# Patient Record
Sex: MALE | Race: WHITE | Employment: OTHER | URBAN - METROPOLITAN AREA
[De-identification: names, ages, dates, MRNs, and addresses within clinical notes are randomized per-mention and may not be internally consistent; named-entity substitution may affect disease eponyms.]

---

## 2017-01-03 ENCOUNTER — TELEPHONE (OUTPATIENT)
Dept: PULMONOLOGY | Age: 57
End: 2017-01-03

## 2017-01-03 ENCOUNTER — TELEPHONE (OUTPATIENT)
Dept: INTERNAL MEDICINE CLINIC | Age: 57
End: 2017-01-03

## 2017-01-04 ENCOUNTER — OFFICE VISIT (OUTPATIENT)
Dept: INTERNAL MEDICINE CLINIC | Age: 57
End: 2017-01-04

## 2017-01-04 ENCOUNTER — TELEPHONE (OUTPATIENT)
Dept: INTERNAL MEDICINE CLINIC | Age: 57
End: 2017-01-04

## 2017-01-04 VITALS
RESPIRATION RATE: 16 BRPM | SYSTOLIC BLOOD PRESSURE: 138 MMHG | DIASTOLIC BLOOD PRESSURE: 88 MMHG | HEART RATE: 88 BPM | TEMPERATURE: 97.9 F

## 2017-01-04 DIAGNOSIS — I10 ESSENTIAL HYPERTENSION, BENIGN: Primary | ICD-10-CM

## 2017-01-04 DIAGNOSIS — J01.00 ACUTE NON-RECURRENT MAXILLARY SINUSITIS: ICD-10-CM

## 2017-01-04 DIAGNOSIS — R73.02 GLUCOSE INTOLERANCE (IMPAIRED GLUCOSE TOLERANCE): ICD-10-CM

## 2017-01-04 PROCEDURE — 99213 OFFICE O/P EST LOW 20 MIN: CPT | Performed by: INTERNAL MEDICINE

## 2017-01-04 ASSESSMENT — ENCOUNTER SYMPTOMS
ABDOMINAL PAIN: 0
SHORTNESS OF BREATH: 0

## 2017-01-06 ENCOUNTER — ANTI-COAG VISIT (OUTPATIENT)
Dept: PHARMACY | Facility: CLINIC | Age: 57
End: 2017-01-06

## 2017-01-06 LAB — INR BLD: 2.4

## 2017-01-09 ENCOUNTER — TELEPHONE (OUTPATIENT)
Dept: PULMONOLOGY | Age: 57
End: 2017-01-09

## 2017-01-20 ENCOUNTER — HOSPITAL ENCOUNTER (OUTPATIENT)
Dept: OTHER | Age: 57
Discharge: HOME | End: 2017-01-20
Attending: INTERNAL MEDICINE | Admitting: INTERNAL MEDICINE

## 2017-01-20 ENCOUNTER — TELEPHONE (OUTPATIENT)
Dept: INTERNAL MEDICINE CLINIC | Age: 57
End: 2017-01-20

## 2017-01-20 DIAGNOSIS — J34.9 SINUS DISORDER: ICD-10-CM

## 2017-01-20 DIAGNOSIS — J34.9 SINUS DISORDER: Primary | ICD-10-CM

## 2017-01-20 RX ORDER — AZITHROMYCIN 250 MG/1
250 TABLET, FILM COATED ORAL DAILY
Qty: 15 TABLET | Refills: 0 | Status: SHIPPED | OUTPATIENT
Start: 2017-01-20 | End: 2017-03-27 | Stop reason: SDUPTHER

## 2017-01-20 RX ORDER — OXYCODONE HYDROCHLORIDE AND ACETAMINOPHEN 5; 325 MG/1; MG/1
1 TABLET ORAL EVERY 6 HOURS PRN
Qty: 60 TABLET | Refills: 0 | Status: SHIPPED | OUTPATIENT
Start: 2017-01-20 | End: 2017-03-08 | Stop reason: SDUPTHER

## 2017-01-25 RX ORDER — WARFARIN SODIUM 5 MG/1
TABLET ORAL
Qty: 105 TABLET | Refills: 3 | Status: SHIPPED | OUTPATIENT
Start: 2017-01-25 | End: 2017-02-02 | Stop reason: SDUPTHER

## 2017-01-26 ENCOUNTER — HOSPITAL ENCOUNTER (OUTPATIENT)
Dept: OTHER | Age: 57
Discharge: HOME | End: 2017-01-26
Attending: SURGERY | Admitting: SURGERY

## 2017-01-26 DIAGNOSIS — R05.9 COUGH: ICD-10-CM

## 2017-01-26 DIAGNOSIS — N20.0 URIC ACID NEPHROLITHIASIS: ICD-10-CM

## 2017-01-26 DIAGNOSIS — R06.02 SOB (SHORTNESS OF BREATH): ICD-10-CM

## 2017-01-30 ENCOUNTER — TELEPHONE (OUTPATIENT)
Dept: CARDIOLOGY CLINIC | Age: 57
End: 2017-01-30

## 2017-01-30 ENCOUNTER — TELEPHONE (OUTPATIENT)
Dept: INTERNAL MEDICINE CLINIC | Age: 57
End: 2017-01-30

## 2017-01-30 ENCOUNTER — OFFICE VISIT (OUTPATIENT)
Dept: INTERNAL MEDICINE CLINIC | Age: 57
End: 2017-01-30

## 2017-01-30 VITALS
SYSTOLIC BLOOD PRESSURE: 132 MMHG | BODY MASS INDEX: 32.47 KG/M2 | WEIGHT: 202 LBS | DIASTOLIC BLOOD PRESSURE: 76 MMHG | HEART RATE: 80 BPM | HEIGHT: 66 IN | RESPIRATION RATE: 16 BRPM

## 2017-01-30 DIAGNOSIS — J42 BRONCHIOLITIS OBLITERANS (HCC): Primary | ICD-10-CM

## 2017-01-30 DIAGNOSIS — R51.9 HEADACHE BEHIND THE EYES: ICD-10-CM

## 2017-01-30 DIAGNOSIS — M79.604 LEG PAIN, RIGHT: ICD-10-CM

## 2017-01-30 DIAGNOSIS — R06.02 SHORTNESS OF BREATH: ICD-10-CM

## 2017-01-30 PROCEDURE — 99214 OFFICE O/P EST MOD 30 MIN: CPT | Performed by: INTERNAL MEDICINE

## 2017-01-30 RX ORDER — AZELASTINE 1 MG/ML
1 SPRAY, METERED NASAL 2 TIMES DAILY
Qty: 1 BOTTLE | Refills: 3 | Status: SHIPPED | OUTPATIENT
Start: 2017-01-30 | End: 2017-11-14

## 2017-01-30 ASSESSMENT — ENCOUNTER SYMPTOMS
SHORTNESS OF BREATH: 1
ABDOMINAL PAIN: 0

## 2017-01-31 ENCOUNTER — TELEPHONE (OUTPATIENT)
Dept: INTERNAL MEDICINE CLINIC | Age: 57
End: 2017-01-31

## 2017-01-31 DIAGNOSIS — R51.9 HEADACHE BEHIND THE EYES: Primary | ICD-10-CM

## 2017-02-02 ENCOUNTER — ANTI-COAG VISIT (OUTPATIENT)
Dept: PHARMACY | Facility: CLINIC | Age: 57
End: 2017-02-02

## 2017-02-02 ENCOUNTER — HOSPITAL ENCOUNTER (OUTPATIENT)
Dept: MRI IMAGING | Age: 57
Discharge: HOME | End: 2017-02-02
Attending: INTERNAL MEDICINE | Admitting: INTERNAL MEDICINE

## 2017-02-02 ENCOUNTER — OFFICE VISIT (OUTPATIENT)
Age: 57
End: 2017-02-02

## 2017-02-02 VITALS
SYSTOLIC BLOOD PRESSURE: 118 MMHG | DIASTOLIC BLOOD PRESSURE: 74 MMHG | WEIGHT: 200.6 LBS | HEART RATE: 72 BPM | HEIGHT: 66 IN | OXYGEN SATURATION: 97 % | BODY MASS INDEX: 32.24 KG/M2

## 2017-02-02 DIAGNOSIS — I42.8 NONISCHEMIC CARDIOMYOPATHY (HCC): Primary | ICD-10-CM

## 2017-02-02 DIAGNOSIS — I44.7 LBBB (LEFT BUNDLE BRANCH BLOCK): ICD-10-CM

## 2017-02-02 DIAGNOSIS — R51.9 HEADACHE: ICD-10-CM

## 2017-02-02 DIAGNOSIS — E78.00 PURE HYPERCHOLESTEROLEMIA: ICD-10-CM

## 2017-02-02 DIAGNOSIS — R51.9 HEADACHE BEHIND THE EYES: ICD-10-CM

## 2017-02-02 DIAGNOSIS — R06.02 SHORTNESS OF BREATH: ICD-10-CM

## 2017-02-02 LAB — INR BLD: 1.8

## 2017-02-02 PROCEDURE — 99214 OFFICE O/P EST MOD 30 MIN: CPT | Performed by: INTERNAL MEDICINE

## 2017-02-02 RX ORDER — CARVEDILOL 6.25 MG/1
6.25 TABLET ORAL 2 TIMES DAILY WITH MEALS
Qty: 180 TABLET | Refills: 6 | Status: SHIPPED | OUTPATIENT
Start: 2017-02-02 | End: 2017-10-24 | Stop reason: SDUPTHER

## 2017-02-02 RX ORDER — PRAVASTATIN SODIUM 40 MG
40 TABLET ORAL NIGHTLY
Qty: 30 TABLET | Refills: 3 | Status: SHIPPED | OUTPATIENT
Start: 2017-02-02 | End: 2017-03-08

## 2017-02-10 ENCOUNTER — HOSPITAL ENCOUNTER (OUTPATIENT)
Dept: ULTRASOUND IMAGING | Age: 57
Discharge: HOME | End: 2017-02-10

## 2017-02-10 DIAGNOSIS — Z13.820 OSTEOPOROSIS SCREENING: ICD-10-CM

## 2017-02-10 DIAGNOSIS — E21.3 HYPERPARATHYROIDISM (HCC): ICD-10-CM

## 2017-02-13 ENCOUNTER — OFFICE VISIT (OUTPATIENT)
Dept: PULMONOLOGY | Age: 57
End: 2017-02-13

## 2017-02-13 ENCOUNTER — TELEPHONE (OUTPATIENT)
Dept: PHARMACY | Facility: CLINIC | Age: 57
End: 2017-02-13

## 2017-02-13 VITALS
HEIGHT: 66 IN | DIASTOLIC BLOOD PRESSURE: 78 MMHG | OXYGEN SATURATION: 94 % | WEIGHT: 198.6 LBS | BODY MASS INDEX: 31.92 KG/M2 | RESPIRATION RATE: 16 BRPM | TEMPERATURE: 98.5 F | SYSTOLIC BLOOD PRESSURE: 124 MMHG | HEART RATE: 87 BPM

## 2017-02-13 DIAGNOSIS — R91.8 LUNG NODULES: ICD-10-CM

## 2017-02-13 DIAGNOSIS — J42 BRONCHIOLITIS OBLITERANS (HCC): Primary | ICD-10-CM

## 2017-02-13 DIAGNOSIS — J20.9 ACUTE BRONCHITIS, UNSPECIFIED ORGANISM: ICD-10-CM

## 2017-02-13 PROCEDURE — 99205 OFFICE O/P NEW HI 60 MIN: CPT | Performed by: INTERNAL MEDICINE

## 2017-02-13 RX ORDER — ALBUTEROL SULFATE 2.5 MG/3ML
2.5 SOLUTION RESPIRATORY (INHALATION) 3 TIMES DAILY
Qty: 360 ML | Refills: 11 | Status: SHIPPED | OUTPATIENT
Start: 2017-02-13 | End: 2017-07-31 | Stop reason: ALTCHOICE

## 2017-02-13 RX ORDER — LEVOFLOXACIN 500 MG/1
500 TABLET, FILM COATED ORAL DAILY
Qty: 7 TABLET | Refills: 0 | Status: SHIPPED | OUTPATIENT
Start: 2017-02-13 | End: 2017-02-21 | Stop reason: SDUPTHER

## 2017-02-13 RX ORDER — ALBUTEROL SULFATE 90 UG/1
2 AEROSOL, METERED RESPIRATORY (INHALATION) EVERY 4 HOURS PRN
Qty: 1 INHALER | Refills: 11 | Status: SHIPPED | OUTPATIENT
Start: 2017-02-13 | End: 2017-05-14

## 2017-02-17 ENCOUNTER — ANTI-COAG VISIT (OUTPATIENT)
Dept: PHARMACY | Facility: CLINIC | Age: 57
End: 2017-02-17

## 2017-02-17 DIAGNOSIS — I26.01 CHRONIC SEPTIC PULMONARY EMBOLISM WITH ACUTE COR PULMONALE (HCC): ICD-10-CM

## 2017-02-17 DIAGNOSIS — I27.82 CHRONIC SEPTIC PULMONARY EMBOLISM WITH ACUTE COR PULMONALE (HCC): ICD-10-CM

## 2017-02-17 LAB — INR BLD: 1.7

## 2017-02-21 ENCOUNTER — OFFICE VISIT (OUTPATIENT)
Dept: INTERNAL MEDICINE CLINIC | Age: 57
End: 2017-02-21

## 2017-02-21 ENCOUNTER — TELEPHONE (OUTPATIENT)
Dept: INTERNAL MEDICINE CLINIC | Age: 57
End: 2017-02-21

## 2017-02-21 VITALS
WEIGHT: 200 LBS | BODY MASS INDEX: 32.14 KG/M2 | TEMPERATURE: 98.4 F | RESPIRATION RATE: 16 BRPM | DIASTOLIC BLOOD PRESSURE: 82 MMHG | SYSTOLIC BLOOD PRESSURE: 136 MMHG | HEIGHT: 66 IN | HEART RATE: 72 BPM

## 2017-02-21 DIAGNOSIS — J42 BRONCHIOLITIS OBLITERANS (HCC): Primary | ICD-10-CM

## 2017-02-21 DIAGNOSIS — I10 ESSENTIAL HYPERTENSION, BENIGN: ICD-10-CM

## 2017-02-21 DIAGNOSIS — R05.9 COUGH: ICD-10-CM

## 2017-02-21 PROCEDURE — 99213 OFFICE O/P EST LOW 20 MIN: CPT | Performed by: INTERNAL MEDICINE

## 2017-02-21 RX ORDER — LEVOFLOXACIN 500 MG/1
500 TABLET, FILM COATED ORAL DAILY
Qty: 7 TABLET | Refills: 0 | Status: SHIPPED | OUTPATIENT
Start: 2017-02-21 | End: 2017-02-28

## 2017-02-21 ASSESSMENT — ENCOUNTER SYMPTOMS
SHORTNESS OF BREATH: 0
ABDOMINAL PAIN: 0

## 2017-03-01 ENCOUNTER — ANTI-COAG VISIT (OUTPATIENT)
Dept: PHARMACY | Facility: CLINIC | Age: 57
End: 2017-03-01

## 2017-03-01 ENCOUNTER — TELEPHONE (OUTPATIENT)
Dept: INTERNAL MEDICINE CLINIC | Age: 57
End: 2017-03-01

## 2017-03-01 LAB — INR BLD: 1.7

## 2017-03-07 ENCOUNTER — TELEPHONE (OUTPATIENT)
Dept: INTERNAL MEDICINE CLINIC | Age: 57
End: 2017-03-07

## 2017-03-08 ENCOUNTER — OFFICE VISIT (OUTPATIENT)
Dept: INTERNAL MEDICINE CLINIC | Age: 57
End: 2017-03-08

## 2017-03-08 VITALS
SYSTOLIC BLOOD PRESSURE: 122 MMHG | DIASTOLIC BLOOD PRESSURE: 64 MMHG | HEART RATE: 72 BPM | RESPIRATION RATE: 16 BRPM | WEIGHT: 202 LBS | BODY MASS INDEX: 32.47 KG/M2 | HEIGHT: 66 IN

## 2017-03-08 DIAGNOSIS — E78.00 PURE HYPERCHOLESTEROLEMIA: ICD-10-CM

## 2017-03-08 DIAGNOSIS — I10 ESSENTIAL HYPERTENSION, BENIGN: ICD-10-CM

## 2017-03-08 DIAGNOSIS — G44.219 EPISODIC TENSION-TYPE HEADACHE, NOT INTRACTABLE: Primary | ICD-10-CM

## 2017-03-08 PROCEDURE — 99214 OFFICE O/P EST MOD 30 MIN: CPT | Performed by: INTERNAL MEDICINE

## 2017-03-08 RX ORDER — OXYCODONE HYDROCHLORIDE AND ACETAMINOPHEN 5; 325 MG/1; MG/1
1 TABLET ORAL EVERY 6 HOURS PRN
Qty: 60 TABLET | Refills: 0 | Status: SHIPPED | OUTPATIENT
Start: 2017-03-08 | End: 2017-05-14

## 2017-03-08 RX ORDER — RANITIDINE 150 MG/1
150 TABLET ORAL 2 TIMES DAILY
Qty: 60 TABLET | Refills: 3 | Status: SHIPPED | OUTPATIENT
Start: 2017-03-08 | End: 2017-04-19 | Stop reason: ALTCHOICE

## 2017-03-08 ASSESSMENT — ENCOUNTER SYMPTOMS
ABDOMINAL PAIN: 0
SHORTNESS OF BREATH: 0

## 2017-03-15 ENCOUNTER — ANTI-COAG VISIT (OUTPATIENT)
Dept: PHARMACY | Facility: CLINIC | Age: 57
End: 2017-03-15

## 2017-03-15 DIAGNOSIS — I27.82 CHRONIC SEPTIC PULMONARY EMBOLISM WITH ACUTE COR PULMONALE (HCC): ICD-10-CM

## 2017-03-15 DIAGNOSIS — I26.01 CHRONIC SEPTIC PULMONARY EMBOLISM WITH ACUTE COR PULMONALE (HCC): ICD-10-CM

## 2017-03-15 LAB — INR BLD: 1.7

## 2017-03-17 ENCOUNTER — TELEPHONE (OUTPATIENT)
Dept: INTERNAL MEDICINE CLINIC | Age: 57
End: 2017-03-17

## 2017-03-17 RX ORDER — CARISOPRODOL 350 MG/1
TABLET ORAL
Qty: 60 TABLET | Refills: 0 | Status: SHIPPED | OUTPATIENT
Start: 2017-03-17 | End: 2017-05-14

## 2017-03-27 RX ORDER — AZITHROMYCIN 250 MG/1
TABLET, FILM COATED ORAL
Qty: 15 TABLET | Refills: 0 | Status: SHIPPED | OUTPATIENT
Start: 2017-03-27 | End: 2017-05-14

## 2017-03-28 ENCOUNTER — ANTI-COAG VISIT (OUTPATIENT)
Dept: PHARMACY | Facility: CLINIC | Age: 57
End: 2017-03-28

## 2017-03-28 DIAGNOSIS — I27.82 CHRONIC SEPTIC PULMONARY EMBOLISM WITH ACUTE COR PULMONALE (HCC): ICD-10-CM

## 2017-03-28 DIAGNOSIS — I26.01 CHRONIC SEPTIC PULMONARY EMBOLISM WITH ACUTE COR PULMONALE (HCC): ICD-10-CM

## 2017-03-28 LAB — INR BLD: 3.8

## 2017-04-06 ENCOUNTER — ANTI-COAG VISIT (OUTPATIENT)
Dept: PHARMACY | Facility: CLINIC | Age: 57
End: 2017-04-06

## 2017-04-06 DIAGNOSIS — I26.01 CHRONIC SEPTIC PULMONARY EMBOLISM WITH ACUTE COR PULMONALE (HCC): ICD-10-CM

## 2017-04-06 DIAGNOSIS — I27.82 CHRONIC SEPTIC PULMONARY EMBOLISM WITH ACUTE COR PULMONALE (HCC): ICD-10-CM

## 2017-04-06 LAB — INR BLD: 2.1

## 2017-04-13 ENCOUNTER — HOSPITAL ENCOUNTER (OUTPATIENT)
Dept: OTHER | Age: 57
Discharge: HOME | End: 2017-04-13
Attending: SURGERY | Admitting: SURGERY

## 2017-04-13 DIAGNOSIS — N20.0 URIC ACID NEPHROLITHIASIS: ICD-10-CM

## 2017-04-14 ENCOUNTER — OFFICE VISIT (OUTPATIENT)
Dept: INTERNAL MEDICINE CLINIC | Age: 57
End: 2017-04-14

## 2017-04-14 ENCOUNTER — TELEPHONE (OUTPATIENT)
Dept: INTERNAL MEDICINE CLINIC | Age: 57
End: 2017-04-14

## 2017-04-14 VITALS
DIASTOLIC BLOOD PRESSURE: 82 MMHG | HEART RATE: 67 BPM | BODY MASS INDEX: 31.92 KG/M2 | OXYGEN SATURATION: 98 % | RESPIRATION RATE: 16 BRPM | WEIGHT: 198.6 LBS | TEMPERATURE: 97.7 F | HEIGHT: 66 IN | SYSTOLIC BLOOD PRESSURE: 120 MMHG

## 2017-04-14 DIAGNOSIS — J01.11 ACUTE RECURRENT FRONTAL SINUSITIS: Primary | ICD-10-CM

## 2017-04-14 PROCEDURE — 99214 OFFICE O/P EST MOD 30 MIN: CPT | Performed by: NURSE PRACTITIONER

## 2017-04-14 RX ORDER — CEFUROXIME AXETIL 250 MG/1
250 TABLET ORAL 2 TIMES DAILY
Qty: 20 TABLET | Refills: 0 | Status: SHIPPED | OUTPATIENT
Start: 2017-04-14 | End: 2017-04-19 | Stop reason: ALTCHOICE

## 2017-04-14 ASSESSMENT — ENCOUNTER SYMPTOMS
RHINORRHEA: 1
WHEEZING: 0
SORE THROAT: 1
SHORTNESS OF BREATH: 0
COUGH: 1
SINUS PRESSURE: 1

## 2017-04-17 RX ORDER — ZOLPIDEM TARTRATE 10 MG/1
TABLET ORAL
Qty: 90 TABLET | Refills: 0 | Status: SHIPPED | OUTPATIENT
Start: 2017-04-17 | End: 2017-07-18 | Stop reason: SDUPTHER

## 2017-04-18 ENCOUNTER — TELEPHONE (OUTPATIENT)
Dept: INTERNAL MEDICINE CLINIC | Age: 57
End: 2017-04-18

## 2017-04-18 DIAGNOSIS — R05.9 COUGH: Primary | ICD-10-CM

## 2017-04-18 RX ORDER — LEVOFLOXACIN 500 MG/1
500 TABLET, FILM COATED ORAL DAILY
Qty: 10 TABLET | Refills: 0 | Status: SHIPPED | OUTPATIENT
Start: 2017-04-18 | End: 2017-04-28

## 2017-04-19 ENCOUNTER — ANTI-COAG VISIT (OUTPATIENT)
Dept: PHARMACY | Facility: CLINIC | Age: 57
End: 2017-04-19

## 2017-04-19 LAB — INR BLD: 2.5

## 2017-04-20 ENCOUNTER — TELEPHONE (OUTPATIENT)
Dept: INTERNAL MEDICINE CLINIC | Age: 57
End: 2017-04-20

## 2017-04-20 ENCOUNTER — HOSPITAL ENCOUNTER (OUTPATIENT)
Dept: OTHER | Age: 57
Discharge: HOME | End: 2017-04-20
Attending: INTERNAL MEDICINE | Admitting: INTERNAL MEDICINE

## 2017-04-20 DIAGNOSIS — R05.9 COUGH: ICD-10-CM

## 2017-04-20 RX ORDER — ONDANSETRON 4 MG/1
4-8 TABLET, ORALLY DISINTEGRATING ORAL EVERY 8 HOURS PRN
Qty: 20 TABLET | Refills: 0 | Status: SHIPPED | OUTPATIENT
Start: 2017-04-20 | End: 2018-06-04 | Stop reason: SDUPTHER

## 2017-04-21 ENCOUNTER — OFFICE VISIT (OUTPATIENT)
Dept: INTERNAL MEDICINE CLINIC | Age: 57
End: 2017-04-21

## 2017-04-21 VITALS
WEIGHT: 197 LBS | RESPIRATION RATE: 16 BRPM | DIASTOLIC BLOOD PRESSURE: 80 MMHG | OXYGEN SATURATION: 97 % | HEIGHT: 66 IN | BODY MASS INDEX: 31.66 KG/M2 | SYSTOLIC BLOOD PRESSURE: 132 MMHG | HEART RATE: 80 BPM

## 2017-04-21 DIAGNOSIS — J32.9 SINUSITIS, UNSPECIFIED CHRONICITY, UNSPECIFIED LOCATION: Primary | ICD-10-CM

## 2017-04-21 DIAGNOSIS — I10 ESSENTIAL HYPERTENSION, BENIGN: ICD-10-CM

## 2017-04-21 DIAGNOSIS — R11.0 NAUSEA: ICD-10-CM

## 2017-04-21 PROCEDURE — 99214 OFFICE O/P EST MOD 30 MIN: CPT | Performed by: INTERNAL MEDICINE

## 2017-04-21 ASSESSMENT — ENCOUNTER SYMPTOMS
ABDOMINAL PAIN: 0
SHORTNESS OF BREATH: 0

## 2017-04-24 ENCOUNTER — TELEPHONE (OUTPATIENT)
Dept: INTERNAL MEDICINE CLINIC | Age: 57
End: 2017-04-24

## 2017-05-01 ENCOUNTER — TELEPHONE (OUTPATIENT)
Dept: INTERNAL MEDICINE CLINIC | Age: 57
End: 2017-05-01

## 2017-05-02 RX ORDER — PREDNISONE 10 MG/1
10 TABLET ORAL DAILY
Qty: 30 TABLET | Refills: 0 | Status: SHIPPED | OUTPATIENT
Start: 2017-05-02 | End: 2017-05-14

## 2017-05-03 ENCOUNTER — ANTI-COAG VISIT (OUTPATIENT)
Dept: PHARMACY | Facility: CLINIC | Age: 57
End: 2017-05-03

## 2017-05-03 LAB — INR BLD: 3

## 2017-05-08 ENCOUNTER — TELEPHONE (OUTPATIENT)
Dept: INTERNAL MEDICINE CLINIC | Age: 57
End: 2017-05-08

## 2017-05-16 ENCOUNTER — TELEPHONE (OUTPATIENT)
Dept: PHARMACY | Facility: CLINIC | Age: 57
End: 2017-05-16

## 2017-05-16 ENCOUNTER — OFFICE VISIT (OUTPATIENT)
Dept: PULMONOLOGY | Age: 57
End: 2017-05-16

## 2017-05-16 VITALS
SYSTOLIC BLOOD PRESSURE: 126 MMHG | TEMPERATURE: 98.2 F | WEIGHT: 195.6 LBS | HEIGHT: 66 IN | BODY MASS INDEX: 31.43 KG/M2 | HEART RATE: 88 BPM | RESPIRATION RATE: 16 BRPM | DIASTOLIC BLOOD PRESSURE: 86 MMHG | OXYGEN SATURATION: 96 %

## 2017-05-16 DIAGNOSIS — J42 BRONCHIOLITIS OBLITERANS (HCC): Primary | ICD-10-CM

## 2017-05-16 DIAGNOSIS — R91.8 LUNG NODULES: ICD-10-CM

## 2017-05-16 PROCEDURE — 99214 OFFICE O/P EST MOD 30 MIN: CPT | Performed by: INTERNAL MEDICINE

## 2017-05-17 ENCOUNTER — ANTI-COAG VISIT (OUTPATIENT)
Dept: PHARMACY | Facility: CLINIC | Age: 57
End: 2017-05-17

## 2017-05-17 DIAGNOSIS — I26.01 CHRONIC SEPTIC PULMONARY EMBOLISM WITH ACUTE COR PULMONALE (HCC): ICD-10-CM

## 2017-05-17 DIAGNOSIS — I27.82 CHRONIC SEPTIC PULMONARY EMBOLISM WITH ACUTE COR PULMONALE (HCC): ICD-10-CM

## 2017-05-17 LAB — INR BLD: 6.2

## 2017-05-19 ENCOUNTER — TELEPHONE (OUTPATIENT)
Dept: INTERNAL MEDICINE CLINIC | Age: 57
End: 2017-05-19

## 2017-05-19 ENCOUNTER — ANTI-COAG VISIT (OUTPATIENT)
Dept: PHARMACY | Facility: CLINIC | Age: 57
End: 2017-05-19

## 2017-05-19 DIAGNOSIS — I26.01 CHRONIC SEPTIC PULMONARY EMBOLISM WITH ACUTE COR PULMONALE (HCC): ICD-10-CM

## 2017-05-19 DIAGNOSIS — I27.82 CHRONIC SEPTIC PULMONARY EMBOLISM WITH ACUTE COR PULMONALE (HCC): ICD-10-CM

## 2017-05-19 LAB — INR BLD: 3

## 2017-05-22 ENCOUNTER — OFFICE VISIT (OUTPATIENT)
Dept: INTERNAL MEDICINE CLINIC | Age: 57
End: 2017-05-22

## 2017-05-22 VITALS
BODY MASS INDEX: 31.18 KG/M2 | SYSTOLIC BLOOD PRESSURE: 136 MMHG | DIASTOLIC BLOOD PRESSURE: 84 MMHG | HEART RATE: 80 BPM | RESPIRATION RATE: 16 BRPM | HEIGHT: 66 IN | WEIGHT: 194 LBS

## 2017-05-22 DIAGNOSIS — I10 ESSENTIAL HYPERTENSION, BENIGN: ICD-10-CM

## 2017-05-22 DIAGNOSIS — R10.30 LOWER ABDOMINAL PAIN: Primary | ICD-10-CM

## 2017-05-22 DIAGNOSIS — E78.00 PURE HYPERCHOLESTEROLEMIA: ICD-10-CM

## 2017-05-22 PROCEDURE — 99214 OFFICE O/P EST MOD 30 MIN: CPT | Performed by: INTERNAL MEDICINE

## 2017-05-22 ASSESSMENT — ENCOUNTER SYMPTOMS
ABDOMINAL PAIN: 0
SHORTNESS OF BREATH: 0

## 2017-06-01 ENCOUNTER — TELEPHONE (OUTPATIENT)
Dept: PHARMACY | Facility: CLINIC | Age: 57
End: 2017-06-01

## 2017-06-05 ENCOUNTER — ANTI-COAG VISIT (OUTPATIENT)
Dept: PHARMACY | Facility: CLINIC | Age: 57
End: 2017-06-05

## 2017-06-05 LAB — INR BLD: 4.5

## 2017-06-06 ENCOUNTER — TELEPHONE (OUTPATIENT)
Dept: INTERNAL MEDICINE CLINIC | Age: 57
End: 2017-06-06

## 2017-06-08 ENCOUNTER — TELEPHONE (OUTPATIENT)
Dept: INTERNAL MEDICINE CLINIC | Age: 57
End: 2017-06-08

## 2017-06-09 ENCOUNTER — NURSE ONLY (OUTPATIENT)
Dept: INTERNAL MEDICINE CLINIC | Age: 57
End: 2017-06-09

## 2017-06-09 DIAGNOSIS — M54.6 CHRONIC THORACIC BACK PAIN, UNSPECIFIED BACK PAIN LATERALITY: Primary | ICD-10-CM

## 2017-06-09 DIAGNOSIS — G89.29 CHRONIC THORACIC BACK PAIN, UNSPECIFIED BACK PAIN LATERALITY: Primary | ICD-10-CM

## 2017-06-09 PROCEDURE — 96372 THER/PROPH/DIAG INJ SC/IM: CPT | Performed by: INTERNAL MEDICINE

## 2017-06-09 RX ORDER — METHYLPREDNISOLONE ACETATE 80 MG/ML
80 INJECTION, SUSPENSION INTRA-ARTICULAR; INTRALESIONAL; INTRAMUSCULAR; SOFT TISSUE ONCE
Status: COMPLETED | OUTPATIENT
Start: 2017-06-09 | End: 2017-06-09

## 2017-06-09 RX ADMIN — METHYLPREDNISOLONE ACETATE 80 MG: 80 INJECTION, SUSPENSION INTRA-ARTICULAR; INTRALESIONAL; INTRAMUSCULAR; SOFT TISSUE at 13:44

## 2017-06-19 ENCOUNTER — TELEPHONE (OUTPATIENT)
Dept: INTERNAL MEDICINE CLINIC | Age: 57
End: 2017-06-19

## 2017-06-19 ENCOUNTER — ANTI-COAG VISIT (OUTPATIENT)
Dept: PHARMACY | Facility: CLINIC | Age: 57
End: 2017-06-19

## 2017-06-19 DIAGNOSIS — I27.82 CHRONIC SEPTIC PULMONARY EMBOLISM WITH ACUTE COR PULMONALE (HCC): ICD-10-CM

## 2017-06-19 DIAGNOSIS — I26.01 CHRONIC SEPTIC PULMONARY EMBOLISM WITH ACUTE COR PULMONALE (HCC): ICD-10-CM

## 2017-06-19 LAB — INR BLD: 2.3

## 2017-07-10 ENCOUNTER — TELEPHONE (OUTPATIENT)
Dept: CARDIOLOGY CLINIC | Age: 57
End: 2017-07-10

## 2017-07-10 ENCOUNTER — TELEPHONE (OUTPATIENT)
Dept: PULMONOLOGY | Age: 57
End: 2017-07-10

## 2017-07-11 RX ORDER — PRAVASTATIN SODIUM 40 MG
40 TABLET ORAL DAILY
Qty: 30 TABLET | Refills: 3 | Status: SHIPPED | OUTPATIENT
Start: 2017-07-11 | End: 2017-09-21 | Stop reason: SDUPTHER

## 2017-07-13 ENCOUNTER — ANTI-COAG VISIT (OUTPATIENT)
Dept: PHARMACY | Facility: CLINIC | Age: 57
End: 2017-07-13

## 2017-07-13 DIAGNOSIS — I26.01 CHRONIC SEPTIC PULMONARY EMBOLISM WITH ACUTE COR PULMONALE (HCC): ICD-10-CM

## 2017-07-13 DIAGNOSIS — I27.82 CHRONIC SEPTIC PULMONARY EMBOLISM WITH ACUTE COR PULMONALE (HCC): ICD-10-CM

## 2017-07-13 LAB — INR BLD: 3.8

## 2017-07-18 RX ORDER — ZOLPIDEM TARTRATE 10 MG/1
TABLET ORAL
Qty: 90 TABLET | Refills: 1 | Status: SHIPPED | OUTPATIENT
Start: 2017-07-18 | End: 2018-01-09 | Stop reason: SDUPTHER

## 2017-07-21 ENCOUNTER — TELEPHONE (OUTPATIENT)
Dept: INTERNAL MEDICINE CLINIC | Age: 57
End: 2017-07-21

## 2017-07-21 RX ORDER — OXYCODONE HYDROCHLORIDE AND ACETAMINOPHEN 5; 325 MG/1; MG/1
1 TABLET ORAL EVERY 6 HOURS PRN
Qty: 16 TABLET | Refills: 0 | Status: ON HOLD | OUTPATIENT
Start: 2017-07-21 | End: 2017-07-25 | Stop reason: HOSPADM

## 2017-07-24 PROBLEM — I42.9 CARDIOMYOPATHY (HCC): Status: ACTIVE | Noted: 2017-07-24

## 2017-07-24 PROBLEM — G45.9 TIA (TRANSIENT ISCHEMIC ATTACK): Status: ACTIVE | Noted: 2017-07-24

## 2017-07-24 PROBLEM — Z86.711 HISTORY OF PULMONARY EMBOLISM: Status: ACTIVE | Noted: 2017-07-24

## 2017-07-25 ENCOUNTER — TELEPHONE (OUTPATIENT)
Dept: PHARMACY | Facility: CLINIC | Age: 57
End: 2017-07-25

## 2017-07-26 ENCOUNTER — CARE COORDINATION (OUTPATIENT)
Dept: CARE COORDINATION | Age: 57
End: 2017-07-26

## 2017-07-31 ENCOUNTER — OFFICE VISIT (OUTPATIENT)
Dept: INTERNAL MEDICINE CLINIC | Age: 57
End: 2017-07-31

## 2017-07-31 ENCOUNTER — ANTI-COAG VISIT (OUTPATIENT)
Dept: PHARMACY | Facility: CLINIC | Age: 57
End: 2017-07-31

## 2017-07-31 VITALS
DIASTOLIC BLOOD PRESSURE: 80 MMHG | OXYGEN SATURATION: 98 % | HEART RATE: 70 BPM | BODY MASS INDEX: 32.28 KG/M2 | SYSTOLIC BLOOD PRESSURE: 128 MMHG | WEIGHT: 200 LBS | TEMPERATURE: 97 F

## 2017-07-31 DIAGNOSIS — I27.82 CHRONIC SEPTIC PULMONARY EMBOLISM WITH ACUTE COR PULMONALE (HCC): ICD-10-CM

## 2017-07-31 DIAGNOSIS — H61.23 BILATERAL IMPACTED CERUMEN: ICD-10-CM

## 2017-07-31 DIAGNOSIS — I26.01 CHRONIC SEPTIC PULMONARY EMBOLISM WITH ACUTE COR PULMONALE (HCC): ICD-10-CM

## 2017-07-31 DIAGNOSIS — J01.90 ACUTE SINUSITIS, RECURRENCE NOT SPECIFIED, UNSPECIFIED LOCATION: Primary | ICD-10-CM

## 2017-07-31 LAB — INR BLD: 2.8

## 2017-07-31 PROCEDURE — 99214 OFFICE O/P EST MOD 30 MIN: CPT | Performed by: INTERNAL MEDICINE

## 2017-07-31 RX ORDER — OXYCODONE HYDROCHLORIDE AND ACETAMINOPHEN 5; 325 MG/1; MG/1
1 TABLET ORAL EVERY 6 HOURS PRN
Qty: 60 TABLET | Refills: 0 | Status: SHIPPED | OUTPATIENT
Start: 2017-07-31 | End: 2017-08-03

## 2017-07-31 ASSESSMENT — ENCOUNTER SYMPTOMS
NAUSEA: 0
WHEEZING: 0
VOMITING: 0
SINUS PRESSURE: 1
DIARRHEA: 0
TROUBLE SWALLOWING: 0
ABDOMINAL PAIN: 0
COUGH: 1
RHINORRHEA: 1
SORE THROAT: 0
SHORTNESS OF BREATH: 0

## 2017-08-04 ENCOUNTER — TELEPHONE (OUTPATIENT)
Dept: INTERNAL MEDICINE CLINIC | Age: 57
End: 2017-08-04

## 2017-08-14 ENCOUNTER — HOSPITAL ENCOUNTER (OUTPATIENT)
Dept: OTHER | Age: 57
Discharge: HOME | End: 2017-08-14
Attending: SURGERY | Admitting: SURGERY

## 2017-08-14 DIAGNOSIS — N20.0 KIDNEY STONE: ICD-10-CM

## 2017-08-14 PROBLEM — R29.898 RIGHT ARM WEAKNESS: Status: ACTIVE | Noted: 2017-08-14

## 2017-08-14 ASSESSMENT — ENCOUNTER SYMPTOMS
SHORTNESS OF BREATH: 0
ABDOMINAL PAIN: 0

## 2017-08-18 ENCOUNTER — TELEPHONE (OUTPATIENT)
Dept: INTERNAL MEDICINE CLINIC | Age: 57
End: 2017-08-18

## 2017-08-18 ENCOUNTER — OFFICE VISIT (OUTPATIENT)
Dept: INTERNAL MEDICINE CLINIC | Age: 57
End: 2017-08-18

## 2017-08-18 DIAGNOSIS — R07.89 ATYPICAL CHEST PAIN: ICD-10-CM

## 2017-08-18 DIAGNOSIS — I42.9 CARDIOMYOPATHY, UNSPECIFIED TYPE (HCC): ICD-10-CM

## 2017-08-18 DIAGNOSIS — I10 ESSENTIAL HYPERTENSION, BENIGN: Primary | ICD-10-CM

## 2017-08-18 DIAGNOSIS — R29.898 RIGHT ARM WEAKNESS: ICD-10-CM

## 2017-08-18 DIAGNOSIS — R79.1 ELEVATED INR: ICD-10-CM

## 2017-08-18 DIAGNOSIS — E78.00 PURE HYPERCHOLESTEROLEMIA: ICD-10-CM

## 2017-08-21 ENCOUNTER — ANTI-COAG VISIT (OUTPATIENT)
Dept: PHARMACY | Facility: CLINIC | Age: 57
End: 2017-08-21

## 2017-08-21 DIAGNOSIS — I27.82 CHRONIC SEPTIC PULMONARY EMBOLISM WITH ACUTE COR PULMONALE (HCC): ICD-10-CM

## 2017-08-21 DIAGNOSIS — I26.01 CHRONIC SEPTIC PULMONARY EMBOLISM WITH ACUTE COR PULMONALE (HCC): ICD-10-CM

## 2017-08-21 LAB — INR BLD: 1.4

## 2017-08-22 ENCOUNTER — OFFICE VISIT (OUTPATIENT)
Dept: INTERNAL MEDICINE CLINIC | Age: 57
End: 2017-08-22

## 2017-08-22 VITALS
SYSTOLIC BLOOD PRESSURE: 120 MMHG | RESPIRATION RATE: 16 BRPM | HEIGHT: 66 IN | WEIGHT: 196 LBS | DIASTOLIC BLOOD PRESSURE: 82 MMHG | HEART RATE: 84 BPM | BODY MASS INDEX: 31.5 KG/M2

## 2017-08-22 DIAGNOSIS — I42.9 CARDIOMYOPATHY, UNSPECIFIED TYPE (HCC): ICD-10-CM

## 2017-08-22 DIAGNOSIS — R07.2 PRECORDIAL PAIN: Primary | ICD-10-CM

## 2017-08-22 DIAGNOSIS — F05 ACUTE CONFUSIONAL STATE: ICD-10-CM

## 2017-08-22 PROCEDURE — 99213 OFFICE O/P EST LOW 20 MIN: CPT | Performed by: INTERNAL MEDICINE

## 2017-08-22 ASSESSMENT — ENCOUNTER SYMPTOMS
ABDOMINAL PAIN: 0
SHORTNESS OF BREATH: 0

## 2017-08-25 ENCOUNTER — TELEPHONE (OUTPATIENT)
Dept: INTERNAL MEDICINE CLINIC | Age: 57
End: 2017-08-25

## 2017-08-28 ENCOUNTER — OFFICE VISIT (OUTPATIENT)
Dept: CARDIOLOGY CLINIC | Age: 57
End: 2017-08-28

## 2017-08-28 VITALS
SYSTOLIC BLOOD PRESSURE: 132 MMHG | HEIGHT: 66 IN | DIASTOLIC BLOOD PRESSURE: 86 MMHG | BODY MASS INDEX: 31.66 KG/M2 | HEART RATE: 66 BPM | WEIGHT: 197 LBS | OXYGEN SATURATION: 98 %

## 2017-08-28 DIAGNOSIS — I44.7 LBBB (LEFT BUNDLE BRANCH BLOCK): ICD-10-CM

## 2017-08-28 DIAGNOSIS — I10 ESSENTIAL HYPERTENSION: ICD-10-CM

## 2017-08-28 DIAGNOSIS — I42.8 NONISCHEMIC CARDIOMYOPATHY (HCC): Primary | ICD-10-CM

## 2017-08-28 DIAGNOSIS — E78.2 MIXED HYPERLIPIDEMIA: ICD-10-CM

## 2017-08-28 PROCEDURE — 93000 ELECTROCARDIOGRAM COMPLETE: CPT | Performed by: INTERNAL MEDICINE

## 2017-08-28 PROCEDURE — 99205 OFFICE O/P NEW HI 60 MIN: CPT | Performed by: INTERNAL MEDICINE

## 2017-08-29 ENCOUNTER — TELEPHONE (OUTPATIENT)
Dept: CARDIOLOGY CLINIC | Age: 57
End: 2017-08-29

## 2017-09-01 ENCOUNTER — ANTI-COAG VISIT (OUTPATIENT)
Dept: PHARMACY | Facility: CLINIC | Age: 57
End: 2017-09-01

## 2017-09-01 ENCOUNTER — TELEPHONE (OUTPATIENT)
Dept: INTERNAL MEDICINE CLINIC | Age: 57
End: 2017-09-01

## 2017-09-01 ENCOUNTER — OFFICE VISIT (OUTPATIENT)
Dept: INTERNAL MEDICINE CLINIC | Age: 57
End: 2017-09-01

## 2017-09-01 VITALS
SYSTOLIC BLOOD PRESSURE: 134 MMHG | BODY MASS INDEX: 31.66 KG/M2 | HEIGHT: 66 IN | RESPIRATION RATE: 16 BRPM | WEIGHT: 197 LBS | DIASTOLIC BLOOD PRESSURE: 86 MMHG | HEART RATE: 68 BPM

## 2017-09-01 DIAGNOSIS — I26.01 CHRONIC SEPTIC PULMONARY EMBOLISM WITH ACUTE COR PULMONALE (HCC): ICD-10-CM

## 2017-09-01 DIAGNOSIS — R10.30 LOWER ABDOMINAL PAIN: Primary | ICD-10-CM

## 2017-09-01 DIAGNOSIS — I27.82 CHRONIC SEPTIC PULMONARY EMBOLISM WITH ACUTE COR PULMONALE (HCC): ICD-10-CM

## 2017-09-01 DIAGNOSIS — I10 ESSENTIAL HYPERTENSION, BENIGN: ICD-10-CM

## 2017-09-01 DIAGNOSIS — I42.8 NONISCHEMIC CARDIOMYOPATHY (HCC): ICD-10-CM

## 2017-09-01 DIAGNOSIS — N20.1 URETERAL STONE: ICD-10-CM

## 2017-09-01 DIAGNOSIS — I44.7 LBBB (LEFT BUNDLE BRANCH BLOCK): ICD-10-CM

## 2017-09-01 LAB
BILIRUBIN, POC: NORMAL
BLOOD URINE, POC: NORMAL
CLARITY, POC: NORMAL
COLOR, POC: NORMAL
GLUCOSE URINE, POC: NORMAL
INR BLD: 3.2
KETONES, POC: NORMAL
LEUKOCYTE EST, POC: NORMAL
NITRITE, POC: NORMAL
PH, POC: 7.5
PROTEIN, POC: NORMAL
SPECIFIC GRAVITY, POC: 1.01
UROBILINOGEN, POC: 0.2

## 2017-09-01 PROCEDURE — 81002 URINALYSIS NONAUTO W/O SCOPE: CPT | Performed by: INTERNAL MEDICINE

## 2017-09-01 PROCEDURE — 99214 OFFICE O/P EST MOD 30 MIN: CPT | Performed by: INTERNAL MEDICINE

## 2017-09-01 RX ORDER — TAMSULOSIN HYDROCHLORIDE 0.4 MG/1
0.4 CAPSULE ORAL 2 TIMES DAILY
Qty: 60 CAPSULE | Refills: 1 | Status: SHIPPED | OUTPATIENT
Start: 2017-09-01 | End: 2017-12-29 | Stop reason: SDUPTHER

## 2017-09-01 ASSESSMENT — ENCOUNTER SYMPTOMS
ABDOMINAL PAIN: 0
SHORTNESS OF BREATH: 0

## 2017-09-11 ENCOUNTER — TELEPHONE (OUTPATIENT)
Dept: INTERNAL MEDICINE CLINIC | Age: 57
End: 2017-09-11

## 2017-09-13 ENCOUNTER — OFFICE VISIT (OUTPATIENT)
Dept: INTERNAL MEDICINE CLINIC | Age: 57
End: 2017-09-13

## 2017-09-13 VITALS
HEART RATE: 86 BPM | DIASTOLIC BLOOD PRESSURE: 84 MMHG | TEMPERATURE: 98.3 F | RESPIRATION RATE: 16 BRPM | OXYGEN SATURATION: 96 % | BODY MASS INDEX: 31.96 KG/M2 | SYSTOLIC BLOOD PRESSURE: 130 MMHG | WEIGHT: 198 LBS

## 2017-09-13 DIAGNOSIS — J01.21 ACUTE RECURRENT ETHMOIDAL SINUSITIS: Primary | ICD-10-CM

## 2017-09-13 PROCEDURE — 99213 OFFICE O/P EST LOW 20 MIN: CPT | Performed by: INTERNAL MEDICINE

## 2017-09-13 RX ORDER — AMOXICILLIN AND CLAVULANATE POTASSIUM 875; 125 MG/1; MG/1
1 TABLET, FILM COATED ORAL 2 TIMES DAILY
Qty: 20 TABLET | Refills: 0 | Status: SHIPPED | OUTPATIENT
Start: 2017-09-13 | End: 2017-09-23

## 2017-09-13 ASSESSMENT — ENCOUNTER SYMPTOMS: COUGH: 1

## 2017-09-14 ENCOUNTER — TELEPHONE (OUTPATIENT)
Dept: PHARMACY | Facility: CLINIC | Age: 57
End: 2017-09-14

## 2017-09-20 ENCOUNTER — TELEPHONE (OUTPATIENT)
Dept: INTERNAL MEDICINE CLINIC | Age: 57
End: 2017-09-20

## 2017-09-20 ENCOUNTER — ANTI-COAG VISIT (OUTPATIENT)
Dept: PHARMACY | Facility: CLINIC | Age: 57
End: 2017-09-20

## 2017-09-20 ENCOUNTER — TELEPHONE (OUTPATIENT)
Dept: CARDIOLOGY CLINIC | Age: 57
End: 2017-09-20

## 2017-09-20 DIAGNOSIS — I26.01 CHRONIC SEPTIC PULMONARY EMBOLISM WITH ACUTE COR PULMONALE (HCC): ICD-10-CM

## 2017-09-20 DIAGNOSIS — I27.82 CHRONIC SEPTIC PULMONARY EMBOLISM WITH ACUTE COR PULMONALE (HCC): ICD-10-CM

## 2017-09-20 LAB — INR BLD: 2.7

## 2017-09-20 RX ORDER — METHOCARBAMOL 500 MG/1
500 TABLET, FILM COATED ORAL 2 TIMES DAILY
COMMUNITY
End: 2017-10-03

## 2017-09-20 RX ORDER — CEFUROXIME AXETIL 250 MG/1
250 TABLET ORAL 2 TIMES DAILY
Qty: 20 TABLET | Refills: 0 | Status: SHIPPED | OUTPATIENT
Start: 2017-09-20 | End: 2017-09-30

## 2017-09-21 RX ORDER — PRAVASTATIN SODIUM 40 MG
40 TABLET ORAL DAILY
Qty: 90 TABLET | Refills: 3 | Status: SHIPPED | OUTPATIENT
Start: 2017-09-21 | End: 2018-08-27 | Stop reason: SDUPTHER

## 2017-09-28 RX ORDER — POTASSIUM CHLORIDE 750 MG/1
TABLET, EXTENDED RELEASE ORAL
Qty: 360 TABLET | Refills: 3 | Status: ON HOLD | OUTPATIENT
Start: 2017-09-28 | End: 2018-04-11 | Stop reason: HOSPADM

## 2017-09-29 ENCOUNTER — HOSPITAL ENCOUNTER (OUTPATIENT)
Dept: OTHER | Age: 57
Discharge: HOME | End: 2017-09-29
Attending: SURGERY | Admitting: SURGERY

## 2017-09-29 DIAGNOSIS — N20.0 CALCULUS OF KIDNEY: ICD-10-CM

## 2017-10-03 ENCOUNTER — OFFICE VISIT (OUTPATIENT)
Dept: INTERNAL MEDICINE CLINIC | Age: 57
End: 2017-10-03

## 2017-10-03 VITALS
WEIGHT: 197 LBS | DIASTOLIC BLOOD PRESSURE: 82 MMHG | SYSTOLIC BLOOD PRESSURE: 130 MMHG | TEMPERATURE: 97.8 F | HEART RATE: 76 BPM | RESPIRATION RATE: 16 BRPM | BODY MASS INDEX: 31.66 KG/M2 | HEIGHT: 66 IN

## 2017-10-03 DIAGNOSIS — Z86.711 HISTORY OF PULMONARY EMBOLISM: ICD-10-CM

## 2017-10-03 DIAGNOSIS — G89.29 CHRONIC THORACIC BACK PAIN, UNSPECIFIED BACK PAIN LATERALITY: Primary | ICD-10-CM

## 2017-10-03 DIAGNOSIS — I50.22 CHRONIC SYSTOLIC CHF (CONGESTIVE HEART FAILURE), NYHA CLASS 2 (HCC): ICD-10-CM

## 2017-10-03 DIAGNOSIS — M54.6 CHRONIC THORACIC BACK PAIN, UNSPECIFIED BACK PAIN LATERALITY: Primary | ICD-10-CM

## 2017-10-03 PROCEDURE — 99214 OFFICE O/P EST MOD 30 MIN: CPT | Performed by: INTERNAL MEDICINE

## 2017-10-03 RX ORDER — METHOCARBAMOL 750 MG/1
750 TABLET, FILM COATED ORAL 4 TIMES DAILY
Qty: 40 TABLET | Refills: 0 | Status: SHIPPED | OUTPATIENT
Start: 2017-10-03 | End: 2017-10-13

## 2017-10-03 RX ORDER — AZITHROMYCIN 250 MG/1
TABLET, FILM COATED ORAL
Qty: 45 TABLET | Refills: 1 | Status: SHIPPED | OUTPATIENT
Start: 2017-10-03 | End: 2017-10-13

## 2017-10-03 ASSESSMENT — ENCOUNTER SYMPTOMS
ABDOMINAL PAIN: 0
SHORTNESS OF BREATH: 0

## 2017-10-03 NOTE — PROGRESS NOTES
Subjective:      Patient ID: Nat Capps is a 61 y.o. male. HPI  1. Chronic thoracic back pain, unspecified back pain laterality --recc sx --now acute onset back pain on 17 th hole of golf-- rt T10 spasm and some rt buttock radiation-- used ice and biofreeze - and robaxin 500 bid --    2. History of pulmonary embolism --Stable--no new issues      3. Chronic systolic CHF (congestive heart failure), NYHA class 2 ([address] --Stable--no new issues          Review of Systems   Respiratory: Negative for shortness of breath. Cardiovascular: Negative for chest pain. Gastrointestinal: Negative for abdominal pain. Objective:   Physical Exam   Eyes: Pupils are equal, round, and reactive to light. Neck: Normal range of motion. Cardiovascular: Normal rate and normal heart sounds. Pulmonary/Chest: Effort normal.   Abdominal: Soft. Musculoskeletal:   Rt parathoracic tightness--m/s pain--incr with flexion and rotation--not radic in nature    Neurological: He is alert. Skin: Skin is warm. Assessment:      1. Chronic thoracic back pain, unspecified back pain laterality --add robaxin 500 qid prn x 10 days      2. History of pulmonary embolism --Continue current therapy      3.  Chronic systolic CHF (congestive heart failure), NYHA class 2 ([address] --Continue current therapy      Ok for zmax QOD as per dr Conchis Weiss for his chr lung issue         Plan:

## 2017-10-04 ENCOUNTER — TELEPHONE (OUTPATIENT)
Dept: CARDIOLOGY CLINIC | Age: 57
End: 2017-10-04

## 2017-10-04 NOTE — TELEPHONE ENCOUNTER
Stewart Garrido called in this morning wanting to talk to Dr. Salma Rowan or his RN. He stated that he has some questions regarding his procedure next Tuesday 10/10/17, and would like to come in tomorrow and discuss them.      You can reach Stewart Garrido at #[phone]

## 2017-10-05 ENCOUNTER — ANTI-COAG VISIT (OUTPATIENT)
Dept: PHARMACY | Facility: CLINIC | Age: 57
End: 2017-10-05

## 2017-10-05 DIAGNOSIS — I27.82 CHRONIC SEPTIC PULMONARY EMBOLISM WITH ACUTE COR PULMONALE (HCC): ICD-10-CM

## 2017-10-05 DIAGNOSIS — I42.8 NONISCHEMIC CARDIOMYOPATHY (HCC): ICD-10-CM

## 2017-10-05 DIAGNOSIS — I44.7 LBBB (LEFT BUNDLE BRANCH BLOCK): ICD-10-CM

## 2017-10-05 DIAGNOSIS — I26.01 CHRONIC SEPTIC PULMONARY EMBOLISM WITH ACUTE COR PULMONALE (HCC): ICD-10-CM

## 2017-10-05 LAB
HCT VFR BLD CALC: 48.3 % (ref 40.5–52.5)
HEMOGLOBIN: 16.4 G/DL (ref 13.5–17.5)
INR BLD: 2.19 (ref 0.85–1.15)
INR BLD: 2.2
MCH RBC QN AUTO: 31.6 PG (ref 26–34)
MCHC RBC AUTO-ENTMCNC: 33.9 G/DL (ref 31–36)
MCV RBC AUTO: 93.2 FL (ref 80–100)
PDW BLD-RTO: 12.7 % (ref 12.4–15.4)
PLATELET # BLD: 194 K/UL (ref 135–450)
PMV BLD AUTO: 7.7 FL (ref 5–10.5)
PROTHROMBIN TIME: 24.7 SEC (ref 9.6–13)
RBC # BLD: 5.18 M/UL (ref 4.2–5.9)
WBC # BLD: 5.3 K/UL (ref 4–11)

## 2017-10-05 NOTE — MR AVS SNAPSHOT
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 HOLD Warfarin 3 days starting 10/7/17 per your doctor's orders for procedure 10/10/17. After procedure, if ok with your doctor, resume your normal dose of Warfarin. Call with any medication changes, especially antibiotics or steroids. Including anything over the counter  Keep Vitamin K consistent. Eats a green vegetable with moderate Vitamin K 1-2 times per week      Vital Signs     Smoking Status                   Never Smoker           Additional Information about your Body Mass Index (BMI)           Your BMI as listed above is considered obese (30 or more). BMI is an estimate of body fat, calculated from your height and weight. The higher your BMI, the greater your risk of heart disease, high blood pressure, type 2 diabetes, stroke, gallstones, arthritis, sleep apnea, and certain cancers. BMI is not perfect. It may overestimate body fat in athletes and people who are more muscular. Even a small weight loss (between 5 and 10 percent of your current weight) by decreasing your calorie intake and becoming more physically active will help lower your risk of developing or worsening diseases associated with obesity.      Learn more at: [email or web address]             Medications and Orders      Your Current Medications Are              methocarbamol (ROBAXIN-750) 750 MG tablet Take 1 tablet by mouth 4 times daily for 10 days    azithromycin (ZITHROMAX) 250 MG tablet Take 1 tab po qod for 90 days    potassium chloride (KLOR-CON M) 10 MEQ extended release tablet TAKE 4 TABLETS BY MOUTH EVERY DAY    sacubitril-valsartan (ENTRESTO) 24-26 MG per tablet Take 1 tablet by mouth 2 times daily    pravastatin (PRAVACHOL) 40 MG tablet Take 1 tablet by mouth daily    tamsulosin (FLOMAX) 0.4 MG capsule Take 1 capsule by mouth 2 times daily    hydrocortisone (PROCTOZONE-HC) 2.5 % rectal cream PLACE RECTALLY TWICE DAILY    dexlansoprazole (DEXILANT) 30 MG CPDR delayed release capsule Take 1 capsule by mouth daily    oxyCODONE-acetaminophen (PERCOCET) 5-325 MG per tablet Take 1 tablet by mouth every 4 hours as needed for Pain  Takes 1-2 tabs q4-6hrs prn .    zolpidem (AMBIEN) 10 MG tablet TAKE 1 TABLET BY MOUTH EVERY NIGHT AT BEDTIME    LYRICA 75 MG capsule TAKE 1 CAPSULE BY MOUTH EVERY MORNING AND 2 CAPSULES BY MOUTH EVERY EVENING    carisoprodol (SOMA) 250 MG tablet Take 350 mg by mouth as needed    ondansetron (ZOFRAN ODT) 4 MG disintegrating tablet Take 1-2 tablets by mouth every 8 hours as needed for Nausea May Sub regular tablet (non-ODT) if insurance does not cover ODT.    carvedilol (COREG) 6.25 MG tablet Take 1 tablet by mouth 2 times daily (with meals)    azelastine (ASTELIN) 0.1 % nasal spray 1 spray by Nasal route 2 times daily Use in each nostril as directed    dicyclomine (BENTYL) 10 MG capsule TAKE ONE CAPSULE BY MOUTH FOUR TIMES DAILY AS NEEDED    warfarin (COUMADIN) 5 MG tablet Take 5 mg by mouth daily EXCEPT 7.5mg on Monday, and Friday or as directed by [facility] Coumadin Service [phone]    Respiratory Therapy Supplies (NEBULIZER/TUBING/MOUTHPIECE) KIT 1 kit by Does not apply route 3 times daily    flavoxate (URISPAS) 100 MG tablet TAKE 1 TABLET BY MOUTH THREE TIMES DAILY AS NEEDED FOR URINARY SPASM    Cholecalciferol (VITAMIN D3) 2000 UNITS CAPS Take 2,000 Units by mouth daily    atropine-PHENobarbital-scopolamine-hyoscyamine (DONNATAL) 16.2 MG per tablet TAKE 1 TABLET BY MOUTH EVERY 6 HOURS AS NEEDED    aspirin 81 MG tablet   Take 81 mg by mouth daily     misoprostol (CYTOTEC) 200 MCG tablet Take 200 mcg by mouth 2 times daily. albuterol (ACCUNEB) 1.25 MG/3ML nebulizer solution Inhale 1 ampule into the lungs every 8 hours. albuterol (PROAIR HFA) 108 (90 BASE) MCG/ACT inhaler Inhale 2 puffs into the lungs every 6 hours as needed. Allergies              Atrovent Nasal Spray [Ipratropium] Other (See Comments)    Chest tightness    Morphine Other (See Comments)    \"makes me feel funny\"    Nitroglycerin Other (See Comments)    Severe hypotension (went from 999 to 66 systolic)    Sulfa Antibiotics Rash    Vicodin [Hydrocodone-acetaminophen] Rash      We Ordered/Performed the following           Protime-INR     Comments: This external order was created through the results console.          Result Summary for Protime-INR      Result Information     Status          Final result (Resulted: 10/5/2017  8:50 AM)           10/5/2017  8:50 AM      Component Results     Component Value    INR 2.2               Additional Information        Basic Information     Date Of Birth Sex Race Ethnicity Preferred Language    1960 Male White Non-/Non  English      Problem List as of 10/5/2017  Date Reviewed: 8/28/2017                Pulmonary embolus (HCC)    CAD (coronary artery disease)    [address]chiolitis obliterans ([facility] [facility]    Essential hypertension, benign    Pure hypercholesterolemia    Glucose intolerance (impaired glucose tolerance)    Ureteral stone    Acute confusional state    Elevated INR    Chronic systolic CHF (congestive heart failure), NYHA class 2 (HCC)    LBBB (left bundle branch block)    Right arm weakness    Transient cerebral ischemia    History of pulmonary embolism    Cardiomyopathy (HCC)    Confusion    Nausea    Cough    Shortness of breath    Acute non-recurrent maxillary sinusitis    DDD (degenerative disc disease), lumbar    Leg pain, right    Lightheadedness    Headache    Bronchitis    Chest pain    Abdominal pain    Nephrolithiasis    Irritable bowel syndrome    Allergic rhinitis    Sinusitis    Back pain

## 2017-10-05 NOTE — PROGRESS NOTES
Mr. Kimberly Salinas is a 61 y.o.  male with history of PE who presents today for anticoagulation monitoring and adjustment. Patient verifies current dosing regimen. Lab Results   Component Value Date    INR 2.2 10/05/2017    INR 2.7 09/20/2017    INR 3.2 09/01/2017       Patient appears well. No changes, no problems. Mr. Sandra Mathis is scheduled for Pacemaker next Tuesday. He will be off of his warfarin 3 days prior to the procedure. He states he will be staying in the hospital one night. I instructed patient to resume his normal dose of Warfarin when he comes home, if ok with his physician. Will see patient one week following procedure. Coumadin dose: Continue Warfarin 5mg (1 tablet) daily except 7.5mg (1 & 1/2 tablet) on Monday and Friday. HOLD Warfarin 3 days starting 10/7/17 per your doctor's orders for procedure 10/10/17. After procedure, if ok with your doctor, resume your normal dose of Warfarin. Recheck INR in 2 weeks. Patient reminded to call the Anticoagulation Clinic with any medication changes. Patient given instructions utilizing the teach back method. After visit summary printed and reviewed with patient. Warfarin dose updated. Influenza vaccine:   [] given  [x] declined  [x] documented in EPIC. .....  Patient will be receiving from his doctor after procedure next week

## 2017-10-06 ENCOUNTER — TELEPHONE (OUTPATIENT)
Dept: INTERNAL MEDICINE CLINIC | Age: 57
End: 2017-10-06

## 2017-10-06 DIAGNOSIS — R19.7 DIARRHEA, UNSPECIFIED TYPE: ICD-10-CM

## 2017-10-06 DIAGNOSIS — R19.7 DIARRHEA, UNSPECIFIED TYPE: Primary | ICD-10-CM

## 2017-10-06 LAB — REJECTED TEST: NORMAL

## 2017-10-09 ENCOUNTER — TELEPHONE (OUTPATIENT)
Dept: CARDIOLOGY CLINIC | Age: 57
End: 2017-10-09

## 2017-10-09 ENCOUNTER — OFFICE VISIT (OUTPATIENT)
Dept: CARDIOLOGY CLINIC | Age: 57
End: 2017-10-09

## 2017-10-09 ENCOUNTER — TELEPHONE (OUTPATIENT)
Dept: INTERNAL MEDICINE CLINIC | Age: 57
End: 2017-10-09

## 2017-10-09 VITALS
HEIGHT: 66 IN | HEART RATE: 90 BPM | OXYGEN SATURATION: 97 % | WEIGHT: 193 LBS | BODY MASS INDEX: 31.02 KG/M2 | SYSTOLIC BLOOD PRESSURE: 132 MMHG | DIASTOLIC BLOOD PRESSURE: 82 MMHG

## 2017-10-09 DIAGNOSIS — I50.22 CHRONIC SYSTOLIC HF (HEART FAILURE) (HCC): ICD-10-CM

## 2017-10-09 DIAGNOSIS — E78.2 MIXED HYPERLIPIDEMIA: ICD-10-CM

## 2017-10-09 DIAGNOSIS — I10 ESSENTIAL HYPERTENSION: ICD-10-CM

## 2017-10-09 DIAGNOSIS — I42.8 NONISCHEMIC CARDIOMYOPATHY (HCC): Primary | ICD-10-CM

## 2017-10-09 PROCEDURE — 99214 OFFICE O/P EST MOD 30 MIN: CPT | Performed by: INTERNAL MEDICINE

## 2017-10-09 NOTE — TELEPHONE ENCOUNTER
[facility] inform patient he cannot have c-diff if stool is formed--no additl sample is needed at this time

## 2017-10-09 NOTE — TELEPHONE ENCOUNTER
PEER TO PEER Discussion for   Prior authorization for  BI-V ICD Implant   Scheduled for 10/10/17  8:30am  [facility]    CPT codes 02805  & 99917  ICD 10:  I44.7  & I42.8    Case # [id number]  Subscriber ID# [id number]  Roderfieldlourdes Huertas (1960)    The Ordering physician should call [phone] and select Option 3 to engage in a Physician to Physician discussion.

## 2017-10-09 NOTE — PROGRESS NOTES
[address]   Electrophysiology Consultation   Date: 10/9/2017     Reason for Consultation: Bi-V ICD implant  Consult Requesting Physician: Bear Burks MD    Chief Complaint: Questions on ICD implant     HPI: Juan Pedraza is a 61 y.o. with a PMH significant for a prior PE in 2011 and nonischemic cardiomyopathy. He is typically followed by Dr. Dmitriy Lemus for his cardiac needs. He presents for further evaluation of a Bi-V ICD implant for prevention of SCD. He was initially hospitalized at [facility] with chest pain. He had an abnormal stress that led to a left heart catheterization on 7/21/16. His coronary arteries were normal but he had LV dysfunction with an LVEF 40% at that time. A repeat echocardiogram demonstrated his LVEF to be 30% despite optimal medical therapy. He is now on Entresto therapy and interested in learning about CRT. He reports that overall he has been doing okay. He is trying to work on weight loss. He admits to some fatigue and shortness of breath with using the elliptical. He stays active with golf. He denies lightheadedness, dizziness, chest pain, palpitations, orthopnea, edema, presyncope or syncope.      Past Medical History:   Diagnosis Date    Bronchiolitis obliterans ([address]     Bundle branch block, right     Cardiomyopathy ([address]     Fibromyalgia     GERD (gastroesophageal reflux disease)     Hepatitis 1979    unsure of which type    Hx of blood clots     Hyperlipemia     Hypertension     IBS (irritable bowel syndrome)     Kidney stone     over thirty kidney stones    Neuropathy ([address]     right side-chest    Pneumothorax 2011    Right    Prostatitis     Pulmonary embolism on right [facility]) 2011    upper lobe-coumadin 2011    Sacroiliitis [facility])         Past Surgical History:   Procedure Laterality Date    CHOLECYSTECTOMY  2007    COLONOSCOPY  9/21/2012    dr Byron Hall  2015    LITHOTRIPSY     [place] opening larger in sinuses    UPPER GASTROINTESTINAL ENDOSCOPY  3/28/2014    dr Heidi mercedes       Allergies: Allergies   Allergen Reactions    Atrovent Nasal Spray [Ipratropium] Other (See Comments)     Chest tightness    Morphine Other (See Comments)     \"makes me feel funny\"      Nitroglycerin Other (See Comments)     Severe hypotension (went from 233 to 66 systolic)    Sulfa Antibiotics Rash    Vicodin [Hydrocodone-Acetaminophen] Rash       Medication:   Prior to Admission medications    Medication Sig Start Date End Date Taking? Authorizing Provider   phenazopyridine (PYRIDIUM) 200 MG tablet Take 1 tablet by mouth 3 times daily as needed for Pain 10/6/17 10/9/17 Yes Camila Stanford MD   ciprofloxacin (CIPRO) 250 MG tablet Take 1 tablet by mouth 2 times daily for 5 doses 10/6/17 10/9/17 Yes Gomez Joe MD   oxyCODONE-acetaminophen (PERCOCET)  MG per tablet Take 1 tablet by mouth every 6 hours as needed for Pain .  10/6/17  Yes Gomez Joe MD   methocarbamol (ROBAXIN-750) 750 MG tablet Take 1 tablet by mouth 4 times daily for 10 days 10/3/17 10/13/17 Yes Gomez Joe MD   azithromycin (ZITHROMAX) 250 MG tablet Take 1 tab po qod for 90 days 10/3/17 10/13/17 Yes Gomez Joe MD   potassium chloride (KLOR-CON M) 10 MEQ extended release tablet TAKE 4 TABLETS BY MOUTH EVERY DAY 9/28/17  Yes Gomez Joe MD   sacubitril-valsartan [facility]) 24-26 MG per tablet Take 1 tablet by mouth 2 times daily 9/21/17  Yes Parag Dawson MD   pravastatin (PRAVACHOL) 40 MG tablet Take 1 tablet by mouth daily 9/21/17  Yes Parag Dawson MD   tamsulosin [facility]) 0.4 MG capsule Take 1 capsule by mouth 2 times daily 9/1/17  Yes Gomez Joe MD   hydrocortisone (PROCTOZONE-HC) 2.5 % rectal cream PLACE RECTALLY TWICE DAILY 8/25/17  Yes Gomez Joe MD   dexlansoprazole (DEXILANT) 30 MG CPDR delayed release capsule Take 1 capsule by mouth daily 8/16/16  Yes Historical Provider, MD   zolpidem (AMBIEN) 10 MG tablet TAKE 1 TABLET BY MOUTH EVERY NIGHT AT BEDTIME 7/18/17  Yes Iker Leo MD   LYRICA 75 MG capsule TAKE 1 CAPSULE BY MOUTH EVERY MORNING AND 2 CAPSULES BY MOUTH EVERY EVENING 7/18/17  Yes Iker Leo MD   carisoprodol (SOMA) 250 MG tablet Take 350 mg by mouth as needed   Yes Historical Provider, MD   ondansetron (ZOFRAN ODT) 4 MG disintegrating tablet Take 1-2 tablets by mouth every 8 hours as needed for Nausea May Sub regular tablet (non-ODT) if insurance does not cover ODT. 4/20/17  Yes Papa Bruce CNP   carvedilol (COREG) 6.25 MG tablet Take 1 tablet by mouth 2 times daily (with meals) 2/2/17  Yes Michelle Mitchell MD   azelastine (ASTELIN) 0.1 % nasal spray 1 spray by Nasal route 2 times daily Use in each nostril as directed 1/30/17  Yes Iker Leo MD   dicyclomine (BENTYL) 10 MG capsule TAKE ONE CAPSULE BY MOUTH FOUR TIMES DAILY AS NEEDED 12/29/16  Yes Iker Leo MD   warfarin (COUMADIN) 5 MG tablet Take 5 mg by mouth daily EXCEPT 7.5mg on Monday, and Friday or as directed by [facility] Coumadin Service [phone]   Yes Historical Provider, MD   Respiratory Therapy Supplies (NEBULIZER/TUBING/MOUTHPIECE) KIT 1 kit by Does not apply route 3 times daily 10/19/16  Yes Iker Leo MD   flavoxate (URISPAS) 100 MG tablet TAKE 1 TABLET BY MOUTH THREE TIMES DAILY AS NEEDED FOR URINARY SPASM 9/12/16  Yes Pete Cisneros MD   Cholecalciferol (VITAMIN D3) 2000 UNITS CAPS Take 2,000 Units by mouth daily   Yes Historical Provider, MD   atropine-PHENobarbital-scopolamine-hyoscyamine (DONNATAL) 16.2 MG per tablet TAKE 1 TABLET BY MOUTH EVERY 6 HOURS AS NEEDED 5/5/14  Yes Iker Leo MD   aspirin 81 MG tablet   Take 81 mg by mouth daily    Yes Historical Provider, MD   misoprostol (CYTOTEC) 200 MCG tablet Take 200 mcg by mouth 2 times daily. Yes Historical Provider, MD   albuterol (ACCUNEB) 1.25 MG/3ML nebulizer solution Inhale 1 ampule into the lungs every 8 hours.    Yes · Eyes: Conjunctivae normal. EOM are normal.   · Neck: Normal range of motion. Neck supple. No rigidity. No JVD present. · Cardiovascular: Normal rate, regular rhythm, S1&S2 and intact distal pulses. · Pulmonary/Chest: Bilateral respiratory sounds. No wheezes. No rhonchi. · Abdominal: Soft. Bowel sounds present. No distension, No tenderness. · Musculoskeletal: No tenderness. No edema    · Lymphadenopathy: Has no cervical adenopathy. · Neurological: Alert and oriented. Cranial nerve appears intact, No Gross deficit   · Skin: Skin is warm and dry. No rash noted. · Psychiatric: Has a normal mood, affect and behavior     Labs:  Reviewed. Cr 0.8 (8/2017)  , LDL 75, TG 96 (7/2017)    ECG: reviewed, 8/28/17 SR    Echo: 7/24/17  Left ventricle size is normal. Mild concentric left ventricular hypertrophy  is present. Global ejection fraction is moderately decreased and estimated  from 30 % to 35%. Diastolic filling parameters suggests grade I diastolic  dysfunction . There is abnormal (paradoxical) septal motion consistent with left bundle  branch block. Mitral valve is structurally normal.  Trivial to mild mitral regurgitation is present. The left atrial size is normal.  A bubble study was performed and fails to show evidence of right to left  shunting. Echo: 11/9/16  Dilated LV with severely reduced systolic function. EF 30%. Anterior, septal  and apical akinesis. Mild mitral regurgitation is present. The left atrial size is normal.  Normal right ventricular size and function. Stress Test:  7/21/16  SPECT perfusion images: On the stress corrected images there is a moderate defect in the septum of the left ventricle. At rest, partially redistributes especially the posterior lateral component. Complete redistribution is not present.   QVBS:  61 %      (Normal is at least 62% for women and 53% for men)  LV wall motion:   There is significant hypokinesia of the ventricular septum  LVEDV: Leg pain, right 11/02/2015    Lightheadedness 09/11/2015    Headache 08/21/2015    Atypical chest pain     Bronchitis 04/07/2015    Chest pain 08/06/2012    Abdominal pain 05/30/2012    Nephrolithiasis     Irritable bowel syndrome     Allergic rhinitis     Sinusitis 03/29/2010    Back pain 01/25/2010        Plan:  1) Cardiomyopathy: Nonischemic. EF 30-35%. NYHA Class II-III on OMT for > 3 months. Rec Bi-V ICD (LBBB, ) for primary prevention. Risk, benefit, alternative, and rationale for the procedure were discussed with the patient which included but, were not limited to: allergic reaction to the medications, pain, bleeding, infection, nerve injury, injury to diaphragm(breathing muscle), pulmonary embolus(blood clot in lungs), deep vein blood clot, pneumothorax, hemothorax, acute renal failure, cardiac perforation, tamponade, need for emergent surgery (open heart), permanent pacemaker, stroke, myocardial infarction and death. Patient verbalized understanding and would like to proceed. Continue current medical therapy including entresto and BB. Greater than 1 year life expectancy expected. 2) HF: Chronic. Systolic. EF 13%. Appears euvolemic on exam today. Recommend low fluid and Na intake. Encouraged daily weights. 3) PE: Hx of- on chronic coumadin therapy. 4) HTN:  Controlled. Continue current medications. Monitor. 5) HLD:  Continue statin therapy. Follow up s/p procedure. He asked to come in today for another appointment prior to procedure to ask additional questions about the ICD implant. He is scheduled for his procedure tomorrow.     I have discussed the plan of care with the primary care team.    Sandoval Franks MD, PhD

## 2017-10-09 NOTE — PATIENT INSTRUCTIONS
Patient Education        Learning About Saving Energy When You Have a Chronic Condition  Introduction  Everyday tasks can be tiring when you have COPD, heart failure, or another long-term (chronic) condition. You may feel at times that you've lost your ability to live your life. But learning to conserve, or save, your energy can help you be less tired. Conserving your energy means finding ways of doing daily activities with as little effort as possible. With some small changes in the way you do things, you can get your tasks done more easily. Some treatments are available that might help. Pulmonary rehabilitation can teach you ways to breathe easier. Cardiac rehabilitation can help make your heart stronger. You also may want to see an occupational or physical therapist. The therapist can give you more tips on building strength and moving with less effort. What can you do to conserve your energy? Planning  · Make a list of what you have to do every day. Group the tasks by location. · Do all the chores in one part of your house around the same time. · Go out for errands or do chores at the time of day when you have the most energy. · Plan rest periods into your day. Getting things done  · Sit down as often as you can when you get dressed, do chores, or cook. · Use a cart with wheels to roll items, such as laundry, from one room to another. · Push or slide boxes or other large items instead of lifting them. Reaching and bending  · Put things you use the most on shelves that are at the level of your waist or shoulder. · Use long-handled grabbers or other tools to reach items on a high shelf or to  things off the floor. Use long-handled dusters when you clean the house. · Use a raised toilet seat to avoid bending too far to sit or stand up. Eating  · Eat several small meals instead of three larger meals. · If you get too tired to eat much, try to choose healthy foods that have more calories.  Have

## 2017-10-10 ENCOUNTER — TELEPHONE (OUTPATIENT)
Dept: INTERNAL MEDICINE CLINIC | Age: 57
End: 2017-10-10

## 2017-10-11 ENCOUNTER — TELEPHONE (OUTPATIENT)
Dept: CARDIOLOGY CLINIC | Age: 57
End: 2017-10-11

## 2017-10-11 DIAGNOSIS — Z45.02 ENCOUNTER FOR ADJUSTMENT OF CARDIAC RESYNCHRONIZATION THERAPY DEFIBRILLATOR (CRT-D): ICD-10-CM

## 2017-10-11 DIAGNOSIS — Z95.810 BIVENTRICULAR ICD (IMPLANTABLE CARDIOVERTER-DEFIBRILLATOR) IN PLACE: Primary | ICD-10-CM

## 2017-10-11 PROBLEM — Z45.018 ENCOUNTER FOR ADJUSTMENT OF CARDIAC RESYNCHRONIZATION THERAPY PACEMAKER (CRT-P): Status: ACTIVE | Noted: 2017-10-11

## 2017-10-11 RX ORDER — OXYCODONE AND ACETAMINOPHEN 10; 325 MG/1; MG/1
1 TABLET ORAL EVERY 6 HOURS PRN
Qty: 10 TABLET | Refills: 0 | Status: SHIPPED | OUTPATIENT
Start: 2017-10-11 | End: 2017-10-27 | Stop reason: SDUPTHER

## 2017-10-11 NOTE — TELEPHONE ENCOUNTER
Patient was d/c'd from hospital today s/p device implant. He called in requesting pain medication for the site pain. He was not given any upon d/c from hospital due to having a recent script for back pain from his PCP. He reports that he is out of that pain medication and is very uncomfortable. Script provided by Javier Hope NP with no refills. I called him to relay- stated that it will be at the  for pickup. He verbalized understanding.

## 2017-10-12 ENCOUNTER — TELEPHONE (OUTPATIENT)
Dept: CARDIOLOGY CLINIC | Age: 57
End: 2017-10-12

## 2017-10-12 NOTE — TELEPHONE ENCOUNTER
Natalia SALDANA,     Can you please call the patient? He is insistent on speaking with you. Thank you.

## 2017-10-12 NOTE — TELEPHONE ENCOUNTER
Jennifer Martinez called in again stating that he forgot to ask Franny Busby two questions. Will his heating blanket and bluetooth headset interrupt his pacemaker? He also wanted to know if someone could give him a courtesy call over the weekend to see how he is doing.     Jennifer Martinez can be reached at [phone]

## 2017-10-12 NOTE — TELEPHONE ENCOUNTER
Called and spoke to patient. He has up coming appointment 10/17/17 with FITO Bacon CNP. Tried to explain to patient that since he is feeling better he can wait and come to appointment Tuesday. He should call PCP for frequent urination (he is fully emptying bladder and urinating in large amounts, no burning or pain). Was given IV antibiotics yesterday post op. He was last seen 10/9/17. He wants to be checked out by Dr. Areli Celis (told patient Dr. Areli Celis or Wolfgang Sutton do not see patients on Friday). Please advise? Spoke to FITO Bacon CNP and instructed patient that urination last evening was normal - had BiV pacemaker implanted and it is diuresing as it is supposed to and to keep his appointment 10/17/17. Patient wants Rosetta Kessler CNP to call him.  He is concerned about how long this will last.

## 2017-10-12 NOTE — TELEPHONE ENCOUNTER
Myriam Gray called back asking to speak to Dr. Tabitha Coleman himself, he is not getting the answers that he wants and doesn't want to keep going through the middle man. \"Something isn't right, doesn't feel right and only Dr. Tabitha Coleman can answer that. \"    Myriam Gray can be reached at [phone]

## 2017-10-12 NOTE — TELEPHONE ENCOUNTER
Patient may use heating blanket and blue tooth per Kelli Lopez RN. Also call office over the weekend and he will be put through to doctor on call. Patient called with above info. Patient expresses understanding.

## 2017-10-12 NOTE — TELEPHONE ENCOUNTER
Olman Mujica called in this morning stating that over night he had to call the paramedics. He stated that all of sudden he started urinating a lot, and just felt weird all over. Paramedics said everything looked fine and just call the doctor in the morning. He is wanting to know if Dr. Mana Gustafson would want to see him? He stated he feels a little better this morning.      You can reach Olman Mujica at #[phone]

## 2017-10-12 NOTE — TELEPHONE ENCOUNTER
Dr. Fabian Mclean,  Please read the messages below. He keeps calling in- he is requesting a call from you directly. Please call the pt today after your procedures.

## 2017-10-12 NOTE — TELEPHONE ENCOUNTER
Please call the patient. Heating blankets and blue tooth are fine. Unfortunately, the office is closed so no one will be able to call him over the weekend. He can call our office if there is an issue and it will put him through to the on-call physician covering the weekend if he has concerns.

## 2017-10-17 ENCOUNTER — OFFICE VISIT (OUTPATIENT)
Dept: CARDIOLOGY CLINIC | Age: 57
End: 2017-10-17

## 2017-10-17 ENCOUNTER — TELEPHONE (OUTPATIENT)
Dept: INTERNAL MEDICINE CLINIC | Age: 57
End: 2017-10-17

## 2017-10-17 ENCOUNTER — ANTI-COAG VISIT (OUTPATIENT)
Dept: PHARMACY | Facility: CLINIC | Age: 57
End: 2017-10-17

## 2017-10-17 ENCOUNTER — PROCEDURE VISIT (OUTPATIENT)
Dept: CARDIOLOGY CLINIC | Age: 57
End: 2017-10-17

## 2017-10-17 ENCOUNTER — HOSPITAL ENCOUNTER (OUTPATIENT)
Dept: OTHER | Age: 57
Discharge: HOME | End: 2017-10-17
Attending: NURSE PRACTITIONER | Admitting: NURSE PRACTITIONER

## 2017-10-17 VITALS
OXYGEN SATURATION: 98 % | HEIGHT: 66 IN | DIASTOLIC BLOOD PRESSURE: 80 MMHG | WEIGHT: 194 LBS | BODY MASS INDEX: 31.18 KG/M2 | SYSTOLIC BLOOD PRESSURE: 118 MMHG | HEART RATE: 68 BPM

## 2017-10-17 DIAGNOSIS — I25.10 CORONARY ARTERY DISEASE INVOLVING NATIVE HEART, ANGINA PRESENCE UNSPECIFIED, UNSPECIFIED VESSEL OR LESION TYPE: ICD-10-CM

## 2017-10-17 DIAGNOSIS — I27.82 CHRONIC SEPTIC PULMONARY EMBOLISM WITHOUT ACUTE COR PULMONALE (HCC): ICD-10-CM

## 2017-10-17 DIAGNOSIS — Z95.810 BIVENTRICULAR ICD (IMPLANTABLE CARDIOVERTER-DEFIBRILLATOR) IN PLACE: Primary | ICD-10-CM

## 2017-10-17 DIAGNOSIS — I10 ESSENTIAL HYPERTENSION, BENIGN: ICD-10-CM

## 2017-10-17 DIAGNOSIS — I42.0 DILATED CARDIOMYOPATHY (HCC): ICD-10-CM

## 2017-10-17 DIAGNOSIS — R05.9 COUGH: ICD-10-CM

## 2017-10-17 DIAGNOSIS — Z95.810 BIVENTRICULAR ICD (IMPLANTABLE CARDIOVERTER-DEFIBRILLATOR) IN PLACE: ICD-10-CM

## 2017-10-17 DIAGNOSIS — I26.90 CHRONIC SEPTIC PULMONARY EMBOLISM WITHOUT ACUTE COR PULMONALE (HCC): ICD-10-CM

## 2017-10-17 LAB — INR BLD: 3.2

## 2017-10-17 PROCEDURE — 93284 PRGRMG EVAL IMPLANTABLE DFB: CPT | Performed by: INTERNAL MEDICINE

## 2017-10-17 PROCEDURE — 99024 POSTOP FOLLOW-UP VISIT: CPT | Performed by: NURSE PRACTITIONER

## 2017-10-17 RX ORDER — METHOCARBAMOL 750 MG/1
750 TABLET, FILM COATED ORAL 2 TIMES DAILY
Status: ON HOLD | COMMUNITY
End: 2017-11-07 | Stop reason: ALTCHOICE

## 2017-10-17 RX ORDER — DOXYCYCLINE HYCLATE 100 MG
100 TABLET ORAL 2 TIMES DAILY
Qty: 20 TABLET | Refills: 0 | Status: SHIPPED | OUTPATIENT
Start: 2017-10-17 | End: 2017-10-17 | Stop reason: ALTCHOICE

## 2017-10-17 RX ORDER — CLINDAMYCIN HYDROCHLORIDE 150 MG/1
150 CAPSULE ORAL 3 TIMES DAILY
Qty: 30 CAPSULE | Refills: 0 | Status: SHIPPED | OUTPATIENT
Start: 2017-10-17 | End: 2017-10-27

## 2017-10-17 NOTE — PROGRESS NOTES
Mr. Yesenia Fiore is a 61 y.o.  male with history of PE who presents today for anticoagulation monitoring and adjustment. Patient verifies current dosing regimen. Patient denies s/s bleeding/bruising/swelling/SOB. No blood in urine or stool. No changes in medication/OTC agents/Herbals. No change in alcohol use. No Procedures scheduled in the future at this time. Lab Results   Component Value Date    INR 3.2 10/17/2017    INR 1.68 (H) 10/11/2017    INR 1.45 (H) 10/10/2017       He had a pacemaker/defribrillator placed on 101-10-17. He was off of his warfarin for 3 days prior to this procedure. He was also on 5 days of Cipro befor the procedure as well. He resumed his warfarin on Wednesday 10-11-17 at his previous dose. His INR is a bit high today at 3.2, which was surprising. He states that he is now on robaxin for muscle spasms. This should not affect his INR. He also states that he has no appetite. This may be the reason his INR is elevated. He denies any bruising or bleeding at thsi time. He does feel like he has cold symptoms today. He will speak to his physician about this and call us if her starts any new antibiotics. For INR of 3.2, we will hold his warfarin toady and decrease his dose to Warfarin 5mg (1 tablet) daily except 7.5mg (1 & 1/2 tablet) on Monday. He will return in 2 weeks for his next INR. Coumadin dose: Warfarin 5mg (1 tablet) daily except 7.5mg (1 & 1/2 tablet) on Monday . Recheck INR in 2 weeks. Patient reminded to call the Anticoagulation Clinic with any signs or symptoms of bleeding or with any medication changes. Patient given instructions utilizing the teach back method. After visit summary printed and reviewed with patient. Warfarin dose updated on patient home medication list  Yes    Influenza vaccine: He declined a flu vaccine for now per his physician's directions.   [] given    [x] declined   [] received previously   [x] plans to receive at a later time   [] refused    [] documented in EPIC

## 2017-10-17 NOTE — PROGRESS NOTES
Postop check, steri strips removed. Incision clean, dry and intact. No redness, swelling or drainage noted. Postop care/restrictions and remote monitor reviewed. Patient comes in for programming evaluation for his [employer] BIV- defibrillator. All sensing and pacing parameters are within normal range. No new arrhythmias. Please see interrogation for more detail. Patient c/o of coughing and feeling the pacing since he had it placed. He states it occurs more at night when he is laying down. I thoroughly tested the RV and LV leads with increasing the pulse width and decreasing the output, changing LV configuration and he continues to be symptomatic. He denies hiccups. Patient will see Neftali today. A CXR was done to verify lead placement. He was given a RX today prophlactically for doxy bc of recent chest congestion, increased coughing and he states he has been spiking a fever. He was told to follow up with his PCP if those symptoms continue or worsen. [employer] rep was asked to come in to test device /leads. He c/o of above symptoms with pacing regardless if the leads were pacing or not but did state the feeling of having a tickle in his throat was intensified when the output was increased. CHANGES MADE-A PULSE AMP 3.5-3.0  RV PULSE AMP 3.5-2.0  LV 1ST PULSE AMP 2.0-1.5  RA CAP CONTROL THRESHOLD START VOLTAGE 3.5-3.0  RV CAP CONTROL SERGEI (AUTO)-OFF  LV CAP CONTROL SERGEI (AUTO)-OFF  LV PACING POLARITY:[id number]. Changes reviewed with Neftali and he is to follow up with Dr. Fannie Álvarez next week to discuss this.

## 2017-10-17 NOTE — PROGRESS NOTES
Mr Juan Carlos Levine caught me in the [facility] to let me know that he will start a 10 day course of doxycycline. I told his that he should continue his current dose of warfarin, as doxycycline does not usually affect the INR.

## 2017-10-17 NOTE — PROGRESS NOTES
[address]   Electrophysiology   Date: 10/17/2017  CC:    Chief Complaint   Patient presents with    Follow-Up from [address] [place] [address]     10/10/17 Bi-V ICD/having increased coughing/feeling like he has a cold    Chest Pain     random/worse in the evening when getting ready to lay down    Shortness of Breath     when laying down     I had the privilege of visiting Beatrice Montana in the office. He presents today for follow up for cardiomyopathy and Biv ICD implant for primary prevention and resynchronization therapy. Patient reports he has not been feeling well. He reports he has a cough an stimulation to his chest area, worse when laying down. He also reports \"cold like symptoms. \" He reports a cough-occasionally productive. Patient also report poor appetite. 61 y. o. with a PMH significant for a prior PE in 2011 and nonischemic cardiomyopathy    He was initially hospitalized at [facility] with chest pain. He had an abnormal stress that led to a left heart catheterization on 7/21/16. His coronary arteries were normal but he had LV dysfunction with an LVEF 40% at that time. A repeat echocardiogram demonstrated his LVEF to be 30% despite optimal medical therapy. HPI: Beatrice Montana is a 61 y.o.      Past Medical History:   Diagnosis Date    Bronchiolitis obliterans ([address]     Bundle branch block, right     Cardiomyopathy ([address]     Fibromyalgia     GERD (gastroesophageal reflux disease)     Hepatitis 1979    unsure of which type    Hx of blood clots     Hyperlipemia     Hypertension     IBS (irritable bowel syndrome)     Kidney stone     over thirty kidney stones    [facility]uropathy [facility] [address]     right side-chest    Pneumothorax 2011    Right    Prostatitis     Pulmonary embolism on right [facility]) 2011    upper lobe-coumadin 2011    Sacroiliitis [facility])         Past Surgical History:   Procedure Laterality Date    CHOLECYSTECTOMY  2007    COLONOSCOPY  9/21/2012    dr Tanvir Senior  2015    LITHOTRIPSY      SINUS SURGERY      Made opening larger in sinuses    UPPER GASTROINTESTINAL ENDOSCOPY  3/28/2014    dr G[facility]       Allergies: Allergies   Allergen Reactions    Atrovent Nasal Spray [Ipratropium] Other (See Comments)     Chest tightness    Morphine Other (See Comments)     \"makes me feel funny\"      Nitroglycerin Other (See Comments)     Severe hypotension (went from 730 to 66 systolic)    Sulfa Antibiotics Rash    Vicodin [Hydrocodone-Acetaminophen] Rash       Medication:  Current Outpatient Prescriptions   Medication Sig Dispense Refill    methocarbamol (ROBAXIN) 750 MG tablet Take 750 mg by mouth 2 times daily      oxyCODONE-acetaminophen (PERCOCET)  MG per tablet Take 1 tablet by mouth every 6 hours as needed for Pain . 10 tablet 0    potassium chloride (KLOR-CON M) 10 MEQ extended release tablet TAKE 4 TABLETS BY MOUTH EVERY  tablet 3    sacubitril-valsartan (ENTRESTO) 24-26 MG per tablet Take 1 tablet by mouth 2 times daily 180 tablet 3    pravastatin (PRAVACHOL) 40 MG tablet Take 1 tablet by mouth daily 90 tablet 3    tamsulosin (FLOMAX) 0.4 MG capsule Take 1 capsule by mouth 2 times daily 60 capsule 1    hydrocortisone (PROCTOZONE-HC) 2.5 % rectal cream PLACE RECTALLY TWICE DAILY 30 g 0    dexlansoprazole (DEXILANT) 30 MG CPDR delayed release capsule Take 1 capsule by mouth daily      zolpidem (AMBIEN) 10 MG tablet TAKE 1 TABLET BY MOUTH EVERY NIGHT AT BEDTIME 90 tablet 1    LYRICA 75 MG capsule TAKE 1 CAPSULE BY MOUTH EVERY MORNING AND 2 CAPSULES BY MOUTH EVERY EVENING 90 capsule 1    carisoprodol (SOMA) 250 MG tablet Take 350 mg by mouth as needed      ondansetron (ZOFRAN ODT) 4 MG disintegrating tablet Take 1-2 tablets by mouth every 8 hours as needed for Nausea May Sub regular tablet (non-ODT) if insurance does not cover ODT.  20 tablet 0    carvedilol (COREG) 6.25 MG tablet Take 1 tablet by mouth 2 times daily (with meals) 180 tablet 6    azelastine (ASTELIN) 0.1 % nasal spray 1 spray by Nasal route 2 times daily Use in each nostril as directed 1 Bottle 3    dicyclomine (BENTYL) 10 MG capsule TAKE ONE CAPSULE BY MOUTH FOUR TIMES DAILY AS NEEDED 360 capsule 3    warfarin (COUMADIN) 5 MG tablet Take 5 mg by mouth daily EXCEPT 7.5mg on Monday or as directed by [facility] Coumadin [facility] [phone]      Respiratory Therapy Supplies (NEBULIZER/TUBING/MOUTHPIECE) KIT 1 kit by Does not apply route 3 times daily 2 kit 5    flavoxate (URISPAS) 100 MG tablet TAKE 1 TABLET BY MOUTH THREE TIMES DAILY AS NEEDED FOR URINARY SPASM 90 tablet 0    Cholecalciferol (VITAMIN D3) 2000 UNITS CAPS Take 2,000 Units by mouth daily      atropine-PHENobarbital-scopolamine-hyoscyamine (DONNATAL) 16.2 MG per tablet TAKE 1 TABLET BY MOUTH EVERY 6 HOURS AS NEEDED 60 tablet 1    aspirin 81 MG tablet   Take 81 mg by mouth daily       misoprostol (CYTOTEC) 200 MCG tablet Take 200 mcg by mouth 2 times daily.  albuterol (ACCUNEB) 1.25 MG/3ML nebulizer solution Inhale 1 ampule into the lungs every 8 hours.  albuterol (PROAIR HFA) 108 (90 BASE) MCG/ACT inhaler Inhale 2 puffs into the lungs every 6 hours as needed. No current facility-administered medications for this visit. Social History:  Reviewed. reports that he has never smoked. He has never used smokeless tobacco. He reports that he drinks alcohol. He reports that he does not use drugs. Family History:  Reviewed. family history includes Cancer in his father; Dementia in his mother; High Blood Pressure in his mother.        Review of System:    · General ROS: negative for chills, positive forfever, no change in weight   · Psychological ROS: negative for  anxiety or depression  · Ophthalmic ROS: negative for  eye pain or loss of vision  · ENT ROS: negative for  headaches, sore throat   · Allergy and Immunology ROS: negative for  hives  · Hematological and Lymphatic ROS: negative for  bleeding problems, blood clots, bruising or jaundice  · Endocrine ROS: negative for  skin changes, temperature intolerance or unexpected weight changes  · Respiratory ROS: positive for  cough, sputum, negative wheezing, shortness of breath   · Cardiovascular ROS: Per HPI. · Gastrointestinal ROS: negative for abdominal pain, diarrhea, nausea/vomiting, bleeding, positive for excessive urination   · Musculoskeletal ROS: negative for  joint swelling, pain    · Neurological ROS: negative for  confusion, numbness/tingling, seizures, weakness  · Dermatological ROS: negative for rash, changes in skin color, lesions     Physical Examination:  /80 (Site: Right Arm, Position: Sitting)   Pulse 68   Ht 5' 6\" (1.676 m)   Wt 194 lb (88 kg)   SpO2 98%   BMI 31.31 kg/m²     · Constitutional: Oriented. No distress. · Head: Normocephalic and atraumatic. · Mouth/Throat: Oropharynx is clear and moist.   · Eyes: Conjunctivae normal. EOM are normal.   · Neck: Normal range of motion. Neck supple. No rigidity. No JVD present. · Cardiovascular: Normal rate, regular rhythm, S1&S2 and intact distal pulses. · Left chest wall stable, incision site stable-margins well approximated, no hematoma, no ecchymosis   · Pulmonary/Chest: Bilateral respiratory sounds. No wheezes. No rhonchi. · Abdominal: Soft. Bowel sounds present. No distension, No tenderness. · Musculoskeletal: No tenderness. No edema    · Neurological: Alert and oriented. Cranial nerve appears intact, No Gross deficit   · Skin: Skin is warm and dry. No rash noted. · Psychiatric: Has a normal mood, affect and behavior       Labs:  Lab Results   Component Value Date    CREATININE 0.9 10/11/2017    BUN 7 10/11/2017     (L) 10/11/2017    K 4.2 10/11/2017    CL 98 (L) 10/11/2017    CO2 27 10/11/2017       ECG: personally reviewed     Echo:  Summary   Left ventricle size is normal. Mild concentric left ventricular hypertrophy   is present.  Global ejection fraction is moderately decreased and estimated   from 30 % to 35%. Diastolic filling parameters suggests grade I diastolic   dysfunction .   There is abnormal (paradoxical) septal motion consistent with left bundle   branch block.   Mitral valve is structurally normal.   Trivial to mild mitral regurgitation is present.   The left atrial size is normal.   A bubble study was performed and fails to show evidence of right to left   shunting.     Device: Device interrogated and functioning appropriately      Assessment:   Patient Active Problem List    Diagnosis Date Noted    Biventricular ICD (implantable cardioverter-defibrillator) in place      Priority: High    Chronic anticoagulation      Priority: High    Pulmonary embolus ([address] 07/08/2015     Priority: High    CAD (coronary artery disease) 07/09/2015     Priority: Medium    Bronchiolitis obliterans (HCC)      Priority: Medium    Essential hypertension, benign      Priority: Medium    Urinary tract infection without hematuria      Priority: Low    Ureteral stone 09/01/2017     Priority: Low    Acute confusional state 08/22/2017     Priority: Low    Elevated INR      Priority: Low    Chronic systolic CHF (congestive heart failure), NYHA class 2 (HCC)      Priority: Low    LBBB (left bundle branch block)      Priority: Low    Right arm weakness 08/14/2017     Priority: Low    Transient cerebral ischemia 07/24/2017     Priority: Low    History of pulmonary embolism 07/24/2017     Priority: Low    Cardiomyopathy ([facility] [address] 07/24/2017     Priority: Low    Confusion      Priority: Low    Nausea 04/21/2017     Priority: Low    Cough 02/21/2017     Priority: Low    Shortness of breath 01/30/2017     Priority: Low    Acute non-recurrent maxillary sinusitis 01/04/2017     Priority: Low    DDD (degenerative disc disease), lumbar 06/20/2016     Priority: Low    Leg pain, right 11/02/2015     Priority: Low    Lightheadedness 09/11/2015     Priority: Low    Headache 08/21/2015     Priority: Low    Atypical chest pain      Priority: Low    Bronchitis 04/07/2015     Priority: Low    Chest pain 08/06/2012     Priority: Low    Abdominal pain 05/30/2012     Priority: Low    Nephrolithiasis      Priority: Low    Irritable bowel syndrome      Priority: Low    Pure hypercholesterolemia      Priority: Low    Allergic rhinitis      Priority: Low    Glucose intolerance (impaired glucose tolerance)      Priority: Low    Sinusitis 03/29/2010     Priority: Low    Back pain 01/25/2010     Priority: Low    Encounter for adjustment of cardiac resynchronization therapy defibrillator (CRT-D) 10/11/2017        Plan:  1. BivICD in situ   Device interrogated   Pacing thresholds stable   Patient c/o diaphragmatic stimulation and cough     Multiple programming configurations attempted     LV lead also turned off to assess     Patient c/o continuing to feel pacing and stimulation with LV lead off    BiV pacing turned back on    Patient also c/o subjective fever and cough for the past several days    10 day course of doxycycline for pocket site     CXR today     Reviewed results     CXR improved from post procedural     No acute abnormalities   Patient education on incision site care and mobility restrictions   2. Cardiomyopathy   Compensated on exam   On Entresto, Coreg, ASA   Continue medications    Reinforced dietary restrictions and fluid restrictions   3. HTN   Stable  4. Cough    If not improved instructed to f/u with PCP    Recent dx of URI   5. Chronic PE   On anticoagulation therapy   INR per coumadin clinic         Thank you for allowing me to participate in the care of Jenna Medeiros. Further evaluation will be based upon the patient's clinical course and testing results. All questions and concerns were addressed to the patient/family. Alternatives to my treatment were discussed.      Deepika Elias, [address]  Electrophysiology   10/17/2017  12:22 PM      Patient

## 2017-10-21 ENCOUNTER — TELEPHONE (OUTPATIENT)
Dept: PHARMACY | Facility: CLINIC | Age: 57
End: 2017-10-21

## 2017-10-21 NOTE — TELEPHONE ENCOUNTER
Mr. Batsheva Bee had called yesterday and left a message. I was unable to return his call yesterday. Called and spoke with him today. He reports he called the after hours number last evening and it was taken care of.     Vipul Lopez, PharmD, [facility]  Anticoagulation Service  [phone]

## 2017-10-24 ENCOUNTER — PROCEDURE VISIT (OUTPATIENT)
Dept: CARDIOLOGY CLINIC | Age: 57
End: 2017-10-24

## 2017-10-24 ENCOUNTER — TELEPHONE (OUTPATIENT)
Dept: INTERNAL MEDICINE CLINIC | Age: 57
End: 2017-10-24

## 2017-10-24 ENCOUNTER — OFFICE VISIT (OUTPATIENT)
Dept: CARDIOLOGY CLINIC | Age: 57
End: 2017-10-24

## 2017-10-24 VITALS
SYSTOLIC BLOOD PRESSURE: 124 MMHG | OXYGEN SATURATION: 97 % | WEIGHT: 190 LBS | DIASTOLIC BLOOD PRESSURE: 82 MMHG | HEIGHT: 66 IN | BODY MASS INDEX: 30.53 KG/M2 | HEART RATE: 80 BPM

## 2017-10-24 DIAGNOSIS — I42.8 NONISCHEMIC CARDIOMYOPATHY (HCC): Primary | ICD-10-CM

## 2017-10-24 DIAGNOSIS — I50.22 CHRONIC SYSTOLIC HF (HEART FAILURE) (HCC): ICD-10-CM

## 2017-10-24 DIAGNOSIS — E78.2 MIXED HYPERLIPIDEMIA: ICD-10-CM

## 2017-10-24 DIAGNOSIS — I42.0 DILATED CARDIOMYOPATHY (HCC): ICD-10-CM

## 2017-10-24 DIAGNOSIS — I10 ESSENTIAL HYPERTENSION: ICD-10-CM

## 2017-10-24 DIAGNOSIS — Z95.810 BIVENTRICULAR ICD (IMPLANTABLE CARDIOVERTER-DEFIBRILLATOR) IN PLACE: Primary | ICD-10-CM

## 2017-10-24 PROCEDURE — 93284 PRGRMG EVAL IMPLANTABLE DFB: CPT | Performed by: INTERNAL MEDICINE

## 2017-10-24 PROCEDURE — 99024 POSTOP FOLLOW-UP VISIT: CPT | Performed by: INTERNAL MEDICINE

## 2017-10-24 RX ORDER — CARVEDILOL 12.5 MG/1
12.5 TABLET ORAL 2 TIMES DAILY WITH MEALS
Qty: 180 TABLET | Refills: 3 | Status: SHIPPED | OUTPATIENT
Start: 2017-10-24 | End: 2017-11-14 | Stop reason: ALTCHOICE

## 2017-10-24 NOTE — PROGRESS NOTES
Device interrogation by company representative. See interrogation for further details. DO NOT TURN ON SERGEI-AUTO THRESHOLD TESTING BC PT COUGHS. He is symptomatic with threshold testing. No new arrhythmias or changes made. Pt to see Dr. Serna Crooked today.

## 2017-10-24 NOTE — PROGRESS NOTES
LITHOTRIPSY      SINUS SURGERY      Made opening larger in sinuses    UPPER GASTROINTESTINAL ENDOSCOPY  3/28/2014    dr Eric mercedes       Allergies: Allergies   Allergen Reactions    Atrovent Nasal Spray [Ipratropium] Other (See Comments)     Chest tightness    Morphine Other (See Comments)     \"makes me feel funny\"      Nitroglycerin Other (See Comments)     Severe hypotension (went from 719 to 66 systolic)    Sulfa Antibiotics Rash    Vicodin [Hydrocodone-Acetaminophen] Rash       Medication:   Prior to Admission medications    Medication Sig Start Date End Date Taking? Authorizing Provider   carvedilol (COREG) 12.5 MG tablet Take 1 tablet by mouth 2 times daily (with meals) 10/24/17  Yes Bonnie Pickett MD   LYRICA 75 MG capsule TAKE ONE CAPSULE BY MOUTH EVERY MORNING AND 2 CAPSULES EVERY EVENING 10/23/17  Yes Cl Newell MD   methocarbamol (ROBAXIN) 750 MG tablet Take 750 mg by mouth 2 times daily   Yes Historical Provider, MD   clindamycin (CLEOCIN) 150 MG capsule Take 1 capsule by mouth 3 times daily for 10 days 10/17/17 10/27/17 Yes Sharon Prabhakar NP   oxyCODONE-acetaminophen (PERCOCET)  MG per tablet Take 1 tablet by mouth every 6 hours as needed for Pain .  10/11/17  Yes Sharon Prabhakar NP   potassium chloride (KLOR-CON M) 10 MEQ extended release tablet TAKE 4 TABLETS BY MOUTH EVERY DAY 9/28/17  Yes Cl Newell MD   sacubitril-valsartan [facility]) 24-26 MG per tablet Take 1 tablet by mouth 2 times daily 9/21/17  Yes Laura Mondragon MD   pravastatin (PRAVACHOL) 40 MG tablet Take 1 tablet by mouth daily 9/21/17  Yes Laura Mondragon MD   tamsulosin [facility]) 0.4 MG capsule Take 1 capsule by mouth 2 times daily 9/1/17  Yes Cl Newell MD   hydrocortisone (PROCTOZONE-HC) 2.5 % rectal cream PLACE RECTALLY TWICE DAILY 8/25/17  Yes Cl Newell MD   dexlansoprazole (DEXILANT) 30 MG CPDR delayed release capsule Take 1 capsule by mouth daily 8/16/16  Yes Historical has never used smokeless tobacco. He reports that he drinks alcohol. He reports that he does not use drugs. Family History:  family history includes Cancer in his father; Dementia in his mother; High Blood Pressure in his mother. Reviewed. Denies family history of sudden cardiac death, arrhythmia, premature CAD    Review of System:    · General ROS: negative for - chills, fever   · Psychological ROS: negative for - anxiety or depression  · Ophthalmic ROS: negative for - eye pain or loss of vision  · ENT ROS: negative for - epistaxis, headaches, nasal discharge, sore throat   · Allergy and Immunology ROS: negative for - hives, nasal congestion   · Hematological and Lymphatic ROS: negative for - bleeding problems, blood clots, bruising or jaundice  · Endocrine ROS: negative for - skin changes, temperature intolerance or unexpected weight changes  · Respiratory ROS: negative for - cough, hemoptysis, pleuritic pain, SOB, sputum changes or wheezing  · Cardiovascular ROS: Per HPI. · Gastrointestinal ROS: negative for - abdominal pain, blood in stools, diarrhea, hematemesis, melena,  nausea/vomiting or swallowing difficulty/pain  · Genito-Urinary ROS: negative for - dysuria or incontinence  · Musculoskeletal ROS: negative for - joint swelling or muscle pain  · Neurological ROS: negative for - confusion, dizziness, gait disturbance, headaches, numbness/tingling,  seizures,  speech problems, tremors,  visual changes or weakness  · Dermatological ROS: negative for - rash    Physical Examination:  Vitals:    10/24/17 1602   BP: 124/82   Pulse: 80   SpO2: 97%       · Constitutional: Oriented. No distress. · Head: Normocephalic and atraumatic. · Mouth/Throat: Oropharynx is clear and moist.   · Eyes: Conjunctivae normal. EOM are normal.   · Neck: Normal range of motion. Neck supple. No rigidity. No JVD present. · Cardiovascular: Normal rate, regular rhythm, S1&S2 and intact distal pulses.    · Pulmonary/Chest: disease), lumbar 06/20/2016    Leg pain, right 11/02/2015    Lightheadedness 09/11/2015    Headache 08/21/2015    Atypical chest pain     Bronchitis 04/07/2015    Chest pain 08/06/2012    Abdominal pain 05/30/2012    Nephrolithiasis     Irritable bowel syndrome     Allergic rhinitis     Sinusitis 03/29/2010    Back pain 01/25/2010        Plan:  1) Cardiomyopathy: Nonischemic. EF 30-35%. NYHA Class II-III on OMT for > 3 months. S/p Bi-V ICD implant for primary prevention. Paced 100%. He reports improvement with CRT. Continue current medical therapy including entresto and BB. 2) HF: Chronic. Systolic. EF 92%. Appears euvolemic on exam today. Recommend low fluid and Na intake. Encouraged daily weights. 3) PE: Hx of- on chronic coumadin therapy. 4) HTN:  Has been trending high (systolic 142'N). Increase Coreg to 12.5 mg BID. Monitor. 5) HLD:  Continue statin therapy. Follow up in 1 month with [facility] NP. Plan is to increase Entresto dosage and re-check BP trend on increased BB therapy.      I have discussed the plan of care with the primary care team.    Hugo Cramer MD, PhD

## 2017-10-25 ENCOUNTER — PROCEDURE VISIT (OUTPATIENT)
Dept: CARDIOLOGY CLINIC | Age: 57
End: 2017-10-25

## 2017-10-25 ENCOUNTER — TELEPHONE (OUTPATIENT)
Dept: CARDIOLOGY CLINIC | Age: 57
End: 2017-10-25

## 2017-10-25 DIAGNOSIS — I42.0 DILATED CARDIOMYOPATHY (HCC): ICD-10-CM

## 2017-10-25 DIAGNOSIS — Z95.810 BIVENTRICULAR ICD (IMPLANTABLE CARDIOVERTER-DEFIBRILLATOR) IN PLACE: Primary | ICD-10-CM

## 2017-10-25 PROCEDURE — 93284 PRGRMG EVAL IMPLANTABLE DFB: CPT | Performed by: INTERNAL MEDICINE

## 2017-10-25 NOTE — TELEPHONE ENCOUNTER
Nothing was done. The pt knows this. I am contacting Donal Jiang in device clinic before I call him because I believe that Donal Jiang has already spoken to the pt this morning.

## 2017-10-25 NOTE — TELEPHONE ENCOUNTER
Patient called this morning stating that he has been up all night because of the adjustments that were made on his pacemaker that were done yesterday in our office. He states he wants all the settings put back to where they were. After speaking to Dr. Merle Harper and Arbovaxk nereida, no adjustments were even made. I told patient that no changes were made when he was in our office. He insisted that \"2 men\" made adjustments. I reassured him that none were made. He became angry and started to raise his voice not believing anything I had to say. I tried to explain that I cant change something back that was never changed. In the end he kept talking over me and I hung up the phone. Since then he has called 2x asking that his pacemaker be changed back.

## 2017-10-27 ENCOUNTER — NURSE ONLY (OUTPATIENT)
Dept: INTERNAL MEDICINE CLINIC | Age: 57
End: 2017-10-27

## 2017-10-27 DIAGNOSIS — Z23 NEED FOR INFLUENZA VACCINATION: Primary | ICD-10-CM

## 2017-10-27 PROCEDURE — 90471 IMMUNIZATION ADMIN: CPT | Performed by: INTERNAL MEDICINE

## 2017-10-27 PROCEDURE — 90686 IIV4 VACC NO PRSV 0.5 ML IM: CPT | Performed by: INTERNAL MEDICINE

## 2017-10-27 RX ORDER — OXYCODONE AND ACETAMINOPHEN 10; 325 MG/1; MG/1
1 TABLET ORAL EVERY 6 HOURS PRN
Qty: 10 TABLET | Refills: 0 | Status: SHIPPED | OUTPATIENT
Start: 2017-10-27 | End: 2017-11-10 | Stop reason: SDUPTHER

## 2017-10-27 NOTE — PROGRESS NOTES
Device interrogation by company representative. See interrogation for further details. ALL OUTPUTS LOWERED TO MINIMUM SETTING.

## 2017-10-27 NOTE — TELEPHONE ENCOUNTER
Pt is requesting a written script for pain medication, pt is coming in about an hour for a flu shot and would like to  the script if possible.

## 2017-10-30 ENCOUNTER — PROCEDURE VISIT (OUTPATIENT)
Dept: CARDIOLOGY CLINIC | Age: 57
End: 2017-10-30

## 2017-10-30 DIAGNOSIS — Z95.810 BIVENTRICULAR ICD (IMPLANTABLE CARDIOVERTER-DEFIBRILLATOR) IN PLACE: Primary | ICD-10-CM

## 2017-10-31 ENCOUNTER — ANTI-COAG VISIT (OUTPATIENT)
Dept: PHARMACY | Facility: CLINIC | Age: 57
End: 2017-10-31

## 2017-10-31 DIAGNOSIS — I27.82 CHRONIC SEPTIC PULMONARY EMBOLISM WITHOUT ACUTE COR PULMONALE (HCC): ICD-10-CM

## 2017-10-31 DIAGNOSIS — I26.90 CHRONIC SEPTIC PULMONARY EMBOLISM WITHOUT ACUTE COR PULMONALE (HCC): ICD-10-CM

## 2017-10-31 LAB — INR BLD: 5.2

## 2017-11-01 ENCOUNTER — HOSPITAL ENCOUNTER (OUTPATIENT)
Dept: OTHER | Age: 57
Discharge: HOME | End: 2017-11-30
Attending: INTERNAL MEDICINE | Admitting: INTERNAL MEDICINE

## 2017-11-01 ENCOUNTER — TELEPHONE (OUTPATIENT)
Dept: INTERNAL MEDICINE CLINIC | Age: 57
End: 2017-11-01

## 2017-11-01 DIAGNOSIS — R05.9 COUGH: ICD-10-CM

## 2017-11-01 RX ORDER — PROMETHAZINE HYDROCHLORIDE AND CODEINE PHOSPHATE 6.25; 1 MG/5ML; MG/5ML
5 SYRUP ORAL EVERY 4 HOURS PRN
Qty: 120 ML | Refills: 1 | Status: SHIPPED | OUTPATIENT
Start: 2017-11-01 | End: 2018-04-05 | Stop reason: ALTCHOICE

## 2017-11-01 NOTE — PROGRESS NOTES
[employer] alert for intermittent capture in the RV lead. His device has a new alert per [employer] that will send an alert when he has 30 or more noncapture beats in a 24 hour period. Dr Rafa Higgins is aware of this. His outputs are at minimum settings d/t his symptoms per Dr. Rafa Higgins.

## 2017-11-01 NOTE — TELEPHONE ENCOUNTER
Pt is asking if dr will call in cough med Promethazine w/Codeine cough syrup to his pharmacy, pt uses [facility] on [address] at [phone].

## 2017-11-02 ENCOUNTER — TELEPHONE (OUTPATIENT)
Dept: INTERNAL MEDICINE CLINIC | Age: 57
End: 2017-11-02

## 2017-11-02 DIAGNOSIS — R05.9 COUGH: ICD-10-CM

## 2017-11-02 RX ORDER — PROMETHAZINE HYDROCHLORIDE AND CODEINE PHOSPHATE 6.25; 1 MG/5ML; MG/5ML
5 SYRUP ORAL EVERY 4 HOURS PRN
Qty: 120 ML | Refills: 1 | Status: CANCELLED | OUTPATIENT
Start: 2017-11-02 | End: 2018-11-02

## 2017-11-02 NOTE — TELEPHONE ENCOUNTER
Pt states jonnie does not have his cough medicine that was faxed yesterday can this be re-sent?     Pharmacy:  [id number] - [id number], [address]

## 2017-11-06 ENCOUNTER — TELEPHONE (OUTPATIENT)
Dept: PHARMACY | Facility: CLINIC | Age: 57
End: 2017-11-06

## 2017-11-06 ENCOUNTER — OFFICE VISIT (OUTPATIENT)
Dept: INTERNAL MEDICINE CLINIC | Age: 57
End: 2017-11-06

## 2017-11-06 ENCOUNTER — TELEPHONE (OUTPATIENT)
Dept: INTERNAL MEDICINE CLINIC | Age: 57
End: 2017-11-06

## 2017-11-06 ENCOUNTER — HOSPITAL ENCOUNTER (OUTPATIENT)
Dept: CT IMAGING | Age: 57
Discharge: HOME | End: 2017-11-06
Attending: INTERNAL MEDICINE | Admitting: INTERNAL MEDICINE

## 2017-11-06 ENCOUNTER — TELEPHONE (OUTPATIENT)
Dept: CARDIOLOGY CLINIC | Age: 57
End: 2017-11-06

## 2017-11-06 VITALS
RESPIRATION RATE: 16 BRPM | WEIGHT: 192 LBS | DIASTOLIC BLOOD PRESSURE: 86 MMHG | HEART RATE: 68 BPM | BODY MASS INDEX: 30.86 KG/M2 | HEIGHT: 66 IN | SYSTOLIC BLOOD PRESSURE: 144 MMHG

## 2017-11-06 DIAGNOSIS — R10.30 LOWER ABDOMINAL PAIN: ICD-10-CM

## 2017-11-06 DIAGNOSIS — R07.2 PRECORDIAL PAIN: Primary | ICD-10-CM

## 2017-11-06 DIAGNOSIS — Z95.810 BIVENTRICULAR ICD (IMPLANTABLE CARDIOVERTER-DEFIBRILLATOR) IN PLACE: ICD-10-CM

## 2017-11-06 PROCEDURE — G8417 CALC BMI ABV UP PARAM F/U: HCPCS | Performed by: INTERNAL MEDICINE

## 2017-11-06 PROCEDURE — G8598 ASA/ANTIPLAT THER USED: HCPCS | Performed by: INTERNAL MEDICINE

## 2017-11-06 PROCEDURE — G8428 CUR MEDS NOT DOCUMENT: HCPCS | Performed by: INTERNAL MEDICINE

## 2017-11-06 PROCEDURE — G8484 FLU IMMUNIZE NO ADMIN: HCPCS | Performed by: INTERNAL MEDICINE

## 2017-11-06 PROCEDURE — 1036F TOBACCO NON-USER: CPT | Performed by: INTERNAL MEDICINE

## 2017-11-06 PROCEDURE — 3017F COLORECTAL CA SCREEN DOC REV: CPT | Performed by: INTERNAL MEDICINE

## 2017-11-06 PROCEDURE — 99214 OFFICE O/P EST MOD 30 MIN: CPT | Performed by: INTERNAL MEDICINE

## 2017-11-06 ASSESSMENT — ENCOUNTER SYMPTOMS
ABDOMINAL PAIN: 0
SHORTNESS OF BREATH: 0

## 2017-11-06 NOTE — TELEPHONE ENCOUNTER
I called him back twice- both times it rang and then picked up after several rings with a \"busy signal\"

## 2017-11-06 NOTE — TELEPHONE ENCOUNTER
I called him again. He is requesting to be seen again by Dr. Rafa Higgins. He states that he \"feels like crap. \"  Appt made for this Wed at 10 am (double book) with device check.

## 2017-11-06 NOTE — TELEPHONE ENCOUNTER
Patient asking if Dr Fracisco Galo will work him in today for an ER f/u for chest pain, he is still in pain and went to the ED yesterday    VQ#[phone]

## 2017-11-06 NOTE — TELEPHONE ENCOUNTER
Patient is requesting that Mellissa Torres RN please call him at [phone]. Patient states he is in the process of passing a kidney stone, he took a pain pill last night and slept on his arm funny. When he woke up he has arm pain and went to the ED. Per pt the ED told him his device was \"fine\". Patient was offered an apt today at 11:45am or Wednesday, pt unable to make both.

## 2017-11-06 NOTE — TELEPHONE ENCOUNTER
Patient asking what he can take regarding a laxative he usually uses colace, is there something dr Yordan vo?     XB#[phone]

## 2017-11-06 NOTE — TELEPHONE ENCOUNTER
Patient seen in ED yesterday. INR checked there and it was in goal rage at 2.59  Patient advised to continue his normal dose and to call to make an appointment depending on course of treatment for his abdominal pain. Patient expressed understanding.

## 2017-11-07 ENCOUNTER — TELEPHONE (OUTPATIENT)
Dept: CARDIOLOGY CLINIC | Age: 57
End: 2017-11-07

## 2017-11-07 NOTE — TELEPHONE ENCOUNTER
Received call from Mr Lori Banuelos. He is currently admitted at [facility] for constipation. He states he was fine for 2 weeks then he starting getting the sternal pain that he was previously getting from the pacing. He states he thinks the coreg is causing him problems along with the pacer and he would like Dr. Dylan Machado to come see him and turn the pacemaker off bc he cannot deal with pain any longer.

## 2017-11-10 ENCOUNTER — TELEPHONE (OUTPATIENT)
Dept: INTERNAL MEDICINE CLINIC | Age: 57
End: 2017-11-10

## 2017-11-10 ENCOUNTER — PROCEDURE VISIT (OUTPATIENT)
Dept: CARDIOLOGY CLINIC | Age: 57
End: 2017-11-10

## 2017-11-10 ENCOUNTER — TELEPHONE (OUTPATIENT)
Dept: CARDIOLOGY CLINIC | Age: 57
End: 2017-11-10

## 2017-11-10 ENCOUNTER — ANTI-COAG VISIT (OUTPATIENT)
Dept: PHARMACY | Facility: CLINIC | Age: 57
End: 2017-11-10

## 2017-11-10 DIAGNOSIS — I26.01 CHRONIC SEPTIC PULMONARY EMBOLISM WITH ACUTE COR PULMONALE (HCC): ICD-10-CM

## 2017-11-10 DIAGNOSIS — I27.82 CHRONIC SEPTIC PULMONARY EMBOLISM WITH ACUTE COR PULMONALE (HCC): ICD-10-CM

## 2017-11-10 DIAGNOSIS — Z95.810 BIVENTRICULAR ICD (IMPLANTABLE CARDIOVERTER-DEFIBRILLATOR) IN PLACE: Primary | ICD-10-CM

## 2017-11-10 LAB — INR BLD: 2

## 2017-11-10 RX ORDER — OXYCODONE AND ACETAMINOPHEN 10; 325 MG/1; MG/1
1 TABLET ORAL EVERY 6 HOURS PRN
Qty: 60 TABLET | Refills: 0 | Status: SHIPPED | OUTPATIENT
Start: 2017-11-10 | End: 2018-03-20

## 2017-11-10 NOTE — PROGRESS NOTES
Mr. Donalee Cooks is a 61 y.o.  male with history of PE who presents today for anticoagulation monitoring and adjustment. Lab Results   Component Value Date    INR 2 11/10/2017    INR 2.97 (H) 11/09/2017    INR 3.59 (H) 11/08/2017     Patient appears well today. He was in the hospital last week for a couple days. Prior to that patient had been seen in the Coumadin Clinic on 10-31-17, at which time patient's INR was elevated. He was instructed to hold his Warfarin for two days and then decrease his dose to Warfarin 5mg daily. It is difficult to tell if the 5mg dose will keep patient's INR therapeutic since he was admitted to hospital, so I will continue this dose and repeat INR in 7-10 days. Coumadin dose: Continue Warfarin 5mg (1 tablet) daily. Recheck INR on 11-20-17. Patient reminded to call the Anticoagulation Clinic with any signs or symptoms of bleeding or with any medication changes. Patient given instructions utilizing the teach back method. After visit summary printed and reviewed with patient. Warfarin dose updated.

## 2017-11-10 NOTE — TELEPHONE ENCOUNTER
I told him that Dr. Fracisco Galo d/c'd carvedilol on 11/8/17 d/t Cynthia Eldridge feeling nauseated while taking it. I advised that per Dr. Clifton Elaine instruction, he should remain off. He asked if Dr. Cornelio Vigil wants to replace it with something that is more easy on his stomach. He doesn't think Jessie Patricia is going to be enough to keep his bp down. I advised to continue Entresto as is, monitor his bp (max 2x daily - but preferably 1x daily), and keep appt with Monika Marshall. I told him that Monika Marshall can discuss any further htn treatments when he is seen in office. He said that he is going to call our office next week if his bp continue to be elevated. I advised that he really needs to try to calm down and not be so anxious about his bp. He v/u.

## 2017-11-10 NOTE — TELEPHONE ENCOUNTER
Patient is calling to ask if he can be can be worked in your schedule for a hospital follow up in 2 weeks and also asking why he only received 10 pain pills please call him at [phone].

## 2017-11-10 NOTE — TELEPHONE ENCOUNTER
Anna Holland stopped in wanting to know if he is supposed to continue taking his carvedilol or if he was supposed to D/C that.       You can reach Anna Holland at #[phone]

## 2017-11-10 NOTE — TELEPHONE ENCOUNTER
Ok to find him a spot in 2 weeks --give him a rd on percocet 10 at #60 also --route to me to sign please --tell him we are trying to work with new rules from feds

## 2017-11-14 ENCOUNTER — OFFICE VISIT (OUTPATIENT)
Dept: PULMONOLOGY | Age: 57
End: 2017-11-14

## 2017-11-14 VITALS
OXYGEN SATURATION: 97 % | RESPIRATION RATE: 16 BRPM | SYSTOLIC BLOOD PRESSURE: 116 MMHG | BODY MASS INDEX: 30.89 KG/M2 | HEIGHT: 66 IN | DIASTOLIC BLOOD PRESSURE: 80 MMHG | HEART RATE: 103 BPM | WEIGHT: 192.2 LBS | TEMPERATURE: 97.5 F

## 2017-11-14 DIAGNOSIS — R91.8 LUNG NODULES: ICD-10-CM

## 2017-11-14 DIAGNOSIS — J42 BRONCHIOLITIS OBLITERANS (HCC): Primary | ICD-10-CM

## 2017-11-14 PROCEDURE — 1036F TOBACCO NON-USER: CPT | Performed by: INTERNAL MEDICINE

## 2017-11-14 PROCEDURE — 3017F COLORECTAL CA SCREEN DOC REV: CPT | Performed by: INTERNAL MEDICINE

## 2017-11-14 PROCEDURE — G8417 CALC BMI ABV UP PARAM F/U: HCPCS | Performed by: INTERNAL MEDICINE

## 2017-11-14 PROCEDURE — 3023F SPIROM DOC REV: CPT | Performed by: INTERNAL MEDICINE

## 2017-11-14 PROCEDURE — G8427 DOCREV CUR MEDS BY ELIG CLIN: HCPCS | Performed by: INTERNAL MEDICINE

## 2017-11-14 PROCEDURE — 1111F DSCHRG MED/CURRENT MED MERGE: CPT | Performed by: INTERNAL MEDICINE

## 2017-11-14 PROCEDURE — G8484 FLU IMMUNIZE NO ADMIN: HCPCS | Performed by: INTERNAL MEDICINE

## 2017-11-14 PROCEDURE — G8598 ASA/ANTIPLAT THER USED: HCPCS | Performed by: INTERNAL MEDICINE

## 2017-11-14 PROCEDURE — 99214 OFFICE O/P EST MOD 30 MIN: CPT | Performed by: INTERNAL MEDICINE

## 2017-11-14 PROCEDURE — G8926 SPIRO NO PERF OR DOC: HCPCS | Performed by: INTERNAL MEDICINE

## 2017-11-14 RX ORDER — CHOLECALCIFEROL (VITAMIN D3) 1250 MCG
1 CAPSULE ORAL WEEKLY
COMMUNITY
End: 2020-09-15 | Stop reason: SDUPTHER

## 2017-11-15 ENCOUNTER — OFFICE VISIT (OUTPATIENT)
Dept: INTERNAL MEDICINE CLINIC | Age: 57
End: 2017-11-15

## 2017-11-15 ENCOUNTER — TELEPHONE (OUTPATIENT)
Dept: PULMONOLOGY | Age: 57
End: 2017-11-15

## 2017-11-15 ENCOUNTER — TELEPHONE (OUTPATIENT)
Dept: INTERNAL MEDICINE CLINIC | Age: 57
End: 2017-11-15

## 2017-11-15 VITALS
OXYGEN SATURATION: 97 % | HEART RATE: 69 BPM | DIASTOLIC BLOOD PRESSURE: 82 MMHG | SYSTOLIC BLOOD PRESSURE: 128 MMHG | RESPIRATION RATE: 16 BRPM

## 2017-11-15 DIAGNOSIS — I42.0 DILATED CARDIOMYOPATHY (HCC): ICD-10-CM

## 2017-11-15 DIAGNOSIS — R10.30 LOWER ABDOMINAL PAIN: Primary | ICD-10-CM

## 2017-11-15 DIAGNOSIS — R06.00 DYSPNEA, UNSPECIFIED TYPE: ICD-10-CM

## 2017-11-15 DIAGNOSIS — R91.8 LUNG NODULES: Primary | ICD-10-CM

## 2017-11-15 PROCEDURE — 99496 TRANSJ CARE MGMT HIGH F2F 7D: CPT | Performed by: INTERNAL MEDICINE

## 2017-11-15 RX ORDER — PREDNISONE 10 MG/1
10 TABLET ORAL DAILY
Qty: 5 TABLET | Refills: 0 | Status: SHIPPED | OUTPATIENT
Start: 2017-11-15 | End: 2017-11-20

## 2017-11-15 ASSESSMENT — ENCOUNTER SYMPTOMS
ABDOMINAL PAIN: 0
SHORTNESS OF BREATH: 1

## 2017-11-15 NOTE — PROGRESS NOTES
Subjective:      Patient ID: Sav Carrera is a 61 y.o. male. HPI  1. Lower abdominal pain --hsop [facility]--DC 11/09--here for f/u     2. Dilated cardiomyopathy [facility]) --sees cardiology     3. Dyspnea, unspecified type --persisting sx--is seeing dr Angela Salazar --chest congestion-- sharp pains in rt shoulder--better after BM--dr mercedes added miralax for fiberall-- persisting cough-- no fever-- some phlegm--scattered-- using mucinex liquid--         Review of Systems   Respiratory: Positive for shortness of breath. Cardiovascular: Negative for chest pain. Gastrointestinal: Negative for abdominal pain. Objective:   Physical Exam   HENT:   Head: Normocephalic. Eyes: Pupils are equal, round, and reactive to light. Neck: Normal range of motion. Cardiovascular: Normal rate and normal heart sounds. Pulmonary/Chest: Effort normal. He has wheezes. He has no rales. He exhibits no tenderness. Wheeze---forced expiration--    Abdominal: Soft. Musculoskeletal: He exhibits no edema. Neurological: He is alert. Assessment:      1. Lower abdominal pain --improved     2. Dilated cardiomyopathy ([address] --Continue current therapy      3.  Dyspnea, unspecified type ---mild wheeze--add pred 10 mg po dialy x 5 dasy     abd sx are gone --Continue current therapy  --        Plan:

## 2017-11-15 NOTE — TELEPHONE ENCOUNTER
Pt asking for Dr Adriana Chapman to see him today for SOB and pain in the upper right part of his chest.       [phone]

## 2017-11-16 ENCOUNTER — HOSPITAL ENCOUNTER (OUTPATIENT)
Dept: CT IMAGING | Age: 57
Discharge: HOME | End: 2017-11-16

## 2017-11-16 DIAGNOSIS — R91.8 LUNG NODULES: ICD-10-CM

## 2017-11-17 ENCOUNTER — TELEPHONE (OUTPATIENT)
Dept: PULMONOLOGY | Age: 57
End: 2017-11-17

## 2017-11-17 RX ORDER — DOXYCYCLINE HYCLATE 100 MG
100 TABLET ORAL 2 TIMES DAILY
Qty: 14 TABLET | Refills: 0 | Status: SHIPPED | OUTPATIENT
Start: 2017-11-17 | End: 2017-11-17

## 2017-11-17 RX ORDER — LEVOFLOXACIN 500 MG/1
500 TABLET, FILM COATED ORAL DAILY
Qty: 7 TABLET | Refills: 0 | Status: SHIPPED | OUTPATIENT
Start: 2017-11-17 | End: 2017-11-24

## 2017-11-17 NOTE — TELEPHONE ENCOUNTER
Patient calling stating since his last office visit on 11/14/17 he has developed a productive cough with green mucus .  Can something be called in ?

## 2017-11-20 ENCOUNTER — TELEPHONE (OUTPATIENT)
Dept: CARDIOLOGY CLINIC | Age: 57
End: 2017-11-20

## 2017-11-20 ENCOUNTER — ANTI-COAG VISIT (OUTPATIENT)
Dept: PHARMACY | Facility: CLINIC | Age: 57
End: 2017-11-20

## 2017-11-20 ENCOUNTER — TELEPHONE (OUTPATIENT)
Dept: PULMONOLOGY | Age: 57
End: 2017-11-20

## 2017-11-20 DIAGNOSIS — I26.01 CHRONIC SEPTIC PULMONARY EMBOLISM WITH ACUTE COR PULMONALE (HCC): ICD-10-CM

## 2017-11-20 DIAGNOSIS — I27.82 CHRONIC SEPTIC PULMONARY EMBOLISM WITH ACUTE COR PULMONALE (HCC): ICD-10-CM

## 2017-11-20 LAB — INR BLD: 2.7

## 2017-11-20 NOTE — TELEPHONE ENCOUNTER
His HR is likely secondary to his body fighting his upper respiratory infection. If he took breathing treatments that can contribute as well. Monitor at this point. No medication changes. At his OV on 11/14/17 at another office it was '104. He can unplug if he wants. At this point, he is going to do what he wants anyway.

## 2017-11-20 NOTE — TELEPHONE ENCOUNTER
Patient called to report that his heart rate is in the 100's and believes that is too high. Pt states his bp this morning was 138/88. Patient would also like to know since his pace maker is off if he can unplug his home monitor. You can reach the patient at [phone].

## 2017-11-21 RX ORDER — LORATADINE 10 MG/1
10 TABLET ORAL DAILY
Qty: 30 TABLET | Refills: 0 | Status: SHIPPED | OUTPATIENT
Start: 2017-11-21 | End: 2018-05-01 | Stop reason: ALTCHOICE

## 2017-11-21 RX ORDER — FLUTICASONE PROPIONATE 50 MCG
1 SPRAY, SUSPENSION (ML) NASAL DAILY
Qty: 1 BOTTLE | Refills: 0 | Status: SHIPPED | OUTPATIENT
Start: 2017-11-21 | End: 2019-08-13 | Stop reason: CLARIF

## 2017-11-28 ENCOUNTER — PROCEDURE VISIT (OUTPATIENT)
Dept: CARDIOLOGY CLINIC | Age: 57
End: 2017-11-28

## 2017-11-28 ENCOUNTER — OFFICE VISIT (OUTPATIENT)
Dept: CARDIOLOGY CLINIC | Age: 57
End: 2017-11-28

## 2017-11-28 VITALS
WEIGHT: 191 LBS | HEART RATE: 86 BPM | HEIGHT: 66 IN | DIASTOLIC BLOOD PRESSURE: 82 MMHG | OXYGEN SATURATION: 96 % | SYSTOLIC BLOOD PRESSURE: 128 MMHG | BODY MASS INDEX: 30.7 KG/M2

## 2017-11-28 DIAGNOSIS — Z79.01 CHRONIC ANTICOAGULATION: ICD-10-CM

## 2017-11-28 DIAGNOSIS — Z95.810 BIVENTRICULAR ICD (IMPLANTABLE CARDIOVERTER-DEFIBRILLATOR) IN PLACE: Primary | ICD-10-CM

## 2017-11-28 DIAGNOSIS — I48.0 PAROXYSMAL ATRIAL FIBRILLATION (HCC): ICD-10-CM

## 2017-11-28 DIAGNOSIS — I42.0 DILATED CARDIOMYOPATHY (HCC): ICD-10-CM

## 2017-11-28 DIAGNOSIS — I10 ESSENTIAL HYPERTENSION, BENIGN: ICD-10-CM

## 2017-11-28 DIAGNOSIS — I50.22 CHRONIC SYSTOLIC CHF (CONGESTIVE HEART FAILURE), NYHA CLASS 2 (HCC): ICD-10-CM

## 2017-11-28 PROCEDURE — 93296 REM INTERROG EVL PM/IDS: CPT | Performed by: INTERNAL MEDICINE

## 2017-11-28 PROCEDURE — 93295 DEV INTERROG REMOTE 1/2/MLT: CPT | Performed by: INTERNAL MEDICINE

## 2017-11-28 PROCEDURE — 3017F COLORECTAL CA SCREEN DOC REV: CPT | Performed by: NURSE PRACTITIONER

## 2017-11-28 PROCEDURE — G8417 CALC BMI ABV UP PARAM F/U: HCPCS | Performed by: NURSE PRACTITIONER

## 2017-11-28 PROCEDURE — 93297 REM INTERROG DEV EVAL ICPMS: CPT | Performed by: NURSE PRACTITIONER

## 2017-11-28 PROCEDURE — 1036F TOBACCO NON-USER: CPT | Performed by: NURSE PRACTITIONER

## 2017-11-28 PROCEDURE — G8427 DOCREV CUR MEDS BY ELIG CLIN: HCPCS | Performed by: NURSE PRACTITIONER

## 2017-11-28 PROCEDURE — G8598 ASA/ANTIPLAT THER USED: HCPCS | Performed by: NURSE PRACTITIONER

## 2017-11-28 PROCEDURE — 99214 OFFICE O/P EST MOD 30 MIN: CPT | Performed by: NURSE PRACTITIONER

## 2017-11-28 PROCEDURE — 1111F DSCHRG MED/CURRENT MED MERGE: CPT | Performed by: NURSE PRACTITIONER

## 2017-11-28 PROCEDURE — G8484 FLU IMMUNIZE NO ADMIN: HCPCS | Performed by: NURSE PRACTITIONER

## 2017-11-28 RX ORDER — METOPROLOL SUCCINATE 25 MG/1
25 TABLET, EXTENDED RELEASE ORAL NIGHTLY
Qty: 30 TABLET | Refills: 3 | Status: SHIPPED | OUTPATIENT
Start: 2017-11-28 | End: 2017-11-30

## 2017-11-28 NOTE — PATIENT INSTRUCTIONS
shot. If you have had one before, ask your doctor whether you need another dose. Get a flu shot every year. If you must be around people with colds or flu, wash your hands often. Activity  · If your doctor recommends it, get more exercise. Walking is a good choice. Bit by bit, increase the amount you walk every day. Try for at least 30 minutes on most days of the week. You also may want to swim, bike, or do other activities. Your doctor may suggest that you join a cardiac rehabilitation program so that you can have help increasing your physical activity safely. · Start light exercise if your doctor says it is okay. Even a small amount will help you get stronger, have more energy, and manage stress. Walking is an easy way to get exercise. Start out by walking a little more than you did in the hospital. Gradually increase the amount you walk. · When you exercise, watch for signs that your heart is working too hard. You are pushing too hard if you cannot talk while you are exercising. If you become short of breath or dizzy or have chest pain, sit down and rest immediately. · Check your pulse regularly. Place two fingers on the artery at the palm side of your wrist, in line with your thumb. If your heartbeat seems uneven or fast, talk to your doctor. When should you call for help? Call 911 anytime you think you may need emergency care. For example, call if:  · You have symptoms of a heart attack. These may include:  ¨ Chest pain or pressure, or a strange feeling in the chest.  ¨ Sweating. ¨ Shortness of breath. ¨ Nausea or vomiting. ¨ Pain, pressure, or a strange feeling in the back, neck, jaw, or upper belly or in one or both shoulders or arms. ¨ Lightheadedness or sudden weakness. ¨ A fast or irregular heartbeat. After you call 911, the  may tell you to chew 1 adult-strength or 2 to 4 low-dose aspirin. Wait for an ambulance. Do not try to drive yourself. · You have symptoms of a stroke.  These may include:  ¨ Sudden numbness, tingling, weakness, or loss of movement in your face, arm, or leg, especially on only one side of your body. ¨ Sudden vision changes. ¨ Sudden trouble speaking. ¨ Sudden confusion or trouble understanding simple statements. ¨ Sudden problems with walking or balance. ¨ A sudden, severe headache that is different from past headaches. · You passed out (lost consciousness). Call your doctor now or seek immediate medical care if:  · You have new or increased shortness of breath. · You feel dizzy or lightheaded, or you feel like you may faint. · Your heart rate becomes irregular. · You can feel your heart flutter in your chest or skip heartbeats. Tell your doctor if these symptoms are new or worse. Watch closely for changes in your health, and be sure to contact your doctor if you have any problems. Where can you learn more? Go to https://YodiopeTRAFI.Zapproved. org and sign in to your [employer] account. Enter U020 in the [employer] box to learn more about \"Atrial Fibrillation: Care Instructions. \"     If you do not have an account, please click on the \"Sign Up Now\" link. Current as of: September 21, 2016  Content Version: 11.3  © 2674-7192 [employer]. Care instructions adapted under license by [facility], [employer] [facility] ([facility]). If you have questions about a medical condition or this instruction, always ask your healthcare professional. [place] [address] any warranty or liability for your use of this information.

## 2017-11-29 ENCOUNTER — TELEPHONE (OUTPATIENT)
Dept: CARDIOLOGY CLINIC | Age: 57
End: 2017-11-29

## 2017-11-29 NOTE — TELEPHONE ENCOUNTER
Last OV 11/28/17, recent ICD check 11/25/17, INR 11/20/17, please review and advise, pt is concerned about the Metoprolol and warning label

## 2017-11-29 NOTE — TELEPHONE ENCOUNTER
Hiro Cabrera called in wanting to speak with [facility] regarding the metoprolol she put him on. He read the warning label and has some concerns he would like to discuss. He would not go into detail, but says after reading the warning label he wants to make sure this is what she wants him to take.       You can reach Hiro Cabrera at #[phone]

## 2017-11-30 RX ORDER — CARVEDILOL 6.25 MG/1
6.25 TABLET ORAL 2 TIMES DAILY WITH MEALS
Qty: 60 TABLET | Refills: 3
Start: 2017-11-30 | End: 2018-02-09 | Stop reason: ALTCHOICE

## 2017-12-01 ENCOUNTER — HOSPITAL ENCOUNTER (OUTPATIENT)
Dept: OTHER | Age: 57
Discharge: HOME | End: 2017-12-31
Attending: INTERNAL MEDICINE | Admitting: INTERNAL MEDICINE

## 2017-12-01 ENCOUNTER — TELEPHONE (OUTPATIENT)
Dept: CARDIOLOGY CLINIC | Age: 57
End: 2017-12-01

## 2017-12-04 ENCOUNTER — TELEPHONE (OUTPATIENT)
Dept: INTERNAL MEDICINE CLINIC | Age: 57
End: 2017-12-04

## 2017-12-04 ENCOUNTER — ANTI-COAG VISIT (OUTPATIENT)
Dept: PHARMACY | Facility: CLINIC | Age: 57
End: 2017-12-04

## 2017-12-04 DIAGNOSIS — I26.01 CHRONIC SEPTIC PULMONARY EMBOLISM WITH ACUTE COR PULMONALE (HCC): ICD-10-CM

## 2017-12-04 DIAGNOSIS — I27.82 CHRONIC SEPTIC PULMONARY EMBOLISM WITH ACUTE COR PULMONALE (HCC): ICD-10-CM

## 2017-12-04 LAB — INTERNATIONAL NORMALIZATION RATIO, POC: 6.1

## 2017-12-11 ENCOUNTER — TELEPHONE (OUTPATIENT)
Dept: CARDIOLOGY CLINIC | Age: 57
End: 2017-12-11

## 2017-12-11 RX ORDER — PREGABALIN 75 MG/1
CAPSULE ORAL
Qty: 270 CAPSULE | Refills: 1 | Status: CANCELLED | OUTPATIENT
Start: 2017-12-11

## 2017-12-14 ENCOUNTER — INITIAL CONSULT (OUTPATIENT)
Dept: SURGERY | Age: 57
End: 2017-12-14

## 2017-12-14 ENCOUNTER — ANTI-COAG VISIT (OUTPATIENT)
Dept: PHARMACY | Facility: CLINIC | Age: 57
End: 2017-12-14

## 2017-12-14 VITALS
WEIGHT: 200 LBS | DIASTOLIC BLOOD PRESSURE: 84 MMHG | HEIGHT: 66 IN | HEART RATE: 84 BPM | BODY MASS INDEX: 32.14 KG/M2 | SYSTOLIC BLOOD PRESSURE: 127 MMHG

## 2017-12-14 DIAGNOSIS — I50.22 CHRONIC SYSTOLIC CHF (CONGESTIVE HEART FAILURE), NYHA CLASS 2 (HCC): ICD-10-CM

## 2017-12-14 DIAGNOSIS — Z79.01 CHRONIC ANTICOAGULATION: ICD-10-CM

## 2017-12-14 DIAGNOSIS — I26.01 CHRONIC SEPTIC PULMONARY EMBOLISM WITH ACUTE COR PULMONALE (HCC): ICD-10-CM

## 2017-12-14 DIAGNOSIS — Z79.01 ANTICOAGULATED ON COUMADIN: ICD-10-CM

## 2017-12-14 DIAGNOSIS — E83.52 HYPERCALCEMIA: Primary | ICD-10-CM

## 2017-12-14 DIAGNOSIS — E83.52 HYPERCALCEMIA: ICD-10-CM

## 2017-12-14 DIAGNOSIS — I27.82 CHRONIC SEPTIC PULMONARY EMBOLISM WITH ACUTE COR PULMONALE (HCC): ICD-10-CM

## 2017-12-14 DIAGNOSIS — J01.90 ACUTE NON-RECURRENT SINUSITIS, UNSPECIFIED LOCATION: Primary | ICD-10-CM

## 2017-12-14 LAB
INTERNATIONAL NORMALIZATION RATIO, POC: 4
PARATHYROID HORMONE INTACT: 127.3 PG/ML (ref 14–72)

## 2017-12-14 PROCEDURE — G8427 DOCREV CUR MEDS BY ELIG CLIN: HCPCS | Performed by: SURGERY

## 2017-12-14 PROCEDURE — 1036F TOBACCO NON-USER: CPT | Performed by: SURGERY

## 2017-12-14 PROCEDURE — 99214 OFFICE O/P EST MOD 30 MIN: CPT | Performed by: SURGERY

## 2017-12-14 PROCEDURE — 3017F COLORECTAL CA SCREEN DOC REV: CPT | Performed by: SURGERY

## 2017-12-14 PROCEDURE — G8484 FLU IMMUNIZE NO ADMIN: HCPCS | Performed by: SURGERY

## 2017-12-14 PROCEDURE — G8598 ASA/ANTIPLAT THER USED: HCPCS | Performed by: SURGERY

## 2017-12-14 PROCEDURE — G8417 CALC BMI ABV UP PARAM F/U: HCPCS | Performed by: SURGERY

## 2017-12-14 RX ORDER — METOPROLOL SUCCINATE 25 MG/1
25 TABLET, EXTENDED RELEASE ORAL DAILY
COMMUNITY
End: 2018-01-11 | Stop reason: SDUPTHER

## 2017-12-14 NOTE — PROGRESS NOTES
Mr. Leland Alfaro is a 61 y.o.  male with history of PE who presents today for anticoagulation monitoring and adjustment. Patient verifies current dosing regimen. Lab Results   Component Value Date    INR 4.0 12/14/2017    INR 6.1 12/04/2017    INR 2.7 11/20/2017     Patient appears well today. He states no medication or diet changes. No alcohol. No cranberries/cranberry juice or grapefruit/grapefruit juice. Mr. Zach Chand INR is still elevated today, cannot pinpoint why, so I am going to decrease his Warfarin dose. Coumadin dose: Hold Warfarin today only. Then NEW DOSE : Warfarin 5mg (1 tablet) daily except 2.5mg (1/2 tablet) every Friday. Recheck INR in 2 weeks. Patient reminded to call the Anticoagulation Clinic with any medication changes. Patient given instructions utilizing the teach back method. After visit summary printed and reviewed with patient. Medications reviewed and Warfarin dose updated.

## 2017-12-14 NOTE — PROGRESS NOTES
 fluticasone (FLONASE) 50 MCG/ACT nasal spray 1 spray by Nasal route daily 1 Bottle 0    loratadine (CLARITIN) 10 MG tablet Take 1 tablet by mouth daily 30 tablet 0    esomeprazole (NEXIUM) 20 MG delayed release capsule Take 40 mg by mouth every morning (before breakfast)      Cholecalciferol (VITAMIN D3) 11527 units CAPS Take 1 capsule by mouth once a week      oxyCODONE-acetaminophen (PERCOCET)  MG per tablet Take 1 tablet by mouth every 6 hours as needed for Pain . Earliest Fill Date: 11/10/17 60 tablet 0    potassium chloride (KLOR-CON M) 10 MEQ extended release tablet TAKE 4 TABLETS BY MOUTH EVERY  tablet 3    sacubitril-valsartan (ENTRESTO) 24-26 MG per tablet Take 1 tablet by mouth 2 times daily 180 tablet 3    pravastatin (PRAVACHOL) 40 MG tablet Take 1 tablet by mouth daily 90 tablet 3    tamsulosin (FLOMAX) 0.4 MG capsule Take 1 capsule by mouth 2 times daily (Patient taking differently: Take 0.4 mg by mouth daily ) 60 capsule 1    hydrocortisone (PROCTOZONE-HC) 2.5 % rectal cream PLACE RECTALLY TWICE DAILY 30 g 0    zolpidem (AMBIEN) 10 MG tablet TAKE 1 TABLET BY MOUTH EVERY NIGHT AT BEDTIME 90 tablet 1    ondansetron (ZOFRAN ODT) 4 MG disintegrating tablet Take 1-2 tablets by mouth every 8 hours as needed for Nausea May Sub regular tablet (non-ODT) if insurance does not cover ODT.  20 tablet 0    dicyclomine (BENTYL) 10 MG capsule TAKE ONE CAPSULE BY MOUTH FOUR TIMES DAILY AS NEEDED 360 capsule 3    warfarin (COUMADIN) 5 MG tablet Take 5 mg by mouth daily Except 2.5mg every Friday or as directed by [facility] [phone]      Respiratory Therapy Supplies (NEBULIZER/TUBING/MOUTHPIECE) KIT 1 kit by Does not apply route 3 times daily 2 kit 5    flavoxate (URISPAS) 100 MG tablet TAKE 1 TABLET BY MOUTH THREE TIMES DAILY AS NEEDED FOR URINARY SPASM 90 tablet 0    atropine-PHENobarbital-scopolamine-hyoscyamine (DONNATAL) 16.2 MG per tablet TAKE 1 TABLET BY MOUTH Negative. Neurological: Negative. Hematological: Bruises/bleeds easily. Psychiatric/Behavioral: Negative. The patient is not nervous/anxious. All other systems reviewed and are negative. Objective:   Physical Exam   Constitutional: He is oriented to person, place, and time. He appears well-developed and well-nourished. No distress. HENT:   Head: Normocephalic and atraumatic. Right Ear: External ear normal.   Left Ear: External ear normal.   Nose: Nose normal.   Mouth/Throat: Oropharynx is clear and moist.   Eyes: Conjunctivae are normal. No scleral icterus. Neck: Normal range of motion. Neck supple. Cardiovascular: Normal rate, regular rhythm and normal heart sounds. Pulmonary/Chest: Effort normal and breath sounds normal. No respiratory distress. He has no wheezes. Abdominal: Soft. He exhibits no distension. Musculoskeletal: Normal range of motion. He exhibits no edema. Neurological: He is oriented to person, place, and time. Skin: Skin is warm and dry. He is not diaphoretic. No erythema. Psychiatric: He has a normal mood and affect. His behavior is normal. Judgment and thought content normal.   Vitals reviewed. Assessment:      1. Hypercalcemia  PTH, INTACT   2. Chronic systolic CHF (congestive heart failure), NYHA class 2 ([address]     3. Chronic anticoagulation             Plan:      Elevated calciums noted. Possible hyperparathyroidism  Chronic history of kidney stones dating back 30 years. Unlikely result of hyperPTH  Osteoporosis is certainly an indication for parathyroidectomy but patient high risk for surgical complications related to recent AICD and anticoagulation  Check PTH to start. May need sestamibi pending results.

## 2017-12-15 ENCOUNTER — OFFICE VISIT (OUTPATIENT)
Dept: INTERNAL MEDICINE CLINIC | Age: 57
End: 2017-12-15

## 2017-12-15 ENCOUNTER — TELEPHONE (OUTPATIENT)
Dept: INTERNAL MEDICINE CLINIC | Age: 57
End: 2017-12-15

## 2017-12-15 VITALS
HEIGHT: 66 IN | WEIGHT: 196 LBS | SYSTOLIC BLOOD PRESSURE: 130 MMHG | TEMPERATURE: 98.1 F | DIASTOLIC BLOOD PRESSURE: 80 MMHG | HEART RATE: 70 BPM | OXYGEN SATURATION: 97 % | BODY MASS INDEX: 31.5 KG/M2

## 2017-12-15 DIAGNOSIS — J30.9 CHRONIC ALLERGIC RHINITIS, UNSPECIFIED SEASONALITY, UNSPECIFIED TRIGGER: ICD-10-CM

## 2017-12-15 DIAGNOSIS — J06.9 VIRAL URI: Primary | ICD-10-CM

## 2017-12-15 DIAGNOSIS — M51.36 DDD (DEGENERATIVE DISC DISEASE), LUMBAR: ICD-10-CM

## 2017-12-15 DIAGNOSIS — N20.1 URETERAL STONE: ICD-10-CM

## 2017-12-15 DIAGNOSIS — Z79.01 ANTICOAGULATED ON COUMADIN: ICD-10-CM

## 2017-12-15 PROCEDURE — 99214 OFFICE O/P EST MOD 30 MIN: CPT | Performed by: NURSE PRACTITIONER

## 2017-12-15 RX ORDER — DEXTROMETHORPHAN HYDROBROMIDE AND PROMETHAZINE HYDROCHLORIDE 15; 6.25 MG/5ML; MG/5ML
5 SYRUP ORAL 4 TIMES DAILY PRN
Qty: 120 ML | Refills: 0 | Status: SHIPPED | OUTPATIENT
Start: 2017-12-15 | End: 2017-12-22

## 2017-12-15 RX ORDER — FLAVOXATE HYDROCHLORIDE 100 MG/1
TABLET ORAL
Qty: 90 TABLET | Refills: 0 | Status: SHIPPED | OUTPATIENT
Start: 2017-12-15 | End: 2019-01-07 | Stop reason: SDUPTHER

## 2017-12-15 RX ORDER — AZELASTINE 1 MG/ML
2 SPRAY, METERED NASAL 2 TIMES DAILY
Qty: 1 BOTTLE | Refills: 3 | Status: SHIPPED | OUTPATIENT
Start: 2017-12-15 | End: 2021-07-01

## 2017-12-15 RX ORDER — METHOCARBAMOL 750 MG/1
750 TABLET, FILM COATED ORAL 4 TIMES DAILY PRN
Qty: 90 TABLET | Refills: 1 | Status: SHIPPED | OUTPATIENT
Start: 2017-12-15 | End: 2018-04-05 | Stop reason: ALTCHOICE

## 2017-12-15 ASSESSMENT — ENCOUNTER SYMPTOMS
COUGH: 1
ABDOMINAL PAIN: 0
DIARRHEA: 0
BACK PAIN: 1
SHORTNESS OF BREATH: 0
NAUSEA: 0
VOMITING: 0
SORE THROAT: 1
WHEEZING: 1
SINUS PRESSURE: 1

## 2017-12-15 ASSESSMENT — PATIENT HEALTH QUESTIONNAIRE - PHQ9
SUM OF ALL RESPONSES TO PHQ9 QUESTIONS 1 & 2: 0
1. LITTLE INTEREST OR PLEASURE IN DOING THINGS: 0
2. FEELING DOWN, DEPRESSED OR HOPELESS: 0
SUM OF ALL RESPONSES TO PHQ QUESTIONS 1-9: 0

## 2017-12-15 NOTE — TELEPHONE ENCOUNTER
Patient has a URI and wants to be worked in Dr Raul Sanford schedule today please call him at [phone]

## 2017-12-15 NOTE — PROGRESS NOTES
Department of Internal Medicine  Clinic Note    Date: 12/15/2017                                               Subjective/Objective:     Chief Complaint   Patient presents with    URI       HPI     Here for eval of cough, right frontal facial pain, congestion, body aches x7 days. Cough-green/yellow. Feels like he is wheezing at night and he's staying up at night coughing. Never a smoker. Denies fever, chills, no cp or sob. Grandkids visiting him have had \"colds\". On chronic nasal sprays and claritin. Using humidifier at night. Not tried any otc meds. Pt on coumadin for h/o DVT and PEs. Doesn't do well with steroids, they irritate his esophagus.               Current Outpatient Prescriptions   Medication Sig Dispense Refill    promethazine-dextromethorphan (PROMETHAZINE-DM) 6.25-15 MG/5ML syrup Take 5 mLs by mouth 4 times daily as needed for Cough 120 mL 0    flavoxate (URISPAS) 100 MG tablet TAKE 1 TABLET BY MOUTH THREE TIMES DAILY AS NEEDED FOR URINARY SPASM 90 tablet 0    methocarbamol (ROBAXIN) 750 MG tablet Take 1 tablet by mouth 4 times daily as needed (back spasms/pain) 90 tablet 1    azelastine (ASTELIN) 0.1 % nasal spray 2 sprays by Nasal route 2 times daily Use in each nostril as directed 1 Bottle 3    metoprolol succinate (TOPROL XL) 25 MG extended release tablet Take 25 mg by mouth daily      carvedilol (COREG) 6.25 MG tablet Take 1 tablet by mouth 2 times daily (with meals) 60 tablet 3    LYRICA 75 MG capsule TAKE 1 CAPSULE BY MOUTH EVERY MORNING AND 2 CAPSULES BY MOUTH EVERY EVENING 90 capsule 0    DEXILANT 30 MG CPDR delayed release capsule TAKE 1 CAPSULE BY MOUTH  DAILY 90 capsule 0    fluticasone (FLONASE) 50 MCG/ACT nasal spray 1 spray by Nasal route daily 1 Bottle 0    loratadine (CLARITIN) 10 MG tablet Take 1 tablet by mouth daily 30 tablet 0    esomeprazole (NEXIUM) 20 MG delayed release capsule Take 40 mg by mouth every morning (before breakfast)      Cholecalciferol (VITAMIN D3) 35194 units CAPS Take 1 capsule by mouth once a week      oxyCODONE-acetaminophen (PERCOCET)  MG per tablet Take 1 tablet by mouth every 6 hours as needed for Pain . Earliest Fill Date: 11/10/17 60 tablet 0    promethazine-codeine (PHENERGAN WITH CODEINE) 6.25-10 MG/5ML syrup Take 5 mLs by mouth every 4 hours as needed for Cough 120 mL 1    potassium chloride (KLOR-CON M) 10 MEQ extended release tablet TAKE 4 TABLETS BY MOUTH EVERY  tablet 3    sacubitril-valsartan (ENTRESTO) 24-26 MG per tablet Take 1 tablet by mouth 2 times daily 180 tablet 3    pravastatin (PRAVACHOL) 40 MG tablet Take 1 tablet by mouth daily 90 tablet 3    tamsulosin (FLOMAX) 0.4 MG capsule Take 1 capsule by mouth 2 times daily (Patient taking differently: Take 0.4 mg by mouth daily ) 60 capsule 1    hydrocortisone (PROCTOZONE-HC) 2.5 % rectal cream PLACE RECTALLY TWICE DAILY 30 g 0    zolpidem (AMBIEN) 10 MG tablet TAKE 1 TABLET BY MOUTH EVERY NIGHT AT BEDTIME 90 tablet 1    ondansetron (ZOFRAN ODT) 4 MG disintegrating tablet Take 1-2 tablets by mouth every 8 hours as needed for Nausea May Sub regular tablet (non-ODT) if insurance does not cover ODT. 20 tablet 0    dicyclomine (BENTYL) 10 MG capsule TAKE ONE CAPSULE BY MOUTH FOUR TIMES DAILY AS NEEDED 360 capsule 3    warfarin (COUMADIN) 5 MG tablet Take 5 mg by mouth daily Except 2.5mg every Friday or as directed by [facility] [phone]      Respiratory Therapy Supplies (NEBULIZER/TUBING/MOUTHPIECE) KIT 1 kit by Does not apply route 3 times daily 2 kit 5    atropine-PHENobarbital-scopolamine-hyoscyamine (DONNATAL) 16.2 MG per tablet TAKE 1 TABLET BY MOUTH EVERY 6 HOURS AS NEEDED 60 tablet 1    aspirin 81 MG tablet   Take 81 mg by mouth daily       misoprostol (CYTOTEC) 200 MCG tablet Take 200 mcg by mouth 2 times daily.  albuterol (ACCUNEB) 1.25 MG/3ML nebulizer solution Inhale 1 ampule into the lungs every 8 hours.       albuterol (PROAIR HFA) 108 (90 BASE) MCG/ACT inhaler Inhale 2 puffs into the lungs every 6 hours as needed. No current facility-administered medications for this visit. Allergies   Allergen Reactions    Atrovent Nasal Spray [Ipratropium] Other (See Comments)     Chest tightness    Morphine Other (See Comments)     \"makes me feel funny\"      Nitroglycerin Other (See Comments)     Severe hypotension (went from 702 to 66 systolic)    Sulfa Antibiotics Rash    Vicodin [Hydrocodone-Acetaminophen] Rash       Review of Systems   Constitutional: Negative for chills, fatigue and fever. HENT: Positive for congestion, postnasal drip, sinus pressure and sore throat. Respiratory: Positive for cough and wheezing. Negative for shortness of breath. Cardiovascular: Negative for chest pain, palpitations and leg swelling. Gastrointestinal: Negative for abdominal pain, diarrhea, nausea and vomiting. Genitourinary: Negative for dysuria. Musculoskeletal: Positive for back pain (chronic- unchanged) and myalgias (mild intermittent). Skin: Negative for rash. Neurological: Negative for dizziness, weakness and headaches. All other systems reviewed and are negative. Vitals:  /80 (Site: Left Arm, Position: Sitting, Cuff Size: Small Adult)   Pulse 70   Temp 98.1 °F (36.7 °C) (Oral)   Ht 5' 6\" (1.676 m)   Wt 196 lb (88.9 kg)   SpO2 97%   BMI 31.64 kg/m²     Physical Exam   Constitutional: He is oriented to person, place, and time. He appears well-developed and well-nourished. No distress. HENT:   Head: Normocephalic and atraumatic. Right Ear: External ear normal.   Left Ear: External ear normal.   Nose: Nose normal.   Mouth/Throat: Oropharynx is clear and moist. No oropharyngeal exudate. Eyes: Conjunctivae are normal. Pupils are equal, round, and reactive to light. Neck: Normal range of motion. Neck supple. Cardiovascular: Normal rate, regular rhythm, normal heart sounds and intact distal pulses.   Exam reveals no gallop and no friction rub. No murmur heard. Pulmonary/Chest: Effort normal and breath sounds normal. No respiratory distress. He has no wheezes. He has no rales. Abdominal: Soft. Musculoskeletal: Normal range of motion. Lymphadenopathy:     He has cervical adenopathy. Neurological: He is alert and oriented to person, place, and time. Skin: Skin is warm and dry. No rash noted. He is not diaphoretic. Psychiatric: He has a normal mood and affect. His behavior is normal. Judgment and thought content normal.   Nursing note and vitals reviewed. Assessment/Plan     Susana Glover was seen today for uri. Diagnoses and all orders for this visit:    Viral URI  -     promethazine-dextromethorphan (PROMETHAZINE-DM) 6.25-15 MG/5ML syrup; Take 5 mLs by mouth 4 times daily as needed for Cough  -drink plenty of fluids.   -continue using humidifier  -continue nasal spray and claritin    DDD (degenerative disc disease), lumbar  -     methocarbamol (ROBAXIN) 750 MG tablet; Take 1 tablet by mouth 4 times daily as needed (back spasms/pain)    Chronic allergic rhinitis, unspecified seasonality, unspecified trigger  -     azelastine (ASTELIN) 0.1 % nasal spray; 2 sprays by Nasal route 2 times daily Use in each nostril as directed    Ureteral stone  -     flavoxate (URISPAS) 100 MG tablet; TAKE 1 TABLET BY MOUTH THREE TIMES DAILY AS NEEDED FOR URINARY SPASM    Anticoagulated on Coumadin  -     PROTIME-INR; Future   Have INR repeated on 12/19 since taking cough med that can interfere with coumadin absorption        Orders Placed This Encounter   Procedures    PROTIME-INR     On dextromethorphan for uri/cough     Standing Status:   Future     Standing Expiration Date:   12/15/2018     Order Specific Question:   Daily Coumadin Dose? Answer:   5mg       Return if symptoms worsen or fail to improve.       MARK Villa     12/15/2017  1:50 PM

## 2017-12-15 NOTE — PATIENT INSTRUCTIONS
Patient Education        Viral Infections: Care Instructions  Your Care Instructions    You don't feel well, but it's not clear what's causing it. You may have a viral infection. Viruses cause many illnesses, such as the common cold, influenza, fever, rashes, and the diarrhea, nausea, and vomiting that are often called \"stomach flu. \" You may wonder if antibiotic medicines could make you feel better. But antibiotics only treat infections caused by bacteria. They don't work on viruses. The good news is that viral infections usually aren't serious. Most will go away in a few days without medical treatment. In the meantime, there are a few things you can do to make yourself more comfortable. Follow-up care is a key part of your treatment and safety. Be sure to make and go to all appointments, and call your doctor if you are having problems. It's also a good idea to know your test results and keep a list of the medicines you take. How can you care for yourself at home? · Get plenty of rest if you feel tired. · Take an over-the-counter pain medicine if needed, such as acetaminophen (Tylenol), ibuprofen (Advil, Motrin), or naproxen (Aleve). Read and follow all instructions on the label. · Be careful when taking over-the-counter cold or flu medicines and Tylenol at the same time. Many of these medicines have acetaminophen, which is Tylenol. Read the labels to make sure that you are not taking more than the recommended dose. Too much acetaminophen (Tylenol) can be harmful. · Drink plenty of fluids, enough so that your urine is light yellow or clear like water. If you have kidney, heart, or liver disease and have to limit fluids, talk with your doctor before you increase the amount of fluids you drink. · Stay home from work, school, and other public places while you have a fever. When should you call for help? Call 911 anytime you think you may need emergency care.  For example, call if:  ? · You have severe

## 2017-12-18 ASSESSMENT — ENCOUNTER SYMPTOMS
SHORTNESS OF BREATH: 0
RESPIRATORY NEGATIVE: 1
ABDOMINAL PAIN: 0
NAUSEA: 0
GASTROINTESTINAL NEGATIVE: 1
ABDOMINAL PAIN: 0
VOMITING: 0
BACK PAIN: 1

## 2017-12-20 ENCOUNTER — TELEPHONE (OUTPATIENT)
Dept: SURGERY | Age: 57
End: 2017-12-20

## 2017-12-20 DIAGNOSIS — E83.52 HYPERCALCEMIA: Primary | ICD-10-CM

## 2017-12-21 ENCOUNTER — TELEPHONE (OUTPATIENT)
Dept: INTERNAL MEDICINE CLINIC | Age: 57
End: 2017-12-21

## 2017-12-21 DIAGNOSIS — J32.9 SINUSITIS, UNSPECIFIED CHRONICITY, UNSPECIFIED LOCATION: Primary | ICD-10-CM

## 2017-12-21 RX ORDER — AMOXICILLIN 500 MG/1
500 CAPSULE ORAL 3 TIMES DAILY
Qty: 30 CAPSULE | Refills: 0 | Status: SHIPPED | OUTPATIENT
Start: 2017-12-21 | End: 2017-12-31

## 2017-12-21 RX ORDER — DOXYCYCLINE HYCLATE 100 MG
100 TABLET ORAL 2 TIMES DAILY
Qty: 14 TABLET | Refills: 0 | Status: SHIPPED | OUTPATIENT
Start: 2017-12-21 | End: 2017-12-21

## 2017-12-21 NOTE — TELEPHONE ENCOUNTER
I called and talked to pt. He thinks doxy is a sulfa drug and then sts he thinks it's a drug he had an allergic reaction to. Discussed appropriate dose of amoxicillin to cover sinusitis and called this rx in for pt.

## 2017-12-21 NOTE — TELEPHONE ENCOUNTER
Pt states he saw bonny and she told him to call back this  Week if he was not feeling better, he still has congestion, sore throat and sinus pressure.  He said bonny told him he would order an antibiotic     [facility] [address], [address] [facility]#[phone]

## 2017-12-26 ENCOUNTER — OFFICE VISIT (OUTPATIENT)
Dept: INTERNAL MEDICINE CLINIC | Age: 57
End: 2017-12-26

## 2017-12-26 VITALS
SYSTOLIC BLOOD PRESSURE: 138 MMHG | HEIGHT: 66 IN | WEIGHT: 200 LBS | HEART RATE: 80 BPM | DIASTOLIC BLOOD PRESSURE: 80 MMHG | BODY MASS INDEX: 32.14 KG/M2 | RESPIRATION RATE: 16 BRPM

## 2017-12-26 DIAGNOSIS — Z95.810 BIVENTRICULAR ICD (IMPLANTABLE CARDIOVERTER-DEFIBRILLATOR) IN PLACE: ICD-10-CM

## 2017-12-26 DIAGNOSIS — R07.89 ATYPICAL CHEST PAIN: Primary | ICD-10-CM

## 2017-12-26 DIAGNOSIS — I10 ESSENTIAL HYPERTENSION, BENIGN: ICD-10-CM

## 2017-12-26 DIAGNOSIS — M25.551 PAIN OF RIGHT HIP JOINT: ICD-10-CM

## 2017-12-26 PROCEDURE — 99214 OFFICE O/P EST MOD 30 MIN: CPT | Performed by: INTERNAL MEDICINE

## 2017-12-26 PROCEDURE — G8484 FLU IMMUNIZE NO ADMIN: HCPCS | Performed by: INTERNAL MEDICINE

## 2017-12-26 PROCEDURE — 1036F TOBACCO NON-USER: CPT | Performed by: INTERNAL MEDICINE

## 2017-12-26 PROCEDURE — 3017F COLORECTAL CA SCREEN DOC REV: CPT | Performed by: INTERNAL MEDICINE

## 2017-12-26 PROCEDURE — G8428 CUR MEDS NOT DOCUMENT: HCPCS | Performed by: INTERNAL MEDICINE

## 2017-12-26 PROCEDURE — G8417 CALC BMI ABV UP PARAM F/U: HCPCS | Performed by: INTERNAL MEDICINE

## 2017-12-26 PROCEDURE — G8598 ASA/ANTIPLAT THER USED: HCPCS | Performed by: INTERNAL MEDICINE

## 2017-12-27 ENCOUNTER — HOSPITAL ENCOUNTER (OUTPATIENT)
Dept: OTHER | Age: 57
Discharge: HOME | End: 2017-12-27
Attending: INTERNAL MEDICINE | Admitting: INTERNAL MEDICINE

## 2017-12-27 ENCOUNTER — ANTI-COAG VISIT (OUTPATIENT)
Dept: PHARMACY | Facility: CLINIC | Age: 57
End: 2017-12-27

## 2017-12-27 DIAGNOSIS — M25.551 PAIN OF RIGHT HIP JOINT: ICD-10-CM

## 2017-12-27 DIAGNOSIS — I26.01 CHRONIC SEPTIC PULMONARY EMBOLISM WITH ACUTE COR PULMONALE (HCC): ICD-10-CM

## 2017-12-27 DIAGNOSIS — I27.82 CHRONIC SEPTIC PULMONARY EMBOLISM WITH ACUTE COR PULMONALE (HCC): ICD-10-CM

## 2017-12-27 LAB — INR BLD: 2.4

## 2017-12-27 NOTE — PROGRESS NOTES
Mr. Athena Bosworth is a 61 y.o.  male with history of PE who presents today for anticoagulation monitoring and adjustment. Patient verifies current dosing regimen  Patient denies s/s bleeding/bruising/swelling/SOB  No blood in urine or stool. No dietary changes. No changes in medication/OTC agents/Herbals. No change in alcohol use. No missed doses. No Procedures scheduled in the future at this time. Lab Results   Component Value Date    INR 2.4 12/27/2017    INR 4.0 12/14/2017    INR 6.1 12/04/2017         augmentin - low interaction  Recheck inr in 2 weeks    After visit summary printed and reviewed with patient.       Medications reviewed and updated on home medication list: Yes  Warfarin dose updated on patient home medication list: Yes     Influenza vaccine: recieved    [] given    [] declined   [x] received previously   [] plans to receive at a later time   [] refused    [x] documented in EPIC

## 2017-12-28 ENCOUNTER — HOSPITAL ENCOUNTER (OUTPATIENT)
Dept: NUCLEAR MEDICINE | Age: 57
Discharge: HOME | End: 2017-12-28

## 2017-12-28 DIAGNOSIS — E83.52 HYPERCALCEMIA: ICD-10-CM

## 2017-12-28 DIAGNOSIS — J42 CHRONIC BRONCHITIS (HCC): ICD-10-CM

## 2017-12-28 RX ADMIN — Medication 20 MILLICURIE: at 09:53

## 2017-12-29 RX ORDER — TAMSULOSIN HYDROCHLORIDE 0.4 MG/1
CAPSULE ORAL
Qty: 60 CAPSULE | Refills: 5 | Status: SHIPPED | OUTPATIENT
Start: 2017-12-29 | End: 2018-04-05 | Stop reason: ALTCHOICE

## 2018-01-01 ENCOUNTER — HOSPITAL ENCOUNTER (OUTPATIENT)
Dept: OTHER | Age: 58
Discharge: HOME | End: 2018-01-31
Attending: INTERNAL MEDICINE | Admitting: INTERNAL MEDICINE

## 2018-01-03 ENCOUNTER — OFFICE VISIT (OUTPATIENT)
Dept: SURGERY | Age: 58
End: 2018-01-03

## 2018-01-03 VITALS
BODY MASS INDEX: 32.47 KG/M2 | SYSTOLIC BLOOD PRESSURE: 147 MMHG | HEART RATE: 72 BPM | DIASTOLIC BLOOD PRESSURE: 89 MMHG | WEIGHT: 202 LBS | HEIGHT: 66 IN

## 2018-01-03 DIAGNOSIS — I50.22 CHRONIC SYSTOLIC CHF (CONGESTIVE HEART FAILURE), NYHA CLASS 2 (HCC): ICD-10-CM

## 2018-01-03 DIAGNOSIS — E83.52 HYPERCALCEMIA: Primary | ICD-10-CM

## 2018-01-03 DIAGNOSIS — Z79.01 ANTICOAGULATED ON COUMADIN: ICD-10-CM

## 2018-01-03 DIAGNOSIS — E21.0 PRIMARY HYPERPARATHYROIDISM (HCC): ICD-10-CM

## 2018-01-03 PROCEDURE — G8427 DOCREV CUR MEDS BY ELIG CLIN: HCPCS | Performed by: SURGERY

## 2018-01-03 PROCEDURE — G8598 ASA/ANTIPLAT THER USED: HCPCS | Performed by: SURGERY

## 2018-01-03 PROCEDURE — G8484 FLU IMMUNIZE NO ADMIN: HCPCS | Performed by: SURGERY

## 2018-01-03 PROCEDURE — 1036F TOBACCO NON-USER: CPT | Performed by: SURGERY

## 2018-01-03 PROCEDURE — 99213 OFFICE O/P EST LOW 20 MIN: CPT | Performed by: SURGERY

## 2018-01-03 PROCEDURE — G8417 CALC BMI ABV UP PARAM F/U: HCPCS | Performed by: SURGERY

## 2018-01-03 PROCEDURE — 3017F COLORECTAL CA SCREEN DOC REV: CPT | Performed by: SURGERY

## 2018-01-03 NOTE — PROGRESS NOTES
Subjective:      Patient ID: Ysabel Cruz is a 61 y.o. male. HPI  CC: go over scan  F/U PTH and sestamibi scan. PTH elevated 127.3. Sestamibi scan with possible adenoma left neck. Knows it is the cause of his kidney stones, although he has been having recurrent kidney stones for 30 years. Denies CP, dyspnea, leg swelling. On coumadin for PEs. Review of Systems    Objective:   Physical Exam   Constitutional: He is oriented to person, place, and time. He appears well-developed and well-nourished. No distress. HENT:   Head: Normocephalic and atraumatic. Right Ear: External ear normal.   Left Ear: External ear normal.   Nose: Nose normal.   Mouth/Throat: Oropharynx is clear and moist.   Eyes: Conjunctivae are normal. No scleral icterus. Neck: Normal range of motion. Neck supple. Pulmonary/Chest: Effort normal. No respiratory distress. Musculoskeletal: Normal range of motion. He exhibits no edema. Neurological: He is alert and oriented to person, place, and time. Skin: Skin is warm and dry. He is not diaphoretic. No erythema. Psychiatric: He has a normal mood and affect. His behavior is normal. Judgment and thought content normal.   Vitals reviewed. Assessment:      1. Hypercalcemia     2. Anticoagulated on Coumadin     3. Chronic systolic CHF (congestive heart failure), NYHA class 2 ([address]     4.  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 Jyoti Kitchen and Dr. Simba Higgins about his risk factors and if surgery is best option  Seeing cardiologist later this month  Continue to trend calcium levels for now

## 2018-01-08 ENCOUNTER — TELEPHONE (OUTPATIENT)
Dept: INTERNAL MEDICINE CLINIC | Age: 58
End: 2018-01-08

## 2018-01-09 ENCOUNTER — TELEPHONE (OUTPATIENT)
Dept: INTERNAL MEDICINE CLINIC | Age: 58
End: 2018-01-09

## 2018-01-09 DIAGNOSIS — G47.00 INSOMNIA, UNSPECIFIED TYPE: Primary | ICD-10-CM

## 2018-01-09 RX ORDER — ZOLPIDEM TARTRATE 10 MG/1
TABLET ORAL
Qty: 90 TABLET | Refills: 0 | Status: ON HOLD | OUTPATIENT
Start: 2018-01-09 | End: 2018-04-11 | Stop reason: HOSPADM

## 2018-01-10 ENCOUNTER — ANTI-COAG VISIT (OUTPATIENT)
Dept: PHARMACY | Facility: CLINIC | Age: 58
End: 2018-01-10

## 2018-01-10 LAB — INTERNATIONAL NORMALIZATION RATIO, POC: 4.6

## 2018-01-10 NOTE — PROGRESS NOTES
Mr. Андрей Costa is a 61 y.o.  male with history of PE who presents today for anticoagulation monitoring and adjustment. Patient verifies current dosing regimen  Patient denies s/s bleeding/bruising/swelling/SOB  No blood in urine or stool. No dietary changes. No changes in medication/OTC agents/Herbals. No change in alcohol use. No missed doses. No Procedures scheduled in the future at this time. Lab Results   Component Value Date    INR 4.6 01/10/2018    INR 2.4 12/27/2017    INR 4.0 12/14/2017       He states that he has been ill for the past two weeks. He had diarrhea, fever and loss of appetite. He also has a cough and is using Mucinex. Each winter his physician prescribes azithromycin 250mg every other day. He just stated this last Friday and will continue until April. He states that the Zithromax does not usually affect his INR. He denies any bruising or bleeding at this time but will watch for this. He will seek medical attention as necessary. For INR of 4.6 we will hold his warfarin for 2 days. He will then resume his previous dosing, as he is starting to feel a bit better and is eating now. He will return next week for his next INR. After visit summary printed and reviewed with patient.

## 2018-01-11 NOTE — TELEPHONE ENCOUNTER
Medication Refill  When was your last appointment with cardiology?  (if 1year or longer, please schedule an appointment)  2/14/18 2/14/2018    Medication needing refilled: metoprolol succinate (TOPROL XL) 25 MG extended release tablet    Doseage of the medication: 25 mg    How are you taking this medication (QD, BID, TID, QID,Take 25 mg by mouth daily PRN):     Patient want a 30 or 90 day supply called in: 90 days    Which Pharmacy are we sending the medication to:[facility] [address] [place], [address] - F [phone]          Medication Refill  Medication needing refilled: sacubitril-valsartan (ENTRESTO) 24-26 MG per tablet    Doseage of the medication: 1 tablet    How are you taking this medication (QD, BID, TID, QID, PRN)Take 1 tablet by mouth 2 times daily:    Patient want a 30 or 90 day supply called in: 90 days    Which Pharmacy are we sending the medication to: [address] [place] [address]

## 2018-01-12 RX ORDER — METOPROLOL SUCCINATE 25 MG/1
25 TABLET, EXTENDED RELEASE ORAL DAILY
Qty: 90 TABLET | Refills: 3 | Status: ON HOLD | OUTPATIENT
Start: 2018-01-12 | End: 2018-04-11

## 2018-01-13 ENCOUNTER — OFFICE VISIT (OUTPATIENT)
Dept: INTERNAL MEDICINE CLINIC | Age: 58
End: 2018-01-13

## 2018-01-13 VITALS
HEIGHT: 66 IN | BODY MASS INDEX: 31.82 KG/M2 | OXYGEN SATURATION: 98 % | HEART RATE: 90 BPM | SYSTOLIC BLOOD PRESSURE: 142 MMHG | WEIGHT: 198 LBS | DIASTOLIC BLOOD PRESSURE: 92 MMHG

## 2018-01-13 DIAGNOSIS — J40 BRONCHITIS: Primary | ICD-10-CM

## 2018-01-13 DIAGNOSIS — E78.00 PURE HYPERCHOLESTEROLEMIA: ICD-10-CM

## 2018-01-13 DIAGNOSIS — R30.0 DYSURIA: ICD-10-CM

## 2018-01-13 DIAGNOSIS — Z79.01 ANTICOAGULATED ON COUMADIN: ICD-10-CM

## 2018-01-13 LAB
BILIRUBIN, POC: NORMAL
BLOOD URINE, POC: NORMAL
CLARITY, POC: NORMAL
COLOR, POC: NORMAL
GLUCOSE URINE, POC: NORMAL
KETONES, POC: NORMAL
LEUKOCYTE EST, POC: NORMAL
NITRITE, POC: NORMAL
PH, POC: 6.5
PROTEIN, POC: NORMAL
SPECIFIC GRAVITY, POC: 1.02
UROBILINOGEN, POC: NORMAL

## 2018-01-13 PROCEDURE — G8484 FLU IMMUNIZE NO ADMIN: HCPCS | Performed by: INTERNAL MEDICINE

## 2018-01-13 PROCEDURE — 99214 OFFICE O/P EST MOD 30 MIN: CPT | Performed by: INTERNAL MEDICINE

## 2018-01-13 PROCEDURE — G8598 ASA/ANTIPLAT THER USED: HCPCS | Performed by: INTERNAL MEDICINE

## 2018-01-13 PROCEDURE — 81002 URINALYSIS NONAUTO W/O SCOPE: CPT | Performed by: INTERNAL MEDICINE

## 2018-01-13 PROCEDURE — G8427 DOCREV CUR MEDS BY ELIG CLIN: HCPCS | Performed by: INTERNAL MEDICINE

## 2018-01-13 PROCEDURE — 1036F TOBACCO NON-USER: CPT | Performed by: INTERNAL MEDICINE

## 2018-01-13 PROCEDURE — 3017F COLORECTAL CA SCREEN DOC REV: CPT | Performed by: INTERNAL MEDICINE

## 2018-01-13 PROCEDURE — G8417 CALC BMI ABV UP PARAM F/U: HCPCS | Performed by: INTERNAL MEDICINE

## 2018-01-13 ASSESSMENT — ENCOUNTER SYMPTOMS
ABDOMINAL PAIN: 0
SHORTNESS OF BREATH: 0

## 2018-01-14 LAB — URINE CULTURE, ROUTINE: NORMAL

## 2018-01-16 RX ORDER — WARFARIN SODIUM 5 MG/1
TABLET ORAL
Qty: 105 TABLET | Refills: 0 | Status: SHIPPED | OUTPATIENT
Start: 2018-01-16 | End: 2018-01-19 | Stop reason: DRUGHIGH

## 2018-01-18 ENCOUNTER — TELEPHONE (OUTPATIENT)
Dept: CARDIOLOGY CLINIC | Age: 58
End: 2018-01-18

## 2018-01-18 NOTE — TELEPHONE ENCOUNTER
We received a Medical clarification request via fax from [address] [place] [address] for Carvedilol and Metoprolol. Last office note, Dr. Francesco Gunderson stated patient wanted to discontinue Coreg but would try the Metoprolol. I called [facility] and advised them that the patient is no longer taking Coreg but is taking Metoprolol. They discontinued the order for the Coreg. The medical clarification request form has been scanned in.

## 2018-01-19 ENCOUNTER — ANTI-COAG VISIT (OUTPATIENT)
Dept: PHARMACY | Facility: CLINIC | Age: 58
End: 2018-01-19

## 2018-01-19 DIAGNOSIS — I27.82 CHRONIC SEPTIC PULMONARY EMBOLISM WITH ACUTE COR PULMONALE (HCC): ICD-10-CM

## 2018-01-19 DIAGNOSIS — I26.01 CHRONIC SEPTIC PULMONARY EMBOLISM WITH ACUTE COR PULMONALE (HCC): ICD-10-CM

## 2018-01-19 LAB — INR BLD: 3.1

## 2018-01-19 NOTE — PROGRESS NOTES
Mr. Kassy Ruelas is a 61 y.o.  male with history of PE who presents today for anticoagulation monitoring and adjustment. Patient verifies current dosing regimen  Patient denies s/s bleeding/bruising/swelling/SOB  No blood in urine or stool. No change in alcohol use. No missed doses. No Procedures scheduled in the future at this time. Lab Results   Component Value Date    INR 3.1 01/19/2018    INR 4.6 01/10/2018    INR 2.4 12/27/2017       Mr. Zuleyma Doan is doing very well today. States that the only medication change he has today is that he is taking Promethazine DM for his cough, which will not affect his warfarin. Last visit his INR was supertherapeutic, so he started adding more greens into his diet. He has been eating greens 3-4 times per week now instead of 1-2 times. Today's INR is right about at goal range; would rather him be a little high based on history of PE. Will have him continue his current warfarin regimen. Instructed him to continue eating the same amount of greens that he has been the past couple of weeks. He requested to return in 3 weeks to recheck INR since it has been erratic lately. After visit summary printed and reviewed with patient.       Medications reviewed and updated on home medication list: No: no changes to maintenance medications    Warfarin dose updated on patient home medication list: Yes: removed an old order that had different dose

## 2018-01-25 DIAGNOSIS — M51.36 DDD (DEGENERATIVE DISC DISEASE), LUMBAR: Primary | ICD-10-CM

## 2018-01-25 RX ORDER — PREGABALIN 75 MG/1
CAPSULE ORAL
Qty: 270 CAPSULE | Refills: 1 | Status: SHIPPED | OUTPATIENT
Start: 2018-01-25 | End: 2018-08-13 | Stop reason: SDUPTHER

## 2018-01-26 ENCOUNTER — TELEPHONE (OUTPATIENT)
Dept: INTERNAL MEDICINE CLINIC | Age: 58
End: 2018-01-26

## 2018-01-26 DIAGNOSIS — R05.9 COUGH: Primary | ICD-10-CM

## 2018-01-29 ENCOUNTER — HOSPITAL ENCOUNTER (OUTPATIENT)
Dept: OTHER | Age: 58
Discharge: HOME | End: 2018-01-29
Attending: INTERNAL MEDICINE | Admitting: INTERNAL MEDICINE

## 2018-01-29 DIAGNOSIS — R05.9 COUGH: ICD-10-CM

## 2018-02-01 ENCOUNTER — HOSPITAL ENCOUNTER (OUTPATIENT)
Dept: OTHER | Age: 58
Discharge: HOME | End: 2018-02-28
Attending: INTERNAL MEDICINE | Admitting: INTERNAL MEDICINE

## 2018-02-05 ENCOUNTER — TELEPHONE (OUTPATIENT)
Dept: INTERNAL MEDICINE CLINIC | Age: 58
End: 2018-02-05

## 2018-02-05 ENCOUNTER — NURSE ONLY (OUTPATIENT)
Dept: INTERNAL MEDICINE CLINIC | Age: 58
End: 2018-02-05

## 2018-02-05 DIAGNOSIS — R05.9 COUGH: Primary | ICD-10-CM

## 2018-02-05 DIAGNOSIS — N39.0 URINARY TRACT INFECTION WITHOUT HEMATURIA, SITE UNSPECIFIED: Primary | ICD-10-CM

## 2018-02-05 LAB
BILIRUBIN, POC: NORMAL
BLOOD URINE, POC: NORMAL
CLARITY, POC: NORMAL
COLOR, POC: NORMAL
GLUCOSE URINE, POC: NORMAL
KETONES, POC: NORMAL
LEUKOCYTE EST, POC: NORMAL
NITRITE, POC: NORMAL
PH, POC: 7
PROTEIN, POC: NORMAL
SPECIFIC GRAVITY, POC: 1.01
UROBILINOGEN, POC: 0.2

## 2018-02-05 PROCEDURE — 81002 URINALYSIS NONAUTO W/O SCOPE: CPT | Performed by: INTERNAL MEDICINE

## 2018-02-05 RX ORDER — CIPROFLOXACIN 250 MG/1
250 TABLET, FILM COATED ORAL 2 TIMES DAILY
Qty: 10 TABLET | Refills: 0 | OUTPATIENT
Start: 2018-02-05 | End: 2018-02-10

## 2018-02-05 RX ORDER — PROMETHAZINE HYDROCHLORIDE AND CODEINE PHOSPHATE 6.25; 1 MG/5ML; MG/5ML
5 SYRUP ORAL EVERY 4 HOURS PRN
Qty: 120 ML | Refills: 1 | OUTPATIENT
Start: 2018-02-05 | End: 2018-02-12

## 2018-02-07 ENCOUNTER — TELEPHONE (OUTPATIENT)
Dept: PHARMACY | Facility: CLINIC | Age: 58
End: 2018-02-07

## 2018-02-09 ENCOUNTER — ANTI-COAG VISIT (OUTPATIENT)
Dept: PHARMACY | Facility: CLINIC | Age: 58
End: 2018-02-09

## 2018-02-09 LAB — INTERNATIONAL NORMALIZATION RATIO, POC: 3

## 2018-02-14 ENCOUNTER — OFFICE VISIT (OUTPATIENT)
Dept: CARDIOLOGY CLINIC | Age: 58
End: 2018-02-14

## 2018-02-14 ENCOUNTER — TELEPHONE (OUTPATIENT)
Dept: SURGERY | Age: 58
End: 2018-02-14

## 2018-02-14 VITALS
WEIGHT: 198 LBS | SYSTOLIC BLOOD PRESSURE: 118 MMHG | HEIGHT: 66 IN | DIASTOLIC BLOOD PRESSURE: 72 MMHG | HEART RATE: 76 BPM | BODY MASS INDEX: 31.82 KG/M2

## 2018-02-14 DIAGNOSIS — E78.2 MIXED HYPERLIPIDEMIA: ICD-10-CM

## 2018-02-14 DIAGNOSIS — I42.8 NICM (NONISCHEMIC CARDIOMYOPATHY) (HCC): Primary | ICD-10-CM

## 2018-02-14 DIAGNOSIS — I10 ESSENTIAL HYPERTENSION, BENIGN: ICD-10-CM

## 2018-02-14 DIAGNOSIS — I26.01 CHRONIC SEPTIC PULMONARY EMBOLISM WITH ACUTE COR PULMONALE (HCC): ICD-10-CM

## 2018-02-14 DIAGNOSIS — I50.22 CHRONIC SYSTOLIC HEART FAILURE (HCC): ICD-10-CM

## 2018-02-14 DIAGNOSIS — I27.82 CHRONIC SEPTIC PULMONARY EMBOLISM WITH ACUTE COR PULMONALE (HCC): ICD-10-CM

## 2018-02-14 PROCEDURE — 99214 OFFICE O/P EST MOD 30 MIN: CPT | Performed by: INTERNAL MEDICINE

## 2018-02-14 PROCEDURE — G8484 FLU IMMUNIZE NO ADMIN: HCPCS | Performed by: INTERNAL MEDICINE

## 2018-02-14 PROCEDURE — G8598 ASA/ANTIPLAT THER USED: HCPCS | Performed by: INTERNAL MEDICINE

## 2018-02-14 PROCEDURE — 1036F TOBACCO NON-USER: CPT | Performed by: INTERNAL MEDICINE

## 2018-02-14 PROCEDURE — G8427 DOCREV CUR MEDS BY ELIG CLIN: HCPCS | Performed by: INTERNAL MEDICINE

## 2018-02-14 PROCEDURE — 93000 ELECTROCARDIOGRAM COMPLETE: CPT | Performed by: INTERNAL MEDICINE

## 2018-02-14 PROCEDURE — G8417 CALC BMI ABV UP PARAM F/U: HCPCS | Performed by: INTERNAL MEDICINE

## 2018-02-14 PROCEDURE — 3017F COLORECTAL CA SCREEN DOC REV: CPT | Performed by: INTERNAL MEDICINE

## 2018-02-14 NOTE — PROGRESS NOTES
negative. Physical Exam:   /72   Pulse 76   Ht 5' 6\" (1.676 m)   Wt 198 lb (89.8 kg) Comment: did not wish to remove shoes  BMI 31.96 kg/m²   Wt Readings from Last 3 Encounters:   02/14/18 198 lb (89.8 kg)   01/13/18 198 lb (89.8 kg)   01/03/18 202 lb (91.6 kg)     Constitutional: He is oriented to person, place, and time. He appears well-developed and well-nourished. In no acute distress. Head: Normocephalic and atraumatic. Pupils equal and round. Neck: Neck supple. No JVP or carotid bruit appreciated. No mass and no thyromegaly present. No lymphadenopathy present. Cardiovascular: Normal rate. Normal heart sounds. Exam reveals no gallop and no friction rub. No murmur heard. Pulmonary/Chest: Effort normal and breath sounds normal. No respiratory distress. He has no wheezes, rhonchi or rales. Abdominal: Soft, non-tender. Bowel sounds are normal. He exhibits no organomegaly, mass or bruit. Extremities: No edema, cyanosis, or clubbing. Pulses are 2+ radial/dorsalis pedis/posterior tibial/carotid bilaterally. Neurological: No gross cranial nerve deficit. Coordination normal.   Skin: Skin is warm and dry. There is no rash or diaphoresis. Psychiatric: He has a normal mood and affect. His speech is normal and behavior is normal.     Lab Review:   FLP:    Lab Results   Component Value Date    TRIG 96 07/24/2017    HDL 46 07/24/2017    HDL 65 11/10/2011    LDLCALC 75 07/24/2017    LABVLDL 19 07/24/2017     BUN/Creatinine:    Lab Results   Component Value Date    BUN 9 11/08/2017    CREATININE 0.8 11/08/2017     EKG Interpretation: 2/14/18, sinus rhythm with LBBB    Image Review:     ECHO 11/28/17  Summary   Normal left ventricular wall thickness.  Ejection fraction is visually   estimated to be 40-45%.  Christean Chroman is septal hypokinesis (secondary to Pacing)   Trivial mitral & tricuspid regurgitation is present.   The left atrial size is normal.   Pacer / ICD wire is visualized in the right pressure is approximately 150/80 left ventricular   pressures 150/14 there is no gradient on pullback  #7SOFPM are no complications  #0JJE patient will be treated medically for left ventricular   dysfunction in the setting of normal coronaries         Assessment/Plan:     Nonischemic Cardiomyopathy  Mr. Gabby Rand is here for a general cardiology follow-up. He had Biv -ICD placed by Dr. Pavan Whitley for underlying  cardiomyopathy with left bundle branch block. Patient apparently became very symptomatic after Biv- ICD insertion and it has now been turned off except for shocking capabilities. He is overall feeling well since he was seen last in our office. His last echocardiogram from November 2017 showed improvement in in his left ventricle ejection fraction at 40-45% when his Biv- pacing was on. He appears euvolemic on clinical examination today. He is currently on evidence-based beta blocker and entresto therapy. I have told him to discuss with Dr. Pavan Whitley during the next appointment about repeating an echocardiogram off resynchronization therapy.      Chronic Systolic Heart failure. EF 40-45%. Appears euvolemic on exam today. Recommend low fluid and Na intake. Encouraged daily weights. Currently on evidence-based beta blocker and entresto therapy.       Pulmonary Embolism   Patient is on Warfarin therapy.       Hypertension  BP is stable. Patient is now back on Toprol XL 25 mg daily. BMP from 11/8/17 stable.      Hyperlipidemia  Continue statin therapy. I have recommended him to have a lipid profile done during the next blood draw. .    Patient to follow up in 4-5 months      Thank you very much for allowing me to participate in the care of your patient. Please do not hesitate to contact me if you have any questions.     Sincerely,  MD L[address]IE [address], [address] [place] [address] [address] [place] [address]  Ph: [phone]  Fax: [phone]

## 2018-02-14 NOTE — PATIENT INSTRUCTIONS
trans fat  · Read food labels, and try to avoid saturated and trans fats. They increase your risk of heart disease. Trans fat is found in many processed foods such as cookies and crackers. · Use olive or canola oil when you cook. Try cholesterol-lowering spreads, such as Benecol or Take Control. · Bake, broil, grill, or steam foods instead of frying them. · Choose lean meats instead of high-fat meats such as hot dogs and sausages. Cut off all visible fat when you prepare meat. · Eat fish, skinless poultry, and meat alternatives such as soy products instead of high-fat meats. Soy products, such as tofu, may be especially good for your heart. · Choose low-fat or fat-free milk and dairy products. Eat fish  · Eat at least two servings of fish a week. Certain fish, such as salmon and tuna, contain omega-3 fatty acids, which may help reduce your risk of heart attack. Eat foods high in fiber  · Eat a variety of grain products every day. Include whole-grain foods that have lots of fiber and nutrients. Examples of whole-grain foods include oats, whole wheat bread, and brown rice. · Buy whole-grain breads and cereals, instead of white bread or pastries. Limit salt and sodium  · Limit how much salt and sodium you eat to help lower your blood pressure. · Taste food before you salt it. Add only a little salt when you think you need it. With time, your taste buds will adjust to less salt. · Eat fewer snack items, fast foods, and other high-salt, processed foods. Check food labels for the amount of sodium in packaged foods. · Choose low-sodium versions of canned goods (such as soups, vegetables, and beans). Limit sugar  · Limit drinks and foods with added sugar. These include candy, desserts, and soda pop. Limit alcohol  · Limit alcohol to no more than 2 drinks a day for men and 1 drink a day for women. Too much alcohol can cause health problems. When should you call for help?   Watch closely for changes in your physical activity). Eat more fruits and vegetables  · Eat a variety of fruit and vegetables every day. Dark green, deep orange, red, or yellow fruits and vegetables are especially good for you. Examples include spinach, carrots, peaches, and berries. · Keep carrots, celery, and other veggies handy for snacks. Buy fruit that is in season and store it where you can see it so that you will be tempted to eat it. · Cook dishes that have a lot of veggies in them, such as stir-fries and soups. Limit saturated and trans fat  · Read food labels, and try to avoid saturated and trans fats. They increase your risk of heart disease. Trans fat is found in many processed foods such as cookies and crackers. · Use olive or canola oil when you cook. Try cholesterol-lowering spreads, such as Benecol or Take Control. · Bake, broil, grill, or steam foods instead of frying them. · Choose lean meats instead of high-fat meats such as hot dogs and sausages. Cut off all visible fat when you prepare meat. · Eat fish, skinless poultry, and meat alternatives such as soy products instead of high-fat meats. Soy products, such as tofu, may be especially good for your heart. · Choose low-fat or fat-free milk and dairy products. Eat fish  · Eat at least two servings of fish a week. Certain fish, such as salmon and tuna, contain omega-3 fatty acids, which may help reduce your risk of heart attack. Eat foods high in fiber  · Eat a variety of grain products every day. Include whole-grain foods that have lots of fiber and nutrients. Examples of whole-grain foods include oats, whole wheat bread, and brown rice. · Buy whole-grain breads and cereals, instead of white bread or pastries. Limit salt and sodium  · Limit how much salt and sodium you eat to help lower your blood pressure. · Taste food before you salt it. Add only a little salt when you think you need it. With time, your taste buds will adjust to less salt.   · Eat fewer snack items, will help you get stronger, have more energy, and manage stress. Walking is an easy way to get exercise. Start out by walking a little more than you did in the hospital. Gradually increase the amount you walk. ? · When you exercise, watch for signs that your heart is working too hard. You are pushing too hard if you cannot talk while you are exercising. If you become short of breath or dizzy or have chest pain, sit down and rest immediately. ? · Check your pulse regularly. Place two fingers on the artery at the palm side of your wrist, in line with your thumb. If your heartbeat seems uneven or fast, talk to your doctor. When should you call for help? Call 911 anytime you think you may need emergency care. For example, call if:  ? · You have symptoms of a heart attack. These may include:  ¨ Chest pain or pressure, or a strange feeling in the chest.  ¨ Sweating. ¨ Shortness of breath. ¨ Nausea or vomiting. ¨ Pain, pressure, or a strange feeling in the back, neck, jaw, or upper belly or in one or both shoulders or arms. ¨ Lightheadedness or sudden weakness. ¨ A fast or irregular heartbeat. After you call 911, the  may tell you to chew 1 adult-strength or 2 to 4 low-dose aspirin. Wait for an ambulance. Do not try to drive yourself. ? · You have symptoms of a stroke. These may include:  ¨ Sudden numbness, tingling, weakness, or loss of movement in your face, arm, or leg, especially on only one side of your body. ¨ Sudden vision changes. ¨ Sudden trouble speaking. ¨ Sudden confusion or trouble understanding simple statements. ¨ Sudden problems with walking or balance. ¨ A sudden, severe headache that is different from past headaches. ? · You passed out (lost consciousness). ?Call your doctor now or seek immediate medical care if:  ? · You have new or increased shortness of breath. ? · You feel dizzy or lightheaded, or you feel like you may faint. ? · Your heart rate becomes irregular.

## 2018-02-14 NOTE — LETTER
[facility] [address]. [place] [address]  Phone: [phone]  Fax: [phone]    Rashad Wheeler MD        February 15, 2018     Trell Tavarez, [address] [id number]    Patient: Loi Browne  MR Number: [id number]  YOB: 1960  Date of Visit: 2/14/2018    Dear Dr. Trell Tavarez:            [address]      Cardiology Follow Up    Loi Browne  1960 February 14, 2018    CC:  \" My breathing is good\"    HPI:  The patient is 61 y.o. male with a past medical history significant for for a prior PE in 2011 and nonischemic cardiomyopathy. He was previously followed by Dr. Gini Buckner for his primary cardiac needs. He was hospitalized at [facility] with chest pain and had an abnormal stress that led to a left heart catheterization on 7/21/16. His coronary arteries were normal but he had LV dysfunction with a LVEF of 40% at that time. A repeat echocardiogram demonstrated his LVEF to be 30% despite optimal medical therapy. Entresto therapy was initiated and he was interested in pursuing CRT. He underwent Bi-V ICD implant on 10/10/17. Biv pacing turned off,  now set at VVI 40 with defibrillator programming ON. He presents today to establish care for his primary cardiology needs. He states he is doing well. He has been compliant with his medications and tolerating them well.          Past Medical History:   Diagnosis Date    [facility]ans [facility]     Bundle branch block, right     Cardiomyopathy ([facility]     Fibromyalgia     GERD (gastroesophageal reflux disease)     Hepatitis 1979    unsure of which type    Hx of blood clots     Hyperlipemia     Hypertension     IBS (irritable bowel syndrome)     Kidney stone     over thirty kidney stones    Neuropathy ([facility]     right side-chest    Pneumothorax 2011    Right    Prostatitis     Pulmonary embolism on right [facility]) 2011    upper lobe-coumadin 2011  misoprostol (CYTOTEC) 200 MCG tablet Take 200 mcg by mouth 2 times daily.  albuterol (ACCUNEB) 1.25 MG/3ML nebulizer solution Inhale 1 ampule into the lungs every 8 hours.  albuterol (PROAIR HFA) 108 (90 BASE) MCG/ACT inhaler Inhale 2 puffs into the lungs every 6 hours as needed. No current facility-administered medications for this visit. Review of Systems:  Review of systems is as detailed above and otherwise all other systems are negative. Physical Exam:   /72   Pulse 76   Ht 5' 6\" (1.676 m)   Wt 198 lb (89.8 kg) Comment: did not wish to remove shoes  BMI 31.96 kg/m²    Wt Readings from Last 3 Encounters:   02/14/18 198 lb (89.8 kg)   01/13/18 198 lb (89.8 kg)   01/03/18 202 lb (91.6 kg)     Constitutional: He is oriented to person, place, and time. He appears well-developed and well-nourished. In no acute distress. Head: Normocephalic and atraumatic. Pupils equal and round. Neck: Neck supple. No JVP or carotid bruit appreciated. No mass and no thyromegaly present. No lymphadenopathy present. Cardiovascular: Normal rate. Normal heart sounds. Exam reveals no gallop and no friction rub. No murmur heard. Pulmonary/Chest: Effort normal and breath sounds normal. No respiratory distress. He has no wheezes, rhonchi or rales. Abdominal: Soft, non-tender. Bowel sounds are normal. He exhibits no organomegaly, mass or bruit. Extremities: No edema, cyanosis, or clubbing. Pulses are 2+ radial/dorsalis pedis/posterior tibial/carotid bilaterally. Neurological: No gross cranial nerve deficit. Coordination normal.   Skin: Skin is warm and dry. There is no rash or diaphoresis. Psychiatric: He has a normal mood and affect.  His speech is normal and behavior is normal.     Lab Review:   FLP:    Lab Results   Component Value Date    TRIG 96 07/24/2017    HDL 46 07/24/2017    HDL 65 11/10/2011    LDLCALC 75 07/24/2017    LABVLDL 19 07/24/2017     BUN/Creatinine:    Lab Results #1coronary angiography summary: Normal coronaries in a left   dominant system  Aleft main coronary is normal  Bthe LAD has minor irregularities and no significant stenoses  Cthe circumflex is dominant and has minor irregularities and no   significant stenoses  Dthe right coronary is a small nondominant vessel and is   angiographically normal  #2ventriculography in the ADKINS projection demonstrates mild   global left ventricular dysfunction ejection fractions   approximately 40  #3aortic pressure is approximately 150/80 left ventricular   pressures 150/14 there is no gradient on pullback  #8EFLVS are no complications  #5TKS patient will be treated medically for left ventricular   dysfunction in the setting of normal coronaries         Assessment/Plan:     Nonischemic Cardiomyopathy  Mr. Vasile Melvin is here for a general cardiology follow-up. He had Biv -ICD placed by Dr. Barbie Barragan for underlying  cardiomyopathy with left bundle branch block. Patient apparently became very symptomatic after Biv- ICD insertion and it has now been turned off except for shocking capabilities. He is overall feeling well since he was seen last in our office. His last echocardiogram from November 2017 showed improvement in in his left ventricle ejection fraction at 40-45% when his Biv- pacing was on. He appears euvolemic on clinical examination today. He is currently on evidence-based beta blocker and entresto therapy. I have told him to discuss with Dr. Barbie Barragan during the next appointment about repeating an echocardiogram off resynchronization therapy.      Chronic Systolic Heart failure. EF 40-45%. Appears euvolemic on exam today. Recommend low fluid and Na intake. Encouraged daily weights. Currently on evidence-based beta blocker and entresto therapy.       Pulmonary Embolism   Patient is on Warfarin therapy.       Hypertension  BP is stable. Patient is now back on Toprol XL 25 mg daily.   BMP from 11/8/17 stable.      Hyperlipidemia Continue statin therapy. I have recommended him to have a lipid profile done during the next blood draw. .    Patient to follow up in 4-5 months      Thank you very much for allowing me to participate in the care of your patient. Please do not hesitate to contact me if you have any questions. Sincerely,  Claude Akbar MD      [address] [place] [address]  Ph: [phone]  Fax: [phone]          If you have questions, please do not hesitate to call me. I look forward to following Hunter Gregory along with you.     Sincerely,        Claude Akbar MD

## 2018-02-19 ENCOUNTER — OFFICE VISIT (OUTPATIENT)
Dept: INTERNAL MEDICINE CLINIC | Age: 58
End: 2018-02-19

## 2018-02-19 VITALS
HEIGHT: 66 IN | OXYGEN SATURATION: 98 % | BODY MASS INDEX: 31.95 KG/M2 | HEART RATE: 123 BPM | SYSTOLIC BLOOD PRESSURE: 110 MMHG | TEMPERATURE: 98.5 F | DIASTOLIC BLOOD PRESSURE: 80 MMHG | WEIGHT: 198.8 LBS

## 2018-02-19 DIAGNOSIS — I10 ESSENTIAL HYPERTENSION, BENIGN: ICD-10-CM

## 2018-02-19 DIAGNOSIS — R00.2 PALPITATIONS: Primary | ICD-10-CM

## 2018-02-19 DIAGNOSIS — R35.0 URINARY FREQUENCY: ICD-10-CM

## 2018-02-19 DIAGNOSIS — Z95.810 BIVENTRICULAR ICD (IMPLANTABLE CARDIOVERTER-DEFIBRILLATOR) IN PLACE: ICD-10-CM

## 2018-02-19 DIAGNOSIS — R30.0 DYSURIA: ICD-10-CM

## 2018-02-19 DIAGNOSIS — I25.10 CORONARY ARTERY DISEASE INVOLVING NATIVE HEART WITHOUT ANGINA PECTORIS, UNSPECIFIED VESSEL OR LESION TYPE: ICD-10-CM

## 2018-02-19 DIAGNOSIS — Z79.01 CHRONIC ANTICOAGULATION: ICD-10-CM

## 2018-02-19 LAB
BILIRUBIN URINE: NEGATIVE
BLOOD, URINE: ABNORMAL
CLARITY: CLEAR
COLOR: YELLOW
EPITHELIAL CELLS, UA: 0 /HPF (ref 0–5)
GLUCOSE URINE: NEGATIVE MG/DL
HYALINE CASTS: 0 /LPF (ref 0–8)
KETONES, URINE: NEGATIVE MG/DL
LEUKOCYTE ESTERASE, URINE: NEGATIVE
MICROSCOPIC EXAMINATION: YES
NITRITE, URINE: NEGATIVE
PH UA: 7.5
PROTEIN UA: NEGATIVE MG/DL
RBC UA: 1 /HPF (ref 0–4)
SPECIFIC GRAVITY UA: 1.01
URINE REFLEX TO CULTURE: ABNORMAL
URINE TYPE: ABNORMAL
UROBILINOGEN, URINE: 0.2 E.U./DL
WBC UA: 0 /HPF (ref 0–5)

## 2018-02-19 PROCEDURE — G8598 ASA/ANTIPLAT THER USED: HCPCS | Performed by: NURSE PRACTITIONER

## 2018-02-19 PROCEDURE — G8427 DOCREV CUR MEDS BY ELIG CLIN: HCPCS | Performed by: NURSE PRACTITIONER

## 2018-02-19 PROCEDURE — G8417 CALC BMI ABV UP PARAM F/U: HCPCS | Performed by: NURSE PRACTITIONER

## 2018-02-19 PROCEDURE — G8484 FLU IMMUNIZE NO ADMIN: HCPCS | Performed by: NURSE PRACTITIONER

## 2018-02-19 PROCEDURE — 93000 ELECTROCARDIOGRAM COMPLETE: CPT | Performed by: NURSE PRACTITIONER

## 2018-02-19 PROCEDURE — 3017F COLORECTAL CA SCREEN DOC REV: CPT | Performed by: NURSE PRACTITIONER

## 2018-02-19 PROCEDURE — 1036F TOBACCO NON-USER: CPT | Performed by: NURSE PRACTITIONER

## 2018-02-19 PROCEDURE — 99214 OFFICE O/P EST MOD 30 MIN: CPT | Performed by: NURSE PRACTITIONER

## 2018-02-19 NOTE — PROGRESS NOTES
Department of Internal Medicine  Clinic Note    Date: 2/19/2018                                               Subjective/Objective:     Chief Complaint   Patient presents with    URI     x21       HPI (location/radiation, quality, severity)  Pt has had a continued cough- clear mucus. Has been on antibiotics and had a negative cxr done 1/29. Pt sts cough is getting better but nagging. He's really here today for palpitations that started this am after moving furniture. No cp or sob. Taking coumadin for PE and DVT history. Last INR was therapeutic last week. Not missing any med doses. Also h/o HTN, CHF, pacemaker (not on currently). He feels like his heart is racing. Patient is otherwise asymptomatic. Patient was seen by the [place] [address] cardiology group 5 days ago. Patient has history of cardiomyopathy with a bundle branch block, he has a biventricular pacemaker that is turned off and now said only with defibrillator programming on. He is on warfarin therapy. Patient's last echo was November 2017 with an EF of 40-45%. His last stress testing Was July 2016. If \"the visit patient added on that he is having some dysuria and urinary frequency for the past several days. Denies abdominal pain, fever, chills, nausea or vomiting. Tobacco:  reports that he has never smoked. He has never used smokeless tobacco.  ETOH:  reports that he does not drink alcohol. Current Outpatient Prescriptions   Medication Sig Dispense Refill    DEXILANT 30 MG CPDR delayed release capsule TAKE 1 CAPSULE BY MOUTH  DAILY 90 capsule 3    pregabalin (LYRICA) 75 MG capsule TAKE ONE CAPSULE BY MOUTH EVERY MORNING AND TAKE 2 CAPSULES BY MOUTH EVERY EVENING.  (Patient taking differently: 75 mg TAKE ONE CAPSULE BY MOUTH EVERY MORNING AND TAKE 2 CAPSULES BY MOUTH EVERY EVENING.) 270 capsule 1    metoprolol succinate (TOPROL XL) 25 MG extended release tablet Take 1 tablet by mouth daily 90 tablet 3    sacubitril-valsartan directed by [facility] Coumadin [facility] [phone]      Respiratory Therapy Supplies (NEBULIZER/TUBING/MOUTHPIECE) KIT 1 kit by Does not apply route 3 times daily 2 kit 5    atropine-PHENobarbital-scopolamine-hyoscyamine (DONNATAL) 16.2 MG per tablet TAKE 1 TABLET BY MOUTH EVERY 6 HOURS AS NEEDED 60 tablet 1    aspirin 81 MG tablet   Take 81 mg by mouth daily       misoprostol (CYTOTEC) 200 MCG tablet Take 200 mcg by mouth 2 times daily.  albuterol (ACCUNEB) 1.25 MG/3ML nebulizer solution Inhale 1 ampule into the lungs every 8 hours.  albuterol (PROAIR HFA) 108 (90 BASE) MCG/ACT inhaler Inhale 2 puffs into the lungs every 6 hours as needed. No current facility-administered medications for this visit. Allergies   Allergen Reactions    Atrovent Nasal Spray [Ipratropium] Other (See Comments)     Chest tightness    Morphine Other (See Comments)     \"makes me feel funny\"      Nitroglycerin Other (See Comments)     Severe hypotension (went from 678 to 66 systolic)    Sulfa Antibiotics Rash    Vicodin [Hydrocodone-Acetaminophen] Rash       Review of Systems   Constitutional: Negative for chills and fever. HENT: Negative for congestion. Respiratory: Positive for cough. Negative for shortness of breath. Cardiovascular: Positive for palpitations. Negative for chest pain and leg swelling. Gastrointestinal: Negative for abdominal pain, diarrhea, nausea and vomiting. Genitourinary: Positive for dysuria and frequency. Negative for difficulty urinating, discharge, flank pain, hematuria, penile pain and urgency. Musculoskeletal: Negative for back pain. Neurological: Negative for dizziness, weakness, light-headedness, numbness and headaches. All other systems reviewed and are negative.       Vitals:  /80 (Site: Left Arm, Position: Sitting, Cuff Size: Small Adult)   Pulse 123   Temp 98.5 °F (36.9 °C) (Oral)   Ht 5' 6\" (1.676 m)   Wt 198 lb 12.8 oz (90.2 kg)   SpO2 98%   BMI

## 2018-02-19 NOTE — PATIENT INSTRUCTIONS
your doctor if you think you are having a problem with your medicine. When should you call for help? Call 911 anytime you think you may need emergency care. For example, call if:  ? · You passed out (lost consciousness). ? · You have symptoms of a heart attack. These may include:  ¨ Chest pain or pressure, or a strange feeling in the chest.  ¨ Sweating. ¨ Shortness of breath. ¨ Pain, pressure, or a strange feeling in the back, neck, jaw, or upper belly or in one or both shoulders or arms. ¨ Lightheadedness or sudden weakness. ¨ A fast or irregular heartbeat. After you call 911, the  may tell you to chew 1 adult-strength or 2 to 4 low-dose aspirin. Wait for an ambulance. Do not try to drive yourself. ? · You have symptoms of a stroke. These may include:  ¨ Sudden numbness, tingling, weakness, or loss of movement in your face, arm, or leg, especially on only one side of your body. ¨ Sudden vision changes. ¨ Sudden trouble speaking. ¨ Sudden confusion or trouble understanding simple statements. ¨ Sudden problems with walking or balance. ¨ A sudden, severe headache that is different from past headaches. ?Call your doctor now or seek immediate medical care if:  ? · You have heart palpitations and:  ¨ Are dizzy or lightheaded, or you feel like you may faint. ¨ Have new or increased shortness of breath. ? Watch closely for changes in your health, and be sure to contact your doctor if:  ? · You continue to have heart palpitations. Where can you learn more? Go to https://Enure Networksuli.Emida. org and sign in to your Wiki-PR account. Enter R508 in the [facility] box to learn more about \"Palpitations: Care Instructions. \"     If you do not have an account, please click on the \"Sign Up Now\" link. Current as of: September 21, 2016  Content Version: 11.5  © 4554-6167 Healthwise, Incorporated. Care instructions adapted under license by [facility] ([facility]).  If you have questions about a medical condition or this instruction, always ask your healthcare professional. [place] [address] any warranty or liability for your use of this information. Patient Education        Musculoskeletal Chest Pain: Care Instructions  Your Care Instructions  Chest pain is not always a sign that something is wrong with your heart or that you have another serious problem. The doctor thinks your chest pain is caused by strained muscles or ligaments, inflamed chest cartilage, or another problem in your chest, rather than by your heart. You may need more tests to find the cause of your chest pain. Follow-up care is a key part of your treatment and safety. Be sure to make and go to all appointments, and call your doctor if you are having problems. It's also a good idea to know your test results and keep a list of the medicines you take. How can you care for yourself at home? · Take pain medicines exactly as directed. ¨ If the doctor gave you a prescription medicine for pain, take it as prescribed. ¨ If you are not taking a prescription pain medicine, ask your doctor if you can take an over-the-counter medicine. · Rest and protect the sore area. · Stop, change, or take a break from any activity that may be causing your pain or soreness. · Put ice or a cold pack on the sore area for 10 to 20 minutes at a time. Try to do this every 1 to 2 hours for the next 3 days (when you are awake) or until the swelling goes down. Put a thin cloth between the ice and your skin. · After 2 or 3 days, apply a heating pad set on low or a warm cloth to the area that hurts. Some doctors suggest that you go back and forth between hot and cold. · Do not wrap or tape your ribs for support. This may cause you to take smaller breaths, which could increase your risk of lung problems. · Mentholated creams such as Bengay or Icy Hot may soothe sore muscles. Follow the instructions on the package.   · Follow your doctor's worse symptoms of a urinary problem. For example:  ¨ You have blood or pus in your urine. ¨ You have chills or body aches. ¨ It hurts worse to urinate. ¨ You have groin or belly pain. ¨ You have pain in your back just below your rib cage (the flank area). ? Watch closely for changes in your health, and be sure to contact your doctor if you have any problems. Where can you learn more? Go to https://Populrpe"[employer]".Powered by Peak. org and sign in to your [employer] account. Enter L120 in the [facility] box to learn more about \"Painful Urination (Dysuria): Care Instructions. \"     If you do not have an account, please click on the \"Sign Up Now\" link. Current as of: May 12, 2017  Content Version: 11.5  © 8222-1678 Healthwise, Incorporated. Care instructions adapted under license by [facility] ([facility]). If you have questions about a medical condition or this instruction, always ask your healthcare professional. [address] any warranty or liability for your use of this information.

## 2018-02-20 ASSESSMENT — ENCOUNTER SYMPTOMS
DIARRHEA: 0
COUGH: 1
ABDOMINAL PAIN: 0
BACK PAIN: 0
SHORTNESS OF BREATH: 0
NAUSEA: 0
VOMITING: 0

## 2018-02-21 ENCOUNTER — TELEPHONE (OUTPATIENT)
Dept: INTERNAL MEDICINE CLINIC | Age: 58
End: 2018-02-21

## 2018-02-21 RX ORDER — WARFARIN SODIUM 5 MG/1
TABLET ORAL
Qty: 105 TABLET | Refills: 0 | Status: SHIPPED | OUTPATIENT
Start: 2018-02-21 | End: 2018-02-27 | Stop reason: DRUGHIGH

## 2018-02-21 RX ORDER — LEVOFLOXACIN 500 MG/1
500 TABLET, FILM COATED ORAL DAILY
Qty: 10 TABLET | Refills: 0 | Status: SHIPPED | OUTPATIENT
Start: 2018-02-21 | End: 2018-11-21 | Stop reason: SDUPTHER

## 2018-02-22 ENCOUNTER — TELEPHONE (OUTPATIENT)
Dept: PHARMACY | Facility: CLINIC | Age: 58
End: 2018-02-22

## 2018-02-22 NOTE — TELEPHONE ENCOUNTER
----- Message from Cristiano Dominguez LPN sent at 8/87/4026 10:02 AM EST -----  Starting Levaquin today please call him [phone]

## 2018-02-22 NOTE — TELEPHONE ENCOUNTER
I called an spoke with patient. He has not picked up the prescription yet, but will be picking it up and starting the Levaquin today. I instructed patient to take Warfarin 1/2 tablet (2.5mg) every-other-day alternating with 1 tablet (5mg) every-other-day while on the Levaquin. Patient already has an appointment in the [facility] next Wednesday, so I instructed him to be sure to keep that appointment.

## 2018-02-27 ENCOUNTER — ANTI-COAG VISIT (OUTPATIENT)
Dept: PHARMACY | Facility: CLINIC | Age: 58
End: 2018-02-27

## 2018-02-27 ENCOUNTER — HOSPITAL ENCOUNTER (OUTPATIENT)
Dept: OTHER | Age: 58
Discharge: HOME | End: 2018-02-27
Attending: UROLOGY | Admitting: UROLOGY

## 2018-02-27 DIAGNOSIS — N20.0 URIC ACID NEPHROLITHIASIS: ICD-10-CM

## 2018-02-27 DIAGNOSIS — I27.82 CHRONIC SEPTIC PULMONARY EMBOLISM WITH ACUTE COR PULMONALE (HCC): ICD-10-CM

## 2018-02-27 DIAGNOSIS — E78.2 MIXED HYPERLIPIDEMIA: ICD-10-CM

## 2018-02-27 DIAGNOSIS — I10 ESSENTIAL HYPERTENSION, BENIGN: ICD-10-CM

## 2018-02-27 DIAGNOSIS — I26.01 CHRONIC SEPTIC PULMONARY EMBOLISM WITH ACUTE COR PULMONALE (HCC): ICD-10-CM

## 2018-02-27 LAB
A/G RATIO: 1.7 (ref 1.1–2.2)
ALBUMIN SERPL-MCNC: 4.5 G/DL (ref 3.4–5)
ALP BLD-CCNC: 137 U/L (ref 40–129)
ALT SERPL-CCNC: 21 U/L (ref 10–40)
ANION GAP SERPL CALCULATED.3IONS-SCNC: 14 MMOL/L (ref 3–16)
AST SERPL-CCNC: 23 U/L (ref 15–37)
BILIRUB SERPL-MCNC: 0.5 MG/DL (ref 0–1)
BUN BLDV-MCNC: 10 MG/DL (ref 7–20)
CALCIUM SERPL-MCNC: 10.6 MG/DL (ref 8.3–10.6)
CHLORIDE BLD-SCNC: 106 MMOL/L (ref 99–110)
CHOLESTEROL, TOTAL: 162 MG/DL (ref 0–199)
CO2: 26 MMOL/L (ref 21–32)
CREAT SERPL-MCNC: 0.9 MG/DL (ref 0.9–1.3)
GFR AFRICAN AMERICAN: >60
GFR NON-AFRICAN AMERICAN: >60
GLOBULIN: 2.7 G/DL
GLUCOSE BLD-MCNC: 96 MG/DL (ref 70–99)
HDLC SERPL-MCNC: 46 MG/DL (ref 40–60)
INR BLD: 2.9
LDL CHOLESTEROL CALCULATED: 86 MG/DL
POTASSIUM SERPL-SCNC: 4.4 MMOL/L (ref 3.5–5.1)
SODIUM BLD-SCNC: 146 MMOL/L (ref 136–145)
TOTAL PROTEIN: 7.2 G/DL (ref 6.4–8.2)
TRIGL SERPL-MCNC: 151 MG/DL (ref 0–150)
VLDLC SERPL CALC-MCNC: 30 MG/DL

## 2018-02-27 NOTE — PROGRESS NOTES
Mr. Isidra Garcia is a 61 y.o.  male with history of PE who presents today for anticoagulation monitoring and adjustment. Patient verifies current dosing regimen  Patient denies s/s bleeding/bruising/swelling/SOB  No blood in urine or stool. No dietary changes. No changes in medication/OTC agents/Herbals. No change in alcohol use. No missed doses. No Procedures scheduled in the future at this time. Lab Results   Component Value Date    INR 2.90 02/27/2018    INR 3 02/09/2018    INR 3.1 01/19/2018       Patient appears well today. Patient started Levaquin 2/22/18. He has been taking Warfarin 1/2 tablet (2.5mg) every-other-day alternating with 1 tablet (5mg) every-other-day while on the Levaquin. He reports he has 2 days left, however, per EPIC it was for 10 days. His last dose should be 3/3/18. Patient's INR was in range today. Patient will continue the dosing while on Levaquin and then will go back to his normal dosing. After visit summary printed and reviewed with patient.       Medications reviewed and updated on home medication list: Yes  Warfarin dose updated on patient home medication list: Yes

## 2018-03-01 ENCOUNTER — HOSPITAL ENCOUNTER (OUTPATIENT)
Dept: OTHER | Age: 58
Discharge: HOME | End: 2018-03-31
Attending: INTERNAL MEDICINE | Admitting: INTERNAL MEDICINE

## 2018-03-05 ENCOUNTER — TELEPHONE (OUTPATIENT)
Dept: CARDIOLOGY CLINIC | Age: 58
End: 2018-03-05

## 2018-03-13 ENCOUNTER — OFFICE VISIT (OUTPATIENT)
Dept: PULMONOLOGY | Age: 58
End: 2018-03-13

## 2018-03-13 VITALS
RESPIRATION RATE: 16 BRPM | SYSTOLIC BLOOD PRESSURE: 132 MMHG | BODY MASS INDEX: 29.89 KG/M2 | WEIGHT: 186 LBS | HEART RATE: 74 BPM | TEMPERATURE: 97.6 F | HEIGHT: 66 IN | OXYGEN SATURATION: 96 % | DIASTOLIC BLOOD PRESSURE: 82 MMHG

## 2018-03-13 DIAGNOSIS — J42 BRONCHIOLITIS OBLITERANS (HCC): Primary | ICD-10-CM

## 2018-03-13 DIAGNOSIS — R91.8 LUNG NODULES: ICD-10-CM

## 2018-03-13 PROCEDURE — G8926 SPIRO NO PERF OR DOC: HCPCS | Performed by: INTERNAL MEDICINE

## 2018-03-13 PROCEDURE — G8482 FLU IMMUNIZE ORDER/ADMIN: HCPCS | Performed by: INTERNAL MEDICINE

## 2018-03-13 PROCEDURE — 1036F TOBACCO NON-USER: CPT | Performed by: INTERNAL MEDICINE

## 2018-03-13 PROCEDURE — 3017F COLORECTAL CA SCREEN DOC REV: CPT | Performed by: INTERNAL MEDICINE

## 2018-03-13 PROCEDURE — 99213 OFFICE O/P EST LOW 20 MIN: CPT | Performed by: INTERNAL MEDICINE

## 2018-03-13 PROCEDURE — G8417 CALC BMI ABV UP PARAM F/U: HCPCS | Performed by: INTERNAL MEDICINE

## 2018-03-13 PROCEDURE — 3023F SPIROM DOC REV: CPT | Performed by: INTERNAL MEDICINE

## 2018-03-13 PROCEDURE — G8427 DOCREV CUR MEDS BY ELIG CLIN: HCPCS | Performed by: INTERNAL MEDICINE

## 2018-03-13 PROCEDURE — G8598 ASA/ANTIPLAT THER USED: HCPCS | Performed by: INTERNAL MEDICINE

## 2018-03-13 NOTE — PATIENT INSTRUCTIONS
Please obtain 6 minute walk test and lung function test    Please come for a follow-up visit in 6 months

## 2018-03-13 NOTE — PROGRESS NOTES
tablet Take 1 tablet by mouth every 6 hours as needed for Pain . Earliest Fill Date: 11/10/17 60 tablet 0    promethazine-codeine (PHENERGAN WITH CODEINE) 6.25-10 MG/5ML syrup Take 5 mLs by mouth every 4 hours as needed for Cough 120 mL 1    potassium chloride (KLOR-CON M) 10 MEQ extended release tablet TAKE 4 TABLETS BY MOUTH EVERY  tablet 3    pravastatin (PRAVACHOL) 40 MG tablet Take 1 tablet by mouth daily 90 tablet 3    hydrocortisone (PROCTOZONE-HC) 2.5 % rectal cream PLACE RECTALLY TWICE DAILY 30 g 0    ondansetron (ZOFRAN ODT) 4 MG disintegrating tablet Take 1-2 tablets by mouth every 8 hours as needed for Nausea May Sub regular tablet (non-ODT) if insurance does not cover ODT. 20 tablet 0    dicyclomine (BENTYL) 10 MG capsule TAKE ONE CAPSULE BY MOUTH FOUR TIMES DAILY AS NEEDED 360 capsule 3    warfarin (COUMADIN) 5 MG tablet Take 5 mg by mouth daily Except 2.5mg every Friday or as directed by [facility] Coumadin Service [phone]      Respiratory Therapy Supplies (NEBULIZER/TUBING/MOUTHPIECE) KIT 1 kit by Does not apply route 3 times daily 2 kit 5    atropine-PHENobarbital-scopolamine-hyoscyamine (DONNATAL) 16.2 MG per tablet TAKE 1 TABLET BY MOUTH EVERY 6 HOURS AS NEEDED 60 tablet 1    aspirin 81 MG tablet   Take 81 mg by mouth daily       misoprostol (CYTOTEC) 200 MCG tablet Take 200 mcg by mouth 2 times daily.  albuterol (ACCUNEB) 1.25 MG/3ML nebulizer solution Inhale 1 ampule into the lungs every 8 hours.  albuterol (PROAIR HFA) 108 (90 BASE) MCG/ACT inhaler Inhale 2 puffs into the lungs every 6 hours as needed.  albuterol (PROVENTIL) (2.5 MG/3ML) 0.083% nebulizer solution USE 3 ML VIA NEBULIZER THREE TIMES DAILY 360 mL 5     No current facility-administered medications for this visit.         Allergies   Allergen Reactions    Atrovent Nasal Spray [Ipratropium] Other (See Comments)     Chest tightness    Morphine Other (See Comments)     \"makes me feel funny\"      turgor  Lymphatic: No cervical LAD. No supraclavicular LAD. Musculoskeletal: No cyanosis, clubbing or joint deformity. Psychiatric: Normal mood and affect; exhibits normal insight and judgement     Pulmonary Function Testing (4/16/14)  FVC 3.69 L at 86% predicted ---> 3.68L at 86% predicted  FEV1 1.89 L at 57% predicted ----> 1.9L at 57% predicted  FEV1/FVC ratio at 51% ---->52% predicted  TLC 4.64 L at 80% predicted  VC 3.73L at 86% predicted  ERV 0.8L at 57% predicted  RV/TLC at 20% predicted  DLCO 24.9 at 97% predicted  DLCO/VA 5.18L at 131 % predicted     Overall: Mild restriction; obstruction is at least moderate without significant reversal; normal diffusing capacity     Images and reports of chest imaging were reviewed by me. My interpretation is:  CXR (1/29/18): Clear lung fields; ICD in place   Chest CT (1/29/17): No LAD; no discernible PE; granuloma in the right upper lobe and right base; right upper lobe nodule; left lower lobe nodule; tree-in-bud opacities in the left upper lobe (lung nodules are stable compared to September 2014)  Chest CT (11/16/17): No LAD; granulomas in the right upper lobe, left upper lobe, lingula and left lower lobe; tree-in-bud opacities in the right upper lobe, right lower lobe, left upper lobe and left lower lobe (stable compared to 9/2014)        ECHO (11/8/17)   Summary   Normal left ventricular wall thickness. Ejection fraction is visually   estimated to be 40-45%.  Radhika Hazy is septal hypokinesis (secondary to Pacing)   Trivial mitral & tricuspid regurgitation is present.   The left atrial size is normal.   Pacer / ICD wire is visualized in the right ventricle.   There appears to be normal right ventricular size and function.     Lab Results   Component Value Date    WBC 6.5 11/08/2017    HGB 15.2 11/08/2017    HCT 44.9 11/08/2017    MCV 91.6 11/08/2017     11/08/2017       Lab Results   Component Value Date    BNP 22.8 08/16/2012       Lab Results   Component Value

## 2018-03-14 ENCOUNTER — TELEPHONE (OUTPATIENT)
Dept: PULMONOLOGY | Age: 58
End: 2018-03-14

## 2018-03-14 NOTE — TELEPHONE ENCOUNTER
I found it in [place] (the 5th one down) It was done at [facility] in 2014. He did not desaturate.

## 2018-03-14 NOTE — TELEPHONE ENCOUNTER
Call from Martha Abraham at [facility] stating they do not take Jovon's insurance  Need to fax order for overnight oximetry to another DME  Cristiane Nguyen and let him know it was being faxed to [facility], Order faxed

## 2018-03-20 ENCOUNTER — HOSPITAL ENCOUNTER (OUTPATIENT)
Dept: CARDIAC REHAB | Age: 58
Discharge: HOME | End: 2018-04-03
Attending: INTERNAL MEDICINE | Admitting: INTERNAL MEDICINE

## 2018-03-20 ENCOUNTER — ANTI-COAG VISIT (OUTPATIENT)
Dept: PHARMACY | Facility: CLINIC | Age: 58
End: 2018-03-20

## 2018-03-20 ENCOUNTER — TELEPHONE (OUTPATIENT)
Dept: CARDIOLOGY CLINIC | Age: 58
End: 2018-03-20

## 2018-03-20 DIAGNOSIS — I27.82 CHRONIC SEPTIC PULMONARY EMBOLISM WITH ACUTE COR PULMONALE (HCC): ICD-10-CM

## 2018-03-20 DIAGNOSIS — M47.816 LUMBAR SPONDYLOSIS: Primary | ICD-10-CM

## 2018-03-20 DIAGNOSIS — I26.01 CHRONIC SEPTIC PULMONARY EMBOLISM WITH ACUTE COR PULMONALE (HCC): ICD-10-CM

## 2018-03-20 LAB — INTERNATIONAL NORMALIZATION RATIO, POC: 5.6

## 2018-03-20 RX ORDER — OXYCODONE HYDROCHLORIDE AND ACETAMINOPHEN 5; 325 MG/1; MG/1
1 TABLET ORAL EVERY 4 HOURS PRN
Qty: 60 TABLET | Refills: 0 | Status: SHIPPED | OUTPATIENT
Start: 2018-03-20 | End: 2018-06-27 | Stop reason: SDUPTHER

## 2018-03-20 NOTE — PROGRESS NOTES
Mr. Sophie Ledezma is a 61 y.o.  male with history of PE who presents today for anticoagulation monitoring and adjustment. Patient verifies current dosing regimen  Patient denies s/s bleeding/bruising/swelling/SOB  No blood in urine or stool. No dietary changes. No change in alcohol use. No missed doses. No Procedures scheduled in the future at this time. Lab Results   Component Value Date    INR 5.6 03/20/2018    INR 2.90 02/27/2018    INR 3 02/09/2018     Fluconazole for 3 days started 3/16/18. Called our Pharmacy over the weekend and was instructed to take a lower warfarin dose of 2.5mg for 3 days. He took 2.5mg 3/16, 3/17 and 3/18. Significant interaction with warfarin. Likely reason for elevated INR today. Hold Warfarin today ONLY. Take Warfarin 2.5mg (half tablet) tomorrow ONLY  THEN Warfarin 5mg (whole tablet) daily except 2.5mg (half tablet) every Friday. After visit summary printed and reviewed with patient.       Medications reviewed and updated on home medication list: Yes  Warfarin dose updated on patient home medication list: Yes

## 2018-03-20 NOTE — TELEPHONE ENCOUNTER
Patient states his cat chewed through his home monitoring device cord. Advised pt to call the company to have them ship him a new cord. Patient expressed understanding.

## 2018-03-23 ENCOUNTER — HOSPITAL ENCOUNTER (OUTPATIENT)
Dept: OTHER | Age: 58
Discharge: HOME | End: 2018-03-23
Attending: UROLOGY | Admitting: UROLOGY

## 2018-03-23 DIAGNOSIS — N20.0 KIDNEY STONE: ICD-10-CM

## 2018-03-27 ENCOUNTER — TELEPHONE (OUTPATIENT)
Dept: PULMONOLOGY | Age: 58
End: 2018-03-27

## 2018-03-27 DIAGNOSIS — G47.34 NOCTURNAL HYPOXIA: Primary | ICD-10-CM

## 2018-03-27 NOTE — TELEPHONE ENCOUNTER
[address] called stated pt insurance will not cover overnight oximetry with the existing dx code and they are requesting a new order with a different code, Patel Kumar is what is currently on there, please advise

## 2018-03-28 ENCOUNTER — TELEPHONE (OUTPATIENT)
Dept: INTERNAL MEDICINE CLINIC | Age: 58
End: 2018-03-28

## 2018-03-28 NOTE — TELEPHONE ENCOUNTER
I spoke to Michael who is with [facility] in [place] office, because I could not get through to the [facility]. She is going to forward a message to Alec to see how the Overnight oximetry was done if the dx code was not approved beforehand. Also the overnight oximetry indicated that Shaji Rogers needs O2 at night. Per Dr Bishop Anthony order Oxygen 2L nocturnally. I spoke to Michael regarding this as well. She said that this diagnosis does not support the need for oxygen. She said that chronic lung conditions would support the need for O2. Spoke with Dr Bishop Anthony she said this is a chronic lung condition.   Left a message with Mario Lepe our [facility] rep to call back  to see if this is an acceptable diagnosis

## 2018-03-29 NOTE — TELEPHONE ENCOUNTER
Spoke with Amrit from [facility]  He said the dx J42 should qualify Chalino Ann for O2. On another note, looking over the test results I noticed that the test results were from 3/9/18. This was a point of concern since we did not even send the order for this until 3/13/18. Want to verify that we have the correct results for Chalino Ann.   He is having Deyanira Barnes from [facility] look into this

## 2018-03-30 NOTE — TELEPHONE ENCOUNTER
Spoke to patient. I have also ordered a sleep study.  Please assist patient with scheduling the test.

## 2018-04-01 ENCOUNTER — HOSPITAL ENCOUNTER (OUTPATIENT)
Dept: OTHER | Age: 58
Discharge: HOME | End: 2018-04-30
Attending: INTERNAL MEDICINE | Admitting: INTERNAL MEDICINE

## 2018-04-02 ENCOUNTER — TELEPHONE (OUTPATIENT)
Dept: PULMONOLOGY | Age: 58
End: 2018-04-02

## 2018-04-03 PROCEDURE — 94618 PULMONARY STRESS TESTING: CPT | Performed by: INTERNAL MEDICINE

## 2018-04-04 ENCOUNTER — TELEPHONE (OUTPATIENT)
Dept: PULMONOLOGY | Age: 58
End: 2018-04-04

## 2018-04-05 PROBLEM — S37.019A PERINEPHRIC HEMATOMA: Status: ACTIVE | Noted: 2018-04-05

## 2018-04-08 PROBLEM — D69.6 THROMBOCYTOPENIA (HCC): Status: ACTIVE | Noted: 2018-04-08

## 2018-04-09 ENCOUNTER — TELEPHONE (OUTPATIENT)
Dept: INTERNAL MEDICINE CLINIC | Age: 58
End: 2018-04-09

## 2018-04-11 ENCOUNTER — TELEPHONE (OUTPATIENT)
Dept: INTERNAL MEDICINE CLINIC | Age: 58
End: 2018-04-11

## 2018-04-12 ASSESSMENT — ENCOUNTER SYMPTOMS
ABDOMINAL PAIN: 0
SHORTNESS OF BREATH: 0

## 2018-04-13 ENCOUNTER — TELEPHONE (OUTPATIENT)
Dept: INTERNAL MEDICINE CLINIC | Age: 58
End: 2018-04-13

## 2018-04-14 ENCOUNTER — OFFICE VISIT (OUTPATIENT)
Dept: INTERNAL MEDICINE CLINIC | Age: 58
End: 2018-04-14

## 2018-04-14 VITALS
DIASTOLIC BLOOD PRESSURE: 68 MMHG | OXYGEN SATURATION: 95 % | RESPIRATION RATE: 16 BRPM | HEART RATE: 93 BPM | SYSTOLIC BLOOD PRESSURE: 128 MMHG | TEMPERATURE: 99.7 F | BODY MASS INDEX: 32.47 KG/M2 | HEIGHT: 66 IN | WEIGHT: 202 LBS

## 2018-04-14 DIAGNOSIS — N39.0 ACUTE UTI: ICD-10-CM

## 2018-04-14 DIAGNOSIS — S30.1XXA ABDOMINAL WALL HEMATOMA, INITIAL ENCOUNTER: ICD-10-CM

## 2018-04-14 DIAGNOSIS — R30.0 DYSURIA: Primary | ICD-10-CM

## 2018-04-14 DIAGNOSIS — D62 ACUTE BLOOD LOSS ANEMIA: ICD-10-CM

## 2018-04-14 LAB
ANION GAP SERPL CALCULATED.3IONS-SCNC: 10 MMOL/L (ref 3–16)
BASOPHILS ABSOLUTE: 0 K/UL (ref 0–0.2)
BASOPHILS RELATIVE PERCENT: 0.5 %
BILIRUBIN, POC: NORMAL
BLOOD URINE, POC: NORMAL
BUN BLDV-MCNC: 9 MG/DL (ref 7–20)
CALCIUM SERPL-MCNC: 10.2 MG/DL (ref 8.3–10.6)
CHLORIDE BLD-SCNC: 103 MMOL/L (ref 99–110)
CLARITY, POC: CLEAR
CO2: 28 MMOL/L (ref 21–32)
COLOR, POC: YELLOW
CREAT SERPL-MCNC: 0.7 MG/DL (ref 0.9–1.3)
EOSINOPHILS ABSOLUTE: 0.2 K/UL (ref 0–0.6)
EOSINOPHILS RELATIVE PERCENT: 3.4 %
GFR AFRICAN AMERICAN: >60
GFR NON-AFRICAN AMERICAN: >60
GLUCOSE BLD-MCNC: 119 MG/DL (ref 70–99)
GLUCOSE URINE, POC: NORMAL
HCT VFR BLD CALC: 30 % (ref 40.5–52.5)
HEMOGLOBIN: 10.4 G/DL (ref 13.5–17.5)
KETONES, POC: NORMAL
LEUKOCYTE EST, POC: NORMAL
LYMPHOCYTES ABSOLUTE: 0.7 K/UL (ref 1–5.1)
LYMPHOCYTES RELATIVE PERCENT: 15.1 %
MCH RBC QN AUTO: 30.9 PG (ref 26–34)
MCHC RBC AUTO-ENTMCNC: 34.8 G/DL (ref 31–36)
MCV RBC AUTO: 88.8 FL (ref 80–100)
MONOCYTES ABSOLUTE: 0.7 K/UL (ref 0–1.3)
MONOCYTES RELATIVE PERCENT: 14 %
NEUTROPHILS ABSOLUTE: 3.2 K/UL (ref 1.7–7.7)
NEUTROPHILS RELATIVE PERCENT: 67 %
NITRITE, POC: NORMAL
PDW BLD-RTO: 15.3 % (ref 12.4–15.4)
PH, POC: 7.5
PLATELET # BLD: 252 K/UL (ref 135–450)
PMV BLD AUTO: 7.7 FL (ref 5–10.5)
POTASSIUM SERPL-SCNC: 3.5 MMOL/L (ref 3.5–5.1)
PROTEIN, POC: NORMAL
RBC # BLD: 3.38 M/UL (ref 4.2–5.9)
SODIUM BLD-SCNC: 141 MMOL/L (ref 136–145)
SPECIFIC GRAVITY, POC: 1.02
UROBILINOGEN, POC: 1
WBC # BLD: 4.8 K/UL (ref 4–11)

## 2018-04-14 PROCEDURE — G8427 DOCREV CUR MEDS BY ELIG CLIN: HCPCS | Performed by: INTERNAL MEDICINE

## 2018-04-14 PROCEDURE — 1036F TOBACCO NON-USER: CPT | Performed by: INTERNAL MEDICINE

## 2018-04-14 PROCEDURE — 81002 URINALYSIS NONAUTO W/O SCOPE: CPT | Performed by: INTERNAL MEDICINE

## 2018-04-14 PROCEDURE — G8417 CALC BMI ABV UP PARAM F/U: HCPCS | Performed by: INTERNAL MEDICINE

## 2018-04-14 PROCEDURE — G8598 ASA/ANTIPLAT THER USED: HCPCS | Performed by: INTERNAL MEDICINE

## 2018-04-14 PROCEDURE — 36415 COLL VENOUS BLD VENIPUNCTURE: CPT | Performed by: INTERNAL MEDICINE

## 2018-04-14 PROCEDURE — 99214 OFFICE O/P EST MOD 30 MIN: CPT | Performed by: INTERNAL MEDICINE

## 2018-04-14 PROCEDURE — 1111F DSCHRG MED/CURRENT MED MERGE: CPT | Performed by: INTERNAL MEDICINE

## 2018-04-14 PROCEDURE — 3017F COLORECTAL CA SCREEN DOC REV: CPT | Performed by: INTERNAL MEDICINE

## 2018-04-14 RX ORDER — CIPROFLOXACIN 500 MG/1
500 TABLET, FILM COATED ORAL 2 TIMES DAILY
Qty: 20 TABLET | Refills: 0 | Status: SHIPPED | OUTPATIENT
Start: 2018-04-14 | End: 2018-04-24

## 2018-04-16 ENCOUNTER — OFFICE VISIT (OUTPATIENT)
Dept: INTERNAL MEDICINE CLINIC | Age: 58
End: 2018-04-16

## 2018-04-16 ENCOUNTER — TELEPHONE (OUTPATIENT)
Dept: CARDIOLOGY CLINIC | Age: 58
End: 2018-04-16

## 2018-04-16 VITALS
OXYGEN SATURATION: 95 % | DIASTOLIC BLOOD PRESSURE: 76 MMHG | HEIGHT: 66 IN | RESPIRATION RATE: 16 BRPM | BODY MASS INDEX: 31.66 KG/M2 | SYSTOLIC BLOOD PRESSURE: 136 MMHG | HEART RATE: 92 BPM | WEIGHT: 197 LBS | TEMPERATURE: 98.4 F

## 2018-04-16 DIAGNOSIS — D62 ACUTE BLOOD LOSS ANEMIA: ICD-10-CM

## 2018-04-16 DIAGNOSIS — S37.019A PERINEPHRIC HEMATOMA: Primary | ICD-10-CM

## 2018-04-16 DIAGNOSIS — I42.9 CARDIOMYOPATHY, UNSPECIFIED TYPE (HCC): ICD-10-CM

## 2018-04-16 LAB — URINE CULTURE, ROUTINE: NORMAL

## 2018-04-16 PROCEDURE — 99215 OFFICE O/P EST HI 40 MIN: CPT | Performed by: INTERNAL MEDICINE

## 2018-04-16 RX ORDER — METOPROLOL SUCCINATE 100 MG/1
50 TABLET, EXTENDED RELEASE ORAL DAILY
Qty: 30 TABLET | Refills: 3 | Status: SHIPPED
Start: 2018-04-16 | End: 2018-06-04

## 2018-04-17 ENCOUNTER — NURSE ONLY (OUTPATIENT)
Dept: CARDIOLOGY CLINIC | Age: 58
End: 2018-04-17

## 2018-04-17 DIAGNOSIS — Z95.810 BIVENTRICULAR ICD (IMPLANTABLE CARDIOVERTER-DEFIBRILLATOR) IN PLACE: ICD-10-CM

## 2018-04-17 DIAGNOSIS — I50.22 CHRONIC SYSTOLIC CHF (CONGESTIVE HEART FAILURE), NYHA CLASS 2 (HCC): ICD-10-CM

## 2018-04-17 DIAGNOSIS — I42.9 CARDIOMYOPATHY, UNSPECIFIED TYPE (HCC): ICD-10-CM

## 2018-04-17 PROCEDURE — 93297 REM INTERROG DEV EVAL ICPMS: CPT | Performed by: INTERNAL MEDICINE

## 2018-04-17 PROCEDURE — 93296 REM INTERROG EVL PM/IDS: CPT | Performed by: INTERNAL MEDICINE

## 2018-04-17 PROCEDURE — 93295 DEV INTERROG REMOTE 1/2/MLT: CPT | Performed by: INTERNAL MEDICINE

## 2018-04-19 ASSESSMENT — ENCOUNTER SYMPTOMS
ABDOMINAL PAIN: 0
SHORTNESS OF BREATH: 0

## 2018-04-23 ENCOUNTER — OFFICE VISIT (OUTPATIENT)
Dept: INTERNAL MEDICINE CLINIC | Age: 58
End: 2018-04-23

## 2018-04-23 ENCOUNTER — TELEPHONE (OUTPATIENT)
Dept: INTERNAL MEDICINE CLINIC | Age: 58
End: 2018-04-23

## 2018-04-23 VITALS
BODY MASS INDEX: 31.18 KG/M2 | HEART RATE: 84 BPM | DIASTOLIC BLOOD PRESSURE: 80 MMHG | HEIGHT: 66 IN | SYSTOLIC BLOOD PRESSURE: 120 MMHG | RESPIRATION RATE: 16 BRPM | WEIGHT: 194 LBS

## 2018-04-23 DIAGNOSIS — S37.019A PERINEPHRIC HEMATOMA: Primary | ICD-10-CM

## 2018-04-23 DIAGNOSIS — I10 ESSENTIAL HYPERTENSION, BENIGN: ICD-10-CM

## 2018-04-23 DIAGNOSIS — Z86.711 HISTORY OF PULMONARY EMBOLISM: ICD-10-CM

## 2018-04-23 LAB
BASOPHILS ABSOLUTE: 0 K/UL (ref 0–0.2)
BASOPHILS RELATIVE PERCENT: 0.8 %
EOSINOPHILS ABSOLUTE: 0.2 K/UL (ref 0–0.6)
EOSINOPHILS RELATIVE PERCENT: 5 %
HCT VFR BLD CALC: 40.9 % (ref 40.5–52.5)
HEMOGLOBIN: 13.5 G/DL (ref 13.5–17.5)
INR BLD: 1.19 (ref 0.85–1.15)
LYMPHOCYTES ABSOLUTE: 1.2 K/UL (ref 1–5.1)
LYMPHOCYTES RELATIVE PERCENT: 24.8 %
MCH RBC QN AUTO: 30.6 PG (ref 26–34)
MCHC RBC AUTO-ENTMCNC: 32.9 G/DL (ref 31–36)
MCV RBC AUTO: 92.9 FL (ref 80–100)
MONOCYTES ABSOLUTE: 0.5 K/UL (ref 0–1.3)
MONOCYTES RELATIVE PERCENT: 10.3 %
NEUTROPHILS ABSOLUTE: 2.9 K/UL (ref 1.7–7.7)
NEUTROPHILS RELATIVE PERCENT: 59.1 %
PDW BLD-RTO: 16.4 % (ref 12.4–15.4)
PLATELET # BLD: 240 K/UL (ref 135–450)
PMV BLD AUTO: 7.8 FL (ref 5–10.5)
PROTHROMBIN TIME: 13.5 SEC (ref 9.6–13)
RBC # BLD: 4.4 M/UL (ref 4.2–5.9)
WBC # BLD: 5 K/UL (ref 4–11)

## 2018-04-23 PROCEDURE — 1036F TOBACCO NON-USER: CPT | Performed by: INTERNAL MEDICINE

## 2018-04-23 PROCEDURE — 99214 OFFICE O/P EST MOD 30 MIN: CPT | Performed by: INTERNAL MEDICINE

## 2018-04-23 PROCEDURE — 1111F DSCHRG MED/CURRENT MED MERGE: CPT | Performed by: INTERNAL MEDICINE

## 2018-04-23 PROCEDURE — 3017F COLORECTAL CA SCREEN DOC REV: CPT | Performed by: INTERNAL MEDICINE

## 2018-04-23 PROCEDURE — G8427 DOCREV CUR MEDS BY ELIG CLIN: HCPCS | Performed by: INTERNAL MEDICINE

## 2018-04-23 PROCEDURE — G8598 ASA/ANTIPLAT THER USED: HCPCS | Performed by: INTERNAL MEDICINE

## 2018-04-23 PROCEDURE — G8417 CALC BMI ABV UP PARAM F/U: HCPCS | Performed by: INTERNAL MEDICINE

## 2018-04-24 ENCOUNTER — ANTI-COAG VISIT (OUTPATIENT)
Dept: INTERNAL MEDICINE CLINIC | Age: 58
End: 2018-04-24

## 2018-04-24 DIAGNOSIS — I27.82 CHRONIC SEPTIC PULMONARY EMBOLISM WITH ACUTE COR PULMONALE (HCC): ICD-10-CM

## 2018-04-24 DIAGNOSIS — I26.01 CHRONIC SEPTIC PULMONARY EMBOLISM WITH ACUTE COR PULMONALE (HCC): ICD-10-CM

## 2018-04-27 ENCOUNTER — TELEPHONE (OUTPATIENT)
Dept: INTERNAL MEDICINE CLINIC | Age: 58
End: 2018-04-27

## 2018-04-30 ENCOUNTER — TELEPHONE (OUTPATIENT)
Dept: INTERNAL MEDICINE CLINIC | Age: 58
End: 2018-04-30

## 2018-04-30 DIAGNOSIS — N39.0 ACUTE UTI: Primary | ICD-10-CM

## 2018-05-01 ENCOUNTER — ANTI-COAG VISIT (OUTPATIENT)
Dept: INTERNAL MEDICINE CLINIC | Age: 58
End: 2018-05-01

## 2018-05-01 ENCOUNTER — HOSPITAL ENCOUNTER (OUTPATIENT)
Dept: OTHER | Age: 58
Discharge: HOME | End: 2018-05-31
Attending: INTERNAL MEDICINE | Admitting: INTERNAL MEDICINE

## 2018-05-01 ENCOUNTER — ANTI-COAG VISIT (OUTPATIENT)
Dept: PHARMACY | Facility: CLINIC | Age: 58
End: 2018-05-01

## 2018-05-01 ENCOUNTER — TELEPHONE (OUTPATIENT)
Dept: INTERNAL MEDICINE CLINIC | Age: 58
End: 2018-05-01

## 2018-05-01 ENCOUNTER — HOSPITAL ENCOUNTER (OUTPATIENT)
Dept: OTHER | Age: 58
Discharge: HOME | End: 2018-05-01
Attending: UROLOGY | Admitting: UROLOGY

## 2018-05-01 DIAGNOSIS — N20.0 CALCULUS, KIDNEY: ICD-10-CM

## 2018-05-01 DIAGNOSIS — I26.01 CHRONIC SEPTIC PULMONARY EMBOLISM WITH ACUTE COR PULMONALE (HCC): ICD-10-CM

## 2018-05-01 DIAGNOSIS — I27.82 CHRONIC SEPTIC PULMONARY EMBOLISM WITH ACUTE COR PULMONALE (HCC): ICD-10-CM

## 2018-05-01 LAB
ANION GAP SERPL CALCULATED.3IONS-SCNC: 14 MMOL/L (ref 3–16)
BUN BLDV-MCNC: 10 MG/DL (ref 7–20)
CALCIUM SERPL-MCNC: 10.9 MG/DL (ref 8.3–10.6)
CHLORIDE BLD-SCNC: 102 MMOL/L (ref 99–110)
CO2: 26 MMOL/L (ref 21–32)
CREAT SERPL-MCNC: 0.9 MG/DL (ref 0.9–1.3)
GFR AFRICAN AMERICAN: >60
GFR NON-AFRICAN AMERICAN: >60
GLUCOSE BLD-MCNC: 94 MG/DL (ref 70–99)
INR BLD: 1.4
INTERNATIONAL NORMALIZATION RATIO, POC: 1.4
POTASSIUM SERPL-SCNC: 4.5 MMOL/L (ref 3.5–5.1)
SODIUM BLD-SCNC: 142 MMOL/L (ref 136–145)

## 2018-05-01 RX ORDER — ZOLPIDEM TARTRATE 10 MG/1
TABLET ORAL
Qty: 90 TABLET | Refills: 0 | Status: SHIPPED | OUTPATIENT
Start: 2018-05-01 | End: 2018-07-30

## 2018-05-07 ENCOUNTER — TELEPHONE (OUTPATIENT)
Dept: INTERNAL MEDICINE CLINIC | Age: 58
End: 2018-05-07

## 2018-05-09 ENCOUNTER — TELEPHONE (OUTPATIENT)
Dept: INTERNAL MEDICINE CLINIC | Age: 58
End: 2018-05-09

## 2018-05-09 RX ORDER — HYDROCORTISONE ACETATE 25 MG/1
25 SUPPOSITORY RECTAL EVERY 12 HOURS
Qty: 12 SUPPOSITORY | Refills: 0 | Status: SHIPPED | OUTPATIENT
Start: 2018-05-09 | End: 2018-08-20 | Stop reason: SDUPTHER

## 2018-05-09 RX ORDER — DICYCLOMINE HYDROCHLORIDE 10 MG/1
CAPSULE ORAL
Qty: 360 CAPSULE | Refills: 1 | Status: SHIPPED | OUTPATIENT
Start: 2018-05-09 | End: 2020-09-16 | Stop reason: SDUPTHER

## 2018-05-16 ENCOUNTER — TELEPHONE (OUTPATIENT)
Dept: PULMONOLOGY | Age: 58
End: 2018-05-16

## 2018-05-16 ENCOUNTER — ANTI-COAG VISIT (OUTPATIENT)
Dept: PHARMACY | Facility: CLINIC | Age: 58
End: 2018-05-16

## 2018-05-16 DIAGNOSIS — I26.01 CHRONIC SEPTIC PULMONARY EMBOLISM WITH ACUTE COR PULMONALE (HCC): ICD-10-CM

## 2018-05-16 DIAGNOSIS — I27.82 CHRONIC SEPTIC PULMONARY EMBOLISM WITH ACUTE COR PULMONALE (HCC): ICD-10-CM

## 2018-05-16 LAB
INR BLD: 1.3
INR BLD: 1.3

## 2018-05-18 ENCOUNTER — TELEPHONE (OUTPATIENT)
Dept: INTERNAL MEDICINE CLINIC | Age: 58
End: 2018-05-18

## 2018-05-18 ENCOUNTER — ANTI-COAG VISIT (OUTPATIENT)
Dept: INTERNAL MEDICINE CLINIC | Age: 58
End: 2018-05-18

## 2018-05-18 DIAGNOSIS — I27.82 CHRONIC SEPTIC PULMONARY EMBOLISM WITH ACUTE COR PULMONALE (HCC): ICD-10-CM

## 2018-05-18 DIAGNOSIS — I26.01 CHRONIC SEPTIC PULMONARY EMBOLISM WITH ACUTE COR PULMONALE (HCC): ICD-10-CM

## 2018-05-21 ENCOUNTER — OFFICE VISIT (OUTPATIENT)
Dept: INTERNAL MEDICINE CLINIC | Age: 58
End: 2018-05-21

## 2018-05-21 VITALS
WEIGHT: 186.2 LBS | OXYGEN SATURATION: 98 % | BODY MASS INDEX: 29.92 KG/M2 | HEIGHT: 66 IN | SYSTOLIC BLOOD PRESSURE: 124 MMHG | HEART RATE: 96 BPM | TEMPERATURE: 99.3 F | DIASTOLIC BLOOD PRESSURE: 78 MMHG

## 2018-05-21 DIAGNOSIS — J06.9 UPPER RESPIRATORY TRACT INFECTION, UNSPECIFIED TYPE: ICD-10-CM

## 2018-05-21 DIAGNOSIS — Z79.01 ANTICOAGULATED: ICD-10-CM

## 2018-05-21 DIAGNOSIS — J40 BRONCHITIS: Primary | ICD-10-CM

## 2018-05-21 PROCEDURE — G8427 DOCREV CUR MEDS BY ELIG CLIN: HCPCS | Performed by: NURSE PRACTITIONER

## 2018-05-21 PROCEDURE — 1036F TOBACCO NON-USER: CPT | Performed by: NURSE PRACTITIONER

## 2018-05-21 PROCEDURE — 99213 OFFICE O/P EST LOW 20 MIN: CPT | Performed by: NURSE PRACTITIONER

## 2018-05-21 PROCEDURE — G8417 CALC BMI ABV UP PARAM F/U: HCPCS | Performed by: NURSE PRACTITIONER

## 2018-05-21 PROCEDURE — G8598 ASA/ANTIPLAT THER USED: HCPCS | Performed by: NURSE PRACTITIONER

## 2018-05-21 PROCEDURE — 3017F COLORECTAL CA SCREEN DOC REV: CPT | Performed by: NURSE PRACTITIONER

## 2018-05-21 RX ORDER — AZITHROMYCIN 250 MG/1
TABLET, FILM COATED ORAL
Qty: 1 PACKET | Refills: 0 | Status: SHIPPED | OUTPATIENT
Start: 2018-05-21 | End: 2018-05-25

## 2018-05-21 RX ORDER — PROMETHAZINE HYDROCHLORIDE AND CODEINE PHOSPHATE 6.25; 1 MG/5ML; MG/5ML
5 SYRUP ORAL EVERY 4 HOURS PRN
Qty: 240 ML | Refills: 0 | Status: SHIPPED | OUTPATIENT
Start: 2018-05-21 | End: 2018-05-28

## 2018-05-21 ASSESSMENT — ENCOUNTER SYMPTOMS
COUGH: 1
VOMITING: 0
DIARRHEA: 0
SINUS PAIN: 0
SHORTNESS OF BREATH: 0
NAUSEA: 0
RHINORRHEA: 1
SINUS PRESSURE: 0
ABDOMINAL PAIN: 0
SORE THROAT: 0

## 2018-05-24 ENCOUNTER — ANTI-COAG VISIT (OUTPATIENT)
Dept: PHARMACY | Facility: CLINIC | Age: 58
End: 2018-05-24

## 2018-05-24 DIAGNOSIS — I27.82 CHRONIC SEPTIC PULMONARY EMBOLISM WITH ACUTE COR PULMONALE (HCC): ICD-10-CM

## 2018-05-24 DIAGNOSIS — I26.01 CHRONIC SEPTIC PULMONARY EMBOLISM WITH ACUTE COR PULMONALE (HCC): ICD-10-CM

## 2018-05-24 LAB — INR BLD: 1.3

## 2018-05-25 ENCOUNTER — TELEPHONE (OUTPATIENT)
Dept: INTERNAL MEDICINE CLINIC | Age: 58
End: 2018-05-25

## 2018-05-30 ASSESSMENT — ENCOUNTER SYMPTOMS
ABDOMINAL PAIN: 0
SHORTNESS OF BREATH: 0

## 2018-06-01 ENCOUNTER — HOSPITAL ENCOUNTER (OUTPATIENT)
Dept: OTHER | Age: 58
Discharge: HOME | End: 2018-06-30
Attending: INTERNAL MEDICINE | Admitting: INTERNAL MEDICINE

## 2018-06-04 ENCOUNTER — OFFICE VISIT (OUTPATIENT)
Dept: INTERNAL MEDICINE CLINIC | Age: 58
End: 2018-06-04

## 2018-06-04 VITALS
WEIGHT: 188 LBS | SYSTOLIC BLOOD PRESSURE: 122 MMHG | DIASTOLIC BLOOD PRESSURE: 90 MMHG | BODY MASS INDEX: 30.34 KG/M2 | HEART RATE: 78 BPM | OXYGEN SATURATION: 98 %

## 2018-06-04 DIAGNOSIS — D62 ACUTE BLOOD LOSS ANEMIA: Primary | ICD-10-CM

## 2018-06-04 DIAGNOSIS — I10 ESSENTIAL HYPERTENSION, BENIGN: ICD-10-CM

## 2018-06-04 DIAGNOSIS — Z79.01 ANTICOAGULATED ON COUMADIN: ICD-10-CM

## 2018-06-04 DIAGNOSIS — S37.019A PERINEPHRIC HEMATOMA: ICD-10-CM

## 2018-06-04 LAB
BASOPHILS ABSOLUTE: 0 K/UL (ref 0–0.2)
BASOPHILS RELATIVE PERCENT: 0.8 %
EOSINOPHILS ABSOLUTE: 0.3 K/UL (ref 0–0.6)
EOSINOPHILS RELATIVE PERCENT: 6.1 %
HCT VFR BLD CALC: 45.1 % (ref 40.5–52.5)
HEMOGLOBIN: 15.4 G/DL (ref 13.5–17.5)
LYMPHOCYTES ABSOLUTE: 1.8 K/UL (ref 1–5.1)
LYMPHOCYTES RELATIVE PERCENT: 34.5 %
MCH RBC QN AUTO: 31.1 PG (ref 26–34)
MCHC RBC AUTO-ENTMCNC: 34.1 G/DL (ref 31–36)
MCV RBC AUTO: 91 FL (ref 80–100)
MONOCYTES ABSOLUTE: 0.4 K/UL (ref 0–1.3)
MONOCYTES RELATIVE PERCENT: 7.7 %
NEUTROPHILS ABSOLUTE: 2.6 K/UL (ref 1.7–7.7)
NEUTROPHILS RELATIVE PERCENT: 50.9 %
PDW BLD-RTO: 14.6 % (ref 12.4–15.4)
PLATELET # BLD: 209 K/UL (ref 135–450)
PMV BLD AUTO: 7.5 FL (ref 5–10.5)
RBC # BLD: 4.96 M/UL (ref 4.2–5.9)
WBC # BLD: 5.1 K/UL (ref 4–11)

## 2018-06-04 PROCEDURE — 1036F TOBACCO NON-USER: CPT | Performed by: INTERNAL MEDICINE

## 2018-06-04 PROCEDURE — G8598 ASA/ANTIPLAT THER USED: HCPCS | Performed by: INTERNAL MEDICINE

## 2018-06-04 PROCEDURE — 3017F COLORECTAL CA SCREEN DOC REV: CPT | Performed by: INTERNAL MEDICINE

## 2018-06-04 PROCEDURE — 99214 OFFICE O/P EST MOD 30 MIN: CPT | Performed by: INTERNAL MEDICINE

## 2018-06-04 PROCEDURE — G8427 DOCREV CUR MEDS BY ELIG CLIN: HCPCS | Performed by: INTERNAL MEDICINE

## 2018-06-04 PROCEDURE — G8417 CALC BMI ABV UP PARAM F/U: HCPCS | Performed by: INTERNAL MEDICINE

## 2018-06-04 RX ORDER — ONDANSETRON 4 MG/1
4-8 TABLET, ORALLY DISINTEGRATING ORAL EVERY 8 HOURS PRN
Qty: 20 TABLET | Refills: 0 | Status: SHIPPED | OUTPATIENT
Start: 2018-06-04 | End: 2019-08-13 | Stop reason: SDUPTHER

## 2018-06-05 LAB
ANION GAP SERPL CALCULATED.3IONS-SCNC: 9 MMOL/L (ref 3–16)
BUN BLDV-MCNC: 6 MG/DL (ref 7–20)
CALCIUM SERPL-MCNC: 11 MG/DL (ref 8.3–10.6)
CHLORIDE BLD-SCNC: 105 MMOL/L (ref 99–110)
CO2: 29 MMOL/L (ref 21–32)
CREAT SERPL-MCNC: 0.9 MG/DL (ref 0.9–1.3)
GFR AFRICAN AMERICAN: >60
GFR NON-AFRICAN AMERICAN: >60
GLUCOSE BLD-MCNC: 93 MG/DL (ref 70–99)
POTASSIUM SERPL-SCNC: 4.6 MMOL/L (ref 3.5–5.1)
SODIUM BLD-SCNC: 143 MMOL/L (ref 136–145)

## 2018-06-09 ENCOUNTER — HOSPITAL ENCOUNTER (OUTPATIENT)
Dept: CT IMAGING | Age: 58
Discharge: HOME | End: 2018-06-09
Attending: INTERNAL MEDICINE | Admitting: INTERNAL MEDICINE

## 2018-06-09 DIAGNOSIS — S37.019A: ICD-10-CM

## 2018-06-09 DIAGNOSIS — S37.019A PERINEPHRIC HEMATOMA: ICD-10-CM

## 2018-06-12 ENCOUNTER — TELEPHONE (OUTPATIENT)
Dept: INTERNAL MEDICINE CLINIC | Age: 58
End: 2018-06-12

## 2018-06-12 ENCOUNTER — ANTI-COAG VISIT (OUTPATIENT)
Dept: INTERNAL MEDICINE CLINIC | Age: 58
End: 2018-06-12

## 2018-06-12 ENCOUNTER — ANTI-COAG VISIT (OUTPATIENT)
Dept: PHARMACY | Facility: CLINIC | Age: 58
End: 2018-06-12

## 2018-06-12 DIAGNOSIS — I26.99 OTHER PULMONARY EMBOLISM WITHOUT ACUTE COR PULMONALE, UNSPECIFIED CHRONICITY (HCC): ICD-10-CM

## 2018-06-12 LAB
INR BLD: 1.2
INR BLD: 1.2

## 2018-06-13 ENCOUNTER — TELEPHONE (OUTPATIENT)
Dept: INTERNAL MEDICINE CLINIC | Age: 58
End: 2018-06-13

## 2018-06-20 ENCOUNTER — ANTI-COAG VISIT (OUTPATIENT)
Dept: PHARMACY | Facility: CLINIC | Age: 58
End: 2018-06-20

## 2018-06-20 LAB — INTERNATIONAL NORMALIZATION RATIO, POC: 1.3

## 2018-06-27 ENCOUNTER — TELEPHONE (OUTPATIENT)
Dept: INTERNAL MEDICINE CLINIC | Age: 58
End: 2018-06-27

## 2018-06-27 ENCOUNTER — OFFICE VISIT (OUTPATIENT)
Dept: INTERNAL MEDICINE CLINIC | Age: 58
End: 2018-06-27

## 2018-06-27 VITALS
SYSTOLIC BLOOD PRESSURE: 120 MMHG | OXYGEN SATURATION: 98 % | RESPIRATION RATE: 16 BRPM | BODY MASS INDEX: 30.7 KG/M2 | DIASTOLIC BLOOD PRESSURE: 78 MMHG | HEART RATE: 55 BPM | WEIGHT: 191 LBS | TEMPERATURE: 98 F | HEIGHT: 66 IN

## 2018-06-27 DIAGNOSIS — R35.0 FREQUENT URINATION: ICD-10-CM

## 2018-06-27 DIAGNOSIS — N20.1 URETERAL STONE: ICD-10-CM

## 2018-06-27 DIAGNOSIS — G89.29 CHRONIC THORACIC BACK PAIN, UNSPECIFIED BACK PAIN LATERALITY: Primary | ICD-10-CM

## 2018-06-27 DIAGNOSIS — M47.816 LUMBAR SPONDYLOSIS: ICD-10-CM

## 2018-06-27 DIAGNOSIS — E78.00 PURE HYPERCHOLESTEROLEMIA: ICD-10-CM

## 2018-06-27 DIAGNOSIS — M54.6 CHRONIC THORACIC BACK PAIN, UNSPECIFIED BACK PAIN LATERALITY: Primary | ICD-10-CM

## 2018-06-27 LAB
BILIRUBIN, POC: NEGATIVE
BLOOD URINE, POC: NEGATIVE
CLARITY, POC: CLEAR
COLOR, POC: YELLOW
GLUCOSE URINE, POC: NEGATIVE
KETONES, POC: NEGATIVE
LEUKOCYTE EST, POC: NEGATIVE
NITRITE, POC: NEGATIVE
PH, POC: 8.5
PROTEIN, POC: NEGATIVE
SPECIFIC GRAVITY, POC: 1.01
UROBILINOGEN, POC: 0.2

## 2018-06-27 PROCEDURE — 99214 OFFICE O/P EST MOD 30 MIN: CPT | Performed by: INTERNAL MEDICINE

## 2018-06-27 PROCEDURE — 81002 URINALYSIS NONAUTO W/O SCOPE: CPT | Performed by: INTERNAL MEDICINE

## 2018-06-27 PROCEDURE — 1036F TOBACCO NON-USER: CPT | Performed by: INTERNAL MEDICINE

## 2018-06-27 PROCEDURE — G8427 DOCREV CUR MEDS BY ELIG CLIN: HCPCS | Performed by: INTERNAL MEDICINE

## 2018-06-27 PROCEDURE — 3017F COLORECTAL CA SCREEN DOC REV: CPT | Performed by: INTERNAL MEDICINE

## 2018-06-27 PROCEDURE — G8598 ASA/ANTIPLAT THER USED: HCPCS | Performed by: INTERNAL MEDICINE

## 2018-06-27 PROCEDURE — G8417 CALC BMI ABV UP PARAM F/U: HCPCS | Performed by: INTERNAL MEDICINE

## 2018-06-27 RX ORDER — OXYCODONE HYDROCHLORIDE AND ACETAMINOPHEN 5; 325 MG/1; MG/1
1 TABLET ORAL EVERY 4 HOURS PRN
Qty: 60 TABLET | Refills: 0 | Status: SHIPPED | OUTPATIENT
Start: 2018-06-27 | End: 2018-11-21 | Stop reason: SDUPTHER

## 2018-06-27 ASSESSMENT — ENCOUNTER SYMPTOMS
ABDOMINAL PAIN: 0
SHORTNESS OF BREATH: 0

## 2018-06-28 ENCOUNTER — ANTI-COAG VISIT (OUTPATIENT)
Dept: PHARMACY | Facility: CLINIC | Age: 58
End: 2018-06-28

## 2018-06-28 DIAGNOSIS — I26.99 OTHER PULMONARY EMBOLISM WITHOUT ACUTE COR PULMONALE, UNSPECIFIED CHRONICITY (HCC): ICD-10-CM

## 2018-06-28 LAB — INR BLD: 1.3

## 2018-06-29 LAB
A/G RATIO: 1.9 (ref 1.1–2.2)
ALBUMIN SERPL-MCNC: 4.4 G/DL (ref 3.4–5)
ALP BLD-CCNC: 145 U/L (ref 40–129)
ALT SERPL-CCNC: 28 U/L (ref 10–40)
ANION GAP SERPL CALCULATED.3IONS-SCNC: 14 MMOL/L (ref 3–16)
AST SERPL-CCNC: 19 U/L (ref 15–37)
BILIRUB SERPL-MCNC: 0.4 MG/DL (ref 0–1)
BUN BLDV-MCNC: 9 MG/DL (ref 7–20)
CALCIUM SERPL-MCNC: 10.8 MG/DL (ref 8.3–10.6)
CHLORIDE BLD-SCNC: 105 MMOL/L (ref 99–110)
CHOLESTEROL, TOTAL: 168 MG/DL (ref 0–199)
CO2: 24 MMOL/L (ref 21–32)
CREAT SERPL-MCNC: 0.9 MG/DL (ref 0.9–1.3)
GFR AFRICAN AMERICAN: >60
GFR NON-AFRICAN AMERICAN: >60
GLOBULIN: 2.3 G/DL
GLUCOSE BLD-MCNC: 99 MG/DL (ref 70–99)
HDLC SERPL-MCNC: 39 MG/DL (ref 40–60)
LDL CHOLESTEROL CALCULATED: 84 MG/DL
POTASSIUM SERPL-SCNC: 4.7 MMOL/L (ref 3.5–5.1)
SODIUM BLD-SCNC: 143 MMOL/L (ref 136–145)
TOTAL PROTEIN: 6.7 G/DL (ref 6.4–8.2)
TRIGL SERPL-MCNC: 227 MG/DL (ref 0–150)
VLDLC SERPL CALC-MCNC: 45 MG/DL

## 2018-07-01 ENCOUNTER — HOSPITAL ENCOUNTER (OUTPATIENT)
Dept: OTHER | Age: 58
Discharge: HOME | End: 2018-07-31
Attending: INTERNAL MEDICINE | Admitting: INTERNAL MEDICINE

## 2018-07-05 ENCOUNTER — TELEPHONE (OUTPATIENT)
Dept: INTERNAL MEDICINE CLINIC | Age: 58
End: 2018-07-05

## 2018-07-12 ENCOUNTER — ANTI-COAG VISIT (OUTPATIENT)
Dept: PHARMACY | Facility: CLINIC | Age: 58
End: 2018-07-12

## 2018-07-12 DIAGNOSIS — I26.99 OTHER PULMONARY EMBOLISM WITHOUT ACUTE COR PULMONALE, UNSPECIFIED CHRONICITY (HCC): ICD-10-CM

## 2018-07-12 LAB — INR BLD: 1.4

## 2018-07-12 NOTE — PROGRESS NOTES
Mr. Manan Crabtree is a 60 y.o.  male with history of PE who presents today for anticoagulation monitoring and adjustment. Patient verifies current dosing regimen  Patient denies s/s bleeding/bruising/swelling/SOB  No blood in urine or stool. No dietary changes. No changes in medication/OTC agents/Herbals. No change in alcohol use. No missed doses. No Procedures scheduled in the future at this time. Lab Results   Component Value Date    INR 1.40 07/12/2018    INR 1.30 06/28/2018    INR 1.3 06/20/2018       Pertinent findings: Patient appears well. Denies changes to medications or diet    Warfarin dosing: Dr. Swati Ferreira dosing Warfarin    After visit summary printed and reviewed with patient.

## 2018-07-24 ENCOUNTER — NURSE ONLY (OUTPATIENT)
Dept: CARDIOLOGY CLINIC | Age: 58
End: 2018-07-24

## 2018-07-24 DIAGNOSIS — Z95.810 BIVENTRICULAR ICD (IMPLANTABLE CARDIOVERTER-DEFIBRILLATOR) IN PLACE: ICD-10-CM

## 2018-07-24 DIAGNOSIS — I50.22 CHRONIC SYSTOLIC CHF (CONGESTIVE HEART FAILURE), NYHA CLASS 2 (HCC): ICD-10-CM

## 2018-07-24 DIAGNOSIS — I42.9 CARDIOMYOPATHY, UNSPECIFIED TYPE (HCC): ICD-10-CM

## 2018-07-24 PROCEDURE — 93297 REM INTERROG DEV EVAL ICPMS: CPT | Performed by: INTERNAL MEDICINE

## 2018-07-24 PROCEDURE — 93295 DEV INTERROG REMOTE 1/2/MLT: CPT | Performed by: INTERNAL MEDICINE

## 2018-07-24 PROCEDURE — 93296 REM INTERROG EVL PM/IDS: CPT | Performed by: INTERNAL MEDICINE

## 2018-07-30 ENCOUNTER — ANTI-COAG VISIT (OUTPATIENT)
Dept: PHARMACY | Facility: CLINIC | Age: 58
End: 2018-07-30

## 2018-07-30 DIAGNOSIS — I26.99 OTHER PULMONARY EMBOLISM WITHOUT ACUTE COR PULMONALE, UNSPECIFIED CHRONICITY (HCC): ICD-10-CM

## 2018-07-30 LAB — INR BLD: 1.4

## 2018-07-30 NOTE — PROGRESS NOTES
Mr. Dorys Farah is a 60 y.o.  male with history of PE who presents today for anticoagulation monitoring and adjustment. Patient verifies current dosing regimen  Patient denies s/s bleeding/bruising/swelling/SOB  No blood in urine or stool. No dietary changes. No changes in medication/OTC agents/Herbals. No change in alcohol use. No missed doses. No Procedures scheduled in the future at this time. Lab Results   Component Value Date    INR 1.40 07/30/2018    INR 1.40 07/12/2018    INR 1.30 06/28/2018       Pertinent findings: No changes no issues    Warfarin dosing: Patient's warfarin managed by Dr. Trina Simms    After visit summary printed and reviewed with patient.

## 2018-07-31 ENCOUNTER — OFFICE VISIT (OUTPATIENT)
Dept: INTERNAL MEDICINE CLINIC | Age: 58
End: 2018-07-31

## 2018-07-31 VITALS
HEIGHT: 66 IN | HEART RATE: 66 BPM | WEIGHT: 196.8 LBS | BODY MASS INDEX: 31.63 KG/M2 | SYSTOLIC BLOOD PRESSURE: 138 MMHG | DIASTOLIC BLOOD PRESSURE: 88 MMHG | OXYGEN SATURATION: 98 % | TEMPERATURE: 98.2 F

## 2018-07-31 DIAGNOSIS — Z86.718 HISTORY OF DVT (DEEP VEIN THROMBOSIS): ICD-10-CM

## 2018-07-31 DIAGNOSIS — M79.651 RIGHT THIGH PAIN: Primary | ICD-10-CM

## 2018-07-31 DIAGNOSIS — M51.36 DDD (DEGENERATIVE DISC DISEASE), LUMBAR: ICD-10-CM

## 2018-07-31 DIAGNOSIS — M25.512 BILATERAL SHOULDER PAIN, UNSPECIFIED CHRONICITY: ICD-10-CM

## 2018-07-31 DIAGNOSIS — M25.511 BILATERAL SHOULDER PAIN, UNSPECIFIED CHRONICITY: ICD-10-CM

## 2018-07-31 PROCEDURE — G8598 ASA/ANTIPLAT THER USED: HCPCS | Performed by: NURSE PRACTITIONER

## 2018-07-31 PROCEDURE — G8427 DOCREV CUR MEDS BY ELIG CLIN: HCPCS | Performed by: NURSE PRACTITIONER

## 2018-07-31 PROCEDURE — 99214 OFFICE O/P EST MOD 30 MIN: CPT | Performed by: NURSE PRACTITIONER

## 2018-07-31 PROCEDURE — 3017F COLORECTAL CA SCREEN DOC REV: CPT | Performed by: NURSE PRACTITIONER

## 2018-07-31 PROCEDURE — G8417 CALC BMI ABV UP PARAM F/U: HCPCS | Performed by: NURSE PRACTITIONER

## 2018-07-31 PROCEDURE — 1036F TOBACCO NON-USER: CPT | Performed by: NURSE PRACTITIONER

## 2018-07-31 NOTE — PROGRESS NOTES
due to history of renal hematoma. Due to his subtherapeutic INR and current complaints patient will have a Doppler study done. DDD (degenerative disc disease), lumbar  Continue current therapy. Bilateral shoulder pain, unspecified chronicity  Continue current therapy. Past, Family, and Social history reviewed with patient during this visit. RX management: see under diagnosis section    Orders Placed This Encounter   Procedures    VL DUP LOWER EXTREMITY VENOUS RIGHT     Standing Status:   Future     Standing Expiration Date:   7/31/2019     Order Specific Question:   Reason for exam:     Answer:   right thigh pain, history of dvt and pe, sub therapeutic INR       Return if symptoms worsen or fail to improve.       MARK Franco     8/2/2018  11:13 AM

## 2018-07-31 NOTE — PATIENT INSTRUCTIONS
Patient Education        Deep Vein Thrombosis: Care Instructions  Your Care Instructions    A deep vein thrombosis (DVT) is a blood clot in certain veins of the legs, pelvis, or arms. Blood clots in these veins need to be treated because they can get bigger, break loose, and travel through the bloodstream to the lungs. A blood clot in a lung can be life-threatening. The doctor may have given you a blood thinner (anticoagulant). A blood thinner can stop the blood clot from growing larger and prevent new clots from forming. You will need to take a blood thinner for 3 to 6 months or longer. The doctor has checked you carefully, but problems can develop later. If you notice any problems or new symptoms, get medical treatment right away. Follow-up care is a key part of your treatment and safety. Be sure to make and go to all appointments, and call your doctor if you are having problems. It's also a good idea to know your test results and keep a list of the medicines you take. How can you care for yourself at home? · Take your medicines exactly as prescribed. Call your doctor if you think you are having a problem with your medicine. · If you are taking a blood thinner, be sure you get instructions about how to take your medicine safely. Blood thinners can cause serious bleeding problems. · Wear compression stockings if your doctor recommends them. These stockings are tighter at the feet than on the legs. They may reduce pain and swelling in your legs. But there are different types of stockings, and they need to fit right. So your doctor will recommend what you need. · When you sit, use a pillow to raise the arm or leg that has the blood clot. Try to keep it above the level of your heart. When should you call for help? Call 911 anytime you think you may need emergency care.  For example, call if:    · You passed out (lost consciousness).     · You have symptoms of a blood clot in your lung (called a pulmonary

## 2018-08-01 ENCOUNTER — HOSPITAL ENCOUNTER (OUTPATIENT)
Dept: OTHER | Age: 58
Discharge: HOME | End: 2018-08-31
Attending: INTERNAL MEDICINE | Admitting: INTERNAL MEDICINE

## 2018-08-01 ENCOUNTER — HOSPITAL ENCOUNTER (OUTPATIENT)
Dept: VASCULAR LAB | Age: 58
Discharge: HOME | End: 2018-08-01
Attending: NURSE PRACTITIONER | Admitting: NURSE PRACTITIONER

## 2018-08-01 DIAGNOSIS — M79.651 PAIN OF RIGHT THIGH: ICD-10-CM

## 2018-08-02 ASSESSMENT — ENCOUNTER SYMPTOMS
COUGH: 0
BACK PAIN: 1
VOMITING: 0
DIARRHEA: 0
ABDOMINAL PAIN: 0
SHORTNESS OF BREATH: 0
NAUSEA: 0

## 2018-08-09 ENCOUNTER — OFFICE VISIT (OUTPATIENT)
Dept: INTERNAL MEDICINE CLINIC | Age: 58
End: 2018-08-09

## 2018-08-09 VITALS
TEMPERATURE: 98.4 F | WEIGHT: 195 LBS | HEIGHT: 66 IN | RESPIRATION RATE: 16 BRPM | SYSTOLIC BLOOD PRESSURE: 122 MMHG | HEART RATE: 74 BPM | DIASTOLIC BLOOD PRESSURE: 60 MMHG | BODY MASS INDEX: 31.34 KG/M2

## 2018-08-09 DIAGNOSIS — J01.90 ACUTE SINUSITIS, RECURRENCE NOT SPECIFIED, UNSPECIFIED LOCATION: Primary | ICD-10-CM

## 2018-08-09 PROCEDURE — 1036F TOBACCO NON-USER: CPT | Performed by: INTERNAL MEDICINE

## 2018-08-09 PROCEDURE — G8427 DOCREV CUR MEDS BY ELIG CLIN: HCPCS | Performed by: INTERNAL MEDICINE

## 2018-08-09 PROCEDURE — G8598 ASA/ANTIPLAT THER USED: HCPCS | Performed by: INTERNAL MEDICINE

## 2018-08-09 PROCEDURE — G8417 CALC BMI ABV UP PARAM F/U: HCPCS | Performed by: INTERNAL MEDICINE

## 2018-08-09 PROCEDURE — 99213 OFFICE O/P EST LOW 20 MIN: CPT | Performed by: INTERNAL MEDICINE

## 2018-08-09 PROCEDURE — 3017F COLORECTAL CA SCREEN DOC REV: CPT | Performed by: INTERNAL MEDICINE

## 2018-08-09 RX ORDER — METOPROLOL SUCCINATE 25 MG/1
25 TABLET, EXTENDED RELEASE ORAL 2 TIMES DAILY
Qty: 60 TABLET | Refills: 1 | Status: SHIPPED | OUTPATIENT
Start: 2018-08-09 | End: 2018-08-20 | Stop reason: SDUPTHER

## 2018-08-09 RX ORDER — AZITHROMYCIN 500 MG/1
500 TABLET, FILM COATED ORAL DAILY
Qty: 1 PACKET | Refills: 0 | Status: SHIPPED | OUTPATIENT
Start: 2018-08-09 | End: 2018-08-12

## 2018-08-09 NOTE — PROGRESS NOTES
MOUTH THREE TIMES DAILY AS NEEDED FOR URINARY SPASM 90 tablet 0    azelastine (ASTELIN) 0.1 % nasal spray 2 sprays by Nasal route 2 times daily Use in each nostril as directed 1 Bottle 3    fluticasone (FLONASE) 50 MCG/ACT nasal spray 1 spray by Nasal route daily 1 Bottle 0    Cholecalciferol (VITAMIN D3) 49453 units CAPS Take 1 capsule by mouth once a week      pravastatin (PRAVACHOL) 40 MG tablet Take 1 tablet by mouth daily 90 tablet 3    Respiratory Therapy Supplies (NEBULIZER/TUBING/MOUTHPIECE) KIT 1 kit by Does not apply route 3 times daily 2 kit 5    albuterol (PROAIR HFA) 108 (90 BASE) MCG/ACT inhaler Inhale 2 puffs into the lungs every 6 hours as needed. No current facility-administered medications on file prior to visit. Review of Systems   All other systems reviewed and are negative. Objective:   Physical Exam   Constitutional: He appears well-developed. HENT:   Head: Normocephalic and atraumatic. Right Ear: Tympanic membrane and external ear normal.   Left Ear: Tympanic membrane and external ear normal.   Nose: Nose normal.   Mouth/Throat: Oropharynx is clear and moist. No oropharyngeal exudate. Neck: Normal range of motion. Neck supple. No thyromegaly present. Cardiovascular: Normal rate, regular rhythm and normal heart sounds. Pulmonary/Chest: Effort normal and breath sounds normal. He has no wheezes. He has no rales. Musculoskeletal: He exhibits no edema. Lymphadenopathy:     He has no cervical adenopathy. Vitals reviewed. Assessment:      Sinusitis- Zithromax Tri-Jose Maria, skip 2 doses of Coumadin and repeat the INR on Tuesday.  No steroids at this time as he is not wheezing currently    He is back to Toprol-XL twice a day due to intolerance of the immediate release formulation      Plan:      As above        Daryl Mitchell MD

## 2018-08-10 RX ORDER — TAMSULOSIN HYDROCHLORIDE 0.4 MG/1
CAPSULE ORAL
Qty: 180 CAPSULE | Refills: 5 | Status: SHIPPED | OUTPATIENT
Start: 2018-08-10 | End: 2018-08-12 | Stop reason: SDUPTHER

## 2018-08-13 DIAGNOSIS — M51.36 DDD (DEGENERATIVE DISC DISEASE), LUMBAR: ICD-10-CM

## 2018-08-13 RX ORDER — PREGABALIN 75 MG/1
CAPSULE ORAL
Qty: 270 CAPSULE | Refills: 1 | Status: SHIPPED | OUTPATIENT
Start: 2018-08-13 | End: 2019-03-19 | Stop reason: SDUPTHER

## 2018-08-13 RX ORDER — TAMSULOSIN HYDROCHLORIDE 0.4 MG/1
CAPSULE ORAL
Qty: 180 CAPSULE | Refills: 5 | Status: SHIPPED | OUTPATIENT
Start: 2018-08-13 | End: 2019-10-23

## 2018-08-15 ENCOUNTER — ANTI-COAG VISIT (OUTPATIENT)
Dept: PHARMACY | Facility: CLINIC | Age: 58
End: 2018-08-15

## 2018-08-15 LAB — INTERNATIONAL NORMALIZATION RATIO, POC: 1.3

## 2018-08-20 ENCOUNTER — TELEPHONE (OUTPATIENT)
Dept: INTERNAL MEDICINE CLINIC | Age: 58
End: 2018-08-20

## 2018-08-20 ENCOUNTER — OFFICE VISIT (OUTPATIENT)
Dept: CARDIOLOGY CLINIC | Age: 58
End: 2018-08-20

## 2018-08-20 VITALS
WEIGHT: 196.4 LBS | DIASTOLIC BLOOD PRESSURE: 70 MMHG | HEART RATE: 76 BPM | OXYGEN SATURATION: 98 % | HEIGHT: 66 IN | SYSTOLIC BLOOD PRESSURE: 112 MMHG | BODY MASS INDEX: 31.57 KG/M2

## 2018-08-20 DIAGNOSIS — I50.22 CHRONIC SYSTOLIC HEART FAILURE (HCC): Primary | ICD-10-CM

## 2018-08-20 DIAGNOSIS — E78.2 MIXED HYPERLIPIDEMIA: ICD-10-CM

## 2018-08-20 DIAGNOSIS — I10 ESSENTIAL HYPERTENSION, BENIGN: ICD-10-CM

## 2018-08-20 DIAGNOSIS — I26.99 OTHER PULMONARY EMBOLISM WITHOUT ACUTE COR PULMONALE, UNSPECIFIED CHRONICITY (HCC): ICD-10-CM

## 2018-08-20 PROCEDURE — G8417 CALC BMI ABV UP PARAM F/U: HCPCS | Performed by: INTERNAL MEDICINE

## 2018-08-20 PROCEDURE — G8427 DOCREV CUR MEDS BY ELIG CLIN: HCPCS | Performed by: INTERNAL MEDICINE

## 2018-08-20 PROCEDURE — 1036F TOBACCO NON-USER: CPT | Performed by: INTERNAL MEDICINE

## 2018-08-20 PROCEDURE — G8598 ASA/ANTIPLAT THER USED: HCPCS | Performed by: INTERNAL MEDICINE

## 2018-08-20 PROCEDURE — 3017F COLORECTAL CA SCREEN DOC REV: CPT | Performed by: INTERNAL MEDICINE

## 2018-08-20 PROCEDURE — 99214 OFFICE O/P EST MOD 30 MIN: CPT | Performed by: INTERNAL MEDICINE

## 2018-08-20 RX ORDER — METOPROLOL SUCCINATE 25 MG/1
25 TABLET, EXTENDED RELEASE ORAL 2 TIMES DAILY
Qty: 180 TABLET | Refills: 1 | Status: SHIPPED | OUTPATIENT
Start: 2018-08-20 | End: 2018-09-19 | Stop reason: SDUPTHER

## 2018-08-20 NOTE — PROGRESS NOTES
November 2017 showed improvement in in his left ventricle ejection fraction at 40-45% when his Biv- pacing was on. He appears euvolemic on clinical examination today. He is currently on evidence-based beta blocker and entresto therapy. Will titrate Entresto up with taking 1 1/2 tab in the am for one week and then increase to 2 tabs in the evening. Will repeat BMP in 2 weeks after increase. Instructed to call if he becomes symptomatic. Chronic Systolic Heart failure. EF 40-45%. Appears euvolemic on exam today. Recommend low fluid and Na intake. Encouraged daily weights. Currently on evidence-based beta blocker and entresto therapy.       Pulmonary Embolism   Patient is on Warfarin therapy. Denies any abnormal bruising or bleeding. INR remain      Hypertension  BP is stable. Patient is now back on Toprol XL 25 mg daily. BMP from 6/2018 stable.      Hyperlipidemia  Continue statin therapy. Last lipid profile 6/2018 LDL 84. Patient to follow up in 3-4 months and instructed to obtain seasonal flu vaccine. Physician Attestation:  The scribes documentation has been prepared under my direction and personally reviewed by me in its entirety. I confirm that the note above accurately reflects all work, treatment, procedures, and medical decision making performed by me. Thank you very much for allowing me to participate in the care of your patient. Please do not hesitate to contact me if you have any questions. Sincerely,  Marco Antonio Hi MD      [place] [id number]  Ph: [phone]  Fax: [phone]    This note was scribed in the presence of Dr Shwetha Ramos MD by Maryana March RN.

## 2018-08-20 NOTE — PATIENT INSTRUCTIONS
you how much sodium you get in one serving. Check the serving size. If you eat more than one serving, you are getting more sodium. · Be aware that sodium can come in forms other than salt, including monosodium glutamate (MSG), sodium citrate, and sodium bicarbonate (baking soda). MSG is often added to Asian food. You can sometimes ask for food without MSG or salt. · Slowly reducing salt will help you adjust to the taste. Take the salt shaker off the table. · Flavor your food with garlic, lemon juice, onion, vinegar, herbs, and spices instead of salt. Do not use soy sauce, steak sauce, onion salt, garlic salt, mustard, or ketchup on your food, unless it is labeled \"low-sodium\" or \"low-salt. \"  · Make your own salad dressings, sauces, and ketchup without adding salt. · Use fresh or frozen ingredients, instead of canned ones, whenever you can. Choose low-sodium canned goods. · Eat less processed food and food from restaurants, including fast food. Exercise as directed  Moderate, regular exercise is very good for your heart. It improves your blood flow and helps control your weight. But too much exercise can stress your heart and cause a heart failure flare-up. · Check with your doctor before you start an exercise program.  · Walking is an easy way to get exercise. Start out slowly. Gradually increase the length and pace of your walk. Swimming, riding a bike, and using a treadmill are also good forms of exercise. · When you exercise, watch for signs that your heart is working too hard. You are pushing yourself too hard if you cannot talk while you are exercising. If you become short of breath or dizzy or have chest pain, stop, sit down, and rest.  · Do not exercise when you do not feel well. Take medicines correctly  · Take your medicines exactly as prescribed. Call your doctor if you think you are having a problem with your medicine. · Make a list of all the medicines you take.  Include those prescribed to you by other doctors and any over-the-counter medicines, vitamins, or supplements you take. Take this list with you when you go to any doctor. · Take your medicines at the same time every day. It may help you to post a list of all the medicines you take every day and what time of day you take them. · Make taking your medicine as simple as you can. Plan times to take your medicines when you are doing other things, such as eating a meal or getting ready for bed. This will make it easier to remember to take your medicines. · Get organized. Use helpful tools, such as daily or weekly pill containers. When should you call for help? Call 911 if you have symptoms of sudden heart failure such as:    · You have severe trouble breathing.     · You cough up pink, foamy mucus.     · You have a new irregular or rapid heartbeat.    Call your doctor now or seek immediate medical care if:    · You have new or increased shortness of breath.     · You are dizzy or lightheaded, or you feel like you may faint.     · You have sudden weight gain, such as more than 2 to 3 pounds in a day or 5 pounds in a week. (Your doctor may suggest a different range of weight gain.)     · You have increased swelling in your legs, ankles, or feet.     · You are suddenly so tired or weak that you cannot do your usual activities.    Watch closely for changes in your health, and be sure to contact your doctor if you develop new symptoms. Where can you learn more? Go to https://[email or web address].[email or web address]. org and sign in to your [employer] account. Enter L648 in the [facility] box to learn more about \"Avoiding Triggers With Heart Failure: Care Instructions. \"     If you do not have an account, please click on the \"Sign Up Now\" link. Current as of: December 6, 2017  Content Version: 11.7  © 1202-7396 [employer]. Care instructions adapted under license by [facility] ([facility]).  If you have questions about a medical condition or this

## 2018-08-27 RX ORDER — PRAVASTATIN SODIUM 40 MG
40 TABLET ORAL DAILY
Qty: 90 TABLET | Refills: 3 | Status: SHIPPED | OUTPATIENT
Start: 2018-08-27 | End: 2019-05-21 | Stop reason: SDUPTHER

## 2018-08-29 ENCOUNTER — ANTI-COAG VISIT (OUTPATIENT)
Dept: PHARMACY | Facility: CLINIC | Age: 58
End: 2018-08-29

## 2018-08-29 DIAGNOSIS — I26.99 OTHER PULMONARY EMBOLISM WITHOUT ACUTE COR PULMONALE, UNSPECIFIED CHRONICITY (HCC): ICD-10-CM

## 2018-08-29 LAB — INR BLD: 1.7

## 2018-08-29 NOTE — PROGRESS NOTES
Mr. Michelle Canseco is a 60 y.o.  male with history of PE who presents today for anticoagulation monitoring and adjustment. Patient verifies current dosing regimen  Patient denies s/s bleeding/bruising/swelling/SOB  No blood in urine or stool. No dietary changes. No changes in medication/OTC agents/Herbals. No change in alcohol use. No missed doses. No Procedures scheduled in the future at this time. Lab Results   Component Value Date    INR 1.70 08/29/2018    INR 1.3 08/15/2018    INR 1.40 07/30/2018       Pertinent findings: No changes no problems. Warfarin dosing: Dr. Chaudhary Loge to dose    After visit summary printed and reviewed with patient.

## 2018-08-30 DIAGNOSIS — G47.00 INSOMNIA, UNSPECIFIED TYPE: Primary | ICD-10-CM

## 2018-08-31 RX ORDER — ZOLPIDEM TARTRATE 10 MG/1
TABLET ORAL
Qty: 90 TABLET | Refills: 0 | Status: SHIPPED | OUTPATIENT
Start: 2018-08-31 | End: 2018-11-30 | Stop reason: SDUPTHER

## 2018-09-01 ENCOUNTER — HOSPITAL ENCOUNTER (OUTPATIENT)
Dept: OTHER | Age: 58
Discharge: HOME | End: 2018-09-01
Attending: INTERNAL MEDICINE | Admitting: INTERNAL MEDICINE

## 2018-09-04 ENCOUNTER — TELEPHONE (OUTPATIENT)
Dept: INTERNAL MEDICINE CLINIC | Age: 58
End: 2018-09-04

## 2018-09-04 ASSESSMENT — ENCOUNTER SYMPTOMS
ABDOMINAL PAIN: 0
SHORTNESS OF BREATH: 0

## 2018-09-05 ENCOUNTER — OFFICE VISIT (OUTPATIENT)
Dept: INTERNAL MEDICINE CLINIC | Age: 58
End: 2018-09-05

## 2018-09-05 VITALS
OXYGEN SATURATION: 97 % | HEART RATE: 68 BPM | DIASTOLIC BLOOD PRESSURE: 82 MMHG | TEMPERATURE: 98.3 F | WEIGHT: 198 LBS | RESPIRATION RATE: 16 BRPM | SYSTOLIC BLOOD PRESSURE: 124 MMHG | BODY MASS INDEX: 31.82 KG/M2 | HEIGHT: 66 IN

## 2018-09-05 DIAGNOSIS — I10 ESSENTIAL HYPERTENSION, BENIGN: ICD-10-CM

## 2018-09-05 DIAGNOSIS — I50.22 CHRONIC SYSTOLIC HEART FAILURE (HCC): ICD-10-CM

## 2018-09-05 DIAGNOSIS — Z79.01 ANTICOAGULATED ON COUMADIN: ICD-10-CM

## 2018-09-05 DIAGNOSIS — J01.90 ACUTE NON-RECURRENT SINUSITIS, UNSPECIFIED LOCATION: ICD-10-CM

## 2018-09-05 DIAGNOSIS — Z79.01 CHRONIC ANTICOAGULATION: ICD-10-CM

## 2018-09-05 DIAGNOSIS — G89.29 CHRONIC THORACIC BACK PAIN, UNSPECIFIED BACK PAIN LATERALITY: Primary | ICD-10-CM

## 2018-09-05 DIAGNOSIS — S37.019A PERINEPHRIC HEMATOMA: ICD-10-CM

## 2018-09-05 DIAGNOSIS — M54.6 CHRONIC THORACIC BACK PAIN, UNSPECIFIED BACK PAIN LATERALITY: Primary | ICD-10-CM

## 2018-09-05 DIAGNOSIS — I25.10 CORONARY ARTERY DISEASE INVOLVING NATIVE HEART WITHOUT ANGINA PECTORIS, UNSPECIFIED VESSEL OR LESION TYPE: ICD-10-CM

## 2018-09-05 LAB
ANION GAP SERPL CALCULATED.3IONS-SCNC: 11 MMOL/L (ref 3–16)
BILIRUBIN URINE: NEGATIVE
BILIRUBIN, POC: NORMAL
BLOOD URINE, POC: NORMAL
BLOOD, URINE: NEGATIVE
BUN BLDV-MCNC: 8 MG/DL (ref 7–20)
CALCIUM SERPL-MCNC: 10.8 MG/DL (ref 8.3–10.6)
CHLORIDE BLD-SCNC: 106 MMOL/L (ref 99–110)
CLARITY, POC: CLEAR
CLARITY: CLEAR
CO2: 24 MMOL/L (ref 21–32)
COLOR, POC: YELLOW
COLOR: YELLOW
CREAT SERPL-MCNC: 0.9 MG/DL (ref 0.9–1.3)
EPITHELIAL CELLS, UA: 0 /HPF (ref 0–5)
GFR AFRICAN AMERICAN: >60
GFR NON-AFRICAN AMERICAN: >60
GLUCOSE BLD-MCNC: 107 MG/DL (ref 70–99)
GLUCOSE URINE, POC: NORMAL
GLUCOSE URINE: NEGATIVE MG/DL
HYALINE CASTS: 0 /LPF (ref 0–8)
INR BLD: 1.19 (ref 0.86–1.14)
KETONES, POC: NORMAL
KETONES, URINE: NEGATIVE MG/DL
LEUKOCYTE EST, POC: NORMAL
LEUKOCYTE ESTERASE, URINE: NEGATIVE
MICROSCOPIC EXAMINATION: NORMAL
NITRITE, POC: NORMAL
NITRITE, URINE: NEGATIVE
PH UA: 7.5
PH, POC: 7.5
POTASSIUM SERPL-SCNC: 4.6 MMOL/L (ref 3.5–5.1)
PROTEIN UA: NEGATIVE MG/DL
PROTEIN, POC: NORMAL
PROTHROMBIN TIME: 13.6 SEC (ref 9.8–13)
RBC UA: 1 /HPF (ref 0–4)
SODIUM BLD-SCNC: 141 MMOL/L (ref 136–145)
SPECIFIC GRAVITY UA: 1.01
SPECIFIC GRAVITY, POC: 1.01
UROBILINOGEN, POC: 0.2
UROBILINOGEN, URINE: 0.2 E.U./DL
WBC UA: 1 /HPF (ref 0–5)

## 2018-09-05 PROCEDURE — 99214 OFFICE O/P EST MOD 30 MIN: CPT | Performed by: INTERNAL MEDICINE

## 2018-09-05 PROCEDURE — 81002 URINALYSIS NONAUTO W/O SCOPE: CPT | Performed by: INTERNAL MEDICINE

## 2018-09-05 PROCEDURE — 3017F COLORECTAL CA SCREEN DOC REV: CPT | Performed by: INTERNAL MEDICINE

## 2018-09-05 PROCEDURE — G8598 ASA/ANTIPLAT THER USED: HCPCS | Performed by: INTERNAL MEDICINE

## 2018-09-05 PROCEDURE — G8417 CALC BMI ABV UP PARAM F/U: HCPCS | Performed by: INTERNAL MEDICINE

## 2018-09-05 PROCEDURE — G8427 DOCREV CUR MEDS BY ELIG CLIN: HCPCS | Performed by: INTERNAL MEDICINE

## 2018-09-05 PROCEDURE — 1036F TOBACCO NON-USER: CPT | Performed by: INTERNAL MEDICINE

## 2018-09-06 ENCOUNTER — ANTI-COAG VISIT (OUTPATIENT)
Dept: INTERNAL MEDICINE CLINIC | Age: 58
End: 2018-09-06

## 2018-09-06 DIAGNOSIS — I26.99 OTHER PULMONARY EMBOLISM WITHOUT ACUTE COR PULMONALE, UNSPECIFIED CHRONICITY (HCC): ICD-10-CM

## 2018-09-11 ENCOUNTER — HOSPITAL ENCOUNTER (OUTPATIENT)
Dept: CT IMAGING | Age: 58
Discharge: HOME | End: 2018-09-11
Attending: INTERNAL MEDICINE | Admitting: INTERNAL MEDICINE

## 2018-09-11 DIAGNOSIS — S37.019A PERINEPHRIC HEMATOMA: ICD-10-CM

## 2018-09-11 DIAGNOSIS — S37.019A: ICD-10-CM

## 2018-09-17 ENCOUNTER — TELEPHONE (OUTPATIENT)
Dept: CARDIOLOGY CLINIC | Age: 58
End: 2018-09-17

## 2018-09-17 ENCOUNTER — ANTI-COAG VISIT (OUTPATIENT)
Dept: PHARMACY | Facility: CLINIC | Age: 58
End: 2018-09-17

## 2018-09-17 ENCOUNTER — OFFICE VISIT (OUTPATIENT)
Dept: PULMONOLOGY | Age: 58
End: 2018-09-17

## 2018-09-17 VITALS
HEART RATE: 67 BPM | RESPIRATION RATE: 16 BRPM | DIASTOLIC BLOOD PRESSURE: 94 MMHG | WEIGHT: 201 LBS | HEIGHT: 66 IN | SYSTOLIC BLOOD PRESSURE: 138 MMHG | BODY MASS INDEX: 32.3 KG/M2 | TEMPERATURE: 97.9 F | OXYGEN SATURATION: 97 %

## 2018-09-17 DIAGNOSIS — J42 BRONCHIOLITIS OBLITERANS (HCC): Primary | ICD-10-CM

## 2018-09-17 DIAGNOSIS — I26.99 OTHER PULMONARY EMBOLISM WITHOUT ACUTE COR PULMONALE, UNSPECIFIED CHRONICITY (HCC): ICD-10-CM

## 2018-09-17 DIAGNOSIS — R05.9 COUGH: ICD-10-CM

## 2018-09-17 LAB — INTERNATIONAL NORMALIZATION RATIO, POC: 1.8

## 2018-09-17 PROCEDURE — 99214 OFFICE O/P EST MOD 30 MIN: CPT | Performed by: INTERNAL MEDICINE

## 2018-09-17 RX ORDER — SODIUM CHLORIDE FOR INHALATION 3 %
15 VIAL, NEBULIZER (ML) INHALATION 2 TIMES DAILY
Qty: 180 ML | Refills: 1 | Status: SHIPPED | OUTPATIENT
Start: 2018-09-17 | End: 2018-12-19 | Stop reason: SDUPTHER

## 2018-09-17 NOTE — TELEPHONE ENCOUNTER
Last OV 8/20/18. Next OV 12/7/18. He was hospitalized at [facility] with chest pain and had an abnormal stress that led to a left heart catheterization on 7/21/16. His coronary arteries were normal but he had LV dysfunction with a LVEF of 40% at that time. Hyperlipidemia  Continue statin therapy. Last lipid profile 6/2018 LDL 84.      Taking pravachol 40 mg daily.

## 2018-09-17 NOTE — PROGRESS NOTES
Mr. Kristy Moody is a 60 y.o.  male with history of PE who presents today for anticoagulation monitoring and adjustment. Patient verifies current dosing regimen. Lab Results   Component Value Date    INR 1.8 09/17/2018    INR 1.19 (H) 09/05/2018    INR 1.70 08/29/2018     Patient has been on lower dosing to allow hematoma in his kidney to heal. Reports it is better and Dr. Daniela Loving told him to resume warfarin at normal dosing for INR of 2-3 and we are to resume dosing here    INR up to 1.8 and he started taking 5 mg again on Thursday. Has taken 27.5 mg in the past 7 days. Will increase up to 5 mg daily except 2.5 mg on Monday and Thursday. Starting with 2.5 mg today since he has taken 5 mg 4 days in a row. He anticipates he will be put on an antibiotic for the bronchitis he has been dealing with for about 1 week. Will call us if put on an antibiotic, so this dose for next week is subject to change. However, he eventually will need around 30-32.5 mg/week as he was previously on. Would start with 30 mg/week so as to not overshoot his goal with recent hematoma    Recheck INR in 1 week. Patient reminded to call the Anticoagulation Clinic with any signs or symptoms of bleeding or with any medication changes. Patient given instructions utilizing the teach back method. After visit summary printed and reviewed with patient.     Medications reviewed and Warfarin dose updated

## 2018-09-17 NOTE — PROGRESS NOTES
right [facility]) 2011    upper lobe-coumadin 2011    Sacroiliitis [facility])        Past Surgical History:   Procedure Laterality Date    CHOLECYSTECTOMY  2007    COLONOSCOPY  9/21/2012    dr Dimitry Hunt  2015    LITHOTRIPSY      PACEMAKER PLACEMENT      SINUS SURGERY      Made opening larger in sinuses    UPPER GASTROINTESTINAL ENDOSCOPY  3/28/2014    dr Jasiel mercedes       Current Outpatient Prescriptions   Medication Sig Dispense Refill    sodium chloride, Inhalant, (NEBUSAL) 3 % nebulizer solution Take 15 mLs by nebulization 2 times daily 180 mL 1    zolpidem (AMBIEN) 10 MG tablet TAKE 1 TABLET BY MOUTH EVERY NIGHT AT BEDTIME 90 tablet 0    pravastatin (PRAVACHOL) 40 MG tablet TAKE 1 TABLET BY MOUTH  DAILY 90 tablet 3    sacubitril-valsartan (ENTRESTO) 24-26 MG per tablet Take 1 tablet by mouth 3 times daily 180 tablet 3    metoprolol succinate (TOPROL XL) 25 MG extended release tablet Take 1 tablet by mouth 2 times daily 180 tablet 1    tamsulosin (FLOMAX) 0.4 MG capsule TAKE ONE CAPSULE BY MOUTH TWICE DAILY 180 capsule 5    pregabalin (LYRICA) 75 MG capsule TAKE ONE CAPSULE BY MOUTH EVERY MORNING AND TAKE 2 CAPSULES BY MOUTH EVERY EVENING. 270 capsule 1    WARFARIN SODIUM PO Take by mouth See Admin Instructions As directed by Dr. Carrol Felipe      ondansetron (ZOFRAN ODT) 4 MG disintegrating tablet Take 1-2 tablets by mouth every 8 hours as needed for Nausea May Sub regular tablet (non-ODT) if insurance does not cover ODT. 20 tablet 0    dicyclomine (BENTYL) 10 MG capsule TAKE ONE CAPSULE BY MOUTH  FOUR TIMES DAILY AS NEEDED 360 capsule 1    hydrocortisone (ANUSOL-HC) 2.5 % rectal cream Place rectally 2 times daily.  1 Tube 0    albuterol (PROVENTIL) (2.5 MG/3ML) 0.083% nebulizer solution USE 3 ML VIA NEBULIZER THREE TIMES DAILY 360 mL 5    DEXILANT 30 MG CPDR delayed release capsule TAKE 1 CAPSULE BY MOUTH  DAILY 90 capsule 3    flavoxate (URISPAS) 100 MG tablet TAKE 1 TABLET BY MOUTH THREE TIMES DAILY AS NEEDED FOR URINARY SPASM 90 tablet 0    azelastine (ASTELIN) 0.1 % nasal spray 2 sprays by Nasal route 2 times daily Use in each nostril as directed 1 Bottle 3    fluticasone (FLONASE) 50 MCG/ACT nasal spray 1 spray by Nasal route daily 1 Bottle 0    Cholecalciferol (VITAMIN D3) 75008 units CAPS Take 1 capsule by mouth once a week      Respiratory Therapy Supplies (NEBULIZER/TUBING/MOUTHPIECE) KIT 1 kit by Does not apply route 3 times daily 2 kit 5    albuterol (PROAIR HFA) 108 (90 BASE) MCG/ACT inhaler Inhale 2 puffs into the lungs every 6 hours as needed. No current facility-administered medications for this visit. Allergies   Allergen Reactions    Atrovent Nasal Spray [Ipratropium] Other (See Comments)     Chest tightness    Morphine Other (See Comments)     \"makes me feel funny\"      Nitroglycerin Other (See Comments)     Severe hypotension (went from 811 to 66 systolic)    Sulfa Antibiotics Rash    Vicodin [Hydrocodone-Acetaminophen] Rash       Family History   Problem Relation Age of Onset    High Blood Pressure Mother     Dementia Mother     Cancer Father         Pancreatic Ca       Social History     Social History    Marital status:      Spouse name: N/A    Number of children: N/A    Years of education: N/A     Occupational History    Not on file.      Social History Main Topics    Smoking status: Never Smoker    Smokeless tobacco: Never Used    Alcohol use No      Comment: occ    Drug use: No    Sexual activity: Yes     Partners: Female      Comment: wife     Other Topics Concern    Not on file     Social History Narrative    No narrative on file   Never smoked, but his mother smoked outside of the home    Immunization History   Administered Date(s) Administered    Influenza, Quadv, 3 yrs and older, IM, PF (Fluzone 3 yrs and older or Afluria 5 yrs and older) 10/27/2017    Pneumococcal 13-valent Conjugate (Loretha Bunker) 09/11/2015    Pneumococcal Polysaccharide ([id number]) 08/01/2007    Pneumococcal Vaccine, unspecified formulation 08/15/2017    Tdap (Boostrix, Adacel) 08/19/2014       ROS:  GENERAL:  No fevers, no chills, no weight loss  RESPIRATORY:  +exertional shortness of breath, + cough      PHYSICAL EXAM:  Vitals:    09/17/18 1058 09/17/18 1223   BP: (!) 142/82 (!) 138/94   Site: Right Upper Arm Right Upper Arm   Position: Sitting Sitting   Cuff Size: Medium Adult Large Adult   Pulse: 67    Resp: 16    Temp: 97.9 °F (36.6 °C)    TempSrc: Oral    SpO2: 97%    Weight: 201 lb (91.2 kg)    Height: 5' 6\" (1.676 m)    on RA    Gen: Well developed; well nourished  Eyes: No scleral icterus. No conjunctival injection. ENT:  Oropharynx clear. External appearance of ears and nose normal.  Neck: Trachea midline. No obvious mass. No visible thyroid enlargement    Respiratory: Clear to auscultation bilaterally, no accessory muscle use  Cardiovascular: Regular rate and rhythm, no appreciable murmurs. No edema. Gastrointestinal: Soft, non-tender. No hernia  Skin: Warm and dry. No rashes or ulcers on visible areas. Normal texture and turgor  Lymphatic: No cervical LAD. No supraclavicular LAD. Musculoskeletal: No cyanosis, clubbing or joint deformity.     Psychiatric: Normal mood and affect; exhibits normal insight and judgement     Data reviewed:  Pulmonary Function Testing (4/16/14)  FVC 3.69 L at 86% predicted ---> 3.68L at 86% predicted  FEV1 1.89 L at 57% predicted ----> 1.9L at 57% predicted  FEV1/FVC ratio at 51% ---->52% predicted  TLC 4.64 L at 80% predicted  VC 3.73L at 86% predicted  ERV 0.8L at 57% predicted  RV/TLC at 20% predicted  DLCO 24.9 at 97% predicted  DLCO/VA 5.18L at 131 % predicted     Overall: Mild restriction; obstruction is at least moderate without significant reversal; normal diffusing capacity     Six minute walk test:  4/3/18- Walked 387m, 73% predicted (normal); jazmyn saturation 93%    Images and reports of chest

## 2018-09-19 ENCOUNTER — OFFICE VISIT (OUTPATIENT)
Dept: CARDIOLOGY CLINIC | Age: 58
End: 2018-09-19

## 2018-09-19 ENCOUNTER — TELEPHONE (OUTPATIENT)
Dept: CARDIOLOGY CLINIC | Age: 58
End: 2018-09-19

## 2018-09-19 VITALS
BODY MASS INDEX: 32.4 KG/M2 | SYSTOLIC BLOOD PRESSURE: 130 MMHG | DIASTOLIC BLOOD PRESSURE: 80 MMHG | OXYGEN SATURATION: 96 % | HEIGHT: 66 IN | WEIGHT: 201.6 LBS | HEART RATE: 116 BPM

## 2018-09-19 DIAGNOSIS — Z95.810 BIVENTRICULAR ICD (IMPLANTABLE CARDIOVERTER-DEFIBRILLATOR) IN PLACE: ICD-10-CM

## 2018-09-19 DIAGNOSIS — R94.31 ABNORMAL EKG: ICD-10-CM

## 2018-09-19 DIAGNOSIS — I47.1 ATRIAL TACHYCARDIA (HCC): Primary | ICD-10-CM

## 2018-09-19 DIAGNOSIS — I42.8 NONISCHEMIC CARDIOMYOPATHY (HCC): Primary | ICD-10-CM

## 2018-09-19 DIAGNOSIS — I48.0 PAROXYSMAL A-FIB (HCC): ICD-10-CM

## 2018-09-19 DIAGNOSIS — I48.0 PAROXYSMAL ATRIAL FIBRILLATION (HCC): ICD-10-CM

## 2018-09-19 PROCEDURE — 99215 OFFICE O/P EST HI 40 MIN: CPT | Performed by: INTERNAL MEDICINE

## 2018-09-19 PROCEDURE — G8427 DOCREV CUR MEDS BY ELIG CLIN: HCPCS | Performed by: INTERNAL MEDICINE

## 2018-09-19 PROCEDURE — 93290 INTERROG DEV EVAL ICPMS IP: CPT | Performed by: INTERNAL MEDICINE

## 2018-09-19 PROCEDURE — G8417 CALC BMI ABV UP PARAM F/U: HCPCS | Performed by: INTERNAL MEDICINE

## 2018-09-19 PROCEDURE — G8598 ASA/ANTIPLAT THER USED: HCPCS | Performed by: INTERNAL MEDICINE

## 2018-09-19 PROCEDURE — 93284 PRGRMG EVAL IMPLANTABLE DFB: CPT | Performed by: INTERNAL MEDICINE

## 2018-09-19 PROCEDURE — 1036F TOBACCO NON-USER: CPT | Performed by: INTERNAL MEDICINE

## 2018-09-19 PROCEDURE — 3017F COLORECTAL CA SCREEN DOC REV: CPT | Performed by: INTERNAL MEDICINE

## 2018-09-19 RX ORDER — METOPROLOL SUCCINATE 25 MG/1
50 TABLET, EXTENDED RELEASE ORAL 2 TIMES DAILY
Qty: 180 TABLET | Refills: 1
Start: 2018-09-19 | End: 2018-11-23

## 2018-09-19 NOTE — PROGRESS NOTES
Skin is warm and dry. No rash noted. · Psychiatric: Has a normal mood, affect and behavior     Labs:  Reviewed. ECG: reviewed, 9/19/18 showing atrial tachycardia 120 bpm. LBBB. Other Non-Invasive Studies:   1. Event monitor:   None    2. ECHO 11/28/17 (s/p Bi-V placement)  Normal left ventricular wall thickness. Ejection fraction is visually  estimated to be 40-45%. There is septal hypokinesis (secondary to Pacing)  Trivial mitral & tricuspid regurgitation is present. The left atrial size is normal.  Pacer / ICD wire is visualized in the right ventricle. There appears to be normal right ventricular size and function.     3. Echo: 7/24/17  Left ventricle size is normal. Mild concentric left ventricular hypertrophy  is present. Global ejection fraction is moderately decreased and estimated  from 30 % to 35%. Diastolic filling parameters suggests grade I diastolic  dysfunction . There is abnormal (paradoxical) septal motion consistent with left bundle  branch block. Mitral valve is structurally normal.  Trivial to mild mitral regurgitation is present. The left atrial size is normal.  A bubble study was performed and fails to show evidence of right to left  shunting.     4. Echo: 11/9/16  Dilated LV with severely reduced systolic function. EF 30%. Anterior, septal  and apical akinesis. Mild mitral regurgitation is present. The left atrial size is normal.  Normal right ventricular size and function. 5. Stress Test: 7/21/16  SPECT perfusion images: On the stress corrected images there is a moderate defect in the septum of the left ventricle. At rest, partially redistributes especially the posterior lateral component. Complete redistribution is not present.   BZMS:  27 %      (Normal is at least 62% for women and 53% for men)  LV wall motion:   There is significant hypokinesia of the ventricular septum  LVEDV: 117ml  (Normal is up to 100ml for women and 150ml for men)  Transient dilatation Atrial tachycardia, apparently new-onset  -Today the patient presents in atrial tachycardia with -506ms  -Treatment options including cardioversion, anti-arrhythmic medication therapy, rate control strategy, and atrial tachycardia ablation were discussed with patient. Risks, benefits and alternative of each treatment options were explained. All questions answered. The patient wishes to proceed with up-titration of Toprol from 25mg BID to now 50mg BID. We will schedule the patient for a cardioversion tomorrow, afterwards starting an anti-arrhythmic medication such as Dofetilide, Amiodarone, or Multaq.  -will follow-up within 1-2 weeks after the CV to confirm continued sinus rhythm. Follow up in 1-2 weeks after CV to confirm continued sinus rhythm. Thank you for allowing me to participate in the care of Kristy Moody. All questions and concerns were addressed to the patient/family. Alternatives to my treatment were discussed. This note was scribed in the presence of Dr. Jasen Shi MD by Stewart Harper RN. The scribe's documentation has been prepared under my direction and personally reviewed by me in its entirety. I confirm that the note above accurately reflects all work, physical examination, the discussion of treatments and procedures, and medical decision making performed by me.     Jose Benton MD, MS, [address] [facility]  Cardiac Electrophysiology  The [address] [place] [address]  [phone]

## 2018-09-20 ENCOUNTER — TELEPHONE (OUTPATIENT)
Dept: CARDIOLOGY CLINIC | Age: 58
End: 2018-09-20

## 2018-09-20 ENCOUNTER — HOSPITAL ENCOUNTER (OUTPATIENT)
Dept: CARDIAC CATH/INVASIVE PROCEDURES | Age: 58
Discharge: HOME | End: 2018-09-20
Attending: INTERNAL MEDICINE | Admitting: INTERNAL MEDICINE

## 2018-09-20 RX ORDER — SODIUM CHLORIDE 9 MG/ML
INJECTION, SOLUTION INTRAVENOUS CONTINUOUS
Status: DISCONTINUED | OUTPATIENT
Start: 2018-09-20 | End: 2018-09-21 | Stop reason: HOSPADM

## 2018-09-20 RX ORDER — SODIUM CHLORIDE 0.9 % (FLUSH) 0.9 %
10 SYRINGE (ML) INJECTION PRN
Status: DISCONTINUED | OUTPATIENT
Start: 2018-09-20 | End: 2018-09-21 | Stop reason: HOSPADM

## 2018-09-20 RX ORDER — SODIUM CHLORIDE 0.9 % (FLUSH) 0.9 %
10 SYRINGE (ML) INJECTION EVERY 12 HOURS SCHEDULED
Status: DISCONTINUED | OUTPATIENT
Start: 2018-09-20 | End: 2018-09-21 | Stop reason: HOSPADM

## 2018-09-20 NOTE — OP NOTE
Instructed pt to continue taking medications and start taking Multaq.  Make appt for 1-2 weeks with Dr. Sylwia Page

## 2018-09-21 ENCOUNTER — TELEPHONE (OUTPATIENT)
Dept: INTERNAL MEDICINE CLINIC | Age: 58
End: 2018-09-21

## 2018-09-21 LAB
EKG ATRIAL RATE: 57 BPM
EKG DIAGNOSIS: NORMAL
EKG P AXIS: 36 DEGREES
EKG P-R INTERVAL: 168 MS
EKG Q-T INTERVAL: 458 MS
EKG QRS DURATION: 146 MS
EKG QTC CALCULATION (BAZETT): 445 MS
EKG R AXIS: -55 DEGREES
EKG T AXIS: 101 DEGREES
EKG VENTRICULAR RATE: 57 BPM

## 2018-09-21 PROCEDURE — 93010 ELECTROCARDIOGRAM REPORT: CPT | Performed by: INTERNAL MEDICINE

## 2018-09-21 NOTE — TELEPHONE ENCOUNTER
Pt dropped off paperwork for me this morning to the office for my review of side effects associated with Multaq. I discussed with Dr. Lavonne Hamilton. Dr. Lavonne Hamilton states that if the pt does not want to take the Multaq then that is his choice. He has no other rec's at this time. It can be further discussed at his upcoming [facility] follow up. I called the pt to discuss. He continues to report that he is not interested in trying it. I told him that Katelynn is Dr. Kevon Halsted at this time. If he doesn't feel comfortable taking then that is his choice. He verbalized understanding.  I removed from the med list and it can further be discussed at follow up

## 2018-09-24 ENCOUNTER — ANTI-COAG VISIT (OUTPATIENT)
Dept: PHARMACY | Age: 58
End: 2018-09-24
Payer: COMMERCIAL

## 2018-09-24 DIAGNOSIS — I26.99 OTHER PULMONARY EMBOLISM WITHOUT ACUTE COR PULMONALE, UNSPECIFIED CHRONICITY (HCC): ICD-10-CM

## 2018-09-24 LAB — INTERNATIONAL NORMALIZATION RATIO, POC: 2.8

## 2018-09-24 PROCEDURE — 85610 PROTHROMBIN TIME: CPT

## 2018-09-24 PROCEDURE — 99211 OFF/OP EST MAY X REQ PHY/QHP: CPT

## 2018-09-25 NOTE — PROGRESS NOTES
septum  LVEDV: 117ml  (Normal is up to 100ml for women and 150ml for men)  Transient dilatation ratio:   1.0      (Normal is up to 1.3 for women and 1.2 for men)    6. Cath: 7/21/16  #1-coronary angiography summary: Normal coronaries in a left   dominant system  A-left main coronary is normal  B-the LAD has minor irregularities and no significant stenoses  C-the circumflex is dominant and has minor irregularities and no   significant stenoses  D-the right coronary is a small nondominant vessel and is   angiographically normal  #2-ventriculography in the ADKINS projection demonstrates mild   global left ventricular dysfunction ejection fractions   approximately 40  #3-aortic pressure is approximately 150/80 left ventricular   pressures 150/14 there is no gradient on pullback  #1-XATRN are no complications  #8-QHC patient will be treated medically for left ventricular   dysfunction in the setting of normal coronaries    Interrogation of his Biotronik BiV ICD 9/19/18: atrial tachycardia with tachycardia cycle length 506 to 495 ms with 1:1 AV conduction. Onset of tachycardia unclear, as it falls below the lower detection limit. Procedures:  1. Biotronik Bi-V ICD implant on 10/10/17 with Dr. Fareed Child  2.  Successful DCCV on 9/21/18     Assessment:   Patient Active Problem List    Diagnosis Date Noted    Biventricular ICD (implantable cardioverter-defibrillator) in place      Priority: High    Chronic anticoagulation      Priority: High    Pulmonary embolus ([address] 07/08/2015     Priority: High    CAD (coronary artery disease) 07/09/2015     Priority: Medium    Bronchiolitis obliterans ([address]      Priority: Medium    Essential hypertension, benign      Priority: Medium    Pure hypercholesterolemia      Priority: Low    Glucose intolerance (impaired glucose tolerance)      Priority: Low    Acute UTI 04/14/2018    Abdominal wall hematoma 04/14/2018    Hypoxia     Thrombocytopenia ([address] 04/08/2018    Renal hematoma, right     Postoperative hemorrhagic shock     Acute blood loss anemia     Metabolic acidosis     Tachycardia     Iron deficiency anemia due to chronic blood loss     Perinephric hematoma 04/05/2018    Primary hyperparathyroidism ([facility] 01/03/2018    Anticoagulated on Coumadin 01/03/2018    Hypercalcemia 12/14/2017    Paroxysmal atrial fibrillation ([facility] 11/28/2017    Encounter for adjustment of cardiac resynchronization therapy defibrillator (CRT-D) 10/11/2017    Urinary tract infection without hematuria     Ureteral stone 09/01/2017    Acute confusional state 08/22/2017    Elevated INR     Chronic systolic CHF (congestive heart failure), NYHA class 2 ([facility])     LBBB (left bundle branch block)     Right arm weakness 08/14/2017    Transient cerebral ischemia 07/24/2017    History of pulmonary embolism 07/24/2017    Cardiomyopathy ([facility] 07/24/2017    Confusion     Nausea 04/21/2017    Cough 02/21/2017    Dyspnea 01/30/2017    Acute non-recurrent maxillary sinusitis 01/04/2017    DDD (degenerative disc disease), lumbar 06/20/2016    Leg pain, right 11/02/2015    Lightheadedness 09/11/2015    Headache 08/21/2015    Atypical chest pain     Bronchitis 04/07/2015    Precordial pain 08/06/2012    Lower abdominal pain 05/30/2012    Nephrolithiasis     Irritable bowel syndrome     Allergic rhinitis     Sinusitis 03/29/2010    Back pain 01/25/2010        Plan:    Cardiomyopathy:  -Nonischemic  -S/p Bi-V ICD in situ (Bi-V pacing off. Set to VVI 40 with defibrillator programming ON per pt request)  -Lots of pt anxiety surrounding his device  -Device interrogated remotely 7/2018- AS/%. -Battery at beginning of service  -Improvement in LVEF to 40-45% post Bi-V implant. Unsure of current LVEF since it has since been turned off  -Continue current medications including Entresto and Toprol XL (has refused Coreg in the past for unknown reasons).     Afib:  -Paroxsymal -Asymptomatic  -Captured on past interrogations  -21 seconds @ 6:40 am on 4/7/18   -On Toprol XL 25 mg BID  -CHADS VASC 5 (HF, HTN)- on coumadin     Atrial tachycardia, apparently new-onset  -Today the patient presents in atrial tachycardia with -506ms  -Treatment options including cardioversion, anti-arrhythmic medication therapy, rate control strategy, and atrial tachycardia ablation were discussed with patient. Risks, benefits and alternative of each treatment options were explained. All questions answered. The patient wishes to proceed with up-titration of Toprol from 25mg BID to now 50mg BID. We will schedule the patient for a cardioversion tomorrow, afterwards starting an anti-arrhythmic medication such as Dofetilide, Amiodarone, or Multaq.  -At time of cardioversion, the patient had already spontaneously converted to sinus rhythm and therefore no cardioversion was needed. I had prescribed Multaq in order to maintain sinus rhythm but he refused to take the prescription, citing his concern for side effects. We have deferred the rest of his care to Dr. Louie Sandoval at this time. If there are any further EP issues that the patient wishes for us to manage (although he seems resistant to abide by our recommendations), he may re-contact our office. Thank you for allowing me to participate in the care of Chasidy Baker. All questions and concerns were addressed to the patient/family. Alternatives to my treatment were discussed. This note was scribed in the presence of Dr. Sylwia Page MD by Brittani Mayo RN. The scribe's documentation has been prepared under my direction and personally reviewed by me in its entirety. I confirm that the note above accurately reflects all work, physical examination, the discussion of treatments and procedures, and medical decision making performed by me.     Deloris Lugo MD, MS, [facility], [place] [address] Electrophysiology  [facility]

## 2018-09-26 PROBLEM — R05.9 COUGH: Status: RESOLVED | Noted: 2017-02-21 | Resolved: 2018-09-26

## 2018-09-28 ENCOUNTER — OFFICE VISIT (OUTPATIENT)
Dept: CARDIOLOGY CLINIC | Age: 58
End: 2018-09-28
Payer: COMMERCIAL

## 2018-09-28 VITALS
SYSTOLIC BLOOD PRESSURE: 132 MMHG | DIASTOLIC BLOOD PRESSURE: 82 MMHG | WEIGHT: 200 LBS | HEART RATE: 76 BPM | HEIGHT: 66 IN | BODY MASS INDEX: 32.14 KG/M2 | OXYGEN SATURATION: 98 %

## 2018-09-28 DIAGNOSIS — I48.0 PAROXYSMAL ATRIAL FIBRILLATION (HCC): Primary | ICD-10-CM

## 2018-09-28 PROCEDURE — G8598 ASA/ANTIPLAT THER USED: HCPCS | Performed by: INTERNAL MEDICINE

## 2018-09-28 PROCEDURE — 1036F TOBACCO NON-USER: CPT | Performed by: INTERNAL MEDICINE

## 2018-09-28 PROCEDURE — G8427 DOCREV CUR MEDS BY ELIG CLIN: HCPCS | Performed by: INTERNAL MEDICINE

## 2018-09-28 PROCEDURE — G8417 CALC BMI ABV UP PARAM F/U: HCPCS | Performed by: INTERNAL MEDICINE

## 2018-09-28 PROCEDURE — 99214 OFFICE O/P EST MOD 30 MIN: CPT | Performed by: INTERNAL MEDICINE

## 2018-09-28 PROCEDURE — 3017F COLORECTAL CA SCREEN DOC REV: CPT | Performed by: INTERNAL MEDICINE

## 2018-09-28 PROCEDURE — 93000 ELECTROCARDIOGRAM COMPLETE: CPT | Performed by: INTERNAL MEDICINE

## 2018-10-04 ENCOUNTER — HOSPITAL ENCOUNTER (OUTPATIENT)
Dept: NUCLEAR MEDICINE | Age: 58
Discharge: HOME | End: 2018-10-04
Payer: COMMERCIAL

## 2018-10-04 DIAGNOSIS — N20.0 CALCULUS OF KIDNEY: ICD-10-CM

## 2018-10-04 DIAGNOSIS — N13.2 HYDRONEPHROSIS WITH RENAL AND URETERAL CALCULOUS OBSTRUCTION: ICD-10-CM

## 2018-10-04 PROCEDURE — A9562 TC99M MERTIATIDE: HCPCS | Performed by: UROLOGY

## 2018-10-04 PROCEDURE — 78708 K FLOW/FUNCT IMAGE W/DRUG: CPT

## 2018-10-04 PROCEDURE — 3430000000 HC RX DIAGNOSTIC RADIOPHARMACEUTICAL: Performed by: UROLOGY

## 2018-10-04 RX ADMIN — Medication 10 MILLICURIE: at 12:45

## 2018-10-06 ENCOUNTER — APPOINTMENT (OUTPATIENT)
Dept: CT IMAGING | Age: 58
End: 2018-10-06
Payer: COMMERCIAL

## 2018-10-06 ENCOUNTER — HOSPITAL ENCOUNTER (EMERGENCY)
Age: 58
Discharge: HOME | End: 2018-10-06
Attending: EMERGENCY MEDICINE
Payer: COMMERCIAL

## 2018-10-06 VITALS
SYSTOLIC BLOOD PRESSURE: 141 MMHG | BODY MASS INDEX: 32.35 KG/M2 | HEIGHT: 66 IN | RESPIRATION RATE: 16 BRPM | HEART RATE: 68 BPM | OXYGEN SATURATION: 96 % | WEIGHT: 201.28 LBS | TEMPERATURE: 98 F | DIASTOLIC BLOOD PRESSURE: 93 MMHG

## 2018-10-06 DIAGNOSIS — R10.30 LOWER ABDOMINAL PAIN: ICD-10-CM

## 2018-10-06 DIAGNOSIS — K62.5 RECTAL BLEEDING: Primary | ICD-10-CM

## 2018-10-06 LAB
ANION GAP SERPL CALCULATED.3IONS-SCNC: 9 MMOL/L (ref 3–16)
BILIRUBIN URINE: NEGATIVE
BLOOD, URINE: NEGATIVE
BUN BLDV-MCNC: 11 MG/DL (ref 7–20)
CALCIUM SERPL-MCNC: 11 MG/DL (ref 8.3–10.6)
CHLORIDE BLD-SCNC: 107 MMOL/L (ref 99–110)
CLARITY: CLEAR
CO2: 27 MMOL/L (ref 21–32)
COLOR: YELLOW
CREAT SERPL-MCNC: 1 MG/DL (ref 0.9–1.3)
GFR AFRICAN AMERICAN: >60
GFR NON-AFRICAN AMERICAN: >60
GLUCOSE BLD-MCNC: 103 MG/DL (ref 70–99)
GLUCOSE URINE: NEGATIVE MG/DL
HCT VFR BLD CALC: 44.6 % (ref 40.5–52.5)
HEMOGLOBIN: 15.2 G/DL (ref 13.5–17.5)
INR BLD: 3.05 (ref 0.86–1.14)
KETONES, URINE: NEGATIVE MG/DL
LEUKOCYTE ESTERASE, URINE: NEGATIVE
MCH RBC QN AUTO: 31.3 PG (ref 26–34)
MCHC RBC AUTO-ENTMCNC: 34 G/DL (ref 31–36)
MCV RBC AUTO: 92.1 FL (ref 80–100)
MICROSCOPIC EXAMINATION: NORMAL
NITRITE, URINE: NEGATIVE
PDW BLD-RTO: 13.2 % (ref 12.4–15.4)
PH UA: 7.5
PLATELET # BLD: 170 K/UL (ref 135–450)
PMV BLD AUTO: 7.2 FL (ref 5–10.5)
POTASSIUM SERPL-SCNC: 4.4 MMOL/L (ref 3.5–5.1)
PROTEIN UA: NEGATIVE MG/DL
PROTHROMBIN TIME: 34.8 SEC (ref 9.8–13)
RBC # BLD: 4.84 M/UL (ref 4.2–5.9)
SODIUM BLD-SCNC: 143 MMOL/L (ref 136–145)
SPECIFIC GRAVITY UA: 1.02
URINE TYPE: NORMAL
UROBILINOGEN, URINE: 0.2 E.U./DL
WBC # BLD: 5.1 K/UL (ref 4–11)

## 2018-10-06 PROCEDURE — 80048 BASIC METABOLIC PNL TOTAL CA: CPT

## 2018-10-06 PROCEDURE — 6360000004 HC RX CONTRAST MEDICATION: Performed by: EMERGENCY MEDICINE

## 2018-10-06 PROCEDURE — 85027 COMPLETE CBC AUTOMATED: CPT

## 2018-10-06 PROCEDURE — 85610 PROTHROMBIN TIME: CPT

## 2018-10-06 PROCEDURE — 2580000003 HC RX 258: Performed by: PHYSICIAN ASSISTANT

## 2018-10-06 PROCEDURE — 99284 EMERGENCY DEPT VISIT MOD MDM: CPT

## 2018-10-06 PROCEDURE — 81003 URINALYSIS AUTO W/O SCOPE: CPT

## 2018-10-06 PROCEDURE — 74177 CT ABD & PELVIS W/CONTRAST: CPT

## 2018-10-06 RX ORDER — 0.9 % SODIUM CHLORIDE 0.9 %
500 INTRAVENOUS SOLUTION INTRAVENOUS ONCE
Status: COMPLETED | OUTPATIENT
Start: 2018-10-06 | End: 2018-10-06

## 2018-10-06 RX ADMIN — SODIUM CHLORIDE 500 ML: 9 INJECTION, SOLUTION INTRAVENOUS at 19:36

## 2018-10-06 RX ADMIN — IOPAMIDOL 75 ML: 755 INJECTION, SOLUTION INTRAVENOUS at 18:11

## 2018-10-06 ASSESSMENT — ENCOUNTER SYMPTOMS
ABDOMINAL PAIN: 1
BLOOD IN STOOL: 1
SHORTNESS OF BREATH: 0
NAUSEA: 0
VOMITING: 0

## 2018-10-06 ASSESSMENT — PAIN SCALES - GENERAL: PAINLEVEL_OUTOF10: 8

## 2018-10-06 ASSESSMENT — PAIN DESCRIPTION - PAIN TYPE: TYPE: ACUTE PAIN

## 2018-10-06 NOTE — ED PROVIDER NOTES
smokeless tobacco. He reports that he does not drink alcohol or use drugs. PHYSICAL EXAM    (up to 7 for level 4, 8 or more for level 5)     ED Triage Vitals [10/06/18 1716]   BP Temp Temp Source Pulse Resp SpO2 Height Weight   (!) 141/93 98 °F (36.7 °C) Oral 68 16 96 % 5' 6\" (1.676 m) 201 lb 4.5 oz (91.3 kg)       Physical Exam   Constitutional: He is oriented to person, place, and time. He appears well-developed and well-nourished. No distress. HENT:   Head: Normocephalic and atraumatic. Right Ear: External ear normal.   Left Ear: External ear normal.   Mouth/Throat: Oropharynx is clear and moist.   Eyes: EOM are normal.   Neck: Normal range of motion. Neck supple. Cardiovascular: Normal rate, regular rhythm and normal heart sounds. Pulmonary/Chest: Effort normal and breath sounds normal. No respiratory distress. Abdominal: Soft. Bowel sounds are normal. He exhibits no distension and no mass. There is tenderness (midl tTP fo the LLQ). There is no rebound and no guarding. Genitourinary:   Genitourinary Comments: Digital rectal exam performed. No hemorrhoids or fissures. There is a scant bloody mucus on the perianal area. Rectal exam did return brown stool with small amount bloody mucus. Musculoskeletal: Normal range of motion. Neurological: He is alert and oriented to person, place, and time. Skin: Skin is warm and dry. He is not diaphoretic. Psychiatric: He has a normal mood and affect.  His behavior is normal. Judgment and thought content normal.   Kacy ED RN chaperoned my rectal    DIFFERENTIAL DIAGNOSIS   Appendicitis, Small Bowel Obstruction, Pancreatitis, Cholesystitis, Hepatitis, Aortic Dissection, DKA, Pylonephritis, Kidney Stone  GIB, fissure, hemmorhoid    DIAGNOSTIC RESULTS     RADIOLOGY:   Non-plain film images such as CT, Ultrasound and MRI are read by the radiologist. Plain radiographic images are visualized and preliminarily interpreted by PARADISE Petit with the below findings:      Interpretation per the Radiologist below, if available at the time of this note:    CT ABDOMEN PELVIS W IV CONTRAST Additional Contrast? None   Final Result   Addendum 1 of 1   ADDENDUM:   The right perinephric/retroperitoneal fluid collection has mildly    decreased   in size, but otherwise stable in appearance, may represent a hematoma. Final   4.4 x 4.3 x 9.1 cm right perinephric/retroperitoneal fluid collection with   mild peripheral enhancement and infiltration of fat, likely related to an   abscess, mildly decreased in size since the prior study. Bilateral nonobstructive renal calculi measuring up to 5 mm. Mild right hydronephrosis and presumed congenital right UPJ obstruction,   stable.                LABS:  Results for orders placed or performed during the hospital encounter of 10/06/18   CBC   Result Value Ref Range    WBC 5.1 4.0 - 11.0 K/uL    RBC 4.84 4.20 - 5.90 M/uL    Hemoglobin 15.2 13.5 - 17.5 g/dL    Hematocrit 44.6 40.5 - 52.5 %    MCV 92.1 80.0 - 100.0 fL    MCH 31.3 26.0 - 34.0 pg    MCHC 34.0 31.0 - 36.0 g/dL    RDW 13.2 12.4 - 15.4 %    Platelets 954 531 - 762 K/uL    MPV 7.2 5.0 - 10.5 fL   Basic Metabolic Panel   Result Value Ref Range    Sodium 143 136 - 145 mmol/L    Potassium 4.4 3.5 - 5.1 mmol/L    Chloride 107 99 - 110 mmol/L    CO2 27 21 - 32 mmol/L    Anion Gap 9 3 - 16    Glucose 103 (H) 70 - 99 mg/dL    BUN 11 7 - 20 mg/dL    CREATININE 1.0 0.9 - 1.3 mg/dL    GFR Non-African American >60 >60    GFR African American >60 >60    Calcium 11.0 (H) 8.3 - 10.6 mg/dL   Protime-INR   Result Value Ref Range    Protime 34.8 (H) 9.8 - 13.0 sec    INR 3.05 (H) 0.86 - 1.14   Urinalysis   Result Value Ref Range    Color, UA YELLOW Straw/Yellow    Clarity, UA Clear Clear    Glucose, Ur Negative Negative mg/dL    Bilirubin Urine Negative Negative    Ketones, Urine Negative Negative mg/dL    Specific Gravity, UA 1.018 1.005 - 1.030    Blood, Urine Negative

## 2018-10-07 NOTE — ED NOTES
Pt educated on discharge information with understanding voiced. Resp are easy non labored. No s/s of distress or discomfort. Pt ambulates well to lobby.       Susana Marr, BARBARA  10/06/18 2032

## 2018-10-09 ENCOUNTER — APPOINTMENT (OUTPATIENT)
Dept: PHARMACY | Age: 58
End: 2018-10-09
Payer: COMMERCIAL

## 2018-10-09 ENCOUNTER — TELEPHONE (OUTPATIENT)
Dept: PHARMACY | Age: 58
End: 2018-10-09

## 2018-10-15 ENCOUNTER — TELEPHONE (OUTPATIENT)
Dept: PHARMACY | Age: 58
End: 2018-10-15

## 2018-10-15 NOTE — TELEPHONE ENCOUNTER
Called patient back to discuss new antibiotic. Lower interaction with warfarin especially at 250 mg every other day. Can still get him scheduled to get his INR rechecked. Asked that he call us back tomorrow to get that scheduled.

## 2018-10-22 ENCOUNTER — ANTI-COAG VISIT (OUTPATIENT)
Dept: PHARMACY | Age: 58
End: 2018-10-22
Payer: COMMERCIAL

## 2018-10-22 ENCOUNTER — TELEPHONE (OUTPATIENT)
Dept: CARDIOLOGY CLINIC | Age: 58
End: 2018-10-22

## 2018-10-22 DIAGNOSIS — I26.99 OTHER PULMONARY EMBOLISM WITHOUT ACUTE COR PULMONALE, UNSPECIFIED CHRONICITY (HCC): ICD-10-CM

## 2018-10-22 LAB — INTERNATIONAL NORMALIZATION RATIO, POC: 3.2

## 2018-10-22 PROCEDURE — 99211 OFF/OP EST MAY X REQ PHY/QHP: CPT

## 2018-10-22 PROCEDURE — 85610 PROTHROMBIN TIME: CPT

## 2018-10-22 NOTE — TELEPHONE ENCOUNTER
Pt would like to speak to Dr. Delmer Whaley regarding his cholesterol (would like labs drawn) and his heart rhythm issues. Pt just saw Dr. El Patel. You can reach the pt at [phone].

## 2018-11-06 ENCOUNTER — TELEPHONE (OUTPATIENT)
Dept: CARDIOLOGY CLINIC | Age: 58
End: 2018-11-06

## 2018-11-06 ENCOUNTER — ANTI-COAG VISIT (OUTPATIENT)
Dept: PHARMACY | Age: 58
End: 2018-11-06
Payer: COMMERCIAL

## 2018-11-06 DIAGNOSIS — I26.99 OTHER PULMONARY EMBOLISM WITHOUT ACUTE COR PULMONALE, UNSPECIFIED CHRONICITY (HCC): Primary | ICD-10-CM

## 2018-11-06 LAB
INR BLD: 3.7 (ref 0.86–1.14)
PROTHROMBIN TIME: 42.2 SEC (ref 9.8–13)

## 2018-11-06 PROCEDURE — 85610 PROTHROMBIN TIME: CPT

## 2018-11-06 PROCEDURE — 99211 OFF/OP EST MAY X REQ PHY/QHP: CPT

## 2018-11-06 PROCEDURE — 36415 COLL VENOUS BLD VENIPUNCTURE: CPT

## 2018-11-06 RX ORDER — WARFARIN SODIUM 5 MG/1
5 TABLET ORAL DAILY
COMMUNITY
End: 2019-03-20 | Stop reason: ALTCHOICE

## 2018-11-06 RX ORDER — WARFARIN SODIUM 5 MG/1
TABLET ORAL
Qty: 105 TABLET | Refills: 0 | Status: SHIPPED | OUTPATIENT
Start: 2018-11-06 | End: 2018-11-27 | Stop reason: DRUGHIGH

## 2018-11-14 ENCOUNTER — TELEPHONE (OUTPATIENT)
Dept: PHARMACY | Age: 58
End: 2018-11-14

## 2018-11-15 ENCOUNTER — ANTI-COAG VISIT (OUTPATIENT)
Dept: PHARMACY | Age: 58
End: 2018-11-15
Payer: COMMERCIAL

## 2018-11-15 LAB — INR BLD: 2.1

## 2018-11-15 PROCEDURE — 85610 PROTHROMBIN TIME: CPT

## 2018-11-15 PROCEDURE — 99211 OFF/OP EST MAY X REQ PHY/QHP: CPT

## 2018-11-23 RX ORDER — METOPROLOL SUCCINATE 25 MG/1
TABLET, EXTENDED RELEASE ORAL
Qty: 180 TABLET | Refills: 3 | Status: SHIPPED | OUTPATIENT
Start: 2018-11-23 | End: 2019-12-23

## 2018-11-27 ENCOUNTER — ANTI-COAG VISIT (OUTPATIENT)
Dept: PHARMACY | Age: 58
End: 2018-11-27
Payer: COMMERCIAL

## 2018-11-27 ENCOUNTER — HOSPITAL ENCOUNTER (OUTPATIENT)
Dept: GENERAL RADIOLOGY | Age: 58
Discharge: HOME | End: 2018-11-27
Payer: COMMERCIAL

## 2018-11-27 ENCOUNTER — HOSPITAL ENCOUNTER (OUTPATIENT)
Age: 58
Discharge: HOME | End: 2018-11-27
Payer: COMMERCIAL

## 2018-11-27 DIAGNOSIS — N20.0 CALCULUS OF KIDNEY: ICD-10-CM

## 2018-11-27 LAB — INTERNATIONAL NORMALIZATION RATIO, POC: 2.7

## 2018-11-27 PROCEDURE — 99211 OFF/OP EST MAY X REQ PHY/QHP: CPT

## 2018-11-27 PROCEDURE — 85610 PROTHROMBIN TIME: CPT

## 2018-11-27 PROCEDURE — 74018 RADEX ABDOMEN 1 VIEW: CPT

## 2018-11-27 RX ORDER — SACUBITRIL AND VALSARTAN 24; 26 MG/1; MG/1
TABLET, FILM COATED ORAL
Qty: 180 TABLET | Refills: 1 | Status: SHIPPED | OUTPATIENT
Start: 2018-11-27 | End: 2018-12-07 | Stop reason: SDUPTHER

## 2018-11-27 NOTE — PROGRESS NOTES
Mr. Jose Barlow is a 60 y.o.  male with history of PE who presents today for anticoagulation monitoring and adjustment. Lab Results   Component Value Date    INR 2.7 11/27/2018    INR 2.1 11/15/2018    INR 3.70 (H) 11/06/2018     Mr. Rui Kent came in today because he had another appointment and did not want to have to come back on Thursday for his regular [facility] appointment. Our computers were down for a few minutes and Mr. Rui Kent did not want to wait. Hien Mehta checked his INR and told him we would call him later. Patient also mentioned he may be having surgery on Friday and would let us know. I called Mr. Javier at home. Patient had been on Cipro and his Warfarin dose was decreased. He is now on Levaquin, which he will finish tomorrow. I instructed patient to only take Warfarin 2.5mg today and tomorrow while on the Levaquin. Then resume his normal dose. Mr. Rui Kent is not having surgery on Friday. However, patient states he is having a colonoscopy on Friday but his doctor is not taking him off of his warfarin, he is to continuing taking his Warfarin. Coumadin dose: Take Warfarin 2.5mg today and tomorrow while still on Levaquin. THEN resume your normal dose :  Warfarin 5mg daily except 2.5 mg every Monday,Wednesday and Friday. Recheck INR in 2 weeks. Warfarin dose updated.

## 2018-11-29 ENCOUNTER — APPOINTMENT (OUTPATIENT)
Dept: PHARMACY | Age: 58
End: 2018-11-29
Payer: COMMERCIAL

## 2018-12-07 ENCOUNTER — OFFICE VISIT (OUTPATIENT)
Dept: CARDIOLOGY CLINIC | Age: 58
End: 2018-12-07
Payer: COMMERCIAL

## 2018-12-07 VITALS
SYSTOLIC BLOOD PRESSURE: 112 MMHG | BODY MASS INDEX: 32.78 KG/M2 | HEIGHT: 66 IN | WEIGHT: 204 LBS | DIASTOLIC BLOOD PRESSURE: 70 MMHG | OXYGEN SATURATION: 98 % | HEART RATE: 64 BPM

## 2018-12-07 DIAGNOSIS — I10 ESSENTIAL HYPERTENSION: ICD-10-CM

## 2018-12-07 DIAGNOSIS — I50.22 CHRONIC SYSTOLIC CONGESTIVE HEART FAILURE (HCC): Primary | ICD-10-CM

## 2018-12-07 DIAGNOSIS — Z86.711 HX PULMONARY EMBOLISM: ICD-10-CM

## 2018-12-07 DIAGNOSIS — I42.8 NICM (NONISCHEMIC CARDIOMYOPATHY) (HCC): Primary | ICD-10-CM

## 2018-12-07 DIAGNOSIS — E78.2 MIXED HYPERLIPIDEMIA: ICD-10-CM

## 2018-12-07 DIAGNOSIS — I50.22 CHRONIC SYSTOLIC CONGESTIVE HEART FAILURE (HCC): ICD-10-CM

## 2018-12-07 PROCEDURE — 93000 ELECTROCARDIOGRAM COMPLETE: CPT | Performed by: INTERNAL MEDICINE

## 2018-12-07 PROCEDURE — 3017F COLORECTAL CA SCREEN DOC REV: CPT | Performed by: INTERNAL MEDICINE

## 2018-12-07 PROCEDURE — G8598 ASA/ANTIPLAT THER USED: HCPCS | Performed by: INTERNAL MEDICINE

## 2018-12-07 PROCEDURE — 99214 OFFICE O/P EST MOD 30 MIN: CPT | Performed by: INTERNAL MEDICINE

## 2018-12-07 PROCEDURE — G8482 FLU IMMUNIZE ORDER/ADMIN: HCPCS | Performed by: INTERNAL MEDICINE

## 2018-12-07 PROCEDURE — G8417 CALC BMI ABV UP PARAM F/U: HCPCS | Performed by: INTERNAL MEDICINE

## 2018-12-07 PROCEDURE — G8427 DOCREV CUR MEDS BY ELIG CLIN: HCPCS | Performed by: INTERNAL MEDICINE

## 2018-12-07 PROCEDURE — 1036F TOBACCO NON-USER: CPT | Performed by: INTERNAL MEDICINE

## 2018-12-07 NOTE — PROGRESS NOTES
kg)   11/21/18 203 lb (92.1 kg)   10/06/18 201 lb 4.5 oz (91.3 kg)     Constitutional: He is oriented to person, place, and time. He appears well-developed and well-nourished. In no acute distress. Head: Normocephalic and atraumatic. Pupils equal and round. Neck: Neck supple. No JVP or carotid bruit appreciated. No mass and no thyromegaly present. No lymphadenopathy present. Cardiovascular: Normal rate. Normal heart sounds. Exam reveals no gallop and no friction rub. No murmur heard. Pulmonary/Chest: Effort normal and breath sounds normal. No respiratory distress. He has no wheezes, rhonchi or rales. Abdominal: Soft, non-tender. Bowel sounds are normal. He exhibits no organomegaly, mass or bruit. Extremities: No edema, cyanosis, or clubbing. Pulses are 2+ radial/dorsalis pedis/posterior tibial/carotid bilaterally. Neurological: No gross cranial nerve deficit. Coordination normal.   Skin: Skin is warm and dry. There is no rash or diaphoresis. Psychiatric: He has a normal mood and affect. His speech is normal and behavior is normal.     Lab Review:   FLP:    Lab Results   Component Value Date    TRIG 227 06/29/2018    HDL 39 06/29/2018    HDL 65 11/10/2011    LDLCALC 84 06/29/2018    LABVLDL 45 06/29/2018     BUN/Creatinine:    Lab Results   Component Value Date    BUN 11 10/06/2018    CREATININE 1.0 10/06/2018     EKG Interpretation: 2/14/18, sinus rhythm with LBBB    Image Review:     ECHO 11/28/17  Summary   Normal left ventricular wall thickness. Ejection fraction is visually   estimated to be 40-45%.  Ada Peoples is septal hypokinesis (secondary to Pacing)   Trivial mitral & tricuspid regurgitation is present.   The left atrial size is normal.   Pacer / ICD wire is visualized in the right ventricle.   There appears to be normal right ventricular size and function. Echo: 7/24/17  Left ventricle size is normal. Mild concentric left ventricular hypertrophy  is present.  Global ejection fraction is moderately decreased and estimated  from 30 % to 35%. Diastolic filling parameters suggests grade I diastolic  dysfunction . There is abnormal (paradoxical) septal motion consistent with left bundle  branch block. Mitral valve is structurally normal.  Trivial to mild mitral regurgitation is present. The left atrial size is normal.  A bubble study was performed and fails to show evidence of right to left  shunting.     Echo: 11/9/16  Dilated LV with severely reduced systolic function. EF 30%. Anterior, septal  and apical akinesis. Mild mitral regurgitation is present. The left atrial size is normal.  Normal right ventricular size and function.     Stress Test:  7/21/16  SPECT perfusion images: On the stress corrected images there is a moderate defect in the septum of the left ventricle. At rest, partially redistributes especially the posterior lateral component. Complete redistribution is not present.   XQUD:  53 %      (Normal is at least 62% for women and 53% for men)  LV wall motion:   There is significant hypokinesia of the ventricular septum  LVEDV: 117ml  (Normal is up to 100ml for women and 150ml for men)  Transient dilatation ratio:   1.0      (Normal is up to 1.3 for women and 1.2 for men)     Cath: 7/21/16  #1-coronary angiography summary: Normal coronaries in a left   dominant system  A-left main coronary is normal  B-the LAD has minor irregularities and no significant stenoses  C-the circumflex is dominant and has minor irregularities and no   significant stenoses  D-the right coronary is a small nondominant vessel and is   angiographically normal  #2-ventriculography in the ADKINS projection demonstrates mild   global left ventricular dysfunction ejection fractions   approximately 40  #3-aortic pressure is approximately 150/80 left ventricular   pressures 150/14 there is no gradient on pullback  #8-YACDA are no complications  #7-OZP patient will be treated medically for left ventricular   dysfunction in

## 2018-12-07 NOTE — PATIENT INSTRUCTIONS
about a medical condition or this instruction, always ask your healthcare professional. [place] [id number] any warranty or liability for your use of this information.

## 2018-12-10 ENCOUNTER — ANTI-COAG VISIT (OUTPATIENT)
Dept: PHARMACY | Age: 58
End: 2018-12-10
Payer: COMMERCIAL

## 2018-12-10 DIAGNOSIS — I26.99 OTHER PULMONARY EMBOLISM WITHOUT ACUTE COR PULMONALE, UNSPECIFIED CHRONICITY (HCC): ICD-10-CM

## 2018-12-10 LAB — INTERNATIONAL NORMALIZATION RATIO, POC: 4.6

## 2018-12-10 PROCEDURE — 99211 OFF/OP EST MAY X REQ PHY/QHP: CPT

## 2018-12-10 PROCEDURE — 85610 PROTHROMBIN TIME: CPT

## 2018-12-10 NOTE — PROGRESS NOTES
Mr. Phan Norris is a 60 y.o.  male with history of PE who presents today for anticoagulation monitoring and adjustment. Patient verifies current dosing regimen  Patient denies s/s bleeding/bruising/swelling/SOB  No blood in urine or stool. No dietary changes. No changes in medication/OTC agents/Herbals. No change in alcohol use. No missed doses. No Procedures scheduled in the future at this time. Lab Results   Component Value Date    INR 4.6 12/10/2018    INR 2.7 11/27/2018    INR 2.1 11/15/2018       Pertinent findings: Patient states he was on levaquin for 10 days and stopped on 12/6. Also, Patient states he did have some hemorrhoid bleeding. Had colonoscopy that showed no internal bleeding and bleeding has now resolved. Patient was instructed to hold his dose the day before his colonoscopy (12/6/18) and then missed a dose that day on 12/7/18. INR today was high despite his dose being held. Instructed patient to HOLD his dose today on 12/10/18 and eat an extra serving of green vegetables to drop INR. Wanted to follow up with patient in 2 weeks but due to holiday will check next INR in 2.5 weeks. Warfarin dosing:   HOLD dose today 12/10/18 and eat an extra serving of green vegetables  THEN resume your normal dose :  Warfarin 5mg daily except 2.5 mg every Tuesday and Thursday. After visit summary printed and reviewed with patient.       Warfarin dose updated on patient home medication list: Yes

## 2018-12-12 DIAGNOSIS — I50.22 CHRONIC SYSTOLIC CONGESTIVE HEART FAILURE (HCC): ICD-10-CM

## 2018-12-12 DIAGNOSIS — E78.2 MIXED HYPERLIPIDEMIA: ICD-10-CM

## 2018-12-12 LAB
ANION GAP SERPL CALCULATED.3IONS-SCNC: 12 MMOL/L (ref 3–16)
BUN BLDV-MCNC: 8 MG/DL (ref 7–20)
CALCIUM SERPL-MCNC: 10.7 MG/DL (ref 8.3–10.6)
CHLORIDE BLD-SCNC: 106 MMOL/L (ref 99–110)
CHOLESTEROL, FASTING: 154 MG/DL (ref 0–199)
CO2: 25 MMOL/L (ref 21–32)
CREAT SERPL-MCNC: 1 MG/DL (ref 0.9–1.3)
GFR AFRICAN AMERICAN: >60
GFR NON-AFRICAN AMERICAN: >60
GLUCOSE BLD-MCNC: 107 MG/DL (ref 70–99)
HDLC SERPL-MCNC: 42 MG/DL (ref 40–60)
LDL CHOLESTEROL CALCULATED: 84 MG/DL
POTASSIUM SERPL-SCNC: 4.1 MMOL/L (ref 3.5–5.1)
SODIUM BLD-SCNC: 143 MMOL/L (ref 136–145)
TRIGLYCERIDE, FASTING: 139 MG/DL (ref 0–150)
VLDLC SERPL CALC-MCNC: 28 MG/DL

## 2018-12-13 LAB
ESTIMATED AVERAGE GLUCOSE: 99.7 MG/DL
HBA1C MFR BLD: 5.1 %

## 2018-12-25 ENCOUNTER — APPOINTMENT (OUTPATIENT)
Dept: CT IMAGING | Age: 58
DRG: 392 | End: 2018-12-25
Payer: COMMERCIAL

## 2018-12-25 ENCOUNTER — HOSPITAL ENCOUNTER (INPATIENT)
Age: 58
LOS: 3 days | Discharge: HOME | DRG: 392 | End: 2018-12-28
Attending: EMERGENCY MEDICINE | Admitting: INTERNAL MEDICINE
Payer: COMMERCIAL

## 2018-12-25 DIAGNOSIS — Q62.11 STENOSIS OF URETEROPELVIC JUNCTION (UPJ): Primary | ICD-10-CM

## 2018-12-25 DIAGNOSIS — Z79.01 ANTICOAGULATED ON COUMADIN: ICD-10-CM

## 2018-12-25 DIAGNOSIS — R74.01 ELEVATED TRANSAMINASE LEVEL: ICD-10-CM

## 2018-12-25 DIAGNOSIS — R74.8 ELEVATED LIPASE: ICD-10-CM

## 2018-12-25 DIAGNOSIS — N28.89 PERINEPHRIC FLUID COLLECTION: ICD-10-CM

## 2018-12-25 DIAGNOSIS — R10.9 ACUTE RIGHT FLANK PAIN: ICD-10-CM

## 2018-12-25 PROBLEM — R11.2 INTRACTABLE VOMITING WITH NAUSEA: Status: ACTIVE | Noted: 2018-12-25

## 2018-12-25 LAB
A/G RATIO: 1.4 (ref 1.1–2.2)
ALBUMIN SERPL-MCNC: 4.1 G/DL (ref 3.4–5)
ALP BLD-CCNC: 140 U/L (ref 40–129)
ALT SERPL-CCNC: 115 U/L (ref 10–40)
ANION GAP SERPL CALCULATED.3IONS-SCNC: 10 MMOL/L (ref 3–16)
APTT: 45.4 SEC (ref 26–36)
AST SERPL-CCNC: 138 U/L (ref 15–37)
BASOPHILS ABSOLUTE: 0 K/UL (ref 0–0.2)
BASOPHILS RELATIVE PERCENT: 0.3 %
BILIRUB SERPL-MCNC: 0.9 MG/DL (ref 0–1)
BILIRUBIN URINE: NEGATIVE
BLOOD, URINE: NEGATIVE
BUN BLDV-MCNC: 12 MG/DL (ref 7–20)
CALCIUM SERPL-MCNC: 10.4 MG/DL (ref 8.3–10.6)
CHLORIDE BLD-SCNC: 108 MMOL/L (ref 99–110)
CLARITY: CLEAR
CO2: 23 MMOL/L (ref 21–32)
COLOR: YELLOW
CREAT SERPL-MCNC: 0.8 MG/DL (ref 0.9–1.3)
EOSINOPHILS ABSOLUTE: 0.1 K/UL (ref 0–0.6)
EOSINOPHILS RELATIVE PERCENT: 0.9 %
GFR AFRICAN AMERICAN: >60
GFR NON-AFRICAN AMERICAN: >60
GLOBULIN: 2.9 G/DL
GLUCOSE BLD-MCNC: 132 MG/DL (ref 70–99)
GLUCOSE URINE: NEGATIVE MG/DL
HCT VFR BLD CALC: 49.1 % (ref 40.5–52.5)
HEMOGLOBIN: 16.7 G/DL (ref 13.5–17.5)
INR BLD: 3.02 (ref 0.86–1.14)
KETONES, URINE: 15 MG/DL
LEUKOCYTE ESTERASE, URINE: NEGATIVE
LIPASE: 72 U/L (ref 13–60)
LYMPHOCYTES ABSOLUTE: 0.2 K/UL (ref 1–5.1)
LYMPHOCYTES RELATIVE PERCENT: 3.7 %
MCH RBC QN AUTO: 31.6 PG (ref 26–34)
MCHC RBC AUTO-ENTMCNC: 34.1 G/DL (ref 31–36)
MCV RBC AUTO: 92.6 FL (ref 80–100)
MICROSCOPIC EXAMINATION: ABNORMAL
MONOCYTES ABSOLUTE: 0.3 K/UL (ref 0–1.3)
MONOCYTES RELATIVE PERCENT: 5 %
NEUTROPHILS ABSOLUTE: 5.2 K/UL (ref 1.7–7.7)
NEUTROPHILS RELATIVE PERCENT: 90.1 %
NITRITE, URINE: NEGATIVE
PDW BLD-RTO: 13 % (ref 12.4–15.4)
PH UA: 7.5
PLATELET # BLD: 147 K/UL (ref 135–450)
PMV BLD AUTO: 7.4 FL (ref 5–10.5)
POTASSIUM REFLEX MAGNESIUM: 4.2 MMOL/L (ref 3.5–5.1)
PROTEIN UA: NEGATIVE MG/DL
PROTHROMBIN TIME: 34.4 SEC (ref 9.8–13)
RBC # BLD: 5.3 M/UL (ref 4.2–5.9)
SODIUM BLD-SCNC: 141 MMOL/L (ref 136–145)
SPECIFIC GRAVITY UA: 1.02
TOTAL PROTEIN: 7 G/DL (ref 6.4–8.2)
URINE REFLEX TO CULTURE: ABNORMAL
URINE TYPE: ABNORMAL
UROBILINOGEN, URINE: 1 E.U./DL
WBC # BLD: 5.8 K/UL (ref 4–11)

## 2018-12-25 PROCEDURE — 96375 TX/PRO/DX INJ NEW DRUG ADDON: CPT

## 2018-12-25 PROCEDURE — 80074 ACUTE HEPATITIS PANEL: CPT

## 2018-12-25 PROCEDURE — 80053 COMPREHEN METABOLIC PANEL: CPT

## 2018-12-25 PROCEDURE — 94760 N-INVAS EAR/PLS OXIMETRY 1: CPT

## 2018-12-25 PROCEDURE — 6370000000 HC RX 637 (ALT 250 FOR IP): Performed by: INTERNAL MEDICINE

## 2018-12-25 PROCEDURE — 85025 COMPLETE CBC W/AUTO DIFF WBC: CPT

## 2018-12-25 PROCEDURE — 6360000002 HC RX W HCPCS: Performed by: INTERNAL MEDICINE

## 2018-12-25 PROCEDURE — 6360000002 HC RX W HCPCS: Performed by: PHYSICIAN ASSISTANT

## 2018-12-25 PROCEDURE — 83690 ASSAY OF LIPASE: CPT

## 2018-12-25 PROCEDURE — 85730 THROMBOPLASTIN TIME PARTIAL: CPT

## 2018-12-25 PROCEDURE — 96374 THER/PROPH/DIAG INJ IV PUSH: CPT

## 2018-12-25 PROCEDURE — 2580000003 HC RX 258: Performed by: INTERNAL MEDICINE

## 2018-12-25 PROCEDURE — 74176 CT ABD & PELVIS W/O CONTRAST: CPT

## 2018-12-25 PROCEDURE — 99285 EMERGENCY DEPT VISIT HI MDM: CPT

## 2018-12-25 PROCEDURE — 96361 HYDRATE IV INFUSION ADD-ON: CPT

## 2018-12-25 PROCEDURE — 36415 COLL VENOUS BLD VENIPUNCTURE: CPT

## 2018-12-25 PROCEDURE — S0028 INJECTION, FAMOTIDINE, 20 MG: HCPCS | Performed by: PHYSICIAN ASSISTANT

## 2018-12-25 PROCEDURE — 2500000003 HC RX 250 WO HCPCS: Performed by: PHYSICIAN ASSISTANT

## 2018-12-25 PROCEDURE — 85610 PROTHROMBIN TIME: CPT

## 2018-12-25 PROCEDURE — 81003 URINALYSIS AUTO W/O SCOPE: CPT

## 2018-12-25 PROCEDURE — 1200000000 HC SEMI PRIVATE

## 2018-12-25 RX ORDER — ZOLPIDEM TARTRATE 5 MG/1
5 TABLET ORAL NIGHTLY PRN
Status: DISCONTINUED | OUTPATIENT
Start: 2018-12-25 | End: 2018-12-28 | Stop reason: HOSPADM

## 2018-12-25 RX ORDER — FLAVOXATE HYDROCHLORIDE 100 MG/1
100 TABLET ORAL 3 TIMES DAILY PRN
Status: DISCONTINUED | OUTPATIENT
Start: 2018-12-25 | End: 2018-12-28 | Stop reason: HOSPADM

## 2018-12-25 RX ORDER — SODIUM CHLORIDE 9 MG/ML
INJECTION, SOLUTION INTRAVENOUS CONTINUOUS
Status: DISCONTINUED | OUTPATIENT
Start: 2018-12-25 | End: 2018-12-27

## 2018-12-25 RX ORDER — TAMSULOSIN HYDROCHLORIDE 0.4 MG/1
0.4 CAPSULE ORAL 2 TIMES DAILY
Status: DISCONTINUED | OUTPATIENT
Start: 2018-12-25 | End: 2018-12-28 | Stop reason: HOSPADM

## 2018-12-25 RX ORDER — SODIUM CHLORIDE, SODIUM LACTATE, POTASSIUM CHLORIDE, AND CALCIUM CHLORIDE .6; .31; .03; .02 G/100ML; G/100ML; G/100ML; G/100ML
1000 INJECTION, SOLUTION INTRAVENOUS ONCE
Status: COMPLETED | OUTPATIENT
Start: 2018-12-25 | End: 2018-12-25

## 2018-12-25 RX ORDER — PREGABALIN 75 MG/1
75 CAPSULE ORAL 3 TIMES DAILY
Status: DISCONTINUED | OUTPATIENT
Start: 2018-12-25 | End: 2018-12-28 | Stop reason: HOSPADM

## 2018-12-25 RX ORDER — KETOROLAC TROMETHAMINE 30 MG/ML
15 INJECTION, SOLUTION INTRAMUSCULAR; INTRAVENOUS ONCE
Status: COMPLETED | OUTPATIENT
Start: 2018-12-25 | End: 2018-12-25

## 2018-12-25 RX ORDER — SODIUM CHLORIDE 0.9 % (FLUSH) 0.9 %
10 SYRINGE (ML) INJECTION PRN
Status: DISCONTINUED | OUTPATIENT
Start: 2018-12-25 | End: 2018-12-28 | Stop reason: HOSPADM

## 2018-12-25 RX ORDER — DICYCLOMINE HYDROCHLORIDE 10 MG/1
10 CAPSULE ORAL
Status: DISCONTINUED | OUTPATIENT
Start: 2018-12-25 | End: 2018-12-28 | Stop reason: HOSPADM

## 2018-12-25 RX ORDER — AZELASTINE 1 MG/ML
2 SPRAY, METERED NASAL 2 TIMES DAILY
Status: DISCONTINUED | OUTPATIENT
Start: 2018-12-25 | End: 2018-12-28 | Stop reason: HOSPADM

## 2018-12-25 RX ORDER — WARFARIN SODIUM 1 MG/1
1 TABLET ORAL
Status: DISPENSED | OUTPATIENT
Start: 2018-12-25 | End: 2018-12-26

## 2018-12-25 RX ORDER — SODIUM CHLORIDE 0.9 % (FLUSH) 0.9 %
10 SYRINGE (ML) INJECTION EVERY 12 HOURS SCHEDULED
Status: DISCONTINUED | OUTPATIENT
Start: 2018-12-25 | End: 2018-12-28 | Stop reason: HOSPADM

## 2018-12-25 RX ORDER — ONDANSETRON 2 MG/ML
4 INJECTION INTRAMUSCULAR; INTRAVENOUS EVERY 30 MIN PRN
Status: DISCONTINUED | OUTPATIENT
Start: 2018-12-25 | End: 2018-12-25 | Stop reason: SDUPTHER

## 2018-12-25 RX ORDER — OXYCODONE HYDROCHLORIDE AND ACETAMINOPHEN 5; 325 MG/1; MG/1
1 TABLET ORAL EVERY 6 HOURS PRN
Status: DISCONTINUED | OUTPATIENT
Start: 2018-12-25 | End: 2018-12-28 | Stop reason: HOSPADM

## 2018-12-25 RX ORDER — FENTANYL CITRATE 50 UG/ML
25 INJECTION, SOLUTION INTRAMUSCULAR; INTRAVENOUS ONCE
Status: COMPLETED | OUTPATIENT
Start: 2018-12-25 | End: 2018-12-25

## 2018-12-25 RX ORDER — FLUTICASONE PROPIONATE 50 MCG
1 SPRAY, SUSPENSION (ML) NASAL DAILY
Status: DISCONTINUED | OUTPATIENT
Start: 2018-12-26 | End: 2018-12-28 | Stop reason: HOSPADM

## 2018-12-25 RX ORDER — PANTOPRAZOLE SODIUM 40 MG/1
40 TABLET, DELAYED RELEASE ORAL
Status: DISCONTINUED | OUTPATIENT
Start: 2018-12-26 | End: 2018-12-28 | Stop reason: HOSPADM

## 2018-12-25 RX ORDER — METOPROLOL SUCCINATE 25 MG/1
25 TABLET, EXTENDED RELEASE ORAL EVERY 12 HOURS
Status: DISCONTINUED | OUTPATIENT
Start: 2018-12-25 | End: 2018-12-28 | Stop reason: HOSPADM

## 2018-12-25 RX ORDER — ALBUTEROL SULFATE 90 UG/1
2 AEROSOL, METERED RESPIRATORY (INHALATION) EVERY 6 HOURS PRN
Status: DISCONTINUED | OUTPATIENT
Start: 2018-12-25 | End: 2018-12-28 | Stop reason: HOSPADM

## 2018-12-25 RX ORDER — AZITHROMYCIN 250 MG/1
250 TABLET, FILM COATED ORAL EVERY OTHER DAY
Status: DISCONTINUED | OUTPATIENT
Start: 2018-12-26 | End: 2018-12-26

## 2018-12-25 RX ORDER — ERGOCALCIFEROL 1.25 MG/1
50000 CAPSULE ORAL WEEKLY
Status: DISCONTINUED | OUTPATIENT
Start: 2018-12-31 | End: 2018-12-27

## 2018-12-25 RX ORDER — ONDANSETRON 2 MG/ML
4 INJECTION INTRAMUSCULAR; INTRAVENOUS EVERY 6 HOURS PRN
Status: DISCONTINUED | OUTPATIENT
Start: 2018-12-25 | End: 2018-12-28 | Stop reason: HOSPADM

## 2018-12-25 RX ORDER — PRAVASTATIN SODIUM 40 MG
40 TABLET ORAL NIGHTLY
Status: DISCONTINUED | OUTPATIENT
Start: 2018-12-26 | End: 2018-12-28 | Stop reason: HOSPADM

## 2018-12-25 RX ADMIN — PREGABALIN 75 MG: 75 CAPSULE ORAL at 21:16

## 2018-12-25 RX ADMIN — SODIUM CHLORIDE, SODIUM LACTATE, POTASSIUM CHLORIDE, AND CALCIUM CHLORIDE 1000 ML: .6; .31; .03; .02 INJECTION, SOLUTION INTRAVENOUS at 15:50

## 2018-12-25 RX ADMIN — Medication 10 ML: at 21:18

## 2018-12-25 RX ADMIN — OXYCODONE AND ACETAMINOPHEN 1 TABLET: 5; 325 TABLET ORAL at 18:24

## 2018-12-25 RX ADMIN — FAMOTIDINE 20 MG: 10 INJECTION, SOLUTION INTRAVENOUS at 15:50

## 2018-12-25 RX ADMIN — SACUBITRIL AND VALSARTAN 1 TABLET: 24; 26 TABLET, FILM COATED ORAL at 21:24

## 2018-12-25 RX ADMIN — METOPROLOL SUCCINATE 25 MG: 25 TABLET, FILM COATED, EXTENDED RELEASE ORAL at 21:16

## 2018-12-25 RX ADMIN — KETOROLAC TROMETHAMINE 15 MG: 30 INJECTION, SOLUTION INTRAMUSCULAR at 15:50

## 2018-12-25 RX ADMIN — ONDANSETRON 4 MG: 2 INJECTION INTRAMUSCULAR; INTRAVENOUS at 21:24

## 2018-12-25 RX ADMIN — TAMSULOSIN HYDROCHLORIDE 0.4 MG: 0.4 CAPSULE ORAL at 21:17

## 2018-12-25 RX ADMIN — ONDANSETRON 4 MG: 2 INJECTION INTRAMUSCULAR; INTRAVENOUS at 15:50

## 2018-12-25 RX ADMIN — DICYCLOMINE HYDROCHLORIDE 10 MG: 10 CAPSULE ORAL at 21:16

## 2018-12-25 RX ADMIN — SODIUM CHLORIDE: 9 INJECTION, SOLUTION INTRAVENOUS at 21:17

## 2018-12-25 RX ADMIN — FENTANYL CITRATE 25 MCG: 50 INJECTION, SOLUTION INTRAMUSCULAR; INTRAVENOUS at 16:58

## 2018-12-25 ASSESSMENT — PAIN DESCRIPTION - PAIN TYPE: TYPE: ACUTE PAIN

## 2018-12-25 ASSESSMENT — PAIN SCALES - GENERAL
PAINLEVEL_OUTOF10: 9
PAINLEVEL_OUTOF10: 7
PAINLEVEL_OUTOF10: 9
PAINLEVEL_OUTOF10: 2
PAINLEVEL_OUTOF10: 9
PAINLEVEL_OUTOF10: 9
PAINLEVEL_OUTOF10: 5
PAINLEVEL_OUTOF10: 9

## 2018-12-25 ASSESSMENT — PAIN DESCRIPTION - LOCATION: LOCATION: FLANK

## 2018-12-25 ASSESSMENT — PAIN DESCRIPTION - ORIENTATION: ORIENTATION: RIGHT;LEFT

## 2018-12-26 ENCOUNTER — APPOINTMENT (OUTPATIENT)
Dept: ULTRASOUND IMAGING | Age: 58
DRG: 392 | End: 2018-12-26
Payer: COMMERCIAL

## 2018-12-26 PROBLEM — R10.32 COLICKY LLQ ABDOMINAL PAIN: Status: ACTIVE | Noted: 2018-12-26

## 2018-12-26 LAB
ALBUMIN SERPL-MCNC: 3.3 G/DL (ref 3.4–5)
ALP BLD-CCNC: 135 U/L (ref 40–129)
ALT SERPL-CCNC: 131 U/L (ref 10–40)
ANION GAP SERPL CALCULATED.3IONS-SCNC: 9 MMOL/L (ref 3–16)
AST SERPL-CCNC: 126 U/L (ref 15–37)
BASOPHILS ABSOLUTE: 0 K/UL (ref 0–0.2)
BASOPHILS RELATIVE PERCENT: 0.6 %
BILIRUB SERPL-MCNC: 2.2 MG/DL (ref 0–1)
BILIRUBIN DIRECT: 1.1 MG/DL (ref 0–0.3)
BILIRUBIN, INDIRECT: 1.1 MG/DL (ref 0–1)
BUN BLDV-MCNC: 8 MG/DL (ref 7–20)
CALCIUM SERPL-MCNC: 10 MG/DL (ref 8.3–10.6)
CHLORIDE BLD-SCNC: 109 MMOL/L (ref 99–110)
CO2: 24 MMOL/L (ref 21–32)
CREAT SERPL-MCNC: 0.9 MG/DL (ref 0.9–1.3)
EOSINOPHILS ABSOLUTE: 0.1 K/UL (ref 0–0.6)
EOSINOPHILS RELATIVE PERCENT: 2.2 %
GFR AFRICAN AMERICAN: >60
GFR NON-AFRICAN AMERICAN: >60
GLUCOSE BLD-MCNC: 105 MG/DL (ref 70–99)
HAV IGM SER IA-ACNC: NORMAL
HCT VFR BLD CALC: 43.7 % (ref 40.5–52.5)
HEMOGLOBIN: 14.8 G/DL (ref 13.5–17.5)
HEPATITIS B CORE IGM ANTIBODY: NORMAL
HEPATITIS B SURFACE ANTIGEN INTERPRETATION: NORMAL
HEPATITIS C ANTIBODY INTERPRETATION: NORMAL
INR BLD: 3.09 (ref 0.86–1.14)
LIPASE: 13 U/L (ref 13–60)
LYMPHOCYTES ABSOLUTE: 0.7 K/UL (ref 1–5.1)
LYMPHOCYTES RELATIVE PERCENT: 21 %
MAGNESIUM: 1.9 MG/DL (ref 1.8–2.4)
MCH RBC QN AUTO: 31.5 PG (ref 26–34)
MCHC RBC AUTO-ENTMCNC: 34 G/DL (ref 31–36)
MCV RBC AUTO: 92.9 FL (ref 80–100)
MONOCYTES ABSOLUTE: 0.4 K/UL (ref 0–1.3)
MONOCYTES RELATIVE PERCENT: 11.4 %
NEUTROPHILS ABSOLUTE: 2.2 K/UL (ref 1.7–7.7)
NEUTROPHILS RELATIVE PERCENT: 64.8 %
PDW BLD-RTO: 13.2 % (ref 12.4–15.4)
PLATELET # BLD: 129 K/UL (ref 135–450)
PMV BLD AUTO: 7.1 FL (ref 5–10.5)
POTASSIUM REFLEX MAGNESIUM: 3.9 MMOL/L (ref 3.5–5.1)
PROTHROMBIN TIME: 35.2 SEC (ref 9.8–13)
RBC # BLD: 4.7 M/UL (ref 4.2–5.9)
SODIUM BLD-SCNC: 142 MMOL/L (ref 136–145)
TOTAL PROTEIN: 5.9 G/DL (ref 6.4–8.2)
WBC # BLD: 3.5 K/UL (ref 4–11)

## 2018-12-26 PROCEDURE — 83735 ASSAY OF MAGNESIUM: CPT

## 2018-12-26 PROCEDURE — 1200000000 HC SEMI PRIVATE

## 2018-12-26 PROCEDURE — 83690 ASSAY OF LIPASE: CPT

## 2018-12-26 PROCEDURE — 2580000003 HC RX 258: Performed by: INTERNAL MEDICINE

## 2018-12-26 PROCEDURE — 99223 1ST HOSP IP/OBS HIGH 75: CPT | Performed by: INTERNAL MEDICINE

## 2018-12-26 PROCEDURE — 76705 ECHO EXAM OF ABDOMEN: CPT

## 2018-12-26 PROCEDURE — 80076 HEPATIC FUNCTION PANEL: CPT

## 2018-12-26 PROCEDURE — 6360000002 HC RX W HCPCS: Performed by: INTERNAL MEDICINE

## 2018-12-26 PROCEDURE — 94760 N-INVAS EAR/PLS OXIMETRY 1: CPT

## 2018-12-26 PROCEDURE — 6370000000 HC RX 637 (ALT 250 FOR IP): Performed by: INTERNAL MEDICINE

## 2018-12-26 PROCEDURE — 85025 COMPLETE CBC W/AUTO DIFF WBC: CPT

## 2018-12-26 PROCEDURE — 80048 BASIC METABOLIC PNL TOTAL CA: CPT

## 2018-12-26 PROCEDURE — 94640 AIRWAY INHALATION TREATMENT: CPT

## 2018-12-26 PROCEDURE — 2700000000 HC OXYGEN THERAPY PER DAY

## 2018-12-26 PROCEDURE — 85610 PROTHROMBIN TIME: CPT

## 2018-12-26 PROCEDURE — 94664 DEMO&/EVAL PT USE INHALER: CPT

## 2018-12-26 RX ORDER — AZITHROMYCIN 250 MG/1
250 TABLET, FILM COATED ORAL EVERY OTHER DAY
Status: DISCONTINUED | OUTPATIENT
Start: 2018-12-27 | End: 2018-12-28 | Stop reason: HOSPADM

## 2018-12-26 RX ORDER — CETIRIZINE HYDROCHLORIDE 10 MG/1
10 TABLET ORAL DAILY
Status: DISCONTINUED | OUTPATIENT
Start: 2018-12-26 | End: 2018-12-28 | Stop reason: HOSPADM

## 2018-12-26 RX ORDER — DOCUSATE SODIUM 100 MG/1
100 CAPSULE, LIQUID FILLED ORAL DAILY
Status: DISCONTINUED | OUTPATIENT
Start: 2018-12-26 | End: 2018-12-28 | Stop reason: HOSPADM

## 2018-12-26 RX ORDER — ALBUTEROL SULFATE 2.5 MG/3ML
2.5 SOLUTION RESPIRATORY (INHALATION)
Status: DISCONTINUED | OUTPATIENT
Start: 2018-12-26 | End: 2018-12-26

## 2018-12-26 RX ORDER — ALBUTEROL SULFATE 2.5 MG/3ML
SOLUTION RESPIRATORY (INHALATION)
Status: DISCONTINUED
Start: 2018-12-26 | End: 2018-12-26

## 2018-12-26 RX ORDER — ASPIRIN 81 MG/1
81 TABLET, CHEWABLE ORAL DAILY
Status: DISCONTINUED | OUTPATIENT
Start: 2018-12-26 | End: 2018-12-28 | Stop reason: HOSPADM

## 2018-12-26 RX ORDER — ALBUTEROL SULFATE 2.5 MG/3ML
2.5 SOLUTION RESPIRATORY (INHALATION) 3 TIMES DAILY
Status: DISCONTINUED | OUTPATIENT
Start: 2018-12-26 | End: 2018-12-28 | Stop reason: HOSPADM

## 2018-12-26 RX ADMIN — DOCUSATE SODIUM 100 MG: 100 CAPSULE, LIQUID FILLED ORAL at 11:28

## 2018-12-26 RX ADMIN — ONDANSETRON 4 MG: 2 INJECTION INTRAMUSCULAR; INTRAVENOUS at 09:24

## 2018-12-26 RX ADMIN — SACUBITRIL AND VALSARTAN 1 TABLET: 24; 26 TABLET, FILM COATED ORAL at 20:45

## 2018-12-26 RX ADMIN — HYDROCORTISONE 2.5%: 25 CREAM TOPICAL at 14:40

## 2018-12-26 RX ADMIN — PRAVASTATIN SODIUM 40 MG: 40 TABLET ORAL at 20:36

## 2018-12-26 RX ADMIN — DICYCLOMINE HYDROCHLORIDE 10 MG: 10 CAPSULE ORAL at 18:01

## 2018-12-26 RX ADMIN — PANTOPRAZOLE SODIUM 40 MG: 40 TABLET, DELAYED RELEASE ORAL at 07:14

## 2018-12-26 RX ADMIN — TAMSULOSIN HYDROCHLORIDE 0.4 MG: 0.4 CAPSULE ORAL at 20:38

## 2018-12-26 RX ADMIN — PREGABALIN 75 MG: 75 CAPSULE ORAL at 20:43

## 2018-12-26 RX ADMIN — SACUBITRIL AND VALSARTAN 1 TABLET: 24; 26 TABLET, FILM COATED ORAL at 14:40

## 2018-12-26 RX ADMIN — OXYCODONE AND ACETAMINOPHEN 1 TABLET: 5; 325 TABLET ORAL at 18:01

## 2018-12-26 RX ADMIN — DICYCLOMINE HYDROCHLORIDE 10 MG: 10 CAPSULE ORAL at 07:15

## 2018-12-26 RX ADMIN — DICYCLOMINE HYDROCHLORIDE 10 MG: 10 CAPSULE ORAL at 22:46

## 2018-12-26 RX ADMIN — DICYCLOMINE HYDROCHLORIDE 10 MG: 10 CAPSULE ORAL at 11:28

## 2018-12-26 RX ADMIN — OXYCODONE AND ACETAMINOPHEN 1 TABLET: 5; 325 TABLET ORAL at 05:27

## 2018-12-26 RX ADMIN — ALBUTEROL SULFATE 2.5 MG: 2.5 SOLUTION RESPIRATORY (INHALATION) at 19:46

## 2018-12-26 RX ADMIN — ALBUTEROL SULFATE 2.5 MG: 2.5 SOLUTION RESPIRATORY (INHALATION) at 11:52

## 2018-12-26 RX ADMIN — ASPIRIN 81 MG 81 MG: 81 TABLET ORAL at 11:28

## 2018-12-26 RX ADMIN — SODIUM CHLORIDE: 9 INJECTION, SOLUTION INTRAVENOUS at 11:18

## 2018-12-26 RX ADMIN — METOPROLOL SUCCINATE 25 MG: 25 TABLET, FILM COATED, EXTENDED RELEASE ORAL at 09:22

## 2018-12-26 RX ADMIN — ZOLPIDEM TARTRATE 5 MG: 5 TABLET ORAL at 22:56

## 2018-12-26 RX ADMIN — ZOLPIDEM TARTRATE 5 MG: 5 TABLET ORAL at 00:11

## 2018-12-26 RX ADMIN — CETIRIZINE HYDROCHLORIDE 10 MG: 10 TABLET, FILM COATED ORAL at 11:28

## 2018-12-26 RX ADMIN — METOPROLOL SUCCINATE 25 MG: 25 TABLET, FILM COATED, EXTENDED RELEASE ORAL at 20:42

## 2018-12-26 RX ADMIN — SACUBITRIL AND VALSARTAN 1 TABLET: 24; 26 TABLET, FILM COATED ORAL at 09:22

## 2018-12-26 RX ADMIN — SODIUM CHLORIDE: 9 INJECTION, SOLUTION INTRAVENOUS at 22:56

## 2018-12-26 RX ADMIN — TAMSULOSIN HYDROCHLORIDE 0.4 MG: 0.4 CAPSULE ORAL at 09:22

## 2018-12-26 ASSESSMENT — PAIN SCALES - GENERAL
PAINLEVEL_OUTOF10: 5
PAINLEVEL_OUTOF10: 8
PAINLEVEL_OUTOF10: 6
PAINLEVEL_OUTOF10: 0

## 2018-12-27 ENCOUNTER — APPOINTMENT (OUTPATIENT)
Dept: PHARMACY | Age: 58
End: 2018-12-27
Payer: COMMERCIAL

## 2018-12-27 ENCOUNTER — APPOINTMENT (OUTPATIENT)
Dept: GENERAL RADIOLOGY | Age: 58
DRG: 392 | End: 2018-12-27
Payer: COMMERCIAL

## 2018-12-27 LAB
A/G RATIO: 1.3 (ref 1.1–2.2)
ALBUMIN SERPL-MCNC: 3.1 G/DL (ref 3.4–5)
ALP BLD-CCNC: 131 U/L (ref 40–129)
ALT SERPL-CCNC: 100 U/L (ref 10–40)
ANION GAP SERPL CALCULATED.3IONS-SCNC: 9 MMOL/L (ref 3–16)
AST SERPL-CCNC: 56 U/L (ref 15–37)
BASOPHILS ABSOLUTE: 0 K/UL (ref 0–0.2)
BASOPHILS RELATIVE PERCENT: 0.4 %
BILIRUB SERPL-MCNC: 0.7 MG/DL (ref 0–1)
BUN BLDV-MCNC: 6 MG/DL (ref 7–20)
CALCIUM SERPL-MCNC: 9.6 MG/DL (ref 8.3–10.6)
CHLORIDE BLD-SCNC: 107 MMOL/L (ref 99–110)
CO2: 26 MMOL/L (ref 21–32)
CREAT SERPL-MCNC: 0.8 MG/DL (ref 0.9–1.3)
EOSINOPHILS ABSOLUTE: 0.3 K/UL (ref 0–0.6)
EOSINOPHILS RELATIVE PERCENT: 8.9 %
GFR AFRICAN AMERICAN: >60
GFR NON-AFRICAN AMERICAN: >60
GLOBULIN: 2.3 G/DL
GLUCOSE BLD-MCNC: 78 MG/DL (ref 70–99)
HCT VFR BLD CALC: 40.8 % (ref 40.5–52.5)
HEMOGLOBIN: 13.8 G/DL (ref 13.5–17.5)
INR BLD: 2.52 (ref 0.86–1.14)
LYMPHOCYTES ABSOLUTE: 1.5 K/UL (ref 1–5.1)
LYMPHOCYTES RELATIVE PERCENT: 43.5 %
MCH RBC QN AUTO: 31.6 PG (ref 26–34)
MCHC RBC AUTO-ENTMCNC: 33.9 G/DL (ref 31–36)
MCV RBC AUTO: 93.2 FL (ref 80–100)
MONOCYTES ABSOLUTE: 0.4 K/UL (ref 0–1.3)
MONOCYTES RELATIVE PERCENT: 12.5 %
NEUTROPHILS ABSOLUTE: 1.2 K/UL (ref 1.7–7.7)
NEUTROPHILS RELATIVE PERCENT: 34.7 %
PDW BLD-RTO: 13.3 % (ref 12.4–15.4)
PLATELET # BLD: 136 K/UL (ref 135–450)
PMV BLD AUTO: 7.4 FL (ref 5–10.5)
POTASSIUM SERPL-SCNC: 4.1 MMOL/L (ref 3.5–5.1)
PROTHROMBIN TIME: 28.7 SEC (ref 9.8–13)
RBC # BLD: 4.37 M/UL (ref 4.2–5.9)
SODIUM BLD-SCNC: 142 MMOL/L (ref 136–145)
TOTAL PROTEIN: 5.4 G/DL (ref 6.4–8.2)
WBC # BLD: 3.4 K/UL (ref 4–11)

## 2018-12-27 PROCEDURE — 2700000000 HC OXYGEN THERAPY PER DAY

## 2018-12-27 PROCEDURE — 1200000000 HC SEMI PRIVATE

## 2018-12-27 PROCEDURE — 94760 N-INVAS EAR/PLS OXIMETRY 1: CPT

## 2018-12-27 PROCEDURE — 6370000000 HC RX 637 (ALT 250 FOR IP): Performed by: INTERNAL MEDICINE

## 2018-12-27 PROCEDURE — 2580000003 HC RX 258: Performed by: INTERNAL MEDICINE

## 2018-12-27 PROCEDURE — 71045 X-RAY EXAM CHEST 1 VIEW: CPT

## 2018-12-27 PROCEDURE — 99233 SBSQ HOSP IP/OBS HIGH 50: CPT | Performed by: INTERNAL MEDICINE

## 2018-12-27 PROCEDURE — 85610 PROTHROMBIN TIME: CPT

## 2018-12-27 PROCEDURE — 36415 COLL VENOUS BLD VENIPUNCTURE: CPT

## 2018-12-27 PROCEDURE — 6360000002 HC RX W HCPCS: Performed by: INTERNAL MEDICINE

## 2018-12-27 PROCEDURE — 94640 AIRWAY INHALATION TREATMENT: CPT

## 2018-12-27 PROCEDURE — 85025 COMPLETE CBC W/AUTO DIFF WBC: CPT

## 2018-12-27 PROCEDURE — 80053 COMPREHEN METABOLIC PANEL: CPT

## 2018-12-27 RX ORDER — ERGOCALCIFEROL 1.25 MG/1
50000 CAPSULE ORAL WEEKLY
Status: DISCONTINUED | OUTPATIENT
Start: 2018-12-27 | End: 2018-12-28 | Stop reason: HOSPADM

## 2018-12-27 RX ADMIN — METOPROLOL SUCCINATE 25 MG: 25 TABLET, FILM COATED, EXTENDED RELEASE ORAL at 09:35

## 2018-12-27 RX ADMIN — AZELASTINE HYDROCHLORIDE 2 SPRAY: 137 SPRAY, METERED NASAL at 09:36

## 2018-12-27 RX ADMIN — ALBUTEROL SULFATE 2.5 MG: 2.5 SOLUTION RESPIRATORY (INHALATION) at 20:36

## 2018-12-27 RX ADMIN — AZITHROMYCIN 250 MG: 250 TABLET, FILM COATED ORAL at 09:35

## 2018-12-27 RX ADMIN — ERGOCALCIFEROL 50000 UNITS: 1.25 CAPSULE ORAL at 14:24

## 2018-12-27 RX ADMIN — PANTOPRAZOLE SODIUM 40 MG: 40 TABLET, DELAYED RELEASE ORAL at 06:26

## 2018-12-27 RX ADMIN — Medication 10 ML: at 21:36

## 2018-12-27 RX ADMIN — DOCUSATE SODIUM 100 MG: 100 CAPSULE, LIQUID FILLED ORAL at 09:35

## 2018-12-27 RX ADMIN — SACUBITRIL AND VALSARTAN 1 TABLET: 24; 26 TABLET, FILM COATED ORAL at 09:38

## 2018-12-27 RX ADMIN — PREGABALIN 75 MG: 75 CAPSULE ORAL at 21:35

## 2018-12-27 RX ADMIN — PREGABALIN 75 MG: 75 CAPSULE ORAL at 14:21

## 2018-12-27 RX ADMIN — PRAVASTATIN SODIUM 40 MG: 40 TABLET ORAL at 21:34

## 2018-12-27 RX ADMIN — SACUBITRIL AND VALSARTAN 1 TABLET: 24; 26 TABLET, FILM COATED ORAL at 14:21

## 2018-12-27 RX ADMIN — ASPIRIN 81 MG 81 MG: 81 TABLET ORAL at 09:35

## 2018-12-27 RX ADMIN — CETIRIZINE HYDROCHLORIDE 10 MG: 10 TABLET, FILM COATED ORAL at 09:35

## 2018-12-27 RX ADMIN — METOPROLOL SUCCINATE 25 MG: 25 TABLET, FILM COATED, EXTENDED RELEASE ORAL at 21:34

## 2018-12-27 RX ADMIN — TAMSULOSIN HYDROCHLORIDE 0.4 MG: 0.4 CAPSULE ORAL at 21:36

## 2018-12-27 RX ADMIN — TAMSULOSIN HYDROCHLORIDE 0.4 MG: 0.4 CAPSULE ORAL at 09:35

## 2018-12-27 RX ADMIN — PREGABALIN 75 MG: 75 CAPSULE ORAL at 09:35

## 2018-12-27 RX ADMIN — ALBUTEROL SULFATE 2.5 MG: 2.5 SOLUTION RESPIRATORY (INHALATION) at 08:08

## 2018-12-27 RX ADMIN — ENOXAPARIN SODIUM 90 MG: 100 INJECTION SUBCUTANEOUS at 21:35

## 2018-12-27 RX ADMIN — ZOLPIDEM TARTRATE 5 MG: 5 TABLET ORAL at 23:51

## 2018-12-27 RX ADMIN — SACUBITRIL AND VALSARTAN 1 TABLET: 24; 26 TABLET, FILM COATED ORAL at 21:37

## 2018-12-27 RX ADMIN — DICYCLOMINE HYDROCHLORIDE 10 MG: 10 CAPSULE ORAL at 06:27

## 2018-12-27 RX ADMIN — OXYCODONE AND ACETAMINOPHEN 1 TABLET: 5; 325 TABLET ORAL at 09:35

## 2018-12-27 ASSESSMENT — PAIN SCALES - GENERAL
PAINLEVEL_OUTOF10: 8
PAINLEVEL_OUTOF10: 5

## 2018-12-28 VITALS
RESPIRATION RATE: 18 BRPM | BODY MASS INDEX: 32.63 KG/M2 | WEIGHT: 203.04 LBS | OXYGEN SATURATION: 95 % | DIASTOLIC BLOOD PRESSURE: 86 MMHG | HEART RATE: 69 BPM | TEMPERATURE: 97.4 F | SYSTOLIC BLOOD PRESSURE: 134 MMHG | HEIGHT: 66 IN

## 2018-12-28 LAB
A/G RATIO: 1.4 (ref 1.1–2.2)
ALBUMIN SERPL-MCNC: 3.4 G/DL (ref 3.4–5)
ALP BLD-CCNC: 130 U/L (ref 40–129)
ALT SERPL-CCNC: 73 U/L (ref 10–40)
ANION GAP SERPL CALCULATED.3IONS-SCNC: 7 MMOL/L (ref 3–16)
AST SERPL-CCNC: 28 U/L (ref 15–37)
BASOPHILS ABSOLUTE: 0 K/UL (ref 0–0.2)
BASOPHILS RELATIVE PERCENT: 1.1 %
BILIRUB SERPL-MCNC: 0.5 MG/DL (ref 0–1)
BUN BLDV-MCNC: 8 MG/DL (ref 7–20)
CALCIUM SERPL-MCNC: 9.8 MG/DL (ref 8.3–10.6)
CHLORIDE BLD-SCNC: 107 MMOL/L (ref 99–110)
CO2: 28 MMOL/L (ref 21–32)
CREAT SERPL-MCNC: 0.8 MG/DL (ref 0.9–1.3)
EOSINOPHILS ABSOLUTE: 0.4 K/UL (ref 0–0.6)
EOSINOPHILS RELATIVE PERCENT: 9.2 %
GFR AFRICAN AMERICAN: >60
GFR NON-AFRICAN AMERICAN: >60
GLOBULIN: 2.4 G/DL
GLUCOSE BLD-MCNC: 93 MG/DL (ref 70–99)
HCT VFR BLD CALC: 42.9 % (ref 40.5–52.5)
HEMOGLOBIN: 14.7 G/DL (ref 13.5–17.5)
INR BLD: 1.66 (ref 0.86–1.14)
LYMPHOCYTES ABSOLUTE: 1.7 K/UL (ref 1–5.1)
LYMPHOCYTES RELATIVE PERCENT: 43.7 %
MCH RBC QN AUTO: 32 PG (ref 26–34)
MCHC RBC AUTO-ENTMCNC: 34.2 G/DL (ref 31–36)
MCV RBC AUTO: 93.5 FL (ref 80–100)
MONOCYTES ABSOLUTE: 0.4 K/UL (ref 0–1.3)
MONOCYTES RELATIVE PERCENT: 10.2 %
NEUTROPHILS ABSOLUTE: 1.4 K/UL (ref 1.7–7.7)
NEUTROPHILS RELATIVE PERCENT: 35.8 %
PDW BLD-RTO: 13.1 % (ref 12.4–15.4)
PLATELET # BLD: 146 K/UL (ref 135–450)
PMV BLD AUTO: 7.5 FL (ref 5–10.5)
POTASSIUM SERPL-SCNC: 3.9 MMOL/L (ref 3.5–5.1)
PROTHROMBIN TIME: 18.9 SEC (ref 9.8–13)
RBC # BLD: 4.59 M/UL (ref 4.2–5.9)
SODIUM BLD-SCNC: 142 MMOL/L (ref 136–145)
TOTAL PROTEIN: 5.8 G/DL (ref 6.4–8.2)
WBC # BLD: 4 K/UL (ref 4–11)

## 2018-12-28 PROCEDURE — 94760 N-INVAS EAR/PLS OXIMETRY 1: CPT

## 2018-12-28 PROCEDURE — 94640 AIRWAY INHALATION TREATMENT: CPT

## 2018-12-28 PROCEDURE — 94664 DEMO&/EVAL PT USE INHALER: CPT

## 2018-12-28 PROCEDURE — 36415 COLL VENOUS BLD VENIPUNCTURE: CPT

## 2018-12-28 PROCEDURE — 2580000003 HC RX 258: Performed by: INTERNAL MEDICINE

## 2018-12-28 PROCEDURE — 85610 PROTHROMBIN TIME: CPT

## 2018-12-28 PROCEDURE — 99239 HOSP IP/OBS DSCHRG MGMT >30: CPT | Performed by: INTERNAL MEDICINE

## 2018-12-28 PROCEDURE — 80053 COMPREHEN METABOLIC PANEL: CPT

## 2018-12-28 PROCEDURE — 6360000002 HC RX W HCPCS: Performed by: INTERNAL MEDICINE

## 2018-12-28 PROCEDURE — 6370000000 HC RX 637 (ALT 250 FOR IP): Performed by: INTERNAL MEDICINE

## 2018-12-28 PROCEDURE — 85025 COMPLETE CBC W/AUTO DIFF WBC: CPT

## 2018-12-28 PROCEDURE — 2700000000 HC OXYGEN THERAPY PER DAY

## 2018-12-28 RX ADMIN — Medication 10 ML: at 09:51

## 2018-12-28 RX ADMIN — METOPROLOL SUCCINATE 25 MG: 25 TABLET, FILM COATED, EXTENDED RELEASE ORAL at 09:43

## 2018-12-28 RX ADMIN — PANTOPRAZOLE SODIUM 40 MG: 40 TABLET, DELAYED RELEASE ORAL at 07:17

## 2018-12-28 RX ADMIN — SACUBITRIL AND VALSARTAN 1 TABLET: 24; 26 TABLET, FILM COATED ORAL at 09:43

## 2018-12-28 RX ADMIN — FLUTICASONE PROPIONATE 1 SPRAY: 50 SPRAY, METERED NASAL at 09:50

## 2018-12-28 RX ADMIN — ALBUTEROL SULFATE 2.5 MG: 2.5 SOLUTION RESPIRATORY (INHALATION) at 08:40

## 2018-12-28 RX ADMIN — OXYCODONE AND ACETAMINOPHEN 1 TABLET: 5; 325 TABLET ORAL at 01:31

## 2018-12-28 RX ADMIN — PREGABALIN 75 MG: 75 CAPSULE ORAL at 09:44

## 2018-12-28 RX ADMIN — AZELASTINE HYDROCHLORIDE 2 SPRAY: 137 SPRAY, METERED NASAL at 09:50

## 2018-12-28 RX ADMIN — CETIRIZINE HYDROCHLORIDE 10 MG: 10 TABLET, FILM COATED ORAL at 09:44

## 2018-12-28 RX ADMIN — DOCUSATE SODIUM 100 MG: 100 CAPSULE, LIQUID FILLED ORAL at 09:44

## 2018-12-28 RX ADMIN — ASPIRIN 81 MG 81 MG: 81 TABLET ORAL at 09:44

## 2018-12-28 RX ADMIN — TAMSULOSIN HYDROCHLORIDE 0.4 MG: 0.4 CAPSULE ORAL at 09:43

## 2018-12-28 RX ADMIN — ENOXAPARIN SODIUM 90 MG: 100 INJECTION SUBCUTANEOUS at 09:48

## 2018-12-28 ASSESSMENT — PAIN SCALES - GENERAL
PAINLEVEL_OUTOF10: 8
PAINLEVEL_OUTOF10: 0
PAINLEVEL_OUTOF10: 4

## 2018-12-31 ENCOUNTER — TELEPHONE (OUTPATIENT)
Dept: CARDIOLOGY CLINIC | Age: 58
End: 2018-12-31

## 2019-01-08 ENCOUNTER — NURSE ONLY (OUTPATIENT)
Dept: CARDIOLOGY CLINIC | Age: 59
End: 2019-01-08
Payer: COMMERCIAL

## 2019-01-08 DIAGNOSIS — Z95.810 BIVENTRICULAR ICD (IMPLANTABLE CARDIOVERTER-DEFIBRILLATOR) IN PLACE: ICD-10-CM

## 2019-01-08 DIAGNOSIS — I50.22 CHRONIC SYSTOLIC CHF (CONGESTIVE HEART FAILURE), NYHA CLASS 2 (HCC): ICD-10-CM

## 2019-01-08 DIAGNOSIS — I42.9 CARDIOMYOPATHY, UNSPECIFIED TYPE (HCC): ICD-10-CM

## 2019-01-08 PROCEDURE — 93296 REM INTERROG EVL PM/IDS: CPT | Performed by: INTERNAL MEDICINE

## 2019-01-08 PROCEDURE — 93297 REM INTERROG DEV EVAL ICPMS: CPT | Performed by: INTERNAL MEDICINE

## 2019-01-08 PROCEDURE — 93295 DEV INTERROG REMOTE 1/2/MLT: CPT | Performed by: INTERNAL MEDICINE

## 2019-01-10 ENCOUNTER — TELEPHONE (OUTPATIENT)
Dept: CARDIOLOGY CLINIC | Age: 59
End: 2019-01-10

## 2019-01-22 ENCOUNTER — APPOINTMENT (OUTPATIENT)
Dept: GENERAL RADIOLOGY | Age: 59
End: 2019-01-22
Payer: COMMERCIAL

## 2019-01-22 ENCOUNTER — TELEPHONE (OUTPATIENT)
Dept: OTHER | Facility: CLINIC | Age: 59
End: 2019-01-22

## 2019-01-22 ENCOUNTER — ANTI-COAG VISIT (OUTPATIENT)
Dept: PHARMACY | Age: 59
End: 2019-01-22
Payer: COMMERCIAL

## 2019-01-22 ENCOUNTER — HOSPITAL ENCOUNTER (EMERGENCY)
Age: 59
Discharge: HOME | End: 2019-01-22
Attending: EMERGENCY MEDICINE
Payer: COMMERCIAL

## 2019-01-22 ENCOUNTER — APPOINTMENT (OUTPATIENT)
Dept: CT IMAGING | Age: 59
End: 2019-01-22
Payer: COMMERCIAL

## 2019-01-22 VITALS
OXYGEN SATURATION: 95 % | HEART RATE: 94 BPM | TEMPERATURE: 98.7 F | BODY MASS INDEX: 32.32 KG/M2 | DIASTOLIC BLOOD PRESSURE: 97 MMHG | SYSTOLIC BLOOD PRESSURE: 162 MMHG | RESPIRATION RATE: 18 BRPM | HEIGHT: 66 IN | WEIGHT: 201.13 LBS

## 2019-01-22 DIAGNOSIS — J40 BRONCHITIS: Primary | ICD-10-CM

## 2019-01-22 DIAGNOSIS — R79.1 ABNORMAL INR: ICD-10-CM

## 2019-01-22 LAB
A/G RATIO: 1.7 (ref 1.1–2.2)
ALBUMIN SERPL-MCNC: 4.5 G/DL (ref 3.4–5)
ALP BLD-CCNC: 121 U/L (ref 40–129)
ALT SERPL-CCNC: 24 U/L (ref 10–40)
ANION GAP SERPL CALCULATED.3IONS-SCNC: 13 MMOL/L (ref 3–16)
AST SERPL-CCNC: 31 U/L (ref 15–37)
BASOPHILS ABSOLUTE: 0 K/UL (ref 0–0.2)
BASOPHILS RELATIVE PERCENT: 0.3 %
BILIRUB SERPL-MCNC: 0.9 MG/DL (ref 0–1)
BUN BLDV-MCNC: 7 MG/DL (ref 7–20)
CALCIUM SERPL-MCNC: 10.7 MG/DL (ref 8.3–10.6)
CHLORIDE BLD-SCNC: 105 MMOL/L (ref 99–110)
CO2: 23 MMOL/L (ref 21–32)
CREAT SERPL-MCNC: 0.8 MG/DL (ref 0.9–1.3)
EOSINOPHILS ABSOLUTE: 0.1 K/UL (ref 0–0.6)
EOSINOPHILS RELATIVE PERCENT: 0.8 %
GFR AFRICAN AMERICAN: >60
GFR NON-AFRICAN AMERICAN: >60
GLOBULIN: 2.7 G/DL
GLUCOSE BLD-MCNC: 95 MG/DL (ref 70–99)
HCT VFR BLD CALC: 44.8 % (ref 40.5–52.5)
HEMOGLOBIN: 15.5 G/DL (ref 13.5–17.5)
INR BLD: 3.8
LYMPHOCYTES ABSOLUTE: 0.4 K/UL (ref 1–5.1)
LYMPHOCYTES RELATIVE PERCENT: 5.1 %
MCH RBC QN AUTO: 32 PG (ref 26–34)
MCHC RBC AUTO-ENTMCNC: 34.7 G/DL (ref 31–36)
MCV RBC AUTO: 92.1 FL (ref 80–100)
MONOCYTES ABSOLUTE: 0.5 K/UL (ref 0–1.3)
MONOCYTES RELATIVE PERCENT: 6.4 %
NEUTROPHILS ABSOLUTE: 7 K/UL (ref 1.7–7.7)
NEUTROPHILS RELATIVE PERCENT: 87.4 %
PDW BLD-RTO: 13.2 % (ref 12.4–15.4)
PLATELET # BLD: 140 K/UL (ref 135–450)
PMV BLD AUTO: 7.2 FL (ref 5–10.5)
POTASSIUM SERPL-SCNC: 4.3 MMOL/L (ref 3.5–5.1)
RBC # BLD: 4.86 M/UL (ref 4.2–5.9)
SODIUM BLD-SCNC: 141 MMOL/L (ref 136–145)
TOTAL PROTEIN: 7.2 G/DL (ref 6.4–8.2)
WBC # BLD: 8 K/UL (ref 4–11)

## 2019-01-22 PROCEDURE — 80053 COMPREHEN METABOLIC PANEL: CPT

## 2019-01-22 PROCEDURE — 70450 CT HEAD/BRAIN W/O DYE: CPT

## 2019-01-22 PROCEDURE — 71046 X-RAY EXAM CHEST 2 VIEWS: CPT

## 2019-01-22 PROCEDURE — 6370000000 HC RX 637 (ALT 250 FOR IP): Performed by: EMERGENCY MEDICINE

## 2019-01-22 PROCEDURE — 87040 BLOOD CULTURE FOR BACTERIA: CPT

## 2019-01-22 PROCEDURE — 85610 PROTHROMBIN TIME: CPT

## 2019-01-22 PROCEDURE — 99211 OFF/OP EST MAY X REQ PHY/QHP: CPT

## 2019-01-22 PROCEDURE — 99284 EMERGENCY DEPT VISIT MOD MDM: CPT

## 2019-01-22 PROCEDURE — 85025 COMPLETE CBC W/AUTO DIFF WBC: CPT

## 2019-01-22 RX ORDER — AZITHROMYCIN 500 MG/1
500 TABLET, FILM COATED ORAL DAILY
Qty: 6 TABLET | Refills: 0 | Status: SHIPPED | OUTPATIENT
Start: 2019-01-22 | End: 2019-01-25

## 2019-01-22 RX ORDER — ACETAMINOPHEN 325 MG/1
650 TABLET ORAL ONCE
Status: COMPLETED | OUTPATIENT
Start: 2019-01-22 | End: 2019-01-22

## 2019-01-22 RX ADMIN — ACETAMINOPHEN 650 MG: 325 TABLET, FILM COATED ORAL at 15:09

## 2019-01-22 ASSESSMENT — ENCOUNTER SYMPTOMS
ABDOMINAL PAIN: 0
SHORTNESS OF BREATH: 1
SORE THROAT: 0
COUGH: 1
EYE REDNESS: 0
RHINORRHEA: 0

## 2019-01-22 ASSESSMENT — PAIN SCALES - GENERAL
PAINLEVEL_OUTOF10: 8
PAINLEVEL_OUTOF10: 9
PAINLEVEL_OUTOF10: 9
PAINLEVEL_OUTOF10: 10
PAINLEVEL_OUTOF10: 9
PAINLEVEL_OUTOF10: 8

## 2019-01-22 ASSESSMENT — PAIN DESCRIPTION - DESCRIPTORS: DESCRIPTORS: PRESSURE;HEADACHE

## 2019-01-22 ASSESSMENT — PAIN - FUNCTIONAL ASSESSMENT: PAIN_FUNCTIONAL_ASSESSMENT: 0-10

## 2019-01-22 ASSESSMENT — PAIN DESCRIPTION - ORIENTATION: ORIENTATION: RIGHT

## 2019-01-22 ASSESSMENT — PAIN DESCRIPTION - LOCATION
LOCATION: HEAD
LOCATION: BACK;CHEST

## 2019-01-22 NOTE — ED NOTES
Pt called out with c/o of head pain on the right side and new earache. Dr. Ruslan Ariza notified.       Daina Moore RN  01/22/19 1500

## 2019-01-22 NOTE — ED NOTES
Fall risk screening completed. Fall risk bracelet applied to patient. Non-skid shoes on patient. The fall risk is indicated using dome light . The call light is within the patient's reach and instructions for use were provided. The bed is in the lowest position with wheels locked. The patient has been advised to notify staff using the call light if there is a need to get up or use restroom. The patient verbalized understanding of safety precautions and how to contact staff for assistance.         Mirta Santoro RN  01/22/19 0529

## 2019-01-22 NOTE — ED NOTES
Discharge and education instructions reviewed. Patient verbalized understanding, teach-back successful. Patient denied questions at this time. No acute distress noted. Patient instructed to follow-up as noted - return to emergency department if symptoms worsen. Patient verbalized understanding. Discharged per EDMD with discharged instructions.        Fly Alcantara RN  01/22/19 9762

## 2019-01-22 NOTE — ED NOTES
Pt ambulated with pulse ox per Dr. Rahul Roberson. Pt ambulates without assistance, maintaining O2 sat between 94% and 97%. Pt had onset of dizziness jail through ambulation, and requests to sit down. Pt then ambulates to room with a one-arm assist by medic. Dizziness dissipates after returning to bed. BARBARA Bolaños informed.      Arash Rojas, EMT-P  01/22/19 4409

## 2019-01-22 NOTE — ED PROVIDER NOTES
[facility]  eMERGENCY dEPARTMENT eNCOUnter      Pt Name: Tristin Biggs  MRN: [id number]  [place] 1960  Date of evaluation: 1/22/2019  Provider: Aidan Gann MD    CHIEF COMPLAINT       Chief Complaint   Patient presents with    Cough     productive with blood in sputum, since Sat    Fatigue         HISTORY OF PRESENT ILLNESS   (Location/Symptom, Timing/Onset,Context/Setting, Quality, Duration, Modifying Factors, Severity)  Note limiting factors. Tristin Biggs is a 60 y.o. male who presents to the emergency department With cough and shortness of breath. The patient states he's been having progressively worsening cough since Saturday. He reports associated fever, chills. He also states that there is been some blood streaks in his sputum since last evening. He states he did receive his flu shot. He has a past medical history of bronchiolitis obliterans for which she sees Dr. Evaristo Love. He states he saw the nurse practitioner his primary care provider's office yesterday. He was started on promethazine plus codeine plus Ceftin. He states he is not improved. Of note the patient also complains some mild discomfort in his right lower extremities-specifically in his right calf. He states he's had a prior DVT in the past and he now takes Coumadin. He states his arms drawn earlier today and was 3.88. HPI    NursingNotes were reviewed. REVIEW OF SYSTEMS    (2-9 systems for level 4, 10 or more for level 5)     Review of Systems   Constitutional: Positive for chills, fatigue and fever. HENT: Negative for rhinorrhea and sore throat. Eyes: Negative for redness. Respiratory: Positive for cough and shortness of breath. Cardiovascular: Negative for chest pain. Gastrointestinal: Negative for abdominal pain. Genitourinary: Negative for flank pain. Neurological: Negative for headaches. Hematological: Negative for adenopathy.    Psychiatric/Behavioral: Negative for confusion. Except as noted above the remainder of the review of systems was reviewed and negative. PAST MEDICAL HISTORY     Past Medical History:   Diagnosis Date    Bronchiolitis obliterans ([address]     Bundle branch block, right     Cardiomyopathy ([address]     Fibromyalgia     GERD (gastroesophageal reflux disease)     Hepatitis 1979    unsure of which type    Hx of blood clots     Hyperlipemia     Hypertension     IBS (irritable bowel syndrome)     Kidney stone     over thirty kidney stones    Neuropathy     right side-chest    Pneumothorax 2011    Right    Prostatitis     Pulmonary embolism on right [facility]) 2011    upper lobe-coumadin 2011    Sacroiliitis ([address]          SURGICALHISTORY       Past Surgical History:   Procedure Laterality Date    CHOLECYSTECTOMY  2007    COLONOSCOPY  9/21/2012    dr Tiffany Sahu  2015    LITHOTRIPSY     [address] opening [facility] in sinuses    UPPER GASTROINTESTINAL ENDOSCOPY  3/28/2014    dr Precious Redd       Previous Medications    ALBUTEROL (PROAIR HFA) 108 (90 BASE) MCG/ACT INHALER    Inhale 2 puffs into the lungs every 6 hours as needed.       ALBUTEROL (PROVENTIL) (2.5 MG/3ML) 0.083% NEBULIZER SOLUTION    USE 3 ML VIA NEBULIZER THREE TIMES DAILY    AZELASTINE (ASTELIN) 0.1 % NASAL SPRAY    2 sprays by Nasal route 2 times daily Use in each nostril as directed    BENZONATATE (TESSALON PERLES) 100 MG CAPSULE    Take 1 capsule by mouth 3 times daily as needed for Cough    CHOLECALCIFEROL (VITAMIN D3) 29747 UNITS CAPS    Take 1 capsule by mouth once a week    DEXILANT 30 MG CPDR DELAYED RELEASE CAPSULE    TAKE 1 CAPSULE BY MOUTH  DAILY    DICYCLOMINE (BENTYL) 10 MG CAPSULE    TAKE ONE CAPSULE BY MOUTH  FOUR TIMES DAILY AS NEEDED    FLAVOXATE (URISPAS) 100 MG TABLET    TAKE 1 TABLET BY MOUTH THREE TIMES DAILY AS NEEDED FOR URINARY SPASM    FLUTICASONE N/A    Number of children: N/A    Years of education: N/A     Social History Main Topics    Smoking status: Never Smoker    Smokeless tobacco: Never Used    Alcohol use No      Comment: occ    Drug use: No    Sexual activity: Yes     Partners: Female      Comment: wife     Other Topics Concern    None     Social History Narrative    None       SCREENINGS             PHYSICAL EXAM    (up to 7 for level 4, 8 or more for level 5)     ED Triage Vitals [01/22/19 1105]   BP Temp Temp Source Pulse Resp SpO2 Height Weight   (!) 149/95 98.6 °F (37 °C) Oral 91 15 95 % 5' 6\" (1.676 m) 201 lb 2 oz (91.2 kg)       Physical Exam   Constitutional: He is oriented to person, place, and time. He appears well-developed and well-nourished. HENT:   Head: Normocephalic and atraumatic. Eyes: Conjunctivae are normal. Right eye exhibits no discharge. Left eye exhibits no discharge. Neck: No tracheal deviation present. Cardiovascular: Normal rate. No murmur heard. Pulmonary/Chest: Effort normal. No respiratory distress. Patient has scattered rhonchi in his right middle and right lower lobe and left lower lobe. Breath sounds are present bilaterally. No rales. Abdominal: Soft. He exhibits no distension and no mass. There is no tenderness. There is no guarding. Musculoskeletal: Normal range of motion. Mild tenderness to the patient's right calf. No swelling. No lower extremity swelling. No rash. Neurological: He is alert and oriented to person, place, and time. Skin: Skin is warm and dry. He is not diaphoretic. Psychiatric: He has a normal mood and affect. His behavior is normal.   Nursing note and vitals reviewed.       DIAGNOSTIC RESULTS     EKG: All EKG's are interpreted by the Emergency Department Physician who either signs or Co-signsthis chart in the absence of a cardiologist.        RADIOLOGY:   Non-plain filmimages such as CT, Ultrasound and MRI are read by the radiologist. Plain radiographic images are visualized and preliminarily interpreted by the emergency physician with the below findings:        Interpretation per the Radiologist below, if available at the time ofthis note:    XR CHEST STANDARD (2 VW)   Final Result   1. No acute abnormality. ED BEDSIDE ULTRASOUND:   Performed by ED Physician - none    LABS:  Labs Reviewed   CBC WITH AUTO DIFFERENTIAL - Abnormal; Notable for the following:        Result Value    Lymphocytes # 0.4 (*)     All other components within normal limits    Narrative:     Performed at:  [facility]  [address] [facility] [address]   Phone [phone]   COMPREHENSIVE METABOLIC PANEL - Abnormal; Notable for the following:     CREATININE 0.8 (*)     Calcium 10.7 (*)     All other components within normal limits    Narrative:     Performed at:  [facility]  [address] [facility] [address]   Phone [phone]   CULTURE BLOOD #1   CULTURE BLOOD #2       All other labs were within normal range or not returned as of this dictation. EMERGENCY DEPARTMENT COURSE and DIFFERENTIAL DIAGNOSIS/MDM:   Vitals:    Vitals:    01/22/19 1300 01/22/19 1326 01/22/19 1328 01/22/19 1332   BP: (!) 137/94      Pulse:  90 100 92   Resp:   20    Temp:       TempSrc:       SpO2: 95% 94% 94% 94%   Weight:       Height:               MDM  Number of Diagnoses or Management Options  Abnormal INR:   Bronchitis:   Diagnosis management comments: Workup the patient shows no pneumonia, no pleural effusion, no pneumothorax. At this time I do not suspect clinically pulmonary emboli given his INR is 3.88 today. The patient states the Ceftin is really irritating his throat. He states he's been able to take 3 days of azithromycin without significant changes in his INR. I asked him to hold his dose of Coumadin today. He is, take a 3 day course of azithromycin.   He states he has inhalers at home and

## 2019-01-24 ENCOUNTER — OFFICE VISIT (OUTPATIENT)
Dept: PULMONOLOGY | Age: 59
End: 2019-01-24
Payer: COMMERCIAL

## 2019-01-24 ENCOUNTER — ANTI-COAG VISIT (OUTPATIENT)
Dept: PHARMACY | Age: 59
End: 2019-01-24
Payer: COMMERCIAL

## 2019-01-24 VITALS
WEIGHT: 199.4 LBS | DIASTOLIC BLOOD PRESSURE: 80 MMHG | HEART RATE: 87 BPM | TEMPERATURE: 97.8 F | SYSTOLIC BLOOD PRESSURE: 122 MMHG | BODY MASS INDEX: 32.05 KG/M2 | RESPIRATION RATE: 16 BRPM | HEIGHT: 66 IN | OXYGEN SATURATION: 93 %

## 2019-01-24 DIAGNOSIS — J42 BRONCHIOLITIS OBLITERANS (HCC): ICD-10-CM

## 2019-01-24 DIAGNOSIS — J20.9 ACUTE BRONCHITIS, UNSPECIFIED ORGANISM: Primary | ICD-10-CM

## 2019-01-24 DIAGNOSIS — G47.34 NOCTURNAL HYPOXIA: ICD-10-CM

## 2019-01-24 DIAGNOSIS — R05.9 COUGH: ICD-10-CM

## 2019-01-24 LAB — INR BLD: 1.6

## 2019-01-24 PROCEDURE — 3017F COLORECTAL CA SCREEN DOC REV: CPT | Performed by: INTERNAL MEDICINE

## 2019-01-24 PROCEDURE — G8427 DOCREV CUR MEDS BY ELIG CLIN: HCPCS | Performed by: INTERNAL MEDICINE

## 2019-01-24 PROCEDURE — G8482 FLU IMMUNIZE ORDER/ADMIN: HCPCS | Performed by: INTERNAL MEDICINE

## 2019-01-24 PROCEDURE — 99214 OFFICE O/P EST MOD 30 MIN: CPT | Performed by: INTERNAL MEDICINE

## 2019-01-24 PROCEDURE — G8926 SPIRO NO PERF OR DOC: HCPCS | Performed by: INTERNAL MEDICINE

## 2019-01-24 PROCEDURE — G8598 ASA/ANTIPLAT THER USED: HCPCS | Performed by: INTERNAL MEDICINE

## 2019-01-24 PROCEDURE — 85610 PROTHROMBIN TIME: CPT

## 2019-01-24 PROCEDURE — 3023F SPIROM DOC REV: CPT | Performed by: INTERNAL MEDICINE

## 2019-01-24 PROCEDURE — G8417 CALC BMI ABV UP PARAM F/U: HCPCS | Performed by: INTERNAL MEDICINE

## 2019-01-24 PROCEDURE — 1036F TOBACCO NON-USER: CPT | Performed by: INTERNAL MEDICINE

## 2019-01-24 PROCEDURE — 99211 OFF/OP EST MAY X REQ PHY/QHP: CPT

## 2019-01-27 LAB
BLOOD CULTURE, ROUTINE: NORMAL
CULTURE, BLOOD 2: NORMAL

## 2019-01-28 ENCOUNTER — ANTI-COAG VISIT (OUTPATIENT)
Dept: PHARMACY | Age: 59
End: 2019-01-28
Payer: COMMERCIAL

## 2019-01-28 LAB — INTERNATIONAL NORMALIZATION RATIO, POC: 4.2

## 2019-01-28 PROCEDURE — 99211 OFF/OP EST MAY X REQ PHY/QHP: CPT

## 2019-01-28 PROCEDURE — 85610 PROTHROMBIN TIME: CPT

## 2019-01-29 ENCOUNTER — APPOINTMENT (OUTPATIENT)
Dept: PHARMACY | Age: 59
End: 2019-01-29
Payer: COMMERCIAL

## 2019-02-04 ENCOUNTER — ANTI-COAG VISIT (OUTPATIENT)
Dept: PHARMACY | Age: 59
End: 2019-02-04
Payer: COMMERCIAL

## 2019-02-04 LAB — INTERNATIONAL NORMALIZATION RATIO, POC: 2.3

## 2019-02-04 PROCEDURE — 99211 OFF/OP EST MAY X REQ PHY/QHP: CPT

## 2019-02-04 PROCEDURE — 85610 PROTHROMBIN TIME: CPT

## 2019-02-08 ENCOUNTER — TELEPHONE (OUTPATIENT)
Dept: PHARMACY | Age: 59
End: 2019-02-08

## 2019-02-12 LAB
ANION GAP SERPL CALCULATED.3IONS-SCNC: 13 MMOL/L (ref 3–16)
BUN BLDV-MCNC: 10 MG/DL (ref 7–20)
CALCIUM SERPL-MCNC: 10.5 MG/DL (ref 8.3–10.6)
CHLORIDE BLD-SCNC: 104 MMOL/L (ref 99–110)
CO2: 25 MMOL/L (ref 21–32)
CREAT SERPL-MCNC: 0.9 MG/DL (ref 0.9–1.3)
GFR AFRICAN AMERICAN: >60
GFR NON-AFRICAN AMERICAN: >60
GLUCOSE BLD-MCNC: 105 MG/DL (ref 70–99)
HCT VFR BLD CALC: 45.5 % (ref 40.5–52.5)
HEMOGLOBIN: 15.4 G/DL (ref 13.5–17.5)
MCH RBC QN AUTO: 32.2 PG (ref 26–34)
MCHC RBC AUTO-ENTMCNC: 33.8 G/DL (ref 31–36)
MCV RBC AUTO: 95.5 FL (ref 80–100)
PDW BLD-RTO: 13.5 % (ref 12.4–15.4)
PLATELET # BLD: 185 K/UL (ref 135–450)
PMV BLD AUTO: 8 FL (ref 5–10.5)
POTASSIUM SERPL-SCNC: 3.6 MMOL/L (ref 3.5–5.1)
RBC # BLD: 4.77 M/UL (ref 4.2–5.9)
SODIUM BLD-SCNC: 142 MMOL/L (ref 136–145)
WBC # BLD: 6.5 K/UL (ref 4–11)

## 2019-02-15 RX ORDER — WARFARIN SODIUM 5 MG/1
TABLET ORAL
Qty: 105 TABLET | Refills: 0 | Status: SHIPPED | OUTPATIENT
Start: 2019-02-15 | End: 2019-02-25

## 2019-02-19 ENCOUNTER — ANTI-COAG VISIT (OUTPATIENT)
Dept: PHARMACY | Age: 59
End: 2019-02-19
Payer: COMMERCIAL

## 2019-02-19 DIAGNOSIS — I26.99 OTHER PULMONARY EMBOLISM WITHOUT ACUTE COR PULMONALE, UNSPECIFIED CHRONICITY (HCC): ICD-10-CM

## 2019-02-19 LAB — INTERNATIONAL NORMALIZATION RATIO, POC: 4.4

## 2019-02-19 PROCEDURE — 85610 PROTHROMBIN TIME: CPT

## 2019-02-19 PROCEDURE — 99211 OFF/OP EST MAY X REQ PHY/QHP: CPT

## 2019-02-24 ENCOUNTER — APPOINTMENT (OUTPATIENT)
Dept: GENERAL RADIOLOGY | Age: 59
End: 2019-02-24
Payer: COMMERCIAL

## 2019-02-24 ENCOUNTER — APPOINTMENT (OUTPATIENT)
Dept: CT IMAGING | Age: 59
End: 2019-02-24
Payer: COMMERCIAL

## 2019-02-24 ENCOUNTER — HOSPITAL ENCOUNTER (EMERGENCY)
Age: 59
Discharge: HOME | End: 2019-02-24
Attending: EMERGENCY MEDICINE
Payer: COMMERCIAL

## 2019-02-24 VITALS
TEMPERATURE: 99.8 F | DIASTOLIC BLOOD PRESSURE: 85 MMHG | OXYGEN SATURATION: 94 % | SYSTOLIC BLOOD PRESSURE: 149 MMHG | HEART RATE: 92 BPM | RESPIRATION RATE: 18 BRPM | WEIGHT: 199.52 LBS | BODY MASS INDEX: 32.06 KG/M2 | HEIGHT: 66 IN

## 2019-02-24 DIAGNOSIS — J40 BRONCHITIS: Primary | ICD-10-CM

## 2019-02-24 LAB
ANION GAP SERPL CALCULATED.3IONS-SCNC: 13 MMOL/L (ref 3–16)
BASOPHILS ABSOLUTE: 0 K/UL (ref 0–0.2)
BASOPHILS RELATIVE PERCENT: 0.2 %
BUN BLDV-MCNC: 7 MG/DL (ref 7–20)
CALCIUM SERPL-MCNC: 10.8 MG/DL (ref 8.3–10.6)
CHLORIDE BLD-SCNC: 102 MMOL/L (ref 99–110)
CO2: 24 MMOL/L (ref 21–32)
CREAT SERPL-MCNC: 0.9 MG/DL (ref 0.9–1.3)
EKG ATRIAL RATE: 113 BPM
EKG DIAGNOSIS: NORMAL
EKG P AXIS: 37 DEGREES
EKG P-R INTERVAL: 150 MS
EKG Q-T INTERVAL: 354 MS
EKG QRS DURATION: 142 MS
EKG QTC CALCULATION (BAZETT): 485 MS
EKG R AXIS: -40 DEGREES
EKG T AXIS: 106 DEGREES
EKG VENTRICULAR RATE: 113 BPM
EOSINOPHILS ABSOLUTE: 0 K/UL (ref 0–0.6)
EOSINOPHILS RELATIVE PERCENT: 0.7 %
GFR AFRICAN AMERICAN: >60
GFR NON-AFRICAN AMERICAN: >60
GLUCOSE BLD-MCNC: 95 MG/DL (ref 70–99)
HCT VFR BLD CALC: 46.3 % (ref 40.5–52.5)
HEMOGLOBIN: 15.8 G/DL (ref 13.5–17.5)
INR BLD: 3.62 (ref 0.86–1.14)
LACTIC ACID: 1.2 MMOL/L (ref 0.4–2)
LYMPHOCYTES ABSOLUTE: 0.3 K/UL (ref 1–5.1)
LYMPHOCYTES RELATIVE PERCENT: 4.6 %
MCH RBC QN AUTO: 31.7 PG (ref 26–34)
MCHC RBC AUTO-ENTMCNC: 34 G/DL (ref 31–36)
MCV RBC AUTO: 93.2 FL (ref 80–100)
MONOCYTES ABSOLUTE: 0.5 K/UL (ref 0–1.3)
MONOCYTES RELATIVE PERCENT: 6.7 %
NEUTROPHILS ABSOLUTE: 6.1 K/UL (ref 1.7–7.7)
NEUTROPHILS RELATIVE PERCENT: 87.8 %
PDW BLD-RTO: 13 % (ref 12.4–15.4)
PLATELET # BLD: 144 K/UL (ref 135–450)
PMV BLD AUTO: 7.1 FL (ref 5–10.5)
POTASSIUM REFLEX MAGNESIUM: 4.2 MMOL/L (ref 3.5–5.1)
PRO-BNP: 88 PG/ML (ref 0–124)
PROTHROMBIN TIME: 41.3 SEC (ref 9.8–13)
RAPID INFLUENZA  B AGN: NEGATIVE
RAPID INFLUENZA A AGN: NEGATIVE
RBC # BLD: 4.97 M/UL (ref 4.2–5.9)
S PYO AG THROAT QL: NEGATIVE
SODIUM BLD-SCNC: 139 MMOL/L (ref 136–145)
TROPONIN: <0.01 NG/ML
WBC # BLD: 6.9 K/UL (ref 4–11)

## 2019-02-24 PROCEDURE — 93005 ELECTROCARDIOGRAM TRACING: CPT | Performed by: EMERGENCY MEDICINE

## 2019-02-24 PROCEDURE — 6360000004 HC RX CONTRAST MEDICATION: Performed by: EMERGENCY MEDICINE

## 2019-02-24 PROCEDURE — 71260 CT THORAX DX C+: CPT

## 2019-02-24 PROCEDURE — 71045 X-RAY EXAM CHEST 1 VIEW: CPT

## 2019-02-24 PROCEDURE — 85610 PROTHROMBIN TIME: CPT

## 2019-02-24 PROCEDURE — 96360 HYDRATION IV INFUSION INIT: CPT

## 2019-02-24 PROCEDURE — 6370000000 HC RX 637 (ALT 250 FOR IP): Performed by: EMERGENCY MEDICINE

## 2019-02-24 PROCEDURE — 87804 INFLUENZA ASSAY W/OPTIC: CPT

## 2019-02-24 PROCEDURE — 85025 COMPLETE CBC W/AUTO DIFF WBC: CPT

## 2019-02-24 PROCEDURE — 87880 STREP A ASSAY W/OPTIC: CPT

## 2019-02-24 PROCEDURE — 71275 CT ANGIOGRAPHY CHEST: CPT

## 2019-02-24 PROCEDURE — 93010 ELECTROCARDIOGRAM REPORT: CPT | Performed by: INTERNAL MEDICINE

## 2019-02-24 PROCEDURE — 83880 ASSAY OF NATRIURETIC PEPTIDE: CPT

## 2019-02-24 PROCEDURE — 84484 ASSAY OF TROPONIN QUANT: CPT

## 2019-02-24 PROCEDURE — 87081 CULTURE SCREEN ONLY: CPT

## 2019-02-24 PROCEDURE — 99285 EMERGENCY DEPT VISIT HI MDM: CPT

## 2019-02-24 PROCEDURE — 80048 BASIC METABOLIC PNL TOTAL CA: CPT

## 2019-02-24 PROCEDURE — 83605 ASSAY OF LACTIC ACID: CPT

## 2019-02-24 PROCEDURE — 2580000003 HC RX 258: Performed by: EMERGENCY MEDICINE

## 2019-02-24 RX ORDER — LEVOFLOXACIN 750 MG/1
750 TABLET ORAL DAILY
Qty: 4 TABLET | Refills: 0 | Status: ON HOLD | OUTPATIENT
Start: 2019-02-24 | End: 2019-03-01 | Stop reason: HOSPADM

## 2019-02-24 RX ORDER — OXYCODONE HYDROCHLORIDE AND ACETAMINOPHEN 5; 325 MG/1; MG/1
1 TABLET ORAL ONCE
Status: COMPLETED | OUTPATIENT
Start: 2019-02-24 | End: 2019-02-24

## 2019-02-24 RX ORDER — LEVOFLOXACIN 500 MG/1
500 TABLET, FILM COATED ORAL ONCE
Status: DISCONTINUED | OUTPATIENT
Start: 2019-02-24 | End: 2019-02-24

## 2019-02-24 RX ORDER — ASPIRIN 81 MG/1
324 TABLET, CHEWABLE ORAL ONCE
Status: DISCONTINUED | OUTPATIENT
Start: 2019-02-24 | End: 2019-02-24

## 2019-02-24 RX ORDER — 0.9 % SODIUM CHLORIDE 0.9 %
1000 INTRAVENOUS SOLUTION INTRAVENOUS ONCE
Status: COMPLETED | OUTPATIENT
Start: 2019-02-24 | End: 2019-02-24

## 2019-02-24 RX ADMIN — OXYCODONE AND ACETAMINOPHEN 1 TABLET: 5; 325 TABLET ORAL at 18:04

## 2019-02-24 RX ADMIN — IOPAMIDOL 75 ML: 755 INJECTION, SOLUTION INTRAVENOUS at 17:55

## 2019-02-24 RX ADMIN — SODIUM CHLORIDE 1000 ML: 9 INJECTION, SOLUTION INTRAVENOUS at 18:04

## 2019-02-24 ASSESSMENT — ENCOUNTER SYMPTOMS
EYE ITCHING: 0
RECTAL PAIN: 0
EYE DISCHARGE: 0
BLOOD IN STOOL: 0
ABDOMINAL PAIN: 0
CHOKING: 0
APNEA: 0
NAUSEA: 0
ABDOMINAL DISTENTION: 0
STRIDOR: 0
CHEST TIGHTNESS: 1
SHORTNESS OF BREATH: 0
PHOTOPHOBIA: 0
BACK PAIN: 0
COLOR CHANGE: 0
COUGH: 1
EYE PAIN: 0
EYE REDNESS: 0
VOMITING: 0
DIARRHEA: 0
CONSTIPATION: 0
WHEEZING: 0
ANAL BLEEDING: 0

## 2019-02-24 ASSESSMENT — PAIN DESCRIPTION - LOCATION
LOCATION: CHEST
LOCATION: CHEST

## 2019-02-24 ASSESSMENT — PAIN DESCRIPTION - PAIN TYPE
TYPE: ACUTE PAIN
TYPE: ACUTE PAIN

## 2019-02-24 ASSESSMENT — PAIN SCALES - GENERAL
PAINLEVEL_OUTOF10: 9

## 2019-02-24 ASSESSMENT — PAIN DESCRIPTION - DESCRIPTORS: DESCRIPTORS: SHARP

## 2019-02-24 NOTE — ED NOTES
In pt room to give aspirin. Pt states he can only have 81mg aspirin a day. Notified Dr. Omar Vizcarra.      Bharti Dumont RN  02/24/19 3161

## 2019-02-24 NOTE — ED NOTES
Bed: [facility]  Expected date:   Expected time:   Means of arrival:   Comments:  [address] RN  02/24/19 6396

## 2019-02-24 NOTE — ED PROVIDER NOTES
11 [facility]  eMERGENCY dEPARTMENT eNCOUnter      Pt Name: Sue Golden  MRN: [id number]  [place] 1960  Date of evaluation: 2/24/2019  Provider: Ana Nolan MD    CHIEF COMPLAINT       Chief Complaint   Patient presents with    Fever and cough            HISTORY OF PRESENT ILLNESS    Sue Golden is a 60 y.o. male who presents to the emergency department with cough and fever. Patient endorses 2 day history productive cough and congestion with pleuritic chest pain (pain on inspiration). Has not been taking anything for pain. + for some SOB as well. No leg swelling or orthopnea. Per  wife has URI which she thinks he caught. + for fevers at home. No other associated symptoms. Nursing Notes were reviewed. REVIEW OF SYSTEMS       Review of Systems   Constitutional: Negative for activity change, appetite change, chills, diaphoresis, fatigue, fever and unexpected weight change. HENT: Negative for congestion, dental problem, drooling, ear discharge and ear pain. Eyes: Negative for photophobia, pain, discharge, redness, itching and visual disturbance. Respiratory: Positive for cough and chest tightness. Negative for apnea, choking, shortness of breath, wheezing and stridor. Cardiovascular: Negative for chest pain, palpitations and leg swelling. Gastrointestinal: Negative for abdominal distention, abdominal pain, anal bleeding, blood in stool, constipation, diarrhea, nausea, rectal pain and vomiting. Endocrine: Negative for cold intolerance and heat intolerance. Genitourinary: Negative for decreased urine volume and urgency. Musculoskeletal: Negative for arthralgias and back pain. Skin: Negative for color change and pallor. Neurological: Negative for dizziness and facial asymmetry. Hematological: Negative for adenopathy. Does not bruise/bleed easily.    Psychiatric/Behavioral: Negative for agitation, behavioral problems, confusion and decreased concentration. Except as noted above the remainder of the review of systems was reviewed and negative. PAST MEDICAL HISTORY     Past Medical History:   Diagnosis Date    Bronchiolitis obliterans ([address]     Bundle branch block, right     Cardiomyopathy ([address]     Fibromyalgia     GERD (gastroesophageal reflux disease)     Hepatitis 1979    unsure of which type    Hx of blood clots     Hyperlipemia     Hypertension     IBS (irritable bowel syndrome)     Kidney stone     over thirty kidney stones    Neuropathy     right side-chest    Pneumothorax 2011    Right    Prostatitis     Pulmonary embolism on right [facility]) 2011    upper lobe-coumadin 2011    Sacroiliitis [facility])        SURGICAL HISTORY       Past Surgical History:   Procedure Laterality Date    CHOLECYSTECTOMY  2007    COLONOSCOPY  9/21/2012    dr Jay Izquierdo  2015    LITHOTRIPSY     [address] opening larger in sinuses    UPPER GASTROINTESTINAL ENDOSCOPY  3/28/2014    dr Caden Harkins       Previous Medications    ALBUTEROL (PROAIR HFA) 108 (90 BASE) MCG/ACT INHALER    Inhale 2 puffs into the lungs every 6 hours as needed.       ALBUTEROL (PROVENTIL) (2.5 MG/3ML) 0.083% NEBULIZER SOLUTION    USE 3 ML VIA NEBULIZER THREE TIMES DAILY    AZELASTINE (ASTELIN) 0.1 % NASAL SPRAY    2 sprays by Nasal route 2 times daily Use in each nostril as directed    CHOLECALCIFEROL (VITAMIN D3) 47564 UNITS CAPS    Take 1 capsule by mouth once a week    DEXILANT 30 MG CPDR DELAYED RELEASE CAPSULE    TAKE 1 CAPSULE BY MOUTH  DAILY    DICYCLOMINE (BENTYL) 10 MG CAPSULE    TAKE ONE CAPSULE BY MOUTH  FOUR TIMES DAILY AS NEEDED    FLAVOXATE (URISPAS) 100 MG TABLET    TAKE 1 TABLET BY MOUTH THREE TIMES DAILY AS NEEDED FOR URINARY SPASM    FLUTICASONE (FLONASE) 50 MCG/ACT NASAL SPRAY    1 spray by Nasal route daily    HYDROCORTISONE (ANUSOL-HC) 25 MG SUPPOSITORY    Place 1 suppository rectally every 12 hours    METOPROLOL SUCCINATE (TOPROL XL) 25 MG EXTENDED RELEASE TABLET    TAKE 1 TABLET BY MOUTH TWO  TIMES DAILY    ONDANSETRON (ZOFRAN ODT) 4 MG DISINTEGRATING TABLET    Take 1-2 tablets by mouth every 8 hours as needed for Nausea May Sub regular tablet (non-ODT) if insurance does not cover ODT. PHENAZOPYRIDINE (PYRIDIUM) 100 MG TABLET    Take 1 tablet by mouth 3 times daily as needed for Pain    PRAVASTATIN (PRAVACHOL) 40 MG TABLET    TAKE 1 TABLET BY MOUTH  DAILY    PREDNISONE (DELTASONE) 10 MG TABLET    1 po bid x 5 days then 1 po qam    PREGABALIN (LYRICA) 75 MG CAPSULE    TAKE ONE CAPSULE BY MOUTH EVERY MORNING AND TAKE 2 CAPSULES BY MOUTH EVERY EVENING. PROCTOZONE-HC 2.5 % RECTAL CREAM    USE RECTALLY TWICE DAILY    RESPIRATORY THERAPY SUPPLIES (NEBULIZER/TUBING/MOUTHPIECE) KIT    1 kit by Does not apply route 3 times daily    SACUBITRIL-VALSARTAN (ENTRESTO) 24-26 MG PER TABLET    Take 2 tablets in the morning and 1 tablet in the evening    TAMSULOSIN (FLOMAX) 0.4 MG CAPSULE    TAKE ONE CAPSULE BY MOUTH TWICE DAILY    WARFARIN (COUMADIN) 5 MG TABLET    Take 5 mg by mouth daily Except 2.5 mg every Tuesday and Thursday or as directed by [facility] Coumadin Service [phone]    WARFARIN (COUMADIN) 5 MG TABLET    TAKE 1 TABLET BY MOUTH EVERY DAY EXCEPT MONDAY AND FRIDAY, TAKE 1 1/2 TABLET    ZOLPIDEM (AMBIEN) 10 MG TABLET    TAKE 1 TABLET BY MOUTH EVERY NIGHT AT BEDTIME. ALLERGIES     Atrovent nasal spray [ipratropium];  Doxycycline; Morphine; Nitroglycerin; Sulfa antibiotics; and Vicodin [hydrocodone-acetaminophen]    FAMILY HISTORY       Family History   Problem Relation Age of Onset    High Blood Pressure Mother     Dementia Mother     Cancer Father         Pancreatic Ca       SOCIAL HISTORY       Social History     Social History    Marital status:      Spouse name: N/A    Number of children: N/A    Years of education: N/A     Social History Main Topics  Smoking status: Never Smoker    Smokeless tobacco: Never Used    Alcohol use No      Comment: occ    Drug use: No    Sexual activity: Yes     Partners: Female      Comment: wife     Other Topics Concern    Not on file     Social History Narrative    No narrative on file       PHYSICAL EXAM       ED Triage Vitals   BP Temp Temp Source Pulse Resp SpO2 Height Weight   02/24/19 1612 02/24/19 1612 02/24/19 1612 02/24/19 1612 02/24/19 1612 02/24/19 1612 02/24/19 1612 02/24/19 1619   (!) 151/86 100.9 °F (38.3 °C) Oral 113 21 92 % 5' 6\" (1.676 m) 199 lb 8.3 oz (90.5 kg)     Vitals:    02/24/19 1619 02/24/19 1835 02/24/19 1900 02/24/19 1940   BP:    (!) 149/85   Pulse:  103 99 92   Resp:  20 17 18   Temp:    99.8 °F (37.7 °C)   TempSrc:    Oral   SpO2:  94% 93% 94%   Weight: 199 lb 8.3 oz (90.5 kg)      Height:           Physical Exam   Constitutional: He appears well-developed. No distress. HENT:   Head: Normocephalic and atraumatic. Eyes: Pupils are equal, round, and reactive to light. Right eye exhibits no discharge. Left eye exhibits no discharge. Neck: Normal range of motion. No tracheal deviation present. No thyromegaly present. Cardiovascular: Regular rhythm. Tachycardia present. No murmur heard. Pulmonary/Chest: He has no wheezes. He has no rales. He exhibits no tenderness. Abdominal: Soft. He exhibits no distension and no mass. There is no tenderness. There is no rebound and no guarding. Musculoskeletal: Normal range of motion. He exhibits no edema, tenderness or deformity. Neurological: He is alert. No cranial nerve deficit. He exhibits normal muscle tone. Coordination normal.   Skin: Skin is warm. No rash noted. He is not diaphoretic. No erythema. No pallor.      DIAGNOSTIC RESULTS     EKG: All EKG's are interpreted by the Emergency Department Physician who either signs or Co-signs this chart in the absence of acardiologist.    EKG shows sinus tachycardia with LAD LBBB similar EKG when compared to old EKGs    RADIOLOGY:   Non-plain film images such as CT, Ultrasoundand MRI are read by the radiologist. Plain radiographic images are visualized and preliminarily interpreted by the emergency physician with the below findings:    CTA shows no evidence of PE or acute pulmonary process     ED BEDSIDE ULTRASOUND:   Performed by ED Physician - none    LABS:  Labs Reviewed   CBC WITH AUTO DIFFERENTIAL - Abnormal; Notable for the following:        Result Value    Lymphocytes # 0.3 (*)     All other components within normal limits    Narrative:     Performed at:  [address] [employer] [address]   Phone [phone]   BASIC METABOLIC PANEL W/ REFLEX TO MG FOR LOW K - Abnormal; Notable for the following:     Calcium 10.8 (*)     All other components within normal limits    Narrative:     Performed at:  [address] [facility] [address]   Phone [phone]   PROTIME-INR - Abnormal; Notable for the following:     Protime 41.3 (*)     INR 3.62 (*)     All other components within normal limits    Narrative:     Performed at:  [facility]  [address] [facility] [employer] [address]   Phone [phone]   RAPID INFLUENZA A/B ANTIGENS    Narrative:     Performed at:  [facility]  [address] [facility] [employer] [address]   Phone [phone] THROAT    Narrative:     Performed at:  [place] [address] [facility] [employer] [address]   Phone [phone]   TROPONIN    Narrative:     Performed at:  [address] [place] [address] Laboratory  [address] [facility] [employer] [address]   Phone [phone]   LACTIC ACID, PLASMA    Narrative:     Performed at:  [place] [address] [facility] [employer] [address]   Phone [phone] NATRIURETIC PEPTIDE    Narrative:     Performed at:  [facility]  [address] [facility] [place] [address]   Phone [phone]   CULTURE BETA STREP CONFIRM PLATE       All other labs were withinnormal range or not returned as of this dictation. EMERGENCY DEPARTMENT COURSE and DIFFERENTIAL DIAGNOSIS/MDM:     Patient is 100% on room air, no wheezing noted with good air movement. Fluids given with improvment in HR. Percocet given per patient request for pain in ER. INR 3.62, patient told to hold his next dose of coumadin. No active bleeding. Troponin negative, chest pain pleuritic associated with cough, do not think this is ACS in origin. Influenza and strep negative. Patient on Levaquin currently and prednisone. To continue both Levaquin and prednisone for concern for bronchitis, fevers mots likely from URI and tachycardia markedly improved prior to DC. To follow up with pulm and PCP in 1-2 days. I discussed with patient the results of evaluation in the ED, diagnosis, care, and prognosis. The plan is to discharge to home. Patient is in agreement with plan and questions have been answered.      I also discussed with patient the reasons which may require a return visit and the importance of follow-up care. The patient is well-appearing, nontoxic, and improved at the time of discharge. Patient agrees to call to arrange follow-up care as directed. Patient understands to return immediately for worsening/change in symptoms. CRITICAL CARE TIME   Total Critical Caretime was 0 minutes, excluding separately reportable procedures. There was a high probability of clinically significant/life threatening deterioration in the patient's condition which required my urgent intervention. PROCEDURES:  Unlessotherwise noted below, none    FINAL IMPRESSION      1.  Bronchitis          DISPOSITION/PLAN   DISPOSITION      PATIENT REFERRED TO:  Sri Iverson MD  [address] [facility] [address]  [phone]            DISCHARGE MEDICATIONS:  Discharge Medication List as of 2/24/2019  7:13 PM      START taking these medications    Details   levofloxacin (LEVAQUIN) 750 MG tablet Take 1 tablet by mouth daily for 4 days, Disp-4 tablet, R-0Print                (Please note that portions ofthis note were completed with a voice recognition program.  Efforts were made to edit the dictations but occasionally words are mis-transcribed.)    Quintin Durant MD(electronically signed)  Attending Emergency Physician          Quintin Durant MD  02/24/19 2710

## 2019-02-25 ENCOUNTER — HOSPITAL ENCOUNTER (INPATIENT)
Age: 59
LOS: 4 days | Discharge: HOME | DRG: 194 | End: 2019-03-01
Attending: EMERGENCY MEDICINE | Admitting: INTERNAL MEDICINE
Payer: COMMERCIAL

## 2019-02-25 ENCOUNTER — APPOINTMENT (OUTPATIENT)
Dept: GENERAL RADIOLOGY | Age: 59
DRG: 194 | End: 2019-02-25
Payer: COMMERCIAL

## 2019-02-25 DIAGNOSIS — J40 BRONCHITIS: ICD-10-CM

## 2019-02-25 DIAGNOSIS — J18.9 PNEUMONIA DUE TO ORGANISM: Primary | ICD-10-CM

## 2019-02-25 DIAGNOSIS — A41.9 SEPSIS, DUE TO UNSPECIFIED ORGANISM: ICD-10-CM

## 2019-02-25 LAB
ANION GAP SERPL CALCULATED.3IONS-SCNC: 11 MMOL/L (ref 3–16)
BASOPHILS ABSOLUTE: 0 K/UL (ref 0–0.2)
BASOPHILS RELATIVE PERCENT: 0.2 %
BILIRUBIN URINE: NEGATIVE
BLOOD, URINE: ABNORMAL
BUN BLDV-MCNC: 9 MG/DL (ref 7–20)
CALCIUM SERPL-MCNC: 10.6 MG/DL (ref 8.3–10.6)
CHLORIDE BLD-SCNC: 101 MMOL/L (ref 99–110)
CLARITY: ABNORMAL
CO2: 24 MMOL/L (ref 21–32)
COLOR: YELLOW
CREAT SERPL-MCNC: 1 MG/DL (ref 0.9–1.3)
EOSINOPHILS ABSOLUTE: 0 K/UL (ref 0–0.6)
EOSINOPHILS RELATIVE PERCENT: 0 %
EPITHELIAL CELLS, UA: 0 /HPF (ref 0–5)
GFR AFRICAN AMERICAN: >60
GFR NON-AFRICAN AMERICAN: >60
GLUCOSE BLD-MCNC: 103 MG/DL (ref 70–99)
GLUCOSE URINE: NEGATIVE MG/DL
HCT VFR BLD CALC: 45.5 % (ref 40.5–52.5)
HEMOGLOBIN: 15.4 G/DL (ref 13.5–17.5)
HYALINE CASTS: 1 /LPF (ref 0–8)
KETONES, URINE: NEGATIVE MG/DL
LACTIC ACID: 0.8 MMOL/L (ref 0.4–2)
LEUKOCYTE ESTERASE, URINE: NEGATIVE
LYMPHOCYTES ABSOLUTE: 0.5 K/UL (ref 1–5.1)
LYMPHOCYTES RELATIVE PERCENT: 11 %
MCH RBC QN AUTO: 31.4 PG (ref 26–34)
MCHC RBC AUTO-ENTMCNC: 33.9 G/DL (ref 31–36)
MCV RBC AUTO: 92.7 FL (ref 80–100)
MICROSCOPIC EXAMINATION: YES
MONOCYTES ABSOLUTE: 0.6 K/UL (ref 0–1.3)
MONOCYTES RELATIVE PERCENT: 13.2 %
NEUTROPHILS ABSOLUTE: 3.3 K/UL (ref 1.7–7.7)
NEUTROPHILS RELATIVE PERCENT: 75.6 %
NITRITE, URINE: NEGATIVE
PDW BLD-RTO: 13.1 % (ref 12.4–15.4)
PH UA: 6
PLATELET # BLD: 125 K/UL (ref 135–450)
PMV BLD AUTO: 7.1 FL (ref 5–10.5)
POTASSIUM SERPL-SCNC: 4.1 MMOL/L (ref 3.5–5.1)
PROTEIN UA: ABNORMAL MG/DL
RBC # BLD: 4.91 M/UL (ref 4.2–5.9)
RBC UA: 266 /HPF (ref 0–4)
SODIUM BLD-SCNC: 136 MMOL/L (ref 136–145)
SPECIFIC GRAVITY UA: 1.02
URINE REFLEX TO CULTURE: ABNORMAL
URINE TYPE: ABNORMAL
UROBILINOGEN, URINE: 0.2 E.U./DL
WBC # BLD: 4.4 K/UL (ref 4–11)
WBC UA: 1 /HPF (ref 0–5)

## 2019-02-25 PROCEDURE — 99223 1ST HOSP IP/OBS HIGH 75: CPT | Performed by: INTERNAL MEDICINE

## 2019-02-25 PROCEDURE — 96365 THER/PROPH/DIAG IV INF INIT: CPT

## 2019-02-25 PROCEDURE — 80048 BASIC METABOLIC PNL TOTAL CA: CPT

## 2019-02-25 PROCEDURE — 85025 COMPLETE CBC W/AUTO DIFF WBC: CPT

## 2019-02-25 PROCEDURE — 71046 X-RAY EXAM CHEST 2 VIEWS: CPT

## 2019-02-25 PROCEDURE — 36415 COLL VENOUS BLD VENIPUNCTURE: CPT

## 2019-02-25 PROCEDURE — 6360000002 HC RX W HCPCS: Performed by: INTERNAL MEDICINE

## 2019-02-25 PROCEDURE — 2060000000 HC ICU INTERMEDIATE R&B

## 2019-02-25 PROCEDURE — 6360000002 HC RX W HCPCS: Performed by: NURSE PRACTITIONER

## 2019-02-25 PROCEDURE — 2580000003 HC RX 258: Performed by: INTERNAL MEDICINE

## 2019-02-25 PROCEDURE — 87040 BLOOD CULTURE FOR BACTERIA: CPT

## 2019-02-25 PROCEDURE — 99285 EMERGENCY DEPT VISIT HI MDM: CPT

## 2019-02-25 PROCEDURE — 2580000003 HC RX 258: Performed by: NURSE PRACTITIONER

## 2019-02-25 PROCEDURE — 2700000000 HC OXYGEN THERAPY PER DAY

## 2019-02-25 PROCEDURE — 94762 N-INVAS EAR/PLS OXIMTRY CONT: CPT

## 2019-02-25 PROCEDURE — 94640 AIRWAY INHALATION TREATMENT: CPT

## 2019-02-25 PROCEDURE — 96375 TX/PRO/DX INJ NEW DRUG ADDON: CPT

## 2019-02-25 PROCEDURE — 6370000000 HC RX 637 (ALT 250 FOR IP): Performed by: NURSE PRACTITIONER

## 2019-02-25 PROCEDURE — 93010 ELECTROCARDIOGRAM REPORT: CPT | Performed by: INTERNAL MEDICINE

## 2019-02-25 PROCEDURE — 96368 THER/DIAG CONCURRENT INF: CPT

## 2019-02-25 PROCEDURE — 6370000000 HC RX 637 (ALT 250 FOR IP): Performed by: INTERNAL MEDICINE

## 2019-02-25 PROCEDURE — 93005 ELECTROCARDIOGRAM TRACING: CPT | Performed by: EMERGENCY MEDICINE

## 2019-02-25 PROCEDURE — 83605 ASSAY OF LACTIC ACID: CPT

## 2019-02-25 PROCEDURE — 81001 URINALYSIS AUTO W/SCOPE: CPT

## 2019-02-25 RX ORDER — SODIUM CHLORIDE 0.9 % (FLUSH) 0.9 %
10 SYRINGE (ML) INJECTION PRN
Status: DISCONTINUED | OUTPATIENT
Start: 2019-02-25 | End: 2019-03-01 | Stop reason: HOSPADM

## 2019-02-25 RX ORDER — ONDANSETRON 2 MG/ML
4 INJECTION INTRAMUSCULAR; INTRAVENOUS EVERY 6 HOURS PRN
Status: DISCONTINUED | OUTPATIENT
Start: 2019-02-25 | End: 2019-03-01 | Stop reason: HOSPADM

## 2019-02-25 RX ORDER — OXYCODONE HYDROCHLORIDE 5 MG/1
5 TABLET ORAL ONCE
Status: COMPLETED | OUTPATIENT
Start: 2019-02-25 | End: 2019-02-25

## 2019-02-25 RX ORDER — DEXLANSOPRAZOLE 30 MG/1
60 CAPSULE, DELAYED RELEASE ORAL DAILY
Status: DISCONTINUED | OUTPATIENT
Start: 2019-02-25 | End: 2019-02-25 | Stop reason: CLARIF

## 2019-02-25 RX ORDER — ALBUTEROL SULFATE 2.5 MG/3ML
2.5 SOLUTION RESPIRATORY (INHALATION)
Status: DISCONTINUED | OUTPATIENT
Start: 2019-02-25 | End: 2019-03-01 | Stop reason: HOSPADM

## 2019-02-25 RX ORDER — METOPROLOL SUCCINATE 25 MG/1
25 TABLET, EXTENDED RELEASE ORAL 2 TIMES DAILY
Status: DISCONTINUED | OUTPATIENT
Start: 2019-02-25 | End: 2019-03-01 | Stop reason: HOSPADM

## 2019-02-25 RX ORDER — SODIUM CHLORIDE 9 MG/ML
INJECTION, SOLUTION INTRAVENOUS CONTINUOUS
Status: DISCONTINUED | OUTPATIENT
Start: 2019-02-25 | End: 2019-02-27

## 2019-02-25 RX ORDER — OXYCODONE HYDROCHLORIDE AND ACETAMINOPHEN 5; 325 MG/1; MG/1
1 TABLET ORAL EVERY 6 HOURS PRN
COMMUNITY
End: 2019-03-08 | Stop reason: SDUPTHER

## 2019-02-25 RX ORDER — PRAVASTATIN SODIUM 10 MG
40 TABLET ORAL NIGHTLY
Status: DISCONTINUED | OUTPATIENT
Start: 2019-02-25 | End: 2019-03-01 | Stop reason: HOSPADM

## 2019-02-25 RX ORDER — IPRATROPIUM BROMIDE AND ALBUTEROL SULFATE 2.5; .5 MG/3ML; MG/3ML
1 SOLUTION RESPIRATORY (INHALATION)
Status: DISCONTINUED | OUTPATIENT
Start: 2019-02-25 | End: 2019-02-25

## 2019-02-25 RX ORDER — METHYLPREDNISOLONE SODIUM SUCCINATE 40 MG/ML
40 INJECTION, POWDER, LYOPHILIZED, FOR SOLUTION INTRAMUSCULAR; INTRAVENOUS EVERY 8 HOURS
Status: DISCONTINUED | OUTPATIENT
Start: 2019-02-25 | End: 2019-02-27

## 2019-02-25 RX ORDER — ALBUTEROL SULFATE 2.5 MG/3ML
2.5 SOLUTION RESPIRATORY (INHALATION)
Status: DISCONTINUED | OUTPATIENT
Start: 2019-02-25 | End: 2019-02-25 | Stop reason: ALTCHOICE

## 2019-02-25 RX ORDER — PREDNISONE 20 MG/1
60 TABLET ORAL ONCE
Status: COMPLETED | OUTPATIENT
Start: 2019-02-25 | End: 2019-02-25

## 2019-02-25 RX ORDER — FAMOTIDINE 20 MG/1
20 TABLET, FILM COATED ORAL 2 TIMES DAILY
Status: DISCONTINUED | OUTPATIENT
Start: 2019-02-25 | End: 2019-02-26 | Stop reason: ALTCHOICE

## 2019-02-25 RX ORDER — PANTOPRAZOLE SODIUM 40 MG/1
40 TABLET, DELAYED RELEASE ORAL
Status: DISCONTINUED | OUTPATIENT
Start: 2019-02-26 | End: 2019-02-26

## 2019-02-25 RX ORDER — AZELASTINE 1 MG/ML
2 SPRAY, METERED NASAL 2 TIMES DAILY
Status: DISCONTINUED | OUTPATIENT
Start: 2019-02-25 | End: 2019-03-01 | Stop reason: HOSPADM

## 2019-02-25 RX ORDER — OXYCODONE HYDROCHLORIDE 5 MG/1
5 TABLET ORAL
Status: DISCONTINUED | OUTPATIENT
Start: 2019-02-25 | End: 2019-02-26

## 2019-02-25 RX ORDER — ZOLPIDEM TARTRATE 5 MG/1
10 TABLET ORAL NIGHTLY PRN
Status: DISCONTINUED | OUTPATIENT
Start: 2019-02-25 | End: 2019-03-01 | Stop reason: HOSPADM

## 2019-02-25 RX ORDER — PREGABALIN 75 MG/1
150 CAPSULE ORAL 2 TIMES DAILY
Status: DISCONTINUED | OUTPATIENT
Start: 2019-02-25 | End: 2019-03-01 | Stop reason: HOSPADM

## 2019-02-25 RX ORDER — SODIUM CHLORIDE 0.9 % (FLUSH) 0.9 %
10 SYRINGE (ML) INJECTION EVERY 12 HOURS SCHEDULED
Status: DISCONTINUED | OUTPATIENT
Start: 2019-02-25 | End: 2019-03-01 | Stop reason: HOSPADM

## 2019-02-25 RX ORDER — WARFARIN SODIUM 5 MG/1
5 TABLET ORAL DAILY
Status: DISCONTINUED | OUTPATIENT
Start: 2019-02-25 | End: 2019-02-25 | Stop reason: DRUGHIGH

## 2019-02-25 RX ORDER — ACETAMINOPHEN 500 MG
1000 TABLET ORAL ONCE
Status: DISCONTINUED | OUTPATIENT
Start: 2019-02-25 | End: 2019-03-01 | Stop reason: HOSPADM

## 2019-02-25 RX ORDER — OXYCODONE HYDROCHLORIDE AND ACETAMINOPHEN 5; 325 MG/1; MG/1
1 TABLET ORAL EVERY 6 HOURS PRN
Status: DISCONTINUED | OUTPATIENT
Start: 2019-02-25 | End: 2019-02-25

## 2019-02-25 RX ADMIN — OXYCODONE HYDROCHLORIDE 5 MG: 5 TABLET ORAL at 16:57

## 2019-02-25 RX ADMIN — Medication 10 ML: at 21:42

## 2019-02-25 RX ADMIN — PREDNISONE 60 MG: 20 TABLET ORAL at 15:26

## 2019-02-25 RX ADMIN — METHYLPREDNISOLONE SODIUM SUCCINATE 40 MG: 40 INJECTION, POWDER, FOR SOLUTION INTRAMUSCULAR; INTRAVENOUS at 20:42

## 2019-02-25 RX ADMIN — AZITHROMYCIN MONOHYDRATE 500 MG: 500 INJECTION, POWDER, LYOPHILIZED, FOR SOLUTION INTRAVENOUS at 15:26

## 2019-02-25 RX ADMIN — PRAVASTATIN SODIUM 40 MG: 10 TABLET ORAL at 20:37

## 2019-02-25 RX ADMIN — ALBUTEROL SULFATE 2.5 MG: 2.5 SOLUTION RESPIRATORY (INHALATION) at 19:58

## 2019-02-25 RX ADMIN — FAMOTIDINE 20 MG: 20 TABLET, FILM COATED ORAL at 21:28

## 2019-02-25 RX ADMIN — SODIUM CHLORIDE: 9 INJECTION, SOLUTION INTRAVENOUS at 20:37

## 2019-02-25 RX ADMIN — METOPROLOL SUCCINATE 25 MG: 25 TABLET, EXTENDED RELEASE ORAL at 21:28

## 2019-02-25 RX ADMIN — AZELASTINE HYDROCHLORIDE 2 SPRAY: 137 SPRAY, METERED NASAL at 21:40

## 2019-02-25 RX ADMIN — OXYCODONE HYDROCHLORIDE 5 MG: 5 TABLET ORAL at 21:28

## 2019-02-25 RX ADMIN — SACUBITRIL AND VALSARTAN 1 TABLET: 24; 26 TABLET, FILM COATED ORAL at 21:40

## 2019-02-25 RX ADMIN — CEFTRIAXONE 1 G: 1 INJECTION, POWDER, FOR SOLUTION INTRAMUSCULAR; INTRAVENOUS at 15:26

## 2019-02-25 RX ADMIN — PREGABALIN 150 MG: 75 CAPSULE ORAL at 21:28

## 2019-02-25 ASSESSMENT — PAIN DESCRIPTION - PROGRESSION: CLINICAL_PROGRESSION: GRADUALLY WORSENING

## 2019-02-25 ASSESSMENT — ENCOUNTER SYMPTOMS
COUGH: 1
GASTROINTESTINAL NEGATIVE: 1
SHORTNESS OF BREATH: 1

## 2019-02-25 ASSESSMENT — PAIN DESCRIPTION - ONSET: ONSET: ON-GOING

## 2019-02-25 ASSESSMENT — PAIN SCALES - GENERAL
PAINLEVEL_OUTOF10: 6
PAINLEVEL_OUTOF10: 6
PAINLEVEL_OUTOF10: 3
PAINLEVEL_OUTOF10: 9

## 2019-02-25 ASSESSMENT — PAIN DESCRIPTION - DESCRIPTORS: DESCRIPTORS: ACHING

## 2019-02-25 ASSESSMENT — PAIN - FUNCTIONAL ASSESSMENT: PAIN_FUNCTIONAL_ASSESSMENT: PREVENTS OR INTERFERES SOME ACTIVE ACTIVITIES AND ADLS

## 2019-02-25 ASSESSMENT — PAIN DESCRIPTION - LOCATION: LOCATION: BACK

## 2019-02-25 ASSESSMENT — PAIN DESCRIPTION - FREQUENCY: FREQUENCY: CONTINUOUS

## 2019-02-25 ASSESSMENT — PAIN DESCRIPTION - PAIN TYPE: TYPE: CHRONIC PAIN

## 2019-02-25 NOTE — ED PROVIDER NOTES
"[facility], [place]    Discharge Summary  Hospitalist    Date of Admission:  11/16/2017  Date of Discharge:  11/20/2017  Discharging Provider: Kady Kaufman  Date of Service (when I saw the patient): 11/20/17    Discharge Diagnoses   DLBCL  Chronic chest wall pain    History of Present Illness   Tisha Arias is a 56 year old female with high grade DLBCL and PMHx significant for breast cancer s/p bilateral mastectomies & BENJIE/BSO, papillary thyroid cancer s/p total thyroidectomy, chronic chest wall pain, muscle spasms, asthma and h/o Rachael en Y gastric bypass, who presents for Cycle 3 of DA-R-EPOCH.    Hospital Course   Tisha Arias was admitted on 11/16/2017.  The following problems were addressed during her hospitalization:    HEME  # High grade B cell lymphoma. \"Double hit-like\". Primary is Dr. Sauceda. Patient diagnosed August 2017. Lytic lesion of right 10th rib with extension. Disease localized above the diaphragm in thoracic and paravertebral soft tissue and mediastinal lymphadenopathy. Biopsy results show non-GCB phenotype with no evidence of c-MYC or BCL6 rearrangement, but with overexpression of c-MYC by immunohistochemistry and mitotic index over 95%. Staging LP and BMBx were negative for lymphoma. Initiated cycle 1 DA-R-EPOCH on 10/6/17. S/p cycle 2 10/27/17. She was admitted for cycle 3 DA-R-EPOCH. Port-a-cath in place. Some increased Port pain with right sided upper extremity movement. CXR with no abnormalities, suspect pain was positional in nature. She tolerated chemotherapy well. Unfortunately her chemo was not given overnight 11/19-11/20 so she is delayed in completing it. Will end early AM on 11/21 and she can discharge as soon as that is completed assuming she does not develop any complications. D/c completed today in anticipation of early AM discharge.   -Neulasta scheduled for 11/22.  -D6-7 Dexamethasone  -F/u with Dr. Sauceda on "
I independently examined and evaluated Shaunna Pena. In brief their history revealed patient with chest pain and shortness of breath. He was here last night for the same. He wears oxygen at night but has recently been wearing continuously. Sent in by his primary care physician after talking to him on the phone. . Their exam revealed febrile, tachycardic, diminished breath sounds throughout. . All diagnostic, treatment, and disposition decisions were made by myself in conjunction with the mid-level provider. Before disposition, questions were sought and answered with the patient. They have voiced understanding and agree with the plan. 12 lead EKG:  Sinus Tachycardia 114  Axis is   Left axis deviation  QTc is  normal  There is no specific ST-T wave changes appreciated. There is no clear evidence of acute ischemia or infarction. It was compared against an EKG from 12/7/18. For all further details of the patient's emergency department visit, please see the mid-level practitioner's documentation.         Qi Keenan MD  02/25/19 Hawa Turcios
11/28      #Anemia. 2/2 disease and chemotherapy.  -Transfuse for Hgb <8 and plts <10K.      #Stage 1, ER/KY-positive, HER2-negative breast cancer. Follows with Dr. Crawley. Diagnosed in 2011. Oncotype recurrence score of 11 (7% recurrence risk after 5 years of tamoxifen). S/p bilateral mastectomies and BENJIE/BSO in 2012. For endocrine therapy she was on Arimidex from 8785-0314, then changed to Tamoxifen b/c of arthralgias on Arimidex. Has been on Tamoxifen since 2014 now held while being treated for lymphoma. Last dose 10/5.   -F/u with Dr. Crawley 11/27/17.      # Papillary thyroid cancer. Follows with Dr. Castro. Diagnosed in 2013. S/p total thyroidectomy and CND. Received ETOH treatment August 2014, October 2014 and December 2014. Has post-surgical hypothyroidism.  -Continue PTA Levothyroxine.  -Continue PTA calcitriol.     NEURO  # Headaches. Pt states she has been having increasing migraine headaches, about daily, for the past two weeks. No vision or hearing changes. Located in occipital region and radiates around head. Tenderness on palpation to neck muscles. Suspect these could be tension headaches related to muscle tightness. LP normal.   -Continue muscle relaxants and tylenol for pain control      GI  # H/o Rachael en Y gastric bypass. Likely affecting absorption, thus patient is on multiple supplements.  -Continue Calcium and Magnesium replacements.      Pain  # Chronic chest wall pain after mastectomy.   -Continue MS Contin 30 mg morning and afternoon and 60 mg at night.  -Oxycodone 30 mg q4hrs prn.  -Celebrex 200 BID.   -Lidocaine cream prn.      #Chronic muscle cramps.   -Continue KCl 20 mEq daily, Robaxin 750 mg QID, Valium 5 mg TID prn, Flexeril prn.      ID  #Anti-infectives.  -Continue PTA acyclovir and fluconazole and levaquin.      MISC  #Lymphedema. Of LE. Improved per patient. Wearing compression stockings.   -Continue PTA HCTZ and Lasix.        #Asthma, allergies.   -Continue PTA singular, 
Tachycardia present. PMI is not displaced. Exam reveals no gallop. No murmur heard. Pulmonary/Chest: Effort normal. No respiratory distress. He has no wheezes. He has no rales. Decreased breath sounds bilateral bases   Neurological: He is alert and oriented to person, place, and time. Skin: Skin is warm. Capillary refill takes less than 2 seconds. He is not diaphoretic. Nursing note and vitals reviewed. DIAGNOSTIC RESULTS     EKG: All EKG's are interpreted by the Emergency Department Physician who either signs or Co-signs this chart in the absence of a cardiologist.    RADIOLOGY:   Non-plain film images such as CT, Ultrasound and MRI are read by the radiologist. Plain radiographic images are visualized and preliminarilyinterpreted by the emergency physician with the below findings:    Interpretation per the Radiologist below,if available at the time of this note:    XR CHEST STANDARD (2 VW)   Final Result   Mild bibasilar airspace disease concerning for pneumonia. Follow-up to   resolution recommended.                LABS:  Labs Reviewed   CBC WITH AUTO DIFFERENTIAL - Abnormal; Notable for the following:        Result Value    Platelets 445 (*)     Lymphocytes # 0.5 (*)     All other components within normal limits    Narrative:     Performed at:  [address] [address]   Phone [phone]   BASIC METABOLIC PANEL - Abnormal; Notable for the following:     Glucose 103 (*)     All other components within normal limits    Narrative:     Performed at:  [address] [address]   Phone [phone]   URINE RT REFLEX TO CULTURE - Abnormal; Notable for the following:     Clarity, UA CLOUDY (*)     Blood, Urine LARGE (*)     Protein, UA TRACE (*)     All other components within normal limits    Narrative:     Performed at:  [facility]  [place] [address]
[employer].    Kady Kaufman PA-C  Hematology/Oncology    Significant Results and Procedures     Pending Results   Unresulted Labs Ordered in the Past 30 Days of this Admission     Date and Time Order Name Status Description    11/16/2017 1539 CSF Culture Aerobic Bacterial Preliminary     11/9/2017 1113 Platelets prepare order unit In process     10/18/2017 1324 Platelets prepare order unit In process     9/27/2017 1016 LEUKEMIA LYMPHOMA EVALUATION (FLOW CYTOMETRY) In process           Code Status   Full Code       Primary Care Physician   Shahla Crawley    Constitutional: Pleasant female seen sitting in bed. No apparent distress, and appears stated age.  Eyes: Lids and lashes normal, sclera clear, conjunctiva normal.  ENT: Normocephalic, oral pharynx with moist mucus membranes, tonsils without erythema or exudates, gums normal and good dentition.   Respiratory: No increased work of breathing, good air exchange, clear to auscultation bilaterally, no crackles or wheezing.  Cardiovascular: Regular rate and rhythm, normal S1 and S2, and no murmur noted.  GI: No masses or scars. +BS. Soft. Tenderness on palpation to LUQ.  Skin: No bruising or bleeding, no redness, warmth, or swelling, no rashes, no lesions, no jaundice.  Extremities: There is no redness, warmth, or swelling of the joints. +1 lower extremity edema, compression stockings in place. No cyanosis.  Neurologic: Awake, alert, oriented to name, place and time.    Vascular access: Port, c/d/i    Discharge Disposition   Discharged to home  Condition at discharge: Good    Consultations This Hospital Stay   None    Time Spent on this Encounter     Discharge Orders     CBC with platelets differential   Last Lab Result: Hemoglobin (g/dL)      Date                     Value                11/20/2017               9.1 (L)          ----------     Comprehensive metabolic panel     Reason for your hospital stay   Chemotherapy with cycle #3 of R-EPOCH for 
lymphoma     Follow Up and recommended labs and tests   Follow up as scheduled in clinic (Neulasta injection on 11/22, labs and providers next week)     Activity   Your activity upon discharge: activity as tolerated     Discharge Instructions   For your dry skin you can try emollients such as aquaphor, eucerin. Aveeno tends to be less greasy.   Take dexamethasone 8 mg (2 tabs daily) on Wed 11/22 and Th 11/23. Can restart levaquin daily, fluconazole daily, acyclovir twice per day. For nausea can take ondansetron every 8 hours as needed and/or compazine (prochlorperazine) every 6 hours as needed    [facility] ([phone]) for temperature > 100.4, shortness of breath, chest pain, headaches, vision changes, bleeding, uncontrolled nausea, vomiting, diarrhea, or pain.     Full Code     Diet   Follow this diet upon discharge: Regular       Discharge Medications   Current Discharge Medication List      CONTINUE these medications which have CHANGED    Details   montelukast (SINGULAIR) 10 MG tablet Take 1 tablet (10 mg) by mouth 2 times daily  Qty: 60 tablet, Refills: 0    Associated Diagnoses: Idiopathic mast cell activation syndrome (H)      ondansetron (ZOFRAN-ODT) 8 MG ODT tab Take 1 tablet (8 mg) by mouth every 8 hours as needed  Qty: 30 tablet, Refills: 1    Associated Diagnoses: High grade B-cell lymphoma (H); Nausea and vomiting, intractability of vomiting not specified, unspecified vomiting type      fluconazole (DIFLUCAN) 200 MG tablet Take 1 tablet (200 mg) by mouth daily  Qty: 30 tablet, Refills: 0    Associated Diagnoses: High grade B-cell lymphoma (H)      acyclovir (ZOVIRAX) 400 MG tablet Take 1 tablet (400 mg) by mouth 2 times daily  Qty: 60 tablet, Refills: 0    Associated Diagnoses: High grade B-cell lymphoma (H)      dexamethasone (DECADRON) 4 MG tablet Take 2 tablets (8 mg) by mouth daily for 2 days On Wed 11/22 and Thursday 11/23  Qty: 4 tablet, Refills: 0    Associated 
Diagnoses: High grade B-cell lymphoma (H)      furosemide (LASIX) 20 MG tablet Take 1 tablet (20 mg) by mouth 2 times daily  Qty: 60 tablet, Refills: 0    Associated Diagnoses: Localized edema      levofloxacin (LEVAQUIN) 250 MG tablet Take 1 tablet (250 mg) by mouth daily  Qty: 30 tablet, Refills: 0    Associated Diagnoses: High grade B-cell lymphoma (H)      magic mouthwash suspension (diphenhydrAMINE, lidocaine, aluminum-magnesium & simethicone) Swish and spit 10 mLs in mouth every 6 hours as needed for mouth sores  Qty: 360 mL, Refills: 1    Associated Diagnoses: Stomatitis and mucositis; High grade B-cell lymphoma (H)      ENOVARX-LIDOCAINE HCL 10 % CREA Externally apply 1 g topically 3 times daily as needed  Qty: 60 g, Refills: 3    Associated Diagnoses: Chest wall pain         CONTINUE these medications which have NOT CHANGED    Details   prochlorperazine (COMPAZINE) 10 MG tablet Take 1 tablet (10 mg) by mouth every 6 hours as needed for nausea or vomiting  Qty: 60 tablet, Refills: 3    Associated Diagnoses: High grade B-cell lymphoma (H); Nausea      cetirizine (ZYRTEC ALLERGY) 10 MG tablet Take 1 tablet (10 mg) by mouth 3 times daily On hold for lab test.  Qty: 30 tablet, Refills: 0    Associated Diagnoses: High grade B-cell lymphoma (H)      diclofenac (VOLTAREN) 1 % GEL topical gel Apply affected area two times daily PRN using enclosed dosing card.  Qty: 100 g, Refills: 0    Associated Diagnoses: Myofascial pain      ferrous sulfate (IRON) 325 (65 FE) MG tablet Take 1 tablet (325 mg) by mouth 3 times daily (with meals)  Qty: 90 tablet, Refills: 0    Associated Diagnoses: Status post bariatric surgery      bisacodyl (DULCOLAX) 5 MG EC tablet Take 3 tablets (15 mg) by mouth daily as needed for constipation  Qty: 30 tablet, Refills: 0    Associated Diagnoses: Constipation, unspecified constipation type      docusate sodium (COLACE) 100 MG tablet Take 100 mg by mouth daily  Qty: 30 tablet, Refills: 0    
Associated Diagnoses: Acute constipation      polyethylene glycol (MIRALAX) powder Take 17 g (1 capful) by mouth daily  Qty: 510 g, Refills: 0    Associated Diagnoses: Acute constipation      cyclobenzaprine (FLEXERIL) 10 MG tablet Take 1 tablet (10 mg) by mouth 3 times daily as needed  Qty: 90 tablet, Refills: 0    Associated Diagnoses: High grade B-cell lymphoma (H)      senna-docusate (SENOKOT-S;PERICOLACE) 8.6-50 MG per tablet Take 1-2 tablets by mouth 2 times daily as needed for constipation  Qty: 60 tablet, Refills: 0    Associated Diagnoses: Acute constipation      oxyCODONE (ROXICODONE) 30 MG IR tablet Take 1 tablet (30 mg) by mouth every 4 hours as needed for moderate to severe pain  Qty: 180 tablet, Refills: 0    Associated Diagnoses: High grade B-cell lymphoma (H)      !! morphine (MS CONTIN) 30 MG 12 hr tablet Take 1 tablet (30 mg) by mouth every 8 hours . Take an addition pill at night such that your evening dose is 60mg  Qty: 120 tablet, Refills: 0    Associated Diagnoses: Neoplasm related pain      !! UNABLE TO FIND Take 2 capsules by mouth 3 times daily Muscle Mag. 2 caps contain B1 20mg, B2 20mg, B6 10mg, magesium 20mg, manganese 2mg.      !! Morphine Sulfate (MS CONTIN PO) Take 60 mg by mouth daily Takes at 8pm      lidocaine-prilocaine (EMLA) cream Apply topically as needed (port access pain.)  Qty: 30 g, Refills: 1    Associated Diagnoses: Neoplasm related pain; Chest wall pain      SUMAtriptan (IMITREX) 50 MG tablet Take 1 tablet (50 mg) by mouth at onset of headache for migraine (may repeat in 2 hours as needed, max 2 tablets daily)  Qty: 9 tablet, Refills: 3    Associated Diagnoses: Migraine without status migrainosus, not intractable, unspecified migraine type      calcitRIOL (ROCALTROL) 0.25 MCG capsule TAKE 2 CAPSULES BY MOUTH EVERY MORNING AND TAKE 1 CAPSULE BY MOUTH EVERY NIGHT AT BEDTIME  Qty: 270 capsule, Refills: 3    Associated Diagnoses: Postsurgical hypothyroidism; Papillary 
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 Ok to take a 5th Robaxin on very severe days.  Qty: 360 tablet, Refills: 1    Associated Diagnoses: Myofascial pain      COMPOUND CONTAINING CONTROLLED SUBSTANCE (CMPD RX) - PHARMACY TO MIX COMPOUNDED MEDICATION Apply small amount to affected area two times daily.  Qty: 120 g, Refills: 6    Comments: Ketamine 6% + lidocaine 10% in Lipo.  Associated Diagnoses: Neoplasm related pain      diazepam (VALIUM) 5 MG tablet Take 1 tablet (5 mg) by mouth 3 times daily as needed For muscle spasms  Qty: 90 tablet, Refills: 2    Associated Diagnoses: Chest wall pain      levothyroxine (SYNTHROID/LEVOTHROID) 112 MCG tablet Mon-Sat: (2 tabs daily) Sunday: 3 tabs  Qty: 189 tablet, Refills: 3    Associated Diagnoses: Postsurgical hypothyroidism; Papillary carcinoma, follicular variant (H); Postsurgical hypoparathyroidism (H); Thyroid cancer (H)      hydrochlorothiazide (MICROZIDE) 12.5 MG capsule Take 1 capsule (12.5 mg) by mouth daily  Qty: 90 capsule, Refills: 2    Associated Diagnoses: Hypocalcemia      potassium chloride SA (POTASSIUM CHLORIDE) 20 MEQ CR tablet Take 1 tablet (20 mEq) by mouth daily  Qty: 90 tablet, Refills: 3    Associated Diagnoses: Hypokalemia      cromolyn (OPTICROM) 4 % ophthalmic solution Place 1 drop into both eyes 4 times daily  Qty: 10 mL, Refills: 5    Associated Diagnoses: Idiopathic mast cell activation syndrome (H)      Cholecalciferol (VITAMIN D3) 2000 UNITS CAPS Take 5,000 Units by mouth daily Takes 2 tabs daily  Qty: 60 capsule, Refills: 4    Associated Diagnoses: Thyroid cancer (H); Postsurgical hypothyroidism; Papillary carcinoma, follicular variant (H); Postsurgical 
hypoparathyroidism (H)      ranitidine (ZANTAC) 75 MG tablet Take 1 tablet (75 mg) by mouth 3 times daily  Qty: 270 tablet, Refills: 3    Associated Diagnoses: Mast cell disease, systemic      aluminum chloride (DRYSOL) 20 % external solution Apply topically At Bedtime  Qty: 60 mL, Refills: 3    Associated Diagnoses: Rash; Intertrigo      !! UNABLE TO FIND 2 tablets 3 times daily MEDICATION NAME: Natural D-Hist (on hold during chemo)    Associated Diagnoses: Breast cancer, unspecified laterality; Papillary carcinoma, follicular variant (H); Chronic musculoskeletal pain      calcium carbonate (TUMS) 500 MG chewable tablet Take 2 tablets (1,000 mg) by mouth nightly as needed for other (cramps)  Qty: 180 chew tab, Refills: 3      !! UNABLE TO FIND 3 tablets 3 times daily MEDICATION NAME: calcium D-Glucarate   3 caps contain 180mg of elemental calcium.      !! UNABLE TO FIND 2 tablets 3 times daily MEDICATION NAME: Digestzymes     Associated Diagnoses: Thyroid cancer (H); Postsurgical hypothyroidism; Postsurgical hypoparathyroidism (H)      !! UNABLE TO FIND 1 tablet daily MEDICATION NAME: Pure Encapsulations    Associated Diagnoses: Thyroid cancer (H); Postsurgical hypothyroidism; Postsurgical hypoparathyroidism (H)      Probiotic Product (PROBIOTIC DAILY PO) Take 1 capsule by mouth daily Lacto acid bifidobacterium    Associated Diagnoses: Breast cancer, unspecified laterality; Thyroid cancer (H); Chronic arthralgias of knees and hips, unspecified laterality      !! order for DME Compression stockings, medium grade, please measure and fit. Please dispense a pair.  Qty: 1 Units, Refills: 0    Associated Diagnoses: Peripheral edema      diphenhydrAMINE (BENADRYL) 50 MG capsule Take 1 capsule (50 mg) by mouth nightly as needed for itching or sleep  Qty: 56 capsule    Associated Diagnoses: High grade B-cell lymphoma (H)      EPINEPHrine (EPIPEN 2-CARIDAD) 0.3 MG/0.3ML injection Inject 0.3 mLs (0.3 mg) into the muscle once as 
needed for anaphylaxis  Qty: 0.6 mL, Refills: 3      VENTOLIN  (90 BASE) MCG/ACT Inhaler INHALE 2 PUFFS INTO THE LUNGS EVERY 4 HOURS AS NEEDED FOR SHORTNESS OF BREATH OR DIFFICULT BREATHING OR WHEEZING  Qty: 18 g, Refills: 3    Associated Diagnoses: Mild intermittent asthma without complication      NASALCROM 5.2 MG/ACT AERS Inhaler SPRAY ONE SPRAY( 1 ML) IN NOSTRIL DAILY  Qty: 1 Bottle, Refills: 6    Associated Diagnoses: Mast cell disease, systemic      !! order for DME Equipment being ordered: compression stockings. 20-30 mm or 30 - 40 mm as patient can tolerate. Physical therapy to determine if they should be above or below the knee.  Qty: 2 Units, Refills: 4    Associated Diagnoses: Venous stasis      !! order for DME Equipment being ordered: compression bra  Qty: 2 Device, Refills: 1    Associated Diagnoses: Malignant neoplasm of right female breast, unspecified site of breast      !! UNABLE TO FIND MEDICATION NAME: Tumeric 3 capsules daily (on hold during chemo)      mometasone (ASMANEX 30 METERED DOSES) 110 MCG/INH inhaler Inhale 1 puff into the lungs daily  Qty: 3 Inhaler, Refills: 3    Associated Diagnoses: Intermittent asthma, uncomplicated      calcium citrate-vitamin D (CALCIUM CITRATE +D) 315-250 MG-UNIT TABS Take 2 tablets by mouth 3 times daily  Qty: 120 tablet      Triamcinolone Acetonide (AZMACORT IN) Inhale 2 puffs into the lungs as needed       !! - Potential duplicate medications found. Please discuss with provider.      STOP taking these medications       allopurinol (ZYLOPRIM) 300 MG tablet Comments:   Reason for Stopping:         tacrolimus (PROTOPIC) 0.1 % ointment Comments:   Reason for Stopping:         triamcinolone (KENALOG) 0.025 % ointment Comments:   Reason for Stopping:             Allergies   Allergies   Allergen Reactions     Baclofen Other (See Comments) and Unknown     Other reaction(s): Edema, chest pain, seizures.      No Clinical Screening - See Comments Shortness Of 
Breath, Palpitations, Anaphylaxis, Itching, Swelling, Difficulty breathing and Rash     Sukhjinder wipes- oral allergy -  July 2015: throat tightness from a Chinese herbal medicine Guillen Raciel Blank Barry Tran     Serotonin Anxiety, Other (See Comments) and Swelling     Seizures      Amitriptyline Hcl Swelling     Birch Trees      Potatoes, carrots, cherries, celery, apple, pears, plums, peaches, parsnip, kiwi, hazelnuts, and apricots,      Blue Dyes Itching     Headaches       Cymbalta Other (See Comments)     Flushing, tremor/muscle twitching and edema     Gabapentin Other (See Comments)     edema  Systemic edema, weaned off from Feb to March per Dr. Dowd.    edema     Grass      Mugwort [Artemisia Vulgaris]      Various spices     Ragweeds      Melons, bananas, cucumbers, zucchini.     Topamax      Nortriptyline Itching, Visual Disturbance, Swelling, GI Disturbance, Anxiety, Other (See Comments) and Nausea     Other reaction(s): Swelling     Data   Most Recent 3 CBC's:  Recent Labs   Lab Test  11/20/17 0447 11/19/17 0338 11/18/17 0448   WBC  6.7  10.0  13.4*   HGB  9.1*  9.7*  9.1*   MCV  88  89  90   PLT  342  383  330      Most Recent 3 BMP's:  Recent Labs   Lab Test  11/20/17 0447 11/19/17 0338 11/18/17 0448   NA  142  143  143   POTASSIUM  3.5  3.5  4.0   CHLORIDE  100  104  107   CO2  33*  30  28   BUN  22  20  14   CR  0.71  0.74  0.70   ANIONGAP  8  9  8   ELMO  7.9*  8.1*  8.2*   GLC  146*  209*  161*     Most Recent 2 LFT's:  Recent Labs   Lab Test  11/20/17 0447 11/19/17 0338   AST  16  15   ALT  20  23   ALKPHOS  64  79   BILITOTAL  0.4  0.2     Most Recent INR's and Anticoagulation Dosing History:  Anticoagulation Dose History     Recent Dosing and Labs Latest Ref Rng & Units 9/14/2017 9/27/2017 10/2/2017 10/28/2017 10/29/2017 10/30/2017 10/31/2017    INR 0.86 - 1.14 1.02 1.02 1.14 0.94 1.02 0.96 1.03    INR Point of Care 0.86 - 1.14 - - - - - - -        Most Recent 3 Troponin's:  Recent 
Labs   Lab Test  10/21/17   1935  10/04/17   1245  09/01/17   1623   05/28/13   1439   TROPI  <0.015  <0.015  <0.015   < >   --    TROPONIN   --    --    --    --   0.13*    < > = values in this interval not displayed.     Most Recent Cholesterol Panel:  Recent Labs   Lab Test  05/21/14   1040   CHOL  168   LDL  81   HDL  62   TRIG  124     Most Recent 6 Bacteria Isolates From Any Culture (See EPIC Reports for Culture Details):  Recent Labs   Lab Test  11/17/17   1155  10/30/17   0940  10/10/17   2247  07/12/16   0712  05/06/13   1038  03/01/12   1303   CULT  Culture negative monitoring continues  No growth  No growth  Culture negative after 4 weeks  Moderate growth Normal skin jacky  <10,000 colonies/mL Gram negative rods No further identification 50 to 100,000 colonies/mL Mixed gram positive jacky No Beta hemolytic Streptococcus  No anaerobes isolated  Light growth Coagulase negative Staphylococcus     Most Recent TSH, T4 and A1c Labs:  Recent Labs   Lab Test  11/02/17   1648   01/21/13   0936   TSH  0.22*   < >   --    T4  1.30   < >   --    A1C   --    --   5.5    < > = values in this interval not displayed.     Results for orders placed or performed during the hospital encounter of 11/16/17   XR Lumbar Punc Intrathecal Chemo Admin    Narrative    Lumbar Puncture using Fluoroscopy    Provided History:  History of diffuse large B cell lymphoma getting  intrathecal methotrexate.    Procedure note: Verbal and written consent for lumbar puncture was  obtained from the patient, and benefits and risk of the procedure were  explained, including but not limited to worsening headache,  hemorrhage, infection, lower extremity pain, or nerve root injury. The  patient was sterilely prepped and draped with the patient in the prone  position, over the lower back. Under fluoroscopic guidance, the  interlaminar spaces were noted. 1% lidocaine was administered for  local anesthetic over the L2-3 interlaminar space, and a 22 
gauge 3.5  inch needle was advanced into the thecal sac under fluoroscopic  guidance.  There was initial show of clear CSF. . Approximately 8 cc  of CSF were collected.    The needle was removed with the stylet in place. There was no  immediate complication associated with the procedure. Samples were  sent for the requested laboratory testing.      Estimated blood loss: Less than 1 cc.    Fluoroscopic time: Less than 1 minute      Impression    Impression: Successful lumbar puncture without immediate complication.    I, SEA LACKEY MD, attest that I was present in the procedure room  for the entire procedure.     I have personally reviewed the examination and initial interpretation  and I agree with the findings.    SEA LACKEY MD   XR Chest Port 1 View    Narrative    History: Evaluate for incorrect position. Patient with sharp pain with  right-sided arm movement.    COMPARISON: Single portable chest performed 9/1/2017 at 1505 hours.    FINDINGS: Interval placement of right IJ catheter with subcutaneous  port. The tip overlies the mid superior vena cava which is good  position. Mild streaky bibasilar opacities again noted compatible with  scarring along with a more prominent scar about the left lateral  pleural margin, inferiorly. No new airspace opacity, pneumothorax or  pleural effusion. The cardiac silhouette and pulmonary vasculature are  unchanged and within normal limits. No acute bony abnormalities about  the thorax on this single image.      Impression    IMPRESSION: No findings to explain right chest pain.    KAREN COHN MD     *Note: Due to a large number of results and/or encounters for the requested time period, some results have not been displayed. A complete set of results can be found in Results Review.

## 2019-02-25 NOTE — PROGRESS NOTES
Medication Reconciliation    List of medications patient is currently taking is complete. Source of information: 1. Conversation with patient at bedside                                      2. EPIC records      Allergies  Atrovent nasal spray [ipratropium]; Doxycycline; Morphine; Nitroglycerin; Sulfa antibiotics; and Vicodin [hydrocodone-acetaminophen]     Notes regarding home medications:   1. Patient received all of his morning home medications prior to arrival to the emergency department. 2. Patient is anticoagulated with Warfarin:      [Indication:  Hx PE, Target INR: 2-3, Current dose: 2.5 mg on TUE/THUR and 5 mg ALL OTHER DAYS] - patient did not take his warfarin yet today. 3. On 02/24/19, patient was prescribed Levaquin 750 mg po daily x 4 days.     Maynor Ly, PharmD, BCPS  2/25/2019 3:41 PM

## 2019-02-26 ENCOUNTER — APPOINTMENT (OUTPATIENT)
Dept: GENERAL RADIOLOGY | Age: 59
DRG: 194 | End: 2019-02-26
Payer: COMMERCIAL

## 2019-02-26 LAB
A/G RATIO: 1.4 (ref 1.1–2.2)
ALBUMIN SERPL-MCNC: 3.8 G/DL (ref 3.4–5)
ALP BLD-CCNC: 88 U/L (ref 40–129)
ALT SERPL-CCNC: 24 U/L (ref 10–40)
ANION GAP SERPL CALCULATED.3IONS-SCNC: 14 MMOL/L (ref 3–16)
AST SERPL-CCNC: 23 U/L (ref 15–37)
BASOPHILS ABSOLUTE: 0 K/UL (ref 0–0.2)
BASOPHILS RELATIVE PERCENT: 0.1 %
BILIRUB SERPL-MCNC: 0.5 MG/DL (ref 0–1)
BUN BLDV-MCNC: 14 MG/DL (ref 7–20)
CALCIUM SERPL-MCNC: 11 MG/DL (ref 8.3–10.6)
CHLORIDE BLD-SCNC: 101 MMOL/L (ref 99–110)
CO2: 22 MMOL/L (ref 21–32)
CREAT SERPL-MCNC: 0.8 MG/DL (ref 0.9–1.3)
EOSINOPHILS ABSOLUTE: 0 K/UL (ref 0–0.6)
EOSINOPHILS RELATIVE PERCENT: 0 %
GFR AFRICAN AMERICAN: >60
GFR NON-AFRICAN AMERICAN: >60
GLOBULIN: 2.7 G/DL
GLUCOSE BLD-MCNC: 167 MG/DL (ref 70–99)
HCT VFR BLD CALC: 45.7 % (ref 40.5–52.5)
HEMOGLOBIN: 15.3 G/DL (ref 13.5–17.5)
INR BLD: 1.71 (ref 0.86–1.14)
LACTIC ACID: 1.4 MMOL/L (ref 0.4–2)
LYMPHOCYTES ABSOLUTE: 0.5 K/UL (ref 1–5.1)
LYMPHOCYTES RELATIVE PERCENT: 11.6 %
MCH RBC QN AUTO: 31.4 PG (ref 26–34)
MCHC RBC AUTO-ENTMCNC: 33.5 G/DL (ref 31–36)
MCV RBC AUTO: 93.7 FL (ref 80–100)
MONOCYTES ABSOLUTE: 0.1 K/UL (ref 0–1.3)
MONOCYTES RELATIVE PERCENT: 1.8 %
NEUTROPHILS ABSOLUTE: 3.8 K/UL (ref 1.7–7.7)
NEUTROPHILS RELATIVE PERCENT: 86.5 %
PDW BLD-RTO: 13 % (ref 12.4–15.4)
PLATELET # BLD: 140 K/UL (ref 135–450)
PMV BLD AUTO: 7.4 FL (ref 5–10.5)
POTASSIUM SERPL-SCNC: 4.3 MMOL/L (ref 3.5–5.1)
PROCALCITONIN: 0.06 NG/ML (ref 0–0.15)
PROTHROMBIN TIME: 19.5 SEC (ref 9.8–13)
RBC # BLD: 4.88 M/UL (ref 4.2–5.9)
S PYO THROAT QL CULT: NORMAL
SODIUM BLD-SCNC: 137 MMOL/L (ref 136–145)
TOTAL PROTEIN: 6.5 G/DL (ref 6.4–8.2)
WBC # BLD: 4.4 K/UL (ref 4–11)

## 2019-02-26 PROCEDURE — 6360000002 HC RX W HCPCS: Performed by: INTERNAL MEDICINE

## 2019-02-26 PROCEDURE — 71045 X-RAY EXAM CHEST 1 VIEW: CPT

## 2019-02-26 PROCEDURE — 2700000000 HC OXYGEN THERAPY PER DAY

## 2019-02-26 PROCEDURE — 6370000000 HC RX 637 (ALT 250 FOR IP): Performed by: INTERNAL MEDICINE

## 2019-02-26 PROCEDURE — 94760 N-INVAS EAR/PLS OXIMETRY 1: CPT

## 2019-02-26 PROCEDURE — 99255 IP/OBS CONSLTJ NEW/EST HI 80: CPT | Performed by: INTERNAL MEDICINE

## 2019-02-26 PROCEDURE — 85025 COMPLETE CBC W/AUTO DIFF WBC: CPT

## 2019-02-26 PROCEDURE — 36415 COLL VENOUS BLD VENIPUNCTURE: CPT

## 2019-02-26 PROCEDURE — 84145 PROCALCITONIN (PCT): CPT

## 2019-02-26 PROCEDURE — 74018 RADEX ABDOMEN 1 VIEW: CPT

## 2019-02-26 PROCEDURE — 87070 CULTURE OTHR SPECIMN AEROBIC: CPT

## 2019-02-26 PROCEDURE — 83605 ASSAY OF LACTIC ACID: CPT

## 2019-02-26 PROCEDURE — 2580000003 HC RX 258: Performed by: INTERNAL MEDICINE

## 2019-02-26 PROCEDURE — 94640 AIRWAY INHALATION TREATMENT: CPT

## 2019-02-26 PROCEDURE — 85610 PROTHROMBIN TIME: CPT

## 2019-02-26 PROCEDURE — 87205 SMEAR GRAM STAIN: CPT

## 2019-02-26 PROCEDURE — 2060000000 HC ICU INTERMEDIATE R&B

## 2019-02-26 PROCEDURE — 99233 SBSQ HOSP IP/OBS HIGH 50: CPT | Performed by: INTERNAL MEDICINE

## 2019-02-26 PROCEDURE — 80053 COMPREHEN METABOLIC PANEL: CPT

## 2019-02-26 RX ORDER — OXYCODONE HYDROCHLORIDE 10 MG/1
10 TABLET ORAL
Status: DISCONTINUED | OUTPATIENT
Start: 2019-02-26 | End: 2019-03-01 | Stop reason: HOSPADM

## 2019-02-26 RX ORDER — TAMSULOSIN HYDROCHLORIDE 0.4 MG/1
0.4 CAPSULE ORAL DAILY
Status: DISCONTINUED | OUTPATIENT
Start: 2019-02-26 | End: 2019-03-01 | Stop reason: HOSPADM

## 2019-02-26 RX ORDER — ESOMEPRAZOLE MAGNESIUM 40 MG/1
40 CAPSULE, DELAYED RELEASE ORAL
Status: DISCONTINUED | OUTPATIENT
Start: 2019-02-26 | End: 2019-02-26 | Stop reason: SDUPTHER

## 2019-02-26 RX ORDER — WARFARIN SODIUM 5 MG/1
5 TABLET ORAL
Status: COMPLETED | OUTPATIENT
Start: 2019-02-26 | End: 2019-02-26

## 2019-02-26 RX ORDER — POLYETHYLENE GLYCOL 3350 17 G/17G
17 POWDER, FOR SOLUTION ORAL DAILY
Status: DISCONTINUED | OUTPATIENT
Start: 2019-02-26 | End: 2019-02-27

## 2019-02-26 RX ADMIN — OXYCODONE HYDROCHLORIDE 5 MG: 5 TABLET ORAL at 04:24

## 2019-02-26 RX ADMIN — ALBUTEROL SULFATE 2.5 MG: 2.5 SOLUTION RESPIRATORY (INHALATION) at 08:58

## 2019-02-26 RX ADMIN — ONDANSETRON 4 MG: 2 INJECTION INTRAMUSCULAR; INTRAVENOUS at 18:45

## 2019-02-26 RX ADMIN — SACUBITRIL AND VALSARTAN 1 TABLET: 24; 26 TABLET, FILM COATED ORAL at 20:40

## 2019-02-26 RX ADMIN — METOPROLOL SUCCINATE 25 MG: 25 TABLET, EXTENDED RELEASE ORAL at 20:39

## 2019-02-26 RX ADMIN — OXYCODONE HYDROCHLORIDE 5 MG: 5 TABLET ORAL at 00:34

## 2019-02-26 RX ADMIN — ALBUTEROL SULFATE 2.5 MG: 2.5 SOLUTION RESPIRATORY (INHALATION) at 21:08

## 2019-02-26 RX ADMIN — Medication 10 ML: at 09:17

## 2019-02-26 RX ADMIN — SACUBITRIL AND VALSARTAN 1 TABLET: 24; 26 TABLET, FILM COATED ORAL at 09:17

## 2019-02-26 RX ADMIN — ALBUTEROL SULFATE 2.5 MG: 2.5 SOLUTION RESPIRATORY (INHALATION) at 12:38

## 2019-02-26 RX ADMIN — OXYCODONE HYDROCHLORIDE 10 MG: 10 TABLET ORAL at 20:39

## 2019-02-26 RX ADMIN — METHYLPREDNISOLONE SODIUM SUCCINATE 40 MG: 40 INJECTION, POWDER, FOR SOLUTION INTRAMUSCULAR; INTRAVENOUS at 04:25

## 2019-02-26 RX ADMIN — OXYCODONE HYDROCHLORIDE 10 MG: 10 TABLET ORAL at 10:43

## 2019-02-26 RX ADMIN — POLYETHYLENE GLYCOL 3350 17 G: 17 POWDER, FOR SOLUTION ORAL at 10:40

## 2019-02-26 RX ADMIN — METHYLPREDNISOLONE SODIUM SUCCINATE 40 MG: 40 INJECTION, POWDER, FOR SOLUTION INTRAMUSCULAR; INTRAVENOUS at 20:39

## 2019-02-26 RX ADMIN — TAMSULOSIN HYDROCHLORIDE 0.4 MG: 0.4 CAPSULE ORAL at 10:44

## 2019-02-26 RX ADMIN — PREGABALIN 150 MG: 75 CAPSULE ORAL at 12:05

## 2019-02-26 RX ADMIN — WARFARIN SODIUM 5 MG: 5 TABLET ORAL at 18:04

## 2019-02-26 RX ADMIN — SODIUM CHLORIDE: 9 INJECTION, SOLUTION INTRAVENOUS at 10:34

## 2019-02-26 RX ADMIN — METHYLPREDNISOLONE SODIUM SUCCINATE 40 MG: 40 INJECTION, POWDER, FOR SOLUTION INTRAMUSCULAR; INTRAVENOUS at 12:05

## 2019-02-26 RX ADMIN — METOPROLOL SUCCINATE 25 MG: 25 TABLET, EXTENDED RELEASE ORAL at 10:43

## 2019-02-26 RX ADMIN — ZOLPIDEM TARTRATE 10 MG: 5 TABLET ORAL at 00:32

## 2019-02-26 RX ADMIN — AZITHROMYCIN MONOHYDRATE 500 MG: 500 INJECTION, POWDER, LYOPHILIZED, FOR SOLUTION INTRAVENOUS at 16:01

## 2019-02-26 RX ADMIN — AZELASTINE HYDROCHLORIDE 2 SPRAY: 137 SPRAY, METERED NASAL at 20:39

## 2019-02-26 RX ADMIN — PRAVASTATIN SODIUM 40 MG: 10 TABLET ORAL at 20:39

## 2019-02-26 RX ADMIN — ALBUTEROL SULFATE 2.5 MG: 2.5 SOLUTION RESPIRATORY (INHALATION) at 16:29

## 2019-02-26 RX ADMIN — ZOLPIDEM TARTRATE 10 MG: 5 TABLET ORAL at 20:39

## 2019-02-26 RX ADMIN — FAMOTIDINE 20 MG: 20 TABLET, FILM COATED ORAL at 09:17

## 2019-02-26 RX ADMIN — AZELASTINE HYDROCHLORIDE 2 SPRAY: 137 SPRAY, METERED NASAL at 09:19

## 2019-02-26 RX ADMIN — PREGABALIN 150 MG: 75 CAPSULE ORAL at 23:59

## 2019-02-26 RX ADMIN — CEFTRIAXONE 1 G: 1 INJECTION, POWDER, FOR SOLUTION INTRAMUSCULAR; INTRAVENOUS at 13:44

## 2019-02-26 ASSESSMENT — PAIN SCALES - GENERAL
PAINLEVEL_OUTOF10: 0
PAINLEVEL_OUTOF10: 6
PAINLEVEL_OUTOF10: 4
PAINLEVEL_OUTOF10: 6
PAINLEVEL_OUTOF10: 7
PAINLEVEL_OUTOF10: 0
PAINLEVEL_OUTOF10: 8

## 2019-02-26 ASSESSMENT — PAIN DESCRIPTION - LOCATION: LOCATION: BACK

## 2019-02-26 ASSESSMENT — PAIN DESCRIPTION - PAIN TYPE: TYPE: CHRONIC PAIN

## 2019-02-26 NOTE — PLAN OF CARE
Problem: Pain:  Goal: Control of acute pain  Control of acute pain   Outcome: Ongoing  Pain/discomfort being managed with PRN analgesics per MD orders. Pt able to express presence and absence of pain and rate pain appropriately using numerical scale. Electronically signed by Lele Muñoz RN on 2/26/2019 at 12:58 PM      Problem: Falls - Risk of:  Goal: Will remain free from falls  Will remain free from falls   Outcome: Ongoing  Call light in reach. Side rails up x2. Pt reminded to use call light for assistance getting out of bed. Hourly rounding done to anticipate pt needs. Electronically signed by Lele Muñoz RN on 2/26/2019 at 12:58 PM      Problem: Discharge Planning:  Goal: Discharged to appropriate level of care  Discharged to appropriate level of care  Outcome: Ongoing  Pt educated on the s/s of infection. Oxygenation status checked q4 hrs, O2 administered per protocol. Cough and deep breathing encourage. Lung fields and breath sounds assessed each shift. P.O. Fluids encouraged to thin secretions.    Electronically signed by Lele Muñoz RN on 2/26/2019 at 12:59 PM

## 2019-02-26 NOTE — CONSULTS
Meds:   tamsulosin  0.4 mg Oral Daily    polyethylene glycol  17 g Oral Daily    warfarin  5 mg Oral Once    [START ON 2/27/2019] esomeprazole  40 mg Oral QAM AC    acetaminophen  1,000 mg Oral Once    azelastine  2 spray Each Nare BID    metoprolol succinate  25 mg Oral BID    pravastatin  40 mg Oral Nightly    pregabalin  150 mg Oral BID    sacubitril-valsartan  1 tablet Oral BID    sodium chloride flush  10 mL Intravenous 2 times per day    cefTRIAXone (ROCEPHIN) IV  1 g Intravenous Q24H    azithromycin  500 mg Intravenous Q24H    warfarin (COUMADIN) daily dosing (placeholder)   Other RX Placeholder    albuterol  2.5 mg Nebulization Q4H [place]    methylPREDNISolone  40 mg Intravenous Q8H       Continuous Infusions:   sodium chloride 50 mL/hr at 02/26/19 1034       PRN Meds:  oxyCODONE, zolpidem, sodium chloride flush, magnesium hydroxide, ondansetron, albuterol    ALLERGIES:  Patient is allergic to atrovent nasal spray [ipratropium]; doxycycline; morphine; nitroglycerin; sulfa antibiotics; and vicodin [hydrocodone-acetaminophen]. REVIEW OF SYSTEMS:  Constitutional: Negative for fever  HENT: Negative for sore throat  Eyes: Negative for redness   Respiratory: +dyspnea, cough  Cardiovascular: Negative for chest pain  Gastrointestinal: Negative for vomiting, diarrhea   Genitourinary: Negative for hematuria   Musculoskeletal: Negative for arthralgias   Skin: Negative for rash  Neurological: Negative for syncope  Hematological: Negative for adenopathy  Psychiatric/Behavorial: Negative for anxiety    Objective:   PHYSICAL EXAM:  Blood pressure 126/79, pulse 70, temperature 97.4 °F (36.3 °C), temperature source Oral, resp. rate 18, height 5' 6\" (1.676 m), weight 199 lb 1.2 oz (90.3 kg), SpO2 96 %.'  Gen:  No acute distress. Eyes: PERRL. Anicteric sclera. No conjunctival injection. ENT: No discharge. Posterior oropharynx clear. External appearance of ears and nose normal.  Neck: Trachea midline.  No mass   Resp:  No crackles. No wheezes. No rhonchi. No dullness on percussion. CV: Regular rate. Regular rhythm. No murmur or rub. No edema. GI: Soft, Non-tender. Non-distended. +BS  Skin: Warm, dry, w/o erythema. Lymph: No cervical or supraclavicular LAD. M/S: No cyanosis. No clubbing. Neuro: no focal neurologic deficit. Moves all extremities  Psych: Awake and alert, Oriented x 3. Judgement and insight appropriate. Mood stable. Data Reviewed:   LABS:  CBC:   Recent Labs      02/24/19   1713  02/25/19   1524  02/26/19   0939   WBC  6.9  4.4  4.4   HGB  15.8  15.4  15.3   HCT  46.3  45.5  45.7   MCV  93.2  92.7  93.7   PLT  144  125*  140     BMP:   Recent Labs      02/24/19   1713  02/25/19   1524  02/26/19   0939   NA  139  136  137   K  4.2  4.1  4.3   CL  102  101  101   CO2  24  24  22   BUN  7  9  14   CREATININE  0.9  1.0  0.8*     LIVER PROFILE:   Recent Labs      02/26/19   0939   AST  23   ALT  24   BILITOT  0.5   ALKPHOS  88     PT/INR:   Recent Labs      02/24/19   1712  02/26/19   0939   PROTIME  41.3*  19.5*   INR  3.62*  1.71*     APTT: No results for input(s): APTT in the last 72 hours. UA:  Recent Labs      02/25/19   1550   COLORU  YELLOW   PHUR  6.0   WBCUA  1   RBCUA  266*   CLARITYU  CLOUDY*   SPECGRAV  1.019   LEUKOCYTESUR  Negative   UROBILINOGEN  0.2   BILIRUBINUR  Negative   BLOODU  LARGE*   GLUCOSEU  Negative     No results for input(s): PHART, ZGE0XSW, PO2ART in the last 72 hours. Radiology Review:  Pertinent images / reports were reviewed as a part of this visit. CT Chest w/ contrast: No results found for this or any previous visit. CT Chest w/o contrast: No results found for this or any previous visit.     CTPA:   Results for orders placed during the hospital encounter of 02/24/19   CT CHEST PULMONARY EMBOLISM W CONTRAST    Narrative EXAMINATION:  CTA OF THE CHEST 2/24/2019 5:57 pm    TECHNIQUE:  CTA of the chest was performed after the administration of

## 2019-02-26 NOTE — DISCHARGE INSTR - COC
Continuity of Care Form    Patient Name: Shaunna Pena   :  1960  MRN:  [id number]    Admit date:  2019  Discharge date:  3/1/19    Code Status Order: Full Code   Advance Directives:   Advance Care Flowsheet Documentation     Date/Time Healthcare Directive Type of Healthcare Directive Copy in [facility] [address] Agent's Name Healthcare Agent's Phone Number    19 [phone]  No, patient does not have an advance directive for healthcare treatment -- -- -- -- --          Admitting Physician:  Tia Valdez MD  PCP: Diamond Carlton MD    Discharging Nurse: Cahndrika Kiser RN  [address] Unit/Room#: C1F-4399/0897-74  Discharging Unit Phone Number: [phone]    Emergency Contact:   Extended Emergency Contact Information  Primary Emergency Contact: Adelaida Javier  Address: [address] [place] [address] Phone: [phone]  Mobile Phone: [phone]  Relation: Spouse  Secondary Emergency Contact: Isamar Ortega [address] [place] [address] Phone: [phone]  Relation: Child    Past Surgical History:  Past Surgical History:   Procedure Laterality Date    CHOLECYSTECTOMY      COLONOSCOPY  2012    dr Whitley Minaya  2015    LITHOTRIPSY     [address] opening larger in sinuses    UPPER GASTROINTESTINAL ENDOSCOPY  3/28/2014    dr Barb Chung       Immunization History:   Immunization History   Administered Date(s) Administered    Influenza, Karalee Plunkett, 3 yrs and older, IM, PF (Fluzone 3 yrs and older or Afluria 5 yrs and older) 10/27/2017    Influenza, Quadv, Recombinant, IM PF (Flublok 18 yrs and older) 10/03/2018    Pneumococcal 13-valent Conjugate ([id number]) 2015    Pneumococcal Polysaccharide (Pceekwiub24) 2007    Pneumococcal Vaccine, unspecified formulation 08/15/2017    Tdap (Boostrix, Adacel) 2014       Active

## 2019-02-26 NOTE — CARE COORDINATION
Case Management:  Discussed discharge needs with patient who tells cm he plans to return home with no anticipated needs. Patient tells cm he is independent in all adl's , ambulates without assistance and does not use an assisted device. Denies need for equipment and is not active with any home care agency. Patient tells cm he is able to transport self or his brother does it. No issues with medication , co-pays affordable. Patient does have home 02 uses at night and Omayra Arnett is provider, has portable tank in room.   Electronically signed by Dexter Parra on 2/26/2019 at 3:18 [id number]

## 2019-02-26 NOTE — H&P
Extensive and include tamsulosin 0.4 mg  daily, Percocet 5/325 as needed for back pain or flank pain, Levaquin  750 daily for four days, [facility]far[facility],  Entresto 24/26 two tabs in the morning, one in the evening, Dexilant 30  mg daily, Ambien 10 mg at bedtime, metoprolol XL 25 b.i.d., pravastatin  40 mg daily, Lyrica 75 mg daily, albuterol nebulizers p.r.n., Flonase,  vitamin D3 and Astelin nasal spray. ALLERGIES:  Include ATROVENT, DOXYCYCLINE, SULFA ANTIBIOTICS, MORPHINE  makes me feel funny, and NITROGLYCERIN with hypotension. REVIEW OF SYSTEMS:  Positive for shortness of breath, positive for  cough, positive for fever, positive for tachycardia, positive for  weakness. It is negative for diarrhea. Positive for emesis. A  10-point review of systems otherwise performed was negative. PHYSICAL EXAMINATION:  VITAL SIGNS: Blood pressure is 122/81, pulse 115, respirations 22, his  O2 sat is 93% on room air, temperature was 101.4. GENERAL:  He is a middle-aged white male, feeling flushed and  complaining of feeling weak and wheezing. HEENT:  Head is normocephalic, atraumatic. Pupils equal, round, and  reactive to light and accommodation. Extraocular muscles intact. NECK:  Supple. CHEST:  Showed expiratory wheezing throughout the anterior chest fields. There is a harsh bronchitic cough. CARDIOVASCULAR:  Exam showed a regular rate and rhythm. ABDOMEN:  Soft, nontender. There was tenderness to palpation over the  left CVA area and the flank. No palpable masses were noted. EXTREMITIES:  Showed a trace of edema without evidence of clubbing or  cyanosis. LABORATORY DATA:  Included white count of 4400, hemoglobin of 15.4,  platelet count of 819,301. Sodium 136, potassium 4.1, BUN and  creatinine 9 and 1.0, blood sugar 103. Urinalysis showed large blood,  otherwise unremarkable. Chest x-ray showed bibasilar airspace disease  consistent with pneumonia.     IMPRESSION AT

## 2019-02-26 NOTE — CONSULTS
Consulting Physician: Dr. Maryana Castros    Reason for Consult: left ureteral colic    History of Present Illness: Teresa Moore is a 60 y.o.  male who is known to our office and was seen by me last Friday for bilateral flank pain and Dr. Gaston Ours 1 week prior to that for right flank pain, he reports last Monday urinating \"black spots\" and Friday urinated \"grey colored urine\". His urine was clear/yellow in office and is clear/yellow today in his urinal today. We discussed his CT results, lasix renal scan and normal lab work in detail again. His CT scan from December shows the hematoma slowly resolving and bilateral non-obstructing renal stones. He states he had another episode of black spotted urine yesterday. His UA is yellow and there is microscopic blood present. He denies urgency, frequency, dysuria.      Past Medical History:   Past Medical History:   Diagnosis Date    Bronchiolitis obliterans ([address]     Bundle branch block, right     Cardiomyopathy ([address]     Fibromyalgia     GERD (gastroesophageal reflux disease)     Hepatitis 1979    unsure of which type    Hx of blood clots     Hyperlipemia     Hypertension     IBS (irritable bowel syndrome)     Kidney stone     over thirty kidney stones    Neuropathy     right side-chest    Pneumothorax 2011    Right    Prostatitis     Pulmonary embolism on right [facility]) 2011    upper lobe-coumadin 2011    Sacroiliitis [facility])        Past Surgical History:  Past Surgical History:   Procedure Laterality Date    CHOLECYSTECTOMY  2007    COLONOSCOPY  9/21/2012    dr Jhon Calderon  2015    LITHOTRIPSY     [address] opening larger in sinuses    UPPER GASTROINTESTINAL ENDOSCOPY  3/28/2014    dr Manuela mercedes       Social History:  Social History     Social History    Marital status:      Spouse name: N/A    Number of children: N/A    Years of education: N/A     Occupational History   

## 2019-02-27 ENCOUNTER — APPOINTMENT (OUTPATIENT)
Dept: CT IMAGING | Age: 59
DRG: 194 | End: 2019-02-27
Payer: COMMERCIAL

## 2019-02-27 LAB
ANION GAP SERPL CALCULATED.3IONS-SCNC: 9 MMOL/L (ref 3–16)
BASOPHILS ABSOLUTE: 0 K/UL (ref 0–0.2)
BASOPHILS RELATIVE PERCENT: 0.1 %
BUN BLDV-MCNC: 13 MG/DL (ref 7–20)
CALCIUM SERPL-MCNC: 11 MG/DL (ref 8.3–10.6)
CHLORIDE BLD-SCNC: 103 MMOL/L (ref 99–110)
CO2: 25 MMOL/L (ref 21–32)
CREAT SERPL-MCNC: 0.9 MG/DL (ref 0.9–1.3)
EOSINOPHILS ABSOLUTE: 0 K/UL (ref 0–0.6)
EOSINOPHILS RELATIVE PERCENT: 0 %
GFR AFRICAN AMERICAN: >60
GFR NON-AFRICAN AMERICAN: >60
GLUCOSE BLD-MCNC: 148 MG/DL (ref 70–99)
HCT VFR BLD CALC: 43.3 % (ref 40.5–52.5)
HEMOGLOBIN: 14.5 G/DL (ref 13.5–17.5)
INR BLD: 1.64 (ref 0.86–1.14)
LYMPHOCYTES ABSOLUTE: 0.8 K/UL (ref 1–5.1)
LYMPHOCYTES RELATIVE PERCENT: 7.4 %
MCH RBC QN AUTO: 31.6 PG (ref 26–34)
MCHC RBC AUTO-ENTMCNC: 33.6 G/DL (ref 31–36)
MCV RBC AUTO: 93.8 FL (ref 80–100)
MONOCYTES ABSOLUTE: 0.4 K/UL (ref 0–1.3)
MONOCYTES RELATIVE PERCENT: 4.3 %
NEUTROPHILS ABSOLUTE: 9 K/UL (ref 1.7–7.7)
NEUTROPHILS RELATIVE PERCENT: 88.2 %
PDW BLD-RTO: 13 % (ref 12.4–15.4)
PLATELET # BLD: 140 K/UL (ref 135–450)
PMV BLD AUTO: 7.3 FL (ref 5–10.5)
POTASSIUM SERPL-SCNC: 5.2 MMOL/L (ref 3.5–5.1)
PROTHROMBIN TIME: 18.7 SEC (ref 9.8–13)
RBC # BLD: 4.61 M/UL (ref 4.2–5.9)
SODIUM BLD-SCNC: 137 MMOL/L (ref 136–145)
WBC # BLD: 10.2 K/UL (ref 4–11)

## 2019-02-27 PROCEDURE — 2060000000 HC ICU INTERMEDIATE R&B

## 2019-02-27 PROCEDURE — 94640 AIRWAY INHALATION TREATMENT: CPT

## 2019-02-27 PROCEDURE — 94760 N-INVAS EAR/PLS OXIMETRY 1: CPT

## 2019-02-27 PROCEDURE — 6370000000 HC RX 637 (ALT 250 FOR IP): Performed by: INTERNAL MEDICINE

## 2019-02-27 PROCEDURE — 85025 COMPLETE CBC W/AUTO DIFF WBC: CPT

## 2019-02-27 PROCEDURE — 74176 CT ABD & PELVIS W/O CONTRAST: CPT

## 2019-02-27 PROCEDURE — 85610 PROTHROMBIN TIME: CPT

## 2019-02-27 PROCEDURE — 80048 BASIC METABOLIC PNL TOTAL CA: CPT

## 2019-02-27 PROCEDURE — 2700000000 HC OXYGEN THERAPY PER DAY

## 2019-02-27 PROCEDURE — 99233 SBSQ HOSP IP/OBS HIGH 50: CPT | Performed by: INTERNAL MEDICINE

## 2019-02-27 PROCEDURE — 6360000002 HC RX W HCPCS: Performed by: INTERNAL MEDICINE

## 2019-02-27 PROCEDURE — 2580000003 HC RX 258: Performed by: INTERNAL MEDICINE

## 2019-02-27 PROCEDURE — 99232 SBSQ HOSP IP/OBS MODERATE 35: CPT | Performed by: INTERNAL MEDICINE

## 2019-02-27 RX ORDER — WARFARIN SODIUM 3 MG/1
6 TABLET ORAL
Status: COMPLETED | OUTPATIENT
Start: 2019-02-27 | End: 2019-02-27

## 2019-02-27 RX ORDER — METHYLPREDNISOLONE SODIUM SUCCINATE 40 MG/ML
40 INJECTION, POWDER, LYOPHILIZED, FOR SOLUTION INTRAMUSCULAR; INTRAVENOUS DAILY
Status: DISCONTINUED | OUTPATIENT
Start: 2019-02-28 | End: 2019-02-28

## 2019-02-27 RX ORDER — POLYETHYLENE GLYCOL 3350 17 G/17G
34 POWDER, FOR SOLUTION ORAL DAILY
Status: DISCONTINUED | OUTPATIENT
Start: 2019-02-27 | End: 2019-03-01 | Stop reason: HOSPADM

## 2019-02-27 RX ORDER — GUAIFENESIN 600 MG/1
600 TABLET, EXTENDED RELEASE ORAL 2 TIMES DAILY
Status: DISCONTINUED | OUTPATIENT
Start: 2019-02-27 | End: 2019-03-01 | Stop reason: HOSPADM

## 2019-02-27 RX ORDER — PROMETHAZINE HYDROCHLORIDE AND CODEINE PHOSPHATE 6.25; 1 MG/5ML; MG/5ML
5 SOLUTION ORAL EVERY 4 HOURS PRN
Status: DISCONTINUED | OUTPATIENT
Start: 2019-02-27 | End: 2019-03-01 | Stop reason: HOSPADM

## 2019-02-27 RX ADMIN — OXYCODONE HYDROCHLORIDE 10 MG: 10 TABLET ORAL at 01:16

## 2019-02-27 RX ADMIN — GUAIFENESIN 600 MG: 600 TABLET, EXTENDED RELEASE ORAL at 09:17

## 2019-02-27 RX ADMIN — ALBUTEROL SULFATE 2.5 MG: 2.5 SOLUTION RESPIRATORY (INHALATION) at 15:52

## 2019-02-27 RX ADMIN — ZOLPIDEM TARTRATE 10 MG: 5 TABLET ORAL at 21:05

## 2019-02-27 RX ADMIN — SODIUM CHLORIDE: 9 INJECTION, SOLUTION INTRAVENOUS at 06:36

## 2019-02-27 RX ADMIN — TAMSULOSIN HYDROCHLORIDE 0.4 MG: 0.4 CAPSULE ORAL at 09:17

## 2019-02-27 RX ADMIN — SACUBITRIL AND VALSARTAN 1 TABLET: 24; 26 TABLET, FILM COATED ORAL at 09:20

## 2019-02-27 RX ADMIN — AZELASTINE HYDROCHLORIDE 2 SPRAY: 137 SPRAY, METERED NASAL at 21:07

## 2019-02-27 RX ADMIN — MOMETASONE FUROATE AND FORMOTEROL FUMARATE DIHYDRATE 2 PUFF: 200; 5 AEROSOL RESPIRATORY (INHALATION) at 20:46

## 2019-02-27 RX ADMIN — OXYCODONE HYDROCHLORIDE 10 MG: 10 TABLET ORAL at 21:05

## 2019-02-27 RX ADMIN — OXYCODONE HYDROCHLORIDE 10 MG: 10 TABLET ORAL at 06:40

## 2019-02-27 RX ADMIN — ALBUTEROL SULFATE 2.5 MG: 2.5 SOLUTION RESPIRATORY (INHALATION) at 20:46

## 2019-02-27 RX ADMIN — WARFARIN SODIUM 6 MG: 3 TABLET ORAL at 16:52

## 2019-02-27 RX ADMIN — POLYETHYLENE GLYCOL 3350 34 G: 17 POWDER, FOR SOLUTION ORAL at 09:17

## 2019-02-27 RX ADMIN — PRAVASTATIN SODIUM 40 MG: 10 TABLET ORAL at 21:05

## 2019-02-27 RX ADMIN — GUAIFENESIN 600 MG: 600 TABLET, EXTENDED RELEASE ORAL at 21:05

## 2019-02-27 RX ADMIN — Medication 10 ML: at 09:18

## 2019-02-27 RX ADMIN — PREGABALIN 150 MG: 75 CAPSULE ORAL at 12:01

## 2019-02-27 RX ADMIN — ALBUTEROL SULFATE 2.5 MG: 2.5 SOLUTION RESPIRATORY (INHALATION) at 08:02

## 2019-02-27 RX ADMIN — Medication 10 ML: at 22:48

## 2019-02-27 RX ADMIN — METHYLPREDNISOLONE SODIUM SUCCINATE 40 MG: 40 INJECTION, POWDER, FOR SOLUTION INTRAMUSCULAR; INTRAVENOUS at 04:16

## 2019-02-27 RX ADMIN — SACUBITRIL AND VALSARTAN 1 TABLET: 24; 26 TABLET, FILM COATED ORAL at 21:05

## 2019-02-27 RX ADMIN — METOPROLOL SUCCINATE 25 MG: 25 TABLET, EXTENDED RELEASE ORAL at 21:05

## 2019-02-27 RX ADMIN — SACUBITRIL AND VALSARTAN 1 TABLET: 24; 26 TABLET, FILM COATED ORAL at 12:01

## 2019-02-27 RX ADMIN — AZITHROMYCIN MONOHYDRATE 500 MG: 500 INJECTION, POWDER, LYOPHILIZED, FOR SOLUTION INTRAVENOUS at 16:05

## 2019-02-27 RX ADMIN — ONDANSETRON 4 MG: 2 INJECTION INTRAMUSCULAR; INTRAVENOUS at 16:49

## 2019-02-27 RX ADMIN — Medication 40 MG: at 06:36

## 2019-02-27 RX ADMIN — PREGABALIN 150 MG: 75 CAPSULE ORAL at 23:34

## 2019-02-27 RX ADMIN — METOPROLOL SUCCINATE 25 MG: 25 TABLET, EXTENDED RELEASE ORAL at 09:17

## 2019-02-27 RX ADMIN — MOMETASONE FUROATE AND FORMOTEROL FUMARATE DIHYDRATE 2 PUFF: 200; 5 AEROSOL RESPIRATORY (INHALATION) at 08:10

## 2019-02-27 RX ADMIN — Medication 5 ML: at 21:05

## 2019-02-27 RX ADMIN — AZELASTINE HYDROCHLORIDE 2 SPRAY: 137 SPRAY, METERED NASAL at 09:18

## 2019-02-27 ASSESSMENT — PAIN SCALES - GENERAL
PAINLEVEL_OUTOF10: 4
PAINLEVEL_OUTOF10: 0
PAINLEVEL_OUTOF10: 0
PAINLEVEL_OUTOF10: 5
PAINLEVEL_OUTOF10: 4
PAINLEVEL_OUTOF10: 0
PAINLEVEL_OUTOF10: 0
PAINLEVEL_OUTOF10: 5
PAINLEVEL_OUTOF10: 1

## 2019-02-27 NOTE — PROGRESS NOTES
tenderness/mass/nodules  Lungs: diminished breath sounds bibasilar  Heart: regular rate and rhythm, S1, S2 normal, no murmur, click, rub or gallop  Abdomen: soft, non-tender; bowel sounds normal; no masses,  no organomegaly  Extremities: tr edema   Neurologic: Mental status: Alert, oriented, thought content appropriate    Admission on 02/25/2019   Component Date Value Ref Range Status    Ventricular Rate 02/25/2019 114  BPM Final    Atrial Rate 02/25/2019 114  BPM Final    P-R Interval 02/25/2019 170  ms Final    QRS Duration 02/25/2019 150  ms Final    Q-T Interval 02/25/2019 348  ms Final    QTc Calculation (Bazett) 02/25/2019 479  ms Final    P Axis 02/25/2019 63  degrees Final    R Axis 02/25/2019 -40  degrees Final    T Axis 02/25/2019 105  degrees Final    Diagnosis 02/25/2019 Sinus tachycardiaLeft axis deviationLeft bundle branch blockabnormal R wave progressionProlonged QTAbnormal ECGWhen compared with ECG of 24-FEB-2019 16:14,No significant change was foundConfirmed by YOAN Monge ([id number]) on 2/25/2019 5:15:18 PM   Final    WBC 02/25/2019 4.4  4.0 - 11.0 K/uL Final    RBC 02/25/2019 4.91  4.20 - 5.90 M/uL Final    Hemoglobin 02/25/2019 15.4  13.5 - 17.5 g/dL Final    Hematocrit 02/25/2019 45.5  40.5 - 52.5 % Final    MCV 02/25/2019 92.7  80.0 - 100.0 fL Final    MCH 02/25/2019 31.4  26.0 - 34.0 pg Final    MCHC 02/25/2019 33.9  31.0 - 36.0 g/dL Final    RDW 02/25/2019 13.1  12.4 - 15.4 % Final    Platelets 26/93/1846 125* 135 - 450 K/uL Final    MPV 02/25/2019 7.1  5.0 - 10.5 fL Final    Neutrophils % 02/25/2019 75.6  % Final    Lymphocytes % 02/25/2019 11.0  % Final    Monocytes % 02/25/2019 13.2  % Final    Eosinophils % 02/25/2019 0.0  % Final    Basophils % 02/25/2019 0.2  % Final    Neutrophils # 02/25/2019 3.3  1.7 - 7.7 K/uL Final    Lymphocytes # 02/25/2019 0.5* 1.0 - 5.1 K/uL Final    Monocytes # 02/25/2019 0.6  0.0 - 1.3 K/uL Final    Eosinophils # 02/25/2019 0.0  0.0 02/25/2019 Not Indicated   Final    Urine Type 02/25/2019 Not Specified   Final    Hyaline Casts, UA 02/25/2019 1  0 - 8 /LPF Final    WBC, UA 02/25/2019 1  0 - 5 /HPF Final    RBC, UA 02/25/2019 266* 0 - 4 /HPF Final    Epi Cells 02/25/2019 0  0 - 5 /HPF Final    Comment: Urinalysis microscopic performed using the  automated methodology (AUWI analyzer).  Protime 02/26/2019 19.5* 9.8 - 13.0 sec Final    Comment: Effective 5-31-18 09:00am EST  Please note reference ranges have  changed for PT and INR Testing.  INR 02/26/2019 1.71* 0.86 - 1.14 Final    Comment: REVIEWED BY TECHNOLOGIST. Effective 11/28/18 at 02:30pm EST    Normal: 0.94 - 1.06  Therapeutic: 2.0 - 3.0  Pros.  Valve: 2.5 - 3.5  AMI: 2.0 - 3.0      WBC 02/26/2019 4.4  4.0 - 11.0 K/uL Final    RBC 02/26/2019 4.88  4.20 - 5.90 M/uL Final    Hemoglobin 02/26/2019 15.3  13.5 - 17.5 g/dL Final    Hematocrit 02/26/2019 45.7  40.5 - 52.5 % Final    MCV 02/26/2019 93.7  80.0 - 100.0 fL Final    MCH 02/26/2019 31.4  26.0 - 34.0 pg Final    MCHC 02/26/2019 33.5  31.0 - 36.0 g/dL Final    RDW 02/26/2019 13.0  12.4 - 15.4 % Final    Platelets 26/86/7970 140  135 - 450 K/uL Final    MPV 02/26/2019 7.4  5.0 - 10.5 fL Final    Neutrophils % 02/26/2019 86.5  % Final    Lymphocytes % 02/26/2019 11.6  % Final    Monocytes % 02/26/2019 1.8  % Final    Eosinophils % 02/26/2019 0.0  % Final    Basophils % 02/26/2019 0.1  % Final    Neutrophils # 02/26/2019 3.8  1.7 - 7.7 K/uL Final    Lymphocytes # 02/26/2019 0.5* 1.0 - 5.1 K/uL Final    Monocytes # 02/26/2019 0.1  0.0 - 1.3 K/uL Final    Eosinophils # 02/26/2019 0.0  0.0 - 0.6 K/uL Final    Basophils # 02/26/2019 0.0  0.0 - 0.2 K/uL Final    Lactic Acid 02/26/2019 1.4  0.4 - 2.0 mmol/L Final    Sodium 02/26/2019 137  136 - 145 mmol/L Final    Potassium 02/26/2019 4.3  3.5 - 5.1 mmol/L Final    Chloride 02/26/2019 101  99 - 110 mmol/L Final    CO2 02/26/2019 22  21 - 32 mmol/L Final    52.5 % Final    MCV 02/27/2019 93.8  80.0 - 100.0 fL Final    MCH 02/27/2019 31.6  26.0 - 34.0 pg Final    MCHC 02/27/2019 33.6  31.0 - 36.0 g/dL Final    RDW 02/27/2019 13.0  12.4 - 15.4 % Final    Platelets 51/36/7901 140  135 - 450 K/uL Final    MPV 02/27/2019 7.3  5.0 - 10.5 fL Final    Neutrophils % 02/27/2019 88.2  % Final    Lymphocytes % 02/27/2019 7.4  % Final    Monocytes % 02/27/2019 4.3  % Final    Eosinophils % 02/27/2019 0.0  % Final    Basophils % 02/27/2019 0.1  % Final    Neutrophils # 02/27/2019 9.0* 1.7 - 7.7 K/uL Final    Lymphocytes # 02/27/2019 0.8* 1.0 - 5.1 K/uL Final    Monocytes # 02/27/2019 0.4  0.0 - 1.3 K/uL Final    Eosinophils # 02/27/2019 0.0  0.0 - 0.6 K/uL Final    Basophils # 02/27/2019 0.0  0.0 - 0.2 K/uL Final         Assessment & Plan:   Principal Problem:    Pneumonia--D#3 of iv antibx --lessening infiltrate   Active Problems:    Cardiomyopathy ([facility]    Chronic anticoagulation--as per pharmacy     Colicky LLQ abdominal pain--incr miralax     Essential hypertension, benign--Continue current therapy      Bronchiolitis obliterans ([facility]  Resolved Problems:    * No resolved hospital problems.  *  Will dc IVF--incr miralax--incr activity--ck O2 sats   Please note that over 35 minutes was spent in evaluating the patient, review of records and results, discussion with staff/family, etc.    Kaylene Heaton MD

## 2019-02-27 NOTE — PROGRESS NOTES
visit.    CT Chest w/ contrast: No results found for this or any previous visit. CT Chest w/o contrast: No results found for this or any previous visit. CTPA:   Results for orders placed during the hospital encounter of 02/24/19   CT CHEST PULMONARY EMBOLISM W CONTRAST    Narrative EXAMINATION:  CTA OF THE CHEST 2/24/2019 5:57 pm    TECHNIQUE:  CTA of the chest was performed after the administration of intravenous  contrast.  Multiplanar reformatted images are provided for review. MIP  images are provided for review. Dose modulation, iterative reconstruction,  and/or weight based adjustment of the mA/kV was utilized to reduce the  radiation dose to as low as reasonably achievable. COMPARISON:  01/26/2017. HISTORY:  ORDERING SYSTEM PROVIDED HISTORY: PE    FINDINGS:  Pulmonary Arteries: Pulmonary arteries are adequately opacified for  evaluation. No evidence of intraluminal filling defect to suggest pulmonary  embolism. Main pulmonary artery is normal in caliber. Mediastinum: The thoracic aorta is normal in course and caliber. The heart  is not enlarged. No pericardial effusion. Pacer leads are in place. No  pathologic hilar or mediastinal adenopathy. Lungs/pleura: The lungs are without acute process. No focal consolidation or  pulmonary edema. No evidence of pleural effusion or pneumothorax. Upper Abdomen: The gallbladder is surgically absent. The visualized upper  abdominal viscera are otherwise unremarkable. Soft Tissues/Bones: No acute bone or soft tissue abnormality. Impression No evidence of pulmonary embolism or acute pulmonary abnormality. CXR PA/LAT:   Results for orders placed during the hospital encounter of 02/25/19   XR CHEST STANDARD (2 VW)    Narrative EXAMINATION:  TWO VIEWS OF THE CHEST    2/25/2019 1:36 pm    COMPARISON:  Chest radiograph February 24, 2019 and priors.     HISTORY:  ORDERING SYSTEM PROVIDED HISTORY: sepsis  TECHNOLOGIST PROVIDED

## 2019-02-28 ENCOUNTER — APPOINTMENT (OUTPATIENT)
Dept: GENERAL RADIOLOGY | Age: 59
DRG: 194 | End: 2019-02-28
Payer: COMMERCIAL

## 2019-02-28 LAB
ANION GAP SERPL CALCULATED.3IONS-SCNC: 8 MMOL/L (ref 3–16)
BUN BLDV-MCNC: 16 MG/DL (ref 7–20)
CALCIUM SERPL-MCNC: 10.2 MG/DL (ref 8.3–10.6)
CHLORIDE BLD-SCNC: 102 MMOL/L (ref 99–110)
CO2: 28 MMOL/L (ref 21–32)
CREAT SERPL-MCNC: 0.9 MG/DL (ref 0.9–1.3)
CULTURE, RESPIRATORY: NORMAL
GFR AFRICAN AMERICAN: >60
GFR NON-AFRICAN AMERICAN: >60
GLUCOSE BLD-MCNC: 118 MG/DL (ref 70–99)
GRAM STAIN RESULT: NORMAL
INR BLD: 2.36 (ref 0.86–1.14)
POTASSIUM SERPL-SCNC: 4.4 MMOL/L (ref 3.5–5.1)
PROTHROMBIN TIME: 26.9 SEC (ref 9.8–13)
SODIUM BLD-SCNC: 138 MMOL/L (ref 136–145)

## 2019-02-28 PROCEDURE — 94761 N-INVAS EAR/PLS OXIMETRY MLT: CPT

## 2019-02-28 PROCEDURE — 6360000002 HC RX W HCPCS: Performed by: INTERNAL MEDICINE

## 2019-02-28 PROCEDURE — 6360000002 HC RX W HCPCS

## 2019-02-28 PROCEDURE — 80048 BASIC METABOLIC PNL TOTAL CA: CPT

## 2019-02-28 PROCEDURE — 6370000000 HC RX 637 (ALT 250 FOR IP): Performed by: INTERNAL MEDICINE

## 2019-02-28 PROCEDURE — 2060000000 HC ICU INTERMEDIATE R&B

## 2019-02-28 PROCEDURE — 2580000003 HC RX 258: Performed by: INTERNAL MEDICINE

## 2019-02-28 PROCEDURE — 2700000000 HC OXYGEN THERAPY PER DAY

## 2019-02-28 PROCEDURE — 99233 SBSQ HOSP IP/OBS HIGH 50: CPT | Performed by: INTERNAL MEDICINE

## 2019-02-28 PROCEDURE — 36415 COLL VENOUS BLD VENIPUNCTURE: CPT

## 2019-02-28 PROCEDURE — 85610 PROTHROMBIN TIME: CPT

## 2019-02-28 PROCEDURE — 94640 AIRWAY INHALATION TREATMENT: CPT

## 2019-02-28 PROCEDURE — 71046 X-RAY EXAM CHEST 2 VIEWS: CPT

## 2019-02-28 PROCEDURE — 99232 SBSQ HOSP IP/OBS MODERATE 35: CPT | Performed by: INTERNAL MEDICINE

## 2019-02-28 RX ORDER — ALBUTEROL SULFATE 2.5 MG/3ML
SOLUTION RESPIRATORY (INHALATION)
Status: COMPLETED
Start: 2019-02-28 | End: 2019-02-28

## 2019-02-28 RX ORDER — AZITHROMYCIN 500 MG/1
500 TABLET, FILM COATED ORAL DAILY
Status: DISCONTINUED | OUTPATIENT
Start: 2019-02-28 | End: 2019-03-01 | Stop reason: HOSPADM

## 2019-02-28 RX ORDER — WARFARIN SODIUM 2.5 MG/1
2.5 TABLET ORAL
Status: COMPLETED | OUTPATIENT
Start: 2019-02-28 | End: 2019-02-28

## 2019-02-28 RX ORDER — PREDNISONE 20 MG/1
40 TABLET ORAL DAILY
Status: DISCONTINUED | OUTPATIENT
Start: 2019-02-28 | End: 2019-03-01 | Stop reason: HOSPADM

## 2019-02-28 RX ADMIN — Medication 40 MG: at 06:36

## 2019-02-28 RX ADMIN — PRAVASTATIN SODIUM 40 MG: 10 TABLET ORAL at 21:23

## 2019-02-28 RX ADMIN — PREDNISONE 40 MG: 20 TABLET ORAL at 10:03

## 2019-02-28 RX ADMIN — METOPROLOL SUCCINATE 25 MG: 25 TABLET, EXTENDED RELEASE ORAL at 21:23

## 2019-02-28 RX ADMIN — ALBUTEROL SULFATE 2.5 MG: 2.5 SOLUTION RESPIRATORY (INHALATION) at 16:07

## 2019-02-28 RX ADMIN — ALBUTEROL SULFATE 2.5 MG: 2.5 SOLUTION RESPIRATORY (INHALATION) at 07:51

## 2019-02-28 RX ADMIN — TAMSULOSIN HYDROCHLORIDE 0.4 MG: 0.4 CAPSULE ORAL at 09:57

## 2019-02-28 RX ADMIN — GUAIFENESIN 600 MG: 600 TABLET, EXTENDED RELEASE ORAL at 21:23

## 2019-02-28 RX ADMIN — GUAIFENESIN 600 MG: 600 TABLET, EXTENDED RELEASE ORAL at 09:57

## 2019-02-28 RX ADMIN — SACUBITRIL AND VALSARTAN 2 TABLET: 24; 26 TABLET, FILM COATED ORAL at 09:58

## 2019-02-28 RX ADMIN — PREGABALIN 150 MG: 75 CAPSULE ORAL at 13:13

## 2019-02-28 RX ADMIN — ALBUTEROL SULFATE 2.5 MG: 2.5 SOLUTION RESPIRATORY (INHALATION) at 11:49

## 2019-02-28 RX ADMIN — Medication 10 ML: at 09:59

## 2019-02-28 RX ADMIN — Medication 10 ML: at 21:25

## 2019-02-28 RX ADMIN — WARFARIN SODIUM 2.5 MG: 2.5 TABLET ORAL at 18:45

## 2019-02-28 RX ADMIN — MOMETASONE FUROATE AND FORMOTEROL FUMARATE DIHYDRATE 2 PUFF: 200; 5 AEROSOL RESPIRATORY (INHALATION) at 07:51

## 2019-02-28 RX ADMIN — AZELASTINE HYDROCHLORIDE 2 SPRAY: 137 SPRAY, METERED NASAL at 10:00

## 2019-02-28 RX ADMIN — METOPROLOL SUCCINATE 25 MG: 25 TABLET, EXTENDED RELEASE ORAL at 09:57

## 2019-02-28 RX ADMIN — PREGABALIN 150 MG: 75 CAPSULE ORAL at 23:18

## 2019-02-28 RX ADMIN — AZELASTINE HYDROCHLORIDE 2 SPRAY: 137 SPRAY, METERED NASAL at 21:24

## 2019-02-28 RX ADMIN — ZOLPIDEM TARTRATE 10 MG: 5 TABLET ORAL at 23:18

## 2019-02-28 RX ADMIN — AZITHROMYCIN 500 MG: 500 TABLET, FILM COATED ORAL at 16:20

## 2019-02-28 RX ADMIN — MOMETASONE FUROATE AND FORMOTEROL FUMARATE DIHYDRATE 2 PUFF: 200; 5 AEROSOL RESPIRATORY (INHALATION) at 20:06

## 2019-02-28 RX ADMIN — ALBUTEROL SULFATE 2.5 MG: 2.5 SOLUTION RESPIRATORY (INHALATION) at 20:06

## 2019-02-28 RX ADMIN — SACUBITRIL AND VALSARTAN 1 TABLET: 24; 26 TABLET, FILM COATED ORAL at 23:18

## 2019-02-28 RX ADMIN — POLYETHYLENE GLYCOL 3350 34 G: 17 POWDER, FOR SOLUTION ORAL at 09:57

## 2019-02-28 ASSESSMENT — PAIN SCALES - GENERAL
PAINLEVEL_OUTOF10: 0

## 2019-02-28 NOTE — PROGRESS NOTES
mmol/L Final    CO2 02/25/2019 24  21 - 32 mmol/L Final    Anion Gap 02/25/2019 11  3 - 16 Final    Glucose 02/25/2019 103* 70 - 99 mg/dL Final    BUN 02/25/2019 9  7 - 20 mg/dL Final    CREATININE 02/25/2019 1.0  0.9 - 1.3 mg/dL Final    GFR Non- 02/25/2019 >60  >60 Final    Comment: >60 mL/min/1.73m2 EGFR, calc. for ages 25 and older using the  MDRD formula (not corrected for weight), is valid for stable  renal function.  GFR  02/25/2019 >60  >60 Final    Comment: Chronic Kidney Disease: less than 60 ml/min/1.73 sq.m. Kidney Failure: less than 15 ml/min/1.73 sq.m. Results valid for patients 18 years and older.  Calcium 02/25/2019 10.6  8.3 - 10.6 mg/dL Final    Lactic Acid 02/25/2019 0.8  0.4 - 2.0 mmol/L Final    Blood Culture, Routine 02/25/2019 No Growth to date. Any change in status will be called. Preliminary    Culture, Blood 2 02/25/2019 No Growth to date. Any change in status will be called.    Preliminary    Color, UA 02/25/2019 YELLOW  Straw/Yellow Final    Clarity, UA 02/25/2019 CLOUDY* Clear Final    Glucose, Ur 02/25/2019 Negative  Negative mg/dL Final    Bilirubin Urine 02/25/2019 Negative  Negative Final    Ketones, Urine 02/25/2019 Negative  Negative mg/dL Final    Specific [place], UA 02/25/2019 1.019  1.005 - 1.030 Final    Blood, Urine 02/25/2019 LARGE* Negative Final    pH, UA 02/25/2019 6.0  5.0 - 8.0 Final    Protein, UA 02/25/2019 TRACE* Negative mg/dL Final    Urobilinogen, Urine 02/25/2019 0.2  <2.0 E.U./dL Final    Nitrite, Urine 02/25/2019 Negative  Negative Final    Leukocyte Esterase, Urine 02/25/2019 Negative  Negative Final    Microscopic Examination 02/25/2019 YES   Final    Urine Reflex to Culture 02/25/2019 Not Indicated   Final    Urine Type 02/25/2019 Not Specified   Final    Hyaline Casts, UA 02/25/2019 1  0 - 8 /LPF Final    WBC, UA 02/25/2019 1  0 - 5 /HPF Final    RBC, UA 02/25/2019 266* 0 - 4 /HPF Final    Epi Cells 02/25/2019 0  0 - 5 /HPF Final    Comment: Urinalysis microscopic performed using the  automated methodology (AUWI analyzer).  Protime 02/26/2019 19.5* 9.8 - 13.0 sec Final    Comment: Effective 5-31-18 09:00am EST  Please note reference ranges have  changed for PT and INR Testing.  INR 02/26/2019 1.71* 0.86 - 1.14 Final    Comment: REVIEWED BY TECHNOLOGIST. Effective 11/28/18 at 02:30pm EST    Normal: 0.94 - 1.06  Therapeutic: 2.0 - 3.0  Pros.  Valve: 2.5 - 3.5  AMI: 2.0 - 3.0      CULTURE, RESPIRATORY 02/26/2019 Further report to follow   Preliminary    Gram Stain Result 02/26/2019    Final                    Value:1+ Epithelial Cells  2+ Gram positive cocci  3+ Small Gram positive rods  1+ WBC's (Polymorphonuclear)      WBC 02/26/2019 4.4  4.0 - 11.0 K/uL Final    RBC 02/26/2019 4.88  4.20 - 5.90 M/uL Final    Hemoglobin 02/26/2019 15.3  13.5 - 17.5 g/dL Final    Hematocrit 02/26/2019 45.7  40.5 - 52.5 % Final    MCV 02/26/2019 93.7  80.0 - 100.0 fL Final    MCH 02/26/2019 31.4  26.0 - 34.0 pg Final    MCHC 02/26/2019 33.5  31.0 - 36.0 g/dL Final    RDW 02/26/2019 13.0  12.4 - 15.4 % Final    Platelets 66/05/7761 140  135 - 450 K/uL Final    MPV 02/26/2019 7.4  5.0 - 10.5 fL Final    Neutrophils % 02/26/2019 86.5  % Final    Lymphocytes % 02/26/2019 11.6  % Final    Monocytes % 02/26/2019 1.8  % Final    Eosinophils % 02/26/2019 0.0  % Final    Basophils % 02/26/2019 0.1  % Final    Neutrophils # 02/26/2019 3.8  1.7 - 7.7 K/uL Final    Lymphocytes # 02/26/2019 0.5* 1.0 - 5.1 K/uL Final    Monocytes # 02/26/2019 0.1  0.0 - 1.3 K/uL Final    Eosinophils # 02/26/2019 0.0  0.0 - 0.6 K/uL Final    Basophils # 02/26/2019 0.0  0.0 - 0.2 K/uL Final    Lactic Acid 02/26/2019 1.4  0.4 - 2.0 mmol/L Final    Sodium 02/26/2019 137  136 - 145 mmol/L Final    Potassium 02/26/2019 4.3  3.5 - 5.1 mmol/L Final    Chloride 02/26/2019 101  99 - 110 mmol/L Final    CO2

## 2019-02-28 NOTE — PROGRESS NOTES
Clinical Pharmacy Note  Warfarin Consult    Leonard Montilla is a 60 y.o. male receiving warfarin managed by pharmacy. Patient being bridged with none. Warfarin Indication: P.E.prophy  Target INR range: 2-3   Dose prior to admission: 5mg qd except 2.5mg tues and thurs    Current warfarin drug-drug interactions: medrol/abx    Recent Labs      02/25/19   1524  02/26/19   0939  02/27/19   0452  02/28/19   0553   HGB  15.4  15.3  14.5   --    HCT  45.5  45.7  43.3   --    INR   --   1.71*  1.64*  2.36*       Assessment/Plan:    Warfarin 2.5 mg tonight, normal home dose. Daily PT/INR until stable within therapeutic range. Thank you for the consult. Will continue to follow.   Electronically signed by Kat Colin [facility] on 2/28/2019 at 8:26 AM

## 2019-02-28 NOTE — PROGRESS NOTES
Pulmonary Progress Note    Date of Admission: 2/25/2019   LOS: 3 days     Chief Complaint   Patient presents with    Chest Pain    Shortness of Breath       Assessment:     Acute Hypoxemic Respiratory Failure with SAO2 <90% on Room Air  Bronchiolitis Obliterans  -- acute exacerbation  Upper Respiratory Tract Infection    Plan:      -unclear etiology for exacerbation, likely viral.  Generally improving but with persistent o2 requirement.  -Agree with empiric duonebs, dulera, [id number], last dose tomorrow  -PO prednisone 40mg today  -Wean supplemental oxygen to goal saturation of >90%  -has nocturnal hypoxemia, may need home o2 screen prior to discharge    24 Hour Events/Subjective  +flank pain  2lpm o2    ROS:   No nausea  No Vomiting  No chest pain      Intake/Output Summary (Last 24 hours) at 02/28/19 0820  Last data filed at 02/27/19 2231   Gross per 24 hour   Intake             1320 ml   Output             1350 ml   Net              -30 ml         PHYSICAL EXAM:   Blood pressure 99/62, pulse 54, temperature 97.9 °F (36.6 °C), temperature source Oral, resp. rate 16, height 5' 6\" (1.676 m), weight 209 lb 7 oz (95 kg), SpO2 96 %.'  Gen:  No acute distress. Eyes: PERRL. Anicteric sclera. No conjunctival injection. ENT: No discharge. Posterior oropharynx clear. External appearance of ears and nose normal.  Neck: Trachea midline. No mass   Resp:  No crackles. No wheezes. No rhonchi. No dullness on percussion. CV: Regular rate. Regular rhythm. No murmur or rub. No edema. GI: Soft, Non-tender. Non-distended. +BS  Skin: Warm, dry, w/o erythema. Lymph: No cervical or supraclavicular LAD. M/S: No cyanosis. No clubbing. Neuro:  CN 2-12 tested, no focal neurologic deficit. Moves all extremities  Psych: Awake and alert, Oriented x 3. Judgement and insight appropriate. Mood stable.       Medications:    Scheduled Meds:   mometasone-formoterol  2 puff Inhalation BID    methylPREDNISolone  40 mg

## 2019-02-28 NOTE — PROGRESS NOTES
WBC  4.4  4.4  10.2   --    HGB  15.4  15.3  14.5   --    HCT  45.5  45.7  43.3   --    PLT  125*  140  140   --    NA  136  137  137  138   K  4.1  4.3  5.2*  4.4   CL  101  101  103  102   CO2  24  22  25  28   BUN  9  14  13  16   CREATININE  1.0  0.8*  0.9  0.9   CALCIUM  10.6  11.0*  11.0*  10.2   INR   --   1.71*  1.64*  2.36*   AST   --   23   --    --    ALT   --   24   --    --    BILITOT   --   0.5   --    --        ASSESSMENT   1. Hospital day # 3  2. 58y.o. With pneumonia. We have been consulted for flank pain. PLAN   1. CT scan showing improvement, no acute interventions required. He has expressed his frustration with this and we discussed he can look into a second opinion if he wishes. Will sign off, call with questions.       BRENNA Caputo CNP 2/28/2019 8:12 AM

## 2019-03-01 VITALS
BODY MASS INDEX: 33.73 KG/M2 | HEIGHT: 66 IN | TEMPERATURE: 97.8 F | OXYGEN SATURATION: 94 % | HEART RATE: 75 BPM | WEIGHT: 209.88 LBS | RESPIRATION RATE: 16 BRPM | SYSTOLIC BLOOD PRESSURE: 131 MMHG | DIASTOLIC BLOOD PRESSURE: 80 MMHG

## 2019-03-01 LAB
EKG ATRIAL RATE: 114 BPM
EKG DIAGNOSIS: NORMAL
EKG P AXIS: 63 DEGREES
EKG P-R INTERVAL: 170 MS
EKG Q-T INTERVAL: 348 MS
EKG QRS DURATION: 150 MS
EKG QTC CALCULATION (BAZETT): 479 MS
EKG R AXIS: -40 DEGREES
EKG T AXIS: 105 DEGREES
EKG VENTRICULAR RATE: 114 BPM
INR BLD: 3.14 (ref 0.86–1.14)
PROTHROMBIN TIME: 35.8 SEC (ref 9.8–13)

## 2019-03-01 PROCEDURE — 99239 HOSP IP/OBS DSCHRG MGMT >30: CPT | Performed by: INTERNAL MEDICINE

## 2019-03-01 PROCEDURE — 6370000000 HC RX 637 (ALT 250 FOR IP): Performed by: INTERNAL MEDICINE

## 2019-03-01 PROCEDURE — 2700000000 HC OXYGEN THERAPY PER DAY

## 2019-03-01 PROCEDURE — 36415 COLL VENOUS BLD VENIPUNCTURE: CPT

## 2019-03-01 PROCEDURE — 85610 PROTHROMBIN TIME: CPT

## 2019-03-01 PROCEDURE — 94760 N-INVAS EAR/PLS OXIMETRY 1: CPT

## 2019-03-01 PROCEDURE — 2580000003 HC RX 258: Performed by: INTERNAL MEDICINE

## 2019-03-01 PROCEDURE — 94640 AIRWAY INHALATION TREATMENT: CPT

## 2019-03-01 PROCEDURE — 6360000002 HC RX W HCPCS: Performed by: INTERNAL MEDICINE

## 2019-03-01 RX ORDER — PREDNISONE 10 MG/1
TABLET ORAL
Qty: 15 TABLET | Refills: 0 | Status: SHIPPED | OUTPATIENT
Start: 2019-03-01 | End: 2019-03-20 | Stop reason: ALTCHOICE

## 2019-03-01 RX ADMIN — GUAIFENESIN 600 MG: 600 TABLET, EXTENDED RELEASE ORAL at 09:25

## 2019-03-01 RX ADMIN — Medication 40 MG: at 06:52

## 2019-03-01 RX ADMIN — AZITHROMYCIN 500 MG: 500 TABLET, FILM COATED ORAL at 09:25

## 2019-03-01 RX ADMIN — SACUBITRIL AND VALSARTAN 2 TABLET: 24; 26 TABLET, FILM COATED ORAL at 09:26

## 2019-03-01 RX ADMIN — ALBUTEROL SULFATE 2.5 MG: 2.5 SOLUTION RESPIRATORY (INHALATION) at 08:01

## 2019-03-01 RX ADMIN — PREDNISONE 40 MG: 20 TABLET ORAL at 09:25

## 2019-03-01 RX ADMIN — AZELASTINE HYDROCHLORIDE 2 SPRAY: 137 SPRAY, METERED NASAL at 09:26

## 2019-03-01 RX ADMIN — METOPROLOL SUCCINATE 25 MG: 25 TABLET, EXTENDED RELEASE ORAL at 09:25

## 2019-03-01 RX ADMIN — TAMSULOSIN HYDROCHLORIDE 0.4 MG: 0.4 CAPSULE ORAL at 09:25

## 2019-03-01 RX ADMIN — Medication 10 ML: at 09:26

## 2019-03-01 RX ADMIN — MOMETASONE FUROATE AND FORMOTEROL FUMARATE DIHYDRATE 2 PUFF: 200; 5 AEROSOL RESPIRATORY (INHALATION) at 08:01

## 2019-03-01 ASSESSMENT — PAIN SCALES - GENERAL
PAINLEVEL_OUTOF10: 0
PAINLEVEL_OUTOF10: 0

## 2019-03-01 NOTE — PROGRESS NOTES
[facility] ROC[facility] Progress Note  3/1/2019 7:15 AM  Subjective:   Admit Date: 2/25/2019      Chief Complaint: Less cough     Interval History:Cont to improve      Diet: DIET CARDIAC;  Medications:   Scheduled Meds:   predniSONE  40 mg Oral Daily    azithromycin  500 mg Oral Daily    mometasone-formoterol  2 puff Inhalation BID    polyethylene glycol  34 g Oral Daily    guaiFENesin  600 mg Oral BID    sacubitril-valsartan  1 tablet Oral Nightly    sacubitril-valsartan  2 tablet Oral Daily    tamsulosin  0.4 mg Oral Daily    esomeprazole  40 mg Oral QAM AC    acetaminophen  1,000 mg Oral Once    azelastine  2 spray Each Nare BID    metoprolol succinate  25 mg Oral BID    pravastatin  40 mg Oral Nightly    pregabalin  150 mg Oral BID    sodium chloride flush  10 mL Intravenous 2 times per day    warfarin (COUMADIN) daily dosing (placeholder)   Other RX Placeholder    albuterol  2.5 mg Nebulization Q4H WA     Continuous Infusions:    Review of Systems -   General ROS: afebrile  Respiratory ROS: positive for - cough  Cardiovascular ROS: no chest pain  Musculoskeletal ROS:positive for - :joint pain  Neurological ROS: no TIA or stroke symptoms    Objective:   Vitals: /86   Pulse 64   Temp 97.8 °F (36.6 °C) (Oral)   Resp 16   Ht 5' 6\" (1.676 m)   Wt 209 lb 14.1 oz (95.2 kg)   SpO2 96%   BMI 33.88 kg/m²   General appearance: alert and cooperative with exam  HEENT: Head: Normocephalic, no lesions, without obvious abnormality.   Neck: no adenopathy, no carotid bruit, no JVD, supple, symmetrical, trachea midline and thyroid not enlarged, symmetric, no tenderness/mass/nodules  Lungs: clear to auscultation bilaterally  Heart: regular rate and rhythm, S1, S2 normal, no murmur, click, rub or gallop  Abdomen: soft, non-tender; bowel sounds normal; no masses,  no organomegaly  Extremities: extremities normal, atraumatic, no cyanosis or edema  Neurologic: Mental status: Alert, oriented, thought content 167* 70 - 99 mg/dL Final    BUN 02/26/2019 14  7 - 20 mg/dL Final    CREATININE 02/26/2019 0.8* 0.9 - 1.3 mg/dL Final    GFR Non- 02/26/2019 >60  >60 Final    Comment: >60 mL/min/1.73m2 EGFR, calc. for ages 25 and older using the  MDRD formula (not corrected for weight), is valid for stable  renal function.  GFR  02/26/2019 >60  >60 Final    Comment: Chronic Kidney Disease: less than 60 ml/min/1.73 sq.m. Kidney Failure: less than 15 ml/min/1.73 sq.m. Results valid for patients 18 years and older.  Calcium 02/26/2019 11.0* 8.3 - 10.6 mg/dL Final    Total Protein 02/26/2019 6.5  6.4 - 8.2 g/dL Final    Alb 02/26/2019 3.8  3.4 - 5.0 g/dL Final    Albumin/Globulin Ratio 02/26/2019 1.4  1.1 - 2.2 Final    Total Bilirubin 02/26/2019 0.5  0.0 - 1.0 mg/dL Final    Alkaline Phosphatase 02/26/2019 88  40 - 129 U/L Final    ALT 02/26/2019 24  10 - 40 U/L Final    AST 02/26/2019 23  15 - 37 U/L Final    Globulin 02/26/2019 2.7  g/dL Final    Procalcitonin 02/26/2019 0.06  0.00 - 0.15 ng/mL Final    Comment: Reference Values:  Adults:                   <=0.15 ng/mL  Children >=72 hours old:  <=0.15 ng/mL  Children <72 hours old:   <2.0 ng/mL at birth, rises to                            <=20 ng/mL at 18-30 hours of age,                            then falls to <=0.15 ng/mL by                            72 hours of age. Interpretation:  General considerations in children older  than 72 hours and in adults:    <0.15    Significant bacterial infection unlikely. 0.15-2.0 Localized infections may be associated with           such borderline levels. >2.0     Highly suggestive of systemic bacterial           infections/sepsis or severe localized bacterial           infection such as severe pneumonia, meningitis,           or peritonitis. With successful antibiotic therapy, PCT levels should fall  immediately. (Half-life of 24 to 36 hours).     Elevated PCT can occur Final    MCH 02/27/2019 31.6  26.0 - 34.0 pg Final    MCHC 02/27/2019 33.6  31.0 - 36.0 g/dL Final    RDW 02/27/2019 13.0  12.4 - 15.4 % Final    Platelets 27/42/8561 140  135 - 450 K/uL Final    MPV 02/27/2019 7.3  5.0 - 10.5 fL Final    Neutrophils % 02/27/2019 88.2  % Final    Lymphocytes % 02/27/2019 7.4  % Final    Monocytes % 02/27/2019 4.3  % Final    Eosinophils % 02/27/2019 0.0  % Final    Basophils % 02/27/2019 0.1  % Final    Neutrophils # 02/27/2019 9.0* 1.7 - 7.7 K/uL Final    Lymphocytes # 02/27/2019 0.8* 1.0 - 5.1 K/uL Final    Monocytes # 02/27/2019 0.4  0.0 - 1.3 K/uL Final    Eosinophils # 02/27/2019 0.0  0.0 - 0.6 K/uL Final    Basophils # 02/27/2019 0.0  0.0 - 0.2 K/uL Final    Protime 02/28/2019 26.9* 9.8 - 13.0 sec Final    Comment: Effective 5-31-18 09:00am EST  Please note reference ranges have  changed for PT and INR Testing.  INR 02/28/2019 2.36* 0.86 - 1.14 Final    Comment: Effective 11/28/18 at 02:30pm EST    Normal: 0.94 - 1.06  Therapeutic: 2.0 - 3.0  Pros. Valve: 2.5 - 3.5  AMI: 2.0 - 3.0      Sodium 02/28/2019 138  136 - 145 mmol/L Final    Potassium 02/28/2019 4.4  3.5 - 5.1 mmol/L Final    Chloride 02/28/2019 102  99 - 110 mmol/L Final    CO2 02/28/2019 28  21 - 32 mmol/L Final    Anion Gap 02/28/2019 8  3 - 16 Final    Glucose 02/28/2019 118* 70 - 99 mg/dL Final    BUN 02/28/2019 16  7 - 20 mg/dL Final    CREATININE 02/28/2019 0.9  0.9 - 1.3 mg/dL Final    GFR Non- 02/28/2019 >60  >60 Final    Comment: >60 mL/min/1.73m2 EGFR, calc. for ages 25 and older using the  MDRD formula (not corrected for weight), is valid for stable  renal function.  GFR  02/28/2019 >60  >60 Final    Comment: Chronic Kidney Disease: less than 60 ml/min/1.73 sq.m. Kidney Failure: less than 15 ml/min/1.73 sq.m. Results valid for patients 18 years and older.       Calcium 02/28/2019 10.2  8.3 - 10.6 mg/dL Final    Protime 03/01/2019 35.8* 9.8 - 13.0 sec Final    Comment: Effective 5-31-18 09:00am EST  Please note reference ranges have  changed for PT and INR Testing.  INR 03/01/2019 3.14* 0.86 - 1.14 Final    Comment: Effective 11/28/18 at 02:30pm EST    Normal: 0.94 - 1.06  Therapeutic: 2.0 - 3.0  Pros. Valve: 2.5 - 3.5  AMI: 2.0 - 3.0           Assessment & Plan:   Principal Problem:    Pneumonia--has completed 5D of zmax--cxr now clear   Active Problems:    Cardiomyopathy (HCC)--Continue current therapy      Chronic anticoagulation    Colicky LLQ abdominal pain--overall lesseneing     Essential hypertension, benign    Bronchiolitis obliterans (HCC)--Continue current therapy    Resolved Problems:    * No resolved hospital problems.  *  Plan to DC to home today --resume maint meds --see me in 1-2 weeks   DS dictated   CONDIT ON DC---STABLE   Please note that over 35 minutes was spent in evaluating the patient, review of records and results, discussion with staff/family, etc.    Eveline Mazariegos MD

## 2019-03-01 NOTE — DISCHARGE SUMMARY
[address] [place] [facility]                               DISCHARGE SUMMARY    PATIENT NAME: Leopoldo Cornwall                        :        1960  MED REC NO:   [id number]                          ROOM:       7833  ACCOUNT NO:   [de-identified]                           ADMIT DATE: 2019  PROVIDER:     Hung Robbins MD                  DISCHARGE DATE:  2019      DIAGNOSIS ON ADMISSION:  Pneumonia. DIAGNOSIS ON DISCHARGE:  Pneumonia, resolved. HISTORY OF PRESENT ILLNESS:  The patient is a 58-year-old white male  admitted through emergency, who presented from home with a history of  increasing shortness of breath, cough, fever, tachycardia over the past  five to seven days. He was seen in the ER 24 hours prior to admission. Chest x-ray at that time failed to reveal an infiltrate. He was  discharged home, taking oral Levaquin. He called our office the  following morning complaining of temperature to 102, tachycardia. He  said his O2 sat was less than 90% and was feeling worse. He returned to  the ER. At that time, chest x-ray showed bibasilar infiltrates. He had  one episode of emesis, just felt weak, coughed, wheezy, and was not  improving despite oral antibiotics. He was admitted. PAST MEDICAL HISTORY:  Include prior pulmonary embolism, on Coumadin  therapy, history of bilateral ureteral stones, hypertension,  hyperlipidemia, bronchiolitis obliterans for which he sees Dr. Antonio Heath of Pulmonary Service. Please see the HPI for further details. He was admitted. He was begun on Zithromax and Rocephin. He was given  IV fluid gently as he looked a little dry clinically. The next several  days, he did improve. Chest x-ray was done through the hospital course  and it showed improvement in the infiltrate. Near discharge, the  infiltrates have resolved.   He was seen by the Pulmonary Service

## 2019-03-02 LAB
BLOOD CULTURE, ROUTINE: NORMAL
CULTURE, BLOOD 2: NORMAL

## 2019-03-04 ENCOUNTER — TELEPHONE (OUTPATIENT)
Dept: INTERNAL MEDICINE CLINIC | Age: 59
End: 2019-03-04

## 2019-03-05 ENCOUNTER — TELEPHONE (OUTPATIENT)
Dept: PULMONOLOGY | Age: 59
End: 2019-03-05

## 2019-03-05 ENCOUNTER — APPOINTMENT (OUTPATIENT)
Dept: PHARMACY | Age: 59
End: 2019-03-05
Payer: COMMERCIAL

## 2019-03-07 ENCOUNTER — TELEPHONE (OUTPATIENT)
Dept: PULMONOLOGY | Age: 59
End: 2019-03-07

## 2019-03-07 DIAGNOSIS — J42 BRONCHIOLITIS OBLITERANS (HCC): Primary | ICD-10-CM

## 2019-03-08 ENCOUNTER — TELEPHONE (OUTPATIENT)
Dept: PHARMACY | Age: 59
End: 2019-03-08

## 2019-03-08 ENCOUNTER — APPOINTMENT (OUTPATIENT)
Dept: PHARMACY | Age: 59
End: 2019-03-08
Payer: COMMERCIAL

## 2019-03-08 DIAGNOSIS — I26.99 OTHER PULMONARY EMBOLISM WITHOUT ACUTE COR PULMONALE, UNSPECIFIED CHRONICITY (HCC): ICD-10-CM

## 2019-03-08 LAB
INR BLD: 5.91 (ref 0.86–1.14)
PROTHROMBIN TIME: 67.4 SEC (ref 9.8–13)

## 2019-03-12 ENCOUNTER — OFFICE VISIT (OUTPATIENT)
Dept: CARDIOLOGY CLINIC | Age: 59
End: 2019-03-12
Payer: COMMERCIAL

## 2019-03-12 VITALS
DIASTOLIC BLOOD PRESSURE: 80 MMHG | WEIGHT: 196 LBS | SYSTOLIC BLOOD PRESSURE: 114 MMHG | BODY MASS INDEX: 31.5 KG/M2 | HEART RATE: 96 BPM | HEIGHT: 66 IN | OXYGEN SATURATION: 97 %

## 2019-03-12 DIAGNOSIS — I10 ESSENTIAL HYPERTENSION: ICD-10-CM

## 2019-03-12 DIAGNOSIS — Z95.0 PACEMAKER: Primary | ICD-10-CM

## 2019-03-12 DIAGNOSIS — E78.5 HYPERLIPIDEMIA, UNSPECIFIED HYPERLIPIDEMIA TYPE: ICD-10-CM

## 2019-03-12 PROCEDURE — 1111F DSCHRG MED/CURRENT MED MERGE: CPT | Performed by: INTERNAL MEDICINE

## 2019-03-12 PROCEDURE — G8598 ASA/ANTIPLAT THER USED: HCPCS | Performed by: INTERNAL MEDICINE

## 2019-03-12 PROCEDURE — 3017F COLORECTAL CA SCREEN DOC REV: CPT | Performed by: INTERNAL MEDICINE

## 2019-03-12 PROCEDURE — G8427 DOCREV CUR MEDS BY ELIG CLIN: HCPCS | Performed by: INTERNAL MEDICINE

## 2019-03-12 PROCEDURE — 99214 OFFICE O/P EST MOD 30 MIN: CPT | Performed by: INTERNAL MEDICINE

## 2019-03-12 PROCEDURE — 1036F TOBACCO NON-USER: CPT | Performed by: INTERNAL MEDICINE

## 2019-03-12 PROCEDURE — G8482 FLU IMMUNIZE ORDER/ADMIN: HCPCS | Performed by: INTERNAL MEDICINE

## 2019-03-12 PROCEDURE — G8417 CALC BMI ABV UP PARAM F/U: HCPCS | Performed by: INTERNAL MEDICINE

## 2019-03-13 ENCOUNTER — ANTI-COAG VISIT (OUTPATIENT)
Dept: PHARMACY | Age: 59
End: 2019-03-13
Payer: COMMERCIAL

## 2019-03-13 LAB — INTERNATIONAL NORMALIZATION RATIO, POC: 2.1

## 2019-03-13 PROCEDURE — 99211 OFF/OP EST MAY X REQ PHY/QHP: CPT

## 2019-03-13 PROCEDURE — 85610 PROTHROMBIN TIME: CPT

## 2019-03-15 RX ORDER — WARFARIN SODIUM 5 MG/1
TABLET ORAL
Qty: 105 TABLET | Refills: 0 | Status: SHIPPED | OUTPATIENT
Start: 2019-03-15 | End: 2019-07-15 | Stop reason: DRUGHIGH

## 2019-03-20 ENCOUNTER — ANTI-COAG VISIT (OUTPATIENT)
Dept: PHARMACY | Age: 59
End: 2019-03-20
Payer: COMMERCIAL

## 2019-03-20 LAB — INTERNATIONAL NORMALIZATION RATIO, POC: 4.2

## 2019-03-20 PROCEDURE — 85610 PROTHROMBIN TIME: CPT

## 2019-03-20 PROCEDURE — 99211 OFF/OP EST MAY X REQ PHY/QHP: CPT

## 2019-03-22 ENCOUNTER — APPOINTMENT (OUTPATIENT)
Dept: PHARMACY | Age: 59
End: 2019-03-22
Payer: COMMERCIAL

## 2019-03-27 ENCOUNTER — ANTI-COAG VISIT (OUTPATIENT)
Dept: PHARMACY | Age: 59
End: 2019-03-27
Payer: COMMERCIAL

## 2019-03-27 LAB — INTERNATIONAL NORMALIZATION RATIO, POC: 2.3

## 2019-03-27 PROCEDURE — 99211 OFF/OP EST MAY X REQ PHY/QHP: CPT

## 2019-03-27 PROCEDURE — 85610 PROTHROMBIN TIME: CPT

## 2019-04-03 ENCOUNTER — APPOINTMENT (OUTPATIENT)
Dept: PHARMACY | Age: 59
End: 2019-04-03
Payer: COMMERCIAL

## 2019-04-03 ENCOUNTER — ANTI-COAG VISIT (OUTPATIENT)
Dept: PHARMACY | Age: 59
End: 2019-04-03
Payer: COMMERCIAL

## 2019-04-03 ENCOUNTER — TELEPHONE (OUTPATIENT)
Dept: CARDIOLOGY CLINIC | Age: 59
End: 2019-04-03

## 2019-04-03 DIAGNOSIS — I27.82 CHRONIC SEPTIC PULMONARY EMBOLISM WITH ACUTE COR PULMONALE (HCC): ICD-10-CM

## 2019-04-03 DIAGNOSIS — I26.01 CHRONIC SEPTIC PULMONARY EMBOLISM WITH ACUTE COR PULMONALE (HCC): ICD-10-CM

## 2019-04-03 PROBLEM — M79.603 ARM PAIN: Status: ACTIVE | Noted: 2019-04-03

## 2019-04-03 LAB — INTERNATIONAL NORMALIZATION RATIO, POC: 3.3

## 2019-04-03 PROCEDURE — 99211 OFF/OP EST MAY X REQ PHY/QHP: CPT

## 2019-04-03 PROCEDURE — 85610 PROTHROMBIN TIME: CPT

## 2019-04-03 NOTE — TELEPHONE ENCOUNTER
Cardiac clearance  He is having a cystoscopy on 4/9/19 with Dr Orlando Lima   Has history of Non-ischemic cardiomyopathy, CHF, HTN  He is on Varsha Leeks does NOT need to hold. Thanks.

## 2019-04-03 NOTE — LETTER
Detail Level: [facility] [address] [place]  Phone: [phone]  Fax: [phone]    Jairo Oliver MD        April 4, 2019     Patient: Tristin Biggs   YOB: 1960   Date of Visit: 4/3/2019       To Whom It May Concern: It is my medical opinion that Hay Solis is stable from a cardiovascular standpoint to proceed with Cystocopy with Dr. Cinthia Anthony. Patient may hold Warfarin for 5 days prior and remain on Aspirin. If you have any questions or concerns, please don't hesitate to call.     Sincerely,        Jairo Oliver MD

## 2019-04-03 NOTE — PROGRESS NOTES
Mr. Dayana Gustafson is a 60 y.o.  male with history of PE who presents today for anticoagulation monitoring and adjustment. Patient verifies current dosing regimen  No blood in urine or stool. No dietary changes. No changes in medication/OTC agents/Herbals. No change in alcohol use. No missed doses. No Procedures scheduled in the future at this time. Lab Results   Component Value Date    INR 3.3 04/03/2019    INR 2.3 03/27/2019    INR 4.2 03/20/2019       Pertinent findings: He continues to have urinary bleeding due to his kidney stones. He was supposed to have a cysto procedure tomorrow but it was cancelled by the physician. It has not yet been rescheduled. He was on cephalexin last week but it was changed to a short course of levofloxacin. He is now finished with the antibiotics. He has been having significant nausea along with the kidney stones. He has not been able to eat his usual diet because of this. The dietary changes, along with the Levaquin, may be the reasons for his elevated INR today. We will hold his warfarin today and then continue his current dosing. He will let us know if/when the cysto is rescheduled. He will return in 10 days for his next INR. Warfarin dosing: Hold warfarin today ONLY then continue:warfarin 5mg(1 tablet) daily except 2.5mg (1/2 tablet) on Tuesday, Thursday and Sunday    After visit summary printed and reviewed with patient.

## 2019-04-03 NOTE — TELEPHONE ENCOUNTER
Last OV 3/12/19. Assessment/Plan:      Nonischemic Cardiomyopathy  He had Biv -ICD placed by Dr. Antoine Meehan for underlying  cardiomyopathy with left bundle branch block. His last echocardiogram from November 2017 showed improvement in in his left ventricle ejection fraction at 40-45% when his Biv- pacing was on. He appears euvolemic on clinical examination today. He is currently on evidence-based beta blocker and entresto therapy. Encouraged to continue to follow up with Dr. Nafisa Forman and the device clinic.      Chronic Systolic Heart failure. EF 40-45%. Appears euvolemic on exam today. Recommend low fluid and Na intake. Encouraged daily weights. Currently on evidence-based beta blocker and entresto therapy.       Pulmonary Embolism   Patient is on Warfarin therapy. Denies any abnormal bruising or bleeding. INR remains therapeutic      Hypertension  BP is stable. Continue Toprol XL 25 mg daily. BMP from 2/2019 stable.      Hyperlipidemia  Continue Pravachol 40 mg daily. Last lipid profile 12/2018 LDL 84. LDL goal is less than 100 since he does not have any significant atherosclerotic heart disease     Follow up in 6 months.

## 2019-04-03 NOTE — TELEPHONE ENCOUNTER
CARDIAC CLEARANCE REQUEST    What type of procedure are you having: cystoscopy     Are you taking any blood thinners: yes, but pt will not need to hold per [facility]    When is your procedure scheduled for: 4/9    What physician is performing your procedure: Dr. Tomasita Meckel    Fax number to send the letter: [phone] attn Arabella Abreu

## 2019-04-09 ENCOUNTER — NURSE ONLY (OUTPATIENT)
Dept: CARDIOLOGY CLINIC | Age: 59
End: 2019-04-09
Payer: COMMERCIAL

## 2019-04-09 DIAGNOSIS — I42.9 CARDIOMYOPATHY, UNSPECIFIED TYPE (HCC): ICD-10-CM

## 2019-04-09 DIAGNOSIS — I50.22 CHRONIC SYSTOLIC CHF (CONGESTIVE HEART FAILURE), NYHA CLASS 2 (HCC): ICD-10-CM

## 2019-04-09 DIAGNOSIS — Z95.810 BIVENTRICULAR ICD (IMPLANTABLE CARDIOVERTER-DEFIBRILLATOR) IN PLACE: Primary | ICD-10-CM

## 2019-04-09 PROCEDURE — 93296 REM INTERROG EVL PM/IDS: CPT | Performed by: INTERNAL MEDICINE

## 2019-04-09 PROCEDURE — 93295 DEV INTERROG REMOTE 1/2/MLT: CPT | Performed by: INTERNAL MEDICINE

## 2019-04-09 PROCEDURE — 93297 REM INTERROG DEV EVAL ICPMS: CPT | Performed by: INTERNAL MEDICINE

## 2019-04-09 NOTE — LETTER
[address] [place] [phone]  [address] [facility] [phone]    Pacemaker/Defibrillator Clinic          04/09/19        [address] [facility] [address] [facility]        Dear Max Haley    This letter is to inform you that we received the transmission from your monitor at home that checks your pacemaker and/or defibrillator, or implanted heart monitor. The next date your monitor will automatically transmit will be 7/16/19. Please do not send additional routine transmissions unless specifically requested. Your device and monitor are wireless and most transmit cellularly, but please periodically check your monitor is still plugged in to the electrical outlet. If you still use the telephone land line to send please ensure the connection to the phone kirk is secure. This will help to ensure successful automatic transmissions in the future. Also, the monitor needs to be close to you while sleeping at night. Please be aware that the remote device transmission sites are periodically monitored only during regular business hours during which simultaneous in-office device clinics are being run. If your transmission requires attention, we will contact you as soon as possible. Thank you.             [address]

## 2019-04-09 NOTE — PROGRESS NOTES
Remote/Biotronik interrogation shows normal device function. All auto testing is off per pt request.   Thoracic impedance is stable at this time. Atrial monitoring episodes recorded. - appear to be ST with 1:1 AV conduction. SVT episodes show known AF.      - on Warfarin. NSVT recorded. - 9 beat run, lasting approx 2 seconds      - on Toprol XL     Dr. Mel Washburn to review interrogation. See interrogation/Paceart report for further details.

## 2019-04-12 ENCOUNTER — APPOINTMENT (OUTPATIENT)
Dept: PHARMACY | Age: 59
End: 2019-04-12
Payer: COMMERCIAL

## 2019-04-16 PROBLEM — Z01.818 PRE-OP EVALUATION: Status: ACTIVE | Noted: 2019-04-16

## 2019-04-18 ENCOUNTER — TELEPHONE (OUTPATIENT)
Dept: PULMONOLOGY | Age: 59
End: 2019-04-18

## 2019-04-18 NOTE — TELEPHONE ENCOUNTER
I think it is best for him not to get a parking placard. It is important for him to walk and exercise.

## 2019-04-26 PROBLEM — R30.0 DYSURIA: Status: ACTIVE | Noted: 2019-04-26

## 2019-05-01 ENCOUNTER — TELEPHONE (OUTPATIENT)
Dept: PHARMACY | Age: 59
End: 2019-05-01

## 2019-05-01 NOTE — TELEPHONE ENCOUNTER
Lila Mendoza states that he received a 7 day course of Levaquin, which his will finish on Friday(5-3-19). He has been off of his warfarin for the past 2 weeks for a surgical procedure. He will restart his warfarin at his previous dose. We will see him in 8 days for his next INR. He will call with any issues.     [address]  Anticoagulation Service  [phone]

## 2019-05-01 NOTE — TELEPHONE ENCOUNTER
Patient called restarted Warfarin yesterday and is on Levoquin until Friday. Has an appointment on 5/09 for Coumadin check.

## 2019-05-07 ENCOUNTER — TELEPHONE (OUTPATIENT)
Dept: CARDIOLOGY CLINIC | Age: 59
End: 2019-05-07

## 2019-05-07 NOTE — TELEPHONE ENCOUNTER
Andrew Corona, BARBARA    Patient last seen by Dr. Jaqueline Ron 9/28/18 for PAF,NICM, S/P Bi-V ICD. CHADS vasc 5- on coumadin. Patient was scheduled for CV 9/20 but was in NSR- advised to start taking Multaq but he refused d/t concerns for side effects. Is the patient OK to start taking Multaq? He has seen Dr. Jose Antonio Medrano recently in office. Please advise.

## 2019-05-07 NOTE — TELEPHONE ENCOUNTER
Charmaine Sun called in this afternoon wanting to talk to Dr. Michelle Cohen. He stated that he is wanting to start Multaq, but doesn't think his insurance is going to cover it. So he wants to make sure that we will have plenty of samples here at the office for him.     You can reach Charmaine Sun #[phone]

## 2019-05-07 NOTE — TELEPHONE ENCOUNTER
Patient called back and stated he has noticed sx's when working out otherwise no complaints. He also states that General Bedoya with 4 of his medications\" so he would like to try samples before purchasing the prescription to be sure he does have any side effects. I advised the patient we cannot sample long term and to call his insurance to find out what the cost for Multaq will be and if it is something he will be able to afford. He will call us back.

## 2019-05-07 NOTE — TELEPHONE ENCOUNTER
Left a message for patient to call back- Need to find out if patient can afford Multaq cannot sample long term.

## 2019-05-08 NOTE — TELEPHONE ENCOUNTER
Patient would like to start Multaq now, please go to encounter- bottom right corner to sign prescription. Thank you.

## 2019-05-08 NOTE — TELEPHONE ENCOUNTER
I called and spoke with the patient- Per Dr. Tod Hedrick, patient is OK to start taking Multaq. I have prepared 9 days of samples and a $0 co-pay card that patient will  on Friday. Patient stated a PA was needed for Multaq and would like a 30 day sent to [facility]. Patient called insurance and 30 day is $30 and 90 day $80. Patient can afford this.

## 2019-05-09 ENCOUNTER — ANTI-COAG VISIT (OUTPATIENT)
Dept: PHARMACY | Age: 59
End: 2019-05-09
Payer: COMMERCIAL

## 2019-05-09 LAB — INTERNATIONAL NORMALIZATION RATIO, POC: 1.5

## 2019-05-09 PROCEDURE — 99211 OFF/OP EST MAY X REQ PHY/QHP: CPT

## 2019-05-09 PROCEDURE — 85610 PROTHROMBIN TIME: CPT

## 2019-05-15 ENCOUNTER — TELEPHONE (OUTPATIENT)
Dept: PULMONOLOGY | Age: 59
End: 2019-05-15

## 2019-05-15 NOTE — TELEPHONE ENCOUNTER
Gustabo Lee from [employer] called and stated that they needed either a C-9 or a retro auth filled out for the patient.  Please advise

## 2019-05-16 PROBLEM — Z01.818 PRE-OP EVALUATION: Status: RESOLVED | Noted: 2019-04-16 | Resolved: 2019-05-16

## 2019-05-17 ENCOUNTER — ANESTHESIA (OUTPATIENT)
Dept: OPERATING ROOM | Age: 59
End: 2019-05-17
Payer: COMMERCIAL

## 2019-05-17 ENCOUNTER — APPOINTMENT (OUTPATIENT)
Dept: CT IMAGING | Age: 59
End: 2019-05-17
Payer: COMMERCIAL

## 2019-05-17 ENCOUNTER — HOSPITAL ENCOUNTER (EMERGENCY)
Age: 59
Discharge: HOME | End: 2019-05-17
Attending: EMERGENCY MEDICINE
Payer: COMMERCIAL

## 2019-05-17 ENCOUNTER — ANESTHESIA EVENT (OUTPATIENT)
Dept: OPERATING ROOM | Age: 59
End: 2019-05-17
Payer: COMMERCIAL

## 2019-05-17 VITALS
HEART RATE: 81 BPM | OXYGEN SATURATION: 94 % | HEIGHT: 66 IN | DIASTOLIC BLOOD PRESSURE: 73 MMHG | SYSTOLIC BLOOD PRESSURE: 112 MMHG | RESPIRATION RATE: 14 BRPM | BODY MASS INDEX: 32.62 KG/M2 | TEMPERATURE: 97.4 F | WEIGHT: 203 LBS

## 2019-05-17 VITALS
TEMPERATURE: 98.6 F | OXYGEN SATURATION: 100 % | RESPIRATION RATE: 4 BRPM | DIASTOLIC BLOOD PRESSURE: 57 MMHG | SYSTOLIC BLOOD PRESSURE: 106 MMHG

## 2019-05-17 DIAGNOSIS — N20.1 URETEROLITHIASIS: Primary | ICD-10-CM

## 2019-05-17 DIAGNOSIS — N20.0 NEPHROLITHIASIS: ICD-10-CM

## 2019-05-17 LAB
A/G RATIO: 1.2 (ref 1.1–2.2)
ALBUMIN SERPL-MCNC: 3.6 G/DL (ref 3.4–5)
ALP BLD-CCNC: 118 U/L (ref 40–129)
ALT SERPL-CCNC: 18 U/L (ref 10–40)
ANION GAP SERPL CALCULATED.3IONS-SCNC: 7 MMOL/L (ref 3–16)
AST SERPL-CCNC: 19 U/L (ref 15–37)
BASOPHILS ABSOLUTE: 0 K/UL (ref 0–0.2)
BASOPHILS RELATIVE PERCENT: 0.8 %
BILIRUB SERPL-MCNC: 0.8 MG/DL (ref 0–1)
BILIRUBIN URINE: NEGATIVE
BLOOD, URINE: NEGATIVE
BUN BLDV-MCNC: 10 MG/DL (ref 7–20)
CALCIUM SERPL-MCNC: 10.3 MG/DL (ref 8.3–10.6)
CHLORIDE BLD-SCNC: 107 MMOL/L (ref 99–110)
CLARITY: CLEAR
CO2: 24 MMOL/L (ref 21–32)
COLOR: ABNORMAL
COMMENT UA: ABNORMAL
CREAT SERPL-MCNC: 0.9 MG/DL (ref 0.9–1.3)
CRYSTALS, UA: ABNORMAL /HPF
EOSINOPHILS ABSOLUTE: 0.5 K/UL (ref 0–0.6)
EOSINOPHILS RELATIVE PERCENT: 7.6 %
EPITHELIAL CELLS, UA: 1 /HPF (ref 0–5)
GFR AFRICAN AMERICAN: >60
GFR NON-AFRICAN AMERICAN: >60
GLOBULIN: 2.9 G/DL
GLUCOSE BLD-MCNC: 98 MG/DL (ref 70–99)
GLUCOSE URINE: NEGATIVE MG/DL
HCT VFR BLD CALC: 44.8 % (ref 40.5–52.5)
HEMOGLOBIN: 15.5 G/DL (ref 13.5–17.5)
HYALINE CASTS: 5 /LPF (ref 0–8)
INR BLD: 1.42 (ref 0.86–1.14)
KETONES, URINE: NEGATIVE MG/DL
LEUKOCYTE ESTERASE, URINE: NEGATIVE
LIPASE: 15 U/L (ref 13–60)
LYMPHOCYTES ABSOLUTE: 1.8 K/UL (ref 1–5.1)
LYMPHOCYTES RELATIVE PERCENT: 29.8 %
MCH RBC QN AUTO: 32.5 PG (ref 26–34)
MCHC RBC AUTO-ENTMCNC: 34.6 G/DL (ref 31–36)
MCV RBC AUTO: 94.1 FL (ref 80–100)
MICROSCOPIC EXAMINATION: YES
MONOCYTES ABSOLUTE: 0.5 K/UL (ref 0–1.3)
MONOCYTES RELATIVE PERCENT: 8.9 %
NEUTROPHILS ABSOLUTE: 3.3 K/UL (ref 1.7–7.7)
NEUTROPHILS RELATIVE PERCENT: 52.9 %
NITRITE, URINE: POSITIVE
PDW BLD-RTO: 13 % (ref 12.4–15.4)
PH UA: 5.5 (ref 5–8)
PLATELET # BLD: 185 K/UL (ref 135–450)
PMV BLD AUTO: 7.1 FL (ref 5–10.5)
POTASSIUM REFLEX MAGNESIUM: 4 MMOL/L (ref 3.5–5.1)
PROTEIN UA: NEGATIVE MG/DL
PROTHROMBIN TIME: 16.2 SEC (ref 9.8–13)
RBC # BLD: 4.76 M/UL (ref 4.2–5.9)
RBC UA: 2 /HPF (ref 0–4)
SODIUM BLD-SCNC: 138 MMOL/L (ref 136–145)
SPECIFIC GRAVITY UA: 1.02 (ref 1–1.03)
TOTAL PROTEIN: 6.5 G/DL (ref 6.4–8.2)
URINE REFLEX TO CULTURE: YES
URINE TYPE: ABNORMAL
UROBILINOGEN, URINE: 1 E.U./DL
WBC # BLD: 6.2 K/UL (ref 4–11)
WBC UA: 7 /HPF (ref 0–5)

## 2019-05-17 PROCEDURE — 7100000000 HC PACU RECOVERY - FIRST 15 MIN: Performed by: UROLOGY

## 2019-05-17 PROCEDURE — 3600000003 HC SURGERY LEVEL 3 BASE: Performed by: UROLOGY

## 2019-05-17 PROCEDURE — 3700000000 HC ANESTHESIA ATTENDED CARE: Performed by: UROLOGY

## 2019-05-17 PROCEDURE — 81001 URINALYSIS AUTO W/SCOPE: CPT

## 2019-05-17 PROCEDURE — 6360000002 HC RX W HCPCS: Performed by: EMERGENCY MEDICINE

## 2019-05-17 PROCEDURE — 96374 THER/PROPH/DIAG INJ IV PUSH: CPT

## 2019-05-17 PROCEDURE — 74176 CT ABD & PELVIS W/O CONTRAST: CPT

## 2019-05-17 PROCEDURE — 7100000011 HC PHASE II RECOVERY - ADDTL 15 MIN: Performed by: UROLOGY

## 2019-05-17 PROCEDURE — 85025 COMPLETE CBC W/AUTO DIFF WBC: CPT

## 2019-05-17 PROCEDURE — 2709999900 HC NON-CHARGEABLE SUPPLY: Performed by: UROLOGY

## 2019-05-17 PROCEDURE — 3600000013 HC SURGERY LEVEL 3 ADDTL 15MIN: Performed by: UROLOGY

## 2019-05-17 PROCEDURE — 6360000002 HC RX W HCPCS: Performed by: ANESTHESIOLOGY

## 2019-05-17 PROCEDURE — 87086 URINE CULTURE/COLONY COUNT: CPT

## 2019-05-17 PROCEDURE — 2500000003 HC RX 250 WO HCPCS: Performed by: NURSE ANESTHETIST, CERTIFIED REGISTERED

## 2019-05-17 PROCEDURE — 7100000010 HC PHASE II RECOVERY - FIRST 15 MIN: Performed by: UROLOGY

## 2019-05-17 PROCEDURE — 2500000003 HC RX 250 WO HCPCS: Performed by: ANESTHESIOLOGY

## 2019-05-17 PROCEDURE — 2580000003 HC RX 258: Performed by: UROLOGY

## 2019-05-17 PROCEDURE — 3700000001 HC ADD 15 MINUTES (ANESTHESIA): Performed by: UROLOGY

## 2019-05-17 PROCEDURE — 2580000003 HC RX 258: Performed by: ANESTHESIOLOGY

## 2019-05-17 PROCEDURE — 80053 COMPREHEN METABOLIC PANEL: CPT

## 2019-05-17 PROCEDURE — 85610 PROTHROMBIN TIME: CPT

## 2019-05-17 PROCEDURE — C1769 GUIDE WIRE: HCPCS | Performed by: UROLOGY

## 2019-05-17 PROCEDURE — 99284 EMERGENCY DEPT VISIT MOD MDM: CPT

## 2019-05-17 PROCEDURE — 83690 ASSAY OF LIPASE: CPT

## 2019-05-17 PROCEDURE — 96375 TX/PRO/DX INJ NEW DRUG ADDON: CPT

## 2019-05-17 PROCEDURE — 96376 TX/PRO/DX INJ SAME DRUG ADON: CPT

## 2019-05-17 PROCEDURE — 6370000000 HC RX 637 (ALT 250 FOR IP): Performed by: EMERGENCY MEDICINE

## 2019-05-17 PROCEDURE — 6360000002 HC RX W HCPCS: Performed by: NURSE ANESTHETIST, CERTIFIED REGISTERED

## 2019-05-17 PROCEDURE — 6360000002 HC RX W HCPCS: Performed by: UROLOGY

## 2019-05-17 PROCEDURE — 7100000001 HC PACU RECOVERY - ADDTL 15 MIN: Performed by: UROLOGY

## 2019-05-17 PROCEDURE — 6360000004 HC RX CONTRAST MEDICATION: Performed by: UROLOGY

## 2019-05-17 RX ORDER — ONDANSETRON 2 MG/ML
4 INJECTION INTRAMUSCULAR; INTRAVENOUS ONCE
Status: COMPLETED | OUTPATIENT
Start: 2019-05-17 | End: 2019-05-17

## 2019-05-17 RX ORDER — FENTANYL CITRATE 50 UG/ML
INJECTION, SOLUTION INTRAMUSCULAR; INTRAVENOUS PRN
Status: DISCONTINUED | OUTPATIENT
Start: 2019-05-17 | End: 2019-05-17 | Stop reason: SDUPTHER

## 2019-05-17 RX ORDER — MORPHINE SULFATE 2 MG/ML
1 INJECTION, SOLUTION INTRAMUSCULAR; INTRAVENOUS EVERY 5 MIN PRN
Status: DISCONTINUED | OUTPATIENT
Start: 2019-05-17 | End: 2019-05-17 | Stop reason: HOSPADM

## 2019-05-17 RX ORDER — OXYCODONE HYDROCHLORIDE AND ACETAMINOPHEN 5; 325 MG/1; MG/1
1 TABLET ORAL PRN
Status: DISCONTINUED | OUTPATIENT
Start: 2019-05-17 | End: 2019-05-17 | Stop reason: HOSPADM

## 2019-05-17 RX ORDER — PROPOFOL 10 MG/ML
INJECTION, EMULSION INTRAVENOUS PRN
Status: DISCONTINUED | OUTPATIENT
Start: 2019-05-17 | End: 2019-05-17 | Stop reason: SDUPTHER

## 2019-05-17 RX ORDER — KETOROLAC TROMETHAMINE 30 MG/ML
15 INJECTION, SOLUTION INTRAMUSCULAR; INTRAVENOUS ONCE
Status: COMPLETED | OUTPATIENT
Start: 2019-05-17 | End: 2019-05-17

## 2019-05-17 RX ORDER — SODIUM CHLORIDE 9 MG/ML
INJECTION, SOLUTION INTRAVENOUS CONTINUOUS
Status: DISCONTINUED | OUTPATIENT
Start: 2019-05-17 | End: 2019-05-17 | Stop reason: HOSPADM

## 2019-05-17 RX ORDER — FENTANYL CITRATE 50 UG/ML
50 INJECTION, SOLUTION INTRAMUSCULAR; INTRAVENOUS EVERY 5 MIN PRN
Status: DISCONTINUED | OUTPATIENT
Start: 2019-05-17 | End: 2019-05-17 | Stop reason: HOSPADM

## 2019-05-17 RX ORDER — MIDAZOLAM HYDROCHLORIDE 1 MG/ML
INJECTION INTRAMUSCULAR; INTRAVENOUS PRN
Status: DISCONTINUED | OUTPATIENT
Start: 2019-05-17 | End: 2019-05-17 | Stop reason: SDUPTHER

## 2019-05-17 RX ORDER — OXYCODONE HYDROCHLORIDE AND ACETAMINOPHEN 5; 325 MG/1; MG/1
2 TABLET ORAL ONCE
Status: COMPLETED | OUTPATIENT
Start: 2019-05-17 | End: 2019-05-17

## 2019-05-17 RX ORDER — SODIUM CHLORIDE 0.9 % (FLUSH) 0.9 %
10 SYRINGE (ML) INJECTION EVERY 12 HOURS SCHEDULED
Status: DISCONTINUED | OUTPATIENT
Start: 2019-05-17 | End: 2019-05-17 | Stop reason: HOSPADM

## 2019-05-17 RX ORDER — OXYCODONE HYDROCHLORIDE AND ACETAMINOPHEN 5; 325 MG/1; MG/1
1 TABLET ORAL EVERY 4 HOURS PRN
Status: ON HOLD | COMMUNITY
End: 2019-05-17 | Stop reason: SDUPTHER

## 2019-05-17 RX ORDER — OXYCODONE HYDROCHLORIDE AND ACETAMINOPHEN 5; 325 MG/1; MG/1
1 TABLET ORAL EVERY 6 HOURS PRN
Qty: 17 TABLET | Refills: 0 | Status: SHIPPED | OUTPATIENT
Start: 2019-05-17 | End: 2019-05-24

## 2019-05-17 RX ORDER — SUCCINYLCHOLINE/SOD CL,ISO/PF 200MG/10ML
SYRINGE (ML) INTRAVENOUS PRN
Status: DISCONTINUED | OUTPATIENT
Start: 2019-05-17 | End: 2019-05-17 | Stop reason: SDUPTHER

## 2019-05-17 RX ORDER — EPHEDRINE SULFATE/0.9% NACL/PF 50 MG/5 ML
SYRINGE (ML) INTRAVENOUS PRN
Status: DISCONTINUED | OUTPATIENT
Start: 2019-05-17 | End: 2019-05-17 | Stop reason: SDUPTHER

## 2019-05-17 RX ORDER — ONDANSETRON 2 MG/ML
INJECTION INTRAMUSCULAR; INTRAVENOUS PRN
Status: DISCONTINUED | OUTPATIENT
Start: 2019-05-17 | End: 2019-05-17 | Stop reason: SDUPTHER

## 2019-05-17 RX ORDER — FENTANYL CITRATE 50 UG/ML
25 INJECTION, SOLUTION INTRAMUSCULAR; INTRAVENOUS EVERY 5 MIN PRN
Status: DISCONTINUED | OUTPATIENT
Start: 2019-05-17 | End: 2019-05-17 | Stop reason: HOSPADM

## 2019-05-17 RX ORDER — MEPERIDINE HYDROCHLORIDE 25 MG/ML
12.5 INJECTION INTRAMUSCULAR; INTRAVENOUS; SUBCUTANEOUS EVERY 5 MIN PRN
Status: DISCONTINUED | OUTPATIENT
Start: 2019-05-17 | End: 2019-05-17 | Stop reason: HOSPADM

## 2019-05-17 RX ORDER — GLYCOPYRROLATE 0.2 MG/ML
INJECTION INTRAMUSCULAR; INTRAVENOUS PRN
Status: DISCONTINUED | OUTPATIENT
Start: 2019-05-17 | End: 2019-05-17 | Stop reason: SDUPTHER

## 2019-05-17 RX ORDER — PHENYLEPHRINE HCL IN 0.9% NACL 1 MG/10 ML
SYRINGE (ML) INTRAVENOUS PRN
Status: DISCONTINUED | OUTPATIENT
Start: 2019-05-17 | End: 2019-05-17 | Stop reason: SDUPTHER

## 2019-05-17 RX ORDER — TAMSULOSIN HYDROCHLORIDE 0.4 MG/1
0.4 CAPSULE ORAL ONCE
Status: COMPLETED | OUTPATIENT
Start: 2019-05-17 | End: 2019-05-17

## 2019-05-17 RX ORDER — ONDANSETRON 2 MG/ML
4 INJECTION INTRAMUSCULAR; INTRAVENOUS
Status: DISCONTINUED | OUTPATIENT
Start: 2019-05-17 | End: 2019-05-17 | Stop reason: HOSPADM

## 2019-05-17 RX ORDER — LIDOCAINE HYDROCHLORIDE 20 MG/ML
INJECTION, SOLUTION EPIDURAL; INFILTRATION; INTRACAUDAL; PERINEURAL PRN
Status: DISCONTINUED | OUTPATIENT
Start: 2019-05-17 | End: 2019-05-17 | Stop reason: SDUPTHER

## 2019-05-17 RX ORDER — MORPHINE SULFATE 2 MG/ML
2 INJECTION, SOLUTION INTRAMUSCULAR; INTRAVENOUS EVERY 5 MIN PRN
Status: DISCONTINUED | OUTPATIENT
Start: 2019-05-17 | End: 2019-05-17 | Stop reason: HOSPADM

## 2019-05-17 RX ORDER — OXYCODONE HYDROCHLORIDE AND ACETAMINOPHEN 5; 325 MG/1; MG/1
2 TABLET ORAL PRN
Status: DISCONTINUED | OUTPATIENT
Start: 2019-05-17 | End: 2019-05-17 | Stop reason: HOSPADM

## 2019-05-17 RX ORDER — SODIUM CHLORIDE 0.9 % (FLUSH) 0.9 %
10 SYRINGE (ML) INJECTION PRN
Status: DISCONTINUED | OUTPATIENT
Start: 2019-05-17 | End: 2019-05-17 | Stop reason: HOSPADM

## 2019-05-17 RX ORDER — OXYCODONE HYDROCHLORIDE AND ACETAMINOPHEN 5; 325 MG/1; MG/1
1 TABLET ORAL EVERY 6 HOURS PRN
Qty: 10 TABLET | Refills: 0 | Status: SHIPPED | OUTPATIENT
Start: 2019-05-17 | End: 2019-05-20

## 2019-05-17 RX ORDER — CEFUROXIME AXETIL 250 MG/1
250 TABLET ORAL 2 TIMES DAILY
Qty: 14 TABLET | Refills: 0 | Status: SHIPPED | OUTPATIENT
Start: 2019-05-17 | End: 2019-05-24

## 2019-05-17 RX ORDER — DEXAMETHASONE SODIUM PHOSPHATE 4 MG/ML
INJECTION, SOLUTION INTRA-ARTICULAR; INTRALESIONAL; INTRAMUSCULAR; INTRAVENOUS; SOFT TISSUE PRN
Status: DISCONTINUED | OUTPATIENT
Start: 2019-05-17 | End: 2019-05-17 | Stop reason: SDUPTHER

## 2019-05-17 RX ADMIN — GLYCOPYRROLATE 0.2 MG: 0.2 INJECTION, SOLUTION INTRAMUSCULAR; INTRAVENOUS at 14:14

## 2019-05-17 RX ADMIN — FENTANYL CITRATE 50 MCG: 50 INJECTION, SOLUTION INTRAMUSCULAR; INTRAVENOUS at 14:39

## 2019-05-17 RX ADMIN — TAMSULOSIN HYDROCHLORIDE 0.4 MG: 0.4 CAPSULE ORAL at 10:14

## 2019-05-17 RX ADMIN — FENTANYL CITRATE 50 MCG: 50 INJECTION INTRAMUSCULAR; INTRAVENOUS at 14:02

## 2019-05-17 RX ADMIN — KETOROLAC TROMETHAMINE 15 MG: 30 INJECTION, SOLUTION INTRAMUSCULAR at 10:14

## 2019-05-17 RX ADMIN — OXYCODONE HYDROCHLORIDE AND ACETAMINOPHEN 2 TABLET: 5; 325 TABLET ORAL at 10:14

## 2019-05-17 RX ADMIN — LIDOCAINE HYDROCHLORIDE 80 MG: 20 INJECTION, SOLUTION EPIDURAL; INFILTRATION; INTRACAUDAL; PERINEURAL at 14:02

## 2019-05-17 RX ADMIN — DEXAMETHASONE SODIUM PHOSPHATE 4 MG: 4 INJECTION, SOLUTION INTRAMUSCULAR; INTRAVENOUS at 14:07

## 2019-05-17 RX ADMIN — HYDROMORPHONE HYDROCHLORIDE 1 MG: 1 INJECTION, SOLUTION INTRAMUSCULAR; INTRAVENOUS; SUBCUTANEOUS at 08:01

## 2019-05-17 RX ADMIN — Medication 100 MCG: at 14:10

## 2019-05-17 RX ADMIN — Medication 120 MG: at 14:02

## 2019-05-17 RX ADMIN — CEFTRIAXONE 2 G: 2 INJECTION, POWDER, FOR SOLUTION INTRAMUSCULAR; INTRAVENOUS at 13:58

## 2019-05-17 RX ADMIN — ONDANSETRON 4 MG: 2 INJECTION INTRAMUSCULAR; INTRAVENOUS at 14:07

## 2019-05-17 RX ADMIN — Medication 15 MG: at 14:17

## 2019-05-17 RX ADMIN — HYDROMORPHONE HYDROCHLORIDE 1 MG: 1 INJECTION, SOLUTION INTRAMUSCULAR; INTRAVENOUS; SUBCUTANEOUS at 08:55

## 2019-05-17 RX ADMIN — KETOROLAC TROMETHAMINE 15 MG: 30 INJECTION, SOLUTION INTRAMUSCULAR at 08:00

## 2019-05-17 RX ADMIN — FAMOTIDINE 20 MG: 10 INJECTION, SOLUTION INTRAVENOUS at 13:52

## 2019-05-17 RX ADMIN — FENTANYL CITRATE 50 MCG: 50 INJECTION INTRAMUSCULAR; INTRAVENOUS at 14:07

## 2019-05-17 RX ADMIN — MIDAZOLAM 2 MG: 1 INJECTION INTRAMUSCULAR; INTRAVENOUS at 14:02

## 2019-05-17 RX ADMIN — SODIUM CHLORIDE: 9 INJECTION, SOLUTION INTRAVENOUS at 13:52

## 2019-05-17 RX ADMIN — Medication 10 MG: at 14:18

## 2019-05-17 RX ADMIN — PROPOFOL 50 MG: 10 INJECTION, EMULSION INTRAVENOUS at 14:02

## 2019-05-17 RX ADMIN — ONDANSETRON 4 MG: 2 INJECTION INTRAMUSCULAR; INTRAVENOUS at 13:13

## 2019-05-17 ASSESSMENT — PAIN DESCRIPTION - ONSET
ONSET: AWAKENED FROM SLEEP
ONSET: ON-GOING
ONSET: ON-GOING
ONSET: UNABLE TO TELL
ONSET: ON-GOING
ONSET: ON-GOING

## 2019-05-17 ASSESSMENT — PULMONARY FUNCTION TESTS
PIF_VALUE: 18
PIF_VALUE: 24
PIF_VALUE: 9
PIF_VALUE: 23
PIF_VALUE: 29
PIF_VALUE: 20
PIF_VALUE: 23
PIF_VALUE: 25
PIF_VALUE: 4
PIF_VALUE: 25
PIF_VALUE: 24
PIF_VALUE: 20
PIF_VALUE: 0
PIF_VALUE: 24
PIF_VALUE: 0
PIF_VALUE: 20
PIF_VALUE: 25
PIF_VALUE: 21
PIF_VALUE: 1
PIF_VALUE: 24
PIF_VALUE: 21
PIF_VALUE: 21
PIF_VALUE: 1
PIF_VALUE: 18
PIF_VALUE: 4
PIF_VALUE: 2
PIF_VALUE: 25
PIF_VALUE: 19
PIF_VALUE: 23
PIF_VALUE: 18
PIF_VALUE: 24

## 2019-05-17 ASSESSMENT — PAIN SCALES - GENERAL
PAINLEVEL_OUTOF10: 5
PAINLEVEL_OUTOF10: 10
PAINLEVEL_OUTOF10: 7
PAINLEVEL_OUTOF10: 4
PAINLEVEL_OUTOF10: 10
PAINLEVEL_OUTOF10: 0
PAINLEVEL_OUTOF10: 6
PAINLEVEL_OUTOF10: 5
PAINLEVEL_OUTOF10: 8
PAINLEVEL_OUTOF10: 10

## 2019-05-17 ASSESSMENT — PAIN DESCRIPTION - PROGRESSION
CLINICAL_PROGRESSION: GRADUALLY IMPROVING
CLINICAL_PROGRESSION: GRADUALLY IMPROVING
CLINICAL_PROGRESSION: NOT CHANGED
CLINICAL_PROGRESSION: NOT CHANGED
CLINICAL_PROGRESSION: GRADUALLY IMPROVING

## 2019-05-17 ASSESSMENT — PAIN DESCRIPTION - DESCRIPTORS
DESCRIPTORS: PRESSURE
DESCRIPTORS: BURNING;ACHING
DESCRIPTORS: BURNING;ACHING
DESCRIPTORS: PRESSURE

## 2019-05-17 ASSESSMENT — PAIN DESCRIPTION - ORIENTATION
ORIENTATION: RIGHT

## 2019-05-17 ASSESSMENT — PAIN DESCRIPTION - FREQUENCY
FREQUENCY: CONTINUOUS

## 2019-05-17 ASSESSMENT — LIFESTYLE VARIABLES: SMOKING_STATUS: 0

## 2019-05-17 ASSESSMENT — PAIN DESCRIPTION - LOCATION
LOCATION: FLANK
LOCATION: OTHER (COMMENT);FLANK
LOCATION: FLANK

## 2019-05-17 ASSESSMENT — PAIN DESCRIPTION - PAIN TYPE
TYPE: SURGICAL PAIN
TYPE: ACUTE PAIN
TYPE: SURGICAL PAIN

## 2019-05-17 ASSESSMENT — PAIN - FUNCTIONAL ASSESSMENT
PAIN_FUNCTIONAL_ASSESSMENT: PREVENTS OR INTERFERES SOME ACTIVE ACTIVITIES AND ADLS
PAIN_FUNCTIONAL_ASSESSMENT: 0-10

## 2019-05-17 ASSESSMENT — ENCOUNTER SYMPTOMS: SHORTNESS OF BREATH: 1

## 2019-05-17 NOTE — TELEPHONE ENCOUNTER
Left message for Zachariah Price to call me back next Tuesday to discuss C-9 form. I talked with Jose with [employer] @ [phone]. mAy Garza will contact patient regarding invoice # [id number]. This invoice is referring to Dr. Ce Mcginnis and Dr. Lucian Page office.

## 2019-05-17 NOTE — ANESTHESIA POSTPROCEDURE EVALUATION
Department of Anesthesiology  Postprocedure Note    Patient: Maddie Schmid  MRN: [id number]  YOB: 1960  Date of evaluation: 5/17/2019  Time:  4:50 PM     Procedure Summary     Date:  05/17/19 Room / Location:  [facility] OR 07 / [facility] OR    Anesthesia Start:  1699 Anesthesia Stop:  8152    Procedure:  RIGHT SIDED URETEROSCOPY FLUGERATION  OF BLADDER (Right ) Diagnosis:  (PAIN)    Surgeon:  Wilfredo Tam MD Responsible Provider:  Fadi Marquez MD    Anesthesia Type:  general ASA Status:  3          Anesthesia Type: general    Prince Phase I: Prince Score: 8    Prince Phase II: Prince Score: 9    Last vitals: Reviewed and per EMR flowsheets.    Vitals:    05/17/19 1509 05/17/19 1511 05/17/19 1518 05/17/19 1544   BP:   107/73 112/73   Pulse: 92 104 85 81   Resp: 12 11 16 14   Temp:   97.4 °F (36.3 °C)    TempSrc:   Temporal    SpO2: 90% 95% 93% 94%   Weight:       Height:           Anesthesia Post Evaluation    Patient location during evaluation: bedside  Patient participation: complete - patient participated  Level of consciousness: awake and alert  Airway patency: patent  Nausea & Vomiting: no nausea  Complications: no  Cardiovascular status: hemodynamically stable  Respiratory status: acceptable  Hydration status: euvolemic

## 2019-05-17 NOTE — ED PROVIDER NOTES
Emergency Physician Note    Chief Complaint  Flank Pain (Right flank pain that started 3 days ago. Burning with urination. Recent tumor removal of the right bladder 3 weeks. Kidney passed one week ago. )       History of Present Illness  Kimberly Parsons is a 60 y.o. male who presents to the ED for flank pain. Patient reports about 3 weeks ago he had a bladder procedure performed by the neurologist that was for removal of a bladder tumor that was very small. Since then he's had some lower abdominal pains off and on for the last 3 days he's had moderate severe right flank pain similar to past kidney stones. States he's also passed a stone recently. He denies any fevers or vomiting. He had a normal bowel movement today after taking Anaprox. He states his urologist is Dr. Loli Centeno. 10 systems reviewed, pertinent positives per HPI otherwise noted to be negative    I have reviewed the following from the nursing documentation:      Prior to Admission medications    Medication Sig Start Date End Date Taking?  Authorizing Provider   dronedarone hcl (MULTAQ) 400 MG TABS Take 1 tablet by mouth 2 times daily (with meals) 5/8/19   Dav Kelly MD   pregabalin (LYRICA) 75 MG capsule TAKE ONE CAPSULE BY MOUTH EVERY MORNING AND TAKE 2 CAPSULES BY MOUTH EVERY EVENING 3/19/19 12/17/19  Addie Leyva MD   warfarin (COUMADIN) 5 MG tablet TAKE 1 TABLET BY MOUTH EVERY DAY EXCEPT MONDAY AND FRIDAY, TAKE 1 1/2 TABLET  Patient taking differently: 5mg daily except 2.5mg on Tuesday, Thursday and Mark 3/15/19   Addie Leyva MD   zolpidem (AMBIEN) 10 MG tablet TAKE 1 TABLET BY MOUTH EVERY NIGHT AT BEDTIME 3/12/19 6/10/19  Addie Leyva MD   aspirin 81 MG tablet Take 81 mg by mouth daily    Historical Provider, MD   flavoxate (URISPAS) 100 MG tablet TAKE 1 TABLET BY MOUTH THREE TIMES DAILY AS NEEDED FOR URINARY SPASM 1/7/19   Addie Leyva MD   sacubitril-valsartan (ENTRESTO) 24-26 MG per tablet Take 2 tablets in the morning and 1 tablet in the evening 12/7/18   Arcadio Modi MD   DEXILANT 30 MG CPDR delayed release capsule TAKE 1 CAPSULE BY MOUTH  DAILY 12/3/18   Lynn May MD   PROCTOZONE-HC 2.5 % rectal cream USE RECTALLY TWICE DAILY  Patient taking differently: USE RECTALLY TWICE DAILY PRN 11/29/18   Lynn May MD   metoprolol succinate (TOPROL XL) 25 MG extended release tablet TAKE 1 TABLET BY MOUTH TWO  TIMES DAILY 11/23/18   Lynn May MD   pravastatin (PRAVACHOL) 40 MG tablet TAKE 1 TABLET BY MOUTH  DAILY 8/27/18   Arcadio Modi MD   tamsulosin (FLOMAX) 0.4 MG capsule TAKE ONE CAPSULE BY MOUTH TWICE DAILY  Patient taking differently: once daily 8/13/18   Lynn May MD   ondansetron (ZOFRAN ODT) 4 MG disintegrating tablet Take 1-2 tablets by mouth every 8 hours as needed for Nausea May Sub regular tablet (non-ODT) if insurance does not cover ODT. 6/4/18   Lynn May MD   dicyclomine (BENTYL) 10 MG capsule TAKE ONE CAPSULE BY MOUTH  FOUR TIMES DAILY AS NEEDED 5/9/18   Rafaela Banerjee MD   albuterol (PROVENTIL) (2.5 MG/3ML) 0.083% nebulizer solution USE 3 ML VIA NEBULIZER THREE TIMES DAILY 2/22/18   Doroteo Trimble MD   azelastine (ASTELIN) 0.1 % nasal spray 2 sprays by Nasal route 2 times daily Use in each nostril as directed 12/15/17   BRENNA Cole - SHANA   fluticasone [facility]) 50 MCG/ACT nasal spray 1 spray by Nasal route daily 11/21/17   Doroteo Trimble MD   Cholecalciferol (VITAMIN D3) 09123 units CAPS Take 1 capsule by mouth once a week    Historical Provider, MD   albuterol (PROAIR HFA) 108 (90 BASE) MCG/ACT inhaler Inhale 2 puffs into the lungs every 6 hours as needed.       Historical Provider, MD       Allergies as of 05/17/2019 - Review Complete 05/17/2019   Allergen Reaction Noted    Atrovent nasal spray [ipratropium] Other (See Comments) 07/04/2015    Doxycycline Hives 01/21/2019    Morphine Other (See Comments) 07/08/2015    Nitroglycerin Other (See Comments) 07/08/2015    Sulfa antibiotics Rash     Vicodin [hydrocodone-acetaminophen] Rash 12/01/2014       Past Medical History:   Diagnosis Date    Bronchiolitis obliterans ([address]     Bundle branch block, right     [facility]omy[facility] [address] [address]     Fibromyalgia     GERD (gastroesophageal reflux disease)     Hepatitis 1979    unsure of which type    Hx of blood clots     Hyperlipemia     Hypertension     IBS (irritable bowel syndrome)     Kidney stone     over thirty kidney stones    Neuropathy     right side-chest    Pneumothorax 2011    Right    Prostatitis     Pulmonary embolism on right [facility]) 2011    upper lobe-coumadin 2011    Sacroiliitis [facility])         Surgical History:   Past Surgical History:   Procedure Laterality Date    CHOLECYSTECTOMY  2007    COLONOSCOPY  9/21/2012    dr Daniel Norris  2015    LITHOTRIPSY     [address] opening larger in sinuses    UPPER GASTROINTESTINAL ENDOSCOPY  3/28/2014    dr Jen mercedes        Family History:    Family History   Problem Relation Age of Onset    High Blood Pressure Mother     Dementia Mother     Cancer Father         Pancreatic Ca       Social History     Socioeconomic History    Marital status:      Spouse name: Not on file    Number of children: Not on file    Years of education: Not on file    Highest education level: Not on file   Occupational History    Not on file   Social Needs    Financial resource strain: Not on file    Food insecurity:     Worry: Not on file     Inability: Not on file    Transportation needs:     Medical: Not on file     Non-medical: Not on file   Tobacco Use    Smoking status: Never Smoker    Smokeless tobacco: Never Used   Substance and Sexual Activity    Alcohol use: No     Comment: occ    Drug use: No    Sexual activity: Yes     Partners: Female     Comment: wife   Lifestyle    Physical activity:     Days per week: Not on file     Minutes per session: Not on file    Stress: Not on file   Relationships    Social connections:     Talks on phone: Not on file     Gets together: Not on file     Attends Sabianism service: Not on file     Active member of club or organization: Not on file     Attends meetings of clubs or organizations: Not on file     Relationship status: Not on file    Intimate partner violence:     Fear of current or ex partner: Not on file     Emotionally abused: Not on file     Physically abused: Not on file     Forced sexual activity: Not on file   Other Topics Concern    Not on file   Social History Narrative    Not on file       Nursing notes reviewed. ED Triage Vitals [05/17/19 0705]   Enc Vitals Group      BP (!) 143/94      Pulse 62      Resp 15      Temp 97.7 °F (36.5 °C)      Temp Source Oral      SpO2 95 %      Weight 203 lb 0.7 oz (92.1 kg)      Height 5' 6\" (1.676 m)      Head Circumference       Peak Flow       Pain Score       Pain Loc       Pain Edu? Excl. in [address]? GENERAL:  Awake, alert. Well developed, well nourished with no apparent distress. HENT:  Normocephalic, Atraumatic, moist mucous membranes. EYES:  Pupils equal round and reactive to light, Conjunctiva normal, extraocular movements normal.  NECK:  No meningeal signs, Supple. CHEST:  Regular rate and rhythm, chest wall non-tender. LUNGS:  Clear to auscultation bilaterally, no respiratory distress. ABDOMEN:  Soft, mild left lower quadrant tenderness, no rebound, rigidity or guarding, non-distended, normal bowel sounds. Right costovertebral angle tenderness to palpation. EXTREMITIES:  Normal range of motion, no edema, no tenderness, no deformity, distal pulses present. BACK:  No tenderness. SKIN: Warm, dry and intact. NEUROLOGIC: Normal mental status. Moving all extremities to command. RADIOLOGY  X-RAYS:  I have reviewed radiologic plain film image(s).   ALL OTHER NON-PLAIN FILM IMAGES SUCH AS CT, ULTRASOUND AND MRI HAVE BEEN READ BY THE RADIOLOGIST. CT ABDOMEN PELVIS WO CONTRAST Additional Contrast? None   Final Result   Mild right-sided hydronephrosis with a 4 mm stone at the right   ureterovesicular junction. Bilateral nonobstructing renal stones. Similar perinephric stranding adjacent to the posteromedial aspect of the   right kidney. LABS  Labs Reviewed   URINE RT REFLEX TO CULTURE - Abnormal; Notable for the following components:       Result Value    Color, UA MANI (*)     Nitrite, Urine POSITIVE (*)     All other components within normal limits    Narrative:     Performed at:  [address] [facility] [employer] [facility] [address]   Phone [phone]   MICROSCOPIC URINALYSIS - Abnormal; Notable for the following components:    Crystals 1+ Ca. Oxalate (*)     WBC, UA 7 (*)     All other components within normal limits    Narrative:     Performed at:  [address] [facility] [employer] [address]   Phone [phone]   URINE CULTURE   CBC WITH AUTO DIFFERENTIAL    Narrative:     Performed at:  [address] [facility] [employer] [facility] [address]   Phone [phone]   COMPREHENSIVE METABOLIC PANEL W/ REFLEX TO MG FOR LOW K    Narrative:     Performed at:  [address] [facility] [employer] [facility] [address]   Phone [phone]   LIPASE    Narrative:     Performed at:  [facility]  [address] [place] [address] [facility] [employer] [address]   Phone [phone]     MEDICAL DECISION MAKING       Patient's pain is well-controlled with medications given in the ER. I spoke with the on-call urologist, Michael Vallejo, and he stated he would not perform cystoscopy today. The patient for me that he wanted to leave to go to his son's wedding tomorrow but also wanted to have his stone intervened down today.   Patient continued to call the urologist's office apparently to attempt to obtain some other form of follow-up or care. I estimate there is LOW risk for ACUTE APPENDICITIS, BOWEL OBSTRUCTION, CHOLECYSTITIS, DIVERTICULITIS, INCARCERATED HERNIA, PANCREATITIS, or PERFORATED BOWEL or ULCER, thus I consider the discharge disposition reasonable. Also, there is no evidence or peritonitis, sepsis, or toxicity. Ye Gamble and I have discussed the diagnosis and risks, and we agree with discharging home to follow-up with their primary doctor. We also discussed returning to the Emergency Department immediately if new or worsening symptoms occur. We have discussed the symptoms which are most concerning (e.g., bloody stool, fever, changing or worsening pain, vomiting) that necessitate immediate return. FINAL Impression    1. Ureterolithiasis    2. Nephrolithiasis        Blood pressure (!) 107/49, pulse 74, temperature 97.3 °F (36.3 °C), temperature source Temporal, resp. rate 8, height 5' 6\" (1.676 m), weight 203 lb (92.1 kg), SpO2 97 %. Patient was given scripts for the following medications. I counseled patient how to take these medications. New Prescriptions    No medications on file       Disposition  Pt is in good condition upon Discharge to home. This chart was generated using the xTurion dictation system. I created this record but it may contain dictation errors.           Loan Parker MD  05/17/19 0038

## 2019-05-17 NOTE — PROGRESS NOTES
Tolerates sitting up in chair and taking PO. Discharge instructions given to pt and wife. Both express an understanding of instructions. IV d/c'd. Pt dressed. Wife went for car.

## 2019-05-17 NOTE — ED NOTES
Patient given sips of water at 1015 with medication. Requested crackers to eat because he was feeling nauseous. Patient asked by Dr. Erick Singleton if he has eaten anything patient said no. Patient later told me that he did not eat the crackers.       Kassy Marx RN  05/17/19 6099

## 2019-05-17 NOTE — ED NOTES
Patient with c/o right sided flank pain and burning with urination that started 3 days ago. Patient reports passing a kidney stone one week ago and having a tumor removed from the right kidney three weeks. Ago. Patient denies N/V. Pt alert and oriented x  With stable vitals.       Ana Sharma RN  05/17/19 8756

## 2019-05-17 NOTE — CONSULTS
Reason for Consult: right flank pain    History of Present Illness: Kaela Ayala is a 60 y.o. male with history of stones and recent bladder tumor removal with Dr Kaia Bliss who presents with right flank pain. CT shows 4 mm distal ureteral stone. Initial plan was to dc home for trial of passage but he called the office back directly with complaints of recurrent severe pain and he is concerned because his son's wedding is tomorrow. No fever. No gross hematuria.     Past Medical History:   Past Medical History:   Diagnosis Date    Bronchiolitis obliterans ([address]     Bundle branch block, right     Cardiomyopathy ([address]     Fibromyalgia     GERD (gastroesophageal reflux disease)     Hepatitis 1979    unsure of which type    Hx of blood clots     Hyperlipemia     Hypertension     IBS (irritable bowel syndrome)     Kidney stone     over thirty kidney stones    Neuropathy     right side-chest    Pneumothorax 2011    Right    Prostatitis     Pulmonary embolism on right [facility]) 2011    upper lobe-coumadin 2011    Sacroiliitis [facility])        Past Surgical History:  Past Surgical History:   Procedure Laterality Date    CHOLECYSTECTOMY  2007    COLONOSCOPY  9/21/2012    dr Rani Lucas  2015    LITHOTRIPSY     [address] opening larger in sinuses    UPPER GASTROINTESTINAL ENDOSCOPY  3/28/2014    dr Monse mercedes       Social History:  Social History     Socioeconomic History    Marital status:      Spouse name: Not on file    Number of children: Not on file    Years of education: Not on file    Highest education level: Not on file   Occupational History    Not on file   Social Needs    Financial resource strain: Not on file    Food insecurity:     Worry: Not on file     Inability: Not on file    Transportation needs:     Medical: Not on file     Non-medical: Not on file   Tobacco Use    Smoking status: Never Smoker    Smokeless tobacco: Never Used   Substance and Sexual Activity    Alcohol use: No     Comment: occ    Drug use: No    Sexual activity: Yes     Partners: Female     Comment: wife   Lifestyle    Physical activity:     Days per week: Not on file     Minutes per session: Not on file    Stress: Not on file   Relationships    Social connections:     Talks on phone: Not on file     Gets together: Not on file     Attends [employer] service: Not on file     Active member of club or organization: Not on file     Attends meetings of clubs or organizations: Not on file     Relationship status: Not on file    Intimate partner violence:     Fear of current or ex partner: Not on file     Emotionally abused: Not on file     Physically abused: Not on file     Forced sexual activity: Not on file   Other Topics Concern    Not on file   Social History Narrative    Not on file       Family History:  Family History   Problem Relation Age of Onset    High Blood Pressure Mother     Dementia Mother     Cancer Father         Pancreatic Ca       Meds:   Current Facility-Administered Medications: cefTRIAXone (ROCEPHIN) 2 g IVPB in D5W 50ml minibag, 2 g, Intravenous, Once    Review of Systems:  10 Systems were reviewed and negative except as in HPI    Vitals:  /72   Pulse 62   Temp 97.7 °F (36.5 °C) (Oral)   Resp 15   Ht 5' 6\" (1.676 m)   Wt 203 lb 0.7 oz (92.1 kg)   SpO2 95%   BMI 32.77 kg/m²   No intake or output data in the 24 hours ending 05/17/19 1326    Physical Exam:  General Appearance: Alert and oriented, cooperative, no distress, appears stated age  Head: Normocephalic, without obvious abnormality, atraumatic  Back: mild right CVA tenderness  Lungs: respirations unlabored, no wheezing  Heart: no lower extremity edema noted  Abdomen: Soft, non-tender, non-distended, no masses  Skin: Skin color, texture, turgor normal, no rashes or lesions  Neurologic: no gross deficits   Male :   Nonpalpable bladder   Mild right CVA

## 2019-05-17 NOTE — BRIEF OP NOTE
Brief Postoperative Note  ______________________________________________________________    Patient: Teddy Cornell  YOB: 1960  MRN: [id number]  Date of Procedure: 5/17/2019    Pre-Op Diagnosis: PAIN    Post-Op Diagnosis: Same       Procedure(s):  RIGHT SIDED URETEROSCOPY  Diagnostic, retrograde pyelogram and fulguration of previously resected bladder tumor base  Anesthesia: General    Surgeon(s):  Ace Teran MD        Estimated Blood Loss (mL): less than 50     Complications: None    Specimens:   * No specimens in log *    Implants:  * No implants in log *      Drains: * No LDAs found *    Findings: no stone seen, site of previous tumor resection above right uo was calcified and may be what was seen on CT scan    Ace Teran MD  Date: 5/17/2019  Time: 2:20 PM

## 2019-05-17 NOTE — ANESTHESIA PRE PROCEDURE
Department of Anesthesiology  Preprocedure Note       Name:  Arthur Koroma   Age:  60 y.o.  :  1960                                          MRN:  [id number]         Date:  2019      Surgeon: Anette Sapp):  Mauricio Robbins MD    Procedure: RIGHT SIDED URETEROSCOPY WITH LASER LITH OTRIPSY (Right )    Medications prior to admission:   Prior to Admission medications    Medication Sig Start Date End Date Taking? Authorizing Provider   oxyCODONE-acetaminophen (PERCOCET) 5-325 MG per tablet Take 1 tablet by mouth every 6 hours as needed for Pain for up to 7 days.  19 Yes Jaylen Ware MD   dronedarone hcl (MULTAQ) 400 MG TABS Take 1 tablet by mouth 2 times daily (with meals) 19   Yaya Reyes MD   pregabalin (LYRICA) 75 MG capsule TAKE ONE CAPSULE BY MOUTH EVERY MORNING AND TAKE 2 CAPSULES BY MOUTH EVERY EVENING 3/19/19 12/17/19  Devaughn Su MD   warfarin (COUMADIN) 5 MG tablet TAKE 1 TABLET BY MOUTH EVERY DAY EXCEPT MONDAY AND Ada Pattee 1 1/2 TABLET  Patient taking differently: 5mg daily except 2.5mg on Tuesday, Thursday and Mark 3/15/19   Devaughn Su MD   zolpidem (AMBIEN) 10 MG tablet TAKE 1 TABLET BY MOUTH EVERY NIGHT AT BEDTIME 3/12/19 6/10/19  Devaughn Su MD   aspirin 81 MG tablet Take 81 mg by mouth daily    Historical Provider, MD   flavoxate (URISPAS) 100 MG tablet TAKE 1 TABLET BY MOUTH THREE TIMES DAILY AS NEEDED FOR URINARY SPASM 19   Devaughn Su MD   sacubitril-valsartan (ENTRESTO) 24-26 MG per tablet Take 2 tablets in the morning and 1 tablet in the evening 18   Uriel Grady MD   DEXILANT 30 MG CPDR delayed release capsule TAKE 1 CAPSULE BY MOUTH  DAILY 12/3/18   Devaughn Su MD   PROCTOZONE-HC 2.5 % rectal cream USE RECTALLY TWICE DAILY  Patient taking differently: USE RECTALLY TWICE DAILY PRN 18   Devaughn Su MD   metoprolol succinate (TOPROL XL) 25 MG extended release tablet TAKE 1 TABLET BY MOUTH TWO  TIMES DAILY 11/23/18   Anais Thomas MD   pravastatin (PRAVACHOL) 40 MG tablet TAKE 1 TABLET BY MOUTH  DAILY 8/27/18   Arcadio Montilla MD   tamsulosin (FLOMAX) 0.4 MG capsule TAKE ONE CAPSULE BY MOUTH TWICE DAILY  Patient taking differently: once daily 8/13/18   Anais Thomas MD   ondansetron (ZOFRAN ODT) 4 MG disintegrating tablet Take 1-2 tablets by mouth every 8 hours as needed for Nausea May Sub regular tablet (non-ODT) if insurance does not cover ODT. 6/4/18   Anais Thomas MD   dicyclomine (BENTYL) 10 MG capsule TAKE ONE CAPSULE BY MOUTH  FOUR TIMES DAILY AS NEEDED 5/9/18   Riky Berger MD   albuterol (PROVENTIL) (2.5 MG/3ML) 0.083% nebulizer solution USE 3 ML VIA NEBULIZER THREE TIMES DAILY 2/22/18   Porfirio Gramajo MD   azelastine (ASTELIN) 0.1 % nasal spray 2 sprays by Nasal route 2 times daily Use in each nostril as directed 12/15/17   BRENNA Hodge - SHANA   fluticasone [facility]) 50 MCG/ACT nasal spray 1 spray by Nasal route daily 11/21/17   Porfirio Gramajo MD   Cholecalciferol (VITAMIN D3) 55229 units CAPS Take 1 capsule by mouth once a week    Historical Provider, MD   albuterol (PROAIR HFA) 108 (90 BASE) MCG/ACT inhaler Inhale 2 puffs into the lungs every 6 hours as needed. Historical Provider, MD       Current medications:    Current Facility-Administered Medications   Medication Dose Route Frequency Provider Last Rate Last Dose    cefTRIAXone (ROCEPHIN) 2 g IVPB in D5W 50ml minibag  2 g Intravenous Once Debi Elaine MD           Allergies:     Allergies   Allergen Reactions    Atrovent Nasal Spray [Ipratropium] Other (See Comments)     Chest tightness    Doxycycline Hives    Morphine Other (See Comments)     \"makes me feel funny\"      Nitroglycerin Other (See Comments)     Severe hypotension (went from 285 to 66 systolic)    Sulfa Antibiotics Rash    Vicodin [Hydrocodone-Acetaminophen] Rash       Problem List:    Patient Active Problem List   Diagnosis Code    Nephrolithiasis N20.0    Pneumonia J18.9    Essential hypertension, benign I10    Back pain M54.9    Irritable bowel syndrome K58.9    Pure hypercholesterolemia E78.00    Allergic rhinitis J30.9    Lower abdominal pain R10.30    Precordial pain R07.2    Bronchiolitis obliterans (HCC) J42    Bronchitis J40    Pulmonary embolus (HCC) I26.99    CAD (coronary artery disease) I25.10    Atypical chest pain R07.89    Headache R51    Lightheadedness R42    Leg pain, right M79.604    DDD (degenerative disc disease), lumbar M51.36    Acute non-recurrent maxillary sinusitis J01.00    Dyspnea R06.00    Nausea R11.0    Transient cerebral ischemia G45.9    History of pulmonary embolism Z86.711    Cardiomyopathy (HCC) I42.9    Confusion R41.0    Right arm weakness R29.898    Elevated INR R79.1    Chronic systolic CHF (congestive heart failure), NYHA class 2 ([facility]) I50.22    LBBB (left bundle branch block) I44.7    Acute confusional state F05    Ureteral stone N20.1    Urinary tract infection without hematuria N39.0    Encounter for adjustment of cardiac resynchronization therapy defibrillator (CRT-D) Z45.02    Biventricular ICD (implantable cardioverter-defibrillator) in place Z95.810    Chronic anticoagulation Z79.01    Paroxysmal atrial fibrillation (HCC) I48.0    Hypercalcemia E83.52    Primary hyperparathyroidism (HCC) E21.0    Anticoagulated on Coumadin Z51.81, Z79.01    Perinephric hematoma S37.019A    Renal hematoma, right S37.011A    Postoperative hemorrhagic shock T81.19XA    Acute blood loss anemia S00    Metabolic acidosis B98.6    Tachycardia R00.0    Iron deficiency anemia due to chronic blood loss D50.0    Thrombocytopenia (HCC) D69.6    Hypoxia R09.02    Acute UTI N39.0    Abdominal wall hematoma S30. 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    Elevated liver enzymes R74.8  Acute flank pain M86.7    Colicky LLQ abdominal pain R10.32    Arm pain M79.603    Dysuria R30.0       Past Medical History:        Diagnosis Date    [facility]olitis obliterans ([facility]     Bundle branch block, right     Cardiomyopathy ([facility]     Fibromyalgia     GERD (gastroesophageal reflux disease)     Hepatitis 1979    unsure of which type    Hx of blood clots     Hyperlipemia     Hypertension     IBS (irritable bowel syndrome)     Kidney stone     over thirty kidney stones    Neuropathy     right side-chest    Pneumothorax 2011    Right    Prostatitis     Pulmonary embolism on right ([facility] 2011    upper lobe-coumadin 2011    Sacroiliitis [facility])        Past Surgical History:        Procedure Laterality Date    CHOLECYSTECTOMY  2007    COLONOSCOPY  9/21/2012    dr Omar Connolly  2015    LITHOTRIPSY     [address] [facility] in sinuses    UPPER GASTROINTESTINAL ENDOSCOPY  3/28/2014    dr Pam mercedes       Social History:    Social History     Tobacco Use    Smoking status: Never Smoker    Smokeless tobacco: Never Used   Substance Use Topics    Alcohol use: No     Comment: occ                                Counseling given: Not Answered      Vital Signs (Current):   Vitals:    05/17/19 0946 05/17/19 1001 05/17/19 1047 05/17/19 1332   BP: 114/74 117/73 126/72 (!) 147/77   Pulse:    63   Resp:    14   Temp:    97 °F (36.1 °C)   TempSrc:       SpO2:    98%   Weight:    203 lb (92.1 kg)   Height:    5' 6\" (1.676 m)                                              BP Readings from Last 3 Encounters:   05/17/19 (!) 147/77   04/26/19 118/74   04/16/19 122/84       NPO Status:  MN+, SEE MAR FOR AM MEDS                                                                               BMI:   Wt Readings from Last 3 Encounters:   05/17/19 203 lb (92.1 kg)   04/26/19 201 lb (91.2 kg)   04/16/19 206 lb (93.4 kg)     Body mass index is 32.77 kg/m².    CBC:   Lab Results   Component Value Date    WBC 6.2 05/17/2019    RBC 4.76 05/17/2019    HGB 15.5 05/17/2019    HCT 44.8 05/17/2019    MCV 94.1 05/17/2019    RDW 13.0 05/17/2019     05/17/2019       CMP:   Lab Results   Component Value Date     05/17/2019    K 4.0 05/17/2019     05/17/2019    CO2 24 05/17/2019    BUN 10 05/17/2019    CREATININE 0.9 05/17/2019    GFRAA >60 05/17/2019    GFRAA >60 05/02/2013    AGRATIO 1.2 05/17/2019    LABGLOM >60 05/17/2019    GLUCOSE 98 05/17/2019    PROT 6.5 05/17/2019    PROT 5.9 01/11/2013    CALCIUM 10.3 05/17/2019    BILITOT 0.8 05/17/2019    ALKPHOS 118 05/17/2019    AST 19 05/17/2019    ALT 18 05/17/2019       POC Tests: No results for input(s): POCGLU, POCNA, POCK, POCCL, POCBUN, POCHEMO, POCHCT in the last 72 hours. Coags:   Lab Results   Component Value Date    PROTIME 16.2 05/17/2019    PROTIME 37.6 01/29/2016    INR 1.42 05/17/2019    APTT 45.4 12/25/2018       HCG (If Applicable): No results found for: PREGTESTUR, PREGSERUM, HCG, HCGQUANT     ABGs:   Lab Results   Component Value Date    PHART 7.317 04/11/2013    PO2ART 62.2 04/11/2013    NMN2FJZ 51.7 04/11/2013    QKL7KVQ 25.7 04/11/2013    BEART -0.6 04/11/2013    T2CSJGPB 92.8 04/11/2013        Type & Screen (If Applicable):  No results found for: GRIS [facility]    Anesthesia Evaluation  Patient summary reviewed no history of anesthetic complications:   Airway: Mallampati: II  TM distance: >3 FB   Neck ROM: full  Mouth opening: > = 3 FB Dental:    (+) lower dentures and partials      Pulmonary: breath sounds clear to auscultation  (+) shortness of breath (B. O. / NITE O2 2L, DAILY AND PRN INHALERS AND NEBS):      (-) not a current smoker          Patient did not smoke on day of surgery.                  Cardiovascular:    (+) hypertension:, pacemaker: AICD and pacemaker, CHF (CARDIOMYOPATHY):, GUEVARA:,     (-) past MI and dysrhythmias    ECG reviewed  Rhythm: regular  Rate: normal    Stress test reviewed       Beta Blocker:  Dose within 24 Hrs         Neuro/Psych:   (+) neuromuscular disease (FIBROMY.):, TIA, headaches:, psychiatric history:            GI/Hepatic/Renal:   (+) GERD:, hepatitis:, liver disease:, renal disease: kidney stones,      (-) no morbid obesity       Endo/Other:    (+) blood dyscrasia: anticoagulation therapy, arthritis:., .                 Abdominal:           Vascular:   + DVT, PE. Anesthesia Plan      general     ASA 3       Induction: intravenous. MIPS: Postoperative opioids intended and Prophylactic antiemetics administered. Anesthetic plan and risks discussed with patient and spouse. Plan discussed with CRNA. This pre-anesthesia assessment may be used as a history and physical.    DOS STAFF ADDENDUM:    Pt seen and examined, chart reviewed (including anesthesia, drug and allergy history). No interval changes to history and physical examination. Anesthetic plan, risks, benefits, alternatives, and personnel involved discussed with patient. Patient verbalized an understanding and agrees to proceed.       Abelardo Damon MD  May 17, 2019  1:44 PM      Abelardo Damon MD   5/17/2019

## 2019-05-17 NOTE — PROGRESS NOTES
Pt alert on arrival to phase II. Pressure in right flank area 5/10 but tolerable at present per pt. Pt wants to rest a little longer on stretcher. VSS. Given soda and cookies. Called for wife. Given call light.

## 2019-05-18 LAB — URINE CULTURE, ROUTINE: NORMAL

## 2019-05-18 NOTE — OP NOTE
[address] Ling Berkowitz                                 OPERATIVE REPORT    PATIENT NAME: Augusto Hernandez                        :        1960  MED REC NO:   [id number]                          ROOM:       010  ACCOUNT NO:   [de-identified]                           ADMIT DATE: 2019  PROVIDER:     Linda Jean MD    DATE OF PROCEDURE:  2019    PREOPERATIVE DIAGNOSIS:  Right ureteral calculus. POSTOPERATIVE DIAGNOSIS:  Transient right ureteral calculus. OPERATION PERFORMED:  Cystoscopy, diagnostic right ureteroscopy, right  retrograde pyelogram, fulguration of base of previously resected bladder  tumor just proximal to the right ureteral orifice. SURGEON:  Linda Jean MD    ANESTHESIA:  General.    COMPLICATIONS:  None. BLOOD LOSS:  Minimal.    INDICATIONS:  This is a 50-year-old gentleman with a history of stone. He presented to the ER this afternoon with right-sided flank and  abdominal pain. He had a CAT scan, which showed mild right  hydronephrosis and a 4-mm stone in the distal right ureter. He was  initially discharged home for a trial of passage, but he called the  office with persistent uncontrolled pain and wished to undergo  ureteroscopy. OPERATIVE PROCEDURE:  He was given IV Rocephin and he was taken to the  operative suite. He was moved onto the table. Anesthesia was induced. His position was changed to the lithotomy position. His genitalia were  prepped and draped. The 21-[place] cystoscope was advanced easily  through the urethra into the bladder. There were no urethral  abnormalities. The prostate was nonobstructive. The bladder was then evaluated. He had a bladder tumor resected  approximately two or three weeks ago. There was a small area less than  1 cm just proximal to the right ureteral orifice with calcification at  the scar.   It made me wonder if perhaps that was seen on the CAT scan. The rest of the bladder was normal in appearance. A 0.035 ZIPwire  advanced easily into the right ureteral orifice up to the kidney under  fluoroscopic guidance. The cystoscope was withdrawn leaving the wire indwelling. The rigid  ureteroscope then advanced alongside the wire into the urethra and into  the bladder. I advanced the ureteroscope into the ureter and it easily  advanced all the way up to the ureteropelvic junction. No stones were  identified. I then performed a retrograde pyelogram through the scope. This did show some very mild hydronephrosis, but the system did seem to  drain normally. The ureteroscope was withdrawn. The wire was  withdrawn. There was a little bit of bleeding stored up at the site of  the previous resection where the calcification was, and I used a Bugbee  electrode to fulgurate this and scrape off the calcification. At the end of the procedure, there was no further bleeding. I did not  feel a stent needed to be placed. The bladder was drained and the scope  was withdrawn. He is awakened and transferred to recovery. He is going  to follow up with Dr. Brittany Escobar as previously scheduled.         Anusha Carrero MD    D: 05/17/2019 14:38:32       T: 05/17/2019 16:14:49     [email or web address]  Job#: [id number]     Doc#: [id number]    CC:

## 2019-05-21 ENCOUNTER — APPOINTMENT (OUTPATIENT)
Dept: PHARMACY | Age: 59
End: 2019-05-21
Payer: COMMERCIAL

## 2019-05-21 RX ORDER — PRAVASTATIN SODIUM 40 MG
40 TABLET ORAL DAILY
Qty: 90 TABLET | Refills: 3 | Status: SHIPPED | OUTPATIENT
Start: 2019-05-21 | End: 2020-06-16

## 2019-05-24 ENCOUNTER — HOSPITAL ENCOUNTER (OUTPATIENT)
Age: 59
Discharge: HOME | End: 2019-05-24
Payer: COMMERCIAL

## 2019-05-24 ENCOUNTER — TELEPHONE (OUTPATIENT)
Dept: PHARMACY | Age: 59
End: 2019-05-24

## 2019-05-24 ENCOUNTER — HOSPITAL ENCOUNTER (OUTPATIENT)
Dept: GENERAL RADIOLOGY | Age: 59
Discharge: HOME | End: 2019-05-24
Payer: COMMERCIAL

## 2019-05-24 DIAGNOSIS — J06.9 URI WITH COUGH AND CONGESTION: ICD-10-CM

## 2019-05-24 PROBLEM — R50.9 FEVER: Status: ACTIVE | Noted: 2019-05-24

## 2019-05-24 PROCEDURE — 71046 X-RAY EXAM CHEST 2 VIEWS: CPT

## 2019-05-24 NOTE — TELEPHONE ENCOUNTER
Mr. Ilean Carton called. He missed his appointment today. He reports he had his INR checked in the ED. He also reports he was started on Cipro. In reviewing his chart I see that his ED visit was a week ago on 5/17/19. His INR was 1.42 at that time. He reports they did not adjust his warfarin at that time. I also see that his last INR with us was 1.5 on 5/9/19. He had been back on warfarin for 7 days at that time. He reports no missed doses and verifies that he is taking 5mg daily EXCEPT 2.5mg every Tues, Thurs and Sunday as instructed. He will be on Cipor for 10 days starting today. Significant interaction with warfarin expecting a rise in INR. I am concerned his INR may be running low. I advised to go ahead and take his regular dosing schedule of 5mg today and tomorrow. Also take warfarin 2.5mg Sunday, then decrease warfarin to 2.5mg daily until we see him 5/29/19. Scheduled that appointment.     Pete Ballard, PharmD, [facility]  Anticoagulation Service  [phone]

## 2019-05-29 ENCOUNTER — ANTI-COAG VISIT (OUTPATIENT)
Dept: PHARMACY | Age: 59
End: 2019-05-29
Payer: COMMERCIAL

## 2019-05-29 DIAGNOSIS — I26.99 OTHER PULMONARY EMBOLISM WITHOUT ACUTE COR PULMONALE, UNSPECIFIED CHRONICITY (HCC): ICD-10-CM

## 2019-05-29 LAB — INTERNATIONAL NORMALIZATION RATIO, POC: 3.6

## 2019-05-29 PROCEDURE — 85610 PROTHROMBIN TIME: CPT

## 2019-05-29 PROCEDURE — 99211 OFF/OP EST MAY X REQ PHY/QHP: CPT

## 2019-05-29 NOTE — PROGRESS NOTES
Mr. Cora Moss is a 60 y.o.  male with history of PE who presents today for anticoagulation monitoring and adjustment. Patient verifies current dosing regimen  Patient denies s/s bleeding/bruising/swelling/SOB  No blood in urine or stool. No dietary changes. No changes in medication/OTC agents/Herbals. No change in alcohol use. No missed doses. No Procedures scheduled in the future at this time. Lab Results   Component Value Date    INR 3.6 05/29/2019    INR 1.42 (H) 05/17/2019    INR 1.5 05/09/2019       Pertinent findings: Dillon Wren started a 10 day course of Cipro on 5-23-19. His INR was low at 1.42 when he was in the ED so we advised him to take 5mg of warfarin on 5-24 and 5-25. He was then told to decrease his warfarin to 2.5mg daily while on the antibiotic. His INR is elevated today at 3.6. We will hold his warfarin today. He will take 2.5mg on Thursday and Friday. He will hold his warfarin on Saturday 6-1-19. He will resume his usual dosing of warfarin on Sunday 6-2-19, which is the last day he takes the Cipro. He was advised to watch for any unusual bruising or bleeding, given his elevated INR. He will return next week for his next INR. Warfarin dosing: Hold warfarin TODAY ONLY. Take warfarin 2.5mg on Thursday and Friday. Do not take any warfarin on Saturday. Then continue Warfarin 5mg (1 tablet) daily except 2.5mg (1/2 tablet) on Tuesday, Thursday and Sunday    After visit summary printed and reviewed with patient.

## 2019-05-31 LAB — PROSTATE SPECIFIC ANTIGEN: 0.96 NG/ML (ref 0–4)

## 2019-06-06 ENCOUNTER — ANTI-COAG VISIT (OUTPATIENT)
Dept: PHARMACY | Age: 59
End: 2019-06-06
Payer: COMMERCIAL

## 2019-06-06 DIAGNOSIS — I26.99 OTHER PULMONARY EMBOLISM WITHOUT ACUTE COR PULMONALE, UNSPECIFIED CHRONICITY (HCC): ICD-10-CM

## 2019-06-06 LAB — INTERNATIONAL NORMALIZATION RATIO, POC: 1.5

## 2019-06-06 PROCEDURE — 85610 PROTHROMBIN TIME: CPT

## 2019-06-06 PROCEDURE — 99211 OFF/OP EST MAY X REQ PHY/QHP: CPT

## 2019-06-14 ENCOUNTER — TELEPHONE (OUTPATIENT)
Dept: PHARMACY | Age: 59
End: 2019-06-14

## 2019-06-18 ENCOUNTER — ANTI-COAG VISIT (OUTPATIENT)
Dept: PHARMACY | Age: 59
End: 2019-06-18
Payer: COMMERCIAL

## 2019-06-18 DIAGNOSIS — I26.99 OTHER PULMONARY EMBOLISM WITHOUT ACUTE COR PULMONALE, UNSPECIFIED CHRONICITY (HCC): ICD-10-CM

## 2019-06-18 LAB — INTERNATIONAL NORMALIZATION RATIO, POC: 2.9

## 2019-06-18 PROCEDURE — 99211 OFF/OP EST MAY X REQ PHY/QHP: CPT

## 2019-06-18 PROCEDURE — 85610 PROTHROMBIN TIME: CPT

## 2019-06-18 NOTE — PROGRESS NOTES
@ XRAY   Mr. Mickey Ahumada is a 60 y.o.  male with history of PE who presents today for anticoagulation monitoring and adjustment. Patient verifies current dosing regimen  Patient denies s/s bleeding/bruising/swelling/SOB  No blood in urine or stool. No dietary changes. No changes in medication/OTC agents/Herbals. No change in alcohol use. No missed doses. No Procedures scheduled in the future at this time. Lab Results   Component Value Date    INR 2.9 06/18/2019    INR 1.5 06/06/2019    INR 3.6 05/29/2019       Pertinent findings: Patient appears well, no changes    Warfarin dosing:   Continue Warfarin 5mg (1 tablet) daily except 2.5mg (1/2 tablet) on Tuesday, Thursday and Sunday    After visit summary printed and reviewed with patient.       Medications reviewed and updated on home medication list: Yes, patient denied any changes  Warfarin dose updated on patient home medication list: Yes

## 2019-06-23 PROBLEM — R05.9 COUGH: Status: RESOLVED | Noted: 2017-02-21 | Resolved: 2019-06-23

## 2019-06-25 ENCOUNTER — TELEPHONE (OUTPATIENT)
Dept: CARDIOLOGY CLINIC | Age: 59
End: 2019-06-25

## 2019-06-25 NOTE — TELEPHONE ENCOUNTER
TENS units can interfere with the normal operation of pacemakers and should not be used. Please notify patient.      Thanks,  Toni Levin RN

## 2019-06-25 NOTE — TELEPHONE ENCOUNTER
Please advise if ok to use Tens unit. Pt last saw Dr. Esvin Moore on 09/25/18. Past medical history significant for for PE (2011), anxiety and nonischemic cardiomyopathy.

## 2019-07-01 ENCOUNTER — ANTI-COAG VISIT (OUTPATIENT)
Dept: PHARMACY | Age: 59
End: 2019-07-01
Payer: COMMERCIAL

## 2019-07-01 DIAGNOSIS — I26.99 OTHER PULMONARY EMBOLISM WITHOUT ACUTE COR PULMONALE, UNSPECIFIED CHRONICITY (HCC): ICD-10-CM

## 2019-07-01 LAB — INTERNATIONAL NORMALIZATION RATIO, POC: 3.5

## 2019-07-01 PROCEDURE — 99211 OFF/OP EST MAY X REQ PHY/QHP: CPT

## 2019-07-01 PROCEDURE — 85610 PROTHROMBIN TIME: CPT

## 2019-07-01 NOTE — PROGRESS NOTES
Mr. Liv Oliver is a 60 y.o.  male with history of PE who presents today for anticoagulation monitoring and adjustment. Patient verifies current dosing regimen  Patient denies s/s bleeding/bruising/swelling/SOB  No blood in urine or stool. No dietary changes. No changes in medication/OTC agents/Herbals. No change in alcohol use. No missed doses. No Procedures scheduled in the future at this time. Lab Results   Component Value Date    INR 3.5 07/01/2019    INR 2.9 06/18/2019    INR 1.5 06/06/2019       Pertinent findings: None    Warfarin dosing: HOLD Warfarin today only then Continue Warfarin 5mg (1 tablet) daily except 2.5mg (1/2 tablet) on Tuesday, Thursday and Sunday    After visit summary printed and reviewed with patient.       Medications reviewed and updated on home medication list: No  Warfarin dose updated on patient home medication list: Yes

## 2019-07-02 ENCOUNTER — APPOINTMENT (OUTPATIENT)
Dept: PHARMACY | Age: 59
End: 2019-07-02
Payer: COMMERCIAL

## 2019-07-08 ENCOUNTER — HOSPITAL ENCOUNTER (OUTPATIENT)
Dept: GENERAL RADIOLOGY | Age: 59
Discharge: HOME | End: 2019-07-08
Payer: COMMERCIAL

## 2019-07-08 ENCOUNTER — HOSPITAL ENCOUNTER (OUTPATIENT)
Dept: MRI IMAGING | Age: 59
Discharge: HOME | End: 2019-07-08
Payer: COMMERCIAL

## 2019-07-08 DIAGNOSIS — M48.062 LUMBAR STENOSIS WITH NEUROGENIC CLAUDICATION: ICD-10-CM

## 2019-07-08 DIAGNOSIS — Z95.0 PACEMAKER: ICD-10-CM

## 2019-07-08 PROCEDURE — 72148 MRI LUMBAR SPINE W/O DYE: CPT

## 2019-07-08 PROCEDURE — 71045 X-RAY EXAM CHEST 1 VIEW: CPT

## 2019-07-12 ENCOUNTER — TELEPHONE (OUTPATIENT)
Dept: PHARMACY | Age: 59
End: 2019-07-12

## 2019-07-15 ENCOUNTER — ANTI-COAG VISIT (OUTPATIENT)
Dept: PHARMACY | Age: 59
End: 2019-07-15
Payer: COMMERCIAL

## 2019-07-15 DIAGNOSIS — I26.99 OTHER PULMONARY EMBOLISM WITHOUT ACUTE COR PULMONALE, UNSPECIFIED CHRONICITY (HCC): ICD-10-CM

## 2019-07-15 LAB — INTERNATIONAL NORMALIZATION RATIO, POC: 2.3

## 2019-07-15 PROCEDURE — 99211 OFF/OP EST MAY X REQ PHY/QHP: CPT

## 2019-07-15 PROCEDURE — 85610 PROTHROMBIN TIME: CPT

## 2019-07-15 RX ORDER — WARFARIN SODIUM 5 MG/1
5 TABLET ORAL DAILY
COMMUNITY
End: 2019-12-24

## 2019-07-16 ENCOUNTER — NURSE ONLY (OUTPATIENT)
Dept: CARDIOLOGY CLINIC | Age: 59
End: 2019-07-16
Payer: COMMERCIAL

## 2019-07-16 DIAGNOSIS — Z95.810 BIVENTRICULAR ICD (IMPLANTABLE CARDIOVERTER-DEFIBRILLATOR) IN PLACE: ICD-10-CM

## 2019-07-16 DIAGNOSIS — I50.22 CHRONIC SYSTOLIC CHF (CONGESTIVE HEART FAILURE), NYHA CLASS 2 (HCC): ICD-10-CM

## 2019-07-16 DIAGNOSIS — I42.9 CARDIOMYOPATHY, UNSPECIFIED TYPE (HCC): ICD-10-CM

## 2019-07-16 PROCEDURE — 93296 REM INTERROG EVL PM/IDS: CPT | Performed by: INTERNAL MEDICINE

## 2019-07-16 PROCEDURE — 93295 DEV INTERROG REMOTE 1/2/MLT: CPT | Performed by: INTERNAL MEDICINE

## 2019-07-16 PROCEDURE — 93297 REM INTERROG DEV EVAL ICPMS: CPT | Performed by: INTERNAL MEDICINE

## 2019-07-23 ENCOUNTER — APPOINTMENT (OUTPATIENT)
Dept: PHYSICAL THERAPY | Age: 59
End: 2019-07-23
Payer: COMMERCIAL

## 2019-07-25 ENCOUNTER — APPOINTMENT (OUTPATIENT)
Dept: PHYSICAL THERAPY | Age: 59
End: 2019-07-25
Payer: COMMERCIAL

## 2019-07-29 ENCOUNTER — ANTI-COAG VISIT (OUTPATIENT)
Dept: PHARMACY | Age: 59
End: 2019-07-29
Payer: COMMERCIAL

## 2019-07-29 DIAGNOSIS — I26.99 OTHER PULMONARY EMBOLISM WITHOUT ACUTE COR PULMONALE, UNSPECIFIED CHRONICITY (HCC): ICD-10-CM

## 2019-07-29 LAB — INTERNATIONAL NORMALIZATION RATIO, POC: 2.2

## 2019-07-29 PROCEDURE — 85610 PROTHROMBIN TIME: CPT

## 2019-07-29 PROCEDURE — 99211 OFF/OP EST MAY X REQ PHY/QHP: CPT

## 2019-07-30 ENCOUNTER — APPOINTMENT (OUTPATIENT)
Dept: PHYSICAL THERAPY | Age: 59
End: 2019-07-30
Payer: COMMERCIAL

## 2019-07-30 ENCOUNTER — HOSPITAL ENCOUNTER (OUTPATIENT)
Dept: PHYSICAL THERAPY | Age: 59
Setting detail: THERAPIES SERIES
Discharge: HOME | End: 2019-07-30
Payer: COMMERCIAL

## 2019-07-30 PROCEDURE — 97035 APP MDLTY 1+ULTRASOUND EA 15: CPT

## 2019-07-30 PROCEDURE — 97161 PT EVAL LOW COMPLEX 20 MIN: CPT

## 2019-07-30 PROCEDURE — 97110 THERAPEUTIC EXERCISES: CPT

## 2019-07-30 ASSESSMENT — PAIN SCALES - QUEBEC BACK PAIN DISABILITY SCALE
MOVE A CHAIR: 3
CARRY TWO BAGS OF GROCERIES: 2
STAND UP FOR 20 TO 30 MINUTES: 4
WALK A FEW BLOCKS OR 300 TO 400M: 3
TURN OVER IN BED: 4
QUEBEC DISABILITY INDEX: 40-59%
TAKE FOOD OUT OF THE REFRIGERATOR: 1
PUT ON SOCKS OR PANYHOSE: 2
SLEEP THROUGH THE NIGHT: 4
QUEBEC CMS MODIFIER: CK
REACH UP TO HIGH SHELVES: 2
SIT IN A CHAIR FOR SEVERAL HOURS: 1
RUN ONE BLOCK OR 100M: 3
BEND OVER TO CLEAN THE BATHTUB: 3
TOTAL SCORE: 46
WALK SEVERAL KILOMETERS  OR MILES: 3
GET OUT OF BED: 1
PULL OR PUSH HEAVY DOORS: 1
LIFT AND CARRY A HEAVY SUITCASE: 3
THROW A BALL: 1
MAKE YOUR BED: 1
CLIMB ONE FLIGHT OF STAIRS: 2
RIDE IN A CAR: 2

## 2019-07-30 NOTE — PROGRESS NOTES
Decreased functional mobility ; Decreased high-level IADLs;Decreased ADL status; Decreased endurance;Decreased ROM; Decreased strength; Increased Pain  Assessment: prior level of function: pt able to golf, sleep, all ADLS without inc pain; DX: lumbar spondylosis   Treatment Diagnosis: LBP, dec ROM, dec strength   Prognosis: Good  Decision Making: Low Complexity  History: see PMH  Exam: see eval   Clinical Presentation: stable   REQUIRES PT FOLLOW UP: Yes  Activity Tolerance  Activity Tolerance: Patient Tolerated treatment well         Plan   Plan  Times per week: 2   Plan weeks: 4  Current Treatment Recommendations: Strengthening, Manual Therapy - Joint Manipulation, Patient/Caregiver Education & Training, ROM, Aquatics, Pain Management, Modalities, Home Exercise Program, Manual Therapy - Soft Tissue Mobilization, Safety Education & Training      OutComes Score  [place] Total Score: 46 (07/30/19 0841)       Goals  Short term goals  Time Frame for Short term goals: 2 wks   Short term goal 1: dec pain to 4-5/10   Short term goal 2: R LE flex min tight   Long term goals  Time Frame for Long term goals : 4 wks  Long term goal 1: dec pain to 2-3/10 for ease with sleep   Long term goal 2: R SB to 10 and all other lumbar ROM WFL min-no pain for ease with general mobility   Long term goal 3: pt independent with hep   Patient Goals   Patient goals : \" lessen the pain \"     Ivon Machado, [id number]

## 2019-07-30 NOTE — PLAN OF CARE
Outpatient Physical Therapy  [x] [facility]    Phone: [phone]   Fax: [phone]   [] [facility] [place]  Phone: [phone]              Fax: [phone]  [] [facility]   Phone: [phone]   Fax: [phone]     To: Referring Practitioner: Dr. Seth Mills      Patient: Jacob Murphy   : 1960   MRN: [id number]  Evaluation Date: 2019      Diagnosis Information:  · Diagnosis: lumbar spondylosis    · Treatment Diagnosis: LBP, dec ROM, dec strength      Physical Therapy Certification/Re-Certification Form  Dear Dr. Seth Mills,  The following patient has been evaluated for physical therapy services and for therapy to continue, Medicare requires monthly physician review of the treatment plan. Please review the attached evaluation and/or summary of the patient's plan of care, and verify that you agree therapy should continue by signing the attached document and sending it back to our office. Plan of Care/Treatment to date:  [x] Therapeutic Exercise    [x] Modalities:  [x] Therapeutic Activity     [x] Ultrasound  [] Electrical Stimulation  [] Gait Training      [] Cervical Traction [x] Lumbar Traction  [] Neuromuscular Re-education    [x] Cold/hotpack [] Iontophoresis   [x] Instruction in HEP     Other:  [x] Manual Therapy      []             [x] Aquatic Therapy - if no relief with land based therapy       []           ? Frequency/Duration:  # Days per week: [] 1 day # Weeks: [] 1 week [] 5 weeks     [x] 2 days? [] 2 weeks [] 6 weeks     [] 3 days   [] 3 weeks [] 7 weeks     [] 4 days   [x] 4 weeks [] 8 weeks    Rehab Potential: [] Excellent [x] Good [] Fair  [] Poor       Electronically signed by:  Porfirio Chavez [place]    If you have any questions or concerns, please don't hesitate to call.   Thank you for your referral.      Physician Signature:________________________________Date:__________________  By signing above, therapists plan is approved by physician

## 2019-07-30 NOTE — FLOWSHEET NOTE
on/20 off 2 step 70#/50# x 10 min         MHP To LB with pt in L SL x 15 min  Do heat with P-tx next session                Other Therapeutic Activities:  Pt was educated on PT POC, Diagnosis, Prognosis, pathomechanics as well as frequency and duration of scheduling future physical therapy appointments. Time was also taken on this day to answer all patient questions and participation in PT. Reviewed appointment policy in detail with patient and patient verbalized understanding. Edu on proper tech/posture with sitting, sleeping, ADLS. Home Exercise Program:  Patient instructed in the above for HEP:   Patient verbalized/demonstrated understanding and was issued written handout.     Timed Code Treatment Minutes:  30    Total Treatment Minutes:  60    Assessment:  [] Patient tolerated treatment well [] Patient limited by fatigue  [x] Patient limited by pain  [] Patient limited by other medical complications  [] Other:     Prognosis: [x] Good [] Fair  [] Poor    Goals:    Short term goals  Time Frame for Short term goals: 2 wks   Short term goal 1: dec pain to 4-5/10   Short term goal 2: R LE flex min tight       Long term goals  Time Frame for Long term goals : 4 wks  Long term goal 1: dec pain to 2-3/10 for ease with sleep   Long term goal 2: R SB to 10 and all other lumbar ROM WFL min-no pain for ease with general mobility   Long term goal 3: pt independent with hep      Patient Requires Follow-up: [x] Yes  [] No    Plan:   [] Continue per plan of care [] Alter current plan (see comments)  [x] Plan of care initiated [] Hold pending MD visit [] Discharge    Plan for Next Session:  Add above as stated    Electronically signed by:  Devon Torres, [address]

## 2019-08-01 ENCOUNTER — APPOINTMENT (OUTPATIENT)
Dept: PHYSICAL THERAPY | Age: 59
End: 2019-08-01
Payer: COMMERCIAL

## 2019-08-01 ENCOUNTER — HOSPITAL ENCOUNTER (OUTPATIENT)
Dept: PHYSICAL THERAPY | Age: 59
Setting detail: THERAPIES SERIES
Discharge: HOME | End: 2019-08-01
Payer: COMMERCIAL

## 2019-08-01 PROCEDURE — 97140 MANUAL THERAPY 1/> REGIONS: CPT

## 2019-08-01 PROCEDURE — 97035 APP MDLTY 1+ULTRASOUND EA 15: CPT

## 2019-08-01 PROCEDURE — 97012 MECHANICAL TRACTION THERAPY: CPT

## 2019-08-01 NOTE — FLOWSHEET NOTE
Physical Therapy Daily Treatment Note  Date:  2019    Patient Name:  Liv Oliver    :  1960  MRN: [id number]    Restrictions/Precautions: Position Activity Restriction  Other position/activity restrictions: no fall risk     Pertinent Medical History: Additional Pertinent Hx: HTN, fibromyalgia, hepatitis, Pacemaker ( no TENS)     Medical/Treatment Diagnosis Information:  · Diagnosis: lumbar spondylosis   · Treatment Diagnosis: LBP, dec ROM, dec strength     Insurance/Certification information:  PT Insurance Information: [facility] 60 vpcy  Physician Information:  Referring Practitioner: Dr. Curtis Or of care signed (Y/N): Faxed at eval     Visit# / total visits:    Pain level: 8/10     Functional Outcomes Measure: at eval  Test: [place]   Score:     Progress Note: []  Yes  [x]  No  Next due by: Visit #10      History of Injury: Subjective  Subjective: Pt reports played 18 + 9 more holes of golf around . Pain was in pain the next day. Pain in low back and goes into R thigh intermittently. Pt denies any N/T. Pain up to 7-810 with typical ADLs. Pain worse with bending and lifting. Recent MRI showing mild spinal stenosis L5 and multilevel foraminal narrowing severe R L4. Pt is using heat, Biofreeze and Icy hot. Subjective:   Sore after initial eval, but US and STM did help. Trying to cont hep, but had to go easy this morning due to soreness. Objective:   Observation:    Test measurements:      Exercises:  Exercise/Equipment Resistance/Repetitions Other comments   Reviewed HEP pt has cont from prior PT  HL march & with UE/LE, PPT, supine HSS, LTR  Also added: SKC, DKC, bridge, pirif str, and SB str to L Pt has been to PT several times for LBP and knows hep program well. Will review and add gradually for pt to cont at home as we treat the back flare up in the clinic.          HEP to add: HL TB abd              % 1.5 w/cm2 to R LB area  X 8 min  Pt in          STM with [facility] to same X 8 min          man p-tx  SIS 60#/40#  2 step  60 sec on/20 sec off  X 10 min  Pt's wt is 200#         MHP after p-tx Declined after p-tx, Bean cream was warm enough                  Other Therapeutic Activities:  Pt was educated on PT POC, Diagnosis, Prognosis, pathomechanics as well as frequency and duration of scheduling future physical therapy appointments. Time was also taken on this day to answer all patient questions and participation in PT. Reviewed appointment policy in detail with patient and patient verbalized understanding. Edu on proper tech/posture with sitting, sleeping, ADLS. Home Exercise Program:  Patient instructed in the above for HEP:   Patient verbalized/demonstrated understanding and was issued written handout.     Timed Code Treatment Minutes:  25    Total Treatment Minutes:  40    Assessment:  [] Patient tolerated treatment well [] Patient limited by fatigue  [x] Patient limited by pain  [] Patient limited by other medical complications  [x] Other: functional improvements: pain dec to 3-4/10 after session     Prognosis: [x] Good [] Fair  [] Poor    Goals:    Short term goals  Time Frame for Short term goals: 2 wks   Short term goal 1: dec pain to 4-5/10   Short term goal 2: R LE flex min tight       Long term goals  Time Frame for Long term goals : 4 wks  Long term goal 1: dec pain to 2-3/10 for ease with sleep   Long term goal 2: R SB to 10 and all other lumbar ROM WFL min-no pain for ease with general mobility   Long term goal 3: pt independent with hep      Patient Requires Follow-up: [x] Yes  [] No    Plan:   [x] Continue per plan of care [] Alter current plan (see comments)  [] Plan of care initiated [] Hold pending MD visit [] Discharge    Plan for Next Session:  Add above as stated; cont to work on [employer] and US to dec acute pain as pt cont hep independently     Electronically signed by:  Jael Sharpe, [address]

## 2019-08-06 ENCOUNTER — HOSPITAL ENCOUNTER (OUTPATIENT)
Dept: PHYSICAL THERAPY | Age: 59
Setting detail: THERAPIES SERIES
Discharge: HOME | End: 2019-08-06
Payer: COMMERCIAL

## 2019-08-06 PROCEDURE — 97140 MANUAL THERAPY 1/> REGIONS: CPT | Performed by: CHIROPRACTOR

## 2019-08-06 PROCEDURE — 97035 APP MDLTY 1+ULTRASOUND EA 15: CPT | Performed by: CHIROPRACTOR

## 2019-08-08 ENCOUNTER — HOSPITAL ENCOUNTER (OUTPATIENT)
Dept: PHYSICAL THERAPY | Age: 59
Setting detail: THERAPIES SERIES
Discharge: HOME | End: 2019-08-08
Payer: COMMERCIAL

## 2019-08-08 NOTE — FLOWSHEET NOTE
Physical Therapy  Cancellation/No-show Note  Patient Name:  Francisco J Barfield  :  1960   Date:  2019  Cancelled visits to date: 1  No-shows to date: 0    For today's appointment patient:  [x]  Cancelled  []  Rescheduled appointment  []  No-show     Reason given by patient:  []  Patient ill  []  Conflicting appointment  []  No transportation    []  Conflict with work  []  No reason given  [x]  Other:     Comments:      Electronically signed by:  Ale Aguilar [id number]

## 2019-08-09 ENCOUNTER — ANTI-COAG VISIT (OUTPATIENT)
Dept: PHARMACY | Age: 59
End: 2019-08-09
Payer: COMMERCIAL

## 2019-08-09 ENCOUNTER — HOSPITAL ENCOUNTER (OUTPATIENT)
Dept: PHYSICAL THERAPY | Age: 59
Setting detail: THERAPIES SERIES
Discharge: HOME | End: 2019-08-09
Payer: COMMERCIAL

## 2019-08-09 DIAGNOSIS — I26.99 OTHER PULMONARY EMBOLISM WITHOUT ACUTE COR PULMONALE, UNSPECIFIED CHRONICITY (HCC): ICD-10-CM

## 2019-08-09 LAB — INTERNATIONAL NORMALIZATION RATIO, POC: 1.8

## 2019-08-09 PROCEDURE — 85610 PROTHROMBIN TIME: CPT

## 2019-08-09 PROCEDURE — 97110 THERAPEUTIC EXERCISES: CPT

## 2019-08-09 PROCEDURE — 99211 OFF/OP EST MAY X REQ PHY/QHP: CPT

## 2019-08-09 PROCEDURE — 97140 MANUAL THERAPY 1/> REGIONS: CPT

## 2019-08-09 PROCEDURE — 97035 APP MDLTY 1+ULTRASOUND EA 15: CPT

## 2019-08-09 NOTE — FLOWSHEET NOTE
to R LB area  X 8 min  Pt in  SL        STM with Deep prep same X 8 min NO WILBUR - felt too cool later after treatment. [facility] p-tx   Pt's wt is 200#   Declined mechanical Tx today because he did not want to wear harness due to bout of pleurisy (per pt)   Man MARLYS Tx 20 sec x 6    MHP  Supine, legs elevated  15 min                Other Therapeutic Activities:  Pt was educated on PT POC, Diagnosis, Prognosis, pathomechanics as well as frequency and duration of scheduling future physical therapy appointments. Time was also taken on this day to answer all patient questions and participation in PT. Reviewed appointment policy in detail with patient and patient verbalized understanding. Edu on proper tech/posture with sitting, sleeping, ADLS. Home Exercise Program:  Patient instructed in the above for HEP:   Patient verbalized/demonstrated understanding and was issued written handout.     Timed Code Treatment Minutes:  25    Total Treatment Minutes:  40    Assessment:  [] Patient tolerated treatment well [] Patient limited by fatigue  [x] Patient limited by pain  [] Patient limited by other medical complications  [x] Other:     Prognosis: [x] Good [] Fair  [] Poor    Goals:    Short term goals  Time Frame for Short term goals: 2 wks   Short term goal 1: dec pain to 4-5/10   Short term goal 2: R LE flex min tight       Long term goals  Time Frame for Long term goals : 4 wks  Long term goal 1: dec pain to 2-3/10 for ease with sleep   Long term goal 2: R SB to 10 and all other lumbar ROM WFL min-no pain for ease with general mobility   Long term goal 3: pt independent with hep      Patient Requires Follow-up: [x] Yes  [] No    Plan:   [x] Continue per plan of care [] Alter current plan (see comments)  [] Plan of care initiated [] Hold pending MD visit [] Discharge    Plan for Next Session:  Add above as stated; cont to work on [employer] and US to dec acute pain as pt cont hep independently     Electronically signed by:

## 2019-08-13 ENCOUNTER — HOSPITAL ENCOUNTER (OUTPATIENT)
Dept: PHYSICAL THERAPY | Age: 59
Setting detail: THERAPIES SERIES
Discharge: HOME | End: 2019-08-13
Payer: COMMERCIAL

## 2019-08-13 ENCOUNTER — APPOINTMENT (OUTPATIENT)
Dept: PHYSICAL THERAPY | Age: 59
End: 2019-08-13
Payer: COMMERCIAL

## 2019-08-13 PROCEDURE — 97035 APP MDLTY 1+ULTRASOUND EA 15: CPT

## 2019-08-13 PROCEDURE — 97140 MANUAL THERAPY 1/> REGIONS: CPT

## 2019-08-13 NOTE — FLOWSHEET NOTE
Physical Therapy Daily Treatment Note  Date:  2019    Patient Name:  Ant Pruett    :  1960  MRN: [id number]    Restrictions/Precautions: Position Activity Restriction  Other position/activity restrictions: no fall risk     Pertinent Medical History: Additional Pertinent Hx: HTN, fibromyalgia, hepatitis, Pacemaker ( no TENS)     Medical/Treatment Diagnosis Information:  · Diagnosis: lumbar spondylosis   · Treatment Diagnosis: LBP, dec ROM, dec strength     Insurance/Certification information:  PT Insurance Information: [facility] 60 vpcy  Physician Information:  Referring Practitioner: Dr. Keon Garcia of care signed (Y/N): Faxed at eval     Visit# / total visits:    Pain level: 3-4/10     Functional Outcomes Measure: at eval  Test: [place]   Score:     Progress Note: []  Yes  [x]  No  Next due by: Visit #10      History of Injury: Subjective  Subjective: Pt reports played 18 + 9 more holes of golf around . Pain was in pain the next day. Pain in low back and goes into R thigh intermittently. Pt denies any N/T. Pain up to 7-10 with typical ADLs. Pain worse with bending and lifting. Recent MRI showing mild spinal stenosis L5 and multilevel foraminal narrowing severe R L4. Pt is using heat, Biofreeze and Icy hot. Subjective:   PT is helping, pain is decreasing overall. HEP is going well and he is consistent with exercises 2 x per day. Objective:   Observation:    Test measurements:      Exercises:  Exercise/Equipment Resistance/Repetitions Other comments   Reviewed HEP pt has cont from prior PT  HL march & with UE/LE, PPT, supine HSS, LTR  Also added: SKC, DKC, bridge, pirif str, and SB str to L Pt has been to PT several times for LBP and knows hep program well. Will review and add gradually for pt to cont at home as we treat the back flare up in the clinic.     HSS Hep - reviewed verbally, pt without questions    Piriformis stretch    Seated rot         % 1.5 w/cm2 to R LB

## 2019-08-15 ENCOUNTER — TELEPHONE (OUTPATIENT)
Dept: PHARMACY | Age: 59
End: 2019-08-15

## 2019-08-15 ENCOUNTER — APPOINTMENT (OUTPATIENT)
Dept: PHYSICAL THERAPY | Age: 59
End: 2019-08-15
Payer: COMMERCIAL

## 2019-08-15 ENCOUNTER — HOSPITAL ENCOUNTER (OUTPATIENT)
Dept: PHYSICAL THERAPY | Age: 59
Setting detail: THERAPIES SERIES
Discharge: HOME | End: 2019-08-15
Payer: COMMERCIAL

## 2019-08-15 PROCEDURE — 97035 APP MDLTY 1+ULTRASOUND EA 15: CPT

## 2019-08-15 PROCEDURE — 97140 MANUAL THERAPY 1/> REGIONS: CPT

## 2019-08-16 ENCOUNTER — TELEPHONE (OUTPATIENT)
Dept: PULMONOLOGY | Age: 59
End: 2019-08-16

## 2019-08-19 ENCOUNTER — TELEPHONE (OUTPATIENT)
Dept: PULMONOLOGY | Age: 59
End: 2019-08-19

## 2019-08-19 RX ORDER — ALBUTEROL SULFATE 2.5 MG/3ML
SOLUTION RESPIRATORY (INHALATION)
Qty: 360 ML | Refills: 0 | Status: SHIPPED | OUTPATIENT
Start: 2019-08-19 | End: 2019-10-25 | Stop reason: SDUPTHER

## 2019-08-20 ENCOUNTER — HOSPITAL ENCOUNTER (OUTPATIENT)
Dept: PHYSICAL THERAPY | Age: 59
Setting detail: THERAPIES SERIES
Discharge: HOME | End: 2019-08-20
Payer: COMMERCIAL

## 2019-08-20 ENCOUNTER — APPOINTMENT (OUTPATIENT)
Dept: PHYSICAL THERAPY | Age: 59
End: 2019-08-20
Payer: COMMERCIAL

## 2019-08-20 PROCEDURE — 97140 MANUAL THERAPY 1/> REGIONS: CPT

## 2019-08-20 PROCEDURE — 97530 THERAPEUTIC ACTIVITIES: CPT

## 2019-08-20 PROCEDURE — 97035 APP MDLTY 1+ULTRASOUND EA 15: CPT

## 2019-08-20 NOTE — FLOWSHEET NOTE
Physical Therapy Daily Treatment Note  Date:  2019    Patient Name:  Ant Pruett    :  1960  MRN: [id number]    Restrictions/Precautions: Position Activity Restriction  Other position/activity restrictions: no fall risk     Pertinent Medical History: Additional Pertinent Hx: HTN, fibromyalgia, hepatitis, Pacemaker ( no TENS)     Medical/Treatment Diagnosis Information:  · Diagnosis: lumbar spondylosis   · Treatment Diagnosis: LBP, dec ROM, dec strength     Insurance/Certification information:  PT Insurance Information: [facility] 60 vpcy  Physician Information:  Referring Practitioner: Dr. Keon Garcia of care signed (Y/N): Faxed at eval     Visit# / total visits:    Pain level: 7-8/10     Functional Outcomes Measure: at eval  Test: [place]   Score:     Progress Note: [x]  Yes  []  No  Next due by: Visit #10      History of Injury: Subjective  Subjective: Pt reports played 18 + 9 more holes of golf around . Pain was in pain the next day. Pain in low back and goes into R thigh intermittently. Pt denies any N/T. Pain up to 7-8/10 with typical ADLs. Pain worse with bending and lifting. Recent MRI showing mild spinal stenosis L5 and multilevel foraminal narrowing severe R L4. Pt is using heat, Biofreeze and Icy hot. Subjective:   Sore today from going to [employer] events and a lot of walking over the weekend. Pt amb into PT with limp. Pt does note he has pain R hip radiating to ant thigh. Testing in past shows mild OA R hip.     Progress Note (19)  * Pain he started PT for in the R back is 70% improved and only about 3-4/10 with sleep and ADLs   * sleep is better  * Recent pain that has been there is now in R buttock/PSIS and post grtr troch area and radiates to R thigh   * B SB 15 no c/o; mild pain with lumbar flex but WFL range and mod dec lumbar ext with mod pain  * discussed option of pool, but pt is unable to do this due to breathing issues and the chlorine in indoors pools bothers his lungs too much          Objective:   Observation:    Test measurements:      Exercises:  Exercise/Equipment Resistance/Repetitions Other comments   Reviewed HEP pt has cont from prior PT  HL march & with UE/LE, PPT, supine HSS, LTR  Also added: SKC, DKC, bridge, pirif str, and SB str to L Pt has been to PT several times for LBP and knows hep program well. Will review and add gradually for pt to cont at home as we treat the back flare up in the clinic. HSS Hep - reviewed verbally, pt without questions    Piriformis stretch    Seated rot    TB Row        ext Pt already doing these at home with home TB          % 1.5 w/cm2 to R buttock area  X 8 min  Pt in  SL        STM with Deep prep same X 8 min  NO WILBUR - felt too cool later after treatment. mech p-tx   Pt's wt is 200#   Cont to hold mech p-tx due to kidney stones and pressure with belts is just too painful. Man LE Tx 20 sec x 6    CP today per pt request  At home today                 Other Therapeutic Activities:  Pt was educated on PT POC, Diagnosis, Prognosis, pathomechanics as well as frequency and duration of scheduling future physical therapy appointments. Time was also taken on this day to answer all patient questions and participation in PT. Reviewed appointment policy in detail with patient and patient verbalized understanding. Edu on proper tech/posture with sitting, sleeping, ADLS. Home Exercise Program:  Patient instructed in the above for HEP:   Patient verbalized/demonstrated understanding and was issued written handout.     Timed Code Treatment Minutes:  45    Total Treatment Minutes:  45    Assessment:  [] Patient tolerated treatment well [] Patient limited by fatigue  [x] Patient limited by pain  [] Patient limited by other medical complications  [x] Other: functional assessment: dec pain after 8/20 session to 3-4/10      Prognosis: [x] Good [] Fair  [] Poor    Goals:    Short term goals  Time Frame for Short term goals: 2 wks   Short term goal 1: dec pain to 4-5/10   Short term goal 2: R LE flex min tight       Long term goals  Time Frame for Long term goals : 4 wks  Long term goal 1: dec pain to 2-3/10 for ease with sleep   Long term goal 2: R SB to 10 and all other lumbar ROM WFL min-no pain for ease with general mobility   Long term goal 3: pt independent with hep      Patient Requires Follow-up: [x] Yes  [] No    Plan:   [x] Continue per plan of care [] Alter current plan (see comments)  [] Plan of care initiated [] Hold pending MD visit [] Discharge    Plan for Next Session:  One more session and then referral back to MD for cont pain     Electronically signed by:  Karl Lim, [address]

## 2019-08-21 ENCOUNTER — TELEPHONE (OUTPATIENT)
Dept: PHARMACY | Age: 59
End: 2019-08-21

## 2019-08-22 ENCOUNTER — APPOINTMENT (OUTPATIENT)
Dept: PHYSICAL THERAPY | Age: 59
End: 2019-08-22
Payer: COMMERCIAL

## 2019-08-23 ENCOUNTER — ANTI-COAG VISIT (OUTPATIENT)
Dept: PHARMACY | Age: 59
End: 2019-08-23
Payer: COMMERCIAL

## 2019-08-23 DIAGNOSIS — I26.99 OTHER PULMONARY EMBOLISM WITHOUT ACUTE COR PULMONALE, UNSPECIFIED CHRONICITY (HCC): ICD-10-CM

## 2019-08-23 LAB — INTERNATIONAL NORMALIZATION RATIO, POC: 1.6

## 2019-08-23 PROCEDURE — 85610 PROTHROMBIN TIME: CPT

## 2019-08-23 PROCEDURE — 99211 OFF/OP EST MAY X REQ PHY/QHP: CPT

## 2019-08-23 NOTE — PROGRESS NOTES
Mr. Davy Lou is a 60 y.o.  male with history of Pulmonary Embolus who presents today for anticoagulation monitoring and adjustment. Patient verifies current dosing regimen  Patient denies s/s bleeding/bruising/swelling/SOB  No blood in urine or stool. No dietary changes. No change in alcohol use. No missed doses. No Procedures scheduled in the future at this time. Lab Results   Component Value Date    INR 1.6 08/23/2019    INR 1.8 08/09/2019    INR 2.2 07/29/2019       Pertinent findings: Recent steroid injection, may not affect INR very much    Warfarin dosing: Take Warfarin 10mg (2 tablets) today only 8/23/19. Then take Warfarin 5mg (1 tablet) daily except 2.5mg (1/2 tablet) on Tuesday and Thursday      After visit summary printed and reviewed with patient. Medications reviewed and updated on home medication list: No: Patient stated no changes, other than steroid injection.   Warfarin dose updated on patient home medication list: Yes

## 2019-08-27 ENCOUNTER — APPOINTMENT (OUTPATIENT)
Dept: PHYSICAL THERAPY | Age: 59
End: 2019-08-27
Payer: COMMERCIAL

## 2019-08-27 ENCOUNTER — TELEPHONE (OUTPATIENT)
Dept: PHARMACY | Age: 59
End: 2019-08-27

## 2019-08-27 ENCOUNTER — TELEPHONE (OUTPATIENT)
Dept: CARDIOLOGY CLINIC | Age: 59
End: 2019-08-27

## 2019-08-27 NOTE — TELEPHONE ENCOUNTER
Patient is taking Levaquin x 10 days. Instructed patient to take Warfarin 2.5mg daily except 5mg DEEPAK rosales on antibiotic, then resume normal dose Warfarin 5mg (1 tablet) daily except 2.5mg (1/2 tablet) on Tuesday and Thursday. He has an appointment next Wednesday for INR. Patient states he is trying to get an epidural scheduled, for which he will be off of his warfarin. Instructed patient to let us know when scheduled.

## 2019-08-28 NOTE — TELEPHONE ENCOUNTER
Cardiac Clearance  He is planning to have a epidural injection and needs to hold Coumadin for 5 days. He is on Coumadin for history of PE. HX: NICM, Chronic systolic heart failure, HTN, and HLD.

## 2019-08-29 ENCOUNTER — APPOINTMENT (OUTPATIENT)
Dept: PHYSICAL THERAPY | Age: 59
End: 2019-08-29
Payer: COMMERCIAL

## 2019-08-30 ENCOUNTER — TELEPHONE (OUTPATIENT)
Dept: PHARMACY | Age: 59
End: 2019-08-30

## 2019-08-30 ENCOUNTER — APPOINTMENT (OUTPATIENT)
Dept: PHARMACY | Age: 59
End: 2019-08-30
Payer: COMMERCIAL

## 2019-09-03 NOTE — DISCHARGE SUMMARY
Outpatient Physical Therapy  [x] [facility]    Phone: [phone]   Fax: [phone]   [] [facility]  Phone: [phone]   Fax: [phone]  [] [facility]              Phone: [phone]   Fax: [phone]     Physical Therapy Progress/Discharge Note  Date: 9/3/2019        Patient Name:  Calos Harris    :  1960  MRN: [id number]  Restrictions/Precautions: Position Activity Restriction  Other position/activity restrictions: no fall risk     Pertinent Medical History: Additional Pertinent Hx: HTN, fibromyalgia, hepatitis, Pacemaker ( no TENS)      Medical/Treatment Diagnosis Information:  · Diagnosis: lumbar spondylosis   · Treatment Diagnosis: LBP, dec ROM, dec strength      Insurance/Certification information:  PT Insurance Information: [facility] 60 vpcy  Physician Information:  Referring Practitioner: Dr. Jamshid Ozuna of care signed (Y/N): Faxed at eval      Visit# / total visits:    Pain level:      -8/10      Time Period for Report:   19-19  Cancels/No-shows to date: 1     Plan of Care/Treatment to date:  [x] Therapeutic Exercise    [x] Modalities:  [] Therapeutic Activity     [x] Ultrasound  [] Electrical Stimulation  [] Gait Training      [] Cervical Traction    [x] Lumbar Traction  [] Neuromuscular Re-education  [x] Cold/hotpack [] Iontophoresis  [x] Instruction in HEP      Other:  [x] Manual Therapy       []    [] Aquatic Therapy       []                    ?       Significant Findings At Last Visit/Comments:    Progress Note (19)  * Pain he started PT for in the R back is 70% improved and only about 3-4/10 with sleep and ADLs   * sleep is better  * Recent pain that has been there is now in R buttock/PSIS and post grtr troch area and radiates to R thigh   * B SB 15 no c/o; mild pain with lumbar flex but WFL range and mod dec lumbar ext with mod pain  * discussed option of pool, but pt is unable to do this due to breathing issues and the chlorine in indoors pools bothers his lungs too much            Assessment:  Summary: limited progress  Patient's response to treatment: good - fair    Progress towards goals:  Progressing; some met; some not met    Current Frequency/Duration:  # Days per week: [] 1 day # Weeks: [] 1 week [x] 4 weeks      [x] 2 days? [] 2 weeks [] 5 weeks      [] 3 days   [] 3 weeks [] 6 weeks     Rehab Potential: [] Excellent [] Good [x] Fair  [] Poor     Goal Status:  [] Achieved [x] Partially Achieved  [] Not Achieved     Patient Status: [] Continue per initial plan of Care     [x] Patient now discharged for referral back to MD; pt cancelled last session and hasn't called to reschedule     [] Additional visits requested, Please re-certify for additional visits:      Requested frequency/duration:  X/week for weeks    Electronically signed by:  Luna Payton PT [id number]    If you have any questions or concerns, please don't hesitate to call.   Thank you for your referral.    Physician Signature:________________________________Date:__________________  By signing above, therapists plan is approved by physician

## 2019-09-04 ENCOUNTER — ANTI-COAG VISIT (OUTPATIENT)
Dept: PHARMACY | Age: 59
End: 2019-09-04
Payer: COMMERCIAL

## 2019-09-04 LAB — INTERNATIONAL NORMALIZATION RATIO, POC: 2.1

## 2019-09-04 PROCEDURE — 85610 PROTHROMBIN TIME: CPT

## 2019-09-04 PROCEDURE — 99211 OFF/OP EST MAY X REQ PHY/QHP: CPT

## 2019-09-05 ENCOUNTER — HOSPITAL ENCOUNTER (OUTPATIENT)
Age: 59
Discharge: HOME | End: 2019-09-05
Payer: COMMERCIAL

## 2019-09-05 ENCOUNTER — HOSPITAL ENCOUNTER (OUTPATIENT)
Dept: GENERAL RADIOLOGY | Age: 59
Discharge: HOME | End: 2019-09-05
Payer: COMMERCIAL

## 2019-09-05 DIAGNOSIS — N20.0 CALCULUS OF KIDNEY: ICD-10-CM

## 2019-09-05 DIAGNOSIS — C67.9 MALIGNANT NEOPLASM OF BLADDER WALL (HCC): ICD-10-CM

## 2019-09-05 PROCEDURE — 74018 RADEX ABDOMEN 1 VIEW: CPT

## 2019-09-12 ENCOUNTER — ANTI-COAG VISIT (OUTPATIENT)
Dept: PHARMACY | Age: 59
End: 2019-09-12
Payer: COMMERCIAL

## 2019-09-12 DIAGNOSIS — I26.99 OTHER PULMONARY EMBOLISM WITHOUT ACUTE COR PULMONALE, UNSPECIFIED CHRONICITY (HCC): ICD-10-CM

## 2019-09-12 LAB — INTERNATIONAL NORMALIZATION RATIO, POC: 2.9

## 2019-09-12 PROCEDURE — 85610 PROTHROMBIN TIME: CPT

## 2019-09-12 PROCEDURE — 99211 OFF/OP EST MAY X REQ PHY/QHP: CPT

## 2019-09-23 ENCOUNTER — APPOINTMENT (OUTPATIENT)
Dept: GENERAL RADIOLOGY | Age: 59
DRG: 310 | End: 2019-09-23
Payer: COMMERCIAL

## 2019-09-23 ENCOUNTER — APPOINTMENT (OUTPATIENT)
Dept: CT IMAGING | Age: 59
DRG: 310 | End: 2019-09-23
Payer: COMMERCIAL

## 2019-09-23 ENCOUNTER — HOSPITAL ENCOUNTER (INPATIENT)
Age: 59
LOS: 2 days | Discharge: HOME | DRG: 310 | End: 2019-09-26
Attending: EMERGENCY MEDICINE | Admitting: INTERNAL MEDICINE
Payer: COMMERCIAL

## 2019-09-23 DIAGNOSIS — R00.0 TACHYCARDIA: Primary | ICD-10-CM

## 2019-09-23 DIAGNOSIS — R07.9 CHEST PAIN, UNSPECIFIED TYPE: ICD-10-CM

## 2019-09-23 DIAGNOSIS — R00.2 PALPITATIONS: ICD-10-CM

## 2019-09-23 LAB
ANION GAP SERPL CALCULATED.3IONS-SCNC: 13 MMOL/L (ref 3–16)
BASOPHILS ABSOLUTE: 0 K/UL (ref 0–0.2)
BASOPHILS RELATIVE PERCENT: 0.2 %
BUN BLDV-MCNC: 12 MG/DL (ref 7–20)
CALCIUM SERPL-MCNC: 11.2 MG/DL (ref 8.3–10.6)
CHLORIDE BLD-SCNC: 107 MMOL/L (ref 99–110)
CO2: 23 MMOL/L (ref 21–32)
CREAT SERPL-MCNC: 1 MG/DL (ref 0.8–1.3)
EOSINOPHILS ABSOLUTE: 0.4 K/UL (ref 0–0.6)
EOSINOPHILS RELATIVE PERCENT: 3.4 %
GFR AFRICAN AMERICAN: >60
GFR NON-AFRICAN AMERICAN: >60
GLUCOSE BLD-MCNC: 133 MG/DL (ref 70–99)
HCT VFR BLD CALC: 47.1 % (ref 40.5–52.5)
HEMOGLOBIN: 16.4 G/DL (ref 13.5–17.5)
LIPASE: 17 U/L (ref 13–60)
LYMPHOCYTES ABSOLUTE: 0.7 K/UL (ref 1–5.1)
LYMPHOCYTES RELATIVE PERCENT: 6.3 %
MCH RBC QN AUTO: 32.2 PG (ref 26–34)
MCHC RBC AUTO-ENTMCNC: 34.8 G/DL (ref 31–36)
MCV RBC AUTO: 92.7 FL (ref 80–100)
MONOCYTES ABSOLUTE: 0.8 K/UL (ref 0–1.3)
MONOCYTES RELATIVE PERCENT: 7.9 %
NEUTROPHILS ABSOLUTE: 8.7 K/UL (ref 1.7–7.7)
NEUTROPHILS RELATIVE PERCENT: 82.2 %
PDW BLD-RTO: 13.6 % (ref 12.4–15.4)
PLATELET # BLD: 164 K/UL (ref 135–450)
PMV BLD AUTO: 7.6 FL (ref 5–10.5)
POTASSIUM REFLEX MAGNESIUM: 4.1 MMOL/L (ref 3.5–5.1)
RBC # BLD: 5.08 M/UL (ref 4.2–5.9)
SODIUM BLD-SCNC: 143 MMOL/L (ref 136–145)
TROPONIN: <0.01 NG/ML
WBC # BLD: 10.5 K/UL (ref 4–11)

## 2019-09-23 PROCEDURE — 6370000000 HC RX 637 (ALT 250 FOR IP): Performed by: PHYSICIAN ASSISTANT

## 2019-09-23 PROCEDURE — 84443 ASSAY THYROID STIM HORMONE: CPT

## 2019-09-23 PROCEDURE — 73502 X-RAY EXAM HIP UNI 2-3 VIEWS: CPT

## 2019-09-23 PROCEDURE — 6360000004 HC RX CONTRAST MEDICATION: Performed by: EMERGENCY MEDICINE

## 2019-09-23 PROCEDURE — 85610 PROTHROMBIN TIME: CPT

## 2019-09-23 PROCEDURE — 71260 CT THORAX DX C+: CPT

## 2019-09-23 PROCEDURE — 36415 COLL VENOUS BLD VENIPUNCTURE: CPT

## 2019-09-23 PROCEDURE — 80048 BASIC METABOLIC PNL TOTAL CA: CPT

## 2019-09-23 PROCEDURE — 71046 X-RAY EXAM CHEST 2 VIEWS: CPT

## 2019-09-23 PROCEDURE — 93005 ELECTROCARDIOGRAM TRACING: CPT | Performed by: PHYSICIAN ASSISTANT

## 2019-09-23 PROCEDURE — 83880 ASSAY OF NATRIURETIC PEPTIDE: CPT

## 2019-09-23 PROCEDURE — 83690 ASSAY OF LIPASE: CPT

## 2019-09-23 PROCEDURE — 99285 EMERGENCY DEPT VISIT HI MDM: CPT

## 2019-09-23 PROCEDURE — 85025 COMPLETE CBC W/AUTO DIFF WBC: CPT

## 2019-09-23 PROCEDURE — 84484 ASSAY OF TROPONIN QUANT: CPT

## 2019-09-23 RX ORDER — 0.9 % SODIUM CHLORIDE 0.9 %
1000 INTRAVENOUS SOLUTION INTRAVENOUS ONCE
Status: COMPLETED | OUTPATIENT
Start: 2019-09-24 | End: 2019-09-24

## 2019-09-23 RX ORDER — OXYCODONE HYDROCHLORIDE AND ACETAMINOPHEN 5; 325 MG/1; MG/1
1 TABLET ORAL ONCE
Status: COMPLETED | OUTPATIENT
Start: 2019-09-23 | End: 2019-09-23

## 2019-09-23 RX ADMIN — IOPAMIDOL 75 ML: 755 INJECTION, SOLUTION INTRAVENOUS at 22:43

## 2019-09-23 RX ADMIN — OXYCODONE HYDROCHLORIDE AND ACETAMINOPHEN 1 TABLET: 5; 325 TABLET ORAL at 22:35

## 2019-09-23 RX ADMIN — LIDOCAINE HYDROCHLORIDE: 20 SOLUTION ORAL; TOPICAL at 21:51

## 2019-09-23 ASSESSMENT — PAIN DESCRIPTION - LOCATION
LOCATION: CHEST

## 2019-09-23 ASSESSMENT — PAIN DESCRIPTION - DESCRIPTORS
DESCRIPTORS: ACHING
DESCRIPTORS: ACHING

## 2019-09-23 ASSESSMENT — PAIN SCALES - GENERAL
PAINLEVEL_OUTOF10: 9
PAINLEVEL_OUTOF10: 7
PAINLEVEL_OUTOF10: 9
PAINLEVEL_OUTOF10: 10

## 2019-09-23 ASSESSMENT — PAIN DESCRIPTION - ONSET
ONSET: ON-GOING
ONSET: ON-GOING

## 2019-09-23 ASSESSMENT — PAIN DESCRIPTION - ORIENTATION: ORIENTATION: MID;UPPER

## 2019-09-23 ASSESSMENT — PAIN DESCRIPTION - PROGRESSION
CLINICAL_PROGRESSION: NOT CHANGED
CLINICAL_PROGRESSION: NOT CHANGED

## 2019-09-23 ASSESSMENT — PAIN DESCRIPTION - FREQUENCY
FREQUENCY: CONTINUOUS
FREQUENCY: CONTINUOUS

## 2019-09-23 ASSESSMENT — PAIN DESCRIPTION - PAIN TYPE
TYPE: ACUTE PAIN

## 2019-09-23 ASSESSMENT — PAIN - FUNCTIONAL ASSESSMENT
PAIN_FUNCTIONAL_ASSESSMENT: ACTIVITIES ARE NOT PREVENTED
PAIN_FUNCTIONAL_ASSESSMENT: PREVENTS OR INTERFERES SOME ACTIVE ACTIVITIES AND ADLS

## 2019-09-24 ENCOUNTER — TELEPHONE (OUTPATIENT)
Dept: ORTHOPEDIC SURGERY | Age: 59
End: 2019-09-24

## 2019-09-24 PROBLEM — R07.9 CHEST PAIN: Status: ACTIVE | Noted: 2019-09-24

## 2019-09-24 LAB
EKG ATRIAL RATE: 122 BPM
EKG DIAGNOSIS: NORMAL
EKG P AXIS: 101 DEGREES
EKG P-R INTERVAL: 336 MS
EKG Q-T INTERVAL: 346 MS
EKG QRS DURATION: 140 MS
EKG QTC CALCULATION (BAZETT): 489 MS
EKG R AXIS: -50 DEGREES
EKG T AXIS: 109 DEGREES
EKG VENTRICULAR RATE: 120 BPM
INR BLD: 2.46 (ref 0.86–1.14)
PRO-BNP: 95 PG/ML (ref 0–124)
PROTHROMBIN TIME: 28.1 SEC (ref 9.8–13)
TROPONIN: <0.01 NG/ML
TROPONIN: <0.01 NG/ML
TSH REFLEX: 1.76 UIU/ML (ref 0.27–4.2)

## 2019-09-24 PROCEDURE — 84484 ASSAY OF TROPONIN QUANT: CPT

## 2019-09-24 PROCEDURE — 87086 URINE CULTURE/COLONY COUNT: CPT

## 2019-09-24 PROCEDURE — 93010 ELECTROCARDIOGRAM REPORT: CPT | Performed by: INTERNAL MEDICINE

## 2019-09-24 PROCEDURE — 96374 THER/PROPH/DIAG INJ IV PUSH: CPT

## 2019-09-24 PROCEDURE — G0378 HOSPITAL OBSERVATION PER HR: HCPCS

## 2019-09-24 PROCEDURE — 36415 COLL VENOUS BLD VENIPUNCTURE: CPT

## 2019-09-24 PROCEDURE — 94761 N-INVAS EAR/PLS OXIMETRY MLT: CPT

## 2019-09-24 PROCEDURE — 81003 URINALYSIS AUTO W/O SCOPE: CPT

## 2019-09-24 PROCEDURE — 6370000000 HC RX 637 (ALT 250 FOR IP): Performed by: INTERNAL MEDICINE

## 2019-09-24 PROCEDURE — 6360000002 HC RX W HCPCS: Performed by: INTERNAL MEDICINE

## 2019-09-24 PROCEDURE — 96360 HYDRATION IV INFUSION INIT: CPT

## 2019-09-24 PROCEDURE — 99223 1ST HOSP IP/OBS HIGH 75: CPT | Performed by: INTERNAL MEDICINE

## 2019-09-24 PROCEDURE — 94760 N-INVAS EAR/PLS OXIMETRY 1: CPT

## 2019-09-24 PROCEDURE — 2580000003 HC RX 258: Performed by: PHYSICIAN ASSISTANT

## 2019-09-24 PROCEDURE — 1200000000 HC SEMI PRIVATE

## 2019-09-24 PROCEDURE — 96361 HYDRATE IV INFUSION ADD-ON: CPT

## 2019-09-24 PROCEDURE — 94640 AIRWAY INHALATION TREATMENT: CPT

## 2019-09-24 RX ORDER — FAMOTIDINE 20 MG/1
20 TABLET, FILM COATED ORAL DAILY
Status: DISCONTINUED | OUTPATIENT
Start: 2019-09-24 | End: 2019-09-25

## 2019-09-24 RX ORDER — POLYETHYLENE GLYCOL 3350 17 G/17G
17 POWDER, FOR SOLUTION ORAL DAILY PRN
Status: DISCONTINUED | OUTPATIENT
Start: 2019-09-24 | End: 2019-09-26 | Stop reason: HOSPADM

## 2019-09-24 RX ORDER — OXYCODONE HYDROCHLORIDE AND ACETAMINOPHEN 5; 325 MG/1; MG/1
1 TABLET ORAL EVERY 8 HOURS PRN
COMMUNITY
End: 2019-10-21 | Stop reason: SDUPTHER

## 2019-09-24 RX ORDER — ASPIRIN 81 MG/1
81 TABLET, CHEWABLE ORAL DAILY
Status: DISCONTINUED | OUTPATIENT
Start: 2019-09-24 | End: 2019-09-26 | Stop reason: HOSPADM

## 2019-09-24 RX ORDER — ZOLPIDEM TARTRATE 5 MG/1
10 TABLET ORAL NIGHTLY
Status: DISCONTINUED | OUTPATIENT
Start: 2019-09-24 | End: 2019-09-26 | Stop reason: HOSPADM

## 2019-09-24 RX ORDER — METOPROLOL SUCCINATE 25 MG/1
25 TABLET, EXTENDED RELEASE ORAL 2 TIMES DAILY
Status: DISCONTINUED | OUTPATIENT
Start: 2019-09-24 | End: 2019-09-26 | Stop reason: HOSPADM

## 2019-09-24 RX ORDER — TAMSULOSIN HYDROCHLORIDE 0.4 MG/1
0.4 CAPSULE ORAL 2 TIMES DAILY
Status: DISCONTINUED | OUTPATIENT
Start: 2019-09-24 | End: 2019-09-26 | Stop reason: HOSPADM

## 2019-09-24 RX ORDER — PANTOPRAZOLE SODIUM 40 MG/1
40 TABLET, DELAYED RELEASE ORAL
Status: DISCONTINUED | OUTPATIENT
Start: 2019-09-24 | End: 2019-09-24 | Stop reason: SDUPTHER

## 2019-09-24 RX ORDER — PREGABALIN 75 MG/1
75 CAPSULE ORAL 3 TIMES DAILY
Status: DISCONTINUED | OUTPATIENT
Start: 2019-09-24 | End: 2019-09-24

## 2019-09-24 RX ORDER — OXYCODONE HYDROCHLORIDE AND ACETAMINOPHEN 5; 325 MG/1; MG/1
1 TABLET ORAL EVERY 4 HOURS PRN
Status: DISCONTINUED | OUTPATIENT
Start: 2019-09-24 | End: 2019-09-26 | Stop reason: HOSPADM

## 2019-09-24 RX ORDER — WARFARIN SODIUM 5 MG/1
5 TABLET ORAL DAILY
Status: DISCONTINUED | OUTPATIENT
Start: 2019-09-24 | End: 2019-09-24

## 2019-09-24 RX ORDER — ALBUTEROL SULFATE 2.5 MG/3ML
2.5 SOLUTION RESPIRATORY (INHALATION) EVERY 6 HOURS PRN
Status: DISCONTINUED | OUTPATIENT
Start: 2019-09-24 | End: 2019-09-24 | Stop reason: SDUPTHER

## 2019-09-24 RX ORDER — PREGABALIN 75 MG/1
150 CAPSULE ORAL NIGHTLY
Status: DISCONTINUED | OUTPATIENT
Start: 2019-09-24 | End: 2019-09-26 | Stop reason: HOSPADM

## 2019-09-24 RX ORDER — PRAVASTATIN SODIUM 40 MG
40 TABLET ORAL NIGHTLY
Status: DISCONTINUED | OUTPATIENT
Start: 2019-09-24 | End: 2019-09-26 | Stop reason: HOSPADM

## 2019-09-24 RX ORDER — PREGABALIN 75 MG/1
75 CAPSULE ORAL EVERY MORNING
Status: DISCONTINUED | OUTPATIENT
Start: 2019-09-24 | End: 2019-09-26 | Stop reason: HOSPADM

## 2019-09-24 RX ORDER — ALBUTEROL SULFATE 2.5 MG/3ML
2.5 SOLUTION RESPIRATORY (INHALATION) EVERY 4 HOURS PRN
Status: DISCONTINUED | OUTPATIENT
Start: 2019-09-24 | End: 2019-09-26 | Stop reason: HOSPADM

## 2019-09-24 RX ORDER — POLYETHYLENE GLYCOL 3350 17 G/17G
17 POWDER, FOR SOLUTION ORAL NIGHTLY
COMMUNITY

## 2019-09-24 RX ORDER — PRAVASTATIN SODIUM 40 MG
40 TABLET ORAL DAILY
Status: DISCONTINUED | OUTPATIENT
Start: 2019-09-24 | End: 2019-09-24

## 2019-09-24 RX ORDER — WARFARIN SODIUM 2.5 MG/1
2.5 TABLET ORAL
Status: ACTIVE | OUTPATIENT
Start: 2019-09-24 | End: 2019-09-25

## 2019-09-24 RX ORDER — FEXOFENADINE HCL 180 MG/1
180 TABLET ORAL DAILY
COMMUNITY
End: 2020-08-26

## 2019-09-24 RX ADMIN — SACUBITRIL AND VALSARTAN 1 TABLET: 24; 26 TABLET, FILM COATED ORAL at 21:35

## 2019-09-24 RX ADMIN — ALBUTEROL SULFATE 2.5 MG: 2.5 SOLUTION RESPIRATORY (INHALATION) at 21:11

## 2019-09-24 RX ADMIN — OXYCODONE HYDROCHLORIDE AND ACETAMINOPHEN 1 TABLET: 5; 325 TABLET ORAL at 19:55

## 2019-09-24 RX ADMIN — POLYETHYLENE GLYCOL 3350 17 G: 17 POWDER, FOR SOLUTION ORAL at 10:01

## 2019-09-24 RX ADMIN — PRAVASTATIN SODIUM 40 MG: 40 TABLET ORAL at 21:36

## 2019-09-24 RX ADMIN — SODIUM CHLORIDE 1000 ML: 9 INJECTION, SOLUTION INTRAVENOUS at 00:03

## 2019-09-24 RX ADMIN — HYDROCORTISONE 2.5%: 25 CREAM TOPICAL at 21:37

## 2019-09-24 RX ADMIN — TAMSULOSIN HYDROCHLORIDE 0.4 MG: 0.4 CAPSULE ORAL at 21:35

## 2019-09-24 RX ADMIN — PREGABALIN 150 MG: 75 CAPSULE ORAL at 21:34

## 2019-09-24 RX ADMIN — ALBUTEROL SULFATE 2.5 MG: 2.5 SOLUTION RESPIRATORY (INHALATION) at 11:57

## 2019-09-24 RX ADMIN — OXYCODONE HYDROCHLORIDE AND ACETAMINOPHEN 1 TABLET: 5; 325 TABLET ORAL at 10:01

## 2019-09-24 RX ADMIN — FAMOTIDINE 20 MG: 20 TABLET ORAL at 12:56

## 2019-09-24 RX ADMIN — SACUBITRIL AND VALSARTAN 1 TABLET: 24; 26 TABLET, FILM COATED ORAL at 10:15

## 2019-09-24 RX ADMIN — METOPROLOL SUCCINATE 25 MG: 25 TABLET, EXTENDED RELEASE ORAL at 21:36

## 2019-09-24 RX ADMIN — METOPROLOL SUCCINATE 25 MG: 25 TABLET, EXTENDED RELEASE ORAL at 10:01

## 2019-09-24 RX ADMIN — PREGABALIN 75 MG: 75 CAPSULE ORAL at 10:01

## 2019-09-24 RX ADMIN — ZOLPIDEM TARTRATE 10 MG: 5 TABLET ORAL at 21:35

## 2019-09-24 RX ADMIN — TAMSULOSIN HYDROCHLORIDE 0.4 MG: 0.4 CAPSULE ORAL at 10:01

## 2019-09-24 RX ADMIN — HYDROMORPHONE HYDROCHLORIDE 0.5 MG: 1 INJECTION, SOLUTION INTRAMUSCULAR; INTRAVENOUS; SUBCUTANEOUS at 23:42

## 2019-09-24 RX ADMIN — SACUBITRIL AND VALSARTAN 1 TABLET: 24; 26 TABLET, FILM COATED ORAL at 12:55

## 2019-09-24 RX ADMIN — ASPIRIN 81 MG 81 MG: 81 TABLET ORAL at 10:01

## 2019-09-24 ASSESSMENT — PAIN DESCRIPTION - PAIN TYPE
TYPE: ACUTE PAIN

## 2019-09-24 ASSESSMENT — PAIN - FUNCTIONAL ASSESSMENT
PAIN_FUNCTIONAL_ASSESSMENT: PREVENTS OR INTERFERES SOME ACTIVE ACTIVITIES AND ADLS
PAIN_FUNCTIONAL_ASSESSMENT: ACTIVITIES ARE NOT PREVENTED
PAIN_FUNCTIONAL_ASSESSMENT: PREVENTS OR INTERFERES SOME ACTIVE ACTIVITIES AND ADLS
PAIN_FUNCTIONAL_ASSESSMENT: ACTIVITIES ARE NOT PREVENTED

## 2019-09-24 ASSESSMENT — PAIN DESCRIPTION - PROGRESSION
CLINICAL_PROGRESSION: GRADUALLY WORSENING
CLINICAL_PROGRESSION: NOT CHANGED

## 2019-09-24 ASSESSMENT — PAIN DESCRIPTION - LOCATION
LOCATION: CHEST
LOCATION: CHEST
LOCATION: BACK
LOCATION: CHEST;ABDOMEN
LOCATION: ABDOMEN;CHEST
LOCATION: BACK

## 2019-09-24 ASSESSMENT — PAIN DESCRIPTION - DESCRIPTORS
DESCRIPTORS: SHARP
DESCRIPTORS: DULL;ACHING
DESCRIPTORS: ACHING
DESCRIPTORS: SHARP

## 2019-09-24 ASSESSMENT — PAIN SCALES - GENERAL
PAINLEVEL_OUTOF10: 8
PAINLEVEL_OUTOF10: 7
PAINLEVEL_OUTOF10: 6
PAINLEVEL_OUTOF10: 4
PAINLEVEL_OUTOF10: 7
PAINLEVEL_OUTOF10: 9
PAINLEVEL_OUTOF10: 7

## 2019-09-24 ASSESSMENT — PAIN DESCRIPTION - ORIENTATION
ORIENTATION: LOWER
ORIENTATION: UPPER;LEFT
ORIENTATION: MID;UPPER
ORIENTATION: LOWER

## 2019-09-24 ASSESSMENT — PAIN DESCRIPTION - ONSET
ONSET: ON-GOING
ONSET: ON-GOING
ONSET: SUDDEN
ONSET: ON-GOING
ONSET: SUDDEN
ONSET: ON-GOING

## 2019-09-24 ASSESSMENT — PAIN DESCRIPTION - FREQUENCY
FREQUENCY: CONTINUOUS

## 2019-09-24 NOTE — ED PROVIDER NOTES
[address]      Pt Name: Paul Harper  MRN: [id number]  [place] 1960  Date of evaluation: 9/23/2019  Provider: PARADISE Rivers    This patient was seen and evaluated by the attending physician Nicole Medellin, *. CHIEF COMPLAINT       Chief Complaint   Patient presents with    Tachycardia     pt to ED [facility] reports of racing heart beat. began around 1730    Chest Pain     began around 1730       HISTORY OF PRESENT ILLNESS  (Location/Symptom, Timing/Onset, Context/Setting, Quality, Duration, Modifying Factors, Severity.)   Paul Harper is a 60 y.o. male who presents to the emergency department with a complaint of heart racing and chest pain. Patient says he got a flu shot this afternoon, and then when after dinner he began to feel like his heart was racing, and it still that way now, with some associated shortness of breath. He says that soon after the symptoms started he began having chest pain, mostly at the midline, but also in the left chest.  He says he has a pacemaker, and he has had an episode earlier this year where his heart was racing and his cardiologist told him he probably shocked him, but the heart rate normalized and his symptoms past before this was needed. Patient said he had a heart catheterization done last year and he believes that was mostly normal, no stenting. He says he was feeling well today before the onset of the symptoms. Denies any bleeding. Says he takes warfarin regularly, no recent changes. Reports past history of pulmonary embolism. No other complaints. Nursing Notes were reviewed and I agree. REVIEW OF SYSTEMS    (2-9 systems for level 4, 10 or more for level 5)     Constitutional:  Negative for fever, chills, appetite change, fatigue and unexpected weight change. HENT:  Negative for congestion, ear pain, facial swelling, rhinorrhea, sinus pressure, sneezing, sore throat and trouble swallowing. epigastric tenderness. No distension, mass, rebound or guarding. Musculoskeletal:  Normal range of motion. No edema exhibited. Neurological:  Alert and oriented to person, place, and time. No cranial nerve deficit. Skin:  Skin is warm and dry. Not diaphoretic. Psychiatric:  Normal mood, affect, behavior, judgment and thought content. DIAGNOSTIC RESULTS     RADIOLOGY:       Interpretation per the Radiologist below, if available at the time of this note:    CT CHEST PULMONARY EMBOLISM W CONTRAST   Preliminary Result   No evidence of acute pulmonary embolism. Multiple clustered areas of pulmonary nodules are nonspecific. These may be   seen in setting of post infectious inflammatory process. Consider follow-up   examination in 3 months to document resolution. No focal consolidative pneumonia. No evidence of pneumothorax. No evidence   of pleural effusion. XR HIP LEFT (2-3 VIEWS)   Preliminary Result   1. No acute abnormality of the left hip or osseous pelvis. 2. Stable mild symmetric bilateral hip osteoarthritis. XR CHEST STANDARD (2 VW)   Final Result   No acute process.              LABS:  Labs Reviewed   CBC WITH AUTO DIFFERENTIAL - Abnormal; Notable for the following components:       Result Value    Neutrophils Absolute 8.7 (*)     Lymphocytes Absolute 0.7 (*)     All other components within normal limits    Narrative:     Performed at:  [address] [address]   Phone [phone]   BASIC METABOLIC PANEL W/ REFLEX TO MG FOR LOW K - Abnormal; Notable for the following components:    Glucose 133 (*)     Calcium 11.2 (*)     All other components within normal limits    Narrative:     Performed at:  [facility]  [address] [facility] [address] [facility] [address]   Phone [phone]   PROTIME-INR - Abnormal; Notable for the following components:    Protime 28.1 (*)     INR 2.46 (*) All other components within normal limits    Narrative:     Performed at:  [facility]  [address] [place] [address]   Phone [phone]   TROPONIN    Narrative:     Performed at:  [facility]  [address] [address]   Phone [phone]   LIPASE    Narrative:     Performed at:  [facility]  [address] [address]   Phone [phone]   TSH WITH REFLEX   BRAIN NATRIURETIC PEPTIDE       All other labs were within normal range or not returned as of this dictation. EMERGENCY DEPARTMENT COURSE and DIFFERENTIAL DIAGNOSIS/MDM:   Vitals:    Vitals:    09/23/19 2052 09/24/19 0001   BP: (!) 176/99 105/77   Pulse: 126 115   Resp:  11   Temp: 97.9 °F (36.6 °C)    TempSrc: Oral    SpO2: 94% 95%   Weight: 208 lb 12.4 oz (94.7 kg)    Height: 5' 6\" (1.676 m)        The patient's condition in the ED was stable, the patient was afebrile and nontoxic in appearance, and the patient's physical exam was unremarkable. Initial EKG showed sinus tachycardia with first-degree AV block and occasional PVCs, and a rate of 120. Repeat EKG showed a rate of 95, with suspicion for second-degree AV block, but on the cardiac monitor in the ED the patient was persistently tachycardic, with a rate that fluctuated around 120. Patient was not orthostatic. Lab results showed a normal troponin. INR is pending. CT angiogram showed no evidence of pulmonary embolism. Patient has a pacemaker, but with his persistent tachycardia, he would benefit from hospital admission for further observation and cardiology consultation. I consulted Dr. Eleno Foreman, on-call for the patient's family physician, and he agreed to admit the patient. PROCEDURES:  None    FINAL IMPRESSION      1. Tachycardia    2. Chest pain, unspecified type    3.  Palpitations          DISPOSITION/PLAN   DISPOSITION Admitted

## 2019-09-24 NOTE — PROGRESS NOTES
4 Eyes Skin Assessment     The patient is being assess for  Admission    I agree that 2 RN's have performed a thorough Head to Toe Skin Assessment on the patient. ALL assessment sites listed below have been assessed. Areas assessed by both nurses:   [x]   Head, Face, and Ears   [x]   Shoulders, Back, and Chest  [x]   Arms, Elbows, and Hands   [x]   Coccyx, Sacrum, and IschIum  [x]   Legs, Feet, and Heels        Does the Patient have Skin Breakdown?   No         Casimiro Prevention initiated:  No   Wound Care Orders initiated:  No      [facility] nurse consulted for Pressure Injury (Stage 3,4, Unstageable, DTI, NWPT, and Complex wounds), New and Established Ostomies:  NA      Nurse 1 eSignature: Electronically signed by Mary Cardozo RN on 9/24/19 at 1:19 AM    **SHARE this note so that the co-signing nurse is able to place an eSignature**    Nurse 2 eSignature: Electronically signed by Janet Curran RN on 9/24/19 at 4:03 AM

## 2019-09-24 NOTE — PROGRESS NOTES
Patient arrived to room 5258 via stretcher. Patient A/O. VSS. No signs of distress. Patient oriented to room and call light system. Will continue to monitor.

## 2019-09-25 ENCOUNTER — APPOINTMENT (OUTPATIENT)
Dept: GENERAL RADIOLOGY | Age: 59
DRG: 310 | End: 2019-09-25
Payer: COMMERCIAL

## 2019-09-25 LAB
A/G RATIO: 1.6 (ref 1.1–2.2)
ALBUMIN SERPL-MCNC: 3.6 G/DL (ref 3.4–5)
ALP BLD-CCNC: 88 U/L (ref 40–129)
ALT SERPL-CCNC: 15 U/L (ref 10–40)
ANION GAP SERPL CALCULATED.3IONS-SCNC: 10 MMOL/L (ref 3–16)
AST SERPL-CCNC: 12 U/L (ref 15–37)
BASOPHILS ABSOLUTE: 0 K/UL (ref 0–0.2)
BASOPHILS RELATIVE PERCENT: 0.7 %
BILIRUB SERPL-MCNC: 0.4 MG/DL (ref 0–1)
BILIRUBIN URINE: NEGATIVE
BLOOD, URINE: NEGATIVE
BUN BLDV-MCNC: 12 MG/DL (ref 7–20)
CALCIUM SERPL-MCNC: 9.9 MG/DL (ref 8.3–10.6)
CHLORIDE BLD-SCNC: 107 MMOL/L (ref 99–110)
CLARITY: CLEAR
CO2: 24 MMOL/L (ref 21–32)
COLOR: YELLOW
CREAT SERPL-MCNC: 0.8 MG/DL (ref 0.8–1.3)
EOSINOPHILS ABSOLUTE: 0.4 K/UL (ref 0–0.6)
EOSINOPHILS RELATIVE PERCENT: 7.2 %
GFR AFRICAN AMERICAN: >60
GFR NON-AFRICAN AMERICAN: >60
GLOBULIN: 2.3 G/DL
GLUCOSE BLD-MCNC: 116 MG/DL (ref 70–99)
GLUCOSE URINE: NEGATIVE MG/DL
HCT VFR BLD CALC: 40.8 % (ref 40.5–52.5)
HEMOGLOBIN: 14 G/DL (ref 13.5–17.5)
INR BLD: 2.54 (ref 0.86–1.14)
KETONES, URINE: NEGATIVE MG/DL
LEUKOCYTE ESTERASE, URINE: NEGATIVE
LV EF: 50 %
LVEF MODALITY: NORMAL
LYMPHOCYTES ABSOLUTE: 1.5 K/UL (ref 1–5.1)
LYMPHOCYTES RELATIVE PERCENT: 29.9 %
MCH RBC QN AUTO: 31.8 PG (ref 26–34)
MCHC RBC AUTO-ENTMCNC: 34.3 G/DL (ref 31–36)
MCV RBC AUTO: 92.6 FL (ref 80–100)
MICROSCOPIC EXAMINATION: NORMAL
MONOCYTES ABSOLUTE: 0.4 K/UL (ref 0–1.3)
MONOCYTES RELATIVE PERCENT: 8.1 %
NEUTROPHILS ABSOLUTE: 2.7 K/UL (ref 1.7–7.7)
NEUTROPHILS RELATIVE PERCENT: 54.1 %
NITRITE, URINE: NEGATIVE
PDW BLD-RTO: 13.7 % (ref 12.4–15.4)
PH UA: 7.5 (ref 5–8)
PLATELET # BLD: 149 K/UL (ref 135–450)
PMV BLD AUTO: 7.5 FL (ref 5–10.5)
POTASSIUM SERPL-SCNC: 3.7 MMOL/L (ref 3.5–5.1)
PROTEIN UA: NEGATIVE MG/DL
PROTHROMBIN TIME: 28.9 SEC (ref 9.8–13)
RBC # BLD: 4.41 M/UL (ref 4.2–5.9)
SODIUM BLD-SCNC: 141 MMOL/L (ref 136–145)
SPECIFIC GRAVITY UA: 1.01 (ref 1–1.03)
TOTAL PROTEIN: 5.9 G/DL (ref 6.4–8.2)
URINE TYPE: NORMAL
UROBILINOGEN, URINE: 0.2 E.U./DL
WBC # BLD: 4.9 K/UL (ref 4–11)

## 2019-09-25 PROCEDURE — G0378 HOSPITAL OBSERVATION PER HR: HCPCS

## 2019-09-25 PROCEDURE — 6360000002 HC RX W HCPCS

## 2019-09-25 PROCEDURE — 6370000000 HC RX 637 (ALT 250 FOR IP): Performed by: INTERNAL MEDICINE

## 2019-09-25 PROCEDURE — 1200000000 HC SEMI PRIVATE

## 2019-09-25 PROCEDURE — 6360000002 HC RX W HCPCS: Performed by: INTERNAL MEDICINE

## 2019-09-25 PROCEDURE — 94760 N-INVAS EAR/PLS OXIMETRY 1: CPT

## 2019-09-25 PROCEDURE — 36415 COLL VENOUS BLD VENIPUNCTURE: CPT

## 2019-09-25 PROCEDURE — 96375 TX/PRO/DX INJ NEW DRUG ADDON: CPT

## 2019-09-25 PROCEDURE — 94640 AIRWAY INHALATION TREATMENT: CPT

## 2019-09-25 PROCEDURE — 85610 PROTHROMBIN TIME: CPT

## 2019-09-25 PROCEDURE — 99233 SBSQ HOSP IP/OBS HIGH 50: CPT | Performed by: INTERNAL MEDICINE

## 2019-09-25 PROCEDURE — 80053 COMPREHEN METABOLIC PANEL: CPT

## 2019-09-25 PROCEDURE — 85025 COMPLETE CBC W/AUTO DIFF WBC: CPT

## 2019-09-25 PROCEDURE — 71045 X-RAY EXAM CHEST 1 VIEW: CPT

## 2019-09-25 PROCEDURE — 6360000004 HC RX CONTRAST MEDICATION: Performed by: INTERNAL MEDICINE

## 2019-09-25 PROCEDURE — 99232 SBSQ HOSP IP/OBS MODERATE 35: CPT | Performed by: NURSE PRACTITIONER

## 2019-09-25 PROCEDURE — 96376 TX/PRO/DX INJ SAME DRUG ADON: CPT

## 2019-09-25 PROCEDURE — C8929 TTE W OR WO FOL WCON,DOPPLER: HCPCS

## 2019-09-25 RX ORDER — WARFARIN SODIUM 5 MG/1
5 TABLET ORAL
Status: COMPLETED | OUTPATIENT
Start: 2019-09-25 | End: 2019-09-25

## 2019-09-25 RX ORDER — CETIRIZINE HYDROCHLORIDE 10 MG/1
10 TABLET ORAL DAILY
Status: DISCONTINUED | OUTPATIENT
Start: 2019-09-25 | End: 2019-09-26 | Stop reason: HOSPADM

## 2019-09-25 RX ORDER — METHOCARBAMOL 500 MG/1
500 TABLET, FILM COATED ORAL EVERY 8 HOURS PRN
Status: DISCONTINUED | OUTPATIENT
Start: 2019-09-25 | End: 2019-09-26 | Stop reason: HOSPADM

## 2019-09-25 RX ORDER — DICYCLOMINE HYDROCHLORIDE 10 MG/1
10 CAPSULE ORAL 4 TIMES DAILY PRN
Status: DISCONTINUED | OUTPATIENT
Start: 2019-09-25 | End: 2019-09-26 | Stop reason: HOSPADM

## 2019-09-25 RX ORDER — FEXOFENADINE HCL 180 MG/1
180 TABLET ORAL DAILY
Status: DISCONTINUED | OUTPATIENT
Start: 2019-09-25 | End: 2019-09-25 | Stop reason: CLARIF

## 2019-09-25 RX ORDER — FLAVOXATE HYDROCHLORIDE 100 MG/1
100 TABLET ORAL 3 TIMES DAILY PRN
Status: DISCONTINUED | OUTPATIENT
Start: 2019-09-25 | End: 2019-09-26 | Stop reason: HOSPADM

## 2019-09-25 RX ORDER — POLYETHYLENE GLYCOL 3350 17 G/17G
17 POWDER, FOR SOLUTION ORAL DAILY
Status: DISCONTINUED | OUTPATIENT
Start: 2019-09-25 | End: 2019-09-26 | Stop reason: HOSPADM

## 2019-09-25 RX ORDER — ALBUTEROL SULFATE 2.5 MG/3ML
SOLUTION RESPIRATORY (INHALATION)
Status: COMPLETED
Start: 2019-09-25 | End: 2019-09-25

## 2019-09-25 RX ORDER — ONDANSETRON 2 MG/ML
4 INJECTION INTRAMUSCULAR; INTRAVENOUS EVERY 6 HOURS PRN
Status: DISCONTINUED | OUTPATIENT
Start: 2019-09-25 | End: 2019-09-26 | Stop reason: HOSPADM

## 2019-09-25 RX ORDER — AZELASTINE 1 MG/ML
2 SPRAY, METERED NASAL 2 TIMES DAILY
Status: DISCONTINUED | OUTPATIENT
Start: 2019-09-25 | End: 2019-09-26 | Stop reason: HOSPADM

## 2019-09-25 RX ORDER — POTASSIUM CHLORIDE AND SODIUM CHLORIDE 450; 150 MG/100ML; MG/100ML
INJECTION, SOLUTION INTRAVENOUS CONTINUOUS
Status: DISCONTINUED | OUTPATIENT
Start: 2019-09-25 | End: 2019-09-26 | Stop reason: HOSPADM

## 2019-09-25 RX ORDER — PHENAZOPYRIDINE HYDROCHLORIDE 100 MG/1
100 TABLET, FILM COATED ORAL 3 TIMES DAILY PRN
Status: DISCONTINUED | OUTPATIENT
Start: 2019-09-25 | End: 2019-09-26 | Stop reason: HOSPADM

## 2019-09-25 RX ADMIN — ONDANSETRON 4 MG: 2 INJECTION INTRAMUSCULAR; INTRAVENOUS at 10:55

## 2019-09-25 RX ADMIN — ALBUTEROL SULFATE 2.5 MG: 2.5 SOLUTION RESPIRATORY (INHALATION) at 02:31

## 2019-09-25 RX ADMIN — TAMSULOSIN HYDROCHLORIDE 0.4 MG: 0.4 CAPSULE ORAL at 21:32

## 2019-09-25 RX ADMIN — PERFLUTREN 1.5 ML: 6.52 INJECTION, SUSPENSION INTRAVENOUS at 09:27

## 2019-09-25 RX ADMIN — POTASSIUM CHLORIDE AND SODIUM CHLORIDE: 450; 150 INJECTION, SOLUTION INTRAVENOUS at 08:45

## 2019-09-25 RX ADMIN — SACUBITRIL AND VALSARTAN 1 TABLET: 24; 26 TABLET, FILM COATED ORAL at 19:58

## 2019-09-25 RX ADMIN — METOPROLOL SUCCINATE 25 MG: 25 TABLET, EXTENDED RELEASE ORAL at 08:46

## 2019-09-25 RX ADMIN — ZOLPIDEM TARTRATE 10 MG: 5 TABLET ORAL at 21:32

## 2019-09-25 RX ADMIN — POLYETHYLENE GLYCOL 3350 17 G: 17 POWDER, FOR SOLUTION ORAL at 08:45

## 2019-09-25 RX ADMIN — HYDROCORTISONE 2.5%: 25 CREAM TOPICAL at 08:47

## 2019-09-25 RX ADMIN — SACUBITRIL AND VALSARTAN 2 TABLET: 24; 26 TABLET, FILM COATED ORAL at 08:45

## 2019-09-25 RX ADMIN — ASPIRIN 81 MG 81 MG: 81 TABLET ORAL at 08:46

## 2019-09-25 RX ADMIN — CETIRIZINE HYDROCHLORIDE 10 MG: 10 TABLET, FILM COATED ORAL at 08:46

## 2019-09-25 RX ADMIN — OXYCODONE HYDROCHLORIDE AND ACETAMINOPHEN 1 TABLET: 5; 325 TABLET ORAL at 21:42

## 2019-09-25 RX ADMIN — PRAVASTATIN SODIUM 40 MG: 40 TABLET ORAL at 19:58

## 2019-09-25 RX ADMIN — AZELASTINE HYDROCHLORIDE 2 SPRAY: 137 SPRAY, METERED NASAL at 12:50

## 2019-09-25 RX ADMIN — TAMSULOSIN HYDROCHLORIDE 0.4 MG: 0.4 CAPSULE ORAL at 08:46

## 2019-09-25 RX ADMIN — HYDROMORPHONE HYDROCHLORIDE 1 MG: 1 INJECTION, SOLUTION INTRAMUSCULAR; INTRAVENOUS; SUBCUTANEOUS at 07:01

## 2019-09-25 RX ADMIN — WARFARIN SODIUM 5 MG: 5 TABLET ORAL at 17:33

## 2019-09-25 RX ADMIN — METOPROLOL SUCCINATE 25 MG: 25 TABLET, EXTENDED RELEASE ORAL at 21:32

## 2019-09-25 RX ADMIN — ALBUTEROL SULFATE 2.5 MG: 2.5 SOLUTION RESPIRATORY (INHALATION) at 12:22

## 2019-09-25 RX ADMIN — PREGABALIN 150 MG: 75 CAPSULE ORAL at 21:32

## 2019-09-25 RX ADMIN — PREGABALIN 75 MG: 75 CAPSULE ORAL at 08:46

## 2019-09-25 ASSESSMENT — PAIN DESCRIPTION - ORIENTATION
ORIENTATION: UPPER;LEFT
ORIENTATION: MID
ORIENTATION: LOWER
ORIENTATION: MID

## 2019-09-25 ASSESSMENT — PAIN DESCRIPTION - PROGRESSION
CLINICAL_PROGRESSION: NOT CHANGED

## 2019-09-25 ASSESSMENT — PAIN DESCRIPTION - PAIN TYPE
TYPE: ACUTE PAIN

## 2019-09-25 ASSESSMENT — PAIN SCALES - GENERAL
PAINLEVEL_OUTOF10: 6
PAINLEVEL_OUTOF10: 6
PAINLEVEL_OUTOF10: 0
PAINLEVEL_OUTOF10: 6
PAINLEVEL_OUTOF10: 6
PAINLEVEL_OUTOF10: 8

## 2019-09-25 ASSESSMENT — PAIN DESCRIPTION - ONSET
ONSET: GRADUAL
ONSET: SUDDEN
ONSET: ON-GOING
ONSET: SUDDEN

## 2019-09-25 ASSESSMENT — PAIN - FUNCTIONAL ASSESSMENT
PAIN_FUNCTIONAL_ASSESSMENT: ACTIVITIES ARE NOT PREVENTED

## 2019-09-25 ASSESSMENT — PAIN DESCRIPTION - FREQUENCY
FREQUENCY: CONTINUOUS
FREQUENCY: INTERMITTENT
FREQUENCY: CONTINUOUS
FREQUENCY: INTERMITTENT

## 2019-09-25 ASSESSMENT — PAIN DESCRIPTION - LOCATION
LOCATION: BACK

## 2019-09-25 ASSESSMENT — PAIN DESCRIPTION - DESCRIPTORS
DESCRIPTORS: SHARP
DESCRIPTORS: SHARP
DESCRIPTORS: ACHING
DESCRIPTORS: ACHING

## 2019-09-25 NOTE — PROGRESS NOTES
[address]   Daily Progress Note      Admit Date:  9/23/2019    CC: heart racing    HPI:   Francisco J Barfield is a [age] [facility] y.o. male with PMH non ischemic CM, S/P ICd which is now turned off at his request, possible AF (hx of taking multaq-off X 5 yrs, hx of antiarrhythmic bro effects), bronchiolitis, PE, GERD, HLP, HTN, kidney stones, sacroilitis. Presented to [facility] with \"heart racing. \" Reported it to be 140-150bpm. It was associated with mid sternal chest pressure and mild SOB. Aggravating factor was a flu shot earlier in the day. In the ED he was found to be sinus tach HR 120s. Troponis neg X3. Today he has new onset right flank pain. Improved with dilaudid. Related to kidney stone. His HR is now controlled. He is ambulating in room and tolerates that without SOB, CP or tachycardia. Review of Systems:   General: Denies fever, chills, fatigue, weakness  Skin: Denies skin changes, rash, itching, lesions.   HEENT: Denies headache, dizziness, vision changes, nosebleeds, sore throat, nasal drainage  RESP: Denies cough, sputum, dyspnea, wheeze, snoring  CARD: Denies palpitations,  Murmur, chest pain  GI:Denies nausea, vomiting, heartburn, loss of appetite, change in bowels  : Denies frequency, pain, incontinence, polyuria  VASC: Denies claudication, leg cramps, clots  MUSC/SKEL: Denies pain, stiffness, arthritis  PSYCH: Denies anxiety, depression, stress  NEURO: Denies numbness, tingling, weakness,change in mood or memory  HEME: Denies abn bruising, bleeding, anemia  ENDO: Denies intolerance to heat, cold, excessive thirst or hunger, hx thyroid disease    Objective:   BP (!) 143/79   Pulse 67   Temp 97.7 °F (36.5 °C) (Oral)   Resp 18   Ht 5' 6\" (1.676 m)   Wt 205 lb 11 oz (93.3 kg)   SpO2 93%   BMI 33.20 kg/m²        Intake/Output Summary (Last 24 hours) at 9/25/2019 1223  Last data filed at 9/25/2019 0845  Gross per 24 hour   Intake 860 ml   Output 945 ml   Net -85 ml       WEIGHT:Admit Weight: 208 lb 12.4 oz (94.7 kg)         Today  Weight: 205 lb 11 oz (93.3 kg)   DRY WEIGHT:  Wt Readings from Last 3 Encounters:   09/25/19 205 lb 11 oz (93.3 kg)   09/23/19 204 lb (92.5 kg)   09/06/19 204 lb (92.5 kg)       Physical Exam:  GEN: Appears well, no acute distress  SKIN: Pink, warm, dry. Nails without clubbing. HEENT: PERRLA. Normocephalic, atraumatic. Neck supple. No adenopathy. LUNG: AP diameter normal. Crackles bilateral bases. Respiratory effort normal.  HEART: S1S2 A/R. No JVD. No carotid bruit. No murmur, rub or gallop. ABD: Soft, nontender. +BS X 4 quads. No hepatomegaly. EXT: Radial and pedal pulses 2+ and symmetric. Without varicosities. No edema. MUSCSKEL: Good ROM X4 extremities. No deformity. NEURO: A/O X3. Calm and cooperative. Telemetry: NSR HR 76    Medications:    warfarin  5 mg Oral Once    polyethylene glycol  17 g Oral Daily    cetirizine  10 mg Oral Daily    azelastine  2 spray Each Nostril BID    aspirin  81 mg Oral Daily    metoprolol succinate  25 mg Oral BID    tamsulosin  0.4 mg Oral BID    zolpidem  10 mg Oral Nightly    pregabalin  75 mg Oral QAM    pregabalin  150 mg Oral Nightly    sacubitril-valsartan  2 tablet Oral QAM    pravastatin  40 mg Oral Nightly    hydrocortisone   Rectal BID    warfarin (COUMADIN) daily dosing (placeholder)   Other RX Placeholder    sacubitril-valsartan  1 tablet Oral Nightly      0.45 % NaCl with KCl 20 mEq 75 mL/hr at 09/25/19 0845     flavoxate, dicyclomine, methocarbamol, phenazopyridine, ondansetron, albuterol, oxyCODONE-acetaminophen, polyethylene glycol, HYDROmorphone, HYDROmorphone    Lab Data: I have reviewed all labs below today.    CBC:   Recent Labs     09/23/19 2134 09/25/19  0528   WBC 10.5 4.9   HGB 16.4 14.0   HCT 47.1 40.8   MCV 92.7 92.6    149     BMP:   Recent Labs     09/23/19 2134 09/25/19  0528    141   K 4.1 3.7    107   CO2 23 24   BUN 12 12   CREATININE 1.0 0.8     GLUCOSE:

## 2019-09-25 NOTE — PROGRESS NOTES
Final    Methodology by Troponin T    WBC 09/25/2019 4.9  4.0 - 11.0 K/uL Final    RBC 09/25/2019 4.41  4.20 - 5.90 M/uL Final    Hemoglobin 09/25/2019 14.0  13.5 - 17.5 g/dL Final    Results confirmed, test repeated    Hematocrit 09/25/2019 40.8  40.5 - 52.5 % Final    MCV 09/25/2019 92.6  80.0 - 100.0 fL Final    MCH 09/25/2019 31.8  26.0 - 34.0 pg Final    MCHC 09/25/2019 34.3  31.0 - 36.0 g/dL Final    RDW 09/25/2019 13.7  12.4 - 15.4 % Final    Platelets 97/91/8821 149  135 - 450 K/uL Final    MPV 09/25/2019 7.5  5.0 - 10.5 fL Final    Neutrophils % 09/25/2019 54.1  % Final    Lymphocytes % 09/25/2019 29.9  % Final    Monocytes % 09/25/2019 8.1  % Final    Eosinophils % 09/25/2019 7.2  % Final    Basophils % 09/25/2019 0.7  % Final    Neutrophils Absolute 09/25/2019 2.7  1.7 - 7.7 K/uL Final    Lymphocytes Absolute 09/25/2019 1.5  1.0 - 5.1 K/uL Final    Monocytes Absolute 09/25/2019 0.4  0.0 - 1.3 K/uL Final    Eosinophils Absolute 09/25/2019 0.4  0.0 - 0.6 K/uL Final    Basophils Absolute 09/25/2019 0.0  0.0 - 0.2 K/uL Final    Sodium 09/25/2019 141  136 - 145 mmol/L Final    Potassium 09/25/2019 3.7  3.5 - 5.1 mmol/L Final    Chloride 09/25/2019 107  99 - 110 mmol/L Final    CO2 09/25/2019 24  21 - 32 mmol/L Final    Anion Gap 09/25/2019 10  3 - 16 Final    Glucose 09/25/2019 116* 70 - 99 mg/dL Final    BUN 09/25/2019 12  7 - 20 mg/dL Final    CREATININE 09/25/2019 0.8  0.8 - 1.3 mg/dL Final    GFR Non- 09/25/2019 >60  >60 Final    Comment: >60 mL/min/1.73m2 EGFR, calc. for ages 25 and older using the  MDRD formula (not corrected for weight), is valid for stable  renal function.  GFR  09/25/2019 >60  >60 Final    Comment: Chronic Kidney Disease: less than 60 ml/min/1.73 sq.m. Kidney Failure: less than 15 ml/min/1.73 sq.m. Results valid for patients 18 years and older.       Calcium 09/25/2019 9.9  8.3 - 10.6 mg/dL Final    Total

## 2019-09-26 VITALS
BODY MASS INDEX: 33.02 KG/M2 | DIASTOLIC BLOOD PRESSURE: 90 MMHG | OXYGEN SATURATION: 97 % | HEART RATE: 79 BPM | RESPIRATION RATE: 18 BRPM | WEIGHT: 205.47 LBS | HEIGHT: 66 IN | SYSTOLIC BLOOD PRESSURE: 126 MMHG | TEMPERATURE: 98.3 F

## 2019-09-26 LAB
ANION GAP SERPL CALCULATED.3IONS-SCNC: 10 MMOL/L (ref 3–16)
BUN BLDV-MCNC: 10 MG/DL (ref 7–20)
CALCIUM SERPL-MCNC: 9.9 MG/DL (ref 8.3–10.6)
CHLORIDE BLD-SCNC: 110 MMOL/L (ref 99–110)
CO2: 23 MMOL/L (ref 21–32)
CREAT SERPL-MCNC: 0.8 MG/DL (ref 0.8–1.3)
GFR AFRICAN AMERICAN: >60
GFR NON-AFRICAN AMERICAN: >60
GLUCOSE BLD-MCNC: 77 MG/DL (ref 70–99)
INR BLD: 2.27 (ref 0.86–1.14)
POTASSIUM SERPL-SCNC: 4.4 MMOL/L (ref 3.5–5.1)
PROTHROMBIN TIME: 25.9 SEC (ref 9.8–13)
SODIUM BLD-SCNC: 143 MMOL/L (ref 136–145)
URINE CULTURE, ROUTINE: NORMAL

## 2019-09-26 PROCEDURE — 6360000002 HC RX W HCPCS: Performed by: INTERNAL MEDICINE

## 2019-09-26 PROCEDURE — 6370000000 HC RX 637 (ALT 250 FOR IP): Performed by: INTERNAL MEDICINE

## 2019-09-26 PROCEDURE — G0378 HOSPITAL OBSERVATION PER HR: HCPCS

## 2019-09-26 PROCEDURE — 80048 BASIC METABOLIC PNL TOTAL CA: CPT

## 2019-09-26 PROCEDURE — 36415 COLL VENOUS BLD VENIPUNCTURE: CPT

## 2019-09-26 PROCEDURE — 85610 PROTHROMBIN TIME: CPT

## 2019-09-26 PROCEDURE — 94640 AIRWAY INHALATION TREATMENT: CPT

## 2019-09-26 PROCEDURE — 94760 N-INVAS EAR/PLS OXIMETRY 1: CPT

## 2019-09-26 PROCEDURE — 99239 HOSP IP/OBS DSCHRG MGMT >30: CPT | Performed by: INTERNAL MEDICINE

## 2019-09-26 RX ORDER — WARFARIN SODIUM 5 MG/1
5 TABLET ORAL
Status: DISCONTINUED | OUTPATIENT
Start: 2019-09-26 | End: 2019-09-26 | Stop reason: HOSPADM

## 2019-09-26 RX ADMIN — ASPIRIN 81 MG 81 MG: 81 TABLET ORAL at 09:29

## 2019-09-26 RX ADMIN — PREGABALIN 75 MG: 75 CAPSULE ORAL at 09:28

## 2019-09-26 RX ADMIN — SACUBITRIL AND VALSARTAN 2 TABLET: 24; 26 TABLET, FILM COATED ORAL at 09:42

## 2019-09-26 RX ADMIN — ALBUTEROL SULFATE 2.5 MG: 2.5 SOLUTION RESPIRATORY (INHALATION) at 08:57

## 2019-09-26 RX ADMIN — POTASSIUM CHLORIDE AND SODIUM CHLORIDE: 450; 150 INJECTION, SOLUTION INTRAVENOUS at 02:00

## 2019-09-26 RX ADMIN — AZELASTINE HYDROCHLORIDE 2 SPRAY: 137 SPRAY, METERED NASAL at 09:33

## 2019-09-26 ASSESSMENT — PAIN DESCRIPTION - LOCATION: LOCATION: FLANK

## 2019-09-26 ASSESSMENT — PAIN DESCRIPTION - PAIN TYPE: TYPE: CHRONIC PAIN

## 2019-09-26 ASSESSMENT — PAIN SCALES - GENERAL: PAINLEVEL_OUTOF10: 5

## 2019-09-26 ASSESSMENT — PAIN DESCRIPTION - ORIENTATION: ORIENTATION: LEFT

## 2019-09-26 ASSESSMENT — PAIN DESCRIPTION - DESCRIPTORS: DESCRIPTORS: SHARP

## 2019-09-26 ASSESSMENT — PAIN DESCRIPTION - FREQUENCY: FREQUENCY: INTERMITTENT

## 2019-09-26 NOTE — CARE COORDINATION
DC order noted. Chart reviewed-no hhc/dme/high cost meds ordered.      No needs identified    Grayson Osorio RN  Case management  [phone]

## 2019-09-26 NOTE — PROGRESS NOTES
Clinical Pharmacy Note  Warfarin Consult    Francisco J Barfield is a 60 y.o. male receiving warfarin managed by pharmacy. Patient being bridged with none. Warfarin Indication: Hx PE  Target INR range: 2-3   Dose prior to admission: 5mg qd except 2.5 mg tues and thurs    Current warfarin drug-drug interactions:     Recent Labs     09/23/19  2134 09/25/19  0528 09/26/19  0542   HGB 16.4 14.0  --    HCT 47.1 40.8  --    INR 2.46* 2.54* 2.27*       Assessment/Plan:    Warfarin 5 mg tonight. Daily PT/INR until stable within therapeutic range. Thank you for the consult. Will continue to follow.     Az Hill, PharmD, BCPS  9/26/2019 7:45 AM

## 2019-09-26 NOTE — PROGRESS NOTES
09/24/2019 <0.01  <0.01 ng/mL Final    Methodology by Troponin T    WBC 09/25/2019 4.9  4.0 - 11.0 K/uL Final    RBC 09/25/2019 4.41  4.20 - 5.90 M/uL Final    Hemoglobin 09/25/2019 14.0  13.5 - 17.5 g/dL Final    Results confirmed, test repeated    Hematocrit 09/25/2019 40.8  40.5 - 52.5 % Final    MCV 09/25/2019 92.6  80.0 - 100.0 fL Final    MCH 09/25/2019 31.8  26.0 - 34.0 pg Final    MCHC 09/25/2019 34.3  31.0 - 36.0 g/dL Final    RDW 09/25/2019 13.7  12.4 - 15.4 % Final    Platelets 26/10/2902 149  135 - 450 K/uL Final    MPV 09/25/2019 7.5  5.0 - 10.5 fL Final    Neutrophils % 09/25/2019 54.1  % Final    Lymphocytes % 09/25/2019 29.9  % Final    Monocytes % 09/25/2019 8.1  % Final    Eosinophils % 09/25/2019 7.2  % Final    Basophils % 09/25/2019 0.7  % Final    Neutrophils Absolute 09/25/2019 2.7  1.7 - 7.7 K/uL Final    Lymphocytes Absolute 09/25/2019 1.5  1.0 - 5.1 K/uL Final    Monocytes Absolute 09/25/2019 0.4  0.0 - 1.3 K/uL Final    Eosinophils Absolute 09/25/2019 0.4  0.0 - 0.6 K/uL Final    Basophils Absolute 09/25/2019 0.0  0.0 - 0.2 K/uL Final    Sodium 09/25/2019 141  136 - 145 mmol/L Final    Potassium 09/25/2019 3.7  3.5 - 5.1 mmol/L Final    Chloride 09/25/2019 107  99 - 110 mmol/L Final    CO2 09/25/2019 24  21 - 32 mmol/L Final    Anion Gap 09/25/2019 10  3 - 16 Final    Glucose 09/25/2019 116* 70 - 99 mg/dL Final    BUN 09/25/2019 12  7 - 20 mg/dL Final    CREATININE 09/25/2019 0.8  0.8 - 1.3 mg/dL Final    GFR Non- 09/25/2019 >60  >60 Final    Comment: >60 mL/min/1.73m2 EGFR, calc. for ages 25 and older using the  MDRD formula (not corrected for weight), is valid for stable  renal function.  GFR  09/25/2019 >60  >60 Final    Comment: Chronic Kidney Disease: less than 60 ml/min/1.73 sq.m. Kidney Failure: less than 15 ml/min/1.73 sq.m. Results valid for patients 18 years and older.       Calcium 09/25/2019 9.9  8.3 -

## 2019-09-27 NOTE — DISCHARGE SUMMARY
takes warfarin  regularly. He has a cardiomyopathy for which he sees Dr. Brandie Cortse with an  ejection fraction in the range of about 45%. Please see the HPI for  further details. He was admitted. Serial troponins were negative. He  was seen in the Cardiology consultation with Dr. Brandie Cortes. Dr. Brandie Cortes  recommended echocardiogram which is performed and it did show an  ejection fraction at this time of 50%. Dr. Brandie Cortes did not want to  considering any further antiarrhythmic therapy due to his multitude of  side effect symptoms at least from the patient's history. He was  already anticoagulated and Dr. Brandie Cortes recommended that continued. The  patient also complained of what he called excruciating left flank pain  which is most likely related to recurrent renal stones. His creatinine  remained normal.  He had no gross hematuria. His urine flow was  adequate. He is maintained on Flomax given IV fluids and improved. He  also continued to complain with his left hip sensitivity which is more  sensitivity over the skin. No rash was noted. X-rays again done as an  outpatient showed mild arthritic change. He also had a CTA of the chest  on the way in. It was negative for pulmonary embolism. Clinically, he  has stabilized and is being discharged to home today.   He will resume  his maintenance medications at home including MiraLax one pack daily,  Allegra 180 mg daily, Percocet 325 q.8 as needed for flank pain,  Lidoderm patch, we told him he can apply this for 12 hours a day for the  left hip pain assuming insurance approves it, zolpidem 10 mg at bedtime,  methocarbamol 500 q.8 p.r.n. back spasm, albuterol nebulizer q.4 p.r.n.,  Zofran 4 mg p.r.n. nausea, warfarin 5 mg daily except 2.5 on Tuesday and  Thursday directed by the [facility], Prudence Moss 24/26 two  tablets in the morning, one in the evening, pravastatin 40 mg daily,  Lyrica 75 one in the morning and two at night, aspirin 81 mg daily,  Urispas 100 mg 3x a day as needed for urinary spasm, Dexilant 30 mg  daily, metoprolol XL 25 daily, Flomax 0.4 mg b.i.d., Bentyl 10 mg q.i.d.  p.r.n. spasm, Astelin nasal spray, and vitamin D 50,000 units once a  week. Told to see Dr. Rosita Luque in follow up per his regular visit. No  acute follow up is needed per Dr. Rosita Luque.     condit on dc---stable     Jimmie Snider MD    D: 09/26/2019 9:39:59       T: 09/26/2019 19:58:00     [email or web address]  Job#: [id number]     Doc#: [id number]    CC:

## 2019-10-02 ENCOUNTER — ANTI-COAG VISIT (OUTPATIENT)
Dept: PHARMACY | Age: 59
End: 2019-10-02
Payer: COMMERCIAL

## 2019-10-02 DIAGNOSIS — I26.99 OTHER PULMONARY EMBOLISM WITHOUT ACUTE COR PULMONALE, UNSPECIFIED CHRONICITY (HCC): ICD-10-CM

## 2019-10-02 LAB — INTERNATIONAL NORMALIZATION RATIO, POC: 2.3

## 2019-10-02 PROCEDURE — 85610 PROTHROMBIN TIME: CPT

## 2019-10-02 PROCEDURE — 99211 OFF/OP EST MAY X REQ PHY/QHP: CPT

## 2019-10-15 ENCOUNTER — TELEPHONE (OUTPATIENT)
Dept: CARDIOLOGY CLINIC | Age: 59
End: 2019-10-15

## 2019-10-18 RX ORDER — TAMSULOSIN HYDROCHLORIDE 0.4 MG/1
CAPSULE ORAL
Qty: 180 CAPSULE | Refills: 0 | Status: SHIPPED | OUTPATIENT
Start: 2019-10-18 | End: 2019-10-23 | Stop reason: SDUPTHER

## 2019-10-22 ENCOUNTER — NURSE ONLY (OUTPATIENT)
Dept: CARDIOLOGY CLINIC | Age: 59
End: 2019-10-22
Payer: COMMERCIAL

## 2019-10-22 DIAGNOSIS — I50.22 CHRONIC SYSTOLIC CHF (CONGESTIVE HEART FAILURE), NYHA CLASS 2 (HCC): ICD-10-CM

## 2019-10-22 DIAGNOSIS — Z95.810 BIVENTRICULAR ICD (IMPLANTABLE CARDIOVERTER-DEFIBRILLATOR) IN PLACE: ICD-10-CM

## 2019-10-22 DIAGNOSIS — I42.9 CARDIOMYOPATHY, UNSPECIFIED TYPE (HCC): ICD-10-CM

## 2019-10-22 PROCEDURE — 93296 REM INTERROG EVL PM/IDS: CPT | Performed by: INTERNAL MEDICINE

## 2019-10-22 PROCEDURE — 93297 REM INTERROG DEV EVAL ICPMS: CPT | Performed by: INTERNAL MEDICINE

## 2019-10-22 PROCEDURE — 93295 DEV INTERROG REMOTE 1/2/MLT: CPT | Performed by: INTERNAL MEDICINE

## 2019-10-24 ENCOUNTER — TELEPHONE (OUTPATIENT)
Dept: PRIMARY CARE CLINIC | Age: 59
End: 2019-10-24

## 2019-10-25 ENCOUNTER — ANTI-COAG VISIT (OUTPATIENT)
Dept: PHARMACY | Age: 59
End: 2019-10-25
Payer: COMMERCIAL

## 2019-10-25 ENCOUNTER — TELEPHONE (OUTPATIENT)
Dept: PULMONOLOGY | Age: 59
End: 2019-10-25

## 2019-10-25 DIAGNOSIS — R06.00 DYSPNEA, UNSPECIFIED TYPE: Primary | ICD-10-CM

## 2019-10-25 DIAGNOSIS — I26.99 OTHER PULMONARY EMBOLISM WITHOUT ACUTE COR PULMONALE, UNSPECIFIED CHRONICITY (HCC): ICD-10-CM

## 2019-10-25 LAB — INTERNATIONAL NORMALIZATION RATIO, POC: 1.7

## 2019-10-25 PROCEDURE — 85610 PROTHROMBIN TIME: CPT

## 2019-10-25 PROCEDURE — 99211 OFF/OP EST MAY X REQ PHY/QHP: CPT

## 2019-10-25 RX ORDER — ALBUTEROL SULFATE 2.5 MG/3ML
SOLUTION RESPIRATORY (INHALATION)
Qty: 360 ML | Refills: 2 | Status: SHIPPED | OUTPATIENT
Start: 2019-10-25 | End: 2020-03-24

## 2019-10-31 ENCOUNTER — TELEPHONE (OUTPATIENT)
Dept: PHARMACY | Age: 59
End: 2019-10-31

## 2019-11-04 ENCOUNTER — HOSPITAL ENCOUNTER (OUTPATIENT)
Dept: GENERAL RADIOLOGY | Age: 59
Discharge: HOME | End: 2019-11-04
Payer: COMMERCIAL

## 2019-11-04 ENCOUNTER — HOSPITAL ENCOUNTER (OUTPATIENT)
Age: 59
Discharge: HOME | End: 2019-11-04
Payer: COMMERCIAL

## 2019-11-04 DIAGNOSIS — N20.0 CALCULUS OF KIDNEY: ICD-10-CM

## 2019-11-04 PROCEDURE — 74018 RADEX ABDOMEN 1 VIEW: CPT

## 2019-11-06 ENCOUNTER — ANTI-COAG VISIT (OUTPATIENT)
Dept: PHARMACY | Age: 59
End: 2019-11-06
Payer: COMMERCIAL

## 2019-11-06 LAB — INTERNATIONAL NORMALIZATION RATIO, POC: 1.1

## 2019-11-06 PROCEDURE — 85610 PROTHROMBIN TIME: CPT

## 2019-11-13 ENCOUNTER — APPOINTMENT (OUTPATIENT)
Dept: PHARMACY | Age: 59
End: 2019-11-13
Payer: COMMERCIAL

## 2019-11-13 ENCOUNTER — OFFICE VISIT (OUTPATIENT)
Dept: CARDIOLOGY CLINIC | Age: 59
End: 2019-11-13
Payer: COMMERCIAL

## 2019-11-13 VITALS
OXYGEN SATURATION: 96 % | HEIGHT: 67 IN | DIASTOLIC BLOOD PRESSURE: 84 MMHG | SYSTOLIC BLOOD PRESSURE: 136 MMHG | WEIGHT: 206 LBS | BODY MASS INDEX: 32.33 KG/M2 | HEART RATE: 60 BPM

## 2019-11-13 DIAGNOSIS — Z86.711 HX PULMONARY EMBOLISM: ICD-10-CM

## 2019-11-13 DIAGNOSIS — E78.5 HYPERLIPIDEMIA, UNSPECIFIED HYPERLIPIDEMIA TYPE: ICD-10-CM

## 2019-11-13 DIAGNOSIS — I10 ESSENTIAL HYPERTENSION: ICD-10-CM

## 2019-11-13 DIAGNOSIS — I42.8 NICM (NONISCHEMIC CARDIOMYOPATHY) (HCC): ICD-10-CM

## 2019-11-13 DIAGNOSIS — R00.0 TACHYCARDIA: Primary | ICD-10-CM

## 2019-11-13 PROCEDURE — G8417 CALC BMI ABV UP PARAM F/U: HCPCS | Performed by: INTERNAL MEDICINE

## 2019-11-13 PROCEDURE — 3017F COLORECTAL CA SCREEN DOC REV: CPT | Performed by: INTERNAL MEDICINE

## 2019-11-13 PROCEDURE — G8427 DOCREV CUR MEDS BY ELIG CLIN: HCPCS | Performed by: INTERNAL MEDICINE

## 2019-11-13 PROCEDURE — 99214 OFFICE O/P EST MOD 30 MIN: CPT | Performed by: INTERNAL MEDICINE

## 2019-11-13 PROCEDURE — G8598 ASA/ANTIPLAT THER USED: HCPCS | Performed by: INTERNAL MEDICINE

## 2019-11-13 PROCEDURE — 1036F TOBACCO NON-USER: CPT | Performed by: INTERNAL MEDICINE

## 2019-11-13 PROCEDURE — G8482 FLU IMMUNIZE ORDER/ADMIN: HCPCS | Performed by: INTERNAL MEDICINE

## 2019-11-13 PROCEDURE — 93000 ELECTROCARDIOGRAM COMPLETE: CPT | Performed by: INTERNAL MEDICINE

## 2019-11-15 ENCOUNTER — ANTI-COAG VISIT (OUTPATIENT)
Dept: PHARMACY | Age: 59
End: 2019-11-15
Payer: COMMERCIAL

## 2019-11-15 DIAGNOSIS — I26.99 OTHER PULMONARY EMBOLISM WITHOUT ACUTE COR PULMONALE, UNSPECIFIED CHRONICITY (HCC): ICD-10-CM

## 2019-11-15 LAB — INTERNATIONAL NORMALIZATION RATIO, POC: 2.2

## 2019-11-15 PROCEDURE — 99211 OFF/OP EST MAY X REQ PHY/QHP: CPT

## 2019-11-15 PROCEDURE — 85610 PROTHROMBIN TIME: CPT

## 2019-11-22 ENCOUNTER — HOSPITAL ENCOUNTER (OUTPATIENT)
Dept: VASCULAR LAB | Age: 59
Discharge: HOME | End: 2019-11-22
Payer: COMMERCIAL

## 2019-11-22 ENCOUNTER — HOSPITAL ENCOUNTER (OUTPATIENT)
Age: 59
Discharge: HOME | End: 2019-11-22
Payer: COMMERCIAL

## 2019-11-22 ENCOUNTER — HOSPITAL ENCOUNTER (OUTPATIENT)
Dept: GENERAL RADIOLOGY | Age: 59
Discharge: HOME | End: 2019-11-22
Payer: COMMERCIAL

## 2019-11-22 DIAGNOSIS — M79.604 PAIN OF RIGHT LOWER EXTREMITY: ICD-10-CM

## 2019-11-22 DIAGNOSIS — R05.3 CHRONIC COUGH: ICD-10-CM

## 2019-11-22 PROCEDURE — 71046 X-RAY EXAM CHEST 2 VIEWS: CPT

## 2019-11-22 PROCEDURE — 93971 EXTREMITY STUDY: CPT

## 2019-12-04 ENCOUNTER — ANTI-COAG VISIT (OUTPATIENT)
Dept: PHARMACY | Age: 59
End: 2019-12-04
Payer: COMMERCIAL

## 2019-12-04 DIAGNOSIS — I26.99 OTHER PULMONARY EMBOLISM WITHOUT ACUTE COR PULMONALE, UNSPECIFIED CHRONICITY (HCC): ICD-10-CM

## 2019-12-04 LAB — INR BLD: 2

## 2019-12-04 PROCEDURE — 99211 OFF/OP EST MAY X REQ PHY/QHP: CPT

## 2019-12-04 PROCEDURE — 85610 PROTHROMBIN TIME: CPT

## 2019-12-05 ENCOUNTER — TELEPHONE (OUTPATIENT)
Dept: CARDIOLOGY CLINIC | Age: 59
End: 2019-12-05

## 2019-12-06 PROBLEM — R25.1 EPISODE OF SHAKING: Status: ACTIVE | Noted: 2019-12-06

## 2019-12-09 ENCOUNTER — OFFICE VISIT (OUTPATIENT)
Dept: CARDIOLOGY CLINIC | Age: 59
End: 2019-12-09
Payer: COMMERCIAL

## 2019-12-09 VITALS
WEIGHT: 209 LBS | SYSTOLIC BLOOD PRESSURE: 112 MMHG | HEART RATE: 84 BPM | OXYGEN SATURATION: 98 % | BODY MASS INDEX: 32.8 KG/M2 | DIASTOLIC BLOOD PRESSURE: 68 MMHG | HEIGHT: 67 IN

## 2019-12-09 DIAGNOSIS — I10 ESSENTIAL HYPERTENSION: ICD-10-CM

## 2019-12-09 DIAGNOSIS — E78.5 DYSLIPIDEMIA: ICD-10-CM

## 2019-12-09 DIAGNOSIS — Z95.810 BIVENTRICULAR ICD (IMPLANTABLE CARDIOVERTER-DEFIBRILLATOR) IN PLACE: ICD-10-CM

## 2019-12-09 DIAGNOSIS — I42.8 NICM (NONISCHEMIC CARDIOMYOPATHY) (HCC): Primary | ICD-10-CM

## 2019-12-09 DIAGNOSIS — I48.0 PAROXYSMAL ATRIAL FIBRILLATION (HCC): ICD-10-CM

## 2019-12-09 PROCEDURE — 99214 OFFICE O/P EST MOD 30 MIN: CPT | Performed by: NURSE PRACTITIONER

## 2019-12-09 PROCEDURE — G8417 CALC BMI ABV UP PARAM F/U: HCPCS | Performed by: NURSE PRACTITIONER

## 2019-12-09 PROCEDURE — 93000 ELECTROCARDIOGRAM COMPLETE: CPT | Performed by: NURSE PRACTITIONER

## 2019-12-09 PROCEDURE — 3017F COLORECTAL CA SCREEN DOC REV: CPT | Performed by: NURSE PRACTITIONER

## 2019-12-09 PROCEDURE — G8482 FLU IMMUNIZE ORDER/ADMIN: HCPCS | Performed by: NURSE PRACTITIONER

## 2019-12-09 PROCEDURE — 1036F TOBACCO NON-USER: CPT | Performed by: NURSE PRACTITIONER

## 2019-12-09 PROCEDURE — G8427 DOCREV CUR MEDS BY ELIG CLIN: HCPCS | Performed by: NURSE PRACTITIONER

## 2019-12-09 PROCEDURE — G8598 ASA/ANTIPLAT THER USED: HCPCS | Performed by: NURSE PRACTITIONER

## 2019-12-09 RX ORDER — FUROSEMIDE 20 MG/1
20 TABLET ORAL DAILY PRN
Qty: 30 TABLET | Refills: 1 | Status: SHIPPED | OUTPATIENT
Start: 2019-12-09 | End: 2021-03-02 | Stop reason: SDUPTHER

## 2019-12-17 ENCOUNTER — TELEPHONE (OUTPATIENT)
Dept: CARDIOLOGY CLINIC | Age: 59
End: 2019-12-17

## 2019-12-20 ENCOUNTER — ANTI-COAG VISIT (OUTPATIENT)
Dept: PHARMACY | Age: 59
End: 2019-12-20
Payer: COMMERCIAL

## 2019-12-20 DIAGNOSIS — I26.99 OTHER PULMONARY EMBOLISM WITHOUT ACUTE COR PULMONALE, UNSPECIFIED CHRONICITY (HCC): ICD-10-CM

## 2019-12-20 LAB — INR BLD: 4

## 2019-12-20 PROCEDURE — 85610 PROTHROMBIN TIME: CPT

## 2019-12-20 PROCEDURE — 99211 OFF/OP EST MAY X REQ PHY/QHP: CPT

## 2019-12-23 ENCOUNTER — ANTI-COAG VISIT (OUTPATIENT)
Dept: PHARMACY | Age: 59
End: 2019-12-23
Payer: COMMERCIAL

## 2019-12-23 DIAGNOSIS — I26.99 OTHER PULMONARY EMBOLISM WITHOUT ACUTE COR PULMONALE, UNSPECIFIED CHRONICITY (HCC): ICD-10-CM

## 2019-12-23 LAB — INR BLD: 1.9

## 2019-12-23 PROCEDURE — 85610 PROTHROMBIN TIME: CPT

## 2019-12-23 PROCEDURE — 99211 OFF/OP EST MAY X REQ PHY/QHP: CPT

## 2019-12-24 RX ORDER — WARFARIN SODIUM 5 MG/1
TABLET ORAL
Qty: 105 TABLET | Refills: 1 | Status: SHIPPED | OUTPATIENT
Start: 2019-12-24 | End: 2020-01-23

## 2019-12-27 ENCOUNTER — APPOINTMENT (OUTPATIENT)
Dept: PHARMACY | Age: 59
End: 2019-12-27
Payer: COMMERCIAL

## 2020-01-02 ENCOUNTER — OFFICE VISIT (OUTPATIENT)
Dept: PULMONOLOGY | Age: 60
End: 2020-01-02
Payer: COMMERCIAL

## 2020-01-02 ENCOUNTER — ANTI-COAG VISIT (OUTPATIENT)
Dept: PHARMACY | Age: 60
End: 2020-01-02
Payer: COMMERCIAL

## 2020-01-02 VITALS
WEIGHT: 209 LBS | HEART RATE: 70 BPM | RESPIRATION RATE: 16 BRPM | OXYGEN SATURATION: 96 % | BODY MASS INDEX: 33.59 KG/M2 | HEIGHT: 66 IN | DIASTOLIC BLOOD PRESSURE: 82 MMHG | TEMPERATURE: 97.7 F | SYSTOLIC BLOOD PRESSURE: 124 MMHG

## 2020-01-02 LAB — INTERNATIONAL NORMALIZATION RATIO, POC: 1.3

## 2020-01-02 PROCEDURE — G8427 DOCREV CUR MEDS BY ELIG CLIN: HCPCS | Performed by: INTERNAL MEDICINE

## 2020-01-02 PROCEDURE — 99211 OFF/OP EST MAY X REQ PHY/QHP: CPT

## 2020-01-02 PROCEDURE — 3023F SPIROM DOC REV: CPT | Performed by: INTERNAL MEDICINE

## 2020-01-02 PROCEDURE — 3017F COLORECTAL CA SCREEN DOC REV: CPT | Performed by: INTERNAL MEDICINE

## 2020-01-02 PROCEDURE — G8482 FLU IMMUNIZE ORDER/ADMIN: HCPCS | Performed by: INTERNAL MEDICINE

## 2020-01-02 PROCEDURE — 1036F TOBACCO NON-USER: CPT | Performed by: INTERNAL MEDICINE

## 2020-01-02 PROCEDURE — 99214 OFFICE O/P EST MOD 30 MIN: CPT | Performed by: INTERNAL MEDICINE

## 2020-01-02 PROCEDURE — G8926 SPIRO NO PERF OR DOC: HCPCS | Performed by: INTERNAL MEDICINE

## 2020-01-02 PROCEDURE — 85610 PROTHROMBIN TIME: CPT

## 2020-01-02 PROCEDURE — G8417 CALC BMI ABV UP PARAM F/U: HCPCS | Performed by: INTERNAL MEDICINE

## 2020-01-02 RX ORDER — SODIUM CHLORIDE FOR INHALATION 3 %
4 VIAL, NEBULIZER (ML) INHALATION EVERY 4 HOURS PRN
Qty: 360 ML | Refills: 5 | Status: SHIPPED | OUTPATIENT
Start: 2020-01-02 | End: 2020-09-01 | Stop reason: SDUPTHER

## 2020-01-02 NOTE — PROGRESS NOTES
Mr. Desiree Ash is a 60 y.o.  male with history of PE who presents today for anticoagulation monitoring and adjustment. Patient verifies current dosing regimen. Lab Results   Component Value Date    INR 1.3 01/02/2020    INR 1.90 12/23/2019    INR 4.00 12/20/2019     Patient called this morning and wanted to have his INR checked today before going out of town. Had steroid injections on 12/23/19 after he left the [facility] at which time his INR = 1.9    Coumadin dose: Take warfarin 7.5mg today 1/2/20 and Friday 1/3/20. Then Continue: Warfarin 5 mg daily except 2.5 mg every Tuesday and Thursday. Recheck INR on 1/13/2020 when patient comes back in town. Patient reminded to call the Anticoagulation Clinic with any medication changes. Patient given instructions utilizing the teach back method. After visit summary printed and reviewed with patient.     Warfarin dose updated

## 2020-01-06 ENCOUNTER — APPOINTMENT (OUTPATIENT)
Dept: PHARMACY | Age: 60
End: 2020-01-06
Payer: COMMERCIAL

## 2020-01-08 ENCOUNTER — TELEPHONE (OUTPATIENT)
Dept: PHARMACY | Age: 60
End: 2020-01-08

## 2020-01-13 ENCOUNTER — ANTI-COAG VISIT (OUTPATIENT)
Dept: PHARMACY | Age: 60
End: 2020-01-13
Payer: COMMERCIAL

## 2020-01-13 LAB — INTERNATIONAL NORMALIZATION RATIO, POC: 4.6

## 2020-01-13 PROCEDURE — 99211 OFF/OP EST MAY X REQ PHY/QHP: CPT

## 2020-01-13 PROCEDURE — 85610 PROTHROMBIN TIME: CPT

## 2020-01-13 NOTE — PROGRESS NOTES
Mr. Belinda Pizano is a 60 y.o.  male with history of PE who presents today for anticoagulation monitoring and adjustment. Patient verifies current dosing regimen  Patient denies s/s bleeding/bruising/swelling/SOB  No blood in urine or stool. No dietary changes. No changes in medication/OTC agents/Herbals. No change in alcohol use. No missed doses. No Procedures scheduled in the future at this time. Lab Results   Component Value Date    INR 4.6 01/13/2020    INR 1.3 01/02/2020    INR 1.90 12/23/2019       Pertinent findings: is on zmax 500mg for 2 weeks    Warfarin dosing: Hold 2 days and take 2.5mg wednesday  Then Continue: Warfarin 5 mg daily except 2.5 mg every Tuesday and Thursday. See next week  After visit summary printed and reviewed with patient. Medications reviewed and updated on home medication list: Yes  Warfarin dose updated on patient home medication list: Yes     Immunizations:   Most Recent Immunizations   Administered Date(s) Administered    Influenza, Quadv, IM, PF (6 mo and older Fluzone, Flulaval, Fluarix, and 3 yrs and older Afluria) 09/23/2019    Influenza, Quadv, Recombinant, IM PF (Flublok 18 yrs and older) 10/03/2018    Pneumococcal Conjugate 13-valent ([id number]) 09/11/2015    Pneumococcal Conjugate Vaccine 08/15/2017    Pneumococcal Polysaccharide ([id number]) 08/01/2007    Tdap (Boostrix, Adacel) 08/19/2014       Offered flu vaccine: If already received, documented in [facility].

## 2020-01-16 ENCOUNTER — TELEPHONE (OUTPATIENT)
Dept: PULMONOLOGY | Age: 60
End: 2020-01-16

## 2020-01-23 ENCOUNTER — ANTI-COAG VISIT (OUTPATIENT)
Dept: PHARMACY | Age: 60
End: 2020-01-23
Payer: COMMERCIAL

## 2020-01-23 LAB — INTERNATIONAL NORMALIZATION RATIO, POC: 5.1

## 2020-01-23 PROCEDURE — 99211 OFF/OP EST MAY X REQ PHY/QHP: CPT

## 2020-01-23 PROCEDURE — 85610 PROTHROMBIN TIME: CPT

## 2020-01-23 RX ORDER — WARFARIN SODIUM 5 MG/1
2.5 TABLET ORAL DAILY
Qty: 105 TABLET | Refills: 1
Start: 2020-01-23 | End: 2021-01-21

## 2020-01-23 NOTE — PROGRESS NOTES
Mr. Cheyenne Marte is a 60 y.o.  male with history of PE who presents today for anticoagulation monitoring and adjustment. Patient verifies current dosing regimen  Patient denies s/s bleeding/bruising/swelling/SOB  No blood in urine or stool. No dietary changes. No changes in medication/OTC agents/Herbals. No change in alcohol use. No missed doses. No Procedures scheduled in the future at this time. Lab Results   Component Value Date    INR 5.1 01/23/2020    INR 4.6 01/13/2020    INR 1.3 01/02/2020       Pertinent findings: Finished a Z-pack 1/11/20 - low interaction. Do not expect that to be the reason for the increased INR. He did start Lasix twice a week, so may have been retaining fluid which could potentially increase the INR. Warfarin dosing: Do not take warfarin today 1/23 and do not take warfarin tomorrow 1/24. THEN NEW DOSE: Warfarin 2.5 mg daily except 5 mg every Monday, Wednesday and Friday    After visit summary printed and reviewed with patient.       Medications reviewed and updated on home medication list: Yes  Warfarin dose updated on patient home medication list: Yes     Immunizations:   Most Recent Immunizations   Administered Date(s) Administered    Influenza, Quadv, IM, PF (6 mo and older Fluzone, Flulaval, Fluarix, and 3 yrs and older Afluria) 09/23/2019    Influenza, Quadv, Recombinant, IM PF (Flublok 18 yrs and older) 10/03/2018    Pneumococcal Conjugate 13-valent ([id number]) 09/11/2015    Pneumococcal Conjugate Vaccine 08/15/2017    Pneumococcal Polysaccharide ([id number]) 08/01/2007    Tdap (Boostrix, Adacel) 08/19/2014       Offered flu vaccine: current

## 2020-01-27 NOTE — PROGRESS NOTES
Biotronik transmission shows normal sensing and pacing function. See interrogation for more details.     TI is stable

## 2020-01-28 ENCOUNTER — NURSE ONLY (OUTPATIENT)
Dept: CARDIOLOGY CLINIC | Age: 60
End: 2020-01-28
Payer: COMMERCIAL

## 2020-01-28 PROCEDURE — 93295 DEV INTERROG REMOTE 1/2/MLT: CPT | Performed by: INTERNAL MEDICINE

## 2020-01-28 PROCEDURE — 93296 REM INTERROG EVL PM/IDS: CPT | Performed by: INTERNAL MEDICINE

## 2020-01-28 NOTE — TELEPHONE ENCOUNTER
Received another denial requesting office notes.   Refaxed current office notes with the Rox[facility] [address] Jare[address] per Sebastian from [facility]

## 2020-02-01 ENCOUNTER — APPOINTMENT (OUTPATIENT)
Dept: CT IMAGING | Age: 60
DRG: 194 | End: 2020-02-01
Payer: COMMERCIAL

## 2020-02-01 ENCOUNTER — APPOINTMENT (OUTPATIENT)
Dept: GENERAL RADIOLOGY | Age: 60
DRG: 194 | End: 2020-02-01
Payer: COMMERCIAL

## 2020-02-01 ENCOUNTER — HOSPITAL ENCOUNTER (INPATIENT)
Age: 60
LOS: 3 days | Discharge: HOME | DRG: 194 | End: 2020-02-04
Attending: EMERGENCY MEDICINE | Admitting: INTERNAL MEDICINE
Payer: COMMERCIAL

## 2020-02-01 LAB
A/G RATIO: 1.7 (ref 1.1–2.2)
ALBUMIN SERPL-MCNC: 4.3 G/DL (ref 3.4–5)
ALP BLD-CCNC: 131 U/L (ref 40–129)
ALT SERPL-CCNC: 25 U/L (ref 10–40)
ANION GAP SERPL CALCULATED.3IONS-SCNC: 11 MMOL/L (ref 3–16)
APTT: 44.4 SEC (ref 24.2–36.2)
AST SERPL-CCNC: 22 U/L (ref 15–37)
BASOPHILS ABSOLUTE: 0.1 K/UL (ref 0–0.2)
BASOPHILS RELATIVE PERCENT: 0.8 %
BILIRUB SERPL-MCNC: 0.7 MG/DL (ref 0–1)
BUN BLDV-MCNC: 7 MG/DL (ref 7–20)
CALCIUM SERPL-MCNC: 10.7 MG/DL (ref 8.3–10.6)
CHLORIDE BLD-SCNC: 106 MMOL/L (ref 99–110)
CO2: 24 MMOL/L (ref 21–32)
CREAT SERPL-MCNC: 0.8 MG/DL (ref 0.8–1.3)
EOSINOPHILS ABSOLUTE: 0.5 K/UL (ref 0–0.6)
EOSINOPHILS RELATIVE PERCENT: 6.6 %
GFR AFRICAN AMERICAN: >60
GFR NON-AFRICAN AMERICAN: >60
GLOBULIN: 2.6 G/DL
GLUCOSE BLD-MCNC: 110 MG/DL (ref 70–99)
HCT VFR BLD CALC: 48.1 % (ref 40.5–52.5)
HEMOGLOBIN: 16.7 G/DL (ref 13.5–17.5)
INR BLD: 1.82 (ref 0.86–1.14)
LYMPHOCYTES ABSOLUTE: 2.1 K/UL (ref 1–5.1)
LYMPHOCYTES RELATIVE PERCENT: 27.2 %
MCH RBC QN AUTO: 31.9 PG (ref 26–34)
MCHC RBC AUTO-ENTMCNC: 34.7 G/DL (ref 31–36)
MCV RBC AUTO: 91.9 FL (ref 80–100)
MONOCYTES ABSOLUTE: 0.6 K/UL (ref 0–1.3)
MONOCYTES RELATIVE PERCENT: 7.4 %
NEUTROPHILS ABSOLUTE: 4.6 K/UL (ref 1.7–7.7)
NEUTROPHILS RELATIVE PERCENT: 58 %
PDW BLD-RTO: 12.9 % (ref 12.4–15.4)
PLATELET # BLD: 181 K/UL (ref 135–450)
PMV BLD AUTO: 7.2 FL (ref 5–10.5)
POTASSIUM REFLEX MAGNESIUM: 3.9 MMOL/L (ref 3.5–5.1)
PRO-BNP: 66 PG/ML (ref 0–124)
PROTHROMBIN TIME: 21.2 SEC (ref 10–13.2)
RBC # BLD: 5.23 M/UL (ref 4.2–5.9)
SODIUM BLD-SCNC: 141 MMOL/L (ref 136–145)
TOTAL PROTEIN: 6.9 G/DL (ref 6.4–8.2)
TROPONIN: <0.01 NG/ML
TROPONIN: <0.01 NG/ML
WBC # BLD: 7.9 K/UL (ref 4–11)

## 2020-02-01 PROCEDURE — 96365 THER/PROPH/DIAG IV INF INIT: CPT

## 2020-02-01 PROCEDURE — 96367 TX/PROPH/DG ADDL SEQ IV INF: CPT

## 2020-02-01 PROCEDURE — 71046 X-RAY EXAM CHEST 2 VIEWS: CPT

## 2020-02-01 PROCEDURE — 2580000003 HC RX 258: Performed by: EMERGENCY MEDICINE

## 2020-02-01 PROCEDURE — G0378 HOSPITAL OBSERVATION PER HR: HCPCS

## 2020-02-01 PROCEDURE — 87040 BLOOD CULTURE FOR BACTERIA: CPT

## 2020-02-01 PROCEDURE — 71260 CT THORAX DX C+: CPT

## 2020-02-01 PROCEDURE — 94760 N-INVAS EAR/PLS OXIMETRY 1: CPT

## 2020-02-01 PROCEDURE — 96366 THER/PROPH/DIAG IV INF ADDON: CPT

## 2020-02-01 PROCEDURE — 93005 ELECTROCARDIOGRAM TRACING: CPT | Performed by: INTERNAL MEDICINE

## 2020-02-01 PROCEDURE — 99285 EMERGENCY DEPT VISIT HI MDM: CPT

## 2020-02-01 PROCEDURE — 6360000002 HC RX W HCPCS: Performed by: EMERGENCY MEDICINE

## 2020-02-01 PROCEDURE — 36415 COLL VENOUS BLD VENIPUNCTURE: CPT

## 2020-02-01 PROCEDURE — 6360000004 HC RX CONTRAST MEDICATION: Performed by: EMERGENCY MEDICINE

## 2020-02-01 PROCEDURE — 2580000003 HC RX 258: Performed by: INTERNAL MEDICINE

## 2020-02-01 PROCEDURE — 85610 PROTHROMBIN TIME: CPT

## 2020-02-01 PROCEDURE — 6370000000 HC RX 637 (ALT 250 FOR IP): Performed by: INTERNAL MEDICINE

## 2020-02-01 PROCEDURE — 85730 THROMBOPLASTIN TIME PARTIAL: CPT

## 2020-02-01 PROCEDURE — 6360000002 HC RX W HCPCS: Performed by: INTERNAL MEDICINE

## 2020-02-01 PROCEDURE — 80053 COMPREHEN METABOLIC PANEL: CPT

## 2020-02-01 PROCEDURE — 84484 ASSAY OF TROPONIN QUANT: CPT

## 2020-02-01 PROCEDURE — 70450 CT HEAD/BRAIN W/O DYE: CPT

## 2020-02-01 PROCEDURE — 85025 COMPLETE CBC W/AUTO DIFF WBC: CPT

## 2020-02-01 PROCEDURE — 96375 TX/PRO/DX INJ NEW DRUG ADDON: CPT

## 2020-02-01 PROCEDURE — 1200000000 HC SEMI PRIVATE

## 2020-02-01 PROCEDURE — 93005 ELECTROCARDIOGRAM TRACING: CPT | Performed by: EMERGENCY MEDICINE

## 2020-02-01 PROCEDURE — 83880 ASSAY OF NATRIURETIC PEPTIDE: CPT

## 2020-02-01 PROCEDURE — 94640 AIRWAY INHALATION TREATMENT: CPT

## 2020-02-01 RX ORDER — LEVOFLOXACIN 5 MG/ML
500 INJECTION, SOLUTION INTRAVENOUS ONCE
Status: COMPLETED | OUTPATIENT
Start: 2020-02-01 | End: 2020-02-01

## 2020-02-01 RX ORDER — OXYCODONE HYDROCHLORIDE AND ACETAMINOPHEN 5; 325 MG/1; MG/1
1 TABLET ORAL ONCE
Status: COMPLETED | OUTPATIENT
Start: 2020-02-01 | End: 2020-02-01

## 2020-02-01 RX ORDER — METOPROLOL SUCCINATE 25 MG/1
25 TABLET, EXTENDED RELEASE ORAL EVERY 12 HOURS
Status: DISCONTINUED | OUTPATIENT
Start: 2020-02-01 | End: 2020-02-04 | Stop reason: HOSPADM

## 2020-02-01 RX ORDER — PANTOPRAZOLE SODIUM 40 MG/1
40 TABLET, DELAYED RELEASE ORAL
Status: DISCONTINUED | OUTPATIENT
Start: 2020-02-02 | End: 2020-02-02

## 2020-02-01 RX ORDER — ASPIRIN 81 MG/1
81 TABLET, CHEWABLE ORAL DAILY
Status: DISCONTINUED | OUTPATIENT
Start: 2020-02-02 | End: 2020-02-04 | Stop reason: HOSPADM

## 2020-02-01 RX ORDER — ONDANSETRON 2 MG/ML
4 INJECTION INTRAMUSCULAR; INTRAVENOUS EVERY 6 HOURS PRN
Status: DISCONTINUED | OUTPATIENT
Start: 2020-02-01 | End: 2020-02-04 | Stop reason: HOSPADM

## 2020-02-01 RX ORDER — METHOCARBAMOL 500 MG/1
500 TABLET, FILM COATED ORAL 3 TIMES DAILY PRN
Status: DISCONTINUED | OUTPATIENT
Start: 2020-02-01 | End: 2020-02-04 | Stop reason: HOSPADM

## 2020-02-01 RX ORDER — LEVOFLOXACIN 5 MG/ML
500 INJECTION, SOLUTION INTRAVENOUS EVERY 24 HOURS
Status: DISCONTINUED | OUTPATIENT
Start: 2020-02-02 | End: 2020-02-04 | Stop reason: HOSPADM

## 2020-02-01 RX ORDER — PREGABALIN 75 MG/1
150 CAPSULE ORAL DAILY
Status: DISCONTINUED | OUTPATIENT
Start: 2020-02-01 | End: 2020-02-04 | Stop reason: HOSPADM

## 2020-02-01 RX ORDER — DIPHENHYDRAMINE HYDROCHLORIDE 50 MG/ML
25 INJECTION INTRAMUSCULAR; INTRAVENOUS ONCE
Status: COMPLETED | OUTPATIENT
Start: 2020-02-01 | End: 2020-02-01

## 2020-02-01 RX ORDER — ERGOCALCIFEROL 1.25 MG/1
50000 CAPSULE ORAL WEEKLY
Status: DISCONTINUED | OUTPATIENT
Start: 2020-02-03 | End: 2020-02-04 | Stop reason: HOSPADM

## 2020-02-01 RX ORDER — FUROSEMIDE 20 MG/1
20 TABLET ORAL DAILY PRN
Status: DISCONTINUED | OUTPATIENT
Start: 2020-02-01 | End: 2020-02-04 | Stop reason: HOSPADM

## 2020-02-01 RX ORDER — AZELASTINE 1 MG/ML
2 SPRAY, METERED NASAL 2 TIMES DAILY
Status: DISCONTINUED | OUTPATIENT
Start: 2020-02-01 | End: 2020-02-04 | Stop reason: HOSPADM

## 2020-02-01 RX ORDER — ONDANSETRON 2 MG/ML
4 INJECTION INTRAMUSCULAR; INTRAVENOUS ONCE
Status: COMPLETED | OUTPATIENT
Start: 2020-02-01 | End: 2020-02-01

## 2020-02-01 RX ORDER — PREGABALIN 25 MG/1
75 CAPSULE ORAL
Status: DISCONTINUED | OUTPATIENT
Start: 2020-02-02 | End: 2020-02-04 | Stop reason: HOSPADM

## 2020-02-01 RX ORDER — ZOLPIDEM TARTRATE 5 MG/1
10 TABLET ORAL NIGHTLY PRN
Status: DISCONTINUED | OUTPATIENT
Start: 2020-02-01 | End: 2020-02-04 | Stop reason: HOSPADM

## 2020-02-01 RX ORDER — WARFARIN SODIUM 2.5 MG/1
2.5 TABLET ORAL
Status: COMPLETED | OUTPATIENT
Start: 2020-02-01 | End: 2020-02-01

## 2020-02-01 RX ORDER — POLYETHYLENE GLYCOL 3350 17 G/17G
17 POWDER, FOR SOLUTION ORAL DAILY
Status: DISCONTINUED | OUTPATIENT
Start: 2020-02-01 | End: 2020-02-04 | Stop reason: HOSPADM

## 2020-02-01 RX ORDER — ALBUTEROL SULFATE 2.5 MG/3ML
2.5 SOLUTION RESPIRATORY (INHALATION) EVERY 4 HOURS PRN
Status: DISCONTINUED | OUTPATIENT
Start: 2020-02-01 | End: 2020-02-04 | Stop reason: HOSPADM

## 2020-02-01 RX ORDER — TAMSULOSIN HYDROCHLORIDE 0.4 MG/1
0.4 CAPSULE ORAL DAILY
Status: DISCONTINUED | OUTPATIENT
Start: 2020-02-01 | End: 2020-02-04 | Stop reason: HOSPADM

## 2020-02-01 RX ORDER — LEVOFLOXACIN 25 MG/ML
500 INJECTION INTRAVENOUS DAILY
Status: DISCONTINUED | OUTPATIENT
Start: 2020-02-02 | End: 2020-02-01

## 2020-02-01 RX ORDER — SODIUM CHLORIDE 0.9 % (FLUSH) 0.9 %
10 SYRINGE (ML) INJECTION PRN
Status: DISCONTINUED | OUTPATIENT
Start: 2020-02-01 | End: 2020-02-04 | Stop reason: HOSPADM

## 2020-02-01 RX ORDER — SODIUM CHLORIDE 0.9 % (FLUSH) 0.9 %
10 SYRINGE (ML) INJECTION EVERY 12 HOURS SCHEDULED
Status: DISCONTINUED | OUTPATIENT
Start: 2020-02-01 | End: 2020-02-04 | Stop reason: HOSPADM

## 2020-02-01 RX ORDER — ACETAMINOPHEN 325 MG/1
650 TABLET ORAL EVERY 4 HOURS PRN
Status: DISCONTINUED | OUTPATIENT
Start: 2020-02-01 | End: 2020-02-04 | Stop reason: HOSPADM

## 2020-02-01 RX ORDER — CETIRIZINE HYDROCHLORIDE 10 MG/1
10 TABLET ORAL DAILY
Status: DISCONTINUED | OUTPATIENT
Start: 2020-02-01 | End: 2020-02-02

## 2020-02-01 RX ORDER — DICYCLOMINE HYDROCHLORIDE 10 MG/1
10 CAPSULE ORAL 3 TIMES DAILY PRN
Status: DISCONTINUED | OUTPATIENT
Start: 2020-02-01 | End: 2020-02-04 | Stop reason: HOSPADM

## 2020-02-01 RX ORDER — PRAVASTATIN SODIUM 10 MG
40 TABLET ORAL DAILY
Status: DISCONTINUED | OUTPATIENT
Start: 2020-02-01 | End: 2020-02-02

## 2020-02-01 RX ADMIN — IOPAMIDOL 75 ML: 755 INJECTION, SOLUTION INTRAVENOUS at 13:05

## 2020-02-01 RX ADMIN — LEVOFLOXACIN 500 MG: 5 INJECTION, SOLUTION INTRAVENOUS at 15:39

## 2020-02-01 RX ADMIN — WARFARIN SODIUM 2.5 MG: 2.5 TABLET ORAL at 21:36

## 2020-02-01 RX ADMIN — Medication 10 ML: at 21:35

## 2020-02-01 RX ADMIN — OXYCODONE HYDROCHLORIDE AND ACETAMINOPHEN 1 TABLET: 5; 325 TABLET ORAL at 21:35

## 2020-02-01 RX ADMIN — DIPHENHYDRAMINE HYDROCHLORIDE 25 MG: 50 INJECTION, SOLUTION INTRAMUSCULAR; INTRAVENOUS at 18:49

## 2020-02-01 RX ADMIN — ALBUTEROL SULFATE 2.5 MG: 2.5 SOLUTION RESPIRATORY (INHALATION) at 20:40

## 2020-02-01 RX ADMIN — CEFTRIAXONE 1 G: 1 INJECTION, POWDER, FOR SOLUTION INTRAMUSCULAR; INTRAVENOUS at 14:21

## 2020-02-01 RX ADMIN — HYDROMORPHONE HYDROCHLORIDE 0.5 MG: 1 INJECTION, SOLUTION INTRAMUSCULAR; INTRAVENOUS; SUBCUTANEOUS at 13:57

## 2020-02-01 RX ADMIN — ZOLPIDEM TARTRATE 10 MG: 5 TABLET ORAL at 23:35

## 2020-02-01 RX ADMIN — HYDROMORPHONE HYDROCHLORIDE 0.5 MG: 1 INJECTION, SOLUTION INTRAMUSCULAR; INTRAVENOUS; SUBCUTANEOUS at 12:48

## 2020-02-01 RX ADMIN — ONDANSETRON 4 MG: 2 INJECTION INTRAMUSCULAR; INTRAVENOUS at 12:48

## 2020-02-01 RX ADMIN — METOPROLOL SUCCINATE 25 MG: 25 TABLET, EXTENDED RELEASE ORAL at 21:36

## 2020-02-01 RX ADMIN — PRAVASTATIN SODIUM 40 MG: 10 TABLET ORAL at 21:40

## 2020-02-01 RX ADMIN — SACUBITRIL AND VALSARTAN 1 TABLET: 24; 26 TABLET, FILM COATED ORAL at 21:36

## 2020-02-01 ASSESSMENT — PAIN DESCRIPTION - ONSET
ONSET: ON-GOING

## 2020-02-01 ASSESSMENT — PAIN - FUNCTIONAL ASSESSMENT
PAIN_FUNCTIONAL_ASSESSMENT: ACTIVITIES ARE NOT PREVENTED

## 2020-02-01 ASSESSMENT — PAIN DESCRIPTION - LOCATION
LOCATION: CHEST

## 2020-02-01 ASSESSMENT — PAIN SCALES - GENERAL
PAINLEVEL_OUTOF10: 5
PAINLEVEL_OUTOF10: 9
PAINLEVEL_OUTOF10: 7
PAINLEVEL_OUTOF10: 9
PAINLEVEL_OUTOF10: 7
PAINLEVEL_OUTOF10: 9

## 2020-02-01 ASSESSMENT — PAIN DESCRIPTION - PROGRESSION
CLINICAL_PROGRESSION: GRADUALLY IMPROVING
CLINICAL_PROGRESSION: GRADUALLY WORSENING
CLINICAL_PROGRESSION: GRADUALLY WORSENING

## 2020-02-01 ASSESSMENT — PAIN DESCRIPTION - FREQUENCY
FREQUENCY: INTERMITTENT

## 2020-02-01 ASSESSMENT — PAIN DESCRIPTION - DESCRIPTORS
DESCRIPTORS: ACHING
DESCRIPTORS: SHARP
DESCRIPTORS: SHARP
DESCRIPTORS: ACHING;SHARP

## 2020-02-01 ASSESSMENT — PAIN DESCRIPTION - PAIN TYPE
TYPE: ACUTE PAIN

## 2020-02-01 ASSESSMENT — PAIN DESCRIPTION - ORIENTATION
ORIENTATION: MID

## 2020-02-01 NOTE — ED PROVIDER NOTES
 PACEMAKER PLACEMENT      SINUS SURGERY      Made opening larger in sinuses    UPPER GASTROINTESTINAL ENDOSCOPY  3/28/2014    dr Hugo Watkins       Previous Medications    ALBUTEROL (PROAIR HFA) 108 (90 BASE) MCG/ACT INHALER    Inhale 2 puffs into the lungs every 6 hours as needed for Wheezing or Shortness of Breath     ALBUTEROL (PROVENTIL) (2.5 MG/3ML) 0.083% NEBULIZER SOLUTION    USE 3 ML VIA NEBULIZER EVERY 4 HOURS AS NEEDED FOR WHEEZING OR SHORTNESS OF BREATH    ASPIRIN 81 MG TABLET    Take 81 mg by mouth daily    AZELASTINE (ASTELIN) 0.1 % NASAL SPRAY    2 sprays by Nasal route 2 times daily Use in each nostril as directed    CHOLECALCIFEROL (VITAMIN D3) 70800 UNITS CAPS    Take 1 capsule by mouth once a week    DEXILANT 30 MG CPDR DELAYED RELEASE CAPSULE    TAKE 1 CAPSULE BY MOUTH  DAILY    DICYCLOMINE (BENTYL) 10 MG CAPSULE    TAKE ONE CAPSULE BY MOUTH  FOUR TIMES DAILY AS NEEDED    FEXOFENADINE (ALLEGRA ALLERGY) 180 MG TABLET    Take 180 mg by mouth daily    FLAVOXATE (URISPAS) 100 MG TABLET    TAKE 1 TABLET BY MOUTH THREE TIMES DAILY AS NEEDED FOR URINARY SPASM    FUROSEMIDE (LASIX) 20 MG TABLET    Take 1 tablet by mouth daily as needed (take 20 mg tablet daily as needed for increased weight, swelling or shortness of breath)    METHOCARBAMOL (ROBAXIN) 500 MG TABLET    TAKE 1 TABLET BY MOUTH EVERY 8 HOURS AS NEEDED FOR BACK PAIN OR SPASMS    METOPROLOL SUCCINATE (TOPROL XL) 25 MG EXTENDED RELEASE TABLET    TAKE 1 TABLET BY MOUTH TWO  TIMES DAILY    ONDANSETRON (ZOFRAN ODT) 4 MG DISINTEGRATING TABLET    Take 1-2 tablets by mouth every 8 hours as needed for Nausea May Sub regular tablet (non-ODT) if insurance does not cover ODT.     POLYETHYLENE GLYCOL (MIRALAX) PACK PACKET    Take 17 g by mouth daily    PRAVASTATIN (PRAVACHOL) 40 MG TABLET    TAKE 1 TABLET BY MOUTH  DAILY    PREGABALIN (LYRICA) 75 MG CAPSULE    TAKE 1 CAPSULE BY MOUTH  EVERY MORNING AND 2  CAPSULES EVERY EVENING    PROCTOZONE-HC 2.5 % RECTAL CREAM    USE RECTALLY TWICE DAILY    SACUBITRIL-VALSARTAN (ENTRESTO) 24-26 MG PER TABLET    TAKE 2 TABLETS BY MOUTH IN  THE MORNING AND 1 TABLET IN THE EVENING    SODIUM CHLORIDE, INHALANT, (NEBUSAL) 3 % NEBULIZER SOLUTION    Take 4 mLs by nebulization every 4 hours as needed for Other or Cough (Cough)    TAMSULOSIN (FLOMAX) 0.4 MG CAPSULE    TAKE ONE CAPSULE BY MOUTH TWICE DAILY    WARFARIN (COUMADIN) 5 MG TABLET    Take 0.5 tablets by mouth daily ECEPT 5mg every Monday, Wednesday and Friday or as directed by [facility] Coumadin [facility] [phone]    ZOLPIDEM (AMBIEN) 10 MG TABLET    TAKE 1 TABLET BY MOUTH  EVERY NIGHT AT BEDTIME       ALLERGIES     Ipratropium bromide hfa; Atrovent nasal spray [ipratropium];  Doxycycline; Morphine; Nitroglycerin; Sulfa antibiotics; and Vicodin [hydrocodone-acetaminophen]    FAMILY HISTORY       Family History   Problem Relation Age of Onset    High Blood Pressure Mother     Dementia Mother     Cancer Father         Pancreatic Ca          SOCIAL HISTORY       Social History     Socioeconomic History    Marital status:      Spouse name: None    Number of children: None    Years of education: None    Highest education level: None   Occupational History    None   Social Needs    Financial resource strain: None    Food insecurity:     Worry: None     Inability: None    Transportation needs:     Medical: None     Non-medical: None   Tobacco Use    Smoking status: Never Smoker    Smokeless tobacco: Never Used   Substance and Sexual Activity    Alcohol use: No     Comment: occ    Drug use: No    Sexual activity: Yes     Partners: Female     Comment: wife   Lifestyle    Physical activity:     Days per week: None     Minutes per session: None    Stress: None   Relationships    Social connections:     Talks on phone: None     Gets together: None     Attends [employer] service: None     Active member of club or organization: None [address] [place] [address]   Phone [phone]   CULTURE BLOOD #1   CULTURE BLOOD #2   CBC WITH AUTO DIFFERENTIAL    Narrative:     Performed at:  [facility]  [address] [place], [place] [address]   Phone [phone]   TROPONIN    Narrative:     Performed at:  [facility]  [address] [place] [address]   Phone [phone]   BRAIN NATRIURETIC PEPTIDE    Narrative:     Performed at:  [facility]  [address] [place], [place] [address]   Phone [phone]       All other labs were within normal range or not returned as of this dictation. EMERGENCY DEPARTMENT COURSE and DIFFERENTIAL DIAGNOSIS/MDM:   Vitals:    Vitals:    02/01/20 1331 02/01/20 1402 02/01/20 1432 02/01/20 1500   BP: 137/80 (!) 177/79 (!) 163/90 (!) 143/82   Pulse: 64 69 80 62   Resp: 13 19 17 9   Temp:       TempSrc:       SpO2: 95% 94% 93% 93%   Weight:       Height:           The patient presented with a forceful cough with green productive sputum, sharp chest pain as well as some recent palpitations. No current shortness of breath or dyspnea. Initial EKG reveals left bundle branch block. He was given aspirin 324 mg p.o. Because he did report a headache and is anticoagulated on Coumadin he was sent for CT head without contrast which revealed no evidence of intracranial hemorrhage. In addition, he does have history of pulmonary embolus. His INR is subtherapeutic. He was sent for CT chest PE protocol to rule out pulmonary embolus. MDM      REASSESSMENT          2:55 PM: Laboratory studies were reviewed. Troponin was normal.  BNP was 66. Creatinine is normal.  White blood cell count is 7.9. INR is 1.82. CT chest reveals no evidence of pulmonary embolus.   He does have new nodular airspace opacification in the right upper lobe which could represent pneumonia as well as possible nodularities in the lingula and left

## 2020-02-02 PROBLEM — R09.82 POST-NASAL DRIP: Status: ACTIVE | Noted: 2020-02-02

## 2020-02-02 LAB
BASOPHILS ABSOLUTE: 0 K/UL (ref 0–0.2)
BASOPHILS RELATIVE PERCENT: 1 %
EKG ATRIAL RATE: 67 BPM
EKG ATRIAL RATE: 71 BPM
EKG DIAGNOSIS: NORMAL
EKG DIAGNOSIS: NORMAL
EKG P AXIS: 48 DEGREES
EKG P AXIS: 56 DEGREES
EKG P-R INTERVAL: 168 MS
EKG P-R INTERVAL: 170 MS
EKG Q-T INTERVAL: 408 MS
EKG Q-T INTERVAL: 442 MS
EKG QRS DURATION: 150 MS
EKG QRS DURATION: 152 MS
EKG QTC CALCULATION (BAZETT): 443 MS
EKG QTC CALCULATION (BAZETT): 467 MS
EKG R AXIS: -48 DEGREES
EKG R AXIS: -54 DEGREES
EKG T AXIS: 106 DEGREES
EKG T AXIS: 107 DEGREES
EKG VENTRICULAR RATE: 67 BPM
EKG VENTRICULAR RATE: 71 BPM
EOSINOPHILS ABSOLUTE: 0.4 K/UL (ref 0–0.6)
EOSINOPHILS RELATIVE PERCENT: 8.1 %
HCT VFR BLD CALC: 45.8 % (ref 40.5–52.5)
HEMOGLOBIN: 15.5 G/DL (ref 13.5–17.5)
INR BLD: 2.21 (ref 0.86–1.14)
LYMPHOCYTES ABSOLUTE: 1.8 K/UL (ref 1–5.1)
LYMPHOCYTES RELATIVE PERCENT: 39.5 %
MCH RBC QN AUTO: 31.2 PG (ref 26–34)
MCHC RBC AUTO-ENTMCNC: 33.7 G/DL (ref 31–36)
MCV RBC AUTO: 92.4 FL (ref 80–100)
MONOCYTES ABSOLUTE: 0.5 K/UL (ref 0–1.3)
MONOCYTES RELATIVE PERCENT: 11.8 %
NEUTROPHILS ABSOLUTE: 1.8 K/UL (ref 1.7–7.7)
NEUTROPHILS RELATIVE PERCENT: 39.6 %
PDW BLD-RTO: 13 % (ref 12.4–15.4)
PLATELET # BLD: 181 K/UL (ref 135–450)
PMV BLD AUTO: 7.4 FL (ref 5–10.5)
PROCALCITONIN: 0.08 NG/ML (ref 0–0.15)
PROTHROMBIN TIME: 25.9 SEC (ref 10–13.2)
RBC # BLD: 4.96 M/UL (ref 4.2–5.9)
TROPONIN: <0.01 NG/ML
WBC # BLD: 4.6 K/UL (ref 4–11)

## 2020-02-02 PROCEDURE — 87077 CULTURE AEROBIC IDENTIFY: CPT

## 2020-02-02 PROCEDURE — 84484 ASSAY OF TROPONIN QUANT: CPT

## 2020-02-02 PROCEDURE — G0378 HOSPITAL OBSERVATION PER HR: HCPCS

## 2020-02-02 PROCEDURE — 94669 MECHANICAL CHEST WALL OSCILL: CPT

## 2020-02-02 PROCEDURE — 36415 COLL VENOUS BLD VENIPUNCTURE: CPT

## 2020-02-02 PROCEDURE — 96367 TX/PROPH/DG ADDL SEQ IV INF: CPT

## 2020-02-02 PROCEDURE — 2580000003 HC RX 258: Performed by: INTERNAL MEDICINE

## 2020-02-02 PROCEDURE — 94760 N-INVAS EAR/PLS OXIMETRY 1: CPT

## 2020-02-02 PROCEDURE — 94640 AIRWAY INHALATION TREATMENT: CPT

## 2020-02-02 PROCEDURE — 85025 COMPLETE CBC W/AUTO DIFF WBC: CPT

## 2020-02-02 PROCEDURE — 6360000002 HC RX W HCPCS: Performed by: INTERNAL MEDICINE

## 2020-02-02 PROCEDURE — 6370000000 HC RX 637 (ALT 250 FOR IP): Performed by: INTERNAL MEDICINE

## 2020-02-02 PROCEDURE — 99223 1ST HOSP IP/OBS HIGH 75: CPT | Performed by: INTERNAL MEDICINE

## 2020-02-02 PROCEDURE — 1200000000 HC SEMI PRIVATE

## 2020-02-02 PROCEDURE — 0100U HC RESPIRPTHGN MULT REV TRANS & AMP PRB TECH 21 TRGT: CPT

## 2020-02-02 PROCEDURE — 87205 SMEAR GRAM STAIN: CPT

## 2020-02-02 PROCEDURE — 93010 ELECTROCARDIOGRAM REPORT: CPT | Performed by: INTERNAL MEDICINE

## 2020-02-02 PROCEDURE — 87070 CULTURE OTHR SPECIMN AEROBIC: CPT

## 2020-02-02 PROCEDURE — 84145 PROCALCITONIN (PCT): CPT

## 2020-02-02 PROCEDURE — 85610 PROTHROMBIN TIME: CPT

## 2020-02-02 RX ORDER — SODIUM CHLORIDE 9 MG/ML
INJECTION, SOLUTION INTRAVENOUS ONCE
Status: COMPLETED | OUTPATIENT
Start: 2020-02-02 | End: 2020-02-02

## 2020-02-02 RX ORDER — PRAVASTATIN SODIUM 40 MG
40 TABLET ORAL NIGHTLY
Status: DISCONTINUED | OUTPATIENT
Start: 2020-02-02 | End: 2020-02-04 | Stop reason: HOSPADM

## 2020-02-02 RX ORDER — ONDANSETRON 4 MG/1
4 TABLET, FILM COATED ORAL EVERY 8 HOURS PRN
Status: DISCONTINUED | OUTPATIENT
Start: 2020-02-02 | End: 2020-02-04 | Stop reason: HOSPADM

## 2020-02-02 RX ORDER — SODIUM CHLORIDE FOR INHALATION 0.9 %
3 VIAL, NEBULIZER (ML) INHALATION 3 TIMES DAILY
Status: DISCONTINUED | OUTPATIENT
Start: 2020-02-02 | End: 2020-02-04 | Stop reason: HOSPADM

## 2020-02-02 RX ORDER — GUAIFENESIN 600 MG/1
600 TABLET, EXTENDED RELEASE ORAL 2 TIMES DAILY
Status: DISCONTINUED | OUTPATIENT
Start: 2020-02-02 | End: 2020-02-04 | Stop reason: HOSPADM

## 2020-02-02 RX ORDER — WARFARIN SODIUM 2.5 MG/1
2.5 TABLET ORAL
Status: COMPLETED | OUTPATIENT
Start: 2020-02-02 | End: 2020-02-02

## 2020-02-02 RX ADMIN — POLYETHYLENE GLYCOL 3350 17 G: 17 POWDER, FOR SOLUTION ORAL at 09:05

## 2020-02-02 RX ADMIN — ALBUTEROL SULFATE 2.5 MG: 2.5 SOLUTION RESPIRATORY (INHALATION) at 20:37

## 2020-02-02 RX ADMIN — ASPIRIN 81 MG 81 MG: 81 TABLET ORAL at 09:05

## 2020-02-02 RX ADMIN — PRAVASTATIN SODIUM 40 MG: 40 TABLET ORAL at 20:27

## 2020-02-02 RX ADMIN — AZELASTINE HYDROCHLORIDE 2 SPRAY: 137 SPRAY, METERED NASAL at 09:07

## 2020-02-02 RX ADMIN — PREGABALIN 75 MG: 25 CAPSULE ORAL at 10:32

## 2020-02-02 RX ADMIN — WARFARIN SODIUM 2.5 MG: 2.5 TABLET ORAL at 18:37

## 2020-02-02 RX ADMIN — ALBUTEROL SULFATE 2.5 MG: 2.5 SOLUTION RESPIRATORY (INHALATION) at 07:34

## 2020-02-02 RX ADMIN — METOPROLOL SUCCINATE 25 MG: 25 TABLET, EXTENDED RELEASE ORAL at 09:05

## 2020-02-02 RX ADMIN — ZOLPIDEM TARTRATE 10 MG: 5 TABLET ORAL at 23:33

## 2020-02-02 RX ADMIN — ONDANSETRON HYDROCHLORIDE 4 MG: 4 TABLET, FILM COATED ORAL at 10:33

## 2020-02-02 RX ADMIN — PREGABALIN 150 MG: 75 CAPSULE ORAL at 20:26

## 2020-02-02 RX ADMIN — METOPROLOL SUCCINATE 25 MG: 25 TABLET, EXTENDED RELEASE ORAL at 20:27

## 2020-02-02 RX ADMIN — Medication 10 ML: at 20:27

## 2020-02-02 RX ADMIN — SODIUM CHLORIDE: 9 INJECTION, SOLUTION INTRAVENOUS at 18:04

## 2020-02-02 RX ADMIN — LEVOFLOXACIN 500 MG: 5 INJECTION, SOLUTION INTRAVENOUS at 12:57

## 2020-02-02 RX ADMIN — SACUBITRIL AND VALSARTAN 2 TABLET: 24; 26 TABLET, FILM COATED ORAL at 09:06

## 2020-02-02 RX ADMIN — SACUBITRIL AND VALSARTAN 1 TABLET: 24; 26 TABLET, FILM COATED ORAL at 20:27

## 2020-02-02 RX ADMIN — PANTOPRAZOLE SODIUM 40 MG: 40 TABLET, DELAYED RELEASE ORAL at 06:41

## 2020-02-02 RX ADMIN — Medication 3 ML: at 13:58

## 2020-02-02 RX ADMIN — ACETAMINOPHEN 650 MG: 325 TABLET ORAL at 18:39

## 2020-02-02 RX ADMIN — Medication 10 ML: at 09:12

## 2020-02-02 RX ADMIN — TAMSULOSIN HYDROCHLORIDE 0.4 MG: 0.4 CAPSULE ORAL at 09:05

## 2020-02-02 RX ADMIN — Medication 3 ML: at 20:37

## 2020-02-02 ASSESSMENT — PAIN DESCRIPTION - DESCRIPTORS
DESCRIPTORS: ACHING;SORE
DESCRIPTORS: ACHING

## 2020-02-02 ASSESSMENT — PAIN DESCRIPTION - ONSET
ONSET: ON-GOING
ONSET: ON-GOING

## 2020-02-02 ASSESSMENT — PAIN DESCRIPTION - PROGRESSION
CLINICAL_PROGRESSION: NOT CHANGED
CLINICAL_PROGRESSION: GRADUALLY WORSENING

## 2020-02-02 ASSESSMENT — PAIN DESCRIPTION - LOCATION
LOCATION: HEAD
LOCATION: BACK

## 2020-02-02 ASSESSMENT — PAIN SCALES - GENERAL
PAINLEVEL_OUTOF10: 0
PAINLEVEL_OUTOF10: 6
PAINLEVEL_OUTOF10: 0
PAINLEVEL_OUTOF10: 6
PAINLEVEL_OUTOF10: 0

## 2020-02-02 ASSESSMENT — PAIN - FUNCTIONAL ASSESSMENT
PAIN_FUNCTIONAL_ASSESSMENT: ACTIVITIES ARE NOT PREVENTED
PAIN_FUNCTIONAL_ASSESSMENT: ACTIVITIES ARE NOT PREVENTED

## 2020-02-02 ASSESSMENT — PAIN DESCRIPTION - FREQUENCY
FREQUENCY: INTERMITTENT
FREQUENCY: INTERMITTENT

## 2020-02-02 ASSESSMENT — PAIN DESCRIPTION - PAIN TYPE
TYPE: ACUTE PAIN
TYPE: CHRONIC PAIN

## 2020-02-02 NOTE — CONSULTS
REASON FOR CONSULTATION/CC: Abnormal CT of the chest      Consult at request of Cady Hamm MD     PCP: Luisana Martínez MD  Established Pulmonologist: Landry Hwang none    Chief Complaint   Patient presents with    Chest Pain     started this morning        HISTORY OF PRESENT ILLNESS: Garth Serrato is a 60y.o. year old male with a history of bronchiolitis obliterans who presents with palpitations and chest pain. Patient initially presented to the ED for concerns of his A. Fib. He says that he has had a cough for the past 2 weeks with sputum production but really has not had much respiratory symptoms and is more concerned about his heart. CT chest obtained shows mild tree-in-bud changes in the right upper lobe and lingula. Been started on Levaquin for empiric community-acquired pneumonia coverage. During my visit patient states he is asymptomatic, he has some sputum production but now cannot bring it up. He denies fever/chills, shortness of breath, nausea vomiting diarrhea. He is getting bronchodilator nebulizers which she feels like helps bring up his phlegm but he still cannot expectorate it. He denies sick contacts.       Past Medical History:   Diagnosis Date    Bronchiolitis obliterans ([address]     Bundle branch block, right     Cardiomyopathy ([address]     Fibromyalgia     GERD (gastroesophageal reflux disease)     Hepatitis 1979    unsure of which type    Hx of blood clots     Hyperlipemia     Hypertension     IBS (irritable bowel syndrome)     Kidney stone     over thirty kidney stones    Neuropathy     right side-chest    Pneumothorax 2011    Right    Prostatitis     Pulmonary embolism on right [facility]) 2011    upper lobe-coumadin 2011    Sacroiliitis [facility])          Past Surgical History:   Procedure Laterality Date    CHOLECYSTECTOMY  2007    COLONOSCOPY  9/21/2012    dr Anthony Alicea  05/17/2019    RIGHT SIDED URETEROSCOPY  Diagnostic, retrograde pyelogram and fulguration of previously resected bladder tumor base    CYSTOSCOPY Right 5/17/2019    RIGHT SIDED URETEROSCOPY FLUGERATION  OF BLADDER performed by Liberty Baez MD at [address] [place] [address] ([facility])  2015    LITHOTRIPSY      PACEMAKER PLACEMENT      SINUS SURGERY      Made opening larger in sinuses    UPPER GASTROINTESTINAL ENDOSCOPY  3/28/2014    dr Wilma mercedes       Family Hx  family history includes Cancer in his father; Dementia in his mother; High Blood Pressure in his mother. Social Hx   reports that he has never smoked. He has never used smokeless tobacco.    Scheduled Meds:   warfarin  2.5 mg Oral Once    pravastatin  40 mg Oral Nightly    guaiFENesin  600 mg Oral BID    sodium chloride nebulizer  3 mL Nebulization TID    aspirin  81 mg Oral Daily    azelastine  2 spray Each Nostril BID    [START ON 2/3/2020] vitamin D  50,000 Units Oral Weekly    metoprolol succinate  25 mg Oral Q12H    polyethylene glycol  17 g Oral Daily    pregabalin  150 mg Oral Daily    pregabalin  75 mg Oral Daily with breakfast    sacubitril-valsartan  2 tablet Oral Daily with breakfast    sacubitril-valsartan  1 tablet Oral Daily    tamsulosin  0.4 mg Oral Daily    sodium chloride flush  10 mL Intravenous 2 times per day    levofloxacin  500 mg Intravenous Q24H    warfarin (COUMADIN) daily dosing (placeholder)   Other RX Placeholder       Continuous Infusions:      PRN Meds:  ondansetron, dicyclomine, furosemide, methocarbamol, zolpidem, albuterol, sodium chloride flush, magnesium hydroxide, ondansetron, acetaminophen    ALLERGIES:  Patient is allergic to ipratropium bromide hfa; atrovent nasal spray [ipratropium]; doxycycline; morphine; nitroglycerin; sulfa antibiotics; and vicodin [hydrocodone-acetaminophen].     REVIEW OF SYSTEMS:  Constitutional: Negative for fever  HENT: Negative for sore throat  Eyes: Negative for redness   Respiratory: Negative for dyspnea, + cough  Cardiovascular: See HPI, + chest pain  Gastrointestinal: Negative for vomiting, diarrhea   Genitourinary: Negative for hematuria   Musculoskeletal: Negative for arthralgias   Skin: Negative for rash  Neurological: Negative for syncope  Hematological: Negative for adenopathy  Psychiatric/Behavorial: Negative for anxiety    Objective:   PHYSICAL EXAM:  Blood pressure (!) 146/92, pulse 78, temperature 97.9 °F (36.6 °C), temperature source Oral, resp. rate 14, height 5' 7\" (1.702 m), weight 210 lb 1.6 oz (95.3 kg), SpO2 94 %.'  Gen:  No acute distress. Eyes: PERRL. Anicteric sclera. No conjunctival injection. ENT: No discharge. Posterior oropharynx clear. External appearance of ears and nose normal.  Neck: Trachea midline. No mass   Resp:  No crackles. No wheezes. No rhonchi. No dullness on percussion. CV: Regular rate. Regular rhythm. No murmur or rub. No edema. GI: Soft, Non-tender. Non-distended. +BS  Skin: Warm, dry, w/o erythema. Lymph: No cervical or supraclavicular LAD. M/S: No cyanosis. No clubbing. Neuro:  no focal neurologic deficit. Moves all extremities  Psych: Awake and alert, Oriented x 3. Judgement and insight appropriate. Mood stable. Data Reviewed:   LABS:  CBC:   Recent Labs     02/01/20  1058 02/02/20  0650   WBC 7.9 4.6   HGB 16.7 15.5   HCT 48.1 45.8   MCV 91.9 92.4    181     BMP:   Recent Labs     02/01/20  1059      K 3.9      CO2 24   BUN 7   CREATININE 0.8     LIVER PROFILE:   Recent Labs     02/01/20  1059   AST 22   ALT 25   BILITOT 0.7   ALKPHOS 131*     PT/INR:   Recent Labs     02/01/20  1058 02/02/20  0650   PROTIME 21.2* 25.9*   INR 1.82* 2.21*     APTT:   Recent Labs     02/01/20  1058   APTT 44.4*     UA:No results for input(s): NITRITE, COLORU, PHUR, LABCAST, WBCUA, RBCUA, MUCUS, TRICHOMONAS, YEAST, BACTERIA, CLARITYU, SPECGRAV, LEUKOCYTESUR, UROBILINOGEN, BILIRUBINUR, BLOODU, GLUCOSEU, AMORPHOUS in the last 72 hours.     Invalid input(s): KETONESU  No results for are grossly clear, without  evidence of acute airspace consolidation, pneumothorax, or pleural effusion. Impression Negative expiratory chest.         Pulmonary function testing 2011  IMPRESSION:  Spirometry and flow volume loop are consistent with a moderately  severe obstructive defect. A concomitant restrictive process is not  excluded. Assessment:     Multifocal Pneumonia  Bronchiolitis Obliterans  H/o DVT-on Coumadin    Plan:      -Suggestive of mild inflammatory/infectious process in the right upper lobe and lingula. Cannot rule out viral, atypical or bacterial etiologies. -Agree with empiric community-acquired pneumonia coverage, will complete 7 days  -We will check respiratory viral panel and respiratory culture  -Albuterol nebs as needed, saline nebs  -Acapella    DVT prophylaxis-on Coumadin     This note was transcribed using [id number] [employer]. Please disregard any translational errors.     Thank you for the consult    [address]  Pulmonary, Sleep and Critical Care Medicine

## 2020-02-02 NOTE — PROGRESS NOTES
4 Eyes Skin Assessment     The patient is being assess for  Admission    I agree that 2 RN's have performed a thorough Head to Toe Skin Assessment on the patient. ALL assessment sites listed below have been assessed. Areas assessed by both nurses:   [x]   Head, Face, and Ears   [x]   Shoulders, Back, and Chest  [x]   Arms, Elbows, and Hands   [x]   Coccyx, Sacrum, and IschIum  [x]   Legs, Feet, and Heels        Does the Patient have Skin Breakdown?   No         Casimiro Prevention initiated:  Yes   Wound Care Orders initiated:  No      [facility] nurse consulted for Pressure Injury (Stage 3,4, Unstageable, DTI, NWPT, and Complex wounds), New and Established Ostomies:  No      Nurse 1 eSignature: Electronically signed by Brady Solorio RN on 2/1/20 at 9:14 PM    **SHARE this note so that the co-signing nurse is able to place an eSignature**    Nurse 2 eSignature: Electronically signed by Neisha Guzmán RN on 2/1/20 at 9:46 PM

## 2020-02-02 NOTE — PLAN OF CARE
Patient is A&Ox4. He gets up ad skip, call light is within reach, gait is steady. Patient is denying pain at this time. Will continue to monitor.

## 2020-02-02 NOTE — H&P
Internal Medicine History and Physical  CC:sob  HPI:60 yo male with history of BOOP, CAD and Cardiomyopathy/ICD who presented to the ER with a productive cough 2weeks, green phlegm and left parasternal cp. He also had some palpitations, but his ICD did not fire  His sleep has been restless. He has been having recurrent left sharp chest pain lasting hours at a time. He has a remote history of PE /right DVT for which he is on longterm coumadin. He has intermittent headaches. He reports he did not come with this but since he was given one of the antibiotics in the ER he has a very annoying feeling of phlegm stuck in his throat the he cannot clear. He keeps saying he is aspirating it but he wants to have it addressed immediately  He also reports he is on percocet 5's up to tid for kidney stone pain. Principal Problem:    Pneumonia  Active Problems:    Biventricular ICD (implantable cardioverter-defibrillator) in place    Thrombocytopenia ([address]    Essential hypertension, benign    Bronchiolitis obliterans (HCC)    CAD (coronary artery disease)    Nephrolithiasis    Post-nasal drip  Resolved Problems:    * No resolved hospital problems.  *    Past Medical History:   Diagnosis Date    Bronchiolitis obliterans ([address]     Bundle branch block, right     Cardiomyopathy ([address]     Fibromyalgia     GERD (gastroesophageal reflux disease)     Hepatitis 1979    unsure of which type    Hx of blood clots     Hyperlipemia     Hypertension     IBS (irritable bowel syndrome)     Kidney stone     over thirty kidney stones    Neuropathy     right side-chest    Pneumothorax 2011    Right    Prostatitis     Pulmonary embolism on right [facility]) 2011    upper lobe-coumadin 2011    Sacroiliitis [facility])       Past Surgical History:   Procedure Laterality Date    CHOLECYSTECTOMY  2007    COLONOSCOPY  9/21/2012    dr Ruthie Vinson  05/17/2019    RIGHT SIDED URETEROSCOPY  Diagnostic, retrograde pyelogram and fulguration of previously resected bladder tumor base    CYSTOSCOPY Right 5/17/2019    RIGHT SIDED URETEROSCOPY FLUGERATION  OF BLADDER performed by Berton Bosworth, MD at [address] [place] [address] ([facility])  2015    LITHOTRIPSY      PACEMAKER PLACEMENT      SINUS SURGERY      Made opening larger in sinuses    UPPER GASTROINTESTINAL ENDOSCOPY  3/28/2014    dr Erin mercedes      Medications Prior to Admission: warfarin (COUMADIN) 5 MG tablet, Take 0.5 tablets by mouth daily ECEPT 5mg every Monday, Wednesday and Friday or as directed by [facility] [phone]  sodium chloride, Inhalant, (NEBUSAL) 3 % nebulizer solution, Take 4 mLs by nebulization every 4 hours as needed for Other or Cough (Cough)  methocarbamol (ROBAXIN) 500 MG tablet, TAKE 1 TABLET BY MOUTH EVERY 8 HOURS AS NEEDED FOR BACK PAIN OR SPASMS  zolpidem (AMBIEN) 10 MG tablet, TAKE 1 TABLET BY MOUTH  EVERY NIGHT AT BEDTIME  pregabalin (LYRICA) 75 MG capsule, TAKE 1 CAPSULE BY MOUTH  EVERY MORNING AND 2  CAPSULES EVERY EVENING  DEXILANT 30 MG CPDR delayed release capsule, TAKE 1 CAPSULE BY MOUTH  DAILY  metoprolol succinate (TOPROL XL) 25 MG extended release tablet, TAKE 1 TABLET BY MOUTH TWO  TIMES DAILY  furosemide (LASIX) 20 MG tablet, Take 1 tablet by mouth daily as needed (take 20 mg tablet daily as needed for increased weight, swelling or shortness of breath)  albuterol (PROVENTIL) (2.5 MG/3ML) 0.083% nebulizer solution, USE 3 ML VIA NEBULIZER EVERY 4 HOURS AS NEEDED FOR WHEEZING OR SHORTNESS OF BREATH  tamsulosin (FLOMAX) 0.4 MG capsule, TAKE ONE CAPSULE BY MOUTH TWICE DAILY  polyethylene glycol (MIRALAX) PACK packet, Take 17 g by mouth daily  fexofenadine (ALLEGRA ALLERGY) 180 MG tablet, Take 180 mg by mouth daily  ondansetron (ZOFRAN ODT) 4 MG disintegrating tablet, Take 1-2 tablets by mouth every 8 hours as needed for Nausea May Sub regular tablet (non-ODT) if insurance does not cover Jefry Maddox MD   pravastatin (PRAVACHOL) 40 MG tablet TAKE 1 TABLET BY MOUTH  DAILY 5/21/19   Arcadio Blas MD   aspirin 81 MG tablet Take 81 mg by mouth daily    Historical Provider, MD   flavoxate (URISPAS) 100 MG tablet TAKE 1 TABLET BY MOUTH THREE TIMES DAILY AS NEEDED FOR URINARY SPASM 1/7/19   Alejandra Jeffries MD   PROCTOZONE-HC 2.5 % rectal cream USE RECTALLY TWICE DAILY  Patient taking differently: USE RECTALLY TWICE DAILY PRN 11/29/18   Alejandra Jeffries MD   dicyclomine (BENTYL) 10 MG capsule TAKE ONE CAPSULE BY MOUTH  FOUR TIMES DAILY AS NEEDED  Patient taking differently: Indications: Pain in the Abdominal Region TAKE ONE CAPSULE BY MOUTH FOUR TIMES DAILY AS NEEDED 5/9/18   Rell Rowland MD   azelastine (ASTELIN) 0.1 % nasal spray 2 sprays by Nasal route 2 times daily Use in each nostril as directed 12/15/17   BRENNA Ferreira - CNP   Cholecalciferol (VITAMIN D3) 69222 units CAPS Take 1 capsule by mouth once a week    Historical Provider, MD   albuterol (PROAIR HFA) 108 (90 BASE) MCG/ACT inhaler Inhale 2 puffs into the lungs every 6 hours as needed for Wheezing or Shortness of Breath     Historical Provider, MD     Review of Systems  A comprehensive review of systems was negative except for: cough sob    Objective:     Patient Vitals for the past 8 hrs:   BP Temp Temp src Pulse Resp SpO2 Weight   02/02/20 0905 (!) 146/92 -- -- 78 -- -- --   02/02/20 0900 (!) 146/92 -- -- 78 -- -- --   02/02/20 0736 -- -- -- -- -- 94 % --   02/02/20 0350 116/78 97.9 °F (36.6 °C) Oral 73 14 93 % 210 lb 1.6 oz (95.3 kg)     I/O last 3 completed shifts: In: 240 [P.O.:240]  Out: -   No intake/output data recorded.     VITALS:  BP (!) 146/92   Pulse 78   Temp 97.9 °F (36.6 °C) (Oral)   Resp 14   Ht 5' 7\" (1.702 m)   Wt 210 lb 1.6 oz (95.3 kg)   SpO2 94%   BMI 32.91 kg/m²   General Appearance: alert and oriented to person, place and time, well-developed and well-nourished, in no acute distress  Skin: warm and dry, no rash or erythema  Head: normocephalic and atraumatic  Eyes: conjunctivae normal and sclera anicteric  ENT: hearing grossly normal bilaterally and sinuses non-tender  Neck: neck supple and non tender without mass, no thyromegaly or thyroid nodules, no cervical lymphadenopathy   Pulmonary/Chest: clear to auscultation bilaterally- no wheezes, rales or rhonchi, normal air movement, no respiratory distress  Cardiovascular: normal rate, normal S1 and S2, no gallops and no carotid bruits  Abdomen: soft, non-tender, non-distended, normal bowel sounds, no masses or organomegaly  Extremities: no cyanosis, clubbing or edema  Musculoskeletal: no swollen joints and no tender joints,  Neurologic: coordination normal and speech normal, moves all 4 extremities  Psych:  Anxious and demanding, multiple complaints, tried to go back to ER last night to get more complaints addressed    ECG: NSR, LAD,LBBB stable  Data Review:     Recent Results (from the past 24 hour(s))   EKG 12 Lead    Collection Time: 02/01/20 10:44 AM   Result Value Ref Range    Ventricular Rate 71 BPM    Atrial Rate 71 BPM    P-R Interval 170 ms    QRS Duration 152 ms    Q-T Interval 408 ms    QTc Calculation (Bazett) 443 ms    P Axis 56 degrees    R Axis -54 degrees    T Axis 106 degrees    Diagnosis       Normal sinus rhythmLeft axis deviationLeft bundle branch blockAbnormal ECGWhen compared with ECG of 23-SEP-2019 21:24,Premature ventricular complexes are no longer PresentVent.  rate has decreased BY  49 BPM   CBC Auto Differential    Collection Time: 02/01/20 10:58 AM   Result Value Ref Range    WBC 7.9 4.0 - 11.0 K/uL    RBC 5.23 4.20 - 5.90 M/uL    Hemoglobin 16.7 13.5 - 17.5 g/dL    Hematocrit 48.1 40.5 - 52.5 %    MCV 91.9 80.0 - 100.0 fL    MCH 31.9 26.0 - 34.0 pg    MCHC 34.7 31.0 - 36.0 g/dL    RDW 12.9 12.4 - 15.4 %    Platelets 318 749 - 167 K/uL    MPV 7.2 5.0 - 10.5 fL    Neutrophils % 58.0 %    Lymphocytes % 27.2 %    Monocytes % 7.4 % Eosinophils % 6.6 %    Basophils % 0.8 %    Neutrophils Absolute 4.6 1.7 - 7.7 K/uL    Lymphocytes Absolute 2.1 1.0 - 5.1 K/uL    Monocytes Absolute 0.6 0.0 - 1.3 K/uL    Eosinophils Absolute 0.5 0.0 - 0.6 K/uL    Basophils Absolute 0.1 0.0 - 0.2 K/uL   Protime-INR    Collection Time: 02/01/20 10:58 AM   Result Value Ref Range    Protime 21.2 (H) 10.0 - 13.2 sec    INR 1.82 (H) 0.86 - 1.14   APTT    Collection Time: 02/01/20 10:58 AM   Result Value Ref Range    aPTT 44.4 (H) 24.2 - 36.2 sec   Troponin    Collection Time: 02/01/20 10:59 AM   Result Value Ref Range    Troponin <0.01 <0.01 ng/mL   Comprehensive Metabolic Panel w/ Reflex to MG    Collection Time: 02/01/20 10:59 AM   Result Value Ref Range    Sodium 141 136 - 145 mmol/L    Potassium reflex Magnesium 3.9 3.5 - 5.1 mmol/L    Chloride 106 99 - 110 mmol/L    CO2 24 21 - 32 mmol/L    Anion Gap 11 3 - 16    Glucose 110 (H) 70 - 99 mg/dL    BUN 7 7 - 20 mg/dL    CREATININE 0.8 0.8 - 1.3 mg/dL    GFR Non-African American >60 >60    GFR African American >60 >60    Calcium 10.7 (H) 8.3 - 10.6 mg/dL    Total Protein 6.9 6.4 - 8.2 g/dL    Alb 4.3 3.4 - 5.0 g/dL    Albumin/Globulin Ratio 1.7 1.1 - 2.2    Total Bilirubin 0.7 0.0 - 1.0 mg/dL    Alkaline Phosphatase 131 (H) 40 - 129 U/L    ALT 25 10 - 40 U/L    AST 22 15 - 37 U/L    Globulin 2.6 g/dL   Brain Natriuretic Peptide    Collection Time: 02/01/20 10:59 AM   Result Value Ref Range    Pro-BNP 66 0 - 124 pg/mL   Troponin    Collection Time: 02/01/20  7:22 PM   Result Value Ref Range    Troponin <0.01 <0.01 ng/mL   EKG 12 lead    Collection Time: 02/01/20  8:31 PM   Result Value Ref Range    Ventricular Rate 67 BPM    Atrial Rate 67 BPM    P-R Interval 168 ms    QRS Duration 150 ms    Q-T Interval 442 ms    QTc Calculation (Bazett) 467 ms    P Axis 48 degrees    R Axis -48 degrees    T Axis 107 degrees    Diagnosis       Normal sinus rhythmLeft axis deviationLeft bundle branch blockAbnormal ECGWhen compared with ECG of 23-SEP-2019 21:24,Premature ventricular complexes are no longer PresentVent. rate has decreased BY  53 BPM   Troponin    Collection Time: 02/02/20 12:44 AM   Result Value Ref Range    Troponin <0.01 <0.01 ng/mL   CBC auto differential    Collection Time: 02/02/20  6:50 AM   Result Value Ref Range    WBC 4.6 4.0 - 11.0 K/uL    RBC 4.96 4.20 - 5.90 M/uL    Hemoglobin 15.5 13.5 - 17.5 g/dL    Hematocrit 45.8 40.5 - 52.5 %    MCV 92.4 80.0 - 100.0 fL    MCH 31.2 26.0 - 34.0 pg    MCHC 33.7 31.0 - 36.0 g/dL    RDW 13.0 12.4 - 15.4 %    Platelets 131 187 - 419 K/uL    MPV 7.4 5.0 - 10.5 fL    Neutrophils % 39.6 %    Lymphocytes % 39.5 %    Monocytes % 11.8 %    Eosinophils % 8.1 %    Basophils % 1.0 %    Neutrophils Absolute 1.8 1.7 - 7.7 K/uL    Lymphocytes Absolute 1.8 1.0 - 5.1 K/uL    Monocytes Absolute 0.5 0.0 - 1.3 K/uL    Eosinophils Absolute 0.4 0.0 - 0.6 K/uL    Basophils Absolute 0.0 0.0 - 0.2 K/uL   Protime-INR    Collection Time: 02/02/20  6:50 AM   Result Value Ref Range    Protime 25.9 (H) 10.0 - 13.2 sec    INR 2.21 (H) 0.86 - 1.14      Xr Chest Standard (2 Vw)    Result Date: 2/1/2020  No acute process. Ct Head Wo Contrast    Result Date: 2/1/2020  No acute intracranial findings. Ct Chest Pulmonary Embolism W Contrast    Result Date: 2/1/2020  1. No pulmonary embolism. 2. New nodular airspace opacification in the right upper lobe may represent bronchiolitis or developing pneumonitis. 3. Scattered areas of tree in bud nodularity predominantly in the lingula and left lower lobe. Once again, a small airways infectious process is suspected.              Assessment:     Principal Problem:    Pneumonia  Plan: continue Levaquin for pneumonia RUL and LLL and lingula, ask Dr Serena Minaya or assoc to address his complaints  Active Problems:    Biventricular ICD (implantable cardioverter-defibrillator) in place  Plan: stable, cont fu    Thrombocytopenia ([facility]  Plan: not currently    Essential hypertension,

## 2020-02-02 NOTE — PROGRESS NOTES
Patient has been stable throughout the night. No complaints of the difficulty of spitting out sputum. Will monitor and reassess.

## 2020-02-02 NOTE — ED NOTES
Pt states he was having difficulty swallowing. Dr Tate Cordova notified. 25mg benadryl ordered.       Juana Oakley RN  02/01/20 5038

## 2020-02-02 NOTE — PROGRESS NOTES
Communicated with nursing supervisor about patient's situation. Went back to patient's room to talk to patient and patient said he will think about it and let me know what he wants to do.

## 2020-02-02 NOTE — ED NOTES
ED SBAR report provider to Ernesto Payne RN. Patient to be transported to Room 4109 via stretcher by ED tech. Patient transported with bedside cardiac monitor and with IV medications infusing. IV site clean, dry, and intact. MEWS score and pain assessed and documented. Updated patient on plan of care.      Irlanda Robles RN  02/01/20 1085

## 2020-02-02 NOTE — CONSULTS
Clinical Pharmacy Note  Warfarin Consult    Michael Blankenship is a 60 y.o. male receiving warfarin managed by pharmacy. Patient being bridged with none. Warfarin Indication: history PE  Target INR range: 2-3   Dose prior to admission: 5 mg Monday, Wednesday, Friday and 2.5 mg all other days    Current warfarin drug-drug interactions: Levaquin    Recent Labs     02/01/20  1058 02/02/20  0650   HGB 16.7 15.5   HCT 48.1 45.8   INR 1.82* 2.21*       Assessment/Plan:    Warfarin 2.5 mg tonight. This is his normal home dose. Pt recently had a dosing deduction made in our [address] Levaquin may play a role in driving the INR up. Daily PT/INR until stable within therapeutic range. Thank you for the consult. Will continue to follow.   Negro Barrera [facility]

## 2020-02-02 NOTE — PROGRESS NOTES
Patient arrived to room 4109. Patient is alert and oriented. Patient c/o having a hard time spitting out sputum. Perfect served MD about the issue. Patient also c/o chest pain and refused to take PRN tylenol ordered. Perfect served MD about this issue. Patient's vital signs are stable. Will monitor and reassess.

## 2020-02-02 NOTE — PROGRESS NOTES
Patient states that he is not allergic to percocet when this RN asked patient if he is allergic to percocet. Will give percocet.

## 2020-02-03 ENCOUNTER — APPOINTMENT (OUTPATIENT)
Dept: GENERAL RADIOLOGY | Age: 60
DRG: 194 | End: 2020-02-03
Payer: COMMERCIAL

## 2020-02-03 LAB
INR BLD: 1.99 (ref 0.86–1.14)
PROTHROMBIN TIME: 23.3 SEC (ref 10–13.2)
REPORT: NORMAL
RESPIRATORY PANEL PCR: NORMAL

## 2020-02-03 PROCEDURE — 2580000003 HC RX 258: Performed by: INTERNAL MEDICINE

## 2020-02-03 PROCEDURE — G0378 HOSPITAL OBSERVATION PER HR: HCPCS

## 2020-02-03 PROCEDURE — 6370000000 HC RX 637 (ALT 250 FOR IP): Performed by: INTERNAL MEDICINE

## 2020-02-03 PROCEDURE — 74230 X-RAY XM SWLNG FUNCJ C+: CPT

## 2020-02-03 PROCEDURE — 92610 EVALUATE SWALLOWING FUNCTION: CPT

## 2020-02-03 PROCEDURE — 36415 COLL VENOUS BLD VENIPUNCTURE: CPT

## 2020-02-03 PROCEDURE — 99232 SBSQ HOSP IP/OBS MODERATE 35: CPT | Performed by: INTERNAL MEDICINE

## 2020-02-03 PROCEDURE — 94640 AIRWAY INHALATION TREATMENT: CPT

## 2020-02-03 PROCEDURE — 92611 MOTION FLUOROSCOPY/SWALLOW: CPT

## 2020-02-03 PROCEDURE — 96366 THER/PROPH/DIAG IV INF ADDON: CPT

## 2020-02-03 PROCEDURE — 94669 MECHANICAL CHEST WALL OSCILL: CPT

## 2020-02-03 PROCEDURE — 99233 SBSQ HOSP IP/OBS HIGH 50: CPT | Performed by: INTERNAL MEDICINE

## 2020-02-03 PROCEDURE — 85610 PROTHROMBIN TIME: CPT

## 2020-02-03 PROCEDURE — 6360000002 HC RX W HCPCS: Performed by: INTERNAL MEDICINE

## 2020-02-03 PROCEDURE — 1200000000 HC SEMI PRIVATE

## 2020-02-03 RX ORDER — PREDNISONE 20 MG/1
20 TABLET ORAL DAILY
Status: DISCONTINUED | OUTPATIENT
Start: 2020-02-03 | End: 2020-02-04 | Stop reason: HOSPADM

## 2020-02-03 RX ORDER — WARFARIN SODIUM 5 MG/1
5 TABLET ORAL
Status: COMPLETED | OUTPATIENT
Start: 2020-02-03 | End: 2020-02-03

## 2020-02-03 RX ORDER — OXYCODONE HYDROCHLORIDE AND ACETAMINOPHEN 5; 325 MG/1; MG/1
1 TABLET ORAL EVERY 6 HOURS PRN
Status: DISCONTINUED | OUTPATIENT
Start: 2020-02-03 | End: 2020-02-04 | Stop reason: HOSPADM

## 2020-02-03 RX ADMIN — LEVOFLOXACIN 500 MG: 5 INJECTION, SOLUTION INTRAVENOUS at 13:05

## 2020-02-03 RX ADMIN — PRAVASTATIN SODIUM 40 MG: 40 TABLET ORAL at 20:51

## 2020-02-03 RX ADMIN — SACUBITRIL AND VALSARTAN 2 TABLET: 24; 26 TABLET, FILM COATED ORAL at 08:33

## 2020-02-03 RX ADMIN — AZELASTINE HYDROCHLORIDE 2 SPRAY: 137 SPRAY, METERED NASAL at 08:34

## 2020-02-03 RX ADMIN — ERGOCALCIFEROL 50000 UNITS: 1.25 CAPSULE ORAL at 08:44

## 2020-02-03 RX ADMIN — POLYETHYLENE GLYCOL 3350 17 G: 17 POWDER, FOR SOLUTION ORAL at 08:33

## 2020-02-03 RX ADMIN — METOPROLOL SUCCINATE 25 MG: 25 TABLET, EXTENDED RELEASE ORAL at 20:51

## 2020-02-03 RX ADMIN — GUAIFENESIN 600 MG: 600 TABLET ORAL at 08:33

## 2020-02-03 RX ADMIN — WARFARIN SODIUM 5 MG: 5 TABLET ORAL at 17:47

## 2020-02-03 RX ADMIN — SACUBITRIL AND VALSARTAN 1 TABLET: 24; 26 TABLET, FILM COATED ORAL at 20:51

## 2020-02-03 RX ADMIN — Medication 10 ML: at 08:33

## 2020-02-03 RX ADMIN — METOPROLOL SUCCINATE 25 MG: 25 TABLET, EXTENDED RELEASE ORAL at 11:24

## 2020-02-03 RX ADMIN — AZELASTINE HYDROCHLORIDE 2 SPRAY: 137 SPRAY, METERED NASAL at 20:54

## 2020-02-03 RX ADMIN — Medication 10 ML: at 20:52

## 2020-02-03 RX ADMIN — PREGABALIN 75 MG: 25 CAPSULE ORAL at 11:24

## 2020-02-03 RX ADMIN — PREDNISONE 20 MG: 20 TABLET ORAL at 08:33

## 2020-02-03 RX ADMIN — Medication 3 ML: at 10:37

## 2020-02-03 RX ADMIN — ASPIRIN 81 MG 81 MG: 81 TABLET ORAL at 08:33

## 2020-02-03 RX ADMIN — TAMSULOSIN HYDROCHLORIDE 0.4 MG: 0.4 CAPSULE ORAL at 11:24

## 2020-02-03 ASSESSMENT — PAIN SCALES - GENERAL
PAINLEVEL_OUTOF10: 0
PAINLEVEL_OUTOF10: 0

## 2020-02-03 NOTE — PROGRESS NOTES
pulmonary embolism. 2. New nodular airspace opacification in the right upper lobe may represent   bronchiolitis or developing pneumonitis. 3. Scattered areas of tree in bud nodularity predominantly in the lingula and   left lower lobe.  Once again, a small airways infectious process is suspected. 2/3/2020 MBS ordered:  patient reports feeling \"something is stuck\" in his throat    Date of Eval: 2/3/2020  Evaluating Therapist: Hyun Kent    Current Diet level:  Current Diet : Regular  Current Liquid Diet : Thin      Primary Complaint  Patient Complaint: patient reports no difficulty swallowing, but endorses sensation of \"blockage\" when attempting to cough up secretions    Pain:  Pain Level: 0    Reason for Referral  Desiree Ash was referred for a bedside swallow evaluation to assess the efficiency of his swallow function, identify signs and symptoms of aspiration and make recommendations regarding safe dietary consistencies, effective compensatory strategies, and safe eating environment. Impression  Dysphagia Diagnosis: Swallow function appears grossly intact(Will proceed with MBS as ordered by MD to r/o pharyngeal phase impairments.)  Dysphagia Outcome Severity Scale: Level 6: Within functional limits/Modified independence     Treatment Plan  Requires SLP Intervention: Yes  Duration/Frequency of Treatment: pending MBS  D/C Recommendations: To be determined       Recommended Diet and Intervention  Diet Solids Recommendation: Regular  Liquid Consistency Recommendation: Thin  Recommended Form of Meds: PO  Compensatory Swallowing Strategies: Upright as possible for all oral intake;Remain upright for 30-45 minutes after meals    Therapeutic Interventions: Diet tolerance monitoring;Patient/Family education    Treatment/Goals  Dysphagia Goals:  The patient will tolerate instrumental swallowing procedure    General  Chart Reviewed: Yes  Subjective  Subjective: Accepted and tolerated evaluation at bedside  Behavior/Cognition: Alert; Cooperative;Pleasant mood  Communication Observation: Functional  Follows Directions: Complex  Dentition: Dentures bottom  Patient Positioning: Upright in bed  Baseline Vocal Quality: Normal  Volitional Cough: Strong  Consistencies Administered: Thin - straw    Vision/Hearing  Vision Exceptions: Wears glasses at all times  Hearing: Within functional limits    Oral Motor Deficits  Oral Motor: Within functional limits    Oral Phase Dysfunction  Oral Phase: WFL     Indicators of Pharyngeal Phase Dysfunction  Pharyngeal Phase: WFL    Prognosis  Prognosis for safe diet advancement: good  Consulted and agree with results and recommendations: Patient;RN    Education  Patient Education: Completed on recommendations/plan  Patient Education Response: Verbalizes understanding       Therapy Time  SLP Individual Minutes  Time In: 3617  Time Out: [address]  Minutes: [place].  Sherry Wilcox, #[id number]  Speech-Language Pathologist  2/3/2020 1:10 PM

## 2020-02-03 NOTE — PROGRESS NOTES
Pulmonary Progress Note    Date of Admission: 2/1/2020   LOS: 2 days     Chief Complaint   Patient presents with    Chest Pain     started this morning        Assessment:     Bronchiolitis Obliterans  Multifocal Pneumonia  H/o dvt -on coumadin    Plan:      -RVP pending, Resp cx ngtd  -possible viral etiology given low procal, but with radiographic infiltrates recommend completing 7days levaquin  -currently tolerating RA  -has nebs PRN  -acapella for airway clearance    From pulmonary standpoint okay for discharge. He still needs to complete PFTs and 6mwt Dr. Nahed Mata had previously ordered for him. We will sign off at this time. Future Appointments   Date Time Provider Erin Gama   2/4/2020  3:00 PM [place] [facility]   4/29/2020 12:30 PM SCHEDULE, [facility] REMOTE TRANSMISSION [facility]   5/20/2020  1:30 PM Óscar Dhillon MD [facility]   7/1/2020 10:00 AM Jhon Crabtree MD [address] [place] [address]         24 Hour Events/Subjective  Remain on RA, still complaining can't clear secretions. Feels like it is getting stuck in his throat. Minimal improvement with nebs, acapella. Overall sputum production is much less and thinner. ROS:   No nausea  No Vomiting  No chest pain      Intake/Output Summary (Last 24 hours) at 2/3/2020 0844  Last data filed at 2/3/2020 0600  Gross per 24 hour   Intake 1927 ml   Output --   Net 1927 ml         PHYSICAL EXAM:   Blood pressure 113/74, pulse 71, temperature 97.7 °F (36.5 °C), temperature source Oral, resp. rate 18, height 5' 7\" (1.702 m), weight 206 lb 12.7 oz (93.8 kg), SpO2 90 %.'  Gen:  No acute distress. Eyes: PERRL. Anicteric sclera. No conjunctival injection. ENT: No discharge. Posterior oropharynx clear. External appearance of ears and nose normal.  Neck: Trachea midline. No mass   Resp:  No crackles. No wheezes. No rhonchi. No dullness on percussion. CV: Regular rate. Regular rhythm. No murmur or rub. No edema.    GI: Soft, BLOODU, GLUCOSEU, AMORPHOUS in the last 72 hours. Invalid input(s): Rupert Zak  No results for input(s): PH, PCO2, PO2 in the last 72 hours. Films:  Radiology Review:  Pertinent images / reports were reviewed as a part of this visit. CT Chest w/ contrast: No results found for this or any previous visit. CT Chest w/o contrast: No results found for this or any previous visit. CTPA:   Results for orders placed during the hospital encounter of 02/01/20   CT CHEST PULMONARY EMBOLISM W CONTRAST    Narrative EXAMINATION:  CTA OF THE CHEST 2/1/2020 12:45 pm    TECHNIQUE:  CTA of the chest was performed after the administration of intravenous  contrast.  Multiplanar reformatted images are provided for review. MIP  images are provided for review. Dose modulation, iterative reconstruction,  and/or weight based adjustment of the mA/kV was utilized to reduce the  radiation dose to as low as reasonably achievable. COMPARISON:  09/23/2019 CT    HISTORY:  ORDERING SYSTEM PROVIDED HISTORY: history of pe chest pain  TECHNOLOGIST PROVIDED HISTORY:  Reason for exam:->history of pe chest pain  Reason for Exam: chest pain, hx pacemaker, hx PE, Pt states he had palpations  last night but went away. Pt denies any CP overnight. Woke up this morning  with intermittent sharp CP  Acuity: Acute  Type of Exam: Initial    FINDINGS:  Pulmonary Arteries: Study is of good technical quality for evaluation of  pulmonary embolism. There are no filling defects to suggest pulmonary  embolism. Main pulmonary artery is normal in caliber. No evidence of right  ventricular strain. Mediastinum: Heart size is normal.  ICD stable. The aorta is normal.  No  lymphadenopathy. Thyroid and esophagus normal.    Lungs/pleura: The airways are patent. No pleural fluid is present. Nodular  airspace opacification has developed in the right upper lobe near the apex. A calcified granuloma is also seen at this location.   Subtle areas of tree in  bud nodularity are observed in the posterior segment of the left upper lobe  and in the superior segment of the left lower lobe. Upper Abdomen: Visualized abdominal structures in the upper abdomen are  normal.    Soft Tissues/Bones: No skeletal abnormalities throughout the chest.      Impression 1. No pulmonary embolism. 2. New nodular airspace opacification in the right upper lobe may represent  bronchiolitis or developing pneumonitis. 3. Scattered areas of tree in bud nodularity predominantly in the lingula and  left lower lobe. Once again, a small airways infectious process is suspected. CXR PA/LAT:   Results for orders placed during the hospital encounter of 02/01/20   XR CHEST STANDARD (2 VW)    Narrative EXAMINATION:  TWO XRAY VIEWS OF THE CHEST    2/1/2020 10:54 am    COMPARISON:  11/22/2019    HISTORY:  ORDERING SYSTEM PROVIDED HISTORY: Chest Pain  TECHNOLOGIST PROVIDED HISTORY:  Reason for exam:->Chest Pain  Reason for Exam: chest pain  Acuity: Acute  Type of Exam: Initial  Additional signs and symptoms: pacemaker    FINDINGS:  The lungs are without acute focal process. There is no effusion or  pneumothorax. The cardiomediastinal silhouette is without acute process. Stable pacemaker. The osseous structures are without acute process. Impression No acute process. CXR portable:   Results for orders placed during the hospital encounter of 09/23/19   XR CHEST PORTABLE    Narrative EXAMINATION:  ONE XRAY VIEW OF THE CHEST    9/25/2019 3:37 am    COMPARISON:  09/23/2019    HISTORY:  ORDERING SYSTEM PROVIDED HISTORY: chest pain  TECHNOLOGIST PROVIDED HISTORY:  Reason for exam:->chest pain  Reason for Exam: chest pain    FINDINGS:  A single frontal view of the chest demonstrates no acute skeletal  abnormality. There is a stable left subclavian pacemaker/AICD noted. The  heart size and mediastinal contours are stable, and within normal limits. The lung volumes are low.   However, the lungs are grossly clear, without  evidence of acute airspace consolidation, pneumothorax, or pleural effusion. Impression Negative expiratory chest.             This note was transcribed using [id number] [employer]. Please disregard any translational errors.     Thank you for this consult,    Louise Winston [address] [facility]

## 2020-02-03 NOTE — CONSULTS
Clinical Pharmacy Note  Warfarin Consult    Romero Story is a 60 y.o. male receiving warfarin managed by pharmacy. Patient being bridged with none. Warfarin Indication: history PE  Target INR range: 2-3   Dose prior to admission: 5 mg Monday, Wednesday, Friday and 2.5 mg all other days    Current warfarin drug-drug interactions: Levaquin, prednisone added 2/3/20    Recent Labs     02/01/20  1058 02/02/20  0650 02/03/20  0923   HGB 16.7 15.5  --    HCT 48.1 45.8  --    INR 1.82* 2.21* 1.99*       Assessment/Plan:    Warfarin 5 mg tonight. This is his normal home dose. Pt recently had a dosing deduction made in our [address]. Levaquin and Prednisone will play a role in driving the INR up  Will watch closely. Daily PT/INR until stable within therapeutic range. Thank you for the consult. Will continue to follow.   Yudy Barreto, [address] [facility]

## 2020-02-03 NOTE — PROGRESS NOTES
Patient alert and oriented at this time; denies pain and shortness of breath. Morning medications given without complication. Pt up as tolerated and call light within reach. Will continue to monitor.  Electronically signed by Stevie Oneil RN on 2/3/2020 at 8:46 AM

## 2020-02-03 NOTE — PROGRESS NOTES
[facility] Progress Note  2/3/2020 7:40 AM  Subjective:   Admit Date: 2/1/2020      Chief Complaint: Cough persists     Interval History: Cough persists--min productive --I can't bring this mucus up--something is blocking it--do you think I need a scope? ? Diet: DIET LOW SODIUM 2 GM; Medications:   Scheduled Meds:   pravastatin  40 mg Oral Nightly    guaiFENesin  600 mg Oral BID    sodium chloride nebulizer  3 mL Nebulization TID    aspirin  81 mg Oral Daily    azelastine  2 spray Each Nostril BID    vitamin D  50,000 Units Oral Weekly    metoprolol succinate  25 mg Oral Q12H    polyethylene glycol  17 g Oral Daily    pregabalin  150 mg Oral Daily    pregabalin  75 mg Oral Daily with breakfast    sacubitril-valsartan  2 tablet Oral Daily with breakfast    sacubitril-valsartan  1 tablet Oral Daily    tamsulosin  0.4 mg Oral Daily    sodium chloride flush  10 mL Intravenous 2 times per day    levofloxacin  500 mg Intravenous Q24H    warfarin (COUMADIN) daily dosing (placeholder)   Other RX Placeholder     Continuous Infusions:    Review of Systems -   General ROS: afebrile  Respiratory ROS: positive for - cough  Cardiovascular ROS: positive for - chest pain  Musculoskeletal ROS:positive for - :joint pain  Neurological ROS: no TIA or stroke symptoms    Objective:   Vitals: /85   Pulse 71   Temp 97.7 °F (36.5 °C) (Oral)   Resp 18   Ht 5' 7\" (1.702 m)   Wt 206 lb 12.7 oz (93.8 kg)   SpO2 90%   BMI 32.39 kg/m²   General appearance: alert and cooperative with exam  HEENT: Head: Normocephalic, no lesions, without obvious abnormality.   Neck: no adenopathy, no carotid bruit, no JVD, supple, symmetrical, trachea midline and thyroid not enlarged, symmetric, no tenderness/mass/nodules  Lungs: rhonchi bilaterally  Heart: regular rate and rhythm  Abdomen: soft, non-tender; bowel sounds normal; no masses,  no organomegaly  Extremities: extremities normal, atraumatic, no cyanosis or edema  Neurologic: Mental status: Alert, oriented, thought content appropriate    Admission on 02/01/2020   Component Date Value Ref Range Status    Ventricular Rate 02/01/2020 71  BPM Final    Atrial Rate 02/01/2020 71  BPM Final    P-R Interval 02/01/2020 170  ms Final    QRS Duration 02/01/2020 152  ms Final    Q-T Interval 02/01/2020 408  ms Final    QTc Calculation (Bazett) 02/01/2020 443  ms Final    P Axis 02/01/2020 56  degrees Final    R Axis 02/01/2020 -54  degrees Final    T Axis 02/01/2020 106  degrees Final    Diagnosis 02/01/2020 Normal sinus rhythmLeft axis deviationLeft bundle branch blockAbnormal ECGWhen compared with ECG of 23-SEP-2019 21:24,Vent.  rate has decreased BY  49 BPMConfirmed by [facility] SANDRO LINARES MD ([id number]) on 2/2/2020 7:25:42 PM   Final    WBC 02/01/2020 7.9  4.0 - 11.0 K/uL Final    RBC 02/01/2020 5.23  4.20 - 5.90 M/uL Final    Hemoglobin 02/01/2020 16.7  13.5 - 17.5 g/dL Final    Hematocrit 02/01/2020 48.1  40.5 - 52.5 % Final    MCV 02/01/2020 91.9  80.0 - 100.0 fL Final    MCH 02/01/2020 31.9  26.0 - 34.0 pg Final    MCHC 02/01/2020 34.7  31.0 - 36.0 g/dL Final    RDW 02/01/2020 12.9  12.4 - 15.4 % Final    Platelets 76/86/3389 181  135 - 450 K/uL Final    MPV 02/01/2020 7.2  5.0 - 10.5 fL Final    Neutrophils % 02/01/2020 58.0  % Final    Lymphocytes % 02/01/2020 27.2  % Final    Monocytes % 02/01/2020 7.4  % Final    Eosinophils % 02/01/2020 6.6  % Final    Basophils % 02/01/2020 0.8  % Final    Neutrophils Absolute 02/01/2020 4.6  1.7 - 7.7 K/uL Final    Lymphocytes Absolute 02/01/2020 2.1  1.0 - 5.1 K/uL Final    Monocytes Absolute 02/01/2020 0.6  0.0 - 1.3 K/uL Final    Eosinophils Absolute 02/01/2020 0.5  0.0 - 0.6 K/uL Final    Basophils Absolute 02/01/2020 0.1  0.0 - 0.2 K/uL Final    Troponin 02/01/2020 <0.01  <0.01 ng/mL Final    Methodology by Troponin T    Sodium 02/01/2020 141  136 - 145 mmol/L Final    Potassium reflex Magnesium 02/01/2020 3.9  3.5 - 5.1 mmol/L Final    Chloride 02/01/2020 106  99 - 110 mmol/L Final    CO2 02/01/2020 24  21 - 32 mmol/L Final    Anion Gap 02/01/2020 11  3 - 16 Final    Glucose 02/01/2020 110* 70 - 99 mg/dL Final    BUN 02/01/2020 7  7 - 20 mg/dL Final    CREATININE 02/01/2020 0.8  0.8 - 1.3 mg/dL Final    GFR Non- 02/01/2020 >60  >60 Final    Comment: >60 mL/min/1.73m2 EGFR, calc. for ages 25 and older using the  MDRD formula (not corrected for weight), is valid for stable  renal function.  GFR  02/01/2020 >60  >60 Final    Comment: Chronic Kidney Disease: less than 60 ml/min/1.73 sq.m. Kidney Failure: less than 15 ml/min/1.73 sq.m. Results valid for patients 18 years and older.  Calcium 02/01/2020 10.7* 8.3 - 10.6 mg/dL Final    Total Protein 02/01/2020 6.9  6.4 - 8.2 g/dL Final    Alb 02/01/2020 4.3  3.4 - 5.0 g/dL Final    Albumin/Globulin Ratio 02/01/2020 1.7  1.1 - 2.2 Final    Total Bilirubin 02/01/2020 0.7  0.0 - 1.0 mg/dL Final    Alkaline Phosphatase 02/01/2020 131* 40 - 129 U/L Final    ALT 02/01/2020 25  10 - 40 U/L Final    AST 02/01/2020 22  15 - 37 U/L Final    Globulin 02/01/2020 2.6  g/dL Final    Protime 02/01/2020 21.2* 10.0 - 13.2 sec Final    Comment: Effective 11-19-19 09:00am EST  Please note reference ranges have  changed for PT and INR Testing.  INR 02/01/2020 1.82* 0.86 - 1.14 Final    Comment: Effective 11/19/19 at 09:00am EST    Normal: 0.86 - 1.14  Therapeutic: 2.0 - 3.0  Pros. Valve: 2.5 - 3.5  AMI: 2.0 - 3.0      aPTT 02/01/2020 44.4* 24.2 - 36.2 sec Final    Comment: Therapeutic range: 49.0 - 76.0 sec    Effective 11-19-19 9:00am EST  Please note reference ranges have  changed for PTT Testing.  Pro-BNP 02/01/2020 66  0 - 124 pg/mL Final    Comment: Methodology by NT-proBNP    An age-independent cutoff point of 300 pg/ml has a 98%  negative predictive value excluding acute heart failure.   Values exceeding the age-related cutoff values (450 pg/mL if  age<50, 900 if 50-75 and 1800 if >75) has 90% sensitivity and  84% specificity for diagnosing acute HF. In patients with  renal compromise (eGFR<60) values greater than 1200pg/ml have  a diagnostic sensitivity and specificity of 89% and 72% for  acute HF.  Blood Culture, Routine 02/01/2020 No Growth to date. Any change in status will be called. Preliminary    Culture, Blood 2 02/01/2020 No Growth to date. Any change in status will be called.    Preliminary    Troponin 02/01/2020 <0.01  <0.01 ng/mL Final    Methodology by Troponin T    Troponin 02/02/2020 <0.01  <0.01 ng/mL Final    Methodology by Troponin T    WBC 02/02/2020 4.6  4.0 - 11.0 K/uL Final    RBC 02/02/2020 4.96  4.20 - 5.90 M/uL Final    Hemoglobin 02/02/2020 15.5  13.5 - 17.5 g/dL Final    Hematocrit 02/02/2020 45.8  40.5 - 52.5 % Final    MCV 02/02/2020 92.4  80.0 - 100.0 fL Final    MCH 02/02/2020 31.2  26.0 - 34.0 pg Final    MCHC 02/02/2020 33.7  31.0 - 36.0 g/dL Final    RDW 02/02/2020 13.0  12.4 - 15.4 % Final    Platelets 97/15/0637 181  135 - 450 K/uL Final    MPV 02/02/2020 7.4  5.0 - 10.5 fL Final    Neutrophils % 02/02/2020 39.6  % Final    Lymphocytes % 02/02/2020 39.5  % Final    Monocytes % 02/02/2020 11.8  % Final    Eosinophils % 02/02/2020 8.1  % Final    Basophils % 02/02/2020 1.0  % Final    Neutrophils Absolute 02/02/2020 1.8  1.7 - 7.7 K/uL Final    Lymphocytes Absolute 02/02/2020 1.8  1.0 - 5.1 K/uL Final    Monocytes Absolute 02/02/2020 0.5  0.0 - 1.3 K/uL Final    Eosinophils Absolute 02/02/2020 0.4  0.0 - 0.6 K/uL Final    Basophils Absolute 02/02/2020 0.0  0.0 - 0.2 K/uL Final    Ventricular Rate 02/01/2020 67  BPM Final    Atrial Rate 02/01/2020 67  BPM Final    P-R Interval 02/01/2020 168  ms Final    QRS Duration 02/01/2020 150  ms Final    Q-T Interval 02/01/2020 442  ms Final    QTc Calculation (Bazett) 02/01/2020 467  ms Final    P Axis 02/01/2020 48  degrees Final    R Axis 02/01/2020 -48  degrees Final    T Axis 02/01/2020 107  degrees Final    Diagnosis 02/01/2020 Normal sinus rhythmLeft axis deviationLeft bundle branch blockAbnormal ECGWhen compared with ECG of 2/1/2020 10:44No significant changeConfirmed by Attila Stinson MD, Isha Sharma ([id number]) on 2/2/2020 7:28:24 PM   Final    Protime 02/02/2020 25.9* 10.0 - 13.2 sec Final    Comment: Effective 11-19-19 09:00am EST  Please note reference ranges have  changed for PT and INR Testing.  INR 02/02/2020 2.21* 0.86 - 1.14 Final    Comment: Effective 11/19/19 at 09:00am EST    Normal: 0.86 - 1.14  Therapeutic: 2.0 - 3.0  Pros. Valve: 2.5 - 3.5  AMI: 2.0 - 3.0      Procalcitonin 02/02/2020 0.08  0.00 - 0.15 ng/mL Final    Comment: Reference Values:  Adults:                   <=0.15 ng/mL  Children >=72 hours old:  <=0.15 ng/mL  Children <72 hours old:   <2.0 ng/mL at birth, rises to                            <=20 ng/mL at 18-30 hours of age,                            then falls to <=0.15 ng/mL by                            72 hours of age. Interpretation:  General considerations in children older  than 72 hours and in adults:    <0.15    Significant bacterial infection unlikely. 0.15-2.0 Localized infections may be associated with           such borderline levels. >2.0     Highly suggestive of systemic bacterial           infections/sepsis or severe localized bacterial           infection such as severe pneumonia, meningitis,           or peritonitis. With successful antibiotic therapy, PCT levels should fall  immediately. (Half-life of 24 to 36 hours). Elevated PCT can occur after major burns, severe trauma, acute  multiorgan failure, major abdominal or cardiothoracic surgery. In cases of noninfectiou                           s elevations, PCT levels should begin to  fall after 24-48 hours.   Autoimmune diseases, chronic  inflammatory processes, viral infections, and mild localized  bacterial infections rarely lead to elevations of PCT  of >0.5 ng/mL. PCT levels may also be elevated in medullary  thyroid carcinoma, small cell carcinoma, and renal failure. Assessment & Plan:   Principal Problem:    Pneumonia---CAP -D#3 of Levaquin --persisting infiltrate --min mucus--will cont  mucinex and add  prednisone --procalcitonin--wnl   Active Problems:    Essential hypertension, benign--Continue current therapy      Bronchiolitis obliterans (HCC)--chr     CAD (coronary artery disease)--Continue current therapy  --remains NSR     Biventricular ICD (implantable cardioverter-defibrillator) in place    Nephrolithiasis    Thrombocytopenia (HCC)    Post-nasal drip  Resolved Problems:    * No resolved hospital problems.  *  Add low dose prednisone--cont levaquin--await resp panels   Please note that over 35 minutes was spent in evaluating the patient, review of records and results, discussion with staff/family, etc.    Floyd Ponce MD

## 2020-02-03 NOTE — PLAN OF CARE
Problem: Pain:  Description  Pain management should include both nonpharmacologic and pharmacologic interventions.   Goal: Pain level will decrease  Description  Pain level will decrease  Outcome: Ongoing  Goal: Control of acute pain  Description  Control of acute pain  Outcome: Ongoing  Goal: Control of chronic pain  Description  Control of chronic pain  Outcome: Ongoing     Problem: Airway Clearance - Ineffective:  Goal: Clear lung sounds  Description  Clear lung sounds  Outcome: Ongoing  Goal: Ability to maintain a clear airway will improve  Description  Ability to maintain a clear airway will improve  Outcome: Ongoing     Problem: Fluid Volume - Deficit:  Goal: Achieves intake and output within specified parameters  Description  Achieves intake and output within specified parameters  Outcome: Ongoing     Problem: Gas Exchange - Impaired:  Goal: Levels of oxygenation will improve  Description  Levels of oxygenation will improve  Outcome: Ongoing

## 2020-02-03 NOTE — PLAN OF CARE
Problem: Pain:  Goal: Pain level will decrease  Description  Pain level will decrease  2/3/2020 0846 by Isabella Newsome RN  Outcome: Ongoing  2/2/2020 2208 by Michael Pat RN  Outcome: Ongoing  Goal: Control of acute pain  Description  Control of acute pain  2/3/2020 0846 by Isabella Newsome RN  Outcome: Ongoing  2/2/2020 2208 by Michael Pat RN  Outcome: Ongoing  Goal: Control of chronic pain  Description  Control of chronic pain  2/3/2020 0846 by Isabella Newsome RN  Outcome: Ongoing  2/2/2020 2208 by Michael Pat RN  Outcome: Ongoing     Problem: Airway Clearance - Ineffective:  Goal: Clear lung sounds  Description  Clear lung sounds  2/3/2020 0846 by Isabella Newsome RN  Outcome: Ongoing  2/2/2020 2208 by Michael Pat RN  Outcome: Ongoing  Goal: Ability to maintain a clear airway will improve  Description  Ability to maintain a clear airway will improve  2/3/2020 0846 by Isabella Newsome RN  Outcome: Ongoing  2/2/2020 2208 by Michael Pat RN  Outcome: Ongoing     Problem: Fluid Volume - Deficit:  Goal: Achieves intake and output within specified parameters  Description  Achieves intake and output within specified parameters  2/3/2020 0846 by Isabella Newsome RN  Outcome: Ongoing  2/2/2020 2208 by Michael Pat RN  Outcome: Ongoing     Problem: Gas Exchange - Impaired:  Goal: Levels of oxygenation will improve  Description  Levels of oxygenation will improve  2/3/2020 0846 by Isabella Newsome RN  Outcome: Ongoing  2/2/2020 2208 by Michael Pat RN  Outcome: Ongoing

## 2020-02-03 NOTE — PROCEDURES
(Dysphagia II); Dysphagia Soft and Bite-Sized (Dysphagia III); Reg solid    Impressions:  Treatment Dx: pharyngeal dysphagia    Oral and pharyngeal phases appears grossly WFL. Patient noted with piecemeal swallow and slow, intentional, purposeful bolus prep with PO which is reported as intentional.    Due to no significant noted impairments during this study, it may be beneficial to consider esophagram vs EGD for continued assessment. Dysphagia Outcome Severity Scale: Level 6: Within functional limits/Modified independence  Penetration-Aspiration Scale (PAS): 1 - Material does not enter the airway    Recommended Diet:  Solid consistency: Regular  Liquid consistency: Thin  Liquid administration via: Cup  Medication administration: PO  Compensatory Swallowing Strategies: Upright as possible for all oral intake;Remain upright for 30-45 minutes after meals    Recommendations/Treatment  Requires SLP Intervention: NO  Patient Education: Completed on recommendations/plan  Patient Education Response: Verbalizes understanding    Prognosis  Prognosis for safe diet advancement: good    Oral Preparation / Oral Phase  Oral Phase: WFL  Oral Phase - Major Contributing Deficits  Piecemeal Swallowing: (reported as intentional )    Pharyngeal Phase  Pharyngeal Phase: WFL    Upper Esophageal Phase  Upper Esophageal Screen: (WFL for upper esophagus viewable during MBS)      Therapy Time:   Individual   Time In 1415   Time Out 1445   Minutes 30       Irina Tuttle, [address] [facility], [id number]  Speech-Language Pathologist  2/3/2020, 2:45 PM

## 2020-02-04 ENCOUNTER — APPOINTMENT (OUTPATIENT)
Dept: GENERAL RADIOLOGY | Age: 60
DRG: 194 | End: 2020-02-04
Payer: COMMERCIAL

## 2020-02-04 ENCOUNTER — APPOINTMENT (OUTPATIENT)
Dept: PHARMACY | Age: 60
End: 2020-02-04
Payer: COMMERCIAL

## 2020-02-04 VITALS
DIASTOLIC BLOOD PRESSURE: 67 MMHG | SYSTOLIC BLOOD PRESSURE: 121 MMHG | WEIGHT: 206.79 LBS | BODY MASS INDEX: 32.46 KG/M2 | TEMPERATURE: 97.4 F | HEART RATE: 78 BPM | OXYGEN SATURATION: 93 % | HEIGHT: 67 IN | RESPIRATION RATE: 16 BRPM

## 2020-02-04 LAB
ANION GAP SERPL CALCULATED.3IONS-SCNC: 10 MMOL/L (ref 3–16)
BASOPHILS ABSOLUTE: 0 K/UL (ref 0–0.2)
BASOPHILS RELATIVE PERCENT: 0.5 %
BUN BLDV-MCNC: 11 MG/DL (ref 7–20)
CALCIUM SERPL-MCNC: 10.9 MG/DL (ref 8.3–10.6)
CHLORIDE BLD-SCNC: 104 MMOL/L (ref 99–110)
CO2: 26 MMOL/L (ref 21–32)
CREAT SERPL-MCNC: 0.9 MG/DL (ref 0.8–1.3)
EOSINOPHILS ABSOLUTE: 0.1 K/UL (ref 0–0.6)
EOSINOPHILS RELATIVE PERCENT: 1 %
GFR AFRICAN AMERICAN: >60
GFR NON-AFRICAN AMERICAN: >60
GLUCOSE BLD-MCNC: 100 MG/DL (ref 70–99)
HCT VFR BLD CALC: 44.4 % (ref 40.5–52.5)
HEMOGLOBIN: 15.2 G/DL (ref 13.5–17.5)
INR BLD: 1.7 (ref 0.86–1.14)
LYMPHOCYTES ABSOLUTE: 2.1 K/UL (ref 1–5.1)
LYMPHOCYTES RELATIVE PERCENT: 26.2 %
MCH RBC QN AUTO: 31.2 PG (ref 26–34)
MCHC RBC AUTO-ENTMCNC: 34.2 G/DL (ref 31–36)
MCV RBC AUTO: 91.3 FL (ref 80–100)
MONOCYTES ABSOLUTE: 0.7 K/UL (ref 0–1.3)
MONOCYTES RELATIVE PERCENT: 9.2 %
NEUTROPHILS ABSOLUTE: 5.1 K/UL (ref 1.7–7.7)
NEUTROPHILS RELATIVE PERCENT: 63.1 %
PDW BLD-RTO: 12.9 % (ref 12.4–15.4)
PLATELET # BLD: 170 K/UL (ref 135–450)
PMV BLD AUTO: 7.6 FL (ref 5–10.5)
POTASSIUM SERPL-SCNC: 4.1 MMOL/L (ref 3.5–5.1)
PROTHROMBIN TIME: 19.8 SEC (ref 10–13.2)
RBC # BLD: 4.86 M/UL (ref 4.2–5.9)
SODIUM BLD-SCNC: 140 MMOL/L (ref 136–145)
WBC # BLD: 8 K/UL (ref 4–11)

## 2020-02-04 PROCEDURE — 85025 COMPLETE CBC W/AUTO DIFF WBC: CPT

## 2020-02-04 PROCEDURE — 85610 PROTHROMBIN TIME: CPT

## 2020-02-04 PROCEDURE — 94640 AIRWAY INHALATION TREATMENT: CPT

## 2020-02-04 PROCEDURE — 6370000000 HC RX 637 (ALT 250 FOR IP): Performed by: INTERNAL MEDICINE

## 2020-02-04 PROCEDURE — 94669 MECHANICAL CHEST WALL OSCILL: CPT

## 2020-02-04 PROCEDURE — 80048 BASIC METABOLIC PNL TOTAL CA: CPT

## 2020-02-04 PROCEDURE — 6360000002 HC RX W HCPCS: Performed by: INTERNAL MEDICINE

## 2020-02-04 PROCEDURE — G0378 HOSPITAL OBSERVATION PER HR: HCPCS

## 2020-02-04 PROCEDURE — 94761 N-INVAS EAR/PLS OXIMETRY MLT: CPT

## 2020-02-04 PROCEDURE — 2580000003 HC RX 258: Performed by: INTERNAL MEDICINE

## 2020-02-04 PROCEDURE — 99239 HOSP IP/OBS DSCHRG MGMT >30: CPT | Performed by: INTERNAL MEDICINE

## 2020-02-04 PROCEDURE — 36415 COLL VENOUS BLD VENIPUNCTURE: CPT

## 2020-02-04 PROCEDURE — 71045 X-RAY EXAM CHEST 1 VIEW: CPT

## 2020-02-04 RX ORDER — LEVOFLOXACIN 500 MG/1
500 TABLET, FILM COATED ORAL DAILY
Qty: 5 TABLET | Refills: 0 | Status: SHIPPED | OUTPATIENT
Start: 2020-02-04 | End: 2020-02-09

## 2020-02-04 RX ORDER — WARFARIN SODIUM 5 MG/1
5 TABLET ORAL
Status: COMPLETED | OUTPATIENT
Start: 2020-02-04 | End: 2020-02-04

## 2020-02-04 RX ORDER — PREDNISONE 20 MG/1
20 TABLET ORAL DAILY
Qty: 5 TABLET | Refills: 0 | Status: SHIPPED | OUTPATIENT
Start: 2020-02-05 | End: 2020-02-10

## 2020-02-04 RX ORDER — GUAIFENESIN 600 MG/1
600 TABLET, EXTENDED RELEASE ORAL 2 TIMES DAILY
Qty: 30 TABLET | Refills: 0 | Status: ON HOLD | OUTPATIENT
Start: 2020-02-04 | End: 2021-04-27 | Stop reason: HOSPADM

## 2020-02-04 RX ADMIN — WARFARIN SODIUM 5 MG: 5 TABLET ORAL at 17:48

## 2020-02-04 RX ADMIN — TAMSULOSIN HYDROCHLORIDE 0.4 MG: 0.4 CAPSULE ORAL at 08:40

## 2020-02-04 RX ADMIN — GUAIFENESIN 600 MG: 600 TABLET ORAL at 11:26

## 2020-02-04 RX ADMIN — PREGABALIN 75 MG: 25 CAPSULE ORAL at 11:26

## 2020-02-04 RX ADMIN — FUROSEMIDE 20 MG: 20 TABLET ORAL at 11:26

## 2020-02-04 RX ADMIN — ALBUTEROL SULFATE 2.5 MG: 2.5 SOLUTION RESPIRATORY (INHALATION) at 08:11

## 2020-02-04 RX ADMIN — GUAIFENESIN 600 MG: 600 TABLET ORAL at 00:25

## 2020-02-04 RX ADMIN — OXYCODONE HYDROCHLORIDE AND ACETAMINOPHEN 1 TABLET: 5; 325 TABLET ORAL at 00:25

## 2020-02-04 RX ADMIN — SACUBITRIL AND VALSARTAN 2 TABLET: 24; 26 TABLET, FILM COATED ORAL at 08:40

## 2020-02-04 RX ADMIN — POLYETHYLENE GLYCOL 3350 17 G: 17 POWDER, FOR SOLUTION ORAL at 08:40

## 2020-02-04 RX ADMIN — Medication 3 ML: at 08:12

## 2020-02-04 RX ADMIN — PREDNISONE 20 MG: 20 TABLET ORAL at 08:40

## 2020-02-04 RX ADMIN — ASPIRIN 81 MG 81 MG: 81 TABLET ORAL at 08:40

## 2020-02-04 RX ADMIN — PREGABALIN 150 MG: 75 CAPSULE ORAL at 02:12

## 2020-02-04 RX ADMIN — AZELASTINE HYDROCHLORIDE 2 SPRAY: 137 SPRAY, METERED NASAL at 08:41

## 2020-02-04 RX ADMIN — METOPROLOL SUCCINATE 25 MG: 25 TABLET, EXTENDED RELEASE ORAL at 11:26

## 2020-02-04 RX ADMIN — LEVOFLOXACIN 500 MG: 5 INJECTION, SOLUTION INTRAVENOUS at 12:30

## 2020-02-04 RX ADMIN — ZOLPIDEM TARTRATE 10 MG: 5 TABLET ORAL at 00:25

## 2020-02-04 RX ADMIN — Medication 10 ML: at 08:41

## 2020-02-04 ASSESSMENT — PAIN DESCRIPTION - DESCRIPTORS: DESCRIPTORS: PATIENT UNABLE TO DESCRIBE

## 2020-02-04 ASSESSMENT — PAIN - FUNCTIONAL ASSESSMENT: PAIN_FUNCTIONAL_ASSESSMENT: ACTIVITIES ARE NOT PREVENTED

## 2020-02-04 ASSESSMENT — PAIN DESCRIPTION - LOCATION: LOCATION: CHEST;FLANK

## 2020-02-04 ASSESSMENT — PAIN SCALES - GENERAL
PAINLEVEL_OUTOF10: 9
PAINLEVEL_OUTOF10: 0

## 2020-02-04 ASSESSMENT — PAIN DESCRIPTION - PAIN TYPE: TYPE: ACUTE PAIN

## 2020-02-04 ASSESSMENT — PAIN DESCRIPTION - ORIENTATION: ORIENTATION: RIGHT

## 2020-02-04 NOTE — PROGRESS NOTES
60year old with a history of pulmonary embolism on coumadin, prostatitis, neuropathy, irritable bowel syndrome, hypertension, hyperlipidemia, GERD, fibromyalgia, cardiomyopathy, bronchiolitis obliterans, sinus surgery, pacemaker placement, lithotripsy, hernia repair, cholecystectomy. Dr. Marce Gay was asked to see patient but is not available so he called me and asked me to come and see him. He presented to the emergency department February 2 with 2 weeks of a productive cough with green phlegm and left parasternal chest pain. He has a feeling of phlegm stuck in his throat that he cannot clear. Labs today show a normal renal panel, normal CBC, INR 1.70. Modified barium swallow was grossly within normal limits. He feels like he is having difiuclty getting phlegm to come back up. Sometimes food gets stuck in the esophagus. His last coumadin dose was last night. Feels like he has a fullness in his throat and when he tries to cough up phlegm it won't come up. Swallowing is fine. 3 months ago had a pill stuck in his esophagus. No nausea or vomiting. NO weight loss. On nocturnal o2. No constipation. On examination he is alert and oriented ×3. She is in no acute distress. He is not on oxygen. His abdomen is soft nontender nondistended. Assess: Dysphagia. Intermittent dysphagia to pills. Prior endoscopy with dilation in 2014. Repeat endoscopy negative for esophageal problem in 2016. He did get a pill stuck 3 months ago. As a result, I do think he would benefit from repeat dilation. I do not think we are going to find anything endoscopically in the esophagus that would explain his sensation of inability to cough up mucus. Would need INR<1.5 prior to performing endoscopic dilation. Will ask Dr. Yumiko Rivas if we can hold coumadin and recheck INR tomorrow. If <1.5 then could do endoscopy while here. If not, could always set up endoscopy next week with Dr. Marce Gay when he is back in town.   Will follow.

## 2020-02-04 NOTE — CONSULTS
Neuropathy     right side-chest    Pneumothorax 2011    Right    Prostatitis     Pulmonary embolism on right [facility]) 2011    upper lobe-coumadin 2011    Sacroiliitis [facility])      Past Surgical History:   Procedure Laterality Date    CHOLECYSTECTOMY  2007    COLONOSCOPY  9/21/2012    dr Doloris Prader  05/17/2019    RIGHT SIDED URETEROSCOPY  Diagnostic, retrograde pyelogram and fulguration of previously resected bladder tumor base    CYSTOSCOPY Right 5/17/2019    RIGHT SIDED URETEROSCOPY FLUGERATION  OF BLADDER performed by Tutu Cotton MD at [address] [place] [address]  2015    LITHOTRIPSY      PACEMAKER PLACEMENT      SINUS SURGERY      Made opening larger in sinuses    UPPER GASTROINTESTINAL ENDOSCOPY  3/28/2014    dr Wilma mercedes     Family History   Problem Relation Age of Onset    High Blood Pressure Mother     Dementia Mother     Cancer Father         Pancreatic Ca     Social History     Socioeconomic History    Marital status:      Spouse name: None    Number of children: None    Years of education: None    Highest education level: None   Occupational History    None   Social Needs    Financial resource strain: None    Food insecurity:     Worry: None     Inability: None    Transportation needs:     Medical: None     Non-medical: None   Tobacco Use    Smoking status: Never Smoker    Smokeless tobacco: Never Used   Substance and Sexual Activity    Alcohol use: No     Comment: occ    Drug use: No    Sexual activity: Yes     Partners: Female     Comment: wife   Lifestyle    Physical activity:     Days per week: None     Minutes per session: None    Stress: None   Relationships    Social connections:     Talks on phone: None     Gets together: None     Attends Druze service: None     Active member of club or organization: None     Attends meetings of clubs or organizations: None     Relationship status: None    Intimate partner violence:     Fear of current or ex partner: None     Emotionally abused: None     Physically abused: None     Forced sexual activity: None   Other Topics Concern    None   Social History Narrative    None       MEDICATIONS   SCHEDULED:  warfarin, 5 mg, Once  predniSONE, 20 mg, Daily  pravastatin, 40 mg, Nightly  guaiFENesin, 600 mg, BID  sodium chloride nebulizer, 3 mL, TID  aspirin, 81 mg, Daily  azelastine, 2 spray, BID  vitamin D, 50,000 Units, Weekly  metoprolol succinate, 25 mg, Q12H  polyethylene glycol, 17 g, Daily  pregabalin, 150 mg, Daily  pregabalin, 75 mg, Daily with breakfast  sacubitril-valsartan, 2 tablet, Daily with breakfast  sacubitril-valsartan, 1 tablet, Daily  tamsulosin, 0.4 mg, Daily  sodium chloride flush, 10 mL, 2 times per day  levofloxacin, 500 mg, Q24H  warfarin (COUMADIN) daily dosing (placeholder), , RX Placeholder      FLUIDS/DRIPS:    PRNs: oxyCODONE-acetaminophen, 1 tablet, Q6H PRN  ondansetron, 4 mg, Q8H PRN  dicyclomine, 10 mg, TID PRN  furosemide, 20 mg, Daily PRN  methocarbamol, 500 mg, TID PRN  zolpidem, 10 mg, Nightly PRN  albuterol, 2.5 mg, Q4H PRN  sodium chloride flush, 10 mL, PRN  magnesium hydroxide, 30 mL, Daily PRN  ondansetron, 4 mg, Q6H PRN  acetaminophen, 650 mg, Q4H PRN      ALLERGIES:  He   Allergies   Allergen Reactions    Ipratropium Bromide Hfa Shortness Of Breath    Atrovent Nasal Spray [Ipratropium] Other (See Comments)     Chest tightness    Doxycycline Hives    Morphine Other (See Comments)     \"makes me feel funny\"      Nitroglycerin Other (See Comments)     Severe hypotension (went from 043 to 66 systolic)    Sulfa Antibiotics Rash    Vicodin [Hydrocodone-Acetaminophen] Rash       REVIEW OF SYSTEMS   Pertinent ROS noted in HPI    PHYSICAL EXAM     Vitals:    02/03/20 1956 02/04/20 0005 02/04/20 0818 02/04/20 0941   BP: (!) 133/90 (!) 152/84  133/81   Pulse: 90 87  80   Resp: 16 18  18   Temp: 98.4 °F (36.9 °C) 98 °F (36.7 °C)  97.7 °F (36.5 °C)   TempSrc: Oral Oral

## 2020-02-04 NOTE — PLAN OF CARE
Problem: Airway Clearance - Ineffective:  Goal: Clear lung sounds  Description  Clear lung sounds  Outcome: Ongoing  Goal: Ability to maintain a clear airway will improve  Description  Ability to maintain a clear airway will improve  Outcome: Ongoing     Problem: Fluid Volume - Deficit:  Goal: Achieves intake and output within specified parameters  Description  Achieves intake and output within specified parameters  Outcome: Ongoing     Problem: Gas Exchange - Impaired:  Goal: Levels of oxygenation will improve  Description  Levels of oxygenation will improve  Outcome: Ongoing

## 2020-02-04 NOTE — PROGRESS NOTES
Pt alert and oriented x4. Resting in bed. Complains of acute pain to right chest/ flank area. Rates pain 9/10. Increased anxiety and heart rate noted. Obtained an order from Dr. Khushi Guerrero on call for PRN Percocet. Administered Percocet and Ambien per order. Pt refused scheduled Lyrica because he was apprehensive of taking it with the Percocet. No further complaints or requests voiced. Respirations easy and unlabored on room air. Denies shortness of breath. Call light within reach. Will continue to monitor.

## 2020-02-04 NOTE — PROGRESS NOTES
Clinical Pharmacy Note  Warfarin Consult    Romero Story is a 60 y.o. male receiving warfarin managed by pharmacy. Patient being bridged with none. Warfarin Indication: history PE  Target INR range: 2-3   Dose prior to admission: 5 mg Monday, Wednesday, Friday and 2.5 mg all other days    Current warfarin drug-drug interactions: Levaquin, prednisone added 2/3/20    Recent Labs     02/01/20  1058 02/02/20  0650 02/03/20  0923 02/04/20  0747   HGB 16.7 15.5  --   --    HCT 48.1 45.8  --   --    INR 1.82* 2.21* 1.99* 1.70*       Assessment/Plan:    Repeat Warfarin 5 mg tonight. Pt recently had a dosing reduction made in our [address]. Levaquin and Prednisone will play a role in driving the INR up  Will watch closely. Daily PT/INR until stable within therapeutic range. Thank you for the consult. Will continue to follow.   Janneth Scott, [facility]

## 2020-02-04 NOTE — DISCHARGE INSTR - COC
Continuity of Care Form    Patient Name: Mary Rodriguez   :  1960  MRN:  [id number]    Admit date:  2020  Discharge date:  ***    Code Status Order: Full Code   Advance Directives:   Advance Care Flowsheet Documentation     Date/Time Healthcare Directive Type of Healthcare Directive Copy in [address] Agent's Name Healthcare Agent's Phone Number    20  Yes, patient has an advance directive for healthcare treatment  Durable power of  for health care  --  --  --  --          Admitting Physician:  Ulysses Dow MD  PCP: Adolfo Mock MD    Discharging Nurse: [facility] Unit/Room#: J3D-7674/7855-23  Discharging Unit Phone Number: ***    Emergency Contact:   Extended Emergency Contact Information  Primary Emergency Contact: Adelaida Javier  Address: [address] [place] [address] of [address] [place] [address] Phone: [phone]  Mobile Phone: [phone]  Relation: Spouse  Secondary Emergency Contact: Isamar Butterfield [address] [place] [address] Phone: [phone]  Relation: Child    Past Surgical History:  Past Surgical History:   Procedure Laterality Date    CHOLECYSTECTOMY  2007    COLONOSCOPY  2012    dr Terrence Mohr  2019    RIGHT SIDED URETEROSCOPY  Diagnostic, retrograde pyelogram and fulguration of previously resected bladder tumor base    CYSTOSCOPY Right 2019    RIGHT SIDED URETEROSCOPY FLUGERATION  OF BLADDER performed by Nicolás Chinchilla MD at [address] [place] [address]      LITHOTRIPSY      [address] opening larger in sinuses    UPPER GASTRO[facility]TESTIN[facility]  [facility]       Immunization History:   Immunization History   Administered Date(s) Administered    Influenza, Quadv, IM, PF (6 mo and older Fluzone, Flulaval, Fluarix, and 3 yrs and older Afluria) 10/27/2017, 2019    R00.0    Iron deficiency anemia due to chronic blood loss D50.0    Thrombocytopenia (HCC) D69.6    Hypoxia R09.02    Acute UTI N39.0    Abdominal wall hematoma S30. 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    Elevated liver enzymes R74.8    Acute flank pain H74.6    Colicky LLQ abdominal pain R10.32    Arm pain M79.603    Dysuria R30.0    Fever R50.9    Chest pain R07.9    Episode of shaking R25.1    Post-nasal drip R09.82       Isolation/Infection:   Isolation          No Isolation        Patient Infection Status     None to display          Nurse Assessment:  Last Vital Signs: /67   Pulse 78   Temp 97.4 °F (36.3 °C) (Oral)   Resp 16   Ht 5' 7\" (1.702 m)   Wt 206 lb 12.7 oz (93.8 kg)   SpO2 93%   BMI 32.39 kg/m²     Last documented pain score (0-10 scale): Pain Level: 0  Last Weight:   Wt Readings from Last 1 Encounters:   02/03/20 206 lb 12.7 oz (93.8 kg)     Mental Status:  {IP PT MENTAL STATUS:22928}    IV Access:  { ZOË IV ACCESS:202176651}    Nursing Mobility/ADLs:  Walking   {CHP DME ZHHM:089113249}  Transfer  {CHP DME LSQK:785958752}  Bathing  {CHP DME QKBH:963573300}  Dressing  {CHP DME PWBB:224826467}  Toileting  {CHP DME OGAV:800406893}  Feeding  {CHP DME OFNU:031965492}  Med Admin  {P DME VYCV:319710617}  Med Delivery   { ZOË MED Delivery:268684739}    Wound Care Documentation and Therapy:  Incision 10/10/17 Chest Left (Active)   Number of days: 847        Elimination:  Continence:   · Bowel: {YES / MV:46239}  · Bladder: {YES / NU:77173}  Urinary Catheter: {Urinary Catheter:064210040}   Colostomy/Ileostomy/Ileal Conduit: {YES / PY:19668}       Date of Last BM: ***    Intake/Output Summary (Last 24 hours) at 2/4/2020 1606  Last data filed at 2/4/2020 1312  Gross per 24 hour   Intake 1660 ml   Output --   Net 1660 ml     I/O last 3 completed shifts:   In: 1660 [P.O.:1560; IV

## 2020-02-04 NOTE — PROGRESS NOTES
tenderness/mass/nodules  Lungs: clear to auscultation bilaterally  Heart: regular rate and rhythm, S1, S2 normal, no murmur, click, rub or gallop  Abdomen: soft, non-tender; bowel sounds normal; no masses,  no organomegaly  Extremities: decr rom l/s spine   Neurologic: Mental status: Alert, oriented, thought content appropriate    Admission on 02/01/2020   Component Date Value Ref Range Status    Ventricular Rate 02/01/2020 71  BPM Final    Atrial Rate 02/01/2020 71  BPM Final    P-R Interval 02/01/2020 170  ms Final    QRS Duration 02/01/2020 152  ms Final    Q-T Interval 02/01/2020 408  ms Final    QTc Calculation (Bazett) 02/01/2020 443  ms Final    P Axis 02/01/2020 56  degrees Final    R Axis 02/01/2020 -54  degrees Final    T Axis 02/01/2020 106  degrees Final    Diagnosis 02/01/2020 Normal sinus rhythmLeft axis deviationLeft bundle branch blockAbnormal ECGWhen compared with ECG of 23-SEP-2019 21:24,Vent.  rate has decreased BY  49 BPMConfirmed by [facility] SANDRO DUBON ([id number]) on 2/2/2020 7:25:42 PM   Final    WBC 02/01/2020 7.9  4.0 - 11.0 K/uL Final    RBC 02/01/2020 5.23  4.20 - 5.90 M/uL Final    Hemoglobin 02/01/2020 16.7  13.5 - 17.5 g/dL Final    Hematocrit 02/01/2020 48.1  40.5 - 52.5 % Final    MCV 02/01/2020 91.9  80.0 - 100.0 fL Final    MCH 02/01/2020 31.9  26.0 - 34.0 pg Final    MCHC 02/01/2020 34.7  31.0 - 36.0 g/dL Final    RDW 02/01/2020 12.9  12.4 - 15.4 % Final    Platelets 37/29/9020 181  135 - 450 K/uL Final    MPV 02/01/2020 7.2  5.0 - 10.5 fL Final    Neutrophils % 02/01/2020 58.0  % Final    Lymphocytes % 02/01/2020 27.2  % Final    Monocytes % 02/01/2020 7.4  % Final    Eosinophils % 02/01/2020 6.6  % Final    Basophils % 02/01/2020 0.8  % Final    Neutrophils Absolute 02/01/2020 4.6  1.7 - 7.7 K/uL Final    Lymphocytes Absolute 02/01/2020 2.1  1.0 - 5.1 K/uL Final    Monocytes Absolute 02/01/2020 0.6  0.0 - 1.3 K/uL Final    Eosinophils Absolute 02/01/2020 have  changed for PTT Testing.  Pro-BNP 02/01/2020 66  0 - 124 pg/mL Final    Comment: Methodology by NT-proBNP    An age-independent cutoff point of 300 pg/ml has a 98%  negative predictive value excluding acute heart failure. Values exceeding the age-related cutoff values (450 pg/mL if  age<50, 900 if 50-75 and 1800 if >75) has 90% sensitivity and  84% specificity for diagnosing acute HF. In patients with  renal compromise (eGFR<60) values greater than 1200pg/ml have  a diagnostic sensitivity and specificity of 89% and 72% for  acute HF.  Blood Culture, Routine 02/01/2020 No Growth to date. Any change in status will be called. Preliminary    Culture, Blood 2 02/01/2020 No Growth to date. Any change in status will be called.    Preliminary    Troponin 02/01/2020 <0.01  <0.01 ng/mL Final    Methodology by Troponin T    Troponin 02/02/2020 <0.01  <0.01 ng/mL Final    Methodology by Troponin T    WBC 02/02/2020 4.6  4.0 - 11.0 K/uL Final    RBC 02/02/2020 4.96  4.20 - 5.90 M/uL Final    Hemoglobin 02/02/2020 15.5  13.5 - 17.5 g/dL Final    Hematocrit 02/02/2020 45.8  40.5 - 52.5 % Final    MCV 02/02/2020 92.4  80.0 - 100.0 fL Final    MCH 02/02/2020 31.2  26.0 - 34.0 pg Final    MCHC 02/02/2020 33.7  31.0 - 36.0 g/dL Final    RDW 02/02/2020 13.0  12.4 - 15.4 % Final    Platelets 81/96/1732 181  135 - 450 K/uL Final    MPV 02/02/2020 7.4  5.0 - 10.5 fL Final    Neutrophils % 02/02/2020 39.6  % Final    Lymphocytes % 02/02/2020 39.5  % Final    Monocytes % 02/02/2020 11.8  % Final    Eosinophils % 02/02/2020 8.1  % Final    Basophils % 02/02/2020 1.0  % Final    Neutrophils Absolute 02/02/2020 1.8  1.7 - 7.7 K/uL Final    Lymphocytes Absolute 02/02/2020 1.8  1.0 - 5.1 K/uL Final    Monocytes Absolute 02/02/2020 0.5  0.0 - 1.3 K/uL Final    Eosinophils Absolute 02/02/2020 0.4  0.0 - 0.6 K/uL Final    Basophils Absolute 02/02/2020 0.0  0.0 - 0.2 K/uL Final    Ventricular Rate desired to go home--Will DC to home  and o/p f/u with GI  --see me 1 week -DS dictated   Please note that over 35 minutes was spent in evaluating the patient, review of records and results, discussion with staff/family, etc.    Luisana Martínez MD

## 2020-02-05 ENCOUNTER — TELEPHONE (OUTPATIENT)
Dept: PHARMACY | Age: 60
End: 2020-02-05

## 2020-02-05 LAB
BLOOD CULTURE, ROUTINE: NORMAL
CULTURE, BLOOD 2: NORMAL
CULTURE, RESPIRATORY: NORMAL
GRAM STAIN RESULT: NORMAL

## 2020-02-05 NOTE — DISCHARGE SUMMARY
[address] [facility]                               DISCHARGE SUMMARY    PATIENT NAME: Mandy Celeste                        :        1960  MED REC NO:   [id number]                          ROOM:       1095  ACCOUNT NO:   [de-identified]                           ADMIT DATE: 2020  PROVIDER:     Natalia Louis MD                  DISCHARGE DATE:  2020      DIAGNOSIS ON ADMISSION:  Pneumonia. DIAGNOSES ON DISCHARGE:  1. Pneumonia, multifocal.  2.  Hypertension. 3.  Bronchiolitis obliterans. 4.  Postnasal drip. 5.  Coronary artery disease. 6.  History of esophageal stricture. HISTORY OF PRESENT ILLNESS:  The patient is a 79-year-old white male  admitted through emergency. He presented initially with a complaint of  productive cough over two weeks' duration, green phlegm and left  parasternal chest pain. He has a history of BOOP, coronary artery  disease and a cardiomyopathy with an ICD in place although turned off. He has a remote history of PE and a right leg DVT for which he is on  long-term Coumadin. He came to the ER complaining of chest pain and  cough. Chest CT was done with his history of pulmonary embolism. While  the plain chest x-ray did not show an infiltrate, the chest CT did show  new nodular airspace disease in the right upper lobe, may represent  bronchiolitis with developing pneumonitis, there were scattered areas of  tree-in-bud nodularity predominantly in the lingula in the left lower  lobe. He was admitted. He was placed on IV Levaquin. He was given  handheld nebulizers. He was seen by the Pulmonary Service. Dr. Hipolito Aguirre  saw him and felt that he had a faint evidence of pneumonitis,  recommended seven days of intravenous Levaquin and it was begun. He  received three days and we will finish it up orally.   He also complained  to Dr. Crystal Adam two days of inability to bring his

## 2020-02-06 NOTE — TELEPHONE ENCOUNTER
Alis Olmos called inquiring the status of the PA for his Nebusol. I called [facility] and spoke to [facility] who was unable to help me and transferred me to [facility] ([phone]). Got her   LVM letting her know that we have resubmitted this PA twice ; once with the correct ICD10 code and second when they requested recent office notes.   Awaiting her response

## 2020-02-10 ENCOUNTER — ANTI-COAG VISIT (OUTPATIENT)
Dept: PHARMACY | Age: 60
End: 2020-02-10
Payer: COMMERCIAL

## 2020-02-10 LAB — INTERNATIONAL NORMALIZATION RATIO, POC: 1.7

## 2020-02-10 PROCEDURE — 85610 PROTHROMBIN TIME: CPT

## 2020-02-10 PROCEDURE — 99211 OFF/OP EST MAY X REQ PHY/QHP: CPT

## 2020-02-12 ENCOUNTER — NURSE ONLY (OUTPATIENT)
Dept: CARDIOLOGY CLINIC | Age: 60
End: 2020-02-12
Payer: COMMERCIAL

## 2020-02-12 PROCEDURE — 93000 ELECTROCARDIOGRAM COMPLETE: CPT | Performed by: NURSE PRACTITIONER

## 2020-02-19 PROBLEM — M79.673 FOOT PAIN: Status: ACTIVE | Noted: 2020-02-19

## 2020-02-25 ENCOUNTER — APPOINTMENT (OUTPATIENT)
Dept: PHARMACY | Age: 60
End: 2020-02-25
Payer: COMMERCIAL

## 2020-02-27 ENCOUNTER — ANTI-COAG VISIT (OUTPATIENT)
Dept: PHARMACY | Age: 60
End: 2020-02-27
Payer: COMMERCIAL

## 2020-02-27 LAB — INTERNATIONAL NORMALIZATION RATIO, POC: 2.5

## 2020-02-27 PROCEDURE — 85610 PROTHROMBIN TIME: CPT

## 2020-02-27 PROCEDURE — 99211 OFF/OP EST MAY X REQ PHY/QHP: CPT

## 2020-02-27 NOTE — PROGRESS NOTES
[address]      Cardiology Follow Up    Maribel Sigala  1960 February 28, 2020    CC:  \"I am still having palpitations. \"     HPI:  The patient is 60 y.o. male with a past medical history significant for for a prior PE in 2011 and nonischemic cardiomyopathy. He was previously followed by Dr. Jeff Caldwell for his primary cardiac needs. He was hospitalized at [facility] with chest pain and had an abnormal stress that led to a left heart catheterization on 7/21/16. His coronary arteries were normal but he had LV dysfunction with a LVEF of 40% at that time. A repeat echocardiogram demonstrated his LVEF to be 30% despite optimal medical therapy. Entresto therapy was initiated and he was interested in pursuing CRT. He underwent Bi-V ICD implant on 10/10/17. Biv pacing turned off,  now set at VVI 40 with defibrillator programming ON. Today, he is here for follow up for palpitations that he is having. He says that he is getting the palpitations more often lately. He is requesting to have an angiogram. Patient denies exertional chest pain/pressure, dyspnea at rest, GUEVARA, PND, orthopnea, lightheadedness, weight changes, changes in LE edema, and syncope.     Past Medical History:   Diagnosis Date    Bronchiolitis obliterans ([address]     Bundle branch block, right     Cardiomyopathy ([address]     Fibromyalgia     GERD (gastroesophageal reflux disease)     Hepatitis 1979    unsure of which type    Hx of blood clots     Hyperlipemia     Hypertension     IBS (irritable bowel syndrome)     Kidney stone     over thirty kidney stones    Neuropathy     right side-chest    Pneumothorax 2011    Right    Prostatitis     Pulmonary embolism on right [facility]) 2011    upper lobe-coumadin 2011    Sacroiliitis [facility])      Past Surgical History:   Procedure Laterality Date    CHOLECYSTECTOMY  2007    COLONOSCOPY  9/21/2012    dr Charmayne Graft  05/17/2019    RIGHT SIDED URETEROSCOPY  Diagnostic, retrograde pyelogram and fulguration of previously resected bladder tumor base    CYSTOSCOPY Right 5/17/2019    RIGHT SIDED URETEROSCOPY FLUGERATION  OF BLADDER performed by Esvin Santiago MD at [address] [place] [address] ([facility])  2015    LITHOTRIPSY      PACEMAKER PLACEMENT      SINUS SURGERY      Made opening larger in sinuses    UPPER GASTROINTESTINAL ENDOSCOPY  3/28/2014    dr Hallie mercedes     Family History   Problem Relation Age of Onset    High Blood Pressure Mother     Dementia Mother     Cancer Father         Pancreatic Ca     Social History     Tobacco Use    Smoking status: Never Smoker    Smokeless tobacco: Never Used   Substance Use Topics    Alcohol use: No     Comment: occ    Drug use: No       Allergies   Allergen Reactions    Ipratropium Bromide Hfa Shortness Of Breath    Atrovent Nasal Spray [Ipratropium] Other (See Comments)     Chest tightness    Doxycycline Hives    Morphine Other (See Comments)     \"makes me feel funny\"      Nitroglycerin Other (See Comments)     Severe hypotension (went from 898 to 66 systolic)    Sulfa Antibiotics Rash    Vicodin [Hydrocodone-Acetaminophen] Rash     Current Outpatient Medications   Medication Sig Dispense Refill    predniSONE (DELTASONE) 10 MG tablet Take 1 tablet by mouth daily for 10 days 10 tablet 0    zolpidem (AMBIEN) 10 MG tablet 1 po qhs 90 tablet 0    guaiFENesin (MUCINEX) 600 MG extended release tablet Take 1 tablet by mouth 2 times daily 30 tablet 0    warfarin (COUMADIN) 5 MG tablet Take 0.5 tablets by mouth daily ECEPT 5mg every Monday, Wednesday and Friday or as directed by [facility] Coumadin Service [phone] tablet 1    sodium chloride, Inhalant, (NEBUSAL) 3 % nebulizer solution Take 4 mLs by nebulization every 4 hours as needed for Other or Cough (Cough) 360 mL 5    methocarbamol (ROBAXIN) 500 MG tablet TAKE 1 TABLET BY MOUTH EVERY 8 HOURS AS NEEDED FOR BACK PAIN OR SPASMS 45 tablet 0    pregabalin (LYRICA) 75 MG capsule Cholecalciferol (VITAMIN D3) 26149 units CAPS Take 1 capsule by mouth once a week      albuterol (PROAIR HFA) 108 (90 BASE) MCG/ACT inhaler Inhale 2 puffs into the lungs every 6 hours as needed for Wheezing or Shortness of Breath        No current facility-administered medications for this visit. Review of Systems:  Review of systems is as detailed above and otherwise all other systems are negative. Physical Exam:   /76 (Site: Left Upper Arm, Position: Sitting)   Pulse 94   Ht 5' 7\" (1.702 m)   Wt 209 lb (94.8 kg) Comment: shoes on  SpO2 96%   BMI 32.73 kg/m²   Wt Readings from Last 3 Encounters:   02/28/20 209 lb (94.8 kg)   02/19/20 210 lb (95.3 kg)   02/11/20 207 lb (93.9 kg)     Constitutional: The patient is oriented to person, place, and time. Appears well-developed and well-nourished. In no acute distress. Head: Normocephalic and atraumatic. Pupils equal and round. Neck: Neck supple. No JVP or carotid bruit appreciated. No mass and no thyromegaly present. No lymphadenopathy present. Cardiovascular: Normal rate. Normal heart sounds. Exam reveals no gallop and no friction rub. No murmur heard. Pulmonary/Chest: Effort normal and breath sounds normal. No respiratory distress. No wheezes, rhonchi or rales. Abdominal: Soft, non-tender. Bowel sounds are normal. Exhibits no organomegaly, mass or bruit. Extremities: No edema. No cyanosis or clubbing. Pulses are 2+ radial and carotid bilaterally. Neurological: No gross cranial nerve deficit. Coordination normal.   Skin: Skin is warm and dry. There is no rash or diaphoresis. Psychiatric: Patient has a normal mood and affect.  Speech is normal and behavior is normal.     Lab Review:   FLP:    Lab Results   Component Value Date    TRIG 126 10/02/2019    HDL 48 10/02/2019    HDL 65 11/10/2011    LDLCALC 66 10/02/2019    LABVLDL 25 10/02/2019     BUN/Creatinine:    Lab Results   Component Value Date    BUN 11 02/04/2020    CREATININE 0.9    Hypertension  Blood pressure is stable. Continue Toprol XL 25 mg daily. BMP from 2/4/2020 stable.      Hyperlipidemia  Continue Pravachol 40 mg daily. Last lipid profile 10/2/19. LDL goal is less than 100 since he does not have any significant atherosclerotic heart disease    Follow up in 6 weeks. Thank you very much for allowing me to participate in the care of your patient. Please do not hesitate to contact me if you have any questions. This note was scribed in the presence of Dr Zuleyma March, by Rossi Ralph RN  Physician Attestation:  The scribes documentation has been prepared under my direction and personally reviewed by me in its entirety. I confirm that the note above accurately reflects all work, treatment, procedures, and medical decision making performed by me.     Sincerely,  Dav Esqueda MD      [address]   [facility]  Ph: [phone]  Fax: [phone]

## 2020-02-27 NOTE — PROGRESS NOTES
Mr. Belinda Pizano is a 60 y.o.  male with history of PE who presents today for anticoagulation monitoring and adjustment. Patient verifies current dosing regimen  Patient denies s/s bleeding/bruising/swelling/SOB  No blood in urine or stool. No dietary changes. No changes in OTC agents/Herbals. No change in alcohol use. No missed doses. No Procedures scheduled in the future at this time. Lab Results   Component Value Date    INR 2.5 02/27/2020    INR 1.7 02/10/2020    INR 1.70 (H) 02/04/2020       Pertinent findings:   Patient states doing well. No complaints regarding warfarin therapy. Patient was in the hospital for heart issues and pneumonia. Not on ABX at this time. Started prednisone 10 mg daily times 10 days on 2-21 thru 3-1-20. He reduced one of his 5 mg dose of warfarin by 50% the week he started the prednisone. No change to warfarin weekly dosing. Next INR in two weeks. Warfarin dosing:   CONTINUE:  Warfarin 2.5 mg daily except 5 mg every Monday, Wednesday and Friday    After visit summary printed and reviewed with patient.       Medications reviewed and updated on home medication list: Yes  Warfarin dose updated on patient home medication list: Yes     Immunizations:   Most Recent Immunizations   Administered Date(s) Administered    Influenza, Quadv, IM, PF (6 mo and older Fluzone, Flulaval, Fluarix, and 3 yrs and older Afluria) 09/23/2019    Influenza, Quadv, Recombinant, IM PF (Flublok 18 yrs and older) 10/03/2018    Pneumococcal Conjugate 13-valent ([id number]) 09/11/2015    Pneumococcal Conjugate Vaccine 08/15/2017    Pneumococcal Polysaccharide ([id number]) 08/01/2007    Tdap (Boostrix, Adacel) 08/19/2014

## 2020-02-28 ENCOUNTER — OFFICE VISIT (OUTPATIENT)
Dept: CARDIOLOGY CLINIC | Age: 60
End: 2020-02-28
Payer: COMMERCIAL

## 2020-02-28 VITALS
OXYGEN SATURATION: 96 % | WEIGHT: 209 LBS | HEIGHT: 67 IN | DIASTOLIC BLOOD PRESSURE: 76 MMHG | HEART RATE: 94 BPM | BODY MASS INDEX: 32.8 KG/M2 | SYSTOLIC BLOOD PRESSURE: 110 MMHG

## 2020-02-28 PROCEDURE — 3017F COLORECTAL CA SCREEN DOC REV: CPT | Performed by: INTERNAL MEDICINE

## 2020-02-28 PROCEDURE — 1111F DSCHRG MED/CURRENT MED MERGE: CPT | Performed by: INTERNAL MEDICINE

## 2020-02-28 PROCEDURE — G8428 CUR MEDS NOT DOCUMENT: HCPCS | Performed by: INTERNAL MEDICINE

## 2020-02-28 PROCEDURE — 99214 OFFICE O/P EST MOD 30 MIN: CPT | Performed by: INTERNAL MEDICINE

## 2020-02-28 PROCEDURE — G8417 CALC BMI ABV UP PARAM F/U: HCPCS | Performed by: INTERNAL MEDICINE

## 2020-02-28 PROCEDURE — G8482 FLU IMMUNIZE ORDER/ADMIN: HCPCS | Performed by: INTERNAL MEDICINE

## 2020-02-28 PROCEDURE — 93000 ELECTROCARDIOGRAM COMPLETE: CPT | Performed by: INTERNAL MEDICINE

## 2020-02-28 PROCEDURE — 1036F TOBACCO NON-USER: CPT | Performed by: INTERNAL MEDICINE

## 2020-02-28 NOTE — PATIENT INSTRUCTIONS
your doctor if you think you are having a problem with your medicine. When should you call for help? Call 911 anytime you think you may need emergency care. For example, call if:    · You passed out (lost consciousness).     · You have symptoms of a heart attack. These may include:  ? Chest pain or pressure, or a strange feeling in the chest.  ? Sweating. ? Shortness of breath. ? Pain, pressure, or a strange feeling in the back, neck, jaw, or upper belly or in one or both shoulders or arms. ? Lightheadedness or sudden weakness. ? A fast or irregular heartbeat. After you call  911, the  may tell you to chew 1 adult-strength or 2 to 4 low-dose aspirin. Wait for an ambulance. Do not try to drive yourself.     · You have symptoms of a stroke. These may include:  ? Sudden numbness, tingling, weakness, or loss of movement in your face, arm, or leg, especially on only one side of your body. ? Sudden vision changes. ? Sudden trouble speaking. ? Sudden confusion or trouble understanding simple statements. ? Sudden problems with walking or balance. ? A sudden, severe headache that is different from past headaches.    Call your doctor now or seek immediate medical care if:    · You have heart palpitations and:  ? Are dizzy or lightheaded, or you feel like you may faint. ? Have new or increased shortness of breath.    Watch closely for changes in your health, and be sure to contact your doctor if:    · You continue to have heart palpitations. Where can you learn more? Go to [email or web address] and sign in to your [employer] account. Enter R508 in the [employer] box to learn more about \"Palpitations: Care Instructions. \"     If you do not have an account, please click on the \"Sign Up Now\" link. Current as of: April 9, 2019  Content Version: 12.3  © 1124-2863 Healthwise, Incorporated. Care instructions adapted under license by [facility] ([facility]).  If you have questions about a

## 2020-03-04 ENCOUNTER — TELEPHONE (OUTPATIENT)
Dept: CARDIOLOGY CLINIC | Age: 60
End: 2020-03-04

## 2020-03-04 NOTE — TELEPHONE ENCOUNTER
Patient had remote device interrogation 1/27/20. I will send message to see if another interrogation can be run.

## 2020-03-04 NOTE — TELEPHONE ENCOUNTER
Gianfranco Yan called in this afternoon stating that Dr. Bibiana Yun was supposed to call [employer] and talk to them about Jovon's device. Gianfranco Yan is unsure if his pacemaker is on or off and Dr. Bibiana Yun told him at this last office visit, that he would find out for him.     You can reach Gianfranco Yan at #[phone]

## 2020-03-05 ENCOUNTER — TELEPHONE (OUTPATIENT)
Dept: CARDIOLOGY CLINIC | Age: 60
End: 2020-03-05

## 2020-03-05 NOTE — TELEPHONE ENCOUNTER
Spoke with patient regarding his device. He was concerned that his device was turned off because he had a MRI and was told it was turned off. Device interrogation on 3/4/2020 showed his device was working properly. I advised that Dr. Steve Newell would like to proceed with a 30 day monitor. Verbalized understanding. Patient would like the monitor sent to him in the mail.

## 2020-03-05 NOTE — TELEPHONE ENCOUNTER
Patient has been successfully enrolled for a 30 day event monitor through [employer]. The patientwillo receive his monitor at his home address(this was listed in the directions on his order).

## 2020-03-05 NOTE — TELEPHONE ENCOUNTER
Patient has been successfully enrolled for a 30 day event monitor through [employer]. The patient will receive his monitor at his home address as listed in the directions on his order.

## 2020-03-12 ENCOUNTER — ANTI-COAG VISIT (OUTPATIENT)
Dept: PHARMACY | Age: 60
End: 2020-03-12
Payer: COMMERCIAL

## 2020-03-12 ENCOUNTER — APPOINTMENT (OUTPATIENT)
Dept: PHARMACY | Age: 60
End: 2020-03-12
Payer: COMMERCIAL

## 2020-03-12 LAB — INTERNATIONAL NORMALIZATION RATIO, POC: 3.5

## 2020-03-12 PROCEDURE — 85610 PROTHROMBIN TIME: CPT

## 2020-03-12 PROCEDURE — 99211 OFF/OP EST MAY X REQ PHY/QHP: CPT

## 2020-03-12 RX ORDER — SILDENAFIL CITRATE 20 MG/1
20 TABLET ORAL PRN
Status: ON HOLD | COMMUNITY
End: 2021-04-25

## 2020-03-12 NOTE — PROGRESS NOTES
Mr. Hiren Ralph is a 60 y.o.  male with history of PE who presents today for anticoagulation monitoring and adjustment. Patient verifies current dosing regimen  Patient denies s/s bleeding/bruising/swelling/SOB  No blood in urine or stool. No dietary changes. No changes in medication/OTC agents/Herbals. No change in alcohol use. No missed doses. No Procedures scheduled in the future at this time. Lab Results   Component Value Date    INR 3.5 03/12/2020    INR 2.5 02/27/2020    INR 1.7 02/10/2020       Pertinent findings: none    Warfarin dosing: Hold today and take 2.5mg tomorrow then    CONTINUE:  Warfarin 2.5 mg daily except 5 mg every Monday, Wednesday and Friday    See in 2 weeks    After visit summary printed and reviewed with patient. Medications reviewed and updated on home medication list: Yes  Warfarin dose updated on patient home medication list: Yes     Immunizations:   Most Recent Immunizations   Administered Date(s) Administered    Influenza, Quadv, IM, PF (6 mo and older Fluzone, Flulaval, Fluarix, and 3 yrs and older Afluria) 09/23/2019    Influenza, Quadv, Recombinant, IM PF (Flublok 18 yrs and older) 10/03/2018    Pneumococcal Conjugate 13-valent ([id number]) 09/11/2015    Pneumococcal Conjugate Vaccine 08/15/2017    Pneumococcal Polysaccharide ([id number]) 08/01/2007    Tdap (Boostrix, Adacel) 08/19/2014       Offered flu vaccine:   If already received, documented in [facility] health maintenance.      CLINICAL PHARMACY CONSULT: MED RECONCILIATION/REVIEW ADDENDUM    For Pharmacy Admin Tracking Only    PHSO: No  Total # of Interventions Recommended: 1  - Decreased Dose #: 1  - Maintenance Safety Lab Monitoring #: 1  Total Interventions Accepted: 1  Time Spent (min): Rito Mcconnell PharmD

## 2020-03-17 ENCOUNTER — TELEPHONE (OUTPATIENT)
Dept: CARDIOLOGY CLINIC | Age: 60
End: 2020-03-17

## 2020-03-17 NOTE — TELEPHONE ENCOUNTER
Regine Eldridge called in this afternoon requesting to speak to Jame Kuo [address], [address] [facility]. He states that he needs the correct information for his cardiac event monitor ordered by Dr. Kyle Wilson because he is having trouble with [facility] covering the monitor. As of now, it is not approved by [facility]. Right after I took Jovon's call, Peter Estrada received a call from [employer] stating that they need us to print the fax that was sent to them for his cardiac event monitor enrollment that says \"okay to flip\" with a doctors signature. It needs to be faxed back to them at [phone]. You can reach Regine Eldridge at [phone].

## 2020-03-25 ENCOUNTER — TELEPHONE (OUTPATIENT)
Dept: PHARMACY | Age: 60
End: 2020-03-25

## 2020-03-26 ENCOUNTER — ANTI-COAG VISIT (OUTPATIENT)
Dept: PHARMACY | Age: 60
End: 2020-03-26
Payer: COMMERCIAL

## 2020-03-26 LAB — INTERNATIONAL NORMALIZATION RATIO, POC: 1.5

## 2020-03-26 PROCEDURE — 85610 PROTHROMBIN TIME: CPT

## 2020-03-26 PROCEDURE — 99211 OFF/OP EST MAY X REQ PHY/QHP: CPT

## 2020-03-26 NOTE — PROGRESS NOTES
Mr. Maribel Sigala is a 60 y.o.  male with history of PE who presents today for anticoagulation monitoring and adjustment. Patient verifies current dosing regimen  Patient denies s/s bleeding/bruising/swelling/SOB  No blood in urine or stool. No dietary changes. No changes in medication/OTC agents/Herbals. No change in alcohol use. No missed doses. No Procedures scheduled in the future at this time. Lab Results   Component Value Date    INR 1.5 03/26/2020    INR 3.5 03/12/2020    INR 2.5 02/27/2020       Pertinent findings:   Patient states doing well. No complaints regarding warfarin therapy. Has been on amoxicillin for a sinus infections. Patient to take a 5 mg dose today, then return to his weekly warfarin dosing. Next INR in 3 weeks    Warfarin dosing:   Take warfarin 5 mg on 3-26-20  CONTINUE:  Warfarin 2.5 mg daily except 5 mg every Monday, Wednesday and Friday    Patient refused After Visit Summary printed and reviewed with patient.      Medications reviewed and updated on home medication list: Yes  Warfarin dose updated on patient home medication list: Yes     Immunizations:   Most Recent Immunizations   Administered Date(s) Administered    Influenza, Quadv, IM, PF (6 mo and older Fluzone, Flulaval, Fluarix, and 3 yrs and older Afluria) 09/23/2019    Influenza, Quadv, Recombinant, IM PF (Flublok 18 yrs and older) 10/03/2018    Pneumococcal Conjugate 13-valent ([id number]) 09/11/2015    Pneumococcal Conjugate Vaccine 08/15/2017    Pneumococcal Polysaccharide ([id number]) 08/01/2007    Tdap (Boostrix, Adacel) 08/19/2014       CLINICAL PHARMACY CONSULT: MED RECONCILIATION/REVIEW ADDENDUM    For Pharmacy Admin Tracking Only    PHSO: Yes  Total # of Interventions Recommended: 1  - Increased Dose #: 1  - Maintenance Safety Lab Monitoring #: 1  Total Interventions Accepted: 1  Time Spent (min): [address], RPh, PRS 3/26/2020  3:15 PM

## 2020-03-27 ENCOUNTER — TELEPHONE (OUTPATIENT)
Dept: PHARMACY | Age: 60
End: 2020-03-27

## 2020-03-27 NOTE — TELEPHONE ENCOUNTER
Fredi Ortez please call patient with instructions to [phone].       [address] [facility]  Anticoagulation, COPD, Heart Failure, Diabetes Services  [phone]

## 2020-03-27 NOTE — TELEPHONE ENCOUNTER
Advised him to take warfarin 5mg today and then decreased his regular maintenance does to warfarin 2.5mg daily (from warfarin 2.5mg daily EXCEPT 5mg every MWF)    He declines an INR sooner than 4/16/20. He agrees to call with any si//sx of bleeding or any concerns. He is concerned about going to lab with tight quarters he is high risk for more severe infection if he would contract COVID 19. He does wear N95 mask and gloves. I advised to speak with the lab about his concerns. Could wait in the hallway. He will also discuss with his PCP about home care option. CLINICAL PHARMACY CONSULT: MED RECONCILIATION/REVIEW ADDENDUM    For Pharmacy Admin Tracking Only    PHSO: No  Total # of Interventions Recommended: 1  - Decreased Dose #: 1  - Maintenance Safety Lab Monitoring #: 1  Total Interventions Accepted: 1  Time Spent (min): 15    Veronica De La Cruz, PharmD    Standing order for PT/INR has been placed in preparation to transition to possible remote INR monitoring given efforts to reduce the spread of COVID-19.

## 2020-04-02 ENCOUNTER — TELEPHONE (OUTPATIENT)
Dept: CARDIOLOGY CLINIC | Age: 60
End: 2020-04-02

## 2020-04-02 NOTE — TELEPHONE ENCOUNTER
That will have to do, we need to get his results, read them (this will be done by Dr. Wayne Santiago), if his results are OK we can do a phone visit to let him know that, if they are abnormal then we will do a VV. We can't do anything until we get and read the results.

## 2020-04-02 NOTE — TELEPHONE ENCOUNTER
Spoke w/ pt, states he will not take his monitor off until 4/15/2020. His VV should be pushed back at least until May, 2020. Please schedule.

## 2020-04-16 ENCOUNTER — ANTI-COAG VISIT (OUTPATIENT)
Dept: PHARMACY | Age: 60
End: 2020-04-16
Payer: COMMERCIAL

## 2020-04-16 PROCEDURE — 99211 OFF/OP EST MAY X REQ PHY/QHP: CPT

## 2020-04-16 NOTE — TELEPHONE ENCOUNTER
Nayely Dickey and he is complaining of muscle pain. Says that he stopped the Pravastatin for a week or so and then last night went back to taking a half a tablet and has the pain today. He has tried other statins in the past and was intolerant. He is agreeable to PCSK9 inhibitor. Do you want me to start approval process?

## 2020-04-20 ENCOUNTER — TELEPHONE (OUTPATIENT)
Dept: CARDIOLOGY CLINIC | Age: 60
End: 2020-04-20

## 2020-04-20 NOTE — TELEPHONE ENCOUNTER
Spoke to patient about Praluent Prior Auth.     This has been submitted today for prior auth    We will call patient when we hear from covermymeds

## 2020-04-21 ENCOUNTER — HOSPITAL ENCOUNTER (OUTPATIENT)
Age: 60
Discharge: HOME | End: 2020-04-21
Payer: COMMERCIAL

## 2020-04-21 ENCOUNTER — TELEPHONE (OUTPATIENT)
Dept: PULMONOLOGY | Age: 60
End: 2020-04-21

## 2020-04-21 ENCOUNTER — HOSPITAL ENCOUNTER (OUTPATIENT)
Dept: GENERAL RADIOLOGY | Age: 60
Discharge: HOME | End: 2020-04-21
Payer: COMMERCIAL

## 2020-04-21 PROCEDURE — 71046 X-RAY EXAM CHEST 2 VIEWS: CPT

## 2020-04-22 ENCOUNTER — TELEPHONE (OUTPATIENT)
Dept: CARDIOLOGY CLINIC | Age: 60
End: 2020-04-22

## 2020-04-22 NOTE — TELEPHONE ENCOUNTER
Prior Courtney Barnard has been submitted. Check on status today and it is still processing - can be 1- 5 days. Patient called asking what the other alternatives would be if prior auth does not approve Praluent. He is worried that he has been off of Pravastatin for 1 week without taking an alternative.     Patient was just denied prior auth for Anthony Goon and he is worried he will not be approved and/or this medication will cost too much

## 2020-04-24 NOTE — TELEPHONE ENCOUNTER
Kimberly Ribeiro from pharmacy - prior Elijah Roper was denied. She will call back on Monday afternoon to see if we have initiated an appeal    Appeal paperwork is in \"form\" folder next to [employer].       Fax appeal to:  [phone]    ID#:  [id number]

## 2020-04-24 NOTE — TELEPHONE ENCOUNTER
Kellie Rodriguez and Indigo Fernández [place] have sent appeal to [phone]    Scanned into media

## 2020-04-28 PROCEDURE — 93272 ECG/REVIEW INTERPRET ONLY: CPT | Performed by: INTERNAL MEDICINE

## 2020-04-29 ENCOUNTER — TELEPHONE (OUTPATIENT)
Dept: CARDIOLOGY CLINIC | Age: 60
End: 2020-04-29

## 2020-04-29 ENCOUNTER — NURSE ONLY (OUTPATIENT)
Dept: CARDIOLOGY CLINIC | Age: 60
End: 2020-04-29
Payer: COMMERCIAL

## 2020-04-29 PROCEDURE — 93296 REM INTERROG EVL PM/IDS: CPT | Performed by: INTERNAL MEDICINE

## 2020-04-29 PROCEDURE — 93295 DEV INTERROG REMOTE 1/2/MLT: CPT | Performed by: INTERNAL MEDICINE

## 2020-04-29 NOTE — LETTER
[address] [phone]  [address] [phone]    Pacemaker/Defibrillator Clinic          04/28/20        [address] [facility] [address] Denise Gallegos [facility]        Dear Patrizia Rose    This letter is to inform you that we received the transmission from your monitor at home that checks your implanted heart device. The next date your monitor will automatically transmit will be 7-29-20. If your report needs attention we will notify you. Your device and monitor are wireless and most transmit cellularly, but please periodically check your monitor is still plugged in to the electrical outlet. If you still use the telephone land line to send please ensure the connection to the phone kirk is secure. This will help to ensure successful automatic transmissions in the future. Also, the monitor needs to be close to you while sleeping at night. Please be aware that the remote device transmission sites are periodically monitored only during regular business hours during which simultaneous in-office device clinics are being run. If your transmission requires attention, we will contact you as soon as possible. Thank you.             [address]i [address]

## 2020-04-29 NOTE — TELEPHONE ENCOUNTER
Please make sure appeal process is started. If the appeal is denied Dr Shayna Paz said we would discuss alternative at that time. He has no known coronary artery disease so without this medication he does not need to worry while we wait for an answer.

## 2020-05-07 LAB — INR BLD: 4.61

## 2020-05-08 ENCOUNTER — ANTI-COAG VISIT (OUTPATIENT)
Dept: PHARMACY | Age: 60
End: 2020-05-08

## 2020-05-13 PROBLEM — R05.9 COUGH: Status: RESOLVED | Noted: 2017-02-21 | Resolved: 2020-05-13

## 2020-05-14 ENCOUNTER — TELEPHONE (OUTPATIENT)
Dept: CARDIOLOGY CLINIC | Age: 60
End: 2020-05-14

## 2020-05-14 ENCOUNTER — VIRTUAL VISIT (OUTPATIENT)
Dept: CARDIOLOGY CLINIC | Age: 60
End: 2020-05-14
Payer: COMMERCIAL

## 2020-05-14 VITALS
HEART RATE: 65 BPM | SYSTOLIC BLOOD PRESSURE: 137 MMHG | BODY MASS INDEX: 31.71 KG/M2 | WEIGHT: 202 LBS | DIASTOLIC BLOOD PRESSURE: 94 MMHG | OXYGEN SATURATION: 94 % | TEMPERATURE: 98.1 F | HEIGHT: 67 IN

## 2020-05-14 PROCEDURE — 99442 PR PHYS/QHP TELEPHONE EVALUATION 11-20 MIN: CPT | Performed by: INTERNAL MEDICINE

## 2020-05-14 NOTE — PROGRESS NOTES
dr Carlo White  05/17/2019    RIGHT SIDED URETEROSCOPY  Diagnostic, retrograde pyelogram and fulguration of previously resected bladder tumor base    CYSTOSCOPY Right 5/17/2019    RIGHT SIDED URETEROSCOPY FLUGERATION  OF BLADDER performed by Sandra Keys MD at [address] [facility]  2015    LITHOTRIPSY      PACEMAKER PLACEMENT      SINUS SURGERY      Made opening larger in sinuses    UPPER GASTROINTESTINAL ENDOS[facility]  [facility]28[facility]2014    [facility]     Family History   Problem Relation Age of Onset    High Blood Pressure Mother     Dementia Mother     Cancer Father         Pancreatic Ca     Social History     Tobacco Use    Smoking status: Never Smoker    Smokeless tobacco: Never Used   Substance Use Topics    Alcohol use: No     Comment: occ    Drug use: No       Allergies   Allergen Reactions    Ipratropium Bromide Hfa Shortness Of Breath    Atrovent Nasal Spray [Ipratropium] Other (See Comments)     Chest tightness    Doxycycline Hives    Morphine Other (See Comments)     \"makes me feel funny\"      Nitroglycerin Other (See Comments)     Severe hypotension (went from 038 to 66 systolic)    Sulfa Antibiotics Rash    Vicodin [Hydrocodone-Acetaminophen] Rash     Current Outpatient Medications   Medication Sig Dispense Refill    pregabalin (LYRICA) 75 MG capsule TAKE 1 CAPSULE BY MOUTH  EVERY MORNING AND 2  CAPSULES EVERY EVENING 270 capsule 0    nystatin (MYCOSTATIN) 210151 UNIT/ML suspension Take 5 mLs by mouth 4 times daily 150 mL 0    AMBIEN 10 MG tablet 1 tablet po qhs 90 tablet 0    alirocumab (PRALUENT) 75 MG/ML SOAJ injection pen Inject 1 mL into the skin every 14 days 1.96 mL 3    flavoxate (URISPAS) 100 MG tablet TAKE 1 TABLET BY MOUTH THREE TIMES DAILY AS NEEDED FOR URINARY SPASM 270 tablet 0    albuterol (PROVENTIL) (2.5 MG/3ML) 0.083% nebulizer solution USE 3 ML VIA NEBULIZER EVERY 4 HOURS AS NEEDED FOR WHEEZING OR SHORTNESS OF BREATH 360 mL 3    sildenafil (REVATIO) 20 MG tablet Take 20 mg by mouth as needed      guaiFENesin (MUCINEX) 600 MG extended release tablet Take 1 tablet by mouth 2 times daily 30 tablet 0    warfarin (COUMADIN) 5 MG tablet Take 0.5 tablets by mouth daily ECEPT 5mg every Monday, Wednesday and Friday or as directed by [facility] Coumadin Service [phone] tablet 1    sodium chloride, Inhalant, (NEBUSAL) 3 % nebulizer solution Take 4 mLs by nebulization every 4 hours as needed for Other or Cough (Cough) 360 mL 5    methocarbamol (ROBAXIN) 500 MG tablet TAKE 1 TABLET BY MOUTH EVERY 8 HOURS AS NEEDED FOR BACK PAIN OR SPASMS 45 tablet 0    DEXILANT 30 MG CPDR delayed release capsule TAKE 1 CAPSULE BY MOUTH  DAILY 90 capsule 3    metoprolol succinate (TOPROL XL) 25 MG extended release tablet TAKE 1 TABLET BY MOUTH TWO  TIMES DAILY 180 tablet 3    furosemide (LASIX) 20 MG tablet Take 1 tablet by mouth daily as needed (take 20 mg tablet daily as needed for increased weight, swelling or shortness of breath) 30 tablet 1    tamsulosin (FLOMAX) 0.4 MG capsule TAKE ONE CAPSULE BY MOUTH TWICE DAILY 180 capsule 0    polyethylene glycol (MIRALAX) PACK packet Take 17 g by mouth daily      fexofenadine (ALLEGRA ALLERGY) 180 MG tablet Take 180 mg by mouth daily      ondansetron (ZOFRAN ODT) 4 MG disintegrating tablet Take 1-2 tablets by mouth every 8 hours as needed for Nausea May Sub regular tablet (non-ODT) if insurance does not cover ODT.  20 tablet 0    sacubitril-valsartan (ENTRESTO) 24-26 MG per tablet TAKE 2 TABLETS BY MOUTH IN  THE MORNING AND 1 TABLET IN THE EVENING 270 tablet 3    pravastatin (PRAVACHOL) 40 MG tablet TAKE 1 TABLET BY MOUTH  DAILY 90 tablet 3    aspirin 81 MG tablet Take 81 mg by mouth daily      PROCTOZONE-HC 2.5 % rectal cream USE RECTALLY TWICE DAILY (Patient taking differently: Place rectally as needed ) 30 g 0    dicyclomine (BENTYL) 10 MG capsule TAKE ONE CAPSULE BY MOUTH  FOUR TIMES DAILY AS NEEDED (Patient taking differently: Indications: Pain in the Abdominal Region TAKE ONE CAPSULE BY MOUTH FOUR TIMES DAILY AS NEEDED) 360 capsule 1    azelastine (ASTELIN) 0.1 % nasal spray 2 sprays by Nasal route 2 times daily Use in each nostril as directed 1 Bottle 3    Cholecalciferol (VITAMIN D3) 89203 units CAPS Take 1 capsule by mouth once a week      albuterol (PROAIR HFA) 108 (90 BASE) MCG/ACT inhaler Inhale 2 puffs into the lungs every 6 hours as needed for Wheezing or Shortness of Breath        No current facility-administered medications for this visit. Review of Systems:  Review of systems is as detailed above and otherwise all other systems are negative. Physical Exam:   BP (!) 137/94   Pulse 65   Temp 98.1 °F (36.7 °C)   Ht 5' 7\" (1.702 m)   Wt 202 lb (91.6 kg) Comment: at home without shoes  SpO2 94%   BMI 31.64 kg/m²   Wt Readings from Last 3 Encounters:   05/14/20 202 lb (91.6 kg)   02/28/20 209 lb (94.8 kg)   02/19/20 210 lb (95.3 kg)     No physical exam as this a telephone visit. Lab Review:   FLP:    Lab Results   Component Value Date    TRIG 126 10/02/2019    HDL 48 10/02/2019    HDL 65 11/10/2011    LDLCALC 66 10/02/2019    LABVLDL 25 10/02/2019     BUN/Creatinine:    Lab Results   Component Value Date    BUN 11 02/04/2020    CREATININE 0.9 02/04/2020     EKG Interpretation: 2/14/18, sinus rhythm with LBBB     11/13/19 Sinus rhythm LBBB     2/28/2020 Sinus rhythm LBBB    Image Review:     ECHO 11/28/17  Summary   Normal left ventricular wall thickness. Ejection fraction is visually   estimated to be 40-45%.  Ginachaim Flemingman is septal hypokinesis (secondary to Pacing)   Trivial mitral & tricuspid regurgitation is present.   The left atrial size is normal.   Pacer / ICD wire is visualized in the right ventricle.   There appears to be normal right ventricular size and function. Echo: 7/24/17  Left ventricle size is normal. Mild concentric left ventricular hypertrophy  is present.  Global by Anabell Slaughter RN    Dayana Rubalcava is a 60 y.o. male evaluated via telephone on 5/14/2020.       Patient identification was verified at the start of the visit: Yes    Total Time: minutes: 11-20 minutes

## 2020-05-14 NOTE — PATIENT INSTRUCTIONS
Patient Education        Heart-Healthy Diet: Care Instructions  Your Care Instructions    A heart-healthy diet has lots of vegetables, fruits, nuts, beans, and whole grains, and is low in salt. It limits foods that are high in saturated fat, such as meats, cheeses, and fried foods. It may be hard to change your diet, but even small changes can lower your risk of heart attack and heart disease. Follow-up care is a key part of your treatment and safety. Be sure to make and go to all appointments, and call your doctor if you are having problems. It's also a good idea to know your test results and keep a list of the medicines you take. How can you care for yourself at home? Watch your portions  · Learn what a serving is. A \"serving\" and a \"portion\" are not always the same thing. Make sure that you are not eating larger portions than are recommended. For example, a serving of pasta is ½ cup. A serving size of meat is 2 to 3 ounces. A 3-ounce serving is about the size of a deck of cards. Measure serving sizes until you are good at [place]" them. Keep in mind that restaurants often serve portions that are 2 or 3 times the size of one serving. · To keep your energy level up and keep you from feeling hungry, eat often but in smaller portions. · Eat only the number of calories you need to stay at a healthy weight. If you need to lose weight, eat fewer calories than your body burns (through exercise and other physical activity). Eat more fruits and vegetables  · Eat a variety of fruit and vegetables every day. Dark green, deep orange, red, or yellow fruits and vegetables are especially good for you. Examples include spinach, carrots, peaches, and berries. · Keep carrots, celery, and other veggies handy for snacks. Buy fruit that is in season and store it where you can see it so that you will be tempted to eat it. · Cook dishes that have a lot of veggies in them, such as stir-fries and soups.   Limit saturated and and sign in to your [employer] account. Enter V137 in the [employer] box to learn more about \"Heart-Healthy Diet: Care Instructions. \"     If you do not have an account, please click on the \"Sign Up Now\" link. Current as of: December 15, 2019Content Version: 12.4  © 7600-5561 Healthwise, Incorporated. Care instructions adapted under license by [facility] If you have questions about a medical condition or this instruction, always ask your healthcare professional. [address] any warranty or liability for your use of this information.

## 2020-05-15 ENCOUNTER — ANTI-COAG VISIT (OUTPATIENT)
Dept: PHARMACY | Age: 60
End: 2020-05-15
Payer: COMMERCIAL

## 2020-05-15 ENCOUNTER — HOSPITAL ENCOUNTER (OUTPATIENT)
Age: 60
Discharge: HOME | End: 2020-05-15
Payer: COMMERCIAL

## 2020-05-15 ENCOUNTER — HOSPITAL ENCOUNTER (OUTPATIENT)
Dept: GENERAL RADIOLOGY | Age: 60
Discharge: HOME | End: 2020-05-15
Payer: COMMERCIAL

## 2020-05-15 PROCEDURE — 74018 RADEX ABDOMEN 1 VIEW: CPT

## 2020-05-18 PROCEDURE — 99211 OFF/OP EST MAY X REQ PHY/QHP: CPT

## 2020-05-18 NOTE — PROGRESS NOTES
Mr. Rajwinder Dc is a 60 y.o.  male with history of PE. Mr. Rajwinder Dc had an INR test today. Results were reviewed and appropriate warfarin management was completed. THIS VISIT WAS COMPLETED AS:  [x]   A VIRTUAL VISIT VIA TELEPHONE IN EFFORTS TO REDUCE THE SPREAD OF COVID-19.  []   A DRIVE-THRU VISIT IN EFFORTS TO REDUCE THE SPREAD OF COVID-19.  []   AN IN PERSON VISIT. PROTOCOLS WERE FOLLOWED WITH PRECAUTIONS TO REDUCE THE SPREAD OF COVID-19. Patient verifies current dosing regimen  Patient denies s/s bleeding/bruising/swelling/SOB  No blood in urine or stool. No dietary changes. No changes in medication/OTC agents/Herbals. No change in alcohol use. No missed doses. No Procedures scheduled in the future at this time. Lab Results   Component Value Date    INR 2.36 (H) 05/15/2020    INR 4.61 (HH) 05/07/2020    INR 4.61 05/07/2020       Pertinent findings: none. INR back in goal range with result on the 15th. Has been on this dose, and this is the first time it has been high in several months. Will not make any adjustment at this time. Patient agreeable. Will be consistent with diet, activity. Call with any med changes    Warfarin dosing: continue same dose  Recheck in 3 weeks.        Medications reviewed and updated on home medication list: Yes  Warfarin dose updated on patient home medication list: Yes       CLINICAL PHARMACY CONSULT: MED RECONCILIATION/REVIEW ADDENDUM    For Pharmacy Admin Tracking Only    PHSO: No  Total # of Interventions Recommended: 0    - Maintenance Safety Lab Monitoring #: 0  Total Interventions Accepted: 0  Time Spent (min): 15    Iraida Peck, BelD

## 2020-05-19 RX ORDER — TAMSULOSIN HYDROCHLORIDE 0.4 MG/1
CAPSULE ORAL
Qty: 180 CAPSULE | Refills: 0 | Status: SHIPPED | OUTPATIENT
Start: 2020-05-19 | End: 2020-06-17

## 2020-05-26 RX ORDER — SACUBITRIL AND VALSARTAN 24; 26 MG/1; MG/1
TABLET, FILM COATED ORAL
Qty: 270 TABLET | Refills: 3 | Status: SHIPPED | OUTPATIENT
Start: 2020-05-26 | End: 2021-02-03 | Stop reason: ALTCHOICE

## 2020-05-29 ENCOUNTER — TELEPHONE (OUTPATIENT)
Dept: CARDIOLOGY CLINIC | Age: 60
End: 2020-05-29

## 2020-05-29 ENCOUNTER — TELEPHONE (OUTPATIENT)
Dept: PULMONOLOGY | Age: 60
End: 2020-05-29

## 2020-05-29 RX ORDER — PREDNISONE 20 MG/1
20 TABLET ORAL DAILY
Qty: 7 TABLET | Refills: 0 | Status: SHIPPED | OUTPATIENT
Start: 2020-05-29 | End: 2020-06-05

## 2020-05-29 NOTE — TELEPHONE ENCOUNTER
Complains of cough   Duration Since march  Cough with sputum production? yes  Color clear  Fever? no  Any other Symptoms? Oxygen 94  Any current treatment tried? nebulizers  Using inhalers? Yes do they help? sometimes  Pharmacy? jonnie Almeida would like to speak with .

## 2020-06-05 ENCOUNTER — ANTI-COAG VISIT (OUTPATIENT)
Dept: PHARMACY | Age: 60
End: 2020-06-05

## 2020-06-06 PROCEDURE — 99211 OFF/OP EST MAY X REQ PHY/QHP: CPT

## 2020-06-16 RX ORDER — PRAVASTATIN SODIUM 40 MG
40 TABLET ORAL DAILY
Qty: 90 TABLET | Refills: 4 | Status: SHIPPED | OUTPATIENT
Start: 2020-06-16 | End: 2020-09-25 | Stop reason: ALTCHOICE

## 2020-06-17 ENCOUNTER — ANTI-COAG VISIT (OUTPATIENT)
Dept: PHARMACY | Age: 60
End: 2020-06-17
Payer: COMMERCIAL

## 2020-06-17 VITALS — TEMPERATURE: 97.3 F

## 2020-06-17 LAB — INTERNATIONAL NORMALIZATION RATIO, POC: 3.3

## 2020-06-17 PROCEDURE — 99211 OFF/OP EST MAY X REQ PHY/QHP: CPT

## 2020-06-17 PROCEDURE — 85610 PROTHROMBIN TIME: CPT

## 2020-06-17 RX ORDER — TAMSULOSIN HYDROCHLORIDE 0.4 MG/1
0.4 CAPSULE ORAL DAILY
COMMUNITY
End: 2020-08-21

## 2020-06-19 ENCOUNTER — APPOINTMENT (OUTPATIENT)
Dept: CT IMAGING | Age: 60
End: 2020-06-19
Payer: COMMERCIAL

## 2020-06-19 ENCOUNTER — HOSPITAL ENCOUNTER (EMERGENCY)
Age: 60
Discharge: HOME | End: 2020-06-19
Payer: COMMERCIAL

## 2020-06-19 VITALS
TEMPERATURE: 98.5 F | RESPIRATION RATE: 16 BRPM | HEART RATE: 83 BPM | HEIGHT: 66 IN | WEIGHT: 213.63 LBS | OXYGEN SATURATION: 96 % | BODY MASS INDEX: 34.33 KG/M2 | SYSTOLIC BLOOD PRESSURE: 146 MMHG | DIASTOLIC BLOOD PRESSURE: 93 MMHG

## 2020-06-19 LAB
A/G RATIO: 1.6 (ref 1.1–2.2)
ALBUMIN SERPL-MCNC: 3.9 G/DL (ref 3.4–5)
ALP BLD-CCNC: 91 U/L (ref 40–129)
ALT SERPL-CCNC: 23 U/L (ref 10–40)
ANION GAP SERPL CALCULATED.3IONS-SCNC: 9 MMOL/L (ref 3–16)
AST SERPL-CCNC: 21 U/L (ref 15–37)
BASOPHILS ABSOLUTE: 0 K/UL (ref 0–0.2)
BASOPHILS RELATIVE PERCENT: 0.9 %
BILIRUB SERPL-MCNC: 0.4 MG/DL (ref 0–1)
BILIRUBIN URINE: NEGATIVE
BLOOD, URINE: ABNORMAL
BUN BLDV-MCNC: 7 MG/DL (ref 7–20)
CALCIUM SERPL-MCNC: 10.3 MG/DL (ref 8.3–10.6)
CHLORIDE BLD-SCNC: 104 MMOL/L (ref 99–110)
CLARITY: CLEAR
CO2: 25 MMOL/L (ref 21–32)
COLOR: YELLOW
CREAT SERPL-MCNC: 0.8 MG/DL (ref 0.8–1.3)
EOSINOPHILS ABSOLUTE: 0.4 K/UL (ref 0–0.6)
EOSINOPHILS RELATIVE PERCENT: 7 %
EPITHELIAL CELLS, UA: 0 /HPF (ref 0–5)
GFR AFRICAN AMERICAN: >60
GFR NON-AFRICAN AMERICAN: >60
GLOBULIN: 2.5 G/DL
GLUCOSE BLD-MCNC: 111 MG/DL (ref 70–99)
GLUCOSE URINE: NEGATIVE MG/DL
HCT VFR BLD CALC: 46.8 % (ref 40.5–52.5)
HEMOGLOBIN: 15.8 G/DL (ref 13.5–17.5)
HYALINE CASTS: 1 /LPF (ref 0–8)
KETONES, URINE: NEGATIVE MG/DL
LEUKOCYTE ESTERASE, URINE: NEGATIVE
LIPASE: 15 U/L (ref 13–60)
LYMPHOCYTES ABSOLUTE: 1.7 K/UL (ref 1–5.1)
LYMPHOCYTES RELATIVE PERCENT: 32.2 %
MCH RBC QN AUTO: 31.1 PG (ref 26–34)
MCHC RBC AUTO-ENTMCNC: 33.7 G/DL (ref 31–36)
MCV RBC AUTO: 92.2 FL (ref 80–100)
MICROSCOPIC EXAMINATION: YES
MONOCYTES ABSOLUTE: 0.5 K/UL (ref 0–1.3)
MONOCYTES RELATIVE PERCENT: 9.2 %
NEUTROPHILS ABSOLUTE: 2.7 K/UL (ref 1.7–7.7)
NEUTROPHILS RELATIVE PERCENT: 50.7 %
NITRITE, URINE: NEGATIVE
PDW BLD-RTO: 13.2 % (ref 12.4–15.4)
PH UA: 6.5 (ref 5–8)
PLATELET # BLD: 161 K/UL (ref 135–450)
PMV BLD AUTO: 7.5 FL (ref 5–10.5)
POTASSIUM REFLEX MAGNESIUM: 4.2 MMOL/L (ref 3.5–5.1)
PROTEIN UA: NEGATIVE MG/DL
RBC # BLD: 5.07 M/UL (ref 4.2–5.9)
RBC UA: 22 /HPF (ref 0–4)
SODIUM BLD-SCNC: 138 MMOL/L (ref 136–145)
SPECIFIC GRAVITY UA: 1.01 (ref 1–1.03)
TOTAL PROTEIN: 6.4 G/DL (ref 6.4–8.2)
URINE REFLEX TO CULTURE: ABNORMAL
URINE TYPE: ABNORMAL
UROBILINOGEN, URINE: 0.2 E.U./DL
WBC # BLD: 5.3 K/UL (ref 4–11)
WBC UA: 2 /HPF (ref 0–5)

## 2020-06-19 PROCEDURE — 74176 CT ABD & PELVIS W/O CONTRAST: CPT

## 2020-06-19 PROCEDURE — 85025 COMPLETE CBC W/AUTO DIFF WBC: CPT

## 2020-06-19 PROCEDURE — 99284 EMERGENCY DEPT VISIT MOD MDM: CPT

## 2020-06-19 PROCEDURE — 81001 URINALYSIS AUTO W/SCOPE: CPT

## 2020-06-19 PROCEDURE — 80053 COMPREHEN METABOLIC PANEL: CPT

## 2020-06-19 PROCEDURE — 83690 ASSAY OF LIPASE: CPT

## 2020-06-19 RX ORDER — OXYCODONE HYDROCHLORIDE AND ACETAMINOPHEN 5; 325 MG/1; MG/1
1 TABLET ORAL ONCE
Status: DISCONTINUED | OUTPATIENT
Start: 2020-06-19 | End: 2020-06-19 | Stop reason: HOSPADM

## 2020-06-19 RX ORDER — IBUPROFEN 600 MG/1
600 TABLET ORAL EVERY 6 HOURS PRN
Qty: 30 TABLET | Refills: 0 | Status: SHIPPED | OUTPATIENT
Start: 2020-06-19 | End: 2020-08-07

## 2020-06-19 RX ORDER — OXYCODONE HYDROCHLORIDE AND ACETAMINOPHEN 5; 325 MG/1; MG/1
1 TABLET ORAL EVERY 6 HOURS PRN
Qty: 10 TABLET | Refills: 0 | Status: SHIPPED | OUTPATIENT
Start: 2020-06-19 | End: 2020-08-18 | Stop reason: SDUPTHER

## 2020-06-19 ASSESSMENT — ENCOUNTER SYMPTOMS
VOMITING: 0
COLOR CHANGE: 0
COUGH: 0
NAUSEA: 0
CONSTIPATION: 0
SHORTNESS OF BREATH: 0
ABDOMINAL PAIN: 0
ABDOMINAL DISTENTION: 0
BLOOD IN STOOL: 0
BACK PAIN: 0
DIARRHEA: 0

## 2020-06-19 ASSESSMENT — PAIN DESCRIPTION - ORIENTATION
ORIENTATION: RIGHT
ORIENTATION: RIGHT

## 2020-06-19 ASSESSMENT — PAIN DESCRIPTION - PAIN TYPE: TYPE: ACUTE PAIN

## 2020-06-19 ASSESSMENT — PAIN DESCRIPTION - DESCRIPTORS: DESCRIPTORS: ACHING;STABBING

## 2020-06-19 ASSESSMENT — PAIN - FUNCTIONAL ASSESSMENT: PAIN_FUNCTIONAL_ASSESSMENT: 0-10

## 2020-06-19 ASSESSMENT — PAIN SCALES - GENERAL
PAINLEVEL_OUTOF10: 9
PAINLEVEL_OUTOF10: 6

## 2020-06-19 ASSESSMENT — PAIN DESCRIPTION - LOCATION
LOCATION: FLANK
LOCATION: FLANK

## 2020-06-19 ASSESSMENT — PAIN DESCRIPTION - FREQUENCY: FREQUENCY: CONTINUOUS

## 2020-06-19 NOTE — ED TRIAGE NOTES
Patient admitted to ED via private car with complaints of right flank pain x2-3 days. Patient states that he thought when he was golfing on Sunday, \"it helped loosen it up,\" but now his right flank is worse. History of kidney stones. Patient is also complaining of upper back soreness. BP is elevated. Other VS are WNL. Patient is alert and oriented x4.

## 2020-06-19 NOTE — ED PROVIDER NOTES
**EVALUATED BY ADVANCED PRACTICE PROVIDER**        [address] [facility] ENCOUNTER      Pt Name: Clarisse Gaytan  YJN:[id number]  [place] 1960  Date of evaluation: 6/18/2020  Provider: BRENNA Zarco CNP      Chief Complaint:    Chief Complaint   Patient presents with    Back Pain     back pain/ R sided flank pain x2-3 days, hx of stones, denies hematuria, N/V    Flank Pain       Nursing Notes, Past Medical Hx, Past Surgical Hx, Social Hx, Allergies, and Family Hx were all reviewed and agreed with or any disagreements were addressed in the HPI.    HPI:  (Location, Duration, Timing, Severity, Quality, Assoc Sx, Context, Modifying factors)  This is a  60 y.o. male with PMH significant for HLD, HTN, IBS, kidney stones, and blood clots anticoagulated on Coumadin who presents to the emergency department today complaining of right flank pain. Onset was 3 to 4 days ago. Symptoms started spontaneously and have been waxing and waning. No exacerbating or alleviating factors. No burning with urination or blood in urine. No fevers. No nausea or vomiting. Patient advised he did have some constipation and took MiraLAX tonight had a couple bowel movements. No recent injury or trauma.     PastMedical/Surgical History:      Diagnosis Date    Bronchiolitis obliterans ([address]     Bundle branch block, right     Cardiomyopathy ([address]     Fibromyalgia     GERD (gastroesophageal reflux disease)     Hepatitis 1979    unsure of which type    Hx of blood clots     Hyperlipemia     Hypertension     IBS (irritable bowel syndrome)     Kidney stone     over thirty kidney stones    Neuropathy     right side-chest    Pneumothorax 2011    Right    Prostatitis     Pulmonary embolism on right [facility]) 2011    upper lobe-coumadin 2011    Sacroiliitis [facility])          Procedure Laterality Date    CHOLECYSTECTOMY  2007    COLONOSCOPY  9/21/2012    dr Johan Camilo collection seen on 09/11/2018 exam.      Lumbar spondylosis at L4-L5, as above. No results found. MEDICAL DECISION MAKING / ED COURSE:      PROCEDURES:   Procedures    None    Patient was given:  Medications   oxyCODONE-acetaminophen (PERCOCET) 5-325 MG per tablet 1 tablet (has no administration in time range)       Differential Diagnosis: Kidney Stone, Pyelonephritis, Renal Artery Aneurysm,  Abdominal Aortic Aneurysm, Metastases to back, Mechanical Back Pain, Cauda Equina Syndrome    Patient seen and examined today for right flank pain. See HPI for patient presentation. Patient is hemodynamically stable, nontoxic, afebrile, and without tachycardia, tachypnea, and hypoxia. Physical exam as above. 76-year-old male lying in bed in no acute distress. Right CVA tenderness. Abdomen soft and nontender. Lungs CTA bilaterally and cardiac exam is normal.  CT shows a 3 mm stone in the bladder. Lipase normal.  CBC and chemistry normal.  Urine shows no infection. Patient given Percocet. He advised him return for fever. He was discharged home in stable condition. Already takes Flomax and already sees urology. At this time, the evidence for any other entities in the differential is insufficient to justify any further testing. This was explained to the patient. The patient was advised that persistent or worsening symptoms will require further evaluation. I discussed with Juan Jose Kamara and/or family the exam results, diagnosis, care, prognosis, reasons to return and the importance of follow up. Patient and/or family is in full agreement with plan and all questions have been answered. Specific discharge instructions explained, including reasons to return to the emergency department. Juan Jose Kamara is well appearing, non-toxic, and afebrile at the time of discharge.  Patient was instructed to follow up with primary care provider in 24-48 hours, and to instructed to return to ED immediately for any

## 2020-06-20 ENCOUNTER — CARE COORDINATION (OUTPATIENT)
Dept: CARE COORDINATION | Age: 60
End: 2020-06-20

## 2020-06-23 ENCOUNTER — VIRTUAL VISIT (OUTPATIENT)
Dept: PULMONOLOGY | Age: 60
End: 2020-06-23
Payer: COMMERCIAL

## 2020-06-23 PROCEDURE — 99214 OFFICE O/P EST MOD 30 MIN: CPT | Performed by: INTERNAL MEDICINE

## 2020-06-23 NOTE — Clinical Note
Needs: - Chest CT in August - Sputum culture - PFTs before next visit  Needs new nebulizer machine. He says a C9 form needs to be submitted.   Follow-up visit in 6 months

## 2020-06-23 NOTE — TELEPHONE ENCOUNTER
Please see encounter from 4/20/2020    Wanting to know about his Prior Auth and is there an alternative medication if he can not get his Praluent V-Y Flap Text: The defect edges were debeveled with a #15 scalpel blade.  Given the location of the defect, shape of the defect and the proximity to free margins a V-Y flap was deemed most appropriate.  Using a sterile surgical marker, an appropriate advancement flap was drawn incorporating the defect and placing the expected incisions within the relaxed skin tension lines where possible.    The area thus outlined was incised deep to adipose tissue with a #15 scalpel blade.  The skin margins were undermined to an appropriate distance in all directions utilizing iris scissors.

## 2020-06-23 NOTE — PROGRESS NOTES
Chief complaint  This is a 63y.o. year old male  who presents with a chief complaint of   Chief Complaint   Patient presents with    6 Month Follow-Up     Bronchiolitis obliterans     This was a telehealth visit performed during the coronavirus pandemic using video and audio. Patient provided consent for the visit. Participants were Laura Pugh MD in the office and patient at home. 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 He was admitted to [facility] in February 2020 for pneumonia. Chest CT showed new tree-in-bud opacities in the right upper lobe. He says since about March he has had a nagging cough. It is productive of clear or gray mucus. He has postnasal drainage. He stays active by exercising on a stationary bike about half an hour every other day. He also walks in the park when able. He uses albuterol nebulizers and 3% saline nebulizers twice daily. He uses pro-air about 3 times a week. He uses 2 L of oxygen at night. Past history:  He was previously seen by Dr. Yvette Lagos, but due to insurance issues had to find another pulmonologist. He says his illness began in 18. 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 Following one incident when the fumes blew into his face he developed shortness of breath. He says he was diagnosed by lung biopsy in 1997 by Dr. Titi Rollins at [facility]. In 2011, he had a prolonged hospital stay due to unexplained bilateral infiltrates and ARDS. His infiltrates resolved with steroids. He also had a spontaneous right-sided pneumothorax that required chest tube placement and a right sided pulmonary embolus. In 2013, he had a right lower extremity DVT. He is on Coumadin.     Past Medical History:   Diagnosis Date    Bronchiolitis obliterans ([address]     Bundle branch block, right     Cardiomyopathy ([address]     Fibromyalgia     GERD (gastroesophageal reflux disease)     Hepatitis 1979 OR SHORTNESS OF BREATH 360 mL 3    sildenafil (REVATIO) 20 MG tablet Take 20 mg by mouth as needed      guaiFENesin (MUCINEX) 600 MG extended release tablet Take 1 tablet by mouth 2 times daily 30 tablet 0    warfarin (COUMADIN) 5 MG tablet Take 0.5 tablets by mouth daily ECEPT 5mg every Monday, Wednesday and Friday or as directed by [facility]mad[facility] [phone] (Patient taking differently: Take 2.5 mg by mouth daily EXCEPT 5mg every Monday and Friday or as directed by [facility]mad[facility] [phone]) 105 tablet 1    sodium chloride, Inhalant, (NEBUSAL) 3 % nebulizer solution Take 4 mLs by nebulization every 4 hours as needed for Other or Cough (Cough) 360 mL 5    methocarbamol (ROBAXIN) 500 MG tablet TAKE 1 TABLET BY MOUTH EVERY 8 HOURS AS NEEDED FOR BACK PAIN OR SPASMS 45 tablet 0    DEXILANT 30 MG CPDR delayed release capsule TAKE 1 CAPSULE BY MOUTH  DAILY 90 capsule 3    metoprolol succinate (TOPROL XL) 25 MG extended release tablet TAKE 1 TABLET BY MOUTH TWO  TIMES DAILY 180 tablet 3    furosemide (LASIX) 20 MG tablet Take 1 tablet by mouth daily as needed (take 20 mg tablet daily as needed for increased weight, swelling or shortness of breath) 30 tablet 1    polyethylene glycol (MIRALAX) PACK packet Take 17 g by mouth daily      fexofenadine (ALLEGRA ALLERGY) 180 MG tablet Take 180 mg by mouth daily      ondansetron (ZOFRAN ODT) 4 MG disintegrating tablet Take 1-2 tablets by mouth every 8 hours as needed for Nausea May Sub regular tablet (non-ODT) if insurance does not cover ODT.  20 tablet 0    aspirin 81 MG tablet Take 81 mg by mouth daily      dicyclomine (BENTYL) 10 MG capsule TAKE ONE CAPSULE BY MOUTH  FOUR TIMES DAILY AS NEEDED (Patient taking differently: Indications: Pain in the Abdominal Region TAKE ONE CAPSULE BY MOUTH FOUR TIMES DAILY AS NEEDED) 360 capsule 1    azelastine (ASTELIN) 0.1 % nasal spray 2 sprays by Nasal route 2 times daily Use in each nostril as directed 1 Relationship status: Not on file    Intimate partner violence     Fear of current or ex partner: Not on file     Emotionally abused: Not on file     Physically abused: Not on file     Forced sexual activity: Not on file   Other Topics Concern    Not on file   Social History Narrative    Not on file   Never smoked, but his mother smoked outside of the home    Immunization History   Administered Date(s) Administered    Influenza, Quadv, IM, PF (6 mo and older Fluzone, Flulaval, Fluarix, and 3 yrs and older Afluria) 10/27/2017, 09/23/2019    Influenza, Erle Brandyn, Recombinant, IM PF (Flublok 18 yrs and older) 10/03/2018    Pneumococcal Conjugate 13-valent ([id number]) 09/11/2015    Pneumococcal Conjugate Vaccine 08/15/2017    Pneumococcal Polysaccharide ([id number]) 08/01/2007    Tdap (Boostrix, Adacel) 08/19/2014       ROS:  GENERAL:  No fevers, no chills  RESPIRATORY:  No shortness of breath,  No wheezing, + cough      PHYSICAL EXAM:  Patient reported vitals:  HR: 60, BP: 135/80, O2sat: 95% on RA  None. Virtual visit     Data reviewed:  Pulmonary Function Testing (4/16/14)  FVC 3.69 L at 86% predicted ---> 3.68L at 86% predicted  FEV1 1.89 L at 57% predicted ----> 1.9L at 57% predicted  FEV1/FVC ratio at 51% ---->52% predicted  TLC 4.64 L at 80% predicted  VC 3.73L at 86% predicted  ERV 0.8L at 57% predicted  RV/TLC at 20% predicted  DLCO 24.9 at 97% predicted  DLCO/VA 5.18L at 131 % predicted     Overall: Mild restriction; obstruction is at least moderate without significant reversal; normal diffusing capacity     Six minute walk test:  4/3/18- Walked 387m, 73% predicted (normal); jazmyn saturation 93%    Images and reports of chest imaging were reviewed by me. My interpretation is:  CXR (4/21/20): Clear lung fields; ICD in place  Chest CT (9/23/19):  No LAD; granuloma in the right upper lobe; nodule in the right middle lobe; tree-in-bud opacities in the right lower lobe, left lower lobe and left upper lobe (findings are unchanged compared to September 2014)  Chest CT (2/1/20): No LAD; tree-in-bud opacities in the right upper lobe (new compared to September 2019); tree-in-bud opacities in the right lower lobe, left lower lobe and left upper lobe (unchanged compared to September 2014)        ECHO (9/23/19)  Summary   Mild concentric LVH with normal LV size and wall motion. EF is    50%. Paradoxical septal motion secondary to paced rhythm   Trivial mitral regurgitation is present. The left atrium is normal in size. The right ventricle is normal in size and function. Pacing wire seen. Lab Results   Component Value Date    WBC 5.3 06/19/2020    HGB 15.8 06/19/2020    HCT 46.8 06/19/2020    MCV 92.2 06/19/2020     06/19/2020       Lab Results   Component Value Date    BNP 22.8 08/16/2012       Lab Results   Component Value Date    CREATININE 0.8 06/19/2020    BUN 7 06/19/2020     06/19/2020    K 4.2 06/19/2020     06/19/2020    CO2 25 06/19/2020         Assessment/Plan:63y.o. year old male presents for follow up. Bronchiolitis obliterans- He still has not had repeat PFTs. I have asked him to do so prior to his next appointment. Continue albuterol nebulizers and pro-air as needed. Will try to obtain a new nebulizer machine for him. Cough- He has a chronic cough due to bronchiolitis as well as post-nasal drainage. Given the change in his cough will obtain sputum culture. Continue 3% saline nebulizers as needed. Lung nodules- He had new tree-in-bud opacities on chest CT in February 2020. He was treated with a course of Levaquin. Will obtain repeat chest CT in August 2020 for follow-up. Nocturnal hypoxia- He will continue 2 L of oxygen at night. He will return for follow-up in 6 months.       Hilary Watson MD  [facility]

## 2020-06-25 ENCOUNTER — ANTI-COAG VISIT (OUTPATIENT)
Dept: PHARMACY | Age: 60
End: 2020-06-25
Payer: COMMERCIAL

## 2020-06-25 VITALS — TEMPERATURE: 96.8 F

## 2020-06-25 LAB — INR BLD: 1.7

## 2020-06-25 PROCEDURE — 85610 PROTHROMBIN TIME: CPT

## 2020-06-25 PROCEDURE — 99211 OFF/OP EST MAY X REQ PHY/QHP: CPT

## 2020-06-26 ENCOUNTER — TELEPHONE (OUTPATIENT)
Dept: CARDIOLOGY CLINIC | Age: 60
End: 2020-06-26

## 2020-06-26 PROBLEM — M89.8X1 PAIN IN SCAPULA: Status: ACTIVE | Noted: 2020-06-26

## 2020-06-26 RX ORDER — AZITHROMYCIN 250 MG/1
250 TABLET, FILM COATED ORAL DAILY
Qty: 6 TABLET | Refills: 0 | Status: SHIPPED | OUTPATIENT
Start: 2020-06-26 | End: 2020-07-28 | Stop reason: CLARIF

## 2020-06-29 DIAGNOSIS — R05.9 COUGH: ICD-10-CM

## 2020-06-30 ENCOUNTER — OFFICE VISIT (OUTPATIENT)
Dept: PRIMARY CARE CLINIC | Age: 60
End: 2020-06-30
Payer: COMMERCIAL

## 2020-06-30 PROCEDURE — G8428 CUR MEDS NOT DOCUMENT: HCPCS | Performed by: NURSE PRACTITIONER

## 2020-06-30 PROCEDURE — 99211 OFF/OP EST MAY X REQ PHY/QHP: CPT | Performed by: NURSE PRACTITIONER

## 2020-06-30 PROCEDURE — G8417 CALC BMI ABV UP PARAM F/U: HCPCS | Performed by: NURSE PRACTITIONER

## 2020-07-01 ENCOUNTER — APPOINTMENT (OUTPATIENT)
Dept: PHARMACY | Age: 60
End: 2020-07-01
Payer: COMMERCIAL

## 2020-07-02 ENCOUNTER — TELEPHONE (OUTPATIENT)
Dept: PULMONOLOGY | Age: 60
End: 2020-07-02

## 2020-07-02 LAB
CULTURE, RESPIRATORY: ABNORMAL
CULTURE, RESPIRATORY: ABNORMAL
GRAM STAIN RESULT: ABNORMAL
ORGANISM: ABNORMAL

## 2020-07-02 RX ORDER — LEVOFLOXACIN 500 MG/1
500 TABLET, FILM COATED ORAL DAILY
Qty: 7 TABLET | Refills: 0 | Status: SHIPPED | OUTPATIENT
Start: 2020-07-02 | End: 2020-07-09

## 2020-07-02 RX ORDER — AMOXICILLIN AND CLAVULANATE POTASSIUM 875; 125 MG/1; MG/1
1 TABLET, FILM COATED ORAL 2 TIMES DAILY
Qty: 14 TABLET | Refills: 0 | Status: SHIPPED | OUTPATIENT
Start: 2020-07-02 | End: 2020-07-09

## 2020-07-02 NOTE — TELEPHONE ENCOUNTER
Pt called stating he needed to check on an earlier call for levaquin instead of augmentin, called Dr Bernadine Rangel, she stated to send in levaquin 500mg once daily for 7 days  Sent to pharmacy  Informed pt

## 2020-07-03 LAB
SARS-COV-2: NOT DETECTED
SOURCE: NORMAL

## 2020-07-04 ENCOUNTER — TELEPHONE (OUTPATIENT)
Dept: PHARMACY | Age: 60
End: 2020-07-04

## 2020-07-04 NOTE — TELEPHONE ENCOUNTER
Rajiv Putnam called to let us know that he started a 7 day course of Levaquin on Wednesday. Since he has already taken 4 doses of the Levaquin, we will reduce his dose on Monday to 2.5mg.  He will call with any bruising or bleeding    [address] [facility]  Anticoagulation Service  [phone]

## 2020-07-16 ENCOUNTER — ANTI-COAG VISIT (OUTPATIENT)
Dept: PHARMACY | Age: 60
End: 2020-07-16
Payer: COMMERCIAL

## 2020-07-16 VITALS — HEART RATE: 98 BPM

## 2020-07-16 LAB — INTERNATIONAL NORMALIZATION RATIO, POC: 2

## 2020-07-16 PROCEDURE — 99211 OFF/OP EST MAY X REQ PHY/QHP: CPT

## 2020-07-16 PROCEDURE — 85610 PROTHROMBIN TIME: CPT

## 2020-07-28 NOTE — PROGRESS NOTES
We received remote transmission from patient's monitor at home. Transmission shows normal sensing and pacing function. EP physician will review. See interrogation under cardiology tab in the [address] [facility] [address] field for more details. TI is stable.

## 2020-07-29 ENCOUNTER — TELEPHONE (OUTPATIENT)
Dept: PHARMACY | Age: 60
End: 2020-07-29

## 2020-07-29 ENCOUNTER — NURSE ONLY (OUTPATIENT)
Dept: CARDIOLOGY CLINIC | Age: 60
End: 2020-07-29
Payer: COMMERCIAL

## 2020-07-29 PROCEDURE — 93297 REM INTERROG DEV EVAL ICPMS: CPT | Performed by: INTERNAL MEDICINE

## 2020-07-29 PROCEDURE — 93296 REM INTERROG EVL PM/IDS: CPT | Performed by: INTERNAL MEDICINE

## 2020-07-29 PROCEDURE — 93295 DEV INTERROG REMOTE 1/2/MLT: CPT | Performed by: INTERNAL MEDICINE

## 2020-07-29 NOTE — TELEPHONE ENCOUNTER
Jessica Cline called to let us know that he started a 10 day course of prednisone for a sinus issue. I advised him to reduce his warfarin dose to 2.5mg daily while on the steroid. He will call with any issues.  His next scheduled INR appointment is 8-7-20    [address]  Anticoagulation Service  [phone]

## 2020-07-30 ENCOUNTER — TELEPHONE (OUTPATIENT)
Dept: PULMONOLOGY | Age: 60
End: 2020-07-30

## 2020-07-30 NOTE — TELEPHONE ENCOUNTER
Pt called stating he would like to switch to [employer] for O2, needs concentrator and 4 tanks  Wrote order and put on Burger's desk to sign, but he will prob have to repeat walk test and OV

## 2020-08-03 PROBLEM — L98.9 SKIN LESIONS: Status: ACTIVE | Noted: 2020-08-03

## 2020-08-06 NOTE — PROGRESS NOTES
[facility] ([facility]      Cardiology Follow Up    Deri Pop  1960 August 7, 2020    CC:  \"I feel well\"     HPI:  The patient is 63 y.o. male with a past medical history significant for for a prior PE in 2011 and nonischemic cardiomyopathy. He was hospitalized at [facility] with chest pain and had an abnormal stress that led to a left heart catheterization on 7/21/16. His coronary arteries were normal but he had LV dysfunction with a LVEF of 40% at that time. A repeat echocardiogram demonstrated his LVEF to be 30% despite optimal medical therapy. Entresto therapy was initiated and he was interested in pursuing CRT. He underwent Bi-V ICD implant on 10/10/17. Biv pacing turned off,  now set at VVI 40 with defibrillator programming ON. Today, he reports feeling well overall. He denies chest pain with rest or exertion. He admits to some right calf pain but this is not consistent. He has been using a stationary bike at home but not on a regular basis. He denies exertional symptoms. He reports an episode of feeling his heart racing and some dizziness while playing golf a few days ago. He did put on sunglasses and the symptoms improved.        Past Medical History:   Diagnosis Date    Bronchiolitis obliterans ([address]     Bundle branch block, right     Cardiomyopathy ([address]     Fibromyalgia     GERD (gastroesophageal reflux disease)     Hepatitis 1979    unsure of which type    Hx of blood clots     Hyperlipemia     Hypertension     IBS (irritable bowel syndrome)     Kidney stone     over thirty kidney stones    Neuropathy     right side-chest    Pneumothorax 2011    Right    Prostatitis     Pulmonary embolism on right [facility]) 2011    upper lobe-coumadin 2011    Sacroiliitis [facility])      Past Surgical History:   Procedure Laterality Date    CHOLECYSTECTOMY  2007    COLONOSCOPY  9/21/2012    dr Ema Sahu  05/17/2019    RIGHT SIDED URETEROSCOPY  Diagnostic, retrograde pyelogram and fulguration of previously resected bladder tumor base    CYSTOSCOPY Right 5/17/2019    RIGHT SIDED URETEROSCOPY FLUGERATION  OF BLADDER performed by Miguel Acharya MD at . [facility]  2015    LITHOTRIPSY      PACEMAKER PLACEMENT      SINUS SURGERY      Made opening larger in sinuses    UPPER GASTROINTESTINAL ENDOSCOPY  3/28/2014    dr Lyon [address] [place] [address]     Family History   Problem Relation Age of Onset    High Blood Pressure Mother     Dementia Mother     Cancer Father         Pancreatic Ca     Social History     Tobacco Use    Smoking status: Never Smoker    Smokeless tobacco: Never Used   Substance Use Topics    Alcohol use: No     Comment: occ    Drug use: No       Allergies   Allergen Reactions    Ipratropium Bromide Hfa Shortness Of Breath    Atrovent Nasal Spray [Ipratropium] Other (See Comments)     Chest tightness    Doxycycline Hives    Morphine Other (See Comments)     \"makes me feel funny\"      Nitroglycerin Other (See Comments)     Severe hypotension (went from 576 to 66 systolic)    Sulfa Antibiotics Rash    Vicodin [Hydrocodone-Acetaminophen] Rash     Current Outpatient Medications   Medication Sig Dispense Refill    tamsulosin (FLOMAX) 0.4 MG capsule Take 0.4 mg by mouth daily      pregabalin (LYRICA) 75 MG capsule TAKE 1 CAPSULE BY MOUTH IN  THE MORNING AND 2 CAPSULES  EVERY EVENING 270 capsule 0    pravastatin (PRAVACHOL) 40 MG tablet TAKE 1 TABLET BY MOUTH  DAILY 90 tablet 4    sacubitril-valsartan (ENTRESTO) 24-26 MG per tablet TAKE 2 TABLETS BY MOUTH IN  THE MORNING AND 1 TABLET BY MOUTH IN THE EVENING 270 tablet 3    AMBIEN 10 MG tablet 1 tablet po qhs 90 tablet 0    flavoxate (URISPAS) 100 MG tablet TAKE 1 TABLET BY MOUTH THREE TIMES DAILY AS NEEDED FOR URINARY SPASM 270 tablet 0    albuterol (PROVENTIL) (2.5 MG/3ML) 0.083% nebulizer solution USE 3 ML VIA NEBULIZER EVERY 4 HOURS AS NEEDED FOR WHEEZING OR SHORTNESS OF BREATH 360 mL 3    sildenafil (REVATIO) 20 MG tablet Take 20 mg by mouth as needed      guaiFENesin (MUCINEX) 600 MG extended release tablet Take 1 tablet by mouth 2 times daily 30 tablet 0    warfarin (COUMADIN) 5 MG tablet Take 0.5 tablets by mouth daily ECEPT 5mg every Monday, Wednesday and Friday or as directed by [facility] [phone] (Patient taking differently: Take 2.5 mg by mouth daily EXCEPT 5mg every Monday, Wednesday and Friday or as directed by [facility] [phone]) 105 tablet 1    sodium chloride, Inhalant, (NEBUSAL) 3 % nebulizer solution Take 4 mLs by nebulization every 4 hours as needed for Other or Cough (Cough) 360 mL 5    DEXILANT 30 MG CPDR delayed release capsule TAKE 1 CAPSULE BY MOUTH  DAILY 90 capsule 3    metoprolol succinate (TOPROL XL) 25 MG extended release tablet TAKE 1 TABLET BY MOUTH TWO  TIMES DAILY 180 tablet 3    furosemide (LASIX) 20 MG tablet Take 1 tablet by mouth daily as needed (take 20 mg tablet daily as needed for increased weight, swelling or shortness of breath) 30 tablet 1    polyethylene glycol (MIRALAX) PACK packet Take 17 g by mouth daily      fexofenadine (ALLEGRA ALLERGY) 180 MG tablet Take 180 mg by mouth daily      ondansetron (ZOFRAN ODT) 4 MG disintegrating tablet Take 1-2 tablets by mouth every 8 hours as needed for Nausea May Sub regular tablet (non-ODT) if insurance does not cover ODT.  20 tablet 0    aspirin 81 MG tablet Take 81 mg by mouth daily      dicyclomine (BENTYL) 10 MG capsule TAKE ONE CAPSULE BY MOUTH  FOUR TIMES DAILY AS NEEDED 360 capsule 1    azelastine (ASTELIN) 0.1 % nasal spray 2 sprays by Nasal route 2 times daily Use in each nostril as directed 1 Bottle 3    Cholecalciferol (VITAMIN D3) 06047 units CAPS Take 1 capsule by mouth once a week      albuterol (PROAIR HFA) 108 (90 BASE) MCG/ACT inhaler Inhale 2 puffs into the lungs every 6 hours as needed for Wheezing or Shortness of Breath        No current facility-administered medications for this visit. Review of Systems:  Review of systems is as detailed above and otherwise all other systems are negative. Physical Exam:   /70   Pulse 66   Temp 98.3 °F (36.8 °C)   Ht 5' 7\" (1.702 m)   Wt 213 lb (96.6 kg) Comment: with shoes  SpO2 96%   BMI 33.36 kg/m²   Wt Readings from Last 3 Encounters:   08/07/20 213 lb (96.6 kg)   08/03/20 212 lb (96.2 kg)   07/28/20 211 lb (95.7 kg)     Constitutional: The patient is oriented to person, place, and time. Appears well-developed and well-nourished. In no acute distress. Head: Normocephalic and atraumatic. Pupils equal and round. Neck: Neck supple. No JVP or carotid bruit appreciated. No mass and no thyromegaly present. No lymphadenopathy present. Cardiovascular: Normal rate. Normal heart sounds. Exam reveals no gallop and no friction rub. No murmur heard. Pulmonary/Chest: Effort normal and breath sounds normal. No respiratory distress. No wheezes, rhonchi or rales. Abdominal: Soft, non-tender. Bowel sounds are normal. Exhibits no organomegaly, mass or bruit. Extremities: No edema. No cyanosis or clubbing. Pulses are 2+ radial and carotid bilaterally. Neurological: No gross cranial nerve deficit. Coordination normal.   Skin: Skin is warm and dry. There is no rash or diaphoresis. Psychiatric: Patient has a normal mood and affect. Speech is normal and behavior is normal.     Lab Review:   FLP:    Lab Results   Component Value Date    TRIG 126 10/02/2019    HDL 42 05/15/2020    HDL 65 11/10/2011    LDLCALC 70 05/15/2020    LABVLDL 33 05/15/2020     BUN/Creatinine:    Lab Results   Component Value Date    BUN 7 06/19/2020    CREATININE 0.8 06/19/2020     EKG Interpretation: 2/14/18, sinus rhythm with LBBB     11/13/19 Sinus rhythm LBBB     2/28/2020 Sinus rhythm LBBB    Image Review:     ECHO 11/28/17  Summary   Normal left ventricular wall thickness.  Ejection fraction is visually   estimated to be 40-45%.  Ricka Backers is septal angiographically normal  #2-ventriculography in the ADKINS projection demonstrates mild   global left ventricular dysfunction ejection fractions   approximately 40  #3-aortic pressure is approximately 150/80 left ventricular   pressures 150/14 there is no gradient on pullback  #5-OANAP are no complications  #0-UIU patient will be treated medically for left ventricular   dysfunction in the setting of normal coronaries     ECHO 9/25/19  Mild concentric LVH with normal LV size and wall motion. EF is   50%. Paradoxical septal motion secondary to paced rhythm  Trivial mitral regurgitation is present. The left atrium is normal in size. The right ventricle is normal in size and function. Pacing wire seen. Assessment/Plan:     Palpitations  Patient denies any further palpitations except he feels his heart racing while he is playing golf. ICD check today shows no significant tachycardia his EKG continues to show sinus rhythm with left bundle branch block. We will continue to follow him clinically. Nonischemic Cardiomyopathy  He had Biv -ICD placed by Dr. Dario Alarcon for underlying  cardiomyopathy with left bundle branch block. His last echocardiogram from November 9/2019 showed improvement in in his left ventricle ejection fraction at 50%. He appears compensated on exam today. He is currently on evidence-based beta blocker and entresto therapy. Encouraged to continue to follow up with Dr. James Hunter and the device clinic.        Pulmonary Embolism   Patient is on Warfarin therapy. Denies any abnormal bruising or bleeding. INR remains therapeutic      Hypertension  Blood pressure is stable. Continue Toprol XL 25 mg daily. BMP from 2/4/2020 stable.      Hyperlipidemia  Last lipid profile is within acceptable limits. He is no longer on any statin therapy and has been denied PCSK9 inhibitors since he has no known atherosclerotic disease. Follow up in 6 months. Encouraged annual flu vaccine.      Thank you very much for allowing me to participate in the care of your patient. Please do not hesitate to contact me if you have any questions. This note was scribed in the presence of Ronnie Armendariz MD by [employer], RN. Complexity of medical decision making-high    Sincerely,  Ronnie Armendariz MD      [facility] [address] [place] [address]  Ph: [phone]  Fax: [phone]    Physician Attestation:  The scribes documentation has been prepared under my direction and personally reviewed by me in its entirety. I confirm that the note above accurately reflects all work, treatment, procedures, and medical decision making performed by me.

## 2020-08-07 ENCOUNTER — ANTI-COAG VISIT (OUTPATIENT)
Dept: PHARMACY | Age: 60
End: 2020-08-07
Payer: COMMERCIAL

## 2020-08-07 ENCOUNTER — NURSE ONLY (OUTPATIENT)
Dept: CARDIOLOGY CLINIC | Age: 60
End: 2020-08-07
Payer: COMMERCIAL

## 2020-08-07 ENCOUNTER — OFFICE VISIT (OUTPATIENT)
Dept: CARDIOLOGY CLINIC | Age: 60
End: 2020-08-07
Payer: COMMERCIAL

## 2020-08-07 VITALS
DIASTOLIC BLOOD PRESSURE: 70 MMHG | HEART RATE: 66 BPM | WEIGHT: 213 LBS | BODY MASS INDEX: 33.43 KG/M2 | OXYGEN SATURATION: 96 % | HEIGHT: 67 IN | TEMPERATURE: 98.3 F | SYSTOLIC BLOOD PRESSURE: 126 MMHG

## 2020-08-07 LAB — INTERNATIONAL NORMALIZATION RATIO, POC: 2.8

## 2020-08-07 PROCEDURE — 93290 INTERROG DEV EVAL ICPMS IP: CPT | Performed by: INTERNAL MEDICINE

## 2020-08-07 PROCEDURE — G8417 CALC BMI ABV UP PARAM F/U: HCPCS | Performed by: INTERNAL MEDICINE

## 2020-08-07 PROCEDURE — 93284 PRGRMG EVAL IMPLANTABLE DFB: CPT | Performed by: INTERNAL MEDICINE

## 2020-08-07 PROCEDURE — 93000 ELECTROCARDIOGRAM COMPLETE: CPT | Performed by: INTERNAL MEDICINE

## 2020-08-07 PROCEDURE — G8427 DOCREV CUR MEDS BY ELIG CLIN: HCPCS | Performed by: INTERNAL MEDICINE

## 2020-08-07 PROCEDURE — 3017F COLORECTAL CA SCREEN DOC REV: CPT | Performed by: INTERNAL MEDICINE

## 2020-08-07 PROCEDURE — 1036F TOBACCO NON-USER: CPT | Performed by: INTERNAL MEDICINE

## 2020-08-07 PROCEDURE — 99211 OFF/OP EST MAY X REQ PHY/QHP: CPT

## 2020-08-07 PROCEDURE — 85610 PROTHROMBIN TIME: CPT

## 2020-08-07 PROCEDURE — 99214 OFFICE O/P EST MOD 30 MIN: CPT | Performed by: INTERNAL MEDICINE

## 2020-08-07 NOTE — PATIENT INSTRUCTIONS
Patient Education        Heart-Healthy Diet: Care Instructions  Your Care Instructions     A heart-healthy diet has lots of vegetables, fruits, nuts, beans, and whole grains, and is low in salt. It limits foods that are high in saturated fat, such as meats, cheeses, and fried foods. It may be hard to change your diet, but even small changes can lower your risk of heart attack and heart disease. Follow-up care is a key part of your treatment and safety. Be sure to make and go to all appointments, and call your doctor if you are having problems. It's also a good idea to know your test results and keep a list of the medicines you take. How can you care for yourself at home? Watch your portions  · Learn what a serving is. A \"serving\" and a \"portion\" are not always the same thing. Make sure that you are not eating larger portions than are recommended. For example, a serving of pasta is ½ cup. A serving size of meat is 2 to 3 ounces. A 3-ounce serving is about the size of a deck of cards. Measure serving sizes until you are good at [place]" them. Keep in mind that restaurants often serve portions that are 2 or 3 times the size of one serving. · To keep your energy level up and keep you from feeling hungry, eat often but in smaller portions. · Eat only the number of calories you need to stay at a healthy weight. If you need to lose weight, eat fewer calories than your body burns (through exercise and other physical activity). Eat more fruits and vegetables  · Eat a variety of fruit and vegetables every day. Dark green, deep orange, red, or yellow fruits and vegetables are especially good for you. Examples include spinach, carrots, peaches, and berries. · Keep carrots, celery, and other veggies handy for snacks. Buy fruit that is in season and store it where you can see it so that you will be tempted to eat it. · Cook dishes that have a lot of veggies in them, such as stir-fries and soups.   Limit saturated and trans fat  · Read food labels, and try to avoid saturated and trans fats. They increase your risk of heart disease. · Use olive or canola oil when you cook. · Bake, broil, grill, or steam foods instead of frying them. · Choose lean meats instead of high-fat meats such as hot dogs and sausages. Cut off all visible fat when you prepare meat. · Eat fish, skinless poultry, and meat alternatives such as soy products instead of high-fat meats. Soy products, such as tofu, may be especially good for your heart. · Choose low-fat or fat-free milk and dairy products. Eat foods high in fiber  · Eat a variety of grain products every day. Include whole-grain foods that have lots of fiber and nutrients. Examples of whole-grain foods include oats, whole wheat bread, and brown rice. · Buy whole-grain breads and cereals, instead of white bread or pastries. Limit salt and sodium  · Limit how much salt and sodium you eat to help lower your blood pressure. · Taste food before you salt it. Add only a little salt when you think you need it. With time, your taste buds will adjust to less salt. · Eat fewer snack items, fast foods, and other high-salt, processed foods. Check food labels for the amount of sodium in packaged foods. · Choose low-sodium versions of canned goods (such as soups, vegetables, and beans). Limit sugar  · Limit drinks and foods with added sugar. These include candy, desserts, and soda pop. Limit alcohol  · Limit alcohol to no more than 2 drinks a day for men and 1 drink a day for women. Too much alcohol can cause health problems. When should you call for help? Watch closely for changes in your health, and be sure to contact your doctor if:  · You would like help planning heart-healthy meals. Where can you learn more? Go to [email or web address] and sign in to your [employer] account.  Enter V137 in the [employer] box to learn more about \"Heart-Healthy Diet: Care Instructions. \"     If you do not have an account, please click on the \"Sign Up Now\" link. Current as of: August 22, 2019               Content Version: 12.5  © 8376-2750 Healthwise, Incorporated. Care instructions adapted under license by [facility] ([facility]). If you have questions about a medical condition or this instruction, always ask your healthcare professional. [address] any warranty or liability for your use of this information.

## 2020-08-07 NOTE — PROGRESS NOTES
Pt seen in clinic today for cardiac device interrogation. Their device is a  BTK 3 chamber BiV ICD. Based on impedance, and intrinsic sensing tests run today, the device appears to be functioning normally. Remaining battery life is 2.9V  AP 0%                  0%  Pt very sensitive to pacing, base rate set to 40bpm - no changes made      New onset episodes:    Other episodes: no new episodes since last remote check    Rx: warfarin (COUMADIN) 5 MG tablet   metoprolol succinate (TOPROL XL) 25 MG extended release tablet TAKE 1 TABLET BY MOUTH TWO TIMES DAILY     Pt was informed of findings today and general questions have been answered with regard to device. Home monitoring hardware is transmitting on schedule. Pt to see Dr. Karri Aquino in clinic today following their device check.

## 2020-08-07 NOTE — PROGRESS NOTES
Mr. Rashaad Jain is a 63 y.o.  male. Mr. Rashaad Jain had an INR test today. Results were reviewed and appropriate warfarin management was completed. THIS VISIT WAS COMPLETED AS:   []    A VIRTUAL VISIT VIA TELEPHONE IN EFFORTS TO REDUCE THE SPREAD OF COVID-19.  []    A DRIVE-THRU VISIT IN EFFORTS TO REDUCE THE SPREAD OF COVID-19. [x]    AN IN PERSON VISIT. PROTOCOLS WERE FOLLOWED WITH PRECAUTIONS TO REDUCE THE SPREAD OF COVID-19. Patient verifies current dosing regimen: Yes     Warfarin medication reviewed and updated on the patient 's home medication list: Yes   All other medications reviewed and updated on the patient 's home medication list: No: no changes other than the 5 days of prednisone. Lab Results   Component Value Date    INR 2.8 2020    INR 2.0 2020    INR 1.70 2020       Patient Findings     Positives:   Change in medications    Negatives:   Signs/symptoms of bleeding, Missed doses, Change in diet/appetite, Bruising    Comments:   Prednisone for 5 days finished 20 - warfarin decreased while taking prednisone. Anticoagulation Summary  As of 2020    INR goal:   2.0-3.0   TTR:   32.2 % (4.9 y)   INR used for dosin.8 (2020)   Warfarin maintenance plan:   5 mg (5 mg x 1) every Mon, Wed, Fri; 2.5 mg (5 mg x 0.5) all other days   Weekly warfarin total:   25 mg   Plan last modified:   Sudha Catalan (2020)   Next INR check:   2020   Priority:   Maintenance   Target end date:    Indefinite    Indications    Pulmonary embolus ([address] [I26.99]             Anticoagulation Episode Summary     INR check location:       Preferred lab:       Send INR reminders to:   [facility]    Comments:   EPIC - finger stick every 2 weeks        Anticoagulation Care Providers     Provider Role Specialty Phone number    Fabi Eli MD Referring Internal Medicine [phone]          Warfarin plan:   He notes that he has a tooth ache and anticipates the dentist may add amoxicillin. Low interaction with warfarin. I would not change his dosing. Advised to call if it is a different antibiotic or any other changes. He is good about calling. Description    CONTINUE: Warfarin 2.5 mg daily EXCEPT 5mg every Monday, Wednesday and Friday    Call with signs or symptoms of bleeding or any concerns or questions. If significant bleeding seek immediate medical attention. Call with medications changes, especially antibiotics and steroids and including any over-the-counter medications or herbal products. Call if you stop any medications. Keep the number of servings of Vitamin K (dark green vegetables) consistent from week to week  Eats three good portion of broccoli or brussel sprouts per week. Reviewed AVS with patient / caregiver.       CLINICAL PHARMACY CONSULT: MED RECONCILIATION/REVIEW ADDENDUM    For Pharmacy Admin Tracking Only    PHSO: No  Total # of Interventions Recommended: 0  Total Interventions Accepted: 0  Time Spent (min): 15

## 2020-08-18 ENCOUNTER — TELEPHONE (OUTPATIENT)
Dept: CARDIOLOGY CLINIC | Age: 60
End: 2020-08-18

## 2020-08-18 PROBLEM — H92.09 OTALGIA: Status: ACTIVE | Noted: 2020-08-18

## 2020-08-18 NOTE — TELEPHONE ENCOUNTER
Pt states he has been coughing a lot since he had his appt on 8/7. He wants to make sure his device is turned off as far as the pacing part.     You can reach the pt at [phone]

## 2020-08-18 NOTE — TELEPHONE ENCOUNTER
Pt assured no changes made to his pacing parameters. Confirmed by displayed parameters on [employer] remote transmission from today.

## 2020-08-21 ENCOUNTER — TELEPHONE (OUTPATIENT)
Dept: PHARMACY | Age: 60
End: 2020-08-21

## 2020-08-21 LAB
BUN BLDV-MCNC: 17 MG/DL (ref 7–20)
CREAT SERPL-MCNC: 1.2 MG/DL (ref 0.8–1.3)
GFR AFRICAN AMERICAN: >60
GFR NON-AFRICAN AMERICAN: >60

## 2020-08-21 RX ORDER — TAMSULOSIN HYDROCHLORIDE 0.4 MG/1
CAPSULE ORAL
Qty: 180 CAPSULE | Refills: 3 | Status: SHIPPED | OUTPATIENT
Start: 2020-08-21 | End: 2020-10-20 | Stop reason: SDUPTHER

## 2020-08-21 NOTE — TELEPHONE ENCOUNTER
Reza Arevalo called to let us know that he started a 7 day course of Ceftin for a sinus infection. This does not generally alter the the INR to any great degree. We will continue his current warfarin dose. He is due to have his INR checked in 4 days. We will make any necessary adjustments at that time.     [address]  Anticoagulation Service  [phone]

## 2020-08-24 ENCOUNTER — TELEPHONE (OUTPATIENT)
Dept: PULMONOLOGY | Age: 60
End: 2020-08-24

## 2020-08-25 ENCOUNTER — TELEPHONE (OUTPATIENT)
Dept: CARDIOLOGY CLINIC | Age: 60
End: 2020-08-25

## 2020-08-25 ENCOUNTER — ANTI-COAG VISIT (OUTPATIENT)
Dept: PHARMACY | Age: 60
End: 2020-08-25
Payer: COMMERCIAL

## 2020-08-25 ENCOUNTER — HOSPITAL ENCOUNTER (OUTPATIENT)
Dept: CT IMAGING | Age: 60
Discharge: HOME | DRG: 313 | End: 2020-08-25
Payer: COMMERCIAL

## 2020-08-25 VITALS — TEMPERATURE: 96.4 F

## 2020-08-25 LAB — INTERNATIONAL NORMALIZATION RATIO, POC: 3.3

## 2020-08-25 PROCEDURE — 71250 CT THORAX DX C-: CPT

## 2020-08-25 PROCEDURE — 99211 OFF/OP EST MAY X REQ PHY/QHP: CPT

## 2020-08-25 PROCEDURE — 85610 PROTHROMBIN TIME: CPT

## 2020-08-25 NOTE — PROGRESS NOTES
Mr. Saul Dangelo is a 63 y.o.  male. Mr. Saul Dangelo had an INR test today. Results were reviewed and appropriate warfarin management was completed. THIS VISIT WAS COMPLETED AS:   []    A VIRTUAL VISIT VIA TELEPHONE IN EFFORTS TO REDUCE THE SPREAD OF COVID-19.  []    A DRIVE-THRU VISIT IN EFFORTS TO REDUCE THE SPREAD OF COVID-19. [x]    AN IN PERSON VISIT. PROTOCOLS WERE FOLLOWED WITH PRECAUTIONS TO REDUCE THE SPREAD OF COVID-19. Patient verifies current dosing regimen: Yes     Warfarin medication reviewed and updated on the patient 's home medication list: Yes   All other medications reviewed and updated on the patient 's home medication list: Yes     Lab Results   Component Value Date    INR 3.3 08/25/2020    INR 2.8 08/07/2020    INR 2.0 07/16/2020       Patient Findings     Positives:   Change in medications    Negatives:   Signs/symptoms of thrombosis, Signs/symptoms of bleeding, Change in alcohol use, Change in activity, Missed doses, Extra doses, Change in diet/appetite, Bruising    Comments:   Patient started Cefuroxime          Anticoagulation Summary  As of 8/25/2020    INR goal:   2.0-3.0   TTR:   32.3 % (4.9 y)   INR used for dosing:   3.3! (8/25/2020)   Warfarin maintenance plan:   5 mg (5 mg x 1) every Mon, Wed, Fri; 2.5 mg (5 mg x 0.5) all other days   Weekly warfarin total:   25 mg   Plan last modified:   Sudha Catalan (6/25/2020)   Next INR check:   9/8/2020   Priority:   Maintenance   Target end date:    Indefinite    Indications    Pulmonary embolus ([facility] [I26.99]             Anticoagulation Episode Summary     INR check location:       Preferred lab:       Send INR reminders to:   [facility]    Comments:   EPIC - finger stick every 2-3 weeks        Anticoagulation Care Providers     Provider Role Specialty Phone number    Cordelia Melgoza MD Referring Internal Medicine [phone]          Warfarin plan:     Patient states he is doing well on his warfarin dose. He denies any signs of bleeding but states a minor bruise on his leg. Denies any missed doses and states no changes in diet. His INR today (08-25-20) is high (3.3) and out of range. His high INR is highly suspected to have been due to an antibiotic (Cefuroxime) which he started on 08-23-20. To help bring his INR to goal, he will hold his warfarin dose today (08-25-20) only, take 2.5 mg warfarin tomorrow (08-26-20) instead of his usual  5 mg dose and then afterwards, continue with his usual warfarin dosing. Patient states he does not want the after visit summary (AVS) sheet and that he has all the information in mind. He will return after two weeks for his next INR check. Description    Hold warfarin dose today (08-25-20) only, take 2.5 mg (one half tablet) tomorrow (08-26-20) then  CONTINUE: Warfarin 2.5 mg daily EXCEPT 5mg every Monday, Wednesday and Friday    Call with signs or symptoms of bleeding or any concerns or questions. If significant bleeding seek immediate medical attention. Call with medications changes, especially antibiotics and steroids and including any over-the-counter medications or herbal products. Call if you stop any medications. Keep the number of servings of Vitamin K (dark green vegetables) consistent from week to week  Eats three good portion of broccoli or brussel sprouts per week. Reviewed AVS with patient / caregiver.       CLINICAL PHARMACY CONSULT: MED RECONCILIATION/REVIEW ADDENDUM    For Pharmacy Admin Tracking Only    PHSO: No  Total # of Interventions Recommended: 1  - Decreased Dose #: 1  - Maintenance Safety Lab Monitoring #: 1  Total Interventions Accepted: 1  Time Spent (min): 15

## 2020-08-25 NOTE — TELEPHONE ENCOUNTER
CAT scan reveals coronary calcification.   Will patient for stress Myoview scan if he can walk on a treadmill to rule out any evidence of reversible ischemia

## 2020-08-25 NOTE — TELEPHONE ENCOUNTER
Pt called had a ct scan td which showed some calcium deposits. Wants to know what he should do.     Colt Hargrove # [phone] [id number]

## 2020-08-25 NOTE — TELEPHONE ENCOUNTER
Dr. Whitney Fredrickson called Hardy Strange to discuss proceeding with Treadmill Perfusion Myoview. He does not feel that he could complete a treadmill but is willing to try. Would you like to proceed and convert to Marlee Wyatt if unable to complete treadmill or proceed with Lexiscan? He would like 1st available when scheduling as he is retired. Will place order and have  schedule tomorrow.

## 2020-08-26 ENCOUNTER — HOSPITAL ENCOUNTER (INPATIENT)
Age: 60
LOS: 1 days | Discharge: HOME | DRG: 313 | End: 2020-08-27
Attending: EMERGENCY MEDICINE | Admitting: INTERNAL MEDICINE
Payer: COMMERCIAL

## 2020-08-26 ENCOUNTER — APPOINTMENT (OUTPATIENT)
Dept: GENERAL RADIOLOGY | Age: 60
DRG: 313 | End: 2020-08-26
Payer: COMMERCIAL

## 2020-08-26 LAB
A/G RATIO: 1.5 (ref 1.1–2.2)
ALBUMIN SERPL-MCNC: 3.9 G/DL (ref 3.4–5)
ALP BLD-CCNC: 93 U/L (ref 40–129)
ALT SERPL-CCNC: 22 U/L (ref 10–40)
ANION GAP SERPL CALCULATED.3IONS-SCNC: 6 MMOL/L (ref 3–16)
AST SERPL-CCNC: 20 U/L (ref 15–37)
BASOPHILS ABSOLUTE: 0 K/UL (ref 0–0.2)
BASOPHILS RELATIVE PERCENT: 0.7 %
BILIRUB SERPL-MCNC: 0.3 MG/DL (ref 0–1)
BUN BLDV-MCNC: 10 MG/DL (ref 7–20)
CALCIUM SERPL-MCNC: 10.7 MG/DL (ref 8.3–10.6)
CHLORIDE BLD-SCNC: 107 MMOL/L (ref 99–110)
CO2: 25 MMOL/L (ref 21–32)
CREAT SERPL-MCNC: 0.8 MG/DL (ref 0.8–1.3)
EKG ATRIAL RATE: 81 BPM
EKG DIAGNOSIS: NORMAL
EKG P AXIS: 83 DEGREES
EKG P-R INTERVAL: 166 MS
EKG Q-T INTERVAL: 434 MS
EKG QRS DURATION: 162 MS
EKG QTC CALCULATION (BAZETT): 504 MS
EKG R AXIS: -46 DEGREES
EKG T AXIS: 97 DEGREES
EKG VENTRICULAR RATE: 81 BPM
EOSINOPHILS ABSOLUTE: 0.3 K/UL (ref 0–0.6)
EOSINOPHILS RELATIVE PERCENT: 5.4 %
GFR AFRICAN AMERICAN: >60
GFR NON-AFRICAN AMERICAN: >60
GLOBULIN: 2.6 G/DL
GLUCOSE BLD-MCNC: 73 MG/DL (ref 70–99)
HCT VFR BLD CALC: 46.1 % (ref 40.5–52.5)
HEMOGLOBIN: 15.5 G/DL (ref 13.5–17.5)
INR BLD: 2.73 (ref 0.86–1.14)
LYMPHOCYTES ABSOLUTE: 1.5 K/UL (ref 1–5.1)
LYMPHOCYTES RELATIVE PERCENT: 29.3 %
MCH RBC QN AUTO: 31.1 PG (ref 26–34)
MCHC RBC AUTO-ENTMCNC: 33.7 G/DL (ref 31–36)
MCV RBC AUTO: 92.3 FL (ref 80–100)
MONOCYTES ABSOLUTE: 0.6 K/UL (ref 0–1.3)
MONOCYTES RELATIVE PERCENT: 11.1 %
NEUTROPHILS ABSOLUTE: 2.7 K/UL (ref 1.7–7.7)
NEUTROPHILS RELATIVE PERCENT: 53.5 %
PDW BLD-RTO: 13.2 % (ref 12.4–15.4)
PLATELET # BLD: 165 K/UL (ref 135–450)
PMV BLD AUTO: 7.1 FL (ref 5–10.5)
POTASSIUM REFLEX MAGNESIUM: 4.2 MMOL/L (ref 3.5–5.1)
PROTHROMBIN TIME: 32 SEC (ref 10–13.2)
RBC # BLD: 4.99 M/UL (ref 4.2–5.9)
SODIUM BLD-SCNC: 138 MMOL/L (ref 136–145)
TOTAL PROTEIN: 6.5 G/DL (ref 6.4–8.2)
TROPONIN: <0.01 NG/ML
TROPONIN: <0.01 NG/ML
WBC # BLD: 5.1 K/UL (ref 4–11)

## 2020-08-26 PROCEDURE — 94760 N-INVAS EAR/PLS OXIMETRY 1: CPT

## 2020-08-26 PROCEDURE — G0378 HOSPITAL OBSERVATION PER HR: HCPCS

## 2020-08-26 PROCEDURE — 2580000003 HC RX 258: Performed by: INTERNAL MEDICINE

## 2020-08-26 PROCEDURE — 2060000000 HC ICU INTERMEDIATE R&B

## 2020-08-26 PROCEDURE — 93005 ELECTROCARDIOGRAM TRACING: CPT | Performed by: EMERGENCY MEDICINE

## 2020-08-26 PROCEDURE — 6370000000 HC RX 637 (ALT 250 FOR IP): Performed by: INTERNAL MEDICINE

## 2020-08-26 PROCEDURE — 94664 DEMO&/EVAL PT USE INHALER: CPT

## 2020-08-26 PROCEDURE — 85610 PROTHROMBIN TIME: CPT

## 2020-08-26 PROCEDURE — 84484 ASSAY OF TROPONIN QUANT: CPT

## 2020-08-26 PROCEDURE — 6370000000 HC RX 637 (ALT 250 FOR IP): Performed by: EMERGENCY MEDICINE

## 2020-08-26 PROCEDURE — 99285 EMERGENCY DEPT VISIT HI MDM: CPT

## 2020-08-26 PROCEDURE — 85025 COMPLETE CBC W/AUTO DIFF WBC: CPT

## 2020-08-26 PROCEDURE — 80053 COMPREHEN METABOLIC PANEL: CPT

## 2020-08-26 PROCEDURE — 94640 AIRWAY INHALATION TREATMENT: CPT

## 2020-08-26 PROCEDURE — 36415 COLL VENOUS BLD VENIPUNCTURE: CPT

## 2020-08-26 PROCEDURE — 71046 X-RAY EXAM CHEST 2 VIEWS: CPT

## 2020-08-26 PROCEDURE — 6360000002 HC RX W HCPCS: Performed by: INTERNAL MEDICINE

## 2020-08-26 PROCEDURE — 93010 ELECTROCARDIOGRAM REPORT: CPT | Performed by: INTERNAL MEDICINE

## 2020-08-26 RX ORDER — ACETAMINOPHEN 325 MG/1
650 TABLET ORAL EVERY 6 HOURS PRN
Status: DISCONTINUED | OUTPATIENT
Start: 2020-08-26 | End: 2020-08-27 | Stop reason: HOSPADM

## 2020-08-26 RX ORDER — ALBUTEROL SULFATE 2.5 MG/3ML
2.5 SOLUTION RESPIRATORY (INHALATION) EVERY 4 HOURS PRN
Status: DISCONTINUED | OUTPATIENT
Start: 2020-08-26 | End: 2020-08-27 | Stop reason: HOSPADM

## 2020-08-26 RX ORDER — ONDANSETRON 2 MG/ML
4 INJECTION INTRAMUSCULAR; INTRAVENOUS EVERY 6 HOURS PRN
Status: DISCONTINUED | OUTPATIENT
Start: 2020-08-26 | End: 2020-08-27 | Stop reason: HOSPADM

## 2020-08-26 RX ORDER — DIPHENHYDRAMINE HCL 25 MG
25 TABLET ORAL DAILY
COMMUNITY
End: 2020-09-08

## 2020-08-26 RX ORDER — WARFARIN SODIUM 5 MG/1
5 TABLET ORAL
Status: COMPLETED | OUTPATIENT
Start: 2020-08-26 | End: 2020-08-26

## 2020-08-26 RX ORDER — ACETAMINOPHEN 650 MG/1
650 SUPPOSITORY RECTAL EVERY 6 HOURS PRN
Status: DISCONTINUED | OUTPATIENT
Start: 2020-08-26 | End: 2020-08-27 | Stop reason: HOSPADM

## 2020-08-26 RX ORDER — TAMSULOSIN HYDROCHLORIDE 0.4 MG/1
0.4 CAPSULE ORAL DAILY
Status: DISCONTINUED | OUTPATIENT
Start: 2020-08-27 | End: 2020-08-27 | Stop reason: HOSPADM

## 2020-08-26 RX ORDER — PREGABALIN 75 MG/1
75 CAPSULE ORAL 3 TIMES DAILY
Status: DISCONTINUED | OUTPATIENT
Start: 2020-08-26 | End: 2020-08-26 | Stop reason: DRUGHIGH

## 2020-08-26 RX ORDER — ASPIRIN 81 MG/1
81 TABLET, CHEWABLE ORAL DAILY
Status: DISCONTINUED | OUTPATIENT
Start: 2020-08-27 | End: 2020-08-27 | Stop reason: HOSPADM

## 2020-08-26 RX ORDER — POLYETHYLENE GLYCOL 3350 17 G/17G
17 POWDER, FOR SOLUTION ORAL DAILY PRN
Status: DISCONTINUED | OUTPATIENT
Start: 2020-08-26 | End: 2020-08-27 | Stop reason: HOSPADM

## 2020-08-26 RX ORDER — PREGABALIN 75 MG/1
150 CAPSULE ORAL EVERY EVENING
Status: DISCONTINUED | OUTPATIENT
Start: 2020-08-26 | End: 2020-08-27 | Stop reason: HOSPADM

## 2020-08-26 RX ORDER — WARFARIN SODIUM 2.5 MG/1
2.5 TABLET ORAL DAILY
Status: DISCONTINUED | OUTPATIENT
Start: 2020-08-26 | End: 2020-08-26 | Stop reason: DRUGHIGH

## 2020-08-26 RX ORDER — ZOLPIDEM TARTRATE 5 MG/1
10 TABLET ORAL NIGHTLY PRN
Status: DISCONTINUED | OUTPATIENT
Start: 2020-08-26 | End: 2020-08-27 | Stop reason: HOSPADM

## 2020-08-26 RX ORDER — SODIUM CHLORIDE 0.9 % (FLUSH) 0.9 %
10 SYRINGE (ML) INJECTION PRN
Status: DISCONTINUED | OUTPATIENT
Start: 2020-08-26 | End: 2020-08-27 | Stop reason: HOSPADM

## 2020-08-26 RX ORDER — PREGABALIN 75 MG/1
75 CAPSULE ORAL DAILY
Status: DISCONTINUED | OUTPATIENT
Start: 2020-08-27 | End: 2020-08-27 | Stop reason: HOSPADM

## 2020-08-26 RX ORDER — SODIUM CHLORIDE 0.9 % (FLUSH) 0.9 %
10 SYRINGE (ML) INJECTION EVERY 12 HOURS SCHEDULED
Status: DISCONTINUED | OUTPATIENT
Start: 2020-08-26 | End: 2020-08-27 | Stop reason: HOSPADM

## 2020-08-26 RX ORDER — PROMETHAZINE HYDROCHLORIDE 25 MG/1
12.5 TABLET ORAL EVERY 6 HOURS PRN
Status: DISCONTINUED | OUTPATIENT
Start: 2020-08-26 | End: 2020-08-27 | Stop reason: HOSPADM

## 2020-08-26 RX ORDER — OXYCODONE HYDROCHLORIDE AND ACETAMINOPHEN 5; 325 MG/1; MG/1
1 TABLET ORAL ONCE
Status: COMPLETED | OUTPATIENT
Start: 2020-08-26 | End: 2020-08-26

## 2020-08-26 RX ORDER — PRAVASTATIN SODIUM 40 MG
40 TABLET ORAL NIGHTLY
Status: DISCONTINUED | OUTPATIENT
Start: 2020-08-26 | End: 2020-08-27 | Stop reason: HOSPADM

## 2020-08-26 RX ORDER — PANTOPRAZOLE SODIUM 40 MG/1
40 TABLET, DELAYED RELEASE ORAL
Status: DISCONTINUED | OUTPATIENT
Start: 2020-08-27 | End: 2020-08-26

## 2020-08-26 RX ORDER — METOPROLOL SUCCINATE 25 MG/1
25 TABLET, EXTENDED RELEASE ORAL 2 TIMES DAILY
Status: DISCONTINUED | OUTPATIENT
Start: 2020-08-26 | End: 2020-08-27 | Stop reason: HOSPADM

## 2020-08-26 RX ADMIN — METOPROLOL SUCCINATE 25 MG: 25 TABLET, EXTENDED RELEASE ORAL at 19:56

## 2020-08-26 RX ADMIN — WARFARIN SODIUM 5 MG: 5 TABLET ORAL at 19:59

## 2020-08-26 RX ADMIN — ACETAMINOPHEN 650 MG: 325 TABLET ORAL at 19:58

## 2020-08-26 RX ADMIN — OXYCODONE HYDROCHLORIDE AND ACETAMINOPHEN 1 TABLET: 5; 325 TABLET ORAL at 14:40

## 2020-08-26 RX ADMIN — ZOLPIDEM TARTRATE 10 MG: 5 TABLET ORAL at 23:08

## 2020-08-26 RX ADMIN — PREGABALIN 150 MG: 75 CAPSULE ORAL at 19:56

## 2020-08-26 RX ADMIN — ALBUTEROL SULFATE 2.5 MG: 2.5 SOLUTION RESPIRATORY (INHALATION) at 23:57

## 2020-08-26 RX ADMIN — Medication 10 ML: at 19:58

## 2020-08-26 RX ADMIN — PRAVASTATIN SODIUM 40 MG: 40 TABLET ORAL at 19:57

## 2020-08-26 RX ADMIN — SACUBITRIL AND VALSARTAN 1 TABLET: 24; 26 TABLET, FILM COATED ORAL at 19:57

## 2020-08-26 RX ADMIN — PREGABALIN 150 MG: 75 CAPSULE ORAL at 23:09

## 2020-08-26 ASSESSMENT — PAIN SCALES - GENERAL
PAINLEVEL_OUTOF10: 8
PAINLEVEL_OUTOF10: 0
PAINLEVEL_OUTOF10: 8
PAINLEVEL_OUTOF10: 3

## 2020-08-26 ASSESSMENT — ENCOUNTER SYMPTOMS
WHEEZING: 1
VOMITING: 0
COUGH: 0
ABDOMINAL PAIN: 0
RHINORRHEA: 0
EYE REDNESS: 0
SHORTNESS OF BREATH: 1
EYE PAIN: 0
BACK PAIN: 1
NAUSEA: 0
EYE DISCHARGE: 0
DIARRHEA: 0
SORE THROAT: 0

## 2020-08-26 ASSESSMENT — PAIN DESCRIPTION - DESCRIPTORS
DESCRIPTORS: ACHING
DESCRIPTORS: ACHING

## 2020-08-26 ASSESSMENT — PAIN DESCRIPTION - LOCATION
LOCATION: CHEST
LOCATION: CHEST

## 2020-08-26 ASSESSMENT — HEART SCORE: ECG: 1

## 2020-08-26 ASSESSMENT — PAIN DESCRIPTION - PROGRESSION: CLINICAL_PROGRESSION: GRADUALLY WORSENING

## 2020-08-26 ASSESSMENT — PAIN DESCRIPTION - ONSET: ONSET: OTHER (COMMENT)

## 2020-08-26 ASSESSMENT — PAIN - FUNCTIONAL ASSESSMENT: PAIN_FUNCTIONAL_ASSESSMENT: ACTIVITIES ARE NOT PREVENTED

## 2020-08-26 ASSESSMENT — PAIN DESCRIPTION - PAIN TYPE
TYPE: ACUTE PAIN
TYPE: ACUTE PAIN

## 2020-08-26 ASSESSMENT — PAIN DESCRIPTION - FREQUENCY: FREQUENCY: INTERMITTENT

## 2020-08-26 NOTE — ED TRIAGE NOTES
Pt to ED and states he had chest tightness this began this am.  Pt states he has recently been told he has calcifications in his chest.  Pt states he was diaphoretic this am at onset of chest pain.   Pt denies n/v.

## 2020-08-26 NOTE — ED PROVIDER NOTES
[facility]  eMERGENCY dEPARTMENT eNCOUnter        Pt Name: Jaye Cheatham  MRN: [id number]  [place] 1960  Date of evaluation: 8/26/2020  Provider: Katharina Black MD  PCP: Ronak Osei MD      CHIEF COMPLAINT       Chief Complaint   Patient presents with    Chest Pain     Started this morning       HISTORY OFPRESENT ILLNESS   (Location/Symptom, Timing/Onset, Context/Setting, Quality, Duration, Modifying Factors,Severity)  Note limiting factors. Jaye Cheatham is a 63 y.o. male       Location/Symptom: Chest pain lightheadedness  Timing/Onset: Chest pain started this morning however he is not felt well for several weeks. Context/Setting: Patient does have a history of pacemaker, left bundle branch block, pulmonary disease. Also has a history of DVT and pulmonary emboli. He is on Coumadin. Apparently had an outpatient cardiac CT that was abnormal and is scheduled to have some type of stress test soon. He also recently received an injection in his back about 3 weeks ago for a nerve block. States he just has not felt well for the last few weeks. He called his primary care provider who told him to come in and be seen but the patient felt he was unable to and came to the emergency department. Quality: Sharp tightness  Duration: This part a bit vague. Initially complains of the chest pain starting this morning but in actuality it is been going on for the last few weeks. It has been coming and going. It has had an exertional component. Modifying Factors: It does hurt to take a deep breath and it does hurt with exertion    Nursing Noteswere all reviewed and agreed with or any disagreements were addressed  in the HPI. REVIEW OF SYSTEMS    (2-9 systems for level 4, 10 or more for level 5)     Review of Systems   Constitutional: Negative for chills, fatigue and fever. HENT: Negative for ear pain, rhinorrhea and sore throat.     Eyes: Negative for pain, discharge, redness and visual disturbance. Respiratory: Positive for shortness of breath and wheezing. Negative for cough. Cardiovascular: Positive for chest pain. Negative for palpitations and leg swelling. Gastrointestinal: Negative for abdominal pain, diarrhea, nausea and vomiting. Genitourinary: Negative for difficulty urinating and dysuria. Musculoskeletal: Positive for back pain. Negative for arthralgias and myalgias. Skin: Negative for rash. Allergic/Immunologic: Negative for environmental allergies. Neurological: Positive for light-headedness. Negative for dizziness, seizures, syncope and headaches. Hematological: Negative for adenopathy. Psychiatric/Behavioral: Negative for suicidal ideas. The patient is not nervous/anxious.           PAST MEDICAL HISTORY     Past Medical History:   Diagnosis Date    Bronchiolitis obliterans ([address]     Bundle branch block, right     Cardiomyopathy ([address]     Fibromyalgia     GERD (gastroesophageal reflux disease)     Hepatitis 1979    unsure of which type    Hx of blood clots     Hyperlipemia     Hypertension     IBS (irritable bowel syndrome)     Kidney stone     over thirty kidney stones    Neuropathy     right side-chest    Pneumothorax 2011    Right    Prostatitis     Pulmonary embolism on right [facility]) 2011    upper lobe-coumadin 2011    Sacroiliitis [facility])          SURGICAL HISTORY     Past Surgical History:   Procedure Laterality Date    CHOLECYSTECTOMY  2007    COLONOSCOPY  9/21/2012    dr Ty Carpenter  05/17/2019    RIGHT SIDED URETEROSCOPY  Diagnostic, retrograde pyelogram and fulguration of previously resected bladder tumor base    CYSTOSCOPY Right 5/17/2019    RIGHT SIDED URETEROSCOPY FLUGERATION  OF BLADDER performed by Abby Goodrich MD at [address] [place] [address]  2015    LITHOTRIPSY      PACEMAKER PLACEMENT      SINUS SURGERY      Made opening larger in sinuses    UPPER GASTROINTESTINAL ENDOSCOPY  3/28/2014    dr Jonna Capone       Previous Medications    ALBUTEROL (PROAIR HFA) 108 (90 BASE) MCG/ACT INHALER    Inhale 2 puffs into the lungs every 6 hours as needed for Wheezing or Shortness of Breath     ALBUTEROL (PROVENTIL) (2.5 MG/3ML) 0.083% NEBULIZER SOLUTION    USE 3 ML VIA NEBULIZER EVERY 4 HOURS AS NEEDED FOR WHEEZING OR SHORTNESS OF BREATH    AMBIEN 10 MG TABLET    1 tablet po qhs    ASPIRIN 81 MG TABLET    Take 81 mg by mouth daily    AZELASTINE (ASTELIN) 0.1 % NASAL SPRAY    2 sprays by Nasal route 2 times daily Use in each nostril as directed    CEFUROXIME (CEFTIN) 250 MG TABLET    Take 1 tablet by mouth 2 times daily for 7 days    CHOLECALCIFEROL (VITAMIN D3) 96371 UNITS CAPS    Take 1 capsule by mouth once a week    DEXILANT 30 MG CPDR DELAYED RELEASE CAPSULE    TAKE 1 CAPSULE BY MOUTH  DAILY    DICYCLOMINE (BENTYL) 10 MG CAPSULE    TAKE ONE CAPSULE BY MOUTH  FOUR TIMES DAILY AS NEEDED    FEXOFENADINE (ALLEGRA ALLERGY) 180 MG TABLET    Take 180 mg by mouth daily    FLAVOXATE (URISPAS) 100 MG TABLET    TAKE 1 TABLET BY MOUTH THREE TIMES DAILY AS NEEDED FOR URINARY SPASM    FUROSEMIDE (LASIX) 20 MG TABLET    Take 1 tablet by mouth daily as needed (take 20 mg tablet daily as needed for increased weight, swelling or shortness of breath)    GUAIFENESIN (MUCINEX) 600 MG EXTENDED RELEASE TABLET    Take 1 tablet by mouth 2 times daily    METOPROLOL SUCCINATE (TOPROL XL) 25 MG EXTENDED RELEASE TABLET    TAKE 1 TABLET BY MOUTH TWO  TIMES DAILY    ONDANSETRON (ZOFRAN ODT) 4 MG DISINTEGRATING TABLET    Take 1-2 tablets by mouth every 8 hours as needed for Nausea May Sub regular tablet (non-ODT) if insurance does not cover ODT.     POLYETHYLENE GLYCOL (MIRALAX) PACK PACKET    Take 17 g by mouth daily    PRAVASTATIN (PRAVACHOL) 40 MG TABLET    TAKE 1 TABLET BY MOUTH  DAILY    PREGABALIN (LYRICA) 75 MG CAPSULE    TAKE 1 CAPSULE BY MOUTH IN  THE MORNING AND 2 CAPSULES  EVERY EVENING    SACUBITRIL-VALSARTAN (ENTRESTO) 24-26 MG PER TABLET    TAKE 2 TABLETS BY MOUTH IN  THE MORNING AND 1 TABLET BY MOUTH IN THE EVENING    SILDENAFIL (REVATIO) 20 MG TABLET    Take 20 mg by mouth as needed    SODIUM CHLORIDE, INHALANT, (NEBUSAL) 3 % NEBULIZER SOLUTION    Take 4 mLs by nebulization every 4 hours as needed for Other or Cough (Cough)    TAMSULOSIN (FLOMAX) 0.4 MG CAPSULE    TAKE 1 CAPSULE BY MOUTH TWICE DAILY    WARFARIN (COUMADIN) 5 MG TABLET    Take 0.5 tablets by mouth daily ECEPT 5mg every Monday, Wednesday and Friday or as directed by [facility] [phone]       ALLERGIES     Ipratropium bromide hfa; Atrovent nasal spray [ipratropium];  Doxycycline; Morphine; Nitroglycerin; Sulfa antibiotics; and Vicodin [hydrocodone-acetaminophen]    FAMILY HISTORY       Family History   Problem Relation Age of Onset    High Blood Pressure Mother     Dementia Mother     Cancer Father         Pancreatic Ca          SOCIAL HISTORY       Social History     Socioeconomic History    Marital status:      Spouse name: Not on file    Number of children: Not on file    Years of education: Not on file    Highest education level: Not on file   Occupational History    Not on file   Social Needs    Financial resource strain: Not hard at all   [employer] insecurity     Worry: Never true     Inability: Never true   [employer] needs     Medical: No     Non-medical: No   Tobacco Use    Smoking status: Never Smoker    Smokeless tobacco: Never Used   Substance and Sexual Activity    Alcohol use: No     Comment: occ    Drug use: No    Sexual activity: Yes     Partners: Female     Comment: wife   Lifestyle    Physical activity     Days per week: Not on file     Minutes per session: Not on file    Stress: Not on file   Relationships    Social connections     Talks on phone: Not on file     Gets together: Not on file     Attends Worship service: Not on file     Active member of club or organization: Not on file     Attends meetings of clubs or organizations: Not on file     Relationship status: Not on file    Intimate partner violence     Fear of current or ex partner: Not on file     Emotionally abused: Not on file     Physically abused: Not on file     Forced sexual activity: Not on file   Other Topics Concern    Not on file   Social History Narrative    Not on file       SCREENINGS      Heart Score for chest pain patients  History: Slightly Suspicious  ECG: Non-Specifc repolarization disturbance/LBTB/PM  Patient Age: > 39 and < 65 years  *Risk factors for Atherosclerotic disease: Hypertension, Obesity, Hypercholesterolemia  Risk Factors: > 3 Risk factors or history of atherosclerotic disease*  Troponin: < 1X normal limit  Heart Score Total: 4      PHYSICAL EXAM    (up to 7 for level 4, 8 or more for level 5)     ED Triage Vitals [08/26/20 1339]   BP Temp Temp Source Pulse Resp SpO2 Height Weight   (!) 156/102 98.5 °F (36.9 °C) Oral 97 18 96 % -- --      oral temperature is 98.5 °F (36.9 °C). His blood pressure is 156/102 (abnormal) and his pulse is 97. His respiration is 18 and oxygen saturation is 96%. Physical Exam  Constitutional:       Appearance: He is well-developed. He is not diaphoretic. HENT:      Head: Normocephalic and atraumatic. Right Ear: External ear normal.      Left Ear: External ear normal.   Eyes:      General: No scleral icterus. Right eye: No discharge. Left eye: No discharge. Neck:      Musculoskeletal: Normal range of motion. Thyroid: No thyromegaly. Vascular: No JVD. Trachea: No tracheal deviation. Cardiovascular:      Rate and Rhythm: Normal rate and regular rhythm. Heart sounds: No murmur. No friction rub. No gallop. Pulmonary:      Effort: Pulmonary effort is normal. No respiratory distress. Breath sounds: No stridor. Examination of the right-upper field reveals wheezing.  Examination of the left-upper field reveals wheezing. Wheezing present. No rales. Abdominal:      General: There is no distension. Palpations: Abdomen is soft. Tenderness: There is no abdominal tenderness. There is no guarding or rebound. Musculoskeletal:         General: No tenderness. Skin:     General: Skin is warm and dry. Findings: No rash (On exposed body surfaces). Neurological:      Mental Status: He is alert and oriented to person, place, and time. Coordination: Coordination normal.   Psychiatric:         Behavior: Behavior normal.         Thought Content:  Thought content normal.         DIAGNOSTIC RESULTS   LABS:    Results for orders placed or performed during the hospital encounter of 08/26/20   CBC Auto Differential   Result Value Ref Range    WBC 5.1 4.0 - 11.0 K/uL    RBC 4.99 4.20 - 5.90 M/uL    Hemoglobin 15.5 13.5 - 17.5 g/dL    Hematocrit 46.1 40.5 - 52.5 %    MCV 92.3 80.0 - 100.0 fL    MCH 31.1 26.0 - 34.0 pg    MCHC 33.7 31.0 - 36.0 g/dL    RDW 13.2 12.4 - 15.4 %    Platelets 020 880 - 135 K/uL    MPV 7.1 5.0 - 10.5 fL    Neutrophils % 53.5 %    Lymphocytes % 29.3 %    Monocytes % 11.1 %    Eosinophils % 5.4 %    Basophils % 0.7 %    Neutrophils Absolute 2.7 1.7 - 7.7 K/uL    Lymphocytes Absolute 1.5 1.0 - 5.1 K/uL    Monocytes Absolute 0.6 0.0 - 1.3 K/uL    Eosinophils Absolute 0.3 0.0 - 0.6 K/uL    Basophils Absolute 0.0 0.0 - 0.2 K/uL   Troponin   Result Value Ref Range    Troponin <0.01 <0.01 ng/mL   Comprehensive Metabolic Panel w/ Reflex to MG   Result Value Ref Range    Sodium 138 136 - 145 mmol/L    Potassium reflex Magnesium 4.2 3.5 - 5.1 mmol/L    Chloride 107 99 - 110 mmol/L    CO2 25 21 - 32 mmol/L    Anion Gap 6 3 - 16    Glucose 73 70 - 99 mg/dL    BUN 10 7 - 20 mg/dL    CREATININE 0.8 0.8 - 1.3 mg/dL    GFR Non-African American >60 >60    GFR African American >60 >60    Calcium 10.7 (H) 8.3 - 10.6 mg/dL    Total Protein 6.5 6.4 - 8.2 g/dL    Alb 3.9 3.4 - 5.0 g/dL    Albumin/Globulin Ratio 1.5 1.1 - 2.2    Total Bilirubin 0.3 0.0 - 1.0 mg/dL    Alkaline Phosphatase 93 40 - 129 U/L    ALT 22 10 - 40 U/L    AST 20 15 - 37 U/L    Globulin 2.6 g/dL   Protime-INR   Result Value Ref Range    Protime 32.0 (H) 10.0 - 13.2 sec    INR 2.73 (H) 0.86 - 1.14   EKG 12 Lead   Result Value Ref Range    Ventricular Rate 81 BPM    Atrial Rate 81 BPM    P-R Interval 166 ms    QRS Duration 162 ms    Q-T Interval 434 ms    QTc Calculation (Bazett) 504 ms    P Axis 83 degrees    R Axis -46 degrees    T Axis 97 degrees    Diagnosis       Normal sinus rhythm Poor data quality, interpretation may be adversely affectedLeft axis deviationLeft bundle branch blockAbnormal ECGWhen compared with ECG of 01-FEB-2020 20:31,No significant change likelyConfirmed by [facility] Alanna DUBON ([id number]) on 8/26/2020 4:03:13 PM       All other labs were within normal range or not returned as of this dictation. EKG: All EKG's are interpreted by the Emergency Department Physician who either signs orCo-signs this chart in the absence of a cardiologist.    EKG visualized preliminarily interpreted by myself. Significant electrical interference affects interpretation. The rate is 81 and this is a left bundle branch block with a regular rhythm. Likely sinus rhythm. The axis is -46. No significant change compared to previous    RADIOLOGY:   Non-plain film images such as CT, Ultrasound and MRI are read by the radiologist. Natalia Umanzor radiographic images are visualized and preliminarily interpreted by the  EDProvider with the below findings:    Xr Chest (2 Vw)    Result Date: 8/26/2020  EXAMINATION: TWO XRAY VIEWS OF THE CHEST 8/26/2020 1:56 pm COMPARISON: 08/25/2020 HISTORY: ORDERING SYSTEM PROVIDED HISTORY: Chest Pain TECHNOLOGIST PROVIDED HISTORY: Reason for exam:->Chest Pain Reason for Exam:  chest pain FINDINGS: There is a left subclavian biventricular ICD.   Cardiac silhouette is normal. Hilar contours are normal.  There is no focal airspace disease or pleural effusion. Spondylosis of the thoracic spine. Cholecystectomy clips. No acute cardiopulmonary disease. Ct Chest Low Dose (ldct)    Result Date: 8/25/2020  EXAMINATION: LOW DOSE OF THE CT  CHEST WITHOUT CONTRAST 8/25/2020 10:26 am TECHNIQUE: Low Dose of the CT Chest without the administration of intravenous contrast (MA=40). Multiplanar reformatted images are provided for review. Dose modulation, iterative reconstruction, and/or weight based adjustment of the mA/kV was utilized to reduce the radiation dose to as low as reasonably achievable. COMPARISON: 02/01/2020 HISTORY: ORDERING SYSTEM PROVIDED HISTORY: Lung nodules TECHNOLOGIST PROVIDED HISTORY: Reason for exam:->Follow-up lung nodules Reason for Exam: fu pulm nodules Acuity: Acute Type of Exam: Subsequent/Follow-up FINDINGS: Mediastinum: Coronary artery calcifications are a marker of atherosclerosis. A 3 lead ICD is in situ. There are no enlarged thoracic lymph nodes. Lungs/pleura: The tracheobronchial tree is patent. There is no pneumothorax or pleural effusion. Mild emphysema involves the bilateral upper lungs. There is bibasilar scarring. There is improved with residual tree-in-bud opacities throughout the bilateral lungs. No new pulmonary nodules have developed. Upper Abdomen: Status post cholecystectomy. Soft Tissues/Bones: Degenerative changes involve the thoracic spine. 1. Improved with residual chronic bilateral bronchiolitis. RECOMMENDATION: Per ACR Lung-RADS Version 1.0 Category 2, Benign appearance or behavior. Management:  Continue annual lung screening with LDCT in 12 months. (probability of malignancy <1%).            PROCEDURES   Unless otherwise noted below, none     Procedures    CRITICAL CARE TIME   N/A    CONSULTS:  IP CONSULT TO PRIMARY CARE PROVIDER  IP CONSULT TO PRIMARY CARE PROVIDER    EMERGENCY DEPARTMENT COURSE and DIFFERENTIAL DIAGNOSIS/MDM:   Vitals:    Vitals:    08/26/20 1339   BP: (!) 156/102 Pulse: 97   Resp: 18   Temp: 98.5 °F (36.9 °C)   TempSrc: Oral   SpO2: 96%       Patient was given the following medications:  Medications   oxyCODONE-acetaminophen (PERCOCET) 5-325 MG per tablet 1 tablet (1 tablet Oral Given 8/26/20 1440)       He has been stable. He is a little bit difficult to read. I was under the impression the primary issue he was concerned about was the chest pain but after talking it over with his primary care provider and suggesting that perhaps he could be managed as an outpatient patient stated that he felt very unsafe going home and that he had to find out why he was having this lightheadedness. So, he states it is both lightheadedness and chest pain. He is concerned that there is something urgently wrong so, I recontacted his primary care provider and we will place him in inpatient care      FINAL IMPRESSION      1. Chest pain, unspecified type          DISPOSITION/PLAN    DISPOSITION Decision To Admit 08/26/2020 03:53:45 PM      PATIENT REFERRED TO:  No follow-up provider specified.     DISCHARGE MEDICATIONS:  New Prescriptions    No medications on file       DISCONTINUED MEDICATIONS:  Discontinued Medications    No medications on file              (Please note that portions of this note were completed with a voice recognition program.  Efforts were made to editthe dictations but occasionally words are mis-transcribed.)    Miguel Huddleston MD (electronically signed)            Miguel Huddleston MD  08/26/20 1774

## 2020-08-26 NOTE — ED NOTES
Unable to obtain ekg due to the patient going to his car     Ignacio Dooley, [facility]  08/26/20 6252

## 2020-08-26 NOTE — CONSULTS
Clinical Pharmacy Note  Warfarin Consult    Godwin Tadeo is a 63 y.o. male receiving warfarin managed by pharmacy. Patient being bridged with none. Warfarin Indication: Hx of PE  Target INR range: 2-3   Dose prior to admission: 2.5mg daily except 5mg QMWF    Current warfarin drug-drug interactions:     Recent Labs     08/25/20  1001 08/26/20  1434   HGB  --  15.5   HCT  --  46.1   INR 3.3 2.73*       Assessment/Plan:    Warfarin 5 mg tonight. Daily PT/INR until stable within therapeutic range. Thank you for the consult. Will continue to follow.   Pk Arringtonut 8/26/2020 7:05 PM

## 2020-08-26 NOTE — TELEPHONE ENCOUNTER
Jordi Nichole called in this afternoon stating that instead of doing the stress test, that he is just going to head to the ER. He just said he doesn't feel right.

## 2020-08-27 VITALS
RESPIRATION RATE: 20 BRPM | TEMPERATURE: 97.9 F | DIASTOLIC BLOOD PRESSURE: 97 MMHG | SYSTOLIC BLOOD PRESSURE: 148 MMHG | OXYGEN SATURATION: 94 % | HEIGHT: 66 IN | HEART RATE: 74 BPM | BODY MASS INDEX: 34.16 KG/M2 | WEIGHT: 212.52 LBS

## 2020-08-27 PROBLEM — R07.9 CHEST PAIN: Status: RESOLVED | Noted: 2019-09-24 | Resolved: 2020-08-27

## 2020-08-27 LAB
HCT VFR BLD CALC: 43.2 % (ref 40.5–52.5)
HEMOGLOBIN: 14.5 G/DL (ref 13.5–17.5)
INR BLD: 2.77 (ref 0.86–1.14)
LV EF: 50 %
LVEF MODALITY: NORMAL
MCH RBC QN AUTO: 31.4 PG (ref 26–34)
MCHC RBC AUTO-ENTMCNC: 33.6 G/DL (ref 31–36)
MCV RBC AUTO: 93.4 FL (ref 80–100)
PDW BLD-RTO: 12.9 % (ref 12.4–15.4)
PLATELET # BLD: 163 K/UL (ref 135–450)
PMV BLD AUTO: 7.3 FL (ref 5–10.5)
PROTHROMBIN TIME: 32.5 SEC (ref 10–13.2)
RBC # BLD: 4.63 M/UL (ref 4.2–5.9)
TROPONIN: <0.01 NG/ML
WBC # BLD: 5.2 K/UL (ref 4–11)

## 2020-08-27 PROCEDURE — 99223 1ST HOSP IP/OBS HIGH 75: CPT | Performed by: INTERNAL MEDICINE

## 2020-08-27 PROCEDURE — 85610 PROTHROMBIN TIME: CPT

## 2020-08-27 PROCEDURE — 94640 AIRWAY INHALATION TREATMENT: CPT

## 2020-08-27 PROCEDURE — A9502 TC99M TETROFOSMIN: HCPCS | Performed by: INTERNAL MEDICINE

## 2020-08-27 PROCEDURE — 6360000002 HC RX W HCPCS: Performed by: INTERNAL MEDICINE

## 2020-08-27 PROCEDURE — 85027 COMPLETE CBC AUTOMATED: CPT

## 2020-08-27 PROCEDURE — 93017 CV STRESS TEST TRACING ONLY: CPT

## 2020-08-27 PROCEDURE — 3430000000 HC RX DIAGNOSTIC RADIOPHARMACEUTICAL: Performed by: INTERNAL MEDICINE

## 2020-08-27 PROCEDURE — 84484 ASSAY OF TROPONIN QUANT: CPT

## 2020-08-27 PROCEDURE — G0378 HOSPITAL OBSERVATION PER HR: HCPCS

## 2020-08-27 PROCEDURE — 36415 COLL VENOUS BLD VENIPUNCTURE: CPT

## 2020-08-27 PROCEDURE — 78452 HT MUSCLE IMAGE SPECT MULT: CPT

## 2020-08-27 PROCEDURE — 99236 HOSP IP/OBS SAME DATE HI 85: CPT | Performed by: INTERNAL MEDICINE

## 2020-08-27 PROCEDURE — 2580000003 HC RX 258: Performed by: INTERNAL MEDICINE

## 2020-08-27 PROCEDURE — 6370000000 HC RX 637 (ALT 250 FOR IP): Performed by: INTERNAL MEDICINE

## 2020-08-27 PROCEDURE — 94760 N-INVAS EAR/PLS OXIMETRY 1: CPT

## 2020-08-27 RX ADMIN — ALBUTEROL SULFATE 2.5 MG: 2.5 SOLUTION RESPIRATORY (INHALATION) at 12:06

## 2020-08-27 RX ADMIN — SACUBITRIL AND VALSARTAN 2 TABLET: 24; 26 TABLET, FILM COATED ORAL at 08:25

## 2020-08-27 RX ADMIN — Medication 20 MG: at 08:25

## 2020-08-27 RX ADMIN — ASPIRIN 81 MG: 81 TABLET, CHEWABLE ORAL at 08:25

## 2020-08-27 RX ADMIN — TETROFOSMIN 10 MILLICURIE: 1.38 INJECTION, POWDER, LYOPHILIZED, FOR SOLUTION INTRAVENOUS at 09:12

## 2020-08-27 RX ADMIN — PREGABALIN 75 MG: 75 CAPSULE ORAL at 08:29

## 2020-08-27 RX ADMIN — REGADENOSON 0.4 MG: 0.08 INJECTION, SOLUTION INTRAVENOUS at 10:40

## 2020-08-27 RX ADMIN — METOPROLOL SUCCINATE 25 MG: 25 TABLET, EXTENDED RELEASE ORAL at 11:59

## 2020-08-27 RX ADMIN — Medication 10 ML: at 08:32

## 2020-08-27 RX ADMIN — TETROFOSMIN 30 MILLICURIE: 1.38 INJECTION, POWDER, LYOPHILIZED, FOR SOLUTION INTRAVENOUS at 10:42

## 2020-08-27 RX ADMIN — TAMSULOSIN HYDROCHLORIDE 0.4 MG: 0.4 CAPSULE ORAL at 11:59

## 2020-08-27 ASSESSMENT — PAIN SCALES - GENERAL
PAINLEVEL_OUTOF10: 0
PAINLEVEL_OUTOF10: 0

## 2020-08-27 NOTE — PROGRESS NOTES
Written discharge instructions including diet, activity, weight monitoring, what to do if symptoms worsen and the importance of follow up care and need to call his/her physician for  an appointment were reviewed with the patient who verbalized understanding. These written instructions were given to the patient to take home and a copy was made for the medical record.  Electronically signed by Sruthi Paredes RN on 8/27/2020 at 2:44 PM

## 2020-08-27 NOTE — PLAN OF CARE
Pt able to verbalize understanding of and adherence to nutritional guidelines/dietary allowances or restrictions. Discussed weight monitoring, intake monitoring and labs.

## 2020-08-27 NOTE — PROGRESS NOTES
Discussed with patient the cardiologist reviewed his stress test and he should be able to go home today with follow up as an outpatient. Dr. Simmons Deajulianna updated with cardio note and plan to discharge this afternoon.

## 2020-08-27 NOTE — CONSULTS
[address] [facility]                                  CONSULTATION    PATIENT NAME: Misty Finn                        :        1960  MED REC NO:   [id number]                          ROOM:       8364  ACCOUNT NO:   [de-identified]                           ADMIT DATE: 2020  PROVIDER:     Pascale Santiago MD    CARDIAC CONSULTATION    CONSULT DATE:  2020    HISTORY OF PRESENT ILLNESS:  This is a 69-year-old male with a history  of nonischemic cardiomyopathy, coronary artery calcification,  bronchiolitis obliterans, history of PE, and anxiety. He has presented  to the hospital with exertional shortness of breath which he has been  noticing for the last two to three weeks. He also has some chest  discomfort. In his recent testing, he was found to have coronary  calcification and he was to undergo outpatient stress testing but he  started experiencing shortness of breath and chest discomfort which  prompted him to come to the emergency room. In the emergency room, his  blood test showed no evidence of MI. His EKG showed left bundle branch  block which is chronic in nature and the patient insisted on getting  admitted to the hospital.  He has no fever, chills, or rigors. No cough  or sputum production. No orthopnea or PND. No leg edema. REVIEW OF SYSTEMS:  Please see HPI. All other systems are reviewed and  they are negative. PAST MEDICAL HISTORY:  1. History of cardiomyopathy. EF 30% to 35%. The patient is status  post biv ICD. Its pacer function has been turned off.  2.  History of atrial fibrillation. 3.  History of pulmonary embolism. 4.  Hypertension. 5.  Hyperlipidemia. 6.  Irritable bowel syndrome. PAST SURGICAL HISTORY:  Cholecystectomy, colonoscopy, cystoscopy, and he  had angiography in the past with no angiographic coronary artery  disease.     SOCIAL HISTORY:  He denied any the care of this pleasant  male.         Marco Richards MD    D: 08/27/2020 9:50:51       T: 08/27/2020 12:01:22     [email or web address]  Job#: [id number]     Doc#: [id number]    CC:

## 2020-08-27 NOTE — H&P
Principal Problem:    Precordial pain  Active Problems:    Coronary artery disease due to calcified coronary lesion    Essential hypertension, benign    Bronchiolitis obliterans ([facility] [address]    Pure hypercholesterolemia    History of pulmonary embolism    Biventricular ICD (implantable cardioverter-defibrillator) in place    History and Physical Dictated, #  [id number]    Time > 35 minutes reviewing chart and patient data, examining and interviewing patient, and discussing with nursing staff, family, etc.

## 2020-08-27 NOTE — DISCHARGE SUMMARY
[address] [facility]                               DISCHARGE SUMMARY    PATIENT NAME: Moises Mariee                        :        1960  MED REC NO:   [id number]                          ROOM:       5083  ACCOUNT NO:   [de-identified]                           ADMIT DATE: 2020  PROVIDER:     Piyush Recinos MD                  DISCHARGE DATE:      DISCHARGE DATE:  2020    REASON FOR ADMISSION:  Chest pain. HISTORY OF PRESENT ILLNESS:  This is a 34-year-old white male with  multiple medical problems including cardiomyopathy, who presented to the  emergency room yesterday with substernal chest pain. The patient has  had chest pain off and on over the last several weeks oftentimes related  to exertion but had some atypical components occurring at rest as well. The patient had a CT scan done earlier this week as well that showed  coronary artery calcification which was a new finding for him. He was  worried about this as well. The patient was admitted for further  evaluation and treatment. HOSPITAL COURSE:  Precordial chest pain, some typical and atypical  symptoms. Coronary calcification was a new finding. The patient  underwent a nuclear medicine Myoview scan today showing no evidence  reversible ischemia. There was a moderate to large size moderately  intense fixed septal and inferior wall defect most consistent with a  left bundle branch block-induced artifact and diaphragm attenuation. LV  function was normal throughout. The patient is being discharged to home  and followup will be with Dr. Bere Bashir in one week. The patient has a  history of hyperlipidemia and has been statin intolerant. Given his now  known coronary artery calcifications, he could be considered for PCSK9  inhibitor therapy.     Remainder of the patient's chronic medical conditions including  hypertension, bronchiolitis obliterans, hyperlipidemia, and a history of  pulmonary embolus were stable throughout this hospital course. DISCHARGE MEDICATIONS:  Please see the discharge med rec form for  discharge medications. FOLLOWUP:  Followup again will be with Dr. Osmar Melendez in one week and with  Dr. Lowry Or as needed. CONDITION ON DISCHARGE:  Fair.         Dexter Sherwood MD    D: 08/27/2020 14:21:24       T: 08/27/2020 14:30:42     [id number]  Job#: [id number]     Doc#: [id number]    CC:

## 2020-08-27 NOTE — DISCHARGE SUMMARY
Discharge Summary Dictated    Dictation #  [id number]    Time > 30 minutes coordinating discharge,  reviewing chart and patient data, examining and interviewing patient, and discussing with nursing staff, case management/social work, family, etc. Normal

## 2020-08-27 NOTE — H&P
[address] 16                              HISTORY AND PHYSICAL    PATIENT NAME: Naye Jean                        :        1960  MED REC NO:   [id number]                          ROOM:         ACCOUNT NO:   [de-identified]                           ADMIT DATE: 2020  PROVIDER:     Maureen Perez MD    REASON FOR ADMISSION:  Chest pain. HISTORY OF PRESENT ILLNESS:  This is a 68-year-old white male with a  complicated history involving a cardiomyopathy, coronary artery disease,  bronchiolitis obliterans, history of pulmonary embolus, who presented to  the emergency room with 3 weeks of intermittent chest pain. The patient  reports that chest discomfort was his lower chest substernal pain that  would occur with activity and then would generally be relieved with  rest.  He did have some discomfort with rest as well and had some pain  overnight but it was only minimal.  He did seem to relate the pain to be  more common when he was active and seemed to resolve when he would sit  down and had take it easy. Of note, the patient did have a routine lung  CT for followup of his pulmonary disease the day before admission and he  was noted to have coronary artery calcifications. As he was having this  chest pain and the finding of the coronary artery calcifications, it  prompted him to go to the emergency room for evaluation. REVIEW OF SYSTEMS:  No change in his chronic shortness of breath or  cough or sputum. No significant abdominal pain. No nausea, vomiting or  diarrhea. No change in chronic sputum production. No lower extremity  edema. No dysuria. The remainder of the review of systems is negative. PAST MEDICAL HISTORY:  Significant for pulmonary embolus on the right in  , history of pneumothorax in  as well, history of recurrent  renal calculi, hypertension, hyperlipidemia.   He had hepatitis in 1979,  unclear what type; gastroesophageal reflux disease, cardiomyopathy with  his last echocardiogram in 09/2019 revealing an ejection fraction of  50%. He has a left bundle branch block and bronchiolitis obliterans. PAST SURGICAL HISTORY:  Significant for cholecystectomy in 2007, hernia  repair in 2015, ureteroscopy in the past for kidney stones, lithotripsy  previously, pacemaker placement, and sinus surgery in the past.    ALLERGIES:  The patient is allergic to ATROVENT which causes shortness  of breath. ATROVENT NASAL SPRAY causes chest tightness. DOXYCYCLINE  causes hives. MORPHINE made him feel funny. NITROGLYCERIN gives him  severe hypotension. SULFA gave him a rash and VICODIN gave him a rash  as well. HOME MEDICATIONS:  Flomax 0.4 mg daily, Lyrica 75 mg in the morning, 150  mg at night; Pravachol 40 mg daily, Entresto 24/26 mg 2 tablets in the  morning, 1 tablet at night; Ambien 10 mg nightly p.r.n., Urispas 100 mg  t.i.d. p.r.n. for bladder spasms, Coumadin daily, Dexilant 30 mg daily,  Toprol-XL 25 mg b.i.d., aspirin 81 mg daily, Lasix 20 mg daily. SOCIAL HISTORY:  The patient is . He has never been a smoker. No alcohol use. FAMILY HISTORY:  Reviewed and noncontributory. PHYSICAL EXAMINATION:  VITAL SIGNS:  The patient is afebrile, blood pressure 112/77, pulse 92,  respiratory rate 15, oxygen saturation is 94% on room air. GENERAL:  This is a 65-year-old white male lying in bed. He is in no  acute distress. HEENT:  Pupils are equal, round, reactive to light. Extraocular  movements are intact. Mucous membranes are moist.  Tongue and uvula are  midline. NECK:  Without lymphadenopathy. There is no carotid bruit. Carotid  upstroke is normal.  Thyroid is without goiter or nodule. CARDIOVASCULAR:  Regular rate and rhythm without murmur, rub, or gallop. PMI is nondisplaced.   PULMONARY:  Clear, equal breath sounds bilaterally without wheezes,  rhonchi, or crackles. ABDOMEN:  Bowel sounds are positive. The abdomen is soft, nontender,  nondistended. There is no hepatosplenomegaly noted. EXTREMITIES:  No clubbing, cyanosis, edema or tenderness. Pulses are 2+  x4 extremities. SKIN:  No rashes or lesions. LYMPH:  No supraclavicular or cervical adenopathy is noted. NEUROLOGIC:  The patient is alert and oriented x3. Cranial nerves II  through XII are grossly intact. He moves all extremities without  difficulty. DIAGNOSTIC STUDIES:  Chest x-ray showed no acute cardiopulmonary  disease. CBC was unremarkable. INR was 2.73. Comprehensive metabolic panel was  normal with the exception of a calcium of 10.7, his creatinine was 0.8. Troponin has been less than 0.01 twice. EKG showed sinus rhythm with a left bundle branch block. CBC repeat this morning was unremarkable once again. INR 2.77. A third  troponin is pending at this time. ASSESSMENT:  1. Precordial pain. 2.  Coronary artery disease due to calcific coronary lesion with  coronary artery calcifications noted on his CT scan two days ago. 3.  Hypertension. 4.  Bronchiolitis obliterans. 5.  Pure hyperlipidemia. 6.  History of pulmonary embolus. 7.  Biventricular ICD placement. PLAN:  At this point, the patient has been admitted. He has so far  ruled out for myocardial infarction. He has had very minimal pain  overnight. Cardiology has been consulted and possibly he will need a  stress test today. We will await their final recommendations. The  patient is n.p.o. except for his medications and he is currently stable  awaiting cardiology evaluation.         Elizabeth Balderas MD    D: 08/27/2020 7:43:50       T: 08/27/2020 7:51:39     [id number]  Job#: [id number]     Doc#: [id number]    CC:

## 2020-08-28 NOTE — TELEPHONE ENCOUNTER
Pt called in stating he did the stress test yesterday while in the hospital but never got a clear answer on what to do about the coronary calcification.     Please give him a call back at [phone]

## 2020-08-28 NOTE — TELEPHONE ENCOUNTER
Patient said that he is home now and he still did not talk to any one about his stress test results.

## 2020-08-28 NOTE — TELEPHONE ENCOUNTER
Dr. Emanuel Dumont is calling about his stress test results and any recommendations moving forward? Please advise. Thanks.

## 2020-08-29 NOTE — TELEPHONE ENCOUNTER
Stress test shows no evidence of ischemia.   Patient has coronary calcification on his CAT scan, and he is intolerant to statin therapy, he needs to be started on nexolotol or  PCSK9 inhibitor which 1 insurance company approves

## 2020-08-31 ENCOUNTER — OFFICE VISIT (OUTPATIENT)
Dept: ENT CLINIC | Age: 60
End: 2020-08-31
Payer: COMMERCIAL

## 2020-08-31 ENCOUNTER — PROCEDURE VISIT (OUTPATIENT)
Dept: AUDIOLOGY | Age: 60
End: 2020-08-31
Payer: COMMERCIAL

## 2020-08-31 VITALS
WEIGHT: 212 LBS | DIASTOLIC BLOOD PRESSURE: 97 MMHG | OXYGEN SATURATION: 95 % | SYSTOLIC BLOOD PRESSURE: 147 MMHG | TEMPERATURE: 97.4 F | BODY MASS INDEX: 34.22 KG/M2 | HEART RATE: 75 BPM

## 2020-08-31 PROCEDURE — 99203 OFFICE O/P NEW LOW 30 MIN: CPT | Performed by: OTOLARYNGOLOGY

## 2020-08-31 PROCEDURE — 92567 TYMPANOMETRY: CPT | Performed by: AUDIOLOGIST

## 2020-08-31 PROCEDURE — 92557 COMPREHENSIVE HEARING TEST: CPT | Performed by: AUDIOLOGIST

## 2020-08-31 RX ORDER — BEMPEDOIC ACID 180 MG/1
180 TABLET, FILM COATED ORAL DAILY
Qty: 30 TABLET | Refills: 4 | Status: SHIPPED | OUTPATIENT
Start: 2020-08-31 | End: 2020-09-18 | Stop reason: SDUPTHER

## 2020-08-31 NOTE — PROGRESS NOTES
[place]      Patient Name: Reji Bui Record Number:  [id number]  Primary Care Physician:  Pierre Buenrostro MD  Date of Consultation: 2020    Chief Complaint: Headaches, dizziness, possible hearing loss        HISTORY OF PRESENT ILLNESS  Kandy Marquez is a(n) 63 y.o. male who presents for evaluation of dizziness and headaches. The patient said he has been having dizziness for about the last month. He describes the dizziness as a lightheaded feeling. He has a feeling that he might pass out. He said this is bad when he bends over and stands back up too quickly. He noticed that a lot the other day when he was golfing. He very occasionally may have a slight spinning sensation as well. He denies any fluctuations in his hearing. He denies any significant history of ear surgery or other ear problems. The patient is very worried because his father  of a ruptured aneurysm. He is wondering if this could have anything to do with his dizziness and headaches.       Patient Active Problem List   Diagnosis    Nephrolithiasis    Pneumonia    Essential hypertension, benign    Back pain    Irritable bowel syndrome    Pure hypercholesterolemia    Allergic rhinitis    Lower abdominal pain    Precordial pain    Bronchiolitis obliterans (HCC)    Bronchitis    Pulmonary embolus (HCC)    Coronary artery disease due to calcified coronary lesion    Atypical chest pain    Headache    Lightheaded    Leg pain, right    DDD (degenerative disc disease), lumbar    Acute non-recurrent maxillary sinusitis    SOB (shortness of breath)    Nausea    Transient cerebral ischemia    History of pulmonary embolism    Cardiomyopathy (HCC)    Confusion    Right arm weakness    Elevated INR    Chronic systolic CHF (congestive heart failure), NYHA class 2 (HCC)    LBBB (left bundle branch block)    Acute confusional state    Ureteral stone    Urinary tract infection without hematuria    Encounter for adjustment of cardiac resynchronization therapy defibrillator (CRT-D)    Biventricular ICD (implantable cardioverter-defibrillator) in place    Chronic anticoagulation    Paroxysmal atrial fibrillation (HCC)    Hypercalcemia    Primary hyperparathyroidism ([address]    Anticoagulated on Coumadin    Perinephric hematoma    Renal hematoma, right    Postoperative hemorrhagic shock    Acute blood loss anemia    Metabolic acidosis    Tachycardia    Iron deficiency anemia due to chronic blood loss    Thrombocytopenia (HCC)    Hypoxia    Acute UTI    Abdominal wall hematoma    Acute right flank pain    Intractable vomiting with nausea    Stenosis of ureteropelvic junction (UPJ)-right    Elevated liver enzymes    Acute flank pain    Colicky LLQ abdominal pain    Arm pain    Dysuria    Fever    Episode of shaking    Post-nasal drip    Foot pain    Pain in scapula    Skin lesions    Otalgia     Past Surgical History:   Procedure Laterality Date    CHOLECYSTECTOMY  2007    COLONOSCOPY  9/21/2012    dr Don Ruano  05/17/2019    RIGHT SIDED URETEROSCOPY  Diagnostic, retrograde pyelogram and fulguration of previously resected bladder tumor base    CYSTOSCOPY Right 5/17/2019    RIGHT SIDED URETEROSCOPY FLUGERATION  OF BLADDER performed by Radha Douglas MD at [address] [place] [address]  2015    LITHOTRIPSY      PACEMAKER PLACEMENT      SINUS SURGERY      Made opening larger in sinuses    UPPER GASTROINTESTINAL ENDOSCOPY  3/28/2014    dr Meliton mercedes     Family History   Problem Relation Age of Onset    High Blood Pressure Mother     Dementia Mother     Cancer Father         Pancreatic Ca     Social History     Socioeconomic History    Marital status:      Spouse name: Not on file    Number of children: Not on file    Years of education: Not on file    Highest education level: Not on file Occupational History    Not on file   Social Needs    Financial resource strain: Not hard at all   [place] insecurity     Worry: Never true     Inability: Never true   [employer] needs     Medical: No     Non-medical: No   Tobacco Use    Smoking status: Never Smoker    Smokeless tobacco: Never Used   Substance and Sexual Activity    Alcohol use: No     Comment: occ    Drug use: No    Sexual activity: Yes     Partners: Female     Comment: wife   Lifestyle    Physical activity     Days per week: Not on file     Minutes per session: Not on file    Stress: Not on file   Relationships    Social connections     Talks on phone: Not on file     Gets together: Not on file     Attends Restorationism service: Not on file     Active member of club or organization: Not on file     Attends meetings of clubs or organizations: Not on file     Relationship status: Not on file    Intimate partner violence     Fear of current or ex partner: Not on file     Emotionally abused: Not on file     Physically abused: Not on file     Forced sexual activity: Not on file   Other Topics Concern    Not on file   Social History Narrative    Not on file       DRUG/FOOD ALLERGIES: Ipratropium bromide hfa; Atrovent nasal spray [ipratropium]; Doxycycline; Morphine; Nitroglycerin; Sulfa antibiotics; and Vicodin [hydrocodone-acetaminophen]    CURRENT MEDICATIONS  Prior to Admission medications    Medication Sig Start Date End Date Taking?  Authorizing Provider   Bempedoic Acid (NEXLETOL) 180 MG TABS Take 180 mg by mouth daily 8/31/20   Aracely Posada MD   diphenhydrAMINE (BENADRYL) 25 MG tablet Take 25 mg by mouth daily Noon    Historical Provider, MD   tamsulosin (FLOMAX) 0.4 MG capsule TAKE 1 CAPSULE BY MOUTH TWICE DAILY  Patient taking differently: Take 0.4 mg by mouth daily  8/21/20   Caridad Peabody, MD   pregabalin (LYRICA) 75 MG capsule TAKE 1 CAPSULE BY MOUTH IN  THE MORNING AND 2 CAPSULES  EVERY EVENING 6/16/20 9/16/20  Manpreet Gracia Víctor Saha MD   pravastatin (PRAVACHOL) 40 MG tablet TAKE 1 TABLET BY MOUTH  DAILY 6/16/20   Arcadio Rosen MD   sacubitril-valsartan (ENTRESTO) 24-26 MG per tablet TAKE 2 TABLETS BY MOUTH IN  THE MORNING AND 1 TABLET BY MOUTH IN THE EVENING 5/26/20   Arcadio Rosen MD   AMBIEN 10 MG tablet 1 tablet po qhs 4/17/20 4/17/21  Brayan Carrera MD   flavoxate (URISPAS) 100 MG tablet TAKE 1 TABLET BY MOUTH THREE TIMES DAILY AS NEEDED FOR URINARY SPASM 4/2/20   BRENNA Pepe CNP   albuterol (PROVENTIL) (2.5 MG/3ML) 0.083% nebulizer solution USE 3 ML VIA NEBULIZER EVERY 4 HOURS AS NEEDED FOR WHEEZING OR SHORTNESS OF BREATH 3/24/20   Arthur Webb MD   sildenafil (REVATIO) 20 MG tablet Take 20 mg by mouth as needed    Historical Provider, MD   guaiFENesin (MUCINEX) 600 MG extended release tablet Take 1 tablet by mouth 2 times daily 2/4/20   Brayan Carrera MD   warfarin (COUMADIN) 5 MG tablet Take 0.5 tablets by mouth daily ECEPT 5mg every Monday, Wednesday and Friday or as directed by [facility] [phone]  Patient taking differently: Take 2.5 mg by mouth daily EXCEPT 5mg every Monday, Wednesday and Friday or as directed by [facility] [phone] 1/23/20   Brayan Carrera MD   sodium chloride, Inhalant, (NEBUSAL) 3 % nebulizer solution Take 4 mLs by nebulization every 4 hours as needed for Other or Cough (Cough) 1/2/20   Arthur Webb MD   DEXILANT 30 MG CPDR delayed release capsule TAKE 1 CAPSULE BY MOUTH  DAILY 12/23/19   Brayan Carrera MD   metoprolol succinate (TOPROL XL) 25 MG extended release tablet TAKE 1 TABLET BY MOUTH TWO  TIMES DAILY 12/23/19   Brayan Carrera MD   furosemide (LASIX) 20 MG tablet Take 1 tablet by mouth daily as needed (take 20 mg tablet daily as needed for increased weight, swelling or shortness of breath) 12/9/19   Mariza Archer, APRN - CNP   polyethylene glycol (MIRALAX) PACK packet Take 17 g by mouth daily    Historical Provider, MD ondansetron (ZOFRAN ODT) 4 MG disintegrating tablet Take 1-2 tablets by mouth every 8 hours as needed for Nausea May Sub regular tablet (non-ODT) if insurance does not cover ODT. 8/13/19   Silvana Spivey MD   aspirin 81 MG tablet Take 81 mg by mouth daily    Historical Provider, MD   dicyclomine (BENTYL) 10 MG capsule TAKE ONE CAPSULE BY MOUTH  FOUR TIMES DAILY AS NEEDED 5/9/18   Jeramie Banuelos MD   azelastine (ASTELIN) 0.1 % nasal spray 2 sprays by Nasal route 2 times daily Use in each nostril as directed 12/15/17   Deedee Ferguson, APRN - CNP   Cholecalciferol (VITAMIN D3) 38722 units CAPS Take 1 capsule by mouth once a week    Historical Provider, MD   albuterol (PROAIR HFA) 108 (90 BASE) MCG/ACT inhaler Inhale 2 puffs into the lungs every 6 hours as needed for Wheezing or Shortness of Breath     Historical Provider, MD       REVIEW OF SYSTEMS  The following systems were reviewed and revealed the following in addition to any already discussed in the HPI:    CONSTITUTIONAL: no weight loss, no fever, no night sweats, no chills  EYES: no vision changes, no blurry vision  EARS: Possible hearing loss from noise exposure  NOSE: no epistaxis, no rhinorrhea  RESPIRATORY: no  Difficulty breathing, no shortness of breath  CV: no chest pain, no Peripheral vascular disease  HEME: No coagulation disorder, no Bleeding disorder  NEURO: Dizziness  SKIN: No new rashes in the head and neck, no recent skin cancers  MOUTH: No new ulcers, no recent teeth infections  GASTROINTESTINAL: No diarrhea, stomach pain  PSYCH: No anxiety, no depression      PHYSICAL EXAM  BP (!) 147/97 (Site: Right Lower Arm, Position: Sitting, Cuff Size: Medium Adult)   Pulse 75   Temp 97.4 °F (36.3 °C) (Temporal)   Wt 212 lb (96.2 kg)   SpO2 95%   BMI 34.22 kg/m²     GENERAL: No Acute Distress, Alert and Oriented, no Hoarseness, strong voice  EYES: EOMI, Anti-icteric  HENT:   Head: Normocephalic and atraumatic.    Face:  Symmetric, facial nerve he may have had a bit of spinning, but the vast majority the time he describes lightheadedness with the feeling that he may pass out. If his neurologic exam is normal, they should continue cardiovascular work-up. - Jessica Lovett MD, Neurology, [address]    3. Sensorineural hearing loss (SNHL) of both ears  Patient does have some sensorineural hearing loss. It is a little bit unusual that it is worse in the low frequencies. I also do not have an explanation for his type B tympanogram.  I would expect this to anything that with the dizziness, but I would like a short-term follow-up with another hearing test in 3 months just to make sure this is not changing rapidly. I have performed a head and neck physical exam personally or was physically present during the key or critical portions of the service. Medical Decision Making:   The following items were considered in medical decision making:  Independent review of images  Review / order clinical lab tests  Review / order radiology tests  Decision to obtain old records

## 2020-08-31 NOTE — PROGRESS NOTES
AUDIOLOGIC AND OTHER PERTINENT MEDICAL HISTORY:      Kurt Melgoza was seen for hearing and middle ear pressure evals. Otolarngology is referral source of todays appointment. Patient presents with risk factors of hearing loss including       ASSESSMENT AND FINDINGS:     Audiogram demonstrates essentially normal hearing with mild degree of loss greater on right side. Patient demonstrated good speech understanding in quiet, at elevated levels. Immittance:  Type B curve from right ear, consistent with shallow eardrum  compliance, with normal pressure. Normal curve from left side. Ipsilateral acoustic reflexes were elevated consistent with middle and inner ear pathologies.               Chart cc'd to Chanel Umanzor MD      Degree of   Hearing Sensitivity dB Range   Within Normal Limits (WNL) 0 - 20   Mild 20 - 40   Moderate 40 - 55   Moderately-Severe 55 - 70   Severe 70 - 90   Profound 90 +       RECOMMENDATIONS:                                                                 ENT to advise treatment plan

## 2020-09-01 ENCOUNTER — TELEPHONE (OUTPATIENT)
Dept: CARDIOLOGY CLINIC | Age: 60
End: 2020-09-01

## 2020-09-01 RX ORDER — ALBUTEROL SULFATE 90 UG/1
AEROSOL, METERED RESPIRATORY (INHALATION)
Qty: 1 INHALER | Refills: 0 | Status: SHIPPED | OUTPATIENT
Start: 2020-09-01

## 2020-09-01 RX ORDER — SODIUM CHLORIDE FOR INHALATION 3 %
4 VIAL, NEBULIZER (ML) INHALATION EVERY 4 HOURS PRN
Qty: 360 ML | Refills: 5 | Status: SHIPPED | OUTPATIENT
Start: 2020-09-01 | End: 2021-01-29

## 2020-09-01 NOTE — TELEPHONE ENCOUNTER
Alexus Hilton is calling stating while he was in the hospital  (8/26/20) states he was diagnosed with Angina. He does not know very much about this. Was walking up a hill today states he getting SOB. This only happened today. Could this be part of having Angina?     Alexus Hilton can be reached at [phone]

## 2020-09-01 NOTE — TELEPHONE ENCOUNTER
Patient had to switch to [facility] since his other pharmacy is closing.   PA sent to workers    Last office visit 6/2020; next appt pending

## 2020-09-02 PROBLEM — R06.09 DYSPNEA ON EXERTION: Status: ACTIVE | Noted: 2017-01-30

## 2020-09-03 ENCOUNTER — HOSPITAL ENCOUNTER (OUTPATIENT)
Dept: PHYSICAL THERAPY | Age: 60
Setting detail: THERAPIES SERIES
Discharge: HOME | End: 2020-09-03
Payer: COMMERCIAL

## 2020-09-08 ENCOUNTER — HOSPITAL ENCOUNTER (OUTPATIENT)
Dept: GENERAL RADIOLOGY | Age: 60
Discharge: HOME | End: 2020-09-08
Payer: COMMERCIAL

## 2020-09-08 ENCOUNTER — ANTI-COAG VISIT (OUTPATIENT)
Dept: PHARMACY | Age: 60
End: 2020-09-08
Payer: COMMERCIAL

## 2020-09-08 ENCOUNTER — NURSE TRIAGE (OUTPATIENT)
Dept: OTHER | Facility: CLINIC | Age: 60
End: 2020-09-08

## 2020-09-08 ENCOUNTER — TELEPHONE (OUTPATIENT)
Dept: CARDIOLOGY CLINIC | Age: 60
End: 2020-09-08

## 2020-09-08 ENCOUNTER — HOSPITAL ENCOUNTER (OUTPATIENT)
Age: 60
Discharge: HOME | End: 2020-09-08
Payer: COMMERCIAL

## 2020-09-08 VITALS — TEMPERATURE: 97.3 F

## 2020-09-08 LAB — INTERNATIONAL NORMALIZATION RATIO, POC: 4

## 2020-09-08 PROCEDURE — 85610 PROTHROMBIN TIME: CPT

## 2020-09-08 PROCEDURE — 99211 OFF/OP EST MAY X REQ PHY/QHP: CPT

## 2020-09-08 PROCEDURE — 71046 X-RAY EXAM CHEST 2 VIEWS: CPT

## 2020-09-08 RX ORDER — ESOMEPRAZOLE MAGNESIUM 40 MG/1
40 CAPSULE, DELAYED RELEASE ORAL
COMMUNITY
End: 2021-02-25 | Stop reason: DRUGHIGH

## 2020-09-08 NOTE — TELEPHONE ENCOUNTER
Received call from Altaf Forte in [facility]. Patient has a pre scheduled appointment for today as a follow up. The patient was seeing the MD due to hospitalization and something was involved with the heart. He did have a stress test with no results. He started with intermittent SOB over the weekend and a cough. The nagging cough has been there for months. Has wheezing but is not doing this now. Patient does have a pacemaker. He did have chest pain yesterday with radiation to the left arm- but this is gone now. Protocol recommendation shared to be evaluated in the ED but patient refuses to do so and wants to see the PCP and care advice shared. Please do not reply to the triage nurse through this encounter. Any subsequent communication should be directly with the patient.     Reason for Disposition   Any history of prior \"blood clot\" in leg or lungs    Protocols used: BREATHING DIFFICULTY-ADULT-OH

## 2020-09-08 NOTE — PROGRESS NOTES
Mr. Leia Zee is a 63 y.o.  male. Mr. Leia Zee had an INR test today. Results were reviewed and appropriate warfarin management was completed. THIS VISIT WAS COMPLETED AS:   []    A VIRTUAL VISIT VIA TELEPHONE IN EFFORTS TO REDUCE THE SPREAD OF COVID-19.  []    A DRIVE-THRU VISIT IN EFFORTS TO REDUCE THE SPREAD OF COVID-19. [x]    AN IN PERSON VISIT. PROTOCOLS WERE FOLLOWED WITH PRECAUTIONS TO REDUCE THE SPREAD OF COVID-19. Patient verifies current dosing regimen: Yes     Warfarin medication reviewed and updated on the patient 's home medication list: Yes   All other medications reviewed and updated on the patient 's home medication list: Yes. Lab Results   Component Value Date    INR 4.0 2020    INR 2.77 (H) 2020    INR 2.73 (H) 2020           Anticoagulation Summary  As of 2020    INR goal:   2.0-3.0   TTR:   32.3 % (4.9 y)   INR used for dosin.0! (2020)   Warfarin maintenance plan:   5 mg (5 mg x 1) every Mon, Wed, Fri; 2.5 mg (5 mg x 0.5) all other days   Weekly warfarin total:   25 mg   Plan last modified:   Sudha Catalan (2020)   Next INR check:   2020   Priority:   Maintenance   Target end date: Indefinite    Indications    Pulmonary embolus ([facility] [address] [I26.99]             Anticoagulation Episode Summary     INR check location:       Preferred lab:       Send INR reminders to:   [facility]    Comments:   EPIC - finger stick every 2-3 weeks        Anticoagulation Care Providers     Provider Role Specialty Phone number    Evan Curran MD Referring Internal Medicine [phone]          Warfarin plan:   He was recently in the hospital for chest pain. His INR was 2.7 on day of discharge. He was started on Imdur, but stopped it after 2 days because it made him feel funny. He denies any other medication changes. His diet remains consistent in vitamin K. No nausea, vomiting or diarrhea. It is unclear why his INR is elevated at 4.0 today. He denies any bruising or bleeding at this time but will watch for anything significant. We will hold his warfarin today and then resume his previous dosing. He will return in 2 weeks for his next INR. Description    HOLD warfarin TODAY ONLY then  CONTINUE: Warfarin 2.5 mg daily EXCEPT 5mg every Monday, Wednesday and Friday    Call with signs or symptoms of bleeding or any concerns or questions. If significant bleeding seek immediate medical attention. Call with medications changes, especially antibiotics and steroids and including any over-the-counter medications or herbal products. Call if you stop any medications. Keep the number of servings of Vitamin K (dark green vegetables) consistent from week to week  Eats three good portion of broccoli or brussel sprouts per week. Reviewed AVS with patient / caregiver.       CLINICAL PHARMACY CONSULT: MED RECONCILIATION/REVIEW ADDENDUM    For Pharmacy Admin Tracking Only    PHSO: No  Total # of Interventions Recommended: 1  - Decreased Dose #: 1  Total Interventions Accepted: 1  Time Spent (min): 15

## 2020-09-08 NOTE — TELEPHONE ENCOUNTER
Deena Cline called and is concerned about his pacemaker. He states that he has been having palpitations ever since he was seen by Dr. Susan Aguila back on 8/07/2020. He has been seen multiple times by Dr. Deep Rivas and he cannot find any problems. He wants to speak with Beckie Titus about this situation.     Deena Cline can be contacted at [phone]

## 2020-09-09 NOTE — TELEPHONE ENCOUNTER
Pt informed of findings RE: implant BiV ICD    Device settings have not been changed from that which pt had in place since visit on 8/7. Pt indicates that they are having frequent \"palpitations. \"    Device does not show evidence of significant increase in ectopy. In fact, pt has had no episodes of any sort since last in person interrogation. Pt informed of these results and offered appt with Dr. James Hunter. Pt refused.

## 2020-09-17 ENCOUNTER — TELEPHONE (OUTPATIENT)
Dept: PHARMACY | Age: 60
End: 2020-09-17

## 2020-09-17 NOTE — TELEPHONE ENCOUNTER
Riccardo Atkins called to let us know that he started a 10 day course of amoxicillin for a respiratory infection. His INR was elevated at his last visit with us. He started the antibiotic 2 days ago. I asked him to take warfarin 2.5mg tomorrow and then resume his previous dosing. He will be seen on Tuesday 9-22-20 for his next INR. He will call with any unusual bleeding or other issues.     [address]  Anticoagulation Service  [phone]

## 2020-09-18 NOTE — TELEPHONE ENCOUNTER
Medication Refill    Bempedoic Acid (NEXLETOL) 180 MG TABS  Take 180 mg by mouth daily     [address] [facility] [phone] [facility] [phone]         **Pt states he never received this medication that was sent to [facility] on 8/31/2020.

## 2020-09-21 ENCOUNTER — TELEPHONE (OUTPATIENT)
Dept: CARDIOLOGY CLINIC | Age: 60
End: 2020-09-21

## 2020-09-21 NOTE — TELEPHONE ENCOUNTER
I called Deena Cline to make his follow up with Dr. Susan Aguila and he asked that I put in a message for Dr. Eloise Lyons. He states Dr. Eloise Lyons wanted him to start Multaq but he never took it because the bad side effects. He wants to know if there was a different medication he can take instead. I do not see this on his medication list.     You can reach Deena Cline at [phone].

## 2020-09-22 ENCOUNTER — TELEPHONE (OUTPATIENT)
Dept: CARDIOLOGY CLINIC | Age: 60
End: 2020-09-22

## 2020-09-22 ENCOUNTER — ANTI-COAG VISIT (OUTPATIENT)
Dept: PHARMACY | Age: 60
End: 2020-09-22
Payer: COMMERCIAL

## 2020-09-22 LAB — INTERNATIONAL NORMALIZATION RATIO, POC: 2.8

## 2020-09-22 PROCEDURE — 99211 OFF/OP EST MAY X REQ PHY/QHP: CPT

## 2020-09-22 PROCEDURE — 85610 PROTHROMBIN TIME: CPT

## 2020-09-22 RX ORDER — BEMPEDOIC ACID 180 MG/1
180 TABLET, FILM COATED ORAL DAILY
Qty: 90 TABLET | Refills: 0 | Status: SHIPPED | OUTPATIENT
Start: 2020-09-22 | End: 2021-02-03 | Stop reason: RX

## 2020-09-22 NOTE — TELEPHONE ENCOUNTER
If he wants recommendations for alternative medication other than Multaq he needs to see Dr. Wilmar Chen in office otherwise he an follow up with his Primary cardiologist Dr. Daniella Iglesias.

## 2020-09-22 NOTE — PROGRESS NOTES
Mr. Rashaad Jain is a 63 y.o.  male. Mr. Rashaad Jain had an INR test today. Results were reviewed and appropriate warfarin management was completed. THIS VISIT WAS COMPLETED AS:   []    A VIRTUAL VISIT VIA TELEPHONE IN EFFORTS TO REDUCE THE SPREAD OF COVID-19.  []    A DRIVE-THRU VISIT IN EFFORTS TO REDUCE THE SPREAD OF COVID-19. [x]    AN IN PERSON VISIT. PROTOCOLS WERE FOLLOWED WITH PRECAUTIONS TO REDUCE THE SPREAD OF COVID-19. Patient verifies current dosing regimen: Yes     Warfarin medication reviewed and updated on the patient 's home medication list: Yes   All other medications reviewed and updated on the patient 's home medication list: No:      Lab Results   Component Value Date    INR 2.8 2020    INR 4.0 2020    INR 2.77 (H) 2020       Patient Findings     Negatives:   Signs/symptoms of thrombosis, Signs/symptoms of bleeding, Laboratory test error suspected, Change in health, Change in alcohol use, Change in activity, Upcoming invasive procedure, Emergency department visit, Upcoming dental procedure, Missed doses, Extra doses, Change in medications, Change in diet/appetite, Hospital admission, Bruising, Other complaints          Anticoagulation Summary  As of 2020    INR goal:   2.0-3.0   TTR:   32.1 % (5 y)   INR used for dosin.8 (2020)   Warfarin maintenance plan:   5 mg (5 mg x 1) every Mon, Wed, Fri; 2.5 mg (5 mg x 0.5) all other days   Weekly warfarin total:   25 mg   Plan last modified:   Sudha Catalan (2020)   Next INR check:   10/13/2020   Priority:   Maintenance   Target end date:    Indefinite    Indications    Pulmonary embolus ([address] [I26.99]             Anticoagulation Episode Summary     INR check location:       Preferred lab:       Send INR reminders to:   [facility]AFF    Comments:   EPIC - finger stick every 2-3 weeks        Anticoagulation Care Providers     Provider Role Specialty Phone number    Kaleb Can MD

## 2020-09-22 NOTE — TELEPHONE ENCOUNTER
Jessica Cline called in to speak to [place], [place]. He states he wants to know why he is supposed to come in tomorrow before his appointment with Dr. Gaby Hernandez to get his pacemaker checked. You can reach Jessica Cline at [phone].

## 2020-09-22 NOTE — TELEPHONE ENCOUNTER
Called/spoke to pt. I informed him that Dr. Eloise Lyons wants to see him in office to discuss treatment options and that we can make an appointment for next available. He says that he wasn't calling to come see Dr. Eloise Lyons about Multaq medication. He says he didn't start it because of all the side effects and he just wants to know if there is something he can take with less side effects. Please advise.

## 2020-09-23 ENCOUNTER — OFFICE VISIT (OUTPATIENT)
Dept: CARDIOLOGY CLINIC | Age: 60
End: 2020-09-23
Payer: COMMERCIAL

## 2020-09-23 VITALS
HEIGHT: 66 IN | OXYGEN SATURATION: 95 % | SYSTOLIC BLOOD PRESSURE: 126 MMHG | BODY MASS INDEX: 34.07 KG/M2 | HEART RATE: 57 BPM | DIASTOLIC BLOOD PRESSURE: 78 MMHG | WEIGHT: 212 LBS | TEMPERATURE: 97.8 F

## 2020-09-23 PROCEDURE — G8417 CALC BMI ABV UP PARAM F/U: HCPCS | Performed by: INTERNAL MEDICINE

## 2020-09-23 PROCEDURE — 1036F TOBACCO NON-USER: CPT | Performed by: INTERNAL MEDICINE

## 2020-09-23 PROCEDURE — 1111F DSCHRG MED/CURRENT MED MERGE: CPT | Performed by: INTERNAL MEDICINE

## 2020-09-23 PROCEDURE — 93000 ELECTROCARDIOGRAM COMPLETE: CPT | Performed by: INTERNAL MEDICINE

## 2020-09-23 PROCEDURE — G8427 DOCREV CUR MEDS BY ELIG CLIN: HCPCS | Performed by: INTERNAL MEDICINE

## 2020-09-23 PROCEDURE — 3017F COLORECTAL CA SCREEN DOC REV: CPT | Performed by: INTERNAL MEDICINE

## 2020-09-23 PROCEDURE — 99214 OFFICE O/P EST MOD 30 MIN: CPT | Performed by: INTERNAL MEDICINE

## 2020-09-23 RX ORDER — FLECAINIDE ACETATE 50 MG/1
50 TABLET ORAL 2 TIMES DAILY
Qty: 60 TABLET | Refills: 3 | Status: SHIPPED | OUTPATIENT
Start: 2020-09-23 | End: 2020-11-04 | Stop reason: SDUPTHER

## 2020-09-23 NOTE — PROGRESS NOTES
Cardiac Electrophysiology Consultation   Date: 9/23/2020  Reason for Consultation: Device Management  Consult Requesting Physician: You Winston MD  Primary Care Physician: William Quigley MD     Chief Complaint:   Chief Complaint   Patient presents with    Follow-up     afib - no cardiac complaints        HPI: Josephine Diallo is a 63 y.o. patient with a history of  PE (2011), anxiety and nonischemic cardiomyopathy. He was previously followed by Dr. Sabina Topete for his primary cardiac needs but is now managed by Dr. Brenda Manjarrez. He was hospitalized at [facility] with chest pain and had an abnormal stress that led to a left heart catheterization on 7/21/16. His coronary arteries were normal but he had LV dysfunction with a LVEF of 40% at that time. A repeat echocardiogram demonstrated his LVEF to be 30% despite optimal medical therapy. Entresto therapy was initiated and he was interested in pursuing CRT. He underwent Bi-V ICD implant on 10/10/17 with Dr. Shaylee Rich and his LVEF improved. However, due to ongoing patient anxiety related to the device, his Biv pacing was turned off--  now set at VVI 40 with defibrillator programming on. At his recent office visit, he was noted to be in AT. He underwent DCCV on 9/21/18 and was instructed to start Multaq which he opted to defer due to possible side effects. Interval History: Today, he presents to office for follow up for management of atrial fibrillation. He is interested in starting on an AAD other than Multaq. He was previously prescribed Multaq but the medication was not started due to concerns over side effects. He states that he is planning on going to the [facility] for evaluation of his BiV ICD. He is compliant with his medications and tolerating them well. He denies chest pain/pressure, tightness, edema, shortness of breath, heart racing, palpitations, lightheadedness, dizziness, syncope, presyncope,  PND or orthopnea.        Past Medical History:   Diagnosis Date    Bronchiolitis obliterans ([facility] [address]     Bundle branch block, right     Cardiomyopathy ([facility] [address]     Chest pain 9/24/2019    Fibromyalgia     GERD (gastroesophageal reflux disease)     Hepatitis 1979    unsure of which type    Hx of blood clots     Hyperlipemia     Hypertension     IBS (irritable bowel syndrome)     Kidney stone     over thirty kidney stones    Neuropathy     right side-chest    Pneumothorax 2011    Right    Prostatitis     Pulmonary embolism on right [facility]) 2011    upper lobe-coumadin 2011    Sacroiliitis [facility])         Past Surgical History:   Procedure Laterality Date    CHOLECYSTECTOMY  2007    COLONOSCOPY  9/21/2012    dr Guy Days  05/17/2019    RIGHT SIDED URETEROSCOPY  Diagnostic, retrograde pyelogram and fulguration of previously resected bladder tumor base    CYSTOSCOPY Right 5/17/2019    RIGHT SIDED URETEROSCOPY FLUGERATION  OF BLADDER performed by Harry Capps MD at [address] [place] [address]  2015    LITHOTRIPSY      PACEMAKER PLACEMENT      SINUS SURGERY      Made opening larger in sinuses    UPPER GASTROINTESTINAL ENDOSCOPY  3/28/2014    dr Stephie mercedes       Allergies: Allergies   Allergen Reactions    Ipratropium Bromide Hfa Shortness Of Breath    Atrovent Nasal Spray [Ipratropium] Other (See Comments)     Chest tightness    Doxycycline Hives    Morphine Other (See Comments)     \"makes me feel funny\"      Nitroglycerin Other (See Comments)     Severe hypotension (went from 670 to 66 systolic)    Sulfa Antibiotics Rash    Vicodin [Hydrocodone-Acetaminophen] Rash       Medication:   Prior to Admission medications    Medication Sig Start Date End Date Taking?  Authorizing Provider   flecainide (TAMBOCOR) 50 MG tablet Take 1 tablet by mouth 2 times daily 9/23/20  Yes Diana Amaral MD   Bempedoic Acid (NEXLETOL) 180 MG TABS Take 180 mg by mouth daily 9/22/20  Yes Leah Luna MD   zolpidem (AMBIEN) 10 MG tablet TAKE 1 TABLET BY MOUTH EVERY NIGHT AT BEDTIME 9/16/20 12/16/20 Yes Cordelia Melgoza MD   dicyclomine (BENTYL) 10 MG capsule TAKE ONE CAPSULE BY MOUTH  FOUR TIMES DAILY AS NEEDED 9/16/20  Yes Cordelia Melgoza MD   Cholecalciferol (VITAMIN D3) 1.25 MG (09001 UT) CAPS Take 1 capsule by mouth once a week 9/15/20  Yes Cordelia Melgoza MD   esomeprazole (NEXIUM) 40 MG delayed release capsule Take 40 mg by mouth every morning (before breakfast)   Yes Historical Provider, MD   sodium chloride, Inhalant, (NEBUSAL) 3 % nebulizer solution Take 4 mLs by nebulization every 4 hours as needed for Other or Cough (Cough) 9/1/20  Yes BRENNA Vila CNP   albuterol sulfate  (90 Base) MCG/ACT inhaler INHALE 2 PUFFS INTO THE LUNGS EVERY 4 HOURS AS NEEDED FOR WHEEZING OR SHORTNESS OF BREATH 9/1/20  Yes BRENNA Vila CNP   tamsulosin (FLOMAX) 0.4 MG capsule TAKE 1 CAPSULE BY MOUTH TWICE DAILY  Patient taking differently: Take 0.4 mg by mouth daily  8/21/20  Yes Cordelia Melgoza MD   pravastatin (PRAVACHOL) 40 MG tablet TAKE 1 TABLET BY MOUTH  DAILY 6/16/20  Yes Rosa Mendez MD   sacubitril-valsartan (ENTRESTO) 24-26 MG per tablet TAKE 2 TABLETS BY MOUTH IN  THE MORNING AND 1 TABLET BY MOUTH IN THE EVENING 5/26/20  Yes Rosa Mendez MD   flavoxate (URISPAS) 100 MG tablet TAKE 1 TABLET BY MOUTH THREE TIMES DAILY AS NEEDED FOR URINARY SPASM 4/2/20  Yes BRENNA Silverman CNP   albuterol (PROVENTIL) (2.5 MG/3ML) 0.083% nebulizer solution USE 3 ML VIA NEBULIZER EVERY 4 HOURS AS NEEDED FOR WHEEZING OR SHORTNESS OF BREATH 3/24/20  Yes Myles Abreu MD   sildenafil (REVATIO) 20 MG tablet Take 20 mg by mouth as needed   Yes Historical Provider, MD   guaiFENesin (MUCINEX) 600 MG extended release tablet Take 1 tablet by mouth 2 times daily 2/4/20  Yes Cordelia Melgoza MD   warfarin (COUMADIN) 5 MG tablet Take 0.5 tablets by mouth daily ECEPT 5mg every Monday, Wednesday and Friday or as directed by [facility] Coumad[facility] [phone]  Patient taking differently: Take 2.5 mg by mouth daily EXCEPT 5mg every Monday, Wednesday and Friday or as directed by [facility] Coumadin Service [phone] 1/23/20  Yes April Shelton MD   metoprolol succinate (TOPROL XL) 25 MG extended release tablet TAKE 1 TABLET BY MOUTH TWO  TIMES DAILY 12/23/19  Yes April Shelton MD   furosemide (LASIX) 20 MG tablet Take 1 tablet by mouth daily as needed (take 20 mg tablet daily as needed for increased weight, swelling or shortness of breath) 12/9/19  Yes Marcina Siemens, APRN - CNP   polyethylene glycol (MIRALAX) PACK packet Take 17 g by mouth daily   Yes Historical Provider, MD   ondansetron (ZOFRAN ODT) 4 MG disintegrating tablet Take 1-2 tablets by mouth every 8 hours as needed for Nausea May Sub regular tablet (non-ODT) if insurance does not cover ODT. 8/13/19  Yes April Shelton MD   aspirin 81 MG tablet Take 81 mg by mouth daily   Yes Historical Provider, MD   azelastine (ASTELIN) 0.1 % nasal spray 2 sprays by Nasal route 2 times daily Use in each nostril as directed 12/15/17  Yes BRENNA Holcomb CNP   pregabalin (LYRICA) 75 MG capsule TAKE 1 CAPSULE BY MOUTH IN  THE MORNING AND 2 CAPSULES  EVERY EVENING 6/16/20 9/16/20  April Shelton MD       Social History:   reports that he has never smoked. He has never used smokeless tobacco. He reports that he does not drink alcohol or use drugs. Family History:  family history includes Cancer in his father; Dementia in his mother; High Blood Pressure in his mother. Reviewed.  Denies family history of sudden cardiac death, arrhythmia, premature CAD    Review of System:    · General ROS: negative for - chills, fever   · Psychological ROS: negative for - anxiety or depression  · Ophthalmic ROS: negative for - eye pain or loss of vision  · ENT ROS: negative for - epistaxis, headaches, nasal discharge, sore throat   · Allergy and Immunology ROS: negative for - hives, nasal congestion · Hematological and Lymphatic ROS: negative for - bleeding problems, blood clots, bruising or jaundice  · Endocrine ROS: negative for - skin changes, temperature intolerance or unexpected weight changes  · Respiratory ROS: negative for - cough, hemoptysis, pleuritic pain, SOB, sputum changes or wheezing  · Cardiovascular ROS: Per HPI. · Gastrointestinal ROS: negative for - abdominal pain, blood in stools, diarrhea, hematemesis, melena, nausea/vomiting or swallowing difficulty/pain  · Genito-Urinary ROS: negative for - dysuria or incontinence  · Musculoskeletal ROS: negative for - joint swelling or muscle pain  · Neurological ROS: negative for - confusion, dizziness, gait disturbance, headaches, numbness/tingling, seizures, speech problems, tremors, visual changes or weakness  · Dermatological ROS: negative for - rash    Physical Examination:  Vitals:    20 1205   BP: 126/78   Pulse: 57   Temp: 97.8 °F (36.6 °C)   SpO2: 95%       · Constitutional: Oriented. No distress. · Head: Normocephalic and atraumatic. · Mouth/Throat: Oropharynx is clear and moist.   · Eyes: Conjunctivae normal. EOM are normal.   · Neck: Normal range of motion. Neck supple. No rigidity. No JVD present. · Cardiovascular: Normal rate, regular rhythm, S1&S2 and intact distal pulses. · Pulmonary/Chest: Bilateral respiratory sounds. No wheezes. No rhonchi. · Abdominal: Soft. Bowel sounds present. No distension, No tenderness. · Musculoskeletal: No tenderness. No edema    · Lymphadenopathy: Has no cervical adenopathy. · Neurological: Alert and oriented. Cranial nerve appears intact, No Gross deficit   · Skin: Skin is warm and dry. No rash noted. · Psychiatric: Has a normal mood, affect and behavior     Labs:  Reviewed. EC2020 reviewed, sinus bradycardia, LBBB with v-rate of 58 bpm with QRS duration 148 ms. No pathologic Q waves, ventricular pre-excitation, or QT prolongation. Studies:   1.  Event monitor: None    2. ECHO 11/28/17 (s/p Bi-V placement)  Normal left ventricular wall thickness. Ejection fraction is visually  estimated to be 40-45%. There is septal hypokinesis (secondary to Pacing)  Trivial mitral & tricuspid regurgitation is present. The left atrial size is normal.  Pacer / ICD wire is visualized in the right ventricle. There appears to be normal right ventricular size and function.     3. Echo: 7/24/17  Left ventricle size is normal. Mild concentric left ventricular hypertrophy  is present. Global ejection fraction is moderately decreased and estimated  from 30 % to 35%. Diastolic filling parameters suggests grade I diastolic  dysfunction . There is abnormal (paradoxical) septal motion consistent with left bundle  branch block. Mitral valve is structurally normal.  Trivial to mild mitral regurgitation is present. The left atrial size is normal.  A bubble study was performed and fails to show evidence of right to left  shunting.     4. Echo: 11/9/16  Dilated LV with severely reduced systolic function. EF 30%. Anterior, septal  and apical akinesis. Mild mitral regurgitation is present. The left atrial size is normal.  Normal right ventricular size and function. 5. NM Cardiac Stress Test 8/26/2020  Summary     No evidence of reversible ischemia.     There is a moderate to large sized, moderate intensity, fixed septal and     inferior wall defect which is most consistent with LBBB induced artifact and     diaphragm attenuation artifact.     Normal LV size and systolic function. Ejection fraction: 50 %    Overall findings represent a low risk study. Stress Test: 7/21/16  SPECT perfusion images: On the stress corrected images there is a moderate defect in the septum of the left ventricle. At rest, partially redistributes especially the posterior lateral component. Complete redistribution is not present.   OYIO:  42 %      (Normal is at least 62% for women and 53% for men)  LV wall motion:   There is significant hypokinesia of the ventricular septum  LVEDV: 117ml  (Normal is up to 100ml for women and 150ml for men)  Transient dilatation ratio:   1.0      (Normal is up to 1.3 for women and 1.2 for men)    6. Cath: 7/21/16  #1-coronary angiography summary: Normal coronaries in a left   dominant system  A-left main coronary is normal  B-the LAD has minor irregularities and no significant stenoses  C-the circumflex is dominant and has minor irregularities and no   significant stenoses  D-the right coronary is a small nondominant vessel and is   angiographically normal  #2-ventriculography in the ADKINS projection demonstrates mild   global left ventricular dysfunction ejection fractions   approximately 40  #3-aortic pressure is approximately 150/80 left ventricular   pressures 150/14 there is no gradient on pullback  #3-UNKRX are no complications  #5-[place] patient will be treated medically for left ventricular   dysfunction in the setting of normal coronaries    I independently reviewed the ECG, MCOT, echocardiogram, stress test, and coronary angiography/PCI results and used them for my plan of care. Procedures:  1. Biotronik Bi-V ICD implant on 10/10/17 with Dr. Charla Kumar  2. Successful DCCV on 9/21/18     Assessment/Plan:   Non ischemic cardiomyopathy:  -Nonischemic  -S/p Bi-V ICD in situ (Bi-V pacing off. Set to VVI 40 with defibrillator programming ON per pt request)  -Lots of pt anxiety surrounding his device  -Device was remotely interrogated on 8/7/2020 and based on impedance, and intrinsic sensing tests run today, the device appears to be functioning normally.  -Remaining battery life is 2.9V  -AP 0%   - 0%  -Pt very sensitive to pacing, base rate set to 40bpm - no changes made. -NM stress test on 8/26/2020 showed normal LV size and systolic function.   -On GDMT.    BB Toprol XL   Entresto    Diuretic: Lasix     Atrial fibrillation  -Paroxsymal   -Asymptomatic  -Captured on past interrogations  On Toprol XL 25 mg BID for rate control of afib, anti-remodeling. -CHADS VASC 5 (HF, HTN)- on coumadin   -He has a OCB0FN1-YJCd Score 2 (CHF, HTN)  -On Coumadin for thromboembolic risk reduction  -Tolerating well no signs or symptoms of abnormal bruising or bleeding.  -We discussed alternative medication to Multaq which he was previously prescribed but declined to start due to SE.  -Start Flecainide 50 mg BID.  -He is not a candidate for ablation due to the presence of his BiV ICD. Follow up with EP nurse practitioner Natividad Chery in one year with device interrogation. Thank you for allowing me to participate in the care of Michelle Joyner. All questions and concerns were addressed to the patient/family. Alternatives to my treatment were discussed. This note was scribed in the presence of Dr. Telma Echeverria MD by Hernando Hand RN. The scribe's documentation has been prepared under my direction and personally reviewed by me in its entirety. I confirm that the note above accurately reflects all work, physical examination, the discussion of treatments and procedures, and medical decision making performed by me.     Telma Echeverria MD, MS, [facility], [facility]  Cardiac Electrophysiology  [address]  [address] [facility], [address] [place] [address] Yves Nair [facility]  [phone]

## 2020-09-23 NOTE — TELEPHONE ENCOUNTER
Pt will see Dr. Rogerio Randall today. It is standard practice to check a pt device each time they see an EP in office. This situation was discussed with Sheri Brody RN and the conclusion drawn that if Dr. Rogerio Randall wants pt device interrogated I will happily do so after his appt. Otherwise, device was check ~2mo ago in clinic.

## 2020-09-23 NOTE — TELEPHONE ENCOUNTER
I called/spoke to the patient. He will come in at 12 noon. He states that why are we checking his device when the pacing part of the device is turned off. If any other questions or concerns please call pt about his device.

## 2020-09-24 ENCOUNTER — HOSPITAL ENCOUNTER (OUTPATIENT)
Dept: MRI IMAGING | Age: 60
Discharge: HOME | End: 2020-09-24
Payer: COMMERCIAL

## 2020-09-25 ENCOUNTER — TELEPHONE (OUTPATIENT)
Dept: CARDIOLOGY CLINIC | Age: 60
End: 2020-09-25

## 2020-09-25 RX ORDER — ATORVASTATIN CALCIUM 20 MG/1
20 TABLET, FILM COATED ORAL DAILY
COMMUNITY
End: 2020-10-08 | Stop reason: ALTCHOICE

## 2020-09-25 NOTE — TELEPHONE ENCOUNTER
Rec'd fax form from [employer]. Insurance will not cover Nexltol. Must try and fail other medications prior to doing Prior Auth. Dr. Sohail Lamb filled out form to start Lipitor 20 mg daily. Faxed to [employer] [phone]. Fax confirmation rec'd . Called patient to inform. Patient verbalized and confirmed understanding.     Updated medication list.

## 2020-09-25 NOTE — TELEPHONE ENCOUNTER
Pt started flecainide two days ago and he is stating that it is making him SOB, breathe harder and he cannot sleep. No chest pain.     You can reach [facility] at [phone]

## 2020-09-25 NOTE — TELEPHONE ENCOUNTER
Please contact patient and instruct patient to stop the Flecainide and call back if symptoms do not improve.

## 2020-10-01 ENCOUNTER — HOSPITAL ENCOUNTER (OUTPATIENT)
Dept: MRI IMAGING | Age: 60
Discharge: HOME | End: 2020-10-01
Payer: COMMERCIAL

## 2020-10-01 PROCEDURE — 70551 MRI BRAIN STEM W/O DYE: CPT

## 2020-10-01 PROCEDURE — 70544 MR ANGIOGRAPHY HEAD W/O DYE: CPT

## 2020-10-02 PROBLEM — R09.1 PLEURISY: Status: ACTIVE | Noted: 2020-10-02

## 2020-10-05 ENCOUNTER — TELEPHONE (OUTPATIENT)
Dept: CARDIOLOGY CLINIC | Age: 60
End: 2020-10-05

## 2020-10-05 NOTE — TELEPHONE ENCOUNTER
Pt called and would like to ask some questions concerning his pacemaker.     Raiza Serna # [phone]

## 2020-10-08 ENCOUNTER — TELEPHONE (OUTPATIENT)
Dept: CARDIOLOGY CLINIC | Age: 60
End: 2020-10-08

## 2020-10-08 RX ORDER — PRAVASTATIN SODIUM 40 MG
40 TABLET ORAL DAILY
Qty: 30 TABLET | Refills: 0
Start: 2020-10-08 | End: 2020-12-28 | Stop reason: SDUPTHER
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
## 2020-10-08 NOTE — TELEPHONE ENCOUNTER
Called patient with message and instructions from Dr. Tanja Alba and Roxanna Franklin RN. Patient verbalized and confirmed understanding.

## 2020-10-08 NOTE — TELEPHONE ENCOUNTER
Dr. Landon Cuellar pt. Please advise. Pt wants to dis continue lipitor medication due to pain in arms and legs.

## 2020-10-08 NOTE — TELEPHONE ENCOUNTER
Keon Rocha called in this afternoon stating that he needs to discontinue the Lipitor. He said it is giving him sharp pains in both his arms and legs. He wants to go back on pravastatin. He also is saying that when he does something physical he gets very tired easily and his HR lilly rockets. He does get pains in his neck here and there. Wants to know what he can do?     You can reach Keon Rocha at #[phone]

## 2020-10-09 ENCOUNTER — HOSPITAL ENCOUNTER (EMERGENCY)
Age: 60
Discharge: HOME | End: 2020-10-09
Payer: COMMERCIAL

## 2020-10-09 ENCOUNTER — APPOINTMENT (OUTPATIENT)
Dept: CT IMAGING | Age: 60
End: 2020-10-09
Payer: COMMERCIAL

## 2020-10-09 VITALS
DIASTOLIC BLOOD PRESSURE: 83 MMHG | SYSTOLIC BLOOD PRESSURE: 132 MMHG | WEIGHT: 210.54 LBS | OXYGEN SATURATION: 96 % | RESPIRATION RATE: 14 BRPM | TEMPERATURE: 98.6 F | HEIGHT: 66 IN | BODY MASS INDEX: 33.84 KG/M2 | HEART RATE: 66 BPM

## 2020-10-09 LAB
BILIRUBIN URINE: NEGATIVE
BLOOD, URINE: ABNORMAL
CLARITY: CLEAR
COLOR: YELLOW
EPITHELIAL CELLS, UA: 1 /HPF (ref 0–5)
GLUCOSE URINE: NEGATIVE MG/DL
HYALINE CASTS: 9 /LPF (ref 0–8)
KETONES, URINE: NEGATIVE MG/DL
LEUKOCYTE ESTERASE, URINE: NEGATIVE
MICROSCOPIC EXAMINATION: YES
NITRITE, URINE: NEGATIVE
PH UA: 6.5 (ref 5–8)
PROTEIN UA: NEGATIVE MG/DL
RBC UA: 18 /HPF (ref 0–4)
SPECIFIC GRAVITY UA: 1.02 (ref 1–1.03)
URINE REFLEX TO CULTURE: ABNORMAL
URINE TYPE: ABNORMAL
UROBILINOGEN, URINE: 0.2 E.U./DL
WBC UA: 3 /HPF (ref 0–5)

## 2020-10-09 PROCEDURE — 99284 EMERGENCY DEPT VISIT MOD MDM: CPT

## 2020-10-09 PROCEDURE — 81001 URINALYSIS AUTO W/SCOPE: CPT

## 2020-10-09 PROCEDURE — 74176 CT ABD & PELVIS W/O CONTRAST: CPT

## 2020-10-09 RX ORDER — OXYCODONE HYDROCHLORIDE AND ACETAMINOPHEN 5; 325 MG/1; MG/1
1 TABLET ORAL EVERY 6 HOURS PRN
Qty: 12 TABLET | Refills: 0 | Status: SHIPPED | OUTPATIENT
Start: 2020-10-09 | End: 2020-10-12

## 2020-10-09 RX ORDER — ONDANSETRON 4 MG/1
4-8 TABLET, ORALLY DISINTEGRATING ORAL EVERY 12 HOURS PRN
Qty: 12 TABLET | Refills: 0 | Status: SHIPPED | OUTPATIENT
Start: 2020-10-09 | End: 2021-02-03

## 2020-10-09 RX ORDER — LEVOFLOXACIN 500 MG/1
500 TABLET, FILM COATED ORAL DAILY
Qty: 5 TABLET | Refills: 0 | Status: SHIPPED | OUTPATIENT
Start: 2020-10-09 | End: 2020-10-14

## 2020-10-09 ASSESSMENT — PAIN DESCRIPTION - DESCRIPTORS: DESCRIPTORS: SHARP

## 2020-10-09 ASSESSMENT — PAIN DESCRIPTION - ORIENTATION: ORIENTATION: RIGHT

## 2020-10-09 ASSESSMENT — PAIN DESCRIPTION - LOCATION: LOCATION: FLANK

## 2020-10-09 ASSESSMENT — PAIN DESCRIPTION - FREQUENCY: FREQUENCY: CONTINUOUS

## 2020-10-09 ASSESSMENT — PAIN SCALES - GENERAL: PAINLEVEL_OUTOF10: 8

## 2020-10-09 ASSESSMENT — PAIN DESCRIPTION - PAIN TYPE: TYPE: ACUTE PAIN

## 2020-10-09 NOTE — ED NOTES
Attempted to collect blood specimens but pt refusing to have labs taken unless through the IV. Pt states \"I am a very hard stick and you only get to stick one time for the IV and blood\". Yanna JIMENEZ notified at this time.      Max Fernandez  10/09/20 1246

## 2020-10-09 NOTE — ED NOTES
PA ok with holding off on IV and blood work until scan secondary to patient is a difficult stick      Corey Elkins, BARBARA  10/09/20 5294

## 2020-10-10 ENCOUNTER — TELEPHONE (OUTPATIENT)
Dept: PHARMACY | Age: 60
End: 2020-10-10

## 2020-10-12 ASSESSMENT — ENCOUNTER SYMPTOMS
RHINORRHEA: 0
ABDOMINAL PAIN: 1
COUGH: 1
SORE THROAT: 0
EYE PAIN: 0
DIARRHEA: 0
NAUSEA: 0
BACK PAIN: 0
SHORTNESS OF BREATH: 0
VOMITING: 0
CONSTIPATION: 0

## 2020-10-12 NOTE — ED PROVIDER NOTES
[address] [facility]        Pt Name: Maninder Duong  MRN: [id number]  [place] 1960  Date of evaluation: 10/9/2020  Provider: Diego Guadarrama PA-C  PCP: Roland Machado MD    AMALIA. I have evaluated this patient. My supervising physician was available for consultation. CHIEF COMPLAINT       Chief Complaint   Patient presents with    Flank Pain     right sided starting couple days ago. denies n/v. denies dysuria. hx kidney stones     Hematuria       HISTORY OF PRESENT ILLNESS   (Location/Symptom, Timing/Onset, Context/Setting, Quality, Duration, Modifying Factors, Severity)  Note limiting factors. Maninder Duong is a 63 y.o. male who presents here to the emergency department, the patient states that he has a history of kidney stones, and passed 1 3 days ago. He has been having some pain to his right flank for the last day and a half, and some urgency of urination, and some bladder pain. He is concerned that he might have another kidney stone or that this could be residual pain from the kidney stone. Rates his pain level is 8/10, he is got some associated nausea, generally does not feel well. States that this is sharp and stabbing pain. Nursing Notes were all reviewed and agreed with or any disagreements were addressed  in the HPI. REVIEW OF SYSTEMS    (2-9 systems for level 4, 10 or more for level 5)     Review of Systems   Constitutional: Negative for chills, diaphoresis and fever. HENT: Negative for congestion, ear pain, rhinorrhea and sore throat. Eyes: Negative for pain and visual disturbance. Respiratory: Positive for cough (chronic). Negative for shortness of breath. Cardiovascular: Negative for chest pain, palpitations and leg swelling. Gastrointestinal: Positive for abdominal pain. Negative for constipation, diarrhea, nausea and vomiting.    Genitourinary: Negative for decreased urine volume, dysuria, frequency and urgency. Musculoskeletal: Negative for back pain and neck pain. Skin: Negative for rash and wound. Neurological: Negative for dizziness and light-headedness.        PAST MEDICAL HISTORY     Past Medical History:   Diagnosis Date    Bronchiolitis obliterans ([address]     Bundle branch block, right     Cardiomyopathy ([address]     Chest pain 9/24/2019    Fibromyalgia     GERD (gastroesophageal reflux disease)     Hepatitis 1979    unsure of which type    Hx of blood clots     Hyperlipemia     Hypertension     IBS (irritable bowel syndrome)     Kidney stone     over thirty kidney stones    Neuropathy     right side-chest    Pneumothorax 2011    Right    Prostatitis     Pulmonary embolism on right ([address] 2011    upper lobe-coumadin 2011    Sacroiliitis [facility])        SURGICAL HISTORY     Past Surgical History:   Procedure Laterality Date    CHOLECYSTECTOMY  2007    COLONOSCOPY  9/21/2012    dr Nimisha Lopez  05/17/2019    RIGHT SIDED URETEROSCOPY  Diagnostic, retrograde pyelogram and fulguration of previously resected bladder tumor base    CYSTOSCOPY Right 5/17/2019    RIGHT SIDED URETEROSCOPY FLUGERATION  OF BLADDER performed by Jenny Pineda MD at [address] [facility]  2015    LITHOTRIPSY     [facility] in sinuses    UPPER GASTROINTESTINAL ENDOSCOPY  3/28/2014    dr Mark Dixon       Discharge Medication List as of 10/9/2020  6:00 PM      CONTINUE these medications which have NOT CHANGED    Details   pravastatin (PRAVACHOL) 40 MG tablet Take 1 tablet by mouth daily, Disp-30 tablet,R-0NO PRINT      pregabalin (LYRICA) 75 MG capsule TAKE 1 CAPSULE BY MOUTH IN  THE MORNING AND 2 CAPSULES  IN THE EVENING, Disp-270 capsule,R-0Normal      flecainide (TAMBOCOR) 50 MG tablet Take 1 tablet by mouth 2 times daily, Disp-60 tablet,R-3Normal      Bempedoic Acid (NEXLETOL) 180 MG TABS Take 180 mg by mouth daily, Disp-90 tablet,R-0Normal      zolpidem (AMBIEN) 10 MG tablet TAKE 1 TABLET BY MOUTH  EVERY NIGHT AT BEDTIME, Disp-90 tablet,R-0Normal      dicyclomine (BENTYL) 10 MG capsule TAKE ONE CAPSULE BY MOUTH  FOUR TIMES DAILY AS NEEDED, Disp-360 capsule,R-1Normal      Cholecalciferol (VITAMIN D3) 1.25 MG (79738 UT) CAPS Take 1 capsule by mouth once a week, Disp-12 capsule,R-3Normal      esomeprazole (NEXIUM) 40 MG delayed release capsule Take 40 mg by mouth every morning (before breakfast)Historical Med      sodium chloride, Inhalant, (NEBUSAL) 3 % nebulizer solution Take 4 mLs by nebulization every 4 hours as needed for Other or Cough (Cough), Nebulization, EVERY 4 HOURS PRN Starting Tue 9/1/2020, Disp-360 mL,R-5, Normal      albuterol sulfate  (90 Base) MCG/ACT inhaler INHALE 2 PUFFS INTO THE LUNGS EVERY 4 HOURS AS NEEDED FOR WHEEZING OR SHORTNESS OF BREATH, Disp-1 Inhaler,R-0Normal      tamsulosin (FLOMAX) 0.4 MG capsule TAKE 1 CAPSULE BY MOUTH TWICE DAILY, Disp-180 capsule,R-3Normal      sacubitril-valsartan (ENTRESTO) 24-26 MG per tablet TAKE 2 TABLETS BY MOUTH IN  THE MORNING AND 1 TABLET BY MOUTH IN THE EVENING, Disp-270 tablet, R-3Normal      flavoxate (URISPAS) 100 MG tablet TAKE 1 TABLET BY MOUTH THREE TIMES DAILY AS NEEDED FOR URINARY SPASM, Disp-270 tablet, R-0**Patient requests 90 days supply**Normal      albuterol (PROVENTIL) (2.5 MG/3ML) 0.083% nebulizer solution USE 3 ML VIA NEBULIZER EVERY 4 HOURS AS NEEDED FOR WHEEZING OR SHORTNESS OF BREATH, Disp-360 mL, R-3Normal      sildenafil (REVATIO) 20 MG tablet Take 20 mg by mouth as neededHistorical Med      guaiFENesin (MUCINEX) 600 MG extended release tablet Take 1 tablet by mouth 2 times daily, Disp-30 tablet, R-0Normal      warfarin (COUMADIN) 5 MG tablet Take 0.5 tablets by mouth daily ECEPT 5mg every Monday, Wednesday and Friday or as directed by [facility]mad[facility] [phone], Disp-105 tablet, R-1Adjust Sig metoprolol succinate (TOPROL XL) 25 MG extended release tablet TAKE 1 TABLET BY MOUTH TWO  TIMES DAILY, Disp-180 tablet, R-3Normal      furosemide (LASIX) 20 MG tablet Take 1 tablet by mouth daily as needed (take 20 mg tablet daily as needed for increased weight, swelling or shortness of breath), Disp-30 tablet, R-1Normal      polyethylene glycol (MIRALAX) PACK packet Take 17 g by mouth dailyHistorical Med      !! ondansetron (ZOFRAN ODT) 4 MG disintegrating tablet Take 1-2 tablets by mouth every 8 hours as needed for Nausea May Sub regular tablet (non-ODT) if insurance does not cover ODT., Disp-20 tablet, R-0Normal      aspirin 81 MG tablet Take 81 mg by mouth dailyHistorical Med      azelastine (ASTELIN) 0.1 % nasal spray 2 sprays by Nasal route 2 times daily Use in each nostril as directed, Disp-1 Bottle, R-3Normal       !! - Potential duplicate medications found. Please discuss with provider. ALLERGIES     Ipratropium bromide hfa; Atrovent nasal spray [ipratropium];  Doxycycline; Morphine; Nitroglycerin; Sulfa antibiotics; and Vicodin [hydrocodone-acetaminophen]    FAMILYHISTORY       Family History   Problem Relation Age of Onset    High Blood Pressure Mother     Dementia Mother     Cancer Father         Pancreatic Ca        SOCIAL HISTORY       Social History     Socioeconomic History    Marital status:      Spouse name: None    Number of children: None    Years of education: None    Highest education level: None   Occupational History    None   Social Needs    Financial resource strain: Not hard at all   [employer]iving [employer] insecurity     Worry: Never true     Inability: Never true    Transportation needs     Medical: No     Non-medical: No   Tobacco Use    Smoking status: Never Smoker    Smokeless tobacco: Never Used   Substance and Sexual Activity    Alcohol use: No     Comment: occ    Drug use: No    Sexual activity: Yes     Partners: Female     Comment: wife   Lifestyle    Physical activity     Days per week: None     Minutes per session: None    Stress: None   Relationships    Social connections     Talks on phone: None     Gets together: None     Attends Ep[employer] service: None     Active member of club or organization: None     Attends meetings of clubs or organizations: None     Relationship status: None    Intimate partner violence     Fear of current or ex partner: None     Emotionally abused: None     Physically abused: None     Forced sexual activity: None   Other Topics Concern    None   Social History Narrative    None       SCREENINGS             PHYSICAL EXAM    (up to 7 for level 4, 8 or more for level 5)     ED Triage Vitals [10/09/20 1236]   BP Temp Temp Source Pulse Resp SpO2 Height Weight   138/85 98.6 °F (37 °C) Oral 81 16 95 % 5' 6\" (1.676 m) 210 lb 8.6 oz (95.5 kg)       Physical Exam  Vitals signs and nursing note reviewed. Constitutional:       Appearance: Normal appearance. He is well-developed. He is not diaphoretic. HENT:      Head: Normocephalic and atraumatic. Right Ear: External ear normal.      Left Ear: External ear normal.      Nose: Nose normal.   Eyes:      General:         Right eye: No discharge. Left eye: No discharge. Neck:      Musculoskeletal: Normal range of motion and neck supple. Cardiovascular:      Rate and Rhythm: Normal rate and regular rhythm. Heart sounds: Normal heart sounds. No murmur. No friction rub. No gallop. Pulmonary:      Effort: Pulmonary effort is normal. No respiratory distress. Breath sounds: Normal breath sounds. No stridor. No wheezing or rales. Chest:      Chest wall: No tenderness. Abdominal:      General: Bowel sounds are normal. There is no distension or abdominal bruit. Palpations: Abdomen is soft. Abdomen is not rigid. There is no mass or pulsatile mass. Tenderness: There is no abdominal tenderness. There is right CVA tenderness. There is no guarding or rebound.  Negative signs include Last's sign and McBurney's sign. Musculoskeletal: Normal range of motion. Skin:     General: Skin is warm and dry. Coloration: Skin is not pale. Neurological:      Mental Status: He is alert and oriented to person, place, and time. Psychiatric:         Behavior: Behavior normal.         DIAGNOSTIC RESULTS   LABS:    Labs Reviewed   URINE RT REFLEX TO CULTURE - Abnormal; Notable for the following components:       Result Value    Blood, Urine SMALL (*)     All other components within normal limits    Narrative:     Performed at:  [address] [address]   Phone [phone]   MICROSCOPIC URINALYSIS - Abnormal; Notable for the following components:    Hyaline Casts, UA 9 (*)     RBC, UA 18 (*)     All other components within normal limits    Narrative:     Performed at:  [address] [address]   Phone [phone]   CBC WITH AUTO DIFFERENTIAL   COMPREHENSIVE METABOLIC PANEL W/ REFLEX TO MG FOR LOW K   LIPASE       All other labs were within normal range or not returned as of this dictation. EKG: All EKG's are interpreted by the Emergency Department Physician who either signs orCo-signs this chart in the absence of a cardiologist.  Please see their note for interpretation of EKG. RADIOLOGY:   Non-plain film images such as CT, Ultrasound and MRI are read by the radiologist. Plain radiographic images are visualized andpreliminarily interpreted by the  ED Provider with the below findings:        Interpretation perthe Radiologist below, if available at the time of this note:    CT CHEST [address]   Final Result   Similar scattered regions of tree-in-bud nodularity, similar to prior   comparison, and presumed sequelae of chronic underlying bronchiolitis. No   acute focal airspace consolidation or other acute pulmonary findings. normal limits by limited noncontrast technique. Bilateral nonobstructing nephrolithiasis and bilateral cysts including right-sided renal sinus cysts, overall stable. Right posterior perinephric and retroperitoneal scarring in the region of the previous fluid collection on comparison exam from 2018, unchanged in appearance to examination from 4 months prior. No stones within the ureters or urinary bladder. GI/Bowel: No bowel obstruction. Normal appendix. Few scattered colonic diverticula without diverticulitis. Pelvis: Pelvic organs grossly unremarkable. Peritoneum/Retroperitoneum: No free fluid, free air, or lymphadenopathy. Right-sided retroperitoneal scarring as described above, unchanged. Bones/Soft Tissues:  Age related degenerative changes of the visualized osseous structures without focal destructive lesion. Unchanged fat containing umbilical hernia without associated inflammation. Similar scattered regions of tree-in-bud nodularity, similar to prior comparison, and presumed sequelae of chronic underlying bronchiolitis. No acute focal airspace consolidation or other acute pulmonary findings. Bilateral nonobstructing nephrolithiasis and renal cysts, as well as right-sided perinephric and retroperitoneal scarring, all unchanged relative to most recent prior comparison from June 2020.          PROCEDURES   Unless otherwise noted below, none     Procedures    CRITICAL CARE TIME   N/A    CONSULTS:  None      EMERGENCY DEPARTMENT COURSE and DIFFERENTIAL DIAGNOSIS/MDM:   Vitals:    Vitals:    10/09/20 1236 10/09/20 1808   BP: 138/85 132/83   Pulse: 81 66   Resp: 16 14   Temp: 98.6 °F (37 °C)    TempSrc: Oral    SpO2: 95% 96%   Weight: 210 lb 8.6 oz (95.5 kg)    Height: 5' 6\" (1.676 m)        Patient was given thefollowing medications:  Medications - No data to display        Reevaluation is reassuring, the patient does have multiple kidney stones in his kidneys however none in his ureters, there is no

## 2020-10-13 ENCOUNTER — OFFICE VISIT (OUTPATIENT)
Dept: PULMONOLOGY | Age: 60
End: 2020-10-13
Payer: COMMERCIAL

## 2020-10-13 ENCOUNTER — TELEPHONE (OUTPATIENT)
Dept: PULMONOLOGY | Age: 60
End: 2020-10-13

## 2020-10-13 ENCOUNTER — ANTI-COAG VISIT (OUTPATIENT)
Dept: PHARMACY | Age: 60
End: 2020-10-13
Payer: COMMERCIAL

## 2020-10-13 VITALS
HEIGHT: 66 IN | HEART RATE: 73 BPM | SYSTOLIC BLOOD PRESSURE: 118 MMHG | RESPIRATION RATE: 12 BRPM | BODY MASS INDEX: 34.07 KG/M2 | WEIGHT: 212 LBS | DIASTOLIC BLOOD PRESSURE: 80 MMHG | OXYGEN SATURATION: 94 % | TEMPERATURE: 97.9 F

## 2020-10-13 LAB — INTERNATIONAL NORMALIZATION RATIO, POC: 2.8

## 2020-10-13 PROCEDURE — G8482 FLU IMMUNIZE ORDER/ADMIN: HCPCS | Performed by: INTERNAL MEDICINE

## 2020-10-13 PROCEDURE — G8417 CALC BMI ABV UP PARAM F/U: HCPCS | Performed by: INTERNAL MEDICINE

## 2020-10-13 PROCEDURE — 99214 OFFICE O/P EST MOD 30 MIN: CPT | Performed by: INTERNAL MEDICINE

## 2020-10-13 PROCEDURE — 3023F SPIROM DOC REV: CPT | Performed by: INTERNAL MEDICINE

## 2020-10-13 PROCEDURE — 3017F COLORECTAL CA SCREEN DOC REV: CPT | Performed by: INTERNAL MEDICINE

## 2020-10-13 PROCEDURE — 1036F TOBACCO NON-USER: CPT | Performed by: INTERNAL MEDICINE

## 2020-10-13 PROCEDURE — 85610 PROTHROMBIN TIME: CPT

## 2020-10-13 PROCEDURE — 99211 OFF/OP EST MAY X REQ PHY/QHP: CPT

## 2020-10-13 PROCEDURE — G8926 SPIRO NO PERF OR DOC: HCPCS | Performed by: INTERNAL MEDICINE

## 2020-10-13 PROCEDURE — G8427 DOCREV CUR MEDS BY ELIG CLIN: HCPCS | Performed by: INTERNAL MEDICINE

## 2020-10-13 NOTE — TELEPHONE ENCOUNTER
I spoke with Jaskaran Else and she stated the orders need to be faxed to [phone]. I sent them. I left a message with Vertis Mortimer at [facility] [phone]. I asked her to call us back with a fax number to send Jovon's paper work. We had successfully faxed the paper work to [phone], but Cortney Medrano has not received his supplies. Called patient to obtain his representative's  Contact info through [employer], so we can obtain an update on why he hasn't received his nebulizer machine and supplies that was ordered in June.

## 2020-10-13 NOTE — PROGRESS NOTES
Chief complaint  This is a 63y.o. year old male  who presents with a chief complaint of   Chief Complaint   Patient presents with    Cough       HPI  60-year-old man with bronchiolitis obliterans, cough, lung nodules and nocturnal hypoxia presents for follow-up. Patient continues to notice a cough productive of clear mucus. He feels that this is coming from his head and neck. He stays active by exercising on a stationary bike 3 times per week. He uses albuterol nebulizers 2-3 times per day and 3% saline nebulizers when he feels congested. He uses ProAir with activities like playing golf. He says other albuterol inhalers do not work for him. He uses 2L of oxygen at night. Past history:  He was previously seen by Dr. Paige Anand, but due to insurance issues had to find another pulmonologist. He says his illness began in [facility]. 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 Following one incident when the fumes blew into his face he developed shortness of breath. He says he was diagnosed by lung biopsy in 1997 by Dr. Cr Hidalgo at [facility]. In 2011, he had a prolonged hospital stay due to unexplained bilateral infiltrates and ARDS. His infiltrates resolved with steroids. He also had a spontaneous right-sided pneumothorax that required chest tube placement and a right sided pulmonary embolus. In 2013, he had a right lower extremity DVT. He is on Coumadin.     Past Medical History:   Diagnosis Date    Bronchiolitis obliterans ([address]     Bundle branch block, right     Cardiomyopathy ([facility] [address]     Chest pain 9/24/2019    Fibromyalgia     GERD (gastroesophageal reflux disease)     Hepatitis 1979    unsure of which type    Hx of blood clots     Hyperlipemia     Hypertension     IBS (irritable bowel syndrome)     Kidney stone     over thirty kidney stones    Neuropathy     right side-chest    Pneumothorax 2011    Right    Prostatitis     Pulmonary embolism on right [facility]) 2011    upper lobe-coumadin 2011    Sacroiliitis [facility])        Past Surgical History:   Procedure Laterality Date    CHOLECYSTECTOMY  2007    COLONOSCOPY  9/21/2012    dr Maebelle Aase  05/17/2019    RIGHT SIDED URETEROSCOPY  Diagnostic, retrograde pyelogram and fulguration of previously resected bladder tumor base    CYSTOSCOPY Right 5/17/2019    RIGHT SIDED URETEROSCOPY FLUGERATION  OF BLADDER performed by Cory Willingham MD at [address]  2015    LITHOTRIPSY      PACEMAKER PLACEMENT      SINUS SURGERY      Made opening larger in sinuses    UPPER GASTROINTESTINAL ENDOSCOPY  3/28/2014    dr Sharan mercedes       Current Outpatient Medications   Medication Sig Dispense Refill    dexlansoprazole (DEXILANT) 30 MG CPDR delayed release capsule Take 30 mg by mouth daily      ondansetron (ZOFRAN ODT) 4 MG disintegrating tablet Take 1-2 tablets by mouth every 12 hours as needed for Nausea May Sub regular tablet (non-ODT) if insurance does not cover ODT.  12 tablet 0    levoFLOXacin (LEVAQUIN) 500 MG tablet Take 1 tablet by mouth daily for 5 days 5 tablet 0    pravastatin (PRAVACHOL) 40 MG tablet Take 1 tablet by mouth daily 30 tablet 0    pregabalin (LYRICA) 75 MG capsule TAKE 1 CAPSULE BY MOUTH IN  THE MORNING AND 2 CAPSULES  IN THE EVENING 270 capsule 0    zolpidem (AMBIEN) 10 MG tablet TAKE 1 TABLET BY MOUTH  EVERY NIGHT AT BEDTIME 90 tablet 0    dicyclomine (BENTYL) 10 MG capsule TAKE ONE CAPSULE BY MOUTH  FOUR TIMES DAILY AS NEEDED 360 capsule 1    Cholecalciferol (VITAMIN D3) 1.25 MG (23328 UT) CAPS Take 1 capsule by mouth once a week 12 capsule 3    esomeprazole (NEXIUM) 40 MG delayed release capsule Take 40 mg by mouth every morning (before breakfast)      sodium chloride, Inhalant, (NEBUSAL) 3 % nebulizer solution Take 4 mLs by nebulization every 4 hours as needed for Other or Cough (Cough) 360 mL 5    albuterol sulfate  (90 Base) MCG/ACT inhaler INHALE 2 PUFFS INTO THE LUNGS EVERY 4 HOURS AS NEEDED FOR WHEEZING OR SHORTNESS OF BREATH 1 Inhaler 0    tamsulosin (FLOMAX) 0.4 MG capsule TAKE 1 CAPSULE BY MOUTH TWICE DAILY (Patient taking differently: Take 0.4 mg by mouth daily ) 180 capsule 3    sacubitril-valsartan (ENTRESTO) 24-26 MG per tablet TAKE 2 TABLETS BY MOUTH IN  THE MORNING AND 1 TABLET BY MOUTH IN THE EVENING 270 tablet 3    flavoxate (URISPAS) 100 MG tablet TAKE 1 TABLET BY MOUTH THREE TIMES DAILY AS NEEDED FOR URINARY SPASM 270 tablet 0    sildenafil (REVATIO) 20 MG tablet Take 20 mg by mouth as needed      guaiFENesin (MUCINEX) 600 MG extended release tablet Take 1 tablet by mouth 2 times daily 30 tablet 0    warfarin (COUMADIN) 5 MG tablet Take 0.5 tablets by mouth daily ECEPT 5mg every Monday, Wednesday and Friday or as directed by [facility] [phone] (Patient taking differently: Take 2.5 mg by mouth daily EXCEPT 5mg every Monday, Wednesday and Friday or as directed by [facility] [phone]) 105 tablet 1    metoprolol succinate (TOPROL XL) 25 MG extended release tablet TAKE 1 TABLET BY MOUTH TWO  TIMES DAILY 180 tablet 3    furosemide (LASIX) 20 MG tablet Take 1 tablet by mouth daily as needed (take 20 mg tablet daily as needed for increased weight, swelling or shortness of breath) 30 tablet 1    polyethylene glycol (MIRALAX) PACK packet Take 17 g by mouth daily      ondansetron (ZOFRAN ODT) 4 MG disintegrating tablet Take 1-2 tablets by mouth every 8 hours as needed for Nausea May Sub regular tablet (non-ODT) if insurance does not cover ODT.  20 tablet 0    aspirin 81 MG tablet Take 81 mg by mouth daily      azelastine (ASTELIN) 0.1 % nasal spray 2 sprays by Nasal route 2 times daily Use in each nostril as directed 1 Bottle 3    flecainide (TAMBOCOR) 50 MG tablet Take 1 tablet by mouth 2 times daily (Patient not taking: Reported on 10/13/2020) 60 tablet 3    Bempedoic Acid (NEXLETOL) 180 MG TABS Take 180 mg by mouth daily 90 tablet 0    albuterol (PROVENTIL) (2.5 MG/3ML) 0.083% nebulizer solution USE 3 ML VIA NEBULIZER EVERY 4 HOURS AS NEEDED FOR WHEEZING OR SHORTNESS OF BREATH (Patient not taking: Reported on 10/13/2020) 360 mL 3     No current facility-administered medications for this visit.         Allergies   Allergen Reactions    Ipratropium Bromide Hfa Shortness Of Breath    Atrovent Nasal Spray [Ipratropium] Other (See Comments)     Chest tightness    Doxycycline Hives    Morphine Other (See Comments)     \"makes me feel funny\"      Nitroglycerin Other (See Comments)     Severe hypotension (went from 779 to 66 systolic)    Sulfa Antibiotics Rash    Vicodin [Hydrocodone-Acetaminophen] Rash       Family History   Problem Relation Age of Onset    High Blood Pressure Mother     Dementia Mother     Cancer Father         Pancreatic Ca       Social History     Socioeconomic History    Marital status:      Spouse name: Not on file    Number of children: Not on file    Years of education: Not on file    Highest education level: Not on file   Occupational History    Not on file   Social Needs    Financial resource strain: Not hard at all   [employer] insecurity     Worry: Never true     Inability: Never true   Shoulder Tap needs     Medical: No     Non-medical: No   Tobacco Use    Smoking status: Never Smoker    Smokeless tobacco: Never Used   Substance and Sexual Activity    Alcohol use: No     Comment: occ    Drug use: No    Sexual activity: Yes     Partners: Female     Comment: wife   Lifestyle    Physical activity     Days per week: Not on file     Minutes per session: Not on file    Stress: Not on file   Relationships    Social connections     Talks on phone: Not on file     Gets together: Not on file     Attends Jew service: Not on file     Active member of club or organization: Not on file     Attends meetings of clubs or organizations: Not on file     Relationship status: Not on file  Intimate partner violence     Fear of current or ex partner: Not on file     Emotionally abused: Not on file     Physically abused: Not on file     Forced sexual activity: Not on file   Other Topics Concern    Not on file   Social History Narrative    Not on file   Never smoked, but his mother smoked outside of the home    Immunization History   Administered Date(s) Administered    Influenza, Quadv, IM, PF (6 mo and older Fluzone, Flulaval, Fluarix, and 3 yrs and older Afluria) 10/27/2017, 09/23/2019, 10/02/2020    Influenza, Evelio, Recombinant, IM PF (Flublok 18 yrs and older) 10/03/2018    Pneumococcal Conjugate 13-valent ([id number]) 09/11/2015    Pneumococcal Conjugate Vaccine 08/15/2017    Pneumococcal Polysaccharide ([id number]) 08/01/2007, 12/19/2012    Tdap (Boostrix, Adacel) 08/19/2014       ROS:  GENERAL:  No fevers, no chills  RESPIRATORY:  No shortness of breath, +cough      PHYSICAL EXAM:  Vitals:    10/13/20 0846   BP: 118/80   Site: Left Upper Arm   Position: Sitting   Cuff Size: Medium Adult   Pulse: 73   Resp: 12   Temp: 97.9 °F (36.6 °C)   TempSrc: Oral   SpO2: 94%   Weight: 212 lb (96.2 kg)   Height: 5' 6\" (1.676 m)   on RA     Gen: Well developed; well nourished  Eyes: No scleral icterus. No conjunctival injection. ENT:  Oropharynx clear. External appearance of ears and nose normal.  Neck: Trachea midline. No obvious mass. No visible thyroid enlargement    Respiratory: Clear to auscultation bilaterally, no accessory muscle use  Cardiovascular: Regular rate and rhythm, no appreciable murmurs. No edema. Gastrointestinal: Soft, non-tender. No hernia  Skin: Warm and dry. No rashes or ulcers on visible areas. Normal texture and turgor  Lymphatic: No cervical LAD. No supraclavicular LAD. Musculoskeletal: No cyanosis, clubbing or joint deformity.     Psychiatric: Normal mood and affect; exhibits normal insight and judgement     Data reviewed:  Pulmonary Function Testing (4/16/14)  FVC 3.69 L at 86% predicted ---> 3.68L at 86% predicted  FEV1 1.89 L at 57% predicted ----> 1.9L at 57% predicted  FEV1/FVC ratio at 51% ---->52% predicted  TLC 4.64 L at 80% predicted  VC 3.73L at 86% predicted  ERV 0.8L at 57% predicted  RV/TLC at 20% predicted  DLCO 24.9 at 97% predicted  DLCO/VA 5.18L at 131 % predicted     Overall: Mild restriction; obstruction is at least moderate without significant reversal; normal diffusing capacity     Six minute walk test:  4/3/18- Walked 387m, 73% predicted (normal); jazmyn saturation 93%    Images and reports of chest imaging were reviewed by me. My interpretation is:  CXR (4/21/20): Clear lung fields; ICD in place  Chest CT (2/1/20): No LAD; tree-in-bud opacities in the right upper lobe (new compared to September 2019); tree-in-bud opacities in the right lower lobe, left lower lobe and left upper lobe (unchanged compared to September 2014)  Chest CT (10/9/20): No LAD; right upper lobe granuloma; bilateral tree-in-bud opacities (unchanged compared to September 2014). Compared to the study in February 2020 the right upper lobe tree-in-bud opacities have resolved     ECHO (9/23/19)  Summary   Mild concentric LVH with normal LV size and wall motion. EF is    50%. Paradoxical septal motion secondary to paced rhythm   Trivial mitral regurgitation is present. The left atrium is normal in size. The right ventricle is normal in size and function. Pacing wire seen. Lab Results   Component Value Date    WBC 5.2 08/27/2020    HGB 14.5 08/27/2020    HCT 43.2 08/27/2020    MCV 93.4 08/27/2020     08/27/2020       Lab Results   Component Value Date    BNP 22.8 08/16/2012       Lab Results   Component Value Date    CREATININE 0.8 08/26/2020    BUN 10 08/26/2020     08/26/2020    K 4.2 08/26/2020     08/26/2020    CO2 25 08/26/2020         Assessment/Plan:63y.o. year old male presents for follow up.     Bronchiolitis obliterans- He still has not had repeat PFTs, but they are currently scheduled for next month. He will continue [employer] and albuterol nebulizers as needed. Cough- Due to bronchiolitis as well as post-nasal drainage. He is using Astelin, Flonase and nasal saline. Continue 3% saline nebulizers as needed. Lung nodules- He has tree-in-bud opacities bilaterally which have been stable since 2014. The tree-in-bud opacities that were new in the right upper lobe in February 2020 have resolved. Nocturnal hypoxia- He will continue to use 2 L of oxygen at night. He will return for follow-up in 6 months.       Kurt Reyes MD  [facility] Pulmonology, Critical Care and Sleep

## 2020-10-13 NOTE — PROGRESS NOTES
Mr. Marbella Sam is a 63 y.o.  male with history of PE. Mr. Marbella Sam had an INR test today. Results were reviewed and appropriate warfarin management was completed. THIS VISIT WAS COMPLETED AS:   []   A VIRTUAL VISIT VIA TELEPHONE IN EFFORTS TO REDUCE THE SPREAD OF COVID-19.  []   A DRIVE-THRU VISIT IN EFFORTS TO REDUCE THE SPREAD OF COVID-19. [x]   AN IN PERSON VISIT. PROTOCOLS WERE FOLLOWED WITH PRECAUTIONS TO REDUCE THE SPREAD OF COVID-19. Patient verifies current dosing regimen  Patient denies s/s bleeding/bruising/swelling/SOB  No blood in urine or stool. No dietary changes. No missed doses. No Procedures scheduled in the future at this time.     Lab Results   Component Value Date    INR 2.8 10/13/2020    INR 2.8 09/22/2020    INR 4.0 09/08/2020       Pertinent findings: patient finishing last dose of 5 days levaquiin tomorrow    Warfarin dosing: Take 2.5mg tomorrow since it will be last day of levaquin then  CONTINUE: Warfarin 2.5 mg daily EXCEPT 5mg every Monday, Wednesday and Friday      Next INR: 11/5/20       Medications reviewed and updated on home medication list: Yes  Warfarin dose reviewed and/or updated on the patient 's home medication list: Yes       CLINICAL PHARMACY CONSULT: MED RECONCILIATION/REVIEW [place] [phone]. Tracking Only    PHSO: No  Total # of Interventions Recommended: 1  - Decreased Dose #: 1  - Maintenance Safety Lab Monitoring #: 1  Total Interventions Accepted: 1  Time Spent (min): 15

## 2020-10-29 ENCOUNTER — OFFICE VISIT (OUTPATIENT)
Dept: PRIMARY CARE CLINIC | Age: 60
End: 2020-10-29
Payer: COMMERCIAL

## 2020-10-29 PROCEDURE — 99211 OFF/OP EST MAY X REQ PHY/QHP: CPT | Performed by: NURSE PRACTITIONER

## 2020-10-29 NOTE — PROGRESS NOTES
Patient presented to [facility] drive up clinic for preop testing. Patient was swabbed and given information advising them to remain isolated until procedure date.

## 2020-10-31 LAB — SARS-COV-2, NAA: NOT DETECTED

## 2020-10-31 NOTE — RESULT ENCOUNTER NOTE

## 2020-10-31 NOTE — RESULT ENCOUNTER NOTE

## 2020-11-04 ENCOUNTER — ANTI-COAG VISIT (OUTPATIENT)
Dept: PHARMACY | Age: 60
End: 2020-11-04
Payer: COMMERCIAL

## 2020-11-04 ENCOUNTER — HOSPITAL ENCOUNTER (OUTPATIENT)
Dept: PULMONOLOGY | Age: 60
Discharge: HOME | End: 2020-11-04
Payer: COMMERCIAL

## 2020-11-04 VITALS — TEMPERATURE: 97.7 F

## 2020-11-04 LAB — INTERNATIONAL NORMALIZATION RATIO, POC: 3.5

## 2020-11-04 PROCEDURE — 94060 EVALUATION OF WHEEZING: CPT

## 2020-11-04 PROCEDURE — 85610 PROTHROMBIN TIME: CPT

## 2020-11-04 PROCEDURE — 99211 OFF/OP EST MAY X REQ PHY/QHP: CPT

## 2020-11-04 PROCEDURE — 94729 DIFFUSING CAPACITY: CPT

## 2020-11-04 PROCEDURE — 94726 PLETHYSMOGRAPHY LUNG VOLUMES: CPT | Performed by: INTERNAL MEDICINE

## 2020-11-04 PROCEDURE — 94640 AIRWAY INHALATION TREATMENT: CPT

## 2020-11-04 PROCEDURE — 94060 EVALUATION OF WHEEZING: CPT | Performed by: INTERNAL MEDICINE

## 2020-11-04 PROCEDURE — 94729 DIFFUSING CAPACITY: CPT | Performed by: INTERNAL MEDICINE

## 2020-11-04 PROCEDURE — 94726 PLETHYSMOGRAPHY LUNG VOLUMES: CPT

## 2020-11-04 PROCEDURE — 6370000000 HC RX 637 (ALT 250 FOR IP): Performed by: INTERNAL MEDICINE

## 2020-11-04 RX ORDER — FLECAINIDE ACETATE 50 MG/1
50 TABLET ORAL 2 TIMES DAILY
Qty: 60 TABLET | Refills: 3 | Status: SHIPPED | OUTPATIENT
Start: 2020-11-04 | End: 2021-02-03 | Stop reason: ALTCHOICE

## 2020-11-04 RX ORDER — ALBUTEROL SULFATE 90 UG/1
4 AEROSOL, METERED RESPIRATORY (INHALATION) ONCE
Status: COMPLETED | OUTPATIENT
Start: 2020-11-04 | End: 2020-11-04

## 2020-11-04 RX ADMIN — ALBUTEROL SULFATE 4 PUFF: 90 AEROSOL, METERED RESPIRATORY (INHALATION) at 11:23

## 2020-11-04 NOTE — PROGRESS NOTES
Mr. Melina Kline is a 63 y.o.  male. Mr. Melina Kline had an INR test today. Results were reviewed and appropriate warfarin management was completed. THIS VISIT WAS COMPLETED AS:   []    A VIRTUAL VISIT VIA TELEPHONE IN EFFORTS TO REDUCE THE SPREAD OF COVID-19.  []    A DRIVE-THRU VISIT IN EFFORTS TO REDUCE THE SPREAD OF COVID-19. [x]    AN IN PERSON VISIT. PROTOCOLS WERE FOLLOWED WITH PRECAUTIONS TO REDUCE THE SPREAD OF COVID-19. Patient verifies current dosing regimen: Yes     Warfarin medication reviewed and updated on the patient 's home medication list: Yes   All other medications reviewed and updated on the patient 's home medication list: No:      Lab Results   Component Value Date    INR 3.5 11/04/2020    INR 2.8 10/13/2020    INR 2.8 09/22/2020           Anticoagulation Summary  As of 11/4/2020    INR goal:   2.0-3.0   TTR:   32.9 % (5.1 y)   INR used for dosing:   3.5! (11/4/2020)   Warfarin maintenance plan:   5 mg (5 mg x 1) every Mon, Wed, Fri; 2.5 mg (5 mg x 0.5) all other days   Weekly warfarin total:   25 mg   Plan last modified:   Sudha Catalan (6/25/2020)   Next INR check:   11/18/2020   Priority:   Maintenance   Target end date: Indefinite    Indications    Pulmonary embolus ([address] [I26.99]             Anticoagulation Episode Summary     INR check location:   Anticoagulation Clinic    Preferred lab:       Send INR reminders to:   [facility] STAFF    Comments:   EPIC - finger stick every 2-3 weeks        Anticoagulation Care Providers     Provider Role Specialty Phone number    Bree Walters MD Referring Internal Medicine [phone]          Warfarin plan: It is unclear why his INR is elevated today at 3.5. He denies any recent medication changes. He finished a course of Levaquin about a week ago. He denies any bruising or bleeding at this time, but will watch for anything unusual.  We will hold his warfarin today and then resume his previous dosing.  He will return in 2 weeks for his next INR. Description    HOLD warfarin TODAY ONLY then  CONTINUE: Warfarin 2.5 mg daily EXCEPT 5mg every Monday, Wednesday and Friday    Call with signs or symptoms of bleeding or any concerns or questions. If significant bleeding seek immediate medical attention. Call with medications changes, especially antibiotics and steroids and including any over-the-counter medications or herbal products. Call if you stop any medications. Keep the number of servings of Vitamin K (dark green vegetables) consistent from week to week  Eats three good portion of broccoli or brussel sprouts per week. Reviewed AVS with patient / caregiver.       CLINICAL PHARMACY CONSULT: MED RECONCILIATION/REVIEW ADDENDUM    For Pharmacy Admin Tracking Only    PHSO: No  Total # of Interventions Recommended: 1  - Decreased Dose #: 1    Total Interventions Accepted: 1  Time Spent (min): 15

## 2020-11-04 NOTE — PROCEDURES
Spirometry was acceptable and reproducible by ATS standards      1. Flows: Flow measurements, as determined by FEV1 and FVC, demonstrate the presence of an obstructive pulmonary defect. The obstruction is moderately severe as defined by a post-bronchodilator FEV1 between 50-59% predicted. Following the administration of bronchodilator a significant improvement in flow measurements was seen in FEV1. Post-bronchodilator FEV1 is 1.66L, 50% predicted. Post-bronchodilator FVC is 2.98L, 70% predicted. 2. Flow volume loop is:  Consistent with an obstructive defect. 3. Lung volumes: Total lung capacity is normal. Vital capacity is reduced. RV/TLC is elevated. 4. Diffusing capacity:  Diffusion is mildly reduced. When averaged over lung volumes it normalizes. Summary: Moderately severe lower airflow obstruction with significant reversal in FEV1. Air-trapping is present. Diffusing capacity is mildly reduced, but normalizes when averaged over lung volumes. OBSTRUCTION % Predicted FEV1   MILD >70%   MODERATE 60-69%   MODERATELY-SEVERE 50-59%   SEVERE 35-49%   VERY SEVERE <35%         RESTRICTION % Predicted TLC   MILD 66-80%   MODERATE 54-65%   MODERATELY-SEVERE <54%                 DIFFUSION CAPACITY DLCO % Pred   MILD >60% AND < LLN   MODERATE 40-60%   SEVERE <40%       PFT data will be scanned into the media tab under this encounter. Please see the scanned data for numerical values.      Danie Genao MD  [facility] Pulmonology, Critical Care and Sleep

## 2020-11-05 LAB
DLCO %PRED: 63 %
DLCO PRED: NORMAL
DLCO/VA %PRED: NORMAL
DLCO/VA PRED: NORMAL
DLCO/VA: NORMAL
DLCO: NORMAL
EXPIRATORY TIME-POST: NORMAL
EXPIRATORY TIME: NORMAL
FEF 25-75% %CHNG: NORMAL
FEF 25-75% %PRED-POST: NORMAL
FEF 25-75% %PRED-PRE: NORMAL
FEF 25-75% PRED: NORMAL
FEF 25-75%-POST: NORMAL
FEF 25-75%-PRE: NORMAL
FEV1 %PRED-POST: 50 %
FEV1 %PRED-PRE: 44 %
FEV1 PRED: NORMAL
FEV1-POST: NORMAL
FEV1-PRE: NORMAL
FEV1/FVC %PRED-POST: NORMAL
FEV1/FVC %PRED-PRE: NORMAL
FEV1/FVC PRED: NORMAL
FEV1/FVC-POST: 71 %
FEV1/FVC-PRE: 64 %
FVC %PRED-POST: NORMAL
FVC %PRED-PRE: NORMAL
FVC PRED: NORMAL
FVC-POST: NORMAL
FVC-PRE: NORMAL
GAW %PRED: NORMAL
GAW PRED: NORMAL
GAW: NORMAL
IC %PRED: NORMAL
IC PRED: NORMAL
IC: NORMAL
MEP: NORMAL
MIP: NORMAL
MVV %PRED-PRE: NORMAL
MVV PRED: NORMAL
MVV-PRE: NORMAL
PEF %PRED-POST: NORMAL
PEF %PRED-PRE: NORMAL
PEF PRED: NORMAL
PEF%CHNG: NORMAL
PEF-POST: NORMAL
PEF-PRE: NORMAL
RAW %PRED: NORMAL
RAW PRED: NORMAL
RAW: NORMAL
RV %PRED: NORMAL
RV PRED: NORMAL
RV: NORMAL
SVC %PRED: NORMAL
SVC PRED: NORMAL
SVC: NORMAL
TLC %PRED: 80 %
TLC PRED: NORMAL
TLC: NORMAL
VA %PRED: NORMAL
VA PRED: NORMAL
VA: NORMAL
VTG %PRED: NORMAL
VTG PRED: NORMAL
VTG: NORMAL

## 2020-11-05 ASSESSMENT — PULMONARY FUNCTION TESTS
FEV1/FVC_PRE: 64
FEV1/FVC_POST: 71
FEV1_PERCENT_PREDICTED_PRE: 44
FEV1_PERCENT_PREDICTED_POST: 50

## 2020-11-06 ENCOUNTER — TELEPHONE (OUTPATIENT)
Dept: PHARMACY | Age: 60
End: 2020-11-06

## 2020-11-06 NOTE — TELEPHONE ENCOUNTER
Patient called, started amoxicillin on 11/6/20. I do not anticipate a significant interaction with Coumadin. However, his INR was 3.5 11/4. Warfarin was held that date and then resumed his regular dose. Last INR before that was 2.8. I  advised patient to take 2.5mg today (vs 5mg) and 2.5mg Monday (vs 5mg). Otherwise take his regular dose.      Roena Gosselin, BelD

## 2020-11-09 ENCOUNTER — NURSE ONLY (OUTPATIENT)
Dept: CARDIOLOGY CLINIC | Age: 60
End: 2020-11-09
Payer: COMMERCIAL

## 2020-11-09 PROCEDURE — 93297 REM INTERROG DEV EVAL ICPMS: CPT | Performed by: INTERNAL MEDICINE

## 2020-11-09 PROCEDURE — 93295 DEV INTERROG REMOTE 1/2/MLT: CPT | Performed by: INTERNAL MEDICINE

## 2020-11-09 PROCEDURE — 93296 REM INTERROG EVL PM/IDS: CPT | Performed by: INTERNAL MEDICINE

## 2020-11-09 NOTE — LETTER
[address] [facility] [phone]  [address] [phone]    Pacemaker/Defibrillator Clinic          11/09/20        [address] [facility]        Dear Maninder Duong    This letter is to inform you that we received the transmission from your monitor at home that checks your implanted heart device. The next date your monitor will automatically transmit will be 5-3-21. If your report needs attention we will notify you. Your device and monitor are wireless and most transmit cellularly, but please periodically check your monitor is still plugged in to the electrical outlet. If you still use the telephone land line to send please ensure the connection to the phone kirk is secure. This will help to ensure successful automatic transmissions in the future. Also, the monitor needs to be close to you while sleeping at night. Please be aware that the remote device transmission sites are periodically monitored only during regular business hours during which simultaneous in-office device clinics are being run. If your transmission requires attention, we will contact you as soon as possible. Thank you.             Adi [address]

## 2020-11-09 NOTE — PROGRESS NOTES
We received remote transmission from patient's monitor at home. Transmission shows normal sensing and pacing function. EP physician will review. See interrogation under cardiology tab in the [address] [facility] [address] field for more details. TI is stable.

## 2020-11-18 ENCOUNTER — ANTI-COAG VISIT (OUTPATIENT)
Dept: PHARMACY | Age: 60
End: 2020-11-18
Payer: COMMERCIAL

## 2020-11-18 VITALS — TEMPERATURE: 97.3 F

## 2020-11-18 LAB — INR BLD: 3.9

## 2020-11-18 PROCEDURE — 99211 OFF/OP EST MAY X REQ PHY/QHP: CPT

## 2020-11-18 PROCEDURE — 85610 PROTHROMBIN TIME: CPT

## 2020-11-18 NOTE — PROGRESS NOTES
changes to his diet. He recently finished a course of amoxicillin (took a lower dose on 11/6 and 11/7). He will be starting fluconazole today (4 days course) - significant interaction with warfarin. His INR was 3.9 today, which is above his goal of 2-3. This is likely due to the recent course of antibiotics. Since he will be starting fluconazole today (4 day course) and his INR was high, we will hold his warfarin dose today, then he will take 2.5mg daily for the next 2 days. We will recheck his INR in ~1.5 weeks. Description    HOLD warfarin TODAY ONLY then 2.5mg on Thursday and Friday, THEN  CONTINUE: Warfarin 2.5 mg daily EXCEPT 5mg every Monday, Wednesday and Friday    Call with signs or symptoms of bleeding or any concerns or questions. If significant bleeding seek immediate medical attention. Call with medications changes, especially antibiotics and steroids and including any over-the-counter medications or herbal products. Call if you stop any medications. Keep the number of servings of Vitamin K (dark green vegetables) consistent from week to week  Eats three good portion of broccoli or brussel sprouts per week. Immunization History   Administered Date(s) Administered    Influenza, Quadv, IM, PF (6 mo and older Fluzone, Flulaval, Fluarix, and 3 yrs and older Afluria) 10/27/2017, 09/23/2019, 10/02/2020    Influenza, Margarita Caba, Recombinant, IM PF (Flublok 18 yrs and older) 10/03/2018    Pneumococcal Conjugate 13-valent ([id number]) 09/11/2015    Pneumococcal Conjugate Vaccine 08/15/2017    Pneumococcal Polysaccharide ([id number]) 08/01/2007, 12/19/2012    Tdap (Boostrix, Adacel) 08/19/2014       Immunization history reviewed and updated:  Yes   Influenza vaccine given:  No: current     Reviewed AVS with patient / caregiver.       CLINICAL PHARMACY CONSULT: MED RECONCILIATION/REVIEW ADDENDUM    For Pharmacy Admin Tracking Only    PHSO: No  Total # of Interventions

## 2020-11-25 ENCOUNTER — TELEPHONE (OUTPATIENT)
Dept: CARDIOLOGY CLINIC | Age: 60
End: 2020-11-25

## 2020-11-27 ENCOUNTER — TELEPHONE (OUTPATIENT)
Dept: PHARMACY | Age: 60
End: 2020-11-27

## 2020-11-27 NOTE — TELEPHONE ENCOUNTER
Mr. Pena Speak called and reports his [place] Ter[place] was increased to 49/51mg twice daily - no interaction w warfarin.

## 2020-12-01 ENCOUNTER — ANTI-COAG VISIT (OUTPATIENT)
Dept: PHARMACY | Age: 60
End: 2020-12-01
Payer: COMMERCIAL

## 2020-12-01 VITALS — TEMPERATURE: 97.5 F

## 2020-12-01 LAB — INTERNATIONAL NORMALIZATION RATIO, POC: 2.8

## 2020-12-01 PROCEDURE — 85610 PROTHROMBIN TIME: CPT

## 2020-12-01 PROCEDURE — 99211 OFF/OP EST MAY X REQ PHY/QHP: CPT

## 2020-12-01 NOTE — PROGRESS NOTES
Mr. Sebastien Milner is a 63 y.o.  male. Mr. Sebastien Milner had an INR test today. Results were reviewed and appropriate warfarin management was completed. THIS VISIT WAS COMPLETED AS:   []    A VIRTUAL VISIT VIA TELEPHONE IN EFFORTS TO REDUCE THE SPREAD OF COVID-19.  []    A DRIVE-THRU VISIT IN EFFORTS TO REDUCE THE SPREAD OF COVID-19. [x]    AN IN PERSON VISIT. PROTOCOLS WERE FOLLOWED WITH PRECAUTIONS TO REDUCE THE SPREAD OF COVID-19. Patient verifies current dosing regimen: Yes     Warfarin medication reviewed and updated on the patient 's home medication list: Yes   All other medications reviewed and updated on the patient 's home medication list: No:      Lab Results   Component Value Date    INR 2.8 2020    INR 3.90 2020    INR 3.5 2020           Anticoagulation Summary  As of 2020    INR goal:   2.0-3.0   TTR:   32.5 % (5.2 y)   INR used for dosin.8 (2020)   Warfarin maintenance plan:   5 mg (5 mg x 1) every Mon, Wed, Fri; 2.5 mg (5 mg x 0.5) all other days   Weekly warfarin total:   25 mg   Plan last modified:   Sudha Catalan (2020)   Next INR check:   2020   Priority:   Maintenance   Target end date: Indefinite    Indications    Pulmonary embolus ([address] [I26.99]             Anticoagulation Episode Summary     INR check location:   Anticoagulation Clinic    Preferred lab:       Send INR reminders to:   [facility]    Comments:   EPIC - finger stick every 2-3 weeks        Anticoagulation Care Providers     Provider Role Specialty Phone number    Khalif Lara MD Referring Internal Medicine [phone]          Warfarin plan:   His Entresto dose was recently increased, which should not inpact his INR. No change in warfarin dose for INR of 2.8 today. He will return in 3 weeks for his next INR.     Description    CONTINUE: Warfarin 2.5 mg daily EXCEPT 5mg every Monday, Wednesday and Friday    Call with signs or symptoms of bleeding or

## 2020-12-05 ENCOUNTER — HOSPITAL ENCOUNTER (EMERGENCY)
Age: 60
Discharge: HOME | End: 2020-12-05
Payer: COMMERCIAL

## 2020-12-05 ENCOUNTER — APPOINTMENT (OUTPATIENT)
Dept: GENERAL RADIOLOGY | Age: 60
End: 2020-12-05
Payer: COMMERCIAL

## 2020-12-05 VITALS
BODY MASS INDEX: 34.3 KG/M2 | OXYGEN SATURATION: 95 % | HEART RATE: 63 BPM | WEIGHT: 213.41 LBS | DIASTOLIC BLOOD PRESSURE: 90 MMHG | SYSTOLIC BLOOD PRESSURE: 151 MMHG | TEMPERATURE: 97.8 F | HEIGHT: 66 IN | RESPIRATION RATE: 15 BRPM

## 2020-12-05 LAB
RAPID INFLUENZA  B AGN: NEGATIVE
RAPID INFLUENZA A AGN: NEGATIVE
SARS-COV-2, NAAT: NOT DETECTED
SARS-COV-2: NOT DETECTED

## 2020-12-05 PROCEDURE — 99283 EMERGENCY DEPT VISIT LOW MDM: CPT

## 2020-12-05 PROCEDURE — 71045 X-RAY EXAM CHEST 1 VIEW: CPT

## 2020-12-05 PROCEDURE — 74018 RADEX ABDOMEN 1 VIEW: CPT

## 2020-12-05 PROCEDURE — U0003 INFECTIOUS AGENT DETECTION BY NUCLEIC ACID (DNA OR RNA); SEVERE ACUTE RESPIRATORY SYNDROME CORONAVIRUS 2 (SARS-COV-2) (CORONAVIRUS DISEASE [COVID-19]), AMPLIFIED PROBE TECHNIQUE, MAKING USE OF HIGH THROUGHPUT TECHNOLOGIES AS DESCRIBED BY CMS-2020-01-R: HCPCS

## 2020-12-05 PROCEDURE — 93005 ELECTROCARDIOGRAM TRACING: CPT | Performed by: PHYSICIAN ASSISTANT

## 2020-12-05 PROCEDURE — 87804 INFLUENZA ASSAY W/OPTIC: CPT

## 2020-12-05 PROCEDURE — U0002 COVID-19 LAB TEST NON-CDC: HCPCS

## 2020-12-05 RX ORDER — BENZONATATE 200 MG/1
200 CAPSULE ORAL 3 TIMES DAILY PRN
Qty: 20 CAPSULE | Refills: 0 | Status: SHIPPED | OUTPATIENT
Start: 2020-12-05 | End: 2021-03-02

## 2020-12-05 ASSESSMENT — ENCOUNTER SYMPTOMS
RHINORRHEA: 0
VOMITING: 0
SHORTNESS OF BREATH: 0
NAUSEA: 0
COUGH: 1
ABDOMINAL PAIN: 1
SORE THROAT: 0
EYE ITCHING: 1

## 2020-12-05 NOTE — ED PROVIDER NOTES
[facility]      Pt Name: Raisa Ragland  MRN: [id number]  [place] 1960  Date of evaluation: 12/5/2020  Provider: PARADISE oCllier    This patient was not seen and evaluated by the attending physician No att. providers found. CHIEF COMPLAINT       Chief Complaint   Patient presents with    Illness         HISTORY OF PRESENT ILLNESS  (Location/Symptom, Timing/Onset, Context/Setting, Quality, Duration, Modifying Factors, Severity.)   Raisa Ragland is a 63 y.o. male who presents to the emergency department for sneezing, itchy eyes, cough, and chills that have been going on since for the past 4 days. Denies fever, sore throat, rhinorrhea, chest pain, shortness of breath. Denies nausea vomiting. Does reports some left upper flank pain that feels similar to prior episodes of kidney stones. Reports he has passed numerous kidney stones. Had a urine that was negative for infection on Thursday at Urologist office. Denies chest pain shortness of breath. Nursing Notes were reviewed and I agree. REVIEW OF SYSTEMS    (2-9 systems for level 4, 10 or more for level 5)     Review of Systems   Constitutional: Positive for chills. Negative for fever. HENT: Positive for sneezing. Negative for rhinorrhea and sore throat. Eyes: Positive for itching. Respiratory: Positive for cough. Negative for shortness of breath. Cardiovascular: Negative for chest pain. Gastrointestinal: Positive for abdominal pain (left flank). Negative for nausea and vomiting. Except as noted above the remainder of the review of systems was reviewed and negative.        PAST MEDICAL HISTORY         Diagnosis Date    Bronchiolitis obliterans ([address]     Bundle branch block, right     Cardiomyopathy ([address]     Chest pain 9/24/2019    Fibromyalgia     GERD (gastroesophageal reflux disease)     Hepatitis 1979    unsure of which type    Hx of blood clots     Hyperlipemia     Hypertension     IBS (irritable bowel syndrome)     Kidney stone     over thirty kidney stones    Neuropathy     right side-chest    Pneumothorax 2011    Right    Prostatitis     Pulmonary embolism on right [facility]) 2011    upper lobe-coumadin 2011    Sacroiliitis [facility])        SURGICAL HISTORY           Procedure Laterality Date    CHOLECYSTECTOMY  2007    COLONOSCOPY  9/21/2012    dr Donald Isaacs  05/17/2019    RIGHT SIDED URETEROSCOPY  Diagnostic, retrograde pyelogram and fulguration of previously resected bladder tumor base    CYSTOSCOPY Right 5/17/2019    RIGHT SIDED URETEROSCOPY FLUGERATION  OF BLADDER performed by Patsy Downs MD at [address] [facility]  2015    LITHOTRIPSY     [facility] in sinuses    UPPER GASTROINTESTINAL ENDOSCOPY  3/28/2014    dr Mckayla Glasgow       Discharge Medication List as of 12/5/2020  1:23 PM      CONTINUE these medications which have NOT CHANGED    Details   metoprolol succinate (TOPROL XL) 25 MG extended release tablet TAKE 1 TABLET BY MOUTH TWO  TIMES DAILY, Disp-180 tablet,R-3Requesting 1 year supplyNormal      flecainide (TAMBOCOR) 50 MG tablet Take 1 tablet by mouth 2 times daily, Disp-60 tablet,R-3Normal      tamsulosin (FLOMAX) 0.4 MG capsule Take 1 capsule by mouth daily, Disp-90 capsule,R-3Normal      dexlansoprazole (DEXILANT) 30 MG CPDR delayed release capsule Take 30 mg by mouth dailyHistorical Med      !! PROAIR  (90 Base) MCG/ACT inhaler Inhale 2 puffs into the lungs every 4 hours as needed for Wheezing or Shortness of Breath, Disp-1 Inhaler,R-5,DAWNormal      !! ondansetron (ZOFRAN ODT) 4 MG disintegrating tablet Take 1-2 tablets by mouth every 12 hours as needed for Nausea May Sub regular tablet (non-ODT) if insurance does not cover ODT., Disp-12 tablet,R-0Print      pravastatin (PRAVACHOL) 40 MG tablet Take 1 tablet by mouth daily, Disp-30 tablet,R-0NO PRINT      pregabalin (LYRICA) 75 MG capsule TAKE 1 CAPSULE BY MOUTH IN  THE MORNING AND 2 CAPSULES  IN THE EVENING, Disp-270 capsule,R-0Normal      Bempedoic Acid (NEXLETOL) 180 MG TABS Take 180 mg by mouth daily, Disp-90 tablet,R-0Normal      zolpidem (AMBIEN) 10 MG tablet TAKE 1 TABLET BY MOUTH  EVERY NIGHT AT BEDTIME, Disp-90 tablet,R-0Normal      dicyclomine (BENTYL) 10 MG capsule TAKE ONE CAPSULE BY MOUTH  FOUR TIMES DAILY AS NEEDED, Disp-360 capsule,R-1Normal      Cholecalciferol (VITAMIN D3) 1.25 MG (23794 UT) CAPS Take 1 capsule by mouth once a week, Disp-12 capsule,R-3Normal      esomeprazole (NEXIUM) 40 MG delayed release capsule Take 40 mg by mouth every morning (before breakfast)Historical Med      sodium chloride, Inhalant, (NEBUSAL) 3 % nebulizer solution Take 4 mLs by nebulization every 4 hours as needed for Other or Cough (Cough), Nebulization, EVERY 4 HOURS PRN Starting Tue 9/1/2020, Disp-360 mL,R-5, Normal      !! albuterol sulfate  (90 Base) MCG/ACT inhaler INHALE 2 PUFFS INTO THE LUNGS EVERY 4 HOURS AS NEEDED FOR WHEEZING OR SHORTNESS OF BREATH, Disp-1 Inhaler,R-0Normal      sacubitril-valsartan (ENTRESTO) 24-26 MG per tablet TAKE 2 TABLETS BY MOUTH IN  THE MORNING AND 1 TABLET BY MOUTH IN THE EVENING, Disp-270 tablet, R-3Normal      flavoxate (URISPAS) 100 MG tablet TAKE 1 TABLET BY MOUTH THREE TIMES DAILY AS NEEDED FOR URINARY SPASM, Disp-270 tablet, R-0**Patient requests 90 days supply**Normal      albuterol (PROVENTIL) (2.5 MG/3ML) 0.083% nebulizer solution USE 3 ML VIA NEBULIZER EVERY 4 HOURS AS NEEDED FOR WHEEZING OR SHORTNESS OF BREATH, Disp-360 mL, R-3Normal      sildenafil (REVATIO) 20 MG tablet Take 20 mg by mouth as neededHistorical Med      guaiFENesin (MUCINEX) 600 MG extended release tablet Take 1 tablet by mouth 2 times daily, Disp-30 tablet, R-0Normal      warfarin (COUMADIN) 5 MG tablet Take 0.5 tablets by mouth He is not ill-appearing, toxic-appearing or diaphoretic. HENT:      Head: Normocephalic and atraumatic. Right Ear: Tympanic membrane, ear canal and external ear normal.      Left Ear: Tympanic membrane, ear canal and external ear normal.      Nose: Nose normal.      Mouth/Throat:      Mouth: Mucous membranes are moist.      Pharynx: Oropharynx is clear. No oropharyngeal exudate or posterior oropharyngeal erythema. Neck:      Musculoskeletal: Normal range of motion and neck supple. Cardiovascular:      Rate and Rhythm: Normal rate and regular rhythm. Heart sounds: Normal heart sounds. Pulmonary:      Effort: Pulmonary effort is normal. No respiratory distress. Breath sounds: Normal breath sounds. Abdominal:      General: There is no distension. Palpations: Abdomen is soft. There is no mass. Tenderness: There is abdominal tenderness (minimal TTP of the left flank area and LUQ with no rash). There is no guarding or rebound. Hernia: No hernia is present. Musculoskeletal: Normal range of motion. Skin:     General: Skin is warm. Neurological:      Mental Status: He is alert. Psychiatric:         Mood and Affect: Mood normal.         Behavior: Behavior normal.         Thought Content: Thought content normal.         Judgment: Judgment normal.         DIFFERENTIAL DIAGNOSIS   Appendicitis, Small Bowel Obstruction, Pancreatitis, Cholesystitis, Hepatitis, Aortic Dissection, DKA, Pylonephritis, Kidney Stone  COVID, PNa, other    DIAGNOSTICRESULTS     EKG: All EKG's are interpreted by PARADISE Steiner in the absence of a cardiologist.    EKG obtained. Please see Dr. Yonatan Nix note for interpretation.     RADIOLOGY:   Non-plain film images such as CT, Ultrasound and MRI are read by the radiologist. Plain radiographic images are visualized and preliminarily interpreted by PARADISE Steiner with the below findings:      Interpretation per the Radiologist below, if available at the time of this note:    XR ABDOMEN (KUB) (SINGLE AP VIEW)   Final Result   Probable small bilateral renal stones      No definite ureteral or bladder stone         XR CHEST PORTABLE   Final Result   No evidence of pneumonia               LABS:  Results for orders placed or performed during the hospital encounter of 12/05/20   Rapid influenza A/B antigens    Specimen: Nares   Result Value Ref Range    Rapid Influenza A Ag Negative Negative    Rapid Influenza B Ag Negative Negative   COVID-19   Result Value Ref Range    SARS-CoV-2, NAAT Not Detected Not Detected       All other labs were withinnormal range or not returned as of this dictation. EMERGENCY DEPARTMENT COURSE and DIFFERENTIAL DIAGNOSIS/MDM:   Vitals:    Vitals:    12/05/20 0955 12/05/20 1226   BP: (!) 165/95 (!) 151/90   Pulse: 75 63   Resp: 16 15   Temp: 97.8 °F (36.6 °C)    TempSrc: Oral    SpO2: 91% 95%   Weight: 213 lb 6.5 oz (96.8 kg)    Height: 5' 6\" (1.676 m)        Patient was nontoxic, well appearing, afebrile with normal vital signs with the exceptiong of /95. Saturating well on room air. COVID rapid was negative. Discussed the 30% false-negative rate of a rapid Covid and offered a PCR. Patient would like this which was ordered. Discussed he needs to quarantine until he gets the result of this which should come back in the next 1 to 2 days. CXR was negative. Flu was negative. Regarding the left flank pain, says it feels similar to prior episode of kidney stone. Does not want a CT as he has had numerous in his lifetime. Wants a KUB. KUB with probable small bilateral renal stones. No ureteral stone or bladder stone. Upon reeval, clinically appears well. Believe is appropriate for outpatient management. I have a low suspicion for acute intra-abdominal abnormality as this pain is also in the setting of numerous other complaints. Instructed to FU with PCP in next few days for reeval and return for worsening.

## 2020-12-05 NOTE — ED PROVIDER NOTES
I did not have face-to-face interaction with this patient. I did not discuss the case with the advanced practice provider. I was available for consultation on the patient if deemed necessary by advanced practice provider.   EKG  The Ekg interpreted by me shows  normal sinus rhythm with a rate of 60  Axis is   Left axis deviation  QTc is  normal  LBBB  ST Segments: normal, no Sgarbossa criteria  No significant change from prior EKG dated 9/23/2020           Agustín Austin MD  12/05/20 2966

## 2020-12-06 LAB
EKG ATRIAL RATE: 60 BPM
EKG DIAGNOSIS: NORMAL
EKG P AXIS: 62 DEGREES
EKG P-R INTERVAL: 166 MS
EKG Q-T INTERVAL: 448 MS
EKG QRS DURATION: 150 MS
EKG QTC CALCULATION (BAZETT): 448 MS
EKG R AXIS: -62 DEGREES
EKG T AXIS: 107 DEGREES
EKG VENTRICULAR RATE: 60 BPM

## 2020-12-06 PROCEDURE — 93010 ELECTROCARDIOGRAM REPORT: CPT | Performed by: INTERNAL MEDICINE

## 2020-12-10 ENCOUNTER — TELEPHONE (OUTPATIENT)
Dept: PHARMACY | Age: 60
End: 2020-12-10

## 2020-12-16 ENCOUNTER — ANTI-COAG VISIT (OUTPATIENT)
Dept: PHARMACY | Age: 60
End: 2020-12-16
Payer: COMMERCIAL

## 2020-12-16 VITALS — TEMPERATURE: 97.5 F

## 2020-12-16 LAB — INR BLD: 1.7

## 2020-12-16 PROCEDURE — 99211 OFF/OP EST MAY X REQ PHY/QHP: CPT

## 2020-12-16 PROCEDURE — 85610 PROTHROMBIN TIME: CPT

## 2020-12-16 NOTE — PROGRESS NOTES
Mr. Anant Sigala is a 63 y.o.  male. Mr. Anant Sigala had an INR test today. Results were reviewed and appropriate warfarin management was completed. THIS VISIT WAS COMPLETED AS:   []    A VIRTUAL VISIT VIA TELEPHONE IN EFFORTS TO REDUCE THE SPREAD OF COVID-19.  []    A DRIVE-THRU VISIT IN EFFORTS TO REDUCE THE SPREAD OF COVID-19. [x]    AN IN PERSON VISIT. PROTOCOLS WERE FOLLOWED WITH PRECAUTIONS TO REDUCE THE SPREAD OF COVID-19. Patient verifies current dosing regimen: Yes     Warfarin medication reviewed and updated on the patient 's home medication list: Yes   All other medications reviewed and updated on the patient 's home medication list: Yes. No changes     Lab Results   Component Value Date    INR 1.70 2020    INR 2.8 2020    INR 3.90 2020           Anticoagulation Summary  As of 2020    INR goal:  2.0-3.0   TTR:  32.8 % (5.2 y)   INR used for dosin.70 (2020)   Warfarin maintenance plan:  5 mg (5 mg x 1) every Mon, Wed, Fri; 2.5 mg (5 mg x 0.5) all other days   Weekly warfarin total:  25 mg   Plan last modified:  Sudha Catalan (2020)   Next INR check:  2021   Priority:  Maintenance   Target end date: Indefinite    Indications    Pulmonary embolus ([facility] [address] [I26.99]             Anticoagulation Episode Summary     INR check location:  Anticoagulation Clinic    Preferred lab:      Send INR reminders to:  [facility] STAFF    Comments:  EPIC - finger stick every 2-3 weeks        Anticoagulation Care Providers     Provider Role Specialty Phone number    Dexter Duncan MD Referring Internal Medicine [phone]          Warfarin plan:   Patient started a 10 day course of Levaquin on 20. His warfarin dose was decreased to 2.5 mg tablet daily due to the drug interaction with the Levaquin. HIs INR today is 1.7.  We will have patient restart his maintenance dose of warfarin 2.5 mg (half a tablet) daily except 5 mg (one tablet)on Monday, Wednesday and Friday. We will see the patient back in  3 weeks for the next INR check    Description    CONTINUE: Warfarin 2.5 mg daily EXCEPT 5mg every Monday, Wednesday and Friday    Call with signs or symptoms of bleeding or any concerns or questions. If significant bleeding seek immediate medical attention. Call with medications changes, especially antibiotics and steroids and including any over-the-counter medications or herbal products. Call if you stop any medications. Keep the number of servings of Vitamin K (dark green vegetables) consistent from week to week  Eats three good portion of broccoli or brussel sprouts per week. Immunization History   Administered Date(s) Administered    Influenza, Quadv, IM, PF (6 mo and older Fluzone, Flulaval, Fluarix, and 3 yrs and older Afluria) 10/27/2017, 09/23/2019, 10/02/2020    Influenza, Fred Half, Recombinant, IM PF (Flublok 18 yrs and older) 10/03/2018    Pneumococcal Conjugate 13-valent ([id number]) 09/11/2015    Pneumococcal Conjugate Vaccine 08/15/2017    Pneumococcal Polysaccharide ([id number]) 08/01/2007, 12/19/2012    Tdap (Boostrix, Adacel) 08/19/2014         Reviewed AVS with patient / caregiver.       CLINICAL PHARMACY CONSULT: MED RECONCILIATION/REVIEW ADDENDUM    For Pharmacy Admin Tracking Only    PHSO (orange banner): No  Total # of Interventions Recommended (warfarin changes): 1  (warfarin changes) - Increased Dose #: 1  (other medication updates)- Updated Order #: 0 Updated Order Reason(s):   (#1 for reviewing INR) - Maintenance Safety Lab Monitoring #: 1  Total Interventions Accepted (warfarin related): 1  Time Spent (min) (round up): 15

## 2020-12-21 ENCOUNTER — TELEPHONE (OUTPATIENT)
Dept: PHARMACY | Age: 60
End: 2020-12-21

## 2020-12-21 NOTE — TELEPHONE ENCOUNTER
Ward Mario Cid called. He started Tessalon perles and Dexilant was increased from 30mg daily to 60mg daily. Do not expect an interaction with warfarin . No change to warfarin therapy.      Rebekah Payne, PharmD, [facility]  Anticoagulation Service  [phone]

## 2020-12-24 ENCOUNTER — APPOINTMENT (OUTPATIENT)
Dept: GENERAL RADIOLOGY | Age: 60
End: 2020-12-24
Payer: COMMERCIAL

## 2020-12-24 ENCOUNTER — HOSPITAL ENCOUNTER (EMERGENCY)
Age: 60
Discharge: HOME | End: 2020-12-24
Attending: EMERGENCY MEDICINE
Payer: COMMERCIAL

## 2020-12-24 VITALS
RESPIRATION RATE: 17 BRPM | WEIGHT: 209.88 LBS | HEIGHT: 66 IN | TEMPERATURE: 98.4 F | BODY MASS INDEX: 33.73 KG/M2 | SYSTOLIC BLOOD PRESSURE: 129 MMHG | OXYGEN SATURATION: 97 % | HEART RATE: 60 BPM | DIASTOLIC BLOOD PRESSURE: 76 MMHG

## 2020-12-24 LAB
ANION GAP SERPL CALCULATED.3IONS-SCNC: 9 MMOL/L (ref 3–16)
BASOPHILS ABSOLUTE: 0 K/UL (ref 0–0.2)
BASOPHILS RELATIVE PERCENT: 0.4 %
BILIRUBIN URINE: NEGATIVE
BLOOD, URINE: ABNORMAL
BUN BLDV-MCNC: 8 MG/DL (ref 7–20)
CALCIUM SERPL-MCNC: 10.4 MG/DL (ref 8.3–10.6)
CHLORIDE BLD-SCNC: 106 MMOL/L (ref 99–110)
CLARITY: CLEAR
CO2: 24 MMOL/L (ref 21–32)
COLOR: YELLOW
CREAT SERPL-MCNC: 0.9 MG/DL (ref 0.8–1.3)
EOSINOPHILS ABSOLUTE: 0.3 K/UL (ref 0–0.6)
EOSINOPHILS RELATIVE PERCENT: 4.2 %
EPITHELIAL CELLS, UA: 0 /HPF (ref 0–5)
GFR AFRICAN AMERICAN: >60
GFR NON-AFRICAN AMERICAN: >60
GLUCOSE BLD-MCNC: 98 MG/DL (ref 70–99)
GLUCOSE URINE: NEGATIVE MG/DL
HCT VFR BLD CALC: 46.8 % (ref 40.5–52.5)
HEMOGLOBIN: 15.6 G/DL (ref 13.5–17.5)
HYALINE CASTS: 1 /LPF (ref 0–8)
INR BLD: 3.58 (ref 0.86–1.14)
KETONES, URINE: NEGATIVE MG/DL
LACTIC ACID: 1.2 MMOL/L (ref 0.4–2)
LEUKOCYTE ESTERASE, URINE: NEGATIVE
LYMPHOCYTES ABSOLUTE: 1.3 K/UL (ref 1–5.1)
LYMPHOCYTES RELATIVE PERCENT: 21.3 %
MCH RBC QN AUTO: 30.6 PG (ref 26–34)
MCHC RBC AUTO-ENTMCNC: 33.5 G/DL (ref 31–36)
MCV RBC AUTO: 91.4 FL (ref 80–100)
MICROSCOPIC EXAMINATION: YES
MONOCYTES ABSOLUTE: 0.6 K/UL (ref 0–1.3)
MONOCYTES RELATIVE PERCENT: 9.1 %
NEUTROPHILS ABSOLUTE: 4 K/UL (ref 1.7–7.7)
NEUTROPHILS RELATIVE PERCENT: 65 %
NITRITE, URINE: NEGATIVE
PDW BLD-RTO: 12.9 % (ref 12.4–15.4)
PH UA: 6.5 (ref 5–8)
PLATELET # BLD: 162 K/UL (ref 135–450)
PMV BLD AUTO: 7.1 FL (ref 5–10.5)
POTASSIUM REFLEX MAGNESIUM: 3.6 MMOL/L (ref 3.5–5.1)
PROTEIN UA: NEGATIVE MG/DL
PROTHROMBIN TIME: 42.1 SEC (ref 10–13.2)
RBC # BLD: 5.12 M/UL (ref 4.2–5.9)
RBC UA: 7 /HPF (ref 0–4)
SARS-COV-2, NAAT: NOT DETECTED
SODIUM BLD-SCNC: 139 MMOL/L (ref 136–145)
SPECIFIC GRAVITY UA: 1.01 (ref 1–1.03)
URINE REFLEX TO CULTURE: ABNORMAL
URINE TYPE: ABNORMAL
UROBILINOGEN, URINE: 0.2 E.U./DL
WBC # BLD: 6.2 K/UL (ref 4–11)
WBC UA: 3 /HPF (ref 0–5)

## 2020-12-24 PROCEDURE — 71045 X-RAY EXAM CHEST 1 VIEW: CPT

## 2020-12-24 PROCEDURE — 87040 BLOOD CULTURE FOR BACTERIA: CPT

## 2020-12-24 PROCEDURE — 99283 EMERGENCY DEPT VISIT LOW MDM: CPT

## 2020-12-24 PROCEDURE — U0002 COVID-19 LAB TEST NON-CDC: HCPCS

## 2020-12-24 PROCEDURE — 83605 ASSAY OF LACTIC ACID: CPT

## 2020-12-24 PROCEDURE — 85610 PROTHROMBIN TIME: CPT

## 2020-12-24 PROCEDURE — 81001 URINALYSIS AUTO W/SCOPE: CPT

## 2020-12-24 PROCEDURE — 80048 BASIC METABOLIC PNL TOTAL CA: CPT

## 2020-12-24 PROCEDURE — 85025 COMPLETE CBC W/AUTO DIFF WBC: CPT

## 2020-12-24 ASSESSMENT — ENCOUNTER SYMPTOMS
COUGH: 1
ABDOMINAL PAIN: 0
SHORTNESS OF BREATH: 0
SORE THROAT: 0
RHINORRHEA: 0
EYE REDNESS: 0

## 2020-12-24 NOTE — ED PROVIDER NOTES
[facility]  eMERGENCY dEPARTMENT eNCOUnter      Pt Name: Scot Nj  MRN: [id number]  [place] 1960  Date of evaluation: 12/24/2020  Provider: Az Lopez MD    [address] [place] [address]       Chief Complaint   Patient presents with    Chills     pt states chills last 2 days, just finished levaquin for proctitis. pt states urinating more frequently. pt has hx esophagitis, son stated his lower chin appeared swollen          HISTORY OF PRESENT ILLNESS   (Location/Symptom, Timing/Onset,Context/Setting, Quality, Duration, Modifying Factors, Severity)  Note limiting factors. Scot Nj is a 63 y.o. male who presents to the emergency department complaining of some cough, congestion and chills. Patient reports that he was recently diagnosed with proctitis and placed on Levaquin which he completed. He reports of for the last 2 days he has had chills and some mild fatigue. He reports a chronic cough for greater than 6 months. He also states his family was concerned about his lower chin being slightly swollen. He denies any objective fevers, body aches, shortness of breath, chest pain, dysuria, frequency. Please note while he declined frequency to me he did tell the nurse that he was having urine frequency. HPI    NursingNotes were reviewed. REVIEW OF SYSTEMS    (2-9 systems for level 4, 10 or more for level 5)     Review of Systems   Constitutional: Positive for chills. Negative for fever. HENT: Negative for rhinorrhea and sore throat. Eyes: Negative for redness. Respiratory: Positive for cough. Negative for shortness of breath. Cardiovascular: Negative for chest pain. Gastrointestinal: Negative for abdominal pain. Genitourinary: Positive for frequency. Negative for flank pain. Musculoskeletal: Negative for myalgias. Skin: Negative for rash. Neurological: Negative for headaches. Hematological: Negative for adenopathy. Psychiatric/Behavioral: Negative for confusion. Except as noted above the remainder of the review of systems was reviewed and negative.        PAST MEDICAL HISTORY     Past Medical History:   Diagnosis Date    Bronchiolitis obliterans ([address]     Bundle branch block, right     Cardiomyopathy ([address]     Chest pain 9/24/2019    Fibromyalgia     GERD (gastroesophageal reflux disease)     Hepatitis 1979    unsure of which type    Hx of blood clots     Hyperlipemia     Hypertension     IBS (irritable bowel syndrome)     Kidney stone     over thirty kidney stones    Neuropathy     right side-chest    Pneumothorax 2011    Right    Prostatitis     Pulmonary embolism on right [facility]) 2011    upper lobe-coumadin 2011    Sacroiliitis ([facility] [address]          SURGICALHISTORY       Past Surgical History:   Procedure Laterality Date    CHOLECYSTECTOMY  2007    COLONOSCOPY  9/21/2012    dr Shmuel Pereira  05/17/2019    RIGHT SIDED URETEROSCOPY  Diagnostic, retrograde pyelogram and fulguration of previously resected bladder tumor base    CYSTOSCOPY Right 5/17/2019    RIGHT SIDED URETEROSCOPY FLUGERATION  OF BLADDER performed by Myra Claude, MD at [address]  2015    LITHOTRIPSY      PACEMAKER PLACEMENT      SINUS SURGERY      Made opening larger in sinuses    UPPER GASTROINTESTINAL ENDOSCOPY  3/28/2014    dr Sophy Vera       Previous Medications    ALBUTEROL (PROVENTIL) (2.5 MG/3ML) 0.083% NEBULIZER SOLUTION    USE 3 ML VIA NEBULIZER EVERY 4 HOURS AS NEEDED FOR WHEEZING OR SHORTNESS OF BREATH    ALBUTEROL SULFATE  (90 BASE) MCG/ACT INHALER    INHALE 2 PUFFS INTO THE LUNGS EVERY 4 HOURS AS NEEDED FOR WHEEZING OR SHORTNESS OF BREATH    ASPIRIN 81 MG TABLET    Take 81 mg by mouth daily    AZELASTINE (ASTELIN) 0.1 % NASAL SPRAY    2 sprays by Nasal route 2 times daily Use in each nostril as directed    BEMPEDOIC ACID (NEXLETOL) 180 MG TABS Take 180 mg by mouth daily    BENZONATATE (TESSALON) 200 MG CAPSULE    Take 1 capsule by mouth 3 times daily as needed for Cough    CHOLECALCIFEROL (VITAMIN D3) 1.25 MG (40824 UT) CAPS    Take 1 capsule by mouth once a week    DEXLANSOPRAZOLE (DEXILANT) 30 MG CPDR DELAYED RELEASE CAPSULE    Take 60 mg by mouth daily     DICYCLOMINE (BENTYL) 10 MG CAPSULE    TAKE ONE CAPSULE BY MOUTH  FOUR TIMES DAILY AS NEEDED    ESOMEPRAZOLE (NEXIUM) 40 MG DELAYED RELEASE CAPSULE    Take 40 mg by mouth every morning (before breakfast)    FLAVOXATE (URISPAS) 100 MG TABLET    TAKE 1 TABLET BY MOUTH THREE TIMES DAILY AS NEEDED FOR URINARY SPASM    FLECAINIDE (TAMBOCOR) 50 MG TABLET    Take 1 tablet by mouth 2 times daily    FUROSEMIDE (LASIX) 20 MG TABLET    Take 1 tablet by mouth daily as needed (take 20 mg tablet daily as needed for increased weight, swelling or shortness of breath)    GUAIFENESIN (MUCINEX) 600 MG EXTENDED RELEASE TABLET    Take 1 tablet by mouth 2 times daily    METOPROLOL SUCCINATE (TOPROL XL) 25 MG EXTENDED RELEASE TABLET    TAKE 1 TABLET BY MOUTH TWO  TIMES DAILY    ONDANSETRON (ZOFRAN ODT) 4 MG DISINTEGRATING TABLET    Take 1-2 tablets by mouth every 8 hours as needed for Nausea May Sub regular tablet (non-ODT) if insurance does not cover ODT. ONDANSETRON (ZOFRAN ODT) 4 MG DISINTEGRATING TABLET    Take 1-2 tablets by mouth every 12 hours as needed for Nausea May Sub regular tablet (non-ODT) if insurance does not cover ODT.     POLYETHYLENE GLYCOL (MIRALAX) PACK PACKET    Take 17 g by mouth daily    PRAVASTATIN (PRAVACHOL) 40 MG TABLET    Take 1 tablet by mouth daily    PREGABALIN (LYRICA) 75 MG CAPSULE    TAKE 1 CAPSULE BY MOUTH IN  THE MORNING AND 2 CAPSULES  IN THE EVENING    PROAIR  (90 BASE) MCG/ACT INHALER    Inhale 2 puffs into the lungs every 4 hours as needed for Wheezing or Shortness of Breath    SACUBITRIL-VALSARTAN (ENTRESTO) 24-26 MG PER TABLET    TAKE 2 TABLETS BY MOUTH IN  THE MORNING AND 1 TABLET BY MOUTH IN THE EVENING    SILDENAFIL (REVATIO) 20 MG TABLET    Take 20 mg by mouth as needed    SODIUM CHLORIDE, INHALANT, (NEBUSAL) 3 % NEBULIZER SOLUTION    Take 4 mLs by nebulization every 4 hours as needed for Other or Cough (Cough)    TAMSULOSIN (FLOMAX) 0.4 MG CAPSULE    Take 1 capsule by mouth daily    WARFARIN (COUMADIN) 5 MG TABLET    Take 0.5 tablets by mouth daily ECEPT 5mg every Monday, Wednesday and Friday or as directed by [facility] Coumadin [facility] [phone]       ALLERGIES     Ipratropium bromide hfa, Atrovent nasal spray [ipratropium], Doxycycline, Morphine, Nitroglycerin, Sulfa antibiotics, and Vicodin [hydrocodone-acetaminophen]    FAMILY HISTORY       Family History   Problem Relation Age of Onset    High Blood Pressure Mother     Dementia Mother     Cancer Father         Pancreatic Ca          SOCIAL HISTORY       Social History     Socioeconomic History    Marital status:      Spouse name: None    Number of children: None    Years of education: None    Highest education level: None   Occupational History    None   Social Needs    Financial resource strain: Not hard at all   ZolkC insecurity     Worry: Never true     Inability: Never true    Transportation needs     Medical: No     Non-medical: No   Tobacco Use    Smoking status: Never Smoker    Smokeless tobacco: Never Used   Substance and Sexual Activity    Alcohol use: No     Comment: occ    Drug use: No    Sexual activity: Yes     Partners: Female     Comment: wife   Lifestyle    Physical activity     Days per week: None     Minutes per session: None    Stress: None   Relationships    Social connections     Talks on phone: None     Gets together: None     Attends Orthodox service: None     Active member of club or organization: None     Attends meetings of clubs or organizations: None     Relationship status: None    Intimate partner violence     Fear of current or ex partner: None     Emotionally abused: None     Physically abused: None     Forced sexual activity: None   Other Topics Concern    None   Social History Narrative    None       SCREENINGS             PHYSICAL EXAM    (up to 7 for level 4, 8 or more for level 5)     ED Triage Vitals [12/24/20 0931]   BP Temp Temp Source Pulse Resp SpO2 Height Weight   (!) 150/88 98.4 °F (36.9 °C) Oral 76 16 97 % 5' 6\" (1.676 m) 209 lb 14.1 oz (95.2 kg)       Physical Exam  Vitals signs and nursing note reviewed. Constitutional:       Appearance: He is well-developed. He is not diaphoretic. HENT:      Head: Normocephalic and atraumatic. Nose: Nose normal.      Comments: No submandibular swelling. No swelling to the floor the mouth. Normal voice. No stridor. No drooling. No trismus. Mouth/Throat:      Mouth: Mucous membranes are moist.   Eyes:      General:         Right eye: No discharge. Left eye: No discharge. Conjunctiva/sclera: Conjunctivae normal.   Neck:      Musculoskeletal: Neck supple. Cardiovascular:      Rate and Rhythm: Normal rate. Pulses: Normal pulses. Heart sounds: No murmur. Pulmonary:      Effort: Pulmonary effort is normal. No respiratory distress. Breath sounds: Normal breath sounds. No wheezing, rhonchi or rales. Abdominal:      General: There is no distension. Tenderness: There is no abdominal tenderness. There is no guarding or rebound. Musculoskeletal: Normal range of motion. Comments: No hematoma appreciated. Calfs are equal in size. Compartments are soft. Dorsalis pedis and posterior tibial pulses are normal.   Skin:     General: Skin is warm and dry. Capillary Refill: Capillary refill takes less than 2 seconds. Neurological:      Mental Status: He is alert and oriented to person, place, and time.    Psychiatric:         Behavior: Behavior normal.         DIAGNOSTIC RESULTS     EKG: All EKG's are interpreted by the Emergency Department Physician who either signs or Co-signsthis chart in the absence of a cardiologist.        RADIOLOGY:   Tono Shoulder such as CT, Ultrasound and MRI are read by the radiologist. Plain radiographic images are visualized and preliminarily interpreted by the emergency physician with the below findings:        Interpretation per the Radiologist below, if available at the time ofthis note:    XR CHEST PORTABLE   Final Result   No evidence of pneumonia               ED BEDSIDE ULTRASOUND:   Performed by ED Physician - none    LABS:  Labs Reviewed   URINE RT REFLEX TO CULTURE - Abnormal; Notable for the following components:       Result Value    Blood, Urine LARGE (*)     All other components within normal limits    Narrative:     Performed at:  [address] [employer]   Phone [phone]   PROTIME-INR - Abnormal; Notable for the following components:    Protime 42.1 (*)     INR 3.58 (*)     All other components within normal limits    Narrative:     Performed at:  [address] [employer]   Phone [phone]   MICROSCOPIC URINALYSIS - Abnormal; Notable for the following components:    RBC, UA 7 (*)     All other components within normal limits    Narrative:     Performed at:  [address] [employer]   Phone [phone]   CULTURE, BLOOD 2   CULTURE, BLOOD 1   CBC WITH AUTO DIFFERENTIAL    Narrative:     Performed at:  [address] [employer]   Phone [phone]   BASIC METABOLIC PANEL W/ REFLEX TO MG FOR LOW K    Narrative:     Performed at:  [address] [employer]   Phone [phone]   LACTIC ACID, PLASMA    Narrative:     Performed at:  [facility]  [address] [place] [address] [place] [address]   Phone [phone]    Narrative:     Performed at:  [facility]  [address]   Phone [phone]       All other labs were within normal range or not returned as of this dictation. EMERGENCY DEPARTMENT COURSE and DIFFERENTIAL DIAGNOSIS/MDM:   Vitals:    Vitals:    12/24/20 0931 12/24/20 1132 12/24/20 1142   BP: (!) 150/88 (!) 146/83 (!) 146/87   Pulse: 76 61 67   Resp: 16 18 17   Temp: 98.4 °F (36.9 °C)     TempSrc: Oral     SpO2: 97% 97% 97%   Weight: 209 lb 14.1 oz (95.2 kg)     Height: 5' 6\" (1.676 m)             MDM  Number of Diagnoses or Management Options  Chills  Diagnosis management comments: DDX: VIral illness, COVID, bronchitis, pneumonia, UTI, other    The patient is clinically well-appearing. He is not hypotensive, not febrile, not tachycardic. His work-up in the emergency department was unremarkable. To his initial evaluation he complained of some mild discomfort in his right calf and was concerned about the possibility of DVT. Given that he is mildly supratherapeutic on his Coumadin I do not think that that is the case. He was advised of his supratherapeutic INR. I also spoke to Dr. Jimenez Parra about the patient and reviewed his history with him. We both feel the patient is safe to go home. Blood cultures are pending. CRITICAL CARE TIME   Total Critical Care time was 0 minutes, excluding separately reportable procedures. There was a high probability of clinically significant/life threatening deterioration in the patient's condition which required my urgent intervention. CONSULTS:  IP CONSULT TO PRIMARY CARE PROVIDER    PROCEDURES:  Unless otherwise noted below, none     Procedures    FINAL IMPRESSION      1.  Chills          DISPOSITION/PLAN   DISPOSITION Decision To Discharge 12/24/2020 12:33:18 PM      PATIENT REFERRED TO:  Susan Pina MD  [address] [place] [address] [place] [address]  [phone]    Schedule an appointment as soon as possible for a visit in 1 week        DISCHARGE MEDICATIONS:  New Prescriptions    No medications on file          (Please note that portions of this note were completed with a voice recognition program.Efforts were made to edit the dictations but occasionally words are mis-transcribed.)    Zach Arellano MD (electronically signed)  Attending Emergency Physician          Zach Arellano MD  12/24/20 3418

## 2020-12-28 ENCOUNTER — TELEPHONE (OUTPATIENT)
Dept: PHARMACY | Age: 60
End: 2020-12-28

## 2020-12-28 LAB
BLOOD CULTURE, ROUTINE: NORMAL
CULTURE, BLOOD 2: NORMAL

## 2020-12-28 RX ORDER — PRAVASTATIN SODIUM 40 MG
40 TABLET ORAL DAILY
Qty: 30 TABLET | Refills: 0 | Status: SHIPPED | OUTPATIENT
Start: 2020-12-28 | End: 2021-03-03 | Stop reason: SDUPTHER

## 2020-12-28 NOTE — TELEPHONE ENCOUNTER
MrWard Mansi Solalesia called to make sure his appt was canceled for 12/22/20. He received a reminder. We have his appt correct for 1/6/21.     Roena Gosselin, PharmD, Pending sale to [facility]  Anticoagulation Service  [phone]

## 2021-01-06 ENCOUNTER — ANTI-COAG VISIT (OUTPATIENT)
Dept: PHARMACY | Age: 61
End: 2021-01-06
Payer: COMMERCIAL

## 2021-01-06 VITALS — TEMPERATURE: 98.1 F

## 2021-01-06 DIAGNOSIS — I26.99 PULMONARY EMBOLISM, UNSPECIFIED CHRONICITY, UNSPECIFIED PULMONARY EMBOLISM TYPE, UNSPECIFIED WHETHER ACUTE COR PULMONALE PRESENT (HCC): ICD-10-CM

## 2021-01-06 DIAGNOSIS — Z79.01 CHRONIC ANTICOAGULATION: ICD-10-CM

## 2021-01-06 LAB — INTERNATIONAL NORMALIZATION RATIO, POC: 2.5

## 2021-01-06 PROCEDURE — 99211 OFF/OP EST MAY X REQ PHY/QHP: CPT

## 2021-01-06 PROCEDURE — 85610 PROTHROMBIN TIME: CPT

## 2021-01-06 NOTE — PROGRESS NOTES
Mr. Jamie Vergara is a 63 y.o.  male. Mr. Jamie Vergara had an INR test today. Results were reviewed and appropriate warfarin management was completed. THIS VISIT WAS COMPLETED AS:   []    A VIRTUAL VISIT VIA TELEPHONE IN EFFORTS TO REDUCE THE SPREAD OF COVID-19.  []    A DRIVE-THRU VISIT IN EFFORTS TO REDUCE THE SPREAD OF COVID-19. [x]    AN IN PERSON VISIT. PROTOCOLS WERE FOLLOWED WITH PRECAUTIONS TO REDUCE THE SPREAD OF COVID-19. Patient verifies current dosing regimen: Yes     Warfarin medication reviewed and updated on the patient 's home medication list: Yes   All other medications reviewed and updated on the patient 's home medication list: No: no changes     Lab Results   Component Value Date    INR 2.5 2021    INR 3.58 (H) 2020    INR 1.70 2020           Anticoagulation Summary  As of 2021    INR goal:  2.0-3.0   TTR:  33.0 % (5.3 y)   INR used for dosin.5 (2021)   Warfarin maintenance plan:  5 mg (5 mg x 1) every Mon, Wed, Fri; 2.5 mg (5 mg x 0.5) all other days   Weekly warfarin total:  25 mg   Plan last modified:  Sudha Catalan (2020)   Next INR check:  2021   Priority:  Maintenance   Target end date: Indefinite    Indications    Pulmonary embolus ([facility] [address] [I26.99]             Anticoagulation Episode Summary     INR check location:  Anticoagulation Clinic    Preferred lab:      Send INR reminders to:  [facility] STAFF    Comments:  EPIC - finger stick every 2-3 weeks        Anticoagulation Care Providers     Provider Role Specialty Phone number    Sneha Taylor MD Referring Internal Medicine [phone]          Warfarin plan:   Looks and feels well today. No changes in medications or diet. No bruising or bleeding noted. No change in dose for INR of  2.5. Will see in 3 weeks for next INR.       Description    CONTINUE: Warfarin 2.5 mg daily EXCEPT 5mg every Monday, Wednesday and Friday    Call with signs or symptoms of bleeding or any concerns or questions. If significant bleeding seek immediate medical attention. Call with medications changes, especially antibiotics and steroids and including any over-the-counter medications or herbal products. Call if you stop any medications. Keep the number of servings of Vitamin K (dark green vegetables) consistent from week to week  Eats three good portion of broccoli or brussel sprouts per week. Immunization History   Administered Date(s) Administered    Influenza, Quadv, IM, PF (6 mo and older Fluzone, Flulaval, Fluarix, and 3 yrs and older Afluria) 10/27/2017, 09/23/2019, 10/02/2020    Influenza, Judyann Fanning, Recombinant, IM PF (Flublok 18 yrs and older) 10/03/2018    Pneumococcal Conjugate 13-valent ([id number]) 09/11/2015    Pneumococcal Conjugate Vaccine 08/15/2017    Pneumococcal Polysaccharide ([id number]) 08/01/2007, 12/19/2012    Tdap (Boostrix, Adacel) 08/19/2014       Reviewed AVS with patient / caregiver.       CLINICAL PHARMACY CONSULT: MED RECONCILIATION/REVIEW ADDENDUM    For Pharmacy Admin Tracking Only    PHSO (orange banner): No  Total # of Interventions Recommended (warfarin changes): 0  (warfarin changes)   (other medication updates)- Updated Order #: 0 Updated Order Reason(s):   (#1 for reviewing INR) - Maintenance Safety Lab Monitoring #: 1  Total Interventions Accepted (warfarin related): 0  Time Spent (min) (round up): 15

## 2021-01-11 PROBLEM — R68.83 CHILLS: Status: ACTIVE | Noted: 2021-01-11

## 2021-01-20 RX ORDER — DICYCLOMINE HYDROCHLORIDE 10 MG/1
CAPSULE ORAL
Qty: 360 CAPSULE | Refills: 1 | Status: CANCELLED | OUTPATIENT
Start: 2021-01-20

## 2021-01-22 ENCOUNTER — ANTI-COAG VISIT (OUTPATIENT)
Dept: PHARMACY | Age: 61
End: 2021-01-22
Payer: COMMERCIAL

## 2021-01-22 VITALS — TEMPERATURE: 96.5 F

## 2021-01-22 DIAGNOSIS — I26.99 PULMONARY EMBOLISM, UNSPECIFIED CHRONICITY, UNSPECIFIED PULMONARY EMBOLISM TYPE, UNSPECIFIED WHETHER ACUTE COR PULMONALE PRESENT (HCC): ICD-10-CM

## 2021-01-22 DIAGNOSIS — Z79.01 CHRONIC ANTICOAGULATION: ICD-10-CM

## 2021-01-22 LAB — INR BLD: 2.5

## 2021-01-22 PROCEDURE — 85610 PROTHROMBIN TIME: CPT

## 2021-01-22 PROCEDURE — 99211 OFF/OP EST MAY X REQ PHY/QHP: CPT

## 2021-01-22 NOTE — PROGRESS NOTES
Mr. Rhona Sims is a 63 y.o.  male. Mr. Rhona Sims had an INR test today. Results were reviewed and appropriate warfarin management was completed. THIS VISIT WAS COMPLETED AS:   []    A VIRTUAL VISIT VIA TELEPHONE IN EFFORTS TO REDUCE THE SPREAD OF COVID-19.  []    A DRIVE-THRU VISIT IN EFFORTS TO REDUCE THE SPREAD OF COVID-19. [x]    AN IN PERSON VISIT. PROTOCOLS WERE FOLLOWED WITH PRECAUTIONS TO REDUCE THE SPREAD OF COVID-19. Patient verifies current dosing regimen: Yes     Warfarin medication reviewed and updated on the patient 's home medication list: Yes   All other medications reviewed and updated on the patient 's home medication list: started on 7-day amoxicillin course today     Lab Results   Component Value Date    INR 2.50 2021    INR 2.5 2021    INR 3.58 (H) 2020       Patient Findings     Positives:  Change in medications    Negatives:  Signs/symptoms of bleeding, Missed doses, Change in diet/appetite, Bruising    Comments:  Started on amoxicillin x7 days today - low interaction with warfarin. Anticoagulation Summary  As of 2021    INR goal:  2.0-3.0   TTR:  33.5 % (5.3 y)   INR used for dosin.50 (2021)   Warfarin maintenance plan:  5 mg (5 mg x 1) every Mon, Wed, Fri; 2.5 mg (5 mg x 0.5) all other days   Weekly warfarin total:  25 mg   Plan last modified:  Sudha Catalan (2020)   Next INR check:  2021   Priority:  Maintenance   Target end date:   Indefinite    Indications    Pulmonary embolus (HCC) [I26.99]  Chronic anticoagulation [Z79.01]             Anticoagulation Episode Summary     INR check location:  Anticoagulation Clinic    Preferred lab:      Send INR reminders to:  [facility] STAFF    Comments:  EPIC - finger stick every 2-3 weeks        Anticoagulation Care Providers     Provider Role Specialty Phone number    Magdalene Vargas MD Referring Internal Medicine [phone]          Warfarin plan:   INR within goal range today, will continue on same dose and return in 2 weeks. Do not expect the amoxicillin to have enough of an effect on his INR to warrant a change at this time. Description    CONTINUE: Warfarin 2.5 mg daily EXCEPT 5mg every Monday, Wednesday and Friday    Call with signs or symptoms of bleeding or any concerns or questions. If significant bleeding seek immediate medical attention. Call with medications changes, especially antibiotics and steroids and including any over-the-counter medications or herbal products. Call if you stop any medications. Keep the number of servings of Vitamin K (dark green vegetables) consistent from week to week  Eats three good portion of broccoli or brussel sprouts per week. Immunization History   Administered Date(s) Administered    Influenza, Quadv, IM, PF (6 mo and older Fluzone, Flulaval, Fluarix, and 3 yrs and older Afluria) 10/27/2017, 09/23/2019, 10/02/2020    Influenza, Viveca Kins, Recombinant, IM PF (Flublok 18 yrs and older) 10/03/2018    Pneumococcal Conjugate 13-valent ([id number]) 09/11/2015    Pneumococcal Conjugate Vaccine 08/15/2017    Pneumococcal Polysaccharide ([id number]) 08/01/2007, 12/19/2012    Tdap (Boostrix, Adacel) 08/19/2014          Reviewed AVS with patient / caregiver.       CLINICAL PHARMACY CONSULT: MED RECONCILIATION/REVIEW ADDENDUM    For Pharmacy Admin Tracking Only    PHSO (orange banner): No  Total # of Interventions Recommended (warfarin changes): 0  (warfarin changes)   (other medication updates)- Updated Order #: 0 Updated Order Reason(s):   (#1 for reviewing INR) - Maintenance Safety Lab Monitoring #: 1  Total Interventions Accepted (warfarin related): 0  Time Spent (min) (round up): 15

## 2021-01-27 ENCOUNTER — OFFICE VISIT (OUTPATIENT)
Dept: PRIMARY CARE CLINIC | Age: 61
End: 2021-01-27
Payer: COMMERCIAL

## 2021-01-27 DIAGNOSIS — Z20.822 SUSPECTED COVID-19 VIRUS INFECTION: Primary | ICD-10-CM

## 2021-01-27 PROBLEM — M79.89 SWELLING OF CALF: Status: ACTIVE | Noted: 2021-01-27

## 2021-01-27 PROCEDURE — G8428 CUR MEDS NOT DOCUMENT: HCPCS | Performed by: NURSE PRACTITIONER

## 2021-01-27 PROCEDURE — 99211 OFF/OP EST MAY X REQ PHY/QHP: CPT | Performed by: NURSE PRACTITIONER

## 2021-01-27 PROCEDURE — G8417 CALC BMI ABV UP PARAM F/U: HCPCS | Performed by: NURSE PRACTITIONER

## 2021-01-27 RX ORDER — OXYCODONE HYDROCHLORIDE AND ACETAMINOPHEN 5; 325 MG/1; MG/1
1 TABLET ORAL EVERY 4 HOURS PRN
COMMUNITY
End: 2021-03-05 | Stop reason: SDUPTHER

## 2021-01-27 SDOH — HEALTH STABILITY: MENTAL HEALTH: HOW OFTEN DO YOU HAVE A DRINK CONTAINING ALCOHOL?: NEVER

## 2021-01-27 NOTE — PROGRESS NOTES
Sai Godoy received a viral test for COVID-19. They were educated on isolation and quarantine as appropriate. For any symptoms, they were directed to seek care from their PCP, given contact information to establish with a doctor, directed to an urgent care or the emergency room.

## 2021-01-27 NOTE — PROGRESS NOTES
[facility] time_____1230_______        Surgery time____________    Take the following medications with a sip of water: Follow your Doctor's pre procedure instructions regarding medications    Do not eat or drink anything after 12:00 midnight prior to your surgery. This includes water chewing gum, mints and ice chips. You may brush your teeth and gargle the morning of your surgery, but do not swallow the water     Please see your family doctor/pediatrician for a history and physical and/or concerning medications. Bring any test results/reports from your physicians office. If you are under the care of a heart doctor or specialist doctor, please be aware that you may be asked to them for clearance    You may be asked to stop blood thinners such as Coumadin, Plavix, Fragmin, Lovenox, etc., or any anti-inflammatories such as:  Aspirin, Ibuprofen, Advil, Naproxen prior to your surgery. We also ask that you stop any OTC medications such as fish oil, vitamin E, glucosamine, garlic, Multivitamins, COQ 10, etc.    We ask that you do not smoke 24 hours prior to surgery  We ask that you do not  drink any alcoholic beverages 24 hours prior to surgery     You must make arrangements for a responsible adult to take you home after your surgery. For your safety you will not be allowed to leave alone or drive yourself home. Your surgery will be cancelled if you do not have a ride home. Also for your safety, it is strongly suggested that someone stay with you the first 24 hours after your surgery. A parent or legal guardian must accompany a child scheduled for surgery and plan to stay at the hospital until the child is discharged. Please do not bring other children with you. For your comfort, please wear simple loose fitting clothing to the hospital.  Please do not bring valuables.     Do not wear any make-up or nail polish on your fingers or toes      For your safety, please do not wear any jewelry or body piercing's on the day of surgery. All jewelry must be removed. If you have dentures, they will be removed before going to operating room. For your convenience, we will provide you with a container. If you wear contact lenses or glasses, they will be removed, please bring a case for them. If you have a living will and a durable power of  for healthcare, please bring in a copy. As part of our patient safety program to minimize surgical site infections, we ask you to do the following:    · Please notify your surgeon if you develop any illness between         now and the  day of your surgery. · This includes a cough, cold, fever, sore throat, nausea,         or vomiting, and diarrhea, etc.  ·  Please notify your surgeon if you experience dizziness, shortness         of breath or blurred vision between now and the time of your surgery. Do not shave your operative site 96 hours prior to surgery. For face and neck surgery, men may use an electric razor 48 hours   prior to surgery. You may shower the night before surgery or the morning of   your surgery with an antibacterial soap. You will need to bring a photo ID and insurance card    [facility] has an onsite pharmacy, would you like to utilize our pharmacy     If you will be staying overnight and use a C-pap machine, please bring   your C-pap to hospital     Our goal is to provide you with excellent care, therefore, visitors will be limited to two(2) in the room at a time so that we may focus on providing this care for you. Please contact pre-admission testing if you have any further questions. [facility] phone number:  [phone] PAT fax number:  [phone]  Please note these are generalized instructions for all surgical cases, you may be provided with more specific instructions according to your surgery.   Preoperative Screening for Elective Surgery/Invasive Procedures While COVID-19 present in the community     Have you tested positive or have been told to self-isolate for COVID-19 like symptoms within the past 28 days? no   Do you currently have any of the following symptoms?no  o Fever >100.0 F or 99.9 F in immunocompromised patients? o New onset cough, shortness of breath or difficulty breathing?  o New onset sore throat, myalgia (muscle aches and pains), headache, loss of taste/smell or diarrhea?  Have you had a potential exposure to COVID-19 within the past 14 days by:  o Close contact with a confirmed case?no  o Close contact with a healthcare worker,  or essential infrastructure worker (grocery store, [employer], gas station, public utilities or transportation)?no  o Do you reside in a congregate setting such as; skilled nursing facility, adult home, correctional facility, homeless shelter or other institutional setting?no  o Have you had recent travel to a known COVID-19 hotspot? no    Indicate if the patient has a positive screen by answering yes to one or more of the above questions. Patients who test positive or screen positive prior to surgery or on the day of surgery should be evaluated in conjunction with the surgeon/proceduralist/anesthesiologist to determine the urgency of the procedure. Remember. [place] Safety First! Call before you Fall

## 2021-01-28 ENCOUNTER — ANESTHESIA EVENT (OUTPATIENT)
Dept: ENDOSCOPY | Age: 61
End: 2021-01-28
Payer: COMMERCIAL

## 2021-01-28 LAB — SARS-COV-2, NAA: NOT DETECTED

## 2021-01-29 ENCOUNTER — HOSPITAL ENCOUNTER (OUTPATIENT)
Dept: VASCULAR LAB | Age: 61
Discharge: HOME | End: 2021-01-29
Payer: COMMERCIAL

## 2021-01-29 DIAGNOSIS — M79.89 SWELLING OF LIMB: ICD-10-CM

## 2021-01-29 PROCEDURE — 93971 EXTREMITY STUDY: CPT

## 2021-02-01 ENCOUNTER — ANESTHESIA (OUTPATIENT)
Dept: ENDOSCOPY | Age: 61
End: 2021-02-01
Payer: COMMERCIAL

## 2021-02-01 ENCOUNTER — HOSPITAL ENCOUNTER (OUTPATIENT)
Age: 61
Setting detail: OUTPATIENT SURGERY
Discharge: HOME | End: 2021-02-01
Attending: INTERNAL MEDICINE | Admitting: INTERNAL MEDICINE
Payer: COMMERCIAL

## 2021-02-01 VITALS
OXYGEN SATURATION: 94 % | SYSTOLIC BLOOD PRESSURE: 149 MMHG | DIASTOLIC BLOOD PRESSURE: 79 MMHG | HEIGHT: 66 IN | BODY MASS INDEX: 33.84 KG/M2 | WEIGHT: 210.54 LBS | RESPIRATION RATE: 18 BRPM | HEART RATE: 86 BPM | TEMPERATURE: 98 F

## 2021-02-01 VITALS — DIASTOLIC BLOOD PRESSURE: 82 MMHG | SYSTOLIC BLOOD PRESSURE: 140 MMHG | OXYGEN SATURATION: 98 %

## 2021-02-01 DIAGNOSIS — K29.90 GASTRITIS AND GASTRODUODENITIS: ICD-10-CM

## 2021-02-01 DIAGNOSIS — K29.70 GASTRITIS AND GASTRODUODENITIS: ICD-10-CM

## 2021-02-01 DIAGNOSIS — R05.9 COUGH: ICD-10-CM

## 2021-02-01 PROCEDURE — 7100000001 HC PACU RECOVERY - ADDTL 15 MIN: Performed by: INTERNAL MEDICINE

## 2021-02-01 PROCEDURE — 3700000001 HC ADD 15 MINUTES (ANESTHESIA): Performed by: INTERNAL MEDICINE

## 2021-02-01 PROCEDURE — 7100000010 HC PHASE II RECOVERY - FIRST 15 MIN: Performed by: INTERNAL MEDICINE

## 2021-02-01 PROCEDURE — 6360000002 HC RX W HCPCS: Performed by: NURSE ANESTHETIST, CERTIFIED REGISTERED

## 2021-02-01 PROCEDURE — 88305 TISSUE EXAM BY PATHOLOGIST: CPT

## 2021-02-01 PROCEDURE — 3700000000 HC ANESTHESIA ATTENDED CARE: Performed by: INTERNAL MEDICINE

## 2021-02-01 PROCEDURE — 6370000000 HC RX 637 (ALT 250 FOR IP): Performed by: ANESTHESIOLOGY

## 2021-02-01 PROCEDURE — 2580000003 HC RX 258: Performed by: ANESTHESIOLOGY

## 2021-02-01 PROCEDURE — 3609012400 HC EGD TRANSORAL BIOPSY SINGLE/MULTIPLE: Performed by: INTERNAL MEDICINE

## 2021-02-01 PROCEDURE — 2709999900 HC NON-CHARGEABLE SUPPLY: Performed by: INTERNAL MEDICINE

## 2021-02-01 PROCEDURE — 7100000000 HC PACU RECOVERY - FIRST 15 MIN: Performed by: INTERNAL MEDICINE

## 2021-02-01 PROCEDURE — 7100000011 HC PHASE II RECOVERY - ADDTL 15 MIN: Performed by: INTERNAL MEDICINE

## 2021-02-01 PROCEDURE — 2500000003 HC RX 250 WO HCPCS: Performed by: NURSE ANESTHETIST, CERTIFIED REGISTERED

## 2021-02-01 RX ORDER — SODIUM CHLORIDE 9 MG/ML
INJECTION, SOLUTION INTRAVENOUS CONTINUOUS
Status: DISCONTINUED | OUTPATIENT
Start: 2021-02-01 | End: 2021-02-01 | Stop reason: HOSPADM

## 2021-02-01 RX ORDER — ONDANSETRON 2 MG/ML
4 INJECTION INTRAMUSCULAR; INTRAVENOUS EVERY 6 HOURS PRN
Status: DISCONTINUED | OUTPATIENT
Start: 2021-02-01 | End: 2021-02-01 | Stop reason: HOSPADM

## 2021-02-01 RX ORDER — PROPOFOL 10 MG/ML
INJECTION, EMULSION INTRAVENOUS CONTINUOUS PRN
Status: DISCONTINUED | OUTPATIENT
Start: 2021-02-01 | End: 2021-02-01 | Stop reason: SDUPTHER

## 2021-02-01 RX ORDER — LIDOCAINE HYDROCHLORIDE 20 MG/ML
INJECTION, SOLUTION EPIDURAL; INFILTRATION; INTRACAUDAL; PERINEURAL PRN
Status: DISCONTINUED | OUTPATIENT
Start: 2021-02-01 | End: 2021-02-01 | Stop reason: SDUPTHER

## 2021-02-01 RX ORDER — PROPOFOL 10 MG/ML
INJECTION, EMULSION INTRAVENOUS PRN
Status: DISCONTINUED | OUTPATIENT
Start: 2021-02-01 | End: 2021-02-01 | Stop reason: SDUPTHER

## 2021-02-01 RX ORDER — SODIUM CHLORIDE 0.9 % (FLUSH) 0.9 %
10 SYRINGE (ML) INJECTION EVERY 12 HOURS SCHEDULED
Status: DISCONTINUED | OUTPATIENT
Start: 2021-02-01 | End: 2021-02-01 | Stop reason: HOSPADM

## 2021-02-01 RX ORDER — OXYCODONE HYDROCHLORIDE AND ACETAMINOPHEN 5; 325 MG/1; MG/1
1 TABLET ORAL ONCE
Status: COMPLETED | OUTPATIENT
Start: 2021-02-01 | End: 2021-02-01

## 2021-02-01 RX ORDER — GLYCOPYRROLATE 0.2 MG/ML
INJECTION INTRAMUSCULAR; INTRAVENOUS PRN
Status: DISCONTINUED | OUTPATIENT
Start: 2021-02-01 | End: 2021-02-01 | Stop reason: SDUPTHER

## 2021-02-01 RX ORDER — SODIUM CHLORIDE 0.9 % (FLUSH) 0.9 %
10 SYRINGE (ML) INJECTION PRN
Status: DISCONTINUED | OUTPATIENT
Start: 2021-02-01 | End: 2021-02-01 | Stop reason: HOSPADM

## 2021-02-01 RX ADMIN — PROPOFOL 150 MCG/KG/MIN: 10 INJECTION, EMULSION INTRAVENOUS at 14:03

## 2021-02-01 RX ADMIN — SODIUM CHLORIDE: 9 INJECTION, SOLUTION INTRAVENOUS at 13:56

## 2021-02-01 RX ADMIN — PROPOFOL 125 MG: 10 INJECTION, EMULSION INTRAVENOUS at 14:03

## 2021-02-01 RX ADMIN — GLYCOPYRROLATE 0.1 MG: 0.2 INJECTION, SOLUTION INTRAMUSCULAR; INTRAVENOUS at 14:00

## 2021-02-01 RX ADMIN — SODIUM CHLORIDE: 9 INJECTION, SOLUTION INTRAVENOUS at 12:59

## 2021-02-01 RX ADMIN — OXYCODONE HYDROCHLORIDE AND ACETAMINOPHEN 1 TABLET: 5; 325 TABLET ORAL at 15:02

## 2021-02-01 RX ADMIN — LIDOCAINE HYDROCHLORIDE 5 ML: 20 INJECTION, SOLUTION EPIDURAL; INFILTRATION; INTRACAUDAL; PERINEURAL at 14:03

## 2021-02-01 ASSESSMENT — LIFESTYLE VARIABLES: SMOKING_STATUS: 0

## 2021-02-01 ASSESSMENT — PAIN DESCRIPTION - ONSET
ONSET: ON-GOING

## 2021-02-01 ASSESSMENT — PAIN DESCRIPTION - PROGRESSION
CLINICAL_PROGRESSION: NOT CHANGED
CLINICAL_PROGRESSION: NOT CHANGED

## 2021-02-01 ASSESSMENT — ENCOUNTER SYMPTOMS: SHORTNESS OF BREATH: 1

## 2021-02-01 ASSESSMENT — PAIN DESCRIPTION - LOCATION: LOCATION: OTHER (COMMENT)

## 2021-02-01 ASSESSMENT — PULMONARY FUNCTION TESTS
PIF_VALUE: 0

## 2021-02-01 ASSESSMENT — PAIN DESCRIPTION - FREQUENCY
FREQUENCY: CONTINUOUS
FREQUENCY: CONTINUOUS

## 2021-02-01 ASSESSMENT — PAIN SCALES - GENERAL: PAINLEVEL_OUTOF10: 8

## 2021-02-01 ASSESSMENT — PAIN DESCRIPTION - ORIENTATION: ORIENTATION: MID

## 2021-02-01 ASSESSMENT — PAIN DESCRIPTION - DESCRIPTORS: DESCRIPTORS: BURNING;DISCOMFORT

## 2021-02-01 NOTE — OP NOTE
[id number]    Patient: Narciso Godwin  : 1960   Acct#: [de-identified]    Procedure: Esophagogastroduodenoscopy    Date: 2021    Endoscopist: Hal Caro MD    Referring Physician: Dr. Moises May    Indications: This is a 63y.o. year old male who presents today for an EGD to evaluate persistent chronic cough. Anesthesia: Propofol 275 mg IV. Description of Procedure: Informed consent was obtained from the patient after explanation of indications, benefits and possible risks and complications of the procedure. The patient was placed in the left lateral decubitus position and the above IV sedation was administered. The Olympus videoendoscope was passed under direct visualization into the esophagus. The esophagus was normal.  Erosive changes were not observed. The scope was then advanced into the stomach. Visualization of mucosa in the gastric body and antrum revealed diffuse gastritis. There were no erosions observed. Multiple fundic gland polyps were observed in the cardia of the stomach. Random biopsies were obtained. The pylorus was patent. Mucosa in the duodenal bulb was normal. The second portion of the duodenum was normal.  EBL : 2.3728 ml. The patient tolerated the procedure well and was taken to the post anesthesia care unit in good condition. Impression: Gastritis, multiple fundic gland polyps, pathology pending. Recommendations:  Review pathology. Consider pro-motility agents.     Electronically signed by Hal Caro MD on 2021 at 2:14 PM.

## 2021-02-01 NOTE — ANESTHESIA PRE PROCEDURE
Department of Anesthesiology  Preprocedure Note       Name:  Jimmie Mckeon   Age:  63 y.o.  :  1960                                          MRN:  [id number]         Date:  2021      Surgeon: Cassia Mitchell):  Kathaleen Cabot, MD    Procedure: ESOPHAGOGASTRODUODENOSCOPY, POSSIBLE BIOPSY, POSSIBLE BALLOON DILATATION (N/A )    Medications prior to admission:   Prior to Admission medications    Medication Sig Start Date End Date Taking? Authorizing Provider   sodium chloride, Inhalant, 3 % nebulizer solution Take 4 mLs by nebulization every 8 hours as needed for Other or Cough (cough) 21   Thom Naylor MD   oxyCODONE-acetaminophen (PERCOCET) 5-325 MG per tablet Take 1 tablet by mouth every 4 hours as needed for Pain. Historical Provider, MD   pregabalin (LYRICA) 75 MG capsule TAKE 1 CAPSULE BY MOUTH IN  THE MORNING AND 2 CAPSULES  IN THE EVENING 21  Minor Cardoso MD   warfarin (COUMADIN) 5 MG tablet TAKE ONE TABLET BY MOUTH EVERY DAY EXCEPT MONDAY & .  ON MONDAY & FRIDAY TAKE ONE AND ONE-HALF TABLETS 21   Minor Cardoso MD   pravastatin (PRAVACHOL) 40 MG tablet Take 1 tablet by mouth daily 20   Suzan Beltran MD   benzonatate (TESSALON) 200 MG capsule Take 1 capsule by mouth 3 times daily as needed for Cough 20   PARADISE Asif   metoprolol succinate (TOPROL XL) 25 MG extended release tablet TAKE 1 TABLET BY MOUTH TWO  TIMES DAILY 20   Minor Cardoso MD   flecainide [facility]) 50 MG tablet Take 1 tablet by mouth 2 times daily 20   Eleonora Osorio MD   tamsulosin [facility]) 0.4 MG capsule Take 1 capsule by mouth daily 10/20/20   Minor Cardoso MD   PROAIR  (68 Base) MCG/ACT inhaler Inhale 2 puffs into the lungs every 4 hours as needed for Wheezing or Shortness of Breath 10/13/20   Thom Naylor MD   ondansetron (ZOFRAN ODT) 4 MG disintegrating tablet Take 1-2 tablets by mouth every 12 hours as needed for Nausea May Sub regular tablet (non-ODT) if insurance does not cover ODT.  10/9/20   Ester Salinas PA-C   Bempedoic Acid (NEXLETOL) 180 MG TABS Take 180 mg by mouth daily 9/22/20   Carmen Reynolds MD   dicyclomine (BENTYL) 10 MG capsule TAKE ONE CAPSULE BY MOUTH  FOUR TIMES DAILY AS NEEDED 9/16/20   Debbie Hernandez MD   Cholecalciferol (VITAMIN D3) 1.25 MG (87608 UT) CAPS Take 1 capsule by mouth once a week 9/15/20   Debbie Hernandez MD   esomeprazole (NEXIUM) 40 MG delayed release capsule Take 40 mg by mouth every morning (before breakfast)    Historical Provider, MD   albuterol sulfate  (90 Base) MCG/ACT inhaler INHALE 2 PUFFS INTO THE LUNGS EVERY 4 HOURS AS NEEDED FOR WHEEZING OR SHORTNESS OF BREATH 9/1/20   BRENNA Taylor CNP   sacubitril-valsartan (ENTRESTO) 24-26 MG per tablet TAKE 2 TABLETS BY MOUTH IN  THE MORNING AND 1 TABLET BY MOUTH IN THE EVENING 5/26/20   Arcadio Velázquez MD   flavoxate (URISPAS) 100 MG tablet TAKE 1 TABLET BY MOUTH THREE TIMES DAILY AS NEEDED FOR URINARY SPASM 4/2/20   [facility], APRN - CNP   albuterol (PROVENTIL) (2.5 MG/3ML) 0.083% nebulizer solution USE 3 ML VIA NEBULIZER EVERY 4 HOURS AS NEEDED FOR WHEEZING OR SHORTNESS OF BREATH 3/24/20   Sophie Kilgore MD   sildenafil (REVATIO) 20 MG tablet Take 20 mg by mouth as needed    Historical Provider, MD   guaiFENesin (MUCINEX) 600 MG extended release tablet Take 1 tablet by mouth 2 times daily 2/4/20   Debbie Hernandez MD   furosemide (LASIX) 20 MG tablet Take 1 tablet by mouth daily as needed (take 20 mg tablet daily as needed for increased weight, swelling or shortness of breath) 12/9/19   BRENNA Carroll CNP   polyethylene glycol (MIRALAX) PACK packet Take 17 g by mouth daily    Historical Provider, MD   ondansetron (ZOFRAN ODT) 4 MG disintegrating tablet Take 1-2 tablets by mouth every 8 hours as needed for Nausea May Sub regular tablet (non-ODT) if insurance does not cover ODT. 8/13/19   Debbie Hernandez MD   aspirin 81 MG Shortness of breath R06.02    Nausea R11.0    Transient cerebral ischemia G45.9    History of pulmonary embolism Z86.711    Cardiomyopathy (HCC) I42.9    Confusion R41.0    Right arm weakness R29.898    Elevated INR R79.1    Chronic systolic CHF (congestive heart failure), NYHA class 2 (HCC) I50.22    LBBB (left bundle branch block) I44.7    Acute confusional state F05    Ureteral stone N20.1    Urinary tract infection without hematuria N39.0    Encounter for adjustment of cardiac resynchronization therapy defibrillator (CRT-D) Z45.02    Biventricular ICD (implantable cardioverter-defibrillator) in place Z95.810    Chronic anticoagulation Z79.01    Paroxysmal atrial fibrillation (HCC) I48.0    Hypercalcemia E83.52    Primary hyperparathyroidism (HCC) E21.0    Anticoagulated on Coumadin Z79.01    Perinephric hematoma S37.019A    Renal hematoma, right S37.011A    Postoperative hemorrhagic shock T81.19XA    Acute blood loss anemia U01    Metabolic acidosis Z01.9    Tachycardia R00.0    Iron deficiency anemia due to chronic blood loss D50.0    Thrombocytopenia (HCC) D69.6    Hypoxia R09.02    Acute UTI N39.0    Abdominal wall hematoma S30. 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    Elevated liver enzymes R74.8    Acute flank pain W72.6    Colicky LLQ abdominal pain R10.32    Arm pain M79.603    Dysuria R30.0    Fever R50.9    Episode of shaking R25.1    Post-nasal drip R09.82    Foot pain M79.673    Pain in scapula M89.8X1    Skin lesions L98.9    Otalgia H92.09    Pleurisy R09.1    Chills R68.83    Swelling of calf M79.89       Past Medical History:        Diagnosis Date    [facility] obliterans [facility] [address]     Bundle branch block, right     Cardiomyopathy ([facility] [address]     Chest pain 9/24/2019    Fibromyalgia     GERD (gastroesophageal reflux disease)     Hepatitis 1979    unsure of which type    Hx of blood clots     Hyperlipemia     Hypertension     IBS (irritable bowel syndrome)     Kidney stone     over thirty kidney stones    Neuropathy     right side-chest    Pneumothorax 2011    Right    Prostatitis     Pulmonary embolism on right [facility]) 2011    upper lobe-coumadin 2011    Sacroiliitis [facility])        Past Surgical History:        Procedure Laterality Date    CHOLECYSTECTOMY  2007    COLONOSCOPY  9/21/2012    dr Kahlil Breen  05/17/2019    RIGHT SIDED URETEROSCOPY  Diagnostic, retrograde pyelogram and fulguration of previously resected bladder tumor base    CYSTOSCOPY Right 5/17/2019    RIGHT SIDED URETEROSCOPY FLUGERATION  OF BLADDER performed by Rickie Aschoff, MD at [address] [place] [address]  2015    LITHOTRIPSY      PACEMAKER PLACEMENT      SINUS SURGERY      Made opening larger in sinuses    UPPER GASTROINTESTINAL ENDOSCOPY  3/28/2014    dr Adalid mercedes       Social History:    Social History     Tobacco Use    Smoking status: Never Smoker    Smokeless tobacco: Never Used   Substance Use Topics    Alcohol use: No     Frequency: Never     Comment: occ                                Counseling given: Not Answered      Vital Signs (Current): There were no vitals filed for this visit.                                            BP Readings from Last 3 Encounters:   01/11/21 (!) 160/94   12/24/20 129/76   12/09/20 132/70       NPO Status:   >8h                                                                               BMI:   Wt Readings from Last 3 Encounters:   01/11/21 212 lb (96.2 kg)   12/24/20 209 lb 14.1 oz (95.2 kg)   12/09/20 211 lb (95.7 kg)     There is no height or weight on file to calculate BMI.    CBC:   Lab Results   Component Value Date    WBC 6.4 01/11/2021    RBC 5.21 01/11/2021    HGB 16.3 01/11/2021    HCT 48.5 01/11/2021    MCV 93.1 01/11/2021    RDW 13.6 01/11/2021     01/11/2021       CMP:   Lab Results Component Value Date     12/24/2020    K 3.6 12/24/2020     12/24/2020    CO2 24 12/24/2020    BUN 8 12/24/2020    CREATININE 0.9 12/24/2020    GFRAA >60 12/24/2020    GFRAA >60 05/02/2013    AGRATIO 1.5 08/26/2020    LABGLOM >60 12/24/2020    GLUCOSE 98 12/24/2020    PROT 6.5 08/26/2020    PROT 5.9 01/11/2013    CALCIUM 10.4 12/24/2020    BILITOT 0.3 08/26/2020    ALKPHOS 93 08/26/2020    AST 20 08/26/2020    ALT 22 08/26/2020       POC Tests: No results for input(s): POCGLU, POCNA, POCK, POCCL, POCBUN, POCHEMO, POCHCT in the last 72 hours. Coags:   Lab Results   Component Value Date    PROTIME 42.1 12/24/2020    PROTIME 37.6 01/29/2016    INR 2.50 01/22/2021    APTT 44.4 02/01/2020       HCG (If Applicable): No results found for: PREGTESTUR, PREGSERUM, HCG, HCGQUANT     ABGs:   Lab Results   Component Value Date    PHART 7.317 04/11/2013    PO2ART 62.2 04/11/2013    XAT7ZCD 51.7 04/11/2013    KBN0DBB 25.7 04/11/2013    BEART -0.6 04/11/2013    W9RRPHKH 92.8 04/11/2013        Type & Screen (If Applicable):  No results found for: LABABO, [address]    Anesthesia Evaluation  Patient summary reviewed no history of anesthetic complications:   Airway: Mallampati: II  TM distance: >3 FB   Neck ROM: full  Mouth opening: > = 3 FB Dental:    (+) lower dentures and partials      Pulmonary: breath sounds clear to auscultation  (+) shortness of breath (B. O. / NITE O2 2L, DAILY AND PRN INHALERS AND NEBS):      (-) not a current smoker          Patient did not smoke on day of surgery.                  Cardiovascular:    (+) hypertension:, pacemaker: AICD and pacemaker, CHF (CARDIOMYOPATHY):, GUEVARA:,     (-) past MI and dysrhythmias    ECG reviewed  Rhythm: regular  Rate: normal    Stress test reviewed       Beta Blocker:  Dose within 24 Hrs         Neuro/Psych:   (+) neuromuscular disease (FIBROMY.):, TIA, headaches:, psychiatric history:            GI/Hepatic/Renal:   (+) GERD:, hepatitis:, liver disease:, renal disease: kidney stones,      (-) no morbid obesity       Endo/Other:    (+) blood dyscrasia: anticoagulation therapy, arthritis:., .                 Abdominal:           Vascular:   + DVT, PE. Anesthesia Plan      MAC     ASA 4       Induction: intravenous. MIPS: Postoperative opioids intended and Prophylactic antiemetics administered. Anesthetic plan and risks discussed with patient. Plan discussed with CRNA. This pre-anesthesia assessment may be used as a history and physical.    DOS STAFF ADDENDUM:    Pt seen and examined, chart reviewed (including anesthesia, drug and allergy history). No interval changes to history and physical examination. Anesthetic plan, risks, benefits, alternatives, and personnel involved discussed with patient. Patient verbalized an understanding and agrees to proceed.       Amari Kasper MD  February 1, 2021  12:36 PM      Amari Kasper MD   2/1/2021

## 2021-02-01 NOTE — PROGRESS NOTES
Patient up and dressed. Steady on feet. Patient waiting for MD to call but no response.  Patient D/c per wheelchair

## 2021-02-01 NOTE — PROGRESS NOTES
Received from PACU. Admitted to Phase 2 care. Awake and alert, respirations easy and even. Oriented to room and surroundings. Continues with complaints of epigastric pain.

## 2021-02-01 NOTE — PROGRESS NOTES
States epigastric discomfort has eased. Tolerating oral intake. States he is waiting for his urologist to return his call.

## 2021-02-01 NOTE — ANESTHESIA POSTPROCEDURE EVALUATION
Department of Anesthesiology  Postprocedure Note    Patient: Marielena Whitehead  MRN: [id number]  [place]: 1960  Date of evaluation: 2/1/2021  Time:  2:40 PM     Procedure Summary     Date: 02/01/21 Room / Location: [address] [place] [address]    Anesthesia Start: 6215 Anesthesia Stop: 9592    Procedure: EGD BIOPSY (N/A ) Diagnosis:       Cough      Gastritis and gastroduodenitis      (COUGH, UNSPECIFIED GASTRITIS AND GASTRODUODENITIS, WITHOUT HEMORRHAGE)    Surgeons: Tanner Morrison MD Responsible Provider: Rayshawn Ayala MD    Anesthesia Type: MAC ASA Status: 4          Anesthesia Type: MAC    Prince Phase I: Prince Score: 10    Prince Phase II:      Last vitals: Reviewed and per EMR flowsheets.        Anesthesia Post Evaluation    Patient location during evaluation: PACU  Patient participation: complete - patient participated  Level of consciousness: awake and alert  Pain score: 4  Airway patency: patent  Nausea & Vomiting: no nausea and no vomiting  Complications: no  Cardiovascular status: blood pressure returned to baseline  Respiratory status: acceptable  Hydration status: euvolemic

## 2021-02-03 ENCOUNTER — TELEPHONE (OUTPATIENT)
Dept: PHARMACY | Age: 61
End: 2021-02-03

## 2021-02-03 ENCOUNTER — APPOINTMENT (OUTPATIENT)
Dept: CT IMAGING | Age: 61
DRG: 694 | End: 2021-02-03
Payer: COMMERCIAL

## 2021-02-03 ENCOUNTER — TELEPHONE (OUTPATIENT)
Dept: CARDIOLOGY CLINIC | Age: 61
End: 2021-02-03

## 2021-02-03 ENCOUNTER — HOSPITAL ENCOUNTER (INPATIENT)
Age: 61
LOS: 1 days | Discharge: HOME | DRG: 694 | End: 2021-02-04
Attending: EMERGENCY MEDICINE | Admitting: INTERNAL MEDICINE
Payer: COMMERCIAL

## 2021-02-03 DIAGNOSIS — N23 URETERAL COLIC: ICD-10-CM

## 2021-02-03 DIAGNOSIS — R10.9 FLANK PAIN: Primary | ICD-10-CM

## 2021-02-03 DIAGNOSIS — N20.0 NEPHROLITHIASIS: ICD-10-CM

## 2021-02-03 DIAGNOSIS — N23 RENAL COLIC ON RIGHT SIDE: ICD-10-CM

## 2021-02-03 DIAGNOSIS — Z78.9 FAILURE OF OUTPATIENT TREATMENT: ICD-10-CM

## 2021-02-03 PROBLEM — K21.00 GASTROESOPHAGEAL REFLUX DISEASE WITH ESOPHAGITIS WITHOUT HEMORRHAGE: Status: ACTIVE | Noted: 2021-02-03

## 2021-02-03 LAB
A/G RATIO: 1.4 (ref 1.1–2.2)
ALBUMIN SERPL-MCNC: 4.1 G/DL (ref 3.4–5)
ALP BLD-CCNC: 106 U/L (ref 40–129)
ALT SERPL-CCNC: 20 U/L (ref 10–40)
ANION GAP SERPL CALCULATED.3IONS-SCNC: 10 MMOL/L (ref 3–16)
AST SERPL-CCNC: 20 U/L (ref 15–37)
BASOPHILS ABSOLUTE: 0.1 K/UL (ref 0–0.2)
BASOPHILS RELATIVE PERCENT: 1.3 %
BILIRUB SERPL-MCNC: 0.4 MG/DL (ref 0–1)
BILIRUBIN URINE: NEGATIVE
BLOOD, URINE: ABNORMAL
BUN BLDV-MCNC: 9 MG/DL (ref 7–20)
CALCIUM SERPL-MCNC: 10.8 MG/DL (ref 8.3–10.6)
CHLORIDE BLD-SCNC: 104 MMOL/L (ref 99–110)
CLARITY: CLEAR
CO2: 25 MMOL/L (ref 21–32)
COLOR: YELLOW
CREAT SERPL-MCNC: 0.9 MG/DL (ref 0.8–1.3)
EOSINOPHILS ABSOLUTE: 0.4 K/UL (ref 0–0.6)
EOSINOPHILS RELATIVE PERCENT: 6 %
EPITHELIAL CELLS, UA: 1 /HPF (ref 0–5)
GFR AFRICAN AMERICAN: >60
GFR NON-AFRICAN AMERICAN: >60
GLOBULIN: 2.9 G/DL
GLUCOSE BLD-MCNC: 110 MG/DL (ref 70–99)
GLUCOSE URINE: NEGATIVE MG/DL
HCT VFR BLD CALC: 47.7 % (ref 40.5–52.5)
HEMOGLOBIN: 16 G/DL (ref 13.5–17.5)
HYALINE CASTS: 4 /LPF (ref 0–8)
INR BLD: 1.66 (ref 0.86–1.14)
KETONES, URINE: NEGATIVE MG/DL
LEUKOCYTE ESTERASE, URINE: ABNORMAL
LIPASE: 18 U/L (ref 13–60)
LYMPHOCYTES ABSOLUTE: 1.9 K/UL (ref 1–5.1)
LYMPHOCYTES RELATIVE PERCENT: 31 %
MCH RBC QN AUTO: 30.7 PG (ref 26–34)
MCHC RBC AUTO-ENTMCNC: 33.4 G/DL (ref 31–36)
MCV RBC AUTO: 91.8 FL (ref 80–100)
MICROSCOPIC EXAMINATION: YES
MONOCYTES ABSOLUTE: 0.5 K/UL (ref 0–1.3)
MONOCYTES RELATIVE PERCENT: 8.8 %
NEUTROPHILS ABSOLUTE: 3.2 K/UL (ref 1.7–7.7)
NEUTROPHILS RELATIVE PERCENT: 52.9 %
NITRITE, URINE: NEGATIVE
PARATHYROID HORMONE INTACT: 166.7 PG/ML (ref 14–72)
PDW BLD-RTO: 12.8 % (ref 12.4–15.4)
PH UA: 6 (ref 5–8)
PLATELET # BLD: 188 K/UL (ref 135–450)
PMV BLD AUTO: 7.3 FL (ref 5–10.5)
POTASSIUM REFLEX MAGNESIUM: 3.8 MMOL/L (ref 3.5–5.1)
PROTEIN UA: NEGATIVE MG/DL
PROTHROMBIN TIME: 19.3 SEC (ref 10–13.2)
RBC # BLD: 5.2 M/UL (ref 4.2–5.9)
RBC UA: 11 /HPF (ref 0–4)
SODIUM BLD-SCNC: 139 MMOL/L (ref 136–145)
SPECIFIC GRAVITY UA: 1.01 (ref 1–1.03)
TOTAL PROTEIN: 7 G/DL (ref 6.4–8.2)
URINE REFLEX TO CULTURE: ABNORMAL
URINE TYPE: ABNORMAL
UROBILINOGEN, URINE: 0.2 E.U./DL
WBC # BLD: 6.1 K/UL (ref 4–11)
WBC UA: 4 /HPF (ref 0–5)

## 2021-02-03 PROCEDURE — G0378 HOSPITAL OBSERVATION PER HR: HCPCS

## 2021-02-03 PROCEDURE — 81001 URINALYSIS AUTO W/SCOPE: CPT

## 2021-02-03 PROCEDURE — 6360000002 HC RX W HCPCS: Performed by: INTERNAL MEDICINE

## 2021-02-03 PROCEDURE — 36415 COLL VENOUS BLD VENIPUNCTURE: CPT

## 2021-02-03 PROCEDURE — 83690 ASSAY OF LIPASE: CPT

## 2021-02-03 PROCEDURE — 94761 N-INVAS EAR/PLS OXIMETRY MLT: CPT

## 2021-02-03 PROCEDURE — 96376 TX/PRO/DX INJ SAME DRUG ADON: CPT

## 2021-02-03 PROCEDURE — 94640 AIRWAY INHALATION TREATMENT: CPT

## 2021-02-03 PROCEDURE — 74176 CT ABD & PELVIS W/O CONTRAST: CPT

## 2021-02-03 PROCEDURE — 1200000000 HC SEMI PRIVATE

## 2021-02-03 PROCEDURE — 6360000002 HC RX W HCPCS: Performed by: EMERGENCY MEDICINE

## 2021-02-03 PROCEDURE — 96375 TX/PRO/DX INJ NEW DRUG ADDON: CPT

## 2021-02-03 PROCEDURE — 2580000003 HC RX 258: Performed by: INTERNAL MEDICINE

## 2021-02-03 PROCEDURE — 99223 1ST HOSP IP/OBS HIGH 75: CPT | Performed by: INTERNAL MEDICINE

## 2021-02-03 PROCEDURE — 85025 COMPLETE CBC W/AUTO DIFF WBC: CPT

## 2021-02-03 PROCEDURE — 83970 ASSAY OF PARATHORMONE: CPT

## 2021-02-03 PROCEDURE — 85610 PROTHROMBIN TIME: CPT

## 2021-02-03 PROCEDURE — 99283 EMERGENCY DEPT VISIT LOW MDM: CPT

## 2021-02-03 PROCEDURE — 80053 COMPREHEN METABOLIC PANEL: CPT

## 2021-02-03 PROCEDURE — 96374 THER/PROPH/DIAG INJ IV PUSH: CPT

## 2021-02-03 PROCEDURE — 6370000000 HC RX 637 (ALT 250 FOR IP): Performed by: INTERNAL MEDICINE

## 2021-02-03 RX ORDER — SACUBITRIL AND VALSARTAN 49; 51 MG/1; MG/1
1 TABLET, FILM COATED ORAL 2 TIMES DAILY
COMMUNITY
Start: 2021-01-24 | End: 2021-09-20 | Stop reason: SDUPTHER

## 2021-02-03 RX ORDER — FENTANYL CITRATE 50 UG/ML
50 INJECTION, SOLUTION INTRAMUSCULAR; INTRAVENOUS ONCE
Status: COMPLETED | OUTPATIENT
Start: 2021-02-03 | End: 2021-02-03

## 2021-02-03 RX ORDER — TAMSULOSIN HYDROCHLORIDE 0.4 MG/1
0.4 CAPSULE ORAL 2 TIMES DAILY
Status: DISCONTINUED | OUTPATIENT
Start: 2021-02-03 | End: 2021-02-04 | Stop reason: HOSPADM

## 2021-02-03 RX ORDER — ONDANSETRON 2 MG/ML
4 INJECTION INTRAMUSCULAR; INTRAVENOUS ONCE
Status: COMPLETED | OUTPATIENT
Start: 2021-02-03 | End: 2021-02-03

## 2021-02-03 RX ORDER — POLYETHYLENE GLYCOL 3350 17 G/17G
17 POWDER, FOR SOLUTION ORAL DAILY
Status: DISCONTINUED | OUTPATIENT
Start: 2021-02-03 | End: 2021-02-04 | Stop reason: HOSPADM

## 2021-02-03 RX ORDER — SODIUM CHLORIDE 0.9 % (FLUSH) 0.9 %
10 SYRINGE (ML) INJECTION EVERY 12 HOURS SCHEDULED
Status: DISCONTINUED | OUTPATIENT
Start: 2021-02-03 | End: 2021-02-04 | Stop reason: HOSPADM

## 2021-02-03 RX ORDER — POTASSIUM CHLORIDE 750 MG/1
20 TABLET, FILM COATED, EXTENDED RELEASE ORAL DAILY
Status: DISCONTINUED | OUTPATIENT
Start: 2021-02-03 | End: 2021-02-04

## 2021-02-03 RX ORDER — ONDANSETRON 2 MG/ML
4 INJECTION INTRAMUSCULAR; INTRAVENOUS EVERY 6 HOURS PRN
Status: DISCONTINUED | OUTPATIENT
Start: 2021-02-03 | End: 2021-02-03

## 2021-02-03 RX ORDER — ALBUTEROL SULFATE 2.5 MG/3ML
2.5 SOLUTION RESPIRATORY (INHALATION) EVERY 4 HOURS PRN
Status: DISCONTINUED | OUTPATIENT
Start: 2021-02-03 | End: 2021-02-04 | Stop reason: HOSPADM

## 2021-02-03 RX ORDER — FLAVOXATE HYDROCHLORIDE 100 MG/1
100 TABLET ORAL 3 TIMES DAILY PRN
Status: DISCONTINUED | OUTPATIENT
Start: 2021-02-03 | End: 2021-02-04 | Stop reason: HOSPADM

## 2021-02-03 RX ORDER — WARFARIN SODIUM 5 MG/1
5 TABLET ORAL DAILY
Status: DISCONTINUED | OUTPATIENT
Start: 2021-02-03 | End: 2021-02-03

## 2021-02-03 RX ORDER — METOPROLOL SUCCINATE 25 MG/1
25 TABLET, EXTENDED RELEASE ORAL 2 TIMES DAILY
Status: DISCONTINUED | OUTPATIENT
Start: 2021-02-03 | End: 2021-02-04 | Stop reason: HOSPADM

## 2021-02-03 RX ORDER — WARFARIN SODIUM 5 MG/1
5 TABLET ORAL
Status: COMPLETED | OUTPATIENT
Start: 2021-02-03 | End: 2021-02-04

## 2021-02-03 RX ORDER — PREGABALIN 75 MG/1
75 CAPSULE ORAL 2 TIMES DAILY
Status: DISCONTINUED | OUTPATIENT
Start: 2021-02-03 | End: 2021-02-03

## 2021-02-03 RX ORDER — ACETAMINOPHEN 325 MG/1
650 TABLET ORAL EVERY 6 HOURS PRN
Status: DISCONTINUED | OUTPATIENT
Start: 2021-02-03 | End: 2021-02-04 | Stop reason: HOSPADM

## 2021-02-03 RX ORDER — OXYCODONE HYDROCHLORIDE AND ACETAMINOPHEN 5; 325 MG/1; MG/1
1 TABLET ORAL EVERY 4 HOURS PRN
Status: DISCONTINUED | OUTPATIENT
Start: 2021-02-03 | End: 2021-02-04 | Stop reason: HOSPADM

## 2021-02-03 RX ORDER — ZOLPIDEM TARTRATE 5 MG/1
10 TABLET ORAL NIGHTLY PRN
Status: DISCONTINUED | OUTPATIENT
Start: 2021-02-03 | End: 2021-02-04 | Stop reason: HOSPADM

## 2021-02-03 RX ORDER — PROMETHAZINE HYDROCHLORIDE 25 MG/1
12.5 TABLET ORAL EVERY 6 HOURS PRN
Status: DISCONTINUED | OUTPATIENT
Start: 2021-02-03 | End: 2021-02-04 | Stop reason: HOSPADM

## 2021-02-03 RX ORDER — POLYETHYLENE GLYCOL 3350 17 G/17G
17 POWDER, FOR SOLUTION ORAL DAILY PRN
Status: DISCONTINUED | OUTPATIENT
Start: 2021-02-03 | End: 2021-02-04 | Stop reason: HOSPADM

## 2021-02-03 RX ORDER — ESOMEPRAZOLE MAGNESIUM 40 MG/1
40 CAPSULE, DELAYED RELEASE ORAL
Status: DISCONTINUED | OUTPATIENT
Start: 2021-02-04 | End: 2021-02-04 | Stop reason: HOSPADM

## 2021-02-03 RX ORDER — PANTOPRAZOLE SODIUM 40 MG/1
40 TABLET, DELAYED RELEASE ORAL
Status: DISCONTINUED | OUTPATIENT
Start: 2021-02-04 | End: 2021-02-03

## 2021-02-03 RX ORDER — PRAVASTATIN SODIUM 40 MG
40 TABLET ORAL NIGHTLY
Status: DISCONTINUED | OUTPATIENT
Start: 2021-02-03 | End: 2021-02-04 | Stop reason: HOSPADM

## 2021-02-03 RX ORDER — ALBUTEROL SULFATE 2.5 MG/3ML
2.5 SOLUTION RESPIRATORY (INHALATION) 3 TIMES DAILY
Status: DISCONTINUED | OUTPATIENT
Start: 2021-02-03 | End: 2021-02-04 | Stop reason: HOSPADM

## 2021-02-03 RX ORDER — FLECAINIDE ACETATE 100 MG/1
50 TABLET ORAL 2 TIMES DAILY
Status: DISCONTINUED | OUTPATIENT
Start: 2021-02-03 | End: 2021-02-03

## 2021-02-03 RX ORDER — PREGABALIN 75 MG/1
75 CAPSULE ORAL DAILY
Status: DISCONTINUED | OUTPATIENT
Start: 2021-02-04 | End: 2021-02-04 | Stop reason: HOSPADM

## 2021-02-03 RX ORDER — ASPIRIN 81 MG/1
81 TABLET, CHEWABLE ORAL DAILY
Status: DISCONTINUED | OUTPATIENT
Start: 2021-02-03 | End: 2021-02-04 | Stop reason: HOSPADM

## 2021-02-03 RX ORDER — FENTANYL CITRATE 50 UG/ML
50 INJECTION, SOLUTION INTRAMUSCULAR; INTRAVENOUS
Status: DISCONTINUED | OUTPATIENT
Start: 2021-02-03 | End: 2021-02-03

## 2021-02-03 RX ORDER — MAGNESIUM HYDROXIDE/ALUMINUM HYDROXICE/SIMETHICONE 120; 1200; 1200 MG/30ML; MG/30ML; MG/30ML
30 SUSPENSION ORAL
Status: DISCONTINUED | OUTPATIENT
Start: 2021-02-03 | End: 2021-02-04 | Stop reason: HOSPADM

## 2021-02-03 RX ORDER — SODIUM CHLORIDE 9 MG/ML
INJECTION, SOLUTION INTRAVENOUS CONTINUOUS
Status: DISCONTINUED | OUTPATIENT
Start: 2021-02-03 | End: 2021-02-04

## 2021-02-03 RX ORDER — PREGABALIN 75 MG/1
150 CAPSULE ORAL NIGHTLY
Status: DISCONTINUED | OUTPATIENT
Start: 2021-02-03 | End: 2021-02-04 | Stop reason: HOSPADM

## 2021-02-03 RX ORDER — ZOLPIDEM TARTRATE 10 MG/1
10 TABLET ORAL NIGHTLY
COMMUNITY
Start: 2021-01-24 | End: 2021-04-05

## 2021-02-03 RX ORDER — ONDANSETRON 2 MG/ML
4 INJECTION INTRAMUSCULAR; INTRAVENOUS EVERY 6 HOURS PRN
Status: DISCONTINUED | OUTPATIENT
Start: 2021-02-03 | End: 2021-02-04 | Stop reason: HOSPADM

## 2021-02-03 RX ORDER — PRAVASTATIN SODIUM 40 MG
40 TABLET ORAL DAILY
Status: DISCONTINUED | OUTPATIENT
Start: 2021-02-03 | End: 2021-02-03

## 2021-02-03 RX ORDER — SODIUM CHLORIDE 0.9 % (FLUSH) 0.9 %
10 SYRINGE (ML) INJECTION PRN
Status: DISCONTINUED | OUTPATIENT
Start: 2021-02-03 | End: 2021-02-04 | Stop reason: HOSPADM

## 2021-02-03 RX ORDER — AZELASTINE 1 MG/ML
2 SPRAY, METERED NASAL 2 TIMES DAILY
Status: DISCONTINUED | OUTPATIENT
Start: 2021-02-03 | End: 2021-02-04 | Stop reason: HOSPADM

## 2021-02-03 RX ORDER — ACETAMINOPHEN 650 MG/1
650 SUPPOSITORY RECTAL EVERY 6 HOURS PRN
Status: DISCONTINUED | OUTPATIENT
Start: 2021-02-03 | End: 2021-02-04 | Stop reason: HOSPADM

## 2021-02-03 RX ADMIN — FENTANYL CITRATE 50 MCG: 50 INJECTION, SOLUTION INTRAMUSCULAR; INTRAVENOUS at 09:59

## 2021-02-03 RX ADMIN — POLYETHYLENE GLYCOL 3350 17 G: 17 POWDER, FOR SOLUTION ORAL at 21:16

## 2021-02-03 RX ADMIN — METOPROLOL SUCCINATE 25 MG: 25 TABLET, EXTENDED RELEASE ORAL at 20:57

## 2021-02-03 RX ADMIN — ONDANSETRON 4 MG: 2 INJECTION INTRAMUSCULAR; INTRAVENOUS at 06:08

## 2021-02-03 RX ADMIN — AZELASTINE HYDROCHLORIDE 2 SPRAY: 137 SPRAY, METERED NASAL at 15:03

## 2021-02-03 RX ADMIN — Medication 10 ML: at 21:01

## 2021-02-03 RX ADMIN — SACUBITRIL AND VALSARTAN 1 TABLET: 49; 51 TABLET, FILM COATED ORAL at 21:18

## 2021-02-03 RX ADMIN — ALBUTEROL SULFATE 2.5 MG: 2.5 SOLUTION RESPIRATORY (INHALATION) at 19:57

## 2021-02-03 RX ADMIN — HYDROMORPHONE HYDROCHLORIDE 1 MG: 1 INJECTION, SOLUTION INTRAMUSCULAR; INTRAVENOUS; SUBCUTANEOUS at 11:52

## 2021-02-03 RX ADMIN — AZELASTINE HYDROCHLORIDE 2 SPRAY: 137 SPRAY, METERED NASAL at 20:57

## 2021-02-03 RX ADMIN — POTASSIUM CHLORIDE 20 MEQ: 750 TABLET, FILM COATED, EXTENDED RELEASE ORAL at 09:59

## 2021-02-03 RX ADMIN — ALBUTEROL SULFATE 2.5 MG: 2.5 SOLUTION RESPIRATORY (INHALATION) at 12:27

## 2021-02-03 RX ADMIN — FENTANYL CITRATE 50 MCG: 50 INJECTION, SOLUTION INTRAMUSCULAR; INTRAVENOUS at 06:07

## 2021-02-03 RX ADMIN — TAMSULOSIN HYDROCHLORIDE 0.4 MG: 0.4 CAPSULE ORAL at 20:57

## 2021-02-03 RX ADMIN — PREGABALIN 150 MG: 75 CAPSULE ORAL at 20:57

## 2021-02-03 RX ADMIN — SODIUM CHLORIDE: 9 INJECTION, SOLUTION INTRAVENOUS at 11:52

## 2021-02-03 RX ADMIN — PRAVASTATIN SODIUM 40 MG: 40 TABLET ORAL at 20:57

## 2021-02-03 RX ADMIN — FLAVOXATE HYDROCHLORIDE 100 MG: 100 TABLET ORAL at 20:57

## 2021-02-03 RX ADMIN — OXYCODONE HYDROCHLORIDE AND ACETAMINOPHEN 1 TABLET: 5; 325 TABLET ORAL at 20:56

## 2021-02-03 ASSESSMENT — PAIN DESCRIPTION - DESCRIPTORS
DESCRIPTORS: ACHING

## 2021-02-03 ASSESSMENT — PAIN DESCRIPTION - ONSET
ONSET: ON-GOING
ONSET: ON-GOING

## 2021-02-03 ASSESSMENT — ENCOUNTER SYMPTOMS
WHEEZING: 0
RHINORRHEA: 0
SHORTNESS OF BREATH: 0
NAUSEA: 0
BACK PAIN: 0
VOMITING: 0
COLOR CHANGE: 0
PHOTOPHOBIA: 0
ABDOMINAL PAIN: 1

## 2021-02-03 ASSESSMENT — PAIN SCALES - GENERAL
PAINLEVEL_OUTOF10: 6
PAINLEVEL_OUTOF10: 7
PAINLEVEL_OUTOF10: 8
PAINLEVEL_OUTOF10: 7

## 2021-02-03 ASSESSMENT — PAIN DESCRIPTION - FREQUENCY
FREQUENCY: CONTINUOUS
FREQUENCY: CONTINUOUS

## 2021-02-03 ASSESSMENT — PAIN DESCRIPTION - PROGRESSION
CLINICAL_PROGRESSION: NOT CHANGED
CLINICAL_PROGRESSION: GRADUALLY WORSENING
CLINICAL_PROGRESSION: NOT CHANGED

## 2021-02-03 ASSESSMENT — PAIN DESCRIPTION - PAIN TYPE
TYPE: ACUTE PAIN

## 2021-02-03 ASSESSMENT — PAIN DESCRIPTION - ORIENTATION
ORIENTATION: UPPER
ORIENTATION: UPPER

## 2021-02-03 ASSESSMENT — PAIN DESCRIPTION - LOCATION
LOCATION: ABDOMEN;FLANK
LOCATION: ABDOMEN
LOCATION: ABDOMEN
LOCATION: ABDOMEN;FLANK
LOCATION: ABDOMEN;FLANK

## 2021-02-03 ASSESSMENT — PAIN - FUNCTIONAL ASSESSMENT
PAIN_FUNCTIONAL_ASSESSMENT: PREVENTS OR INTERFERES SOME ACTIVE ACTIVITIES AND ADLS
PAIN_FUNCTIONAL_ASSESSMENT: PREVENTS OR INTERFERES SOME ACTIVE ACTIVITIES AND ADLS

## 2021-02-03 NOTE — ED PROVIDER NOTES
[facility]  EMERGENCY DEPARTMENTENCOUNTER      Pt Name: Maxx Jeffries  MRN: [id number]  [place] 1960  Date ofevaluation: 2/3/2021  Provider: Brandee Gomez MD    CHIEF COMPLAINT       Chief Complaint   Patient presents with    Abdominal Pain     pt came in with abd pain bilaterally along with bilateral flank pain that started last weds    Flank Pain         HISTORY OF PRESENT ILLNESS   (Location/Symptom, Timing/Onset,Context/Setting, Quality, Duration, Modifying Factors, Severity)  Note limiting factors. Maxx Jeffries is a 63 y.o. male  who  has a past medical history of Bronchiolitis obliterans ([address] Bundle branch block, right, Cardiomyopathy ([address] Chest pain, Fibromyalgia, GERD (gastroesophageal reflux disease), Hepatitis, Hx of blood clots, Hyperlipemia, Hypertension, IBS (irritable bowel syndrome), Kidney stone, Neuropathy, Pneumothorax, Prostatitis, Pulmonary embolism on right ([address] and Sacroiliitis ([address] who presents to the emergency department for evaluation of bilateral flank pain and abdominal pain. Patient states is a history of recurrent urolithiasis. He states he was seen by his urologist the previous day due to worsening pain is scheduled for an outpatient CT. he states that today he was having progressively worsening pain despite taking oral Percocet. He states pain is consistent with previous kidney stones had the past.  He denies decreased urine output or gross hematuria. Denies fevers nausea or vomiting. He has had a previous hernia repair in the abdomen. He denies changes in bowel function. HPI    NursingNotes were reviewed. REVIEW OF SYSTEMS    (2-9 systems for level 4, 10 or more for level 5)     Review of Systems   Constitutional: Negative for activity change, chills and fever. HENT: Negative for congestion and rhinorrhea. Eyes: Negative for photophobia and visual disturbance.    Respiratory: Negative for shortness of breath and wheezing. Cardiovascular: Negative for palpitations and leg swelling. Gastrointestinal: Positive for abdominal pain. Negative for nausea and vomiting. Endocrine: Negative for polydipsia and polyuria. Genitourinary: Positive for flank pain. Negative for decreased urine volume, difficulty urinating, frequency and hematuria. Musculoskeletal: Negative for back pain and gait problem. Skin: Negative for color change and rash. Neurological: Negative for light-headedness and headaches. Psychiatric/Behavioral: Negative for confusion. The patient is not nervous/anxious. All other systems reviewed and are negative. Except as noted above the remainder of the review of systems was reviewed and negative.        PAST MEDICAL HISTORY     Past Medical History:   Diagnosis Date    Bronchiolitis obliterans ([address]     Bundle branch block, right     Cardiomyopathy ([address]     Chest pain 9/24/2019    Fibromyalgia     GERD (gastroesophageal reflux disease)     Hepatitis 1979    unsure of which type    Hx of blood clots     Hyperlipemia     Hypertension     IBS (irritable bowel syndrome)     Kidney stone     over thirty kidney stones    Neuropathy     right side-chest    Pneumothorax 2011    Right    Prostatitis     Pulmonary embolism on right [facility]) 2011    upper lobe-coumadin 2011    Sacroiliitis ([address]          SURGICALHISTORY       Past Surgical History:   Procedure Laterality Date    CHOLECYSTECTOMY  2007    COLONOSCOPY  9/21/2012    dr Cale Fernando  05/17/2019    RIGHT SIDED URETEROSCOPY  Diagnostic, retrograde pyelogram and fulguration of previously resected bladder tumor base    CYSTOSCOPY Right 5/17/2019    RIGHT SIDED URETEROSCOPY FLUGERATION  OF BLADDER performed by Florentino Burgos MD at [address] [place] [address]  2015    LITHOTRIPSY      PACEMAKER PLACEMENT      SINUS SURGERY      Made opening larger in sinuses    UPPER GASTROINTESTINAL ENDOSCOPY  3/28/2014 dr Steph Goldsmith N/A 2/1/2021    EGD BIOPSY performed by Lee Erickson MD at [address]       Previous Medications    ALBUTEROL (PROVENTIL) (2.5 MG/3ML) 0.083% NEBULIZER SOLUTION    USE 3 ML VIA NEBULIZER EVERY 4 HOURS AS NEEDED FOR WHEEZING OR SHORTNESS OF BREATH    ALBUTEROL SULFATE  (90 BASE) MCG/ACT INHALER    INHALE 2 PUFFS INTO THE LUNGS EVERY 4 HOURS AS NEEDED FOR WHEEZING OR SHORTNESS OF BREATH    ASPIRIN 81 MG TABLET    Take 81 mg by mouth daily    AZELASTINE (ASTELIN) 0.1 % NASAL SPRAY    2 sprays by Nasal route 2 times daily Use in each nostril as directed    BEMPEDOIC ACID (NEXLETOL) 180 MG TABS    Take 180 mg by mouth daily    BENZONATATE (TESSALON) 200 MG CAPSULE    Take 1 capsule by mouth 3 times daily as needed for Cough    CHOLECALCIFEROL (VITAMIN D3) 1.25 MG (31255 UT) CAPS    Take 1 capsule by mouth once a week    DICYCLOMINE (BENTYL) 10 MG CAPSULE    TAKE ONE CAPSULE BY MOUTH  FOUR TIMES DAILY AS NEEDED    ESOMEPRAZOLE (NEXIUM) 40 MG DELAYED RELEASE CAPSULE    Take 40 mg by mouth every morning (before breakfast)    FLAVOXATE (URISPAS) 100 MG TABLET    TAKE 1 TABLET BY MOUTH THREE TIMES DAILY AS NEEDED FOR URINARY SPASM    FLECAINIDE (TAMBOCOR) 50 MG TABLET    Take 1 tablet by mouth 2 times daily    FUROSEMIDE (LASIX) 20 MG TABLET    Take 1 tablet by mouth daily as needed (take 20 mg tablet daily as needed for increased weight, swelling or shortness of breath)    GUAIFENESIN (MUCINEX) 600 MG EXTENDED RELEASE TABLET    Take 1 tablet by mouth 2 times daily    METOPROLOL SUCCINATE (TOPROL XL) 25 MG EXTENDED RELEASE TABLET    TAKE 1 TABLET BY MOUTH TWO  TIMES DAILY    ONDANSETRON (ZOFRAN ODT) 4 MG DISINTEGRATING TABLET    Take 1-2 tablets by mouth every 8 hours as needed for Nausea May Sub regular tablet (non-ODT) if insurance does not cover ODT.     ONDANSETRON (ZOFRAN ODT) 4 MG DISINTEGRATING TABLET    Take 1-2 tablets by Medical: No     Non-medical: No   Tobacco Use    Smoking status: Never Smoker    Smokeless tobacco: Never Used   Substance and Sexual Activity    Alcohol use: No     Frequency: Never     Comment: occ    Drug use: No    Sexual activity: Yes     Partners: Female     Comment: wife   Lifestyle    Physical activity     Days per week: Not on file     Minutes per session: Not on file    Stress: Not on file   Relationships    Social connections     Talks on phone: Not on file     Gets together: Not on file     Attends Hoahaoism service: Not on file     Active member of club or organization: Not on file     Attends meetings of clubs or organizations: Not on file     Relationship status: Not on file    Intimate partner violence     Fear of current or ex partner: Not on file     Emotionally abused: Not on file     Physically abused: Not on file     Forced sexual activity: Not on file   Other Topics Concern    Not on file   Social History Narrative    Not on file       SCREENINGS             PHYSICAL EXAM    (up to 7 for level 4, 8 or more for level 5)     ED Triage Vitals [02/03/21 0547]   BP Temp Temp Source Pulse Resp SpO2 Height Weight   (!) 146/102 98 °F (36.7 °C) Oral 74 15 94 % 5' 6\" (1.676 m) 217 lb 13 oz (98.8 kg)       Physical Exam  Vitals signs and nursing note reviewed. Constitutional:       General: He is not in acute distress. Appearance: He is well-developed. He is not ill-appearing or toxic-appearing. HENT:      Head: Normocephalic and atraumatic. Eyes:      Conjunctiva/sclera: Conjunctivae normal.   Neck:      Musculoskeletal: Normal range of motion. Trachea: No tracheal deviation. Cardiovascular:      Rate and Rhythm: Normal rate and regular rhythm. Pulmonary:      Effort: Pulmonary effort is normal.      Breath sounds: Normal breath sounds. No wheezing or rales. Abdominal:      General: There is no distension. Palpations: Abdomen is soft. Tenderness:  There is no abdominal tenderness. There is right CVA tenderness and left CVA tenderness. Musculoskeletal: Normal range of motion. General: No deformity. Skin:     General: Skin is warm and dry. Capillary Refill: Capillary refill takes less than 2 seconds. Neurological:      Mental Status: He is alert and oriented to person, place, and time. RESULTS     EKG: All EKG's are interpreted by the Emergency Department Physician who either signs or Co-signsthis chart in the absence of a cardiologist.    RADIOLOGY:   Lanis Joyce such as CT, Ultrasound and MRI are read by the radiologist. Plain radiographic images are visualized and preliminarily interpreted by the emergency physician with the below findings:    Interpretation per the Radiologist below, if available at the time ofthis note:    No orders to display         ED BEDSIDE ULTRASOUND:   Performed by ED Physician - none    LABS:  Labs Reviewed - No data to display    All other labs were within normal range or not returned as of this dictation. EMERGENCY DEPARTMENT COURSE and DIFFERENTIAL DIAGNOSIS/MDM:   Vitals:    Vitals:    02/03/21 0547   BP: (!) 146/102   Pulse: 74   Resp: 15   Temp: 98 °F (36.7 °C)   TempSrc: Oral   SpO2: 94%   Weight: 217 lb 13 oz (98.8 kg)   Height: 5' 6\" (1.676 m)       Patient was given thefollowing medications:  Medications - No data to display    ED COURSE & MEDICAL DECISION MAKING    Pertinent Labs & Imaging studies reviewed. (See chart for details)   -  Patient seen and evaluated in the emergency department. -  Triage and nursing notes reviewed and incorporated. -  Old chart records reviewed and incorporated.   -  Differential diagnosis includes: Differential Diagnosis: Urinary retention, pyelonephritis, bladder infection, urosepsis, GI emergency, surgical emergency, dehydration, musculoskeletal back pain, vaginal candidiasis, other    -  Work-up included:  See above  -  ED treatment included: See above  -

## 2021-02-03 NOTE — PROGRESS NOTES
4 Eyes Skin Assessment     NAME:  Mary Beth Reece  YOB: 1960  MEDICAL RECORD NUMBER:  [id number]    The patient is being assess for  Admission    I agree that 2 RN's have performed a thorough Head to Toe Skin Assessment on the patient. ALL assessment sites listed below have been assessed. Areas assessed by both nurses:    Head, Face, Ears, Shoulders, Back, Chest, Arms, Elbows, Hands, Sacrum. Buttock, Coccyx, Ischium and Legs. Feet and Heels        Does the Patient have a Wound?  No noted wound(s)       Casimiro Prevention initiated:  Yes   Wound Care Orders initiated:  No    Pressure Injury (Stage 3,4, Unstageable, DTI, NWPT, and Complex wounds) if present place consult order under [de-identified] No    New and Established Ostomies if present place consult order under : No      Nurse 1 eSignature: Electronically signed by Master Claudio RN on 2/3/21 at 5:33 PM EST    **SHARE this note so that the co-signing nurse is able to place an eSignature**    Nurse 2 eSignature: {Esignature:[id number]}

## 2021-02-03 NOTE — PLAN OF CARE
Problem: Pain:  Goal: Pain level will decrease  Description: Pain level will decrease  Outcome: Ongoing  Note: Assessing and monitoring pt's pain. PRN pain medication being given if ordered. Educating pt on nonpharmacologic interventions for pain control. Will continue to monitor and reassess. Problem: Pain:  Goal: Control of acute pain  Description: Control of acute pain  Outcome: Ongoing  Note: Assessing and monitoring pt's pain. PRN pain medication being given if ordered. Educating pt on nonpharmacologic interventions for pain control. Will continue to monitor and reassess. Problem: Pain:  Goal: Control of chronic pain  Description: Control of chronic pain  Outcome: Ongoing  Note: Assessing and monitoring pt's pain. PRN pain medication being given if ordered. Educating pt on nonpharmacologic interventions for pain control. Will continue to monitor and reassess. Problem: Falls - Risk of:  Goal: Will remain free from falls  Description: Will remain free from falls  Outcome: Ongoing  Note: Fall risk assessment completed. Fall precautions in place. Bed in lowest position, wheels locked, bed/chair exit alarm in place, call light within reach, and non skid footwear on. Walkway free of clutter. Pt alert and oriented and able to make needs known. Pt educated to use call light when needing to get up, and pt utilizes call light to make needs known. Will continue to monitor.         Problem: Falls - Risk of:  Goal: Absence of physical injury  Description: Absence of physical injury  Outcome: Ongoing  Note: Pt absent from physical injury on this shift

## 2021-02-03 NOTE — CONSULTS
Consulting Physician:Dr. Sampson Kaur    Reason for Consult: flank pain, CT negative, percocet no relieving his pain    History of Present Illness: Mayra Hillman is a 63 y.o. male with a complicated  history with multiple stones and prior hematoma s/p ESWL, also with stage Ta bladder cancer, underwent surveillance cystoscopy with Dr. Milagro Plunkett on 1/18/21 which did not show reoccurrence of cancer and had clear efflux from bilateral ureteral orifices. He is well known to our office and was seen by Dr. Mike Pena yesterday for complaints of new onset of right flank pain and an outpatient CT was ordered for him and he was given prescription for Percocet. He feels he is passing stones rather frequently. He came to the ED due to worsening pain despite the percocet. He now describes the flank and abdominal pain as bilaterally R>L. He is frustrated and states \"you guys tell me there isn't anything wrong and two days after I leave your office, I always pass a stone. I know the CT today doesn't show anything but it's wrong. I know what a stone feels like and this is exactly that. \" Denies GH, urgency, frequency, dysuria. He does have chronic microhematuria. Labs and VSS.     Past Medical History:   Past Medical History:   Diagnosis Date    Bronchiolitis obliterans ([address]     Bundle branch block, right     Cardiomyopathy ([address]     Chest pain 9/24/2019    Fibromyalgia     GERD (gastroesophageal reflux disease)     Hepatitis 1979    unsure of which type    Hx of blood clots     Hyperlipemia     Hypertension     IBS (irritable bowel syndrome)     Kidney stone     over thirty kidney stones    Neuropathy     right side-chest    Pneumothorax 2011    Right    Prostatitis     Pulmonary embolism on right [facility]) 2011    upper lobe-coumadin 2011    Sacroiliitis [facility])        Past Surgical History:  Past Surgical History:   Procedure Laterality Date    CHOLECYSTECTOMY  2007    COLONOSCOPY  9/21/2012    dr Lea Patino CYSTOSCOPY  05/17/2019    RIGHT SIDED URETEROSCOPY  Diagnostic, retrograde pyelogram and fulguration of previously resected bladder tumor base    CYSTOSCOPY Right 5/17/2019    RIGHT SIDED URETEROSCOPY FLUGERATION  OF BLADDER performed by Christian Dennison MD at [facility]  2015    LITHOTRIPSY      PACEMAKER PLACEMENT      SINUS SURGERY      Made opening larger in sinuses    UPPER GASTROINTESTINAL ENDOSCOPY  3/28/2014    dr Kai Harris N/A 2/1/2021    EGD BIOPSY performed by Tani Paulino MD at [facility] History:  Social History     Socioeconomic History    Marital status:      Spouse name: Not on file    Number of children: Not on file    Years of education: Not on file    Highest education level: Not on file   Occupational History    Not on file   Social Needs    Financial resource strain: Not hard at all   Alida-Mike insecurity     Worry: Never true     Inability: Never true   mySociety needs     Medical: No     Non-medical: No   Tobacco Use    Smoking status: Never Smoker    Smokeless tobacco: Never Used   Substance and Sexual Activity    Alcohol use: No     Frequency: Never     Comment: occ    Drug use: No    Sexual activity: Yes     Partners: Female     Comment: wife   Lifestyle    Physical activity     Days per week: Not on file     Minutes per session: Not on file    Stress: Not on file   Relationships    Social connections     Talks on phone: Not on file     Gets together: Not on file     Attends Adventism service: Not on file     Active member of club or organization: Not on file     Attends meetings of clubs or organizations: Not on file     Relationship status: Not on file    Intimate partner violence     Fear of current or ex partner: Not on file     Emotionally abused: Not on file     Physically abused: Not on file     Forced sexual activity: Not on file   Other Topics Concern    Not on file Social History Narrative    Not on file       Family History:  Family History   Problem Relation Age of Onset    High Blood Pressure Mother     Dementia Mother     Cancer Father         Pancreatic Ca       Meds:   Current Facility-Administered Medications: potassium chloride (KLOR-CON) extended release tablet 20 mEq, 20 mEq, Oral, Daily  aluminum & magnesium hydroxide-simethicone (MAALOX) 200-200-20 MG/5ML suspension 30 mL, 30 mL, Oral, Q3H PRN    Review of Systems:  10 Systems were reviewed and negative except as in HPI    Vitals:  BP (!) 143/90   Pulse 54   Temp 98 °F (36.7 °C) (Oral)   Resp 17   Ht 5' 6\" (1.676 m)   Wt 217 lb 13 oz (98.8 kg)   SpO2 95%   BMI 35.16 kg/m²   No intake or output data in the 24 hours ending 02/03/21 1015    Physical Exam:  General Appearance: Alert and oriented, cooperative, no distress, appears stated age  Head: Normocephalic, without obvious abnormality, atraumatic  Back: no CVA tenderness  Lungs: respirations unlabored, no wheezing  Heart: Regular rate and rhythm, no lower extremity edema noted  Abdomen: Soft, non-tender, non-distended, no masses  Skin: Skin color, texture, turgor normal, no rashes or lesions  Neurologic: no gross deficits  Male :   Nonpalpable bladder   No CVA tenderness   Spontaneously voiding   MARIA T Not indicated    Labs:  CBC   Lab Results   Component Value Date    WBC 6.1 02/03/2021    RBC 5.20 02/03/2021    HGB 16.0 02/03/2021    HCT 47.7 02/03/2021    MCV 91.8 02/03/2021    MCH 30.7 02/03/2021    MCHC 33.4 02/03/2021    RDW 12.8 02/03/2021     02/03/2021    MPV 7.3 02/03/2021     BMP   Lab Results   Component Value Date     02/03/2021    K 3.8 02/03/2021     02/03/2021    CO2 25 02/03/2021    BUN 9 02/03/2021    CREATININE 0.9 02/03/2021    GLUCOSE 110 02/03/2021    CALCIUM 10.8 02/03/2021       Urinalysis:   Lab Results   Component Value Date    COLORU YELLOW 02/03/2021    GLUCOSEU Negative 02/03/2021    GLUCOSEU NEGATIVE 01/24/2012    BLOODU MODERATE 02/03/2021    NITRU Negative 02/03/2021    LEUKOCYTESUR TRACE 02/03/2021       Imaging:  Pertinent images and radiologist's report were reviewed independently  CT abdomen/pelvis w/o contrast 2/3/21  Impression   No acute intra-abnormality       Bilateral nonobstructing renal calculi.       Scattered sclerotic lesions in the pelvic bones, similar to prior, likely   bone islands in the absence of a known primary     Impression/Plan:   - 60y. o. male with renal colic.   - Pain control  - No acute  intervention.      BRENNA Mcgill - CNP 1/4/315416:06 AM

## 2021-02-03 NOTE — ED PROVIDER NOTES
[address] [place] [address]      Pt Name: Maude Sheldon  MRN: [id number]  Griseldagfmaximiliano 1960  Date of evaluation: 2/3/2021  Provider: Mike Rai, [address] [place] [address]  Chief Complaint   Patient presents with    Abdominal Pain     pt came in with abd pain bilaterally along with bilateral flank pain that started last weds    Flank Pain         This patient was turned over to me at 0700 By Dr. Maude Sheldon. They were examined by me and I agree with the history and physical examination of Dr. Maude Sheldon. After my evaluation of this patient, and review of the labs and imaging studies, they will be admitted. Past Medical History:   Diagnosis Date    Bronchiolitis obliterans ([address]     Bundle branch block, right     Cardiomyopathy ([address]     Chest pain 9/24/2019    Fibromyalgia     GERD (gastroesophageal reflux disease)     Hepatitis 1979    unsure of which type    Hx of blood clots     Hyperlipemia     Hypertension     IBS (irritable bowel syndrome)     Kidney stone     over thirty kidney stones    Neuropathy     right side-chest    Pneumothorax 2011    Right    Prostatitis     Pulmonary embolism on right ([address] 2011    upper lobe-coumadin 2011    Sacroiliitis ([address]        Vitals:    02/03/21 0710   BP: 128/84   Pulse: 73   Resp: 16   Temp:    SpO2: 96%       MDM:  Maude Sheldon is a 63 y.o. male who presents with bilateral flank pain worse on the right that has been present for 1 week ago. It has been intermittent. He has a history of similar pain with kidney stones. He states that he did not show up on the CAT scan. He has had multiple uroscopy's and cystoscopies without an answer for his pain. Patient's been taking Percocet without relief and could not sleep last night secondary to the pain. He is taking Flomax twice a day. Physical exam reveals mild tenderness in the right flank pain.   Remainder of his physical exam is noncontributory. Laboratory work revealed mild blood in urine. CT scan did not reveal a cause for his pain. I spoke to Dr. Kita Mcconnell who stated patient will need to be admitted to the hospital because they cannot control his pain at home. He is on Percocets 5 mg. I spoke to his urologist's PA and they stated they knew him well. They will evaluate him in the hospital.  Patient was admitted to hospital for pain control and failure of outpatient treatment. Dr. Kita Mcconnell will write the admission orders.     Vitals:    02/03/21 0710   BP: 128/84   Pulse: 73   Resp: 16   Temp:    SpO2: 96%       Labs Reviewed   COMPREHENSIVE METABOLIC PANEL W/ REFLEX TO MG FOR LOW K - Abnormal; Notable for the following components:       Result Value    Glucose 110 (*)     Calcium 10.8 (*)     All other components within normal limits    Narrative:     Performed at:  [address] [email or web address]   Phone [phone]   URINE RT REFLEX TO CULTURE - Abnormal; Notable for the following components:    Blood, Urine MODERATE (*)     Leukocyte Esterase, Urine TRACE (*)     All other components within normal limits    Narrative:     Performed at:  [address] [employer]   Phone [phone]   PROTIME-INR - Abnormal; Notable for the following components:    Protime 19.3 (*)     INR 1.66 (*)     All other components within normal limits    Narrative:     Performed at:  [address] [employer]   Phone [phone]   MICROSCOPIC URINALYSIS - Abnormal; Notable for the following components:    RBC, UA 11 (*)     All other components within normal limits    Narrative:     Performed at:  [address] [email or web address]   Phone [phone]   CBC WITH AUTO DIFFERENTIAL    Narrative:     Performed at:  [facility]  [address] [place], [place] [address]   Phone [phone]   LIPASE    Narrative:     Performed at:  [facility]  [address] [place], [place] [address]   Phone [phone]       CT ABDOMEN PELVIS WO CONTRAST Additional Contrast? None   Final Result   No acute intra-abnormality      Bilateral nonobstructing renal calculi. Scattered sclerotic lesions in the pelvic bones, similar to prior, likely   bone islands in the absence of a known primary               Pertinent Labs & Imaging studies reviewed. (See chart for details)    Patient remained stable in the ED. the fentanyl controlled his pain briefly but then returned. He states that she has not been able to control his pain at home. Therefore, I cannot prescribe stronger pain medicine and patient will need to be admitted to the hospital for failure of outpatient treatment and right flank pain. Dr. Keron Helms was notified at 0909. The patient's blood pressure was found to be elevated according to CMS/Medicare and the Affordable Care Act/ObamaCare criteria. Elevated blood pressure could occur because of pain or anxiety or other reasons and does not mean that they need to have their blood pressure treated or medications otherwise adjusted. However, this could also be a sign that they will need to have their blood pressure treated or medications changed. The patient was instructed to follow up closely with their personal physician to have their blood pressure rechecked. The patient was instructed to take a list of recent blood pressure readings to their next visit with their personal physician.     Medications   ondansetron (ZOFRAN) injection 4 mg (4 mg Intravenous Given 2/3/21 0608)   fentaNYL (SUBLIMAZE) injection 50 mcg (50 mcg Intravenous Given 2/3/21 0607)       New Prescriptions    No medications on file       I reviewed old records     (This chart has been completed using [address]. Although attempts have been made to ensure accuracy, words and/or phrases may not be transcribed as intended.)    Patient requested pain medicines at the time of their exam.    IMPRESSION(S):  1. Flank pain    2. Renal colic on right side    3.  Failure of outpatient treatment        Critical Care Time: 30 minutes not including billable procedures       Simón Mota,   02/03/21 5706

## 2021-02-03 NOTE — TELEPHONE ENCOUNTER
Mr. Jaylin Brice left a message yesterday evening. I returned his call today. He was wanting to change his appointment. Since then he has been admitted. He does not believe they will change his mediation. We went ahead and rescheduled from 2 weeks from now. He will call if he needs to change this. I will also follow along to see if he would need to be seen sooner.      Shanthi Cherry, PharmD, [address] [place] [address] Service  [phone]

## 2021-02-03 NOTE — ED NOTES
Pharmacy Medication Reconciliation Note     List of medications patient is currently taking is complete. Source of information:   1. EMR  2. patient    Notes regarding home medications:   1. Reports that he did take some of his AM medications this morning in the ED. 2. Never took Bempedoic acid because it was not covered by his insurance  3. Reports that he is no longer on flecainide due to side effects  4. On warfarin managed at [address] - reports taking it 5 mg mdaily except 2.5 mg on Tues and Thurs which Is different than reported in last anticoag visit. This will need to be updated. Reports no missed doses. INR today 1.66. needs to move his next appointment to next week.    5. Currently taking Flomax BID     Denies taking any other OTC or herbal medications    Loli Christian, PharmD, BCPS  2/3/2021  11:46 AM

## 2021-02-03 NOTE — ED TRIAGE NOTES
Pt states he was just at his urologist appointment yesterday and he was told that he has kidney stones and that he will be passing them. Today the patient is in more pain so he came to the ER.

## 2021-02-03 NOTE — H&P
[address] [facility]                              HISTORY AND PHYSICAL    PATIENT NAME: Shawn Kirkland                        :        1960  MED REC NO:   [id number]                          ROOM:       3118  ACCOUNT NO:   [de-identified]                           ADMIT DATE: 2021  PROVIDER:     Tori Ramirez MD    CHIEF COMPLAINT:  Intractable right flank pain. HISTORY OF PRESENT ILLNESS:  The patient is a 56-year-old white male  admitted through emergency. He presented with a history of increasing  pain in the lower abdomen. He initially described it as bilateral on  the flanks but then said it was mostly on the right side. He does have  evidence of hematuria on urinalysis. He has a history of multiple renal  stones in the past and gets pain like this which he feels represents his  ureteral colic on a fairly regular basis. He presented to the ER having  taken Percocet at home without relief of his pain. He is already on  Flomax. He was given fentanyl in the ER which relieved his pain short  term, but then it came back and the patient stated that he could not be  discharged home because he is in too much pain. CT was done in the ER  that did not show evidence of hydronephrosis. It did show bilateral  nonobstructing renal calculi but no evidence of hydroureter or  hydronephrosis. He is being admitted for control of his pain, for IV  fluids, and to follow his laboratory parameters. PAST MEDICAL HISTORY:  Remarkable for bronchiolitis obliterans for which  he is followed by the Pulmonary service, gastroesophageal reflux  disease, history of DVT and pulmonary embolus for which he is on  warfarin therapy, hypertension, hyperlipidemia, prior right-sided  pneumothorax, and a history of pulmonary embolism, as mentioned.     PAST SURGICAL HISTORY:  Includes cholecystectomy and multiple ureteroscopies. He has also had pacemaker placement, lithotripsy  recently, and an upper GI endoscopy performed by Dr. Ji Hussein for  intractable gastroesophageal reflux. ADMISSION MEDICATIONS:  Include albuterol nebulizer and multidose  inhaler, aspirin 81 mg daily, vitamin D, Nexium 40 mg daily, flecainide  50 mg b.i.d., metoprolol 25 b.i.d., Percocet q.4 as needed for pain,  pravastatin 40 mg daily, Lyrica 75 mg at bedtime, 150 mg at night;  valsartan 24/26 one b.i.d., tamsulosin 0.4 mg b.i.d., and warfarin on a  daily basis. ALLERGIES:  Include ATROVENT, DOXYCYCLINE, SULFA ANTIBIOTICS, and  HYDROCODONE. FAMILY HISTORY:  Not contributory. SOCIAL HISTORY:  He is not a smoker or drinker. REVIEW OF SYSTEMS:  Positive for flank pain. Positive for abdominal  pain. Positive for some nausea. Negative for chest pain. Negative for  shortness of breath. Positive for some gastroesophageal reflux and  epigastric pain. Ten-point review of systems otherwise performed was  negative. PHYSICAL EXAMINATION:  VITAL SIGNS:  On admission, blood pressure initially was 146/102, after  his pain was controlled, his pressure is 128/84; pulse 74, respirations  15, he is afebrile. GENERAL:  He is a well-developed, well-nourished white male sitting in  bed with multiple concerns. HEENT:  Head is normocephalic, atraumatic. Pupils are equal, round,  reactive to light and accommodation. Extraocular muscles intact. NECK:  Supple without JVD. CHEST:  Without wheezes, rales, or rhonchi. CARDIOVASCULAR:  Regular rate and rhythm. There was no resting  tachycardia. ABDOMEN:  Tender over the right flank and the right lower quadrant area. Tapping over the kidneys did produce some CVA tenderness on the right  side. EXTREMITIES:  No evident clubbing, cyanosis, or edema. NEUROLOGIC:  He is alert, oriented. Moving all extremities without  focal neurologic deficits. LABORATORY DATA:  BUN and creatinine are 9 and 0.9, sodium 139,  potassium 3.8, calcium was slightly elevated at 10.8, glucose was 110. White count 6100, hemoglobin 16, platelet count 646,557. Urinalysis  showed moderate blood, leukocyte esterase was trace. CT scan of the abdomen and pelvis again showed bilateral nonobstructing  renal calculi. IMPRESSION:  1. Ureteral colic on the right side. No hydronephrosis on CT but he  does have hematuria. He has had recurring symptoms and pain is  uncontrolled with Percocet and fentanyl. We will admit for pain control  and follow his laboratory data. 2.  Essential hypertension. 3.  Right lower quadrant abdominal pain associated with ureteral colic. 4.  Gastroesophageal reflux with esophagitis. 5.  Bronchiolitis obliterans. 6.  History of pulmonary embolism. 7.  History of primary hyperparathyroidism. PLAN:  He will be admitted, given IV fluids, pain management. We will  follow his labs.         Son Cutler MD    D: 02/03/2021 9:47:04       T: 02/03/2021 13:46:15     [email or web address]  Job#: [id number]     Doc#: [id number]    CC:  Nas Owens MD

## 2021-02-03 NOTE — ED NOTES
ED SBAR report provider to [facility]. Patient to be transported to Room 3118 via stretcher by ED tech. Patient transported with bedside cardiac monitor and with IV medications infusing. IV site clean, dry, and intact. MEWS score and pain assessed as 8/10 and documented. Updated patient on plan of care.      Wing Conrad RN  02/03/21 1016

## 2021-02-03 NOTE — H&P
H dictated--IMP: Principal Problem:    Ureteral colic--Rt side--no hydro on CT--hematuria on u/a--recc sx --uncontrolled with percocet and fentanyl--admit for pain control and follow labs   Active Problems:    Essential hypertension, benign--Continue current therapy      Right lower quadrant abdominal pain--sec to ureteral colic     Gastroesophageal reflux disease with esophagitis without hemorrhage--reent EGD--cont PPI     Bronchiolitis obliterans (HCC)--Continue current therapy      History of pulmonary embolism--cont warfarin     Primary hyperparathyroidism (HCC)--mild incr clac   Resolved Problems:    * No resolved hospital problems.  *    Admit--iv fluids-pain mgmt--follow labs     [facility]

## 2021-02-03 NOTE — ED NOTES
Care assumed from [facility].   Pt sitting up in bed watching TV, no distress noted, even respirations   pain 6/10     Clois Goodpasture, RN  02/03/21 8436

## 2021-02-04 ENCOUNTER — APPOINTMENT (OUTPATIENT)
Dept: GENERAL RADIOLOGY | Age: 61
DRG: 694 | End: 2021-02-04
Payer: COMMERCIAL

## 2021-02-04 VITALS
DIASTOLIC BLOOD PRESSURE: 59 MMHG | HEART RATE: 74 BPM | SYSTOLIC BLOOD PRESSURE: 117 MMHG | OXYGEN SATURATION: 91 % | BODY MASS INDEX: 34.97 KG/M2 | HEIGHT: 66 IN | WEIGHT: 217.59 LBS | RESPIRATION RATE: 16 BRPM | TEMPERATURE: 97.3 F

## 2021-02-04 LAB
A/G RATIO: 1.5 (ref 1.1–2.2)
ALBUMIN SERPL-MCNC: 3.5 G/DL (ref 3.4–5)
ALP BLD-CCNC: 112 U/L (ref 40–129)
ALT SERPL-CCNC: 44 U/L (ref 10–40)
ANION GAP SERPL CALCULATED.3IONS-SCNC: 6 MMOL/L (ref 3–16)
AST SERPL-CCNC: 41 U/L (ref 15–37)
BILIRUB SERPL-MCNC: 0.6 MG/DL (ref 0–1)
BUN BLDV-MCNC: 8 MG/DL (ref 7–20)
CALCIUM SERPL-MCNC: 10.1 MG/DL (ref 8.3–10.6)
CHLORIDE BLD-SCNC: 106 MMOL/L (ref 99–110)
CO2: 28 MMOL/L (ref 21–32)
CREAT SERPL-MCNC: 0.9 MG/DL (ref 0.8–1.3)
GFR AFRICAN AMERICAN: >60
GFR NON-AFRICAN AMERICAN: >60
GLOBULIN: 2.4 G/DL
GLUCOSE BLD-MCNC: 90 MG/DL (ref 70–99)
HCT VFR BLD CALC: 42.2 % (ref 40.5–52.5)
HEMOGLOBIN: 14 G/DL (ref 13.5–17.5)
INR BLD: 1.84 (ref 0.86–1.14)
MCH RBC QN AUTO: 31 PG (ref 26–34)
MCHC RBC AUTO-ENTMCNC: 33.2 G/DL (ref 31–36)
MCV RBC AUTO: 93.4 FL (ref 80–100)
PDW BLD-RTO: 13.1 % (ref 12.4–15.4)
PLATELET # BLD: 168 K/UL (ref 135–450)
PMV BLD AUTO: 7.4 FL (ref 5–10.5)
POTASSIUM SERPL-SCNC: 4.1 MMOL/L (ref 3.5–5.1)
PROTHROMBIN TIME: 21.5 SEC (ref 10–13.2)
RBC # BLD: 4.52 M/UL (ref 4.2–5.9)
SODIUM BLD-SCNC: 140 MMOL/L (ref 136–145)
TOTAL PROTEIN: 5.9 G/DL (ref 6.4–8.2)
WBC # BLD: 4.5 K/UL (ref 4–11)

## 2021-02-04 PROCEDURE — 80053 COMPREHEN METABOLIC PANEL: CPT

## 2021-02-04 PROCEDURE — 71045 X-RAY EXAM CHEST 1 VIEW: CPT

## 2021-02-04 PROCEDURE — 2580000003 HC RX 258: Performed by: INTERNAL MEDICINE

## 2021-02-04 PROCEDURE — 85610 PROTHROMBIN TIME: CPT

## 2021-02-04 PROCEDURE — 94640 AIRWAY INHALATION TREATMENT: CPT

## 2021-02-04 PROCEDURE — 99239 HOSP IP/OBS DSCHRG MGMT >30: CPT | Performed by: INTERNAL MEDICINE

## 2021-02-04 PROCEDURE — 85027 COMPLETE CBC AUTOMATED: CPT

## 2021-02-04 PROCEDURE — G0378 HOSPITAL OBSERVATION PER HR: HCPCS

## 2021-02-04 PROCEDURE — 6360000002 HC RX W HCPCS: Performed by: INTERNAL MEDICINE

## 2021-02-04 PROCEDURE — 94760 N-INVAS EAR/PLS OXIMETRY 1: CPT

## 2021-02-04 PROCEDURE — 36415 COLL VENOUS BLD VENIPUNCTURE: CPT

## 2021-02-04 PROCEDURE — 96375 TX/PRO/DX INJ NEW DRUG ADDON: CPT

## 2021-02-04 PROCEDURE — 6370000000 HC RX 637 (ALT 250 FOR IP): Performed by: INTERNAL MEDICINE

## 2021-02-04 RX ORDER — TAMSULOSIN HYDROCHLORIDE 0.4 MG/1
0.4 CAPSULE ORAL 2 TIMES DAILY
Qty: 60 CAPSULE | Refills: 2 | Status: SHIPPED | OUTPATIENT
Start: 2021-02-04 | End: 2021-09-20 | Stop reason: SDUPTHER

## 2021-02-04 RX ORDER — OXYCODONE HYDROCHLORIDE AND ACETAMINOPHEN 5; 325 MG/1; MG/1
1 TABLET ORAL EVERY 4 HOURS PRN
Qty: 30 TABLET | Refills: 0 | Status: SHIPPED | OUTPATIENT
Start: 2021-02-04 | End: 2021-02-07

## 2021-02-04 RX ORDER — WARFARIN SODIUM 2.5 MG/1
2.5 TABLET ORAL
Status: DISCONTINUED | OUTPATIENT
Start: 2021-02-04 | End: 2021-02-04 | Stop reason: HOSPADM

## 2021-02-04 RX ORDER — FUROSEMIDE 10 MG/ML
20 INJECTION INTRAMUSCULAR; INTRAVENOUS ONCE
Status: COMPLETED | OUTPATIENT
Start: 2021-02-04 | End: 2021-02-04

## 2021-02-04 RX ORDER — POTASSIUM CHLORIDE 750 MG/1
20 TABLET, FILM COATED, EXTENDED RELEASE ORAL ONCE
Status: COMPLETED | OUTPATIENT
Start: 2021-02-04 | End: 2021-02-04

## 2021-02-04 RX ADMIN — ZOLPIDEM TARTRATE 5 MG: 5 TABLET ORAL at 00:02

## 2021-02-04 RX ADMIN — POLYETHYLENE GLYCOL 3350 17 G: 17 POWDER, FOR SOLUTION ORAL at 08:48

## 2021-02-04 RX ADMIN — ALBUTEROL SULFATE 2.5 MG: 2.5 SOLUTION RESPIRATORY (INHALATION) at 12:53

## 2021-02-04 RX ADMIN — ESOMEPRAZOLE MAGNESIUM 40 MG: 40 CAPSULE, DELAYED RELEASE ORAL at 06:22

## 2021-02-04 RX ADMIN — METOPROLOL SUCCINATE 25 MG: 25 TABLET, EXTENDED RELEASE ORAL at 11:48

## 2021-02-04 RX ADMIN — PREGABALIN 75 MG: 75 CAPSULE ORAL at 11:48

## 2021-02-04 RX ADMIN — ASPIRIN 81 MG: 81 TABLET, CHEWABLE ORAL at 11:49

## 2021-02-04 RX ADMIN — POTASSIUM CHLORIDE 20 MEQ: 750 TABLET, FILM COATED, EXTENDED RELEASE ORAL at 11:48

## 2021-02-04 RX ADMIN — TAMSULOSIN HYDROCHLORIDE 0.4 MG: 0.4 CAPSULE ORAL at 08:46

## 2021-02-04 RX ADMIN — SACUBITRIL AND VALSARTAN 1 TABLET: 49; 51 TABLET, FILM COATED ORAL at 08:42

## 2021-02-04 RX ADMIN — FUROSEMIDE 20 MG: 10 INJECTION, SOLUTION INTRAMUSCULAR; INTRAVENOUS at 10:04

## 2021-02-04 RX ADMIN — SODIUM CHLORIDE: 9 INJECTION, SOLUTION INTRAVENOUS at 00:00

## 2021-02-04 RX ADMIN — POLYETHYLENE GLYCOL 3350 17 G: 17 POWDER, FOR SOLUTION ORAL at 11:49

## 2021-02-04 RX ADMIN — ALBUTEROL SULFATE 2.5 MG: 2.5 SOLUTION RESPIRATORY (INHALATION) at 08:13

## 2021-02-04 RX ADMIN — WARFARIN SODIUM 5 MG: 5 TABLET ORAL at 00:01

## 2021-02-04 RX ADMIN — AZELASTINE HYDROCHLORIDE 2 SPRAY: 137 SPRAY, METERED NASAL at 10:05

## 2021-02-04 RX ADMIN — POTASSIUM BICARBONATE 20 MEQ: 782 TABLET, EFFERVESCENT ORAL at 10:04

## 2021-02-04 ASSESSMENT — PAIN DESCRIPTION - DESCRIPTORS
DESCRIPTORS: ACHING
DESCRIPTORS: ACHING

## 2021-02-04 ASSESSMENT — PAIN DESCRIPTION - PROGRESSION
CLINICAL_PROGRESSION: NOT CHANGED
CLINICAL_PROGRESSION: GRADUALLY IMPROVING

## 2021-02-04 ASSESSMENT — PAIN DESCRIPTION - FREQUENCY: FREQUENCY: CONTINUOUS

## 2021-02-04 ASSESSMENT — PAIN DESCRIPTION - LOCATION: LOCATION: ABDOMEN

## 2021-02-04 NOTE — PLAN OF CARE
Problem: Pain:  Goal: Pain level will decrease  Description: Pain level will decrease  2/4/2021 1508 by Jaron Jose RN  Outcome: Completed  2/4/2021 0717 by Jaron Jose RN  Outcome: Ongoing  Note: Pain level will decrease. Electronically signed by Jaron Jsoe RN on 2/4/2021 at 7:17 AM    Goal: Control of acute pain  Description: Control of acute pain  2/4/2021 1508 by Jaron Jose RN  Outcome: Completed  2/4/2021 [phone] by Jaron Jose RN  Outcome: Ongoing  Note: Control of acute pain. Electronically signed by Jaron Jose RN on 2/4/2021 at 7:17 AM    Goal: Control of chronic pain  Description: Control of chronic pain  2/4/2021 1508 by Jaron Jose RN  Outcome: Completed  2/4/2021 0717 by Jaron Jose RN  Outcome: Ongoing  Note: Control of chronic pain. Electronically signed by Jaron Jose RN on 2/4/2021 at 7:17 AM       Problem: Falls - Risk of:  Goal: Will remain free from falls  Description: Will remain free from falls  2/4/2021 1508 by Jaron Jose RN  Outcome: Completed  2/4/2021 [phone] by Jaron Jose RN  Outcome: Ongoing  Note: Will remain free from falls. Electronically signed by Jaron Jose RN on 2/4/2021 at 7:17 AM    Goal: Absence of physical injury  Description: Absence of physical injury  2/4/2021 1508 by Jaron Jose RN  Outcome: Completed  2/4/2021 0717 by Jaron Jose RN  Outcome: Ongoing  Note: Absence of physical injury.   Electronically signed by Jaron Jose RN on 2/4/2021 at 7:17 AM

## 2021-02-04 NOTE — PROGRESS NOTES
[facility] Progress Note  2/4/2021 8:39 AM  Subjective:   Admit Date: 2/3/2021      Chief Complaint: persisting rt flank pain --but better--c/o low O2 sats --90%--are my lungs leaking ? ? Interval History: Rt flank pain persists--dilaudid is being given per patient request--no relief per patient with percocet or fentanyl--but now will agree to percocet   Lab noted --bun/cr=8/0.9  Urol did consult--no procedure planned     Diet: DIET CARDIAC;  Medications:   Scheduled Meds:   [Held by provider] aspirin  81 mg Oral Daily    metoprolol succinate  25 mg Oral BID    polyethylene glycol  17 g Oral Daily    tamsulosin  0.4 mg Oral BID    sodium chloride flush  10 mL Intravenous 2 times per day    potassium chloride  20 mEq Oral Daily    pregabalin  75 mg Oral Daily    pregabalin  150 mg Oral Nightly    esomeprazole  40 mg Oral QAM AC    warfarin (COUMADIN) daily dosing (placeholder)   Other RX Placeholder    sacubitril-valsartan  1 tablet Oral BID    azelastine  2 spray Each Nostril BID    pravastatin  40 mg Oral Nightly    albuterol  2.5 mg Nebulization TID     Continuous Infusions:   sodium chloride 75 mL/hr at 02/04/21 0000       Review of Systems -   General ROS: afebrile  Respiratory ROS: positive for - shortness of breath  Cardiovascular ROS: no chest pain  Musculoskeletal ROS:positive for - :joint pain  Neurological ROS: no TIA or stroke symptoms    Objective:   Vitals: /77   Pulse 75   Temp 97.6 °F (36.4 °C) (Oral)   Resp 14   Ht 5' 6\" (1.676 m)   Wt 217 lb 9.5 oz (98.7 kg)   SpO2 93%   BMI 35.12 kg/m²   General appearance: alert and cooperative with exam  HEENT: Head: Normocephalic, no lesions, without obvious abnormality.   Neck: no adenopathy, no carotid bruit, no JVD, supple, symmetrical, trachea midline and thyroid not enlarged, symmetric, no tenderness/mass/nodules  Lungs: rales bibasilar  Heart: regular rate and rhythm, S1, S2 normal, no murmur, click, rub or gallop  Abdomen: rt flank tr tender   Extremities: tr edema   Neurologic: Mental status: Alert, oriented, thought content appropriate    Admission on 02/03/2021   Component Date Value Ref Range Status    WBC 02/03/2021 6.1  4.0 - 11.0 K/uL Final    RBC 02/03/2021 5.20  4. 20 - 5.90 M/uL Final    Hemoglobin 02/03/2021 16.0  13.5 - 17.5 g/dL Final    Hematocrit 02/03/2021 47.7  40.5 - 52.5 % Final    MCV 02/03/2021 91.8  80.0 - 100.0 fL Final    MCH 02/03/2021 30.7  26.0 - 34.0 pg Final    MCHC 02/03/2021 33.4  31.0 - 36.0 g/dL Final    RDW 02/03/2021 12.8  12.4 - 15.4 % Final    Platelets 07/77/3120 188  135 - 450 K/uL Final    MPV 02/03/2021 7.3  5.0 - 10.5 fL Final    Neutrophils % 02/03/2021 52.9  % Final    Lymphocytes % 02/03/2021 31.0  % Final    Monocytes % 02/03/2021 8.8  % Final    Eosinophils % 02/03/2021 6.0  % Final    Basophils % 02/03/2021 1.3  % Final    Neutrophils Absolute 02/03/2021 3.2  1.7 - 7.7 K/uL Final    Lymphocytes Absolute 02/03/2021 1.9  1.0 - 5.1 K/uL Final    Monocytes Absolute 02/03/2021 0.5  0.0 - 1.3 K/uL Final    Eosinophils Absolute 02/03/2021 0.4  0.0 - 0.6 K/uL Final    Basophils Absolute 02/03/2021 0.1  0.0 - 0.2 K/uL Final    Sodium 02/03/2021 139  136 - 145 mmol/L Final    Potassium reflex Magnesium 02/03/2021 3.8  3.5 - 5.1 mmol/L Final    Chloride 02/03/2021 104  99 - 110 mmol/L Final    CO2 02/03/2021 25  21 - 32 mmol/L Final    Anion Gap 02/03/2021 10  3 - 16 Final    Glucose 02/03/2021 110* 70 - 99 mg/dL Final    BUN 02/03/2021 9  7 - 20 mg/dL Final    CREATININE 02/03/2021 0.9  0.8 - 1.3 mg/dL Final    GFR Non- 02/03/2021 >60  >60 Final    Comment: >60 mL/min/1.73m2 EGFR, calc. for ages 25 and older using the  MDRD formula (not corrected for weight), is valid for stable  renal function.  GFR  02/03/2021 >60  >60 Final    Comment: Chronic Kidney Disease: less than 60 ml/min/1.73 sq.m.           Kidney Failure: less than 15 ml/min/1.73 sq.m. Results valid for patients 18 years and older.  Calcium 02/03/2021 10.8* 8.3 - 10.6 mg/dL Final    Total Protein 02/03/2021 7.0  6.4 - 8.2 g/dL Final    Albumin 02/03/2021 4.1  3.4 - 5.0 g/dL Final    Albumin/Globulin Ratio 02/03/2021 1.4  1.1 - 2.2 Final    Total Bilirubin 02/03/2021 0.4  0.0 - 1.0 mg/dL Final    Alkaline Phosphatase 02/03/2021 106  40 - 129 U/L Final    ALT 02/03/2021 20  10 - 40 U/L Final    AST 02/03/2021 20  15 - 37 U/L Final    Globulin 02/03/2021 2.9  g/dL Final    Lipase 02/03/2021 18.0  13.0 - 60.0 U/L Final    Color, UA 02/03/2021 YELLOW  Straw/Yellow Final    Clarity, UA 02/03/2021 Clear  Clear Final    Glucose, Ur 02/03/2021 Negative  Negative mg/dL Final    Bilirubin Urine 02/03/2021 Negative  Negative Final    Ketones, Urine 02/03/2021 Negative  Negative mg/dL Final    Specific Lutz, UA 02/03/2021 1.011  1.005 - 1.030 Final    Blood, Urine 02/03/2021 MODERATE* Negative Final    pH, UA 02/03/2021 6.0  5.0 - 8.0 Final    Protein, UA 02/03/2021 Negative  Negative mg/dL Final    Urobilinogen, Urine 02/03/2021 0.2  <2.0 E.U./dL Final    Nitrite, Urine 02/03/2021 Negative  Negative Final    Leukocyte Esterase, Urine 02/03/2021 TRACE* Negative Final    Microscopic Examination 02/03/2021 YES   Final    Urine Type 02/03/2021 NotGiven   Final    Urine Reflex to Culture 02/03/2021 Not Indicated   Final    Protime 02/03/2021 19.3* 10.0 - 13.2 sec Final    Comment: Effective 11-19-19 09:00am EST  Please note reference ranges have  changed for PT and INR Testing.  INR 02/03/2021 1.66* 0.86 - 1.14 Final    Comment: Effective 11/19/19 at 09:00am EST    Normal: 0.86 - 1.14  Therapeutic: 2.0 - 3.0  Pros.  Valve: 2.5 - 3.5  AMI: 2.0 - 3.0      Hyaline Casts, UA 02/03/2021 4  0 - 8 /LPF Final    WBC, UA 02/03/2021 4  0 - 5 /HPF Final    RBC, UA 02/03/2021 11* 0 - 4 /HPF Final    Epithelial Cells, UA 02/03/2021 1  0 - 5 /HPF Final    Comment: Urinalysis microscopic performed using the  automated methodology (AUWI analyzer).  PTH 02/03/2021 166.7* 14.0 - 72.0 pg/mL Final    WBC 02/04/2021 4.5  4.0 - 11.0 K/uL Final    RBC 02/04/2021 4.52  4.20 - 5.90 M/uL Final    Hemoglobin 02/04/2021 14.0  13.5 - 17.5 g/dL Final    Hematocrit 02/04/2021 42.2  40.5 - 52.5 % Final    MCV 02/04/2021 93.4  80.0 - 100.0 fL Final    MCH 02/04/2021 31.0  26.0 - 34.0 pg Final    MCHC 02/04/2021 33.2  31.0 - 36.0 g/dL Final    RDW 02/04/2021 13.1  12.4 - 15.4 % Final    Platelets 59/07/9898 168  135 - 450 K/uL Final    MPV 02/04/2021 7.4  5.0 - 10.5 fL Final    Sodium 02/04/2021 140  136 - 145 mmol/L Final    Potassium 02/04/2021 4.1  3.5 - 5.1 mmol/L Final    Chloride 02/04/2021 106  99 - 110 mmol/L Final    CO2 02/04/2021 28  21 - 32 mmol/L Final    Anion Gap 02/04/2021 6  3 - 16 Final    Glucose 02/04/2021 90  70 - 99 mg/dL Final    BUN 02/04/2021 8  7 - 20 mg/dL Final    CREATININE 02/04/2021 0.9  0.8 - 1.3 mg/dL Final    GFR Non- 02/04/2021 >60  >60 Final    Comment: >60 mL/min/1.73m2 EGFR, calc. for ages 25 and older using the  MDRD formula (not corrected for weight), is valid for stable  renal function.  GFR  02/04/2021 >60  >60 Final    Comment: Chronic Kidney Disease: less than 60 ml/min/1.73 sq.m. Kidney Failure: less than 15 ml/min/1.73 sq.m. Results valid for patients 18 years and older.       Calcium 02/04/2021 10.1  8.3 - 10.6 mg/dL Final    Total Protein 02/04/2021 5.9* 6.4 - 8.2 g/dL Final    Albumin 02/04/2021 3.5  3.4 - 5.0 g/dL Final    Albumin/Globulin Ratio 02/04/2021 1.5  1.1 - 2.2 Final    Total Bilirubin 02/04/2021 0.6  0.0 - 1.0 mg/dL Final    Alkaline Phosphatase 02/04/2021 112  40 - 129 U/L Final    ALT 02/04/2021 44* 10 - 40 U/L Final    AST 02/04/2021 41* 15 - 37 U/L Final    Globulin 02/04/2021 2.4  g/dL Final    Protime 02/04/2021 21.5* 10.0 - 13.2 sec Final    Comment: Effective 11-19-19 09:00am EST  Please note reference ranges have  changed for PT and INR Testing.  INR 02/04/2021 1.84* 0.86 - 1.14 Final    Comment: Effective 11/19/19 at 09:00am EST    Normal: 0.86 - 1.14  Therapeutic: 2.0 - 3.0  Pros. Valve: 2.5 - 3.5  AMI: 2.0 - 3.0           Assessment & Plan:   Principal Problem:    Ureteral colic--seems improved--I have recc not to use dilaudid for pain but ret to percocet or tyl--will cause less resp depression   Active Problems:    Essential hypertension, benign--stable     Right lower quadrant abdominal pain--improving     Gastroesophageal reflux disease with esophagitis without hemorrhage    Bronchiolitis obliterans ([address]    History of pulmonary embolism--cont warfarin     Primary hyperparathyroidism ([address]  Resolved Problems:    * No resolved hospital problems.  *  Lessening flank pain--will give iv lasix x 1--dc iv fluids--hopefully dc later today if he feels better --will also ck cxr   DS dictated   Please note that over 35 minutes was spent in evaluating the patient, review of records and results, discussion with staff/family, etc.    Audra Dunn MD

## 2021-02-04 NOTE — CONSULTS
Clinical Pharmacy Note  Warfarin Consult    Miles Farris is a 63 y.o. male receiving warfarin managed by pharmacy. Patient being bridged with none. Warfarin Indication: History of PE  Target INR range: 2-3   Dose prior to admission: 5 mg daily, except 2.5 mg Tuesdays and Thursdays    Current warfarin drug-drug interactions:     Recent Labs     02/03/21  0600   HGB 16.0   HCT 47.7   INR 1.66*       Assessment/Plan:    Warfarin 5 mg tonight. Daily PT/INR until stable within therapeutic range. Thank you for the consult. Will continue to follow.     Nany Cabrera, PharmD  2/3/2021 11:44 PM

## 2021-02-04 NOTE — PLAN OF CARE
Problem: Pain:  Goal: Pain level will decrease  Description: Pain level will decrease  Outcome: Ongoing  Note: Pain level will decrease. Electronically signed by Britni Chong RN on 2/4/2021 at 7:17 AM    Goal: Control of acute pain  Description: Control of acute pain  Outcome: Ongoing  Note: Control of acute pain. Electronically signed by Britni Chong RN on 2/4/2021 at 7:17 AM    Goal: Control of chronic pain  Description: Control of chronic pain  Outcome: Ongoing  Note: Control of chronic pain. Electronically signed by Britni Chong RN on 2/4/2021 at 7:17 AM       Problem: Falls - Risk of:  Goal: Will remain free from falls  Description: Will remain free from falls  Outcome: Ongoing  Note: Will remain free from falls. Electronically signed by Britni Chong RN on 2/4/2021 at 7:17 AM    Goal: Absence of physical injury  Description: Absence of physical injury  Outcome: Ongoing  Note: Absence of physical injury.   Electronically signed by Britni Chong RN on 2/4/2021 at 7:17 AM

## 2021-02-04 NOTE — DISCHARGE SUMMARY
[address]                               DISCHARGE SUMMARY    PATIENT NAME: Stacey Benton                        :        1960  MED REC NO:   [id number]                          ROOM:       3118  ACCOUNT NO:   [de-identified]                           ADMIT DATE: 2021  PROVIDER:     Nata Winn MD                  DISCHARGE DATE:  2021    DIAGNOSIS ON ADMISSION:  Right ureteral colic. DIAGNOSES ON DISCHARGE:  1.  Right ureteral colic. 2.  Gastroesophageal reflux. 3.  Essential hypertension. 4.  Bronchiolitis obliterans. 5.  History of pulmonary embolism. 6.  Primary hyperparathyroidism. HISTORY OF PRESENT ILLNESS:  The patient is a 79-year-old white male  admitted through emergency. He presented with a history of intractable  right flank pain. He described it as initially bilaterally then said  mostly on the right side. He does have evidence of hematuria on  urinalysis. He has a history of multiple renal stones in the past and  gets pain like this which he feels represents ureteral colic on a fairly  regular basis. CT done in the ER did not show any evidence of  hydronephrosis or hydroureter. It did show multiple stones up in the  kidneys. They were not appearing to be obstructing. The patient was  adamant in his statement that he gets stones all the time and the CAT  scans do not show them and he knows that this is ureteral pain. He was  taking Percocet for pain at home along with Flomax. He was attempted to  be discharged from the ER, however, he refused to go stating that the  pain was too severe. He was given a dose of fentanyl in the ER which  relieved his pain some. He was admitted, brought up to the floor and  then said the fentanyl was not working and requested Dilaudid. He was  given one dose of Dilaudid.   His past medical history is remarkable for bronchiolitis obliterans for which he is followed by the Pulmonary  Service, prior history of DVT and pulmonary embolism for which he is on  warfarin therapy, hypertension, hyperlipidemia, prior right-sided  pneumothorax. Please see the HPI for further details. The patient was  admitted. He was given IV fluids at 75 mL an hour. He was given one or  two doses of IV Dilaudid. When visited by Dr. Kita Mcconnell the following  morning, he said his pain was at least 50 to 60%. He was then  complaining his oxygen levels were low. He received a fair amount of  fluid between ER and overnight and mild amount of rales were heard. He  has been given one dose of Lasix here to see if it clears it up. He  remained fairly needy through the hospital course requiring certain  medications at certain times as _____ to do to direct the nurse as far  as what he wants for treatment. The following morning, his BUN and  creatinine were still in the normal range at 8 and 0.9, sodium 140,  potassium 4.1. Hemoglobin was stable at 14. Blood sugar was 90. His  serum calcium was normal at 10.1. Chest x-ray will be done this morning  looking for a mild amount of vascular congestion. The patient will be  gotten up and ambulated and he seems amenable to discharge now that his  pain is better controlled. I recommend that he return to Tylenol for  pain or Percocet for severe pain. Clinically, he will be discharged  later today assuming he continues to feel well.   At the time of his  discharge, he will resume his maintenance meds at home which include  Entresto 49/51 one tablet b.i.d., Percocet 5/325 q.4 as needed for pain,  Lyrica 75 mg one in the morning, two in the evening, warfarin as at home  5 mg daily except on Monday and Friday to take 7.5 mg; pravastatin 40 mg  daily, metoprolol XL 25 b.i.d., ProAir inhaler or nebulizer q.4 p.r.n.,  Nexium 40 mg daily, Urispas t.i.d. as needed for spasm, Lasix 20 mg p.r.n. for shortness of breath, aspirin 81 mg daily, Ambien 10 mg at  night. Only new medication would be to increase his Flomax from one a  day to two a day. Also, he will be given a prescription for Percocet  5/325 as needed for pain. Told to follow with Dr. Erickson Brwoning in about a  week.     Aren Akbar MD    D: 02/04/2021 9:17:19       T: 02/04/2021 16:25:25     [email or web address]  Job#: [id number]     Doc#: [id number]    CC:

## 2021-02-04 NOTE — CONSULTS
Clinical Pharmacy Note  Warfarin Consult    Juan C Womack is a 63 y.o. male receiving warfarin managed by pharmacy. Patient being bridged with none. Warfarin Indication: History of PE  Target INR range: 2-3   Dose prior to admission: 5 mg daily, except 2.5 mg Tuesdays and Thursdays    Current warfarin drug-drug interactions:     Recent Labs     02/03/21  0600 02/04/21  0537   HGB 16.0 14.0   HCT 47.7 42.2   INR 1.66* 1.84*       Assessment/Plan:    Warfarin 5 mg given at 1201 am this morning. Will give Warfarin 2.5 mg this evening (this would would be his normal home dose). Daily PT/INR until stable within therapeutic range. Thank you for the consult. Will continue to follow.     Magui Khalil RPh  2/4/2021 9:57 AM

## 2021-02-04 NOTE — PROGRESS NOTES
Pt being dc'd home, instructions read and understood, iv dc'd abbocath intact.   Electronically signed by Palmer Armstrong RN on 2/4/2021 at 3:11 PM

## 2021-02-05 ENCOUNTER — APPOINTMENT (OUTPATIENT)
Dept: CT IMAGING | Age: 61
DRG: 694 | End: 2021-02-05
Payer: COMMERCIAL

## 2021-02-16 ENCOUNTER — TELEPHONE (OUTPATIENT)
Dept: PULMONOLOGY | Age: 61
End: 2021-02-16

## 2021-02-16 NOTE — TELEPHONE ENCOUNTER
CLEMENTE says he had a CT done recently as a result of being seen in ED. He says he had kidney stones, but CT also included lung findings. Please call him at [phone].

## 2021-02-17 ENCOUNTER — ANTI-COAG VISIT (OUTPATIENT)
Dept: PHARMACY | Age: 61
End: 2021-02-17
Payer: COMMERCIAL

## 2021-02-17 DIAGNOSIS — Z79.01 CHRONIC ANTICOAGULATION: ICD-10-CM

## 2021-02-17 DIAGNOSIS — I26.99 PULMONARY EMBOLISM, UNSPECIFIED CHRONICITY, UNSPECIFIED PULMONARY EMBOLISM TYPE, UNSPECIFIED WHETHER ACUTE COR PULMONALE PRESENT (HCC): ICD-10-CM

## 2021-02-17 LAB — INR BLD: 2.8

## 2021-02-17 PROCEDURE — 85610 PROTHROMBIN TIME: CPT

## 2021-02-17 PROCEDURE — 99211 OFF/OP EST MAY X REQ PHY/QHP: CPT

## 2021-02-17 NOTE — PROGRESS NOTES
Mr. Bhavik Carreno is a 63 y.o.  male. Mr. Bhavik Carreno had an INR test today. Results were reviewed and appropriate warfarin management was completed. THIS VISIT WAS COMPLETED AS:   []    A VIRTUAL VISIT VIA TELEPHONE IN EFFORTS TO REDUCE THE SPREAD OF COVID-19.  []    A DRIVE-THRU VISIT IN EFFORTS TO REDUCE THE SPREAD OF COVID-19. [x]    AN IN PERSON VISIT. PROTOCOLS WERE FOLLOWED WITH PRECAUTIONS TO REDUCE THE SPREAD OF COVID-19. Patient verifies current dosing regimen: Yes     Warfarin medication reviewed and updated on the patient 's home medication list: Yes   All other medications reviewed and updated on the patient 's home medication list: No: No Change     Lab Results   Component Value Date    INR 2.80 2021    INR 1.84 (H) 2021    INR 1.66 (H) 2021       Patient Findings     Negatives:  Signs/symptoms of thrombosis, Signs/symptoms of bleeding, Change in medications, Change in diet/appetite, Bruising          Anticoagulation Summary  As of 2021    INR goal:  2.0-3.0   TTR:  33.7 % (5.3 y)   INR used for dosin.80 (2021)   Warfarin maintenance plan:  5 mg (5 mg x 1) every Mon, Wed, Fri; 2.5 mg (5 mg x 0.5) all other days   Weekly warfarin total:  25 mg   Plan last modified:  Sudha Catalan (2020)   Next INR check:  3/10/2021   Priority:  Maintenance   Target end date: Indefinite    Indications    Pulmonary embolus (HCC) [I26.99]  Chronic anticoagulation [Z79.01]             Anticoagulation Episode Summary     INR check location:  Anticoagulation Clinic    Preferred lab:      Send INR reminders to:  [facility]    Comments:  EPIC - finger stick every 2-3 weeks        Anticoagulation Care Providers     Provider Role Specialty Phone number    Vashti Ruelas MD Referring Internal Medicine [phone]          Warfarin assessment / plan:   Patient appears well. Patient denies any change in medication and diet.  Patient instructed to continue current warfarin regimen based on INR of 2.8. Follow-up scheduled 4 weeks. Description    CONTINUE: Warfarin 2.5 mg daily EXCEPT 5mg every Monday, Wednesday and Friday    Call with signs or symptoms of bleeding or any concerns or questions. If significant bleeding seek immediate medical attention. Call with medications changes, especially antibiotics and steroids and including any over-the-counter medications or herbal products. Call if you stop any medications. Keep the number of servings of Vitamin K (dark green vegetables) consistent from week to week  Eats three good portion of broccoli or brussel sprouts per week. Immunization History   Administered Date(s) Administered    Influenza, Quadv, IM, PF (6 mo and older Fluzone, Flulaval, Fluarix, and 3 yrs and older Afluria) 10/27/2017, 09/23/2019, 10/02/2020    Influenza, Consuella Menon, Recombinant, IM PF (Flublok 18 yrs and older) 10/03/2018    Pneumococcal Conjugate 13-valent ([id number]) 09/11/2015    Pneumococcal Conjugate Vaccine 08/15/2017    Pneumococcal Polysaccharide ([id number]) 08/01/2007, 12/19/2012    Tdap (Boostrix, Adacel) 08/19/2014     Reviewed AVS with patient / caregiver.       CLINICAL PHARMACY CONSULT: MED RECONCILIATION/REVIEW ADDENDUM    For Pharmacy Admin Tracking Only    PHSO (orange banner): No  Total # of Interventions Recommended (warfarin changes): 0  (#1 for reviewing INR) - Maintenance Safety Lab Monitoring #: 1  Total Interventions Accepted (warfarin related): 0  Time Spent (min) (round up): 15

## 2021-02-17 NOTE — TELEPHONE ENCOUNTER
Please let patient know I reviewed the lung portion of his CT A/P from 2/2021. The lungs findings are the same as on his prior chest CT scans.

## 2021-02-24 NOTE — PROGRESS NOTES
CHOLECYSTECTOMY  2007    COLONOSCOPY  9/21/2012    dr Collins Artist daren    CYSTOSCOPY  05/17/2019    RIGHT SIDED URETEROSCOPY  Diagnostic, retrograde pyelogram and fulguration of previously resected bladder tumor base    CYSTOSCOPY Right 5/17/2019    RIGHT SIDED URETEROSCOPY FLUGERATION  OF BLADDER performed by Essie Miranda MD at [address] [facility]  2015    LITHOTRIPSY      PACEMAKER PLACEMENT      SINUS SURGERY      Made opening larger in sinuses    UPPER GASTROINTESTINAL ENDOSCOPY  3/28/2014    dr Arnol Arredondo N/A 2/1/2021    EGD BIOPSY performed by Viji Vigil MD at [address] History   Problem Relation Age of Onset    High Blood Pressure Mother     Dementia Mother     Cancer Father         Pancreatic Ca     Social History     Tobacco Use    Smoking status: Never Smoker    Smokeless tobacco: Never Used   Substance Use Topics    Alcohol use: No     Frequency: Never     Comment: occ    Drug use: No       Allergies   Allergen Reactions    Ipratropium Bromide Hfa Shortness Of Breath    Atrovent Nasal Spray [Ipratropium] Other (See Comments)     Chest tightness    Doxycycline Hives    Morphine Other (See Comments)     \"makes me feel funny\"      Nitroglycerin Other (See Comments)     Severe hypotension (went from 592 to 66 systolic)    Sulfa Antibiotics Rash    Vicodin [Hydrocodone-Acetaminophen] Rash     Current Outpatient Medications   Medication Sig Dispense Refill    Esomeprazole Magnesium (NEXIUM PO) Take 20 mg by mouth 2 each morning and one before supper      tamsulosin (FLOMAX) 0.4 MG capsule Take 1 capsule by mouth 2 times daily 60 capsule 2    ENTRESTO 49-51 MG per tablet Take 1 tablet by mouth 2 times daily      zolpidem (AMBIEN) 10 MG tablet Take 10 mg by mouth nightly.       sodium chloride, Inhalant, 3 % nebulizer solution Take 4 mLs by nebulization every 8 hours as needed for Other or Cough (cough) 360 mL 5  oxyCODONE-acetaminophen (PERCOCET) 5-325 MG per tablet Take 1 tablet by mouth every 4 hours as needed for Pain.  pregabalin (LYRICA) 75 MG capsule TAKE 1 CAPSULE BY MOUTH IN  THE MORNING AND 2 CAPSULES  IN THE EVENING 270 capsule 0    warfarin (COUMADIN) 5 MG tablet TAKE ONE TABLET BY MOUTH EVERY DAY EXCEPT [address]  ON MONDAY & FRIDAY TAKE ONE AND ONE-HALF TABLETS (Patient taking differently: Take 5 mg by mouth daily Except 2.5 mg Tues and Thursday) 32 tablet 0    pravastatin (PRAVACHOL) 40 MG tablet Take 1 tablet by mouth daily 30 tablet 0    benzonatate (TESSALON) 200 MG capsule Take 1 capsule by mouth 3 times daily as needed for Cough 20 capsule 0    metoprolol succinate (TOPROL XL) 25 MG extended release tablet TAKE 1 TABLET BY MOUTH TWO  TIMES DAILY 180 tablet 3    PROAIR  (90 Base) MCG/ACT inhaler Inhale 2 puffs into the lungs every 4 hours as needed for Wheezing or Shortness of Breath 1 Inhaler 5    dicyclomine (BENTYL) 10 MG capsule TAKE ONE CAPSULE BY MOUTH  FOUR TIMES DAILY AS NEEDED 360 capsule 1    Cholecalciferol (VITAMIN D3) 1.25 MG (70433 UT) CAPS Take 1 capsule by mouth once a week 12 capsule 3    albuterol sulfate  (90 Base) MCG/ACT inhaler INHALE 2 PUFFS INTO THE LUNGS EVERY 4 HOURS AS NEEDED FOR WHEEZING OR SHORTNESS OF BREATH 1 Inhaler 0    flavoxate (URISPAS) 100 MG tablet TAKE 1 TABLET BY MOUTH THREE TIMES DAILY AS NEEDED FOR URINARY SPASM 270 tablet 0    albuterol (PROVENTIL) (2.5 MG/3ML) 0.083% nebulizer solution USE 3 ML VIA NEBULIZER EVERY 4 HOURS AS NEEDED FOR WHEEZING OR SHORTNESS OF BREATH 360 mL 3    sildenafil (REVATIO) 20 MG tablet Take 20 mg by mouth as needed      guaiFENesin (MUCINEX) 600 MG extended release tablet Take 1 tablet by mouth 2 times daily 30 tablet 0    furosemide (LASIX) 20 MG tablet Take 1 tablet by mouth daily as needed (take 20 mg tablet daily as needed for increased weight, swelling or shortness of breath) 30 tablet 1  polyethylene glycol (MIRALAX) PACK packet Take 17 g by mouth nightly       aspirin 81 MG tablet Take 81 mg by mouth daily      azelastine (ASTELIN) 0.1 % nasal spray 2 sprays by Nasal route 2 times daily Use in each nostril as directed 1 Bottle 3     No current facility-administered medications for this visit. Review of Systems:  · Constitutional: no unanticipated weight loss. There's been no change in energy level, sleep pattern, or activity level. No fevers, chills. · Eyes: No visual changes or diplopia. No scleral icterus. · ENT: No Headaches, hearing loss or vertigo. No mouth sores or sore throat. · Cardiovascular: as reviewed in HPI  · Respiratory: No cough or wheezing, no sputum production. No hematemesis. · Gastrointestinal: No abdominal pain, appetite loss, blood in stools. No change in bowel or bladder habits. · Genitourinary: No dysuria, trouble voiding, or hematuria. · Musculoskeletal:  No gait disturbance, no joint complaints. · Integumentary: No rash or pruritis. · Neurological: No headache, diplopia, change in muscle strength, numbness or tingling. · Psychiatric: No anxiety or depression. · Endocrine: No temperature intolerance. No excessive thirst, fluid intake, or urination. No tremor. · Hematologic/Lymphatic: No abnormal bruising or bleeding, blood clots or swollen lymph nodes. · Allergic/Immunologic: No nasal congestion or hives. Physical Exam:   /78   Pulse 72   Temp 97.7 °F (36.5 °C)   Ht 5' 6\" (1.676 m)   Wt 212 lb (96.2 kg) Comment: with shoes  SpO2 96%   BMI 34.22 kg/m²   Wt Readings from Last 3 Encounters:   02/25/21 212 lb (96.2 kg)   02/19/21 213 lb 12.8 oz (97 kg)   02/04/21 217 lb 9.5 oz (98.7 kg)     Constitutional: The patient is oriented to person, place, and time. Appears well-developed and well-nourished. In no acute distress. Head: Normocephalic and atraumatic. Pupils equal and round. Neck: Neck supple.  No JVP or carotid bruit appreciated. No mass and no thyromegaly present. No lymphadenopathy present. Cardiovascular: Normal rate. Normal heart sounds. Exam reveals no gallop and no friction rub. No murmur heard. Pulmonary/Chest: Effort normal and breath sounds normal. No respiratory distress. No wheezes, rhonchi or rales. Abdominal: Soft, non-tender. Bowel sounds are normal. Exhibits no organomegaly, mass or bruit. Extremities: No edema. No cyanosis or clubbing. Pulses are 2+ radial and carotid bilaterally. Neurological: No gross cranial nerve deficit. Coordination normal.   Skin: Skin is warm and dry. There is no rash or diaphoresis. Psychiatric: Patient has a normal mood and affect. Speech is normal and behavior is normal.     Lab Review:   Lab Results   Component Value Date    TRIG 126 10/02/2019    HDL 42 05/15/2020    HDL 65 11/10/2011    LDLCALC 70 05/15/2020    LABVLDL 33 05/15/2020       Lab Results   Component Value Date    BUN 8 02/04/2021    CREATININE 0.9 02/04/2021     EKG Interpretation: 2/14/18: sinus rhythm with LBBB     11/13/19: Sinus rhythm LBBB     2/28/2020: Sinus rhythm LBBB     2/25/21: SR with LBBB. Image Review:     ECHO 11/28/17  Summary   Normal left ventricular wall thickness. Ejection fraction is visually   estimated to be 40-45%.  Roby Motts is septal hypokinesis (secondary to Pacing)   Trivial mitral & tricuspid regurgitation is present.   The left atrial size is normal.   Pacer / ICD wire is visualized in the right ventricle.   There appears to be normal right ventricular size and function. Echo: 7/24/17  Left ventricle size is normal. Mild concentric left ventricular hypertrophy  is present. Global ejection fraction is moderately decreased and estimated  from 30 % to 35%. Diastolic filling parameters suggests grade I diastolic  dysfunction . There is abnormal (paradoxical) septal motion consistent with left bundle  branch block.   Mitral valve is structurally normal.  Trivial to mild mitral regurgitation is present. The left atrial size is normal.  A bubble study was performed and fails to show evidence of right to left  shunting.     Echo: 11/9/16  Dilated LV with severely reduced systolic function. EF 30%. Anterior, septal  and apical akinesis. Mild mitral regurgitation is present. The left atrial size is normal.  Normal right ventricular size and function.     Stress Test:  7/21/16  SPECT perfusion images: On the stress corrected images there is a moderate defect in the septum of the left ventricle. At rest, partially redistributes especially the posterior lateral component. Complete redistribution is not present. WAED:  02 %      (Normal is at least 62% for women and 53% for men)  LV wall motion:   There is significant hypokinesia of the ventricular septum  LVEDV: 117ml  (Normal is up to 100ml for women and 150ml for men)  Transient dilatation ratio:   1.0      (Normal is up to 1.3 for women and 1.2 for men)     Cath: 7/21/16  #1-coronary angiography summary: Normal coronaries in a left   dominant system  A-left main coronary is normal  B-the LAD has minor irregularities and no significant stenoses  C-the circumflex is dominant and has minor irregularities and no   significant stenoses  D-the right coronary is a small nondominant vessel and is   angiographically normal  #2-ventriculography in the ADKINS projection demonstrates mild   global left ventricular dysfunction ejection fractions   approximately 40  #3-aortic pressure is approximately 150/80 left ventricular   pressures 150/14 there is no gradient on pullback  #1-OYPFS are no complications  #9-PPE patient will be treated medically for left ventricular   dysfunction in the setting of normal coronaries     ECHO 9/25/19  Mild concentric LVH with normal LV size and wall motion. EF is 50%. Paradoxical septal motion secondary to paced rhythm  Trivial mitral regurgitation is present. The left atrium is normal in size.   The right ventricle is normal in size and function. Pacing wire seen. Stress study:8/26/20  No evidence of reversible ischemia.    There is a moderate to large sized, moderate intensity, fixed septal and    inferior wall defect which is most consistent with LBBB induced artifact and    diaphragm attenuation artifact.    Normal LV size and systolic function.    Overall findings represent a low risk study.         Echo: 10/2020  - Left ventricle: The cavity size is normal. Wall thickness is    normal. Systolic function was mildly reduced. The calculated    ejection fraction was 42%. Normal diastolic function. - Ventricular septum: Septal motion is paradoxical septal motion    consistent with a conduction abnormality or paced rhythm. - Right ventricle: Pacer wire noted in right ventricle. - Inferior vena cava: The vessel was normal in size; the    respirophasic diameter changes were in the normal range (&gt;= 50%);    findings are consistent with normal central venous pressure. Assessment/Plan:     Nonischemic Cardiomyopathy  He had Biv -ICD placed by Dr. Alyssa Simon for underlying cardiomyopathy with left bundle branch block. His last echocardiogram from 10/2020 revaled LVEF 42%. He continues with GUEVARA and fatigue with activity. He appears compensated on physical exam today. He is currently on evidence-based beta blocker and entresto therapy. He is also on lasix. Encouraged to continue to follow up with Dr. Natalia Bethea and the device clinic routinely. Since he continues to complain of dyspnea on exertion and fatigue ,I  discussed proceeding with RHC with oxygen saturation study to further assess for possible pulmonary hypertension. BMP, CBC prior. I had a detail discussion with the patient and the family members regarding the risk and benefit of the procedures. I explained to them the details of the procedure. I explained to them that the procedure will be performed using moderate sedation and local anaesthesia using lidocaine.  I explained any risk of bleeding, perforation of the vessel, stroke, myocardial infarction or death. The patient and the family members understood the risk and benefits and the details of procedure and would like to go ahead with the procedure. The patient gave informed consent.       Pulmonary Embolism   Patient is on Warfarin therapy. Denies any abnormal bruising or bleeding. INR managed per [facility] clinic. He denies any recurrence and has remained stable.      Hypertension, essential   Blood pressure is stable. Continue Toprol XL 25 mg daily. BMP from 2/3/21 stable.      Hyperlipidemia, unspecified   Last lipid profile 5/15/20 is within acceptable limits except , LDLc 70. He is no longer on any statin therapy and has been denied PCSK9 inhibitors since he has no known atherosclerotic disease. Will obtain lipid profile before the next visit    Bronchiolitis obliterans, choric cough and lung nodules  He has an upcoming follow up 3/11/2021. I have asked him to discuss 6 minute walk test with him. Palpitations  Patient denies any further palpitations except he feels his heart racing. ICD check today shows no significant tachycardia his EKG continues to show sinus rhythm with left bundle branch block. We will continue to follow him clinically. Complexity of medical decision making-high. Follow up in after angiogram.     Thank you very much for allowing me to participate in the care of your patient. Please do not hesitate to contact me if you have any questions. Physician Attestation:  The scribes documentation has been prepared under my direction and personally reviewed by me in its entirety. I confirm that the note above accurately reflects all work, treatment, procedures, and medical decision making performed by me.     Sincerely,  Antonino Malone MD      [address]   [place] [facility]  Ph: [phone]  Fax: [phone]    This note was scribed in the presence of Dr. Kay Varma MD by Skyla Cruz RN.

## 2021-02-25 ENCOUNTER — TELEPHONE (OUTPATIENT)
Dept: CARDIOLOGY CLINIC | Age: 61
End: 2021-02-25

## 2021-02-25 ENCOUNTER — OFFICE VISIT (OUTPATIENT)
Dept: CARDIOLOGY CLINIC | Age: 61
End: 2021-02-25
Payer: COMMERCIAL

## 2021-02-25 VITALS
DIASTOLIC BLOOD PRESSURE: 78 MMHG | SYSTOLIC BLOOD PRESSURE: 134 MMHG | HEIGHT: 66 IN | OXYGEN SATURATION: 96 % | BODY MASS INDEX: 34.07 KG/M2 | TEMPERATURE: 97.7 F | HEART RATE: 72 BPM | WEIGHT: 212 LBS

## 2021-02-25 DIAGNOSIS — I10 ESSENTIAL HYPERTENSION: ICD-10-CM

## 2021-02-25 DIAGNOSIS — R06.09 DOE (DYSPNEA ON EXERTION): ICD-10-CM

## 2021-02-25 DIAGNOSIS — R06.02 SOB (SHORTNESS OF BREATH) ON EXERTION: ICD-10-CM

## 2021-02-25 DIAGNOSIS — R00.2 PALPITATIONS: Primary | ICD-10-CM

## 2021-02-25 DIAGNOSIS — I27.82 OTHER CHRONIC PULMONARY EMBOLISM WITHOUT ACUTE COR PULMONALE (HCC): ICD-10-CM

## 2021-02-25 DIAGNOSIS — I42.8 NONISCHEMIC CARDIOMYOPATHY (HCC): ICD-10-CM

## 2021-02-25 DIAGNOSIS — R53.83 FATIGUE, UNSPECIFIED TYPE: ICD-10-CM

## 2021-02-25 DIAGNOSIS — E78.5 HYPERLIPIDEMIA, UNSPECIFIED HYPERLIPIDEMIA TYPE: ICD-10-CM

## 2021-02-25 PROCEDURE — G8482 FLU IMMUNIZE ORDER/ADMIN: HCPCS | Performed by: INTERNAL MEDICINE

## 2021-02-25 PROCEDURE — 1111F DSCHRG MED/CURRENT MED MERGE: CPT | Performed by: INTERNAL MEDICINE

## 2021-02-25 PROCEDURE — G8427 DOCREV CUR MEDS BY ELIG CLIN: HCPCS | Performed by: INTERNAL MEDICINE

## 2021-02-25 PROCEDURE — G8417 CALC BMI ABV UP PARAM F/U: HCPCS | Performed by: INTERNAL MEDICINE

## 2021-02-25 PROCEDURE — 3017F COLORECTAL CA SCREEN DOC REV: CPT | Performed by: INTERNAL MEDICINE

## 2021-02-25 PROCEDURE — 1036F TOBACCO NON-USER: CPT | Performed by: INTERNAL MEDICINE

## 2021-02-25 PROCEDURE — 99214 OFFICE O/P EST MOD 30 MIN: CPT | Performed by: INTERNAL MEDICINE

## 2021-02-25 PROCEDURE — 93000 ELECTROCARDIOGRAM COMPLETE: CPT | Performed by: INTERNAL MEDICINE

## 2021-02-25 NOTE — PATIENT INSTRUCTIONS
It also checks how well your heart is pumping. This test is different from left heart catheterization. That test checks for blockages in the coronary arteries. How do you prepare for the procedure? Procedures can be stressful. This information will help you understand what you can expect. And it will help you safely prepare for your procedure. Preparing for the procedure    · Be sure you have someone to take you home. Anesthesia and pain medicine will make it unsafe for you to drive or get home on your own.     · Understand exactly what procedure is planned, along with the risks, benefits, and other options.     · Tell your doctor ALL the medicines, vitamins, supplements, and herbal remedies you take. Some may increase the risk of problems during your procedure. Your doctor will tell you if you should stop taking any of them before the procedure and how soon to do it.     · If you take aspirin or some other blood thinner, ask your doctor if you should stop taking it before your procedure. Make sure that you understand exactly what your doctor wants you to do. These medicines increase the risk of bleeding.     · Make sure your doctor and the hospital have a copy of your advance directive. If you don't have one, you may want to prepare one. It lets others know your health care wishes. It's a good thing to have before any type of surgery or procedure. How is the test done? · The test is done in a hospital, often in a cardiac catheterization laboratory (\"cath lab\"). You lie on a table under a large X-ray machine. · You will get medicine through an IV in one of your veins to help you relax. · You will be awake during the procedure, but you may not be able to remember much about it. The doctor will inject some medicine to numb the skin where the catheter will be put in. You will feel a small needle stick, like having a blood test. You may feel some pressure when the doctor puts in the catheter.   · The doctor may look at [place] on a monitor (like a TV screen) to move the catheter to your heart and lungs. · The catheter will be removed. A nurse may press on a bandage on the opening to prevent bleeding. · After the test, you will stay in a room for at least a few hours to make sure the catheter site starts to heal. You may have a bandage or a compression device on the catheter site to prevent bleeding. · If the catheter was placed in your neck or arm, you may sit up in your bed. If the catheter was placed in your groin, you may lie in bed for a few hours. How long does the test take? The procedure will probably take about 1 to 2 hours. What happens after the test?  You may be able to go home later the same day. Or you might need to stay in the hospital overnight. Follow-up care is a key part of your treatment and safety. Be sure to make and go to all appointments, and call your doctor if you are having problems. It's also a good idea to keep a list of the medicines you take. Ask your doctor when you can expect to have your test results. Where can you learn more? Go to https://The fresh GrouppepicewToxic Attire.Classroom IQ. org and sign in to your [email or web address] account. Enter N834 in the Fiberspar box to learn more about \"Right Heart Catheterization: About This Test.\"     If you do not have an account, please click on the \"Sign Up Now\" link. Current as of: December 16, 2019               Content Version: 12.6  © 6997-2125 [employer]. Care instructions adapted under license by [facility] [employer] [facility] ([facility]). If you have questions about a medical condition or this instruction, always ask your healthcare professional. [place] [address] any warranty or liability for your use of this information. Patient Education        Avoiding Triggers With Heart Failure: Care Instructions  Your Care Instructions     Triggers are anything that make your heart failure flare up.  A flare-up is also called \"sudden heart medicines you take every day and what time of day you take them. · Make taking your medicine as simple as you can. Plan times to take your medicines when you are doing other things, such as eating a meal or getting ready for bed. This will make it easier to remember to take your medicines. · Get organized. Use helpful tools, such as daily or weekly pill containers. When should you call for help? Call 911 if you have symptoms of sudden heart failure such as:    · You have severe trouble breathing.     · You cough up pink, foamy mucus.     · You have a new irregular or rapid heartbeat. Call your doctor now or seek immediate medical care if:    · You have new or increased shortness of breath.     · You are dizzy or lightheaded, or you feel like you may faint.     · You have sudden weight gain, such as more than 2 to 3 pounds in a day or 5 pounds in a week. (Your doctor may suggest a different range of weight gain.)     · You have increased swelling in your legs, ankles, or feet.     · You are suddenly so tired or weak that you cannot do your usual activities. Watch closely for changes in your health, and be sure to contact your doctor if you develop new symptoms. Where can you learn more? Go to https://Provigent.ecoInsight. org and sign in to your [employer] account. Enter F472 in the Starvine box to learn more about \"Avoiding Triggers With Heart Failure: Care Instructions. \"     If you do not have an account, please click on the \"Sign Up Now\" link. Current as of: December 16, 2019               Content Version: 12.6  © 4898-7111 [employer]. Care instructions adapted under license by [facility] ([facility]). If you have questions about a medical condition or this instruction, always ask your healthcare professional. [place] [address] any warranty or liability for your use of this information.          Patient Education        How to Read a Food Label to Limit Sodium: Care Instructions  Your Care Instructions  Sodium causes your body to hold on to extra water. This can raise your blood pressure and force your heart and kidneys to work harder. In very serious cases, this could cause you to be put in the hospital. It might even be life-threatening. By limiting sodium, you will feel better and lower your risk of serious problems. Processed foods, fast food, and restaurant foods are the major sources of dietary sodium. The most common name for sodium is salt. Try to limit how much sodium you eat to less than 2,300 milligrams (mg) a day. If you limit your sodium to 1,500 mg a day, you can lower your blood pressure even more. This limit counts all the salt that you eat in foods you cook or in packaged foods. Keep a list of everything you eat and drink. Follow-up care is a key part of your treatment and safety. Be sure to make and go to all appointments, and call your doctor if you are having problems. It's also a good idea to know your test results and keep a list of the medicines you take. How can you care for yourself at home? Read ingredient lists on food labels  · Read the list of ingredients on food labels to help you find how much sodium is in a food. The label lists the ingredients in a food in descending order (from the most to the least). If salt or sodium is high on the list, there may be a lot of sodium in the food. · Know that sodium has different names. Sodium is also called monosodium glutamate (MSG, common in [place] food), sodium citrate, sodium alginate, sodium hydroxide, and sodium phosphate. Read Nutrition Facts labels  · On most foods, there is a Nutrition Facts label. This will tell you how much sodium is in one serving of food. Look at both the serving size and the sodium amount. The serving size is located at the top of the label, usually right under the \"Nutrition Facts\" title. The amount of sodium is given in the list under the title.  It is given in milligrams (mg). ? Check the serving size carefully. A single serving is often very small, and you may eat more than one serving. If this is the case, you will eat more sodium than listed on the label. For example, if the serving size for a canned soup is 1 cup and the sodium amount is 470 mg, if you have 2 cups you will eat 940 mg of sodium. · The nutrition facts for fresh fruits and vegetables are not listed on the food. They may be listed somewhere in the store. These foods usually have no sodium or low sodium. · The Nutrition Facts label also gives you the Percent Daily Value for sodium. This is how much of the recommended amount of sodium a serving contains. The daily value for sodium is less than 2,300 mg. So if the Percent Daily Value says 50%, this means one serving is giving you half of this, or 1,150 mg. Buy low-sodium foods  · Look for foods that are made with less sodium. Watch for the following words on the label. ? \"Unsalted\" means there is no sodium added to the food. But there may be sodium already in the food naturally. ? \"Sodium-free\" means a serving has less than 5 mg of sodium. ? \"Very low sodium\" means a serving has 35 mg or less of sodium. ? \"Low-sodium\" means a serving has 140 mg or less of sodium. · \"Reduced-sodium\" means that there is 25% less sodium than what the food normally has. This is still usually too much sodium. Try not to buy foods with this on the label. · Buy fresh vegetables, or frozen vegetables without added sauces. Buy low-sodium versions of canned vegetables, soups, and other canned goods. Where can you learn more? Go to https://Purchasing Platformpepiceweb.Shoutlet. org and sign in to your [employer]ix account. Enter 19 [id number] in the Rapid Vocabulary box to learn more about \"How to Read a Food Label to Limit Sodium: Care Instructions. \"     If you do not have an account, please click on the \"Sign Up Now\" link.   Current as of: August 22, 2019               Content Version: 12.6  © 2006-2020 Healthwise, Incorporated. Care instructions adapted under license by [facility] ([facility]). If you have questions about a medical condition or this instruction, always ask your healthcare professional. [address] any warranty or liability for your use of this information. Patient Education        Walking for Exercise: Care Instructions  Your Care Instructions     Walking is one of the easiest ways to get the exercise you need for good health. A brisk, 30-minute walk each day can help you feel better and have more energy. It can help you lower your risk of disease. Walking can help you keep your bones strong and your heart healthy. Check with your doctor before you start a walking plan if you have heart problems, other health issues, or you have not been active in a long time. Follow your doctor's instructions for safe levels of exercise. Follow-up care is a key part of your treatment and safety. Be sure to make and go to all appointments, and call your doctor if you are having problems. It's also a good idea to know your test results and keep a list of the medicines you take. How can you care for yourself at home? Getting started  · Start slowly and set a short-term goal. For example, walk for 5 or 10 minutes every day. · Bit by bit, increase the amount you walk every day. Try for at least 30 minutes on most days of the week. You also may want to swim, bike, or do other activities. · If finding enough time is a problem, it is fine to be active in blocks of 10 minutes or more throughout your day and week. · To get the heart-healthy benefits of walking, you need to walk briskly enough to increase your heart rate and breathing, but not so fast that you cannot talk comfortably. · Wear comfortable shoes that fit well and provide good support for your feet and ankles.   Staying with your plan  · After you've made walking a habit, set a longer-term goal. You may want to set a goal of walking briskly for longer or walking farther. Experts say to do 2½ hours of moderate activity a week. A faster heartbeat is what defines moderate-level activity. · To stay motivated, walk with friends, coworkers, or pets. · Use a phone christine or pedometer to track your steps each day. Set a goal to increase your steps. Once you get there, set a higher goal. Aim for 10,000 steps a day. · If the weather keeps you from walking outside, go for walks at the mall with a friend. Local schools and churches may have indoor gyms where you can walk. Fitting a walk into your workday  · Park several blocks away from work, or get off the bus a few stops early. · Use the stairs instead of the elevator, at least for a few floors. · Suggest holding meetings with colleagues during a walk inside or outside the building. · Use the restroom that is the farthest from your desk or workstation. · Use your morning and afternoon breaks to take quick 15-minute walks. Staying safe  · Know your surroundings. Walk in a well-lighted, safe place. If it is dark, walk with a partner. Wear light-colored clothing. If you can, buy a vest or jacket that reflects light. · Carry a cell phone for emergencies. · Drink plenty of water. Take a water bottle with you when you walk. This is very important if it is hot out. · Be careful not to slip on wet or icy ground. You can buy \"grippers\" for your shoes to help keep you from slipping. · Pay attention to your walking surface. Use sidewalks and paths. · If you have breathing problems like asthma or COPD, ask your doctor when it is safe for you to walk outdoors. Cold, dry air, smog, pollen, or other things in the air could cause breathing problems. Where can you learn more? Go to https://Sensor Medical Technologyuli.Ecosia. org and sign in to your [place] account. Enter R159 in the KylesNeema box to learn more about \"Walking for Exercise: Care Instructions. \"     If you do not have an account, please click on the \"Sign Up Now\" link. Current as of: January 16, 2020               Content Version: 12.6  © 2006-2020 RentJiffy, Incorporated. Care instructions adapted under license by [facility]. If you have questions about a medical condition or this instruction, always ask your healthcare professional. [address] any warranty or liability for your use of this information.

## 2021-02-25 NOTE — TELEPHONE ENCOUNTER
RIGHT HEART CATHETERIZATION WITH DR. Shyla Briones AT [facility]    The morning of your procedure you will park in the hospital parking lot and report directly to the cath lab to check in.      DATE: Tuesday 3/2/21  TIME: 0930  ARRIVAL TIME: 0800     Pre-Procedure Instructions   1. You will need to fast for at least 8 hours prior to procedure. 2. No caffeine the morning of.   3. You will need to hold your anticoagulation, COUMADIN/WARFARIN for 2 days prior. 4. Hold your diuretic, LASIX the morning of procedure. 5. All other medications can be taken in the morning with sips of water. 6. You will need to take 325 mg aspirin the morning of. If you are currently taking 81 mg please take 4 tablets that morning. 7. Do not use any lotions, creams or perfume the morning of procedure. 8. Pre-procedure lab work will need to be completed 5-7 days prior to procedure. 9. Please have a responsible adult to drive you home after procedure. 10. We advise you have someone to stay with you for 24 hours following procedure for precautionary measures. 11. Depending on procedure you may require an overnight stay. 12. Cath lab will provide you with all post procedure instructions.      If you have any questions regarding the procedure itself or medications, please call [phone] and ask to speak with a nurse. preprocedure instructions printed on AVS at o.v. today, labs given at that time as well. I contacted patient and he is agreeable to the above. He wrote down and read back correctly. No q/c at this time. Published on Soukboard and e-mail to [place].

## 2021-02-26 DIAGNOSIS — R06.09 DOE (DYSPNEA ON EXERTION): ICD-10-CM

## 2021-02-26 DIAGNOSIS — E78.5 HYPERLIPIDEMIA, UNSPECIFIED HYPERLIPIDEMIA TYPE: ICD-10-CM

## 2021-02-26 DIAGNOSIS — I42.8 NONISCHEMIC CARDIOMYOPATHY (HCC): ICD-10-CM

## 2021-02-26 DIAGNOSIS — R53.83 FATIGUE, UNSPECIFIED TYPE: ICD-10-CM

## 2021-02-26 LAB
CHOLESTEROL, FASTING: 151 MG/DL (ref 0–199)
HCT VFR BLD CALC: 48.2 % (ref 40.5–52.5)
HDLC SERPL-MCNC: 39 MG/DL (ref 40–60)
HEMOGLOBIN: 16.2 G/DL (ref 13.5–17.5)
LDL CHOLESTEROL CALCULATED: 92 MG/DL
MCH RBC QN AUTO: 30.9 PG (ref 26–34)
MCHC RBC AUTO-ENTMCNC: 33.7 G/DL (ref 31–36)
MCV RBC AUTO: 92 FL (ref 80–100)
PDW BLD-RTO: 13.1 % (ref 12.4–15.4)
PLATELET # BLD: 193 K/UL (ref 135–450)
PMV BLD AUTO: 8 FL (ref 5–10.5)
RBC # BLD: 5.24 M/UL (ref 4.2–5.9)
TRIGLYCERIDE, FASTING: 98 MG/DL (ref 0–150)
VLDLC SERPL CALC-MCNC: 20 MG/DL
WBC # BLD: 7.9 K/UL (ref 4–11)

## 2021-02-26 NOTE — TELEPHONE ENCOUNTER
Pt called in this morning stating that with his acid reflux, he will be unable to take that much Asprin the morning of the procedure.      Please call him back at [phone]

## 2021-03-02 ENCOUNTER — HOSPITAL ENCOUNTER (OUTPATIENT)
Dept: CARDIAC CATH/INVASIVE PROCEDURES | Age: 61
Discharge: HOME | End: 2021-03-02
Payer: COMMERCIAL

## 2021-03-02 VITALS
OXYGEN SATURATION: 95 % | BODY MASS INDEX: 34.07 KG/M2 | WEIGHT: 212 LBS | DIASTOLIC BLOOD PRESSURE: 78 MMHG | SYSTOLIC BLOOD PRESSURE: 140 MMHG | TEMPERATURE: 98.4 F | HEIGHT: 66 IN | RESPIRATION RATE: 16 BRPM | HEART RATE: 98 BPM

## 2021-03-02 DIAGNOSIS — I42.8 NICM (NONISCHEMIC CARDIOMYOPATHY) (HCC): ICD-10-CM

## 2021-03-02 LAB — INR BLD: 1.8 (ref 0.86–1.14)

## 2021-03-02 PROCEDURE — 93451 RIGHT HEART CATH: CPT | Performed by: INTERNAL MEDICINE

## 2021-03-02 PROCEDURE — 93451 RIGHT HEART CATH: CPT

## 2021-03-02 PROCEDURE — 99153 MOD SED SAME PHYS/QHP EA: CPT

## 2021-03-02 PROCEDURE — 6360000002 HC RX W HCPCS

## 2021-03-02 PROCEDURE — C1751 CATH, INF, PER/CENT/MIDLINE: HCPCS

## 2021-03-02 PROCEDURE — 99152 MOD SED SAME PHYS/QHP 5/>YRS: CPT

## 2021-03-02 PROCEDURE — 2500000003 HC RX 250 WO HCPCS

## 2021-03-02 PROCEDURE — 85610 PROTHROMBIN TIME: CPT

## 2021-03-02 PROCEDURE — 2580000003 HC RX 258

## 2021-03-02 PROCEDURE — C1894 INTRO/SHEATH, NON-LASER: HCPCS

## 2021-03-02 RX ORDER — DICYCLOMINE HYDROCHLORIDE 10 MG/1
CAPSULE ORAL
Qty: 360 CAPSULE | Refills: 1 | Status: SHIPPED | OUTPATIENT
Start: 2021-03-02 | End: 2021-12-16 | Stop reason: SDUPTHER

## 2021-03-02 RX ORDER — SODIUM CHLORIDE 0.9 % (FLUSH) 0.9 %
10 SYRINGE (ML) INJECTION EVERY 12 HOURS SCHEDULED
Status: DISCONTINUED | OUTPATIENT
Start: 2021-03-02 | End: 2021-03-03 | Stop reason: HOSPADM

## 2021-03-02 RX ORDER — SODIUM CHLORIDE 9 MG/ML
INJECTION, SOLUTION INTRAVENOUS CONTINUOUS
Status: DISCONTINUED | OUTPATIENT
Start: 2021-03-02 | End: 2021-03-03 | Stop reason: HOSPADM

## 2021-03-02 RX ORDER — SODIUM CHLORIDE 0.9 % (FLUSH) 0.9 %
10 SYRINGE (ML) INJECTION PRN
Status: DISCONTINUED | OUTPATIENT
Start: 2021-03-02 | End: 2021-03-03 | Stop reason: HOSPADM

## 2021-03-02 RX ORDER — FUROSEMIDE 20 MG/1
40 TABLET ORAL DAILY
Qty: 30 TABLET | Refills: 1 | Status: SHIPPED | OUTPATIENT
Start: 2021-03-02 | End: 2021-03-03

## 2021-03-02 RX ORDER — SODIUM CHLORIDE 0.9 % (FLUSH) 0.9 %
10 SYRINGE (ML) INJECTION EVERY 12 HOURS SCHEDULED
Status: DISCONTINUED | OUTPATIENT
Start: 2021-03-02 | End: 2021-03-02 | Stop reason: SDUPTHER

## 2021-03-02 RX ORDER — ONDANSETRON 2 MG/ML
4 INJECTION INTRAMUSCULAR; INTRAVENOUS EVERY 6 HOURS PRN
Status: DISCONTINUED | OUTPATIENT
Start: 2021-03-02 | End: 2021-03-03 | Stop reason: HOSPADM

## 2021-03-02 RX ORDER — ACETAMINOPHEN 325 MG/1
650 TABLET ORAL EVERY 4 HOURS PRN
Status: DISCONTINUED | OUTPATIENT
Start: 2021-03-02 | End: 2021-03-03 | Stop reason: HOSPADM

## 2021-03-02 RX ORDER — SODIUM CHLORIDE 0.9 % (FLUSH) 0.9 %
10 SYRINGE (ML) INJECTION PRN
Status: DISCONTINUED | OUTPATIENT
Start: 2021-03-02 | End: 2021-03-02 | Stop reason: SDUPTHER

## 2021-03-02 RX ORDER — BENZONATATE 200 MG/1
200 CAPSULE ORAL 3 TIMES DAILY PRN
Qty: 20 CAPSULE | Refills: 0 | Status: ON HOLD | OUTPATIENT
Start: 2021-03-02 | End: 2021-04-25

## 2021-03-02 ASSESSMENT — PAIN DESCRIPTION - PAIN TYPE: TYPE: CHRONIC PAIN

## 2021-03-02 ASSESSMENT — PAIN SCALES - GENERAL: PAINLEVEL_OUTOF10: 5

## 2021-03-02 NOTE — BRIEF OP NOTE
Brief Postoperative Note    Miranda Perez  YOB: 1960  [id number]    Pre-operative Diagnosis: SOB    Post-operative Diagnosis: Same    Procedure: RHC    Anesthesia: Moderate Sedation    Surgeons/Assistants: Deena Calvillo    Estimated Blood Loss: less than 50     Complications: None    Specimens: Was Not Obtained    Findings: Slightly above normal Rt heart pressures  Slightly elevated PCW  Normal CO/CI    Electronically signed by Max Rivera MD on 3/2/2021 at 10:06 AM

## 2021-03-02 NOTE — PROCEDURES
[address]                            CARDIAC CATHETERIZATION    PATIENT NAME: Niranjan Vivas                        :        1960  MED REC NO:   [id number]                          ROOM:  ACCOUNT NO:   [de-identified]                           ADMIT DATE: 2021  PROVIDER:     Kurtis Deleon MD    DATE OF PROCEDURE:  2021    PROCEDURE PERFORMED:  Right heart study. PROCEDURE NOTE:  The patient was explained benefits and risks of the  procedure. Informed consent was obtained. The patient's existing  antecubital vein was exchanged over a guidewire to a 5-Indonesian sheath. A  5-Indonesian balloon-tipped Powell-Maria Guadalupe catheter was then advanced and  pressure and oxygen saturation were obtained in the right atrium, right  ventricle, pulmonary artery, and pulmonary capillary wedge positions. Cardiac output and cardiac indices were also obtained using a  thermodilution method. All the catheters were then removed. The  patient left the cath lab in stable condition. HEMODYNAMIC DATA:  The right atrial pressure mean was 6 with oxygen  saturation of 78%. RV pressure was 39 systolic with oxygen saturation  of 77%. PA pressure was 30/13 with mean of 22. Pulmonary capillary  wedge pressure mean was 17 mmHg. Cardiac output by thermodilution  method was 4.81 liters per minute. Index was 2.34 liters per minute per  body surface area. By Norva Ko method, cardiac output was 6.88 liters per  minute with a cardiac index of 3.35 liters per minute per body surface  area. OVERALL IMPRESSION:  1. Slightly above normal right heart pressure with slightly elevated  pulmonary capillary wedge pressure of 17 mmHg. 2.  Normal cardiac output and cardiac indices. In view of the above findings, we will restart the patient on diuretic  therapy and we will follow his symptoms.     Limited blood loss less than 50 cc Suly Stovall MD    D: 03/02/2021 10:15:53       T: 03/02/2021 12:03:03     [email or web address]  Job#: [id number]     Doc#: [id number]    CC:  MD Itz Sanchez MD

## 2021-03-02 NOTE — PRE SEDATION
tablet 0    metoprolol succinate (TOPROL XL) 25 MG extended release tablet TAKE 1 TABLET BY MOUTH TWO  TIMES DAILY 180 tablet 3    PROAIR  (90 Base) MCG/ACT inhaler Inhale 2 puffs into the lungs every 4 hours as needed for Wheezing or Shortness of Breath 1 Inhaler 5    albuterol sulfate  (90 Base) MCG/ACT inhaler INHALE 2 PUFFS INTO THE LUNGS EVERY 4 HOURS AS NEEDED FOR WHEEZING OR SHORTNESS OF BREATH 1 Inhaler 0    albuterol (PROVENTIL) (2.5 MG/3ML) 0.083% nebulizer solution USE 3 ML VIA NEBULIZER EVERY 4 HOURS AS NEEDED FOR WHEEZING OR SHORTNESS OF BREATH 360 mL 3    polyethylene glycol (MIRALAX) PACK packet Take 17 g by mouth nightly       aspirin 81 MG tablet Take 81 mg by mouth daily      azelastine (ASTELIN) 0.1 % nasal spray 2 sprays by Nasal route 2 times daily Use in each nostril as directed 1 Bottle 3    sodium chloride, Inhalant, 3 % nebulizer solution Take 4 mLs by nebulization every 8 hours as needed for Other or Cough (cough) 360 mL 5    Cholecalciferol (VITAMIN D3) 1.25 MG (93105 UT) CAPS Take 1 capsule by mouth once a week 12 capsule 3    flavoxate (URISPAS) 100 MG tablet TAKE 1 TABLET BY MOUTH THREE TIMES DAILY AS NEEDED FOR URINARY SPASM 270 tablet 0    sildenafil (REVATIO) 20 MG tablet Take 20 mg by mouth as needed      guaiFENesin (MUCINEX) 600 MG extended release tablet Take 1 tablet by mouth 2 times daily 30 tablet 0    furosemide (LASIX) 20 MG tablet Take 1 tablet by mouth daily as needed (take 20 mg tablet daily as needed for increased weight, swelling or shortness of breath) 30 tablet 1     Current Facility-Administered Medications   Medication Dose Route Frequency Provider Last Rate Last Admin    0.9 % sodium chloride infusion   Intravenous Continuous Arcadio Hutchinson MD        sodium chloride flush 0.9 % injection 10 mL  10 mL Intravenous 2 times per day Arcadio Hutchinson MD        sodium chloride flush 0.9 % injection 10 mL  10 mL Intravenous PRGLADYS GRAHAM Agustín Proctor MD               Pre-Sedation:    Pre-Sedation Documentation and Exam:  I have personally completed a history, physical exam & review of systems for this patient (see notes). Prior History of Anesthesia Complications:   none    Modified Mallampati:  I (soft palate, uvula, fauces, tonsillar pillars visible)    ASA Classification:  Class 3 - A patient with severe systemic disease that limits activity but is not incapacitating      Prince Scale: Activity:  2 - Able to move 4 extremities voluntarily on command  Respiration:  2 - Able to breathe deeply and cough freely  Circulation:  2 - BP+/- 20mmHg of normal  Consciousness:  2 - Fully awake  Oxygen Saturation (color):  2 - Able to maintain oxygen saturation >92% on room air    Sedation/Anesthesia Plan:  Guard the patient's safety and welfare. Minimize physical discomfort and pain. Minimize negative psychological responses to treatment by providing sedation and analgesia and maximize the potential amnesia. Patient to meet pre-procedure discharge plan.     Medication Planned:  midazolam intravenously and fentanyl intravenously    Patient is an appropriate candidate for plan of sedation: yes      Electronically signed by Alcides Sy MD on 3/2/2021 at 9:29 AM

## 2021-03-03 ENCOUNTER — TELEPHONE (OUTPATIENT)
Dept: CARDIOLOGY CLINIC | Age: 61
End: 2021-03-03

## 2021-03-03 DIAGNOSIS — I42.8 NICM (NONISCHEMIC CARDIOMYOPATHY) (HCC): ICD-10-CM

## 2021-03-03 RX ORDER — FUROSEMIDE 20 MG/1
TABLET ORAL
Qty: 60 TABLET | Refills: 5 | Status: SHIPPED | OUTPATIENT
Start: 2021-03-03 | End: 2021-03-18

## 2021-03-03 RX ORDER — FUROSEMIDE 20 MG/1
40 TABLET ORAL DAILY
Qty: 60 TABLET | Refills: 5 | Status: SHIPPED | OUTPATIENT
Start: 2021-03-03 | End: 2021-03-03

## 2021-03-03 RX ORDER — PRAVASTATIN SODIUM 40 MG
40 TABLET ORAL DAILY
Qty: 90 TABLET | Refills: 3 | Status: SHIPPED | OUTPATIENT
Start: 2021-03-03 | End: 2021-03-18 | Stop reason: SDUPTHER

## 2021-03-03 NOTE — TELEPHONE ENCOUNTER
Rachele Murrieta called in this morning stating he had a right heart cath yesterday and he stated that they told his wife to take two lasix 40 mg daily. States that will make him dehydrated and he has kidney stones so he will not be able to take it. He is also out of the medication.  It looks like they sent it to the mail order service, but [facility]     [facility] [address] [place] [address]., [address] [facility] [address]   Phone:  [phone]  Fax:  [phone]

## 2021-03-03 NOTE — TELEPHONE ENCOUNTER
RHC completed yesterday, slightly elevated right heart pressures and pulmonary capillary wedge pressure. States will restart patient on diuretic. Called Raymundo Ojeda and states he was not currently taking the Lasix several weeks prior to procedure. He states he is not comfortable with taking 40 mg daily. He denies any worsening cardiac symptoms. Instructed to resume Lasix 20 mg daily and can take an extra dose if needed for any worsening symptoms. He was much more comfortable with taking extra as needed. New prescription sent to pharmacy per request along with pravastatin.

## 2021-03-04 ENCOUNTER — TELEPHONE (OUTPATIENT)
Dept: PHARMACY | Age: 61
End: 2021-03-04

## 2021-03-04 NOTE — TELEPHONE ENCOUNTER
Patient states he had a heart cath on 3-2-21. His INR was 1.8. He rescheduled for 3-25-21.     /2/2021 10:26 AM - Nechama Schirmer Incoming Lab Results From Soft (Epic Adt)    Component Value Ref Range & Units Status Collected Lab   INR 1. 80High   0.86 - 1.14 Final 03/02/2021  8:43 AM  - [facility]   Performed on POC     . Candi Hercules [address] [facility], [address] [place] [address]  Anticoagulation Clinic  [address] [place] [address] Drive.   Abner Michael 24 [phone]

## 2021-03-11 ENCOUNTER — APPOINTMENT (OUTPATIENT)
Dept: PHARMACY | Age: 61
End: 2021-03-11
Payer: COMMERCIAL

## 2021-03-11 ENCOUNTER — OFFICE VISIT (OUTPATIENT)
Dept: PULMONOLOGY | Age: 61
End: 2021-03-11
Payer: COMMERCIAL

## 2021-03-11 VITALS
OXYGEN SATURATION: 94 % | DIASTOLIC BLOOD PRESSURE: 80 MMHG | BODY MASS INDEX: 34.58 KG/M2 | RESPIRATION RATE: 12 BRPM | HEIGHT: 66 IN | HEART RATE: 84 BPM | SYSTOLIC BLOOD PRESSURE: 128 MMHG | WEIGHT: 215.2 LBS | TEMPERATURE: 97.5 F

## 2021-03-11 DIAGNOSIS — R05.9 COUGH: ICD-10-CM

## 2021-03-11 DIAGNOSIS — G47.34 NOCTURNAL HYPOXIA: ICD-10-CM

## 2021-03-11 DIAGNOSIS — J42 BRONCHIOLITIS OBLITERANS (HCC): Primary | ICD-10-CM

## 2021-03-11 DIAGNOSIS — R06.02 SOB (SHORTNESS OF BREATH): ICD-10-CM

## 2021-03-11 PROCEDURE — 99214 OFFICE O/P EST MOD 30 MIN: CPT | Performed by: INTERNAL MEDICINE

## 2021-03-11 PROCEDURE — G8417 CALC BMI ABV UP PARAM F/U: HCPCS | Performed by: INTERNAL MEDICINE

## 2021-03-11 PROCEDURE — 1036F TOBACCO NON-USER: CPT | Performed by: INTERNAL MEDICINE

## 2021-03-11 PROCEDURE — G8427 DOCREV CUR MEDS BY ELIG CLIN: HCPCS | Performed by: INTERNAL MEDICINE

## 2021-03-11 PROCEDURE — G8926 SPIRO NO PERF OR DOC: HCPCS | Performed by: INTERNAL MEDICINE

## 2021-03-11 PROCEDURE — 3023F SPIROM DOC REV: CPT | Performed by: INTERNAL MEDICINE

## 2021-03-11 PROCEDURE — G8482 FLU IMMUNIZE ORDER/ADMIN: HCPCS | Performed by: INTERNAL MEDICINE

## 2021-03-11 PROCEDURE — 3017F COLORECTAL CA SCREEN DOC REV: CPT | Performed by: INTERNAL MEDICINE

## 2021-03-11 RX ORDER — BUDESONIDE AND FORMOTEROL FUMARATE DIHYDRATE 160; 4.5 UG/1; UG/1
2 AEROSOL RESPIRATORY (INHALATION) 2 TIMES DAILY
Qty: 1 INHALER | Refills: 0 | Status: SHIPPED | OUTPATIENT
Start: 2021-03-11 | End: 2021-04-22

## 2021-03-11 NOTE — PROGRESS NOTES
kidney stones    Neuropathy     right side-chest    Pneumothorax 2011    Right    Prostatitis     Pulmonary embolism on right [facility]) 2011    upper lobe-coumadin 2011    Sacroiliitis [facility])        Past Surgical History:   Procedure Laterality Date    CHOLECYSTECTOMY  2007    COLONOSCOPY  9/21/2012    dr Darliss Curling  05/17/2019    RIGHT SIDED URETEROSCOPY  Diagnostic, retrograde pyelogram and fulguration of previously resected bladder tumor base    CYSTOSCOPY Right 5/17/2019    RIGHT SIDED URETEROSCOPY FLUGERATION  OF BLADDER performed by Jazmin Ni MD at [facility].  2015    LITHOTRIPSY      PACEMAKER PLACEMENT      SINUS SURGERY      Made opening larger in sinuses    UPPER GASTROINTESTINAL ENDOSCOPY  3/28/2014    dr Sarah Henry N/A 2/1/2021    EGD BIOPSY performed by Isi Isabel MD at [facility]       Current Outpatient Medications   Medication Sig Dispense Refill    budesonide-formoterol (SYMBICORT) 160-4.5 MCG/ACT AERO Inhale 2 puffs into the lungs 2 times daily 1 Inhaler 0    oxyCODONE-acetaminophen (PERCOCET) 5-325 MG per tablet Take 1 tablet by mouth every 4 hours as needed for Pain for up to 7 days. 42 tablet 0    furosemide (LASIX) 20 MG tablet Take 20 mg daily, may take an additional 20 mg tablet if needed for worsening symptoms.  60 tablet 5    pravastatin (PRAVACHOL) 40 MG tablet Take 1 tablet by mouth daily 90 tablet 3    benzonatate (TESSALON) 200 MG capsule Take 1 capsule by mouth 3 times daily as needed for Cough 20 capsule 0    dicyclomine (BENTYL) 10 MG capsule TAKE ONE CAPSULE BY MOUTH  FOUR TIMES DAILY AS NEEDED 360 capsule 1    Esomeprazole Magnesium (NEXIUM PO) Take 20 mg by mouth 2 each morning and one before supper      tamsulosin (FLOMAX) 0.4 MG capsule Take 1 capsule by mouth 2 times daily 60 capsule 2    ENTRESTO 49-51 MG per tablet Take 1 tablet by mouth 2 times daily      zolpidem (AMBIEN) 10 MG tablet Take 10 mg by mouth nightly.  sodium chloride, Inhalant, 3 % nebulizer solution Take 4 mLs by nebulization every 8 hours as needed for Other or Cough (cough) 360 mL 5    pregabalin (LYRICA) 75 MG capsule TAKE 1 CAPSULE BY MOUTH IN  THE MORNING AND 2 CAPSULES  IN THE EVENING 270 capsule 0    warfarin (COUMADIN) 5 MG tablet TAKE ONE TABLET BY MOUTH EVERY DAY EXCEPT [address] ON MONDAY & FRIDAY TAKE ONE AND ONE-HALF TABLETS (Patient taking differently: Take 5 mg by mouth daily Except 2.5 mg Tues and Thursday) 32 tablet 0    metoprolol succinate (TOPROL XL) 25 MG extended release tablet TAKE 1 TABLET BY MOUTH TWO  TIMES DAILY 180 tablet 3    PROAIR  (90 Base) MCG/ACT inhaler Inhale 2 puffs into the lungs every 4 hours as needed for Wheezing or Shortness of Breath 1 Inhaler 5    Cholecalciferol (VITAMIN D3) 1.25 MG (18917 UT) CAPS Take 1 capsule by mouth once a week 12 capsule 3    albuterol sulfate  (90 Base) MCG/ACT inhaler INHALE 2 PUFFS INTO THE LUNGS EVERY 4 HOURS AS NEEDED FOR WHEEZING OR SHORTNESS OF BREATH 1 Inhaler 0    flavoxate (URISPAS) 100 MG tablet TAKE 1 TABLET BY MOUTH THREE TIMES DAILY AS NEEDED FOR URINARY SPASM 270 tablet 0    albuterol (PROVENTIL) (2.5 MG/3ML) 0.083% nebulizer solution USE 3 ML VIA NEBULIZER EVERY 4 HOURS AS NEEDED FOR WHEEZING OR SHORTNESS OF BREATH 360 mL 3    sildenafil (REVATIO) 20 MG tablet Take 20 mg by mouth as needed      guaiFENesin (MUCINEX) 600 MG extended release tablet Take 1 tablet by mouth 2 times daily 30 tablet 0    polyethylene glycol (MIRALAX) PACK packet Take 17 g by mouth nightly       aspirin 81 MG tablet Take 81 mg by mouth daily      azelastine (ASTELIN) 0.1 % nasal spray 2 sprays by Nasal route 2 times daily Use in each nostril as directed 1 Bottle 3     No current facility-administered medications for this visit.         Allergies   Allergen Reactions    Ipratropium Bromide Hfa Shortness Of Breath    Atrovent Nasal Spray [Ipratropium] Other (See Comments)     Chest tightness    Doxycycline Hives    Morphine Other (See Comments)     \"makes me feel funny\"      Nitroglycerin Other (See Comments)     Severe hypotension (went from 176 to 66 systolic)    Sulfa Antibiotics Rash    Vicodin [Hydrocodone-Acetaminophen] Rash       Family History   Problem Relation Age of Onset    High Blood Pressure Mother     Dementia Mother     Cancer Father         Pancreatic Ca       Social History     Socioeconomic History    Marital status:      Spouse name: Not on file    Number of children: Not on file    Years of education: Not on file    Highest education level: Not on file   Occupational History    Not on file   Social Needs    Financial resource strain: Not hard at all   Cojoin insecurity     Worry: Never true     Inability: Never true   [employer] needs     Medical: No     Non-medical: No   Tobacco Use    Smoking status: Never Smoker    Smokeless tobacco: Never Used   Substance and Sexual Activity    Alcohol use: No     Frequency: Never     Comment: occ    Drug use: No    Sexual activity: Yes     Partners: Female     Comment: wife   Lifestyle    Physical activity     Days per week: Not on file     Minutes per session: Not on file    Stress: Not on file   Relationships    Social connections     Talks on phone: Not on file     Gets together: Not on file     Attends Sikhism service: Not on file     Active member of club or organization: Not on file     Attends meetings of clubs or organizations: Not on file     Relationship status: Not on file    Intimate partner violence     Fear of current or ex partner: Not on file     Emotionally abused: Not on file     Physically abused: Not on file     Forced sexual activity: Not on file   Other Topics Concern    Not on file   Social History Narrative    Not on file   Never smoked, but his mother smoked outside of the home    Immunization History   Administered Date(s) Administered    Influenza, Quadv, IM, PF (6 mo and older Fluzone, Flulaval, Fluarix, and 3 yrs and older Afluria) 10/27/2017, 09/23/2019, 10/02/2020    Influenza, Marshia Creed, Recombinant, IM PF (Flublok 18 yrs and older) 10/03/2018    Pneumococcal Conjugate 13-valent ([id number]) 09/11/2015    Pneumococcal Conjugate Vaccine 08/15/2017    Pneumococcal Polysaccharide ([id number]) 08/01/2007, 12/19/2012    Tdap (Boostrix, Adacel) 08/19/2014       ROS:  GENERAL:  No fevers, no chills, +3lb weight gain in 5 months   RESPIRATORY:  +exertional shortness of breath, +cough      PHYSICAL EXAM:  Vitals:    03/11/21 1358   BP: 128/80   Site: Left Upper Arm   Position: Sitting   Cuff Size: Medium Adult   Pulse: 84   Resp: 12   Temp: 97.5 °F (36.4 °C)   TempSrc: Temporal   SpO2: 94%   Weight: 215 lb 3.2 oz (97.6 kg)   Height: 5' 6\" (1.676 m)   on RA     Gen: Well developed; well nourished  Eyes: No scleral icterus. No conjunctival injection. ENT:  Oropharynx clear. External appearance of ears and nose normal.  Neck: Trachea midline. No obvious mass. No visible thyroid enlargement    Respiratory: Clear to auscultation bilaterally, no accessory muscle use  Cardiovascular: Regular rate and rhythm, no appreciable murmurs. No edema. Gastrointestinal: Soft, non-tender. No hernia  Skin: Warm and dry. No rashes or ulcers on visible areas. Normal texture and turgor  Lymphatic: No cervical LAD. No supraclavicular LAD. Musculoskeletal: No cyanosis, clubbing or joint deformity.     Psychiatric: Normal mood and affect; exhibits normal insight and judgement     Data reviewed:  Pulmonary Function Testing (11/4/20)  FVC 2.84 L at 67% predicted ---> 2.98L at 70% predicted  FEV1 1.44 L at 44% predicted -----> 1.66L at 50% predicted  FEV1/FVC ratio at 51%----> 56%  TLC 5.24 L at 80% predicted  VC 3.02L at 71% predicted  RV/TLC at 42% predicted  DLCO 18.47 at 63% predicted  DLCO/VA 3.94L at 88 % predicted    Overall: Severe lower airflow obstruction with significant reversal in FEV1. Air-trapping is present. Diffusing capacity is mildy reduced, but normalizes when averaged over lung volumes (compared to the study in April 2014 flow measurements, slow vital capacity and diffusion capacity have declined)     Six minute walk test:  4/3/18- Walked 387m, 73% predicted (normal); jazmyn saturation 93%    Images and reports of chest imaging were reviewed by me. My interpretation is:  CXR (2/4/21): Clear lung fields  Chest CT (2/1/20): No LAD; tree-in-bud opacities in the right upper lobe (new compared to September 2019); tree-in-bud opacities in the right lower lobe, left lower lobe and left upper lobe (unchanged compared to September 2014)  Chest CT (10/9/20): No LAD; right upper lobe granuloma; bilateral tree-in-bud opacities (unchanged compared to September 2014). Compared to the study in February 2020 the right upper lobe tree-in-bud opacities have resolved     ECHO (9/23/19)  Summary   Mild concentric LVH with normal LV size and wall motion. EF is    50%. Paradoxical septal motion secondary to paced rhythm   Trivial mitral regurgitation is present. The left atrium is normal in size. The right ventricle is normal in size and function. Pacing wire seen. Lab Results   Component Value Date    WBC 7.9 02/26/2021    HGB 16.2 02/26/2021    HCT 48.2 02/26/2021    MCV 92.0 02/26/2021     02/26/2021       Lab Results   Component Value Date    BNP 22.8 08/16/2012       Lab Results   Component Value Date    CREATININE 0.9 02/04/2021    BUN 8 02/04/2021     02/04/2021    K 4.1 02/04/2021     02/04/2021    CO2 28 02/04/2021         Assessment/Plan:63y.o. year old male presents for follow up. 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 We discussed pulmonary rehabilitation. He is interested in attending. A consult has been placed.  Will also obtain 6-minute walk. Cough- Due to bronchiolitis obliterans as well as post-nasal drainage. He will continue Astelin, Flonase and nasal saline. We discussed that he should resume 3% saline nebulizers as needed. Shortness of breath- Recent right heart catheterization showed evidence of fluid retention. He is on Lasix every other day. Will give a trial of Symbicort 160/4.5 mcg twice daily. Nocturnal hypoxia- He will continue 2 L of oxygen with sleep. He will return for follow-up in 4 months.       Samm Wright MD  [facility] Pulmonology, Critical Care and Sleep

## 2021-03-11 NOTE — PATIENT INSTRUCTIONS
Please use Symbicort twice daily   Please attend pulmonary rehab     Please come for a follow-up in 4 months.

## 2021-03-12 ENCOUNTER — TELEPHONE (OUTPATIENT)
Dept: PULMONOLOGY | Age: 61
End: 2021-03-12

## 2021-03-12 NOTE — TELEPHONE ENCOUNTER
Patient complains of congestion that started this afternoon. He used his inhalers and neb and stated he is still having congestion. He is requesting an antibiotic. No other symptoms. He stated you heard some wheezing at his visit yesterday and he was concerned. His O2 at 95. Patient was advised if his conditions worsens he should go to an urgent care.

## 2021-03-15 NOTE — TELEPHONE ENCOUNTER
It looks like his primary physician prescribed azithromycin. If that does not help he should let me know.

## 2021-03-17 ENCOUNTER — ANTI-COAG VISIT (OUTPATIENT)
Dept: PHARMACY | Age: 61
End: 2021-03-17
Payer: COMMERCIAL

## 2021-03-17 ENCOUNTER — TELEPHONE (OUTPATIENT)
Dept: PULMONOLOGY | Age: 61
End: 2021-03-17

## 2021-03-17 DIAGNOSIS — Z79.01 CHRONIC ANTICOAGULATION: ICD-10-CM

## 2021-03-17 LAB
ANION GAP SERPL CALCULATED.3IONS-SCNC: 15 MMOL/L (ref 3–16)
BUN BLDV-MCNC: 10 MG/DL (ref 7–20)
CALCIUM SERPL-MCNC: 10.7 MG/DL (ref 8.3–10.6)
CHLORIDE BLD-SCNC: 103 MMOL/L (ref 99–110)
CO2: 24 MMOL/L (ref 21–32)
CREAT SERPL-MCNC: 0.9 MG/DL (ref 0.8–1.3)
GFR AFRICAN AMERICAN: >60
GFR NON-AFRICAN AMERICAN: >60
GLUCOSE BLD-MCNC: 58 MG/DL (ref 70–99)
INR BLD: 3.7
POTASSIUM SERPL-SCNC: 3.7 MMOL/L (ref 3.5–5.1)
PRO-BNP: 73 PG/ML (ref 0–124)
SODIUM BLD-SCNC: 142 MMOL/L (ref 136–145)

## 2021-03-17 PROCEDURE — 99211 OFF/OP EST MAY X REQ PHY/QHP: CPT

## 2021-03-17 PROCEDURE — 85610 PROTHROMBIN TIME: CPT

## 2021-03-17 NOTE — TELEPHONE ENCOUNTER
Notified patient his 6MWT and pulm rehab are covered through [facility]. He can call to schedule. I also notified pulm rehab.

## 2021-03-17 NOTE — PROGRESS NOTES
Mr. Madie Paz is a 63 y.o.  male. Mr. Madie Paz had an INR test today. Results were reviewed and appropriate warfarin management was completed. THIS VISIT WAS COMPLETED AS:   []    A VIRTUAL VISIT VIA TELEPHONE IN EFFORTS TO REDUCE THE SPREAD OF COVID-19.  []    A DRIVE-THRU VISIT IN EFFORTS TO REDUCE THE SPREAD OF COVID-19. [x]    AN IN PERSON VISIT. PROTOCOLS WERE FOLLOWED WITH PRECAUTIONS TO REDUCE THE SPREAD OF COVID-19. Patient verifies current dosing regimen: Yes     Warfarin medication reviewed and updated on the patient 's home medication list: Yes   All other medications reviewed and updated on the patient 's home medication list: Yes     Lab Results   Component Value Date    INR 3.70 03/17/2021    INR 1.80 (H) 03/02/2021    INR 2.80 02/17/2021       Patient Findings     Positives:  Signs/symptoms of bleeding    Negatives:  Missed doses, Change in medications, Change in diet/appetite, Bruising    Comments:  Has been having nose bleeds which are new. Not dripping/uncontrollable bleeding, just blood tinged mucous          Anticoagulation Summary  As of 3/17/2021    INR goal:  2.0-3.0   TTR:  34.1 % (5.4 y)   INR used for dosing:  3.70 (3/17/2021)   Warfarin maintenance plan:  5 mg (5 mg x 1) every Mon, Wed, Fri; 2.5 mg (5 mg x 0.5) all other days   Weekly warfarin total:  25 mg   Plan last modified:  Sudha Catalan (6/25/2020)   Next INR check:  3/29/2021   Priority:  Maintenance   Target end date:   Indefinite    Indications    Pulmonary embolus (HCC) [I26.99]  Chronic anticoagulation [Z79.01]             Anticoagulation Episode Summary     INR check location:  Anticoagulation Clinic    Preferred lab:      Send INR reminders to:  [facility]    Comments:  EPIC - finger stick every 2-3 weeks        Anticoagulation Care Providers     Provider Role Specialty Phone number    Alli Stanford MD Referring Internal Medicine [phone]          Warfarin assessment / plan: Patient complaining of some bloody mucous and feeling light-headed following 1st dose of COVID vaccine last week. He was prescribed a Zpak but did not pick this up from the pharmacy. INR high today at 3.7 which likely explains the new blood tinged mucous. No other signs of bleeding (bruising, blood in urine/stool). No changes to his medications or diet. He denies any nausea/vomiting, fever as post-vaccine side effects. Will hold warfarin dose today only and then continue previous dose from tomorrow. Patient instructed to seek immediate medical treatment if his nose bleeds worsen or if he experiences uncontrollable bleeding. F/u in ~2 weeks    Description    Do not take warfarin TODAY ONLY  Then CONTINUE: Warfarin 2.5 mg daily EXCEPT 5mg every Monday, Wednesday and Friday    Call with signs or symptoms of bleeding or any concerns or questions. If significant bleeding seek immediate medical attention. Call with medications changes, especially antibiotics and steroids and including any over-the-counter medications or herbal products. Call if you stop any medications. Keep the number of servings of Vitamin K (dark green vegetables) consistent from week to week  Eats three good portion of broccoli or brussel sprouts per week. Immunization History   Administered Date(s) Administered    Influenza, Quadv, IM, PF (6 mo and older Fluzone, Flulaval, Fluarix, and 3 yrs and older Afluria) 10/27/2017, 09/23/2019, 10/02/2020    Influenza, Alex Sal, Recombinant, IM PF (Flublok 18 yrs and older) 10/03/2018    Pneumococcal Conjugate 13-valent ([id number]) 09/11/2015    Pneumococcal Conjugate Vaccine 08/15/2017    Pneumococcal Polysaccharide ([id number]) 08/01/2007, 12/19/2012    Tdap (Boostrix, Adacel) 08/19/2014         Reviewed AVS with patient / caregiver.       CLINICAL PHARMACY CONSULT: MED RECONCILIATION/REVIEW ADDENDUM    For Pharmacy Admin Tracking Only    PHSO (orange banner): No  Total # of Interventions Recommended (warfarin changes): 1  (warfarin changes) - Decreased Dose #: 1  (other medication updates)- Updated Order #: 1 Updated Order Reason(s):  Other  (#1 for reviewing INR) - Maintenance Safety Lab Monitoring #: 1  Total Interventions Accepted (warfarin related): 1  Time Spent (min) (round up): 15

## 2021-03-18 ENCOUNTER — OFFICE VISIT (OUTPATIENT)
Dept: CARDIOLOGY CLINIC | Age: 61
End: 2021-03-18
Payer: COMMERCIAL

## 2021-03-18 VITALS
DIASTOLIC BLOOD PRESSURE: 76 MMHG | HEART RATE: 78 BPM | WEIGHT: 209 LBS | BODY MASS INDEX: 33.59 KG/M2 | HEIGHT: 66 IN | SYSTOLIC BLOOD PRESSURE: 126 MMHG | TEMPERATURE: 97.8 F | OXYGEN SATURATION: 96 %

## 2021-03-18 DIAGNOSIS — I42.8 NONISCHEMIC CARDIOMYOPATHY (HCC): Primary | ICD-10-CM

## 2021-03-18 DIAGNOSIS — I42.8 NICM (NONISCHEMIC CARDIOMYOPATHY) (HCC): ICD-10-CM

## 2021-03-18 DIAGNOSIS — Z86.711 HISTORY OF PULMONARY EMBOLISM: ICD-10-CM

## 2021-03-18 DIAGNOSIS — E78.5 HYPERLIPIDEMIA, UNSPECIFIED HYPERLIPIDEMIA TYPE: ICD-10-CM

## 2021-03-18 DIAGNOSIS — I10 ESSENTIAL HYPERTENSION: ICD-10-CM

## 2021-03-18 PROCEDURE — G8417 CALC BMI ABV UP PARAM F/U: HCPCS | Performed by: INTERNAL MEDICINE

## 2021-03-18 PROCEDURE — 1036F TOBACCO NON-USER: CPT | Performed by: INTERNAL MEDICINE

## 2021-03-18 PROCEDURE — G8427 DOCREV CUR MEDS BY ELIG CLIN: HCPCS | Performed by: INTERNAL MEDICINE

## 2021-03-18 PROCEDURE — 99214 OFFICE O/P EST MOD 30 MIN: CPT | Performed by: INTERNAL MEDICINE

## 2021-03-18 PROCEDURE — 3017F COLORECTAL CA SCREEN DOC REV: CPT | Performed by: INTERNAL MEDICINE

## 2021-03-18 PROCEDURE — G8482 FLU IMMUNIZE ORDER/ADMIN: HCPCS | Performed by: INTERNAL MEDICINE

## 2021-03-18 RX ORDER — FUROSEMIDE 20 MG/1
10 TABLET ORAL DAILY
Qty: 15 TABLET | Refills: 0
Start: 2021-03-18 | End: 2021-04-21 | Stop reason: SINTOL

## 2021-03-18 RX ORDER — PRAVASTATIN SODIUM 40 MG
40 TABLET ORAL DAILY
Qty: 90 TABLET | Refills: 3 | Status: SHIPPED | OUTPATIENT
Start: 2021-03-18 | End: 2021-08-09 | Stop reason: SDUPTHER

## 2021-03-18 NOTE — PROGRESS NOTES
[address]      Cardiology Progress Note    Nyla Damon  1960 March 18, 2021    CC:  \"I think the lasix dried me out. \"     HPI:  The patient is 63 y.o. male with a past medical history significant for for a prior PE in 2011 and nonischemic cardiomyopathy. He was hospitalized at [facility] with chest pain and had an abnormal stress that led to a left heart catheterization on 7/21/16. His coronary arteries were normal but he had LV dysfunction with a LVEF of 40% at that time. A repeat echocardiogram demonstrated his LVEF to be 30% despite optimal medical therapy. Entresto therapy was initiated and he was interested in pursuing CRT. He underwent Bi-V ICD implant on 10/10/17. Biv pacing turned off,  now set at VVI 40 with defibrillator programming ON. Today, he returns in follow up s/p RHC for continued GUEVARA. He reports that he has not seen any changes in his GUEVARA with the addition of diuretic therapy with pro-BNP completely normal 73, renal function wnl. He feels that the lasix is drying him out with some cramps. He reduced his lasix to 10 mg daily. He did follow up with Dr. Stephanie Burger-Pulmonology for bronchiolitis obliterans and is on medical therapy and she has recommended pulmonary rehab and started him on symbicort. He reports medical therapy compliance and tolerating.      Past Medical History:   Diagnosis Date    Bronchiolitis obliterans ([address]     Bundle branch block, right     Cardiomyopathy ([address]     Chest pain 9/24/2019    Fibromyalgia     GERD (gastroesophageal reflux disease)     Hepatitis 1979    unsure of which type    Hx of blood clots     Hyperlipemia     Hypertension     IBS (irritable bowel syndrome)     Kidney stone     over thirty kidney stones    Neuropathy     right side-chest    Pneumothorax 2011    Right    Prostatitis     Pulmonary embolism on right [facility] 2011    upper lobe-coumadin 2011    Sacroiliitis [facility])      Past Surgical History:   Procedure Laterality  aspirin 81 MG tablet Take 81 mg by mouth daily      azelastine (ASTELIN) 0.1 % nasal spray 2 sprays by Nasal route 2 times daily Use in each nostril as directed 1 Bottle 3     No current facility-administered medications for this visit. Review of Systems:  · Constitutional: no unanticipated weight loss. There's been no change in energy level, sleep pattern, or activity level. No fevers, chills. · Eyes: No visual changes or diplopia. No scleral icterus. · ENT: No Headaches, hearing loss or vertigo. No mouth sores or sore throat. · Cardiovascular: as reviewed in HPI  · Respiratory: No cough or wheezing, no sputum production. No hematemesis. · Gastrointestinal: No abdominal pain, appetite loss, blood in stools. No change in bowel or bladder habits. · Genitourinary: No dysuria, trouble voiding, or hematuria. · Musculoskeletal:  No gait disturbance, no joint complaints. · Integumentary: No rash or pruritis. · Neurological: No headache, diplopia, change in muscle strength, numbness or tingling. · Psychiatric: No anxiety or depression. · Endocrine: No temperature intolerance. No excessive thirst, fluid intake, or urination. No tremor. · Hematologic/Lymphatic: No abnormal bruising or bleeding, blood clots or swollen lymph nodes. · Allergic/Immunologic: No nasal congestion or hives. Physical Exam:   /76   Pulse 78   Temp 97.8 °F (36.6 °C)   Ht 5' 6\" (1.676 m)   Wt 209 lb (94.8 kg) Comment: with shoes  SpO2 96%   BMI 33.73 kg/m²   Wt Readings from Last 3 Encounters:   03/18/21 209 lb (94.8 kg)   03/11/21 215 lb 3.2 oz (97.6 kg)   03/02/21 212 lb (96.2 kg)     Constitutional: The patient is oriented to person, place, and time. Appears well-developed and well-nourished. In no acute distress. Head: Normocephalic and atraumatic. Pupils equal and round. Neck: Neck supple. No JVP or carotid bruit appreciated. No mass and no thyromegaly present.  No lymphadenopathy present. Cardiovascular: Normal rate. Normal heart sounds. Exam reveals no gallop and no friction rub. No murmur heard. Pulmonary/Chest: Effort normal and breath sounds normal. No respiratory distress. No wheezes, rhonchi or rales. Abdominal: Soft, non-tender. Bowel sounds are normal. Exhibits no organomegaly, mass or bruit. Extremities: No edema. No cyanosis or clubbing. Pulses are 2+ radial and carotid bilaterally. Neurological: No gross cranial nerve deficit. Coordination normal.   Skin: Skin is warm and dry. There is no rash or diaphoresis. Psychiatric: Patient has a normal mood and affect. Speech is normal and behavior is normal.     Lab Review:   Lab Results   Component Value Date    TRIG 126 10/02/2019    HDL 39 02/26/2021    HDL 65 11/10/2011    LDLCALC 92 02/26/2021    LABVLDL 20 02/26/2021       Lab Results   Component Value Date    BUN 10 03/17/2021    CREATININE 0.9 03/17/2021     EKG Interpretation: 2/14/18: sinus rhythm with LBBB     11/13/19: Sinus rhythm LBBB     2/28/2020: Sinus rhythm LBBB     2/25/21: SR with LBBB. 3/18/21: not completed    Image Review:     ECHO 11/28/17  Summary   Normal left ventricular wall thickness. Ejection fraction is visually   estimated to be 40-45%.  Danika Means is septal hypokinesis (secondary to Pacing)   Trivial mitral & tricuspid regurgitation is present.   The left atrial size is normal.   Pacer / ICD wire is visualized in the right ventricle.   There appears to be normal right ventricular size and function. Echo: 7/24/17  Left ventricle size is normal. Mild concentric left ventricular hypertrophy  is present. Global ejection fraction is moderately decreased and estimated  from 30 % to 35%. Diastolic filling parameters suggests grade I diastolic  dysfunction . There is abnormal (paradoxical) septal motion consistent with left bundle  branch block. Mitral valve is structurally normal.  Trivial to mild mitral regurgitation is present.   The left atrial size is normal.  A bubble study was performed and fails to show evidence of right to left  shunting.     Echo: 11/9/16  Dilated LV with severely reduced systolic function. EF 30%. Anterior, septal  and apical akinesis. Mild mitral regurgitation is present. The left atrial size is normal.  Normal right ventricular size and function.     Stress Test:  7/21/16  SPECT perfusion images: On the stress corrected images there is a moderate defect in the septum of the left ventricle. At rest, partially redistributes especially the posterior lateral component. Complete redistribution is not present. TDKU:  04 %      (Normal is at least 62% for women and 53% for men)  LV wall motion:   There is significant hypokinesia of the ventricular septum  LVEDV: 117ml  (Normal is up to 100ml for women and 150ml for men)  Transient dilatation ratio:   1.0      (Normal is up to 1.3 for women and 1.2 for men)     Cath: 7/21/16  #1-coronary angiography summary: Normal coronaries in a left   dominant system  A-left main coronary is normal  B-the LAD has minor irregularities and no significant stenoses  C-the circumflex is dominant and has minor irregularities and no   significant stenoses  D-the right coronary is a small nondominant vessel and is   angiographically normal  #2-ventriculography in the ADKISN projection demonstrates mild   global left ventricular dysfunction ejection fractions   approximately 40  #3-aortic pressure is approximately 150/80 left ventricular   pressures 150/14 there is no gradient on pullback  #5-QOVLP are no complications  #5-CARLO patient will be treated medically for left ventricular   dysfunction in the setting of normal coronaries     ECHO 9/25/19  Mild concentric LVH with normal LV size and wall motion. EF is 50%. Paradoxical septal motion secondary to paced rhythm  Trivial mitral regurgitation is present. The left atrium is normal in size. The right ventricle is normal in size and function.  Pacing wire seen.    Stress study:8/26/20  No evidence of reversible ischemia.    There is a moderate to large sized, moderate intensity, fixed septal and    inferior wall defect which is most consistent with LBBB induced artifact and    diaphragm attenuation artifact.    Normal LV size and systolic function.    Overall findings represent a low risk study.         Echo: 10/2020  - Left ventricle: The cavity size is normal. Wall thickness is    normal. Systolic function was mildly reduced. The calculated    ejection fraction was 42%. Normal diastolic function. - Ventricular septum: Septal motion is paradoxical septal motion    consistent with a conduction abnormality or paced rhythm. - Right ventricle: Pacer wire noted in right ventricle. - Inferior vena cava: The vessel was normal in size; the    respirophasic diameter changes were in the normal range (&gt;= 50%);    findings are consistent with normal central venous pressure. RHC: 3/2/21  HEMODYNAMIC DATA:  The right atrial pressure mean was 6 with oxygen  saturation of 78%. RV pressure was 39 systolic with oxygen saturation  of 77%. PA pressure was 30/13 with mean of 22. Pulmonary capillary  wedge pressure mean was 17 mmHg. Cardiac output by thermodilution  method was 4.81 liters per minute. Index was 2.34 liters per minute per  body surface area. By Haskel Deena method, cardiac output was 6.88 liters per  minute with a cardiac index of 3.35 liters per minute per body surface  area.     OVERALL IMPRESSION:  1. Slightly above normal right heart pressure with slightly elevated  pulmonary capillary wedge pressure of 17 mmHg. 2.  Normal cardiac output and cardiac indices.     In view of the above findings, we will restart the patient on diuretic  therapy and we will follow his symptoms. Assessment/Plan:     Nonischemic Cardiomyopathy  He had Biv -ICD placed by Dr. Shavonne Coe for underlying cardiomyopathy with left bundle branch block.  His last echocardiogram from 10/2020 palpitations except he feels his heart racing. His last ICD check  shows no significant tachycardia we will continue to follow him clinically. We will see him back in 4 months. Instructed to obtain flu vaccine this season, annually and obtain COVID-19 vaccine once available. Thank you very much for allowing me to participate in the care of your patient. Please do not hesitate to contact me if you have any questions. Complexity of medical decision making-high  Sincerely,  Timmy Mercedes MD    This note was scribed in the presence of Dr. Deborah Martínez MD by Celia Timmons RN.       [address] [place] [address] [address]  Ph: [phone]  Fax: [phone]

## 2021-03-24 ENCOUNTER — TELEPHONE (OUTPATIENT)
Dept: CARDIOLOGY CLINIC | Age: 61
End: 2021-03-24

## 2021-03-24 NOTE — TELEPHONE ENCOUNTER
Please call patient to find out what his concerns are. Also, inform him that we cannot have a VV with both providers. We would be more than happy to schedule an appointment with 1 or both of the providers, but separately.

## 2021-03-24 NOTE — TELEPHONE ENCOUNTER
Per Too Kaye below. His remote checks are going to an unknown location. When I saw him in [facility] last, he saw Dr. Jennifer Parks I believe, and we discussed having him transferred back to our clinic from [facility]. I called  multiple times asking them to release him to which i got a response that he was not being followed by them. Spoke with pt who states that he will call [facility] and release records. His tachycardia occurred Monday afternoon. Pt will call back with update.

## 2021-03-24 NOTE — TELEPHONE ENCOUNTER
Patient states he has fatigued and cannot do his normal activities. He states his HR jumped up during a game of golf. His HR was 143 for 7 hours. He denies any other cardiac complaints.

## 2021-03-24 NOTE — TELEPHONE ENCOUNTER
Precious Gracia called in this morning wanting to do a virtual visit with both Dr. Radha Arias and Dr. Nicolle Muhammad at the same time. He said he only needs 10 minutes. He wants to discuss with them what his plan is since he said neither of Dr. Radha Arias or Dr. Deepti Salinas is working.        You can reach Precious Gracia at #[phone]

## 2021-03-25 ENCOUNTER — NURSE ONLY (OUTPATIENT)
Dept: CARDIOLOGY CLINIC | Age: 61
End: 2021-03-25
Payer: COMMERCIAL

## 2021-03-25 DIAGNOSIS — I42.9 CARDIOMYOPATHY, UNSPECIFIED TYPE (HCC): ICD-10-CM

## 2021-03-25 DIAGNOSIS — Z95.810 BIVENTRICULAR ICD (IMPLANTABLE CARDIOVERTER-DEFIBRILLATOR) IN PLACE: ICD-10-CM

## 2021-03-25 PROCEDURE — 93296 REM INTERROG EVL PM/IDS: CPT | Performed by: INTERNAL MEDICINE

## 2021-03-25 PROCEDURE — 93295 DEV INTERROG REMOTE 1/2/MLT: CPT | Performed by: INTERNAL MEDICINE

## 2021-03-25 NOTE — TELEPHONE ENCOUNTER
Per [facility] records indicate that he had an SVT yesterday but it does not have an egm of it. Spoke to patient who is agreeable to coming in and having device interrogation and seeing Dr. Mariza Davies. Please schedule appointment. Thanks.

## 2021-03-25 NOTE — PROGRESS NOTES
We received remote transmission from patient's monitor at home. Transmission shows normal sensing and pacing function. Patient will need OV for interrogation to get full report for future remote. He switched to another cardiology office and is now back with [address]. This report was his 1st send to us since he left our practice. EP physician will review. See interrogation under cardiology tab in the [address] [place] [address] [address] field for more details.

## 2021-03-29 ENCOUNTER — HOSPITAL ENCOUNTER (OUTPATIENT)
Dept: CARDIAC REHAB | Age: 61
Setting detail: THERAPIES SERIES
Discharge: HOME | End: 2021-03-29
Payer: COMMERCIAL

## 2021-03-29 ENCOUNTER — ANTI-COAG VISIT (OUTPATIENT)
Dept: PHARMACY | Age: 61
End: 2021-03-29
Payer: COMMERCIAL

## 2021-03-29 DIAGNOSIS — I26.99 PULMONARY EMBOLISM, UNSPECIFIED CHRONICITY, UNSPECIFIED PULMONARY EMBOLISM TYPE, UNSPECIFIED WHETHER ACUTE COR PULMONALE PRESENT (HCC): ICD-10-CM

## 2021-03-29 DIAGNOSIS — J42 BRONCHIOLITIS OBLITERANS (HCC): ICD-10-CM

## 2021-03-29 DIAGNOSIS — Z79.01 CHRONIC ANTICOAGULATION: ICD-10-CM

## 2021-03-29 LAB — INR BLD: 2.3

## 2021-03-29 PROCEDURE — 85610 PROTHROMBIN TIME: CPT

## 2021-03-29 PROCEDURE — 94618 PULMONARY STRESS TESTING: CPT

## 2021-03-29 PROCEDURE — 94618 PULMONARY STRESS TESTING: CPT | Performed by: INTERNAL MEDICINE

## 2021-03-29 PROCEDURE — 99211 OFF/OP EST MAY X REQ PHY/QHP: CPT

## 2021-03-29 NOTE — PROGRESS NOTES
Mr. Char Vizcaino is a 63 y.o.  male. Mr. Char Vizcaino had an INR test today. Results were reviewed and appropriate warfarin management was completed. THIS VISIT WAS COMPLETED AS:   []    A VIRTUAL VISIT VIA TELEPHONE IN EFFORTS TO REDUCE THE SPREAD OF COVID-19.  []    A DRIVE-THRU VISIT IN EFFORTS TO REDUCE THE SPREAD OF COVID-19. [x]    AN IN PERSON VISIT. PROTOCOLS WERE FOLLOWED WITH PRECAUTIONS TO REDUCE THE SPREAD OF COVID-19. Patient verifies current dosing regimen: Yes     Warfarin medication reviewed and updated on the patient 's home medication list: Yes   All other medications reviewed and updated on the patient 's home medication list: Yes     Lab Results   Component Value Date    INR 2.30 2021    INR 3.70 2021    INR 1.80 (H) 2021       Patient Findings     Positives:  Missed doses, Change in medications    Negatives:  Change in diet/appetite          Anticoagulation Summary  As of 3/29/2021    INR goal:  2.0-3.0   TTR:  34.2 % (5.5 y)   INR used for dosin.30 (3/29/2021)   Warfarin maintenance plan:  5 mg (5 mg x 1) every Mon, Wed, Fri; 2.5 mg (5 mg x 0.5) all other days   Weekly warfarin total:  25 mg   Plan last modified:  Sudha Catalan (2020)   Next INR check:  2021   Priority:  Maintenance   Target end date: Indefinite    Indications    Pulmonary embolus (HCC) [I26.99]  Chronic anticoagulation [Z79.01]             Anticoagulation Episode Summary     INR check location:  Anticoagulation Clinic    Preferred lab:      Send INR reminders to:  [facility]AFF    Comments:  EPIC - finger stick every 2-3 weeks        Anticoagulation Care Providers     Provider Role Specialty Phone number    Gilberto Umanzor MD Referring Internal Medicine [phone]          Warfarin assessment / plan:   Patient appears well, no complaints today. Did take the first 3 doses of a Zpak over the weekend--did not take  warfarin dose to try to compensate.  Also states that he ate more greens last week in response to his high INR on 3/17    INR is within goal today at 2.3. No changes to warfarin therapy. Patient instructed to hold off on greens for the next few days as his INR may fall slightly due to the skipped dose. F/u in 3 weeks    Description    CONTINUE: Warfarin 2.5 mg daily EXCEPT 5mg every Monday, Wednesday and Friday    Call with signs or symptoms of bleeding or any concerns or questions. If significant bleeding seek immediate medical attention. Call with medications changes, especially antibiotics and steroids and including any over-the-counter medications or herbal products. Call if you stop any medications. Keep the number of servings of Vitamin K (dark green vegetables) consistent from week to week  Eats three good portion of broccoli or brussel sprouts per week. Immunization History   Administered Date(s) Administered    Influenza, Quadv, IM, PF (6 mo and older Fluzone, Flulaval, Fluarix, and 3 yrs and older Afluria) 10/27/2017, 09/23/2019, 10/02/2020    Influenza, Rosan Pancoast, Recombinant, IM PF (Flublok 18 yrs and older) 10/03/2018    Pneumococcal Conjugate 13-valent ([id number]) 09/11/2015    Pneumococcal Conjugate Vaccine 08/15/2017    Pneumococcal Polysaccharide ([id number]) 08/01/2007, 12/19/2012    Tdap (Boostrix, Adacel) 08/19/2014         Reviewed AVS with patient / caregiver.       CLINICAL PHARMACY CONSULT: MED RECONCILIATION/REVIEW ADDENDUM    For Pharmacy Admin Tracking Only    PHSO (orange banner): No  Total # of Interventions Recommended (warfarin changes): 0  (other medication updates)- Updated Order #: 0 Updated Order Reason(s)  (#1 for reviewing INR) - Maintenance Safety Lab Monitoring #: 1  Total Interventions Accepted (warfarin related): 0  Time Spent (min) (round up): 15

## 2021-03-29 NOTE — PROCEDURES
[facility] Cardiopulmonary Rehabilitation   Six Minute Walk Test    Jamie Lynngeoffrey  YOB: 1960  Account Number: [de-identified]  AGE: 63 y.o.     OP test []   Pulmonary Rehab PRE [x]  POST   []    Diagnosis: bronchiolitis obliterans     Referring Physician: Dr. Jennifer Iyer    Gender [x]  male [] female AGE:60     Height: 5 ft  6 in 168 cm    Weight: 212 lbs 96 kg  Blood Pressure 148/90    Medications affecting heart rate:  Toprol XL 25 mg BID, ProAir 2 Puff Q4H PRN    Supplemental O2 during the test [x]  no [] yes  lpm     [] continuous []  intermittent    Device : [] NC []  HFNC    []  oximizer  [] mask      Laps completed: 11 (1 lap = 100 ft)        Baseline (resting) Walking End of Test (recovery)      Heart Rate 84   110  84    BP  148/90   158/98  134/84    Dyspnea 0   0  0 (Joyce scale)    Fatigue 0.5   1  0.5 (Joyce scale)    SpO2  96% RA  92% RA 96% RA      Stopped or paused before 6 mins? [x]  no [] yes How long? Symptoms at end of exercise:[] angina  [] dizziness  []  hip, leg, calf, back pain       [x]  no complaints []  other    Number of laps: 11 (x 30.48 meters)= 335m + final partial lap: 0     Total distance walked in 6 mins: 335 meters    Predicted distance: 488 meters  Percent predicted:69%              [x] normal            [] reduced     Summary: This is a pre-rehab test, performed on room air. The patient ambulated 335 meters which is normal-69% predicted. His  heart rate response was normal, the blood pressure response was normal, oxygen saturations were low normal.  The patient desaturated to 92% while ambulating on room air. The degree of symptoms based on the Joyce Dyspnea/Fatigue scale were unchanged with testing. This test does not indicate the need for supplemental oxygen.             Bertin Garcia DO  Pulmonary Rehab Director

## 2021-03-31 NOTE — PROGRESS NOTES
[facility] PULMONARY REHABILITATION ORDER  Medical Director:  Dr. Roly Hatch  (X)Phase II Pulmonary Rehabilitation [facility] Facility-based, supervised exercise with SpO2 / HR monitoring and Oxygen Titration (if necessary) each session, 2-3 times weekly, with individualized education sessions. Patient Name: Ke Norton  : 1960  Referring Physician: Dr. Lakeshia Hall  Date: 3/31/2021    Medically Necessary Pulmonary Rehab for:  Brochiolitis Obliterans (from my dictation or the PFT report)     Physician Prescribed Exercise:  Length of program:  Up to 36 sessions, subject to insurance limitations. Program to include aerobic endurance conditioning, resistance training (RT), step training (ST), flexibility training, and education (all relevant topics including psychosocial assessment). FITT Principles + Progression for Exercise Prescription (also found on the ITP):     Frequency: 2-3x / wk for up to 36 sessions    Intensity:   Set from Initial 6MWT (Feet achieved converted to METs)   Type:          Aerobic and Strength (Treadmill, AD, NuStep, SciFit, UBE, RT, ST)   Time:        15-60 min. of Treatment; Aerobic, RT, ST, Rests and Education  Progression:  1-2 minute increase in Time, per Type, per session, 5-20% increase in Intensity per week if SpO2 >88 AND Joyce RPE/RPD is <14      Note:  These are guidelines. The Pulmonary Rehab staff may adjust the treatment to suit the patient's individual needs, goals, oxygen saturation and functional level. Plan of Care:  Pulmonary Rehab aerobic endurance and strength training sessions for a total of 30-91 min/day, including Education time, 2-3 days/week with suggested supplemented matching minutes of walking at home on most days not participating in Pulmonary Rehab. Patient is willing to cooperate and participate in the plan of care.  Patient is physically able, motivated, and willing to participate in SD, and I expect this patient to improve in a reasonable and predictable time frame. Other Program Components:   (X)6 Minute Walk Test (6 MWT) at program initiation and discharge   (X)Evaluation for oxygen needs while exercising   (X)Titrate Oxygen to maintain >88 SpO2   (X)Stop Exercise or Apply Oxygen if patient desaturates <88%   (x)May continue in the Maintenance Program upon completion    Measurable Endurance Goal:    -Aerobic endurance goal to be measured in minutes. Start endurance training per patient tolerance at 1-15 minutes per exercise type, progressing to a total of 31-60 minutes using various modes of training (see Exercise Prescription on Individualized Treatment Plan 'ITP'). -Measurable Muscular Strength Goal: Starting at 1-3 lbs x 8 reps, progressing daily/weekly to 5-10 lbs x 15 reps    NOTE: Mabel Davies tobacco History:   Social History     Tobacco Use   Smoking Status Never Smoker   Smokeless Tobacco Never Used     If patient is a current smoker, patient will participate in tobacco cessation program during Pulmonary Rehab.       Physician Signature/Date/Time:

## 2021-04-04 PROBLEM — R05.9 COUGH: Status: RESOLVED | Noted: 2017-02-21 | Resolved: 2021-04-04

## 2021-04-19 ENCOUNTER — ANTI-COAG VISIT (OUTPATIENT)
Dept: PHARMACY | Age: 61
End: 2021-04-19
Payer: COMMERCIAL

## 2021-04-19 ENCOUNTER — NURSE ONLY (OUTPATIENT)
Dept: CARDIOLOGY CLINIC | Age: 61
End: 2021-04-19
Payer: COMMERCIAL

## 2021-04-19 ENCOUNTER — OFFICE VISIT (OUTPATIENT)
Dept: CARDIOLOGY CLINIC | Age: 61
End: 2021-04-19
Payer: COMMERCIAL

## 2021-04-19 VITALS
HEART RATE: 78 BPM | OXYGEN SATURATION: 95 % | BODY MASS INDEX: 33.91 KG/M2 | DIASTOLIC BLOOD PRESSURE: 68 MMHG | WEIGHT: 211 LBS | HEIGHT: 66 IN | SYSTOLIC BLOOD PRESSURE: 110 MMHG

## 2021-04-19 DIAGNOSIS — Z79.01 CHRONIC ANTICOAGULATION: ICD-10-CM

## 2021-04-19 DIAGNOSIS — I48.0 PAROXYSMAL ATRIAL FIBRILLATION (HCC): Primary | ICD-10-CM

## 2021-04-19 DIAGNOSIS — I50.22 CHRONIC SYSTOLIC CHF (CONGESTIVE HEART FAILURE), NYHA CLASS 2 (HCC): ICD-10-CM

## 2021-04-19 DIAGNOSIS — Z95.810 BIVENTRICULAR ICD (IMPLANTABLE CARDIOVERTER-DEFIBRILLATOR) IN PLACE: Primary | ICD-10-CM

## 2021-04-19 DIAGNOSIS — I26.99 PULMONARY EMBOLISM, UNSPECIFIED CHRONICITY, UNSPECIFIED PULMONARY EMBOLISM TYPE, UNSPECIFIED WHETHER ACUTE COR PULMONALE PRESENT (HCC): ICD-10-CM

## 2021-04-19 DIAGNOSIS — Z95.810 BIVENTRICULAR ICD (IMPLANTABLE CARDIOVERTER-DEFIBRILLATOR) IN PLACE: ICD-10-CM

## 2021-04-19 DIAGNOSIS — I42.8 NICM (NONISCHEMIC CARDIOMYOPATHY) (HCC): ICD-10-CM

## 2021-04-19 DIAGNOSIS — I48.0 PAROXYSMAL ATRIAL FIBRILLATION (HCC): ICD-10-CM

## 2021-04-19 DIAGNOSIS — R00.0 TACHYCARDIA: ICD-10-CM

## 2021-04-19 DIAGNOSIS — I44.7 LBBB (LEFT BUNDLE BRANCH BLOCK): ICD-10-CM

## 2021-04-19 LAB — INTERNATIONAL NORMALIZATION RATIO, POC: 3.6

## 2021-04-19 PROCEDURE — 1036F TOBACCO NON-USER: CPT | Performed by: INTERNAL MEDICINE

## 2021-04-19 PROCEDURE — G8417 CALC BMI ABV UP PARAM F/U: HCPCS | Performed by: INTERNAL MEDICINE

## 2021-04-19 PROCEDURE — 3017F COLORECTAL CA SCREEN DOC REV: CPT | Performed by: INTERNAL MEDICINE

## 2021-04-19 PROCEDURE — 99214 OFFICE O/P EST MOD 30 MIN: CPT | Performed by: INTERNAL MEDICINE

## 2021-04-19 PROCEDURE — G8427 DOCREV CUR MEDS BY ELIG CLIN: HCPCS | Performed by: INTERNAL MEDICINE

## 2021-04-19 PROCEDURE — 93284 PRGRMG EVAL IMPLANTABLE DFB: CPT | Performed by: INTERNAL MEDICINE

## 2021-04-19 PROCEDURE — 85610 PROTHROMBIN TIME: CPT

## 2021-04-19 PROCEDURE — 93290 INTERROG DEV EVAL ICPMS IP: CPT | Performed by: INTERNAL MEDICINE

## 2021-04-19 PROCEDURE — 99211 OFF/OP EST MAY X REQ PHY/QHP: CPT

## 2021-04-19 NOTE — PROGRESS NOTES
Pt seen in clinic today for cardiac device interrogation to re-establish care  Their device is a BTK 3 chamber BiV CRTD  Based on threshold, impedance, and intrinsic sensing tests run today, the device appears to be functioning normally. Remaining battery life is 2.98v  Pt is sensitive to pacing and threshold tests as such requests they not be run in office. Pt programmed to VVI 40bpm as a result. AP 0%                 RP 0%                CRTP 0%    TI HF monitor reveals pt is high fluid episode beginning today. Rx: warfarin (COUMADIN) 5 MG tablet   Take 1 tablet by mouth daily Except 2.5 mg Tues and ThursdayPatient taking differently: Take 2.5 mg by mouth daily Except 5 mg on Monday, Wedneday, Friday    furosemide (LASIX) 20 MG tablet 1 tab po qd    metoprolol succinate (TOPROL XL) 25 MG extended release tablet 1 tab po bid - (dosage as listed in Epic)    Pt was informed of findings today and general questions have been answered with regard to device. Home monitoring hardware is transmitting on schedule. Results discussed with or to be reviewed by Dr. Lazaro Jimenes    Pt to see Dr. Lazaro Jimenes  in clinic today following their device check.

## 2021-04-19 NOTE — PROGRESS NOTES
attention. Call with medications changes, especially antibiotics and steroids and including any over-the-counter medications or herbal products. Call if you stop any medications. Keep the number of servings of Vitamin K (dark green vegetables) consistent from week to week  Eats three good portion of broccoli or brussel sprouts per week. Immunization History   Administered Date(s) Administered    Influenza, Quadv, IM, PF (6 mo and older Fluzone, Flulaval, Fluarix, and 3 yrs and older Afluria) 10/27/2017, 09/23/2019, 10/02/2020    Influenza, Mitul Lard, Recombinant, IM PF (Flublok 18 yrs and older) 10/03/2018    Pneumococcal Conjugate 13-valent ([id number]) 09/11/2015    Pneumococcal Conjugate Vaccine 08/15/2017    Pneumococcal Polysaccharide ([id number]) 08/01/2007, 12/19/2012    Tdap (Boostrix, Adacel) 08/19/2014         Reviewed AVS with patient / caregiver.       CLINICAL PHARMACY CONSULT: MED RECONCILIATION/REVIEW ADDENDUM    For Pharmacy Admin Tracking Only    PHSO (orange banner): No  Total # of Interventions Recommended (warfarin changes): 1  (warfarin changes) - Decreased Dose #: 1  (other medication updates)- Updated Order #: 0 Updated Order Reason(s):   (#1 for reviewing INR) - Maintenance Safety Lab Monitoring #: 1  Total Interventions Accepted (warfarin related): 1  Time Spent (min) (round up): 15

## 2021-04-19 NOTE — PATIENT INSTRUCTIONS
Patient Education        amiodarone (oral)  Pronunciation:  LEXIE DE LEON yuli kennedy  Brand:  Pacerone  What is the most important information I should know about amiodarone? Amiodarone is for use only in treating life-threatening heart rhythm disorders. You should not take this medicine if you are allergic to amiodarone or iodine, or if you have heart block, a history of slow heartbeats that have caused you to faint, or if your heart cannot pump blood properly. Amiodarone can cause dangerous side effects on your heart, liver, lungs, or vision. Call your doctor or get medical help at once if you have: chest pain, fast or pounding heartbeats, trouble breathing, vision problems, upper stomach pain, vomiting, dark urine, jaundice (yellowing of the skin or eyes), or if you cough up blood. What is amiodarone? Amiodarone affects the rhythm of your heartbeats. Amiodarone is used to help keep the heart beating normally in people with life-threatening heart rhythm disorders of the ventricles (the lower chambers of the heart that allow blood to flow out of the heart). Amiodarone is used to treat ventricular tachycardia or ventricular fibrillation. Amiodarone is for use only in treating life-threatening heart rhythm disorders. Amiodarone may also be used for purposes not listed in this medication guide. What should I discuss with my healthcare provider before taking amiodarone? You should not use this medicine if you are allergic to amiodarone or iodine, or if you have:  · a serious heart condition called \"AV block\" (2nd or 3rd degree), unless you have a pacemaker;  · a history of slow heartbeats that have caused you to faint; or  · if your heart cannot pump blood properly. Amiodarone can cause dangerous side effects on your heart, liver, lungs, or thyroid.     Tell your doctor if you have ever had:  · asthma or another lung disorder;  · liver disease;  · a thyroid disorder;  · vision problems;  · high or low blood pressure;  · an electrolyte imbalance (such as low levels of potassium or magnesium in your blood); or  · if you have a pacemaker or defibrillator implanted in your chest.  Taking amiodarone during pregnancy may harm an unborn baby, or cause thyroid problems or abnormal heartbeats in the baby after it is born. Amiodarone may also affect the child's growth or development (speech, movement, academic skills) later in life. Tell your doctor if you are pregnant or if you become pregnant. You should not breast-feed while taking amiodarone, and for several months after stopping. Amiodarone takes a long time to clear from your body. Talk to your doctor about the best way to feed your baby during this time. How should I take amiodarone? Follow all directions on your prescription label and read all medication guides or instruction sheets. Your doctor may occasionally change your dose. Use the medicine exactly as directed. You will receive your first few doses in a hospital setting, where your heart rhythm can be monitored. If you have been taking another heart rhythm medicine, you may need to gradually stop taking it when you start using amiodarone. Follow your doctor's dosing instructions very carefully. You may take amiodarone with or without food, but take it the same way each time. It may take up to 3 weeks before your heart rhythm improves. Keep using the medicine as directed even if you feel well. Amiodarone can have long lasting effects on your body. You may need frequent medical tests while using this medicine and for several months after your last dose. If you need surgery (including laser eye surgery), tell the surgeon ahead of time that you are using amiodarone. This medicine can affect the results of certain medical tests. Tell any doctor who treats you that you are using amiodarone. Store at room temperature away from moisture, heat, and light. What happens if I miss a dose?   Skip the missed dose and

## 2021-04-19 NOTE — PROGRESS NOTES
albuterol (PROVENTIL) (2.5 MG/3ML) 0.083% nebulizer solution Take 3 mLs by nebulization every 4 hours as needed for Wheezing or Shortness of Breath 4/1/21  Yes Luz Maria Perez MD   furosemide (LASIX) 20 MG tablet Take 0.5 tablets by mouth daily 3/18/21  Yes Merrick Wyatt MD   pravastatin (PRAVACHOL) 40 MG tablet Take 1 tablet by mouth daily 3/18/21  Yes Merrick Wyatt MD   warfarin (COUMADIN) 5 MG tablet Take 1 tablet by mouth daily Except 2.5 mg Tues and Thursday  Patient taking differently: Take 2.5 mg by mouth daily Except 5 mg on Monday, Wedneday, Friday 3/12/21  Yes Stephany Malone MD   budesonide-formoterol [facility]) 160-4.5 MCG/ACT AERO Inhale 2 puffs into the lungs 2 times daily 3/11/21  Yes Luz Maria Perez MD   benzonatate (TESSALON) 200 MG capsule Take 1 capsule by mouth 3 times daily as needed for Cough 3/2/21  Yes Merrick Wyatt MD   dicyclomine (BENTYL) 10 MG capsule TAKE ONE CAPSULE BY MOUTH  FOUR TIMES DAILY AS NEEDED 3/2/21  Yes Merrick Wyatt MD   Esomeprazole Magnesium (NEXIUM PO) Take 20 mg by mouth 2 each morning and one before supper   Yes Historical Provider, MD   tamsulosin (FLOMAX) 0.4 MG capsule Take 1 capsule by mouth 2 times daily 2/4/21  Yes Stephany Malone MD   ENTRESTO 49-51 MG per tablet Take 1 tablet by mouth 2 times daily 1/24/21  Yes Historical Provider, MD   sodium chloride, Inhalant, 3 % nebulizer solution Take 4 mLs by nebulization every 8 hours as needed for Other or Cough (cough) 1/29/21  Yes Luz Maria Perez MD   metoprolol succinate (TOPROL XL) 25 MG extended release tablet TAKE 1 TABLET BY MOUTH TWO  TIMES DAILY 11/17/20  Yes Stephany Malone MD   PROAIR  (89 Base) MCG/ACT inhaler Inhale 2 puffs into the lungs every 4 hours as needed for Wheezing or Shortness of Breath 10/13/20  Yes Luz Maria Perez MD   Cholecalciferol (VITAMIN D3) 1.25 MG (63695 UT) CAPS Take 1 capsule by mouth once a week 9/15/20  Yes Stephany Malone MD   albuterol sulfate  (90 Base) MCG/ACT inhaler INHALE 2 PUFFS INTO THE LUNGS EVERY 4 HOURS AS NEEDED FOR WHEEZING OR SHORTNESS OF BREATH 9/1/20  Yes BRENNA Jha CNP   flavoxate (URISPAS) 100 MG tablet TAKE 1 TABLET BY MOUTH THREE TIMES DAILY AS NEEDED FOR URINARY SPASM 4/2/20  Yes BRENNA Michelle CNP   sildenafil (REVATIO) 20 MG tablet Take 20 mg by mouth as needed   Yes Historical Provider, MD   guaiFENesin (MUCINEX) 600 MG extended release tablet Take 1 tablet by mouth 2 times daily 2/4/20  Yes Leyla Melendez MD   polyethylene glycol (MIRALAX) PACK packet Take 17 g by mouth nightly    Yes Historical Provider, MD   aspirin 81 MG tablet Take 81 mg by mouth daily   Yes Historical Provider, MD   azelastine (ASTELIN) 0.1 % nasal spray 2 sprays by Nasal route 2 times daily Use in each nostril as directed 12/15/17  Yes BRENNA Michelle CNP       Social History:   reports that he has never smoked. He has never used smokeless tobacco. He reports that he does not drink alcohol or use drugs. Family History:  family history includes Cancer in his father; Dementia in his mother; High Blood Pressure in his mother. Reviewed. Denies family history of sudden cardiac death, arrhythmia, premature CAD    Review of System:    · General ROS: negative for - chills, fever   · Psychological ROS: negative for - anxiety or depression  · Ophthalmic ROS: negative for - eye pain or loss of vision  · ENT ROS: negative for - epistaxis, headaches, nasal discharge, sore throat   · Allergy and Immunology ROS: negative for - hives, nasal congestion   · Hematological and Lymphatic ROS: negative for - bleeding problems, blood clots, bruising or jaundice  · Endocrine ROS: negative for - skin changes, temperature intolerance or unexpected weight changes  · Respiratory ROS: negative for - cough, hemoptysis, pleuritic pain, SOB, sputum changes or wheezing  · Cardiovascular ROS: Per HPI.    · Gastrointestinal ROS: negative for - abdominal pain, blood in stools, diarrhea, hematemesis, melena, nausea/vomiting or swallowing difficulty/pain  · Genito-Urinary ROS: negative for - dysuria or incontinence  · Musculoskeletal ROS: negative for - joint swelling or muscle pain  · Neurological ROS: negative for - confusion, dizziness, gait disturbance, headaches, numbness/tingling, seizures, speech problems, tremors, visual changes or weakness  · Dermatological ROS: negative for - rash    Physical Examination:  Vitals:    21 0945   BP: 110/68   Pulse: 78   SpO2: 95%       · Constitutional: Oriented. No distress. · Head: Normocephalic and atraumatic. · Mouth/Throat: Oropharynx is clear and moist.   · Eyes: Conjunctivae normal. EOM are normal.   · Neck: Normal range of motion. Neck supple. No rigidity. No JVD present. · Cardiovascular: Normal rate, regular rhythm, S1&S2 and intact distal pulses. · Pulmonary/Chest: Bilateral respiratory sounds. No wheezes. No rhonchi. · Abdominal: Soft. Bowel sounds present. No distension, No tenderness. · Musculoskeletal: No tenderness. No edema    · Lymphadenopathy: Has no cervical adenopathy. · Neurological: Alert and oriented. Cranial nerve appears intact, No Gross deficit   · Skin: Skin is warm and dry. No rash noted. · Psychiatric: Has a normal mood, affect and behavior     Labs:  Reviewed. EC2020 reviewed, sinus bradycardia, LBBB with v-rate of 58 bpm with QRS duration 148 ms. No pathologic Q waves, ventricular pre-excitation, or QT prolongation. Studies:   1. Event monitor:   None    2. ECHO 17 (s/p Bi-V placement)  Normal left ventricular wall thickness. Ejection fraction is visually  estimated to be 40-45%. There is septal hypokinesis (secondary to Pacing)  Trivial mitral & tricuspid regurgitation is present. The left atrial size is normal.  Pacer / ICD wire is visualized in the right ventricle.   There appears to be normal right ventricular size and function.     3. Echo: 7/24/17  Left ventricle size is normal. Mild concentric left ventricular hypertrophy  is present. Global ejection fraction is moderately decreased and estimated  from 30 % to 35%. Diastolic filling parameters suggests grade I diastolic  dysfunction . There is abnormal (paradoxical) septal motion consistent with left bundle  branch block. Mitral valve is structurally normal.  Trivial to mild mitral regurgitation is present. The left atrial size is normal.  A bubble study was performed and fails to show evidence of right to left  shunting.     4. Echo: 11/9/16  Dilated LV with severely reduced systolic function. EF 30%. Anterior, septal  and apical akinesis. Mild mitral regurgitation is present. The left atrial size is normal.  Normal right ventricular size and function. 5. NM Cardiac Stress Test 8/26/2020  Summary     No evidence of reversible ischemia.     There is a moderate to large sized, moderate intensity, fixed septal and     inferior wall defect which is most consistent with LBBB induced artifact and     diaphragm attenuation artifact.     Normal LV size and systolic function. Ejection fraction: 50 %    Overall findings represent a low risk study. Stress Test: 7/21/16  SPECT perfusion images: On the stress corrected images there is a moderate defect in the septum of the left ventricle. At rest, partially redistributes especially the posterior lateral component. Complete redistribution is not present. ZYHX:  26 %      (Normal is at least 62% for women and 53% for men)  LV wall motion:   There is significant hypokinesia of the ventricular septum  LVEDV: 117ml  (Normal is up to 100ml for women and 150ml for men)  Transient dilatation ratio:   1.0      (Normal is up to 1.3 for women and 1.2 for men)    6.  Cath: 7/21/16  #1-coronary angiography summary: Normal coronaries in a left   dominant system  A-left main coronary is normal  B-the LAD has minor irregularities and no significant stenoses  C-the circumflex is dominant and has minor irregularities and no   significant stenoses  D-the right coronary is a small nondominant vessel and is   angiographically normal  #2-ventriculography in the ADKINS projection demonstrates mild   global left ventricular dysfunction ejection fractions   approximately 40  #3-aortic pressure is approximately 150/80 left ventricular   pressures 150/14 there is no gradient on pullback  #7-ICQVS are no complications  #5-QPS patient will be treated medically for left ventricular   dysfunction in the setting of normal coronaries    I independently reviewed the ECG, MCOT, echocardiogram, stress test, and coronary angiography/PCI results and used them for my plan of care. Procedures:  1. Biotronik Bi-V ICD implant on 10/10/17 with Dr. Bere Putnam  2. Successful DCCV on 9/21/2018     Assessment/Plan:       Atrial fibrillation  Paroxsymal   Asymptomatic  Captured on past interrogations  On Toprol XL 25 mg BID for rate control of afib, anti-remodeling. He has a [id number] Score 2 (CHF, HTN). On Eliquis 5 mg BID for  thromboembolic risk reduction. Tolerating well no signs or symptoms of abnormal bruising or bleeding. He has a [id number] Score (HTN, CHF)  On Warfarin for thromboembolic risk reduction. Tolerating well no signs or symptoms of abnormal bruising or bleeding. Multaq was previously prescribed but declined to start due to SE. Discussed alternative AAD amiodarone and if he is interested in starting he will contact our office. If he is interested in starting would recommend starting at Amiodarone 400mg BID x 1 week, then 400mg po daily x 1 week, then 200mg po daily indefinitely. He is not a candidate for ablation due to the presence of his BiV ICD and thus concerns for dislodgement of the leads. Chronic systolic CHF (congestive heart failure), NYHA class 2   Non ischemic cardiomyopathy:  S/p Bi-V ICD in situ (Bi-V pacing off.  Set to VVI 40 with defibrillator programming ON per pt request)  Lots of pt anxiety surrounding his device  Pt very sensitive to pacing, base rate set to 40bpm - no changes made. NM stress test on 8/26/2020 showed normal LV size and systolic function. He states that he only takes his Lasix \"when he can\" because he states the he gets \"to dry\". EF 50 % on 9/23/2019  On guideline directed medical therapy. Beta Blocker: Toprol XL               Aldosterone Antagonist: Spironolactone                      Diuretic: Lasix (furosemide)  Last took on Friday 4/16/2021              ARB/ Neprilysin inhibitor: Entresto  Euvolemic. Device interrogated today and based on threshold, impedance, and intrinsic sensing tests run today, the device appears to be functioning normally. Remaining battery life is 2.98v  Pt is sensitive to pacing and threshold tests as such requests they not be run in office. Pt programmed to VVI 40bpm as a result. AP 0%   RP 0%  CRTP 0%  TI HF monitor reveals pt is high fluid episode beginning today. Patient no longer needs to follow up with EP will continue to monitor his device remotely and when Follow up with     Thank you for allowing me to participate in the care of Clarence Pacer. All questions and concerns were addressed to the patient/family. Alternatives to my treatment were discussed. This note was scribed in the presence of Dr. Melanie Hillman MD by Ashley Archer RN. The scribe's documentation has been prepared under my direction and personally reviewed by me in its entirety. I confirm that the note above accurately reflects all work, physical examination, the discussion of treatments and procedures, and medical decision making performed by me.     Melanie Hillman MD, MS, [facility] [place], [facility]  Cardiac Electrophysiology  [address]  [address] [facility], [facility]  [address] [facility] [address]  [phone]

## 2021-04-20 ENCOUNTER — HOSPITAL ENCOUNTER (OUTPATIENT)
Dept: CARDIAC REHAB | Age: 61
Setting detail: THERAPIES SERIES
Discharge: HOME | End: 2021-04-01
Payer: COMMERCIAL

## 2021-04-21 ENCOUNTER — OFFICE VISIT (OUTPATIENT)
Dept: CARDIOLOGY CLINIC | Age: 61
End: 2021-04-21
Payer: COMMERCIAL

## 2021-04-21 VITALS
HEIGHT: 66 IN | SYSTOLIC BLOOD PRESSURE: 126 MMHG | BODY MASS INDEX: 34.01 KG/M2 | HEART RATE: 58 BPM | DIASTOLIC BLOOD PRESSURE: 74 MMHG | OXYGEN SATURATION: 96 % | WEIGHT: 211.6 LBS

## 2021-04-21 DIAGNOSIS — I50.23 ACUTE ON CHRONIC SYSTOLIC HEART FAILURE (HCC): Primary | ICD-10-CM

## 2021-04-21 PROCEDURE — G8427 DOCREV CUR MEDS BY ELIG CLIN: HCPCS | Performed by: NURSE PRACTITIONER

## 2021-04-21 PROCEDURE — 99214 OFFICE O/P EST MOD 30 MIN: CPT | Performed by: NURSE PRACTITIONER

## 2021-04-21 PROCEDURE — G8417 CALC BMI ABV UP PARAM F/U: HCPCS | Performed by: NURSE PRACTITIONER

## 2021-04-21 PROCEDURE — 1036F TOBACCO NON-USER: CPT | Performed by: NURSE PRACTITIONER

## 2021-04-21 PROCEDURE — 3017F COLORECTAL CA SCREEN DOC REV: CPT | Performed by: NURSE PRACTITIONER

## 2021-04-21 RX ORDER — DEXLANSOPRAZOLE 60 MG/1
60 CAPSULE, DELAYED RELEASE ORAL DAILY
COMMUNITY
End: 2021-12-20 | Stop reason: SDUPTHER

## 2021-04-21 RX ORDER — TORSEMIDE 10 MG/1
10 TABLET ORAL DAILY
Qty: 30 TABLET | Refills: 3 | Status: ON HOLD | OUTPATIENT
Start: 2021-04-21 | End: 2021-04-25

## 2021-04-21 NOTE — PROGRESS NOTES
The [address] [facility], [address] [place] [address] [address], [place] [address]  [phone]    PrimaryCare Doctor:  Joanne Espino MD  Primary Cardiologist: Dr. Peter Mcgowan    Chief Complaint   Patient presents with    Follow-up     elevated optivol level patient states \"cant\" take 20mg lasix a day  states gives him cramps and dries him out , gets dizzy occassionally also complaining of headaches denies any shortness of breath       History of Present Illness:  Park Schaeffer is a 63 y.o. male with  PMH non ischemic CM, S/P BiV ICD 2017 -turned off at his request D/T anxiety issues now set VVI 40 with defibrillator programmng on, 2016 CP/abn stress>LHC with normal cors, found EF 40% at that time, Atrial Tach 2018>underwent DCCV 9/2018 >declined multaq D/T side effects, AF,  PE 2011, RLE DVT 2013 - on coumadin, GERD, HLP, HTN, kidney stones, sacroilitis. Never smoked. ARDS 2011. Bronchiolitis Obliterans and nocturnal hypoxia - wears 2L NC at night (follows with Dr. Cassandra Calloway). Retired. Patient presents to [facility] cardiology for follow up for heart failure. Recently saw Dr. Isidra Jones for AF management. Device check indicated stable thoracic impedence. C/O SOB and fatigue with exertion such as carrying a case of water from car to house. He tried to play golf a few weeks ago and felt like he was \"gasping. \" He was riding in a golf cart and only only walking to the green and back. SOB improves with rest. Also using inhalers which helps. He denies orthopnea, cough, sputum. He is taking lasix 10mg every 12 hrs. If he takes more than that he gets lightheaded and says it will ruin his kidneys. Denies syncope. Home wt has been stable. Around 211lbs. He is compliant with 2gm Na diet. He drinks 120 fl oz of fluid per day. Denies CP, palpitations. Underwent RHC 3/2/21 which showed mild hypervolemia and was instructed to take lasix.      Review of Systems:   General: Denies fever, chills  Skin: Denies skin changes, rash, itching, lesions. HEENT: Denies headache, dizziness, vision changes, nosebleeds, sore throat, nasal drainage  RESP:See HPI  CARD: Denies palpitations,  murmur  GI:Denies nausea, vomiting, heartburn, loss of appetite, change in bowels  : Denies frequency, pain, incontinence, polyuria  VASC: Denies claudication, leg cramps, clots  MUSC/SKEL: Denies pain, stiffness, arthritis  PSYCH: Denies anxiety, depression, stress  NEURO: Denies numbness, tingling, weakness,change in mood or memory  HEME: Denies abn bruising, bleeding, anemia  ENDO: Denies intolerance to heat, cold, excessive thirst or hunger, hx thyroid disease    /74   Pulse 58   Ht 5' 6\" (1.676 m)   Wt 211 lb 9.6 oz (96 kg)   SpO2 96%   BMI 34.15 kg/m²   Wt Readings from Last 3 Encounters:   04/21/21 211 lb 9.6 oz (96 kg)   04/19/21 211 lb (95.7 kg)   03/18/21 209 lb (94.8 kg)       Physical Exam:  GEN: Appears well, no acute distress  SKIN: Pink, warm, dry. Nails without clubbing. HEENT: PERRLA. Normocephalic, atraumatic. Neck supple. No adenopathy. LUNG: AP diameter normal. Clear bilateral. No wheeze, rales, or ronchi. Respiratory effort normal.  HEART: S1S2 A/R. Mild JVD. No carotid bruit. No murmur, rub or gallop. ABD: Large, fluid, Soft, nontender. +BS X 4 quads. No hepatomegaly. EXT: Radial and pedal pulses 2+ and symmetric. Without varicosities. No edema. MUSCSKEL: Good ROM X4 extremities. No deformity. NEURO: A/O X3. Calm and cooperative. Past Medical History:   has a past medical history of Bronchiolitis obliterans ([facility] [address] Bundle branch block, right, Cardiomyopathy ([facility] [address] Chest pain, Fibromyalgia, GERD (gastroesophageal reflux disease), Hepatitis, Hx of blood clots, Hyperlipemia, Hypertension, IBS (irritable bowel syndrome), Kidney stone, Neuropathy, Pneumothorax, Prostatitis, Pulmonary embolism on right ([facility] [address] and Sacroiliitis ([address]   Surgical History:   has a past surgical history that includes Cholecystectomy (2007); Colonoscopy (9/21/2012); Lithotripsy; sinus surgery; Upper gastrointestinal endoscopy (3/28/2014); hernia repair (2015); pacemaker placement; Cystocopy (05/17/2019); Cystoscopy (Right, 5/17/2019); and Upper gastrointestinal endoscopy (N/A, 2/1/2021). Social History:   reports that he has never smoked. He has never used smokeless tobacco. He reports that he does not drink alcohol or use drugs. Family History:   Family History   Problem Relation Age of Onset    High Blood Pressure Mother     Dementia Mother     Cancer Father         Pancreatic Ca       HomeMedications:  Prior to Admission medications    Medication Sig Start Date End Date Taking?  Authorizing Provider   dexlansoprazole (DEXILANT) 60 MG CPDR delayed release capsule Take 60 mg by mouth daily   Yes Historical Provider, MD   pregabalin (LYRICA) 75 MG capsule TAKE 1 CAPSULE BY MOUTH IN  THE MORNING AND 2 CAPSULES  IN THE EVENING 4/12/21 7/11/21 Yes Heriberto Rudd MD   zolpidem (AMBIEN) 10 MG tablet TAKE 1 TABLET BY MOUTH  EVERY NIGHT AT BEDTIME 4/5/21 7/5/21 Yes Heriberto Rudd MD   albuterol (PROVENTIL) (2.5 MG/3ML) 0.083% nebulizer solution Take 3 mLs by nebulization every 4 hours as needed for Wheezing or Shortness of Breath 4/1/21  Yes Jaime Baird MD   furosemide (LASIX) 20 MG tablet Take 0.5 tablets by mouth daily  Patient taking differently: Take 10 mg by mouth 2 times daily  3/18/21  Yes Derek Perez MD   pravastatin (PRAVACHOL) 40 MG tablet Take 1 tablet by mouth daily 3/18/21  Yes Derek Perez MD   warfarin (COUMADIN) 5 MG tablet Take 1 tablet by mouth daily Except 2.5 mg Tues and Thursday  Patient taking differently: Take 2.5 mg by mouth daily Except 5 mg on Monday, Wedneday, Friday 3/12/21  Yes Heriberto Rudd MD   budesonide-formoterol [facility]) 160-4.5 MCG/ACT AERO Inhale 2 puffs into the lungs 2 times daily 3/11/21  Yes Jaime Baird MD   benzonatate (TESSALON) 200 MG capsule Take 1 capsule by mouth 3 times daily as needed for Cough 3/2/21  Yes Alyssa Delgado MD   dicyclomine (BENTYL) 10 MG capsule TAKE ONE CAPSULE BY MOUTH  FOUR TIMES DAILY AS NEEDED 3/2/21  Yes Alyssa Delgado MD   tamsulosin (FLOMAX) 0.4 MG capsule Take 1 capsule by mouth 2 times daily 2/4/21  Yes Dee Dee Goldsmith MD   ENTRESTO 49-51 MG per tablet Take 1 tablet by mouth 2 times daily 1/24/21  Yes Historical Provider, MD   sodium chloride, Inhalant, 3 % nebulizer solution Take 4 mLs by nebulization every 8 hours as needed for Other or Cough (cough) 1/29/21  Yes Monalisa Harris MD   metoprolol succinate (TOPROL XL) 25 MG extended release tablet TAKE 1 TABLET BY MOUTH TWO  TIMES DAILY 11/17/20  Yes Dee Dee Goldsmith MD   PROAIR  (46 Base) MCG/ACT inhaler Inhale 2 puffs into the lungs every 4 hours as needed for Wheezing or Shortness of Breath 10/13/20  Yes Monalisa Harris MD   Cholecalciferol (VITAMIN D3) 1.25 MG (37305 UT) CAPS Take 1 capsule by mouth once a week 9/15/20  Yes Dee Dee Goldsmith MD   albuterol sulfate  (90 Base) MCG/ACT inhaler INHALE 2 PUFFS INTO THE LUNGS EVERY 4 HOURS AS NEEDED FOR WHEEZING OR SHORTNESS OF BREATH 9/1/20  Yes BRENNA Herrera CNP   flavoxate (URISPAS) 100 MG tablet TAKE 1 TABLET BY MOUTH THREE TIMES DAILY AS NEEDED FOR URINARY SPASM 4/2/20  Yes BRENNA Blanchard CNP   sildenafil (REVATIO) 20 MG tablet Take 20 mg by mouth as needed   Yes Historical Provider, MD   guaiFENesin (MUCINEX) 600 MG extended release tablet Take 1 tablet by mouth 2 times daily 2/4/20  Yes Dee Dee Goldsmith MD   polyethylene glycol (MIRALAX) PACK packet Take 17 g by mouth nightly    Yes Historical Provider, MD   aspirin 81 MG tablet Take 81 mg by mouth daily   Yes Historical Provider, MD   azelastine (ASTELIN) 0.1 % nasal spray 2 sprays by Nasal route 2 times daily Use in each nostril as directed 12/15/17  Yes BRENNA Blanchard CNP        Allergies:  Ipratropium bromide hfa, Atrovent nasal spray [ipratropium], Doxycycline, Morphine, Nitroglycerin, Sulfa antibiotics, and Vicodin [hydrocodone-acetaminophen]       LABS: Results reviewed with patient today. CBC:   Lab Results   Component Value Date    WBC 7.9 02/26/2021    WBC 4.5 02/04/2021    WBC 6.1 02/03/2021    RBC 5.24 02/26/2021    RBC 4.52 02/04/2021    RBC 5.20 02/03/2021    HGB 16.2 02/26/2021    HGB 14.0 02/04/2021    HGB 16.0 02/03/2021    HCT 48.2 02/26/2021    HCT 42.2 02/04/2021    HCT 47.7 02/03/2021    MCV 92.0 02/26/2021    MCV 93.4 02/04/2021    MCV 91.8 02/03/2021    RDW 13.1 02/26/2021    RDW 13.1 02/04/2021    RDW 12.8 02/03/2021     02/26/2021     02/04/2021     02/03/2021     BMP:  Lab Results   Component Value Date     03/17/2021     02/04/2021     02/03/2021    K 3.7 03/17/2021    K 4.1 02/04/2021    K 3.8 02/03/2021    K 3.6 12/24/2020    K 4.2 08/26/2020     03/17/2021     02/04/2021     02/03/2021    CO2 24 03/17/2021    CO2 28 02/04/2021    CO2 25 02/03/2021    BUN 10 03/17/2021    BUN 8 02/04/2021    BUN 9 02/03/2021    CREATININE 0.9 03/17/2021    CREATININE 0.9 02/04/2021    CREATININE 0.9 02/03/2021     BNP:   Lab Results   Component Value Date    PROBNP 73 03/17/2021    PROBNP 66 02/01/2020    PROBNP 95 09/23/2019       Parameters:   > 450 pg/mL under age 48  > 900 pg/mL ages 54-65  > 1800 pg/mL over age 76    Iron Studies:  No results found for: TIBC, FERRITIN  GLUCOSE: No results for input(s): GLUCOSE in the last 72 hours.   LIVER PROFILE:   Lab Results   Component Value Date    AST 41 02/04/2021    ALT 44 02/04/2021    LIPASE 18.0 02/03/2021    AMYLASE 44 11/08/2017    LABALBU 3.5 02/04/2021    BILIDIR 1.1 12/26/2018    BILITOT 0.6 02/04/2021    ALKPHOS 112 02/04/2021     PT/INR:   Lab Results   Component Value Date    PROTIME 21.5 02/04/2021    PROTIME 37.6 01/29/2016    INR 3.6 04/19/2021    INR 2.30 03/29/2021     Cardiac Enzymes:  Lab Results   Component Value moderate to large sized, moderate intensity, fixed septal and     inferior wall defect which is most consistent with LBBB induced artifact and     diaphragm attenuation artifact.     Normal LV size and systolic function. Ejection fraction: 50 %    Overall findings represent a low risk study. Stress Test: 7/21/16  SPECT perfusion images: On the stress corrected images there is a moderate defect in the septum of the left ventricle. At rest, partially redistributes especially the posterior lateral component. Complete redistribution is not present. VZCJ:  62 %      (Normal is at least 62% for women and 53% for men)  LV wall motion:   There is significant hypokinesia of the ventricular septum  LVEDV: 117ml  (Normal is up to 100ml for women and 150ml for men)  Transient dilatation ratio:   1.0      (Normal is up to 1.3 for women and 1.2 for men)    Cardiac Angiography:   Cath: 7/21/16  #1-coronary angiography summary: Normal coronaries in a left   dominant system  A-left main coronary is normal  B-the LAD has minor irregularities and no significant stenoses  C-the circumflex is dominant and has minor irregularities and no   significant stenoses  D-the right coronary is a small nondominant vessel and is   angiographically normal  #2-ventriculography in the ADKINS projection demonstrates mild   global left ventricular dysfunction ejection fractions   approximately 40  #3-aortic pressure is approximately 150/80 left ventricular   pressures 150/14 there is no gradient on pullback  #3-LDGRG are no complications  #0-YEF patient will be treated medically for left ventricular   dysfunction in the setting of normal coronaries    [facility] 3/2/2021  OVERALL IMPRESSION:  1. Slightly above normal right heart pressure with slightly elevated  pulmonary capillary wedge pressure of 17 mmHg. 2.  Normal cardiac output and cardiac indices. Assessment/Plan:      1.) Acute on chronic systolic heart failure: EF 30% improved to 50%. NICM. Continues with SOB with exertion. Per RHC he was hypervolmeic with PCWP 17mmhg. He is not taking lasix appropriately. He is worried about his kidneys but recent labs indicate normal BUN/creat and this was explained to him. He is not following a fluid restriction. He also says lasix is causing him to feel lightheaded. SOB is most likely multifactorial with chronic lung dz. I do not suspect PE with taking coumadin. NYHA Class III   Stage C  Diuretic: lasix 10mg BID> change to torsemide 10mg daily  Beta Blocker: Toprol XL  ACEi/ARB/ARNI: entresto  Aldosterone Antagonist: none  SGLT2 Inhibitor: none  2gm Na diet, daily weight, 64 oz fluid restriction  Avoid NSAIDS and other nephrotoxic meds  Cardiac Rehab: Not indicated for EF>35%  ICD: s/p BiV ICD- Bi V off, now VVI 40 with defibrillator ON    2.) Paroxysmal Atrial Fib: Managed by Dr. Cem Roper. Declined multaq and Amio  CHADSVASC- 2    HASBLED-  Thromboembolic risk reduction: warfarin INR goal 2-3 managed by clinic  Rate Control: Toprol XL  Rhythm Control:  Not a candidate for ablation with BiV ICD  EP to follow device remotely    3. ) Hyperlipidemia:   LDL Goal < 70  Statin: pravachol            Low cholesterol diet  Liver fx 2 months after initiation then q6mos  Lipid panel annually    4.) Hx RLE DVT/PE: On coumadin     Instructions:   1. Medications: change lasix to torsemide  2. Labs: none  3. Follow up: 4 weeks  4. Daily weight: Call for increase 3 lbs/day or 5 lbs/week. 5. 2 gm sodium diet:  6. Fluid Restriction: 64 oz.       I appreciate the opportunity of cooperating in the care of this individual.    LEE Berger, SHANA, 4/21/2021,2:52 PM

## 2021-04-22 ENCOUNTER — HOSPITAL ENCOUNTER (OUTPATIENT)
Dept: CARDIAC REHAB | Age: 61
Setting detail: THERAPIES SERIES
Discharge: HOME | End: 2021-04-22
Payer: COMMERCIAL

## 2021-04-22 PROCEDURE — G0239 OTH RESP PROC, GROUP: HCPCS

## 2021-04-24 ENCOUNTER — HOSPITAL ENCOUNTER (OUTPATIENT)
Age: 61
Setting detail: OBSERVATION
Discharge: HOME | End: 2021-04-27
Attending: EMERGENCY MEDICINE | Admitting: INTERNAL MEDICINE
Payer: COMMERCIAL

## 2021-04-24 DIAGNOSIS — R07.9 CHEST PAIN, UNSPECIFIED TYPE: Primary | ICD-10-CM

## 2021-04-24 LAB
A/G RATIO: 1.5 (ref 1.1–2.2)
ALBUMIN SERPL-MCNC: 4.1 G/DL (ref 3.4–5)
ALP BLD-CCNC: 102 U/L (ref 40–129)
ALT SERPL-CCNC: 34 U/L (ref 10–40)
ANION GAP SERPL CALCULATED.3IONS-SCNC: 8 MMOL/L (ref 3–16)
APTT: 50.5 SEC (ref 24.2–36.2)
AST SERPL-CCNC: 33 U/L (ref 15–37)
BASOPHILS ABSOLUTE: 0.1 K/UL (ref 0–0.2)
BASOPHILS RELATIVE PERCENT: 0.8 %
BILIRUB SERPL-MCNC: 0.3 MG/DL (ref 0–1)
BILIRUBIN URINE: NEGATIVE
BLOOD, URINE: ABNORMAL
BUN BLDV-MCNC: 11 MG/DL (ref 7–20)
CALCIUM SERPL-MCNC: 10 MG/DL (ref 8.3–10.6)
CHLORIDE BLD-SCNC: 104 MMOL/L (ref 99–110)
CLARITY: CLEAR
CO2: 28 MMOL/L (ref 21–32)
COLOR: YELLOW
CREAT SERPL-MCNC: 0.9 MG/DL (ref 0.8–1.3)
EOSINOPHILS ABSOLUTE: 0.5 K/UL (ref 0–0.6)
EOSINOPHILS RELATIVE PERCENT: 5.8 %
EPITHELIAL CELLS, UA: 0 /HPF (ref 0–5)
GFR AFRICAN AMERICAN: >60
GFR NON-AFRICAN AMERICAN: >60
GLOBULIN: 2.7 G/DL
GLUCOSE BLD-MCNC: 91 MG/DL (ref 70–99)
GLUCOSE URINE: NEGATIVE MG/DL
HCT VFR BLD CALC: 46.4 % (ref 40.5–52.5)
HEMOGLOBIN: 15.7 G/DL (ref 13.5–17.5)
HYALINE CASTS: 0 /LPF (ref 0–8)
INR BLD: 3.49 (ref 0.86–1.14)
KETONES, URINE: NEGATIVE MG/DL
LACTIC ACID: 1.1 MMOL/L (ref 0.4–2)
LEUKOCYTE ESTERASE, URINE: NEGATIVE
LIPASE: 18 U/L (ref 13–60)
LYMPHOCYTES ABSOLUTE: 1.7 K/UL (ref 1–5.1)
LYMPHOCYTES RELATIVE PERCENT: 21.5 %
MCH RBC QN AUTO: 30.6 PG (ref 26–34)
MCHC RBC AUTO-ENTMCNC: 33.8 G/DL (ref 31–36)
MCV RBC AUTO: 90.5 FL (ref 80–100)
MICROSCOPIC EXAMINATION: YES
MONOCYTES ABSOLUTE: 0.6 K/UL (ref 0–1.3)
MONOCYTES RELATIVE PERCENT: 7.4 %
NEUTROPHILS ABSOLUTE: 5 K/UL (ref 1.7–7.7)
NEUTROPHILS RELATIVE PERCENT: 64.5 %
NITRITE, URINE: NEGATIVE
PDW BLD-RTO: 12.8 % (ref 12.4–15.4)
PH UA: 7.5 (ref 5–8)
PLATELET # BLD: 180 K/UL (ref 135–450)
PMV BLD AUTO: 7.5 FL (ref 5–10.5)
POTASSIUM SERPL-SCNC: 3.9 MMOL/L (ref 3.5–5.1)
PROTEIN UA: ABNORMAL MG/DL
PROTHROMBIN TIME: 41 SEC (ref 10–13.2)
RBC # BLD: 5.12 M/UL (ref 4.2–5.9)
RBC UA: 6 /HPF (ref 0–4)
SODIUM BLD-SCNC: 140 MMOL/L (ref 136–145)
SPECIFIC GRAVITY UA: 1.01 (ref 1–1.03)
TOTAL PROTEIN: 6.8 G/DL (ref 6.4–8.2)
TROPONIN: <0.01 NG/ML
URINE REFLEX TO CULTURE: ABNORMAL
URINE TYPE: ABNORMAL
UROBILINOGEN, URINE: 0.2 E.U./DL
WBC # BLD: 7.8 K/UL (ref 4–11)
WBC UA: 2 /HPF (ref 0–5)

## 2021-04-24 PROCEDURE — 99284 EMERGENCY DEPT VISIT MOD MDM: CPT

## 2021-04-24 PROCEDURE — 85025 COMPLETE CBC W/AUTO DIFF WBC: CPT

## 2021-04-24 PROCEDURE — 85610 PROTHROMBIN TIME: CPT

## 2021-04-24 PROCEDURE — 83605 ASSAY OF LACTIC ACID: CPT

## 2021-04-24 PROCEDURE — 93005 ELECTROCARDIOGRAM TRACING: CPT | Performed by: EMERGENCY MEDICINE

## 2021-04-24 PROCEDURE — 96374 THER/PROPH/DIAG INJ IV PUSH: CPT

## 2021-04-24 PROCEDURE — 81001 URINALYSIS AUTO W/SCOPE: CPT

## 2021-04-24 PROCEDURE — 36415 COLL VENOUS BLD VENIPUNCTURE: CPT

## 2021-04-24 PROCEDURE — 85730 THROMBOPLASTIN TIME PARTIAL: CPT

## 2021-04-24 PROCEDURE — 84484 ASSAY OF TROPONIN QUANT: CPT

## 2021-04-24 PROCEDURE — 80053 COMPREHEN METABOLIC PANEL: CPT

## 2021-04-24 PROCEDURE — 83690 ASSAY OF LIPASE: CPT

## 2021-04-24 PROCEDURE — 96375 TX/PRO/DX INJ NEW DRUG ADDON: CPT

## 2021-04-24 ASSESSMENT — PAIN DESCRIPTION - FREQUENCY: FREQUENCY: INTERMITTENT

## 2021-04-25 ENCOUNTER — APPOINTMENT (OUTPATIENT)
Dept: CT IMAGING | Age: 61
End: 2021-04-25
Payer: COMMERCIAL

## 2021-04-25 PROBLEM — R07.9 CHEST PAIN: Status: ACTIVE | Noted: 2021-04-25

## 2021-04-25 LAB
EKG ATRIAL RATE: 71 BPM
EKG DIAGNOSIS: NORMAL
EKG P AXIS: 60 DEGREES
EKG P-R INTERVAL: 182 MS
EKG Q-T INTERVAL: 442 MS
EKG QRS DURATION: 154 MS
EKG QTC CALCULATION (BAZETT): 480 MS
EKG R AXIS: -53 DEGREES
EKG T AXIS: 108 DEGREES
EKG VENTRICULAR RATE: 71 BPM
INR BLD: 3.75 (ref 0.86–1.14)
PRO-BNP: 93 PG/ML (ref 0–124)
PROTHROMBIN TIME: 44.1 SEC (ref 10–13.2)
TROPONIN: <0.01 NG/ML
TROPONIN: <0.01 NG/ML

## 2021-04-25 PROCEDURE — 36415 COLL VENOUS BLD VENIPUNCTURE: CPT

## 2021-04-25 PROCEDURE — 99220 PR INITIAL OBSERVATION CARE/DAY 70 MINUTES: CPT | Performed by: INTERNAL MEDICINE

## 2021-04-25 PROCEDURE — 6370000000 HC RX 637 (ALT 250 FOR IP): Performed by: INTERNAL MEDICINE

## 2021-04-25 PROCEDURE — 94761 N-INVAS EAR/PLS OXIMETRY MLT: CPT

## 2021-04-25 PROCEDURE — 6370000000 HC RX 637 (ALT 250 FOR IP): Performed by: EMERGENCY MEDICINE

## 2021-04-25 PROCEDURE — 99214 OFFICE O/P EST MOD 30 MIN: CPT | Performed by: INTERNAL MEDICINE

## 2021-04-25 PROCEDURE — 84484 ASSAY OF TROPONIN QUANT: CPT

## 2021-04-25 PROCEDURE — G0378 HOSPITAL OBSERVATION PER HR: HCPCS

## 2021-04-25 PROCEDURE — 74174 CTA ABD&PLVS W/CONTRAST: CPT

## 2021-04-25 PROCEDURE — 6360000002 HC RX W HCPCS: Performed by: EMERGENCY MEDICINE

## 2021-04-25 PROCEDURE — 94640 AIRWAY INHALATION TREATMENT: CPT

## 2021-04-25 PROCEDURE — 83880 ASSAY OF NATRIURETIC PEPTIDE: CPT

## 2021-04-25 PROCEDURE — 6370000000 HC RX 637 (ALT 250 FOR IP): Performed by: STUDENT IN AN ORGANIZED HEALTH CARE EDUCATION/TRAINING PROGRAM

## 2021-04-25 PROCEDURE — 93010 ELECTROCARDIOGRAM REPORT: CPT | Performed by: INTERNAL MEDICINE

## 2021-04-25 PROCEDURE — 6360000004 HC RX CONTRAST MEDICATION: Performed by: EMERGENCY MEDICINE

## 2021-04-25 PROCEDURE — 6360000002 HC RX W HCPCS: Performed by: STUDENT IN AN ORGANIZED HEALTH CARE EDUCATION/TRAINING PROGRAM

## 2021-04-25 PROCEDURE — 6360000002 HC RX W HCPCS: Performed by: INTERNAL MEDICINE

## 2021-04-25 PROCEDURE — 2580000003 HC RX 258: Performed by: STUDENT IN AN ORGANIZED HEALTH CARE EDUCATION/TRAINING PROGRAM

## 2021-04-25 PROCEDURE — 71250 CT THORAX DX C-: CPT

## 2021-04-25 PROCEDURE — 85610 PROTHROMBIN TIME: CPT

## 2021-04-25 RX ORDER — POLYETHYLENE GLYCOL 3350 17 G/17G
17 POWDER, FOR SOLUTION ORAL DAILY PRN
Status: DISCONTINUED | OUTPATIENT
Start: 2021-04-25 | End: 2021-04-27 | Stop reason: HOSPADM

## 2021-04-25 RX ORDER — ONDANSETRON 2 MG/ML
4 INJECTION INTRAMUSCULAR; INTRAVENOUS ONCE
Status: COMPLETED | OUTPATIENT
Start: 2021-04-25 | End: 2021-04-25

## 2021-04-25 RX ORDER — ALBUTEROL SULFATE 2.5 MG/3ML
2.5 SOLUTION RESPIRATORY (INHALATION) 3 TIMES DAILY
Status: DISCONTINUED | OUTPATIENT
Start: 2021-04-25 | End: 2021-04-27 | Stop reason: HOSPADM

## 2021-04-25 RX ORDER — OXYBUTYNIN CHLORIDE 5 MG/1
5 TABLET ORAL 3 TIMES DAILY PRN
Status: DISCONTINUED | OUTPATIENT
Start: 2021-04-25 | End: 2021-04-27 | Stop reason: HOSPADM

## 2021-04-25 RX ORDER — SODIUM CHLORIDE 9 MG/ML
10 INJECTION INTRAVENOUS DAILY
Status: DISCONTINUED | OUTPATIENT
Start: 2021-04-25 | End: 2021-04-25

## 2021-04-25 RX ORDER — PROMETHAZINE HYDROCHLORIDE 25 MG/1
12.5 TABLET ORAL EVERY 6 HOURS PRN
Status: DISCONTINUED | OUTPATIENT
Start: 2021-04-25 | End: 2021-04-27 | Stop reason: HOSPADM

## 2021-04-25 RX ORDER — ASPIRIN 81 MG/1
81 TABLET, CHEWABLE ORAL DAILY
Status: DISCONTINUED | OUTPATIENT
Start: 2021-04-26 | End: 2021-04-25

## 2021-04-25 RX ORDER — OXYCODONE HYDROCHLORIDE AND ACETAMINOPHEN 5; 325 MG/1; MG/1
1 TABLET ORAL 2 TIMES DAILY PRN
Status: DISCONTINUED | OUTPATIENT
Start: 2021-04-25 | End: 2021-04-25 | Stop reason: DRUGHIGH

## 2021-04-25 RX ORDER — FLAVOXATE HYDROCHLORIDE 100 MG/1
100 TABLET ORAL 3 TIMES DAILY PRN
Status: DISCONTINUED | OUTPATIENT
Start: 2021-04-25 | End: 2021-04-25 | Stop reason: RX

## 2021-04-25 RX ORDER — ASPIRIN 81 MG/1
324 TABLET, CHEWABLE ORAL ONCE
Status: COMPLETED | OUTPATIENT
Start: 2021-04-25 | End: 2021-04-25

## 2021-04-25 RX ORDER — WARFARIN SODIUM 2.5 MG/1
2.5 TABLET ORAL DAILY
Status: DISCONTINUED | OUTPATIENT
Start: 2021-04-25 | End: 2021-04-25

## 2021-04-25 RX ORDER — SODIUM CHLORIDE 0.9 % (FLUSH) 0.9 %
5-40 SYRINGE (ML) INJECTION EVERY 12 HOURS SCHEDULED
Status: DISCONTINUED | OUTPATIENT
Start: 2021-04-25 | End: 2021-04-27 | Stop reason: HOSPADM

## 2021-04-25 RX ORDER — LIDOCAINE 4 G/G
1 PATCH TOPICAL DAILY
Status: DISCONTINUED | OUTPATIENT
Start: 2021-04-25 | End: 2021-04-27 | Stop reason: HOSPADM

## 2021-04-25 RX ORDER — PRAVASTATIN SODIUM 40 MG
40 TABLET ORAL DAILY
Status: DISCONTINUED | OUTPATIENT
Start: 2021-04-25 | End: 2021-04-27 | Stop reason: HOSPADM

## 2021-04-25 RX ORDER — SODIUM CHLORIDE 0.9 % (FLUSH) 0.9 %
5-40 SYRINGE (ML) INJECTION PRN
Status: DISCONTINUED | OUTPATIENT
Start: 2021-04-25 | End: 2021-04-27 | Stop reason: HOSPADM

## 2021-04-25 RX ORDER — OXYCODONE HYDROCHLORIDE AND ACETAMINOPHEN 5; 325 MG/1; MG/1
1 TABLET ORAL EVERY 4 HOURS PRN
Status: ON HOLD | COMMUNITY
End: 2021-07-03 | Stop reason: SDUPTHER

## 2021-04-25 RX ORDER — METOPROLOL SUCCINATE 25 MG/1
25 TABLET, EXTENDED RELEASE ORAL 2 TIMES DAILY
Status: DISCONTINUED | OUTPATIENT
Start: 2021-04-25 | End: 2021-04-27 | Stop reason: HOSPADM

## 2021-04-25 RX ORDER — POLYETHYLENE GLYCOL 3350 17 G/17G
17 POWDER, FOR SOLUTION ORAL NIGHTLY
Status: DISCONTINUED | OUTPATIENT
Start: 2021-04-25 | End: 2021-04-27 | Stop reason: HOSPADM

## 2021-04-25 RX ORDER — PREGABALIN 75 MG/1
75 CAPSULE ORAL EVERY MORNING
Status: DISCONTINUED | OUTPATIENT
Start: 2021-04-25 | End: 2021-04-27 | Stop reason: HOSPADM

## 2021-04-25 RX ORDER — ZOLPIDEM TARTRATE 5 MG/1
10 TABLET ORAL NIGHTLY
Status: DISCONTINUED | OUTPATIENT
Start: 2021-04-25 | End: 2021-04-27 | Stop reason: HOSPADM

## 2021-04-25 RX ORDER — PANTOPRAZOLE SODIUM 40 MG/10ML
40 INJECTION, POWDER, LYOPHILIZED, FOR SOLUTION INTRAVENOUS DAILY
Status: DISCONTINUED | OUTPATIENT
Start: 2021-04-25 | End: 2021-04-25

## 2021-04-25 RX ORDER — DICYCLOMINE HYDROCHLORIDE 10 MG/1
10 CAPSULE ORAL 4 TIMES DAILY
Status: DISCONTINUED | OUTPATIENT
Start: 2021-04-25 | End: 2021-04-27 | Stop reason: HOSPADM

## 2021-04-25 RX ORDER — PREGABALIN 75 MG/1
150 CAPSULE ORAL NIGHTLY
Status: DISCONTINUED | OUTPATIENT
Start: 2021-04-25 | End: 2021-04-27 | Stop reason: HOSPADM

## 2021-04-25 RX ORDER — ASPIRIN 81 MG/1
81 TABLET, CHEWABLE ORAL DAILY
Status: DISCONTINUED | OUTPATIENT
Start: 2021-04-25 | End: 2021-04-27 | Stop reason: HOSPADM

## 2021-04-25 RX ORDER — TAMSULOSIN HYDROCHLORIDE 0.4 MG/1
0.4 CAPSULE ORAL 2 TIMES DAILY
Status: DISCONTINUED | OUTPATIENT
Start: 2021-04-25 | End: 2021-04-27 | Stop reason: HOSPADM

## 2021-04-25 RX ORDER — ACETAMINOPHEN 325 MG/1
650 TABLET ORAL EVERY 6 HOURS PRN
Status: DISCONTINUED | OUTPATIENT
Start: 2021-04-25 | End: 2021-04-27 | Stop reason: HOSPADM

## 2021-04-25 RX ORDER — AZELASTINE 1 MG/ML
2 SPRAY, METERED NASAL 2 TIMES DAILY
Status: DISCONTINUED | OUTPATIENT
Start: 2021-04-25 | End: 2021-04-27 | Stop reason: HOSPADM

## 2021-04-25 RX ORDER — ERGOCALCIFEROL 1.25 MG/1
50000 CAPSULE ORAL WEEKLY
Status: DISCONTINUED | OUTPATIENT
Start: 2021-04-28 | End: 2021-04-27 | Stop reason: HOSPADM

## 2021-04-25 RX ORDER — GUAIFENESIN 600 MG/1
600 TABLET, EXTENDED RELEASE ORAL 2 TIMES DAILY PRN
Status: DISCONTINUED | OUTPATIENT
Start: 2021-04-25 | End: 2021-04-27 | Stop reason: HOSPADM

## 2021-04-25 RX ORDER — SODIUM CHLORIDE 9 MG/ML
25 INJECTION, SOLUTION INTRAVENOUS PRN
Status: DISCONTINUED | OUTPATIENT
Start: 2021-04-25 | End: 2021-04-27 | Stop reason: HOSPADM

## 2021-04-25 RX ORDER — ONDANSETRON 2 MG/ML
4 INJECTION INTRAMUSCULAR; INTRAVENOUS EVERY 6 HOURS PRN
Status: DISCONTINUED | OUTPATIENT
Start: 2021-04-25 | End: 2021-04-27 | Stop reason: HOSPADM

## 2021-04-25 RX ORDER — OXYCODONE HYDROCHLORIDE AND ACETAMINOPHEN 5; 325 MG/1; MG/1
1 TABLET ORAL EVERY 6 HOURS PRN
Status: DISCONTINUED | OUTPATIENT
Start: 2021-04-25 | End: 2021-04-27 | Stop reason: HOSPADM

## 2021-04-25 RX ORDER — SODIUM CHLORIDE FOR INHALATION 3 %
4 VIAL, NEBULIZER (ML) INHALATION EVERY 8 HOURS PRN
Status: DISCONTINUED | OUTPATIENT
Start: 2021-04-25 | End: 2021-04-27 | Stop reason: HOSPADM

## 2021-04-25 RX ADMIN — AZELASTINE 2 SPRAY: 1 SPRAY, METERED NASAL at 22:33

## 2021-04-25 RX ADMIN — SODIUM CHLORIDE, PRESERVATIVE FREE 10 ML: 5 INJECTION INTRAVENOUS at 20:47

## 2021-04-25 RX ADMIN — ONDANSETRON 4 MG: 2 INJECTION INTRAMUSCULAR; INTRAVENOUS at 00:32

## 2021-04-25 RX ADMIN — NYSTATIN 500000 UNITS: 100000 SUSPENSION ORAL at 17:57

## 2021-04-25 RX ADMIN — PRAVASTATIN SODIUM 40 MG: 40 TABLET ORAL at 20:46

## 2021-04-25 RX ADMIN — ASPIRIN 81 MG 324 MG: 81 TABLET ORAL at 01:41

## 2021-04-25 RX ADMIN — METOPROLOL SUCCINATE 25 MG: 25 TABLET, EXTENDED RELEASE ORAL at 10:41

## 2021-04-25 RX ADMIN — ONDANSETRON 4 MG: 2 INJECTION INTRAMUSCULAR; INTRAVENOUS at 17:10

## 2021-04-25 RX ADMIN — DICYCLOMINE HYDROCHLORIDE 10 MG: 10 CAPSULE ORAL at 17:57

## 2021-04-25 RX ADMIN — ACETAMINOPHEN 650 MG: 325 TABLET ORAL at 05:28

## 2021-04-25 RX ADMIN — PREGABALIN 150 MG: 75 CAPSULE ORAL at 21:36

## 2021-04-25 RX ADMIN — NYSTATIN 500000 UNITS: 100000 SUSPENSION ORAL at 21:40

## 2021-04-25 RX ADMIN — TAMSULOSIN HYDROCHLORIDE 0.4 MG: 0.4 CAPSULE ORAL at 22:33

## 2021-04-25 RX ADMIN — ALBUTEROL SULFATE 2.5 MG: 2.5 SOLUTION RESPIRATORY (INHALATION) at 12:34

## 2021-04-25 RX ADMIN — OXYCODONE HYDROCHLORIDE AND ACETAMINOPHEN 1 TABLET: 5; 325 TABLET ORAL at 10:41

## 2021-04-25 RX ADMIN — DICYCLOMINE HYDROCHLORIDE 10 MG: 10 CAPSULE ORAL at 13:40

## 2021-04-25 RX ADMIN — ZOLPIDEM TARTRATE 10 MG: 5 TABLET ORAL at 22:32

## 2021-04-25 RX ADMIN — POLYETHYLENE GLYCOL 3350 17 G: 17 POWDER, FOR SOLUTION ORAL at 22:36

## 2021-04-25 RX ADMIN — POLYETHYLENE GLYCOL 3350 17 G: 17 POWDER, FOR SOLUTION ORAL at 13:41

## 2021-04-25 RX ADMIN — TAMSULOSIN HYDROCHLORIDE 0.4 MG: 0.4 CAPSULE ORAL at 10:41

## 2021-04-25 RX ADMIN — SODIUM CHLORIDE, PRESERVATIVE FREE 10 ML: 5 INJECTION INTRAVENOUS at 08:26

## 2021-04-25 RX ADMIN — PREGABALIN 75 MG: 75 CAPSULE ORAL at 10:41

## 2021-04-25 RX ADMIN — DICYCLOMINE HYDROCHLORIDE 10 MG: 10 CAPSULE ORAL at 22:37

## 2021-04-25 RX ADMIN — SACUBITRIL AND VALSARTAN 1 TABLET: 49; 51 TABLET, FILM COATED ORAL at 13:44

## 2021-04-25 RX ADMIN — METOPROLOL SUCCINATE 25 MG: 25 TABLET, EXTENDED RELEASE ORAL at 22:33

## 2021-04-25 RX ADMIN — ALBUTEROL SULFATE 2.5 MG: 2.5 SOLUTION RESPIRATORY (INHALATION) at 21:01

## 2021-04-25 RX ADMIN — OXYCODONE HYDROCHLORIDE AND ACETAMINOPHEN 1 TABLET: 5; 325 TABLET ORAL at 17:57

## 2021-04-25 RX ADMIN — NYSTATIN 500000 UNITS: 100000 SUSPENSION ORAL at 13:40

## 2021-04-25 RX ADMIN — HYDROMORPHONE HYDROCHLORIDE 0.5 MG: 1 INJECTION, SOLUTION INTRAMUSCULAR; INTRAVENOUS; SUBCUTANEOUS at 00:32

## 2021-04-25 RX ADMIN — IOPAMIDOL 75 ML: 755 INJECTION, SOLUTION INTRAVENOUS at 00:27

## 2021-04-25 RX ADMIN — ONDANSETRON 4 MG: 2 INJECTION INTRAMUSCULAR; INTRAVENOUS at 11:46

## 2021-04-25 RX ADMIN — SACUBITRIL AND VALSARTAN 1 TABLET: 49; 51 TABLET, FILM COATED ORAL at 20:46

## 2021-04-25 ASSESSMENT — PAIN DESCRIPTION - FREQUENCY
FREQUENCY: CONTINUOUS

## 2021-04-25 ASSESSMENT — PAIN SCALES - GENERAL
PAINLEVEL_OUTOF10: 6
PAINLEVEL_OUTOF10: 4
PAINLEVEL_OUTOF10: 7
PAINLEVEL_OUTOF10: 0
PAINLEVEL_OUTOF10: 5
PAINLEVEL_OUTOF10: 5

## 2021-04-25 ASSESSMENT — PAIN DESCRIPTION - DIRECTION: RADIATING_TOWARDS: BACK

## 2021-04-25 ASSESSMENT — PAIN DESCRIPTION - LOCATION
LOCATION: BACK
LOCATION: ABDOMEN;BACK

## 2021-04-25 ASSESSMENT — PAIN DESCRIPTION - DESCRIPTORS
DESCRIPTORS: SHARP;CRAMPING
DESCRIPTORS: SHARP;CRAMPING
DESCRIPTORS: SHARP

## 2021-04-25 ASSESSMENT — PAIN DESCRIPTION - ORIENTATION
ORIENTATION: LEFT
ORIENTATION: LOWER

## 2021-04-25 ASSESSMENT — PAIN DESCRIPTION - ONSET: ONSET: ON-GOING

## 2021-04-25 ASSESSMENT — PAIN DESCRIPTION - PAIN TYPE
TYPE: ACUTE PAIN
TYPE: ACUTE PAIN

## 2021-04-25 NOTE — CONSULTS
Moises Lopez  1960 April 25, 2021    Reason for Consult: Palpitations  CC: Palpitations    HPI:  The patient is 63 y.o. male with a past medical history significant for generalized anxiety disorder and chronic systolic CHF s/p Biventricular pacemaker/ICD who presented to [facility] ED with symptoms of worsening heartburn. He attributes this to Torsemide and I was asked to see him for this. Berto Jacobs states that he came to the ED because of palpitations. He has been caring for his son's dog over the weekend. He was at rest when this occurred. He was concerned for a-fib. He had no racing of his heartbeat. He was not particularly short of breath but a little bit. He states that he had some chest pain that lasted for about 20-30 seconds. There was a little bit of left arm pain. The pain has not recurred since then. Berto Jacobs relates that he thinks that torsemide has been causing crampiong in his sides. He was previously on lasix which he flet did the same thing. He endorses some thrush in his throat which he attributes to Symbicort. Review of Systems:  Constitutional: No fatigue, weakness, night sweats or fever. HEENT: No new vision difficulties or ringing in the ears. Respiratory: No new SOB, PND, orthopnea or cough. Cardiovascular: See HPI   GI: No n/v, diarrhea, constipation, abdominal pain or changes in bowel habits. No melena, no hematochezia  : No urinary frequency, urgency, incontinence, hematuria or dysuria. Skin: No cyanosis or skin lesions. Musculoskeletal: No new muscle or joint pain. Neurological: No syncope or TIA-like symptoms.   Psychiatric: No anxiety, insomnia or depression     Past Medical History:   Diagnosis Date    Bronchiolitis obliterans ([address]     Bundle branch block, right     Cardiomyopathy ([facility] [address]     Chest pain 9/24/2019    Fibromyalgia     GERD (gastroesophageal reflux disease)     Hepatitis 1979    unsure of which type    Hx of blood clots     Hyperlipemia     Hypertension     IBS (irritable bowel syndrome)     Kidney stone     over thirty kidney stones    Neuropathy     right side-chest    Pneumothorax 2011    Right    Prostatitis     Pulmonary embolism on right [facility]) 2011    upper lobe-coumadin 2011    Sacroiliitis [facility])      Past Surgical History:   Procedure Laterality Date    CHOLECYSTECTOMY  2007    COLONOSCOPY  9/21/2012    dr Marcos Sims  05/17/2019    RIGHT SIDED URETEROSCOPY  Diagnostic, retrograde pyelogram and fulguration of previously resected bladder tumor base    CYSTOSCOPY Right 5/17/2019    RIGHT SIDED URETEROSCOPY FLUGERATION  OF BLADDER performed by Evangelina Sam MD at [facility]  2015    LITHOTRIPSY     [address] opening larger in sinuses    UPPER GASTROINTESTINAL ENDOSCOPY  3/28/2014    dr Dmitry Louis N/A 2/1/2021    EGD BIOPSY performed by Rhona Diez MD at [address] History   Problem Relation Age of Onset    High Blood Pressure Mother     Dementia Mother     Cancer Father         Pancreatic Ca     Social History     Tobacco Use    Smoking status: Never Smoker    Smokeless tobacco: Never Used   Substance Use Topics    Alcohol use: No     Frequency: Never     Comment: occ    Drug use: No       Allergies   Allergen Reactions    Ipratropium Bromide Hfa Shortness Of Breath    Atrovent Nasal Spray [Ipratropium] Other (See Comments)     Chest tightness    Doxycycline Hives    Morphine Other (See Comments)     \"makes me feel funny\"      Nitroglycerin Other (See Comments)     Severe hypotension (went from 407 to 66 systolic)    Sulfa Antibiotics Rash    Vicodin [Hydrocodone-Acetaminophen] Rash     Current Facility-Administered Medications   Medication Dose Route Frequency Provider Last Rate Last Admin    sodium chloride flush 0.9 % Oral Nightly Orestes Cesar MD        sodium chloride (Inhalant) 3 % nebulizer solution 4 mL  4 mL Nebulization Q8H PRN Orestes Cesar MD        tamsulosin [facility]) capsule 0.4 mg  0.4 mg Oral BID Orestes Cesar MD   0.4 mg at 04/25/21 1041    zolpidem (AMBIEN) tablet 10 mg  10 mg Oral Nightly Orestes Cesar MD        oxyCODONE-acetaminophen (PERCOCET) 5-325 MG per tablet 1 tablet  1 tablet Oral Q6H PRN Orestes Cesar MD   1 tablet at 04/25/21 1041    esomeprazole (NEXIUM) delayed release capsule 40 mg  40 mg Oral BID AC Orestes Cesar MD        warfarin (COUMADIN) daily dosing (placeholder)   Other RX Placeholder Orestes Cesar MD        [facility]) tablet 5 mg  5 mg Oral TID PRN Orestes Cesar MD           Physical Exam:   BP (!) 146/82   Pulse 65   Temp 97.8 °F (36.6 °C) (Oral)   Resp 20   Ht 5' 6\" (1.676 m)   Wt 209 lb 6.4 oz (95 kg)   SpO2 94%   BMI 33.80 kg/m²     Intake/Output Summary (Last 24 hours) at 4/25/2021 1144  Last data filed at 4/25/2021 3352  Gross per 24 hour   Intake 10 ml   Output --   Net 10 ml     Wt Readings from Last 2 Encounters:   04/25/21 209 lb 6.4 oz (95 kg)   04/21/21 211 lb 9.6 oz (96 kg)     Constitutional: He is oriented to person, place, and time. He appears well-developed and well-nourished. In no acute distress. Head: Normocephalic and atraumatic. Neck: Neck supple. No JVD present. Carotid bruit is not present. No mass and no thyromegaly present. No lymphadenopathy present. Cardiovascular: Normal rate, regular rhythm, normal heart sounds and intact distal pulses. Exam reveals no gallop and no friction rub. No murmur heard. Pulmonary/Chest: Effort normal and breath sounds normal. No respiratory distress. He has no wheezes, rhonchi or rales. Abdominal: Soft, non-tender. Bowel sounds and aorta are normal. He exhibits no organomegaly, mass or bruit. Extremities: No edema, cyanosis, or clubbing.  Pulses are 2+ radial/carotid/dorsalis pedis and posterior tibial bilaterally. Neurological: He is alert and oriented to person, place, and time. He has normal reflexes. No cranial nerve deficit. Coordination normal.   Skin: Skin is warm and dry. There is no rash or diaphoresis. Psychiatric: He has a normal mood and affect. His speech is normal and behavior is normal.     EKG Interpretation: Sinus rhythm with LBBB    Lab Review:   Lab Results   Component Value Date    TRIG 126 10/02/2019    HDL 39 02/26/2021    HDL 65 11/10/2011    LDLCALC 92 02/26/2021    LABVLDL 20 02/26/2021     Lab Results   Component Value Date     04/24/2021    K 3.9 04/24/2021    K 3.8 02/03/2021    BUN 11 04/24/2021    CREATININE 0.9 04/24/2021     Recent Labs     04/24/21  2320   WBC 7.8   HGB 15.7   HCT 46.4          Assessment:  1. Palpitations  2. Chronic Systolic CHF, class 2  3. Generalized Anxiety Disorder    Plan:  I don't feel that Kaycee Henderson needs any further cardiovascular work up at this time except for a device interrogation tomorrow. I have a low suspicion that his palpitations were due to a-fib. Kaycee Henderson has lots of issues with many things, including drugs and his BiV device (currently turned off and set VVI at 40bpm with ICD function intact). His generalized anxiety disorder seems to be disabling for him at times. I am not sure what can be done about this from a pharmacology or therapy standpoint. As for his loop diuretic, I would just stop this. He really has not had much decompensated CHF. The answer would be to further increase his Entresto instead which was done apparently by Dr. Eb Douglas when he sought a second opinion three months ago at [facility]. This does seem to be Jovon's habit at times. We can check in on him tomorrow. There is no need for any ischemic or other work-up at this time though. His chest pain was brief and atypical for angina.

## 2021-04-25 NOTE — CONSULTS
Clinical Pharmacy Note  Warfarin Consult    Luis Gould is a 63 y.o. male receiving warfarin managed by pharmacy. Patient being bridged with none. Warfarin Indication: Hx PE  Target INR range: 2-3   Dose prior to admission: 5 mg MWF, 2.5 mg all other days      Recent Labs     04/24/21  2320   HGB 15.7   HCT 46.4   INR 3.49*       Assessment/Plan:    No warfarin tonight. Daily PT/INR until stable within therapeutic range. Thank you for the consult. Will continue to follow.   Zakia Love, [address] [facility]

## 2021-04-25 NOTE — PROGRESS NOTES
Patient transferred from ED, VSS,  NS on tele. Oriented patient to room, bed locked in lowest position, call light in reach. All questions answered. Will continue to monitor.

## 2021-04-25 NOTE — H&P
Internal Medicine History and Physical  CC:chest pain  HPI:61 yo male with history of nonischemic CM, ICD, and PAF and gerd presented to the ER because he was having worsening gerd symptoms and palpitaitions and low back pain. He reports that was recently started on demadex for worsening heart failure and had a bad reaction including worsening gerd which moved from his LLQ to his midline and all the way to his throat. He also reported worsening trouble swalling eventhough he had a egd and dilatation recently. He was also started on symbicort more recently and despite rinsing after each use reported feelings of thrush and wondered if it was causing esophageal irritation. In addition he felt like his low back pain was flaring. He had a rough night, could not sleep and still has the symptoms reported above. CT's in the ER showed right perinephric stranding no dissections throughout aorta and no acute pulmonary infection. Principal Problem:    Chest pain  Active Problems:    NICM (nonischemic cardiomyopathy) (HCC)    Biventricular ICD (implantable cardioverter-defibrillator) in place    Paroxysmal atrial fibrillation (HCC)    Gastroesophageal reflux disease with esophagitis without hemorrhage    Essential hypertension, benign    Thrombocytopenia (HCC)    Colicky LLQ abdominal pain    Back pain    Irritable bowel syndrome  Resolved Problems:    * No resolved hospital problems.  *    Past Medical History:   Diagnosis Date    Bronchiolitis obliterans ([address]     Bundle branch block, right     Cardiomyopathy ([address]     Chest pain 9/24/2019    Fibromyalgia     GERD (gastroesophageal reflux disease)     Hepatitis 1979    unsure of which type    Hx of blood clots     Hyperlipemia     Hypertension     IBS (irritable bowel syndrome)     Kidney stone     over thirty kidney stones    Neuropathy     right side-chest    Pneumothorax 2011    Right    Prostatitis     Pulmonary embolism chloride, Inhalant, 3 % nebulizer solution, Take 4 mLs by nebulization every 8 hours as needed for Other or Cough (cough)  metoprolol succinate (TOPROL XL) 25 MG extended release tablet, TAKE 1 TABLET BY MOUTH TWO  TIMES DAILY  Cholecalciferol (VITAMIN D3) 1.25 MG (81466 UT) CAPS, Take 1 capsule by mouth once a week (Patient taking differently: Take 1 capsule by mouth once a week Takes on Wednesdays)  albuterol sulfate  (90 Base) MCG/ACT inhaler, INHALE 2 PUFFS INTO THE LUNGS EVERY 4 HOURS AS NEEDED FOR WHEEZING OR SHORTNESS OF BREATH  flavoxate (URISPAS) 100 MG tablet, TAKE 1 TABLET BY MOUTH THREE TIMES DAILY AS NEEDED FOR URINARY SPASM  guaiFENesin (MUCINEX) 600 MG extended release tablet, Take 1 tablet by mouth 2 times daily (Patient taking differently: Take 600 mg by mouth 2 times daily as needed for Congestion Takes when he has a cough)  polyethylene glycol (MIRALAX) PACK packet, Take 17 g by mouth nightly   aspirin 81 MG tablet, Take 81 mg by mouth daily  azelastine (ASTELIN) 0.1 % nasal spray, 2 sprays by Nasal route 2 times daily Use in each nostril as directed  [DISCONTINUED] torsemide (DEMADEX) 10 MG tablet, Take 1 tablet by mouth daily  [DISCONTINUED] benzonatate (TESSALON) 200 MG capsule, Take 1 capsule by mouth 3 times daily as needed for Cough  [DISCONTINUED] PROAIR  (90 Base) MCG/ACT inhaler, Inhale 2 puffs into the lungs every 4 hours as needed for Wheezing or Shortness of Breath  [DISCONTINUED] sildenafil (REVATIO) 20 MG tablet, Take 20 mg by mouth as needed  Allergies   Allergen Reactions    Ipratropium Bromide Hfa Shortness Of Breath    Atrovent Nasal Spray [Ipratropium] Other (See Comments)     Chest tightness    Doxycycline Hives    Morphine Other (See Comments)     \"makes me feel funny\"      Nitroglycerin Other (See Comments)     Severe hypotension (went from 267 to 66 systolic)    Sulfa Antibiotics Rash    Vicodin [Hydrocodone-Acetaminophen] Rash      Social History Tobacco Use    Smoking status: Never Smoker    Smokeless tobacco: Never Used   Substance Use Topics    Alcohol use: No     Frequency: Never     Comment: occ      Family History   Problem Relation Age of Onset    High Blood Pressure Mother     Dementia Mother     Cancer Father         Pancreatic Ca        Prior to Admission medications    Medication Sig Start Date End Date Taking?  Authorizing Provider   budesonide-formoterol (SYMBICORT) 160-4.5 MCG/ACT AERO INHALE 2 PUFFS INTO THE LUNGS 2 TIMES DAILY 4/22/21  Yes Claudean Shingles, MD   dexlansoprazole (DEXILANT) 60 MG CPDR delayed release capsule Take 60 mg by mouth daily   Yes Historical Provider, MD   pregabalin (LYRICA) 75 MG capsule TAKE 1 CAPSULE BY MOUTH IN  THE MORNING AND 2 CAPSULES  IN THE EVENING 4/12/21 7/11/21 Yes Julianna Dakin, MD   zolpidem (AMBIEN) 10 MG tablet TAKE 1 TABLET BY MOUTH  EVERY NIGHT AT BEDTIME 4/5/21 7/5/21 Yes Julianna Dakin, MD   albuterol (PROVENTIL) (2.5 MG/3ML) 0.083% nebulizer solution Take 3 mLs by nebulization every 4 hours as needed for Wheezing or Shortness of Breath  Patient taking differently: Take 2.5 mg by nebulization three times daily  4/1/21  Yes Claudean Shingles, MD   pravastatin (PRAVACHOL) 40 MG tablet Take 1 tablet by mouth daily 3/18/21  Yes Octavio Sheth MD   warfarin (COUMADIN) 5 MG tablet Take 1 tablet by mouth daily Except 2.5 mg Tues and Thursday  Patient taking differently: Take 2.5 mg by mouth daily 5 mg on Monday, Wednesday, Friday  2.5 mg all other days 3/12/21  Yes Julianna Dakin, MD   dicyclomine (BENTYL) 10 MG capsule TAKE ONE CAPSULE BY MOUTH  FOUR TIMES DAILY AS NEEDED 3/2/21  Yes Octavio Sheth MD   tamsulosin (FLOMAX) 0.4 MG capsule Take 1 capsule by mouth 2 times daily 2/4/21  Yes Julianna Dakin, MD   ENTRESTO 49-51 MG per tablet Take 1 tablet by mouth 2 times daily 1/24/21  Yes Historical Provider, MD   sodium chloride, Inhalant, 3 % nebulizer solution Take 4 mLs by nebulization every 8 hours as needed for Other or Cough (cough) 1/29/21  Yes Alpa Ramos MD   metoprolol succinate (TOPROL XL) 25 MG extended release tablet TAKE 1 TABLET BY MOUTH TWO  TIMES DAILY 11/17/20  Yes Yuliana Cruz MD   Cholecalciferol (VITAMIN D3) 1.25 MG (87637 UT) CAPS Take 1 capsule by mouth once a week  Patient taking differently: Take 1 capsule by mouth once a week Takes on Wednesdays 9/15/20  Yes Yuliana Cruz MD   albuterol sulfate  (90 Base) MCG/ACT inhaler INHALE 2 PUFFS INTO THE LUNGS EVERY 4 HOURS AS NEEDED FOR WHEEZING OR SHORTNESS OF BREATH 9/1/20  Yes BRENNA Hightower CNP   flavoxate (URISPAS) 100 MG tablet TAKE 1 TABLET BY MOUTH THREE TIMES DAILY AS NEEDED FOR URINARY SPASM 4/2/20  Yes [place] BRENNA Sapp CNP   guaiFENesin (MUCINEX) 600 MG extended release tablet Take 1 tablet by mouth 2 times daily  Patient taking differently: Take 600 mg by mouth 2 times daily as needed for Congestion Takes when he has a cough 2/4/20  Yes Yuliana Cruz MD   polyethylene glycol (MIRALAX) PACK packet Take 17 g by mouth nightly    Yes Historical Provider, MD   aspirin 81 MG tablet Take 81 mg by mouth daily   Yes Historical Provider, MD   azelastine (ASTELIN) 0.1 % nasal spray 2 sprays by Nasal route 2 times daily Use in each nostril as directed 12/15/17  Yes [place] BRENNA Sapp CNP     Review of Systems  A comprehensive review of systems was negative except for: low back pain, gerd    Objective:     Patient Vitals for the past 8 hrs:   BP Temp Temp src Pulse Resp SpO2 Height Weight   04/25/21 0820 -- -- -- -- -- 94 % -- --   04/25/21 0432 (!) 144/90 97.5 °F (36.4 °C) Oral 63 18 92 % -- --   04/25/21 0300 (!) 163/91 98.2 °F (36.8 °C) Oral 66 16 94 % 5' 6\" (1.676 m) 209 lb 6.4 oz (95 kg)     No intake/output data recorded.   I/O this shift:  In: 10 [I.V.:10]  Out: -     VITALS:  BP (!) 144/90   Pulse 63   Temp 97.5 °F (36.4 °C) (Oral)   Resp 18   Ht 5' 6\" (1.676 m)   Wt 209 lb 6.4 oz (95 kg)   SpO2 94%   BMI 33.80 kg/m²   General Appearance: alert and oriented to person, place and time, well-developed and well-nourished, in no acute distress  Skin: warm and dry, no rash or erythema  Head: normocephalic and atraumatic  Eyes: conjunctivae normal and sclera anicteric  ENT: hearing grossly normal bilaterally and sinuses non-tender.   Tongue with white coating cw thrush  Neck: neck supple and non tender without mass, no thyromegaly or thyroid nodules, no cervical lymphadenopathy   Pulmonary/Chest: clear to auscultation bilaterally- no wheezes, rales or rhonchi, normal air movement, no respiratory distress  Cardiovascular: normal rate, normal S1 and S2, no gallops and no carotid bruits no jvd  Abdomen: soft, non-tender, non-distended, normal bowel sounds, no masses or organomegaly  Extremities: no cyanosis, clubbing or edema  Musculoskeletal: no swollen joints and no tender joints,  Neurologic: coordination normal and speech normal, moves all 4 extremities      Data Review:     Recent Results (from the past 24 hour(s))   Urine, reflex to culture    Collection Time: 04/24/21 10:45 PM    Specimen: Urine, clean catch   Result Value Ref Range    Color, UA YELLOW Straw/Yellow    Clarity, UA Clear Clear    Glucose, Ur Negative Negative mg/dL    Bilirubin Urine Negative Negative    Ketones, Urine Negative Negative mg/dL    Specific Gravity, UA 1.010 1.005 - 1.030    Blood, Urine SMALL (A) Negative    pH, UA 7.5 5.0 - 8.0    Protein, UA TRACE (A) Negative mg/dL    Urobilinogen, Urine 0.2 <2.0 E.U./dL    Nitrite, Urine Negative Negative    Leukocyte Esterase, Urine Negative Negative    Microscopic Examination YES     Urine Type NotGiven     Urine Reflex to Culture Not Indicated    Microscopic Urinalysis    Collection Time: 04/24/21 10:45 PM   Result Value Ref Range    Hyaline Casts, UA 0 0 - 8 /LPF    WBC, UA 2 0 - 5 /HPF    RBC, UA 6 (H) 0 - 4 /HPF    Epithelial Cells, UA 0 0 - 5 /HPF   CBC Auto Differential Collection Time: 04/24/21 11:26 PM   Result Value Ref Range    Ventricular Rate 71 BPM    Atrial Rate 71 BPM    P-R Interval 182 ms    QRS Duration 154 ms    Q-T Interval 442 ms    QTc Calculation (Bazett) 480 ms    P Axis 60 degrees    R Axis -53 degrees    T Axis 108 degrees    Diagnosis       Normal sinus rhythmLeft axis deviationLeft bundle branch blockAbnormal ECGWhen compared with ECG of 05-DEC-2020 11:51,No significant change was found   Troponin    Collection Time: 04/25/21  5:28 AM   Result Value Ref Range    Troponin <0.01 <0.01 ng/mL      Ct Chest Wo Contrast    Result Date: 4/25/2021  1. Distal airway inflammation. 2. No intramural hematoma in the thoracic aorta. 3. Other findings as described. Cta Chest Abdomen Pelvis W Contrast    Result Date: 4/25/2021  1. No aortic aneurysm or dissection. 2. No acute pulmonary embolism. 3. Inflammatory airway process extending into the distal airways. No pneumonia at this time. 4. Nonobstructive renal calculi. 5. Irregular right perinephric soft tissue again visualized. Correlate with history. Follow-up recommended. 6. Other findings as described.              Assessment:     Principal Problem:    Chest pain: assess very atypical for ACS, today he says its more palpitations  Plan: ask Cardiology to check on him and discuss whether he needs a loop diuretic, has not tolerated demadex or lasix  Active Problems:    NICM (nonischemic cardiomyopathy) ([address]  Plan: last echo ef up to 50% does not appear to be in heart failure on my exam    Biventricular ICD (implantable cardioverter-defibrillator) in place  Plan: cardiology to interrogate if deemed necessary    Paroxysmal atrial fibrillation ([address]  Plan: on coumadin not currently in afib    Gastroesophageal reflux disease with esophagitis without hemorrhage  Plan: worsening symptoms with worsening dysphagia as well will ask GI to see him    Essential hypertension, benign  Plan: a little elevated, resume medicine Thrombocytopenia ([address]  Plan: not currently    Colicky LLQ abdominal pain  Plan: tenderness in this area appears to be chronic, no sign of acute diverticulitis on CT or ow    Back pain  Plan: ongoing chronic resume percocet    Irritable bowel syndrome  Plan: ongoing chronic cont same meds            Antonino Koroma MD

## 2021-04-25 NOTE — PROGRESS NOTES
Perfect serve call made to Dr Suman Slater for pain medicine for back pain. Covering MD contacted.  Tylenol and lidocaine patch ordered

## 2021-04-25 NOTE — PLAN OF CARE
Problem: Pain:  Goal: Pain level will decrease  Description: Pain level will decrease  4/25/2021 1010 by Jennifer Rosas RN  Outcome: Ongoing     Problem: Pain:  Goal: Control of acute pain  Description: Control of acute pain  4/25/2021 1010 by Jennifer Rosas RN  Outcome: Ongoing     Problem: Pain:  Goal: Control of chronic pain  Description: Control of chronic pain  4/25/2021 1010 by Jennifer Rosas RN  Outcome: Ongoing     Problem: Nausea/Vomiting:  Goal: Absence of nausea/vomiting  Description: Absence of nausea/vomiting  Outcome: Ongoing     Problem: Nausea/Vomiting:  Goal: Able to drink  Description: Able to drink  Outcome: Ongoing     Problem: Nausea/Vomiting:  Goal: Able to eat  Description: Able to eat  Outcome: Ongoing     Problem: Nausea/Vomiting:  Goal: Ability to achieve adequate nutritional intake will improve  Description: Ability to achieve adequate nutritional intake will improve  Outcome: Ongoing

## 2021-04-25 NOTE — PROGRESS NOTES
Lab called  by this RN for a troponin lab draw. Attempt by  failed. \"Will send another tech shortly\" per . Waiting for troponin to be drawn. . patient has no chest pain at this time. Will continue to monitor.

## 2021-04-25 NOTE — ED NOTES
Bed: A-17  Expected date: 4/24/21  Expected time: 10:19 PM  Means of arrival: [facility]  Comments:  Flank pain     [place] Amos Smith [facility]  04/24/21 [id number]

## 2021-04-25 NOTE — PLAN OF CARE
Problem: Pain:  Goal: Pain level will decrease  Description: Pain level will decrease  4/25/2021 0615 by Georgina Echeverria RN  Outcome: Ongoing  4/25/2021 0339 by Sergey Roman RN  Outcome: Ongoing  Goal: Control of acute pain  Description: Control of acute pain  4/25/2021 0615 by Georgina Echeverria RN  Outcome: Ongoing  4/25/2021 0339 by Sergey Roman RN  Outcome: Ongoing  Goal: Control of chronic pain  Description: Control of chronic pain  4/25/2021 0615 by Georgina Echeverria RN  Outcome: Ongoing  4/25/2021 0339 by Sergey Roman RN  Outcome: Ongoing

## 2021-04-25 NOTE — PROGRESS NOTES
4 Eyes Skin Assessment     NAME:  Danita Jones  YOB: 1960  MEDICAL RECORD NUMBER:  [id number]    The patient is being assess for  Admission    I agree that 2 RN's have performed a thorough Head to Toe Skin Assessment on the patient. ALL assessment sites listed below have been assessed. Areas assessed by both nurses:    Head, Face, Ears, Shoulders, Back, Chest, Arms, Elbows, Hands, Sacrum. Buttock, Coccyx, Ischium and Legs. Feet and Heels        Does the Patient have a Wound?  No noted wound(s)       Casimiro Prevention initiated:  NA   Wound Care Orders initiated:  NA    Pressure Injury (Stage 3,4, Unstageable, DTI, NWPT, and Complex wounds) if present place consult order under [de-identified] NA    New and Established Ostomies if present place consult order under : No      Nurse 1 eSignature: Electronically signed by Misael Page RN on 4/25/21 at 3:44 AM EDT    **SHARE this note so that the co-signing nurse is able to place an eSignature**    Nurse 2 eSignature: Electronically signed by Geovanna Moses RN on 4/25/21 at 5:08 AM EDT

## 2021-04-25 NOTE — ED PROVIDER NOTES
History   Problem Relation Age of Onset    High Blood Pressure Mother     Dementia Mother     Cancer Father         Pancreatic Ca     Social History     Socioeconomic History    Marital status:      Spouse name: Not on file    Number of children: Not on file    Years of education: Not on file    Highest education level: Not on file   Occupational History    Not on file   Social Needs    Financial resource strain: Not hard at all   Alida-Mike insecurity     Worry: Never true     Inability: Never true   [employer] needs     Medical: No     Non-medical: No   Tobacco Use    Smoking status: Never Smoker    Smokeless tobacco: Never Used   Substance and Sexual Activity    Alcohol use: No     Frequency: Never     Comment: occ    Drug use: No    Sexual activity: Yes     Partners: Female     Comment: wife   Lifestyle    Physical activity     Days per week: Not on file     Minutes per session: Not on file    Stress: Not on file   Relationships    Social connections     Talks on phone: Not on file     Gets together: Not on file     Attends [employer] service: Not on file     Active member of club or organization: Not on file     Attends meetings of clubs or organizations: Not on file     Relationship status: Not on file    Intimate partner violence     Fear of current or ex partner: Not on file     Emotionally abused: Not on file     Physically abused: Not on file     Forced sexual activity: Not on file   Other Topics Concern    Not on file   Social History Narrative    Not on file     No current facility-administered medications for this encounter.       Current Outpatient Medications   Medication Sig Dispense Refill    budesonide-formoterol (SYMBICORT) 160-4.5 MCG/ACT AERO INHALE 2 PUFFS INTO THE LUNGS 2 TIMES DAILY 1 Inhaler 5    dexlansoprazole (DEXILANT) 60 MG CPDR delayed release capsule Take 60 mg by mouth daily      torsemide (DEMADEX) 10 MG tablet Take 1 tablet by mouth daily 30 tablet 3  pregabalin (LYRICA) 75 MG capsule TAKE 1 CAPSULE BY MOUTH IN  THE MORNING AND 2 CAPSULES  IN THE EVENING 270 capsule 0    zolpidem (AMBIEN) 10 MG tablet TAKE 1 TABLET BY MOUTH  EVERY NIGHT AT BEDTIME 90 tablet 0    albuterol (PROVENTIL) (2.5 MG/3ML) 0.083% nebulizer solution Take 3 mLs by nebulization every 4 hours as needed for Wheezing or Shortness of Breath 360 mL 5    pravastatin (PRAVACHOL) 40 MG tablet Take 1 tablet by mouth daily 90 tablet 3    warfarin (COUMADIN) 5 MG tablet Take 1 tablet by mouth daily Except 2.5 mg Tues and Thursday (Patient taking differently: Take 2.5 mg by mouth daily Except 5 mg on Monday, Wedneday, Friday) 90 tablet 3    benzonatate (TESSALON) 200 MG capsule Take 1 capsule by mouth 3 times daily as needed for Cough 20 capsule 0    dicyclomine (BENTYL) 10 MG capsule TAKE ONE CAPSULE BY MOUTH  FOUR TIMES DAILY AS NEEDED 360 capsule 1    tamsulosin (FLOMAX) 0.4 MG capsule Take 1 capsule by mouth 2 times daily 60 capsule 2    ENTRESTO 49-51 MG per tablet Take 1 tablet by mouth 2 times daily      sodium chloride, Inhalant, 3 % nebulizer solution Take 4 mLs by nebulization every 8 hours as needed for Other or Cough (cough) 360 mL 5    metoprolol succinate (TOPROL XL) 25 MG extended release tablet TAKE 1 TABLET BY MOUTH TWO  TIMES DAILY 180 tablet 3    PROAIR  (90 Base) MCG/ACT inhaler Inhale 2 puffs into the lungs every 4 hours as needed for Wheezing or Shortness of Breath 1 Inhaler 5    Cholecalciferol (VITAMIN D3) 1.25 MG (70905 UT) CAPS Take 1 capsule by mouth once a week 12 capsule 3    albuterol sulfate  (90 Base) MCG/ACT inhaler INHALE 2 PUFFS INTO THE LUNGS EVERY 4 HOURS AS NEEDED FOR WHEEZING OR SHORTNESS OF BREATH 1 Inhaler 0    flavoxate (URISPAS) 100 MG tablet TAKE 1 TABLET BY MOUTH THREE TIMES DAILY AS NEEDED FOR URINARY SPASM 270 tablet 0    sildenafil (REVATIO) 20 MG tablet Take 20 mg by mouth as needed      guaiFENesin (MUCINEX) 600 MG extended release tablet Take 1 tablet by mouth 2 times daily 30 tablet 0    polyethylene glycol (MIRALAX) PACK packet Take 17 g by mouth nightly       aspirin 81 MG tablet Take 81 mg by mouth daily      azelastine (ASTELIN) 0.1 % nasal spray 2 sprays by Nasal route 2 times daily Use in each nostril as directed 1 Bottle 3     Allergies   Allergen Reactions    Ipratropium Bromide Hfa Shortness Of Breath    Atrovent Nasal Spray [Ipratropium] Other (See Comments)     Chest tightness    Doxycycline Hives    Morphine Other (See Comments)     \"makes me feel funny\"      Nitroglycerin Other (See Comments)     Severe hypotension (went from 560 to 66 systolic)    Sulfa Antibiotics Rash    Vicodin [Hydrocodone-Acetaminophen] Rash       [unfilled]    Nursing Notes Reviewed    Physical Exam:  Vitals:    04/24/21 2228   BP: (!) 152/92   Pulse: 76   Resp: 16   Temp: 98.5 °F (36.9 °C)   SpO2: 96%       GENERAL APPEARANCE: Awake and alert. Cooperative. No acute distress. HEAD: Normocephalic. Atraumatic. EYES: EOM's grossly intact. Sclera anicteric. ENT: Mucous membranes are moist. Tolerates saliva. No trismus. NECK: Supple. No meningismus. Trachea midline. HEART: RRR. Radial pulses 2+. LUNGS: Respirations unlabored. CTAB  ABDOMEN: Soft. Non-tender. No guarding or rebound. TTP L CVA  EXTREMITIES: No acute deformities. SKIN: Warm and dry. NEUROLOGICAL: No gross facial drooping. Moves all 4 extremities spontaneously. PSYCHIATRIC: Normal mood.     I have reviewed and interpreted all of the currently available lab results from this visit (if applicable):  Results for orders placed or performed during the hospital encounter of 04/24/21   Urine, reflex to culture    Specimen: Urine, clean catch   Result Value Ref Range    Color, UA YELLOW Straw/Yellow    Clarity, UA Clear Clear    Glucose, Ur Negative Negative mg/dL    Bilirubin Urine Negative Negative    Ketones, Urine Negative Negative mg/dL    Specific [place], UA 1.010 1.005 - 1.030    Blood, Urine SMALL (A) Negative    pH, UA 7.5 5.0 - 8.0    Protein, UA TRACE (A) Negative mg/dL    Urobilinogen, Urine 0.2 <2.0 E.U./dL    Nitrite, Urine Negative Negative    Leukocyte Esterase, Urine Negative Negative    Microscopic Examination YES     Urine Type NotGiven         Radiographs (if obtained):  [] The following radiograph was interpreted by myself in the absence of a radiologist:  [x] Radiologist's Report Reviewed:      CTA CHEST ABDOMEN PELVIS W CONTRAST (Final result)  Result time 04/25/21 01:01:22  Final result by Walter Reyes DO (04/25/21 01:01:22)                Impression:    1. No aortic aneurysm or dissection. 2. No acute pulmonary embolism. 3. Inflammatory airway process extending into the distal airways.  No   pneumonia at this time. 4. Nonobstructive renal calculi. 5. Irregular right perinephric soft tissue again visualized.  Correlate with   history.  Follow-up recommended. 6. Other findings as described. Narrative:    EXAMINATION:   CTA OF THE CHEST, ABDOMEN AND PELVIS WITH CONTRAST     4/25/2021 12:11 am:     TECHNIQUE:   CTA of the chest, abdomen and pelvis was performed after the administration   of intravenous contrast.  Multiplanar reformatted images are provided for   review.  MIP images are provided for review. Dose modulation, iterative   reconstruction, and/or weight based adjustment of the mA/kV was utilized to   reduce the radiation dose to as low as reasonably achievable. COMPARISON:   CT chest without contrast same day.  CT abdomen pelvis 02/03/2021. HISTORY:   ORDERING SYSTEM PROVIDED HISTORY: AORTA STUDY PLEASE   TECHNOLOGIST PROVIDED HISTORY:   Reason for exam:->AORTA STUDY PLEASE   Decision Support Exception->Emergency Medical Condition (MA)   Reason for Exam: BACK PAIN; CHEST PAIN; ARM PAIN     FINDINGS:     CTA CHEST:     Pulmonary Arteries: Pulmonary arteries are within normal limits in size. .  No   filling defect is identified in the pulmonary arteries. Mediastinum: Heart is within normal limits in size. No pericardial effusion. Aorta is within normal limits in size. No luminal defect is appreciated in   the visualized thoracic aorta. No intramural hematoma appreciated on same-day   CT chest without contrast.     Lungs/pleura: Central airways are patent. Mild to moderate peribronchial   thickening and multiple regions of tree-in-bud opacities again noted.  No   focal consolidation.  Right upper lobe granuloma.  No pleural effusion. No   pneumothorax. Soft Tissues/Bones: No adenopathy. Scattered degenerative changes noted in   the visualized spine without spondylolisthesis.  Transvenous pacer. CTA ABDOMEN PELVIS:     Abdominal aorta: No aneurysm.  No stenoses.  No dissection. Celiac artery: No stenoses. Superior mesenteric artery: No stenoses. Inferior mesenteric artery: No stenoses. Right renal artery/arteries: No stenoses. Left renal artery/arteries: No stenoses. Right common iliac artery: No stenoses. Right external iliac artery: No stenoses. Right internal iliac artery: No stenoses. Left common iliac artery: No stenoses. Left external iliac artery: No stenoses. Left internal iliac artery: No stenoses     Organs:     Liver: Unremarkable on this phase of enhancement. Spleen: Unremarkable on this phase of enhancement. Pancreas: Unremarkable on this phase of enhancement. Adrenal glands: Unremarkable on this phase of enhancement. Kidneys: Parapelvic cyst in the right kidney again visualized.  Irregular   soft tissue attenuation in the right perinephric retroperitoneum again   visualized.  Left kidney appears unremarkable.  Nonobstructive renal calculi   again visualized.  No ureteral calculus.  No hydronephrosis. Gallbladder: Surgically absent.      GI/Bowel:     Evaluation of the bowel and mesentery is limited due to lack of enteric   contrast.  Pulmonary   arteries are within normal limits in size. .     Lungs/pleura: Central airways are patent. Tree-in-bud opacities in the right   lower lobe and right middle lobe.  To lesser extent, tree-in-bud opacities in   the remainder of the lungs.  Mild to moderate peribronchial thickening.  No   focal consolidation.  No pleural effusion.  No pneumothorax. Soft Tissues/Bones: Suboptimal evaluation for adenopathy without intravenous   contrast. Scattered degenerative changes noted in the visualized spine   without spondylolisthesis. Upper Abdomen: For findings below the diaphragm, refer to same-day CTA   abdomen pelvis.                       EKG (if obtained): (All EKG's are interpreted by myself in the absence of a cardiologist)  Initial EKG on my interpretation shows normal sinus rhythm with LAD and LBBB. No ST segment elevation    MDM:  Differential diagnosis: Aortic dissection, pyelonephritis, ACS    Pain medication given. INR 3.49. Otherwise CBC/chemistry/lactic acid unremarkable. UA no UTI    CTA C/A/P done light of left-sided lower back pain to ensure no aortic pathology is noted, none was present    EKG/troponin not evidencing ACS however based on his risk profile and the fact that the chest pain started only a matter of hours ago 1 set of troponins would not be sufficient so he will be admitted. Patient admitted by Dr. Fransisca Reece covering for PCP    Old records reviewed. Labs and imaging reviewed and results discussed with patient. .        Patient was given scripts for the following medications. I counseled patient how to take these medications. New Prescriptions    No medications on file         CRITICAL CARE TIME   Total Critical Care time was 0 minutes, excluding separately reportable procedures. There was a high probability of clinically significant/life threatening deterioration in the patient's condition which required my urgent intervention.       Clinical Impression:  Chest

## 2021-04-26 ENCOUNTER — TELEPHONE (OUTPATIENT)
Dept: CARDIOLOGY CLINIC | Age: 61
End: 2021-04-26

## 2021-04-26 LAB
ANION GAP SERPL CALCULATED.3IONS-SCNC: 7 MMOL/L (ref 3–16)
BILIRUBIN URINE: NEGATIVE
BLOOD, URINE: ABNORMAL
BUN BLDV-MCNC: 12 MG/DL (ref 7–20)
CALCIUM SERPL-MCNC: 10.2 MG/DL (ref 8.3–10.6)
CHLORIDE BLD-SCNC: 106 MMOL/L (ref 99–110)
CLARITY: CLEAR
CO2: 25 MMOL/L (ref 21–32)
COLOR: YELLOW
COMMENT UA: ABNORMAL
CREAT SERPL-MCNC: 1.1 MG/DL (ref 0.8–1.3)
CRYSTALS, UA: ABNORMAL /HPF
EKG ATRIAL RATE: 65 BPM
EKG DIAGNOSIS: NORMAL
EKG P AXIS: 58 DEGREES
EKG P-R INTERVAL: 172 MS
EKG Q-T INTERVAL: 448 MS
EKG QRS DURATION: 152 MS
EKG QTC CALCULATION (BAZETT): 465 MS
EKG R AXIS: -52 DEGREES
EKG T AXIS: 108 DEGREES
EKG VENTRICULAR RATE: 65 BPM
EPITHELIAL CELLS, UA: 1 /HPF (ref 0–5)
GFR AFRICAN AMERICAN: >60
GFR NON-AFRICAN AMERICAN: >60
GLUCOSE BLD-MCNC: 134 MG/DL (ref 70–99)
GLUCOSE URINE: NEGATIVE MG/DL
HCT VFR BLD CALC: 45.3 % (ref 40.5–52.5)
HEMOGLOBIN: 15.5 G/DL (ref 13.5–17.5)
HYALINE CASTS: 6 /LPF (ref 0–8)
INR BLD: 3.3 (ref 0.86–1.14)
KETONES, URINE: NEGATIVE MG/DL
LEUKOCYTE ESTERASE, URINE: NEGATIVE
MCH RBC QN AUTO: 31.3 PG (ref 26–34)
MCHC RBC AUTO-ENTMCNC: 34.1 G/DL (ref 31–36)
MCV RBC AUTO: 91.8 FL (ref 80–100)
MICROSCOPIC EXAMINATION: YES
NITRITE, URINE: NEGATIVE
PDW BLD-RTO: 13.3 % (ref 12.4–15.4)
PH UA: 6 (ref 5–8)
PLATELET # BLD: 172 K/UL (ref 135–450)
PMV BLD AUTO: 7.4 FL (ref 5–10.5)
POTASSIUM SERPL-SCNC: 4.2 MMOL/L (ref 3.5–5.1)
PROTEIN UA: NEGATIVE MG/DL
PROTHROMBIN TIME: 38.7 SEC (ref 10–13.2)
RBC # BLD: 4.93 M/UL (ref 4.2–5.9)
RBC UA: 11 /HPF (ref 0–4)
SODIUM BLD-SCNC: 138 MMOL/L (ref 136–145)
SPECIFIC GRAVITY UA: 1.01 (ref 1–1.03)
URINE REFLEX TO CULTURE: ABNORMAL
URINE TYPE: ABNORMAL
UROBILINOGEN, URINE: 0.2 E.U./DL
WBC # BLD: 4.8 K/UL (ref 4–11)
WBC UA: 3 /HPF (ref 0–5)

## 2021-04-26 PROCEDURE — G0378 HOSPITAL OBSERVATION PER HR: HCPCS

## 2021-04-26 PROCEDURE — 80048 BASIC METABOLIC PNL TOTAL CA: CPT

## 2021-04-26 PROCEDURE — 81001 URINALYSIS AUTO W/SCOPE: CPT

## 2021-04-26 PROCEDURE — 85610 PROTHROMBIN TIME: CPT

## 2021-04-26 PROCEDURE — 99226 PR SBSQ OBSERVATION CARE/DAY 35 MINUTES: CPT | Performed by: INTERNAL MEDICINE

## 2021-04-26 PROCEDURE — 85027 COMPLETE CBC AUTOMATED: CPT

## 2021-04-26 PROCEDURE — 2700000000 HC OXYGEN THERAPY PER DAY

## 2021-04-26 PROCEDURE — 2580000003 HC RX 258: Performed by: STUDENT IN AN ORGANIZED HEALTH CARE EDUCATION/TRAINING PROGRAM

## 2021-04-26 PROCEDURE — 99214 OFFICE O/P EST MOD 30 MIN: CPT | Performed by: NURSE PRACTITIONER

## 2021-04-26 PROCEDURE — 94640 AIRWAY INHALATION TREATMENT: CPT

## 2021-04-26 PROCEDURE — 36415 COLL VENOUS BLD VENIPUNCTURE: CPT

## 2021-04-26 PROCEDURE — 6370000000 HC RX 637 (ALT 250 FOR IP): Performed by: INTERNAL MEDICINE

## 2021-04-26 PROCEDURE — 6360000002 HC RX W HCPCS: Performed by: INTERNAL MEDICINE

## 2021-04-26 PROCEDURE — 94761 N-INVAS EAR/PLS OXIMETRY MLT: CPT

## 2021-04-26 RX ORDER — FUROSEMIDE 10 MG/ML
40 INJECTION INTRAMUSCULAR; INTRAVENOUS ONCE
Status: DISCONTINUED | OUTPATIENT
Start: 2021-04-26 | End: 2021-04-27 | Stop reason: HOSPADM

## 2021-04-26 RX ADMIN — SACUBITRIL AND VALSARTAN 1 TABLET: 49; 51 TABLET, FILM COATED ORAL at 08:28

## 2021-04-26 RX ADMIN — PREGABALIN 150 MG: 75 CAPSULE ORAL at 21:06

## 2021-04-26 RX ADMIN — ALBUTEROL SULFATE 2.5 MG: 2.5 SOLUTION RESPIRATORY (INHALATION) at 07:22

## 2021-04-26 RX ADMIN — AZELASTINE 2 SPRAY: 1 SPRAY, METERED NASAL at 21:09

## 2021-04-26 RX ADMIN — ZOLPIDEM TARTRATE 10 MG: 5 TABLET ORAL at 21:06

## 2021-04-26 RX ADMIN — NYSTATIN 500000 UNITS: 100000 SUSPENSION ORAL at 21:06

## 2021-04-26 RX ADMIN — DICYCLOMINE HYDROCHLORIDE 10 MG: 10 CAPSULE ORAL at 21:06

## 2021-04-26 RX ADMIN — SODIUM CHLORIDE, PRESERVATIVE FREE 10 ML: 5 INJECTION INTRAVENOUS at 21:17

## 2021-04-26 RX ADMIN — ALBUTEROL SULFATE 2.5 MG: 2.5 SOLUTION RESPIRATORY (INHALATION) at 13:10

## 2021-04-26 RX ADMIN — TAMSULOSIN HYDROCHLORIDE 0.4 MG: 0.4 CAPSULE ORAL at 08:28

## 2021-04-26 RX ADMIN — POLYETHYLENE GLYCOL 3350 17 G: 17 POWDER, FOR SOLUTION ORAL at 21:06

## 2021-04-26 RX ADMIN — ALBUTEROL SULFATE 2.5 MG: 2.5 SOLUTION RESPIRATORY (INHALATION) at 19:27

## 2021-04-26 RX ADMIN — ASPIRIN 81 MG: 81 TABLET, CHEWABLE ORAL at 08:27

## 2021-04-26 RX ADMIN — SACUBITRIL AND VALSARTAN 1 TABLET: 49; 51 TABLET, FILM COATED ORAL at 21:16

## 2021-04-26 RX ADMIN — DICYCLOMINE HYDROCHLORIDE 10 MG: 10 CAPSULE ORAL at 17:02

## 2021-04-26 RX ADMIN — NYSTATIN 500000 UNITS: 100000 SUSPENSION ORAL at 12:28

## 2021-04-26 RX ADMIN — Medication 40 MG: at 06:09

## 2021-04-26 RX ADMIN — METOPROLOL SUCCINATE 25 MG: 25 TABLET, EXTENDED RELEASE ORAL at 08:27

## 2021-04-26 RX ADMIN — NYSTATIN 500000 UNITS: 100000 SUSPENSION ORAL at 08:28

## 2021-04-26 RX ADMIN — METOPROLOL SUCCINATE 25 MG: 25 TABLET, EXTENDED RELEASE ORAL at 21:06

## 2021-04-26 RX ADMIN — SODIUM CHLORIDE, PRESERVATIVE FREE 10 ML: 5 INJECTION INTRAVENOUS at 08:28

## 2021-04-26 RX ADMIN — DICYCLOMINE HYDROCHLORIDE 10 MG: 10 CAPSULE ORAL at 08:28

## 2021-04-26 RX ADMIN — PREGABALIN 75 MG: 75 CAPSULE ORAL at 08:27

## 2021-04-26 RX ADMIN — TAMSULOSIN HYDROCHLORIDE 0.4 MG: 0.4 CAPSULE ORAL at 21:06

## 2021-04-26 RX ADMIN — PRAVASTATIN SODIUM 40 MG: 40 TABLET ORAL at 21:16

## 2021-04-26 ASSESSMENT — PAIN SCALES - GENERAL: PAINLEVEL_OUTOF10: 0

## 2021-04-26 NOTE — PROGRESS NOTES
Call placed to [employer] to schedule pacemaker interrogation. Information given, will call with ETA.

## 2021-04-26 NOTE — PROGRESS NOTES
thyroid not enlarged, symmetric, no tenderness/mass/nodules  Lungs: rales bibasilar  Heart: regular rate and rhythm, S1, S2 normal, no murmur, click, rub or gallop  Abdomen: soft, non-tender; bowel sounds normal; no masses,  no organomegaly  Extremities: no edema   Neurologic: Mental status: Alert, oriented, thought content appropriate    Admission on 04/24/2021   Component Date Value Ref Range Status    Color, UA 04/24/2021 YELLOW  Straw/Yellow Final    Clarity, UA 04/24/2021 Clear  Clear Final    Glucose, Ur 04/24/2021 Negative  Negative mg/dL Final    Bilirubin Urine 04/24/2021 Negative  Negative Final    Ketones, Urine 04/24/2021 Negative  Negative mg/dL Final    Specific Gravity, UA 04/24/2021 1.010  1.005 - 1.030 Final    Blood, Urine 04/24/2021 SMALL* Negative Final    pH, UA 04/24/2021 7.5  5.0 - 8.0 Final    Protein, UA 04/24/2021 TRACE* Negative mg/dL Final    Urobilinogen, Urine 04/24/2021 0.2  <2.0 E.U./dL Final    Nitrite, Urine 04/24/2021 Negative  Negative Final    Leukocyte Esterase, Urine 04/24/2021 Negative  Negative Final    Microscopic Examination 04/24/2021 YES   Final    Urine Type 04/24/2021 NotGiven   Final    Urine Reflex to Culture 04/24/2021 Not Indicated   Final    Hyaline Casts, UA 04/24/2021 0  0 - 8 /LPF Final    WBC, UA 04/24/2021 2  0 - 5 /HPF Final    RBC, UA 04/24/2021 6* 0 - 4 /HPF Final    Epithelial Cells, UA 04/24/2021 0  0 - 5 /HPF Final    Comment: Urinalysis microscopic performed using the  automated methodology (AUWI analyzer).       WBC 04/24/2021 7.8  4.0 - 11.0 K/uL Final    RBC 04/24/2021 5.12  4.20 - 5.90 M/uL Final    Hemoglobin 04/24/2021 15.7  13.5 - 17.5 g/dL Final    Hematocrit 04/24/2021 46.4  40.5 - 52.5 % Final    MCV 04/24/2021 90.5  80.0 - 100.0 fL Final    MCH 04/24/2021 30.6  26.0 - 34.0 pg Final    MCHC 04/24/2021 33.8  31.0 - 36.0 g/dL Final    RDW 04/24/2021 12.8  12.4 - 15.4 % Final    Platelets 27/81/0948 180  135 - 450 K/uL Final    MPV 04/24/2021 7.5  5.0 - 10.5 fL Final    Neutrophils % 04/24/2021 64.5  % Final    Lymphocytes % 04/24/2021 21.5  % Final    Monocytes % 04/24/2021 7.4  % Final    Eosinophils % 04/24/2021 5.8  % Final    Basophils % 04/24/2021 0.8  % Final    Neutrophils Absolute 04/24/2021 5.0  1.7 - 7.7 K/uL Final    Lymphocytes Absolute 04/24/2021 1.7  1.0 - 5.1 K/uL Final    Monocytes Absolute 04/24/2021 0.6  0.0 - 1.3 K/uL Final    Eosinophils Absolute 04/24/2021 0.5  0.0 - 0.6 K/uL Final    Basophils Absolute 04/24/2021 0.1  0.0 - 0.2 K/uL Final    Sodium 04/24/2021 140  136 - 145 mmol/L Final    Potassium 04/24/2021 3.9  3.5 - 5.1 mmol/L Final    Chloride 04/24/2021 104  99 - 110 mmol/L Final    CO2 04/24/2021 28  21 - 32 mmol/L Final    Anion Gap 04/24/2021 8  3 - 16 Final    Glucose 04/24/2021 91  70 - 99 mg/dL Final    BUN 04/24/2021 11  7 - 20 mg/dL Final    CREATININE 04/24/2021 0.9  0.8 - 1.3 mg/dL Final    GFR Non- 04/24/2021 >60  >60 Final    Comment: >60 mL/min/1.73m2 EGFR, calc. for ages 25 and older using the  MDRD formula (not corrected for weight), is valid for stable  renal function.  GFR  04/24/2021 >60  >60 Final    Comment: Chronic Kidney Disease: less than 60 ml/min/1.73 sq.m. Kidney Failure: less than 15 ml/min/1.73 sq.m. Results valid for patients 18 years and older.       Calcium 04/24/2021 10.0  8.3 - 10.6 mg/dL Final    Total Protein 04/24/2021 6.8  6.4 - 8.2 g/dL Final    Albumin 04/24/2021 4.1  3.4 - 5.0 g/dL Final    Albumin/Globulin Ratio 04/24/2021 1.5  1.1 - 2.2 Final    Total Bilirubin 04/24/2021 0.3  0.0 - 1.0 mg/dL Final    Alkaline Phosphatase 04/24/2021 102  40 - 129 U/L Final    ALT 04/24/2021 34  10 - 40 U/L Final    AST 04/24/2021 33  15 - 37 U/L Final    Globulin 04/24/2021 2.7  g/dL Final    Lactic Acid 04/24/2021 1.1  0.4 - 2.0 mmol/L Final    Troponin 04/24/2021 <0.01  <0.01 ng/mL Final 10.0 - 13.2 sec Final    Comment: Effective 11-19-19 09:00am EST  Please note reference ranges have  changed for PT and INR Testing.  INR 04/25/2021 3.75* 0.86 - 1.14 Final    Comment: Effective 11/19/19 at 09:00am EST    Normal: 0.86 - 1.14  Therapeutic: 2.0 - 3.0  Pros. Valve: 2.5 - 3.5  AMI: 2.0 - 3.0           Assessment & Plan:   Principal Problem:    Chest pain--atypical--neg trop--recc incr activity and see how he does   Active Problems:    Essential hypertension, benign--stable     NICM (nonischemic cardiomyopathy) (HCC)---remains on entresto     Colicky LLQ abdominal pain    Gastroesophageal reflux disease with esophagitis without hemorrhage--cont PPI--await GI eval     Irritable bowel syndrome    Biventricular ICD (implantable cardioverter-defibrillator) in place    Paroxysmal atrial fibrillation (HCC)--remains on warfarin     Back pain    Thrombocytopenia (HCC)  Resolved Problems:    * No resolved hospital problems.  *  Will adv diet--await interpolation of ICD and GI consults --enc activity   Please note that over 35 minutes was spent in evaluating the patient, review of records and results, discussion with staff/family, etc.    Giulia Beckett MD

## 2021-04-26 NOTE — TELEPHONE ENCOUNTER
Pt assured that no device parameters were changed at his last visit and that there were no correlating episodes with his symptoms on Saturday that were recoreded.

## 2021-04-26 NOTE — CARE COORDINATION
INITIAL CASE MANAGEMENT ASSESSMENT    Reviewed chart, met with patient to assess possible discharge needs. Explained Case Management role/services. Living Situation: Patient lives withhis Wife in a house with 2 steps to enter. ADLs: Independent     DME: Oxygen Concentrator for hs    PT/OT Recs: N/A     Active Services: O/P Card[facility]ulmonary Rehab at [facility]: Active /Wife     Medications: [facility]/No barriers    PCP: Joellen Meza MD      HD/PD: N/A    PLAN/COMMENTS: Plan to return to home. Denies needs. SW/CM provided contact information for patient or family to call with any questions. SW/CM will follow and assist as needed. Electronically signed by Mode Barnes RN on 4/26/2021 at 4:24 PM

## 2021-04-26 NOTE — TELEPHONE ENCOUNTER
Cortney Medrano called in this afternoon wanting to talk directly with Veronica and wanted to know why his cardiologist Dr. Finesse Blank didn't see him in the hospital. I notified him that they are seeing pts. He stated that \"if 10 mg of lasix didn't work why does Veronica NP think 40 mg will work\" He wants to know the reason why it was increased when Dr. Italia Das rounded on him yesterday and stopped it. Cortney Medrano can be reached at [phone].

## 2021-04-26 NOTE — TELEPHONE ENCOUNTER
I called patient to explain why Veronica ordered IV Lasix (per her progress note: NICM. Increased Sob and O2 demands. Crackles bilateral bases. Decreased thoracic impedence suggests hypervolemia. He has not had diuretic for several days. I would suggest IV lasix dose today if patient willing). Patient says he received a breathing treatment and the respiratory therapist informed him that his lungs sounded clear. He is hesitant to receiving IV lasix. I informed him that I would forward a message to Veronica to discuss further. He v/u.

## 2021-04-26 NOTE — PLAN OF CARE
Problem: Nausea/Vomiting:  Goal: Absence of nausea/vomiting  Description: Absence of nausea/vomiting  Outcome: Ongoing  Goal: Able to drink  Description: Able to drink  Outcome: Ongoing  Goal: Able to eat  Description: Able to eat  Outcome: Ongoing  Goal: Ability to achieve adequate nutritional intake will improve  Description: Ability to achieve adequate nutritional intake will improve  Outcome: Ongoing     Problem: Pain:  Goal: Pain level will decrease  Description: Pain level will decrease  Outcome: Ongoing  Goal: Control of acute pain  Description: Control of acute pain  Outcome: Ongoing  Goal: Control of chronic pain  Description: Control of chronic pain  Outcome: Ongoing

## 2021-04-26 NOTE — TELEPHONE ENCOUNTER
Pt called in to speak to [facility] about his device. He wants to know if its turned off or something since his episode did not show up on Saturday.     You can reach him at [phone]

## 2021-04-26 NOTE — PROGRESS NOTES
Call placed to Dr Tiffany Paz with update. Informed MD that pts O2 was 88% on RA and metoprolol was held this AM for BP of 92/56. No new orders at this time. MD ok with O2 to keep O2 above 90%. Will continue to monitor.

## 2021-04-26 NOTE — CONSULTS
Clinical Pharmacy Note  Warfarin Consult    Matt Santos is a 63 y.o. male receiving warfarin managed by pharmacy. Patient being bridged with none. Warfarin Indication: Hx PE  Target INR range: 2-3   Dose prior to admission: 5 mg MWF, 2.5 mg all other days      Recent Labs     04/24/21  2320 04/25/21  1116 04/26/21  0647   HGB 15.7  --  15.5   HCT 46.4  --  45.3   INR 3.49* 3.75* 3.30*       Assessment/Plan:    Jovon's INR remains elevated. No warfarin tonight. Daily PT/INR until stable within therapeutic range. Thank you for the consult. Will continue to follow.   Hardy Nicole, [address] [facility]

## 2021-04-26 NOTE — CONSULTS
GASTROENTEROLOGY CONSULTATION    Reason for Consult:  Chest pain and reflux symptoms  Requesting Physician:  Dr. Elroy Mejia  ADMIT DATE:  4/24/2021    HISTORY OF PRESENT ILLNESS:    The patient is a 63 y.o. male. Consultation requested for evaluation of chronic reflux symptoms. Symptoms have been present forever. This particular issue was prompted by back pain and palpitations which prompted the hospital visit. The reflux was evaluated in February with EGD and biopsy. The PPI dosage was increased. Symptoms have worsened over the last several days but thrush was diagnosed and since treatment the chest symptoms have lessened. There has not been nocturnal heartburn. There has not been nocturnal water brash. There have not been symptoms to suggest nocturnal aspiration. There has not been vomiting or regurgitation. There have not been vocal cord symptoms. There has not been dysphagia. There has not been weight loss.       Past Medical History:     Bronchiolitis obliterans ([address]     Bundle branch block, right     Cardiomyopathy ([address]     Fibromyalgia     GERD (gastroesophageal reflux disease)     Hepatitis 1979    Hx of blood clots     Hyperlipemia     Hypertension     IBS (irritable bowel syndrome)     Kidney stone     Neuropathy     Pneumothorax 2011    Prostatitis     Pulmonary embolism on right ([address] 2011    Sacroiliitis ([address]      Past Surgical History:     CHOLECYSTECTOMY  2007    COLONOSCOPY  9/21/2012    CYSTOSCOPY  05/17/2019    CYSTOSCOPY Right 5/17/2019    HERNIA REPAIR  2015    LITHOTRIPSY      PACEMAKER PLACEMENT      SINUS SURGERY      UPPER GASTROINTESTINAL ENDOSCOPY  3/28/2014    UPPER GASTROINTESTINAL ENDOSCOPY N/A 2/1/2021     Current Medications:     furosemide  40 mg Intravenous Once    lidocaine  1 patch Transdermal Daily    albuterol  2.5 mg Nebulization TID    aspirin  81 mg Oral Daily    azelastine  2 spray Each Nostril BID    nystatin  5 mL Oral 4x Daily    dicyclomine  10 mg Oral 4x Daily    sacubitril-valsartan  1 tablet Oral BID    metoprolol succinate  25 mg Oral BID    polyethylene glycol  17 g Oral Nightly    pravastatin  40 mg Oral Daily    pregabalin  75 mg Oral QAM    pregabalin  150 mg Oral Nightly    tamsulosin  0.4 mg Oral BID    zolpidem  10 mg Oral Nightly    esomeprazole  40 mg Oral BID AC    warfarin    Other RX Placeholder     Allergies:  Ipratropium bromide hfa, Atrovent nasal spray [ipratropium], Doxycycline, Morphine, Nitroglycerin, Sulfa antibiotics, and Vicodin [hydrocodone-acetaminophen]    Family History:    High Blood Pressure Mother     Dementia Mother     Cancer Father         Pancreatic Ca     REVIEW OF SYSTEMS:    General:   No weight change. No fever or fatigue. Hypertension. Negative nicotine. Negative alcohol consumption. Head:    No chronic headaches, no dizziness. Eyes:    Normal vision, no diplopia, no pain, no glaucoma. Nose:    No epistaxis. Neck:    No tenderness, no sore throat. Chest:    Bronchiolitis. COPD. PE.  Pnemothorax. Heart:    Cardiomyopathy. RBBB. Endocrine:    No diabetes. No thyroid disease. :    Stones. Prostatitis. Musculoskeletal:  Fibromyalgia. Scroiliitis. Dermatologic:   No rashes or chronic skin conditions. Neurologic:   Neuropathy. No weakness, no tremor, no seizures, no history of stroke. Psychiatric:   No depressive symptoms. No anxiety. Heme:    No bleeding or clotting difficulties. PHYSICAL EXAM:    Vitals:  /81   Pulse 73   Temp 97.8 °F (36.6 °C) (Oral)   Resp 18   Ht 5' 6\" (1.676 m)   Wt 210 lb (95.3 kg)   SpO2 96%   BMI 33.89 kg/m²   General:   Normal appearance, normal affect. Head:    Normocephalic, no lesions. Eyes:    Sclera normal color. Normal hydration. Neck:    No masses, no thyromegaly, no adenopathy. Chest:    Lungs clear, no rales, no rhonchi, no wheezes.     Heart:    Regular rhythm, no murmurs, no rubs, no gallops. Abdomen:   Soft, no tenderness, no masses, no enlargement of the liver or spleen, normal peristaltic sounds. Extremities:   No deformities, no edema, no erythema, normal peripheral pulses. Neuro:    Normal mental status. LABS:    04/24/21 04/26/21   WBC 7.8 4.8   HGB 15.7 15.5   HCT 46.4 45.3   MCV 90.5 91.8    172      138   K 3.9 4.2    106   CO2 28 25   BUN 11 12   CREATININE 0.9 1.1     AST 33   ALT 34   BILITOT 0.3   ALKPHOS 102     IMPRESSION/RECOMMENDATIONS:      The esophagus was recently evaluated. No anatomic study is currently necessary. Treatment for the thrush seems to be having a positive impact on the chest symptoms. He may follow up in the office as needed. GI will sign off for now. More than 45 minutes of time spent in the examination, evaluation, counseling, review of medications, and therapeutic planning.     Electronically signed by Breana Krueger MD on 4/26/2021 at 4:56 PM.

## 2021-04-26 NOTE — PROGRESS NOTES
pain, incontinence, polyuria  VASC: Denies claudication, leg cramps, clots  MUSC/SKEL: Denies pain, stiffness, arthritis  PSYCH: Denies anxiety, depression, stress  NEURO: Denies numbness, tingling, weakness,change in mood or memory  HEME: Denies abn bruising, bleeding, anemia  ENDO: Denies intolerance to heat, cold, excessive thirst or hunger, hx thyroid disease    Objective:   BP (!) 92/56   Pulse 79   Temp 97.2 °F (36.2 °C) (Axillary)   Resp 18   Ht 5' 6\" (1.676 m)   Wt 210 lb (95.3 kg)   SpO2 93%   BMI 33.89 kg/m²           Intake/Output Summary (Last 24 hours) at 4/26/2021 1005  Last data filed at 4/25/2021 1202  Gross per 24 hour   Intake 200 ml   Output --   Net 200 ml     I/O since adm: ? WEIGHT:Admit Weight: 210 lb (95.3 kg)         Today  Weight: 210 lb (95.3 kg)   DRY WEIGHT:  Wt Readings from Last 3 Encounters:   04/26/21 210 lb (95.3 kg)   04/21/21 211 lb 9.6 oz (96 kg)   04/19/21 211 lb (95.7 kg)       Physical Exam:  GEN: Appears fatigued, no acute distress  SKIN: Pink, warm, dry. Nails without clubbing. HEENT: PERRLA. Normocephalic, atraumatic. Neck supple. No adenopathy. LUNG: AP diameter normal. Crackles bilateral bases. Respiratory effort increased. Clement Fly HEART: S1S2 A/R. + JVD. No carotid bruit. No murmur, rub or gallop. ABD: Firm, distended +BS X 4 quads. No hepatomegaly. EXT: Radial and pedal pulses 2+ and symmetric. Without varicosities. No edema. MUSCSKEL: Good ROM X4 extremities. No deformity. NEURO: A/O X3. Calm and cooperative.      Telemetry: NSR    Medications:    sodium chloride flush  5-40 mL Intravenous 2 times per day    lidocaine  1 patch Transdermal Daily    albuterol  2.5 mg Nebulization TID    aspirin  81 mg Oral Daily    azelastine  2 spray Each Nostril BID    nystatin  5 mL Oral 4x Daily    [START ON 4/28/2021] vitamin D  50,000 Units Oral Weekly    dicyclomine  10 mg Oral 4x Daily    sacubitril-valsartan  1 tablet Oral BID    metoprolol succinate  25 mg Oral BID    polyethylene glycol  17 g Oral Nightly    pravastatin  40 mg Oral Daily    pregabalin  75 mg Oral QAM    pregabalin  150 mg Oral Nightly    tamsulosin  0.4 mg Oral BID    zolpidem  10 mg Oral Nightly    esomeprazole  40 mg Oral BID AC    warfarin (COUMADIN) daily dosing (placeholder)   Other RX Placeholder      sodium chloride       sodium chloride flush, sodium chloride, promethazine **OR** ondansetron, polyethylene glycol, acetaminophen, guaiFENesin, sodium chloride (Inhalant), oxyCODONE-acetaminophen, oxybutynin    Lab Data: I have reviewed all labs below today.    CBC:   Recent Labs     04/24/21  2320 04/26/21  0647   WBC 7.8 4.8   HGB 15.7 15.5   HCT 46.4 45.3   MCV 90.5 91.8    172     BMP:   Recent Labs     04/24/21  2320      K 3.9      CO2 28   BUN 11   CREATININE 0.9     GLUCOSE:   Recent Labs     04/24/21  2320   GLUCOSE 91     LIVER PROFILE:   Lab Results   Component Value Date    AST 33 04/24/2021    ALT 34 04/24/2021    LIPASE 18.0 04/24/2021    AMYLASE 44 11/08/2017    LABALBU 4.1 04/24/2021    BILIDIR 1.1 12/26/2018    BILITOT 0.3 04/24/2021    ALKPHOS 102 04/24/2021     PT/INR:   Lab Results   Component Value Date    PROTIME 38.7 04/26/2021    PROTIME 37.6 01/29/2016    INR 3.30 04/26/2021     APTT:   Lab Results   Component Value Date    APTT 50.5 04/24/2021     Pro-BNP:    Lab Results   Component Value Date    PROBNP 93 04/25/2021    PROBNP 73 03/17/2021    PROBNP 66 02/01/2020     Parameters:   > 450 pg/mL under age 48  > 900 pg/mL ages 54-65  > 1800 pg/mL over age 76    ENZYMES:  Lab Results   Component Value Date    CKTOTAL 69 08/14/2017    CKMB 0.65 08/17/2012    CKMB 0.75 05/17/2011    TROPONINI <0.01 04/25/2021     FASTING LIPID PANEL:  Lab Results   Component Value Date    CHOL 139 10/02/2019    HDL 39 02/26/2021    HDL 65 11/10/2011    LDLCALC 92 02/26/2021    TRIG 126 10/02/2019       Diagnostics:    EKG:   Summary 9/25/19   Mild concentric LVH with normal LV size and wall motion. EF is    50%.   Paradoxical septal motion secondary to paced rhythm   Trivial mitral regurgitation is present.   The left atrium is normal in size.   The right ventricle is normal in size and function. Pacing wire seen.     Summary 11/8/2017 (S/P BiV placement)   Normal left ventricular wall thickness. Ejection fraction is visually   estimated to be 40-45%.  Michaela Roman is septal hypokinesis (secondary to Pacing)   Trivial mitral & tricuspid regurgitation is present.   The left atrial size is normal.   Pacer / ICD wire is visualized in the right ventricle.   There appears to be normal right ventricular size and function     Echo: 7/24/17  Left ventricle size is normal. Mild concentric left ventricular hypertrophy  is present. Global ejection fraction is moderately decreased and estimated  from 30 % to 35%. Diastolic filling parameters suggests grade I diastolic  dysfunction . There is abnormal (paradoxical) septal motion consistent with left bundle  branch block. Mitral valve is structurally normal.  Trivial to mild mitral regurgitation is present. The left atrial size is normal.  A bubble study was performed and fails to show evidence of right to left  Shunting.     Echo: 11/9/16  Dilated LV with severely reduced systolic function. EF 30%. Anterior, septal  and apical akinesis. Mild mitral regurgitation is present. The left atrial size is normal.  Normal right ventricular size and function.     NM Cardiac Stress Test 8/26/2020  Summary     No evidence of reversible ischemia.     There is a moderate to large sized, moderate intensity, fixed septal and     inferior wall defect which is most consistent with LBBB induced artifact and     diaphragm attenuation artifact.     Normal LV size and systolic function. Ejection fraction: 50 %    Overall findings represent a low risk study.       Stress Test: 7/21/16  SPECT perfusion images:  On the stress corrected images there is a moderate defect in the septum of the left ventricle. At rest, partially redistributes especially the posterior lateral component. Complete redistribution is not present. JAQT:  66 %      (Normal is at least 62% for women and 53% for men)  LV wall motion:   There is significant hypokinesia of the ventricular septum  LVEDV: 117ml  (Normal is up to 100ml for women and 150ml for men)  Transient dilatation ratio:   1.0      (Normal is up to 1.3 for women and 1.2 for men)     Cardiac Angiography:   Cath: 7/21/16  #1-coronary angiography summary: Normal coronaries in a left   dominant system  A-left main coronary is normal  B-the LAD has minor irregularities and no significant stenoses  C-the circumflex is dominant and has minor irregularities and no   significant stenoses  D-the right coronary is a small nondominant vessel and is   angiographically normal  #2-ventriculography in the ADKINS projection demonstrates mild   global left ventricular dysfunction ejection fractions   approximately 40  #3-aortic pressure is approximately 150/80 left ventricular   pressures 150/14 there is no gradient on pullback  #7-NIABQ are no complications  #4-JNB patient will be treated medically for left ventricular   dysfunction in the setting of normal coronaries     [facility] 3/2/2021  OVERALL IMPRESSION:  1.  Slightly above normal right heart pressure with slightly elevated  pulmonary capillary wedge pressure of 17 mmHg. 2.  Normal cardiac output and cardiac indices.       Assessment/Plan:  1.) Acute on chronic systolic heart failure: EF 30% improved to 50%. NICM. Increased Sob and O2 demands. Crackles bilateral bases. Decreased thoracic impedence suggests hypervolemia. He has not had diuretic for several days. I would suggest IV lasix dose today if patient willing.    NYHA Class III              Stage C  Diuretic:   Beta Blocker: Toprol XL  ACEi/ARB/ARNI: entresto > BP soft, would not increase now  Aldosterone Antagonist: none  SGLT2 Inhibitor: none  2gm Na diet, daily weight, 64 oz fluid restriction  Avoid NSAIDS and other nephrotoxic meds  Cardiac Rehab: Not indicated for EF>35%  ICD: s/p BiV ICD- Bi V off, now VVI 40 with defibrillator ON     2.) Paroxysmal Atrial Fib: NSR per device interrogation. Managed by Dr. Ania Carlson.  Declined multaq and Amio  CHADSVASC- 2    HASBLED-  Thromboembolic risk reduction: warfarin INR goal 2-3 managed by clinic  Rate Control: Toprol XL  Rhythm Control:  Not a candidate for ablation with BiV ICD  EP to follow device remotely     3. ) Hyperlipidemia:   LDL Goal < 70  Statin: pravachol            Low cholesterol diet  Liver fx 2 months after initiation then q6mos  Lipid panel annually     4.) Hx RLE DVT/PE: On coumadin      Electronically signed by BRENNA Chen CNP on 4/26/2021 at 10:05 AM

## 2021-04-27 ENCOUNTER — APPOINTMENT (OUTPATIENT)
Dept: CARDIAC REHAB | Age: 61
End: 2021-04-27
Payer: COMMERCIAL

## 2021-04-27 VITALS
WEIGHT: 210 LBS | DIASTOLIC BLOOD PRESSURE: 70 MMHG | OXYGEN SATURATION: 93 % | TEMPERATURE: 97.5 F | SYSTOLIC BLOOD PRESSURE: 112 MMHG | RESPIRATION RATE: 17 BRPM | HEART RATE: 90 BPM | BODY MASS INDEX: 33.75 KG/M2 | HEIGHT: 66 IN

## 2021-04-27 LAB
INR BLD: 2.51 (ref 0.86–1.14)
PROTHROMBIN TIME: 29.4 SEC (ref 10–13.2)

## 2021-04-27 PROCEDURE — 6360000002 HC RX W HCPCS: Performed by: INTERNAL MEDICINE

## 2021-04-27 PROCEDURE — 2580000003 HC RX 258: Performed by: STUDENT IN AN ORGANIZED HEALTH CARE EDUCATION/TRAINING PROGRAM

## 2021-04-27 PROCEDURE — 94640 AIRWAY INHALATION TREATMENT: CPT

## 2021-04-27 PROCEDURE — 6370000000 HC RX 637 (ALT 250 FOR IP): Performed by: INTERNAL MEDICINE

## 2021-04-27 PROCEDURE — 99214 OFFICE O/P EST MOD 30 MIN: CPT | Performed by: NURSE PRACTITIONER

## 2021-04-27 PROCEDURE — 36415 COLL VENOUS BLD VENIPUNCTURE: CPT

## 2021-04-27 PROCEDURE — 99217 PR OBSERVATION CARE DISCHARGE MANAGEMENT: CPT | Performed by: INTERNAL MEDICINE

## 2021-04-27 PROCEDURE — 85610 PROTHROMBIN TIME: CPT

## 2021-04-27 PROCEDURE — G0378 HOSPITAL OBSERVATION PER HR: HCPCS

## 2021-04-27 PROCEDURE — 94761 N-INVAS EAR/PLS OXIMETRY MLT: CPT

## 2021-04-27 RX ORDER — FUROSEMIDE 20 MG/1
10 TABLET ORAL 2 TIMES DAILY PRN
Qty: 30 TABLET | Refills: 3 | Status: SHIPPED | OUTPATIENT
Start: 2021-04-27 | End: 2021-05-19 | Stop reason: SDUPTHER

## 2021-04-27 RX ADMIN — ASPIRIN 81 MG: 81 TABLET, CHEWABLE ORAL at 09:31

## 2021-04-27 RX ADMIN — Medication 40 MG: at 06:36

## 2021-04-27 RX ADMIN — DICYCLOMINE HYDROCHLORIDE 10 MG: 10 CAPSULE ORAL at 09:31

## 2021-04-27 RX ADMIN — PREGABALIN 75 MG: 75 CAPSULE ORAL at 09:31

## 2021-04-27 RX ADMIN — OXYCODONE HYDROCHLORIDE AND ACETAMINOPHEN 1 TABLET: 5; 325 TABLET ORAL at 01:14

## 2021-04-27 RX ADMIN — ALBUTEROL SULFATE 2.5 MG: 2.5 SOLUTION RESPIRATORY (INHALATION) at 08:29

## 2021-04-27 RX ADMIN — SACUBITRIL AND VALSARTAN 1 TABLET: 49; 51 TABLET, FILM COATED ORAL at 09:51

## 2021-04-27 RX ADMIN — NYSTATIN 500000 UNITS: 100000 SUSPENSION ORAL at 09:31

## 2021-04-27 RX ADMIN — SODIUM CHLORIDE, PRESERVATIVE FREE 10 ML: 5 INJECTION INTRAVENOUS at 09:36

## 2021-04-27 RX ADMIN — PRAVASTATIN SODIUM 40 MG: 40 TABLET ORAL at 09:31

## 2021-04-27 RX ADMIN — TAMSULOSIN HYDROCHLORIDE 0.4 MG: 0.4 CAPSULE ORAL at 09:31

## 2021-04-27 RX ADMIN — METOPROLOL SUCCINATE 25 MG: 25 TABLET, EXTENDED RELEASE ORAL at 09:31

## 2021-04-27 ASSESSMENT — PAIN SCALES - GENERAL: PAINLEVEL_OUTOF10: 4

## 2021-04-27 NOTE — FLOWSHEET NOTE
Discharge instructions and medications reviewed with patient. Pt. Verbalized understanding. Denies questions. Discontinued IV without complications. Pt. tolerated well.       Electronically signed by Terra Hooper RN on 4/27/2021 at 12:09 PM

## 2021-04-27 NOTE — PLAN OF CARE
Problem: Pain:  Goal: Pain level will decrease  Description: Pain level will decrease  Outcome: Ongoing     Problem: Pain:  Goal: Control of acute pain  Description: Control of acute pain  4/27/2021 1052 by Deidra Green RN  Outcome: Ongoing     Problem: Pain:  Goal: Control of chronic pain  Description: Control of chronic pain  Outcome: Ongoing     Problem: Nausea/Vomiting:  Goal: Absence of nausea/vomiting  Description: Absence of nausea/vomiting  Outcome: Ongoing     Problem: Nausea/Vomiting:  Goal: Able to drink  Description: Able to drink  Outcome: Ongoing     Problem: Nausea/Vomiting:  Goal: Able to eat  Description: Able to eat  Outcome: Ongoing     Problem: Nausea/Vomiting:  Goal: Ability to achieve adequate nutritional intake will improve  Description: Ability to achieve adequate nutritional intake will improve  Outcome: Ongoing     Problem: Falls - Risk of:  Goal: Will remain free from falls  Description: Will remain free from falls  Outcome: Ongoing     Problem: Falls - Risk of:  Goal: Absence of physical injury  Description: Absence of physical injury  Outcome: Ongoing     Problem: OXYGENATION/RESPIRATORY FUNCTION  Goal: Patient will maintain patent airway  Outcome: Ongoing     Problem: OXYGENATION/RESPIRATORY FUNCTION  Goal: Patient will achieve/maintain normal respiratory rate/effort  Description: Respiratory rate and effort will be within normal limits for the patient  Outcome: Ongoing     Problem: HEMODYNAMIC STATUS  Goal: Patient has stable vital signs and fluid balance  Outcome: Ongoing     Problem: FLUID AND ELECTROLYTE IMBALANCE  Goal: Fluid and electrolyte balance are achieved/maintained  Outcome: Ongoing     Problem: ACTIVITY INTOLERANCE/IMPAIRED MOBILITY  Goal: Mobility/activity is maintained at optimum level for patient  Outcome: Ongoing

## 2021-04-27 NOTE — PROGRESS NOTES
Pharmacy Heart Failure Medication Reconciliation Note    Pt discharged from [facility] today after admission for chest pain. Bulmaro Hoffman has a diagnosis of diastolic heart failure (last EF = 50% on 09/2019). Pertinent Labs:  BMP:   Lab Results   Component Value Date     04/26/2021    K 4.2 04/26/2021    K 3.8 02/03/2021     04/26/2021    CO2 25 04/26/2021    BUN 12 04/26/2021    CREATININE 1.1 04/26/2021     BNP:   Lab Results   Component Value Date    PROBNP 93 04/25/2021    PROBNP 73 03/17/2021    PROBNP 66 02/01/2020       Patient taking an ACEI / ARB / Entresto:  Yes     Patient taking a BETA BLOCKER:  Yes  Patient taking a LOOP DIURETIC: Yes  Patient taking a ALDOSTERONE RECEPTOR ANTAGONIST: No: preserved EF    Patient has a diagnosis of Atrial Fibrillation: Yes. If yes, patient is on appropriate anticoagulation: Yes    Patient has a diagnosis of type 2 diabetes:  No:       Corrections to discharge medications include:  none    Discharge Medications:         Medication List        START taking these medications      furosemide 20 MG tablet  Commonly known as: Lasix  Take 0.5 tablets by mouth 2 times daily as needed (SOB)     nystatin 426186 UNIT/ML suspension  Commonly known as: MYCOSTATIN  Take 5 mLs by mouth 4 times daily            CHANGE how you take these medications      * albuterol sulfate  (90 Base) MCG/ACT inhaler  INHALE 2 PUFFS INTO THE LUNGS EVERY 4 HOURS AS NEEDED FOR WHEEZING OR SHORTNESS OF BREATH  What changed: Another medication with the same name was changed. Make sure you understand how and when to take each.      * albuterol (2.5 MG/3ML) 0.083% nebulizer solution  Commonly known as: PROVENTIL  Take 3 mLs by nebulization every 4 hours as needed for Wheezing or Shortness of Breath  What changed: when to take this     Vitamin D3 1.25 MG (48562 UT) Caps  Take 1 capsule by mouth once a week  What changed: additional instructions     warfarin 5 MG tablet  Commonly known as:

## 2021-04-27 NOTE — PROGRESS NOTES
Avani ROCHE [facility] Progress Note  4/27/2021 8:34 AM  Subjective:   Admit Date: 4/24/2021      Chief Complaint: I cant take lasix--it dries me out      Interval History: AICD interrogated--pacer is off--no vent arrthymias noted  Cardiol ordered lasix iv x1 yest--he refused   I dwp that occas dose of lasix will help --recc use ut qod to q 3 days   On nystatin for oral thrush--says it is helping   Diet: DIET CARDIAC; Low Sodium (2 GM); Daily Fluid Restriction: 1500 ml; No Caffeine  Medications:   Scheduled Meds:   furosemide  40 mg Intravenous Once    sodium chloride flush  5-40 mL Intravenous 2 times per day    lidocaine  1 patch Transdermal Daily    albuterol  2.5 mg Nebulization TID    aspirin  81 mg Oral Daily    azelastine  2 spray Each Nostril BID    nystatin  5 mL Oral 4x Daily    [START ON 4/28/2021] vitamin D  50,000 Units Oral Weekly    dicyclomine  10 mg Oral 4x Daily    sacubitril-valsartan  1 tablet Oral BID    metoprolol succinate  25 mg Oral BID    polyethylene glycol  17 g Oral Nightly    pravastatin  40 mg Oral Daily    pregabalin  75 mg Oral QAM    pregabalin  150 mg Oral Nightly    tamsulosin  0.4 mg Oral BID    zolpidem  10 mg Oral Nightly    esomeprazole  40 mg Oral BID AC    warfarin (COUMADIN) daily dosing (placeholder)   Other RX Placeholder     Continuous Infusions:   sodium chloride         Review of Systems -   General ROS: afebrile  Respiratory ROS: positive for - shortness of breath  Cardiovascular ROS: no chest pain  Musculoskeletal ROS:positive for - :joint pain  Neurological ROS: no TIA or stroke symptoms    Objective:   Vitals: /70   Pulse 71   Temp 98.4 °F (36.9 °C) (Oral)   Resp 18   Ht 5' 6\" (1.676 m)   Wt 210 lb (95.3 kg)   SpO2 94%   BMI 33.89 kg/m²   General appearance: alert and cooperative with exam  HEENT: Head: Normocephalic, no lesions, without obvious abnormality.   Neck: no adenopathy, no carotid bruit, no JVD, supple, symmetrical, trachea midline 04/24/2021 <0.01  <0.01 ng/mL Final    Methodology by Troponin T    Protime 04/24/2021 41.0* 10.0 - 13.2 sec Final    Comment: Effective 11-19-19 09:00am EST  Please note reference ranges have  changed for PT and INR Testing.  INR 04/24/2021 3.49* 0.86 - 1.14 Final    Comment: Effective 11/19/19 at 09:00am EST    Normal: 0.86 - 1.14  Therapeutic: 2.0 - 3.0  Pros. Valve: 2.5 - 3.5  AMI: 2.0 - 3.0      aPTT 04/24/2021 50.5* 24.2 - 36.2 sec Final    Comment: Therapeutic range: 49.0 - 76.0 sec    Effective 11-19-19 9:00am EST  Please note reference ranges have  changed for PTT Testing.  Ventricular Rate 04/24/2021 71  BPM Final    Atrial Rate 04/24/2021 71  BPM Final    P-R Interval 04/24/2021 182  ms Final    QRS Duration 04/24/2021 154  ms Final    Q-T Interval 04/24/2021 442  ms Final    QTc Calculation (Bazett) 04/24/2021 480  ms Final    P Axis 04/24/2021 60  degrees Final    R Axis 04/24/2021 -53  degrees Final    T Axis 04/24/2021 108  degrees Final    Diagnosis 04/24/2021 Normal sinus rhythmLeft axis deviationLeft bundle branch blockAbnormal ECGWhen compared with ECG of 05-DEC-2020 11:51,No significant change was foundConfirmed by LAM GILL MD, Nieves Mcclain ([id number]) on 4/25/2021 9:51:05 AM   Final    Lipase 04/24/2021 18.0  13.0 - 60.0 U/L Final    Troponin 04/25/2021 <0.01  <0.01 ng/mL Final    Methodology by Troponin T    Troponin 04/25/2021 <0.01  <0.01 ng/mL Final    Methodology by Troponin T    Pro-BNP 04/25/2021 93  0 - 124 pg/mL Final    Comment: Methodology by NT-proBNP    An age-independent cutoff point of 300 pg/ml has a 98%  negative predictive value excluding acute heart failure. Values exceeding the age-related cutoff values (450 pg/mL if  age<50, 900 if 50-75 and 1800 if >75) has 90% sensitivity and  84% specificity for diagnosing acute HF.  In patients with  renal compromise (eGFR<60) values greater than 1200pg/ml have  a diagnostic sensitivity and specificity of 89% and 72% for  acute HF.  Protime 04/25/2021 44.1* 10.0 - 13.2 sec Final    Comment: Effective 11-19-19 09:00am EST  Please note reference ranges have  changed for PT and INR Testing.  INR 04/25/2021 3.75* 0.86 - 1.14 Final    Comment: Effective 11/19/19 at 09:00am EST    Normal: 0.86 - 1.14  Therapeutic: 2.0 - 3.0  Pros. Valve: 2.5 - 3.5  AMI: 2.0 - 3.0      WBC 04/26/2021 4.8  4.0 - 11.0 K/uL Final    RBC 04/26/2021 4.93  4.20 - 5.90 M/uL Final    Hemoglobin 04/26/2021 15.5  13.5 - 17.5 g/dL Final    Hematocrit 04/26/2021 45.3  40.5 - 52.5 % Final    MCV 04/26/2021 91.8  80.0 - 100.0 fL Final    MCH 04/26/2021 31.3  26.0 - 34.0 pg Final    MCHC 04/26/2021 34.1  31.0 - 36.0 g/dL Final    RDW 04/26/2021 13.3  12.4 - 15.4 % Final    Platelets 93/27/4163 172  135 - 450 K/uL Final    MPV 04/26/2021 7.4  5.0 - 10.5 fL Final    Protime 04/26/2021 38.7* 10.0 - 13.2 sec Final    Comment: Effective 11-19-19 09:00am EST  Please note reference ranges have  changed for PT and INR Testing.  INR 04/26/2021 3.30* 0.86 - 1.14 Final    Comment: Effective 11/19/19 at 09:00am EST    Normal: 0.86 - 1.14  Therapeutic: 2.0 - 3.0  Pros.  Valve: 2.5 - 3.5  AMI: 2.0 - 3.0      Ventricular Rate 04/26/2021 65  BPM Final    Atrial Rate 04/26/2021 65  BPM Final    P-R Interval 04/26/2021 172  ms Final    QRS Duration 04/26/2021 152  ms Final    Q-T Interval 04/26/2021 448  ms Final    QTc Calculation (Bazett) 04/26/2021 465  ms Final    P Axis 04/26/2021 58  degrees Final    R Axis 04/26/2021 -52  degrees Final    T Axis 04/26/2021 108  degrees Final    Diagnosis 04/26/2021 Normal sinus rhythmLeft axis deviationLeft bundle branch blockPoor R wave progressionqt interval long for rateAbnormal ECGWhen compared with ECG of 24-APR-2021 23:26,Questionable change in initial forces of Anterolateral leadsConfirmed by Deleon [facility] ([id number]) on 4/26/2021 9:05:45 PM   Final    Sodium 04/26/2021 138  136 - 145 mmol/L Final    Potassium 04/26/2021 4.2  3.5 - 5.1 mmol/L Final    Chloride 04/26/2021 106  99 - 110 mmol/L Final    CO2 04/26/2021 25  21 - 32 mmol/L Final    Anion Gap 04/26/2021 7  3 - 16 Final    Glucose 04/26/2021 134* 70 - 99 mg/dL Final    BUN 04/26/2021 12  7 - 20 mg/dL Final    CREATININE 04/26/2021 1.1  0.8 - 1.3 mg/dL Final    GFR Non- 04/26/2021 >60  >60 Final    Comment: >60 mL/min/1.73m2 EGFR, calc. for ages 25 and older using the  MDRD formula (not corrected for weight), is valid for stable  renal function.  GFR  04/26/2021 >60  >60 Final    Comment: Chronic Kidney Disease: less than 60 ml/min/1.73 sq.m. Kidney Failure: less than 15 ml/min/1.73 sq.m. Results valid for patients 18 years and older.  Calcium 04/26/2021 10.2  8.3 - 10.6 mg/dL Final    Color, UA 04/26/2021 YELLOW  Straw/Yellow Final    Clarity, UA 04/26/2021 Clear  Clear Final    Glucose, Ur 04/26/2021 Negative  Negative mg/dL Final    Bilirubin Urine 04/26/2021 Negative  Negative Final    Ketones, Urine 04/26/2021 Negative  Negative mg/dL Final    Specific Gravity, UA 04/26/2021 1.015  1.005 - 1.030 Final    Blood, Urine 04/26/2021 SMALL* Negative Final    pH, UA 04/26/2021 6.0  5.0 - 8.0 Final    Protein, UA 04/26/2021 Negative  Negative mg/dL Final    Urobilinogen, Urine 04/26/2021 0.2  <2.0 E.U./dL Final    Nitrite, Urine 04/26/2021 Negative  Negative Final    Leukocyte Esterase, Urine 04/26/2021 Negative  Negative Final    Microscopic Examination 04/26/2021 YES   Final    Urine Type 04/26/2021 NotGiven   Final    Urine Reflex to Culture 04/26/2021 Not Indicated   Final    Crystals, UA 04/26/2021 1+ Ca. Oxalate* None Seen /HPF Final    Urinalysis Comments 04/26/2021 see below   Final    Automated urinalysis results confirmed by microscopic.     Hyaline Casts, UA 04/26/2021 6  0 - 8 /LPF Final    WBC, UA 04/26/2021 3  0 - 5 /HPF Final    RBC, UA 04/26/2021 11* 0 - 4 /HPF Final    Epithelial Cells, UA 04/26/2021 1  0 - 5 /HPF Final    Comment: Urinalysis microscopic performed using the  automated methodology (AUWI analyzer). Assessment & Plan:   Principal Problem:    Chest pain--non cardiac--trop neg--DC to home today   Active Problems:    Essential hypertension, benign    NICM (nonischemic cardiomyopathy) (HCC)--EF-has improved overall     Colicky LLQ abdominal pain    Gastroesophageal reflux disease with esophagitis without hemorrhage    Irritable bowel syndrome    Biventricular ICD (implantable cardioverter-defibrillator) in place    Paroxysmal atrial fibrillation (HCC)    Back pain    Thrombocytopenia (HCC)  Resolved Problems:    * No resolved hospital problems.  *  Will dc to home today on nystatin--he will use lasix 20 mg pprn SOB  See me in 2 weeks   He is to resume cardiac and pulm rehab   DS dictated   Please note that over 35 minutes was spent in evaluating the patient, review of records and results, discussion with staff/family, etc.    John Luo MD

## 2021-04-27 NOTE — PROGRESS NOTES
[facility]   Daily Progress Note      Admit Date:  4/24/2021    CC: SOB    HPI:   Shanique Medina is a 63 y.o. male with PMH non ischemic CM, S/P BiV ICD 2017 -turned off at his request D/T anxiety issues now set VVI 40 with defibrillator programmng on, 2016 CP/abn stress>LHC with normal cors, found EF 40% at that time, Atrial Tach 2018>underwent DCCV 9/2018 >declined multaq D/T side effects, AF,  PE 2011, RLE DVT 2013 - on coumadin, GERD, HLP, HTN, kidney stones, sacroilitis. Never smoked. ARDS 2011. Bronchiolitis Obliterans and nocturnal hypoxia - wears 2L NC at night (follows with Dr. Cherylene Pam). Retired. Admitted to [facility] with multiple complaints. Reported GERD symptoms that started with torsemide - LLQ pain radiating to chest and throat. Then C/O palpitations. He was concerned about AF. Dr. Aneta Kumar consulted. Seen by GI yesterday>indicated chest/GI symptoms diminished since treating thrush. SOB improved today. He C/O mild SOB with activity. O2 has been weaned off. Maintaining O2 sat >90%. He refused lasix yesterday. Reviewed device check yesterday that showed decrease thoracic impedence indicating hypervolemia. He agrees to take lasix PRN at discharge for wt gain, SOB, edema. Denies CP, palpitations, LH, dizzy, syncope, chest pain/burning, abd pain. Pacer interrogation 4/26/2021 reveals no atrial or vent arrhythmias. It does show decreased thoracic impedence indicated hypervolemia. Review of Systems:   General: Denies fever, chills, +fatigue, + generalizedweakness  Skin: Denies skin changes, rash, itching, lesions.   HEENT: Denies headache, dizziness, vision changes, nosebleeds, sore throat, nasal drainage  RESP: Denies cough, sputum,  wheeze, snoring  CARD: Denies palpitations,  Murmur, chest pain  GI:Denies nausea, vomiting, heartburn, loss of appetite, change in bowels  : Denies frequency, pain, incontinence, polyuria  VASC: Denies claudication, leg cramps, clots  MUSC/SKEL: Denies pain, stiffness, arthritis  PSYCH: Denies anxiety, depression, stress  NEURO: Denies numbness, tingling, weakness,change in mood or memory  HEME: Denies abn bruising, bleeding, anemia  ENDO: Denies intolerance to heat, cold, excessive thirst or hunger, hx thyroid disease    Objective:   /70   Pulse 71   Temp 98.4 °F (36.9 °C) (Oral)   Resp 18   Ht 5' 6\" (1.676 m)   Wt 210 lb (95.3 kg)   SpO2 94%   BMI 33.89 kg/m²           Intake/Output Summary (Last 24 hours) at 4/27/2021 0854  Last data filed at 4/27/2021 5077  Gross per 24 hour   Intake 790 ml   Output 1300 ml   Net -510 ml     I/O since adm: ? WEIGHT:Admit Weight: 210 lb (95.3 kg)         Today  Weight: 210 lb (95.3 kg)   DRY WEIGHT:  Wt Readings from Last 3 Encounters:   04/27/21 210 lb (95.3 kg)   04/21/21 211 lb 9.6 oz (96 kg)   04/19/21 211 lb (95.7 kg)       Physical Exam:  GEN: Appears fatigued, no acute distress  SKIN: Pink, warm, dry. Nails without clubbing. HEENT: PERRLA. Normocephalic, atraumatic. Neck supple. No adenopathy. LUNG: AP diameter normal. Crackles bilateral bases. Respiratory effort increased. Sueellen Payment HEART: S1S2 A/R. + JVD. No carotid bruit. No murmur, rub or gallop. ABD: Firm, distended +BS X 4 quads. No hepatomegaly. EXT: Radial and pedal pulses 2+ and symmetric. Without varicosities. No edema. MUSCSKEL: Good ROM X4 extremities. No deformity. NEURO: A/O X3. Calm and cooperative.      Telemetry: NSR    Medications:    furosemide  40 mg Intravenous Once    sodium chloride flush  5-40 mL Intravenous 2 times per day    lidocaine  1 patch Transdermal Daily    albuterol  2.5 mg Nebulization TID    aspirin  81 mg Oral Daily    azelastine  2 spray Each Nostril BID    nystatin  5 mL Oral 4x Daily    [START ON 4/28/2021] vitamin D  50,000 Units Oral Weekly    dicyclomine  10 mg Oral 4x Daily    sacubitril-valsartan  1 tablet Oral BID    metoprolol succinate  25 mg Oral BID    polyethylene stress corrected images there is a moderate defect in the septum of the left ventricle. At rest, partially redistributes especially the posterior lateral component. Complete redistribution is not present. CITI:  84 %      (Normal is at least 62% for women and 53% for men)  LV wall motion:   There is significant hypokinesia of the ventricular septum  LVEDV: 117ml  (Normal is up to 100ml for women and 150ml for men)  Transient dilatation ratio:   1.0      (Normal is up to 1.3 for women and 1.2 for men)     Cardiac Angiography:   Cath: 7/21/16  #1-coronary angiography summary: Normal coronaries in a left   dominant system  A-left main coronary is normal  B-the LAD has minor irregularities and no significant stenoses  C-the circumflex is dominant and has minor irregularities and no   significant stenoses  D-the right coronary is a small nondominant vessel and is   angiographically normal  #2-ventriculography in the ADKINS projection demonstrates mild   global left ventricular dysfunction ejection fractions   approximately 40  #3-aortic pressure is approximately 150/80 left ventricular   pressures 150/14 there is no gradient on pullback  #3-JIEZN are no complications  #5-GJB patient will be treated medically for left ventricular   dysfunction in the setting of normal coronaries     [facility] 3/2/2021  OVERALL IMPRESSION:  1.  Slightly above normal right heart pressure with slightly elevated  pulmonary capillary wedge pressure of 17 mmHg. 2.  Normal cardiac output and cardiac indices.       Assessment/Plan:  1.) Acute on chronic systolic heart failure: EF 30% improved to 50%. NICM. Refused lasix yesterday. O2 weaned off over night. States SOB is improved. He agrees to take Lasix PRN at home for SOB, wt gain, edema, abd distention. He is scheduled to [facility] next week with [facility]. Concerned for non compliance with lasix and high risk for readmission. Reviewed S/S of HF and instructed to call [facility] at first onset.    He plans to take lasix when he gets home today, he does not want to take before discharge. NYHA Class III              Stage C  Diuretic: lasix PRN  Beta Blocker: Toprol XL  ACEi/ARB/ARNI: entresto > BP soft, would not increase now  Aldosterone Antagonist: none  SGLT2 Inhibitor: none  2gm Na diet, daily weight, 64 oz fluid restriction  Avoid NSAIDS and other nephrotoxic meds  Cardiac Rehab: Not indicated for EF>35%  ICD: s/p BiV ICD- Bi V off, now VVI 40 with defibrillator ON     2.) Paroxysmal Atrial Fib: NSR per device interrogation. Managed by Dr. Harshal Lal.  Declined multaq and Skylao  CHADSVASC- 2    HASBLED-  Thromboembolic risk reduction: warfarin INR goal 2-3 managed by clinic  Rate Control: Toprol XL  Rhythm Control:  Not a candidate for ablation with BiV ICD  EP to follow device remotely     3. ) Hyperlipidemia:   LDL Goal < 70  Statin: pravachol            Low cholesterol diet  Liver fx 2 months after initiation then q6mos  Lipid panel annually     4.) Hx RLE DVT/PE: On coumadin      Electronically signed by BRENNA Jones - CNP on 4/27/2021 at 8:54 AM

## 2021-04-28 ENCOUNTER — TELEPHONE (OUTPATIENT)
Dept: PULMONOLOGY | Age: 61
End: 2021-04-28

## 2021-04-28 NOTE — TELEPHONE ENCOUNTER
Patient calling because he was in the hospital until yesterday. He had a CT scan done while admitted. He received the results but wants to discuss them with Dr. Conrad Sales over the phone. I offered her next available appt. time to him, but he is insisting he speak with her today. He is requesting a phone call from her.      CB # Verified

## 2021-04-28 NOTE — DISCHARGE SUMMARY
[address] [address]                               DISCHARGE SUMMARY    PATIENT NAME: Jonah Fish                        :        1960  MED REC NO:   [id number]                          ROOM:       [id number]  ACCOUNT NO:   [de-identified]                           ADMIT DATE: 2021  PROVIDER:     Russ Snellen, MD                  DISCHARGE DATE:  2021    CHIEF COMPLAINT:  Chest pain, atypical.    DISCHARGE DIAGNOSES:  1. Chest pain, atypical.  2.  Hypertension. 3.  Nonischemic cardiomyopathy. 4.  Gastroesophageal reflux with esophagitis. 5.  Biventricular ICD in place. HISTORY OF PRESENT ILLNESS:  The patient is a 56-year-old white male  admitted through emergency. He presented with a history of worsening  GERD symptoms, palpitations, and low back pain. He was recently started  on Demadex for worsening heart failure as an outpatient by Cardiology. He had a reaction including worsening GERD, which then he said moved  from his left lower quadrant to his midline all the way to his throat. Also reported worsening trouble swallowing even though he has had a  prior EGD and dilatation. Also was recently on Symbicort and he said  despite _____, he has a feelings of thrush feel like coming back. In  addition, he felt like his low back pain was flaring, he could not sleep  and he came to ER. Evaluation in the ER included a CT scan that showed  a right perinephric stranding, which is chronic from a prior hemorrhage  around the kidney. He was admitted, his serial troponins were negative. The patient is fairly controlling with his care and was seen by  Cardiology. Cardiology felt that his pain was atypical and is noted by  Dr. Stefanie Fair. He told Dr. Stefanie Fair he came to ER because of palpitations. He told Dr. Stefanie Fair he had been caring for his son's dog and he felt  stressed with this.   He was concerned for atrial fibrillation. He had  no racing heart, may be a little bit short of breath. He states he had  some chest pain that lasts 20 to 30 seconds, pain has not recurred  since. The patient related to Dr. Gabby Rosa, he thought that torsemide  caused leg cramping in his sides. He was previously on Lasix and it did  the same thing. He said he could feel it _____ his kidneys and  shrinking them and afraid it was going to put him in kidney failure. Dr. Gabby Rosa felt like his pain was not cardiac. He did interpolate his  AICD, which did not show atrial fib or any firing. Dr. Gabby Rosa was not  sure he needed a loop diuretic. He thought increasing his Lanis Reagin,  which was done as an outpatient was a good idea. He did not feel an  ischemic workup was needed. The patient developed slight amounts of  hypoxemia through the hospital course and usually resolved with rest.   He was ordered for IV Lasix, which he refused. Dr. Juani Wolf had a  discussion on the day of discharge regarding use of Lasix on an as  needed basis and I told him I did not think it would precipitate kidney  failure on one or two doses and his labs here have not shown any  evidence of renal insufficiency. Near discharge, his INR was 2.51. His  BUN and creatinine are 12 and 1.1, sodium 138, potassium 4.2. Urinalysis is unremarkable and he clinically is improving. He was seen  by Dr. Uyen Nolasco in GI consultation. Dr. Uyen Nolasco is familiar with him and  he had performed esophageal gastroduodenostomy and dilatation on him in  the past and Dr. Uyen Nolasco felt that we should merely continue his  nystatin, which is being done for thrush. Clinically, following  improvement, he is going to be readied for discharge today. He will go  home on 04/27/2021.     His discharge medications will include his maintenance medications  including Percocet every 4 as needed for back pain, Symbicort 160/4.5  two puffs b.i.d., Dexilant 60 mg daily, Lyrica 75 mg one in the morning  and two at night, Ambien 10 mg at bedtime, albuterol nebulizing solution  2.5 mg every 4 or as needed, pravastatin 40 mg at bedtime, warfarin 5 mg  daily except 2.5 mg on Tuesday and Thursday, Flomax 0.4 mg b.i.d.,  Entresto 49/51 one tab b.i.d., metoprolol XL 25 b.i.d., MiraLax one pack  daily, and aspirin 81 mg daily. His new meds will include nystatin  100,000 units/mL 5 mL q.i.d. for four days for his thrush and I told him  to resume Lasix 20 mg. He wants to take it to half a tab twice a day as  needed and I have approved that. Told to follow with Dr. Nidia Perdue in  about two weeks.     Tracey Roberson MD    D: 04/27/2021 9:09:29       T: 04/27/2021 17:05:26     [email or web address]  Job#: [id number]     Doc#: [id number]    CC:

## 2021-04-29 ENCOUNTER — APPOINTMENT (OUTPATIENT)
Dept: CARDIAC REHAB | Age: 61
End: 2021-04-29
Payer: COMMERCIAL

## 2021-04-30 PROBLEM — R04.0 EPISTAXIS: Status: ACTIVE | Noted: 2021-04-30

## 2021-05-03 ENCOUNTER — OFFICE VISIT (OUTPATIENT)
Dept: PULMONOLOGY | Age: 61
End: 2021-05-03
Payer: COMMERCIAL

## 2021-05-03 VITALS
BODY MASS INDEX: 33.91 KG/M2 | RESPIRATION RATE: 16 BRPM | SYSTOLIC BLOOD PRESSURE: 120 MMHG | TEMPERATURE: 97.5 F | DIASTOLIC BLOOD PRESSURE: 72 MMHG | WEIGHT: 211 LBS | HEIGHT: 66 IN | HEART RATE: 76 BPM | OXYGEN SATURATION: 95 %

## 2021-05-03 DIAGNOSIS — G47.34 NOCTURNAL HYPOXIA: ICD-10-CM

## 2021-05-03 DIAGNOSIS — J42 BRONCHIOLITIS OBLITERANS (HCC): Primary | ICD-10-CM

## 2021-05-03 PROCEDURE — 99214 OFFICE O/P EST MOD 30 MIN: CPT | Performed by: INTERNAL MEDICINE

## 2021-05-03 NOTE — PROGRESS NOTES
Chief complaint  This is a 63y.o. year old male  who presents with a chief complaint of   Chief Complaint   Patient presents with    Other     Discuss CT chest        HPI  69-year-old man with bronchiolitis obliterans and nocturnal hypoxia presents for follow-up. He was admitted to [facility] a week ago for chest pain. He underwent chest CT which showed inflammation in the distal airways. He has been noticing that when he plays golf his heart rate increases to the 130s. He says he has mild shortness of breath. He has no significant cough. He says he was told by his cardiologist that this is not due to a cardiac problem. He started pulmonary rehabilitation a few weeks ago, but had to stop when he got admitted to the hospital.  He tried Symbicort for 3 weeks, but says he felt no difference in his symptoms and it gave him thrush so he discontinued it. He uses his nebulizer about 3 times a day and proair about twice a day. He uses 2 L of oxygen at night. He has 3% saline nebulizers which he uses as needed. Past history:  He was previously seen by Dr. Natalie Recio, but due to insurance issues had to find another pulmonologist. He says his illness began in [address] [address]. 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 Following one incident when the fumes blew into his face he developed shortness of breath. He says he was diagnosed by lung biopsy in 1997 by Dr. Marcial Galdamez at [facility]. In 2011, he had a prolonged hospital stay due to unexplained bilateral infiltrates and ARDS. His infiltrates resolved with steroids. He also had a spontaneous right-sided pneumothorax that required chest tube placement and a right sided pulmonary embolus. In 2013, he had a right lower extremity DVT. He is on Coumadin.     Past Medical History:   Diagnosis Date    Bronchiolitis obliterans ([address]     Bundle branch block, right     Cardiomyopathy ([address]     Chest pain 9/24/2019    Fibromyalgia     GERD (gastroesophageal pravastatin (PRAVACHOL) 40 MG tablet Take 1 tablet by mouth daily 90 tablet 3    warfarin (COUMADIN) 5 MG tablet Take 1 tablet by mouth daily Except 2.5 mg Tues and Thursday (Patient taking differently: Take by mouth See Admin Instructions 5 mg on Monday, Wednesday, Friday  2.5 mg all other days) 90 tablet 3    dicyclomine (BENTYL) 10 MG capsule TAKE ONE CAPSULE BY MOUTH  FOUR TIMES DAILY AS NEEDED 360 capsule 1    tamsulosin (FLOMAX) 0.4 MG capsule Take 1 capsule by mouth 2 times daily 60 capsule 2    ENTRESTO 49-51 MG per tablet Take 1 tablet by mouth 2 times daily      sodium chloride, Inhalant, 3 % nebulizer solution Take 4 mLs by nebulization every 8 hours as needed for Other or Cough (cough) 360 mL 5    metoprolol succinate (TOPROL XL) 25 MG extended release tablet TAKE 1 TABLET BY MOUTH TWO  TIMES DAILY 180 tablet 3    Cholecalciferol (VITAMIN D3) 1.25 MG (72953 UT) CAPS Take 1 capsule by mouth once a week (Patient taking differently: Take 1 capsule by mouth once a week Takes on Wednesdays) 12 capsule 3    albuterol sulfate  (90 Base) MCG/ACT inhaler INHALE 2 PUFFS INTO THE LUNGS EVERY 4 HOURS AS NEEDED FOR WHEEZING OR SHORTNESS OF BREATH 1 Inhaler 0    polyethylene glycol (MIRALAX) PACK packet Take 17 g by mouth nightly       aspirin 81 MG tablet Take 81 mg by mouth daily      azelastine (ASTELIN) 0.1 % nasal spray 2 sprays by Nasal route 2 times daily Use in each nostril as directed 1 Bottle 3    budesonide-formoterol (SYMBICORT) 160-4.5 MCG/ACT AERO INHALE 2 PUFFS INTO THE LUNGS 2 TIMES DAILY (Patient not taking: Reported on 5/3/2021) 1 Inhaler 5     No current facility-administered medications for this visit.         Allergies   Allergen Reactions    Ipratropium Bromide Hfa Shortness Of Breath    Atrovent Nasal Spray [Ipratropium] Other (See Comments)     Chest tightness    Doxycycline Hives    Morphine Other (See Comments)     \"makes me feel funny\"      Nitroglycerin Other (See Comments)     Severe hypotension (went from 450 to 66 systolic)    Sulfa Antibiotics Rash    Vicodin [Hydrocodone-Acetaminophen] Rash       Family History   Problem Relation Age of Onset    High Blood Pressure Mother     Dementia Mother     Cancer Father         Pancreatic Ca       Social History     Socioeconomic History    Marital status:      Spouse name: Not on file    Number of children: Not on file    Years of education: Not on file    Highest education level: Not on file   Occupational History    Not on file   Social Needs    Financial resource strain: Not hard at all   [place] insecurity     Worry: Never true     Inability: Never true   [employer] needs     Medical: No     Non-medical: No   Tobacco Use    Smoking status: Never Smoker    Smokeless tobacco: Never Used   Substance and Sexual Activity    Alcohol use: No     Frequency: Never     Comment: occ    Drug use: No    Sexual activity: Yes     Partners: Female     Comment: wife   Lifestyle    Physical activity     Days per week: Not on file     Minutes per session: Not on file    Stress: Not on file   Relationships    Social connections     Talks on phone: Not on file     Gets together: Not on file     Attends Yazidi service: Not on file     Active member of club or organization: Not on file     Attends meetings of clubs or organizations: Not on file     Relationship status: Not on file    Intimate partner violence     Fear of current or ex partner: Not on file     Emotionally abused: Not on file     Physically abused: Not on file     Forced sexual activity: Not on file   Other Topics Concern    Not on file   Social History Narrative    Not on file   Never smoked, but his mother smoked outside of the home    Immunization History   Administered Date(s) Administered    COVID-19, Moderna, PF, 100mcg/0.5mL 03/09/2021, 04/08/2021    Influenza, Quadv, IM, PF (6 mo and older Fluzone, Flulaval, Fluarix, and 3 yrs and older averaged over lung volumes (compared to the study in April 2014 flow measurements, slow vital capacity and diffusion capacity have declined)     Six minute walk test:  3/29/21- Walked 335m, 69% predicted (normal); jazmyn saturation 92%    Images and reports of chest imaging were reviewed by me. My interpretation is:  CXR (2/4/21): Clear lung fields  Chest CT (4/25/21): No LAD; tree-in-bud opacities bilaterally (unchanged compared to September 2014)      ECHO (9/23/19)  Summary   Mild concentric LVH with normal LV size and wall motion. EF is    50%. Paradoxical septal motion secondary to paced rhythm   Trivial mitral regurgitation is present. The left atrium is normal in size. The right ventricle is normal in size and function. Pacing wire seen. Lab Results   Component Value Date    WBC 4.8 04/26/2021    HGB 15.5 04/26/2021    HCT 45.3 04/26/2021    MCV 91.8 04/26/2021     04/26/2021       Lab Results   Component Value Date    BNP 22.8 08/16/2012       Lab Results   Component Value Date    CREATININE 1.1 04/26/2021    BUN 12 04/26/2021     04/26/2021    K 4.2 04/26/2021     04/26/2021    CO2 25 04/26/2021         Assessment/Plan:63y.o. year old male presents for follow up. Bronchiolitis obliterans- Stable. Chest CT shows findings that are unchanged compared to 2014. He will continue ProAir with albuterol nebulizers as needed. He has started participation in pulmonary rehabilitation. Nocturnal hypoxia- He will continue 2 L of oxygen at night. He will return for follow-up in 6 months.       Lori Lange MD  [facility] Pulmonology, Critical Care and Sleep

## 2021-05-12 ENCOUNTER — OFFICE VISIT (OUTPATIENT)
Dept: ENT CLINIC | Age: 61
End: 2021-05-12
Payer: COMMERCIAL

## 2021-05-12 DIAGNOSIS — R04.2 HEMOPTYSIS: ICD-10-CM

## 2021-05-12 DIAGNOSIS — H90.3 SENSORINEURAL HEARING LOSS (SNHL) OF BOTH EARS: Primary | ICD-10-CM

## 2021-05-12 DIAGNOSIS — R04.0 EPISTAXIS: ICD-10-CM

## 2021-05-12 PROCEDURE — 1036F TOBACCO NON-USER: CPT | Performed by: OTOLARYNGOLOGY

## 2021-05-12 PROCEDURE — 31238 NSL/SINS NDSC SRG NSL HEMRRG: CPT | Performed by: OTOLARYNGOLOGY

## 2021-05-12 PROCEDURE — G8427 DOCREV CUR MEDS BY ELIG CLIN: HCPCS | Performed by: OTOLARYNGOLOGY

## 2021-05-12 PROCEDURE — 3017F COLORECTAL CA SCREEN DOC REV: CPT | Performed by: OTOLARYNGOLOGY

## 2021-05-12 PROCEDURE — 99213 OFFICE O/P EST LOW 20 MIN: CPT | Performed by: OTOLARYNGOLOGY

## 2021-05-12 PROCEDURE — G8417 CALC BMI ABV UP PARAM F/U: HCPCS | Performed by: OTOLARYNGOLOGY

## 2021-05-12 NOTE — PROGRESS NOTES
[address] [facility]  Follow up      Patient Name: April Naik  Medical Record Number:  [id number]  Primary Care Physician:  John Luo MD  Date of Consultation: 5/12/2021    Chief Complaint: Epistaxis        Interval History  Patient is been having intermittent nosebleeds from the left side for the last couple weeks. Is not sure what triggered this, but he is on oxygen at night which dries out his nose and he does take Coumadin. He said that the severity of the is not bad but it has been fairly persistent. He sees also and been spitting up blood. He said the left side so most of these noticed bleeding. I saw the patient last in August.  This was for a number of reasons, but he was supposed to follow-up with a hearing test.  He has had a lot of medical issues since I saw him in 1 was able to make the follow-up. He is complaining a little bit more shortness of breath than usual over the last few days. REVIEW OF SYSTEMS  As above  PHYSICAL EXAM  GENERAL: No Acute Distress, Alert and Oriented, no Hoarseness, strong voice  EYES: EOMI, Anti-icteric  HENT:   Head: Normocephalic and atraumatic.    Face:  Symmetric, facial nerve intact, no sinus tenderness  Right Ear: Normal external ear, normal external auditory canal, intact tympanic membrane with normal mobility and aerated middle ear  Left Ear: Normal external ear, normal external auditory canal, intact tympanic membrane with normal mobility and aerated middle ear  Mouth/Oral Cavity:  normal lips, Uvula is midline, no mucosal lesions, no trismus, poor dentition, normal salivary quality/flow  Oropharynx/Larynx:  normal oropharynx, normal tonsils; normal larynx/nasopharynx on mirror exam  Nose: See below  NECK: Normal range of motion, no thyromegaly, trachea is midline, no lymphadenopathy, no neck masses, no crepitus  CHEST: Normal respiratory effort, no retractions, breathing comfortably  SKIN: No rashes, was generated completely or in part utilizing Dragon dictation speech recognition software. Occasionally, words are mistranscribed and despite editing, the text may contain inaccuracies due to incorrect word recognition. If further clarification is needed please contact the office at [phone].

## 2021-05-14 ENCOUNTER — TELEPHONE (OUTPATIENT)
Dept: ENT CLINIC | Age: 61
End: 2021-05-14

## 2021-05-14 NOTE — TELEPHONE ENCOUNTER
If he is coughing up phlegm and feels as though he is having a pulmonary issue he really needs to see his primary care doctor.

## 2021-05-17 ENCOUNTER — ANTI-COAG VISIT (OUTPATIENT)
Dept: PHARMACY | Age: 61
End: 2021-05-17
Payer: COMMERCIAL

## 2021-05-17 DIAGNOSIS — I26.93 SINGLE SUBSEGMENTAL PULMONARY EMBOLISM WITHOUT ACUTE COR PULMONALE (HCC): Primary | ICD-10-CM

## 2021-05-17 DIAGNOSIS — Z79.01 CHRONIC ANTICOAGULATION: ICD-10-CM

## 2021-05-17 LAB — INTERNATIONAL NORMALIZATION RATIO, POC: 4.1

## 2021-05-17 PROCEDURE — 85610 PROTHROMBIN TIME: CPT

## 2021-05-17 PROCEDURE — 99211 OFF/OP EST MAY X REQ PHY/QHP: CPT

## 2021-05-17 NOTE — PROGRESS NOTES
Mr. Renu Castrejon is a 63 y.o.  male. Mr. Renu Castrejon had an INR test today. Results were reviewed and appropriate warfarin management was completed. THIS VISIT WAS PERFORMED AS: AN IN PERSON VISIT. PROTOCOLS WERE FOLLOWED WITH PRECAUTIONS TO REDUCE THE SPREAD OF COVID-19. Patient verifies current dosing regimen: Yes     Warfarin medication reviewed and updated on the patient 's home medication list: Yes   All other medications reviewed and updated on the patient 's home medication list: Yes     Lab Results   Component Value Date    INR 4.1 2021    INR 1.90 (H) 2021    INR 2.51 (H) 2021       Patient Findings     Positives:  Signs/symptoms of bleeding    Comments:  Nose bleed 21  Had bleed cauterized          Anticoagulation Summary  As of 2021    INR goal:  2.0-3.0   TTR:  34.5 % (5.6 y)   INR used for dosin.1 (2021)   Warfarin maintenance plan:  5 mg (5 mg x 1) every Mon, Wed, Fri; 2.5 mg (5 mg x 0.5) all other days   Weekly warfarin total:  25 mg   Plan last modified:  Sudha Catalan (2020)   Next INR check:  2021   Priority:  Maintenance   Target end date: Indefinite    Indications    Pulmonary embolus (HCC) [I26.99]  Chronic anticoagulation [Z79.01]             Anticoagulation Episode Summary     INR check location:  Anticoagulation Clinic    Preferred lab:      Send INR reminders to:  [facility]    Comments:  EPIC - finger stick every 2-3 weeks        Anticoagulation Care Providers     Provider Role Specialty Phone number    Dong Caal MD Referring Internal Medicine [phone]          Warfarin assessment / plan:   Patient had nose bleed cauterized last week but they did not do an INR. Also has a cough now and a fever last week. He may be put on abx, he will call us with that info.   INR 4.1 today , will hold 2 days and see next week    Description    Hold two days then  CONTINUE: Warfarin 2.5 mg daily EXCEPT 5mg every Monday, Wednesday and Friday    Call with signs or symptoms of bleeding or any concerns or questions. If significant bleeding seek immediate medical attention. Call with medications changes, especially antibiotics and steroids and including any over-the-counter medications or herbal products. Call if you stop any medications. Keep the number of servings of Vitamin K (dark green vegetables) consistent from week to week  Eats three good portion of broccoli or brussel sprouts per week. Immunization History   Administered Date(s) Administered    COVID-19, Moderna, PF, 100mcg/0.5mL 2021, 2021    Influenza, Quadv, IM, PF (6 mo and older Fluzone, Flulaval, Fluarix, and 3 yrs and older Afluria) 10/27/2017, 2019, 10/02/2020    Influenza, Epimenio Jnei, Recombinant, IM PF (Flublok 18 yrs and older) 10/03/2018    Pneumococcal Conjugate 13-valent ([id number]) 2015    Pneumococcal Conjugate Vaccine 08/15/2017    Pneumococcal Polysaccharide ([id number]) 2007, 2012    Tdap (Boostrix, Adacel) 2014         Reviewed AVS with patient / caregiver.       CLINICAL PHARMACY CONSULT: MED RECONCILIATION/REVIEW ADDENDUM    For Pharmacy Admin Tracking Only     Intervention Detail: Adherence Monitorin   Total # of Interventions Recommended: 2   Total # of Interventions Accepted: 2   Time Spent (min): 15

## 2021-05-18 ENCOUNTER — TELEPHONE (OUTPATIENT)
Dept: ENT CLINIC | Age: 61
End: 2021-05-18

## 2021-05-18 ENCOUNTER — HOSPITAL ENCOUNTER (OUTPATIENT)
Age: 61
Discharge: HOME | End: 2021-05-18
Payer: COMMERCIAL

## 2021-05-18 ENCOUNTER — HOSPITAL ENCOUNTER (OUTPATIENT)
Dept: GENERAL RADIOLOGY | Age: 61
Discharge: HOME | End: 2021-05-18
Payer: COMMERCIAL

## 2021-05-18 ENCOUNTER — TELEPHONE (OUTPATIENT)
Dept: PHARMACY | Age: 61
End: 2021-05-18

## 2021-05-18 DIAGNOSIS — M25.552 HIP PAIN, ACUTE, LEFT: ICD-10-CM

## 2021-05-18 PROCEDURE — 73502 X-RAY EXAM HIP UNI 2-3 VIEWS: CPT

## 2021-05-18 NOTE — TELEPHONE ENCOUNTER
Patient called and stated that he was seen in the office on 5/12/21 and Dr. Ajith Queen did some cauterization. [place]ighb[place] He is now feeling like he has dry blood or mucous in his nose. He stated that he does have a sinus infection and is unsure if that is causing issues. Patient is wanting to know if he can start using his nasal spray again and if he is allowed to blow his nose?     Please call patient back once this message is addressed,[phone]

## 2021-05-18 NOTE — TELEPHONE ENCOUNTER
Shi Soto called to let us know that he started a Chantal Dine for a respiratory infection. His INR was elevated here yesterday at 4.1. He held his warfarin yesterday. He will hold it again today(5-18). We will then continue his previous dosing. He will call with any bruising or bleeding issues.     [address]  Anticoagulation Service  [phone]

## 2021-05-19 RX ORDER — FUROSEMIDE 20 MG/1
10 TABLET ORAL 2 TIMES DAILY PRN
Qty: 30 TABLET | Refills: 3 | Status: SHIPPED | OUTPATIENT
Start: 2021-05-19 | End: 2021-08-09 | Stop reason: SDUPTHER

## 2021-05-24 ENCOUNTER — OFFICE VISIT (OUTPATIENT)
Dept: CARDIOLOGY CLINIC | Age: 61
End: 2021-05-24
Payer: COMMERCIAL

## 2021-05-24 ENCOUNTER — OFFICE VISIT (OUTPATIENT)
Dept: ENT CLINIC | Age: 61
End: 2021-05-24
Payer: COMMERCIAL

## 2021-05-24 ENCOUNTER — NURSE ONLY (OUTPATIENT)
Dept: CARDIOLOGY CLINIC | Age: 61
End: 2021-05-24
Payer: COMMERCIAL

## 2021-05-24 VITALS
DIASTOLIC BLOOD PRESSURE: 81 MMHG | SYSTOLIC BLOOD PRESSURE: 122 MMHG | WEIGHT: 208 LBS | BODY MASS INDEX: 33.43 KG/M2 | HEIGHT: 66 IN | TEMPERATURE: 98 F | HEART RATE: 68 BPM

## 2021-05-24 VITALS
BODY MASS INDEX: 33.43 KG/M2 | SYSTOLIC BLOOD PRESSURE: 116 MMHG | WEIGHT: 208 LBS | OXYGEN SATURATION: 94 % | HEIGHT: 66 IN | DIASTOLIC BLOOD PRESSURE: 80 MMHG | HEART RATE: 69 BPM

## 2021-05-24 DIAGNOSIS — I48.0 PAROXYSMAL ATRIAL FIBRILLATION (HCC): ICD-10-CM

## 2021-05-24 DIAGNOSIS — I50.23 ACUTE ON CHRONIC SYSTOLIC HEART FAILURE (HCC): ICD-10-CM

## 2021-05-24 DIAGNOSIS — I50.22 CHRONIC SYSTOLIC CHF (CONGESTIVE HEART FAILURE), NYHA CLASS 2 (HCC): ICD-10-CM

## 2021-05-24 DIAGNOSIS — I42.8 NICM (NONISCHEMIC CARDIOMYOPATHY) (HCC): ICD-10-CM

## 2021-05-24 DIAGNOSIS — I44.7 LBBB (LEFT BUNDLE BRANCH BLOCK): ICD-10-CM

## 2021-05-24 DIAGNOSIS — H90.3 SENSORINEURAL HEARING LOSS (SNHL) OF BOTH EARS: ICD-10-CM

## 2021-05-24 DIAGNOSIS — R04.0 EPISTAXIS: Primary | ICD-10-CM

## 2021-05-24 DIAGNOSIS — Z95.810 BIVENTRICULAR ICD (IMPLANTABLE CARDIOVERTER-DEFIBRILLATOR) IN PLACE: ICD-10-CM

## 2021-05-24 DIAGNOSIS — I48.91 ATRIAL FIBRILLATION, UNSPECIFIED TYPE (HCC): Primary | ICD-10-CM

## 2021-05-24 PROCEDURE — G8417 CALC BMI ABV UP PARAM F/U: HCPCS | Performed by: OTOLARYNGOLOGY

## 2021-05-24 PROCEDURE — 3017F COLORECTAL CA SCREEN DOC REV: CPT | Performed by: OTOLARYNGOLOGY

## 2021-05-24 PROCEDURE — G8427 DOCREV CUR MEDS BY ELIG CLIN: HCPCS | Performed by: OTOLARYNGOLOGY

## 2021-05-24 PROCEDURE — 93289 INTERROG DEVICE EVAL HEART: CPT | Performed by: INTERNAL MEDICINE

## 2021-05-24 PROCEDURE — G8427 DOCREV CUR MEDS BY ELIG CLIN: HCPCS | Performed by: NURSE PRACTITIONER

## 2021-05-24 PROCEDURE — 99213 OFFICE O/P EST LOW 20 MIN: CPT | Performed by: OTOLARYNGOLOGY

## 2021-05-24 PROCEDURE — 3017F COLORECTAL CA SCREEN DOC REV: CPT | Performed by: NURSE PRACTITIONER

## 2021-05-24 PROCEDURE — 99214 OFFICE O/P EST MOD 30 MIN: CPT | Performed by: NURSE PRACTITIONER

## 2021-05-24 PROCEDURE — G8417 CALC BMI ABV UP PARAM F/U: HCPCS | Performed by: NURSE PRACTITIONER

## 2021-05-24 PROCEDURE — 1036F TOBACCO NON-USER: CPT | Performed by: NURSE PRACTITIONER

## 2021-05-24 PROCEDURE — 93290 INTERROG DEV EVAL ICPMS IP: CPT | Performed by: NURSE PRACTITIONER

## 2021-05-24 PROCEDURE — 1036F TOBACCO NON-USER: CPT | Performed by: OTOLARYNGOLOGY

## 2021-05-24 NOTE — PROGRESS NOTES
[address] [facility]  Follow up      Patient Name: Don Lee  Medical Record Number:  [id number]  Primary Care Physician:  Tyrone Reza MD  Date of Consultation: 5/24/2021    Chief Complaint: Epistaxis      Interval History  I saw the patient couple of weeks ago for a nosebleed. I cauterized the left septum with silver nitrate. He says is not any bleeding since that time. He is getting an audiogram later today for his hearing loss. REVIEW OF SYSTEMS  As above    PHYSICAL EXAM  GENERAL: No Acute Distress, Alert and Oriented, no Hoarseness, strong voice  EYES: EOMI, Anti-icteric  HENT:   Head: Normocephalic and atraumatic. Face:  Symmetric, facial nerve intact, no sinus tenderness  Right Ear: Normal external ear, normal external auditory canal, intact tympanic membrane with normal mobility and aerated middle ear  Left Ear: Normal external ear, normal external auditory canal, intact tympanic membrane with normal mobility and aerated middle ear  Mouth/Oral Cavity:  normal lips, Uvula is midline, no mucosal lesions  Oropharynx/Larynx:  normal oropharynx  Nose:Normal external nasal appearance. Anterior rhinoscopy shows a severe rightward deviated septum. The area that cauterize in the left septum appears to be healing well. NECK: Normal range of motion, no thyromegaly, trachea is midline, no lymphadenopathy, no neck masses, no crepitus  CHEST: Normal respiratory effort, no retractions, breathing comfortably  SKIN: No rashes, normal appearing skin, no evidence of skin lesions/tumors  Neuro:  cranial nerve II-XII intact; normal gait  Cardio:  no edema          ASSESSMENT/PLAN  1. Epistaxis  Resolved. Continue using the nasal saline. I want him to follow-up in 3-month so we can examine it again. 2. Sensorineural hearing loss (SNHL) of both ears  Patient had an somewhat unusual hearing loss noted on an audiogram last year.   He is good to get a new one today just to make sure there is no changes. I have performed a head and neck physical exam personally or was physically present during the key or critical portions of the service. This note was generated completely or in part utilizing Dragon dictation speech recognition software. Occasionally, words are mistranscribed and despite editing, the text may contain inaccuracies due to incorrect word recognition. If further clarification is needed please contact the office at [phone].

## 2021-05-24 NOTE — PROGRESS NOTES
The [facility], [address] [place] [address] [address] [place] [address]  [phone]    PrimaryCare Doctor:  Lizzette Sofia MD  Primary Cardiologist: Dr. Rex Snider    Chief Complaint   Patient presents with    1 Month Follow-Up     SOB on exertion, started  cardiac rehab 3 weeks ago, no other cardiac complaints       History of Present Illness:  Lila Lam is a 63 y.o. male with  PMH non ischemic CM, S/P BiV ICD 2017 -turned off at his request D/T anxiety issues now set VVI 40 with defibrillator programmng on, 2016 CP/abn stress>LHC with normal cors, found EF 40% at that time, Atrial Tach 2018>underwent DCCV 9/2018 >declined multaq D/T side effects, AF,  PE 2011, RLE DVT 2013 - on coumadin, GERD, HLP, HTN, kidney stones, sacroilitis. Never smoked. ARDS 2011. Bronchiolitis Obliterans and nocturnal hypoxia - wears 2L NC at night (follows with Dr. Hawk Alegre). Retired. Patient presents to [facility] cardiology for follow up for heart failure. Admitted to [facility] 4/24-4/27/2021 with atypical CP, GERD, palpitations, back pain. Prior to that his lasix was changed to torsemide. Patient reported that torsemide was contributing to his symptoms. Seen by Dr. Nica Smith in hospital. Chest discomfort was felt to be non cardiac, possibly stress related. He was discharged with lasix. Now taking lasix 10mg BID. Wt 208lbs. Wt 212lbs on discharge. SOB is chronic and occurs with \"over exerting. \" Such as carrying a load of laundry up 1 flight of steps. Resolves with rest.  Started cardiac rehab. Has stopped for now D/T back pain. He is doing stretches and taking Tyelnol arthritis. He hopes to go back to rehab when back pain improved. He denies any further CP, palpitations, orthopnea, wt gain, swelling, syncope. He is compliant with 2gm Na diet. He drinks 120 fl oz of fluid per day. Device check today indicates decrease thoracic impedence which would suggest increased fluid.     Review of Systems:   General: Denies fever, chills  Skin: Denies skin changes, rash, itching, lesions. HEENT: Denies headache, dizziness, vision changes, nosebleeds, sore throat, nasal drainage  RESP:See HPI  CARD: Denies palpitations,  murmur  GI:Denies nausea, vomiting, heartburn, loss of appetite, change in bowels  : Denies frequency, pain, incontinence, polyuria  VASC: Denies claudication, leg cramps, clots  MUSC/SKEL: Denies pain, stiffness, arthritis  PSYCH: Denies anxiety, depression, stress  NEURO: Denies numbness, tingling, weakness,change in mood or memory  HEME: Denies abn bruising, bleeding, anemia  ENDO: Denies intolerance to heat, cold, excessive thirst or hunger, hx thyroid disease    /80   Pulse 69 Comment: irregular  Ht 5' 6\" (1.676 m)   Wt 208 lb (94.3 kg)   SpO2 94%   BMI 33.57 kg/m²   Wt Readings from Last 3 Encounters:   05/24/21 208 lb (94.3 kg)   05/24/21 208 lb (94.3 kg)   05/14/21 211 lb (95.7 kg)       Physical Exam:  GEN: Appears well, no acute distress  SKIN: Pink, warm, dry. Nails without clubbing. HEENT: PERRLA. Normocephalic, atraumatic. Neck supple. No adenopathy. LUNG: AP diameter normal. Fine crackles bilateral bases. Few exp wheeze upper airways. Respiratory effort normal.  HEART: S1S2 A/R. No JVD. No carotid bruit. No murmur, rub or gallop. ABD: Large, fluid, Soft, nontender. +BS X 4 quads. No hepatomegaly. EXT: Radial and pedal pulses 2+ and symmetric. Without varicosities. No edema. MUSCSKEL: Good ROM X4 extremities. No deformity. NEURO: A/O X3. Calm and cooperative.       Past Medical History:   has a past medical history of Bronchiolitis obliterans ([facility] [address] Bundle branch block, right, Cardiomyopathy ([address] Chest pain, COVID-19 virus vaccine not available, Fibromyalgia, GERD (gastroesophageal reflux disease), Hepatitis, Hx of blood clots, Hyperlipemia, Hypertension, IBS (irritable bowel syndrome), Kidney stone, Neuropathy, Pneumothorax, Prostatitis, Pulmonary embolism 7.9 02/26/2021    RBC 4.93 04/26/2021    RBC 5.12 04/24/2021    RBC 5.24 02/26/2021    HGB 15.5 04/26/2021    HGB 15.7 04/24/2021    HGB 16.2 02/26/2021    HCT 45.3 04/26/2021    HCT 46.4 04/24/2021    HCT 48.2 02/26/2021    MCV 91.8 04/26/2021    MCV 90.5 04/24/2021    MCV 92.0 02/26/2021    RDW 13.3 04/26/2021    RDW 12.8 04/24/2021    RDW 13.1 02/26/2021     04/26/2021     04/24/2021     02/26/2021     BMP:  Lab Results   Component Value Date     04/26/2021     04/24/2021     03/17/2021    K 4.2 04/26/2021    K 3.9 04/24/2021    K 3.7 03/17/2021    K 3.8 02/03/2021    K 3.6 12/24/2020    K 4.2 08/26/2020     04/26/2021     04/24/2021     03/17/2021    CO2 25 04/26/2021    CO2 28 04/24/2021    CO2 24 03/17/2021    BUN 12 04/26/2021    BUN 11 04/24/2021    BUN 10 03/17/2021    CREATININE 1.1 04/26/2021    CREATININE 0.9 04/24/2021    CREATININE 0.9 03/17/2021     BNP:   Lab Results   Component Value Date    PROBNP 93 04/25/2021    PROBNP 73 03/17/2021    PROBNP 66 02/01/2020       Parameters:   > 450 pg/mL under age 48  > 900 pg/mL ages 54-65  > 1800 pg/mL over age 76    Iron Studies:  No results found for: TIBC, FERRITIN  GLUCOSE: No results for input(s): GLUCOSE in the last 72 hours.   LIVER PROFILE:   Lab Results   Component Value Date    AST 33 04/24/2021    ALT 34 04/24/2021    LIPASE 18.0 04/24/2021    AMYLASE 44 11/08/2017    LABALBU 4.1 04/24/2021    BILIDIR 1.1 12/26/2018    BILITOT 0.3 04/24/2021    ALKPHOS 102 04/24/2021     PT/INR:   Lab Results   Component Value Date    PROTIME 22.2 04/30/2021    PROTIME 37.6 01/29/2016    INR 4.1 05/17/2021    INR 1.90 04/30/2021     Cardiac Enzymes:  Lab Results   Component Value Date    CKTOTAL 69 08/14/2017    CKMB 0.65 08/17/2012    CKMB 0.75 05/17/2011    TROPONINI <0.01 04/25/2021     FASTING LIPID PANEL:  Lab Results   Component Value Date    CHOL 139 10/02/2019    HDL 39 02/26/2021    HDL 65 11/10/2011 [address] 92 02/26/2021    TRIG 126 10/02/2019       Cardiac Imaging: Reports reviewed with patient today. ECHO:    Summary 9/25/19   Mild concentric LVH with normal LV size and wall motion. EF is    50%.   Paradoxical septal motion secondary to paced rhythm   Trivial mitral regurgitation is present.   The left atrium is normal in size.   The right ventricle is normal in size and function. Pacing wire seen.     Summary 11/8/2017 (S/P BiV placement)   Normal left ventricular wall thickness. Ejection fraction is visually   estimated to be 40-45%.  Pearson Maizes is septal hypokinesis (secondary to Pacing)   Trivial mitral & tricuspid regurgitation is present.   The left atrial size is normal.   Pacer / ICD wire is visualized in the right ventricle.   There appears to be normal right ventricular size and function     Echo: 7/24/17  Left ventricle size is normal. Mild concentric left ventricular hypertrophy  is present. Global ejection fraction is moderately decreased and estimated  from 30 % to 35%. Diastolic filling parameters suggests grade I diastolic  dysfunction . There is abnormal (paradoxical) septal motion consistent with left bundle  branch block. Mitral valve is structurally normal.  Trivial to mild mitral regurgitation is present. The left atrial size is normal.  A bubble study was performed and fails to show evidence of right to left  Shunting. Echo: 11/9/16  Dilated LV with severely reduced systolic function. EF 30%. Anterior, septal  and apical akinesis. Mild mitral regurgitation is present. The left atrial size is normal.  Normal right ventricular size and function. NM Cardiac Stress Test 8/26/2020  Summary     No evidence of reversible ischemia.    Pearson Maizes is a moderate to large sized, moderate intensity, fixed septal and     inferior wall defect which is most consistent with LBBB induced artifact and     diaphragm attenuation artifact.     Normal LV size and systolic function.  Ejection fraction: 50 %    Overall findings represent a low risk study. Stress Test: 7/21/16  SPECT perfusion images: On the stress corrected images there is a moderate defect in the septum of the left ventricle. At rest, partially redistributes especially the posterior lateral component. Complete redistribution is not present. SKAA:  75 %      (Normal is at least 62% for women and 53% for men)  LV wall motion:   There is significant hypokinesia of the ventricular septum  LVEDV: 117ml  (Normal is up to 100ml for women and 150ml for men)  Transient dilatation ratio:   1.0      (Normal is up to 1.3 for women and 1.2 for men)    Cardiac Angiography:   Cath: 7/21/16  #1-coronary angiography summary: Normal coronaries in a left   dominant system  A-left main coronary is normal  B-the LAD has minor irregularities and no significant stenoses  C-the circumflex is dominant and has minor irregularities and no   significant stenoses  D-the right coronary is a small nondominant vessel and is   angiographically normal  #2-ventriculography in the ADKINS projection demonstrates mild   global left ventricular dysfunction ejection fractions   approximately 40  #3-aortic pressure is approximately 150/80 left ventricular   pressures 150/14 there is no gradient on pullback  #5-INYZB are no complications  #6-MANDO patient will be treated medically for left ventricular   dysfunction in the setting of normal coronaries    [facility] 3/2/2021  OVERALL IMPRESSION:  1. Slightly above normal right heart pressure with slightly elevated  pulmonary capillary wedge pressure of 17 mmHg. 2.  Normal cardiac output and cardiac indices. Assessment/Plan:      1.) Acute on chronic systolic heart failure: EF 30% improved to 50%. NICM. Appears euvolemic today. SOB is chronic and at baseline for him. No edema. His wt is down to 208 lbs from 212 lbs at discharge. Although device interrogation suggests increased fluid. SOB is most likely multifactorial with chronic lung dz.  He

## 2021-05-24 NOTE — PROGRESS NOTES
Pt seen in clinic today for cardiac device interrogation to check thoracic impedance only  Their device is a BTK 3 chamber BiV CRTD  Based on threshold, impedance, and intrinsic sensing tests run today, the device appears to be functioning with minimal deviation from established trends. Remaining battery life is 2.98v  Pt is sensitive to pacing and threshold tests as such requests they not be run in office. Pt programmed to VVI 40bpm as a result. AP 0%                 RP 0%                CRTP 0%    TI HF monitor reveals pt is high fluid episode beginning today. 1 episode NSVT - see PDF for EGM    Rx: warfarin (COUMADIN) 5 MG tablet   Take 1 tablet by mouth daily Except 2.5 mg Tues and ThursdayPatient taking differently: Take 2.5 mg by mouth daily Except 5 mg on Monday, Wedneday, Friday  furosemide (LASIX) 20 MG tablet 1 tab po bid - pt notes he is only taking QD  metoprolol succinate (TOPROL XL) 25 MG extended release tablet 1 tab po bid - (dosage as listed in Epic)    Pt was informed of findings today and general questions have been answered with regard to device. Home monitoring hardware is transmitting on schedule. Results discussed with or to be reviewed by Dr. Ni Hamden    Pt to see AASHISH Franco in clinic today following their device check.  -results and pt rx compliance discussed with Aashish Peña prior to appt.

## 2021-05-24 NOTE — PATIENT INSTRUCTIONS
Instructions:   1. Medications: continue current medications, take extra lasix as needed for wt gain, increased SOB, swelling, abd bloating, increased fatigue  2. Labs: none  3. Follow up: 3 months  4. Daily weight: Call for increase 3 lbs/day or 5 lbs/week. 5. 2 gm sodium diet:  6. Fluid Restriction: 64 oz.

## 2021-05-26 ENCOUNTER — HOSPITAL ENCOUNTER (OUTPATIENT)
Dept: PHYSICAL THERAPY | Age: 61
Setting detail: THERAPIES SERIES
Discharge: HOME | End: 2021-05-26
Payer: COMMERCIAL

## 2021-05-26 ENCOUNTER — TELEPHONE (OUTPATIENT)
Dept: PULMONOLOGY | Age: 61
End: 2021-05-26

## 2021-05-26 PROCEDURE — 97140 MANUAL THERAPY 1/> REGIONS: CPT

## 2021-05-26 PROCEDURE — 97110 THERAPEUTIC EXERCISES: CPT

## 2021-05-26 PROCEDURE — 97162 PT EVAL MOD COMPLEX 30 MIN: CPT

## 2021-05-26 ASSESSMENT — PAIN SCALES - QUEBEC BACK PAIN DISABILITY SCALE
QUEBEC CMS MODIFIER: CK
PUT ON SOCKS OR PANYHOSE: 2
STAND UP FOR 20 TO 30 MINUTES: 4
MOVE A CHAIR: 3
THROW A BALL: 2
RIDE IN A CAR: 2
WALK SEVERAL KILOMETERS  OR MILES: 3
BEND OVER TO CLEAN THE BATHTUB: 3
LIFT AND CARRY A HEAVY SUITCASE: 3

## 2021-05-26 NOTE — PLAN OF CARE
[facility]  [address] [facility]  Phone: [phone]   Fax:     [phone]                                                       Physical Therapy Certification    Dear Referring Practitioner: Jackelin Hurt MD,    We had the pleasure of evaluating the following patient for physical therapy services at [facility]. A summary of our findings can be found in the initial assessment below. This includes our plan of care. If you have any questions or concerns regarding these findings, please do not hesitate to contact me at the office phone number checked above. Thank you for the referral.       Physician Signature:_______________________________Date:__________________  By signing above (or electronic signature), therapists plan is approved by physician        Patient: Ke Norton   : 1960   MRN: [id number]  Referring Physician: Referring Practitioner: Jackelin Hurt MD      Evaluation Date: 2021      Medical Diagnosis Information:  Diagnosis: L hip pain   Treatment Diagnosis: decreased mobility, strength                                         Insurance information: PT Insurance Information: [facility] - [facility] PCY     Precautions/ Contra-indications: pacemaker  Latex Allergy:  [x]NO      []YES  Preferred Language for Healthcare:   [x]English       []other:    C-SSRS Triggered by Intake questionnaire (Past 2 wk assessment ):   [x] No, Questionnaire did not trigger screening.   [] Yes, Patient intake triggered C-SSRS Screening      [] C-SSRS Screening completed  [] PCP notified via Epic     SUBJECTIVE: Patient stated complaint:  Patient reports a few weeks ago he woke up with L hip pain. Notes that at that time he started working out at cardio pulmonary rehab. Patient was doing the treadmill and bike at CP rehab.   Patient reports standing and bending over increase his pain. Has also not been able to sleep on his L side recently. Massaging and icing his hip tend to decrease his pain. Has stopped cardiac rehab due to pain. Saw MD and states xrays showed mild OA in B hips. Patient states his pain is worse in the morning and gets better throughout the afternoon. Relevant Medical History:HTN, h/o blood clots, cardiomyopathy, fibromyalgia, hepatitis, h/o pneumothorax, neuropathy, pacemaker  Functional Scale/Score: Mirikian = 49% disability    Pain Scale: 8/10  Easing factors: massage, ice  Provocative factors: prolonged standing, bending over    Type: []Constant   [x]Intermittent  []Radiating []Localized []other:     Numbness/Tingling: pt denies    Occupation/School: retired    Living Status/Prior Level of Function: Independent with ADLs and IADLs, unable to stand or walk for prolonged periods    OBJECTIVE:   Palpation: mod TTP along illiac crest and L lumbar paraspinals. No TTP L glute    Gait: (include devices/WB status) Patient ambulates with slight antalgic gait on L side.      Bandages/Dressings/Incisions: NA      ROM  Comments   Lumbar Flex FT to knees Pain across L iliac crest   Lumbar Ext Mod decreased Pain across L iliac crest     ROM LEFT RIGHT Comments   Lumbar Side Bend FT to 1 inch above knees FT to 1 inch above knees More pain with L SB vs R SB   Lumbar Rotation mod decreased Mod decreased    Quadrant      Hip Flexion 90     Hip Abd      Hip ER WNL WNL    Hip IR Min tightness Min tightness    Hip Extension            Hamstring Flex 40 40 At 90/90    Piriformis      Samy test  Tightness noted hip flexors              Myotomes/Strength Left Right Comments   [x]ALL NORMAL      Hip Abd 4/5 5/5    Hip Ext 4-/5 5/5    Hip flexion (L1-L2 femoral) 4-/5 5/5    Knee extension (L2-L4 femoral) 4+/5 5/5    Knee flexion (S1 sciatic) 4/5 5/5    Dorsiflexion (L4-L5 deep peroneal) 5/5 5/5    Great Toe Ext (L5 deep peroneal nerve) 5/5 5/5    Multifidus      Transverse Musculoskeletal conditions   []Disc pathology   []Congenital spine pathologies   []Prior surgical intervention  []Osteoporosis (M81.8)  []Osteopenia (M85.8)   Endocrine conditions   []Hypothyroid (E03.9)  []Hyperthyroid Gastrointestinal conditions   []Constipation (L35.19)   Metabolic conditions   []Morbid obesity (E66.01)  []Diabetes type 1(E10.65) or 2 (E11.65)   [x]Neuropathy (G60.9)     Pulmonary conditions   [x]Asthma (J45)  []Coughing   []COPD (J44.9)   Psychological Disorders  []Anxiety (F41.9)  []Depression (F32.9)   []Other:   []Other:           Barriers to/and or personal factors that will affect rehab potential:              []Age  []Sex    []Smoker              []Motivation/Lack of Motivation                        [x]Co-Morbidities              []Cognitive Function, education/learning barriers              []Environmental, home barriers              []profession/work barriers  []past PT/medical experience  []other:  Justification:     Falls Risk Assessment (30 days):  [x] Falls Risk assessed and no intervention required.   [] Falls Risk assessed and Patient requires intervention due to being higher risk   TUG score (>12s at risk):     [] Falls education provided, including:         ASSESSMENT:   Functional Impairments:     [x]Noted lumbar/proximal hip hypomobility   []Noted lumbosacral and/or generalized hypermobility   [x]Decreased Lumbosacral/hip/LE functional ROM   [x]Decreased core/proximal hip strength and neuromuscular control    [x]Decreased LE functional strength    []Abnormal reflexes/sensation/myotomal/dermatomal deficits  [x]Reduced balance/proprioceptive control    []other:      Functional Activity Limitations (from functional questionnaire and intake)   [x]Reduced ability to tolerate prolonged functional positions   [x]Reduced ability or difficulty with changes of positions or transfers between positions   []Reduced ability to maintain good posture and demonstrate good body mechanics with sitting, bending, and lifting   [x]Reduced ability to sleep   [x] Reduced ability or tolerance with driving and/or computer work   [x]Reduced ability to perform lifting, reaching, carrying tasks   [x]Reduced ability to squat   [x]Reduced ability to forward bend   [x]Reduced ability to ambulate prolonged functional periods/distances/surfaces   [x]Reduced ability to ascend/descend stairs   []other:       Participation Restrictions   [x]Reduced participation in self care activities   [x]Reduced participation in home management activities   []Reduced participation in work activities   [x]Reduced participation in social activities. []Reduced participation in sport/recreational activities. Classification:   []Signs/symptoms consistent with Lumbar instability/stabilization subgroup. [x]Signs/symptoms consistent with Lumbar mobilization/manipulation subgroup, myotomes and dermatomes intact. Meets manipulation criteria. []Signs/symptoms consistent with Lumbar direction specific/centralization subgroup   [x]Signs/symptoms consistent with Lumbar traction subgroup       []Signs/symptoms consistent with lumbar facet dysfunction   []Signs/symptoms consistent with lumbar stenosis type dysfunction   []Signs/symptoms consistent with nerve root involvement including myotome & dermatome dysfunction   []Signs/symptoms consistent with post-surgical status including: decreased ROM, strength and function.    []signs/symptoms consistent with pathology which may benefit from Dry needling     []other:      Prognosis/Rehab Potential:      [x]Excellent   []Good    []Fair   []Poor    Tolerance of evaluation/treatment:    [x]Excellent   []Good    []Fair   []Poor     Physical Therapy Evaluation Complexity Justification  [x] A history of present problem with:  [] no personal factors and/or comorbidities that impact the plan of care;  [x]1-2 personal factors and/or comorbidities that impact the plan of care  []3 personal factors and/or comorbidities that impact the plan of care  [x] An examination of body systems using standardized tests and measures addressing any of the following: body structures and functions (impairments), activity limitations, and/or participation restrictions;:  [] a total of 1-2 or more elements   [x] a total of 3 or more elements   [] a total of 4 or more elements   [x] A clinical presentation with:  [] stable and/or uncomplicated characteristics   [x] evolving clinical presentation with changing characteristics  [] unstable and unpredictable characteristics;   [x] Clinical decision making of [] low, [x] moderate, [] high complexity using standardized patient assessment instrument and/or measurable assessment of functional outcome. [] EVAL (LOW) 64651 (typically 20 minutes face-to-face)  [x] EVAL (MOD) 31523 (typically 30 minutes face-to-face)  [] EVAL (HIGH) 14740 (typically 45 minutes face-to-face)  [] RE-EVAL     PLAN: Begin PT focusing on: proximal hip mobilizations, LB mobs, LB core activation, proximal hip activation, and HEP    Frequency/Duration:  2 days per week for 4 Weeks:  Interventions:  [x]  Therapeutic exercise including: strength training, ROM, for LE, Glutes and core   [x]  NMR activation and proprioception for glutes , LE and Core   [x]  Manual therapy as indicated for Hip complex, LE and spine to include: Dry Needling/IASTM, STM, PROM, Gr I-IV mobilizations, manipulation. [x]  Modalities as needed that may include: thermal agents, E-stim, Biofeedback, US, iontophoresis as indicated  [x]  Patient education on joint protection, postural re-education, activity modification, progression of HEP.   [x]  Aquatic exercise including: strength training, ROM and balance for LE, Glutes and core     HEP instruction: Patient instructed on the following for HEP: stand hip flexor stretch, step quadratus stretch, supine LTR, and SL open book thoracic rotations    GOALS:  Patient stated goal: \"Get strong again in order to play golf\"  [] Progressing: [] Met: [] Not Met: [] Adjusted    Therapist goals for Patient:   Short Term Goals: To be achieved in: 2 weeks  1. Independent in HEP and progression per patient tolerance, in order to prevent re-injury. [] Progressing: [] Met: [] Not Met: [] Adjusted  2. Patient will have a decrease in pain by 40% to facilitate improvement in movement, function, and ADLs as indicated by Functional Deficits. [] Progressing: [] Met: [] Not Met: [] Adjusted    Long Term Goals: To be achieved in: at discharge  1. Disability index score of 25% or less for the [place] to assist with reaching prior level of function. [] Progressing: [] Met: [] Not Met: [] Adjusted  2. Patient will demonstrate increased lumbar AROM to WNL, good LS mobility, good hip ROM to allow for proper joint functioning as indicated by patients Functional Deficits. [] Progressing: [] Met: [] Not Met: [] Adjusted  3. Patient will demonstrate an increase in LE Strength to 5/5 to allow for proper functional mobility as indicated by patients Functional Deficits. [] Progressing: [] Met: [] Not Met: [] Adjusted  4. Patient will return to household duties without increased symptoms or restriction. [] Progressing: [] Met: [] Not Met: [] Adjusted  5. Patient will be able to bend over to  light items without increased symptoms or restictions. [] Progressing: [] Met: [] Not Met: [] Adjusted     Electronically signed by:  Richard Broderick, PT , OMT-C,  [id number]      Note: If patient does not return for scheduled/recommended follow up visits, this note will serve as a discharge from care along with the most recent update on progress.

## 2021-05-27 ENCOUNTER — ANTI-COAG VISIT (OUTPATIENT)
Dept: PHARMACY | Age: 61
End: 2021-05-27
Payer: COMMERCIAL

## 2021-05-27 DIAGNOSIS — Z79.01 CHRONIC ANTICOAGULATION: Primary | ICD-10-CM

## 2021-05-27 LAB — INTERNATIONAL NORMALIZATION RATIO, POC: 5.6

## 2021-05-27 PROCEDURE — 85610 PROTHROMBIN TIME: CPT

## 2021-05-27 PROCEDURE — 99211 OFF/OP EST MAY X REQ PHY/QHP: CPT

## 2021-05-27 RX ORDER — SENNOSIDES 8.6 MG
650 CAPSULE ORAL 2 TIMES DAILY
Status: ON HOLD | COMMUNITY
End: 2021-07-03 | Stop reason: HOSPADM

## 2021-05-27 NOTE — PROGRESS NOTES
Mr. Kelly Swanson is a 63 y.o.  male. Mr. Kelly Swanson had an INR test today. Results were reviewed and appropriate warfarin management was completed. THIS VISIT WAS PERFORMED AS: AN IN PERSON VISIT. PROTOCOLS WERE FOLLOWED WITH PRECAUTIONS TO REDUCE THE SPREAD OF COVID-19. Patient verifies current dosing regimen: Yes     Warfarin medication reviewed and updated on the patient 's home medication list: Yes   All other medications reviewed and updated on the patient 's home medication list: Yes     Lab Results   Component Value Date    INR 5.6 2021    INR 4.1 2021    INR 1.90 (H) 2021       Patient Findings     Positives:  Signs/symptoms of bleeding, Extra doses, Change in medications    Negatives:  Signs/symptoms of thrombosis, Change in health, Missed doses, Change in diet/appetite, Bruising    Comments:  Patient was taking an additional 2.5 mg every Saturday and    Patient states he has kidney stones and that he has blood in his urine. Started on  and has lessened. He says this is normal and due to his kidney stones. Anticoagulation Summary  As of 2021    INR goal:  2.0-3.0   TTR:  34.3 % (5.6 y)   INR used for dosin.6 (2021)   Warfarin maintenance plan:  5 mg (5 mg x 1) every Mon, Wed, Fri; 2.5 mg (5 mg x 0.5) all other days   Weekly warfarin total:  25 mg   Plan last modified:  Sudha Catalan (2020)   Next INR check:  2021   Priority:  Maintenance   Target end date:   Indefinite    Indications    Pulmonary embolus (HCC) [I26.99]  Chronic anticoagulation [Z79.01]             Anticoagulation Episode Summary     INR check location:  Anticoagulation Clinic    Preferred lab:      Send INR reminders to:  Jeremy STAFF    Comments:  EPIC - finger stick every 2-3 weeks        Anticoagulation Care Providers     Provider Role Specialty Phone number    Abbey Shah MD Referring Internal Medicine [phone]          Warfarin assessment / plan:   Patient's INR was supratherapeutic today at 5.6. This is most likely due to the fact that he just finished a Z-Jose Maria on 5/25 and he was taking an extra 2.5 mg of warfarin on Saturday's and Sunday's because he misunderstood his dosing instructions. Patient reports some blood in the urine starting on 5/21 but states that is normal for him because he is passing kidney stones and that the blood has since lessened. I told the patient to go to the emergency room if he falls and hits his head and counseled him on when to seek medical attention for bruising and bleeding. I held his warfarin dose for 3 days and will have him follow his correct dosing regimen of warfarin 2.5 mg daily except 5 mg on Monday's, Wednesday's, and Friday's. I reviewed the AVS with the patient but I am not sure if he was paying attention to what I was saying. Description    Eat some dark green vegetables today    Do not take Warfarin today (5/27), this Friday (5/28), and this Saturday (5/29) and then    CONTINUE: Warfarin 2.5 mg daily EXCEPT 5mg every Monday, Wednesday and Friday    Call with signs or symptoms of bleeding or any concerns or questions. If significant bleeding seek immediate medical attention. Call with medications changes, especially antibiotics and steroids and including any over-the-counter medications or herbal products. Call if you stop any medications. Keep the number of servings of Vitamin K (dark green vegetables) consistent from week to week  Eats three good portion of broccoli or brussel sprouts per week.                    Immunization History   Administered Date(s) Administered    COVID-19, Moderna, PF, 100mcg/0.5mL 03/09/2021, 04/08/2021    Influenza, Quadv, IM, PF (6 mo and older Fluzone, Flulaval, Fluarix, and 3 yrs and older Afluria) 10/27/2017, 09/23/2019, 10/02/2020    Influenza, Evelio, Recombinant, IM PF (Flublok 18 yrs and older) 10/03/2018    Pneumococcal Conjugate 13-valent Randy Redig) 09/11/2015    Pneumococcal Conjugate Vaccine 08/15/2017    Pneumococcal Polysaccharide ([id number]) 08/01/2007, 12/19/2012    Tdap (Boostrix, Adacel) 08/19/2014         Reviewed AVS with patient / caregiver.       CLINICAL PHARMACY CONSULT: MED RECONCILIATION/REVIEW ADDENDUM    For Pharmacy Admin Tracking Only     Intervention Detail: Dose Adjustment: 1: reason: Therapy Optimization   Total # of Interventions Recommended: 1   Total # of Interventions Accepted: 1   Time Spent (min): Roxann [id number], Pharmacy Student at [facility], [place], [facility] 5/27/2021  4:35 PM

## 2021-05-28 ENCOUNTER — HOSPITAL ENCOUNTER (OUTPATIENT)
Dept: PHYSICAL THERAPY | Age: 61
Setting detail: THERAPIES SERIES
Discharge: HOME | End: 2021-05-28
Payer: COMMERCIAL

## 2021-05-28 PROCEDURE — 97112 NEUROMUSCULAR REEDUCATION: CPT

## 2021-05-28 PROCEDURE — 97012 MECHANICAL TRACTION THERAPY: CPT

## 2021-05-28 PROCEDURE — 97140 MANUAL THERAPY 1/> REGIONS: CPT

## 2021-05-28 NOTE — FLOWSHEET NOTE
[facility]  [address] [facility] [place], [facility]  Phone: [phone]   Fax:     [phone]    Physical Therapy Treatment Note/ Progress Report:     Date:  2021    Patient Name:  Jerrod Romero    :  1960  MRN: [id number]    Pertinent Medical History:  HTN, h/o blood clots, cardiomyopathy, fibromyalgia, hepatitis, h/o pneumothorax, neuropathy, pacemaker    Medical/Treatment Diagnosis Information:  · Diagnosis: L hip pain  · Treatment Diagnosis: decreased mobility, strength    Insurance/Certification information:  PT Insurance Information: [facility] - 61 PCY  Physician Information:  Referring Practitioner: Fidelina Torres MD  Plan of care signed (Y/N):  Sent for cosign     Date of Patient follow up with Physician:      Progress Report: []  Yes  [x]  No     Date Range for reporting period:  Beginnin2021  Ending:    Progress report due (10 Rx/or 30 days whichever is less):     Recertification due (POC duration/ or 90 days whichever is less): 21     Visit # POC/ Insurance Allowable Auth Needed   2 61 PCY []Yes    [x]No     Functional Outcomes Measure:   Date Assessed: at eval  Test: [place]  Score: 49% disability    Pain level:  6.5/10     History of Injury: Patient reports a few weeks ago he woke up with L hip pain. Notes that at that time he started working out at [facility]. Patient was doing the treadmill and bike at [facility]. Patient reports standing and bending over increase his pain. Has also not been able to sleep on his L side recently. Massaging and icing his hip tend to decrease his pain. Has stopped cardiac rehab due to pain. Saw MD and states xrays showed mild OA in B hips. Patient states his pain is worse in the morning and gets better throughout the afternoon.     SUBJECTIVE:  : was pretty sore after initial eval due to stretching but is feeling better today. OBJECTIVE:    Observation:   · Palpation: mod TTP along illiac crest and L lumbar paraspinals. No TTP L glute  · Gait: (include devices/WB status) Patient ambulates with slight antalgic gait on L side   Test measurements:   ROM:  Date Lumbar Flex Lumbar Ext Lumbar L SB Lumbar R SB Lumbar L Rot Lumbar R Rot   Eval 5/26 FT to knees Mod decreased FT to 1 inch above knee FT to 1 inch above knee Mod decreased Mod decreased              Strength:  Date Hip flexion Hip abduction Hip extension Quad Hamstring Ankle DF Ankle PF   Eval 5/26 4-/5 4/5 4-/5 4+/5 4/5 5/5 5/5                  RESTRICTIONS/PRECAUTIONS: PACEMAKER    Exercises/Interventions:     Therapeutic Ex (29970)   Min: 10 Resistance/Reps Notes/Cues   Stand hip flexor stretch 2x 30 sec    Stand quadratus step stretch 2x 30 sec         Supine LTR 10x    SL thoracic open book rotations 10x L/R                        Therapeutic Activity (52286) Min:                          NMR re-education (27236)   Min:15     TA set 10x 5 sec holds Mod cueing to isolate lower abs.    qped arch n sag 10x Mod cueing for segmental mobility   bridge 10x         Manual Intervention (99142) Min: 10     SL lumbar rotation mobilization Grade 3 x3mins L/R    Central PA glides Lspine and Tspine Grade 3 x5 mins    Long axis L hip distraction x2 mins         Modalities  Min: 10     Mechanical lumbar traction Intermittent  100# max / 80# min  60 sec on / 20 sec off  x10 mins with CP      Other Therapeutic Activities:  Pt was educated on PT POC, Diagnosis, Prognosis, pathomechanics as well as frequency and duration of scheduling future physical therapy appointments. Time was also taken on this day to answer all patient questions and participation in PT. Reviewed appointment policy in detail with patient and patient verbalized understanding.      Home Exercise Program: Patient instructed in the following for HEP: supine LTR, SL open book rotations, hip flexor stretch, and quadratus stretch. Patient verbalized/demonstrated understanding and was issued written handout. Therapeutic Exercise and NMR EXR  [x] (79668) Provided verbal/tactile cueing for activities related to strengthening, flexibility, endurance, ROM  for improvements in proximal hip and core control with self care, mobility, lifting and ambulation.  [] (89896) Provided verbal/tactile cueing for activities related to improving balance, coordination, kinesthetic sense, posture, motor skill, proprioception  to assist with core control in self care, mobility, lifting, and ambulation. Therapeutic Activities:    [] (99744 or 35433) Provided verbal/tactile cueing for activities related to improving balance, coordination, kinesthetic sense, posture, motor skill, proprioception and motor activation to allow for proper function  with self care and ADLs  [] (75432) Provided training and instruction to the patient for proper core and proximal hip recruitment and positioning with ambulation re-education     Home Exercise Program:    [x] (25131) Reviewed/Progressed HEP activities related to strengthening, flexibility, endurance, ROM of core, proximal hip and LE for functional self-care, mobility, lifting and ambulation   [] (05673) Reviewed/Progressed HEP activities related to improving balance, coordination, kinesthetic sense, posture, motor skill, proprioception of core, proximal hip and LE for self care, mobility, lifting, and ambulation      Manual Treatments:  PROM / STM / Oscillations-Mobs:  G-I, II, III, IV (PA's, Inf., Post.)  [x] (34990) Provided manual therapy to mobilize proximal hip and LS spine soft tissue/joints for the purpose of modulating pain, promoting relaxation,  increasing ROM, reducing/eliminating soft tissue swelling/inflammation/restriction, improving soft tissue extensibility and allowing for proper ROM for normal function with self care, mobility, lifting and ambulation.      Charges:  Timed Code Treatment Minutes: 35   Total Treatment Minutes: 45     [] EVAL (LOW) 85587 (typically 20 minutes face-to-face)  [] EVAL (MOD) 32732 (typically 30 minutes face-to-face)  [] EVAL (HIGH) 51335 (typically 45 minutes face-to-face)  [] RE-EVAL     [] CM(99346) x     [] Dry needle 1 or 2 Muscles (82774)  [x] NMR (59822) x     [] Dry needle 3+ Muscles (83021)  [x] Manual (10438) x     [] Ultrasound (10372) x  [] TA (84940) x     [x] Mech Traction (00247)  [] ES(attended) (88205)     [] ES (un) (62913):   [] Vasopump (25088)  [] Ionto (71449)   [] Other:    GOALS:  Patient stated goal: \"Get strong again in order to play golf\"  []? Progressing: []? Met: []? Not Met: []? Adjusted     Therapist goals for Patient:   Short Term Goals: To be achieved in: 2 weeks  1. Independent in HEP and progression per patient tolerance, in order to prevent re-injury. []? Progressing: []? Met: []? Not Met: []? Adjusted  2. Patient will have a decrease in pain by 40% to facilitate improvement in movement, function, and ADLs as indicated by Functional Deficits. []? Progressing: []? Met: []? Not Met: []? Adjusted     Long Term Goals: To be achieved in: at discharge  1. Disability index score of 25% or less for the [place] to assist with reaching prior level of function. []? Progressing: []? Met: []? Not Met: []? Adjusted  2. Patient will demonstrate increased lumbar AROM to WNL, good LS mobility, good hip ROM to allow for proper joint functioning as indicated by patients Functional Deficits. []? Progressing: []? Met: []? Not Met: []? Adjusted  3. Patient will demonstrate an increase in LE Strength to 5/5 to allow for proper functional mobility as indicated by patients Functional Deficits. []? Progressing: []? Met: []? Not Met: []? Adjusted  4. Patient will return to household duties without increased symptoms or restriction. []? Progressing: []? Met: []? Not Met: []? Adjusted  5.  Patient will be able to bend over to  light items without increased symptoms or restictions. []? Progressing: []? Met: []? Not Met: []? Adjusted         ASSESSMENT:  Patient with significant joint tightness in tspine and lspine. Improved some with manual therapy techniques. Core activation and segmental mobility requires mod cueing for good technique. Treatment/Activity Tolerance:  [x] Patient tolerated treatment well [] Patient limited by fatique  [] Patient limited by pain  [] Patient limited by other medical complications  [] Other:     Overall Progression Towards Functional goals/ Treatment Progress Update:  [] Patient is progressing as expected towards functional goals listed. [] Progression is slowed due to complexities/Impairments listed. [] Progression has been slowed due to co-morbidities. [x] Plan just implemented, too soon to assess goals progression <30days   [] Goals require adjustment due to lack of progress  [] Patient is not progressing as expected and requires additional follow up with physician  [] Other:    Prognosis for POC: [x] Good [] Fair  [] Poor    Patient requires continued skilled intervention: [x] Yes  [] No      PLAN: Assess mechanical traction  [x] Continue per plan of care [] Alter current plan (see comments)  [] Plan of care initiated [] Hold pending MD visit [] Discharge    Electronically signed by: Lorna Mirza PT , OMT-C,  [id number]      Note: If patient does not return for scheduled/recommended follow up visits, this note will serve as a discharge from care along with the most recent update on progress.

## 2021-06-01 ENCOUNTER — HOSPITAL ENCOUNTER (OUTPATIENT)
Dept: PHYSICAL THERAPY | Age: 61
Setting detail: THERAPIES SERIES
Discharge: HOME | End: 2021-06-01
Payer: COMMERCIAL

## 2021-06-01 PROCEDURE — 97035 APP MDLTY 1+ULTRASOUND EA 15: CPT

## 2021-06-01 PROCEDURE — 97140 MANUAL THERAPY 1/> REGIONS: CPT

## 2021-06-01 PROCEDURE — 97110 THERAPEUTIC EXERCISES: CPT

## 2021-06-01 NOTE — FLOWSHEET NOTE
[facility]  [address] [facility] [facility] [place], [facility]  Phone: [phone]   Fax:     [phone]    Physical Therapy Treatment Note/ Progress Report:     Date:  2021    Patient Name:  Corey Davis    :  1960  MRN: [id number]    Pertinent Medical History:  HTN, h/o blood clots, cardiomyopathy, fibromyalgia, hepatitis, h/o pneumothorax, neuropathy, pacemaker    Medical/Treatment Diagnosis Information:  · Diagnosis: L hip pain  · Treatment Diagnosis: decreased mobility, strength    Insurance/Certification information:  PT Insurance Information: [facility] - 61 PCY  Physician Information:  Referring Practitioner: Bartolo Breen MD  Plan of care signed (Y/N):  Sent for cosign     Date of Patient follow up with Physician:      Progress Report: []  Yes  [x]  No     Date Range for reporting period:  Beginnin2021  Ending:    Progress report due (10 Rx/or 30 days whichever is less):     Recertification due (POC duration/ or 90 days whichever is less): 21     Visit # POC/ Insurance Allowable Auth Needed   3 61 PCY []Yes    [x]No     Functional Outcomes Measure:   Date Assessed: at eval  Test: [place]  Score: 49% disability    Pain level:  9.5/10     History of Injury: Patient reports a few weeks ago he woke up with L hip pain. Notes that at that time he started working out at [facility] rehab. Patient was doing the treadmill and bike at [facility]. Patient reports standing and bending over increase his pain. Has also not been able to sleep on his L side recently. Massaging and icing his hip tend to decrease his pain. Has stopped cardiac rehab due to pain. Saw MD and states xrays showed mild OA in B hips. Patient states his pain is worse in the morning and gets better throughout the afternoon.     SUBJECTIVE:  : was pretty sore after initial eval due to stretching but is feeling better today. 6/1: really overdid it with all of the new exercises last PT session; p-tx and everything felt ok initially, but pain started really hurting when he woke the next morning, hurt all weekend and is still in significant; pt amb w/ forward flexed posture    OBJECTIVE:    Observation:   · Palpation: mod TTP along illiac crest and L lumbar paraspinals.   No TTP L glute  · Gait: (include devices/WB status) Patient ambulates with slight antalgic gait on L side   Test measurements:   ROM:  Date Lumbar Flex Lumbar Ext Lumbar L SB Lumbar R SB Lumbar L Rot Lumbar R Rot   Eval 5/26 FT to knees Mod decreased FT to 1 inch above knee FT to 1 inch above knee Mod decreased Mod decreased              Strength:  Date Hip flexion Hip abduction Hip extension Quad Hamstring Ankle DF Ankle PF   Eval 5/26 4-/5 4/5 4-/5 4+/5 4/5 5/5 5/5                  RESTRICTIONS/PRECAUTIONS: PACEMAKER    Exercises/Interventions: 6/1 all exercises very gentle as tolerated; some exercises held due to high pain levels     Therapeutic Ex (81337)   Min: 18 Resistance/Reps Notes/Cues   Stand hip flexor stretch 2 x 30 sec    Stand quadratus step stretch 2x 30 sec L only, too much pain on R         Supine LTR 10x    SL thoracic open book rotations                         Therapeutic Activity (60975) Min:                          NMR re-education (57607)   Min:10     TA set 10x 5 sec holds Mod cueing to isolate lower abs.    qped arch n sag Pt states Qped is painful Mod cueing for segmental mobility   bridge          Manual Intervention (34054) Min: 7     SL lumbar rotation mobilization 6/1: Pt declined due to pain levels    Central PA glides Lspine and Tspine    Long axis L hip distraction X 2 mins with relief     STM with Bean  X 5 min  Pt to resume monthly massages starting 6/2         Modalities  Min: 10     Mechanical lumbar traction                    US B LB into L hip 50% 1.5 w/cm Manual p-tx w/ belt x 2 min wth relief         X 8 min Pacemaker - no estim     * pt declined p-tx today due to high pain levels  pn dec to 5-6/10 after session              CP at home      Other Therapeutic Activities:  Pt was educated on PT POC, Diagnosis, Prognosis, pathomechanics as well as frequency and duration of scheduling future physical therapy appointments. Time was also taken on this day to answer all patient questions and participation in PT. Reviewed appointment policy in detail with patient and patient verbalized understanding. Home Exercise Program: Patient instructed in the following for HEP: supine LTR, SL open book rotations, hip flexor stretch, and quadratus stretch. Patient verbalized/demonstrated understanding and was issued written handout. Therapeutic Exercise and NMR EXR  [x] (81662) Provided verbal/tactile cueing for activities related to strengthening, flexibility, endurance, ROM  for improvements in proximal hip and core control with self care, mobility, lifting and ambulation.  [] (06992) Provided verbal/tactile cueing for activities related to improving balance, coordination, kinesthetic sense, posture, motor skill, proprioception  to assist with core control in self care, mobility, lifting, and ambulation.      Therapeutic Activities:    [] (39165 or 48695) Provided verbal/tactile cueing for activities related to improving balance, coordination, kinesthetic sense, posture, motor skill, proprioception and motor activation to allow for proper function  with self care and ADLs  [] (54767) Provided training and instruction to the patient for proper core and proximal hip recruitment and positioning with ambulation re-education     Home Exercise Program:    [x] (37683) Reviewed/Progressed HEP activities related to strengthening, flexibility, endurance, ROM of core, proximal hip and LE for functional self-care, mobility, lifting and ambulation   [] (55269) Reviewed/Progressed HEP activities related to improving balance, coordination, Patient will demonstrate increased lumbar AROM to WNL, good LS mobility, good hip ROM to allow for proper joint functioning as indicated by patients Functional Deficits. []? Progressing: []? Met: []? Not Met: []? Adjusted  3. Patient will demonstrate an increase in LE Strength to 5/5 to allow for proper functional mobility as indicated by patients Functional Deficits. []? Progressing: []? Met: []? Not Met: []? Adjusted  4. Patient will return to household duties without increased symptoms or restriction. []? Progressing: []? Met: []? Not Met: []? Adjusted  5. Patient will be able to bend over to  light items without increased symptoms or restictions. []? Progressing: []? Met: []? Not Met: []? Adjusted         ASSESSMENT:  Patient with significant joint tightness in tspine and lspine. Improved some with manual therapy techniques. Core activation and segmental mobility requires mod cueing for good technique. Treatment/Activity Tolerance:  [x] Patient tolerated treatment well [] Patient limited by fatique  [] Patient limited by pain  [] Patient limited by other medical complications  [] Other:     Overall Progression Towards Functional goals/ Treatment Progress Update:  [] Patient is progressing as expected towards functional goals listed. [] Progression is slowed due to complexities/Impairments listed. [] Progression has been slowed due to co-morbidities.   [x] Plan just implemented, too soon to assess goals progression <30days   [] Goals require adjustment due to lack of progress  [] Patient is not progressing as expected and requires additional follow up with physician  [] Other:    Prognosis for POC: [x] Good [] Fair  [] Poor    Patient requires continued skilled intervention: [x] Yes  [] No      PLAN: Reassess for cont PT   [x] Continue per plan of care [] Alter current plan (see comments)  [] Plan of care initiated [] Hold pending MD visit [] Discharge    Electronically signed by: Jose Alberto Rodriguez Bruce Parker, PT ,[id number]      Note: If patient does not return for scheduled/recommended follow up visits, this note will serve as a discharge from care along with the most recent update on progress.

## 2021-06-02 ENCOUNTER — ANTI-COAG VISIT (OUTPATIENT)
Dept: PHARMACY | Age: 61
End: 2021-06-02
Payer: COMMERCIAL

## 2021-06-02 LAB — INTERNATIONAL NORMALIZATION RATIO, POC: 1.3

## 2021-06-02 PROCEDURE — 85610 PROTHROMBIN TIME: CPT

## 2021-06-02 PROCEDURE — 99211 OFF/OP EST MAY X REQ PHY/QHP: CPT

## 2021-06-02 NOTE — PROGRESS NOTES
issues. Description    Take warfarin 7.5mg(1 & 1/2 tablets) today(6-2)   Take warfarin 5mg(1 tablet) tomorrow(6-3) then  CONTINUE: Warfarin 2.5 mg daily EXCEPT 5mg every Monday, Wednesday and Friday    Call with signs or symptoms of bleeding or any concerns or questions. If significant bleeding seek immediate medical attention. Call with medications changes, especially antibiotics and steroids and including any over-the-counter medications or herbal products. Call if you stop any medications. Keep the number of servings of Vitamin K (dark green vegetables) consistent from week to week  Eats three good portion of broccoli or brussel sprouts per week. Immunization History   Administered Date(s) Administered    COVID-19, Moderna, PF, 100mcg/0.5mL 03/09/2021, 04/08/2021    Influenza, Quadv, IM, PF (6 mo and older Fluzone, Flulaval, Fluarix, and 3 yrs and older Afluria) 10/27/2017, 09/23/2019, 10/02/2020    Influenza, Joyceann Moment, Recombinant, IM PF (Flublok 18 yrs and older) 10/03/2018    Pneumococcal Conjugate 13-valent ([id number]) 09/11/2015    Pneumococcal Conjugate Vaccine 08/15/2017    Pneumococcal Polysaccharide ([id number]) 08/01/2007, 12/19/2012    Tdap (Boostrix, Adacel) 08/19/2014         Reviewed AVS with patient / caregiver.       CLINICAL PHARMACY CONSULT: MED RECONCILIATION/REVIEW ADDENDUM    For Pharmacy Admin Tracking Only     Intervention Detail: Dose Adjustment: 1: reason: Therapy Optimization   Total # of Interventions Recommended: 1   Total # of Interventions Accepted: 1   Time Spent (min): 20

## 2021-06-03 ENCOUNTER — HOSPITAL ENCOUNTER (OUTPATIENT)
Dept: PHYSICAL THERAPY | Age: 61
Setting detail: THERAPIES SERIES
Discharge: HOME | End: 2021-06-03
Payer: COMMERCIAL

## 2021-06-03 PROCEDURE — 97140 MANUAL THERAPY 1/> REGIONS: CPT

## 2021-06-03 PROCEDURE — 97035 APP MDLTY 1+ULTRASOUND EA 15: CPT

## 2021-06-03 PROCEDURE — 97110 THERAPEUTIC EXERCISES: CPT

## 2021-06-03 NOTE — FLOWSHEET NOTE
[facility], [facility]  [address] [place], [facility]  Phone: [phone]   Fax:     [phone]    Physical Therapy Treatment Note/ Progress Report:     Date:  6/3/2021    Patient Name:  Ellen Stanford    :  1960  MRN: [id number]    Pertinent Medical History:  HTN, h/o blood clots, cardiomyopathy, fibromyalgia, hepatitis, h/o pneumothorax, neuropathy, pacemaker    Medical/Treatment Diagnosis Information:  · Diagnosis: L hip pain  · Treatment Diagnosis: decreased mobility, strength    Insurance/Certification information:  PT Insurance Information: [facility] - 61 PCY  Physician Information:  Referring Practitioner: Amy Shoemaker MD  Plan of care signed (Y/N):  Sent for cosign     Date of Patient follow up with Physician:      Progress Report: []  Yes  [x]  No     Date Range for reporting period:  Beginnin2021  Ending:    Progress report due (10 Rx/or 30 days whichever is less):     Recertification due (POC duration/ or 90 days whichever is less): 21     Visit # POC/ Insurance Allowable Auth Needed   4 61 PCY []Yes    [x]No     Functional Outcomes Measure:   Date Assessed: at eval  Test: [place]  Score: 49% disability    Pain level:  5/10     History of Injury: Patient reports a few weeks ago he woke up with L hip pain. Notes that at that time he started working out at [facility] rehab. Patient was doing the treadmill and bike at [facility]. Patient reports standing and bending over increase his pain. Has also not been able to sleep on his L side recently. Massaging and icing his hip tend to decrease his pain. Has stopped cardiac rehab due to pain. Saw MD and states xrays showed mild OA in B hips. Patient states his pain is worse in the morning and gets better throughout the afternoon.     SUBJECTIVE:  : was pretty sore after initial eval due to stretching but is feeling better today. 6/1: really overdid it with all of the new exercises last PT session; p-tx and everything felt ok initially, but pain started really hurting when he woke the next morning, hurt all weekend and is still in significant; pt amb w/ forward flexed posture  6/3: relief of pain after last session; still prefers to stick to same POC as last time to help cont to dec his inflammation levels before adding too much new exercise in     OBJECTIVE:    Observation:   · Palpation: mod TTP along illiac crest and L lumbar paraspinals.   No TTP L glute  · Gait: (include devices/WB status) Patient ambulates with slight antalgic gait on L side   Test measurements:   ROM:  Date Lumbar Flex Lumbar Ext Lumbar L SB Lumbar R SB Lumbar L Rot Lumbar R Rot   Eval 5/26 FT to knees Mod decreased FT to 1 inch above knee FT to 1 inch above knee Mod decreased Mod decreased              Strength:  Date Hip flexion Hip abduction Hip extension Quad Hamstring Ankle DF Ankle PF   Eval 5/26 4-/5 4/5 4-/5 4+/5 4/5 5/5 5/5                  RESTRICTIONS/PRECAUTIONS: PACEMAKER    Exercises/Interventions: 6/3 all exercises very gentle as tolerated; some exercises cont to hold due to high pain levels     Therapeutic Ex (84153)   Min: 8 Resistance/Reps Notes/Cues   Stand hip flexor stretch 2 x 30 sec    Stand quadratus step stretch 2x 30 sec  gentle        Supine LTR 10x    SL thoracic open book rotations  6/3 pt declining to add new ex                       Therapeutic Activity (57635) Min:                          NMR re-education (97309)   Min:2     TA set 10x 5 sec holds Mod cueing to isolate lower abs.    qped arch n sag Pt states Qped is painful Mod cueing for segmental mobility   bridge          Manual Intervention (36260) Min: 22     SL lumbar rotation mobilization 6/1: Pt declined due to pain levels    Central PA glides Lspine and Tspine    Long axis L hip distraction    Man p-tx w/ belt X 2 mins with relief     X 2 min w/ relief     STM X 8 min w/ regular massage cream     pt declining Bean b/c he had \"chills\" after application  Pt to resume monthly massages starting 6/4         Modalities  Min: 8     Mechanical lumbar traction                    US B LB into L hip 50% 1.5 w/cm           X 8 min  Pacemaker - no estim     * pt declined p-tx today due to high pain levels  pn dec to 5-6/10 after session              CP at home      Other Therapeutic Activities:  Pt was educated on PT POC, Diagnosis, Prognosis, pathomechanics as well as frequency and duration of scheduling future physical therapy appointments. Time was also taken on this day to answer all patient questions and participation in PT. Reviewed appointment policy in detail with patient and patient verbalized understanding. Home Exercise Program: Patient instructed in the following for HEP: supine LTR, SL open book rotations, hip flexor stretch, and quadratus stretch. Patient verbalized/demonstrated understanding and was issued written handout. Therapeutic Exercise and NMR EXR  [x] (87092) Provided verbal/tactile cueing for activities related to strengthening, flexibility, endurance, ROM  for improvements in proximal hip and core control with self care, mobility, lifting and ambulation.  [] (33792) Provided verbal/tactile cueing for activities related to improving balance, coordination, kinesthetic sense, posture, motor skill, proprioception  to assist with core control in self care, mobility, lifting, and ambulation.      Therapeutic Activities:    [] (10419 or 92520) Provided verbal/tactile cueing for activities related to improving balance, coordination, kinesthetic sense, posture, motor skill, proprioception and motor activation to allow for proper function  with self care and ADLs  [] (99989) Provided training and instruction to the patient for proper core and proximal hip recruitment and positioning with ambulation re-education     Home Exercise Program:    [x] (89717) Reviewed/Progressed HEP activities related to strengthening, flexibility, endurance, ROM of core, proximal hip and LE for functional self-care, mobility, lifting and ambulation   [] (55697) Reviewed/Progressed HEP activities related to improving balance, coordination, kinesthetic sense, posture, motor skill, proprioception of core, proximal hip and LE for self care, mobility, lifting, and ambulation      Manual Treatments:  PROM / STM / Oscillations-Mobs:  G-I, II, III, IV (PA's, Inf., Post.)  [x] (59005) Provided manual therapy to mobilize proximal hip and LS spine soft tissue/joints for the purpose of modulating pain, promoting relaxation,  increasing ROM, reducing/eliminating soft tissue swelling/inflammation/restriction, improving soft tissue extensibility and allowing for proper ROM for normal function with self care, mobility, lifting and ambulation. Charges:  Timed Code Treatment Minutes: 40   Total Treatment Minutes: 40     [] EVAL (LOW) 98219 (typically 20 minutes face-to-face)  [] EVAL (MOD) 06583 (typically 30 minutes face-to-face)  [] EVAL (HIGH) 44594 (typically 45 minutes face-to-face)  [] RE-EVAL     [x] QV(87258) x     [] Dry needle 1 or 2 Muscles (04151)  [] NMR (45720) x     [] Dry needle 3+ Muscles (09417)  [x] Manual (81622) x     [x] Ultrasound (31272) x  [] TA (14871) x     [] Mech Traction (52969)  [] ES(attended) (32834)     [] ES (un) (39732):   [] Vasopump (75555)  [] Ionto (78324)   [] Other:    GOALS:  Patient stated goal: \"Get strong again in order to play golf\"  []? Progressing: []? Met: []? Not Met: []? Adjusted     Therapist goals for Patient:   Short Term Goals: To be achieved in: 2 weeks  1. Independent in HEP and progression per patient tolerance, in order to prevent re-injury. []? Progressing: []? Met: []? Not Met: []? Adjusted  2. Patient will have a decrease in pain by 40% to facilitate improvement in movement, function, and ADLs as indicated by Functional Deficits.   []? Progressing: []? Met: []? Not Met: []? Adjusted     Long Term Goals: To be achieved in: at discharge  1. Disability index score of 25% or less for the [place] to assist with reaching prior level of function. []? Progressing: []? Met: []? Not Met: []? Adjusted  2. Patient will demonstrate increased lumbar AROM to WNL, good LS mobility, good hip ROM to allow for proper joint functioning as indicated by patients Functional Deficits. []? Progressing: []? Met: []? Not Met: []? Adjusted  3. Patient will demonstrate an increase in LE Strength to 5/5 to allow for proper functional mobility as indicated by patients Functional Deficits. []? Progressing: []? Met: []? Not Met: []? Adjusted  4. Patient will return to household duties without increased symptoms or restriction. []? Progressing: []? Met: []? Not Met: []? Adjusted  5. Patient will be able to bend over to  light items without increased symptoms or restictions. []? Progressing: []? Met: []? Not Met: []? Adjusted         ASSESSMENT:  Patient with significant joint tightness in tspine and lspine. Improved some with manual therapy techniques. Core activation and segmental mobility requires mod cueing for good technique. Treatment/Activity Tolerance:  [x] Patient tolerated treatment well [] Patient limited by fatique  [] Patient limited by pain  [] Patient limited by other medical complications  [] Other:     Overall Progression Towards Functional goals/ Treatment Progress Update:  [] Patient is progressing as expected towards functional goals listed. [] Progression is slowed due to complexities/Impairments listed. [] Progression has been slowed due to co-morbidities.   [x] Plan just implemented, too soon to assess goals progression <30days   [] Goals require adjustment due to lack of progress  [] Patient is not progressing as expected and requires additional follow up with physician  [] Other:    Prognosis for POC: [x] Good [] Fair  [] Poor    Patient requires continued skilled intervention: [x] Yes  [] No      PLAN: Reassess for cont PT   [x] Continue per plan of care [] Alter current plan (see comments)  [] Plan of care initiated [] Hold pending MD visit [] Discharge    Electronically signed by: Malachi George, PT ,[id number]      Note: If patient does not return for scheduled/recommended follow up visits, this note will serve as a discharge from care along with the most recent update on progress.

## 2021-06-08 ENCOUNTER — HOSPITAL ENCOUNTER (OUTPATIENT)
Dept: PHYSICAL THERAPY | Age: 61
Setting detail: THERAPIES SERIES
Discharge: HOME | End: 2021-06-08
Payer: COMMERCIAL

## 2021-06-08 DIAGNOSIS — H91.90 HEARING LOSS, UNSPECIFIED HEARING LOSS TYPE, UNSPECIFIED LATERALITY: Primary | ICD-10-CM

## 2021-06-08 PROCEDURE — 97140 MANUAL THERAPY 1/> REGIONS: CPT

## 2021-06-08 PROCEDURE — 97035 APP MDLTY 1+ULTRASOUND EA 15: CPT

## 2021-06-08 PROCEDURE — 97110 THERAPEUTIC EXERCISES: CPT

## 2021-06-08 NOTE — FLOWSHEET NOTE
[facility], [facility]  [address] [place], [facility]  Phone: [phone]   Fax:     [phone]    Physical Therapy Treatment Note/ Progress Report:     Date:  2021    Patient Name:  Skip Piña    :  1960  MRN: [id number]    Pertinent Medical History:  HTN, h/o blood clots, cardiomyopathy, fibromyalgia, hepatitis, h/o pneumothorax, neuropathy, pacemaker    Medical/Treatment Diagnosis Information:  · Diagnosis: L hip pain  · Treatment Diagnosis: decreased mobility, strength    Insurance/Certification information:  PT Insurance Information: [facility] - 61 PCY  Physician Information:  Referring Practitioner: Domi Romero MD  Plan of care signed (Y/N):  Sent for cosign     Date of Patient follow up with Physician:      Progress Report: []  Yes  [x]  No     Date Range for reporting period:  Beginnin2021  Ending:    Progress report due (10 Rx/or 30 days whichever is less):     Recertification due (POC duration/ or 90 days whichever is less): 21     Visit # POC/ Insurance Allowable Auth Needed   5 61 PCY []Yes    [x]No     Functional Outcomes Measure:   Date Assessed: at eval  Test: [place]  Score: 49% disability    Pain level:  10     History of Injury: Patient reports a few weeks ago he woke up with L hip pain. Notes that at that time he started working out at [facility] pulmonary rehab. Patient was doing the treadmill and bike at [facility]. Patient reports standing and bending over increase his pain. Has also not been able to sleep on his L side recently. Massaging and icing his hip tend to decrease his pain. Has stopped cardiac rehab due to pain. Saw MD and states xrays showed mild OA in B hips. Patient states his pain is worse in the morning and gets better throughout the afternoon.     SUBJECTIVE:  : was pretty sore after initial eval due to stretching but is feeling better today. 6/1: really overdid it with all of the new exercises last PT session; p-tx and everything felt ok initially, but pain started really hurting when he woke the next morning, hurt all weekend and is still in significant; pt amb w/ forward flexed posture  6/3: relief of pain after last session; still prefers to stick to same POC as last time to help cont to dec his inflammation levels before adding too much new exercise in   6/8: overall feels the pain is calming down and not as intense as it was prior to PT, but the prolonged standing still gets to him; had medical STM on 6/3 and was sore initially, but overall felt it was beneficial      OBJECTIVE:    Observation:   · Palpation: mod TTP along illiac crest and L lumbar paraspinals.   No TTP L glute  · Gait: (include devices/WB status) Patient ambulates with slight antalgic gait on L side   Test measurements:   ROM:  Date Lumbar Flex Lumbar Ext Lumbar L SB Lumbar R SB Lumbar L Rot Lumbar R Rot   Eval 5/26 FT to knees Mod decreased FT to 1 inch above knee FT to 1 inch above knee Mod decreased Mod decreased              Strength:  Date Hip flexion Hip abduction Hip extension Quad Hamstring Ankle DF Ankle PF   Eval 5/26 4-/5 4/5 4-/5 4+/5 4/5 5/5 5/5                  RESTRICTIONS/PRECAUTIONS: PACEMAKER    Exercises/Interventions: 6/3 all exercises very gentle as tolerated; some exercises cont to hold due to high pain levels     Therapeutic Ex (35076)   Min: 10 Resistance/Reps Notes/Cues   Stand hip flexor stretch 2 x 30 sec cues   Stand quadratus step stretch 2x 30 sec  gentle        Supine LTR 10x    SL thoracic open book rotations 5x L/R Cues for gentle                        Therapeutic Activity (84798) Min:                          NMR re-education (97980)   Min:5     TA set 10x 5 sec holds Mod cueing to isolate lower abs.    qped arch n sag Pt states Qped is painful Mod cueing for segmental mobility   bridge 5x         Manual Intervention (37476) Min: with self care and ADLs  [] (07919) Provided training and instruction to the patient for proper core and proximal hip recruitment and positioning with ambulation re-education     Home Exercise Program:    [x] (91020) Reviewed/Progressed HEP activities related to strengthening, flexibility, endurance, ROM of core, proximal hip and LE for functional self-care, mobility, lifting and ambulation   [] (48808) Reviewed/Progressed HEP activities related to improving balance, coordination, kinesthetic sense, posture, motor skill, proprioception of core, proximal hip and LE for self care, mobility, lifting, and ambulation      Manual Treatments:  PROM / STM / Oscillations-Mobs:  G-I, II, III, IV (PA's, Inf., Post.)  [x] (44827) Provided manual therapy to mobilize proximal hip and LS spine soft tissue/joints for the purpose of modulating pain, promoting relaxation,  increasing ROM, reducing/eliminating soft tissue swelling/inflammation/restriction, improving soft tissue extensibility and allowing for proper ROM for normal function with self care, mobility, lifting and ambulation. Charges:  Timed Code Treatment Minutes: 40   Total Treatment Minutes: 40     [] EVAL (LOW) 22790 (typically 20 minutes face-to-face)  [] EVAL (MOD) 40094 (typically 30 minutes face-to-face)  [] EVAL (HIGH) 74634 (typically 45 minutes face-to-face)  [] RE-EVAL     [x] MR(47460) x     [] Dry needle 1 or 2 Muscles (64131)  [] NMR (81432) x     [] Dry needle 3+ Muscles (82224)  [x] Manual (42705) x     [x] Ultrasound (60554) x  [] TA (23020) x     [] Mech Traction (03574)  [] ES(attended) (97063)     [] ES (un) (79690):   [] Vasopump (10663)  [] Ionto (36055)   [] Other:    GOALS:  Patient stated goal: \"Get strong again in order to play golf\"  []? Progressing: []? Met: []? Not Met: []? Adjusted     Therapist goals for Patient:   Short Term Goals: To be achieved in: 2 weeks  1.  Independent in HEP and progression per patient tolerance, in order to prevent re-injury. []? Progressing: []? Met: []? Not Met: []? Adjusted  2. Patient will have a decrease in pain by 40% to facilitate improvement in movement, function, and ADLs as indicated by Functional Deficits. []? Progressing: []? Met: []? Not Met: []? Adjusted     Long Term Goals: To be achieved in: at discharge  1. Disability index score of 25% or less for the [place] to assist with reaching prior level of function. []? Progressing: []? Met: []? Not Met: []? Adjusted  2. Patient will demonstrate increased lumbar AROM to WNL, good LS mobility, good hip ROM to allow for proper joint functioning as indicated by patients Functional Deficits. []? Progressing: []? Met: []? Not Met: []? Adjusted  3. Patient will demonstrate an increase in LE Strength to 5/5 to allow for proper functional mobility as indicated by patients Functional Deficits. []? Progressing: []? Met: []? Not Met: []? Adjusted  4. Patient will return to household duties without increased symptoms or restriction. []? Progressing: []? Met: []? Not Met: []? Adjusted  5. Patient will be able to bend over to  light items without increased symptoms or restictions. []? Progressing: []? Met: []? Not Met: []? Adjusted         ASSESSMENT:  Patient with significant joint tightness in tspine and lspine. Improved some with manual therapy techniques. Core activation and segmental mobility requires mod cueing for good technique. Treatment/Activity Tolerance:  [x] Patient tolerated treatment well [] Patient limited by fatique  [] Patient limited by pain  [] Patient limited by other medical complications  [] Other:     Overall Progression Towards Functional goals/ Treatment Progress Update:  [] Patient is progressing as expected towards functional goals listed. [] Progression is slowed due to complexities/Impairments listed. [] Progression has been slowed due to co-morbidities.   [x] Plan just implemented, too soon to assess goals progression <30days   [] Goals require adjustment due to lack of progress  [] Patient is not progressing as expected and requires additional follow up with physician  [] Other:    Prognosis for POC: [x] Good [] Fair  [] Poor    Patient requires continued skilled intervention: [x] Yes  [] No      PLAN:   [x] Continue per plan of care [] Alter current plan (see comments)  [] Plan of care initiated [] Hold pending MD visit [] Discharge    Electronically signed by: Tiffany Rocha, PT ,[id number]      Note: If patient does not return for scheduled/recommended follow up visits, this note will serve as a discharge from care along with the most recent update on progress.

## 2021-06-08 NOTE — PROGRESS NOTES
Mari Caro   1960, 63 y.o. male   [id number]       Referring Provider: Zeferino Ayon MD  Referral Type: In an order in [address] [place] [address]    Reason for Visit: Evaluation of suspected change in hearing, tinnitus, or balance. ADULT AUDIOLOGIC EVALUATION      Mari Caro is a 63 y.o. male seen today, 6/9/2021 , for an initial audiologic evaluation. Patient was seen by Zeferino Ayon MD following today's evaluation. AUDIOLOGIC AND OTHER PERTINENT MEDICAL HISTORY:      Mari Caro noted aural fullness, tinnitus and history of occupational noise exposure. Patient reports bilateral tinnitus that is intermittent in nature. 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 He also reports bilateral pressure in the ears today. Mari Caro denied otalgia, otorrhea, dizziness, imbalance, history of falls, history of head trauma, history of ear surgery and family history of hearing loss. Date: 6/9/2021     IMPRESSIONS:      AD: Hearing WNL sloping to Mild SNHL, Excellent WRS, Type As tymp  AS:Mild SNHL, Excellent WRS, Type A tymp    Hearing loss consistent with SNHL. Hearing loss significant enough to create hearing difficulty in some listening situations. Discussed hearing loss, tinnitus, and hearing aids with patient. Patient to follow medical recommendations per Zeferino Ayon MD .    ASSESSMENT AND FINDINGS:     Otoscopy revealed: Clear ear canals bilaterally    RIGHT EAR:  Hearing Sensitivity: Mild Sensorineural hearing loss. Speech Recognition Threshold: 30 dB HL  Word Recognition: Excellent (%), based on NU-6  25-word list at 65 dBHL using recorded speech stimuli. Tympanometry: Normal peak pressure with low compliance, Type As tympanogram, consistent with reduced tympanic membrane mobility.   Acoustic Reflexes: Ipsilateral: Could not maintain seal. Contralateral: Could not maintain seal.    LEFT EAR:  Hearing Sensitivity: Normal hearing sensitivity to Mild Sensorineural hearing Patient states since her recliner has been broken and she is unable to elevate her legs intermittently during the day, her legs have become more swollen.  Patient denies symptoms for ER per protocol at this time.  Advised patient of home care instruction for swelling in legs.  Advised patient to use any stool, chair, etc, use pillows if needed to elevate her feet/legs during the day.  Advised patient to seek emergency care for worsening symptoms.  Patient states she does not like to go into the hospital.  Advised patient again, for worsening symptoms to seek emergency care.  Patient stated understanding.    Patient scheduled for office visit Monday 3/29/2021.  Will forward to PCP for FYI and review.    Request for prescribing electrical recliner for patient through visit assessment.      Additional Information    Negative: Sounds like a life-threatening emergency to the triager    Negative: Chest pain    Negative: Small area of swelling and followed an insect bite to the area    Negative: Followed a knee injury    Negative: Ankle or foot injury    Negative: Pregnant with leg swelling or edema    Negative: Difficulty breathing at rest    Negative: Entire foot is cool or blue in comparison to other side    Negative: Swelling of face, arm or hands (Exception: slight puffiness of fingers during hot weather)    Negative: Pregnant > 20 weeks and sudden weight gain (i.e., > 2 lbs, 1 kg in one week)    Negative: SEVERE swelling (e.g., swelling extends above knee, entire leg is swollen, weeping fluid)    Negative: Thigh or calf pain and only 1 side and present > 1 hour    Negative: Thigh, calf, or ankle swelling in only one leg    Negative: Thigh, calf, or ankle swelling in both legs, but one side is definitely more swollen    Negative: Cast on leg or ankle and has increasing pain    Negative: Can't walk or can barely stand (new onset)    Negative: Fever and red area (or area very tender to touch)    Negative: Patient sounds  "very sick or weak to the triager    Patient wants to be seen    Answer Assessment - Initial Assessment Questions  1. ONSET: \"When did the swelling start?\" (e.g., minutes, hours, days)      Approximately 2-3 weeks ago    2. LOCATION: \"What part of the leg is swollen?\"  \"Are both legs swollen or just one leg?\"      Both legs from knees down through the top of the foot    3. SEVERITY: \"How bad is the swelling?\" (e.g., localized; mild, moderate, severe)   - Localized - small area of swelling localized to one leg   - MILD pedal edema - swelling limited to foot and ankle, pitting edema < 1/4 inch (6 mm) deep, rest and elevation eliminate most or all swelling   - MODERATE edema - swelling of lower leg to knee, pitting edema > 1/4 inch (6 mm) deep, rest and elevation only partially reduce swelling   - SEVERE edema - swelling extends above knee, facial or hand swelling present       Hard swelling    4. REDNESS: \"Does the swelling look red or infected?\"      Intermittently    5. PAIN: \"Is the swelling painful to touch?\" If so, ask: \"How painful is it?\"   (Scale 1-10; mild, moderate or severe)      Stabbing pain intermittently mainly in arch of top of foot, sometimes the toes, not painful to the touch    6. FEVER: \"Do you have a fever?\" If so, ask: \"What is it, how was it measured, and when did it start?\"       No    7. CAUSE: \"What do you think is causing the leg swelling?\"      Unknown    8. MEDICAL HISTORY: \"Do you have a history of heart failure, kidney disease, liver failure, or cancer?\"      Hypertension, family history    9. RECURRENT SYMPTOM: \"Have you had leg swelling before?\" If so, ask: \"When was the last time?\" \"What happened that time?\"      Tender spots on legs from both knee replacement surgery    10. OTHER SYMPTOMS: \"Do you have any other symptoms?\" (e.g., chest pain, difficulty breathing)        Not any different than daily problems    11. PREGNANCY: \"Is there any chance you are pregnant?\" \"When was your last " loss.  Speech Recognition Threshold: 30 dB HL  Word Recognition: Excellent (%), based on NU-6 25-word list at 65 dBHL using recorded speech stimuli. Tympanometry: Normal peak pressure and compliance, Type A tympanogram, consistent with normal middle ear function. Acoustic Reflexes: Ipsilateral: Could not maintain seal. Contralateral: Could not maintain seal.    Reliability: Good  Transducer: Inserts    See scanned audiogram dated 6/9/2021  for results. PATIENT EDUCATION:       The following items were discussed with the patient:    - Good Communication Strategies  - Hearing Loss and Hearing Aids   - Tinnitus Management Strategies   - Noise-Induced Hearing Loss and use of Hearing Protection Devices (HPDs)    Educational information was shared in the After Visit Summary. RECOMMENDATIONS:                                                                                                                                                                                                                                                            The following items are recommended based on patient report and results from today's appointment:   - Continue medical follow-up with Jennifer Brown MD.   - Retest hearing as medically indicated and/or sooner if a change in hearing is noted. - If desired, schedule a Hearing Aid Evaluation (HAE) appointment to discuss hearing aid options. - Utilize \"Good Communication Strategies\" as discussed to assist in speech understanding with communication partners. - Maintain a sound enriched environment to assist in the management of tinnitus symptoms.     - Use hearing protection devices (HPDs), such as protective ear muffs and ear plugs, when exposed to dangerous sound levels.        Tita Tamayo  Audiologist    Chart CC'd to: Jennifer Brown MD      Degree of   Hearing Sensitivity dB Range   Within Normal Limits (WNL) 0 - 20 "menstrual period?\"        NA    Protocols used: LEG SWELLING AND EDEMA-A-OH    Shelia Snow RN    " Mild 20 - 40   Moderate 40 - 55   Moderately-Severe 55 - 70   Severe 70 - 90   Profound 90 +

## 2021-06-09 ENCOUNTER — PROCEDURE VISIT (OUTPATIENT)
Dept: AUDIOLOGY | Age: 61
End: 2021-06-09
Payer: COMMERCIAL

## 2021-06-09 ENCOUNTER — OFFICE VISIT (OUTPATIENT)
Dept: ENT CLINIC | Age: 61
End: 2021-06-09
Payer: COMMERCIAL

## 2021-06-09 VITALS — SYSTOLIC BLOOD PRESSURE: 134 MMHG | HEART RATE: 88 BPM | DIASTOLIC BLOOD PRESSURE: 86 MMHG

## 2021-06-09 DIAGNOSIS — H93.13 TINNITUS OF BOTH EARS: Primary | ICD-10-CM

## 2021-06-09 DIAGNOSIS — J06.9 VIRAL UPPER RESPIRATORY TRACT INFECTION: ICD-10-CM

## 2021-06-09 DIAGNOSIS — H90.3 SENSORINEURAL HEARING LOSS (SNHL) OF BOTH EARS: Primary | ICD-10-CM

## 2021-06-09 DIAGNOSIS — R04.0 EPISTAXIS: ICD-10-CM

## 2021-06-09 PROCEDURE — 3017F COLORECTAL CA SCREEN DOC REV: CPT | Performed by: OTOLARYNGOLOGY

## 2021-06-09 PROCEDURE — 1036F TOBACCO NON-USER: CPT | Performed by: OTOLARYNGOLOGY

## 2021-06-09 PROCEDURE — 99213 OFFICE O/P EST LOW 20 MIN: CPT | Performed by: OTOLARYNGOLOGY

## 2021-06-09 PROCEDURE — G8427 DOCREV CUR MEDS BY ELIG CLIN: HCPCS | Performed by: OTOLARYNGOLOGY

## 2021-06-09 PROCEDURE — 92567 TYMPANOMETRY: CPT | Performed by: AUDIOLOGIST

## 2021-06-09 PROCEDURE — 92557 COMPREHENSIVE HEARING TEST: CPT | Performed by: AUDIOLOGIST

## 2021-06-09 PROCEDURE — G8417 CALC BMI ABV UP PARAM F/U: HCPCS | Performed by: OTOLARYNGOLOGY

## 2021-06-09 NOTE — PROGRESS NOTES
[facility] [facility]  Follow up      Patient Name: Arik Hercules  Medical Record Number:  [id number]  Primary Care Physician:  Jasen Zurita MD  Date of Consultation: 6/9/2021    Chief Complaint: Hearing loss        Interval History  I been seeing the patient for epistaxis and hearing loss. Had a hearing test today. He does complain of a 2-day history of nasal congestion runny nose. No nosebleeds. REVIEW OF SYSTEMS  As above    PHYSICAL EXAM  GENERAL: No Acute Distress, Alert and Oriented, no Hoarseness, strong voice  EYES: EOMI, Anti-icteric  HENT:   Head: Normocephalic and atraumatic. Face:  Symmetric, facial nerve intact, no sinus tenderness  Right Ear: Normal external ear, normal external auditory canal, intact tympanic membrane with normal mobility and aerated middle ear  Left Ear: Normal external ear, normal external auditory canal, intact tympanic membrane with normal mobility and aerated middle ear  Mouth/Oral Cavity: Normal  Oropharynx/Larynx: Normal  Nose: Rightward deviated septum, no evidence of purulent drainage. NECK: Normal range of motion, no thyromegaly, trachea is midline, no lymphadenopathy, no neck masses, no crepitus  CHEST: Normal respiratory effort, no retractions, breathing comfortably  SKIN: No rashes, normal appearing skin, no evidence of skin lesions/tumors  Neuro:  cranial nerve II-XII intact; normal gait  Cardio:  no edema      Patient had an audiogram today that shows bilateral mild sensorineural hearing loss. This is not significantly worse in the lower frequencies like his previous audiogram.      ASSESSMENT/PLAN  1. Sensorineural hearing loss (SNHL) of both ears  Mild sensorineural hearing loss. I think that the low-frequency hearing loss noted on his audiogram was an error. Follow-up in 1 to 2 years with a hearing test    2.  Viral upper respiratory tract infection  Patient is complaining of a runny nose over the last 2 days.  This probably a viral URI. If this is not resolved in about 5 days, I told him to call the office and we can call him in an antibiotic    3. Epistaxis  Resolved             I have performed a head and neck physical exam personally or was physically present during the key or critical portions of the service. This note was generated completely or in part utilizing Dragon dictation speech recognition software. Occasionally, words are mistranscribed and despite editing, the text may contain inaccuracies due to incorrect word recognition. If further clarification is needed please contact the office at [phone].

## 2021-06-09 NOTE — PATIENT INSTRUCTIONS
Good Communication Strategies    Communication can be challenging for anyone, but can be especially difficult for those with some degree of hearing loss. While we may not be able to control every factor that may lead to difficulty with communication, there are Good Communication Strategies that we can all use in our day-to-day lives. Communication takes both parties working together for it to be successful. Tips as a Listener:   1. Control your environment. It is important to limit the amount of background noise in the room when possible. You should also consider having a good light source in the room to best see the other person. 2. Ask for clarification. Instead of saying \"What?\", you can use parts of what you heard to make a new question. For example, if you heard the word \"Thursday\" but not the rest of the week, you may ask \"What was that about Thursday? \" or \"What did you want to do Thursday? \". This shows the person talking that you are listening and will help them better explain what they are saying. 3. Be an advocate for yourself. If you are hearing but not understanding, tell the other person \"I can hear you, but I need you to slow down when you speak. \"  Or if someone is facing the other direction, say \"I cannot hear you when you are not looking at me when we talk. \"       Tips as a Talker:   - Sit or stand 3 to 6 feet away to maximize audibility         -- It is unrealistic to believe someone else will fully hear your message if you are speaking from across the room or in a different room in the house   - Stay at eye level to help with visual cues   - Make sure you have the persons attention before speaking   - Use facial expressions and gestures to accentuate your message   - Raise your voice slightly (do not scream)   - Speak slowly and distinctly   - Use short, simple sentences   - Rephrase your words if the person is having a hard time understanding you    - To avoid distortion, dont speak directly into a persons ear      Some additional items that may be helpful:   - Remain patient - this is important for both parties   - Write down items that still cannot be heard/understood. You may write with pen/paper or consider typing/texting on a cell phone or smart device. - If background noise is unavoidable, try to keep yourself in a good position in the room. By sitting at a lin on the side of the restaurant (preferably a corner), it will be easier to communicate than if you were sitting at a table in the middle with background noise surrounding you. Keep yourself positioned away from music speakers or heavy foot traffic. Hearing Loss: Care Instructions  Your Care Instructions      Hearing loss is a sudden or slow decrease in how well you hear. It can range from mild to profound. Permanent hearing loss can occur with aging, and it can happen when you are exposed long-term to loud noise. Examples include listening to loud music, riding motorcycles, or being around other loud machines. Hearing loss can affect your work and home life. It can make you feel lonely or depressed. You may feel that you have lost your independence. But hearing aids and other devices can help you hear better and feel connected to others. Follow-up care is a key part of your treatment and safety. Be sure to make and go to all appointments, and call your doctor if you are having problems. It's also a good idea to know your test results and keep a list of the medicines you take. How can you care for yourself at home? · Avoid loud noises whenever possible. This helps keep your hearing from getting worse. Always wear hearing protection around loud noises. · If appropriate, wear hearing aid(s) as directed. It is recommended that hearing aids are worn during all waking hours to keep your brain active and give it access to the sounds it is missing.       · If you are beginning your process with hearing aid(s), schedule a \"Hearing Aid Evaluation\" with an audiologist to discuss your lifestyle, features of hearing aid technology, and styles of hearing aids available. It is recommended that you contact your insurance company to determine if you have a hearing aid benefit, as this may dictate who you can see for these services. · Have hearing tests as your doctor suggests. They can show whether your hearing has changed. Your hearing aid may need to be adjusted. · Use other assistive devices as needed. These may include:  ? Telephone amplifiers and hearing aids that can connect to a television, stereo, radio, or microphone. ? Devices that use lights or vibrations. These alert you to the doorbell, a ringing telephone, or a baby monitor. ? Television closed-captioning. This shows the words at the bottom of the screen. Most new TVs can do this. ? TTY (text telephone). This lets you type messages back and forth on the telephone instead of talking or listening. These devices are also called TDD. When messages are typed on the keyboard, they are sent over the phone line to a receiving TTY. The message is shown on a monitor. · Use pagers, fax machines, text, and email if it is hard for you to communicate by telephone. · Try to learn a listening technique called speech-reading. It is not lip-reading. You pay attention to people's gestures, expressions, posture, and tone of voice. These clues can help you understand what a person is saying. Face the person you are talking to, and have him or her face you. Make sure the lighting is good. You need to see the other person's face clearly. · Think about counseling if you need help to adjust to your hearing loss. When should you call for help? Watch closely for changes in your health, and be sure to contact your doctor if:    · You think your hearing is getting worse. · You have new symptoms, such as dizziness or nausea.            Tinnitus: Overview and Management Strategies          Many people have some ringing sounds in their ears once in a while. You may hear a roar, a hiss, a tinkle, or a buzz. The sound usually lasts only a few minutes. If it goes on all the time, you may have tinnitus. Tinnitus is usually caused by long-term exposure to loud noise. This damages the nerves in the inner ear. It can occur with all types of hearing loss. It may be a symptom of almost any ear problem. Tinnitus may be caused by a buildup of earwax. Or, it may be caused by ear infections or certain medicines (especially antibiotics or large amounts of aspirin). You can also hear noises in your ears because of an injury to the ears, drinking too much alcohol or caffeine, or a medical condition. Other conditions may also contribute to tinnitus, including: head and neck trauma, temporomandibular joint disorder (TMJ), sinus pressure and barometric trauma, traumatic brain injury, metabolic disorders, autoimmune disorders, stress, and high blood pressure. You may need tests to evaluate your hearing and to find causes of long-lasting tinnitus. Your doctor may suggest one or more treatments to help you cope with the tinnitus. You can also do things at home to help reduce symptoms. Causes of Tinnitus  We do not know the exact cause of tinnitus. One thing we do know is that you are not imagining it. If you have tinnitus, chances are the cause will remain a mystery. Conditions that might cause tinnitus include the following:    Hearing loss    Ménière's disease    Loud noise exposure    Migraines    Head injury    Drugs or medicines that are toxic to hearing    Anemia    High blood pressure    Stress    A lot of wax in the ear    Certain types of tumors    Having a lot of caffeine    Smoking cigarettes  You may find that your tinnitus is worse at night. This happens because it is quiet and you are not distracted.  Feeling tired and stressed may make your tinnitus worse.    Follow-up care is a key part of your treatment and safety. Be sure to make and go to all appointments, and call your doctor if you are having problems. It's also a good idea to know your test results and keep a list of the medicines you take. How can you care for yourself at home? · Limit or cut out alcohol, caffeine, and sodium. They can make your symptoms worse. · Do not smoke or use other tobacco products. Nicotine reduces blood flow to the ear and makes tinnitus worse. If you need help quitting, talk to your doctor about stop-smoking programs and medicines. These can increase your chances of quitting for good. · Talk to your doctor about whether to stop taking aspirin and similar products such as ibuprofen or naproxen. · Get exercise often. It can help improve blood flow to the ear. Hearing Aids and Other Devices  A hearing aid may help your tinnitus if you have a hearing loss. An audiologist can help you find and use the best hearing aid for you. Tinnitus maskers look like hearing aids. They make a sound that masks, or covers up, the tinnitus. The masking sound distracts you from the ringing in your ears. You may be able to use a masker and a hearing aid at the same time. Sound machines can be useful at night or during quiet times. There are machines you can buy at the store. Or, you can find apps on your phone that make sounds, like the ocean or rainfall. Fish tanks, fans, quiet music, and indoor waterfalls can help, as well. Ways to manage/cope with tinnitus  Some tinnitus may last a long time. To manage your tinnitus, try to:  · Avoid noises that you think caused your tinnitus. If you can't avoid loud noises, wear earplugs or earmuffs. · Ignore the sound by paying attention to other things. Keeping your brain busy with other tasks or background noise can help your brain not focus on the tinnitus. · Try to not give the tinnitus an emotional reaction.   Do your best to ignore the sound and not let it bother you. Relax using biofeedback, meditation, or yoga. Feeling stressed and being tired can make tinnitus worse. · Play music or white noise to help you sleep. Background noise may cover up the noise that you hear in your ears. You can buy a tabletop machine or a device that sits under your pillow to play soothing sounds, like ocean waves. · Smart phones have free apps, such as Whist, Relax Melodies, ReSound Relief, and White Noise Lite. These apps have different types of sounds/noise, some of which you can blend together to find sounds that are most soothing to you. · Hearing aid technology, especially when there is some hearing loss, may help reduce tinnitus symptoms by giving your brain better access to the sounds it is missing. There are some hearing aids with built-in noise generator programs, which may help when amplification alone is not enough. Additional resources may be found through the American Tinnitus Association at www.kirill.org    When should you call for help? Call 911 anytime you think you may need emergency care. For example, call if:    · You have symptoms of a stroke. These may include:  ? Sudden numbness, tingling, weakness, or loss of movement in your face, arm, or leg, especially on only one side of your body. ? Sudden vision changes. ? Sudden trouble speaking. ? Sudden confusion or trouble understanding simple statements. ? Sudden problems with walking or balance. ? A sudden, severe headache that is different from past headaches. Call your doctor now or seek immediate medical care if:    · You develop other symptoms. These may include hearing loss (or worse hearing loss), balance problems, dizziness, nausea, or vomiting. Watch closely for changes in your health, and be sure to contact your doctor if:    · Your tinnitus moves from both ears to one ear. · Your hearing loss gets worse within 1 day after an ear injury.      · Your tinnitus or hearing loss does not get better within 1 week after an ear injury. · Your tinnitus bothers you enough that you want to take medicines to help you cope with it. If you notice changes in your tinnitus and/or your hearing, it is recommended that you have your hearing tested by your audiologist and to follow-up with your physician that manages your hearing loss (such as your ENT or Primary Care doctor). Learning About Hearing Aids  What is a hearing aid? A hearing aid makes sounds louder. It can help some people with hearing problems to hear better. Hearing aids do not restore normal hearing. But they can make it easier to communicate by making sounds clearer. · Digital programmable hearing aids can adjust themselves to work best where you are at any time. You also have more choices in setting them up than with analog hearing aids. There are also different styles of hearing aids. · A behind-the-ear (BTE) hearing aid connects to a plastic ear mold that fits inside the outer ear. BTE hearing aids are used for all levels of hearing loss, especially very severe hearing loss. They may be better for children for safety and growth reasons. Poorly fitting BTE ear molds or a buildup of earwax may cause a whistling sound (feedback). · An in-the-ear (ITE) hearing aid fits in the outer part of the ear. It can be used by people with mild to severe hearing loss. ITE hearing aids can be used with other hearing devices, such as a telecoil that improves hearing during phone calls. ITE hearing aids can be damaged by earwax and fluid draining from the ear. Their small size may be hard for some people to handle. They are not often used in children because the case must be replaced as the child grows. · An in-the-canal (ITC) hearing aid fits into the ear canal. ITC hearing aids are used by people with mild to moderate hearing loss.  They are made to fit the shape and the size of your ear canal. They can be damaged by earwax and fluid draining from the ear. Their small size may be hard for some people to handle. They are not made for children. You have some options if you have a hearing problem and are thinking about getting hearing aids. You can go to your doctor or an audiologist. He or she will do a hearing test and help you decide which type and style of hearing aid may be best for you. What else should I know about hearing aids? Find out if your insurance covers hearing aids. They can be expensive. Different types of hearing aids come with different costs. Also find out about a warranty or return policy in case you are not happy with your hearing aids. Follow-up care is a key part of your treatment and safety. Be sure to make and go to all appointments, and call your doctor if you are having problems. It's also a good idea to know your test results and keep a list of the medicines you take. Where can you learn more? Go to https://[employer].Passbox. org and sign in to your Be[employer] account. Enter R242 in the Inherited Health box to learn more about \"Learning About Hearing Aids. \"     If you do not have an account, please click on the \"Sign Up Now\" link. Current as of: October 21, 2018  Content Version: 12.1  © 1972-8122 Healthwise, Incorporated. Care instructions adapted under license by [facility] [place] ([facility]). If you have questions about a medical condition or this instruction, always ask your healthcare professional. [place] [address] any warranty or liability for your use of this information. Noise-Induced Hearing Loss  What it is, and what you can do to prevent it      Exposure to loud sounds, in an occupational setting or recreational, can cause permanent hearing loss. Sound is measured in decibels (dB). Noise-induced hearing loss is the ONLY type of preventable hearing loss. Hearing loss related to noise exposure can occur at any age.       There are small sensory cells, called inner and outer hair cells, within the inner ear (cochlea). These cells process the loudness (intensity) and pitch (frequency) of sound and send the signal to the brain via our auditory nerve (vestibulocochlear nerve, cranial nerve VIII). When these cells are damaged, they can result in permanent hearing loss and/or tinnitus. The hair cells responsible for high frequency sounds, like birds chirping, are most likely to be damaged due to loud sounds. The high frequency sounds are also very important for our clarity and understanding of speech. OCCUPATIONAL NOISE EXPOSURE RECREATIONAL NOISE EXPOSURE   Some jobs may have exposure to loud sounds in the workplace. These jobs may include but are not limited to:  [employer]   Construction   Welding   Landscaping   Hairdressing/hairstyling   Musicians  [employer]    ... And more! Many activities outside of work may cause permanent hearing loss. These activities may include but are not limited to:  Lawnmowers, leaf blowers  [employer] (such as pigs squealing)   Chainsaws and other power tools  [employer] musical instruments and/or singing   Listening to music too loudly - at concerts, through stereo, through ear buds or headphones   Attending sporting events   Attending fireworks shows or using fireworks at home  [employer] of firearms   . .. And more! REDUCE OR PROTECT YOUR EARS FROM NOISE EXPOSURE    To do your best to avoid noise-induced hearing loss, here are some tips:   Limit exposure to loud sounds. 85 dB (decibels) is safe for 8 hours. As sounds are louder, the length of time the sound is safe lessens. These numbers are cumulative across a 24-hour period.   (NIOSH and CDC, 2002)  o 85 dB is safe for 8 hours  o 88 dB is safe for 4 hours  o 91 dB is safe for 2 hours  o 94 dB is safe for 1 hour  o 97 dB is safe for 30 minutes  o 100 dB is safe for 15 minutes  o 103 dB is safe for 7.5 minutes  o 106 dB is safe for 3.75 minutes  o 109 dB is safe for LESS THAN 2 minutes  o 112 dB is safe for LESS THAN 1 minute  o 115 dB is safe for ~ 30 seconds  o 130 dB can cause IMMEDIATE hearing loss   If you are unsure if a sound is too loud, consider checking the sound level with a \"sound level meter\". There are apps on smart devices that can measure the loudness of the sound. They are not as accurate as expensive equipment used by scientists, but it will give you a guesstimate of how loud the sound is, and if it may be damaging to your hearing.  If you cannot avoid loud sounds, here are ways to reduce your exposure:  o 1. Wear hearing protection  - Ear plugs and protective ear muffs can be used to reduce the intensity of the sound. The higher the NRR (noise reduction rating), the better reduction of the intensity of the sound   o 2. Turn the volume down  - When listening to music, turn the volume down, especially when wearing ear buds or headphones. A good rule of thumb is to not go beyond the middle setting on your device. If you can't hear someone talking to you from arm's length away, your music may be at a level that it can cause damage. If someone else can hear your music from 3 feet away, it may also be at a level that it can cause damage. o 3. Walk away from the sound  - If you do not have the ability to wear hearing protection or turn down the volume of the sound, you should do your best to move away from the source of the sound. - Sound decreases in intensity as we move further from the source. The sound will decrease by 6 dB for every doubling of distance from the sound source. Exposure to these sounds may cause permanent damage to your hearing.   If you suspect your hearing has changed, it is recommended that you have your hearing tested by your audiologist.

## 2021-06-09 NOTE — Clinical Note
Dr. Og Blanco,    Please see note from this patient's audiogram from today. Please let me know if there is anything further you need.         Tita Sanches  Audiologist

## 2021-06-10 ENCOUNTER — APPOINTMENT (OUTPATIENT)
Dept: PHYSICAL THERAPY | Age: 61
End: 2021-06-10
Payer: COMMERCIAL

## 2021-06-11 ENCOUNTER — TELEPHONE (OUTPATIENT)
Dept: ENT CLINIC | Age: 61
End: 2021-06-11

## 2021-06-11 RX ORDER — AMOXICILLIN AND CLAVULANATE POTASSIUM 875; 125 MG/1; MG/1
1 TABLET, FILM COATED ORAL 2 TIMES DAILY
Qty: 14 TABLET | Refills: 0 | Status: SHIPPED | OUTPATIENT
Start: 2021-06-11 | End: 2021-06-18

## 2021-06-11 NOTE — TELEPHONE ENCOUNTER
Pt called back because he feels like he can't wait till over the weekend he says that he has brown stuff coming out and wants to get it treated right away before it gets any worse, So can we go ahead and send in the antibiotic so he can start to feel better over the weekend?  Please advise

## 2021-06-11 NOTE — TELEPHONE ENCOUNTER
Pt called and he wanted to know if we would send in antibiotic, After looking at [facility] note it does state to please wait 5 days to see if he starts feeling better. I told him to call on Monday if he was not feeling any better and we would send something in for him             Patient is complaining of a runny nose over the last 2 days. This probably a viral URI.   If this is not resolved in about 5 days, I told him to call the office and we can call him in an antibiotic

## 2021-06-14 ENCOUNTER — TELEPHONE (OUTPATIENT)
Dept: ENT CLINIC | Age: 61
End: 2021-06-14

## 2021-06-14 RX ORDER — AMOXICILLIN 250 MG/1
250 CAPSULE ORAL 3 TIMES DAILY
Qty: 21 CAPSULE | Refills: 0 | Status: SHIPPED | OUTPATIENT
Start: 2021-06-14 | End: 2021-06-21

## 2021-06-14 NOTE — TELEPHONE ENCOUNTER
augmentin was called in for pt on Friday but he states he can't take it b/c it's too hard on his stomach. He is requesting Amoxicillin 250mg tabs instead. Please call to advise.

## 2021-06-15 ENCOUNTER — TELEPHONE (OUTPATIENT)
Dept: CARDIOLOGY CLINIC | Age: 61
End: 2021-06-15

## 2021-06-15 ENCOUNTER — HOSPITAL ENCOUNTER (OUTPATIENT)
Dept: PHYSICAL THERAPY | Age: 61
Setting detail: THERAPIES SERIES
Discharge: HOME | End: 2021-06-15
Payer: COMMERCIAL

## 2021-06-15 NOTE — FLOWSHEET NOTE
[facility], [facility]  [address] [place], [facility]  Phone: [phone]   Fax:     [phone]    Physical Therapy  Cancellation/No-show Note  Patient Name:  João Luo  :  1960   Date:  6/15/2021  Cancelled visits to date: 1  No-shows to date: 0    Patient status for today's appointment patient:  [x]  Cancelled  []  Rescheduled appointment  []  No-show     Reason given by patient:  []  Patient ill  []  Conflicting appointment  []  No transportation    []  Conflict with work  [x]  No reason given  []  Other:     Comments:      Phone call information:   []  Phone call made today to patient at _ time at number provided:      []  Patient answered, conversation as follows:    []  Patient did not answer, message left as follows:  [x]  Phone call not made today    Electronically signed by:  Nicolette  Lan Spann

## 2021-06-15 NOTE — TELEPHONE ENCOUNTER
Gosia Mckinley called in this morning stating that his HR has been all over the place this morning. He is wanting to know if Susie Alvarado can call him back and asked what AASHISH Martin thinks could be going on? He said his BP is fine, it was 109/90 this morning.      You can reach Gosia Mckinley at #[phone]

## 2021-06-15 NOTE — TELEPHONE ENCOUNTER
Please see Veronica's message below and notify. Patient should follow up as scheduled with Dr Marvin Gonzalez in July.

## 2021-06-15 NOTE — TELEPHONE ENCOUNTER
Device check from this morning reveals normal device function with no arrhythmias to correlate with pt reported symptoms. Thoracic impedance HF monitor appears high implying pt is trending toward less fluid.

## 2021-06-15 NOTE — TELEPHONE ENCOUNTER
Device check indicates he is not having arrhythmias as reported and he is not hypervolemic. He should treat his URI as indicated by his PCP. Please let him know the CT scan shows no active inflammation, this is great news  No strictures or abscesses or fistulas visualized either  There are two incidental findings- pulmonary nodules, which will need follow up imaging as well as nonobstructing kidney stone  Please ask patient who is PCP is and make sure these results are faxed to them and they are aware       Thank you

## 2021-06-16 ENCOUNTER — ANTI-COAG VISIT (OUTPATIENT)
Dept: PHARMACY | Age: 61
End: 2021-06-16
Payer: COMMERCIAL

## 2021-06-16 DIAGNOSIS — Z79.01 CHRONIC ANTICOAGULATION: Primary | ICD-10-CM

## 2021-06-16 LAB — INTERNATIONAL NORMALIZATION RATIO, POC: 2.3

## 2021-06-16 PROCEDURE — 85610 PROTHROMBIN TIME: CPT

## 2021-06-16 PROCEDURE — 99211 OFF/OP EST MAY X REQ PHY/QHP: CPT

## 2021-06-16 NOTE — PROGRESS NOTES
ill. His INR is in range today at 2.3. We will continue his current dosing and see him in 2 weeks, as his INR readings have been quite volatile lately. Description    CONTINUE: Warfarin 2.5 mg daily EXCEPT 5mg every Monday, Wednesday and Friday    Call with signs or symptoms of bleeding or any concerns or questions. If significant bleeding seek immediate medical attention. Call with medications changes, especially antibiotics and steroids and including any over-the-counter medications or herbal products. Call if you stop any medications. Keep the number of servings of Vitamin K (dark green vegetables) consistent from week to week  Eats three good portion of broccoli or brussel sprouts per week. Immunization History   Administered Date(s) Administered    COVID-19, Moderna, PF, 100mcg/0.5mL 03/09/2021, 04/08/2021    Influenza, Quadv, IM, PF (6 mo and older Fluzone, Flulaval, Fluarix, and 3 yrs and older Afluria) 10/27/2017, 09/23/2019, 10/02/2020    Influenza, Aldean Cheese, Recombinant, IM PF (Flublok 18 yrs and older) 10/03/2018    Pneumococcal Conjugate 13-valent ([id number]) 09/11/2015    Pneumococcal Conjugate Vaccine 08/15/2017    Pneumococcal Polysaccharide ([id number]) 08/01/2007, 12/19/2012    Tdap (Boostrix, Adacel) 08/19/2014         Reviewed AVS with patient / caregiver.       CLINICAL PHARMACY CONSULT: MED RECONCILIATION/REVIEW ADDENDUM    For Pharmacy Admin Tracking Only     Intervention Detail:    Total # of Interventions Recommended: 0   Total # of Interventions Accepted: 0   Time Spent (min): 20

## 2021-06-17 ENCOUNTER — HOSPITAL ENCOUNTER (OUTPATIENT)
Dept: PHYSICAL THERAPY | Age: 61
Setting detail: THERAPIES SERIES
End: 2021-06-17
Payer: COMMERCIAL

## 2021-06-18 ENCOUNTER — HOSPITAL ENCOUNTER (OUTPATIENT)
Dept: CT IMAGING | Age: 61
Discharge: HOME | End: 2021-06-18
Payer: COMMERCIAL

## 2021-06-18 DIAGNOSIS — R10.9 ACUTE LEFT FLANK PAIN: ICD-10-CM

## 2021-06-18 PROCEDURE — 74176 CT ABD & PELVIS W/O CONTRAST: CPT

## 2021-06-19 ENCOUNTER — HOSPITAL ENCOUNTER (OUTPATIENT)
Age: 61
Discharge: HOME | End: 2021-06-19
Payer: COMMERCIAL

## 2021-06-19 DIAGNOSIS — R10.9 ACUTE LEFT FLANK PAIN: ICD-10-CM

## 2021-06-19 LAB
ANION GAP SERPL CALCULATED.3IONS-SCNC: 9 MMOL/L (ref 3–16)
BASOPHILS ABSOLUTE: 0.1 K/UL (ref 0–0.2)
BASOPHILS RELATIVE PERCENT: 1.4 %
BUN BLDV-MCNC: 9 MG/DL (ref 7–20)
CALCIUM SERPL-MCNC: 10.2 MG/DL (ref 8.3–10.6)
CHLORIDE BLD-SCNC: 103 MMOL/L (ref 99–110)
CO2: 26 MMOL/L (ref 21–32)
CREAT SERPL-MCNC: 1.1 MG/DL (ref 0.8–1.3)
EOSINOPHILS ABSOLUTE: 0.5 K/UL (ref 0–0.6)
EOSINOPHILS RELATIVE PERCENT: 7.5 %
GFR AFRICAN AMERICAN: >60
GFR NON-AFRICAN AMERICAN: >60
GLUCOSE BLD-MCNC: 104 MG/DL (ref 70–99)
HCT VFR BLD CALC: 47.5 % (ref 40.5–52.5)
HEMOGLOBIN: 16.1 G/DL (ref 13.5–17.5)
LYMPHOCYTES ABSOLUTE: 2.2 K/UL (ref 1–5.1)
LYMPHOCYTES RELATIVE PERCENT: 32.4 %
MCH RBC QN AUTO: 30.8 PG (ref 26–34)
MCHC RBC AUTO-ENTMCNC: 33.8 G/DL (ref 31–36)
MCV RBC AUTO: 91.1 FL (ref 80–100)
MONOCYTES ABSOLUTE: 0.6 K/UL (ref 0–1.3)
MONOCYTES RELATIVE PERCENT: 8.7 %
NEUTROPHILS ABSOLUTE: 3.4 K/UL (ref 1.7–7.7)
NEUTROPHILS RELATIVE PERCENT: 50 %
PDW BLD-RTO: 13.3 % (ref 12.4–15.4)
PLATELET # BLD: 201 K/UL (ref 135–450)
PMV BLD AUTO: 7.3 FL (ref 5–10.5)
POTASSIUM SERPL-SCNC: 4 MMOL/L (ref 3.5–5.1)
RBC # BLD: 5.21 M/UL (ref 4.2–5.9)
SODIUM BLD-SCNC: 138 MMOL/L (ref 136–145)
WBC # BLD: 6.7 K/UL (ref 4–11)

## 2021-06-19 PROCEDURE — 80048 BASIC METABOLIC PNL TOTAL CA: CPT

## 2021-06-19 PROCEDURE — 36415 COLL VENOUS BLD VENIPUNCTURE: CPT

## 2021-06-19 PROCEDURE — 85025 COMPLETE CBC W/AUTO DIFF WBC: CPT

## 2021-06-23 ENCOUNTER — TELEPHONE (OUTPATIENT)
Dept: CARDIOLOGY CLINIC | Age: 61
End: 2021-06-23

## 2021-06-23 NOTE — TELEPHONE ENCOUNTER
Paul Keene called in this afternoon stating that his HR is irregular today. He asked to talk to [facility]. [facility] asked me to ask him to send a reading and he will take a look at it.    Paul Keene said his BP is normal.    You can reach Paul Keene at #[phone]

## 2021-06-24 NOTE — TELEPHONE ENCOUNTER
Pt informed that device shows no new correlating arrhythmia. Pt notes that he currently has a kidney stone and attributes high bp and bpm to that.

## 2021-06-28 ENCOUNTER — NURSE ONLY (OUTPATIENT)
Dept: CARDIOLOGY CLINIC | Age: 61
End: 2021-06-28
Payer: COMMERCIAL

## 2021-06-28 DIAGNOSIS — Z95.810 BIVENTRICULAR ICD (IMPLANTABLE CARDIOVERTER-DEFIBRILLATOR) IN PLACE: ICD-10-CM

## 2021-06-28 DIAGNOSIS — I42.9 CARDIOMYOPATHY, UNSPECIFIED TYPE (HCC): ICD-10-CM

## 2021-06-28 DIAGNOSIS — I50.22 CHRONIC SYSTOLIC CHF (CONGESTIVE HEART FAILURE), NYHA CLASS 2 (HCC): ICD-10-CM

## 2021-06-28 PROCEDURE — 93295 DEV INTERROG REMOTE 1/2/MLT: CPT | Performed by: INTERNAL MEDICINE

## 2021-06-28 PROCEDURE — 93296 REM INTERROG EVL PM/IDS: CPT | Performed by: INTERNAL MEDICINE

## 2021-06-28 PROCEDURE — 93297 REM INTERROG DEV EVAL ICPMS: CPT | Performed by: INTERNAL MEDICINE

## 2021-06-28 NOTE — PROGRESS NOTES
We received remote transmission from patient's CRT-D monitor at home (programmed VVI RV @ 40bpm per 11.2017 specialty comments). Transmission shows normal sensing and pacing function. No new arrhythmias/events recorded. Thoracic impedance is stable. EP physician will review. See interrogation under cardiology tab in the [address] [place] [address] [address] field for more details.

## 2021-06-29 ENCOUNTER — ANTI-COAG VISIT (OUTPATIENT)
Dept: PHARMACY | Age: 61
End: 2021-06-29
Payer: COMMERCIAL

## 2021-06-29 ENCOUNTER — APPOINTMENT (OUTPATIENT)
Dept: PHARMACY | Age: 61
End: 2021-06-29
Payer: COMMERCIAL

## 2021-06-29 DIAGNOSIS — Z79.01 CHRONIC ANTICOAGULATION: ICD-10-CM

## 2021-06-29 DIAGNOSIS — I27.82 CHRONIC PULMONARY EMBOLISM, UNSPECIFIED PULMONARY EMBOLISM TYPE, UNSPECIFIED WHETHER ACUTE COR PULMONALE PRESENT (HCC): Primary | ICD-10-CM

## 2021-06-29 LAB — INR BLD: 3.8

## 2021-06-29 PROCEDURE — 99211 OFF/OP EST MAY X REQ PHY/QHP: CPT

## 2021-06-29 PROCEDURE — 85610 PROTHROMBIN TIME: CPT

## 2021-06-29 NOTE — PROGRESS NOTES
Mr. Clarence Carrillo is a 57 y.o.  male. Mr. Clarence Carrillo had an INR test today. Results were reviewed and appropriate warfarin management was completed. THIS VISIT WAS PERFORMED AS: An in person visit. Protocols were followed with precautions to reduce the spread of COVID-19. Patient verifies current dosing regimen: Yes     Warfarin medication reviewed and updated on the patient 's home medication list: Yes   All other medications reviewed and updated on the patient 's home medication list: No changes    Lab Results   Component Value Date    INR 3.80 06/29/2021    INR 2.3 06/16/2021    INR 1.3 06/02/2021       Patient Findings     Positives:  Change in medications, Change in diet/appetite    Negatives:  Signs/symptoms of bleeding, Change in health, Missed doses, Bruising    Comments:  Still taking amoxicillin, had to pause therapy for an allergy test, still has 5 days of therapy remaining. Not eating much due to kidney stone          Anticoagulation Summary  As of 6/29/2021    INR goal:  2.0-3.0   TTR:  34.4 % (5.7 y)   INR used for dosing:  3.80 (6/29/2021)   Warfarin maintenance plan:  5 mg (5 mg x 1) every Mon, Wed, Fri; 2.5 mg (5 mg x 0.5) all other days   Weekly warfarin total:  25 mg   Plan last modified:  Sudha Catalan (6/25/2020)   Next INR check:  7/14/2021   Priority:  Maintenance   Target end date:   Indefinite    Indications    Pulmonary embolus (HCC) [I26.99]  Chronic anticoagulation [Z79.01]             Anticoagulation Episode Summary     INR check location:  Anticoagulation Clinic    Preferred lab:      Send INR reminders to:  [facility] STAFF    Comments:  EPIC - finger stick every 2-3 weeks        Anticoagulation Care Providers     Provider Role Specialty Phone number    Felicie Brunner, MD Referring Internal Medicine [phone]          Warfarin assessment / plan:   Supratherapeutic INR assessment: 3.8  Denies extra warfarin doses  Denies illness, fever, or

## 2021-06-29 NOTE — PLAN OF CARE
[facility], [facility]  [address] [facility], [facility]  Phone: [phone]   Fax:     [phone]      Physical Therapy Discharge Summary    Dear Dr. Penelope Ulrich ,    We had the pleasure of treating the following patient for physical therapy services at [address]. A summary of our findings can be found in the discharge summary below. This includes our final assessment. If you have any questions or concerns regarding these findings, please do not hesitate to contact me at the office phone number checked above. Thank you for the referral.       Physician Signature:________________________________Date:__________________  By signing above, therapists plan is approved by physician          Patient: Yovani Dockery   : 1960   MRN: [id number]  Referring Physician:        Evaluation Date: 2021        Medical/Treatment Diagnosis Information:  · Diagnosis: L hip pain  · Treatment Diagnosis: decreased mobility, strength       Insurance/Certification information:  C - 60 vpcy     Date Range of Treatment: 21-21  Total visits:5      Functional Outcomes Measure: at discharge  Test: NT - pt cancelled last session and then called to discharge due to reports of feeling better   Score:    SUBJECTIVE:Pt was seen x 5 sessions and notes that pain is improving and is not as intense as it was prior to PT. He still c/o pain with prolonged standing. Pt called the PT dept on  with reports of \"feeling better\" and requesting d/c to hep.         Pain Scale: 6/10    Type: [x]Constant   []Intermitment  []Radiating []Localized  []other:     Functional Limitations: []Sitting [x]Standing [x]Walking    []Squatting []Stairs            []ADL's  []Driving []Sports/Recreation  []Other:        Response to Treatment:   []Patient met all goals and has responded well to treatment and will continue HEP   []Patient should continue to improve in reasonable time if they continue HEP   []Patient has plateaued and is no longer responding to skilled PT intervention    []Patient is getting worse and would benefit from return to referring MD   [x]Patient requesting d/c after 5 sessions due to improvement noted         PLAN:    .  Patient will need to maintain their HEP in order to prevent re-occurrence.       Reason for Discharge:  [] Goals Met   [] Patient did not return after initial evaluation   [] Progress Plateaued  [] Missed _____ scheduled appointments   [] No insurance coverage [x] Patient terminated therapy   [] MD discharged from PT [] Financial Limitations  [] Other:      Electronically signed by:  Vlad Sebastian

## 2021-07-01 ENCOUNTER — HOSPITAL ENCOUNTER (INPATIENT)
Age: 61
LOS: 2 days | Discharge: HOME | DRG: 660 | End: 2021-07-03
Attending: INTERNAL MEDICINE | Admitting: INTERNAL MEDICINE
Payer: COMMERCIAL

## 2021-07-01 DIAGNOSIS — N23 URETERAL COLIC: ICD-10-CM

## 2021-07-01 DIAGNOSIS — N20.0 KIDNEY STONE: ICD-10-CM

## 2021-07-01 DIAGNOSIS — R10.9 RIGHT FLANK PAIN: Primary | ICD-10-CM

## 2021-07-01 LAB
A/G RATIO: 1.4 (ref 1.1–2.2)
ALBUMIN SERPL-MCNC: 3.9 G/DL (ref 3.4–5)
ALP BLD-CCNC: 106 U/L (ref 40–129)
ALT SERPL-CCNC: 24 U/L (ref 10–40)
ANION GAP SERPL CALCULATED.3IONS-SCNC: 9 MMOL/L (ref 3–16)
AST SERPL-CCNC: 31 U/L (ref 15–37)
BASOPHILS ABSOLUTE: 0 K/UL (ref 0–0.2)
BASOPHILS RELATIVE PERCENT: 0.9 %
BILIRUB SERPL-MCNC: 0.5 MG/DL (ref 0–1)
BILIRUBIN URINE: NEGATIVE
BLOOD, URINE: ABNORMAL
BUN BLDV-MCNC: 13 MG/DL (ref 7–20)
CALCIUM SERPL-MCNC: 10.8 MG/DL (ref 8.3–10.6)
CHLORIDE BLD-SCNC: 102 MMOL/L (ref 99–110)
CLARITY: ABNORMAL
CO2: 29 MMOL/L (ref 21–32)
COLOR: YELLOW
CREAT SERPL-MCNC: 1.3 MG/DL (ref 0.8–1.3)
EOSINOPHILS ABSOLUTE: 0.3 K/UL (ref 0–0.6)
EOSINOPHILS RELATIVE PERCENT: 5.5 %
EPITHELIAL CELLS, UA: 0 /HPF (ref 0–5)
GFR AFRICAN AMERICAN: >60
GFR NON-AFRICAN AMERICAN: 56
GLOBULIN: 2.8 G/DL
GLUCOSE BLD-MCNC: 92 MG/DL (ref 70–99)
GLUCOSE URINE: NEGATIVE MG/DL
HCT VFR BLD CALC: 44 % (ref 40.5–52.5)
HEMOGLOBIN: 15 G/DL (ref 13.5–17.5)
HYALINE CASTS: 1 /LPF (ref 0–8)
KETONES, URINE: NEGATIVE MG/DL
LEUKOCYTE ESTERASE, URINE: NEGATIVE
LIPASE: 25 U/L (ref 13–60)
LYMPHOCYTES ABSOLUTE: 1.3 K/UL (ref 1–5.1)
LYMPHOCYTES RELATIVE PERCENT: 22.4 %
MCH RBC QN AUTO: 30.9 PG (ref 26–34)
MCHC RBC AUTO-ENTMCNC: 34.2 G/DL (ref 31–36)
MCV RBC AUTO: 90.4 FL (ref 80–100)
MICROSCOPIC EXAMINATION: YES
MONOCYTES ABSOLUTE: 0.6 K/UL (ref 0–1.3)
MONOCYTES RELATIVE PERCENT: 10.7 %
NEUTROPHILS ABSOLUTE: 3.5 K/UL (ref 1.7–7.7)
NEUTROPHILS RELATIVE PERCENT: 60.5 %
NITRITE, URINE: NEGATIVE
PDW BLD-RTO: 13.4 % (ref 12.4–15.4)
PH UA: 7.5 (ref 5–8)
PLATELET # BLD: 157 K/UL (ref 135–450)
PMV BLD AUTO: 7.5 FL (ref 5–10.5)
POTASSIUM REFLEX MAGNESIUM: 4.1 MMOL/L (ref 3.5–5.1)
PROTEIN UA: ABNORMAL MG/DL
RBC # BLD: 4.86 M/UL (ref 4.2–5.9)
RBC UA: 527 /HPF (ref 0–4)
SODIUM BLD-SCNC: 140 MMOL/L (ref 136–145)
SPECIFIC GRAVITY UA: 1.01 (ref 1–1.03)
TOTAL PROTEIN: 6.7 G/DL (ref 6.4–8.2)
URINE REFLEX TO CULTURE: ABNORMAL
URINE TYPE: ABNORMAL
UROBILINOGEN, URINE: 0.2 E.U./DL
WBC # BLD: 5.7 K/UL (ref 4–11)
WBC UA: 2 /HPF (ref 0–5)

## 2021-07-01 PROCEDURE — 2580000003 HC RX 258: Performed by: INTERNAL MEDICINE

## 2021-07-01 PROCEDURE — 96375 TX/PRO/DX INJ NEW DRUG ADDON: CPT

## 2021-07-01 PROCEDURE — 80053 COMPREHEN METABOLIC PANEL: CPT

## 2021-07-01 PROCEDURE — 1200000000 HC SEMI PRIVATE

## 2021-07-01 PROCEDURE — 96374 THER/PROPH/DIAG INJ IV PUSH: CPT

## 2021-07-01 PROCEDURE — 81001 URINALYSIS AUTO W/SCOPE: CPT

## 2021-07-01 PROCEDURE — 36415 COLL VENOUS BLD VENIPUNCTURE: CPT

## 2021-07-01 PROCEDURE — 6360000002 HC RX W HCPCS: Performed by: PHYSICIAN ASSISTANT

## 2021-07-01 PROCEDURE — 99283 EMERGENCY DEPT VISIT LOW MDM: CPT

## 2021-07-01 PROCEDURE — 6360000002 HC RX W HCPCS: Performed by: INTERNAL MEDICINE

## 2021-07-01 PROCEDURE — 83690 ASSAY OF LIPASE: CPT

## 2021-07-01 PROCEDURE — 85025 COMPLETE CBC W/AUTO DIFF WBC: CPT

## 2021-07-01 RX ORDER — SODIUM CHLORIDE 9 MG/ML
INJECTION, SOLUTION INTRAVENOUS CONTINUOUS
Status: DISCONTINUED | OUTPATIENT
Start: 2021-07-01 | End: 2021-07-03 | Stop reason: HOSPADM

## 2021-07-01 RX ORDER — ALBUTEROL SULFATE 2.5 MG/3ML
2.5 SOLUTION RESPIRATORY (INHALATION) EVERY 4 HOURS PRN
Status: DISCONTINUED | OUTPATIENT
Start: 2021-07-01 | End: 2021-07-03 | Stop reason: HOSPADM

## 2021-07-01 RX ORDER — GUAIFENESIN 600 MG/1
600 TABLET, EXTENDED RELEASE ORAL 2 TIMES DAILY
COMMUNITY

## 2021-07-01 RX ORDER — METOPROLOL SUCCINATE 25 MG/1
25 TABLET, EXTENDED RELEASE ORAL DAILY
Status: DISCONTINUED | OUTPATIENT
Start: 2021-07-02 | End: 2021-07-03 | Stop reason: HOSPADM

## 2021-07-01 RX ORDER — ONDANSETRON 2 MG/ML
4 INJECTION INTRAMUSCULAR; INTRAVENOUS ONCE
Status: COMPLETED | OUTPATIENT
Start: 2021-07-01 | End: 2021-07-01

## 2021-07-01 RX ORDER — AMOXICILLIN 250 MG/1
250 CAPSULE ORAL 3 TIMES DAILY
Status: ON HOLD | COMMUNITY
End: 2021-07-03 | Stop reason: HOSPADM

## 2021-07-01 RX ORDER — ONDANSETRON 2 MG/ML
4 INJECTION INTRAMUSCULAR; INTRAVENOUS EVERY 6 HOURS PRN
Status: DISCONTINUED | OUTPATIENT
Start: 2021-07-01 | End: 2021-07-03 | Stop reason: HOSPADM

## 2021-07-01 RX ORDER — ONDANSETRON 4 MG/1
4 TABLET, ORALLY DISINTEGRATING ORAL EVERY 8 HOURS PRN
Status: DISCONTINUED | OUTPATIENT
Start: 2021-07-01 | End: 2021-07-03 | Stop reason: HOSPADM

## 2021-07-01 RX ORDER — PRAVASTATIN SODIUM 10 MG
40 TABLET ORAL DAILY
Status: DISCONTINUED | OUTPATIENT
Start: 2021-07-02 | End: 2021-07-03 | Stop reason: HOSPADM

## 2021-07-01 RX ORDER — TAMSULOSIN HYDROCHLORIDE 0.4 MG/1
0.4 CAPSULE ORAL DAILY
Status: DISCONTINUED | OUTPATIENT
Start: 2021-07-02 | End: 2021-07-02

## 2021-07-01 RX ORDER — POLYETHYLENE GLYCOL 3350 17 G/17G
17 POWDER, FOR SOLUTION ORAL DAILY PRN
Status: DISCONTINUED | OUTPATIENT
Start: 2021-07-01 | End: 2021-07-03 | Stop reason: HOSPADM

## 2021-07-01 RX ORDER — SODIUM CHLORIDE 0.9 % (FLUSH) 0.9 %
5-40 SYRINGE (ML) INJECTION PRN
Status: DISCONTINUED | OUTPATIENT
Start: 2021-07-01 | End: 2021-07-03 | Stop reason: HOSPADM

## 2021-07-01 RX ORDER — SODIUM CHLORIDE 9 MG/ML
25 INJECTION, SOLUTION INTRAVENOUS PRN
Status: DISCONTINUED | OUTPATIENT
Start: 2021-07-01 | End: 2021-07-03 | Stop reason: HOSPADM

## 2021-07-01 RX ORDER — SODIUM CHLORIDE 0.9 % (FLUSH) 0.9 %
5-40 SYRINGE (ML) INJECTION EVERY 12 HOURS SCHEDULED
Status: DISCONTINUED | OUTPATIENT
Start: 2021-07-01 | End: 2021-07-03 | Stop reason: HOSPADM

## 2021-07-01 RX ORDER — DEXTROSE AND SODIUM CHLORIDE 5; .45 G/100ML; G/100ML
INJECTION, SOLUTION INTRAVENOUS CONTINUOUS
Status: DISCONTINUED | OUTPATIENT
Start: 2021-07-01 | End: 2021-07-03 | Stop reason: HOSPADM

## 2021-07-01 RX ORDER — PANTOPRAZOLE SODIUM 40 MG/1
40 TABLET, DELAYED RELEASE ORAL
Status: DISCONTINUED | OUTPATIENT
Start: 2021-07-02 | End: 2021-07-03 | Stop reason: HOSPADM

## 2021-07-01 RX ORDER — ZOLPIDEM TARTRATE 5 MG/1
10 TABLET ORAL NIGHTLY PRN
Status: DISCONTINUED | OUTPATIENT
Start: 2021-07-02 | End: 2021-07-03 | Stop reason: HOSPADM

## 2021-07-01 RX ORDER — ACETAMINOPHEN 325 MG/1
650 TABLET ORAL EVERY 4 HOURS PRN
Status: DISCONTINUED | OUTPATIENT
Start: 2021-07-01 | End: 2021-07-03 | Stop reason: HOSPADM

## 2021-07-01 RX ORDER — PREGABALIN 25 MG/1
75 CAPSULE ORAL 2 TIMES DAILY
Status: DISCONTINUED | OUTPATIENT
Start: 2021-07-01 | End: 2021-07-03 | Stop reason: HOSPADM

## 2021-07-01 RX ORDER — OXYCODONE HYDROCHLORIDE AND ACETAMINOPHEN 5; 325 MG/1; MG/1
1 TABLET ORAL EVERY 4 HOURS PRN
Status: DISCONTINUED | OUTPATIENT
Start: 2021-07-01 | End: 2021-07-03 | Stop reason: HOSPADM

## 2021-07-01 RX ADMIN — HYDROMORPHONE HYDROCHLORIDE 0.5 MG: 1 INJECTION, SOLUTION INTRAMUSCULAR; INTRAVENOUS; SUBCUTANEOUS at 23:30

## 2021-07-01 RX ADMIN — ONDANSETRON 4 MG: 2 INJECTION INTRAMUSCULAR; INTRAVENOUS at 23:30

## 2021-07-01 RX ADMIN — HYDROMORPHONE HYDROCHLORIDE 1 MG: 1 INJECTION, SOLUTION INTRAMUSCULAR; INTRAVENOUS; SUBCUTANEOUS at 20:12

## 2021-07-01 RX ADMIN — ONDANSETRON 4 MG: 2 INJECTION INTRAMUSCULAR; INTRAVENOUS at 20:13

## 2021-07-01 RX ADMIN — SODIUM CHLORIDE, PRESERVATIVE FREE 10 ML: 5 INJECTION INTRAVENOUS at 23:49

## 2021-07-01 ASSESSMENT — PAIN DESCRIPTION - PAIN TYPE
TYPE: ACUTE PAIN
TYPE: ACUTE PAIN

## 2021-07-01 ASSESSMENT — ENCOUNTER SYMPTOMS
BACK PAIN: 0
CHOKING: 0
ABDOMINAL PAIN: 0
VOMITING: 1
APNEA: 0
SHORTNESS OF BREATH: 0
NAUSEA: 1
FACIAL SWELLING: 0
EYE DISCHARGE: 0
EYE REDNESS: 0

## 2021-07-01 ASSESSMENT — PAIN SCALES - GENERAL
PAINLEVEL_OUTOF10: 10
PAINLEVEL_OUTOF10: 7
PAINLEVEL_OUTOF10: 0
PAINLEVEL_OUTOF10: 7
PAINLEVEL_OUTOF10: 8
PAINLEVEL_OUTOF10: 10

## 2021-07-01 ASSESSMENT — PAIN DESCRIPTION - ONSET: ONSET: ON-GOING

## 2021-07-01 ASSESSMENT — PAIN DESCRIPTION - PROGRESSION: CLINICAL_PROGRESSION: NOT CHANGED

## 2021-07-01 ASSESSMENT — PAIN DESCRIPTION - LOCATION: LOCATION: FLANK

## 2021-07-01 ASSESSMENT — PAIN DESCRIPTION - ORIENTATION: ORIENTATION: RIGHT;LEFT

## 2021-07-02 ENCOUNTER — ANESTHESIA EVENT (OUTPATIENT)
Dept: OPERATING ROOM | Age: 61
DRG: 660 | End: 2021-07-02
Payer: COMMERCIAL

## 2021-07-02 ENCOUNTER — ANESTHESIA (OUTPATIENT)
Dept: OPERATING ROOM | Age: 61
DRG: 660 | End: 2021-07-02
Payer: COMMERCIAL

## 2021-07-02 VITALS
OXYGEN SATURATION: 100 % | RESPIRATION RATE: 12 BRPM | DIASTOLIC BLOOD PRESSURE: 44 MMHG | SYSTOLIC BLOOD PRESSURE: 77 MMHG

## 2021-07-02 LAB
ANION GAP SERPL CALCULATED.3IONS-SCNC: 8 MMOL/L (ref 3–16)
BASOPHILS ABSOLUTE: 0 K/UL (ref 0–0.2)
BASOPHILS RELATIVE PERCENT: 1 %
BUN BLDV-MCNC: 12 MG/DL (ref 7–20)
CALCIUM SERPL-MCNC: 9.7 MG/DL (ref 8.3–10.6)
CHLORIDE BLD-SCNC: 104 MMOL/L (ref 99–110)
CO2: 27 MMOL/L (ref 21–32)
CREAT SERPL-MCNC: 1.2 MG/DL (ref 0.8–1.3)
EOSINOPHILS ABSOLUTE: 0.2 K/UL (ref 0–0.6)
EOSINOPHILS RELATIVE PERCENT: 4.6 %
GFR AFRICAN AMERICAN: >60
GFR NON-AFRICAN AMERICAN: >60
GLUCOSE BLD-MCNC: 189 MG/DL (ref 70–99)
HCT VFR BLD CALC: 40.3 % (ref 40.5–52.5)
HEMOGLOBIN: 13.7 G/DL (ref 13.5–17.5)
INR BLD: 2.36 (ref 0.88–1.12)
LYMPHOCYTES ABSOLUTE: 0.8 K/UL (ref 1–5.1)
LYMPHOCYTES RELATIVE PERCENT: 22.8 %
MCH RBC QN AUTO: 30.9 PG (ref 26–34)
MCHC RBC AUTO-ENTMCNC: 33.9 G/DL (ref 31–36)
MCV RBC AUTO: 91.3 FL (ref 80–100)
MONOCYTES ABSOLUTE: 0.4 K/UL (ref 0–1.3)
MONOCYTES RELATIVE PERCENT: 11.9 %
NEUTROPHILS ABSOLUTE: 2.2 K/UL (ref 1.7–7.7)
NEUTROPHILS RELATIVE PERCENT: 59.7 %
PDW BLD-RTO: 13.4 % (ref 12.4–15.4)
PLATELET # BLD: 141 K/UL (ref 135–450)
PMV BLD AUTO: 7.5 FL (ref 5–10.5)
POTASSIUM REFLEX MAGNESIUM: 3.8 MMOL/L (ref 3.5–5.1)
PROTHROMBIN TIME: 27.7 SEC (ref 9.9–12.7)
RBC # BLD: 4.42 M/UL (ref 4.2–5.9)
SODIUM BLD-SCNC: 139 MMOL/L (ref 136–145)
WBC # BLD: 3.6 K/UL (ref 4–11)

## 2021-07-02 PROCEDURE — 6370000000 HC RX 637 (ALT 250 FOR IP): Performed by: UROLOGY

## 2021-07-02 PROCEDURE — 85025 COMPLETE CBC W/AUTO DIFF WBC: CPT

## 2021-07-02 PROCEDURE — 6360000002 HC RX W HCPCS: Performed by: UROLOGY

## 2021-07-02 PROCEDURE — 6360000002 HC RX W HCPCS: Performed by: INTERNAL MEDICINE

## 2021-07-02 PROCEDURE — 2580000003 HC RX 258: Performed by: INTERNAL MEDICINE

## 2021-07-02 PROCEDURE — 2580000003 HC RX 258: Performed by: UROLOGY

## 2021-07-02 PROCEDURE — 1200000000 HC SEMI PRIVATE

## 2021-07-02 PROCEDURE — 6360000002 HC RX W HCPCS

## 2021-07-02 PROCEDURE — C1769 GUIDE WIRE: HCPCS | Performed by: UROLOGY

## 2021-07-02 PROCEDURE — 94761 N-INVAS EAR/PLS OXIMETRY MLT: CPT

## 2021-07-02 PROCEDURE — 3700000000 HC ANESTHESIA ATTENDED CARE: Performed by: UROLOGY

## 2021-07-02 PROCEDURE — 3700000001 HC ADD 15 MINUTES (ANESTHESIA): Performed by: UROLOGY

## 2021-07-02 PROCEDURE — 85610 PROTHROMBIN TIME: CPT

## 2021-07-02 PROCEDURE — 2500000003 HC RX 250 WO HCPCS

## 2021-07-02 PROCEDURE — 6360000004 HC RX CONTRAST MEDICATION: Performed by: UROLOGY

## 2021-07-02 PROCEDURE — 36415 COLL VENOUS BLD VENIPUNCTURE: CPT

## 2021-07-02 PROCEDURE — 3600000002 HC SURGERY LEVEL 2 BASE: Performed by: UROLOGY

## 2021-07-02 PROCEDURE — 94640 AIRWAY INHALATION TREATMENT: CPT

## 2021-07-02 PROCEDURE — 3600000012 HC SURGERY LEVEL 2 ADDTL 15MIN: Performed by: UROLOGY

## 2021-07-02 PROCEDURE — 7100000000 HC PACU RECOVERY - FIRST 15 MIN: Performed by: UROLOGY

## 2021-07-02 PROCEDURE — 2709999900 HC NON-CHARGEABLE SUPPLY: Performed by: UROLOGY

## 2021-07-02 PROCEDURE — 80048 BASIC METABOLIC PNL TOTAL CA: CPT

## 2021-07-02 PROCEDURE — 0T768DZ DILATION OF RIGHT URETER WITH INTRALUMINAL DEVICE, VIA NATURAL OR ARTIFICIAL OPENING ENDOSCOPIC: ICD-10-PCS | Performed by: UROLOGY

## 2021-07-02 PROCEDURE — 6370000000 HC RX 637 (ALT 250 FOR IP): Performed by: INTERNAL MEDICINE

## 2021-07-02 PROCEDURE — C2617 STENT, NON-COR, TEM W/O DEL: HCPCS | Performed by: UROLOGY

## 2021-07-02 PROCEDURE — 7100000001 HC PACU RECOVERY - ADDTL 15 MIN: Performed by: UROLOGY

## 2021-07-02 PROCEDURE — BT1D1ZZ FLUOROSCOPY OF RIGHT KIDNEY, URETER AND BLADDER USING LOW OSMOLAR CONTRAST: ICD-10-PCS | Performed by: UROLOGY

## 2021-07-02 PROCEDURE — C1758 CATHETER, URETERAL: HCPCS | Performed by: UROLOGY

## 2021-07-02 PROCEDURE — 99223 1ST HOSP IP/OBS HIGH 75: CPT | Performed by: INTERNAL MEDICINE

## 2021-07-02 DEVICE — STENT URET 6FR L26CM PERCFLX HYDR+ TAPR TIP GRAD
Type: IMPLANTABLE DEVICE | Site: URETER | Status: FUNCTIONAL
Removed: 2021-07-21

## 2021-07-02 RX ORDER — FUROSEMIDE 20 MG/1
10 TABLET ORAL 2 TIMES DAILY PRN
Status: DISCONTINUED | OUTPATIENT
Start: 2021-07-02 | End: 2021-07-03 | Stop reason: HOSPADM

## 2021-07-02 RX ORDER — ALBUTEROL SULFATE 2.5 MG/3ML
2.5 SOLUTION RESPIRATORY (INHALATION) 3 TIMES DAILY
Status: DISCONTINUED | OUTPATIENT
Start: 2021-07-02 | End: 2021-07-03 | Stop reason: HOSPADM

## 2021-07-02 RX ORDER — PHENYLEPHRINE HCL IN 0.9% NACL 1 MG/10 ML
SYRINGE (ML) INTRAVENOUS PRN
Status: DISCONTINUED | OUTPATIENT
Start: 2021-07-02 | End: 2021-07-02 | Stop reason: SDUPTHER

## 2021-07-02 RX ORDER — PROPOFOL 10 MG/ML
INJECTION, EMULSION INTRAVENOUS PRN
Status: DISCONTINUED | OUTPATIENT
Start: 2021-07-02 | End: 2021-07-02 | Stop reason: SDUPTHER

## 2021-07-02 RX ORDER — FENTANYL CITRATE 50 UG/ML
25 INJECTION, SOLUTION INTRAMUSCULAR; INTRAVENOUS EVERY 5 MIN PRN
Status: DISCONTINUED | OUTPATIENT
Start: 2021-07-02 | End: 2021-07-02 | Stop reason: HOSPADM

## 2021-07-02 RX ORDER — ONDANSETRON 2 MG/ML
4 INJECTION INTRAMUSCULAR; INTRAVENOUS
Status: DISCONTINUED | OUTPATIENT
Start: 2021-07-02 | End: 2021-07-02 | Stop reason: HOSPADM

## 2021-07-02 RX ORDER — MIDAZOLAM HYDROCHLORIDE 1 MG/ML
INJECTION INTRAMUSCULAR; INTRAVENOUS PRN
Status: DISCONTINUED | OUTPATIENT
Start: 2021-07-02 | End: 2021-07-02 | Stop reason: SDUPTHER

## 2021-07-02 RX ORDER — LIDOCAINE HYDROCHLORIDE 20 MG/ML
INJECTION, SOLUTION EPIDURAL; INFILTRATION; INTRACAUDAL; PERINEURAL PRN
Status: DISCONTINUED | OUTPATIENT
Start: 2021-07-02 | End: 2021-07-02 | Stop reason: SDUPTHER

## 2021-07-02 RX ORDER — FENTANYL CITRATE 50 UG/ML
INJECTION, SOLUTION INTRAMUSCULAR; INTRAVENOUS PRN
Status: DISCONTINUED | OUTPATIENT
Start: 2021-07-02 | End: 2021-07-02 | Stop reason: SDUPTHER

## 2021-07-02 RX ORDER — PROPOFOL 10 MG/ML
INJECTION, EMULSION INTRAVENOUS CONTINUOUS PRN
Status: DISCONTINUED | OUTPATIENT
Start: 2021-07-02 | End: 2021-07-02 | Stop reason: SDUPTHER

## 2021-07-02 RX ORDER — TAMSULOSIN HYDROCHLORIDE 0.4 MG/1
0.4 CAPSULE ORAL 2 TIMES DAILY
Status: DISCONTINUED | OUTPATIENT
Start: 2021-07-02 | End: 2021-07-03 | Stop reason: HOSPADM

## 2021-07-02 RX ADMIN — HYDROMORPHONE HYDROCHLORIDE 0.5 MG: 1 INJECTION, SOLUTION INTRAMUSCULAR; INTRAVENOUS; SUBCUTANEOUS at 05:31

## 2021-07-02 RX ADMIN — PROPOFOL 60 MG: 10 INJECTION, EMULSION INTRAVENOUS at 15:06

## 2021-07-02 RX ADMIN — TAMSULOSIN HYDROCHLORIDE 0.4 MG: 0.4 CAPSULE ORAL at 21:17

## 2021-07-02 RX ADMIN — TAMSULOSIN HYDROCHLORIDE 0.4 MG: 0.4 CAPSULE ORAL at 08:59

## 2021-07-02 RX ADMIN — ALBUTEROL SULFATE 2.5 MG: 2.5 SOLUTION RESPIRATORY (INHALATION) at 21:25

## 2021-07-02 RX ADMIN — HYDROMORPHONE HYDROCHLORIDE 0.5 MG: 1 INJECTION, SOLUTION INTRAMUSCULAR; INTRAVENOUS; SUBCUTANEOUS at 21:17

## 2021-07-02 RX ADMIN — PRAVASTATIN SODIUM 40 MG: 10 TABLET ORAL at 08:58

## 2021-07-02 RX ADMIN — SODIUM CHLORIDE: 9 INJECTION, SOLUTION INTRAVENOUS at 22:48

## 2021-07-02 RX ADMIN — FENTANYL CITRATE 25 MCG: 50 INJECTION INTRAMUSCULAR; INTRAVENOUS at 15:02

## 2021-07-02 RX ADMIN — PREGABALIN 75 MG: 25 CAPSULE ORAL at 08:58

## 2021-07-02 RX ADMIN — ALBUTEROL SULFATE 2.5 MG: 2.5 SOLUTION RESPIRATORY (INHALATION) at 07:58

## 2021-07-02 RX ADMIN — DEXTROSE AND SODIUM CHLORIDE: 5; 450 INJECTION, SOLUTION INTRAVENOUS at 01:54

## 2021-07-02 RX ADMIN — CEFTRIAXONE 2000 MG: 2 INJECTION, POWDER, FOR SOLUTION INTRAMUSCULAR; INTRAVENOUS at 14:57

## 2021-07-02 RX ADMIN — POLYETHYLENE GLYCOL 3350 17 G: 17 POWDER, FOR SOLUTION ORAL at 21:17

## 2021-07-02 RX ADMIN — SODIUM CHLORIDE: 9 INJECTION, SOLUTION INTRAVENOUS at 14:50

## 2021-07-02 RX ADMIN — ALBUTEROL SULFATE 2.5 MG: 2.5 SOLUTION RESPIRATORY (INHALATION) at 13:37

## 2021-07-02 RX ADMIN — ONDANSETRON 4 MG: 2 INJECTION INTRAMUSCULAR; INTRAVENOUS at 05:33

## 2021-07-02 RX ADMIN — ONDANSETRON 4 MG: 2 INJECTION INTRAMUSCULAR; INTRAVENOUS at 21:17

## 2021-07-02 RX ADMIN — MIDAZOLAM 1 MG: 1 INJECTION INTRAMUSCULAR; INTRAVENOUS at 15:02

## 2021-07-02 RX ADMIN — PROPOFOL 120 MCG/KG/MIN: 10 INJECTION, EMULSION INTRAVENOUS at 15:06

## 2021-07-02 RX ADMIN — MIDAZOLAM 1 MG: 1 INJECTION INTRAMUSCULAR; INTRAVENOUS at 14:57

## 2021-07-02 RX ADMIN — ONDANSETRON 4 MG: 2 INJECTION INTRAMUSCULAR; INTRAVENOUS at 12:31

## 2021-07-02 RX ADMIN — ZOLPIDEM TARTRATE 10 MG: 5 TABLET ORAL at 22:51

## 2021-07-02 RX ADMIN — HYDROMORPHONE HYDROCHLORIDE 0.5 MG: 1 INJECTION, SOLUTION INTRAMUSCULAR; INTRAVENOUS; SUBCUTANEOUS at 09:10

## 2021-07-02 RX ADMIN — LIDOCAINE HYDROCHLORIDE 50 MG: 20 INJECTION, SOLUTION EPIDURAL; INFILTRATION; INTRACAUDAL; PERINEURAL at 15:06

## 2021-07-02 RX ADMIN — Medication 100 MCG: at 15:27

## 2021-07-02 RX ADMIN — PREGABALIN 75 MG: 25 CAPSULE ORAL at 21:17

## 2021-07-02 RX ADMIN — SACUBITRIL AND VALSARTAN 1 TABLET: 49; 51 TABLET, FILM COATED ORAL at 21:17

## 2021-07-02 RX ADMIN — ALBUTEROL SULFATE 2.5 MG: 2.5 SOLUTION RESPIRATORY (INHALATION) at 01:44

## 2021-07-02 ASSESSMENT — PULMONARY FUNCTION TESTS
PIF_VALUE: 1
PIF_VALUE: 0
PIF_VALUE: 1

## 2021-07-02 ASSESSMENT — PAIN SCALES - GENERAL
PAINLEVEL_OUTOF10: 8
PAINLEVEL_OUTOF10: 4
PAINLEVEL_OUTOF10: 0
PAINLEVEL_OUTOF10: 4
PAINLEVEL_OUTOF10: 9
PAINLEVEL_OUTOF10: 10

## 2021-07-02 ASSESSMENT — ENCOUNTER SYMPTOMS: SHORTNESS OF BREATH: 1

## 2021-07-02 ASSESSMENT — PAIN DESCRIPTION - ONSET: ONSET: ON-GOING

## 2021-07-02 ASSESSMENT — PAIN DESCRIPTION - DESCRIPTORS: DESCRIPTORS: SHARP

## 2021-07-02 ASSESSMENT — LIFESTYLE VARIABLES: SMOKING_STATUS: 0

## 2021-07-02 ASSESSMENT — PAIN DESCRIPTION - PROGRESSION: CLINICAL_PROGRESSION: NOT CHANGED

## 2021-07-02 ASSESSMENT — PAIN - FUNCTIONAL ASSESSMENT
PAIN_FUNCTIONAL_ASSESSMENT: ACTIVITIES ARE NOT PREVENTED
PAIN_FUNCTIONAL_ASSESSMENT: 0-10
PAIN_FUNCTIONAL_ASSESSMENT: ACTIVITIES ARE NOT PREVENTED

## 2021-07-02 ASSESSMENT — PAIN DESCRIPTION - LOCATION: LOCATION: FLANK

## 2021-07-02 ASSESSMENT — PAIN DESCRIPTION - ORIENTATION: ORIENTATION: RIGHT;LEFT

## 2021-07-02 ASSESSMENT — PAIN DESCRIPTION - PAIN TYPE: TYPE: ACUTE PAIN

## 2021-07-02 NOTE — H&P
[address]                              HISTORY AND PHYSICAL    PATIENT NAME: Raheel Hernandez                        :        1960  MED REC NO:   [id number]                          ROOM:       [id number]  ACCOUNT NO:   [de-identified]                           ADMIT DATE: 2021  PROVIDER:     Cinthia Cobb MD    CHIEF COMPLAINT:  Intractable right flank pain. HISTORY OF PRESENT ILLNESS:  The patient is a 18-year-old white male  admitted through emergency. He presented with a history of recurrent  and persistent and severe right flank pain secondary to  ureterolithiasis. He takes Percocet at home for this pain when it  occurs and this pain was unrelieved with Percocet on a regular basis. He does have a past history of kidney stones bilaterally and he was seen  by Dr. Gely Corbin in Urology as an outpatient and plan was for cysto and  possible stent placement as an outpatient. However, his pain is  increased and he is being admitted now for pain control and  re-evaluation. PAST MEDICAL HISTORY:  Very long and storied and includes bronchiolitis  obliterans, cardiomyopathy, gastroesophageal reflux disease, history of  prior pulmonary embolism for which he is on warfarin therapy,  hypertension, hyperlipidemia. PAST SURGICAL HISTORY:  Includes cholecystectomy in the past.  He has  had multiple ureteroscopies in the past.  He has had lithotripsy,  pacemaker placement. ADMISSION MEDICATIONS:  Included Arthritis Tylenol, albuterol nebulizer,  baby aspirin, vitamin D, Dexilant, Entresto, metoprolol, furosemide as  needed for shortness of breath, Percocet p.r.n. pain, pravastatin,  Lyrica, tamsulosin, and zolpidem. ALLERGIES:  Multiple including DOXYCYCLINE, SULFA, HYDROCODONE which  causes nausea, ATROVENT NASAL SPRAY, MORPHINE, and NITROGLYCERIN. FAMILY HISTORY:  Not contributory.     SOCIAL HISTORY:  He is not a smoker or a drinker. REVIEW OF SYSTEMS:  Positive for flank pain. Positive for intermittent  shortness of breath. Negative for chest pain. Positive for postnasal  drip and cough. Positive for dysuria. Positive for hematuria. Ten-point review of systems that was performed was negative. PHYSICAL EXAMINATION:  VITAL SIGNS:  On admission, blood pressure was 140/70, pulse 56,  respiration 18, he was afebrile, O2 sats 99% on room air. GENERAL:  He is a well-developed, well-nourished white male lying in  bed, complaining of right flank pain but appears at rest.  HEENT:  Head is normocephalic, atraumatic. Pupils are equal, round,  reactive to light and accommodation. Extraocular muscles intact. NECK:  Supple without JVD. CHEST:  Mild rales at the bases without expiratory wheezing. CARDIOVASCULAR:  Regular rate and rhythm. ABDOMEN:  Soft. There was flank tenderness over the right CVA area with  palpation. Bowel sounds were active. EXTREMITIES:  Showed 1+ edema. NEUROLOGIC:  Alert, oriented, conversant. Moving all extremities  without focal neurologic deficits. LABORATORY DATA:  BUN and creatinine are 13 and 1.3, slightly elevated,  his creatinine is down to 1.2 overnight with rehydration. Calcium is  10.8, on rehydration was 9.7. Blood sugars was 92. White count 5000,  hemoglobin 15, platelet count of 939,341. Urinalysis showed large  blood, otherwise unremarkable. A CT of the abdomen was not done because it was done 06/18 as an  outpatient and it did show a 4-mm stone at the proximal right ureter,  multiple stones in the right kidney, and nonobstructing left renal  calculi. IMPRESSION:  1. Acute flank pain with right ureteral colic, intractable, unrelieved  with Percocet. 2.  Acute kidney injury. 3.  Nephrolithiasis, chronic and recurrent. 4.  Hypertension. 5.  Primary hyperparathyroidism. 6.  Warfarin therapy. 7.  Bronchiolitis obliterans.   8.  Chronic anticoagulation. PLAN:  He has been admitted. He will be given pain control with IV  Dilaudid and Zofran. Urology will see and follow. I spoke with Dr. Sen Hudson this morning. He was planning to see if we can get a cysto done  today. He will be followed by Dr. Flora Colvin and the Urology staff.         Andrew Barrow MD    D: 07/02/2021 7:53:33       T: 07/02/2021 14:42:24     [email or web address]  Job#: [id number]     Doc#: [id number]    CC:

## 2021-07-02 NOTE — ANESTHESIA PRE PROCEDURE
Department of Anesthesiology  Preprocedure Note       Name:  Madhuri Felder   Age:  57 y.o.  :  1960                                          MRN:  [id number]         Date:  2021      Surgeon: Carmen Briones): Raheel Vazquez DO    Procedure: CYSTOSCOPY, RIGHT URETERAL STENT INSERTION (Right )    Medications prior to admission:   Prior to Admission medications    Medication Sig Start Date End Date Taking? Authorizing Provider   guaiFENesin (MUCINEX) 600 MG extended release tablet Take 600 mg by mouth 2 times daily   Yes Historical Provider, MD   amoxicillin (AMOXIL) 250 MG capsule Take 250 mg by mouth 3 times daily   Yes Historical Provider, MD   zolpidem (AMBIEN) 10 MG tablet Take 1 tablet by mouth nightly as needed for Sleep for up to 14 days. 21 Yes Maurizio Munoz MD   ondansetron (ZOFRAN) 4 MG tablet Take 1 tablet by mouth every 8 hours as needed for Nausea or Vomiting 21  Yes BRENNA Larkin - SHANA   acetaminophen (TYLENOL 8 HOUR ARTHRITIS PAIN) 650 MG extended release tablet Take 650 mg by mouth 2 times daily As needed   Yes Historical Provider, MD   furosemide (LASIX) 20 MG tablet Take 0.5 tablets by mouth 2 times daily as needed (SOB) 21  Yes Sharri Carreon MD   oxyCODONE-acetaminophen (PERCOCET) 5-325 MG per tablet Take 1 tablet by mouth every 4 hours as needed for Pain.    Yes Historical Provider, MD   dexlansoprazole (DEXILANT) 60 MG CPDR delayed release capsule Take 60 mg by mouth daily   Yes Historical Provider, MD   pregabalin (LYRICA) 75 MG capsule TAKE 1 CAPSULE BY MOUTH IN  THE MORNING AND 2 CAPSULES  IN THE EVENING 21 Yes Maurizio Munoz MD   albuterol (PROVENTIL) (2.5 MG/3ML) 0.083% nebulizer solution Take 3 mLs by nebulization every 4 hours as needed for Wheezing or Shortness of Breath  Patient taking differently: Take 2.5 mg by nebulization three times daily  21  Yes Ying Hager MD   pravastatin (PRAVACHOL) 40 MG tablet Take 1 tablet by mouth daily  Patient taking differently: Take 40 mg by mouth every evening  3/18/21  Yes Morenita Gruber MD   warfarin (COUMADIN) 5 MG tablet Take 1 tablet by mouth daily Except 2.5 mg Tues and Thursday  Patient taking differently: Take 2.5 mg by mouth daily EXCEPT 5mg every Monday, Wednesday and Friday or as directed by [facility] Coumadin [facility] [phone] 3/12/21  Yes Dedra Bosworth, MD   dicyclomine (BENTYL) 10 MG capsule TAKE ONE CAPSULE BY MOUTH  FOUR TIMES DAILY AS NEEDED 3/2/21  Yes Morenita Gruber MD   tamsulosin (FLOMAX) 0.4 MG capsule Take 1 capsule by mouth 2 times daily 2/4/21  Yes Dedra Bosworth, MD   ENTRESTO 49-51 MG per tablet Take 1 tablet by mouth 2 times daily 1/24/21  Yes Historical Provider, MD   sodium chloride, Inhalant, 3 % nebulizer solution Take 4 mLs by nebulization every 8 hours as needed for Other or Cough (cough) 1/29/21  Yes Carson Jason MD   metoprolol succinate (TOPROL XL) 25 MG extended release tablet TAKE 1 TABLET BY MOUTH TWO  TIMES DAILY 11/17/20  Yes Dedra Bosworth, MD   Cholecalciferol (VITAMIN D3) 1.25 MG (94424 UT) CAPS Take 1 capsule by mouth once a week 9/15/20  Yes Dedra Bosworth, MD   albuterol sulfate  (90 Base) MCG/ACT inhaler INHALE 2 PUFFS INTO THE LUNGS EVERY 4 HOURS AS NEEDED FOR WHEEZING OR SHORTNESS OF BREATH 9/1/20  Yes BRENNA Zuniga - CNP   polyethylene glycol (MIRALAX) PACK packet Take 17 g by mouth nightly    Yes Historical Provider, MD   aspirin 81 MG tablet Take 81 mg by mouth daily   Yes Historical Provider, MD       Current medications:    Current Facility-Administered Medications   Medication Dose Route Frequency Provider Last Rate Last Admin    albuterol (PROVENTIL) nebulizer solution 2.5 mg  2.5 mg Nebulization TID Dedra Bosworth, MD   2.5 mg at 07/02/21 1337    HYDROmorphone (DILAUDID) injection 0.5 mg  0.5 mg Intravenous Q3H PRN Dedra Bosworth, MD   0.5 mg at 07/02/21 0910    furosemide (LASIX) tablet 10 mg  10 mg Oral BID PRN Kady Blackwood MD        tamsulosin [facility]) capsule 0.4 mg  0.4 mg Oral BID Kady Blackwood MD   0.4 mg at 07/02/21 0859    0.9 % sodium chloride infusion   Intravenous Continuous Kady Blackwood MD        sodium chloride flush 0.9 % injection 5-40 mL  5-40 mL Intravenous 2 times per day Rowena Conrad, DO   10 mL at 07/01/21 2349    sodium chloride flush 0.9 % injection 5-40 mL  5-40 mL Intravenous PRN Rowena Conrad, DO        0.9 % sodium chloride infusion  25 mL Intravenous PRN Rowena Conrad, DO        enoxaparin (LOVENOX) injection 40 mg  40 mg Subcutaneous Daily Rowena Conrad, DO        acetaminophen (TYLENOL) tablet 650 mg  650 mg Oral Q4H PRN Rowena Conrad, DO        ondansetron (ZOFRAN-ODT) disintegrating tablet 4 mg  4 mg Oral Q8H PRN Rowena Conrad, DO        Or    ondansetron (ZOFRAN) injection 4 mg  4 mg Intravenous Q6H PRN Rowena Conrad, DO   4 mg at 07/02/21 1231    polyethylene glycol (GLYCOLAX) packet 17 g  17 g Oral Daily PRN Rowena Conrad, DO        dextrose 5 % and 0.45 % sodium chloride infusion   Intravenous Continuous Rowena Conrad,  mL/hr at 07/02/21 0154 New Bag at 07/02/21 0154    albuterol (PROVENTIL) nebulizer solution 2.5 mg  2.5 mg Nebulization Q4H PRN Rowena Conrad, DO   2.5 mg at 07/02/21 0144    pantoprazole (PROTONIX) tablet 40 mg  40 mg Oral QAM AC Rowena Conrad, DO        sacubitril-valsartan (ENTRESTO) 49-51 MG per tablet 1 tablet  1 tablet Oral BID Rowena Conrad, DO        metoprolol succinate (TOPROL XL) extended release tablet 25 mg  25 mg Oral Daily Rowena Conrad, DO        oxyCODONE-acetaminophen (PERCOCET) 5-325 MG per tablet 1 tablet  1 tablet Oral Q4H PRN Rowena Conrad, DO        pravastatin (PRAVACHOL) tablet 40 mg  40 mg Oral Daily Rowena Conrad, DO   40 mg at 07/02/21 0858    pregabalin (LYRICA) capsule 75 mg  75 mg Oral BID Rowena Conrad DO   75 mg at 07/02/21 0858    zolpidem (AMBIEN) tablet 10 mg  10 mg Oral Nightly PRN [employer], DO           Allergies:     Allergies   Allergen Reactions    Ipratropium Bromide Hfa Shortness Of Breath    Atrovent Nasal Spray [Ipratropium] Other (See Comments)     Chest tightness    Doxycycline Hives    Morphine Other (See Comments)     \"makes me feel funny\"      Nitroglycerin Other (See Comments)     Severe hypotension (went from 155 to 66 systolic)    Sulfa Antibiotics Rash    Vicodin [Hydrocodone-Acetaminophen] Rash       Problem List:    Patient Active Problem List   Diagnosis Code    Nephrolithiasis N20.0    Pneumonia J18.9    Essential hypertension, benign I10    Back pain M54.9    URTI (acute upper respiratory infection) J06.9    Irritable bowel syndrome K58.9    Pure hypercholesterolemia E78.00    Allergic rhinitis J30.9    Right lower quadrant abdominal pain R10.31    Precordial pain R07.2    Bronchiolitis obliterans (HCC) J42    Bronchitis J40    Pulmonary embolus ([facility]) I26.99    Coronary artery disease due to calcified coronary lesion I25.10, I25.84    Atypical chest pain R07.89    Headache R51.9    Lightheaded R42    Leg pain, right M79.604    DDD (degenerative disc disease), lumbar M51.36    Acute non-recurrent maxillary sinusitis J01.00    Shortness of breath R06.02    Nausea R11.0    Transient cerebral ischemia G45.9    History of pulmonary embolism Z86.711    NICM (nonischemic cardiomyopathy) ([facility]) I42.8    Confusion R41.0    Right arm weakness R29.898    Elevated INR R79.1    Acute on chronic systolic heart failure ([facility]) I50.23    LBBB (left bundle branch block) I44.7    Acute confusional state F05    Ureteral stone N20.1    Urinary tract infection without hematuria N39.0    Encounter for adjustment of cardiac resynchronization therapy defibrillator (CRT-D) Z45.02    Biventricular ICD (implantable cardioverter-defibrillator) in place Z95.810    Chronic anticoagulation Z79.01    Paroxysmal atrial fibrillation ([facility]) I48.0    Hypercalcemia E83.52    Primary hyperparathyroidism (HCC) E21.0    On warfarin therapy Z79.01    Perinephric hematoma S37.019A    Renal hematoma, right S37.011A    Postoperative hemorrhagic shock T81.19XA    Acute blood loss anemia L89    Metabolic acidosis Z54.7    Tachycardia R00.0    Iron deficiency anemia due to chronic blood loss D50.0    Thrombocytopenia (HCC) D69.6    Hypoxia R09.02    Acute UTI N39.0    Abdominal wall hematoma S30. 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    Elevated liver enzymes R74.8    Acute flank pain N29.7    Colicky LLQ abdominal pain R10.32    Arm pain M79.603    Dysuria R30.0    Fever R50.9    Episode of shaking R25.1    Post-nasal drip R09.82    Foot pain M79.673    Pain in scapula M89.8X1    Skin lesions L98.9    Otalgia H92.09    Pleurisy R09.1    Chills R68.83    Swelling of calf P39.23    Ureteral colic E20    Gastroesophageal reflux disease with esophagitis without hemorrhage K21.00    Chest pain R07.9    Epistaxis R04.0       Past Medical History:        Diagnosis Date    Bronchiolitis obliterans (HCC)     Bundle branch block, right     Cardiomyopathy ([facility] [address]     Chest pain 9/24/2019    COVID-19 virus vaccine not available     Fibromyalgia     GERD (gastroesophageal reflux disease)     Hepatitis 1979    unsure of which type    Hx of blood clots     Hyperlipemia     Hypertension     IBS (irritable bowel syndrome)     Kidney stone     over thirty kidney stones    Neuropathy     right side-chest    Pneumothorax 2011    Right    Prostatitis     Pulmonary embolism on right [facility]) 2011    upper lobe-coumadin 2011    Sacroiliitis [facility])        Past Surgical History:        Procedure Laterality Date    CHOLECYSTECTOMY  2007    COLONOSCOPY  9/21/2012    dr Maralyn Nageotte  05/17/2019    RIGHT SIDED URETEROSCOPY  Diagnostic, retrograde pyelogram and fulguration of previously resected bladder tumor base    CYSTOSCOPY Right 5/17/2019    RIGHT SIDED URETEROSCOPY FLUGERATION  OF BLADDER performed by Tomas Enciso MD at [address]  2015    LITHOTRIPSY      PACEMAKER PLACEMENT      SINUS SURGERY      Made opening larger in sinuses    UPPER GASTROINTESTINAL ENDOSCOPY  3/28/2014    dr Dorothea Blake N/A 2/1/2021    EGD BIOPSY performed by Darion Chung MD at [facility] History:    Social History     Tobacco Use    Smoking status: Never Smoker    Smokeless tobacco: Never Used   Substance Use Topics    Alcohol use: No     Comment: occ                                Counseling given: Not Answered      Vital Signs (Current):   Vitals:    07/02/21 0758 07/02/21 0910 07/02/21 1342 07/02/21 1402   BP:  107/71  (!) 122/57   Pulse:  56  56   Resp: 16 18 16 16   Temp:  98.1 °F (36.7 °C)  96.6 °F (35.9 °C)   TempSrc:  Oral  Temporal   SpO2: 97% 92% 95% 97%   Weight:       Height:                                                  BP Readings from Last 3 Encounters:   07/02/21 (!) 122/57   06/18/21 131/69   06/09/21 134/86       NPO Status: Time of last liquid consumption: 2100 >8h                        Time of last solid consumption: 2100                        Date of last liquid consumption: 07/01/21                        Date of last solid food consumption: 07/01/21    BMI:   Wt Readings from Last 3 Encounters:   07/02/21 209 lb 6.4 oz (95 kg)   06/18/21 208 lb (94.3 kg)   05/24/21 208 lb (94.3 kg)     Body mass index is 33.8 kg/m².     CBC:   Lab Results   Component Value Date    WBC 3.6 07/02/2021    RBC 4.42 07/02/2021    HGB 13.7 07/02/2021    HCT 40.3 07/02/2021    MCV 91.3 07/02/2021    RDW 13.4 07/02/2021     07/02/2021       CMP:   Lab Results   Component Value Date     07/02/2021    K 3.8 07/02/2021     07/02/2021    CO2 27 07/02/2021    BUN 12 07/02/2021 CREATININE 1.2 07/02/2021    GFRAA >60 07/02/2021    GFRAA >60 05/02/2013    AGRATIO 1.4 07/01/2021    LABGLOM >60 07/02/2021    GLUCOSE 189 07/02/2021    PROT 6.7 07/01/2021    PROT 5.9 01/11/2013    CALCIUM 9.7 07/02/2021    BILITOT 0.5 07/01/2021    ALKPHOS 106 07/01/2021    AST 31 07/01/2021    ALT 24 07/01/2021       POC Tests: No results for input(s): POCGLU, POCNA, POCK, POCCL, POCBUN, POCHEMO, POCHCT in the last 72 hours. Coags:   Lab Results   Component Value Date    PROTIME 27.7 07/02/2021    PROTIME 37.6 01/29/2016    INR 2.36 07/02/2021    APTT 50.5 04/24/2021       HCG (If Applicable): No results found for: PREGTESTUR, PREGSERUM, HCG, HCGQUANT     ABGs:   Lab Results   Component Value Date    PHART 7.317 04/11/2013    PO2ART 62.2 04/11/2013    UDP9RRX 51.7 04/11/2013    OQY0AMK 25.7 04/11/2013    BEART -0.6 04/11/2013    E1YQJWHD 92.8 04/11/2013        Type & Screen (If Applicable):  No results found for: [facility]    Anesthesia Evaluation  Patient summary reviewed no history of anesthetic complications:   Airway: Mallampati: II  TM distance: >3 FB   Neck ROM: full  Mouth opening: > = 3 FB Dental:      Comment: Poor dentition    Pulmonary: breath sounds clear to auscultation  (+) shortness of breath (B. O. / NITE O2 2L, DAILY AND PRN INHALERS AND NEBS):      (-) not a current smoker          Patient did not smoke on day of surgery.                 ROS comment: Wears 2L nc 02 at home prn and hs   Cardiovascular:    (+) hypertension:, pacemaker: AICD and pacemaker, CAD:, CHF (CARDIOMYOPATHY):, GUEVARA:, hyperlipidemia    (-) past MI and dysrhythmias    ECG reviewed  Rhythm: regular  Rate: normal    Stress test reviewed       Beta Blocker:  Dose within 24 Hrs         Neuro/Psych:   (+) neuromuscular disease (FIBROMY.):, TIA, headaches:, psychiatric history:            GI/Hepatic/Renal:   (+) GERD:, hepatitis:, liver disease:, renal disease: kidney stones,      (-) no morbid obesity       Endo/Other: (+) blood dyscrasia: anticoagulation therapy, arthritis:., .                 Abdominal:             Vascular:   + DVT, PE. Other Findings:             Anesthesia Plan      MAC     ASA 4       Induction: intravenous. Anesthetic plan and risks discussed with patient and spouse. Plan discussed with CRNA. This pre-anesthesia assessment may be used as a history and physical.    DOS STAFF ADDENDUM:    Pt seen and examined, chart reviewed (including anesthesia, drug and allergy history). No interval changes to history and physical examination. Anesthetic plan, risks, benefits, alternatives, and personnel involved discussed with patient. Patient verbalized an understanding and agrees to proceed.       Meryl Cook MD  July 2, 2021  2:37 PM      Meryl Cook MD   7/2/2021

## 2021-07-02 NOTE — H&P
Dictated--IMP: Principal Problem:    Acute flank pain--rt ureteral colic--unrelieved with percocet--recent ct show rt ureteral stone --cr sl incr at 1.3   Active Problems:    Nephrolithiasis--chr and recc     Essential hypertension, benign--Continue current therapy      Ureteral stone    Primary hyperparathyroidism (HCC)--mild calcium elevation--iv fluids     On warfarin therapy--hold for ?? Stent     Stenosis of ureteropelvic junction (UPJ)-right    Bronchiolitis obliterans (HCC)    Chronic anticoagulation  Resolved Problems:    * No resolved hospital problems. *  Admit--pain control--use iv dilaudid--iv fluids--urology to see and follow--?? Stent? ? Was planned as o/p for next week by dr Hipolito Marr MD

## 2021-07-02 NOTE — PROGRESS NOTES
Dr. Abhijit Sánchez came to bedside, pt is arousable now & following commands, VSS with HR=50s-60s; per pt he only has AICD not pacing@ this time, BP slowly improving 92/67 on 2L O2 NC presently.

## 2021-07-02 NOTE — ED PROVIDER NOTES
resected bladder tumor base    CYSTOSCOPY Right 5/17/2019    RIGHT SIDED URETEROSCOPY FLUGERATION  OF BLADDER performed by Orestes Rincon MD at [facility]  2015    LITHOTRIPSY      PACEMAKER PLACEMENT      SINUS SURGERY      Made opening larger in sinuses    UPPER GASTROINTESTINAL ENDOSCOPY  3/28/2014    dr Ralf Thompson N/A 2/1/2021    EGD BIOPSY performed by Trudi Ahumada MD at [address] [facility]       Medications:  Previous Medications    ACETAMINOPHEN (TYLENOL 8 HOUR ARTHRITIS PAIN) 650 MG EXTENDED RELEASE TABLET    Take 650 mg by mouth 2 times daily As needed    ALBUTEROL (PROVENTIL) (2.5 MG/3ML) 0.083% NEBULIZER SOLUTION    Take 3 mLs by nebulization every 4 hours as needed for Wheezing or Shortness of Breath    ALBUTEROL SULFATE  (90 BASE) MCG/ACT INHALER    INHALE 2 PUFFS INTO THE LUNGS EVERY 4 HOURS AS NEEDED FOR WHEEZING OR SHORTNESS OF BREATH    ASPIRIN 81 MG TABLET    Take 81 mg by mouth daily    AZELASTINE (ASTELIN) 0.1 % NASAL SPRAY    2 sprays by Nasal route 2 times daily Use in each nostril as directed    CHOLECALCIFEROL (VITAMIN D3) 1.25 MG (94067 UT) CAPS    Take 1 capsule by mouth once a week    DEXLANSOPRAZOLE (DEXILANT) 60 MG CPDR DELAYED RELEASE CAPSULE    Take 60 mg by mouth daily    DICYCLOMINE (BENTYL) 10 MG CAPSULE    TAKE ONE CAPSULE BY MOUTH  FOUR TIMES DAILY AS NEEDED    ENTRESTO 49-51 MG PER TABLET    Take 1 tablet by mouth 2 times daily    FUROSEMIDE (LASIX) 20 MG TABLET    Take 0.5 tablets by mouth 2 times daily as needed (SOB)    METOPROLOL SUCCINATE (TOPROL XL) 25 MG EXTENDED RELEASE TABLET    TAKE 1 TABLET BY MOUTH TWO  TIMES DAILY    ONDANSETRON (ZOFRAN) 4 MG TABLET    Take 1 tablet by mouth every 8 hours as needed for Nausea or Vomiting    OXYCODONE-ACETAMINOPHEN (PERCOCET) 5-325 MG PER TABLET    Take 1 tablet by mouth every 4 hours as needed for Pain.     POLYETHYLENE GLYCOL (MIRALAX) PACK PACKET    Take 17 g by mouth nightly     PRAVASTATIN (PRAVACHOL) 40 MG TABLET    Take 1 tablet by mouth daily    PREGABALIN (LYRICA) 75 MG CAPSULE    TAKE 1 CAPSULE BY MOUTH IN  THE MORNING AND 2 CAPSULES  IN THE EVENING    SODIUM CHLORIDE, INHALANT, 3 % NEBULIZER SOLUTION    Take 4 mLs by nebulization every 8 hours as needed for Other or Cough (cough)    TAMSULOSIN (FLOMAX) 0.4 MG CAPSULE    Take 1 capsule by mouth 2 times daily    WARFARIN (COUMADIN) 5 MG TABLET    Take 1 tablet by mouth daily Except 2.5 mg Tues and Thursday    ZOLPIDEM (AMBIEN) 10 MG TABLET    Take 1 tablet by mouth nightly as needed for Sleep for up to 14 days. Review of Systems:  (2-9 systems needed)  Review of Systems   Constitutional: Negative for chills and fever. HENT: Negative for congestion, facial swelling and sneezing. Eyes: Negative for discharge and redness. Respiratory: Negative for apnea, choking and shortness of breath. Cardiovascular: Negative for chest pain. Gastrointestinal: Positive for nausea and vomiting. Negative for abdominal pain. Genitourinary: Positive for flank pain. Negative for dysuria. Musculoskeletal: Negative for back pain, neck pain and neck stiffness. Neurological: Negative for dizziness, tremors, seizures and headaches. All other systems reviewed and are negative. \"Positives and Pertinent negatives as per HPI\"    Physical Exam:  Physical Exam  Vitals and nursing note reviewed. Constitutional:       Appearance: He is well-developed. He is not diaphoretic. HENT:      Head: Normocephalic and atraumatic. Nose: Nose normal.      Mouth/Throat:      Mouth: Mucous membranes are moist.      Pharynx: Oropharynx is clear. Eyes:      General:         Right eye: No discharge. Left eye: No discharge. Cardiovascular:      Rate and Rhythm: Normal rate and regular rhythm. Heart sounds: Normal heart sounds. No murmur heard. No friction rub. No gallop.     Pulmonary:      Effort: Pulmonary effort is normal. No respiratory distress. Breath sounds: Normal breath sounds. No wheezing or rales. Chest:      Chest wall: No tenderness. Abdominal:      General: Abdomen is flat. Bowel sounds are normal. There is no distension. Palpations: Abdomen is soft. There is no mass. Tenderness: There is no abdominal tenderness. There is no guarding or rebound. Musculoskeletal:         General: Normal range of motion. Cervical back: Normal, normal range of motion and neck supple. Back:    Skin:     General: Skin is warm and dry. Neurological:      Mental Status: He is alert and oriented to person, place, and time.    Psychiatric:         Behavior: Behavior normal.         MEDICAL DECISION MAKING    Vitals:    Vitals:    07/01/21 1923   BP: (!) 163/97   Pulse: 61   Resp: 18   Temp: 98.2 °F (36.8 °C)   TempSrc: Oral   SpO2: 93%   Weight: 208 lb 5.4 oz (94.5 kg)   Height: 5' 6\" (1.676 m)       LABS:  Labs Reviewed   COMPREHENSIVE METABOLIC PANEL W/ REFLEX TO MG FOR LOW K - Abnormal; Notable for the following components:       Result Value    GFR Non- 56 (*)     Calcium 10.8 (*)     All other components within normal limits    Narrative:     Performed at:  [facility]  [address] [address]   Phone [phone]   URINE RT REFLEX TO CULTURE - Abnormal; Notable for the following components:    Clarity, UA TURBID (*)     Blood, Urine LARGE (*)     Protein, UA TRACE (*)     All other components within normal limits    Narrative:     Performed at:  [facility]  [address] [address]   Phone [phone]   MICROSCOPIC URINALYSIS - Abnormal; Notable for the following components:    RBC,  (*)     All other components within normal limits    Narrative:     Performed at:  [address] [place] [address]  [address] [address]   Phone [phone]   CBC WITH AUTO DIFFERENTIAL    Narrative:     Performed at:  [facility]  [address] [facility] [address] [facility] [address]   Phone [phone]   LIPASE    Narrative:     Performed at:  [facility]  [address] [facility] [place] [address] [facility] [address]   Phone [phone] of labs reviewed and were negative at this time or not returned at the time of this note. RADIOLOGY:   Non-plain film images such as CT, Ultrasound and MRI are read by the radiologist. Viral Sanchez PA-C have directly visualized the radiologic plain film image(s) with the below findings:      Interpretation per the Radiologist below, if available at the time of this note:    No orders to display        [address] Additional Contrast? None    Result Date: 6/18/2021  EXAMINATION: CT OF THE ABDOMEN AND PELVIS WITHOUT CONTRAST 6/18/2021 5:27 pm TECHNIQUE: CT of the abdomen and pelvis was performed without the administration of intravenous contrast. Multiplanar reformatted images are provided for review. Dose modulation, iterative reconstruction, and/or weight based adjustment of the mA/kV was utilized to reduce the radiation dose to as low as reasonably achievable. COMPARISON: 02/03/2021 HISTORY: ORDERING SYSTEM PROVIDED HISTORY: Acute left flank pain TECHNOLOGIST PROVIDED HISTORY: Additional Contrast?->None Reason for exam:->left flank pain, hematuria FINDINGS: Lower Chest: Scattered bandlike opacities are seen in the lung bases. No pleural effusions noted. A few subtle tree-in-bud nodules are seen in the left lower lobe. , likely postinflammatory-infectious. There is streak artifact seen from pacer. Organs: Adrenal glands appear normal.  No splenomegaly. No perisplenic fluid There are clips from prior cholecystectomy. There is trace intrahepatic biliary ductal dilatation No peripancreatic fluid.   No peripancreatic inflammatory change Stones are seen in the left kidney, measuring 4 mm in size or less. No hydronephrosis noted. Stones are seen in the right kidney, measuring 4 mm in size or less. Parapelvic cyst is seen on the right. 5 mm hyperdense nodule projects off the right kidney, similar to prior. There is a stone seen in the proximal right ureter, likely just below the right UPJ  measuring 4 mm in size. Perinephric stranding inferiorly to the right kidney is again identified. GI/Bowel: No significant small bowel distention noted. Mild stool load seen in the colon. Scattered colonic diverticula are seen. Appendix is identified and normal in caliber. No bowel obstruction Pelvis: Bladder is incompletely distended, accentuating its wall thickness. No free fluid in pelvis. No pelvic adenopathy. Peritoneum/Retroperitoneum: No free fluid in pelvis. No pelvic adenopathy. Prominent fat is seen in the inguinal canals. Calcifications seen in the pelvis, compatible with phleboliths. Bones/Soft Tissues: Tiny periumbilical hernia containing fat is seen Spurring is seen in the spine. Spurring is seen in the hips. There is bony ankylosis at the SI joints, similar to prior. Tiny sclerotic foci in the pelvic bones appear similar. 4 mm stone is seen in the proximal right ureter. No significant hydronephrosis on the right. Multiple stones remain in the right kidney Nonobstructing left renal calculi. MEDICAL DECISION MAKING / ED COURSE:      PROCEDURES:   Procedures    None    Patient was given:  Medications   HYDROmorphone (DILAUDID) injection 1 mg (1 mg Intravenous Given 7/1/21 2012)   ondansetron (ZOFRAN) injection 4 mg (4 mg Intravenous Given 7/1/21 2013)     Emergency room course: Patient on exam cardiovascular regular rhythm, lungs are clear. No wheeze rales or rhonchi. No chest wall tenderness with palpation. Abdomen is soft with slight right-sided CVA tenderness but right flank tenderness with percussion.   Full range of motion of extremity. Alert oriented x4. Does not appear to be in acute distress. No repeat CT was done since he recently had a CT scan I did review that shows a 4 mm stone proximal right ureter. Lab result from today show CBC within normal limits with a white count of 5.7. CMP creatinine slightly elevated 1.3. Otherwise unremarkable. Lipase 25.0. Urinalysis shows negative leukocytes, negative nitrites, shows large blood negative for ketones. Microscopically hyaline casts of 1, WBC 2, , epithelial cells 0. Discussed patient lab results from today. Also placed a call out to urology spoke with Dr. Nicole Light who was okay with patient being admitted for pain control keep him n.p.o. after midnight. I will place a call out patient primary care provider for admission. Wellington Martinez, nurse practitioner state that she will talk to the admitting physician on my behalf. The patient tolerated their visit well. I evaluated the patient. The physician was available for consultation as needed. The patient and / or the family were informed of the results of any tests, a time was given to answer questions, a plan was proposed and they agreed with plan. CLINICAL IMPRESSION:  1. Right flank pain    2. Kidney stone        DISPOSITION Decision To Admit 07/01/2021 08:50:57 PM      PATIENT REFERRED TO:  No follow-up provider specified.     DISCHARGE MEDICATIONS:  New Prescriptions    No medications on file       DISCONTINUED MEDICATIONS:  Discontinued Medications    No medications on file              (Please note the MDM and HPI sections of this note were completed with a voice recognition program.  Efforts were made to edit the dictations but occasionally words are mis-transcribed.)    Electronically signed, Lloyd Ortiz PA-C,          Lloyd Ortiz PA-C  07/01/21 2100       Lloyd Ortiz PA-C  07/01/21 2745

## 2021-07-02 NOTE — PLAN OF CARE
Problem: Pain:  Goal: Pain level will decrease  Description: Pain level will decrease  7/2/2021 1205 by Margie Cheng RN  Outcome: Ongoing  7/2/2021 0451 by Sweetie Mejia RN  Outcome: Ongoing  Goal: Control of acute pain  Description: Control of acute pain  7/2/2021 1205 by Margie Cheng RN  Outcome: Ongoing  7/2/2021 0451 by Sweetie Mejia RN  Outcome: Ongoing  Goal: Control of chronic pain  Description: Control of chronic pain  7/2/2021 1205 by Margie Cheng RN  Outcome: Ongoing  7/2/2021 0451 by Sweetie Mejia RN  Outcome: Ongoing     Problem: SAFETY  Goal: Free from accidental physical injury  7/2/2021 1205 by Margie Cheng RN  Outcome: Ongoing  7/2/2021 0451 by Sweetie Mejia RN  Outcome: Ongoing  Goal: Free from intentional harm  7/2/2021 1205 by Margie Cheng RN  Outcome: Ongoing  7/2/2021 0451 by Sweetie Mejia RN  Outcome: Ongoing     Problem: DAILY CARE  Goal: Daily care needs are met  7/2/2021 1205 by Margie Cheng RN  Outcome: Ongoing  7/2/2021 0451 by Sweetie Mejia RN  Outcome: Ongoing     Problem: PAIN  Goal: Patient's pain/discomfort is manageable  7/2/2021 1205 by Margie Cheng RN  Outcome: Ongoing  7/2/2021 0451 by Sweetie Mejia RN  Outcome: Ongoing     Problem: SKIN INTEGRITY  Goal: Skin integrity is maintained or improved  7/2/2021 1205 by Margie Cheng RN  Outcome: Ongoing  7/2/2021 0451 by Sweetie Mejia RN  Outcome: Ongoing     Problem: KNOWLEDGE DEFICIT  Goal: Patient/S.O. demonstrates understanding of disease process, treatment plan, medications, and discharge instructions.   7/2/2021 1205 by Margie Cheng RN  Outcome: Ongoing  7/2/2021 0451 by Sweetie Mejia RN  Outcome: Ongoing     Problem: DISCHARGE BARRIERS  Goal: Patient's continuum of care needs are met  7/2/2021 1205 by Margie Cheng RN  Outcome: Ongoing  7/2/2021 0451 by Sweetie Mejia RN  Outcome: Ongoing

## 2021-07-02 NOTE — BRIEF OP NOTE
Brief Postoperative Note      Patient: Roby Felipe  YOB: 1960  MRN: [id number]    Date of Procedure: 7/2/2021    Pre-Op Diagnosis: RIGHT URETERAL CALCULI    Post-Op Diagnosis: Same       Procedure(s):  CYSTOSCOPY, RIGHT URETERAL STENT INSERTION  CYSTOSCOPY RETROGRADE PYELOGRAM    Surgeon(s): Florecita Perez DO    Assistant:  Surgical Assistant: Princess Bernabe    Anesthesia: General    Estimated Blood Loss (mL): Minimal    Complications: None    Specimens:   * No specimens in log *    Implants:  Implant Name Type Inv.  Item Serial No.  Lot No. LRB No. Used Action   STENT URET 6FR L26CM PERCFLX HYDR+ TAPR TIP GRAD  STENT URET 6FR L26CM PERCFLX HYDR+ TAPR TIP GRAD  [facility] [id number]- [id number] Right 1 Implanted         Drains: * No LDAs found *    Findings: stone at L5, R ureter, small caliber ureter, bladder clean, 6 x 24 JJ stent    Electronically signed by Latonia Dozier DO on 7/2/2021 at 3:24 PM

## 2021-07-02 NOTE — PROGRESS NOTES
Admitted to PACU 13 from OR, pt sedate, anesthesia @ bedside d/t low BP 78/52, on 100% NRB with 100% O2 sats.

## 2021-07-02 NOTE — CARE COORDINATION
7/2 Patient not in room when attempted to do initial assessment. Electronically signed by Daniel Drummond RN on 7/2/2021 at 5:07 PM

## 2021-07-02 NOTE — ANESTHESIA POSTPROCEDURE EVALUATION
Department of Anesthesiology  Postprocedure Note    Patient: Cody Strange  MRN: [id number]  YOB: 1960  Date of evaluation: 7/2/2021  Time:  4:43 PM     Procedure Summary     Date: 07/02/21 Room / Location: [address] [place] [address]    Anesthesia Start: 0498 Anesthesia Stop: 4904    Procedures:       CYSTOSCOPY, RIGHT URETERAL STENT INSERTION (Right )      CYSTOSCOPY RETROGRADE PYELOGRAM (Right ) Diagnosis: (RIGHT URETERAL CALCULI)    Surgeons: Christelle Bautista DO Responsible Provider: Tatyana Aquino MD    Anesthesia Type: MAC ASA Status: 4          Anesthesia Type: MAC    Prince Phase I: Prince Score: 9    Prince Phase II:      Last vitals: Reviewed and per EMR flowsheets.        Anesthesia Post Evaluation    Patient location during evaluation: PACU  Level of consciousness: awake and alert  Airway patency: patent  Nausea & Vomiting: no nausea and no vomiting  Complications: no  Cardiovascular status: hemodynamically stable  Respiratory status: acceptable  Hydration status: stable

## 2021-07-02 NOTE — PROGRESS NOTES
Pt awakens and will converse appropriately, notified Dr. Royer Billings about HR as low as 45-46 while asleep and then rebounding to 58-62 when awake with steady BP, ok to be transferred back to floor. Per pt he wears O2 @ HS d/t low HR. Report given to [facility] on 4W. Pt transferred back to rm 4117.

## 2021-07-02 NOTE — CONSULTS
Reason for Consult: kidney stone    History of Present Illness: Loly Quiroga is a 57 y.o. with hx of recurrent stone disease, chronic pain syndrome, bladder ca. Diagnosed with 4 mm proximal right ureteral calc several wks ago. Additional bilateral renal stones. Followed by Dr Gely Corbin who was planning outpt stent insertion. Admitted with intractable pain despite percocet.     Past Medical History:   Past Medical History:   Diagnosis Date    Bronchiolitis obliterans ([address]     Bundle branch block, right     Cardiomyopathy ([address]     Chest pain 9/24/2019    COVID-19 virus vaccine not available     Fibromyalgia     GERD (gastroesophageal reflux disease)     Hepatitis 1979    unsure of which type    Hx of blood clots     Hyperlipemia     Hypertension     IBS (irritable bowel syndrome)     Kidney stone     over thirty kidney stones    Neuropathy     right side-chest    Pneumothorax 2011    Right    Prostatitis     Pulmonary embolism on right [facility] 2011    upper lobe-coumadin 2011    Sacroiliitis [facility]        Past Surgical History:  Past Surgical History:   Procedure Laterality Date    CHOLECYSTECTOMY  2007    COLONOSCOPY  9/21/2012    dr Maebelle Aase  05/17/2019    RIGHT SIDED URETEROSCOPY  Diagnostic, retrograde pyelogram and fulguration of previously resected bladder tumor base    CYSTOSCOPY Right 5/17/2019    RIGHT SIDED URETEROSCOPY FLUGERATION  OF BLADDER performed by Cory Willingham MD at [address]  2015    LITHOTRIPSY     [address] opening larger in sinuses    UPPER GASTROINTESTINAL ENDOSCOPY  3/28/2014    dr Ashlyn Finley N/A 2/1/2021    EGD BIOPSY performed by Whitney Cardenas MD at [facility] History:  Social History     Socioeconomic History    Marital status:      Spouse name: Not on file    Number of children: Not on file    Years of education: Not on file    Highest education level: Not on file   Occupational History    Not on file   Tobacco Use    Smoking status: Never Smoker    Smokeless tobacco: Never Used   Vaping Use    Vaping Use: Never used   Substance and Sexual Activity    Alcohol use: No     Comment: occ    Drug use: No    Sexual activity: Yes     Partners: Female     Comment: wife   Other Topics Concern    Not on file   Social History Narrative    Not on file     Social Determinants of Health     Financial Resource Strain: Low Risk     Difficulty of Paying Living Expenses: Not hard at all   Food Insecurity: No Food Insecurity    Worried About Running Out of Food in the Last Year: Never true    [address] in the Last Year: Never true   Transportation Needs:     Lack of Transportation (Medical):      Lack of Transportation (Non-Medical):    Physical Activity:     Days of Exercise per Week:     Minutes of Exercise per Session:    Stress:     Feeling of Stress :    Social Connections:     Frequency of Communication with Friends and Family:     Frequency of Social Gatherings with Friends and Family:     Attends [employer] Services:     Active Member of Clubs or Organizations:     Attends Club or Organization Meetings:     Marital Status:    Intimate Partner Violence:     Fear of Current or Ex-Partner:     Emotionally Abused:     Physically Abused:     Sexually Abused:        Family History:  Family History   Problem Relation Age of Onset    High Blood Pressure Mother     Dementia Mother     Cancer Father         Pancreatic Ca       Meds:   Current Facility-Administered Medications: albuterol (PROVENTIL) nebulizer solution 2.5 mg, 2.5 mg, Nebulization, TID  HYDROmorphone (DILAUDID) injection 0.5 mg, 0.5 mg, Intravenous, Q3H PRN  furosemide (LASIX) tablet 10 mg, 10 mg, Oral, BID PRN  tamsulosin (FLOMAX) capsule 0.4 mg, 0.4 mg, Oral, BID  0.9 % sodium chloride infusion, , Intravenous, Continuous  sodium chloride flush 0.9 % injection 5-40 mL, 5-40 mL, Intravenous, 2 times per day  sodium chloride flush 0.9 % injection 5-40 mL, 5-40 mL, Intravenous, PRN  0.9 % sodium chloride infusion, 25 mL, Intravenous, PRN  enoxaparin (LOVENOX) injection 40 mg, 40 mg, Subcutaneous, Daily  acetaminophen (TYLENOL) tablet 650 mg, 650 mg, Oral, Q4H PRN  ondansetron (ZOFRAN-ODT) disintegrating tablet 4 mg, 4 mg, Oral, Q8H PRN **OR** ondansetron (ZOFRAN) injection 4 mg, 4 mg, Intravenous, Q6H PRN  polyethylene glycol (GLYCOLAX) packet 17 g, 17 g, Oral, Daily PRN  dextrose 5 % and 0.45 % sodium chloride infusion, , Intravenous, Continuous  tamsulosin (FLOMAX) capsule 0.4 mg, 0.4 mg, Oral, Daily  albuterol (PROVENTIL) nebulizer solution 2.5 mg, 2.5 mg, Nebulization, Q4H PRN  pantoprazole (PROTONIX) tablet 40 mg, 40 mg, Oral, QAM AC  sacubitril-valsartan (ENTRESTO) 49-51 MG per tablet 1 tablet, 1 tablet, Oral, BID  metoprolol succinate (TOPROL XL) extended release tablet 25 mg, 25 mg, Oral, Daily  oxyCODONE-acetaminophen (PERCOCET) 5-325 MG per tablet 1 tablet, 1 tablet, Oral, Q4H PRN  pravastatin (PRAVACHOL) tablet 40 mg, 40 mg, Oral, Daily  pregabalin (LYRICA) capsule 75 mg, 75 mg, Oral, BID  zolpidem (AMBIEN) tablet 10 mg, 10 mg, Oral, Nightly PRN    Review of Systems:  10 Systems were reviewed and negative except as in HPI    Vitals:  /86   Pulse 68   Temp 97.8 °F (36.6 °C) (Oral)   Resp 18   Ht 5' 6\" (1.676 m)   Wt 209 lb 6.4 oz (95 kg)   SpO2 97%   BMI 33.80 kg/m²     Intake/Output Summary (Last 24 hours) at 7/2/2021 0764  Last data filed at 7/2/2021 0448  Gross per 24 hour   Intake --   Output 400 ml   Net -400 ml       Physical Exam:  General Appearance: Alert and oriented, cooperative, no distress, appears stated age  Head: Normocephalic, without obvious abnormality, atraumatic  Back: no CVA tenderness  Lungs: respirations unlabored, no wheezing  Heart: Regular rate and rhythm, no lower extremity edema noted  Abdomen: Soft, non-tender, non-distended, no masses  Skin: Skin color, texture, turgor normal, no rashes or lesions  Neurologic: no gross deficits   Male :   Nonpalpable bladder   No CVA tenderness    Labs:  CBC   Lab Results   Component Value Date    WBC 3.6 07/02/2021    RBC 4.42 07/02/2021    HGB 13.7 07/02/2021    HCT 40.3 07/02/2021    MCV 91.3 07/02/2021    MCH 30.9 07/02/2021    MCHC 33.9 07/02/2021    RDW 13.4 07/02/2021     07/02/2021    MPV 7.5 07/02/2021     BMP   Lab Results   Component Value Date     07/02/2021    K 3.8 07/02/2021     07/02/2021    CO2 27 07/02/2021    BUN 12 07/02/2021    CREATININE 1.2 07/02/2021    GLUCOSE 189 07/02/2021    CALCIUM 9.7 07/02/2021       Urinalysis:   Lab Results   Component Value Date    COLORU YELLOW 07/01/2021    GLUCOSEU Negative 07/01/2021    GLUCOSEU NEGATIVE 01/24/2012    BLOODU LARGE 07/01/2021    NITRU Negative 07/01/2021    LEUKOCYTESUR Negative 07/01/2021       Imaging:  CT reviewed  Pertinent images and radiologist's report were reviewed independently      Impression/Plan:   Intractable pain d/t 4 mm right ureteral calc, additional renal stones  Pt reports tolerating previous stents poorly    - will try to coordinate stent insertion vs ureteroscopy  - Keep NPO for procedure    Brianna Webster MD 5/9/80260:85 AM

## 2021-07-02 NOTE — ED NOTES
Report called to Pancho Calvert RN on 4W. Denies further questions.      Gunnar Bess RN  07/01/21 0883

## 2021-07-02 NOTE — PROGRESS NOTES
Pharmacy Medication Reconciliation Note     List of medications patient is currently taking is complete. Source of information:   1. Per conversation with patient at bedside   2. EMR    Notes regarding home medications:   1. Per patient, he is very good about taking all his medications and has taken almost all med PTA  2. Patient reports if he can get his zolpidem in the hospital before 12am he will not take his last 2 Lyrica tonight (07/01/21), if he doesn't he will take his 2 Lyrica  3. Patient reports being on his third to last day of amoxicillin therapy for possible bronchitis --250 TID for 7 days in which he started 4 days ago.     Patient denies taking any other OTC or herbal medications    Darrin Rodriguez, Pharmacy Intern  7/1/2021  10:07 PM

## 2021-07-02 NOTE — RT PROTOCOL NOTE
RT Inhaler-Nebulizer Bronchodilator Protocol Note    There is a bronchodilator order in the chart from a provider indicating to follow the RT Bronchodilator Protocol and there is an Initiate RT Bronchodilator Protocol order as well (see protocol at bottom of note). The findings from the last RT Protocol Assessment were as follows:  Smoking: Non smoker  Surgical Status: No surgery  Xray: X-ray not available  Respiratory Pattern: RR 12-20  Mental Status: Alert and Oriented  Breath Sounds: Diminished and/or crackles  Cough: Strong, spontaneous, non-productive  Activity Level: Walking unassisted  Oxygen Requirement: Room Air - 2LNC/28% or home setting  Indication for Bronchodilator Therapy: Decreased or absent breath sounds  Bronchodilator Assessment Score: 1  Pt takes HHN TID at home. Aerosolized bronchodilator medication orders have been revised according to the RT Bronchodilator Protocol. RT Inhaler-Nebulizer Bronchodilator Protocol:    Respiratory Therapist to perform RT Therapy Protocol Assessment then follow the protocol. No Indications - adjust the frequency to every 6 hours PRN wheezing or bronchospasm, if no treatments needed after 48 hours then discontinue using Per Protocol order mode. If indication present, adjust the RT bronchodilator orders based on the Bronchodilator Assessment Score as follows:    0-6 - enter or revise RT bronchodilator order to Albuterol Inhaler order with frequency of every 2 hours PRN for wheezing or increased work of breathing using Per Protocol order mode. If Albuterol Inhaler not tolerated or not effective, then discontinue the Albuterol Inhaler order and enter Albuterol Nebulizer order with same frequency and PRN reasons. Repeat RT Therapy Protocol Assessment as needed.     7-10 - discontinue any other Inpatient aerosolized bronchodilator medication orders and enter or revise two Albuterol Inhaler orders, one with BID frequency and one with frequency of every 2

## 2021-07-03 ENCOUNTER — APPOINTMENT (OUTPATIENT)
Dept: GENERAL RADIOLOGY | Age: 61
DRG: 660 | End: 2021-07-03
Payer: COMMERCIAL

## 2021-07-03 VITALS
DIASTOLIC BLOOD PRESSURE: 76 MMHG | RESPIRATION RATE: 16 BRPM | TEMPERATURE: 98.3 F | HEART RATE: 78 BPM | OXYGEN SATURATION: 92 % | HEIGHT: 66 IN | WEIGHT: 209.44 LBS | BODY MASS INDEX: 33.66 KG/M2 | SYSTOLIC BLOOD PRESSURE: 136 MMHG

## 2021-07-03 PROBLEM — N20.1 URETERAL STONE: Status: RESOLVED | Noted: 2017-09-01 | Resolved: 2021-07-03

## 2021-07-03 LAB
A/G RATIO: 1.3 (ref 1.1–2.2)
ALBUMIN SERPL-MCNC: 3.3 G/DL (ref 3.4–5)
ALP BLD-CCNC: 112 U/L (ref 40–129)
ALT SERPL-CCNC: 54 U/L (ref 10–40)
ANION GAP SERPL CALCULATED.3IONS-SCNC: 8 MMOL/L (ref 3–16)
AST SERPL-CCNC: 41 U/L (ref 15–37)
BASOPHILS ABSOLUTE: 0.1 K/UL (ref 0–0.2)
BASOPHILS RELATIVE PERCENT: 1.1 %
BILIRUB SERPL-MCNC: 0.4 MG/DL (ref 0–1)
BUN BLDV-MCNC: 10 MG/DL (ref 7–20)
CALCIUM SERPL-MCNC: 9.5 MG/DL (ref 8.3–10.6)
CHLORIDE BLD-SCNC: 105 MMOL/L (ref 99–110)
CO2: 25 MMOL/L (ref 21–32)
CREAT SERPL-MCNC: 1.1 MG/DL (ref 0.8–1.3)
EOSINOPHILS ABSOLUTE: 0.4 K/UL (ref 0–0.6)
EOSINOPHILS RELATIVE PERCENT: 7.7 %
GFR AFRICAN AMERICAN: >60
GFR NON-AFRICAN AMERICAN: >60
GLOBULIN: 2.5 G/DL
GLUCOSE BLD-MCNC: 84 MG/DL (ref 70–99)
HCT VFR BLD CALC: 39.2 % (ref 40.5–52.5)
HEMOGLOBIN: 13.5 G/DL (ref 13.5–17.5)
INR BLD: 2.23 (ref 0.88–1.12)
LACTIC ACID: 0.6 MMOL/L (ref 0.4–2)
LYMPHOCYTES ABSOLUTE: 1.3 K/UL (ref 1–5.1)
LYMPHOCYTES RELATIVE PERCENT: 26.7 %
MCH RBC QN AUTO: 31.7 PG (ref 26–34)
MCHC RBC AUTO-ENTMCNC: 34.5 G/DL (ref 31–36)
MCV RBC AUTO: 91.6 FL (ref 80–100)
MONOCYTES ABSOLUTE: 0.4 K/UL (ref 0–1.3)
MONOCYTES RELATIVE PERCENT: 8.9 %
NEUTROPHILS ABSOLUTE: 2.7 K/UL (ref 1.7–7.7)
NEUTROPHILS RELATIVE PERCENT: 55.6 %
PDW BLD-RTO: 13.3 % (ref 12.4–15.4)
PLATELET # BLD: 132 K/UL (ref 135–450)
PMV BLD AUTO: 7.6 FL (ref 5–10.5)
POTASSIUM SERPL-SCNC: 4 MMOL/L (ref 3.5–5.1)
PROTHROMBIN TIME: 26 SEC (ref 9.9–12.7)
RBC # BLD: 4.28 M/UL (ref 4.2–5.9)
SODIUM BLD-SCNC: 138 MMOL/L (ref 136–145)
TOTAL PROTEIN: 5.8 G/DL (ref 6.4–8.2)
WBC # BLD: 4.8 K/UL (ref 4–11)

## 2021-07-03 PROCEDURE — 85610 PROTHROMBIN TIME: CPT

## 2021-07-03 PROCEDURE — 94760 N-INVAS EAR/PLS OXIMETRY 1: CPT

## 2021-07-03 PROCEDURE — 85025 COMPLETE CBC W/AUTO DIFF WBC: CPT

## 2021-07-03 PROCEDURE — 80053 COMPREHEN METABOLIC PANEL: CPT

## 2021-07-03 PROCEDURE — 6360000002 HC RX W HCPCS: Performed by: UROLOGY

## 2021-07-03 PROCEDURE — 71045 X-RAY EXAM CHEST 1 VIEW: CPT

## 2021-07-03 PROCEDURE — 2700000000 HC OXYGEN THERAPY PER DAY

## 2021-07-03 PROCEDURE — 36415 COLL VENOUS BLD VENIPUNCTURE: CPT

## 2021-07-03 PROCEDURE — 94640 AIRWAY INHALATION TREATMENT: CPT

## 2021-07-03 PROCEDURE — 83605 ASSAY OF LACTIC ACID: CPT

## 2021-07-03 PROCEDURE — 99232 SBSQ HOSP IP/OBS MODERATE 35: CPT | Performed by: STUDENT IN AN ORGANIZED HEALTH CARE EDUCATION/TRAINING PROGRAM

## 2021-07-03 PROCEDURE — 6370000000 HC RX 637 (ALT 250 FOR IP): Performed by: UROLOGY

## 2021-07-03 RX ORDER — OXYCODONE HYDROCHLORIDE AND ACETAMINOPHEN 5; 325 MG/1; MG/1
1 TABLET ORAL EVERY 4 HOURS PRN
Qty: 30 TABLET | Refills: 0 | Status: SHIPPED | OUTPATIENT
Start: 2021-07-03 | End: 2021-10-18 | Stop reason: SDUPTHER

## 2021-07-03 RX ADMIN — METOPROLOL SUCCINATE 25 MG: 25 TABLET, FILM COATED, EXTENDED RELEASE ORAL at 10:30

## 2021-07-03 RX ADMIN — SACUBITRIL AND VALSARTAN 1 TABLET: 49; 51 TABLET, FILM COATED ORAL at 10:30

## 2021-07-03 RX ADMIN — ALBUTEROL SULFATE 2.5 MG: 2.5 SOLUTION RESPIRATORY (INHALATION) at 07:44

## 2021-07-03 ASSESSMENT — PAIN DESCRIPTION - PROGRESSION: CLINICAL_PROGRESSION: GRADUALLY IMPROVING

## 2021-07-03 ASSESSMENT — PAIN DESCRIPTION - DESCRIPTORS: DESCRIPTORS: ACHING;SORE

## 2021-07-03 ASSESSMENT — PAIN DESCRIPTION - PAIN TYPE: TYPE: ACUTE PAIN;SURGICAL PAIN

## 2021-07-03 ASSESSMENT — PAIN DESCRIPTION - LOCATION: LOCATION: BACK;FLANK

## 2021-07-03 ASSESSMENT — PAIN - FUNCTIONAL ASSESSMENT: PAIN_FUNCTIONAL_ASSESSMENT: ACTIVITIES ARE NOT PREVENTED

## 2021-07-03 ASSESSMENT — PAIN DESCRIPTION - ORIENTATION: ORIENTATION: LEFT

## 2021-07-03 ASSESSMENT — PAIN SCALES - GENERAL: PAINLEVEL_OUTOF10: 5

## 2021-07-03 ASSESSMENT — PAIN DESCRIPTION - FREQUENCY: FREQUENCY: INTERMITTENT

## 2021-07-03 ASSESSMENT — PAIN DESCRIPTION - ONSET: ONSET: ON-GOING

## 2021-07-03 NOTE — PLAN OF CARE
Problem: Pain:  Description: Pain management should include both nonpharmacologic and pharmacologic interventions. Goal: Pain level will decrease  Description: Pain level will decrease  7/3/2021 1117 by Mikki Mcconnell RN  Outcome: Ongoing  7/3/2021 0103 by Mendez Gavin RN  Outcome: Ongoing  Goal: Control of acute pain  Description: Control of acute pain  7/3/2021 1117 by Mikki Mcconnell RN  Outcome: Ongoing  7/3/2021 0103 by Mendez Gavin RN  Outcome: Ongoing  Goal: Control of chronic pain  Description: Control of chronic pain  7/3/2021 1117 by Mikki Mcconnell RN  Outcome: Ongoing  7/3/2021 0103 by Mendez Gavin RN  Outcome: Ongoing     Problem: SAFETY  Goal: Free from accidental physical injury  7/3/2021 1117 by Mikki Mcconnell RN  Outcome: Ongoing  7/3/2021 0103 by Mendez Gavin RN  Outcome: Ongoing  Goal: Free from intentional harm  7/3/2021 1117 by Mikki Mcconnell RN  Outcome: Ongoing  7/3/2021 0103 by Mendez Gavin RN  Outcome: Ongoing     Problem: DAILY CARE  Goal: Daily care needs are met  7/3/2021 1117 by Mikki Mcconnell RN  Outcome: Ongoing  7/3/2021 0103 by Mendez Gavin RN  Outcome: Ongoing     Problem: PAIN  Goal: Patient's pain/discomfort is manageable  7/3/2021 1117 by Mikki Mcconnell RN  Outcome: Ongoing  7/3/2021 0103 by Mendez Gavin RN  Outcome: Ongoing     Problem: SKIN INTEGRITY  Goal: Skin integrity is maintained or improved  7/3/2021 1117 by Mikki Mcconnell RN  Outcome: Ongoing  7/3/2021 0103 by Mendez Gavin RN  Outcome: Ongoing     Problem: KNOWLEDGE DEFICIT  Goal: Patient/S.O. demonstrates understanding of disease process, treatment plan, medications, and discharge instructions.   7/3/2021 1117 by Mikki Mcconnell RN  Outcome: Ongoing  7/3/2021 0103 by Mendez Gavin RN  Outcome: Ongoing     Problem: DISCHARGE BARRIERS  Goal: Patient's continuum of care needs are met  7/3/2021 1117 by Courtney Sheets RN  Outcome: Ongoing  7/3/2021 0103 by Amy Lipscomb RN  Outcome: Ongoing

## 2021-07-03 NOTE — PROGRESS NOTES
Pt Name: Maxx Watson  Medical Record Number: [id number]  Date of Birth 1960   Today's Date: 7/3/2021      Subjective:  Denies pain, nausea or fever. Some dysuria as expected    ROS: Constitutional: No fever    Vitals:  Vitals:    07/02/21 2126 07/03/21 0520 07/03/21 0745 07/03/21 0846   BP:  (!) 151/83  136/76   Pulse:  74  78   Resp: 18 18  16   Temp:  98.2 °F (36.8 °C)  98.3 °F (36.8 °C)   TempSrc:  Oral  Oral   SpO2: 94% 97% 98% 92%   Weight:  209 lb 7 oz (95 kg)     Height:         I/O last 3 completed shifts: In: 200 [I.V.:200]  Out: 1500 [Urine:1500]    Exam:  General: Awake, oriented, no acute distress  Respiratory: Nonlabored breathing  Abdomen: Soft, non-tender, non-distended, no masses  Skin: Skin color, texture, turgor normal, no rashes or lesions  Neurologic: no gross deficits    CURRENT MEDICATIONS   Scheduled Meds:   albuterol  2.5 mg Nebulization TID    tamsulosin  0.4 mg Oral BID    sodium chloride flush  5-40 mL Intravenous 2 times per day    enoxaparin  40 mg Subcutaneous Daily    pantoprazole  40 mg Oral QAM AC    sacubitril-valsartan  1 tablet Oral BID    metoprolol succinate  25 mg Oral Daily    pravastatin  40 mg Oral Daily    pregabalin  75 mg Oral BID     Continuous Infusions:   sodium chloride Stopped (07/03/21 0849)    sodium chloride      dextrose 5 % and 0.45 % NaCl 150 mL/hr at 07/02/21 0154     PRN Meds:. HYDROmorphone, furosemide, sodium chloride flush, sodium chloride, acetaminophen, ondansetron **OR** ondansetron, polyethylene glycol, albuterol, oxyCODONE-acetaminophen, zolpidem    LABS     Recent Labs     07/01/21  1950 07/02/21  0439 07/02/21  1153 07/03/21  0638   WBC 5.7 3.6*  --  4.8   HGB 15.0 13.7  --  13.5   HCT 44.0 40.3*  --  39.2*    141  --  132*    139  --  138   K 4.1 3.8  --  4.0    104  --  105   CO2 29 27  --  25   BUN 13 12  --  10   CREATININE 1.3 1.2  --  1.1   CALCIUM 10.8* 9.7  --  9.5   INR  --   --  2.36* 2.23*   AST 31 --   --  41*   ALT 24  --   --  54*   BILITOT 0.5  --   --  0.4           ASSESSMENT   1. Hospital day # 2  2. Right ureteral calculus sp stent insertion 7/2/21  PLAN   1. OK to PR home.   Our office will contact him to arrange ureteroscopy    Anurag Kirkland MD 7/3/2021 10:05 AM

## 2021-07-03 NOTE — PROGRESS NOTES
IV removed. Discharge instructions reviewed with patient. All questions and concerns addressed. Given incentive spirometer per patient request. Pt understands follow ups needed and where prescriptions were sent. Pt discharged home ambulatory with wife.

## 2021-07-03 NOTE — PLAN OF CARE
Problem: Pain:  Goal: Pain level will decrease  Description: Pain level will decrease  7/3/2021 0103 by Quan Mercedes RN  Outcome: Ongoing  7/2/2021 1205 by Gracie Dumont RN  Outcome: Ongoing  Goal: Control of acute pain  Description: Control of acute pain  7/3/2021 0103 by Quan Mercedes RN  Outcome: Ongoing  7/2/2021 1205 by Gracie Dumont RN  Outcome: Ongoing  Goal: Control of chronic pain  Description: Control of chronic pain  7/3/2021 0103 by Quan Mercedes RN  Outcome: Ongoing  7/2/2021 1205 by Gracie Dumont RN  Outcome: Ongoing     Problem: SAFETY  Goal: Free from accidental physical injury  7/3/2021 0103 by Quan Mercedes RN  Outcome: Ongoing  7/2/2021 1205 by Gracie Dumont RN  Outcome: Ongoing  Goal: Free from intentional harm  7/3/2021 0103 by Quan Mercedes RN  Outcome: Ongoing  7/2/2021 1205 by Gracie Dumont RN  Outcome: Ongoing     Problem: DAILY CARE  Goal: Daily care needs are met  7/3/2021 0103 by Quan Mercedes RN  Outcome: Ongoing  7/2/2021 1205 by Gracie Dumont RN  Outcome: Ongoing     Problem: PAIN  Goal: Patient's pain/discomfort is manageable  7/3/2021 0103 by Quan Mercedes RN  Outcome: Ongoing  7/2/2021 1205 by Gracie Dumont RN  Outcome: Ongoing     Problem: SKIN INTEGRITY  Goal: Skin integrity is maintained or improved  7/3/2021 0103 by Quan Mercedes RN  Outcome: Ongoing  7/2/2021 1205 by Gracie Dumont RN  Outcome: Ongoing     Problem: KNOWLEDGE DEFICIT  Goal: Patient/S.O. demonstrates understanding of disease process, treatment plan, medications, and discharge instructions.   7/3/2021 0103 by Quan Mercedes RN  Outcome: Ongoing  7/2/2021 1205 by Gracie Dumont RN  Outcome: Ongoing     Problem: DISCHARGE BARRIERS  Goal: Patient's continuum of care needs are met  7/3/2021 0103 by Quan Mercedes RN  Outcome: Ongoing  7/2/2021 1205 by Gracie Dumont RN  Outcome: Ongoing Atrial fibrillation    BPH (benign prostatic hyperplasia)    CAD (coronary atherosclerotic disease) s/p CABG/Stent BMS x 2 to LAD    Dyslipidemia    HTN (hypertension)

## 2021-07-03 NOTE — OP NOTE
[address] [place]                                OPERATIVE REPORT    PATIENT NAME: Jacob Oropeza                        :        1960  MED REC NO:   [id number]                          ROOM:       [id number]  ACCOUNT NO:   [de-identified]                           ADMIT DATE: 2021  PROVIDER:     Keila Saleem DO      DATE OF PROCEDURE:  2021    PREOPERATIVE DIAGNOSES:  Right renal calculus and history of  transitional cell carcinoma of the bladder. POSTOPERATIVE DIAGNOSES:  Right renal calculus and history of  transitional cell carcinoma of the bladder. PROCEDURE PERFORMED:  Cystourethroscopy with right retrograde  pyelography and placement of right double-J stent. INDICATION:  This is a 27-year-old who was found 2 weeks ago to have a 4  mm stone in his right proximal ureter. The hope was that he could pass  this spontaneously. Unfortunately, he was admitted to the hospital last  evening with out of control renal colic. The patient was counseled that  we would perform retrograde pyelography and place a stent or do a  ureteroscopy depending upon the location of the stone. Also, we would  do a careful cystoscopy to make sure there were no recurrent bladder  tumors. FINDINGS:  The distal ureter was quite small in caliber. There was a  filling defect at L5 consistent with the small stone, mild  hydronephrosis and the bladder was without evidence of recurrent tumors. PROCEDURE:  The patient was taken to the cystoscopy suite and under TIVA  anesthesia placed in the dorsal lithotomy position and prepped and  draped in the usual sterile fashion. A 21-South African rigid cystoscope was  passed. The anterior urethra was without stricture or tumor. The  prostatic urethra was normal.  The bladder showed no evidence of tumor,  inflammation or foreign body.   Ureteral orifices were normal.    A Kinderhook catheter was placed in the right ureter and a retrograde  pyelogram performed. This showed a small caliber distal and mid ureter  and a filling defect consistent with the small stone at L5. The ureter  was slightly larger above this. I made a clinical decision at this  point not to pursue ureteroscopy because he had not been pre-stented and  this stone was in the upper ureter and the ureter caliber was smaller  than average. I then placed a ZIPwire through the Laveen catheter and removed the  Laveen catheter. I now passed a 6 x 24 double-J stent over the wire  and deployed it such that there was a coil over the renal pelvis and a  secondary coil in the bladder. The procedure was completed. The  patient will be discharged home for medical service and will have a  follow-up ureteroscopy at some point in the next week or two with Dr. Silvia Mackenzie.     LISHA- chris SANCHEZ DO    D: 07/02/2021 15:39:33       T: 07/02/2021 15:50:14     [email or web address]  Job#: [id number]     Doc#: [id number]    CC:

## 2021-07-03 NOTE — PROGRESS NOTES
Name: Quan Valente  /Age/Sex: 1960 (57 y.o. male)   MRN & CSN: [id number] & [id number]  Admission Date/Time: 2021  7:25 PM   [id number]  Current Hospital Day: Hospital Day: 3   Principal Problem: Acute flank pain  HPI     Patient seen and examined. No issues overnight. He tolerated the procedure well yesterday. Still with some flank pain but is much better. He is not having fever or chills. His appetite is good and he feels ready to go home today. All other review of systems negative unless noted above. VITALS VITALS RANGE (24 hours)  [unfilled]  [unfilled]    INS/OUTS WEIGHTS    Intake/Output Summary (Last 24 hours) at 7/3/2021 0720  Last data filed at 2021 2250  Gross per 24 hour   Intake 200 ml   Output 1500 ml   Net -1300 ml      No intake/output data recorded.  [unfilled]       PHYSICAL EXAM     General: No acute distress  HEENT: Moist oral mucosa  Cardiac: Regular rate and rhythm, no murmurs rubs gallops  Lungs: Clear to auscultation bilaterally  Abdomen: Some bilateral flank tenderness otherwise soft, nontender, nondistended, no guarding or rebound  Extremities: Trace edema  Neuro: Nonfocal      LABS   BMP  Recent Labs     21  0439    139   K 4.1 3.8    104   CO2 29 27   BUN 13 12   CREATININE 1.3 1.2   CALCIUM 10.8* 9.7     CBC/COAGS  Recent Labs     21  1950 21  0439 21  1153 21  0638   WBC 5.7 3.6*  --  4.8   HCT 44.0 40.3*  --  39.2*    141  --  132*   INR  --   --  2.36* 2.23*   PROTIME  --   --  27.7* 26.0*     Liver & Pancreas  Recent Labs     21   AST 31   ALT 24   ALKPHOS 106   LIPASE 25.0        IMAGING   CXR: No acute cardiopulmonary pathology   Above studies and images were personally reviewed by myself    MEDS   Scheduled Meds:   albuterol  2.5 mg Nebulization TID    tamsulosin  0.4 mg Oral BID    sodium chloride flush  5-40 mL Intravenous 2 times per day    enoxaparin  40 mg Subcutaneous Daily    pantoprazole  40 mg Oral QAM AC    sacubitril-valsartan  1 tablet Oral BID    metoprolol succinate  25 mg Oral Daily    pravastatin  40 mg Oral Daily    pregabalin  75 mg Oral BID     Continuous Infusions:   sodium chloride 75 mL/hr at 07/02/21 2248    sodium chloride      dextrose 5 % and 0.45 % NaCl 150 mL/hr at 07/02/21 0154     PRN Meds:HYDROmorphone, furosemide, sodium chloride flush, sodium chloride, acetaminophen, ondansetron **OR** ondansetron, polyethylene glycol, albuterol, oxyCODONE-acetaminophen, zolpidem     CURRENT HOSPITAL PROBLEMS     Ureter stone  Flank pain  -Cystourethroscopy with right retrograde  pyelography and placement of right double-J stent.  -Follow-up with urology in 1 to 2 weeks for ureteroscopy  -5 days of Percocet for pain control    ARVIN  -Resolved    Cardiomyopathy status post ICD  Hypertension  -Continue home meds    Hyperlipidemia  - Continue statin    Previous pulmonary embolism  -Continue warfarin    Bronchiolitis obliterans  -Inhaler    Dispo: Home today    Juliette Khalil MD 7/3/2021 7:20 AM

## 2021-07-03 NOTE — CARE COORDINATION
INITIAL CASE MANAGEMENT ASSESSMENT    Reviewed chart, met with patient to assess possible discharge needs. Explained Case Management role/services. SW interviewed patient via telephone today. Living Situation: Patient reports that he resides in a single family home with his wife and one cat. There is entry level admission. Prior to this hospital stay he reports that he had no trouble getting in and out of the property. ADLs: Prior to medical admission patient reports that he was independent. He stated that he doesn't anticipate any needs at discharge. DME: Prior to medical admission patient reports that he used no durable medical equipment. He stated that he doesn't anticipate any needs at discharge. PT/OT Recs: Since this morning patient reports that he was ambulating without assistance. He stated that he doesn't anticipate any needs at discharge. .      Active Services: Prior to medical admission patient reports that he used no active services. He stated that he doesn't anticipate any needs at discharge. Transportation: Prior to medical admission patient reports that he was an active . He stated that his wife will likely transport him home at discharge. Medications: [facility] or [facility]. At this time he reports no issues with access or affordability. [address] [phone] (phone) and [phone]  (fax number). Patient reports that his last appointment with this provider was over two weeks ago. HD/PD: N/A    PLAN/COMMENTS:   1) Discharge to home with family. Patient doesn't appear to have any psychosocial needs that we can address at this time. If anything changes please contact this writer at the number listed below. SW provided contact information for patient or family to call with any questions. [facility] will follow and assist as needed. Respectfully submitted,    Irma Carpenter MSW, LISW-S  [facility] Worker  [phone]    Electronically signed by CIRILO Gonzales on 7/3/2021 at 8:46 AM

## 2021-07-04 ENCOUNTER — TELEPHONE (OUTPATIENT)
Dept: INTERNAL MEDICINE CLINIC | Age: 61
End: 2021-07-04

## 2021-07-04 NOTE — TELEPHONE ENCOUNTER
Patient called complaining shortness of breath. He said his oxygen level is around low 90s, he has oxygen available at home. He says it is difficult to take a deep breath with his recent stent placement. I advised him to use his incentive spirometer every hour. I advised him to take his Lasix as it is prescribed. He is not having any fever, chills, productive cough at this time. He was agreeable to this plan.

## 2021-07-06 ENCOUNTER — TELEPHONE (OUTPATIENT)
Dept: PULMONOLOGY | Age: 61
End: 2021-07-06

## 2021-07-06 NOTE — DISCHARGE SUMMARY
Tim. His pain was  markedly improved and plan is for him to be discharged home and follow  up with Urology in one to two weeks with further ureteroscopy. At the  time of his discharge, he was told to take his regular medications and  please see the discharge records for that and told to follow to Dr. Jimenez Parra in one to two weeks and Urology in one to two weeks.     Maria Elena Rowe MD    D: 07/05/2021 10:26:19       T: 07/05/2021 15:53:30     [email or web address]  Job#: [id number]     Doc#: [id number]    CC:  MD Cesar Ramos MD

## 2021-07-06 NOTE — TELEPHONE ENCOUNTER
Patient calling stating his O2's drop down to 88% with exertion in the home since having had a stent inserted in  his urethria last week. His 6 min walk back in March did not indicate the need for O2. Could this be from his procedure; is this an acceptable level? He has a nebulizer and rescue inhaler which he states he is using. No other symptoms.   Please advise

## 2021-07-07 NOTE — TELEPHONE ENCOUNTER
Would monitor for now. Would not qualify for oxygen at this point. IF oxygen goes below 90% again would recommend calling us back. Would need Chest xray at that time.

## 2021-07-07 NOTE — TELEPHONE ENCOUNTER
Dnoi Martinez calls back to see if Dr. Noemí Braga has any thoughts on his oxygen levels dropping down to 88%. Levels have been between 90-93 today. \"Had a sinus infection and continues with cough for over a week. Completing amoxicillin\" . Dr. Noemí Braga please advise.

## 2021-07-14 ENCOUNTER — ANTI-COAG VISIT (OUTPATIENT)
Dept: PHARMACY | Age: 61
End: 2021-07-14
Payer: COMMERCIAL

## 2021-07-14 DIAGNOSIS — Z79.01 CHRONIC ANTICOAGULATION: ICD-10-CM

## 2021-07-14 DIAGNOSIS — I26.99 PULMONARY EMBOLISM, UNSPECIFIED CHRONICITY, UNSPECIFIED PULMONARY EMBOLISM TYPE, UNSPECIFIED WHETHER ACUTE COR PULMONALE PRESENT (HCC): Primary | ICD-10-CM

## 2021-07-14 LAB — INTERNATIONAL NORMALIZATION RATIO, POC: 2.6

## 2021-07-14 PROCEDURE — 85610 PROTHROMBIN TIME: CPT

## 2021-07-14 PROCEDURE — 99211 OFF/OP EST MAY X REQ PHY/QHP: CPT

## 2021-07-14 NOTE — PROGRESS NOTES
Mr. Zhao Mckeon is a 57 y.o.  male. Mr. Zhao Mckeon had an INR test today. Results were reviewed and appropriate warfarin management was completed. This visit was performed as: An in person visit. Protocols were followed with precautions to reduce the spread of COVID-19. Patient verifies current dosing regimen: Yes     Warfarin medication reviewed and updated on the patient 's home medication list: Yes   All other medications reviewed and updated on the patient 's home medication list: No: starting steroid (prednisone 10mg PO QD x5) tomorrow 7/15/21     Lab Results   Component Value Date    INR 2.6 2021    INR 2.23 (H) 2021    INR 2.36 (H) 2021       Patient Findings     Positives:  Change in medications    Negatives:  Signs/symptoms of bleeding, Missed doses, Change in diet/appetite, Bruising    Comments:  Prednisone 10mg PO QD for 5 days (starting soon--after stent removal)          Anticoagulation Summary  As of 2021    INR goal:  2.0-3.0   TTR:  34.4 % (5.7 y)   INR used for dosin.6 (2021)   Warfarin maintenance plan:  5 mg (5 mg x 1) every Mon, Wed, Fri; 2.5 mg (5 mg x 0.5) all other days   Weekly warfarin total:  25 mg   Plan last modified:  Sudha Catalan (2020)   Next INR check:  2021   Priority:  Maintenance   Target end date: Indefinite    Indications    Pulmonary embolus (HCC) [I26.99]  Chronic anticoagulation [Z79.01]             Anticoagulation Episode Summary     INR check location:  Anticoagulation Clinic    Preferred lab:      Send INR reminders to:  [facility]AFF    Comments:  EPIC - finger stick every 2-3 weeks        Anticoagulation Care Providers     Provider Role Specialty Phone number    Pau Haines MD Referring Internal Medicine [phone]          Warfarin assessment / plan:     Appears well. No changes   No acute findings  My Burleson was in the hospital from  until 7-3.  A urinary stent was placed on 21 and is due to be removed tomorrow. He states that he has had increased breathing issues, for which he has consulted Dr. Erma Stacy. He will be starting prednisone 10mg daily for 5 days on 7-15-21. We advised him to take 2.5mg of warfarin daily while taking prednisone then continue his usual dose. He will hold his warfarin today for removal of stent tomorrow. He will return in 2 weeks for his next INR. He will call with any issues. Description    TAKE 2.5mg WHILE ON PREDNISONE   THEN CONTINUE: Warfarin 2.5 mg daily EXCEPT 5mg every Monday, Wednesday and Friday    Call with signs or symptoms of bleeding or any concerns or questions. If significant bleeding seek immediate medical attention. Call with medications changes, especially antibiotics and steroids and including any over-the-counter medications or herbal products. Call if you stop any medications. Keep the number of servings of Vitamin K (dark green vegetables) consistent from week to week  Eats three good portion of broccoli or brussel sprouts per week. Immunization History   Administered Date(s) Administered    COVID-19, Moderna, PF, 100mcg/0.5mL 03/09/2021, 04/08/2021    Influenza, Quadv, IM, PF (6 mo and older Fluzone, Flulaval, Fluarix, and 3 yrs and older Afluria) 10/27/2017, 09/23/2019, 10/02/2020    Influenza, Jimmie Starch, Recombinant, IM PF (Flublok 18 yrs and older) 10/03/2018    Pneumococcal Conjugate 13-valent ([id number]) 09/11/2015    Pneumococcal Conjugate Vaccine 08/15/2017    Pneumococcal Polysaccharide ([id number]) 08/01/2007, 12/19/2012    Tdap (Boostrix, Adacel) 08/19/2014         Reviewed AVS with patient / caregiver.       CLINICAL PHARMACY CONSULT: MED RECONCILIATION/REVIEW ADDENDUM    For Pharmacy Admin Tracking Only     Intervention Detail: Dose Adjustment: 1, reason: Therapy Optimization   Total # of Interventions Recommended: 1   Total # of Interventions Accepted: 1   Time Spent (min): 20

## 2021-07-20 NOTE — PROGRESS NOTES
[facility]      Cardiology Progress Note    Mary Beth Coyle  1960 July 21, 2021    CC:  \"I still have heart racing. \"     HPI:  The patient is 57 y.o. male with a past medical history significant for for a prior PE in 2011 and nonischemic cardiomyopathy. He was hospitalized at [facility] with chest pain and had an abnormal stress that led to a left heart catheterization on 7/21/16. His coronary arteries were normal but he had LV dysfunction with a LVEF of 40% at that time. A repeat echocardiogram demonstrated his LVEF to be 30% despite optimal medical therapy. Entresto therapy was initiated and he was interested in pursuing CRT. He underwent Bi-V ICD implant on 10/10/17. Biv pacing turned off,  now set at VVI 40 with defibrillator programming ON. Also with a hx of atrial tach and underwent DCCV 9/2018, AFIB, HLD, HTN, ARDS 2011, bronchiolitis obliterans and nocturnal hypoxia-O2 2L nightly managed per Pulmonary. Today, he reports continued heart racing symptoms. Patient denies exertional chest pain/pressure, dyspnea at rest, GUEVARA, PND, orthopnea, palpitations, lightheadedness, weight changes, changes in LE edema, and syncope. He reports medical therapy compliance and tolerating. He has obtained COVID vaccine. He reports no abnormal bruising or bleeding.        Past Medical History:   Diagnosis Date    Bronchiolitis obliterans ([address]     Bundle branch block, right     Cardiomyopathy ([facility] [address]     Chest pain 9/24/2019    COVID-19 virus vaccine not available     Fibromyalgia     GERD (gastroesophageal reflux disease)     Hepatitis 1979    unsure of which type    Hx of blood clots     Hyperlipemia     Hypertension     IBS (irritable bowel syndrome)     Kidney stone     over thirty kidney stones    Neuropathy     right side-chest    Pneumothorax 2011    Right    Prostatitis     Pulmonary embolism on right [facility]) 2011    upper lobe-coumadin 2011    Sacroiliitis [facility])      Past Surgical History:   Procedure Laterality Date    CHOLECYSTECTOMY  2007    COLONOSCOPY  9/21/2012    dr Susana Sprague  05/17/2019    RIGHT SIDED URETEROSCOPY  Diagnostic, retrograde pyelogram and fulguration of previously resected bladder tumor base    CYSTOSCOPY Right 5/17/2019    RIGHT SIDED URETEROSCOPY FLUGERATION  OF BLADDER performed by Sandra Gay MD at [facility]. Right 7/2/2021    CYSTOURETHROSCOPY WITH RIGHT RETROGRADE PYELOGRAPHY AND PLACEMENT OF RIGHT DOUBLE J STENT performed by Unique Fowler DO at [facility]  2015    LITHOTRIPSY      PACEMAKER PLACEMENT      SINUS SURGERY      Made opening larger in sinuses    UPPER GASTROINTESTINAL ENDOSCOPY  3/28/2014    dr Vick Fraser N/A 2/1/2021    EGD BIOPSY performed by Isabel Rousseau MD at [facility] History   Problem Relation Age of Onset    High Blood Pressure Mother     Dementia Mother     Cancer Father         Pancreatic Ca     Social History     Tobacco Use    Smoking status: Never Smoker    Smokeless tobacco: Never Used   Vaping Use    Vaping Use: Never used   Substance Use Topics    Alcohol use: No     Comment: occ    Drug use: No       Allergies   Allergen Reactions    Ipratropium Bromide Hfa Shortness Of Breath    Atrovent Nasal Spray [Ipratropium] Other (See Comments)     Chest tightness    Doxycycline Hives    Morphine Other (See Comments)     \"makes me feel funny\"      Nitroglycerin Other (See Comments)     Severe hypotension (went from 250 to 66 systolic)    Sulfa Antibiotics Rash    Vicodin [Hydrocodone-Acetaminophen] Rash     Current Outpatient Medications   Medication Sig Dispense Refill    pregabalin (LYRICA) 75 MG capsule TAKE 1 CAPSULE BY MOUTH IN  THE MORNING AND 2 CAPSULES  IN THE EVENING 270 capsule 0    zolpidem (AMBIEN) 10 MG tablet Take 1 tablet by mouth nightly as needed for Sleep for up to 14 days.  90 tablet 0  guaiFENesin (MUCINEX) 600 MG extended release tablet Take 600 mg by mouth 2 times daily      ondansetron (ZOFRAN) 4 MG tablet Take 1 tablet by mouth every 8 hours as needed for Nausea or Vomiting 20 tablet 0    furosemide (LASIX) 20 MG tablet Take 0.5 tablets by mouth 2 times daily as needed (SOB) 30 tablet 3    dexlansoprazole (DEXILANT) 60 MG CPDR delayed release capsule Take 60 mg by mouth daily      pravastatin (PRAVACHOL) 40 MG tablet Take 1 tablet by mouth daily (Patient taking differently: Take 40 mg by mouth every evening ) 90 tablet 3    warfarin (COUMADIN) 5 MG tablet Take 1 tablet by mouth daily Except 2.5 mg Tues and Thursday (Patient taking differently: Take 2.5 mg by mouth daily EXCEPT 5mg every Monday, Wednesday and Friday or as directed by [facility] [phone]) 90 tablet 3    dicyclomine (BENTYL) 10 MG capsule TAKE ONE CAPSULE BY MOUTH  FOUR TIMES DAILY AS NEEDED 360 capsule 1    tamsulosin (FLOMAX) 0.4 MG capsule Take 1 capsule by mouth 2 times daily 60 capsule 2    ENTRESTO 49-51 MG per tablet Take 1 tablet by mouth 2 times daily      sodium chloride, Inhalant, 3 % nebulizer solution Take 4 mLs by nebulization every 8 hours as needed for Other or Cough (cough) 360 mL 5    metoprolol succinate (TOPROL XL) 25 MG extended release tablet TAKE 1 TABLET BY MOUTH TWO  TIMES DAILY 180 tablet 3    Cholecalciferol (VITAMIN D3) 1.25 MG (99198 UT) CAPS Take 1 capsule by mouth once a week 12 capsule 3    albuterol sulfate  (90 Base) MCG/ACT inhaler INHALE 2 PUFFS INTO THE LUNGS EVERY 4 HOURS AS NEEDED FOR WHEEZING OR SHORTNESS OF BREATH 1 Inhaler 0    polyethylene glycol (MIRALAX) PACK packet Take 17 g by mouth nightly       aspirin 81 MG tablet Take 81 mg by mouth daily       No current facility-administered medications for this visit. Review of Systems:  · Constitutional: no unanticipated weight loss.  There's been no change in energy level, sleep pattern, or activity level. No fevers, chills. · Eyes: No visual changes or diplopia. No scleral icterus. · ENT: No Headaches, hearing loss or vertigo. No mouth sores or sore throat. · Cardiovascular: as reviewed in HPI  · Respiratory: No cough or wheezing, no sputum production. No hematemesis. · Gastrointestinal: No abdominal pain, appetite loss, blood in stools. No change in bowel or bladder habits. · Genitourinary: No dysuria, trouble voiding, or hematuria. · Musculoskeletal:  No gait disturbance, no joint complaints. · Integumentary: No rash or pruritis. · Neurological: No headache, diplopia, change in muscle strength, numbness or tingling. · Psychiatric: No anxiety or depression. · Endocrine: No temperature intolerance. No excessive thirst, fluid intake, or urination. No tremor. · Hematologic/Lymphatic: No abnormal bruising or bleeding, blood clots or swollen lymph nodes. · Allergic/Immunologic: No nasal congestion or hives. Physical Exam:   /76   Pulse 72   Ht 5' 6\" (1.676 m)   Wt 204 lb (92.5 kg) Comment: with shoes  SpO2 95%   BMI 32.93 kg/m²   Wt Readings from Last 3 Encounters:   07/21/21 204 lb (92.5 kg)   07/12/21 208 lb (94.3 kg)   07/03/21 209 lb 7 oz (95 kg)     Constitutional: The patient is oriented to person, place, and time. Appears well-developed and well-nourished. In no acute distress. Head: Normocephalic and atraumatic. Pupils equal and round. Neck: Neck supple. No JVP or carotid bruit appreciated. No mass and no thyromegaly present. No lymphadenopathy present. Cardiovascular: Normal rate. Normal heart sounds. Exam reveals no gallop and no friction rub. No murmur heard. Pulmonary/Chest: Effort normal and breath sounds normal. No respiratory distress. No wheezes, rhonchi or rales. Abdominal: Soft, non-tender. Bowel sounds are normal. Exhibits no organomegaly, mass or bruit. Extremities: No edema. No cyanosis or clubbing.  Pulses are 2+ radial and carotid bilaterally. Neurological: No gross cranial nerve deficit. Coordination normal.   Skin: Skin is warm and dry. There is no rash or diaphoresis. Psychiatric: Patient has a normal mood and affect. Speech is normal and behavior is normal.     Lab Review:   Lab Results   Component Value Date    TRIG 126 10/02/2019    HDL 39 02/26/2021    HDL 65 11/10/2011    LDLCALC 92 02/26/2021    LABVLDL 20 02/26/2021       Lab Results   Component Value Date    BUN 10 07/03/2021    CREATININE 1.1 07/03/2021     EKG Interpretation: 2/14/18: sinus rhythm with LBBB     11/13/19: Sinus rhythm LBBB     2/28/20: Sinus rhythm LBBB     2/25/21: SR with LBBB.      7/21/21: Sinus  Rhythm with Left bundle branch block. Image Review:     ECHO 11/28/17  Summary   Normal left ventricular wall thickness. Ejection fraction is visually   estimated to be 40-45%.  Hesston Pham is septal hypokinesis (secondary to Pacing)   Trivial mitral & tricuspid regurgitation is present.   The left atrial size is normal.   Pacer / ICD wire is visualized in the right ventricle.   There appears to be normal right ventricular size and function. Echo: 7/24/17  Left ventricle size is normal. Mild concentric left ventricular hypertrophy  is present. Global ejection fraction is moderately decreased and estimated  from 30 % to 35%. Diastolic filling parameters suggests grade I diastolic  dysfunction . There is abnormal (paradoxical) septal motion consistent with left bundle  branch block. Mitral valve is structurally normal.  Trivial to mild mitral regurgitation is present. The left atrial size is normal.  A bubble study was performed and fails to show evidence of right to left  shunting.     Echo: 11/9/16  Dilated LV with severely reduced systolic function. EF 30%. Anterior, septal  and apical akinesis. Mild mitral regurgitation is present.   The left atrial size is normal.  Normal right ventricular size and function.     Stress Test:  7/21/16  SPECT perfusion images: On the stress corrected images there is a moderate defect in the septum of the left ventricle. At rest, partially redistributes especially the posterior lateral component. Complete redistribution is not present. OPCO:  91 %      (Normal is at least 62% for women and 53% for men)  LV wall motion:   There is significant hypokinesia of the ventricular septum  LVEDV: 117ml  (Normal is up to 100ml for women and 150ml for men)  Transient dilatation ratio:   1.0      (Normal is up to 1.3 for women and 1.2 for men)     Cath: 7/21/16  #1-coronary angiography summary: Normal coronaries in a left   dominant system  A-left main coronary is normal  B-the LAD has minor irregularities and no significant stenoses  C-the circumflex is dominant and has minor irregularities and no   significant stenoses  D-the right coronary is a small nondominant vessel and is   angiographically normal  #2-ventriculography in the ADKINS projection demonstrates mild   global left ventricular dysfunction ejection fractions   approximately 40  #3-aortic pressure is approximately 150/80 left ventricular   pressures 150/14 there is no gradient on pullback  #3-FRSMW are no complications  #2-YUJ patient will be treated medically for left ventricular   dysfunction in the setting of normal coronaries     ECHO 9/25/19  Mild concentric LVH with normal LV size and wall motion. EF is 50%. Paradoxical septal motion secondary to paced rhythm  Trivial mitral regurgitation is present. The left atrium is normal in size. The right ventricle is normal in size and function. Pacing wire seen.     Stress study:8/26/20  No evidence of reversible ischemia.    There is a moderate to large sized, moderate intensity, fixed septal and    inferior wall defect which is most consistent with LBBB induced artifact and    diaphragm attenuation artifact.    Normal LV size and systolic function.    Overall findings represent a low risk study.         Echo: 10/2020  - Left ventricle: The cavity size is normal. Wall thickness is    normal. Systolic function was mildly reduced. The calculated    ejection fraction was 42%. Normal diastolic function. - Ventricular septum: Septal motion is paradoxical septal motion    consistent with a conduction abnormality or paced rhythm. - Right ventricle: Pacer wire noted in right ventricle. - Inferior vena cava: The vessel was normal in size; the    respirophasic diameter changes were in the normal range (&gt;= 50%);    findings are consistent with normal central venous pressure. RHC: 3/2/21  HEMODYNAMIC DATA:  The right atrial pressure mean was 6 with oxygen  saturation of 78%. RV pressure was 39 systolic with oxygen saturation  of 77%. PA pressure was 30/13 with mean of 22. Pulmonary capillary  wedge pressure mean was 17 mmHg. Cardiac output by thermodilution  method was 4.81 liters per minute. Index was 2.34 liters per minute per  body surface area. By Monet Mandril method, cardiac output was 6.88 liters per  minute with a cardiac index of 3.35 liters per minute per body surface  area.     OVERALL IMPRESSION:  1. Slightly above normal right heart pressure with slightly elevated  pulmonary capillary wedge pressure of 17 mmHg. 2.  Normal cardiac output and cardiac indices.     In view of the above findings, we will restart the patient on diuretic  therapy and we will follow his symptoms. Assessment/Plan:     Nonischemic Cardiomyopathy/chronic systolic heart failure   Currently denies any symptoms of dyspnea on exertion, PND orthopnea or leg edema  He appears euvolemic on clinical exam today  Jayjay Centeno had Biv -ICD placed by Dr. Cyndy Baez for underlying cardiomyopathy with left bundle branch block. His [facility] with no CAD, last stress normal 8/2020, last echocardiogram from 10/2020 revaled LVEF 42%, RHC 3/2/21 slightly above normal right heart pressure with slightly elevated pulmonary wedge pressure 17mmHg, normal cardiac output and cardiac indices. Pulmonary Embolism   Patient is on Warfarin therapy. Denies any abnormal bruising or bleeding. INR managed per [facility]. He denies any recurrence and has remained stable.      Hypertension, essential   Blood pressure is stable. Continue Toprol XL 25 mg daily. BMP from 7/3/21 stable.      Hyperlipidemia, unspecified   Last lipid profile 5/15/20 is within acceptable limits except , LDLc 70. Repeat lipid 2/26/21 stable except low HDL 39, LDLc 92. LFT remain abnormal but stable 7/3/21. He is on pravastatin therapy as he had myalgias on lipitor and has been denied PCSK9 inhibitors since he has no known atherosclerotic disease. Will repeat lipid/liver profile prior to next visit. Paroxsymal atrial fibrillation  Patient denies any further palpitations except he feels his heart racing. He has remained stable. EKG today SR with LBBB. His last ICD check 6/28/21 reviewed and stable. Will follow him with routine device interrogations. Bronchiolitis obliterans, choric cough and lung nodules  Pulmonology for bronchiolitis obliterans and is on medical therapy and she has recommended pulmonary rehab and started him on symbicort. We will see him back in 6 months. Thank you very much for allowing me to participate in the care of your patient. Please do not hesitate to contact me if you have any questions. Sincerely,  Ning Peters MD      [facility], [address] [place] [address]  Ph: [phone]  Fax: [phone]    This note was scribed in the presence of Dr. Matt Brennan MD by Tavon Watkins RN.

## 2021-07-21 ENCOUNTER — OFFICE VISIT (OUTPATIENT)
Dept: CARDIOLOGY CLINIC | Age: 61
End: 2021-07-21
Payer: COMMERCIAL

## 2021-07-21 VITALS
SYSTOLIC BLOOD PRESSURE: 130 MMHG | DIASTOLIC BLOOD PRESSURE: 76 MMHG | HEART RATE: 72 BPM | HEIGHT: 66 IN | OXYGEN SATURATION: 95 % | BODY MASS INDEX: 32.78 KG/M2 | WEIGHT: 204 LBS

## 2021-07-21 DIAGNOSIS — I26.99 PULMONARY EMBOLISM, UNSPECIFIED CHRONICITY, UNSPECIFIED PULMONARY EMBOLISM TYPE, UNSPECIFIED WHETHER ACUTE COR PULMONALE PRESENT (HCC): ICD-10-CM

## 2021-07-21 DIAGNOSIS — E78.5 HYPERLIPIDEMIA, UNSPECIFIED HYPERLIPIDEMIA TYPE: ICD-10-CM

## 2021-07-21 DIAGNOSIS — I48.0 PAF (PAROXYSMAL ATRIAL FIBRILLATION) (HCC): ICD-10-CM

## 2021-07-21 DIAGNOSIS — I10 ESSENTIAL HYPERTENSION: ICD-10-CM

## 2021-07-21 DIAGNOSIS — I42.8 NONISCHEMIC CARDIOMYOPATHY (HCC): Primary | ICD-10-CM

## 2021-07-21 PROCEDURE — 1036F TOBACCO NON-USER: CPT | Performed by: INTERNAL MEDICINE

## 2021-07-21 PROCEDURE — 99214 OFFICE O/P EST MOD 30 MIN: CPT | Performed by: INTERNAL MEDICINE

## 2021-07-21 PROCEDURE — 93000 ELECTROCARDIOGRAM COMPLETE: CPT | Performed by: INTERNAL MEDICINE

## 2021-07-21 PROCEDURE — 3017F COLORECTAL CA SCREEN DOC REV: CPT | Performed by: INTERNAL MEDICINE

## 2021-07-21 PROCEDURE — 1111F DSCHRG MED/CURRENT MED MERGE: CPT | Performed by: INTERNAL MEDICINE

## 2021-07-21 PROCEDURE — G8427 DOCREV CUR MEDS BY ELIG CLIN: HCPCS | Performed by: INTERNAL MEDICINE

## 2021-07-21 PROCEDURE — G8417 CALC BMI ABV UP PARAM F/U: HCPCS | Performed by: INTERNAL MEDICINE

## 2021-07-21 NOTE — PATIENT INSTRUCTIONS
Patient Education        High Cholesterol: Care Instructions  Overview     Cholesterol is a type of fat in your blood. It is needed for many body functions, such as making new cells. Cholesterol is made by your body. It also comes from food you eat. High cholesterol means that you have too much of the fat in your blood. This raises your risk of a heart attack and stroke. LDL and HDL are part of your total cholesterol. LDL is the \"bad\" cholesterol. High LDL can raise your risk for coronary artery disease, heart attack, and stroke. HDL is the \"good\" cholesterol. It helps clear bad cholesterol from the body. High HDL is linked with a lower risk of coronary artery disease, heart attack, and stroke. Your cholesterol levels help your doctor find out your risk for having a heart attack or stroke. You and your doctor can talk about whether you need to lower your risk and what treatment is best for you. Treatment options include a heart-healthy lifestyle and medicine. Both options can help lower your cholesterol and your risk. The way you choose to lower your risk will depend on how high your risk is for heart attack and stroke. It will also depend on how you feel about taking medicines. Follow-up care is a key part of your treatment and safety. Be sure to make and go to all appointments, and call your doctor if you are having problems. It's also a good idea to know your test results and keep a list of the medicines you take. How can you care for yourself at home? · Eat heart-healthy foods. ? Eat fruits, vegetables, whole grains, beans, and other high-fiber foods. ? Eat lean proteins, such as seafood, lean meats, beans, nuts, and soy products. ? Eat healthy fats, such as canola and olive oil. ? Choose foods that are low in saturated fat. ? Limit sodium and alcohol. ? Limit drinks and foods with added sugar. · Be physically active. Try to do moderate activity at least 2½ hours a week.  Or try to do vigorous activity at least 1¼ hours a week. You may want to walk or try other activities, such as running, swimming, cycling, or playing tennis or team sports. · Stay at a healthy weight or lose weight by making the changes in eating and physical activity listed above. Losing just a small amount of weight, even 5 to 10 pounds, can help reduce your risk for having a heart attack or stroke. · Do not smoke. · Manage other health problems. These include diabetes and high blood pressure. If you think you may have a problem with alcohol or drug use, talk to your doctor. · If you take medicine, take it exactly as prescribed. Call your doctor if you think you are having a problem with your medicine. · Check with your doctor or pharmacist before you use any other medicines, including over-the-counter medicines. Make sure your doctor knows all of the medicines, vitamins, herbal products, and supplements you take. Taking some medicines together can cause problems. When should you call for help? Watch closely for changes in your health, and be sure to contact your doctor if:    · You need help making lifestyle changes.     · You have questions about your medicine. Where can you learn more? Go to https://[email or web address].Annex Products. org and sign in to your [employer] account. Enter N932 in the [facility] box to learn more about \"High Cholesterol: Care Instructions. \"     If you do not have an account, please click on the \"Sign Up Now\" link. Current as of: August 31, 2020               Content Version: 12.9  © 2006-2021 [employer]. Care instructions adapted under license by [facility] ([facility]). If you have questions about a medical condition or this instruction, always ask your healthcare professional. [place] [address] any warranty or liability for your use of this information.          Patient Education        Heart-Healthy Diet: Care Instructions  Your Care Instructions     A heart-healthy diet has lots of vegetables, fruits, nuts, beans, and whole grains, and is low in salt. It limits foods that are high in saturated fat, such as meats, cheeses, and fried foods. It may be hard to change your diet, but even small changes can lower your risk of heart attack and heart disease. Follow-up care is a key part of your treatment and safety. Be sure to make and go to all appointments, and call your doctor if you are having problems. It's also a good idea to know your test results and keep a list of the medicines you take. How can you care for yourself at home? Watch your portions  · Use food labels to learn what the recommended servings are for the foods you eat. · Eat only the number of calories you need to stay at a healthy weight. If you need to lose weight, eat fewer calories than your body burns (through exercise and other physical activity). Eat more fruits and vegetables  · Eat a variety of fruit and vegetables every day. Dark green, deep orange, red, or yellow fruits and vegetables are especially good for you. Examples include spinach, carrots, peaches, and berries. · Keep carrots, celery, and other veggies handy for snacks. Buy fruit that is in season and store it where you can see it so that you will be tempted to eat it. · Cook dishes that have a lot of veggies in them, such as stir-fries and soups. Limit saturated fat  · Read food labels, and try to avoid saturated fats. They increase your risk of heart disease. · Use olive or canola oil when you cook. · Bake, broil, grill, or steam foods instead of frying them. · Choose lean meats instead of high-fat meats such as hot dogs and sausages. Cut off all visible fat when you prepare meat. · Eat fish, skinless poultry, and meat alternatives such as soy products instead of high-fat meats. Soy products, such as tofu, may be especially good for your heart. · Choose low-fat or fat-free milk and dairy products.   Eat foods high in fiber  · Eat a vitamins and minerals. Sodium is a nutrient. Your body needs the right amount to stay healthy and work as it should. You can use the list below to help you make choices about which foods to eat. Fruits  · Fresh, frozen, canned, or dried fruit  Vegetables  · Fresh or frozen vegetables, with no added salt  · Canned vegetables, low-sodium or with no added salt  Grains  · Bagels without salted tops  · Cereal with no added salt  · Corn tortillas  · Crackers with no added salt  · Oatmeal, cooked without salt  · Popcorn with no salt  · Pasta and noodles, cooked without salt  · Rice, cooked without salt  · Unsalted pretzels  Dairy and dairy alternatives  · Butter, unsalted  · Cream cheese  · Ice cream  · Milk  · Soy milk  Meats and other protein foods  · Beans and peas, canned with no salt  · Eggs  · Fresh fish (not smoked)  · Fresh meats (not smoked or cured)  · Nuts and nut butter, prepared without salt  · Poultry, not packaged with sodium solution  · Tofu, unseasoned  · Tuna, canned without salt  Seasonings  · Garlic  · Herbs and spices  · Lemon juice  · Mustard  · Olive oil  · Salt-free seasoning mixes  · Vinegar  Work with your doctor to find out how much of this nutrient you need. Depending on your health, you may need more or less of it in your diet. Where can you learn more? Go to https://Glimpsepepiceweb.healthRailsware. org and sign in to your [employer] account. Enter Z578 in the [facility] box to learn more about \"Learning About Low-Sodium Foods. \"     If you do not have an account, please click on the \"Sign Up Now\" link. Current as of: December 17, 2020               Content Version: 12.9  © 2343-1470 Healthwise, [employer]. Care instructions adapted under license by [facility] ([facility]). If you have questions about a medical condition or this instruction, always ask your healthcare professional. [address] any warranty or liability for your use of this information.          Patient Education        Walking for Exercise: Care Instructions  Your Care Instructions     Walking is one of the easiest ways to get the exercise you need for good health. A brisk, 30-minute walk each day can help you feel better and have more energy. It can help you lower your risk of disease. Walking can help you keep your bones strong and your heart healthy. Check with your doctor before you start a walking plan if you have heart problems, other health issues, or you have not been active in a long time. Follow your doctor's instructions for safe levels of exercise. Follow-up care is a key part of your treatment and safety. Be sure to make and go to all appointments, and call your doctor if you are having problems. It's also a good idea to know your test results and keep a list of the medicines you take. How can you care for yourself at home? Getting started  · Start slowly and set a short-term goal. For example, walk for 5 or 10 minutes every day. · Bit by bit, increase the amount you walk every day. Try for at least 30 minutes on most days of the week. You also may want to swim, bike, or do other activities. · If finding enough time is a problem, it's fine to be active in shorter periods of time throughout your day. · To get the heart-healthy benefits of walking, you need to walk briskly enough to increase your heart rate and breathing, but not so fast that you can't talk comfortably. · Wear comfortable shoes that fit well and provide good support for your feet and ankles. Staying with your plan  · After you've made walking a habit, set a longer-term goal. You may want to set a goal of walking briskly for longer or walking farther. Experts say to do 2½ hours (150 minutes) of moderate activity a week. A faster heartbeat is what defines moderate-level activity. · To stay motivated, walk with friends, coworkers, or pets. · Use a phone christine or pedometer to track your steps each day.  Set a goal to increase your warranty or liability for your use of this information.

## 2021-07-28 ENCOUNTER — ANTI-COAG VISIT (OUTPATIENT)
Dept: PHARMACY | Age: 61
End: 2021-07-28
Payer: COMMERCIAL

## 2021-07-28 DIAGNOSIS — Z79.01 CHRONIC ANTICOAGULATION: ICD-10-CM

## 2021-07-28 DIAGNOSIS — I26.99 PULMONARY EMBOLISM, UNSPECIFIED CHRONICITY, UNSPECIFIED PULMONARY EMBOLISM TYPE, UNSPECIFIED WHETHER ACUTE COR PULMONALE PRESENT (HCC): Primary | ICD-10-CM

## 2021-07-28 LAB — INTERNATIONAL NORMALIZATION RATIO, POC: 2.6

## 2021-07-28 PROCEDURE — 99211 OFF/OP EST MAY X REQ PHY/QHP: CPT

## 2021-07-28 PROCEDURE — 85610 PROTHROMBIN TIME: CPT

## 2021-07-28 NOTE — PROGRESS NOTES
Mr. Marbella Sam is a 57 y.o.  male. Mr. Marbella Sam had an INR test today. Results were reviewed and appropriate warfarin management was completed. This visit was performed as: An in person visit. Protocols were followed with precautions to reduce the spread of COVID-19. Patient verifies current dosing regimen: Yes     Warfarin medication reviewed and updated on the patient 's home medication list: Yes   All other medications reviewed and updated on the patient 's home medication list: No: no changes. Lab Results   Component Value Date    INR 2.6 2021    INR 2.6 2021    INR 2.23 (H) 2021       Patient Findings     Negatives:  Signs/symptoms of bleeding, Missed doses, Change in medications, Change in diet/appetite, Bruising          Anticoagulation Summary  As of 2021    INR goal:  2.0-3.0   TTR:  34.8 % (5.7 y)   INR used for dosin.6 (2021)   Warfarin maintenance plan:  5 mg (5 mg x 1) every Mon, Wed, Fri; 2.5 mg (5 mg x 0.5) all other days   Weekly warfarin total:  25 mg   Plan last modified:  Sudha Catalan (2020)   Next INR check:  2021   Priority:  Maintenance   Target end date: Indefinite    Indications    Pulmonary embolus (HCC) [I26.99]  Chronic anticoagulation [Z79.01]             Anticoagulation Episode Summary     INR check location:  Anticoagulation Clinic    Preferred lab:      Send INR reminders to:  [facility]    Comments:  EPIC - finger stick every 2-3 weeks        Anticoagulation Care Providers     Provider Role Specialty Phone number    Yue Levy MD Referring Internal Medicine [phone]          Warfarin assessment / plan:     Appears well. No changes   No acute findings  No change to warfarin therapy today. Description    CONTINUE: Warfarin 2.5 mg daily EXCEPT 5mg every Monday, Wednesday and Friday    Call with signs or symptoms of bleeding or any concerns or questions.   If significant bleeding seek immediate medical attention. Call with medications changes, especially antibiotics and steroids and including any over-the-counter medications or herbal products. Call if you stop any medications. Keep the number of servings of Vitamin K (dark green vegetables) consistent from week to week  Eats three good portion of broccoli or brussel sprouts per week. Immunization History   Administered Date(s) Administered    COVID-19, Moderna, PF, 100mcg/0.5mL 03/09/2021, 04/08/2021    Influenza, Quadv, IM, PF (6 mo and older Fluzone, Flulaval, Fluarix, and 3 yrs and older Afluria) 10/27/2017, 09/23/2019, 10/02/2020    Influenza, Janas Rota, Recombinant, IM PF (Flublok 18 yrs and older) 10/03/2018    Pneumococcal Conjugate 13-valent ([id number]) 09/11/2015    Pneumococcal Conjugate Vaccine 08/15/2017    Pneumococcal Polysaccharide ([id number]) 08/01/2007, 12/19/2012    Tdap (Boostrix, Adacel) 08/19/2014         Reviewed AVS with patient / caregiver.       CLINICAL PHARMACY CONSULT: MED RECONCILIATION/REVIEW ADDENDUM    For Pharmacy Admin Tracking Only     Intervention Detail:    Total # of Interventions Recommended: 0   Total # of Interventions Accepted: 0   Time Spent (min): 20     Seen by Dottie Benton, Student P4

## 2021-08-04 ENCOUNTER — OFFICE VISIT (OUTPATIENT)
Dept: PULMONOLOGY | Age: 61
End: 2021-08-04
Payer: COMMERCIAL

## 2021-08-04 VITALS
DIASTOLIC BLOOD PRESSURE: 80 MMHG | RESPIRATION RATE: 16 BRPM | BODY MASS INDEX: 33.49 KG/M2 | OXYGEN SATURATION: 93 % | HEART RATE: 76 BPM | HEIGHT: 66 IN | WEIGHT: 208.4 LBS | SYSTOLIC BLOOD PRESSURE: 136 MMHG | TEMPERATURE: 97.5 F

## 2021-08-04 DIAGNOSIS — J42 BRONCHIOLITIS OBLITERANS (HCC): Primary | ICD-10-CM

## 2021-08-04 PROCEDURE — 99214 OFFICE O/P EST MOD 30 MIN: CPT | Performed by: INTERNAL MEDICINE

## 2021-08-04 RX ORDER — TIOTROPIUM BROMIDE AND OLODATEROL 3.124; 2.736 UG/1; UG/1
2 SPRAY, METERED RESPIRATORY (INHALATION) DAILY
Qty: 1 INHALER | Refills: 0 | COMMUNITY
Start: 2021-08-04 | End: 2021-08-26 | Stop reason: SDUPTHER

## 2021-08-04 ASSESSMENT — ENCOUNTER SYMPTOMS: COUGH: 1

## 2021-08-04 NOTE — PROGRESS NOTES
[address], SLEEP, AND CRITICAL CARE   Martinez Gamez (:  1960) is a 57 y.o. male,Established patient, here for evaluation of the following chief complaint(s): Other (f/u bronchiolitis obliterans)         ASSESSMENT/PLAN:  1. Bronchiolitis obliterans ([facility] [address]  Assessment & Plan:   -Symptoms generally stable but does have increased productive cough  -We will start Stiolto, provide sample, developed recurrent thrush with ICS  Orders:  -     tiotropium-olodaterol (STIOLTO RESPIMAT) 2.5-2.5 MCG/ACT AERS; Inhale 2 puffs into the lungs daily, Disp-1 Inhaler, R-0Lot [id number] EXP Sample      Return in about 6 months (around 2022). Subjective   SUBJECTIVE/OBJECTIVE:  Transitioning care from Dr. Neelima Ashley  Symptoms generally stable though there has been an increase in productive cough recently. Notices more with exertion while golfing. Gets thrush with ICS. History of bronchiolitis obliterans in       Review of Systems   Constitutional: Negative. Respiratory: Positive for cough. Cardiovascular: Negative. Musculoskeletal: Negative. Neurological: Negative. Psychiatric/Behavioral: Negative. All other systems reviewed and are negative. Objective   Physical Exam    Gen:  No acute distress. Eyes: PERRL. EOMI. Anicteric sclera. No conjunctival injection. ENT: No discharge. oropharynx clear. External appearance of ears and nose normal.  Neck: Trachea midline. No mass   Resp:  No crackles. No wheezes. No rhonchi. No dullness on percussion. CV: Regular rate. Regular rhythm. No murmur or rub. No edema. GI: Soft, Non-tender. Non-distended. +BS  Skin: Warm, dry, w/o erythema. Lymph: No cervical or supraclavicular LAD. M/S: No cyanosis. No clubbing. Neuro:  no focal neurologic deficit. Moves all extremities  Psych: Awake and alert, Oriented x 3. Judgement and insight appropriate. Mood stable.       On this date 2021 I have spent 31 minutes reviewing previous notes, test results and face to face with the patient discussing the diagnosis and importance of compliance with the treatment plan as well as documenting on the day of the visit. An electronic signature was used to authenticate this note.     --Jillian Mcgarry MD

## 2021-08-04 NOTE — ASSESSMENT & PLAN NOTE
-Symptoms generally stable but does have increased productive cough  -We will start Stiolto, provide sample, developed recurrent thrush with ICS

## 2021-08-05 ENCOUNTER — HOSPITAL ENCOUNTER (OUTPATIENT)
Dept: CARDIAC REHAB | Age: 61
Setting detail: THERAPIES SERIES
Discharge: HOME | End: 2021-08-05
Payer: COMMERCIAL

## 2021-08-05 PROCEDURE — G0239 OTH RESP PROC, GROUP: HCPCS

## 2021-08-06 ENCOUNTER — TELEPHONE (OUTPATIENT)
Dept: PHARMACY | Age: 61
End: 2021-08-06

## 2021-08-06 NOTE — TELEPHONE ENCOUNTER
Patient called, started Stiolto inhaler. Anticipate no interaction with Coumadin. Advised patient to continue current dose and follow up as scheduled.     For Pharmacy Admin Tracking Only     Intervention Detail:    Total # of Interventions Recommended: 0   Total # of Interventions Accepted: 0   Time Spent (min): 10

## 2021-08-09 RX ORDER — FUROSEMIDE 20 MG/1
10 TABLET ORAL 2 TIMES DAILY PRN
Qty: 30 TABLET | Refills: 2 | Status: SHIPPED | OUTPATIENT
Start: 2021-08-09 | End: 2021-09-20 | Stop reason: SDUPTHER

## 2021-08-09 RX ORDER — PRAVASTATIN SODIUM 40 MG
40 TABLET ORAL DAILY
Qty: 30 TABLET | Refills: 3 | Status: SHIPPED | OUTPATIENT
Start: 2021-08-09 | End: 2021-09-20 | Stop reason: SDUPTHER

## 2021-08-09 NOTE — TELEPHONE ENCOUNTER
Medication Refill    Medication needing refilled:  furosemide (LASIX)   pravastatin (PRAVACHOL)    Dosage of the medication:  20 MG tablet   40 MG tablet     How are you taking this medication (QD, BID, TID, QID, PRN):  Take 0.5 tablets by mouth 2 times daily as needed (SOB)  Take 40 mg by mouth every evening     30 or 90 day supply called in: 90 supply     When will you run out of your medication:    Which Pharmacy are we sending the medication to?:      [address] [facility] [address]   [address] [facility] [address] [facility], [address] [place], [place] [address] [address]   Phone:  [phone]  Fax:  [phone]

## 2021-08-09 NOTE — TELEPHONE ENCOUNTER
Last OV: 07/21/21  Next OV: 08/24/21  Last refill:05/19/21  Most recent Labs: 07/03/21 cmp , 02/26/21 lipid   Last PT/INR (if needed):  Last EKG (if needed): 07/21/21

## 2021-08-10 ENCOUNTER — HOSPITAL ENCOUNTER (OUTPATIENT)
Dept: CARDIAC REHAB | Age: 61
Setting detail: THERAPIES SERIES
Discharge: HOME | End: 2021-08-10
Payer: COMMERCIAL

## 2021-08-10 PROCEDURE — G0239 OTH RESP PROC, GROUP: HCPCS

## 2021-08-12 ENCOUNTER — HOSPITAL ENCOUNTER (OUTPATIENT)
Dept: CARDIAC REHAB | Age: 61
Setting detail: THERAPIES SERIES
Discharge: HOME | End: 2021-08-12
Payer: COMMERCIAL

## 2021-08-12 PROCEDURE — G0239 OTH RESP PROC, GROUP: HCPCS

## 2021-08-17 ENCOUNTER — APPOINTMENT (OUTPATIENT)
Dept: CARDIAC REHAB | Age: 61
End: 2021-08-17
Payer: COMMERCIAL

## 2021-08-19 ENCOUNTER — HOSPITAL ENCOUNTER (OUTPATIENT)
Dept: CARDIAC REHAB | Age: 61
Setting detail: THERAPIES SERIES
Discharge: HOME | End: 2021-08-19
Payer: COMMERCIAL

## 2021-08-19 ENCOUNTER — ANTI-COAG VISIT (OUTPATIENT)
Dept: PHARMACY | Age: 61
End: 2021-08-19
Payer: COMMERCIAL

## 2021-08-19 DIAGNOSIS — Z79.01 CHRONIC ANTICOAGULATION: ICD-10-CM

## 2021-08-19 DIAGNOSIS — I26.99 PULMONARY EMBOLISM, UNSPECIFIED CHRONICITY, UNSPECIFIED PULMONARY EMBOLISM TYPE, UNSPECIFIED WHETHER ACUTE COR PULMONALE PRESENT (HCC): Primary | ICD-10-CM

## 2021-08-19 LAB — INR BLD: 4.4

## 2021-08-19 PROCEDURE — 99211 OFF/OP EST MAY X REQ PHY/QHP: CPT

## 2021-08-19 PROCEDURE — G0239 OTH RESP PROC, GROUP: HCPCS

## 2021-08-19 PROCEDURE — 85610 PROTHROMBIN TIME: CPT

## 2021-08-19 NOTE — PROGRESS NOTES
Mr. Anant Sigala is a 57 y.o.  male. Mr. Anant Sigala had an INR test today. Results were reviewed and appropriate warfarin management was completed. This visit was performed as: An in person visit. Protocols were followed with precautions to reduce the spread of COVID-19. Patient verifies current dosing regimen: Yes     Warfarin medication reviewed and updated on the patient 's home medication list: Yes   All other medications reviewed and updated on the patient 's home medication list: No: no changes     Lab Results   Component Value Date    INR 4.40 2021    INR 2.6 2021    INR 2.6 2021       Patient Findings     Negatives:  Missed doses, Change in medications, Change in diet/appetite          Anticoagulation Summary  As of 2021    INR goal:  2.0-3.0   TTR:  34.7 % (5.8 y)   INR used for dosin.40 (2021)   Warfarin maintenance plan:  5 mg (5 mg x 1) every Mon, Wed, Fri; 2.5 mg (5 mg x 0.5) all other days   Weekly warfarin total:  25 mg   Plan last modified:  Sudha Catalan (2020)   Next INR check:  2021   Priority:  Maintenance   Target end date: Indefinite    Indications    Pulmonary embolus (HCC) [I26.99]  Chronic anticoagulation [Z79.01]             Anticoagulation Episode Summary     INR check location:  Anticoagulation Clinic    Preferred lab:      Send INR reminders to:  [facility] STAFF    Comments:  EPIC - finger stick every 2-3 weeks        Anticoagulation Care Providers     Provider Role Specialty Phone number    Dexter Duncan MD Referring Internal Medicine [phone]          Warfarin assessment / plan:     Patient's INR supratherapeutic today at 4.4. Patient denies any changes to medication or diet - still eating about 3 servings of greens a week. Plan - hold dose today then take 2.5 mg tomorrow then continue warfarin 2.5 mg daily EXCEPT 5 mg on , , and . Recheck INR in 2.5 weeks.     Description HOLD dose today then 2.5 mg (half tablet) tomorrow then   CONTINUE: Warfarin 2.5 mg daily EXCEPT 5 mg every Monday, Wednesday and Friday    Call with signs or symptoms of bleeding or any concerns or questions. If significant bleeding seek immediate medical attention. Call with medications changes, especially antibiotics and steroids and including any over-the-counter medications or herbal products. Call if you stop any medications. Keep the number of servings of Vitamin K (dark green vegetables) consistent from week to week  Eats three good portion of broccoli or brussel sprouts per week. Immunization History   Administered Date(s) Administered    COVID-19, Moderna, PF, 100mcg/0.5mL 03/09/2021, 04/08/2021    Influenza, Quadv, IM, PF (6 mo and older Fluzone, Flulaval, Fluarix, and 3 yrs and older Afluria) 10/27/2017, 09/23/2019, 10/02/2020    Influenza, Margarita Caba, Recombinant, IM PF (Flublok 18 yrs and older) 10/03/2018    Pneumococcal Conjugate 13-valent ([id number]) 09/11/2015    Pneumococcal Conjugate Vaccine 08/15/2017    Pneumococcal Polysaccharide ([id number]) 08/01/2007, 12/19/2012    Tdap (Boostrix, Adacel) 08/19/2014         Reviewed AVS with patient / caregiver.       CLINICAL PHARMACY CONSULT: MED RECONCILIATION/REVIEW ADDENDUM    For Pharmacy Admin Tracking Only     Intervention Detail: Dose Adjustment: 1, reason: Therapy Optimization   Total # of Interventions Recommended: 1   Total # of Interventions Accepted: 1   Time Spent (min): 15

## 2021-08-23 ENCOUNTER — TELEPHONE (OUTPATIENT)
Dept: CARDIOLOGY CLINIC | Age: 61
End: 2021-08-23

## 2021-08-23 NOTE — TELEPHONE ENCOUNTER
Received a call from Veronica ZENDEJAS and can you call to schedule for 6 months since he seen Dr Toni Graham 1 month ago.

## 2021-08-24 ENCOUNTER — APPOINTMENT (OUTPATIENT)
Dept: CARDIAC REHAB | Age: 61
End: 2021-08-24
Payer: COMMERCIAL

## 2021-08-25 ENCOUNTER — OFFICE VISIT (OUTPATIENT)
Dept: ENT CLINIC | Age: 61
End: 2021-08-25
Payer: COMMERCIAL

## 2021-08-25 VITALS
WEIGHT: 214 LBS | DIASTOLIC BLOOD PRESSURE: 81 MMHG | SYSTOLIC BLOOD PRESSURE: 128 MMHG | BODY MASS INDEX: 34.54 KG/M2 | HEART RATE: 90 BPM

## 2021-08-25 DIAGNOSIS — R04.0 EPISTAXIS: ICD-10-CM

## 2021-08-25 DIAGNOSIS — H90.3 SENSORINEURAL HEARING LOSS (SNHL) OF BOTH EARS: Primary | ICD-10-CM

## 2021-08-25 PROCEDURE — 3017F COLORECTAL CA SCREEN DOC REV: CPT | Performed by: OTOLARYNGOLOGY

## 2021-08-25 PROCEDURE — 99213 OFFICE O/P EST LOW 20 MIN: CPT | Performed by: OTOLARYNGOLOGY

## 2021-08-25 PROCEDURE — G8427 DOCREV CUR MEDS BY ELIG CLIN: HCPCS | Performed by: OTOLARYNGOLOGY

## 2021-08-25 PROCEDURE — G8417 CALC BMI ABV UP PARAM F/U: HCPCS | Performed by: OTOLARYNGOLOGY

## 2021-08-25 PROCEDURE — 1036F TOBACCO NON-USER: CPT | Performed by: OTOLARYNGOLOGY

## 2021-08-25 NOTE — PROGRESS NOTES
[address] & NECK SURGERY  Follow up      Patient Name: Scot Nj  Medical Record Number:  [id number]  Primary Care Physician:  Jhon Atwood MD  Date of Consultation: 8/25/2021    Chief Complaint: Epistaxis        Interval History  Patient is following up for his nosebleeds. He says that he is not had any nasal bleeding. He does have a bit of nasal congestion, but is not bothersome to him. Overall he is happy right now. REVIEW OF SYSTEMS  As above    PHYSICAL EXAM  GENERAL: No Acute Distress, Alert and Oriented, no Hoarseness, strong voice  EYES: EOMI, Anti-icteric  HENT:   Head: Normocephalic and atraumatic. Face:  Symmetric, facial nerve intact, no sinus tenderness  Right Ear: Normal external ear, normal external auditory canal, intact tympanic membrane with normal mobility and aerated middle ear  Left Ear: Normal external ear, normal external auditory canal, intact tympanic membrane with normal mobility and aerated middle ear  Mouth/Oral Cavity:  normal lips, Uvula is midline  Oropharynx/Larynx:  normal oropharynx  Nose:Normal external nasal appearance. Anterior rhinoscopy shows a severe rightward septal deviation without any evidence of recent bleeding  NECK: Normal range of motion, no thyromegaly, trachea is midline, no lymphadenopathy, no neck masses, no crepitus  CHEST: Normal respiratory effort, no retractions, breathing comfortably  SKIN: No rashes, normal appearing skin, no evidence of skin lesions/tumors  Neuro:  cranial nerve II-XII intact; normal gait  Cardio:  no edema          ASSESSMENT/PLAN  1. Sensorineural hearing loss (SNHL) of both ears  Patient will follow up in 1 to 2 years with a new hearing test    2. Epistaxis  This is resolved. He will follow-up if he has another nosebleed. I have performed a head and neck physical exam personally or was physically present during the key or critical portions of the service.     This note was generated completely or in part utilizing Dragon dictation speech recognition software. Occasionally, words are mistranscribed and despite editing, the text may contain inaccuracies due to incorrect word recognition. If further clarification is needed please contact the office at [phone].

## 2021-08-26 ENCOUNTER — TELEPHONE (OUTPATIENT)
Dept: PULMONOLOGY | Age: 61
End: 2021-08-26

## 2021-08-26 ENCOUNTER — HOSPITAL ENCOUNTER (OUTPATIENT)
Dept: CARDIAC REHAB | Age: 61
Setting detail: THERAPIES SERIES
Discharge: HOME | End: 2021-08-26
Payer: COMMERCIAL

## 2021-08-26 DIAGNOSIS — J42 BRONCHIOLITIS OBLITERANS (HCC): ICD-10-CM

## 2021-08-26 PROCEDURE — G0239 OTH RESP PROC, GROUP: HCPCS

## 2021-08-26 NOTE — TELEPHONE ENCOUNTER
Patient was given samples of Stiolto , this worked very well and he would like a script sent to [facility]

## 2021-08-27 ENCOUNTER — TELEPHONE (OUTPATIENT)
Dept: PULMONOLOGY | Age: 61
End: 2021-08-27

## 2021-08-27 DIAGNOSIS — J42 BRONCHIOLITIS OBLITERANS (HCC): ICD-10-CM

## 2021-08-27 NOTE — TELEPHONE ENCOUNTER
Patient called pharmacy , they stated they do not have his script . I called pharmacy they claim to have not rec'd it yesterday.  Please resend

## 2021-08-31 ENCOUNTER — APPOINTMENT (OUTPATIENT)
Dept: CARDIAC REHAB | Age: 61
End: 2021-08-31
Payer: COMMERCIAL

## 2021-09-02 ENCOUNTER — APPOINTMENT (OUTPATIENT)
Dept: CARDIAC REHAB | Age: 61
End: 2021-09-02
Payer: COMMERCIAL

## 2021-09-07 ENCOUNTER — APPOINTMENT (OUTPATIENT)
Dept: CARDIAC REHAB | Age: 61
End: 2021-09-07
Payer: COMMERCIAL

## 2021-09-07 ENCOUNTER — ANTI-COAG VISIT (OUTPATIENT)
Dept: PHARMACY | Age: 61
End: 2021-09-07
Payer: COMMERCIAL

## 2021-09-07 DIAGNOSIS — Z79.01 CHRONIC ANTICOAGULATION: ICD-10-CM

## 2021-09-07 DIAGNOSIS — I27.82 CHRONIC PULMONARY EMBOLISM, UNSPECIFIED PULMONARY EMBOLISM TYPE, UNSPECIFIED WHETHER ACUTE COR PULMONALE PRESENT (HCC): Primary | ICD-10-CM

## 2021-09-07 LAB — INR BLD: 4.4

## 2021-09-07 PROCEDURE — 99211 OFF/OP EST MAY X REQ PHY/QHP: CPT

## 2021-09-07 PROCEDURE — 85610 PROTHROMBIN TIME: CPT

## 2021-09-07 RX ORDER — ATORVASTATIN CALCIUM 10 MG/1
10 TABLET, FILM COATED ORAL DAILY
COMMUNITY
End: 2021-09-30 | Stop reason: ALTCHOICE

## 2021-09-07 NOTE — PROGRESS NOTES
Mr. Jazzy Alcocer is a 57 y.o.  male. Mr. Jazzy Alcocer had an INR test today. Results were reviewed and appropriate warfarin management was completed. This visit was performed as: An in person visit. Protocols were followed with precautions to reduce the spread of COVID-19. Patient verifies current dosing regimen: Yes     Warfarin medication reviewed and updated on the patient 's home medication list: Yes   All other medications reviewed and updated on the patient 's home medication list: Yes     Lab Results   Component Value Date    INR 4.40 2021    INR 4.40 2021    INR 2.6 2021       Patient Findings     Positives:  Change in medications    Negatives:  Signs/symptoms of bleeding, Change in alcohol use, Missed doses, Change in diet/appetite, Bruising    Comments:  Started taking Atorvastatin yesterday. He is not taking Pravastatin at this time          Anticoagulation Summary  As of 2021    INR goal:  2.0-3.0   TTR:  34.4 % (5.9 y)   INR used for dosin.40 (2021)   Warfarin maintenance plan:  5 mg (5 mg x 1) every Mon, Wed; 2.5 mg (5 mg x 0.5) all other days   Weekly warfarin total:  22.5 mg   Plan last modified:  Erendira Russo (2021)   Next INR check:  2021   Priority:  Maintenance   Target end date: Indefinite    Indications    Pulmonary embolus (HCC) [I26.99]  Chronic anticoagulation [Z79.01]             Anticoagulation Episode Summary     INR check location:  Anticoagulation Clinic    Preferred lab:      Send INR reminders to:  [facility] STAFF    Comments:  EPIC - finger stick every 2-3 weeks        Anticoagulation Care Providers     Provider Role Specialty Phone number    Nevaeh Taylor MD Referring Internal Medicine [phone]          Warfarin assessment / plan:   Patient's INR supratherapeutic today at 4.40. No bruising or bleeding reported. He denies extra warfarin doses, increased alcohol intake, or change in diet.  He started taking Atorvastatin 10 mg yesterday. Patient was told to Hold his Warfarin dose today and take 2.5 mg tablet for the rest of the week. Made a 10 % decrease (2.5 mg/week) to his weekly warfarin dosing. His new dose is 2.5 mg tablet every day except 5 mg on Mondays and Wednesday. Patient is scheduled to come back in about 2 weeks. Description    HOLD dose today then 2.5 mg (half tablet) tomorrow then   NEW DOSE: Warfarin 2.5 mg daily EXCEPT 5 mg every Monday and Wednesday    Call with signs or symptoms of bleeding or any concerns or questions. If significant bleeding seek immediate medical attention. Call with medications changes, especially antibiotics and steroids and including any over-the-counter medications or herbal products. Call if you stop any medications. Keep the number of servings of Vitamin K (dark green vegetables) consistent from week to week  Eats three good portion of broccoli or brussel sprouts per week. Immunization History   Administered Date(s) Administered    COVID-19, Moderna, PF, 100mcg/0.5mL 03/09/2021, 04/08/2021    Influenza, Quadv, IM, PF (6 mo and older Fluzone, Flulaval, Fluarix, and 3 yrs and older Afluria) 10/27/2017, 09/23/2019, 10/02/2020    Influenza, Nellene Dileep, Recombinant, IM PF (Flublok 18 yrs and older) 10/03/2018    Pneumococcal Conjugate 13-valent ([id number]) 09/11/2015    Pneumococcal Conjugate Vaccine 08/15/2017    Pneumococcal Polysaccharide ([id number]) 08/01/2007, 12/19/2012    Tdap (Boostrix, Adacel) 08/19/2014         Reviewed AVS with patient / caregiver.       CLINICAL PHARMACY CONSULT: MED RECONCILIATION/REVIEW ADDENDUM    For Pharmacy Admin Tracking Only     Intervention Detail: Dose Adjustment: 1, reason: Therapy De-escalation   Total # of Interventions Recommended: 1   Total # of Interventions Accepted: 1   Time Spent (min): 15

## 2021-09-09 ENCOUNTER — APPOINTMENT (OUTPATIENT)
Dept: CARDIAC REHAB | Age: 61
End: 2021-09-09
Payer: COMMERCIAL

## 2021-09-17 ENCOUNTER — ANTI-COAG VISIT (OUTPATIENT)
Dept: PHARMACY | Age: 61
End: 2021-09-17
Payer: COMMERCIAL

## 2021-09-17 ENCOUNTER — TELEPHONE (OUTPATIENT)
Dept: CARDIOLOGY CLINIC | Age: 61
End: 2021-09-17

## 2021-09-17 DIAGNOSIS — I26.99 PULMONARY EMBOLISM, UNSPECIFIED CHRONICITY, UNSPECIFIED PULMONARY EMBOLISM TYPE, UNSPECIFIED WHETHER ACUTE COR PULMONALE PRESENT (HCC): Primary | ICD-10-CM

## 2021-09-17 DIAGNOSIS — Z79.01 CHRONIC ANTICOAGULATION: ICD-10-CM

## 2021-09-17 LAB — INR BLD: 2.2

## 2021-09-17 PROCEDURE — 85610 PROTHROMBIN TIME: CPT

## 2021-09-17 PROCEDURE — 99211 OFF/OP EST MAY X REQ PHY/QHP: CPT

## 2021-09-17 NOTE — PROGRESS NOTES
Mr. Loly Quiroga is a 57 y.o.  male. Mr. Loly Quiroga had an INR test today. Results were reviewed and appropriate warfarin management was completed. This visit was performed as: An in person visit. Protocols were followed with precautions to reduce the spread of COVID-19. Patient verifies current dosing regimen: Yes     Warfarin medication reviewed and updated on the patient 's home medication list: Yes   All other medications reviewed and updated on the patient 's home medication list: No: no changes     Lab Results   Component Value Date    INR 2.20 2021    INR 4.40 2021    INR 4.40 2021       Patient Findings     Positives:  Change in medications    Negatives:  Signs/symptoms of bleeding, Missed doses, Change in diet/appetite, Bruising    Comments:  Took a one time Cefdinir this weekend - would not affect his INR. Anticoagulation Summary  As of 2021    INR goal:  2.0-3.0   TTR:  34.4 % (5.9 y)   INR used for dosin.20 (2021)   Warfarin maintenance plan:  5 mg (5 mg x 1) every Mon, Wed; 2.5 mg (5 mg x 0.5) all other days   Weekly warfarin total:  22.5 mg   Plan last modified:  Caroline Hough (2021)   Next INR check:  10/6/2021   Priority:  Maintenance   Target end date: Indefinite    Indications    Pulmonary embolus (HCC) [I26.99]  Chronic anticoagulation [Z79.01]             Anticoagulation Episode Summary     INR check location:  Anticoagulation Clinic    Preferred lab:      Send INR reminders to:  [facility]    Comments:  EPIC - finger stick every 2-3 weeks        Anticoagulation Care Providers     Provider Role Specialty Phone number    Jessica Blizzard, MD Referring Internal Medicine [phone]          Warfarin assessment / plan:   Patient appears well today and with no complains. He denies any changes to his diet or any missed doses of his Warfarin.  He did state that he felt ill during the weekend and took a pill of Cefdinir that was remaining from a previous prescription. No signs of bleeding or bruising, and his diet is still the same. His INR was therapeutic at 2.20. No change to warfarin therapy today. He was reminded to call us with any changes to his medications. Will see him again in about 2 weeks. Description    CONTINUE: Warfarin 2.5 mg daily EXCEPT 5 mg every Monday and Wednesday    Call with signs or symptoms of bleeding or any concerns or questions. If significant bleeding seek immediate medical attention. Call with medications changes, especially antibiotics and steroids and including any over-the-counter medications or herbal products. Call if you stop any medications. Keep the number of servings of Vitamin K (dark green vegetables) consistent from week to week  Eats three good portion of broccoli or brussel sprouts per week. Immunization History   Administered Date(s) Administered    COVID-19, Moderna, PF, 100mcg/0.5mL 2021, 2021    Influenza, Quadv, IM, PF (6 mo and older Fluzone, Flulaval, Fluarix, and 3 yrs and older Afluria) 10/27/2017, 2019, 10/02/2020    Influenza, Darrall Spittle, Recombinant, IM PF (Flublok 18 yrs and older) 10/03/2018    Pneumococcal Conjugate 13-valent ([id number]) 2015    Pneumococcal Conjugate Vaccine 08/15/2017    Pneumococcal Polysaccharide ([id number]) 2007, 2012    Tdap (Boostrix, Adacel) 2014         Reviewed AVS with patient / caregiver.       CLINICAL PHARMACY CONSULT: MED RECONCILIATION/REVIEW ADDENDUM    For Pharmacy Admin Tracking Only     Intervention Detail: Adherence Monitorin   Total # of Interventions Recommended: 0   Total # of Interventions Accepted: 0   Time Spent (min): 20

## 2021-09-17 NOTE — TELEPHONE ENCOUNTER
Brady Dallas,    Can you look into this and contact patient and discuss.     Thanks,  Loyd Reynolds RN

## 2021-09-17 NOTE — TELEPHONE ENCOUNTER
Just bought a [employer] watch to get his heart rate, pulse. But his directions say that he cannot use/monitor ECG if he has a Pacemaker which he does.     Please call to discuss [phone]

## 2021-09-20 NOTE — TELEPHONE ENCOUNTER
Called pt twice at listed number. Pt picked up and hung up phone immediately. If ppt calls back please inform him that his galaxy watch, or any smart watch for that matter, will not harm his pacemaker/defibrillator. Futher it will not adversely affect his implanted device provided it is greater than 2 inches from it. I am inclined to believe the warning on the smart watch instructions are to inform the user that the device will simply not give an accurate read out in the scenario that their heart rate is augmented but an implanted cardiac device. Patient can use his smart watch without fear of damaging interaction however it will likely not give an accurate reading of his heart rate.

## 2021-09-24 ENCOUNTER — HOSPITAL ENCOUNTER (OUTPATIENT)
Dept: GENERAL RADIOLOGY | Age: 61
Discharge: HOME | End: 2021-09-24
Payer: COMMERCIAL

## 2021-09-24 ENCOUNTER — HOSPITAL ENCOUNTER (OUTPATIENT)
Age: 61
Discharge: HOME | End: 2021-09-24
Payer: COMMERCIAL

## 2021-09-24 DIAGNOSIS — R30.0 DYSURIA: ICD-10-CM

## 2021-09-24 PROCEDURE — 74018 RADEX ABDOMEN 1 VIEW: CPT

## 2021-09-27 ENCOUNTER — TELEPHONE (OUTPATIENT)
Dept: PHARMACY | Age: 61
End: 2021-09-27

## 2021-09-27 NOTE — TELEPHONE ENCOUNTER
Called to inform us that he was prescribed Levaquin 500 mg daily x 7 days starting on Friday 9/24    Has been taking his normal dose thus far. Recommended he take just 2.5 mg tonight and Wednesday instead of 5 mg doses. Can always take the 5 mg after his appointment on Thursday if his INR is on the lower end of his goal range as he will be close to finishing up the course of antibiotics by then. Checking INR Thursday to monitor closely while on antibiotics.      Patient V/U

## 2021-09-28 ENCOUNTER — NURSE ONLY (OUTPATIENT)
Dept: CARDIOLOGY CLINIC | Age: 61
End: 2021-09-28
Payer: COMMERCIAL

## 2021-09-28 DIAGNOSIS — I50.23 ACUTE ON CHRONIC SYSTOLIC HEART FAILURE (HCC): ICD-10-CM

## 2021-09-28 DIAGNOSIS — I42.8 NICM (NONISCHEMIC CARDIOMYOPATHY) (HCC): ICD-10-CM

## 2021-09-28 DIAGNOSIS — Z95.810 BIVENTRICULAR ICD (IMPLANTABLE CARDIOVERTER-DEFIBRILLATOR) IN PLACE: ICD-10-CM

## 2021-09-28 PROCEDURE — 93295 DEV INTERROG REMOTE 1/2/MLT: CPT | Performed by: INTERNAL MEDICINE

## 2021-09-28 PROCEDURE — 93296 REM INTERROG EVL PM/IDS: CPT | Performed by: INTERNAL MEDICINE

## 2021-09-28 PROCEDURE — 93297 REM INTERROG DEV EVAL ICPMS: CPT | Performed by: INTERNAL MEDICINE

## 2021-09-28 NOTE — PROGRESS NOTES
We received remote transmission from patient's CRT-D monitor at home (programmed VVI RV @ 40bpm per 11.2017 specialty comments). Transmission shows normal sensing and pacing function. Noted AT/SVT as well as NSVT (warfarin, Toprol XL). Thoracic impedance is normal.    EP physician will review. See interrogation under cardiology tab in the [address] [place] [address] [address] field for more details.

## 2021-09-30 ENCOUNTER — OFFICE VISIT (OUTPATIENT)
Dept: PHARMACY | Age: 61
End: 2021-09-30
Payer: COMMERCIAL

## 2021-09-30 ENCOUNTER — ANTI-COAG VISIT (OUTPATIENT)
Dept: PHARMACY | Age: 61
End: 2021-09-30
Payer: COMMERCIAL

## 2021-09-30 VITALS
RESPIRATION RATE: 14 BRPM | BODY MASS INDEX: 34.8 KG/M2 | DIASTOLIC BLOOD PRESSURE: 84 MMHG | WEIGHT: 215.6 LBS | HEART RATE: 84 BPM | SYSTOLIC BLOOD PRESSURE: 136 MMHG | OXYGEN SATURATION: 97 %

## 2021-09-30 DIAGNOSIS — I27.82 CHRONIC PULMONARY EMBOLISM, UNSPECIFIED PULMONARY EMBOLISM TYPE, UNSPECIFIED WHETHER ACUTE COR PULMONALE PRESENT (HCC): Primary | ICD-10-CM

## 2021-09-30 DIAGNOSIS — Z79.01 CHRONIC ANTICOAGULATION: ICD-10-CM

## 2021-09-30 DIAGNOSIS — I50.23 ACUTE ON CHRONIC SYSTOLIC HEART FAILURE (HCC): Primary | ICD-10-CM

## 2021-09-30 LAB — INTERNATIONAL NORMALIZATION RATIO, POC: 1.7

## 2021-09-30 PROCEDURE — 99213 OFFICE O/P EST LOW 20 MIN: CPT | Performed by: NURSE PRACTITIONER

## 2021-09-30 PROCEDURE — 99211 OFF/OP EST MAY X REQ PHY/QHP: CPT

## 2021-09-30 PROCEDURE — 85610 PROTHROMBIN TIME: CPT

## 2021-09-30 RX ORDER — PRAVASTATIN SODIUM 40 MG
40 TABLET ORAL DAILY
COMMUNITY

## 2021-09-30 RX ORDER — LANOLIN ALCOHOL/MO/W.PET/CERES
3 CREAM (GRAM) TOPICAL NIGHTLY PRN
COMMUNITY

## 2021-09-30 RX ORDER — ZOLPIDEM TARTRATE 10 MG/1
TABLET ORAL NIGHTLY PRN
COMMUNITY
End: 2021-10-08

## 2021-09-30 NOTE — PROGRESS NOTES
Mr. Cody Strange is a 57 y.o.  male. Mr. Cody Strange had an INR test today. Results were reviewed and appropriate warfarin management was completed. This visit was performed as: An in person visit. Protocols were followed with precautions to reduce the spread of COVID-19. Patient verifies current dosing regimen: Yes     Warfarin medication reviewed and updated on the patient 's home medication list: Yes   All other medications reviewed and updated on the patient 's home medication list: No: no changes     Lab Results   Component Value Date    INR 1.7 2021    INR 2.20 2021    INR 4.40 2021           Anticoagulation Summary  As of 2021    INR goal:  2.0-3.0   TTR:  34.4 % (5.9 y)   INR used for dosin.7 (2021)   Warfarin maintenance plan:  5 mg (5 mg x 1) every Mon, Wed, Fri; 2.5 mg (5 mg x 0.5) all other days   Weekly warfarin total:  25 mg   Plan last modified:  Shayla Aaron (2021)   Next INR check:  10/21/2021   Priority:  Maintenance   Target end date: Indefinite    Indications    Pulmonary embolus (HCC) [I26.99]  Chronic anticoagulation [Z79.01]             Anticoagulation Episode Summary     INR check location:  Anticoagulation Clinic    Preferred lab:      Send INR reminders to:  [facility]    Comments:  EPIC - finger stick every 2-3 weeks        Anticoagulation Care Providers     Provider Role Specialty Phone number    Abbie Levi MD Referring Internal Medicine [phone]          Warfarin assessment / plan:   Patient appears well today, no complains. His INR was subtherapeutic at 1.70. He was started on Levaquin 500 mg on , and his dose was decreased a little because of that. His last dose of the Levaquin is tomorrow. He denies any missed doses of Warfarin, or increased vitamin K intake. No signs of bleeding or bruising. He was put back on a weekly dose of 25 mg.  His new dose is to take Warfarin 2.5 mg (1/2 tablet) daily except 5 mg (1 tablet) on Mondays, Wednesdays and Fridays. Will see him again in 3 weeks. Description    NEW DOSE: Warfarin 2.5 mg daily EXCEPT 5 mg every Monday, Wednesday and Friday    Call with signs or symptoms of bleeding or any concerns or questions. If significant bleeding seek immediate medical attention. Call with medications changes, especially antibiotics and steroids and including any over-the-counter medications or herbal products. Call if you stop any medications. Keep the number of servings of Vitamin K (dark green vegetables) consistent from week to week  Eats three good portion of broccoli or brussel sprouts per week. Immunization History   Administered Date(s) Administered    COVID-19, Moderna, PF, 100mcg/0.5mL 03/09/2021, 04/08/2021    Influenza, Quadv, IM, PF (6 mo and older Fluzone, Flulaval, Fluarix, and 3 yrs and older Afluria) 10/27/2017, 09/23/2019, 10/02/2020    Influenza, Emanuel Reasons, Recombinant, IM PF (Flublok 18 yrs and older) 10/03/2018    Pneumococcal Conjugate 13-valent ([id number]) 09/11/2015    Pneumococcal Conjugate Vaccine 08/15/2017    Pneumococcal Polysaccharide ([id number]) 08/01/2007, 12/19/2012    Tdap (Boostrix, Adacel) 08/19/2014         Reviewed AVS with patient / caregiver.       CLINICAL PHARMACY CONSULT: MED RECONCILIATION/REVIEW ADDENDUM    For Pharmacy Admin Tracking Only     Intervention Detail: Dose Adjustment: 1, reason: Therapy Optimization   Total # of Interventions Recommended: 1   Total # of Interventions Accepted: 1   Time Spent (min): 15

## 2021-09-30 NOTE — PROGRESS NOTES
[facility]  Heart Failure    [address]  Phone: [phone]  Fax: [phone]      NAME: Jazzy Alcocer  MEDICAL RECORD NUMBER:  [id number]  AGE: 57 y.o.    GENDER: male  : 1960  EPISODE DATE:  2021      Chief Complaint   Patient presents with    Congestive Heart Failure        Past Medical History:   Diagnosis Date    Bronchiolitis obliterans ([address]     Bundle branch block, right     Cardiomyopathy ([address]     Chest pain 2019    COVID-19 virus vaccine not available     Fibromyalgia     GERD (gastroesophageal reflux disease)     Hepatitis 1979    unsure of which type    Hx of blood clots     Hyperlipemia     Hypertension     IBS (irritable bowel syndrome)     Kidney stone     over thirty kidney stones    Neuropathy     right side-chest    Pneumothorax 2011    Right    Prostatitis     Pulmonary embolism on right [facility]) 2011    upper lobe-coumadin 2011    Sacroiliitis [facility])       Past Surgical History:   Procedure Laterality Date    CHOLECYSTECTOMY      COLONOSCOPY  2012    dr Arnaldo Velasquez  2019    RIGHT SIDED URETEROSCOPY  Diagnostic, retrograde pyelogram and fulguration of previously resected bladder tumor base    CYSTOSCOPY Right 2019    RIGHT SIDED URETEROSCOPY FLUGERATION  OF BLADDER performed by Consuelo Patino MD at [address]. Right 2021    CYSTOURETHROSCOPY WITH RIGHT RETROGRADE PYELOGRAPHY AND PLACEMENT OF RIGHT DOUBLE J STENT performed by Lexie Hunter DO at [facility]      LITHOTRIPSY     [address] opening larger in sinuses    UPPER GASTROINTESTINAL ENDOSCOPY  3/28/2014    dr Beatrice Patrick N/A 2021    EGD BIOPSY performed by Sariah Seth MD at [address] [facility] History   Problem Relation Age of Onset    High Blood Pressure Mother     Dementia Mother     Cancer Father         Pancreatic Ca     Social History     Tobacco Use    Smoking status: Never Smoker    Smokeless tobacco: Never Used   Vaping Use    Vaping Use: Never used   Substance Use Topics    Alcohol use: No    Drug use: No     Counseling given: Not Answered     Immunization History   Administered Date(s) Administered    COVID-19, Moderna, PF, 100mcg/0.5mL 03/09/2021, 04/08/2021    Influenza, Quadv, IM, PF (6 mo and older Fluzone, Flulaval, Fluarix, and 3 yrs and older Afluria) 10/27/2017, 09/23/2019, 10/02/2020    Influenza, Hassel Scarce, Recombinant, IM PF (Flublok 18 yrs and older) 10/03/2018    Pneumococcal Conjugate 13-valent ([id number]) 09/11/2015    Pneumococcal Conjugate Vaccine 08/15/2017    Pneumococcal Polysaccharide ([id number]) 08/01/2007, 12/19/2012    Tdap (Boostrix, Adacel) 08/19/2014     Allergies   Allergen Reactions    Ipratropium Bromide Hfa Shortness Of Breath    Atrovent Nasal Spray [Ipratropium] Other (See Comments)     Chest tightness    Doxycycline Hives    Morphine Other (See Comments)     \"makes me feel funny\"      Nitroglycerin Other (See Comments)     Severe hypotension (went from 356 to 66 systolic)    Sulfa Antibiotics Rash    Vicodin [Hydrocodone-Acetaminophen] Rash        ECHO EF%: 50%  Date: 9/2019        History of WILLAM: Denies  []BIPAP/CPAP use:   []Education about proper cleaning completed today     Subjective   HPI: Mr. Román Luna is a 57 y.o. male here for Heart Failure Services. He has a medical history significant for chronic systolic heart failure, HTN, LBBB, and ICM, A. fib, ICD, PE, and HLD/CAD. Also has frequent nephrolithiasis. Sees nephrology for this complaint. Believes that he is developing a kidney stone currently. Tells me he has a lot of knowledge about his heart failure because he has been dealing with it for several years.   His primary health goal currently is to lose 30 pounds with a goal weight of 185.  He is getting good quality sleep and uses 2 L for sleeping at night. He gets exercise by walking and playing golf frequently. He weighs himself daily and is also monitoring his sodium and fluid intake. Has some shortness of breath with exertion only. Denies edema, fatigue, CP, palpitations, orthopnea, or abdominal fullness. Review of Systems:   Constitutional: Negative for fatigue, Negative for activity change, appetite change, chills, diaphoresis, fever and unexpected weight change. HENT: Negative for congestion, ear pain, postnasal drip, rhinorrhea, sinus pressure, sinus pain, sneezing, sore throat and trouble swallowing. Eyes: Negative for discharge and redness. Respiratory: Negative for cough, chest tightness, sputum, shortness of breath and wheezing. Cardiovascular: Negative for chest pain, palpitations and leg swelling. Gastrointestinal: Negative for abdominal distention, abdominal pain, constipation, diarrhea, nausea and vomiting. Genitourinary: Negative for decreased urine volume, difficulty urinating, dysuria and hematuria. Skin: Negative for pallor and rash. Neurological: Negative for dizziness, tremors, weakness, light-headedness and headaches. Psychiatric/Behavioral: Negative for confusion and sleep disturbance. The patient is not nervous/anxious. Sleep: Negative for restless sleep, snoring, frequent waking       Functional Activity New York Heart Association Classification  Patient Symptoms   []   Class I No limitation of physical activity. Ordinary physical activity does not cause undue fatigue, palpitation, dyspnea (shortness of breath). [x]   Class II Slight limitation of physical activity. Comfortable at rest. Ordinary physical activity results in fatigue, palpitation, dyspnea (shortness of breath). []   Class III  Jeremiah limitation of physical activity. Comfortable at rest.  Less than ordinary activity causes fatigue, palpitation, dyspnea.   []   Class IV Unable to carry on any physical activity without discomfort. Symptoms of heart failure at rest.  If any physical activity is undertaken, discomfort increases. Shortness of Breath:  []   None   [x]   Dyspnea on exertion   []   Dyspnea with normal activities  []   Dyspnea at rest  []   Dyspnea while sleeping    Patient Findings:   []  Missed doses  []  Diet changes  []  Sodium intake changes    []  Alcohol intake changes  []  Night time cough  []  Edema    []  Activity changes   []  Fatigue that limits activity []  Acute illness  []  Early saiety, abd fullness []  Chest pain   []  Other        Objective:   /84 (Site: Right Upper Arm, Position: Sitting)   Pulse 84   Resp 14   Wt 215 lb 9.6 oz (97.8 kg)   SpO2 97%   BMI 34.80 kg/m²   BP Readings from Last 3 Encounters:   09/30/21 136/84   09/24/21 130/70   08/28/21 130/74     Wt Readings from Last 6 Encounters:   09/30/21 215 lb 9.6 oz (97.8 kg)   09/24/21 214 lb 9.6 oz (97.3 kg)   08/28/21 213 lb 6.4 oz (96.8 kg)   08/25/21 214 lb (97.1 kg)   08/04/21 208 lb 6.4 oz (94.5 kg)   08/03/21 207 lb (93.9 kg)     Physical Exam:   Constitutional: Oriented to person, place, and time. Appears well-developed and well-nourished. No distress. HENT:   Head: Normocephalic and atraumatic. Right Ear: External ear normal.   Left Ear: External ear normal.   Eyes: Pupils are equal, round, and reactive to light. Conjunctivae and EOM are normal.   Neck: Normal range of motion. Neck supple. No JVD present. No tracheal deviation present. Cardiovascular: Normal rate, regular rhythm and normal heart sounds. No murmur heard. Pulmonary/Chest: Effort normal and breath sounds normal. No respiratory distress. No wheezes. No rales. Abdominal: Soft. Bowel sounds are normal. No distension. There is no tenderness. Musculoskeletal: Normal range of motion. No edema. Neurological: Alert and oriented to person, place, and time. Skin: Skin is warm and dry.  Capillary refill takes less than 2 seconds. No rash noted. He is not diaphoretic. Psychiatric: Normal mood and affect. BMP:   Lab Results   Component Value Date     07/03/2021    K 4.0 07/03/2021    K 3.8 07/02/2021     07/03/2021    CO2 25 07/03/2021    BUN 10 07/03/2021    CREATININE 1.1 07/03/2021       CBC:    Lab Results   Component Value Date    WBC 4.8 07/03/2021    HGB 13.5 07/03/2021    HCT 39.2 07/03/2021     07/03/2021     HgA1C:   Lab Results   Component Value Date    LABA1C 5.1 12/12/2018    LABA1C 5.0 07/24/2017    LABA1C 5.0 07/24/2017     TSH:   Lab Results   Component Value Date    TSH 5.05 08/17/2012     Lipids:   Lab Results   Component Value Date    CHOL 139 10/02/2019    TRIG 126 10/02/2019    HDL 39 02/26/2021    HDL 65 11/10/2011    LDLCALC 92 02/26/2021     ProBNP:   Lab Results   Component Value Date    PROBNP 93 04/25/2021    PROBNP 73 03/17/2021    PROBNP 66 02/01/2020       [x]Reviewed daily weight log and assessed self management skills including early recognition and notification of exacerbation   [x]Encouraged daily weights and to call with increase of 3 pounds in one day or 5 pounds in one week or weight increased above dry weight    [x]Discussed fluid restriction and sodium restriction as well as nutrition goals   [x]Weight loss counseling performed  [x]Exercise Counseling performed      Medications reviewed:   [] compared patient's bottles with EPIC list  [x] patient did not bring medication bottles      Current medications:  Outpatient Medications Marked as Taking for the 9/30/21 encounter (Office Visit) with BRNENA Crump CNP   Medication Sig Dispense Refill    metoprolol succinate (TOPROL XL) 25 MG extended release tablet TAKE 1 TABLET BY MOUTH  TWICE DAILY 180 tablet 3    pravastatin (PRAVACHOL) 40 MG tablet Take 40 mg by mouth daily      zolpidem (AMBIEN) 10 MG tablet Take by mouth nightly as needed for Sleep.       melatonin 3 MG TABS tablet Take 3 mg by mouth nightly as needed Taking half tablet      levoFLOXacin (LEVAQUIN) 500 MG tablet Take 1 tablet by mouth daily for 7 days 7 tablet 0    ENTRESTO 49-51 MG per tablet Take 1 tablet by mouth 2 times daily 60 tablet 1    furosemide (LASIX) 20 MG tablet Take 0.5 tablets by mouth 2 times daily as needed (SOB) 30 tablet 2    tamsulosin (FLOMAX) 0.4 MG capsule Take 1 capsule by mouth 2 times daily 60 capsule 0    warfarin (COUMADIN) 5 MG tablet Take 1 tablet by mouth daily Except 2.5 mg Tues and Thursday (Patient taking differently: Take 5 mg by mouth daily Except 2.5 mg Tues, Thursday, Saturday and sunday) 90 tablet 3    vitamin D (ERGOCALCIFEROL) 1.25 MG (66280 UT) CAPS capsule TAKE 1 CAPSULE BY MOUTH ONCE A WEEK 4 capsule 3    tiotropium-olodaterol (STIOLTO RESPIMAT) 2.5-2.5 MCG/ACT AERS Inhale 2 puffs into the lungs daily 1 Inhaler 3    pregabalin (LYRICA) 75 MG capsule TAKE 1 CAPSULE BY MOUTH IN  THE MORNING AND 2 CAPSULES  IN THE EVENING 270 capsule 0    guaiFENesin (MUCINEX) 600 MG extended release tablet Take 600 mg by mouth 2 times daily      ondansetron (ZOFRAN) 4 MG tablet Take 1 tablet by mouth every 8 hours as needed for Nausea or Vomiting 20 tablet 0    dexlansoprazole (DEXILANT) 60 MG CPDR delayed release capsule Take 60 mg by mouth daily      dicyclomine (BENTYL) 10 MG capsule TAKE ONE CAPSULE BY MOUTH  FOUR TIMES DAILY AS NEEDED 360 capsule 1    sodium chloride, Inhalant, 3 % nebulizer solution Take 4 mLs by nebulization every 8 hours as needed for Other or Cough (cough) 360 mL 5    Cholecalciferol (VITAMIN D3) 1.25 MG (04845 UT) CAPS Take 1 capsule by mouth once a week 12 capsule 3    albuterol sulfate  (90 Base) MCG/ACT inhaler INHALE 2 PUFFS INTO THE LUNGS EVERY 4 HOURS AS NEEDED FOR WHEEZING OR SHORTNESS OF BREATH 1 Inhaler 0    polyethylene glycol (MIRALAX) PACK packet Take 17 g by mouth nightly       aspirin 81 MG tablet Take 81 mg by mouth daily         [x]Reviewed heart failure medications including how they work and potential side effects. [x]Advised patient to avoid NSAIDs. Get With The Guidelines  Mr. Mazin Singleton is on a Beta-Blocker for (HFrEF) (systolic) EF </= 04%  Yes    Mr. Mazin Singleton is on an Ace-Inhibitor / ARB / Entresto for (HFrEF) (systolic) EF </= 79% Yes      Mr. Mazin Singleton is on a Aldosterone Receptor Antagonist for (HFrEF) (systolic) EF </= 55% or EF </= 40% with MI (Okay to use if SCr </= 2.5mg/dL in men, SCr </= 2mg/dL in women; Potassium < 5.0meq/L) No      Mr. Mazin Singleton is on a Diuretic Yes    If the above guidelines are not met, reason documented above or recommendations made: Yes. [x] Advanced Directives completed and scanned at prior encounter  [] Advanced Directives need to be addressed      Barriers to Adherence: Active attentive participant    Environmental Concerns: not applicable    Readiness to Change  action -has made some lifestyle changes and is currently working on additional changes to his diet for weight loss. A heart failure binder has been provided in addition to extensive education including the information noted above. Assessment/Plan     Problem List Items Addressed This Visit     Acute on chronic systolic heart failure ([facility] - Primary     No symptoms of acute exacerbation. Continue daily weights, sodium and fluid restriction. Emergency action plan reviewed today. Continue management and follow-up with cardiology as scheduled. No orders of the defined types were placed in this encounter. Follow up with wellness center: No follow-up needed at this time  Time spent in visit today including counseling and education: 45 minutes. *This note was transcribed using [id number]. Please disregard any translational errors.          Electronically signed by MartinBRENNA Em CNP, on 9/30/2021 at 3:23 PM

## 2021-10-04 PROCEDURE — 85610 PROTHROMBIN TIME: CPT

## 2021-10-04 PROCEDURE — 99211 OFF/OP EST MAY X REQ PHY/QHP: CPT

## 2021-10-13 ENCOUNTER — TELEPHONE (OUTPATIENT)
Dept: CARDIOLOGY CLINIC | Age: 61
End: 2021-10-13

## 2021-10-13 NOTE — TELEPHONE ENCOUNTER
See previous note. For future use though, I am unable to perform any sort of \"checks,\" testing, maintenance, or parameter changes remotely, devices do not function in such a way at the current time.

## 2021-10-13 NOTE — TELEPHONE ENCOUNTER
Sana Lemos is calling in wanting to talk to [facility] about the sharp pains that he gets in his side. He said he sleeps on his side and thinks that it may be from his pacemaker. Sana Lemos can be reached at [phone].

## 2021-10-13 NOTE — TELEPHONE ENCOUNTER
Stacy Phoenix, I spoke to patient who states that he awakened this AM with a discomfort left of his device site. He describes the discomfort as a \"spasm\" that is localized in a small area. The discomfort seems to be worse with movement. He denies associated symptoms. He denies exertional chest pain/pressure, SOB, palpitations, dizziness/light-headedness and syncope. He is wondering if Stacy Phoenix is checking his device or doing something to his device.

## 2021-10-13 NOTE — TELEPHONE ENCOUNTER
Pt assured that device function is normal based on remote transmission from this morning. Pt instructed to contact us if pain continues or progresses, or to contact his GP.

## 2021-10-17 PROCEDURE — 99283 EMERGENCY DEPT VISIT LOW MDM: CPT

## 2021-10-17 ASSESSMENT — PAIN SCALES - GENERAL: PAINLEVEL_OUTOF10: 4

## 2021-10-18 ENCOUNTER — APPOINTMENT (OUTPATIENT)
Dept: GENERAL RADIOLOGY | Age: 61
End: 2021-10-18
Payer: COMMERCIAL

## 2021-10-18 ENCOUNTER — HOSPITAL ENCOUNTER (EMERGENCY)
Age: 61
Discharge: HOME | End: 2021-10-18
Attending: EMERGENCY MEDICINE
Payer: COMMERCIAL

## 2021-10-18 VITALS
BODY MASS INDEX: 34.22 KG/M2 | HEART RATE: 65 BPM | SYSTOLIC BLOOD PRESSURE: 132 MMHG | WEIGHT: 212 LBS | RESPIRATION RATE: 21 BRPM | OXYGEN SATURATION: 95 % | DIASTOLIC BLOOD PRESSURE: 83 MMHG | TEMPERATURE: 98.6 F

## 2021-10-18 DIAGNOSIS — R09.81 NASAL CONGESTION: Primary | ICD-10-CM

## 2021-10-18 LAB
ANION GAP SERPL CALCULATED.3IONS-SCNC: 10 MMOL/L (ref 3–16)
BASOPHILS ABSOLUTE: 0 K/UL (ref 0–0.2)
BASOPHILS RELATIVE PERCENT: 0.6 %
BUN BLDV-MCNC: 11 MG/DL (ref 7–20)
CALCIUM SERPL-MCNC: 10.5 MG/DL (ref 8.3–10.6)
CHLORIDE BLD-SCNC: 103 MMOL/L (ref 99–110)
CO2: 21 MMOL/L (ref 21–32)
CREAT SERPL-MCNC: 1 MG/DL (ref 0.8–1.3)
EOSINOPHILS ABSOLUTE: 0.3 K/UL (ref 0–0.6)
EOSINOPHILS RELATIVE PERCENT: 4.6 %
GFR AFRICAN AMERICAN: >60
GFR NON-AFRICAN AMERICAN: >60
GLUCOSE BLD-MCNC: 98 MG/DL (ref 70–99)
HCT VFR BLD CALC: 48.6 % (ref 40.5–52.5)
HEMOGLOBIN: 16.1 G/DL (ref 13.5–17.5)
LYMPHOCYTES ABSOLUTE: 1.5 K/UL (ref 1–5.1)
LYMPHOCYTES RELATIVE PERCENT: 23.5 %
MCH RBC QN AUTO: 30.4 PG (ref 26–34)
MCHC RBC AUTO-ENTMCNC: 33.1 G/DL (ref 31–36)
MCV RBC AUTO: 92 FL (ref 80–100)
MONOCYTES ABSOLUTE: 0.5 K/UL (ref 0–1.3)
MONOCYTES RELATIVE PERCENT: 7.7 %
NEUTROPHILS ABSOLUTE: 4 K/UL (ref 1.7–7.7)
NEUTROPHILS RELATIVE PERCENT: 63.6 %
PDW BLD-RTO: 13.1 % (ref 12.4–15.4)
PLATELET # BLD: 192 K/UL (ref 135–450)
PMV BLD AUTO: 8 FL (ref 5–10.5)
POTASSIUM REFLEX MAGNESIUM: 5.8 MMOL/L (ref 3.5–5.1)
PRO-BNP: 46 PG/ML (ref 0–124)
RBC # BLD: 5.28 M/UL (ref 4.2–5.9)
SARS-COV-2, NAAT: NOT DETECTED
SODIUM BLD-SCNC: 134 MMOL/L (ref 136–145)
WBC # BLD: 6.3 K/UL (ref 4–11)

## 2021-10-18 PROCEDURE — 71045 X-RAY EXAM CHEST 1 VIEW: CPT

## 2021-10-18 PROCEDURE — 36415 COLL VENOUS BLD VENIPUNCTURE: CPT

## 2021-10-18 PROCEDURE — 85025 COMPLETE CBC W/AUTO DIFF WBC: CPT

## 2021-10-18 PROCEDURE — 83880 ASSAY OF NATRIURETIC PEPTIDE: CPT

## 2021-10-18 PROCEDURE — 80048 BASIC METABOLIC PNL TOTAL CA: CPT

## 2021-10-18 PROCEDURE — 96374 THER/PROPH/DIAG INJ IV PUSH: CPT

## 2021-10-18 PROCEDURE — 6360000002 HC RX W HCPCS: Performed by: EMERGENCY MEDICINE

## 2021-10-18 PROCEDURE — 87635 SARS-COV-2 COVID-19 AMP PRB: CPT

## 2021-10-18 RX ORDER — KETOROLAC TROMETHAMINE 30 MG/ML
30 INJECTION, SOLUTION INTRAMUSCULAR; INTRAVENOUS ONCE
Status: COMPLETED | OUTPATIENT
Start: 2021-10-18 | End: 2021-10-18

## 2021-10-18 RX ORDER — LEVOFLOXACIN 500 MG/1
500 TABLET, FILM COATED ORAL DAILY
Qty: 6 TABLET | Refills: 0 | Status: SHIPPED | OUTPATIENT
Start: 2021-10-18 | End: 2021-10-24

## 2021-10-18 RX ADMIN — KETOROLAC TROMETHAMINE 30 MG: 30 INJECTION, SOLUTION INTRAMUSCULAR at 02:06

## 2021-10-18 ASSESSMENT — ENCOUNTER SYMPTOMS
STRIDOR: 0
COUGH: 1
ABDOMINAL DISTENTION: 0
ABDOMINAL PAIN: 0
APNEA: 0
RECTAL PAIN: 0
EYE REDNESS: 0
WHEEZING: 0
VOMITING: 0
COLOR CHANGE: 0
CONSTIPATION: 0
NAUSEA: 0
EYE DISCHARGE: 0
CHEST TIGHTNESS: 0
CHOKING: 0
BACK PAIN: 0
DIARRHEA: 0
SHORTNESS OF BREATH: 0
ANAL BLEEDING: 0
EYE PAIN: 0
BLOOD IN STOOL: 0
EYE ITCHING: 0
PHOTOPHOBIA: 0

## 2021-10-18 ASSESSMENT — PAIN SCALES - GENERAL: PAINLEVEL_OUTOF10: 7

## 2021-10-18 NOTE — ED PROVIDER NOTES
[facility]      Pt Name: Fred Black  MRN: [id number]  [place] 1960  Date of evaluation: 10/17/2021  Provider: Kentrell Steele MD    CHIEF COMPLAINT       Chief Complaint   Patient presents with    Nasal Congestion     X 2 days, denies fever, lower back pain. Hx CHF, states has pichardo 4lb this week. HISTORY OF PRESENT ILLNESS    Fred Black is a 57 y.o. male who presents to the emergency department with nasal congestion cough. Patient endorses symptoms for the last 2 days. Denies fevers or chills. States last time this happened he had a sinus infection. No shortness of breath or chest pain. Has an appointment with his primary care doctor in the next few hours. Does endorses a 4 pound weight gain. Denies leg swelling. Denies orthopnea. No other associated symptoms. Nursing Notes were reviewed. Including nursing noted for FM, Surgical History, Past Medical History, Social History, vitals, and allergies; agree with all. REVIEW OF SYSTEMS       Review of Systems   Constitutional: Negative for chills, diaphoresis, fatigue, fever and unexpected weight change. HENT: Positive for congestion. Negative for dental problem, drooling, ear discharge and ear pain. Eyes: Negative for photophobia, pain, discharge, redness, itching and visual disturbance. Respiratory: Positive for cough. Negative for apnea, choking, chest tightness, shortness of breath, wheezing and stridor. Cardiovascular: Negative for chest pain, palpitations and leg swelling. Gastrointestinal: Negative for abdominal distention, abdominal pain, anal bleeding, blood in stool, constipation, diarrhea, nausea, rectal pain and vomiting. Endocrine: Negative for cold intolerance and heat intolerance. Genitourinary: Negative for decreased urine volume and urgency. Musculoskeletal: Negative for arthralgias and back pain.    Skin: Negative for color change and Discharge Medication List as of 10/18/2021  2:55 AM      CONTINUE these medications which have NOT CHANGED    Details   zolpidem (AMBIEN) 10 MG tablet TAKE 1 TABLET BY MOUTH AT  NIGHT AS NEEDED FOR SLEEP, Disp-90 tablet, R-0Normal      furosemide (LASIX) 20 MG tablet Take 0.5 tablets by mouth 2 times daily as needed (SOB), Disp-30 tablet, R-2Normal      pregabalin (LYRICA) 75 MG capsule TAKE 1 CAPSULE BY MOUTH IN  THE MORNING AND 2 CAPSULES  IN THE EVENING, Disp-270 capsule, R-0Normal      triamcinolone (KENALOG) 0.1 % cream Apply topically to affected areas 2 times daily. , Disp-45 g, R-2, Normal      linaCLOtide (LINZESS) 72 MCG CAPS capsule Take 1 capsule by mouth every morning (before breakfast), Disp-4 capsule, R-0Sample      metoprolol succinate (TOPROL XL) 25 MG extended release tablet TAKE 1 TABLET BY MOUTH  TWICE DAILY, Disp-180 tablet, R-3Requesting 1 year supplyNormal      pravastatin (PRAVACHOL) 40 MG tablet Take 40 mg by mouth dailyHistorical Med      melatonin 3 MG TABS tablet Take 3 mg by mouth nightly as needed Taking half tabletHistorical Med      ENTRESTO 49-51 MG per tablet Take 1 tablet by mouth 2 times daily, Disp-60 tablet, R-1, DAWNormal      tamsulosin (FLOMAX) 0.4 MG capsule Take 1 capsule by mouth 2 times daily, Disp-60 capsule, R-0Normal      warfarin (COUMADIN) 5 MG tablet Take 1 tablet by mouth daily Except 2.5 mg Tues and Thursday, Disp-90 tablet, R-3REFILL REQUESTED 3/11/21 @@ 6:50 P. M. DLBNormal      vitamin D (ERGOCALCIFEROL) 1.25 MG (92325 UT) CAPS capsule TAKE 1 CAPSULE BY MOUTH ONCE A WEEK, Disp-4 capsule, R-3REFILL REQUESTED FRI 8/27/21 @@ 11:51AMNormal      tiotropium-olodaterol (STIOLTO RESPIMAT) 2.5-2.5 MCG/ACT AERS Inhale 2 puffs into the lungs daily, Disp-1 Inhaler, R-3Normal      guaiFENesin (MUCINEX) 600 MG extended release tablet Take 600 mg by mouth 2 times dailyHistorical Med      ondansetron (ZOFRAN) 4 MG tablet Take 1 tablet by mouth every 8 hours as needed for Nausea or Vomiting, Disp-20 tablet, R-0Normal      dexlansoprazole (DEXILANT) 60 MG CPDR delayed release capsule Take 60 mg by mouth dailyHistorical Med      dicyclomine (BENTYL) 10 MG capsule TAKE ONE CAPSULE BY MOUTH  FOUR TIMES DAILY AS NEEDED, Disp-360 capsule, R-1Normal      sodium chloride, Inhalant, 3 % nebulizer solution Take 4 mLs by nebulization every 8 hours as needed for Other or Cough (cough), Nebulization, EVERY 8 HOURS PRN Starting Fri 1/29/2021, Disp-360 mL, R-5, Normal      Cholecalciferol (VITAMIN D3) 1.25 MG (66979 UT) CAPS Take 1 capsule by mouth once a week, Disp-12 capsule,R-3Normal      albuterol sulfate  (90 Base) MCG/ACT inhaler INHALE 2 PUFFS INTO THE LUNGS EVERY 4 HOURS AS NEEDED FOR WHEEZING OR SHORTNESS OF BREATH, Disp-1 Inhaler,R-0Normal      polyethylene glycol (MIRALAX) PACK packet Take 17 g by mouth nightly Historical Med      aspirin 81 MG tablet Take 81 mg by mouth dailyHistorical Med             ALLERGIES     Ipratropium bromide hfa, Atrovent nasal spray [ipratropium], Doxycycline, Morphine, Nitroglycerin, Sulfa antibiotics, and Vicodin [hydrocodone-acetaminophen]    FAMILY HISTORY        Family History   Problem Relation Age of Onset    High Blood Pressure Mother     Dementia Mother     Cancer Father         Pancreatic Ca       SOCIAL HISTORY       Social History     Socioeconomic History    Marital status:      Spouse name: Not on file    Number of children: Not on file    Years of education: Not on file    Highest education level: Not on file   Occupational History    Not on file   Tobacco Use    Smoking status: Never Smoker    Smokeless tobacco: Never Used   Vaping Use    Vaping Use: Never used   Substance and Sexual Activity    Alcohol use: No    Drug use: No    Sexual activity: Yes     Partners: Female     Comment: wife   Other Topics Concern    Not on file   Social History Narrative    Not on file     Social Determinants of Health     Financial Resource Strain: Low Risk     Difficulty of Paying Living Expenses: Not hard at all   Food Insecurity: No Food Insecurity    Worried About Running Out of Food in the Last Year: Never true    Per of Food in the Last Year: Never true   Transportation Needs:     Lack of Transportation (Medical):  Lack of Transportation (Non-Medical):    Physical Activity:     Days of Exercise per Week:     Minutes of Exercise per Session:    Stress:     Feeling of Stress :    Social Connections:     Frequency of Communication with Friends and Family:     Frequency of Social Gatherings with Friends and Family:     Attends Yazidism Services:     Active Member of Clubs or Organizations:     Attends Club or Organization Meetings:     Marital Status:    Intimate Partner Violence:     Fear of Current or Ex-Partner:     Emotionally Abused:     Physically Abused:     Sexually Abused:        PHYSICAL EXAM       ED Triage Vitals [10/17/21 2344]   BP Temp Temp src Pulse Resp SpO2 Height Weight   (!) 150/102 98.6 °F (37 °C) -- 105 16 94 % -- 212 lb (96.2 kg)       Physical Exam  Vitals and nursing note reviewed. Constitutional:       General: He is not in acute distress. Appearance: He is well-developed. He is not diaphoretic. HENT:      Head: Normocephalic and atraumatic. Nose: Congestion present. Eyes:      General:         Right eye: No discharge. Left eye: No discharge. Pupils: Pupils are equal, round, and reactive to light. Neck:      Thyroid: No thyromegaly. Trachea: No tracheal deviation. Cardiovascular:      Rate and Rhythm: Normal rate and regular rhythm. Heart sounds: No murmur heard. Pulmonary:      Breath sounds: No wheezing or rales. Chest:      Chest wall: No tenderness. Abdominal:      General: There is no distension. Palpations: Abdomen is soft. There is no mass. Tenderness: There is no abdominal tenderness. There is no guarding or rebound. Musculoskeletal:         General: No tenderness or deformity. Normal range of motion. Cervical back: Normal range of motion. Skin:     General: Skin is warm. Coloration: Skin is not pale. Findings: No erythema or rash. Neurological:      Mental Status: He is alert. Cranial Nerves: No cranial nerve deficit. Motor: No abnormal muscle tone. Coordination: Coordination normal.         DIAGNOSTIC RESULTS     RADIOLOGY:   Non-plain film images such as CT, Ultrasoundand MRI are read by the radiologist. Plain radiographic images are visualized and preliminarily interpreted by the emergency physician with the below findings:    Chest x-ray reassuring    ED BEDSIDE ULTRASOUND:   Performed by ED Physician - none    LABS:  Labs Reviewed   BASIC METABOLIC PANEL W/ REFLEX TO MG FOR LOW K - Abnormal; Notable for the following components:       Result Value    Sodium 134 (*)     Potassium reflex Magnesium 5.8 (*)     All other components within normal limits    Narrative:     Performed at:  [facility]  [address] [facility] [place] [address]   Phone [phone], RAPID    Narrative:     Performed at:  [facility]  [address] [place] [address] [address]   Phone [phone]   CBC WITH AUTO DIFFERENTIAL    Narrative:     Performed at:  [facility]  [address] [facility] [place] [address] [address]   Phone [phone]   BRAIN NATRIURETIC PEPTIDE    Narrative:     Performed at:  [facility]  [address] [place] [address] [address]   Phone [phone]       All other labs were withinnormal range or not returned as of this dictation.     EMERGENCY DEPARTMENT COURSE and DIFFERENTIAL DIAGNOSIS/MDM:     PMH, Surgical Hx, FH, Social Hx reviewed by myself (ETOH usage, Tobacco usage, Drug usage reviewed by myself, no pertinent Hx)- No Pertinent Hx     Old records were reviewed by me     60-year-old male with nasal congestion. Reassuring work-up otherwise. He is very concerned about a sinus infection. We will treat. Discussed PCP follow-up in the next 1 to 2 days. I estimate there is LOW risk for Sepsis, MI, Stroke, Tamponade, PTX, Toxicity or other life threatening etiology thus I consider the discharge disposition reasonable. The patient is at low risk for mortality based on demographic, history and clinical factors. Given the best available information and clinical assessment, I estimate the risk of hospitalization to be greater than risk of treatment at home. I have explained to the patient that the risk could rapidly change, given precautions for return and instructions. Explained to patient that the risk for mortality is low based on demographic, history and clinical factors. I discussed with patient the results of evaluation in the ED, diagnosis, care, and prognosis. The plan is to discharge to home. Patient is in agreement with plan and questions have been answered. I also discussed with patient the reasons which may require a return visit and the importance of follow-up care. The patient is well-appearing, nontoxic, and improved at the time of discharge. Patient agrees to call to arrange follow-up care as directed. Patient understands to return immediately for worsening/change in symptoms. CRITICAL CARE TIME   Total Critical Caretime was 0 minutes, excluding separately reportable procedures. There was a high probability of clinically significant/life threatening deterioration in the patient's condition which required my urgent intervention. PROCEDURES:  Unlessotherwise noted below, none    FINAL IMPRESSION      1.  Nasal congestion          DISPOSITION/PLAN   DISPOSITION Decision To Discharge 10/18/2021 02:46:06 AM    PATIENT REFERRED TO:  Georgi Aschoff, MD  [facility] [address]  [phone]    Call today        DISCHARGE MEDICATIONS:  Discharge Medication List as of 10/18/2021  2:55 AM      START taking these medications    Details   levoFLOXacin (LEVAQUIN) 500 MG tablet Take 1 tablet by mouth daily for 6 days, Disp-6 tablet, R-0Print                (Please note that portions ofthis note were completed with a voice recognition program.  Efforts were made to edit the dictations but occasionally words are mis-transcribed.)    Sam Haider MD(electronically signed)  Attending Emergency Physician        Sam Haider MD  10/18/21 0416

## 2021-10-18 NOTE — ED NOTES
Patient complaining of low back pain. Orders obtained from Dr. Janette Heimlich for toradol, patient states to RN \"That doesn't work I normally need dilaudid or fentanyl. \" Patient agreeable to try toradol.       Tracy Sharpe RN  10/18/21 8643

## 2021-10-18 NOTE — ED NOTES
Patient DCed from the ED. Dicussed AVS, follow up, and prescriptions with patient and he verbalizes understanding. IV DCed. Reinforced to patient that should symptoms persist or worsen to return to the ED. He verbalizes understanding. VSS. Patient ambulated out of ED on his own with a steady gait.       Lexus Starr RN  10/18/21 0426

## 2021-10-20 ENCOUNTER — TELEPHONE (OUTPATIENT)
Dept: PHARMACY | Age: 61
End: 2021-10-20

## 2021-10-20 NOTE — TELEPHONE ENCOUNTER
Johnny Aixa called to let us know that he started a 7 day course of Levaquin yesterday. We will reduce his warfarin dose to 2.5mg daily while on the antibiotic. He will resume his previous dosing when the antibiotic course is finished. He will call with any issues.     [address]  Anticoagulation Service  [phone]

## 2021-10-21 ENCOUNTER — ANTI-COAG VISIT (OUTPATIENT)
Dept: PHARMACY | Age: 61
End: 2021-10-21
Payer: COMMERCIAL

## 2021-10-21 DIAGNOSIS — Z79.01 CHRONIC ANTICOAGULATION: Primary | ICD-10-CM

## 2021-10-21 LAB — INTERNATIONAL NORMALIZATION RATIO, POC: 1.9

## 2021-10-21 PROCEDURE — 99211 OFF/OP EST MAY X REQ PHY/QHP: CPT

## 2021-10-21 PROCEDURE — 85610 PROTHROMBIN TIME: CPT

## 2021-10-21 NOTE — PROGRESS NOTES
Mr. Zahraa Baltazar is a 57 y.o.  male. Mr. Zahraa Baltazar had an INR test today. Results were reviewed and appropriate warfarin management was completed. This visit was performed as: An in person visit. Protocols were followed with precautions to reduce the spread of COVID-19. Patient verifies current dosing regimen: Yes     Warfarin medication reviewed and updated on the patient 's home medication list: Yes   All other medications reviewed and updated on the patient 's home medication list: Started on Levaquin and has 5 days left (end on 10/25)    Lab Results   Component Value Date    INR 1.9 10/21/2021    INR 1.7 2021    INR 2.20 2021       Patient Findings     Positives:  Change in medications, Bruising    Negatives:  Signs/symptoms of bleeding, Missed doses, Change in diet/appetite    Comments:  Reports some minor bruising on his legs  Started on Levaquin and has 5 days left           Anticoagulation Summary  As of 10/21/2021    INR goal:  2.0-3.0   TTR:  34.1 % (6 y)   INR used for dosin.9 (10/21/2021)   Warfarin maintenance plan:  2.5 mg (5 mg x 0.5) every Sun, Tue, Thu; 5 mg (5 mg x 1) all other days   Weekly warfarin total:  27.5 mg   Plan last modified:  [facility] (10/21/2021)   Next INR check:  2021   Priority:  Maintenance   Target end date: Indefinite    Indications    Pulmonary embolus (HCC) [I26.99]  Chronic anticoagulation [Z79.01]             Anticoagulation Episode Summary     INR check location:  Anticoagulation Clinic    Preferred lab:      Send INR reminders to:  [facility]    Comments:  EPIC - finger stick every 2-3 weeks        Anticoagulation Care Providers     Provider Role Specialty Phone number    Citlali Miller MD Referring Internal Medicine [phone]          Warfarin assessment / plan:     Patient appears well with no complaints. Reports no signs of bleeding but has some bruising on his legs. Reports no missed doses. States he was started on Levaquin and has 5 days left. Today's INR reading was 1.9. INR did not increase as much as expected since last visit and will increase his warfarin dose by 10%. Will recheck INR in 2 weeks. Description    NEW DOSE: Warfarin 2.5 mg daily EXCEPT 5 mg every Monday, Wednesday, Friday, and Saturday     Call with signs or symptoms of bleeding or any concerns or questions. If significant bleeding seek immediate medical attention. Call with medications changes, especially antibiotics and steroids and including any over-the-counter medications or herbal products. Call if you stop any medications. Keep the number of servings of Vitamin K (dark green vegetables) consistent from week to week  Eats three good portion of broccoli or brussel sprouts per week. Immunization History   Administered Date(s) Administered    COVID-19, Moderna, PF, 100mcg/0.5mL 03/09/2021, 04/08/2021    Influenza, Quadv, IM, PF (6 mo and older Fluzone, Flulaval, Fluarix, and 3 yrs and older Afluria) 10/27/2017, 09/23/2019, 10/02/2020    Influenza, Danilo Aas, Recombinant, IM PF (Flublok 18 yrs and older) 10/03/2018    Pneumococcal Conjugate 13-valent ([id number]) 09/11/2015    Pneumococcal Conjugate Vaccine 08/15/2017    Pneumococcal Polysaccharide ([id number]) 08/01/2007, 12/19/2012    Tdap (Boostrix, Adacel) 08/19/2014           Reviewed AVS with patient / caregiver.       CLINICAL PHARMACY CONSULT: MED RECONCILIATION/REVIEW ADDENDUM    For Pharmacy Admin Tracking Only     Intervention Detail: Dose Adjustment: 1, reason: Therapy Optimization   Total # of Interventions Recommended: 1   Total # of Interventions Accepted: 1   Time Spent (min): 20    Interviewed and Reviewed By: Tyrone Santamaria (Pharmacy Student)  Reviewed by: Evaristo Darby RP, PRS 10/21/2021  3:50 PM

## 2021-11-05 ENCOUNTER — ANTI-COAG VISIT (OUTPATIENT)
Dept: PHARMACY | Age: 61
End: 2021-11-05
Payer: COMMERCIAL

## 2021-11-05 DIAGNOSIS — Z79.01 CHRONIC ANTICOAGULATION: ICD-10-CM

## 2021-11-05 DIAGNOSIS — I26.99 PULMONARY EMBOLISM, UNSPECIFIED CHRONICITY, UNSPECIFIED PULMONARY EMBOLISM TYPE, UNSPECIFIED WHETHER ACUTE COR PULMONALE PRESENT (HCC): Primary | ICD-10-CM

## 2021-11-05 LAB — INTERNATIONAL NORMALIZATION RATIO, POC: 1.1

## 2021-11-05 PROCEDURE — 99211 OFF/OP EST MAY X REQ PHY/QHP: CPT

## 2021-11-05 PROCEDURE — 85610 PROTHROMBIN TIME: CPT

## 2021-11-05 NOTE — PROGRESS NOTES
Mr. Hortencia Howe is a 57 y.o.  male. Mr. Hortencia Howe had an INR test today. Results were reviewed and appropriate warfarin management was completed. This visit was performed as: An in person visit. Protocols were followed with precautions to reduce the spread of COVID-19. Patient verifies current dosing regimen: Yes     Warfarin medication reviewed and updated on the patient 's home medication list: Yes   All other medications reviewed and updated on the patient 's home medication list: No: no changes     Lab Results   Component Value Date    INR 1.1 2021    INR 1.9 10/21/2021    INR 1.7 2021       Patient Findings     Negatives:  Signs/symptoms of bleeding, Missed doses, Change in medications, Change in diet/appetite, Bruising          Anticoagulation Summary  As of 2021    INR goal:  2.0-3.0   TTR:  33.8 % (6 y)   INR used for dosin.1 (2021)   Warfarin maintenance plan:  2.5 mg (5 mg x 0.5) every Sun, Tue, u; 5 mg (5 mg x 1) all other days   Weekly warfarin total:  27.5 mg   Plan last modified:  Carroll Arnold RPH (10/21/2021)   Next INR check:  2021   Priority:  Maintenance   Target end date: Indefinite    Indications    Pulmonary embolus (HCC) [I26.99]  Chronic anticoagulation [Z79.01]             Anticoagulation Episode Summary     INR check location:  Anticoagulation Clinic    Preferred lab:      Send INR reminders to:  [facility]    Comments:  EPIC - finger stick every 2-3 weeks        Anticoagulation Care Providers     Provider Role Specialty Phone number    Estefani Raza MD Referring Internal Medicine [phone]          Warfarin assessment / plan:     Denies missed doses  Denies medication changes   Increased Vitamin K intake    States he has had broccoli four times this week, which only a little more than normal.   INR low today at 1.1.  Will increase dose today and tomorrow, then return to normal dose and recheck INR next week.      Description    TODAY AND TOMORROW: take warfarin one and half tablets (7.5 mg) then  CONTINUE: Warfarin 2.5 mg daily EXCEPT 5 mg every Monday, Wednesday, Friday, and Saturday     Call with signs or symptoms of bleeding or any concerns or questions. If significant bleeding seek immediate medical attention. Call with medications changes, especially antibiotics and steroids and including any over-the-counter medications or herbal products. Call if you stop any medications. Keep the number of servings of Vitamin K (dark green vegetables) consistent from week to week  Eats three good portion of broccoli or brussel sprouts per week. Immunization History   Administered Date(s) Administered    COVID-19, Thea Newmanks, Primary or Immunocompromised, PF, 100mcg/0.5mL 03/09/2021, 04/08/2021    Influenza, MDCK Quadv, IM, PF (Flucelvax 2 yrs and older) 10/26/2021    Influenza, Quadv, IM, PF (6 mo and older Fluzone, Flulaval, Fluarix, and 3 yrs and older Afluria) 10/27/2017, 09/23/2019, 10/02/2020    Influenza, Estil Pierce, Recombinant, IM PF (Flublok 18 yrs and older) 10/03/2018    Pneumococcal Conjugate 13-valent ([id number]) 09/11/2015    Pneumococcal Conjugate Vaccine 08/15/2017    Pneumococcal Polysaccharide ([id number]) 08/01/2007, 12/19/2012    Tdap (Boostrix, Adacel) 08/19/2014             Reviewed AVS with patient / caregiver.       CLINICAL PHARMACY CONSULT: MED RECONCILIATION/REVIEW ADDENDUM    For Pharmacy Admin Tracking Only     Intervention Detail: Dose Adjustment: 1, reason: Therapy Optimization   Total # of Interventions Recommended: 1   Total # of Interventions Accepted: 1   Time Spent (min): 15

## 2021-11-12 ENCOUNTER — ANTI-COAG VISIT (OUTPATIENT)
Dept: PHARMACY | Age: 61
End: 2021-11-12
Payer: COMMERCIAL

## 2021-11-12 DIAGNOSIS — I27.82 CHRONIC PULMONARY EMBOLISM, UNSPECIFIED PULMONARY EMBOLISM TYPE, UNSPECIFIED WHETHER ACUTE COR PULMONALE PRESENT (HCC): Primary | ICD-10-CM

## 2021-11-12 DIAGNOSIS — Z79.01 CHRONIC ANTICOAGULATION: ICD-10-CM

## 2021-11-12 LAB — INTERNATIONAL NORMALIZATION RATIO, POC: 2.3

## 2021-11-12 PROCEDURE — 85610 PROTHROMBIN TIME: CPT

## 2021-11-12 PROCEDURE — 99211 OFF/OP EST MAY X REQ PHY/QHP: CPT

## 2021-11-12 NOTE — PROGRESS NOTES
Rosemary Wood is a 57 y.o. here for warfarin management. Regina Trevizo had an INR test today. Results were reviewed and appropriate warfarin management was completed. This visit was performed as: An in person visit. Protocols were followed with precautions to reduce the spread of COVID-19. Patient verifies current dosing regimen: Yes     Warfarin medication reviewed and updated on the patient 's home medication list: Yes   All other medications reviewed and updated on the patient 's home medication list: No: no changes     Lab Results   Component Value Date    INR 2.3 2021    INR 1.1 2021    INR 1.9 10/21/2021       Patient Findings     Positives:  Change in diet/appetite    Negatives:  Signs/symptoms of bleeding, Change in medications, Bruising    Comments:  Limiting greens this past week due to recent low INR          Anticoagulation Summary  As of 2021    INR goal:  2.0-3.0   TTR:  33.8 % (6 y)   INR used for dosin.3 (2021)   Warfarin maintenance plan:  2.5 mg (5 mg x 0.5) every Sun, e, Thu; 5 mg (5 mg x 1) all other days   Weekly warfarin total:  27.5 mg   Plan last modified:  Deangelo Avelar RPH (10/21/2021)   Next INR check:  2021   Priority:  Maintenance   Target end date: Indefinite    Indications    Pulmonary embolus (HCC) [I26.99]  Chronic anticoagulation [Z79.01]             Anticoagulation Episode Summary     INR check location:  Anticoagulation Clinic    Preferred lab:      Send INR reminders to:  [facility]    Comments:  EPIC - finger stick every 2-3 weeks        Anticoagulation Care Providers     Provider Role Specialty Phone number    Lore Villanueva MD Referring Internal Medicine [phone]          Warfarin assessment / plan:     Appears well. INR responded nicely after giving a higher dose for two days after an INR of 1.1 on 21. He reports he limited foods high in vitamin K due to the low INR.      We had increased dose on 10/21.  Not sure why his INR was so low at his last visit. He reports today that he has been moving, so maybe this contributed. We will continue with this higher weeklywarfarin dose. Description    CONTINUE: Warfarin 5 mg daily EXCEPT 2.5 mg every Sunday, Tuesday and Thursday    Call with signs or symptoms of bleeding or any concerns or questions. If significant bleeding seek immediate medical attention. Call with medications changes, especially antibiotics and steroids and including any over-the-counter medications or herbal products. Call if you stop any medications. Keep the number of servings of Vitamin K (dark green vegetables) consistent from week to week  Eats three good portion of broccoli or brussel sprouts per week. Immunization History   Administered Date(s) Administered    COVID-19, Yadav Batch, Primary or Immunocompromised, PF, 100mcg/0.5mL 03/09/2021, 04/08/2021    Influenza, MDCK Quadv, IM, PF (Flucelvax 2 yrs and older) 10/26/2021    Influenza, Quadv, IM, PF (6 mo and older Fluzone, Flulaval, Fluarix, and 3 yrs and older Afluria) 10/27/2017, 09/23/2019, 10/02/2020    Influenza, Elyce Highland, Recombinant, IM PF (Flublok 18 yrs and older) 10/03/2018    Pneumococcal Conjugate 13-valent ([id number]) 09/11/2015    Pneumococcal Conjugate Vaccine 08/15/2017    Pneumococcal Polysaccharide ([id number]) 08/01/2007, 12/19/2012    Tdap (Boostrix, Adacel) 08/19/2014             Reviewed AVS with patient / caregiver.       CLINICAL PHARMACY CONSULT: MED RECONCILIATION/REVIEW ADDENDUM    For Pharmacy Admin Tracking Only     Intervention Detail: Dose Adjustment: 1, reason: Therapy Optimization   Total # of Interventions Recommended: 1   Total # of Interventions Accepted: 1   Time Spent (min): 15

## 2021-11-19 RX ORDER — FUROSEMIDE 20 MG/1
10 TABLET ORAL 2 TIMES DAILY PRN
Qty: 90 TABLET | Refills: 1 | Status: SHIPPED | OUTPATIENT
Start: 2021-11-19 | End: 2021-11-30 | Stop reason: SDUPTHER

## 2021-11-19 RX ORDER — SACUBITRIL AND VALSARTAN 49; 51 MG/1; MG/1
1 TABLET, FILM COATED ORAL 2 TIMES DAILY
Qty: 90 TABLET | Refills: 2 | Status: SHIPPED | OUTPATIENT
Start: 2021-11-19 | End: 2022-02-14

## 2021-11-19 NOTE — TELEPHONE ENCOUNTER
Nathan Powell called in upset, saying that this wasn't taken care of last month.      Medication Refill    Medication needing refilled:  furosemide (LASIX)   ENTRESTO 49-51     Dosage of the medication:  20 MG tablet   49-51 MG per tablet     How are you taking this medication (QD, BID, TID, QID, PRN):  Take 0.5 tablets by mouth 2 times daily as needed (SOB)  Take 1 tablet by mouth 2 times daily    30 or 90 day supply called in: 90 days     When will you run out of your medication:    Which Pharmacy are we sending the medication to?:    [address], [facility] [address]   [address] [place],  [place] [facility] [address]   Phone:  [phone]  Fax:  [phone]

## 2021-11-19 NOTE — TELEPHONE ENCOUNTER
Last OV: 07/21/2021  Next OV: x  Most recent Labs: 10/18/2021 bmp and 07/03/2021 cmp   Last EKG (if needed): 07/21/2021

## 2021-11-23 ENCOUNTER — TELEPHONE (OUTPATIENT)
Dept: PHARMACY | Age: 61
End: 2021-11-23

## 2021-11-24 ENCOUNTER — ANTI-COAG VISIT (OUTPATIENT)
Dept: PHARMACY | Age: 61
End: 2021-11-24
Payer: COMMERCIAL

## 2021-11-24 DIAGNOSIS — Z79.01 CHRONIC ANTICOAGULATION: Primary | ICD-10-CM

## 2021-11-24 LAB — INTERNATIONAL NORMALIZATION RATIO, POC: 2.1

## 2021-11-24 PROCEDURE — 99211 OFF/OP EST MAY X REQ PHY/QHP: CPT

## 2021-11-24 PROCEDURE — 85610 PROTHROMBIN TIME: CPT

## 2021-11-24 NOTE — PROGRESS NOTES
Rosemary Wood is a 57 y.o. here for warfarin management. Regina Trevizo had an INR test today. Results were reviewed and appropriate warfarin management was completed. This visit was performed as: An in person visit. Protocols were followed with precautions to reduce the spread of COVID-19. Patient verifies current dosing regimen: Yes     Warfarin medication reviewed and updated on the patient 's home medication list: Yes   All other medications reviewed and updated on the patient 's home medication list: No: no changes other than current Levaquin therapy. Lab Results   Component Value Date    INR 2.1 2021    INR 2.3 2021    INR 1.1 2021       Patient Findings     Positives:  Change in medications    Comments:  Started Levaquin 7 days ago. Anticoagulation Summary  As of 2021    INR goal:  2.0-3.0   TTR:  34.2 % (6.1 y)   INR used for dosin.1 (2021)   Warfarin maintenance plan:  2.5 mg (5 mg x 0.5) every Sun, Tue, Thu; 5 mg (5 mg x 1) all other days   Weekly warfarin total:  27.5 mg   Plan last modified:  Deangelo Avelar RPH (10/21/2021)   Next INR check:  12/15/2021   Priority:  Maintenance   Target end date: Indefinite    Indications    Pulmonary embolus ([address] [I26.99]  Chronic anticoagulation [Z79.01]             Anticoagulation Episode Summary     INR check location:  Anticoagulation Clinic    Preferred lab:      Send INR reminders to:  [facility]    Comments:  EPIC - finger stick every 2-3 weeks        Anticoagulation Care Providers     Provider Role Specialty Phone number    Lore Villanueva MD Referring Internal Medicine [phone]          Warfarin assessment / plan: Regina Trevizo came in today for an INR check after starting a 10 day course of Levaquin last Wednesday. Since he has been on many courses of antibiotics in the past, he reduced his warfarin dose to 2.5mg daily without consulting us.  This worked out just fine, as his INR is 2.1 today. We will have him take 5mg of warfarin today and then take 2.5mg on Thursday and Friday(which is the last day of his Levaquin). He will then resume his previous dosing. He will return in 3 weeks for his next INR. He will call with any issues or new medications. Description    Take warfarin 5mg today  Take warfarin 2.5mg(1/2 tablet) on Thursday(11-25) and Friday(11-26) then   CONTINUE: Warfarin 5 mg daily EXCEPT 2.5 mg every Sunday, Tuesday and Thursday    Call with signs or symptoms of bleeding or any concerns or questions. If significant bleeding seek immediate medical attention. Call with medications changes, especially antibiotics and steroids and including any over-the-counter medications or herbal products. Call if you stop any medications. Keep the number of servings of Vitamin K (dark green vegetables) consistent from week to week  Eats three good portion of broccoli or brussel sprouts per week. Immunization History   Administered Date(s) Administered    AISLNINID-19Juan Antonio, Primary or Immunocompromised, PF, 100mcg/0.5mL 03/09/2021, 04/08/2021    Influenza, MDCK Quadv, IM, PF (Flucelvax 2 yrs and older) 10/26/2021    Influenza, Quadv, IM, PF (6 mo and older Fluzone, Flulaval, Fluarix, and 3 yrs and older Afluria) 10/27/2017, 09/23/2019, 10/02/2020    Influenza, Obinna Sami, Recombinant, IM PF (Flublok 18 yrs and older) 10/03/2018    Pneumococcal Conjugate 13-valent ([id number]) 09/11/2015    Pneumococcal Conjugate Vaccine 08/15/2017    Pneumococcal Polysaccharide ([id number]) 08/01/2007, 12/19/2012    Tdap (Boostrix, Adacel) 08/19/2014             Reviewed AVS with patient / caregiver.       CLINICAL PHARMACY CONSULT: MED RECONCILIATION/REVIEW ADDENDUM    For Pharmacy Admin Tracking Only     Intervention Detail: Dose Adjustment: 1, reason: Therapy Optimization   Total # of Interventions Recommended: 1   Total # of Interventions Accepted: 1   Time Spent (min): 20

## 2021-11-30 RX ORDER — FUROSEMIDE 20 MG/1
10 TABLET ORAL 2 TIMES DAILY PRN
Qty: 180 TABLET | Refills: 1 | Status: SHIPPED | OUTPATIENT
Start: 2021-11-30 | End: 2022-01-11 | Stop reason: SDUPTHER

## 2021-11-30 NOTE — TELEPHONE ENCOUNTER
Johnny Gains stopped by the office this morning stating that his refill on his Furosemide should of been for 180 days. So he asked to send another 90 day over.        Medication Refill    Medication needing refilled:    furosemide (LASIX)    Dosage of the medication:  20 MG tablet     How are you taking this medication (QD, BID, TID, QID, PRN):  Take 0.5 tablets by mouth 2 times daily as needed (SOB)    30 or 90 day supply called in: 90 day     When will you run out of your medication:    Which Pharmacy are we sending the medication to?:    [address] [place], [facility] [address]   [address], [facility] [address]   Phone:  [phone]  Fax:  [phone]

## 2021-12-03 ENCOUNTER — TELEPHONE (OUTPATIENT)
Dept: PHARMACY | Age: 61
End: 2021-12-03

## 2021-12-03 ENCOUNTER — HOSPITAL ENCOUNTER (OUTPATIENT)
Age: 61
Discharge: HOME | End: 2021-12-03
Payer: COMMERCIAL

## 2021-12-03 ENCOUNTER — HOSPITAL ENCOUNTER (OUTPATIENT)
Dept: GENERAL RADIOLOGY | Age: 61
Discharge: HOME | End: 2021-12-03
Payer: COMMERCIAL

## 2021-12-03 DIAGNOSIS — R07.2 PRECORDIAL PAIN: ICD-10-CM

## 2021-12-03 PROCEDURE — 71046 X-RAY EXAM CHEST 2 VIEWS: CPT

## 2021-12-03 NOTE — TELEPHONE ENCOUNTER
Patient called, started prednisone on 12/3. I anticipate an interaction with Coumadin. Advised patient to warfarin 2.5mg daily and follow up 12/6.     Bel HuertaD    For Pharmacy Admin Tracking Only     Intervention Detail: Dose Adjustment: 1, reason: Interaction   Total # of Interventions Recommended: 1   Total # of Interventions Accepted: 1   Time Spent (min): 10

## 2021-12-06 ENCOUNTER — ANTI-COAG VISIT (OUTPATIENT)
Dept: PHARMACY | Age: 61
End: 2021-12-06
Payer: COMMERCIAL

## 2021-12-06 ENCOUNTER — HOSPITAL ENCOUNTER (OUTPATIENT)
Dept: CT IMAGING | Age: 61
Discharge: HOME | End: 2021-12-06
Payer: COMMERCIAL

## 2021-12-06 DIAGNOSIS — I26.99 PULMONARY EMBOLISM, UNSPECIFIED CHRONICITY, UNSPECIFIED PULMONARY EMBOLISM TYPE, UNSPECIFIED WHETHER ACUTE COR PULMONALE PRESENT (HCC): ICD-10-CM

## 2021-12-06 DIAGNOSIS — R31.29 OTHER MICROSCOPIC HEMATURIA: ICD-10-CM

## 2021-12-06 DIAGNOSIS — Z79.01 CHRONIC ANTICOAGULATION: Primary | ICD-10-CM

## 2021-12-06 DIAGNOSIS — M54.9 DORSODYNIA: ICD-10-CM

## 2021-12-06 LAB — INR BLD: 1.5

## 2021-12-06 PROCEDURE — 85610 PROTHROMBIN TIME: CPT

## 2021-12-06 PROCEDURE — 99211 OFF/OP EST MAY X REQ PHY/QHP: CPT

## 2021-12-06 PROCEDURE — 74176 CT ABD & PELVIS W/O CONTRAST: CPT

## 2021-12-06 NOTE — PROGRESS NOTES
Ajay Broderick is a 57 y.o. here for warfarin management. Carmita Charles had an INR test today. Results were reviewed and appropriate warfarin management was completed. This visit was performed as: An in person visit. Protocols were followed with precautions to reduce the spread of COVID-19. Patient verifies current dosing regimen: Yes     Warfarin medication reviewed and updated on the patient 's home medication list: Yes   All other medications reviewed and updated on the patient 's home medication list: Yes. Started taking steroids  and stopped , then restarted 12/3. On prednisone 5 mg. Since beginning steroids he has been taking 2.5 mg warfarin daily    Lab Results   Component Value Date    INR 1.50 2021    INR 2.1 2021    INR 2.3 2021       Patient Findings     Positives:  Missed doses, Change in medications    Negatives:  Signs/symptoms of bleeding, Change in diet/appetite, Bruising    Comments:  Reports taking steroids starting , stopped , restarted 12/3 for 6 more days. Reports stomach upset and insomnia. Since beginning steroids on  patient was taking 2.5 mg (1/2 tablet) of warfarin daily          Anticoagulation Summary  As of 2021    INR goal:  2.0-3.0   TTR:  34.1 % (6.1 y)   INR used for dosin.50 (2021)   Warfarin maintenance plan:  2.5 mg (5 mg x 0.5) every Sun, Tue, Thu; 5 mg (5 mg x 1) all other days   Weekly warfarin total:  27.5 mg   Plan last modified:  Keren Padilla RPH (10/21/2021)   Next INR check:  2021   Priority:  Maintenance   Target end date:   Indefinite    Indications    Pulmonary embolus (HCC) [I26.99]  Chronic anticoagulation [Z79.01]             Anticoagulation Episode Summary     INR check location:  Anticoagulation Clinic    Preferred lab:      Send INR reminders to:  [facility]    Comments:  EPIC - finger stick every 2-3 weeks        Anticoagulation Care Providers     Provider Role Specialty Phone number    Lore Villanueva MD Referring Internal Medicine [phone]          Warfarin assessment / plan:     Denies increased vitamin K intake  Missed dose(s)  Medication changes    Patient's INR was subtherapeutic at 1.5 today. He reports taking warfarin 2.5 mg daily since beginning steroids on 11/26. He was instructed to resume his normal dosing and will return for INR check on 12/22/21. Description    CONTINUE: Warfarin 5 mg daily EXCEPT 2.5 mg every Sunday, Tuesday and Thursday    Call with signs or symptoms of bleeding or any concerns or questions. If significant bleeding seek immediate medical attention. Call with medications changes, especially antibiotics and steroids and including any over-the-counter medications or herbal products. Call if you stop any medications. Keep the number of servings of Vitamin K (dark green vegetables) consistent from week to week  Eats three good portion of broccoli or brussel sprouts per week. Immunization History   Administered Date(s) Administered    [facility], Primary or Immunocompromised, PF, 100mcg/0.5mL 03/09/2021, 04/08/2021    Influenza, MDCK Quadv, IM, PF (Flucelvax 2 yrs and older) 10/26/2021    Influenza, Quadv, IM, PF (6 mo and older Fluzone, Flulaval, Fluarix, and 3 yrs and older Afluria) 10/27/2017, 09/23/2019, 10/02/2020    Influenza, Amber Arena, Recombinant, IM PF (Flublok 18 yrs and older) 10/03/2018    Pneumococcal Conjugate 13-valent ([id number]) 09/11/2015    Pneumococcal Conjugate Vaccine 08/15/2017    Pneumococcal Polysaccharide ([id number]) 08/01/2007, 12/19/2012    Tdap (Boostrix, Adacel) 08/19/2014             Reviewed AVS with patient / caregiver.       CLINICAL PHARMACY CONSULT: MED RECONCILIATION/REVIEW ADDENDUM    For Pharmacy Admin Tracking Only     Intervention Detail: Dose Adjustment: 1, reason: Therapy Optimization   Total # of Interventions Recommended: 1   Total # of Interventions Accepted: 1   Time Spent (min): 20

## 2021-12-21 ENCOUNTER — ANTI-COAG VISIT (OUTPATIENT)
Dept: PHARMACY | Age: 61
End: 2021-12-21
Payer: COMMERCIAL

## 2021-12-21 DIAGNOSIS — I27.82 CHRONIC PULMONARY EMBOLISM, UNSPECIFIED PULMONARY EMBOLISM TYPE, UNSPECIFIED WHETHER ACUTE COR PULMONALE PRESENT (HCC): ICD-10-CM

## 2021-12-21 DIAGNOSIS — Z79.01 CHRONIC ANTICOAGULATION: Primary | ICD-10-CM

## 2021-12-21 LAB — INR BLD: 3.1

## 2021-12-21 PROCEDURE — 99211 OFF/OP EST MAY X REQ PHY/QHP: CPT

## 2021-12-21 PROCEDURE — 85610 PROTHROMBIN TIME: CPT

## 2021-12-21 NOTE — PROGRESS NOTES
Bebeto Gurrola is a 57 y.o. here for warfarin management. Cheryle Becerra had an INR test today. Results were reviewed and appropriate warfarin management was completed. This visit was performed as: An in person visit. Protocols were followed with precautions to reduce the spread of COVID-19. Patient verifies current dosing regimen: Yes     Warfarin medication reviewed and updated on the patient 's home medication list: Yes   All other medications reviewed and updated on the patient 's home medication list: No: No changes     Lab Results   Component Value Date    INR 3.10 12/21/2021    INR 1.50 12/06/2021    INR 2.1 11/24/2021       Patient Findings     Negatives:  Signs/symptoms of bleeding, Missed doses, Change in medications, Change in diet/appetite, Bruising          Anticoagulation Summary  As of 12/21/2021    INR goal:  2.0-3.0   TTR:  34.3 % (6.1 y)   INR used for dosing:  3.10 (12/21/2021)   Warfarin maintenance plan:  2.5 mg (5 mg x 0.5) every Sun, Tue, Thu; 5 mg (5 mg x 1) all other days   Weekly warfarin total:  27.5 mg   Plan last modified:  Sera Palacios RP (10/21/2021)   Next INR check:  1/11/2022   Priority:  Maintenance   Target end date: Indefinite    Indications    Pulmonary embolus (HCC) [I26.99]  Chronic anticoagulation [Z79.01]             Anticoagulation Episode Summary     INR check location:  Anticoagulation Clinic    Preferred lab:      Send INR reminders to:  [facility] STAFF    Comments:  EPIC - finger stick every 2-3 weeks        Anticoagulation Care Providers     Provider Role Specialty Phone number    Jeff Ling MD Referring Internal Medicine [phone]          Warfarin assessment / plan:     Denies medication changes   Denies extra warfarin doses  Denies decrease in vitamin K intake     Patient looks and feels well today. INR was supratherapeutic at 3.1. He had recently been taking steroids and finished about a week and a half ago.  His INR was only slightly high and has been high and low at this dose, he was instructed to continue his current warfarin regimen. Next INR check 1/11/22. Description    CONTINUE: Warfarin 5 mg daily EXCEPT 2.5 mg every Sunday, Tuesday and Thursday    Call with signs or symptoms of bleeding or any concerns or questions. If significant bleeding seek immediate medical attention. Call with medications changes, especially antibiotics and steroids and including any over-the-counter medications or herbal products. Call if you stop any medications. Keep the number of servings of Vitamin K (dark green vegetables) consistent from week to week  Eats three good portion of broccoli or brussel sprouts per week. Immunization History   Administered Date(s) Administered    COVID-19, Garth Divine, Primary or Immunocompromised, PF, 100mcg/0.5mL 03/09/2021, 04/08/2021    Influenza, MDCK Quadv, IM, PF (Flucelvax 2 yrs and older) 10/26/2021    Influenza, Quadv, IM, PF (6 mo and older Fluzone, Flulaval, Fluarix, and 3 yrs and older Afluria) 10/27/2017, 09/23/2019, 10/02/2020    Influenza, Oneill Deal, Recombinant, IM PF (Flublok 18 yrs and older) 10/03/2018    Pneumococcal Conjugate 13-valent ([id number]) 09/11/2015    Pneumococcal Conjugate Vaccine 08/15/2017    Pneumococcal Polysaccharide ([id number]) 08/01/2007, 12/19/2012    Tdap (Boostrix, Adacel) 08/19/2014             Reviewed AVS with patient / caregiver.       CLINICAL PHARMACY CONSULT: MED RECONCILIATION/REVIEW ADDENDUM    For Pharmacy Admin Tracking Only     Intervention Detail:    Total # of Interventions Recommended: 0   Total # of Interventions Accepted: 0   Time Spent (min): 15

## 2021-12-28 ENCOUNTER — NURSE ONLY (OUTPATIENT)
Dept: CARDIOLOGY CLINIC | Age: 61
End: 2021-12-28
Payer: COMMERCIAL

## 2021-12-28 DIAGNOSIS — I50.23 ACUTE ON CHRONIC SYSTOLIC HEART FAILURE (HCC): ICD-10-CM

## 2021-12-28 DIAGNOSIS — Z95.810 BIVENTRICULAR ICD (IMPLANTABLE CARDIOVERTER-DEFIBRILLATOR) IN PLACE: ICD-10-CM

## 2021-12-28 DIAGNOSIS — I42.8 NICM (NONISCHEMIC CARDIOMYOPATHY) (HCC): ICD-10-CM

## 2021-12-28 DIAGNOSIS — I44.7 LBBB (LEFT BUNDLE BRANCH BLOCK): ICD-10-CM

## 2021-12-29 NOTE — PROGRESS NOTES
We received remote transmission from patient's CRT-D monitor at home (programmed VVI RV @ 40bpm per 11.2017 specialty comments). Transmission shows normal sensing and pacing function. No new arrhythmias/events recorded. Thoracic impedance is normal.    EP physician will review. See interrogation under cardiology tab in the [address] [place] [address] [address] field for more details. Will continue to monitor remotely.

## 2021-12-30 PROCEDURE — 93295 DEV INTERROG REMOTE 1/2/MLT: CPT | Performed by: INTERNAL MEDICINE

## 2021-12-30 PROCEDURE — 93296 REM INTERROG EVL PM/IDS: CPT | Performed by: INTERNAL MEDICINE

## 2021-12-30 PROCEDURE — 93297 REM INTERROG DEV EVAL ICPMS: CPT | Performed by: INTERNAL MEDICINE

## 2022-01-05 NOTE — PROGRESS NOTES
[facility]   Electrophysiology   Date: 11/28/2017  CC:    Chief Complaint   Patient presents with    Hypertension     states that his bp and heart rate has been elevated, but other than that he has been feeling well - \"I feel good since my pacemaker was turned off. \"    Tachycardia     he states that his heart rate increases very high with very little activity - \"just walking around will shoot my heart up to 120's\"     I had the privilege of visiting Omero Abraham in the office. He presents today for follow up for cardiomyopathy, cough and  and Biv ICD in situ. During last hospitalization his device was set at VVI 40 with tachy arrthymias on per patient request.    Patient reports he is feeling better. He has concerns about his blood pressure and heart rate. He states his abdominal pain has improved. 61 y. o. with a PMH significant for a prior PE in 2011 and nonischemic cardiomyopathy      HPI: Omero Abraham is a 61 y.o. Past Medical History:   Diagnosis Date    Bronchiolitis obliterans ([address]     Bundle branch block, right     Cardiomyopathy ([address]     Fibromyalgia     GERD (gastroesophageal reflux disease)     Hepatitis 1979    unsure of which type    Hx of blood clots     Hyperlipemia     Hypertension     IBS (irritable bowel syndrome)     Kidney stone     over thirty kidney stones    Neuropathy ([address]     right side-chest    Pneumothorax 2011    Right    Prostatitis     Pulmonary embolism on right [facility]) 2011    upper lobe-coumadin 2011    Sacroiliitis [facility])         Past Surgical History:   Procedure Laterality Date    CHOLECYSTECTOMY  2007    COLONOSCOPY  9/21/2012    dr Angel Lima  2015    LITHOTRIPSY     [place] opening larger in sinuses    [facility]PPER GASTRO[facility]TESTINAL ENDOS[facility]       Allergies:   Allergies   Allergen Reactions    Atrovent Nasal Spray [Ipratropium] Other (See Comments)     Chest tightness Follows up with nephrology later this week.    Morphine Other (See Comments)     \"makes me feel funny\"      Nitroglycerin Other (See Comments)     Severe hypotension (went from 036 to 66 systolic)    Sulfa Antibiotics Rash    Vicodin [Hydrocodone-Acetaminophen] Rash       Medication:  Current Outpatient Prescriptions   Medication Sig Dispense Refill    LYRICA 75 MG capsule TAKE 1 CAPSULE BY MOUTH EVERY MORNING AND 2 CAPSULES BY MOUTH EVERY EVENING 90 capsule 0    DEXILANT 30 MG CPDR delayed release capsule TAKE 1 CAPSULE BY MOUTH  DAILY 90 capsule 0    fluticasone (FLONASE) 50 MCG/ACT nasal spray 1 spray by Nasal route daily 1 Bottle 0    loratadine (CLARITIN) 10 MG tablet Take 1 tablet by mouth daily 30 tablet 0    esomeprazole (NEXIUM) 20 MG delayed release capsule Take 40 mg by mouth every morning (before breakfast)      Cholecalciferol (VITAMIN D3) 88685 units CAPS Take 1 capsule by mouth once a week      oxyCODONE-acetaminophen (PERCOCET)  MG per tablet Take 1 tablet by mouth every 6 hours as needed for Pain .  Earliest Fill Date: 11/10/17 60 tablet 0    promethazine-codeine (PHENERGAN WITH CODEINE) 6.25-10 MG/5ML syrup Take 5 mLs by mouth every 4 hours as needed for Cough 120 mL 1    potassium chloride (KLOR-CON M) 10 MEQ extended release tablet TAKE 4 TABLETS BY MOUTH EVERY  tablet 3    sacubitril-valsartan (ENTRESTO) 24-26 MG per tablet Take 1 tablet by mouth 2 times daily 180 tablet 3    pravastatin (PRAVACHOL) 40 MG tablet Take 1 tablet by mouth daily 90 tablet 3    tamsulosin (FLOMAX) 0.4 MG capsule Take 1 capsule by mouth 2 times daily (Patient taking differently: Take 0.4 mg by mouth daily ) 60 capsule 1    hydrocortisone (PROCTOZONE-HC) 2.5 % rectal cream PLACE RECTALLY TWICE DAILY 30 g 0    zolpidem (AMBIEN) 10 MG tablet TAKE 1 TABLET BY MOUTH EVERY NIGHT AT BEDTIME 90 tablet 1    ondansetron (ZOFRAN ODT) 4 MG disintegrating tablet Take 1-2 tablets by mouth every 8 hours as needed for Nausea May Sub regular tablet (non-ODT) if insurance does not cover ODT. 20 tablet 0    dicyclomine (BENTYL) 10 MG capsule TAKE ONE CAPSULE BY MOUTH FOUR TIMES DAILY AS NEEDED 360 capsule 3    warfarin (COUMADIN) 5 MG tablet Take 5 mg by mouth daily or as directed by [facility] Coumadin Service [phone]      Respiratory Therapy Supplies (NEBULIZER/TUBING/MOUTHPIECE) KIT 1 kit by Does not apply route 3 times daily 2 kit 5    flavoxate (URISPAS) 100 MG tablet TAKE 1 TABLET BY MOUTH THREE TIMES DAILY AS NEEDED FOR URINARY SPASM 90 tablet 0    atropine-PHENobarbital-scopolamine-hyoscyamine (DONNATAL) 16.2 MG per tablet TAKE 1 TABLET BY MOUTH EVERY 6 HOURS AS NEEDED 60 tablet 1    aspirin 81 MG tablet   Take 81 mg by mouth daily       misoprostol (CYTOTEC) 200 MCG tablet Take 200 mcg by mouth 2 times daily.  albuterol (ACCUNEB) 1.25 MG/3ML nebulizer solution Inhale 1 ampule into the lungs every 8 hours.  albuterol (PROAIR HFA) 108 (90 BASE) MCG/ACT inhaler Inhale 2 puffs into the lungs every 6 hours as needed. No current facility-administered medications for this visit. Social History:  Reviewed. reports that he has never smoked. He has never used smokeless tobacco. He reports that he drinks alcohol. He reports that he does not use drugs. Family History:  Reviewed. family history includes Cancer in his father; Dementia in his mother; High Blood Pressure in his mother.        Review of System:    · General ROS: negative for chills, positive forfever, no change in weight   · Psychological ROS: negative for  anxiety or depression  · Ophthalmic ROS: negative for  eye pain or loss of vision  · ENT ROS: negative for  headaches, sore throat   · Allergy and Immunology ROS: negative for  hives  · Hematological and Lymphatic ROS: negative for  bleeding problems, blood clots, bruising or jaundice  · Endocrine ROS: negative for  skin changes, temperature intolerance or unexpected weight changes  · Respiratory ROS: positive bundle   branch block.   Mitral valve is structurally normal.   Trivial to mild mitral regurgitation is present.   The left atrial size is normal.   A bubble study was performed and fails to show evidence of right to left   shunting.     Device: Device interrogated and functioning appropriately      Assessment:   Patient Active Problem List    Diagnosis Date Noted    Biventricular ICD (implantable cardioverter-defibrillator) in place      Priority: High    Chronic anticoagulation      Priority: High    Pulmonary embolus ([facility] 07/08/2015     Priority: High    CAD (coronary artery disease) 07/09/2015     Priority: Medium    Bronchiolitis obliterans ([facility]      Priority: Medium    Essential hypertension, benign      Priority: Medium    Encounter for adjustment of cardiac resynchronization therapy defibrillator (CRT-D) 10/11/2017     Priority: Low    Urinary tract infection without hematuria      Priority: Low    Ureteral stone 09/01/2017     Priority: Low    Acute confusional state 08/22/2017     Priority: Low    Elevated INR      Priority: Low    Chronic systolic CHF (congestive heart failure), NYHA class 2 ([facility])      Priority: Low    LBBB (left bundle branch block)      Priority: Low    Right arm weakness 08/14/2017     Priority: Low    Transient cerebral ischemia 07/24/2017     Priority: Low    History of pulmonary embolism 07/24/2017     Priority: Low    Cardiomyopathy ([facility] 07/24/2017     Priority: Low    Confusion      Priority: Low    Nausea 04/21/2017     Priority: Low    Cough 02/21/2017     Priority: Low    Dyspnea 01/30/2017     Priority: Low    Acute non-recurrent maxillary sinusitis 01/04/2017     Priority: Low    DDD (degenerative disc disease), lumbar 06/20/2016     Priority: Low    Leg pain, right 11/02/2015     Priority: Low    Lightheadedness 09/11/2015     Priority: Low    Headache 08/21/2015     Priority: Low    Atypical chest pain      Priority: Low    Bronchitis 04/07/2015     Priority: Low    Precordial pain 08/06/2012     Priority: Low    Lower abdominal pain 05/30/2012     Priority: Low    Nephrolithiasis      Priority: Low    Irritable bowel syndrome      Priority: Low    Pure hypercholesterolemia      Priority: Low    Allergic rhinitis      Priority: Low    Glucose intolerance (impaired glucose tolerance)      Priority: Low    Sinusitis 03/29/2010     Priority: Low    Back pain 01/25/2010     Priority: Low    Paroxysmal atrial fibrillation ([address] 11/28/2017        Plan:  1. BiV ICD in situ   Discussed with patient he is currently set at VVI 40 with defibrillator programming ON    BiV pacing is off   Patient refuses device interrogation and has disconnected his home monitor    Discussed at length with patient advantage of remote monitoring and he is willing to turn back on the home monitor    2. Atrial fibrillation    New onset-paroxsymal    On previous remote transmission   Patient refuses device interrogation to assess burden    Refuses Coreg    Discussed consideration of Toprol XL     Patient is agreeable     Will start low dose at night    On Coumadin therapy for hx of DVT     Continue     CHADSVASC 5 for CHF, CAD, HTN, hx of PE   3. HTN   Currently stable   Labile likely secondary to AF   Currently on Entresto     Continue    Start Toprol XL   4. Cardiomyopathy    Non ischemic   Improved with BIV pacing on echo however uncertain of current EF with BiV pacing off   Continue Entresto   Will start low dose beta-blocker    Reinforced fluid and Na restrictions      Dr Toney Whittington also present during portions of OV and in agreement with POC. Thank you for allowing me to participate in the care of Mabel Kinney. Further evaluation will be based upon the patient's clinical course and testing results. All questions and concerns were addressed to the patient/family. Alternatives to my treatment were discussed.      Caroline Page, [address] [facility] [place]  Electrophysiology   11/28/2017  12:49 PM      Patient has atrial fibrillation: No      Patient is on anticoagulation therapy:  Yes         Patient instructed on life style modifications   Tobacco use was discussed with the patient and patient educated on the negative effects. I have asked the patient to not utilize these agents.       FASTING LIPID PANEL:  Lab Results   Component Value Date    HDL 46 07/24/2017    HDL 65 11/10/2011    LDLCALC 75 07/24/2017    TRIG 96 07/24/2017

## 2022-01-07 DIAGNOSIS — J42 BRONCHIOLITIS OBLITERANS (HCC): ICD-10-CM

## 2022-01-07 DIAGNOSIS — R06.02 SOB (SHORTNESS OF BREATH) ON EXERTION: ICD-10-CM

## 2022-01-07 DIAGNOSIS — E78.5 HYPERLIPIDEMIA, UNSPECIFIED HYPERLIPIDEMIA TYPE: Primary | ICD-10-CM

## 2022-01-07 NOTE — TELEPHONE ENCOUNTER
Last ov: 07/21/21  Last labs: 10/18/21  Next appointment:    Pt states he has been taking 20mg of lasix BID for 4-5 months now because he was noticing some unexplained weight gain, would like that script to be sent in as 20mg BID. Per pt's chart he's supposed to be taking 0.5 tablet of 20mg of lasix bid.

## 2022-01-07 NOTE — TELEPHONE ENCOUNTER
Medication Refill    Medication needing refilled:  furosemide (LASIX)    Dosage of the medication:  20 MG tablet     How are you taking this medication (QD, BID, TID, QID, PRN):  Lala De La Garza said he it taking 40 mg daily     30 or 90 day supply called in: 180 tablet     When will you run out of your medication:    Which Pharmacy are we sending the medication to?:    [address], [address] [facility] [phone]   [address] [place],  [facility] [address]   Phone:  [phone]  Fax:  [phone]

## 2022-01-10 RX ORDER — TIOTROPIUM BROMIDE AND OLODATEROL 3.124; 2.736 UG/1; UG/1
SPRAY, METERED RESPIRATORY (INHALATION)
Qty: 4 G | Refills: 5 | Status: SHIPPED | OUTPATIENT
Start: 2022-01-10

## 2022-01-10 NOTE — TELEPHONE ENCOUNTER
If he can take the diuretic as needed that would be preferred but okay to refill Lasix 20 mg BID if that is what he is taking. Labs remain stable. Continue to monitor for worsening heart failure symptoms and call the office with worsening weight gain, LE edema or shortness of breath.

## 2022-01-11 ENCOUNTER — TELEPHONE (OUTPATIENT)
Dept: PHARMACY | Age: 62
End: 2022-01-11

## 2022-01-11 RX ORDER — FUROSEMIDE 20 MG/1
20 TABLET ORAL 2 TIMES DAILY PRN
Qty: 90 TABLET | Refills: 1 | Status: SHIPPED | OUTPATIENT
Start: 2022-01-11 | End: 2022-05-03

## 2022-01-11 NOTE — TELEPHONE ENCOUNTER
Spoke to pt and he states he would like the script called in for 20mg BID. Pt would also like to have cholesterol and other routine labs checked since it has been awhile. Med ordered.

## 2022-01-11 NOTE — TELEPHONE ENCOUNTER
Omaira Wall called reporting sinus symptoms and concern about coming to his appointment with possible COVID. I called him back and he received a negative test result. He would still like to move his appt to tomorrow. Changed to 1/12. He started a Z-pack over the weekend. I advised no change to warfarin dosing and we will assess tomorrow.      Lila Garcia, PharmD, [address] [place] [address]  Anticoagulation Service  [phone]

## 2022-01-12 ENCOUNTER — TELEPHONE (OUTPATIENT)
Dept: PHARMACY | Age: 62
End: 2022-01-12

## 2022-01-12 DIAGNOSIS — R06.02 SOB (SHORTNESS OF BREATH) ON EXERTION: ICD-10-CM

## 2022-01-12 DIAGNOSIS — E78.5 HYPERLIPIDEMIA, UNSPECIFIED HYPERLIPIDEMIA TYPE: ICD-10-CM

## 2022-01-12 LAB
A/G RATIO: 1.8 (ref 1.1–2.2)
ALBUMIN SERPL-MCNC: 4.2 G/DL (ref 3.4–5)
ALP BLD-CCNC: 122 U/L (ref 40–129)
ALT SERPL-CCNC: 17 U/L (ref 10–40)
ANION GAP SERPL CALCULATED.3IONS-SCNC: 13 MMOL/L (ref 3–16)
AST SERPL-CCNC: 15 U/L (ref 15–37)
BILIRUB SERPL-MCNC: 0.5 MG/DL (ref 0–1)
BUN BLDV-MCNC: 10 MG/DL (ref 7–20)
CALCIUM SERPL-MCNC: 10.5 MG/DL (ref 8.3–10.6)
CHLORIDE BLD-SCNC: 102 MMOL/L (ref 99–110)
CHOLESTEROL, TOTAL: 147 MG/DL (ref 0–199)
CO2: 23 MMOL/L (ref 21–32)
CREAT SERPL-MCNC: 1 MG/DL (ref 0.8–1.3)
GFR AFRICAN AMERICAN: >60
GFR NON-AFRICAN AMERICAN: >60
GLUCOSE BLD-MCNC: 106 MG/DL (ref 70–99)
HCT VFR BLD CALC: 47.7 % (ref 40.5–52.5)
HDLC SERPL-MCNC: 38 MG/DL (ref 40–60)
HEMOGLOBIN: 15.9 G/DL (ref 13.5–17.5)
LDL CHOLESTEROL CALCULATED: 76 MG/DL
MCH RBC QN AUTO: 30.6 PG (ref 26–34)
MCHC RBC AUTO-ENTMCNC: 33.4 G/DL (ref 31–36)
MCV RBC AUTO: 91.7 FL (ref 80–100)
PDW BLD-RTO: 13.4 % (ref 12.4–15.4)
PLATELET # BLD: 199 K/UL (ref 135–450)
PMV BLD AUTO: 7.5 FL (ref 5–10.5)
POTASSIUM SERPL-SCNC: 4.1 MMOL/L (ref 3.5–5.1)
RBC # BLD: 5.21 M/UL (ref 4.2–5.9)
SODIUM BLD-SCNC: 138 MMOL/L (ref 136–145)
TOTAL PROTEIN: 6.6 G/DL (ref 6.4–8.2)
TRIGL SERPL-MCNC: 166 MG/DL (ref 0–150)
VLDLC SERPL CALC-MCNC: 33 MG/DL
WBC # BLD: 6.6 K/UL (ref 4–11)

## 2022-01-12 NOTE — TELEPHONE ENCOUNTER
Kemal Vinson left a voicemail early this morning asking for a return call. I tried on 6 separate occasions to reach him today. I recievd no answer and no voice mail was available. He did not show for his appointment this afternoon. We will follow up with him at a later time.     [address]  Anticoagulation Service  [phone]

## 2022-01-19 DIAGNOSIS — R06.00 DYSPNEA, UNSPECIFIED TYPE: ICD-10-CM

## 2022-01-19 RX ORDER — ALBUTEROL SULFATE 2.5 MG/3ML
2.5 SOLUTION RESPIRATORY (INHALATION) EVERY 4 HOURS PRN
Qty: 360 ML | Refills: 5 | Status: SHIPPED | OUTPATIENT
Start: 2022-01-19

## 2022-01-27 ENCOUNTER — TELEPHONE (OUTPATIENT)
Dept: PHARMACY | Age: 62
End: 2022-01-27

## 2022-01-27 NOTE — TELEPHONE ENCOUNTER
Requested to get his INR checked 1/28. [place].     Sari Munoz, PharmD, [address] [facility]  Anticoagulation Service  [phone]

## 2022-01-28 ENCOUNTER — ANTI-COAG VISIT (OUTPATIENT)
Dept: PHARMACY | Age: 62
End: 2022-01-28
Payer: COMMERCIAL

## 2022-01-28 DIAGNOSIS — I27.82 CHRONIC PULMONARY EMBOLISM, UNSPECIFIED PULMONARY EMBOLISM TYPE, UNSPECIFIED WHETHER ACUTE COR PULMONALE PRESENT (HCC): Primary | ICD-10-CM

## 2022-01-28 DIAGNOSIS — Z79.01 CHRONIC ANTICOAGULATION: ICD-10-CM

## 2022-01-28 LAB — INTERNATIONAL NORMALIZATION RATIO, POC: 2

## 2022-01-28 PROCEDURE — 85610 PROTHROMBIN TIME: CPT

## 2022-01-28 PROCEDURE — 99211 OFF/OP EST MAY X REQ PHY/QHP: CPT

## 2022-01-28 NOTE — PROGRESS NOTES
Renay Lin is a 57 y.o. here for warfarin management. Matt Ramirez had an INR test today. Results were reviewed and appropriate warfarin management was completed. This visit was performed as: An in person visit. Protocols were followed with precautions to reduce the spread of COVID-19. Patient verifies current dosing regimen: Yes     Warfarin medication reviewed and updated on the patient 's home medication list: Yes   All other medications reviewed and updated on the patient 's home medication list: No: no changes     Lab Results   Component Value Date    INR 2.0 2022    INR 2.48 (H) 2022    INR 3.10 2021       Patient Findings     Positives:  Change in medications    Negatives:  Signs/symptoms of bleeding, Change in diet/appetite, Bruising    Comments:  Recent Z-pack - low interaction          Anticoagulation Summary  As of 2022    INR goal:  2.0-3.0   TTR:  35.2 % (6.2 y)   INR used for dosin.0 (2022)   Warfarin maintenance plan:  2.5 mg (5 mg x 0.5) every Sun, Tue, Thu; 5 mg (5 mg x 1) all other days   Weekly warfarin total:  27.5 mg   Plan last modified:  Rosary Phoenix, RPH (10/21/2021)   Next INR check:  2022   Priority:  Maintenance   Target end date: Indefinite    Indications    Pulmonary embolus (HCC) [I26.99]  Chronic anticoagulation [Z79.01]             Anticoagulation Episode Summary     INR check location:  Anticoagulation Clinic    Preferred lab:      Send INR reminders to:  [facility]    Comments:  EPIC - finger stick every 2-3 weeks        Anticoagulation Care Providers     Provider Role Specialty Phone number    Itz Denny MD Referring Internal Medicine [phone]          Warfarin assessment / plan:     Appears well. No changes   No acute findings     No change to warfarin therapy today.        Description    Warfarin 5mg daily EXCEPT 2.5mg every , Tuesday and Thursday    Call [phone] with signs or symptoms of bleeding or ANY medication changes (including antibiotics, steroids, over-the-counter medications or herbal supplements). If significant bleeding occurs or if you fall and hit your head, please seek immediate medical attention. Keep the number of servings of vitamin K containing foods (dark green, leafy vegetables) the same each week. Please call if this changes. Limit alcohol intake. Please call if this changes. Immunization History   Administered Date(s) Administered    COVID-19, Reyes Meiers, Primary or Immunocompromised, PF, 100mcg/0.5mL 03/09/2021, 04/08/2021, 12/15/2021    Influenza, MDCK Quadv, IM, PF (Flucelvax 2 yrs and older) 10/26/2021    Influenza, Quadv, IM, PF (6 mo and older Fluzone, Flulaval, Fluarix, and 3 yrs and older Afluria) 10/27/2017, 09/23/2019, 10/02/2020    Influenza, Corita Leer, Recombinant, IM PF (Flublok 18 yrs and older) 10/03/2018    Pneumococcal Conjugate 13-valent ([id number]) 09/11/2015    Pneumococcal Conjugate Vaccine 08/15/2017    Pneumococcal Polysaccharide ([id number]) 08/01/2007, 12/19/2012    Tdap (Boostrix, Adacel) 08/19/2014             Reviewed AVS with patient / caregiver.       CLINICAL PHARMACY CONSULT: MED RECONCILIATION/REVIEW ADDENDUM    For Pharmacy Admin Tracking Only     Intervention Detail:    Total # of Interventions Recommended: 0   Total # of Interventions Accepted: 0   Time Spent (min): 10

## 2022-02-03 ENCOUNTER — TELEPHONE (OUTPATIENT)
Dept: PHARMACY | Age: 62
End: 2022-02-03

## 2022-02-11 NOTE — PROGRESS NOTES
We received remote transmission from patient's CRT-D monitor at home (programmed VVI RV @ 40bpm per 11.2017 specialty comments). Transmission shows normal sensing and pacing function. No new arrhythmias/events recorded. Thoracic impedance is normal w upward trend noted. NP will review. See interrogation under cardiology tab in the [address] [place] [address] [address] field for more details. Will continue to monitor remotely.

## 2022-02-14 ENCOUNTER — ANTI-COAG VISIT (OUTPATIENT)
Dept: PHARMACY | Age: 62
End: 2022-02-14
Payer: COMMERCIAL

## 2022-02-14 ENCOUNTER — HOSPITAL ENCOUNTER (OUTPATIENT)
Age: 62
Discharge: HOME | End: 2022-02-14
Payer: COMMERCIAL

## 2022-02-14 ENCOUNTER — HOSPITAL ENCOUNTER (OUTPATIENT)
Dept: GENERAL RADIOLOGY | Age: 62
Discharge: HOME | End: 2022-02-14
Payer: COMMERCIAL

## 2022-02-14 DIAGNOSIS — I10 PRIMARY HYPERTENSION: ICD-10-CM

## 2022-02-14 DIAGNOSIS — Z79.01 CHRONIC ANTICOAGULATION: Primary | ICD-10-CM

## 2022-02-14 DIAGNOSIS — R05.9 COUGH: ICD-10-CM

## 2022-02-14 DIAGNOSIS — R06.02 SOB (SHORTNESS OF BREATH): ICD-10-CM

## 2022-02-14 LAB — INTERNATIONAL NORMALIZATION RATIO, POC: 5

## 2022-02-14 PROCEDURE — 74018 RADEX ABDOMEN 1 VIEW: CPT

## 2022-02-14 PROCEDURE — 99211 OFF/OP EST MAY X REQ PHY/QHP: CPT

## 2022-02-14 PROCEDURE — 85610 PROTHROMBIN TIME: CPT

## 2022-02-14 PROCEDURE — 71046 X-RAY EXAM CHEST 2 VIEWS: CPT

## 2022-02-14 RX ORDER — SACUBITRIL AND VALSARTAN 49; 51 MG/1; MG/1
TABLET, FILM COATED ORAL
Qty: 180 TABLET | Refills: 1 | Status: SHIPPED | OUTPATIENT
Start: 2022-02-14 | End: 2022-07-05

## 2022-02-14 NOTE — PROGRESS NOTES
Mag Hastings is a 57 y.o. here for warfarin management. Chay Avila had an INR test today. Results were reviewed and appropriate warfarin management was completed. This visit was performed as: An in person visit. Protocols were followed with precautions to reduce the spread of COVID-19. Patient verifies current dosing regimen: Yes     Warfarin medication reviewed and updated on the patient 's home medication list: Yes   All other medications reviewed and updated on the patient 's home medication list: YES     Lab Results   Component Value Date    INR 5.0 2022    INR 2.0 2022    INR 2.48 (H) 2022       Patient Findings     Positives:  Change in medications, Change in diet/appetite    Negatives:  Signs/symptoms of thrombosis, Signs/symptoms of bleeding, Change in health, Missed doses, Bruising    Comments:  Levaquin and Prednisone 10 mg daily times x 7 days  Decrease in appetite          Anticoagulation Summary  As of 2022    INR goal:  2.0-3.0   TTR:  35.2 % (6.3 y)   INR used for dosin.0 (2022)   Warfarin maintenance plan:  2.5 mg (5 mg x 0.5) every Sun, Tue, Thu; 5 mg (5 mg x 1) all other days   Weekly warfarin total:  27.5 mg   Plan last modified:  Gopi Valencia RPH (10/21/2021)   Next INR check:  2022   Priority:  Maintenance   Target end date: Indefinite    Indications    Pulmonary embolus (HCC) [I26.99]  Chronic anticoagulation [Z79.01]             Anticoagulation Episode Summary     INR check location:  Anticoagulation Clinic    Preferred lab:      Send INR reminders to:  [facility]    Comments:  EPIC - finger stick every 2-3 weeks        Anticoagulation Care Providers     Provider Role Specialty Phone number    Gale Rodriguez MD Referring Internal Medicine [phone]          Warfarin assessment / plan:   Patient appears well, but states he has an upper respiratory infection.   Will be starting Levaquin 250 mg daily times 7 days and prednisone 10 mg daily times 7 days (2-14 thru 2-20-22) . No complaints regarding warfarin therapy. No change to weekly warfarin dosing. INR is supratherapeutic at 5.0. He states he was on a course of amoxicillin. Generally amoxicillin has minimal affect on the INR (in combination with warfarin). However, he has not been eating as much due to a decrease in appetite. With all the above affects on the INR I have dosed as below. To be seen again on 1 week. Description    Hold warfarin on 2-14 and 2-15-22. Have a portion of vitamin K greens on 2-14-22  Take warfarin 2.5 mg on 2-16 thru 2-18-22  CONTINUE: Warfarin 5mg daily EXCEPT 2.5mg every Sunday, Tuesday and Thursday    Call [phone] with signs or symptoms of bleeding or ANY medication changes (including antibiotics, steroids, over-the-counter medications or herbal supplements). If significant bleeding occurs or if you fall and hit your head, please seek immediate medical attention. Keep the number of servings of vitamin K containing foods (dark green, leafy vegetables) the same each week. Please call if this changes. Limit alcohol intake. Please call if this changes. Immunization History   Administered Date(s) Administered    COVID-19, Rere Padron, Primary or Immunocompromised, PF, 100mcg/0.5mL 03/09/2021, 04/08/2021, 12/15/2021    Influenza, MDCK Quadv, IM, PF (Flucelvax 2 yrs and older) 10/26/2021    Influenza, Quadv, IM, PF (6 mo and older Fluzone, Flulaval, Fluarix, and 3 yrs and older Afluria) 10/27/2017, 09/23/2019, 10/02/2020    Influenza, Joy Cozier, Recombinant, IM PF (Flublok 18 yrs and older) 10/03/2018    Pneumococcal Conjugate 13-valent ([id number]) 09/11/2015    Pneumococcal Conjugate Vaccine 08/15/2017    Pneumococcal Polysaccharide ([id number]) 08/01/2007, 12/19/2012    Tdap (Boostrix, Adacel) 08/19/2014             Reviewed AVS with patient / caregiver.     Adjust dosage and/or reconcile meds (fill pill box) </=

## 2022-02-15 ENCOUNTER — NURSE ONLY (OUTPATIENT)
Dept: CARDIOLOGY CLINIC | Age: 62
End: 2022-02-15
Payer: COMMERCIAL

## 2022-02-15 DIAGNOSIS — Z95.810 BIVENTRICULAR ICD (IMPLANTABLE CARDIOVERTER-DEFIBRILLATOR) IN PLACE: ICD-10-CM

## 2022-02-15 DIAGNOSIS — I50.23 ACUTE ON CHRONIC SYSTOLIC HEART FAILURE (HCC): ICD-10-CM

## 2022-02-15 DIAGNOSIS — I42.8 NICM (NONISCHEMIC CARDIOMYOPATHY) (HCC): ICD-10-CM

## 2022-02-15 DIAGNOSIS — I44.7 LBBB (LEFT BUNDLE BRANCH BLOCK): ICD-10-CM

## 2022-02-15 PROCEDURE — G2066 INTER DEVC REMOTE 30D: HCPCS | Performed by: NURSE PRACTITIONER

## 2022-02-15 PROCEDURE — 93297 REM INTERROG DEV EVAL ICPMS: CPT | Performed by: NURSE PRACTITIONER

## 2022-02-16 ENCOUNTER — TELEPHONE (OUTPATIENT)
Dept: CARDIOLOGY CLINIC | Age: 62
End: 2022-02-16

## 2022-02-16 ENCOUNTER — OFFICE VISIT (OUTPATIENT)
Dept: PULMONOLOGY | Age: 62
End: 2022-02-16
Payer: COMMERCIAL

## 2022-02-16 VITALS
BODY MASS INDEX: 35.36 KG/M2 | DIASTOLIC BLOOD PRESSURE: 80 MMHG | TEMPERATURE: 97.5 F | HEIGHT: 66 IN | WEIGHT: 220 LBS | RESPIRATION RATE: 16 BRPM | SYSTOLIC BLOOD PRESSURE: 120 MMHG | HEART RATE: 88 BPM | OXYGEN SATURATION: 95 %

## 2022-02-16 DIAGNOSIS — R06.02 SHORTNESS OF BREATH: ICD-10-CM

## 2022-02-16 DIAGNOSIS — J42 BRONCHIOLITIS OBLITERANS (HCC): Primary | ICD-10-CM

## 2022-02-16 DIAGNOSIS — I50.23 ACUTE ON CHRONIC SYSTOLIC HEART FAILURE (HCC): Primary | ICD-10-CM

## 2022-02-16 PROCEDURE — G8417 CALC BMI ABV UP PARAM F/U: HCPCS | Performed by: INTERNAL MEDICINE

## 2022-02-16 PROCEDURE — 3023F SPIROM DOC REV: CPT | Performed by: INTERNAL MEDICINE

## 2022-02-16 PROCEDURE — 3017F COLORECTAL CA SCREEN DOC REV: CPT | Performed by: INTERNAL MEDICINE

## 2022-02-16 PROCEDURE — G8482 FLU IMMUNIZE ORDER/ADMIN: HCPCS | Performed by: INTERNAL MEDICINE

## 2022-02-16 PROCEDURE — 1036F TOBACCO NON-USER: CPT | Performed by: INTERNAL MEDICINE

## 2022-02-16 PROCEDURE — 99213 OFFICE O/P EST LOW 20 MIN: CPT | Performed by: INTERNAL MEDICINE

## 2022-02-16 PROCEDURE — G8427 DOCREV CUR MEDS BY ELIG CLIN: HCPCS | Performed by: INTERNAL MEDICINE

## 2022-02-16 NOTE — TELEPHONE ENCOUNTER
Spoke with patient regarding weight gain. He has gained 5 pounds in the past week. He denies orthopnea or swelling. Instructed to take an extra 20 mg of Lasix for the next 2 days to see if his weight comes down at all. Encouraged to monitor his sodium intake and call by the end of the week with an update. He v/u.

## 2022-02-16 NOTE — TELEPHONE ENCOUNTER
Rec'd fax from [facility] that entresto 49-51 valid from 1/10/19 thru 01/09/2023. Called [facility] to make aware. [facility] said that patient tried to refill Rx too early so it triggered a Prior Authorization. Letter was sent to patient from [facility].

## 2022-02-16 NOTE — ASSESSMENT & PLAN NOTE
-Symptoms out of proportionTo lung disease. Patient has a known history of diastolic heart failure on diuretics. He has noticed some weight gain recently as well.  -He states he will reach out to [facility] today to determine his diuretic status.

## 2022-02-16 NOTE — TELEPHONE ENCOUNTER
Elizabeth Organ stop in stating that he needs to speak with someone about his water pill and his weight has went up to 219.       Elizabeth Organ can be reached at [phone]

## 2022-02-16 NOTE — PROGRESS NOTES
[address] [facility], SLEEP, AND CRITICAL CARE   Dave Taylor (:  1960) is a 57 y.o. male,Established patient, here for evaluation of the following chief complaint(s):  Follow-up         ASSESSMENT/PLAN:  1. Bronchiolitis obliterans ([address]  Assessment & Plan:  -Has been stable since . -FEV1 50%  -On Stiolto, albuterol as needed  2. Shortness of breath  Assessment & Plan:  -Symptoms out of proportionTo lung disease. Patient has a known history of diastolic heart failure on diuretics. He has noticed some weight gain recently as well.  -He states he will [facility] to [facility] today to determine his diuretic status. Return in about 6 months (around 2022). Subjective   SUBJECTIVE/OBJECTIVE:    Generally stable though he thinks he is more short of breath. On further inquiry he is not really is more short of breath is just feels that his belly is full and he gets more winded exerting himself. Gets thrush with ICS. History of bronchiolitis obliterans in       Review of Systems   Constitutional: Negative. Cardiovascular: Negative. Musculoskeletal: Negative. Neurological: Negative. Psychiatric/Behavioral: Negative. All other systems reviewed and are negative. Objective   Physical Exam    Gen:  No acute distress. Eyes: PERRL. EOMI. Anicteric sclera. No conjunctival injection. ENT: No discharge. oropharynx clear. External appearance of ears and nose normal.  Neck: Trachea midline. No mass   Resp:  No crackles. No wheezes. No rhonchi. No dullness on percussion. CV: Regular rate. Regular rhythm. No murmur or rub. No edema. GI: Soft, Non-tender. Non-distended. +BS  Skin: Warm, dry, w/o erythema. Lymph: No cervical or supraclavicular LAD. M/S: No cyanosis. No clubbing. Neuro:  no focal neurologic deficit. Moves all extremities  Psych: Awake and alert, Oriented x 3. Judgement and insight appropriate. Mood stable.     I spent 25 minutes with the patient, >50% was face-to-face in discussion of the diagnosis and the coordination of care in regards to the treatment plan. All questions answered. An electronic signature was used to authenticate this note.     --Yari Kline MD

## 2022-02-18 NOTE — TELEPHONE ENCOUNTER
Please call to obtain an update from Darius Mcallister to see how he is feeling. If symptoms improved continue medications and call with any worsening symptoms.

## 2022-02-18 NOTE — TELEPHONE ENCOUNTER
Called patient to get an update and he states his weight has gotten down. Patient states he now weighs about 216lbs. Patient states he has still been taking his extra 20 mg of lasix and feels fine.

## 2022-02-18 NOTE — TELEPHONE ENCOUNTER
Dr. Kolb Left    Patient called in earlier this week with weight gain and instructed to take an extra 20 mg Lasix for the past couple days. His weight has come down and patient reports feeling better. Would you like to continue increased dose of Lasix and repeat labs or reduce to previous dose?

## 2022-02-18 NOTE — TELEPHONE ENCOUNTER
Have patient continue increased dose of Lasix for another month and then reduce it back to his original dose. . Repeat BMP in 1 month

## 2022-02-21 LAB — PATHOLOGY/CYTOLOGY REPORT: NORMAL

## 2022-02-24 ENCOUNTER — HOSPITAL ENCOUNTER (OUTPATIENT)
Dept: CT IMAGING | Age: 62
Discharge: HOME | End: 2022-02-24
Payer: COMMERCIAL

## 2022-02-24 ENCOUNTER — ANTI-COAG VISIT (OUTPATIENT)
Dept: PHARMACY | Age: 62
End: 2022-02-24
Payer: COMMERCIAL

## 2022-02-24 DIAGNOSIS — I26.99 PULMONARY EMBOLISM, UNSPECIFIED CHRONICITY, UNSPECIFIED PULMONARY EMBOLISM TYPE, UNSPECIFIED WHETHER ACUTE COR PULMONALE PRESENT (HCC): Primary | ICD-10-CM

## 2022-02-24 DIAGNOSIS — N20.1 CALCULUS OF URETER: ICD-10-CM

## 2022-02-24 DIAGNOSIS — Z79.01 CHRONIC ANTICOAGULATION: ICD-10-CM

## 2022-02-24 LAB — INR BLD: 1.8

## 2022-02-24 PROCEDURE — 74176 CT ABD & PELVIS W/O CONTRAST: CPT

## 2022-02-24 PROCEDURE — 99211 OFF/OP EST MAY X REQ PHY/QHP: CPT

## 2022-02-24 PROCEDURE — 85610 PROTHROMBIN TIME: CPT

## 2022-02-24 NOTE — PROGRESS NOTES
Latia Walls is a 57 y.o. here for warfarin management. Jourdan Redd had an INR test today. Results were reviewed and appropriate warfarin management was completed. This visit was performed as: An in person visit. Protocols were followed with precautions to reduce the spread of COVID-19. Patient verifies current dosing regimen: Yes     Warfarin medication reviewed and updated on the patient 's home medication list: Yes   All other medications reviewed and updated on the patient 's home medication list: No     Lab Results   Component Value Date    INR 1.80 2022    INR 5.0 2022    INR 2.0 2022       Patient Findings     Negatives:  Signs/symptoms of bleeding, Change in health, Missed doses, Change in medications, Change in diet/appetite, Bruising          Anticoagulation Summary  As of 2022    INR goal:  2.0-3.0   TTR:  35.2 % (6.3 y)   INR used for dosin.80 (2022)   Warfarin maintenance plan:  2.5 mg (5 mg x 0.5) every Sun, Tue, Thu; 5 mg (5 mg x 1) all other days   Weekly warfarin total:  27.5 mg   Plan last modified:  Jason Burns RPH (10/21/2021)   Next INR check:  3/9/2022   Priority:  Maintenance   Target end date: Indefinite    Indications    Pulmonary embolus (HCC) [I26.99]  Chronic anticoagulation [Z79.01]             Anticoagulation Episode Summary     INR check location:  Anticoagulation Clinic    Preferred lab:      Send INR reminders to:  [facility]    Comments:  EPIC - finger stick every 2-3 weeks        Anticoagulation Care Providers     Provider Role Specialty Phone number    Terrence Li MD Referring Internal Medicine [phone]          Warfarin assessment / plan:     Appears well. Finished prednisone x7 days on  and will finish Levaquin x7 days on . No other changes or complaints. INR today is subtherapeutic at 1.8.  INR likely below goal because we adjusted at his last visit for the interacting medications and adjusted a little too far    Expect INR to come up with two more days of the Levaquin as will as returning to his regular weekly dose which typically works well for him. Follow up in 2 weeks. Description    CONTINUE: Warfarin 5mg daily EXCEPT 2.5mg every Sunday, Tuesday and Thursday    Call [phone] with signs or symptoms of bleeding or ANY medication changes (including antibiotics, steroids, over-the-counter medications or herbal supplements). If significant bleeding occurs or if you fall and hit your head, please seek immediate medical attention. Keep the number of servings of vitamin K containing foods (dark green, leafy vegetables) the same each week. Please call if this changes. Limit alcohol intake. Please call if this changes. Immunization History   Administered Date(s) Administered    [id number], Hillary Beasley, Primary or Immunocompromised, PF, 100mcg/0.5mL 03/09/2021, 04/08/2021, 12/15/2021    Influenza, MDCK Quadv, IM, PF (Flucelvax 2 yrs and older) 10/26/2021    Influenza, Quadv, IM, PF (6 mo and older Fluzone, Flulaval, Fluarix, and 3 yrs and older Afluria) 10/27/2017, 09/23/2019, 10/02/2020    Influenza, Mo Schlossman, Recombinant, IM PF (Flublok 18 yrs and older) 10/03/2018    Pneumococcal Conjugate 13-valent ([id number]) 09/11/2015    Pneumococcal Conjugate Vaccine 08/15/2017    Pneumococcal Polysaccharide ([id number]) 08/01/2007, 12/19/2012    Tdap (Boostrix, Adacel) 08/19/2014           Orders Placed This Encounter   Procedures    Protime-INR     This external order was created through the results console. No orders of the defined types were placed in this encounter. Reviewed AVS with patient / caregiver.     Billing Points:  0 billing points this visit       CLINICAL PHARMACY CONSULT: MED RECONCILIATION/REVIEW ADDENDUM    For Pharmacy Admin Tracking Only     Intervention Detail:    Total # of Interventions Recommended: 0   Total # of Interventions Accepted: 0   Time Spent (min): 20

## 2022-03-03 ENCOUNTER — TELEPHONE (OUTPATIENT)
Dept: PHARMACY | Age: 62
End: 2022-03-03

## 2022-03-03 LAB — PATHOLOGY/CYTOLOGY REPORT: NORMAL

## 2022-03-03 NOTE — TELEPHONE ENCOUNTER
Patient called, started Prednisone taper on 3/3/22 for 9 days. Anticipate an interaction with Warfarin. Advised patient to decrease warfarin to 2.5mg daily (from Warfarin 5mg daily EXCEPT 2.5mg every Sunday, Tuesday and Thursday) and follow up 3/9/22 as planned.     Bel OscarD    For Pharmacy Admin Tracking Only     Intervention Detail: Dose Adjustment: 1, reason: Interaction   Total # of Interventions Recommended: 1   Total # of Interventions Accepted: 1   Time Spent (min): 10

## 2022-03-09 ENCOUNTER — ANTI-COAG VISIT (OUTPATIENT)
Dept: PHARMACY | Age: 62
End: 2022-03-09
Payer: COMMERCIAL

## 2022-03-09 DIAGNOSIS — I27.82 CHRONIC PULMONARY EMBOLISM, UNSPECIFIED PULMONARY EMBOLISM TYPE, UNSPECIFIED WHETHER ACUTE COR PULMONALE PRESENT (HCC): Primary | ICD-10-CM

## 2022-03-09 DIAGNOSIS — Z79.01 CHRONIC ANTICOAGULATION: ICD-10-CM

## 2022-03-09 LAB — INR BLD: 2.9

## 2022-03-09 PROCEDURE — 99211 OFF/OP EST MAY X REQ PHY/QHP: CPT

## 2022-03-09 PROCEDURE — 85610 PROTHROMBIN TIME: CPT

## 2022-03-09 NOTE — PROGRESS NOTES
Riddhi Dallas is a 57 y.o. here for warfarin management. James Martino had an INR test today. Results were reviewed and appropriate warfarin management was completed. This visit was performed as: An in person visit. Protocols were followed with precautions to reduce the spread of COVID-19. Patient verifies current dosing regimen: Yes     Warfarin medication reviewed and updated on the patient 's home medication list: Yes   All other medications reviewed and updated on the patient 's home medication list: Yes     Lab Results   Component Value Date    INR 2.90 2022    INR 1.80 2022    INR 5.0 2022       Patient Findings     Positives:  Change in medications    Negatives:  Signs/symptoms of bleeding, Missed doses, Change in diet/appetite, Bruising    Comments:  He was prescribed a 9 day prednisone taper on 3-3-22. FINISHED TAKING PREDNISONE - abruptly stopped will not finish next 2 days          Anticoagulation Summary  As of 3/9/2022    INR goal:  2.0-3.0   TTR:  35.4 % (6.4 y)   INR used for dosin.90 (3/9/2022)   Warfarin maintenance plan:  2.5 mg (5 mg x 0.5) every Sun, Tue, Thu; 5 mg (5 mg x 1) all other days   Weekly warfarin total:  27.5 mg   Plan last modified:  Yudy Walters RPH (10/21/2021)   Next INR check:  3/23/2022   Priority:  High   Target end date: Indefinite    Indications    Pulmonary embolus (HCC) [I26.99]  Chronic anticoagulation [Z79.01]             Anticoagulation Episode Summary     INR check location:  Anticoagulation Clinic    Preferred lab:      Send INR reminders to:  [facility]    Comments:  EPIC - finger stick every 2-3 weeks        Anticoagulation Care Providers     Provider Role Specialty Phone number    Sabrina Moon MD Referring Internal Medicine [phone]          Warfarin assessment / plan:     Appears well. No changes. No acute findings.        James Martino was prescribed a 9 day taper of prednisone on 3-2-22 for respiratory encounter. Reviewed AVS with patient / caregiver.     Billing Points:  0 billing points this visit       CLINICAL PHARMACY CONSULT: MED RECONCILIATION/REVIEW ADDENDUM    For Pharmacy Admin Tracking Only     Intervention Detail:    Total # of Interventions Recommended: 0   Total # of Interventions Accepted: 0   Time Spent (min): 15

## 2022-03-14 RX ORDER — SODIUM CHLORIDE FOR INHALATION 3 %
4 VIAL, NEBULIZER (ML) INHALATION EVERY 8 HOURS PRN
Qty: 360 ML | Refills: 5 | Status: SHIPPED | OUTPATIENT
Start: 2022-03-14 | End: 2022-05-16

## 2022-03-22 ENCOUNTER — ANTI-COAG VISIT (OUTPATIENT)
Dept: PHARMACY | Age: 62
End: 2022-03-22
Payer: COMMERCIAL

## 2022-03-22 DIAGNOSIS — Z79.01 CHRONIC ANTICOAGULATION: ICD-10-CM

## 2022-03-22 DIAGNOSIS — I26.99 PULMONARY EMBOLISM, UNSPECIFIED CHRONICITY, UNSPECIFIED PULMONARY EMBOLISM TYPE, UNSPECIFIED WHETHER ACUTE COR PULMONALE PRESENT (HCC): Primary | ICD-10-CM

## 2022-03-22 PROBLEM — M79.672 LEFT FOOT PAIN: Status: ACTIVE | Noted: 2020-02-19

## 2022-03-22 LAB — INR BLD: 3

## 2022-03-22 PROCEDURE — 99211 OFF/OP EST MAY X REQ PHY/QHP: CPT

## 2022-03-22 PROCEDURE — 85610 PROTHROMBIN TIME: CPT

## 2022-03-22 NOTE — PROGRESS NOTES
Jameson Guajardo is a 57 y.o. here for warfarin management. Santos Viramontes had an INR test today. Results were reviewed and appropriate warfarin management was completed. This visit was performed as: An in person visit. Protocols were followed with precautions to reduce the spread of COVID-19. Patient verifies current dosing regimen: Yes     Warfarin medication reviewed and updated on the patient 's home medication list: Yes   All other medications reviewed and updated on the patient 's home medication list: Yes     Lab Results   Component Value Date    INR 3.00 03/22/2022    INR 2.90 03/09/2022    INR 1.80 02/24/2022       Patient Findings     Positives:  Missed doses    Negatives:  Signs/symptoms of bleeding, Change in medications, Change in diet/appetite, Bruising    Comments:  Missed dose yesterday          Anticoagulation Summary  As of 3/22/2022    INR goal:  2.0-3.0   TTR:  35.8 % (6.4 y)   INR used for dosing:  3.00 (3/22/2022)   Warfarin maintenance plan:  2.5 mg (5 mg x 0.5) every Sun, Tue, Thu; 5 mg (5 mg x 1) all other days   Weekly warfarin total:  27.5 mg   Plan last modified:  Gypsy Lobo [facility] (10/21/2021)   Next INR check:  4/19/2022   Priority:  Maintenance   Target end date: Indefinite    Indications    Pulmonary embolus (HCC) [I26.99]  Chronic anticoagulation [Z79.01]             Anticoagulation Episode Summary     INR check location:  Anticoagulation Clinic    Preferred lab:      Send INR reminders to:  [facility]    Comments:  EPIC - finger stick every 2-3 weeks        Anticoagulation Care Providers     Provider Role Specialty Phone number    Shanel Culver MD Referring Internal Medicine [phone]          Warfarin assessment / plan:     Appears well. No changes. No acute findings. No change to warfarin therapy today.      Description    CONTINUE: Warfarin 5mg daily EXCEPT 2.5mg every Sunday, Tuesday and Thursday    Call [phone] with signs or symptoms of bleeding or ANY medication changes (including antibiotics, steroids, over-the-counter medications or herbal supplements). If significant bleeding occurs or if you fall and hit your head, please seek immediate medical attention. Keep the number of servings of vitamin K containing foods (dark green, leafy vegetables) the same each week. Please call if this changes. Limit alcohol intake. Please call if this changes. Immunization History   Administered Date(s) Administered    COVID-19, Torin Santiago, Primary or Immunocompromised, PF, 100mcg/0.5mL 03/09/2021, 04/08/2021, 12/15/2021    Influenza, MDCK Quadv, IM, PF (Flucelvax 2 yrs and older) 10/26/2021    Influenza, Quadv, IM, PF (6 mo and older Fluzone, Flulaval, Fluarix, and 3 yrs and older Afluria) 10/27/2017, 09/23/2019, 10/02/2020    Influenza, Loletta Flatten, Recombinant, IM PF (Flublok 18 yrs and older) 10/03/2018    Pneumococcal Conjugate 13-valent ([id number]) 09/11/2015    Pneumococcal Conjugate Vaccine 08/15/2017    Pneumococcal Polysaccharide ([id number]) 08/01/2007, 12/19/2012    Tdap (Boostrix, Adacel) 08/19/2014           Orders Placed This Encounter   Procedures    Protime-INR     This external order was created through the results console. No orders of the defined types were placed in this encounter. Reviewed AVS with patient / caregiver.     Billing Points:  0 billing points this visit       CLINICAL PHARMACY CONSULT: MED RECONCILIATION/REVIEW ADDENDUM    For Pharmacy Admin Tracking Only     Intervention Detail:    Total # of Interventions Recommended: 0   Total # of Interventions Accepted: 0   Time Spent (min): 15

## 2022-03-25 ENCOUNTER — TELEPHONE (OUTPATIENT)
Dept: PHARMACY | Age: 62
End: 2022-03-25

## 2022-03-25 NOTE — TELEPHONE ENCOUNTER
Call from Nancy Bennett. Has begun methylprednisolone 4 mg BID times 5 days. Instructed him to change his warfarin dosing to: Warfarin 2.5 mg on 3-25 and 3-26-22  Hold warfarin on 3-27-22  Warfarin 5 mg on 3-28-22  Warfarin 2.5 mg on 3-29-22    Then continue routine warfarin weekly dosing. Lazarus Campbell verbalized understanding of above instructions. Cindi William [facility], [address] [facility]  Anticoagulation Clinic  [address] [place] [address] Drive.   Abner Michael 24 [phone]

## 2022-03-29 ENCOUNTER — NURSE ONLY (OUTPATIENT)
Dept: CARDIOLOGY CLINIC | Age: 62
End: 2022-03-29
Payer: COMMERCIAL

## 2022-03-29 DIAGNOSIS — I50.23 ACUTE ON CHRONIC SYSTOLIC HEART FAILURE (HCC): ICD-10-CM

## 2022-03-29 DIAGNOSIS — I44.7 LBBB (LEFT BUNDLE BRANCH BLOCK): ICD-10-CM

## 2022-03-29 DIAGNOSIS — Z95.810 BIVENTRICULAR ICD (IMPLANTABLE CARDIOVERTER-DEFIBRILLATOR) IN PLACE: ICD-10-CM

## 2022-03-29 DIAGNOSIS — I42.8 NICM (NONISCHEMIC CARDIOMYOPATHY) (HCC): ICD-10-CM

## 2022-03-29 DIAGNOSIS — I48.0 PAROXYSMAL ATRIAL FIBRILLATION (HCC): ICD-10-CM

## 2022-03-29 PROCEDURE — 93296 REM INTERROG EVL PM/IDS: CPT | Performed by: INTERNAL MEDICINE

## 2022-03-29 PROCEDURE — 93297 REM INTERROG DEV EVAL ICPMS: CPT | Performed by: INTERNAL MEDICINE

## 2022-03-29 PROCEDURE — 93295 DEV INTERROG REMOTE 1/2/MLT: CPT | Performed by: INTERNAL MEDICINE

## 2022-03-29 NOTE — PROGRESS NOTES
We received remote transmission from patient's CRT-D monitor at home (programmed VVI RV @ 40bpm per 11.2017 specialty comments). Transmission shows normal sensing and pacing function. ST/AT/SVT noted (Toprol XL, warfarin). Thoracic impedance is slightly below reference line w upward trend noted. EP physician will review. See interrogation under cardiology tab in the [address] [place] [address] [address] field for more details. Will continue to monitor remotely.

## 2022-04-12 ENCOUNTER — ANTI-COAG VISIT (OUTPATIENT)
Dept: PHARMACY | Age: 62
End: 2022-04-12
Payer: COMMERCIAL

## 2022-04-12 DIAGNOSIS — I27.82 CHRONIC PULMONARY EMBOLISM, UNSPECIFIED PULMONARY EMBOLISM TYPE, UNSPECIFIED WHETHER ACUTE COR PULMONALE PRESENT (HCC): Primary | ICD-10-CM

## 2022-04-12 DIAGNOSIS — Z79.01 CHRONIC ANTICOAGULATION: ICD-10-CM

## 2022-04-12 LAB — INTERNATIONAL NORMALIZATION RATIO, POC: 2.9

## 2022-04-12 PROCEDURE — 85610 PROTHROMBIN TIME: CPT

## 2022-04-12 PROCEDURE — 99211 OFF/OP EST MAY X REQ PHY/QHP: CPT

## 2022-04-12 NOTE — PROGRESS NOTES
Viridiana Whitaker is a 57 y.o. here for warfarin management. Melba Arreaga had an INR test today. Results were reviewed and appropriate warfarin management was completed. This visit was performed as: An in person visit. Protocols were followed with precautions to reduce the spread of COVID-19. Patient verifies current dosing regimen: Yes     Warfarin medication reviewed and updated on the patient 's home medication list: Yes   All other medications reviewed and updated on the patient 's home medication list: No: no changes     Lab Results   Component Value Date    INR 2.9 2022    INR 3.00 2022    INR 2.90 2022       Patient Findings     Negatives:  Signs/symptoms of thrombosis, Signs/symptoms of bleeding, Change in medications, Change in diet/appetite, Bruising          Anticoagulation Summary  As of 2022    INR goal:  2.0-3.0   TTR:  36.4 % (6.5 y)   INR used for dosin.9 (2022)   Warfarin maintenance plan:  2.5 mg (5 mg x 0.5) every Sun, Tue, Thu; 5 mg (5 mg x 1) all other days   Weekly warfarin total:  27.5 mg   Plan last modified:  Radha Gage [facility] (10/21/2021)   Next INR check:  5/3/2022   Priority:  Maintenance   Target end date: Indefinite    Indications    Pulmonary embolus (HCC) [I26.99]  Chronic anticoagulation [Z79.01]             Anticoagulation Episode Summary     INR check location:  Anticoagulation Clinic    Preferred lab:      Send INR reminders to:  [facility]    Comments:  EPIC - finger stick every 2-3 weeks        Anticoagulation Care Providers     Provider Role Specialty Phone number    Ok Ozuna MD Referring Internal Medicine [phone]          Warfarin assessment / plan:     Appears well. No changes. No acute findings. No change to warfarin therapy today.      Description    CONTINUE: Warfarin 5mg daily EXCEPT 2.5mg every , Tuesday and Thursday    Call [phone] with signs or symptoms of bleeding or ANY medication changes (including antibiotics, steroids, over-the-counter medications or herbal supplements). If significant bleeding occurs or if you fall and hit your head, please seek immediate medical attention. Keep the number of servings of vitamin K containing foods (dark green, leafy vegetables) the same each week. Please call if this changes. Limit alcohol intake. Please call if this changes. Immunization History   Administered Date(s) Administered    COVID-19, Jaiden So, Primary or Immunocompromised, PF, 100mcg/0.5mL 03/09/2021, 04/08/2021, 12/15/2021    Influenza, MDCK Quadv, IM, PF (Flucelvax 2 yrs and older) 10/26/2021    Influenza, Quadv, IM, PF (6 mo and older Fluzone, Flulaval, Fluarix, and 3 yrs and older Afluria) 10/27/2017, 09/23/2019, 10/02/2020    Influenza, Kylah Spies, Recombinant, IM PF (Flublok 18 yrs and older) 10/03/2018    Pneumococcal Conjugate 13-valent ([id number]) 09/11/2015    Pneumococcal Conjugate Vaccine 08/15/2017    Pneumococcal Polysaccharide ([id number]) 08/01/2007, 12/19/2012    Tdap (Boostrix, Adacel) 08/19/2014           Orders Placed This Encounter   Procedures    POCT INR     This external order was created through the results console. No orders of the defined types were placed in this encounter. Reviewed AVS with patient / caregiver.     Billing Points:  0 billing points this visit       CLINICAL PHARMACY CONSULT: MED RECONCILIATION/REVIEW ADDENDUM    For Pharmacy Admin Tracking Only     Intervention Detail:    Total # of Interventions Recommended: 0   Total # of Interventions Accepted: 0   Time Spent (min): 10

## 2022-04-13 ENCOUNTER — TELEPHONE (OUTPATIENT)
Dept: PHARMACY | Age: 62
End: 2022-04-13

## 2022-04-13 NOTE — TELEPHONE ENCOUNTER
We received a call from the office of nurse practitioner, [facility], informing us that Raiza Serna was prescribed a course of amoxicillin and Phenergan. Raiza Serna saw us yesterday and had an INR of 2.9. He has had several courses of amoxicillin in the past with little effect on his INR. I placed a call to Raiza Serna to discuss his warfarin dosing. He denies any fever or diarrhea at this time. We will have him take 2.5mg of warfarin today, instead of his usual 5mg dose. He will then resume his previous dosing. He was instructed to call with any issues.     [address]  Anticoagulation Service  [phone]

## 2022-04-19 ENCOUNTER — HOSPITAL ENCOUNTER (OUTPATIENT)
Dept: VASCULAR LAB | Age: 62
Discharge: HOME | End: 2022-04-19
Payer: COMMERCIAL

## 2022-04-19 DIAGNOSIS — Z86.711 HISTORY OF PULMONARY EMBOLISM: ICD-10-CM

## 2022-04-19 DIAGNOSIS — M79.661 RIGHT CALF PAIN: ICD-10-CM

## 2022-04-19 PROCEDURE — 93971 EXTREMITY STUDY: CPT

## 2022-04-20 ENCOUNTER — TELEPHONE (OUTPATIENT)
Dept: PHARMACY | Age: 62
End: 2022-04-20

## 2022-04-20 NOTE — TELEPHONE ENCOUNTER
Patient called, started prednisone on 4/19 for 9 days. Anticipate an interaction with Warfarin. Advised patient to decrease warfarin to 2.5mg daily while taking prednisone and follow up 4/27.     Stephanie Ruggiero, PharmD    For Pharmacy Admin Tracking Only     Intervention Detail: Dose Adjustment: 1, reason: Interaction   Total # of Interventions Recommended: 1   Total # of Interventions Accepted: 1   Time Spent (min): 10

## 2022-04-21 ENCOUNTER — HOSPITAL ENCOUNTER (OUTPATIENT)
Age: 62
Discharge: HOME | DRG: 660 | End: 2022-04-21
Payer: COMMERCIAL

## 2022-04-21 ENCOUNTER — HOSPITAL ENCOUNTER (OUTPATIENT)
Dept: GENERAL RADIOLOGY | Age: 62
Discharge: HOME | DRG: 660 | End: 2022-04-21
Payer: COMMERCIAL

## 2022-04-21 DIAGNOSIS — R39.82 CHRONIC BLADDER PAIN: ICD-10-CM

## 2022-04-21 PROCEDURE — 74018 RADEX ABDOMEN 1 VIEW: CPT

## 2022-04-22 ENCOUNTER — TELEPHONE (OUTPATIENT)
Dept: PHARMACY | Age: 62
End: 2022-04-22

## 2022-04-24 ENCOUNTER — APPOINTMENT (OUTPATIENT)
Dept: CT IMAGING | Age: 62
DRG: 660 | End: 2022-04-24
Payer: COMMERCIAL

## 2022-04-24 ENCOUNTER — HOSPITAL ENCOUNTER (INPATIENT)
Age: 62
LOS: 2 days | Discharge: HOME | DRG: 660 | End: 2022-04-26
Attending: INTERNAL MEDICINE | Admitting: INTERNAL MEDICINE
Payer: COMMERCIAL

## 2022-04-24 DIAGNOSIS — N30.01 ACUTE CYSTITIS WITH HEMATURIA: ICD-10-CM

## 2022-04-24 DIAGNOSIS — N23 URETERAL COLIC: ICD-10-CM

## 2022-04-24 DIAGNOSIS — N20.1 URETERIC STONE: Primary | ICD-10-CM

## 2022-04-24 DIAGNOSIS — R10.9 FLANK PAIN: ICD-10-CM

## 2022-04-24 PROBLEM — N20.0 CALCULOUS PYELONEPHRITIS: Status: ACTIVE | Noted: 2022-04-24

## 2022-04-24 LAB
ANION GAP SERPL CALCULATED.3IONS-SCNC: 12 MMOL/L (ref 3–16)
BASOPHILS ABSOLUTE: 0 K/UL (ref 0–0.2)
BASOPHILS RELATIVE PERCENT: 0.3 %
BILIRUBIN URINE: NEGATIVE
BLOOD, URINE: ABNORMAL
BUN BLDV-MCNC: 15 MG/DL (ref 7–20)
CALCIUM SERPL-MCNC: 10.1 MG/DL (ref 8.3–10.6)
CHLORIDE BLD-SCNC: 103 MMOL/L (ref 99–110)
CLARITY: CLEAR
CO2: 25 MMOL/L (ref 21–32)
COLOR: ABNORMAL
CREAT SERPL-MCNC: 1.1 MG/DL (ref 0.8–1.3)
EOSINOPHILS ABSOLUTE: 0 K/UL (ref 0–0.6)
EOSINOPHILS RELATIVE PERCENT: 0.4 %
EPITHELIAL CELLS, UA: 1 /HPF (ref 0–5)
GFR AFRICAN AMERICAN: >60
GFR NON-AFRICAN AMERICAN: >60
GLUCOSE BLD-MCNC: 99 MG/DL (ref 70–99)
GLUCOSE URINE: NEGATIVE MG/DL
HCT VFR BLD CALC: 45.1 % (ref 40.5–52.5)
HEMOGLOBIN: 15.1 G/DL (ref 13.5–17.5)
HYALINE CASTS: 10 /LPF (ref 0–8)
KETONES, URINE: NEGATIVE MG/DL
LEUKOCYTE ESTERASE, URINE: NEGATIVE
LYMPHOCYTES ABSOLUTE: 1.9 K/UL (ref 1–5.1)
LYMPHOCYTES RELATIVE PERCENT: 30.4 %
MAGNESIUM: 1.9 MG/DL (ref 1.8–2.4)
MCH RBC QN AUTO: 30.7 PG (ref 26–34)
MCHC RBC AUTO-ENTMCNC: 33.4 G/DL (ref 31–36)
MCV RBC AUTO: 91.9 FL (ref 80–100)
MICROSCOPIC EXAMINATION: YES
MONOCYTES ABSOLUTE: 0.7 K/UL (ref 0–1.3)
MONOCYTES RELATIVE PERCENT: 11.5 %
NEUTROPHILS ABSOLUTE: 3.7 K/UL (ref 1.7–7.7)
NEUTROPHILS RELATIVE PERCENT: 57.4 %
NITRITE, URINE: POSITIVE
PDW BLD-RTO: 14 % (ref 12.4–15.4)
PH UA: 6 (ref 5–8)
PLATELET # BLD: 177 K/UL (ref 135–450)
PMV BLD AUTO: 6.9 FL (ref 5–10.5)
POTASSIUM REFLEX MAGNESIUM: 3.5 MMOL/L (ref 3.5–5.1)
PROTEIN UA: NEGATIVE MG/DL
RBC # BLD: 4.91 M/UL (ref 4.2–5.9)
RBC UA: 31 /HPF (ref 0–4)
SODIUM BLD-SCNC: 140 MMOL/L (ref 136–145)
SPECIFIC GRAVITY UA: 1.02 (ref 1–1.03)
URINE REFLEX TO CULTURE: ABNORMAL
URINE TYPE: ABNORMAL
UROBILINOGEN, URINE: 1 E.U./DL
WBC # BLD: 6.4 K/UL (ref 4–11)
WBC UA: 2 /HPF (ref 0–5)

## 2022-04-24 PROCEDURE — 6370000000 HC RX 637 (ALT 250 FOR IP): Performed by: INTERNAL MEDICINE

## 2022-04-24 PROCEDURE — 94640 AIRWAY INHALATION TREATMENT: CPT

## 2022-04-24 PROCEDURE — 1200000000 HC SEMI PRIVATE

## 2022-04-24 PROCEDURE — 2580000003 HC RX 258: Performed by: PHYSICIAN ASSISTANT

## 2022-04-24 PROCEDURE — 2580000003 HC RX 258: Performed by: INTERNAL MEDICINE

## 2022-04-24 PROCEDURE — 80048 BASIC METABOLIC PNL TOTAL CA: CPT

## 2022-04-24 PROCEDURE — 85025 COMPLETE CBC W/AUTO DIFF WBC: CPT

## 2022-04-24 PROCEDURE — 74176 CT ABD & PELVIS W/O CONTRAST: CPT

## 2022-04-24 PROCEDURE — 99223 1ST HOSP IP/OBS HIGH 75: CPT | Performed by: INTERNAL MEDICINE

## 2022-04-24 PROCEDURE — 83735 ASSAY OF MAGNESIUM: CPT

## 2022-04-24 PROCEDURE — 81001 URINALYSIS AUTO W/SCOPE: CPT

## 2022-04-24 PROCEDURE — 94760 N-INVAS EAR/PLS OXIMETRY 1: CPT

## 2022-04-24 PROCEDURE — 96375 TX/PRO/DX INJ NEW DRUG ADDON: CPT

## 2022-04-24 PROCEDURE — 99285 EMERGENCY DEPT VISIT HI MDM: CPT

## 2022-04-24 PROCEDURE — 6360000002 HC RX W HCPCS: Performed by: INTERNAL MEDICINE

## 2022-04-24 PROCEDURE — 6360000002 HC RX W HCPCS: Performed by: PHYSICIAN ASSISTANT

## 2022-04-24 PROCEDURE — 96374 THER/PROPH/DIAG INJ IV PUSH: CPT

## 2022-04-24 PROCEDURE — 36415 COLL VENOUS BLD VENIPUNCTURE: CPT

## 2022-04-24 RX ORDER — SODIUM CHLORIDE 9 MG/ML
INJECTION, SOLUTION INTRAVENOUS PRN
Status: DISCONTINUED | OUTPATIENT
Start: 2022-04-24 | End: 2022-04-26 | Stop reason: HOSPADM

## 2022-04-24 RX ORDER — POLYETHYLENE GLYCOL 3350 17 G/17G
17 POWDER, FOR SOLUTION ORAL NIGHTLY
Status: DISCONTINUED | OUTPATIENT
Start: 2022-04-24 | End: 2022-04-26 | Stop reason: HOSPADM

## 2022-04-24 RX ORDER — PANTOPRAZOLE SODIUM 40 MG/1
40 TABLET, DELAYED RELEASE ORAL
Status: DISCONTINUED | OUTPATIENT
Start: 2022-04-25 | End: 2022-04-24 | Stop reason: SDUPTHER

## 2022-04-24 RX ORDER — WARFARIN SODIUM 5 MG/1
5 TABLET ORAL DAILY
Status: DISCONTINUED | OUTPATIENT
Start: 2022-04-24 | End: 2022-04-24 | Stop reason: DRUGHIGH

## 2022-04-24 RX ORDER — SODIUM CHLORIDE 0.9 % (FLUSH) 0.9 %
5-40 SYRINGE (ML) INJECTION EVERY 12 HOURS SCHEDULED
Status: DISCONTINUED | OUTPATIENT
Start: 2022-04-24 | End: 2022-04-26 | Stop reason: HOSPADM

## 2022-04-24 RX ORDER — AZELASTINE 1 MG/ML
1 SPRAY, METERED NASAL 2 TIMES DAILY
Status: DISCONTINUED | OUTPATIENT
Start: 2022-04-24 | End: 2022-04-25

## 2022-04-24 RX ORDER — ACETAMINOPHEN 650 MG/1
650 SUPPOSITORY RECTAL EVERY 6 HOURS PRN
Status: DISCONTINUED | OUTPATIENT
Start: 2022-04-24 | End: 2022-04-26 | Stop reason: HOSPADM

## 2022-04-24 RX ORDER — LORATADINE 10 MG/1
10 TABLET ORAL DAILY
Status: DISCONTINUED | OUTPATIENT
Start: 2022-04-24 | End: 2022-04-24 | Stop reason: CLARIF

## 2022-04-24 RX ORDER — LORATADINE 10 MG/1
10 TABLET ORAL DAILY
COMMUNITY
End: 2022-09-01

## 2022-04-24 RX ORDER — PANTOPRAZOLE SODIUM 40 MG/1
40 TABLET, DELAYED RELEASE ORAL
Status: DISCONTINUED | OUTPATIENT
Start: 2022-04-25 | End: 2022-04-24 | Stop reason: CLARIF

## 2022-04-24 RX ORDER — 0.9 % SODIUM CHLORIDE 0.9 %
1000 INTRAVENOUS SOLUTION INTRAVENOUS ONCE
Status: COMPLETED | OUTPATIENT
Start: 2022-04-24 | End: 2022-04-24

## 2022-04-24 RX ORDER — ESOMEPRAZOLE MAGNESIUM 40 MG/1
40 CAPSULE, DELAYED RELEASE ORAL
Status: DISCONTINUED | OUTPATIENT
Start: 2022-04-25 | End: 2022-04-24 | Stop reason: CLARIF

## 2022-04-24 RX ORDER — PREGABALIN 75 MG/1
75 CAPSULE ORAL 3 TIMES DAILY
Status: DISCONTINUED | OUTPATIENT
Start: 2022-04-24 | End: 2022-04-25

## 2022-04-24 RX ORDER — KETOROLAC TROMETHAMINE 30 MG/ML
15 INJECTION, SOLUTION INTRAMUSCULAR; INTRAVENOUS ONCE
Status: COMPLETED | OUTPATIENT
Start: 2022-04-24 | End: 2022-04-24

## 2022-04-24 RX ORDER — ZOLPIDEM TARTRATE 5 MG/1
10 TABLET ORAL NIGHTLY PRN
Status: DISCONTINUED | OUTPATIENT
Start: 2022-04-24 | End: 2022-04-26 | Stop reason: HOSPADM

## 2022-04-24 RX ORDER — POLYETHYLENE GLYCOL 3350 17 G/17G
17 POWDER, FOR SOLUTION ORAL DAILY PRN
Status: DISCONTINUED | OUTPATIENT
Start: 2022-04-24 | End: 2022-04-24 | Stop reason: SDUPTHER

## 2022-04-24 RX ORDER — METHOCARBAMOL 500 MG/1
500 TABLET, FILM COATED ORAL 4 TIMES DAILY PRN
Status: DISCONTINUED | OUTPATIENT
Start: 2022-04-24 | End: 2022-04-26 | Stop reason: HOSPADM

## 2022-04-24 RX ORDER — ONDANSETRON 4 MG/1
4 TABLET, ORALLY DISINTEGRATING ORAL EVERY 8 HOURS PRN
Status: DISCONTINUED | OUTPATIENT
Start: 2022-04-24 | End: 2022-04-26 | Stop reason: HOSPADM

## 2022-04-24 RX ORDER — TAMSULOSIN HYDROCHLORIDE 0.4 MG/1
0.4 CAPSULE ORAL 2 TIMES DAILY
Status: DISCONTINUED | OUTPATIENT
Start: 2022-04-24 | End: 2022-04-26 | Stop reason: HOSPADM

## 2022-04-24 RX ORDER — ONDANSETRON 2 MG/ML
4 INJECTION INTRAMUSCULAR; INTRAVENOUS EVERY 6 HOURS PRN
Status: DISCONTINUED | OUTPATIENT
Start: 2022-04-24 | End: 2022-04-26 | Stop reason: HOSPADM

## 2022-04-24 RX ORDER — GUAIFENESIN 600 MG/1
600 TABLET, EXTENDED RELEASE ORAL 2 TIMES DAILY
Status: DISCONTINUED | OUTPATIENT
Start: 2022-04-24 | End: 2022-04-26 | Stop reason: HOSPADM

## 2022-04-24 RX ORDER — FLAVOXATE HYDROCHLORIDE 100 MG/1
100 TABLET ORAL 3 TIMES DAILY PRN
Status: DISCONTINUED | OUTPATIENT
Start: 2022-04-24 | End: 2022-04-26 | Stop reason: HOSPADM

## 2022-04-24 RX ORDER — METOPROLOL SUCCINATE 50 MG/1
50 TABLET, EXTENDED RELEASE ORAL DAILY
Status: DISCONTINUED | OUTPATIENT
Start: 2022-04-25 | End: 2022-04-26 | Stop reason: HOSPADM

## 2022-04-24 RX ORDER — ACETAMINOPHEN 325 MG/1
650 TABLET ORAL EVERY 6 HOURS PRN
Status: DISCONTINUED | OUTPATIENT
Start: 2022-04-24 | End: 2022-04-26 | Stop reason: HOSPADM

## 2022-04-24 RX ORDER — SODIUM CHLORIDE 9 MG/ML
INJECTION, SOLUTION INTRAVENOUS CONTINUOUS
Status: DISCONTINUED | OUTPATIENT
Start: 2022-04-24 | End: 2022-04-26 | Stop reason: HOSPADM

## 2022-04-24 RX ORDER — ESOMEPRAZOLE MAGNESIUM 40 MG/1
40 CAPSULE, DELAYED RELEASE ORAL
Status: DISCONTINUED | OUTPATIENT
Start: 2022-04-25 | End: 2022-04-24

## 2022-04-24 RX ORDER — ALBUTEROL SULFATE 2.5 MG/3ML
2.5 SOLUTION RESPIRATORY (INHALATION) EVERY 4 HOURS PRN
Status: DISCONTINUED | OUTPATIENT
Start: 2022-04-24 | End: 2022-04-24

## 2022-04-24 RX ORDER — LORATADINE 10 MG/1
10 TABLET ORAL DAILY
Status: DISCONTINUED | OUTPATIENT
Start: 2022-04-24 | End: 2022-04-26 | Stop reason: HOSPADM

## 2022-04-24 RX ORDER — PRAVASTATIN SODIUM 40 MG
40 TABLET ORAL NIGHTLY
Status: DISCONTINUED | OUTPATIENT
Start: 2022-04-24 | End: 2022-04-26 | Stop reason: HOSPADM

## 2022-04-24 RX ORDER — SODIUM CHLORIDE 0.9 % (FLUSH) 0.9 %
5-40 SYRINGE (ML) INJECTION PRN
Status: DISCONTINUED | OUTPATIENT
Start: 2022-04-24 | End: 2022-04-26 | Stop reason: HOSPADM

## 2022-04-24 RX ORDER — ENOXAPARIN SODIUM 100 MG/ML
30 INJECTION SUBCUTANEOUS 2 TIMES DAILY
Status: DISCONTINUED | OUTPATIENT
Start: 2022-04-24 | End: 2022-04-24

## 2022-04-24 RX ORDER — CETIRIZINE HYDROCHLORIDE 10 MG/1
10 TABLET ORAL DAILY
Status: DISCONTINUED | OUTPATIENT
Start: 2022-04-24 | End: 2022-04-24 | Stop reason: CLARIF

## 2022-04-24 RX ORDER — ESOMEPRAZOLE MAGNESIUM 40 MG/1
40 FOR SUSPENSION ORAL DAILY
Status: ON HOLD | COMMUNITY
End: 2022-04-25

## 2022-04-24 RX ORDER — LEVOFLOXACIN 500 MG/1
500 TABLET, FILM COATED ORAL DAILY
Status: ON HOLD | COMMUNITY
Start: 2022-04-21 | End: 2022-04-26 | Stop reason: HOSPADM

## 2022-04-24 RX ORDER — ONDANSETRON 2 MG/ML
4 INJECTION INTRAMUSCULAR; INTRAVENOUS ONCE
Status: COMPLETED | OUTPATIENT
Start: 2022-04-24 | End: 2022-04-24

## 2022-04-24 RX ORDER — SODIUM CHLORIDE FOR INHALATION 3 %
4 VIAL, NEBULIZER (ML) INHALATION EVERY 8 HOURS PRN
Status: DISCONTINUED | OUTPATIENT
Start: 2022-04-24 | End: 2022-04-26 | Stop reason: HOSPADM

## 2022-04-24 RX ORDER — METHOCARBAMOL 500 MG/1
500 TABLET, FILM COATED ORAL 4 TIMES DAILY PRN
COMMUNITY

## 2022-04-24 RX ADMIN — ONDANSETRON 4 MG: 2 INJECTION INTRAMUSCULAR; INTRAVENOUS at 17:34

## 2022-04-24 RX ADMIN — BENZOCAINE AND MENTHOL 1 LOZENGE: 15; 3.6 LOZENGE ORAL at 15:53

## 2022-04-24 RX ADMIN — ONDANSETRON 4 MG: 2 INJECTION INTRAMUSCULAR; INTRAVENOUS at 12:01

## 2022-04-24 RX ADMIN — HYDROMORPHONE HYDROCHLORIDE 1 MG: 1 INJECTION, SOLUTION INTRAMUSCULAR; INTRAVENOUS; SUBCUTANEOUS at 12:59

## 2022-04-24 RX ADMIN — AZELASTINE HYDROCHLORIDE 1 SPRAY: 137 SPRAY, METERED NASAL at 21:07

## 2022-04-24 RX ADMIN — LORATADINE 10 MG: 10 TABLET ORAL at 17:37

## 2022-04-24 RX ADMIN — SODIUM CHLORIDE 1000 ML: 9 INJECTION, SOLUTION INTRAVENOUS at 12:05

## 2022-04-24 RX ADMIN — ALBUTEROL SULFATE 2.5 MG: 2.5 SOLUTION RESPIRATORY (INHALATION) at 21:40

## 2022-04-24 RX ADMIN — SODIUM CHLORIDE, PRESERVATIVE FREE 10 ML: 5 INJECTION INTRAVENOUS at 23:37

## 2022-04-24 RX ADMIN — KETOROLAC TROMETHAMINE 15 MG: 30 INJECTION, SOLUTION INTRAMUSCULAR at 12:02

## 2022-04-24 RX ADMIN — PRAVASTATIN SODIUM 40 MG: 40 TABLET ORAL at 21:06

## 2022-04-24 RX ADMIN — ONDANSETRON 4 MG: 2 INJECTION INTRAMUSCULAR; INTRAVENOUS at 23:35

## 2022-04-24 RX ADMIN — HYDROMORPHONE HYDROCHLORIDE 1 MG: 1 INJECTION, SOLUTION INTRAMUSCULAR; INTRAVENOUS; SUBCUTANEOUS at 18:54

## 2022-04-24 RX ADMIN — HYDROMORPHONE HYDROCHLORIDE 1 MG: 1 INJECTION, SOLUTION INTRAMUSCULAR; INTRAVENOUS; SUBCUTANEOUS at 15:54

## 2022-04-24 RX ADMIN — TIOTROPIUM BROMIDE AND OLODATEROL 2 PUFF: 3.124; 2.736 SPRAY, METERED RESPIRATORY (INHALATION) at 16:38

## 2022-04-24 RX ADMIN — TAMSULOSIN HYDROCHLORIDE 0.4 MG: 0.4 CAPSULE ORAL at 23:35

## 2022-04-24 RX ADMIN — SACUBITRIL AND VALSARTAN 1 TABLET: 49; 51 TABLET, FILM COATED ORAL at 21:06

## 2022-04-24 RX ADMIN — SODIUM CHLORIDE: 9 INJECTION, SOLUTION INTRAVENOUS at 14:50

## 2022-04-24 RX ADMIN — CEFTRIAXONE 1000 MG: 1 INJECTION, POWDER, FOR SOLUTION INTRAMUSCULAR; INTRAVENOUS at 13:00

## 2022-04-24 ASSESSMENT — ENCOUNTER SYMPTOMS
ABDOMINAL PAIN: 1
BACK PAIN: 0
SORE THROAT: 0
NAUSEA: 1
VOMITING: 0
SHORTNESS OF BREATH: 0

## 2022-04-24 ASSESSMENT — PAIN DESCRIPTION - LOCATION
LOCATION: FLANK
LOCATION: FLANK
LOCATION: ABDOMEN
LOCATION: FLANK

## 2022-04-24 ASSESSMENT — PAIN DESCRIPTION - ORIENTATION
ORIENTATION: RIGHT
ORIENTATION: RIGHT
ORIENTATION: RIGHT;LOWER
ORIENTATION: RIGHT

## 2022-04-24 ASSESSMENT — PAIN SCALES - GENERAL
PAINLEVEL_OUTOF10: 8
PAINLEVEL_OUTOF10: 6
PAINLEVEL_OUTOF10: 8
PAINLEVEL_OUTOF10: 9
PAINLEVEL_OUTOF10: 5
PAINLEVEL_OUTOF10: 0
PAINLEVEL_OUTOF10: 9
PAINLEVEL_OUTOF10: 0

## 2022-04-24 ASSESSMENT — PAIN SCALES - WONG BAKER: WONGBAKER_NUMERICALRESPONSE: 0

## 2022-04-24 ASSESSMENT — PAIN DESCRIPTION - DESCRIPTORS
DESCRIPTORS: SHARP;SHOOTING;STABBING
DESCRIPTORS: SHOOTING;SHARP;STABBING
DESCRIPTORS: SHOOTING;SHARP;STABBING
DESCRIPTORS: SHARP

## 2022-04-24 ASSESSMENT — PAIN DESCRIPTION - PAIN TYPE
TYPE: ACUTE PAIN
TYPE: ACUTE PAIN

## 2022-04-24 ASSESSMENT — PAIN DESCRIPTION - FREQUENCY
FREQUENCY: CONTINUOUS
FREQUENCY: CONTINUOUS

## 2022-04-24 NOTE — ED NOTES
Pt. to be admitted to [facility] room 4128. Report called to BARBARA Yi and transport to be initiated.       Lisbeth Alaniz RN  04/24/22 0539

## 2022-04-24 NOTE — H&P
[address] [address]                              HISTORY AND PHYSICAL    PATIENT NAME: Rosendo Stone                        :        1960  MED REC NO:   [id number]                          ROOM:       4128  ACCOUNT NO:   [de-identified]                           ADMIT DATE: 2022  PROVIDER:     Flavio Brown MD    CHIEF COMPLAINT:  Right ureteral stone with pyelonephritis. HISTORY OF PRESENT ILLNESS:  The patient is a 69-year-old white male  admitted through emergency. He presented with a history of progressive  right flank pain over the past several days. He describes it as being  in the right flank radiating down in the testicle area and consistent  with his usual ureteral colic. He said this has been going on for  approximately three to four days. He describes the pain as a 9 out of  10. He also has some urinary frequency and dysuria. He saw Dr. Adonis Vang,  his urologist, who started him on Levaquin. He presented to the ER  complaining of persisting pain, hematuria, and generalized weakness. He  does have an extensive history of kidney stones and he has had at least  30 to 36 of them, some requiring stents in the past.  He was admitted at  this time because the CT scan showed a right ureteral stone with some  hydronephrosis. There was some swelling of the kidney, questionably  complicating pyelonephritis and a urine that showed nitrite positive  suggesting infection. PAST MEDICAL HISTORY:  Extensive, includes bronchiolitis obliterans,  nonischemic cardiomyopathy, gastroesophageal reflux disease, history of  DVT and pulmonary embolism on warfarin therapy, recurrent ureteral  stones, and a prior history of pulmonary embolism in .     PAST SURGICAL HISTORY:  Includes cholecystectomy, cystoscopy with stent  placement in the past, hernia repair, lithotripsy, pacemaker placement,  sinus surgery, and multiple cystoscopy and ureteral stents. MEDICATIONS:  His medications on admission included albuterol via  inhaler, aspirin 81 mg daily, vitamin D 50,000 units once a week,  Dexilant 60 mg daily, Entresto 49/51 one b.i.d., Lasix 20 mg p.r.n.,  melatonin 3 mg at bedtime, metoprolol XL 25 b.i.d., Miralax one packet  daily, pravastatin 40 mg daily, Lyrica one tablet in the morning, two  tablets in the evening 75 mg tabs, and Ambien 10 mg at bedtime. ALLERGIES:  His allergies are multiple including DOXYCYCLINE, SULFA  ANTIBIOTICS, NORCO, ATROVENT NASAL SPRAY, MORPHINE, and NITROGLYCERIN. SOCIAL HISTORY:  He is not a smoker or drinker. FAMILY HISTORY:  Noncontributory. REVIEW OF SYSTEMS:  Positive for dysuria. Positive for right flank  pain. Positive for hematuria. Positive for weakness. Ten-point review  of systems otherwise performed and was negative. PHYSICAL EXAMINATION:  VITAL SIGNS:  Blood pressure 112/71, pulse 77, respirations 14, he is  afebrile. GENERAL:  He is a well-developed, well-nourished white male, lying in  bed, complaining of some right flank pain. HEENT EXAM:  Head is normocephalic, atraumatic. Pupils are equally  round and reactive to light and accommodation. Extraocular muscles were  intact. NECK:   Supple without JVD. CHEST:  Showed bibasilar rales without wheezes. CARDIOVASCULAR EXAM:  Showed irregular rate and rhythm. ABDOMEN:  Remarkable for right CVA tenderness and some tenderness in the  right flank radiating down in the testicle. EXTREMITIES:  Showed trace of edema. NEUROLOGIC EXAM:  He is alert, oriented, moving all extremities without  focal deficits. LABORATORY DATA:  Urinalysis showed nitrite positive, negative leukocyte  esterase. CT scan of the abdomen and pelvis showed right obstructive uropathy with  a 5-mm stone in the mid to distal ureter with proximal dilatation.    There was also some associated right kidney swelling and exophytic 10-mm  protrusion on the kidney that has been there and chronic. He had  multiple stones throughout both kidneys up in the kidneys themselves. Additionally, INR is 2.9. His warfarin will be held. White count 6400,  hemoglobin 15, platelet count of 418,653. Blood sugars 99, BUN and  creatinine 15 and 1.1, sodium 140, potassium 3.5. IMPRESSION:  At the time of this dictation;  1. Calculus pyelonephritis from the right side with associated right  ureteral stone, urinary infection, swollen right kidney. 2.  Hypertension. 3.  Nonischemic cardiomyopathy. 4.  On warfarin therapy. 5.  Ureteral colic. PLAN:  He will be admitted. His warfarin will be held. He will be made  n.p.o. after midnight. Urology has been consulted to consider him for  stent placement. Also being maintained on Rocephin as it appeared he  has a urinary infection and this occurred while he was on Levaquin  therapy.         Sumi Danielson MD    D: 04/24/2022 13:54:32       T: 04/24/2022 17:42:59     [email or web address]  Job#: [id number]     Doc#: [id number]    CC:  Stephany Kendall MD

## 2022-04-24 NOTE — H&P
H&P dictated--IMP: Principal Problem:    Calculous pyelonephritis--RT ureteral stone with assoc hydronephrosis and urin infection and swollen rt kidney    Active Problems:    Essential hypertension, benign--Continue current therapy      NICM (nonischemic cardiomyopathy) (HCC)    On warfarin therapy--hold for ureeteral stent tomorrow     Ureteral colic--iv fluids/analgesics/urol consult   Resolved Problems:    * No resolved hospital problems.  *  Admit--iv fluids/iv antibx/hold wrfarin/urol consult -  Kellee Ortiz MD

## 2022-04-24 NOTE — PROGRESS NOTES
Patient arrived room 4128 from ed . Patient alert and oriented ambulating in room . Right flank pain, improved from ed post dilaudid. No nausea. Placed on tele nsr. Call light in reach, oriented to room and plan of care.

## 2022-04-24 NOTE — ED PROVIDER NOTES
[address] [facility]      Pt Name: Kesha Colin  MRN: [id number]  Griseldagfmaximiliano 1960  Date of evaluation: 4/24/2022  Provider: Lisa Su PA-C    This patient was not seen and evaluated by the attending physician Margo Sepulveda MD.    CHIEF COMPLAINT      Chief Complaint: flank pain      HISTORYOF PRESENT ILLNESS  (Location/Symptom, Timing/Onset, Context/Setting, Quality, Duration, Modifying Factors, Severity.)   Kesha Colin is a 57 y.o. male who presents to the emergency department complaining of right flank pain. The patient reports the pain started on Friday. He rates it 9 out of 10. He describes it as sharp and shooting. It radiates into his abdomen. Nothing makes it better or worse. He has not taken anything for it. Since Thursday he has had some urinary frequency, dysuria and urgency. He saw his urologist Dr. Amanda Vasquez who started him on Levaquin for UTI. Patient reports he is now having hematuria. He has an extensive history of kidney stones and says he has had over 36 of them in the past.  He has sometimes required intervention for the stones. Nursing Notes were reviewed and I agree. REVIEW OF SYSTEMS    (2-9 systems for level 4, 10 or more forlevel 5)     Review of Systems   Constitutional: Negative for chills and fever. HENT: Negative for sore throat. Respiratory: Negative for shortness of breath. Cardiovascular: Negative for chest pain. Gastrointestinal: Positive for abdominal pain and nausea. Negative for vomiting. Genitourinary: Positive for difficulty urinating, dysuria, flank pain and hematuria. Musculoskeletal: Negative for back pain. Skin: Negative for rash. Neurological: Negative for light-headedness and headaches. Psychiatric/Behavioral: The patient is not nervous/anxious. All other systems reviewed and are negative. Positives and Pertinent negatives as per HPI.   Except as noted above the remainder of the review of systems was reviewed and negative.        PAST MEDICALHISTORY         Diagnosis Date    [facility]ans [facility]     Bundle branch block, right     Cardiomyopathy ([facility]     Chest pain 9/24/2019    COVID-19 virus vaccine not available     Fibromyalgia     GERD (gastroesophageal reflux disease)     Hepatitis 1979    unsure of which type    Hx of blood clots     Hyperlipemia     Hypertension     IBS (irritable bowel syndrome)     Kidney stone     over thirty kidney stones    Neuropathy     right side-chest    Pneumothorax 2011    Right    Prostatitis     Pulmonary embolism on right [facility]) 2011    upper lobe-coumadin 2011    Sacroiliitis [facility])        SURGICAL HISTORY           Procedure Laterality Date    CHOLECYSTECTOMY  2007    COLONOSCOPY  9/21/2012    dr Cervantes Res  05/17/2019    RIGHT SIDED URETEROSCOPY  Diagnostic, retrograde pyelogram and fulguration of previously resected bladder tumor base    CYSTOSCOPY Right 5/17/2019    RIGHT SIDED URETEROSCOPY FLUGERATION  OF BLADDER performed by Blanco Cabral MD at [address]. Right 7/2/2021    CYSTOURETHROSCOPY WITH RIGHT RETROGRADE PYELOGRAPHY AND PLACEMENT OF RIGHT DOUBLE J STENT performed by Candice Park DO at [facility]  2015    LITHOTRIPSY     [facility] in sinuses    UPPER GASTROINTESTINAL ENDOSCOPY  3/28/2014    dr Lemus Line N/A 2/1/2021    EGD BIOPSY performed by Carmelita Waddell MD at [address] [facility]       Previous Medications    ALBUTEROL (PROVENTIL) (2.5 MG/3ML) 0.083% NEBULIZER SOLUTION    Take 3 mLs by nebulization every 4 hours as needed for Wheezing or Shortness of Breath    ALBUTEROL SULFATE  (90 BASE) MCG/ACT INHALER    INHALE 2 PUFFS INTO THE LUNGS EVERY 4 HOURS AS NEEDED FOR WHEEZING OR SHORTNESS OF BREATH    ASPIRIN 81 MG TABLET    Take 81 mg by mouth daily    AZELASTINE (ASTELIN) 0.1 % NASAL SPRAY    2 sprays by Nasal route 2 times daily Use in each nostril as directed    CHOLECALCIFEROL (VITAMIN D3) 1.25 MG (55885 UT) CAPS    Take 1 capsule by mouth once a week    DEXLANSOPRAZOLE (DEXILANT) 60 MG CPDR DELAYED RELEASE CAPSULE    Take 1 capsule by mouth daily    DICYCLOMINE (BENTYL) 10 MG CAPSULE    TAKE ONE CAPSULE BY MOUTH  FOUR TIMES DAILY AS NEEDED    ENTRESTO 49-51 MG PER TABLET    TAKE 1 TABLET BY MOUTH  TWICE DAILY    FLAVOXATE (URISPAS) 100 MG TABLET    Take 1 tablet by mouth 3 times daily as needed for Muscle spasms    FUROSEMIDE (LASIX) 20 MG TABLET    Take 1 tablet by mouth 2 times daily as needed (SOB)    GUAIFENESIN (MUCINEX) 600 MG EXTENDED RELEASE TABLET    Take 600 mg by mouth 2 times daily    LINACLOTIDE (LINZESS) 72 MCG CAPS CAPSULE    Take 1 capsule by mouth every morning (before breakfast)    MELATONIN 3 MG TABS TABLET    Take 3 mg by mouth nightly as needed Taking half tablet    METOPROLOL SUCCINATE (TOPROL XL) 25 MG EXTENDED RELEASE TABLET    TAKE 1 TABLET BY MOUTH  TWICE DAILY    ONDANSETRON (ZOFRAN) 4 MG TABLET    Take 1 tablet by mouth every 8 hours as needed for Nausea or Vomiting    POLYETHYLENE GLYCOL (MIRALAX) PACK PACKET    Take 17 g by mouth nightly     PRAVASTATIN (PRAVACHOL) 40 MG TABLET    Take 40 mg by mouth daily    PREDNISONE (DELTASONE) 10 MG TABLET    2 TABS PO BID X 3 DAYS THEN 1 PO BID X 3 DAYS THEN 1 PO DAILY UNTIL GONE    PREGABALIN (LYRICA) 75 MG CAPSULE    TAKE 1 CAPSULE BY MOUTH IN  THE MORNING AND 2 CAPSULES  IN THE EVENING    SODIUM CHLORIDE, INHALANT, 3 % NEBULIZER SOLUTION    Take 4 mLs by nebulization every 8 hours as needed for Other or Cough (cough)    STIOLTO RESPIMAT 2.5-2.5 MCG/ACT AERS    INHALE 2 PUFFS INTO THE LUNGS DAILY    TAMSULOSIN (FLOMAX) 0.4 MG CAPSULE    TAKE ONE CAPSULE BY MOUTH TWO TIMES A DAY    TRIAMCINOLONE (KENALOG) 0.1 % CREAM    Apply topically to affected areas 2 times daily. VITAMIN D (ERGOCALCIFEROL) 1.25 MG (38981 UT) CAPS CAPSULE    TAKE 1 CAPSULE BY MOUTH ONCE A WEEK    WARFARIN (COUMADIN) 5 MG TABLET    Take 1 tablet by mouth daily Except 2.5 mg Tues and Thursday    ZOLPIDEM (AMBIEN) 10 MG TABLET    TAKE 1 TABLET BY MOUTH AT  NIGHT AS NEEDED FOR SLEEP       ALLERGIES     Ipratropium bromide hfa, Atrovent nasal spray [ipratropium], Doxycycline, Morphine, Nitroglycerin, Sulfa antibiotics, and Vicodin [hydrocodone-acetaminophen]    FAMILY HISTORY           Problem Relation Age of Onset    High Blood Pressure Mother     Dementia Mother     Cancer Father         Pancreatic Ca     Family Status   Relation Name Status    Mother  Alive    Father   at age 59        cerebral anuerysm    Sister  Alive    Brother  Alive   [facility] Brother  Alive    Brother  Alive        SOCIAL HISTORY    reports that he has never smoked. He has never used smokeless tobacco. He reports that he does not drink alcohol and does not use drugs. PHYSICAL EXAM    (up to 7 for level 4, 8 or more for level 5)     ED Triage Vitals [22 1105]   BP Temp Temp Source Pulse Resp SpO2 Height Weight   112/71 97.9 °F (36.6 °C) Oral 77 14 94 % 5' 7\" (1.702 m) 223 lb 15.8 oz (101.6 kg)       Physical Exam  Vitals and nursing note reviewed. Constitutional:       General: He is not in acute distress. Appearance: He is well-developed. HENT:      Head: Normocephalic and atraumatic. Cardiovascular:      Rate and Rhythm: Normal rate and regular rhythm. Heart sounds: Normal heart sounds. Pulmonary:      Effort: Pulmonary effort is normal. No respiratory distress. Breath sounds: Normal breath sounds. Abdominal:      Palpations: Abdomen is soft. Tenderness: There is abdominal tenderness (right mid and right flank). Musculoskeletal:         General: Normal range of motion. Cervical back: Neck supple. Skin:     General: Skin is warm and dry. Neurological:      Mental Status: He is alert and oriented to person, place, and time. Psychiatric:         Behavior: Behavior normal.            DIAGNOSTIC RESULTS     When ordered, EKGs are interpreted by the Emergency Department Physician in the absence of a cardiologist. Please see their note for interpretation of EKG    RADIOLOGY:         Interpretation per the Radiologist below, if available at the time of this note:    CT ABDOMEN PELVIS WO CONTRAST Additional Contrast? None   Final Result   Right obstructive uropathy. 5 mm stone in the mid to distal right ureter   with mild proximal dilation. Bilateral nephrolithiasis also noted. LABS:  Labs Reviewed   URINALYSIS WITH REFLEX TO CULTURE - Abnormal; Notable for the following components:       Result Value    Color, UA DARK YELLOW (*)     Blood, Urine MODERATE (*)     Nitrite, Urine POSITIVE (*)     All other components within normal limits   MICROSCOPIC URINALYSIS - Abnormal; Notable for the following components:    Hyaline Casts, UA 10 (*)     RBC, UA 31 (*)     All other components within normal limits   CBC WITH AUTO DIFFERENTIAL   BASIC METABOLIC PANEL W/ REFLEX TO MG FOR LOW K   MAGNESIUM       When ordered, only abnormal lab results are displayed. All other labs are within normal range or not returned as of the dictation. EMERGENCY DEPARTMENT COURSE and DIFFERENTIAL DIAGNOSIS/MDM:   Vitals:    Vitals:    04/24/22 1206 04/24/22 1302 04/24/22 1315 04/24/22 1342   BP: 110/66 120/72 112/62    Pulse:       Resp:    16   Temp:       TempSrc:       SpO2:  94% 96% 92%   Weight:       Height:            I have evaluated this patient. My supervising physician was available for consultation. The patient was nontoxic and afebrile. He was uncomfortable and had tenderness on exam.  He was initially given Toradol and Zofran without any relief. He did require Dilaudid for pain control.   His urine is nitrite positive with 31 red blood cells, 2 white blood cells. I am unable to see the urine culture that was reportedly obtained on Thursday in the office. He has a normal white blood cell count. Stable H&H. Normal electrolytes. His renal function is stable. CT showed right obstructive uropathy with a 5 mm stone in the right mid to distal ureter with mild proximal dilatation. Patient reports that his pain has not been adequately controlled. He has had multiple kidney stones in the past.  He feels that he needs to be admitted. I think this is reasonable given the associated infection. I did discuss with Dr. Glenda Ochoa. He asked that the patient be n.p.o. after midnight and will put him on the schedule for stent placement tomorrow morning. I spoke with the patient's PCP Dr. Geo Vargas who was admitted for further inpatient management      PROCEDURES:  None    FINAL IMPRESSION      1. Ureteric stone    2. Acute cystitis with hematuria    3. Flank pain          DISPOSITION/PLAN   DISPOSITION Admitted 04/24/2022 01:35:51 PM      PATIENT REFERRED TO:  No follow-up provider specified.     MEDICATIONS:  New Prescriptions    No medications on file       (Please note that portions of this note were completed with a voice recognition program.  Efforts were made toedit the dictations but occasionally words are mis-transcribed.)    RENEE Flores PA-C  04/24/22 1672

## 2022-04-24 NOTE — PROGRESS NOTES
4 Eyes Skin Assessment     NAME:  Essence Mcneill  YOB: 1960  MEDICAL RECORD NUMBER:  [id number]    The patient is being assess for  Admission    I agree that 2 RN's have performed a thorough Head to Toe Skin Assessment on the patient. ALL assessment sites listed below have been assessed. Areas assessed by both nurses:    Head, Face, Ears, Shoulders, Back, Chest, Arms, Elbows, Hands, Sacrum. Buttock, Coccyx, Ischium and Legs. Feet and Heels        Does the Patient have a Wound?  No noted wound(s)       Casimiro Prevention initiated:  NA   Wound Care Orders initiated:  NA    Pressure Injury (Stage 3,4, Unstageable, DTI, NWPT, and Complex wounds) if present place consult order under [de-identified] NA    New and Established Ostomies if present place consult order under : NA      Nurse 1 eSignature: Electronically signed by Natalia Cooley RN on 4/24/22 at 3:45 PM EDT    **SHARE this note so that the co-signing nurse is able to place an eSignature**    Nurse 2 eSignature: Electronically signed by Sharri Gil RN on 4/24/22 at 3:48 PM EDT

## 2022-04-25 ENCOUNTER — ANESTHESIA (OUTPATIENT)
Dept: OPERATING ROOM | Age: 62
DRG: 660 | End: 2022-04-25
Payer: COMMERCIAL

## 2022-04-25 ENCOUNTER — ANESTHESIA EVENT (OUTPATIENT)
Dept: OPERATING ROOM | Age: 62
DRG: 660 | End: 2022-04-25
Payer: COMMERCIAL

## 2022-04-25 ENCOUNTER — TELEPHONE (OUTPATIENT)
Dept: CARDIOLOGY CLINIC | Age: 62
End: 2022-04-25

## 2022-04-25 VITALS
SYSTOLIC BLOOD PRESSURE: 96 MMHG | RESPIRATION RATE: 10 BRPM | OXYGEN SATURATION: 98 % | DIASTOLIC BLOOD PRESSURE: 55 MMHG

## 2022-04-25 LAB
INR BLD: 1.62 (ref 0.88–1.12)
MAGNESIUM: 2 MG/DL (ref 1.8–2.4)
PROTHROMBIN TIME: 18.7 SEC (ref 9.9–12.7)

## 2022-04-25 PROCEDURE — 6370000000 HC RX 637 (ALT 250 FOR IP): Performed by: INTERNAL MEDICINE

## 2022-04-25 PROCEDURE — 3700000001 HC ADD 15 MINUTES (ANESTHESIA): Performed by: UROLOGY

## 2022-04-25 PROCEDURE — 36415 COLL VENOUS BLD VENIPUNCTURE: CPT

## 2022-04-25 PROCEDURE — 6360000002 HC RX W HCPCS: Performed by: INTERNAL MEDICINE

## 2022-04-25 PROCEDURE — 85610 PROTHROMBIN TIME: CPT

## 2022-04-25 PROCEDURE — 83735 ASSAY OF MAGNESIUM: CPT

## 2022-04-25 PROCEDURE — 2580000003 HC RX 258: Performed by: INTERNAL MEDICINE

## 2022-04-25 PROCEDURE — 2709999900 HC NON-CHARGEABLE SUPPLY: Performed by: UROLOGY

## 2022-04-25 PROCEDURE — 7100000000 HC PACU RECOVERY - FIRST 15 MIN: Performed by: UROLOGY

## 2022-04-25 PROCEDURE — 3600000002 HC SURGERY LEVEL 2 BASE: Performed by: UROLOGY

## 2022-04-25 PROCEDURE — 2060000000 HC ICU INTERMEDIATE R&B

## 2022-04-25 PROCEDURE — 6360000002 HC RX W HCPCS: Performed by: NURSE ANESTHETIST, CERTIFIED REGISTERED

## 2022-04-25 PROCEDURE — 3700000000 HC ANESTHESIA ATTENDED CARE: Performed by: UROLOGY

## 2022-04-25 PROCEDURE — 2500000003 HC RX 250 WO HCPCS: Performed by: NURSE ANESTHETIST, CERTIFIED REGISTERED

## 2022-04-25 PROCEDURE — 99233 SBSQ HOSP IP/OBS HIGH 50: CPT | Performed by: INTERNAL MEDICINE

## 2022-04-25 PROCEDURE — 0T768DZ DILATION OF RIGHT URETER WITH INTRALUMINAL DEVICE, VIA NATURAL OR ARTIFICIAL OPENING ENDOSCOPIC: ICD-10-PCS | Performed by: UROLOGY

## 2022-04-25 PROCEDURE — 6370000000 HC RX 637 (ALT 250 FOR IP): Performed by: UROLOGY

## 2022-04-25 PROCEDURE — 7100000001 HC PACU RECOVERY - ADDTL 15 MIN: Performed by: UROLOGY

## 2022-04-25 PROCEDURE — C2617 STENT, NON-COR, TEM W/O DEL: HCPCS | Performed by: UROLOGY

## 2022-04-25 PROCEDURE — C1769 GUIDE WIRE: HCPCS | Performed by: UROLOGY

## 2022-04-25 PROCEDURE — 94640 AIRWAY INHALATION TREATMENT: CPT

## 2022-04-25 PROCEDURE — 3600000012 HC SURGERY LEVEL 2 ADDTL 15MIN: Performed by: UROLOGY

## 2022-04-25 PROCEDURE — 94760 N-INVAS EAR/PLS OXIMETRY 1: CPT

## 2022-04-25 DEVICE — STENT URET 6FR L26CM PERCFLX HYDR+ TAPR TIP GRAD: Type: IMPLANTABLE DEVICE | Site: URETER | Status: FUNCTIONAL

## 2022-04-25 RX ORDER — SODIUM CHLORIDE 9 MG/ML
INJECTION, SOLUTION INTRAVENOUS PRN
Status: DISCONTINUED | OUTPATIENT
Start: 2022-04-25 | End: 2022-04-25 | Stop reason: HOSPADM

## 2022-04-25 RX ORDER — FENTANYL CITRATE 50 UG/ML
25 INJECTION, SOLUTION INTRAMUSCULAR; INTRAVENOUS EVERY 5 MIN PRN
Status: DISCONTINUED | OUTPATIENT
Start: 2022-04-25 | End: 2022-04-25 | Stop reason: HOSPADM

## 2022-04-25 RX ORDER — LIDOCAINE HYDROCHLORIDE 20 MG/ML
INJECTION, SOLUTION EPIDURAL; INFILTRATION; INTRACAUDAL; PERINEURAL PRN
Status: DISCONTINUED | OUTPATIENT
Start: 2022-04-25 | End: 2022-04-25 | Stop reason: SDUPTHER

## 2022-04-25 RX ORDER — SODIUM CHLORIDE 0.9 % (FLUSH) 0.9 %
5-40 SYRINGE (ML) INJECTION EVERY 12 HOURS SCHEDULED
Status: DISCONTINUED | OUTPATIENT
Start: 2022-04-25 | End: 2022-04-25 | Stop reason: HOSPADM

## 2022-04-25 RX ORDER — DIPHENHYDRAMINE HCL 25 MG
25 TABLET ORAL EVERY 6 HOURS PRN
Status: DISCONTINUED | OUTPATIENT
Start: 2022-04-25 | End: 2022-04-26 | Stop reason: HOSPADM

## 2022-04-25 RX ORDER — AZELASTINE 1 MG/ML
1 SPRAY, METERED NASAL NIGHTLY
Status: DISCONTINUED | OUTPATIENT
Start: 2022-04-25 | End: 2022-04-26 | Stop reason: HOSPADM

## 2022-04-25 RX ORDER — SODIUM CHLORIDE 0.9 % (FLUSH) 0.9 %
5-40 SYRINGE (ML) INJECTION PRN
Status: DISCONTINUED | OUTPATIENT
Start: 2022-04-25 | End: 2022-04-25 | Stop reason: HOSPADM

## 2022-04-25 RX ORDER — PROPOFOL 10 MG/ML
INJECTION, EMULSION INTRAVENOUS PRN
Status: DISCONTINUED | OUTPATIENT
Start: 2022-04-25 | End: 2022-04-25 | Stop reason: SDUPTHER

## 2022-04-25 RX ORDER — OXYCODONE HYDROCHLORIDE 5 MG/1
5 TABLET ORAL
Status: DISCONTINUED | OUTPATIENT
Start: 2022-04-25 | End: 2022-04-25 | Stop reason: HOSPADM

## 2022-04-25 RX ORDER — PREGABALIN 75 MG/1
75 CAPSULE ORAL DAILY
Status: DISCONTINUED | OUTPATIENT
Start: 2022-04-25 | End: 2022-04-26 | Stop reason: HOSPADM

## 2022-04-25 RX ORDER — ONDANSETRON 2 MG/ML
4 INJECTION INTRAMUSCULAR; INTRAVENOUS
Status: DISCONTINUED | OUTPATIENT
Start: 2022-04-25 | End: 2022-04-25 | Stop reason: HOSPADM

## 2022-04-25 RX ORDER — PREGABALIN 75 MG/1
150 CAPSULE ORAL NIGHTLY
Status: DISCONTINUED | OUTPATIENT
Start: 2022-04-25 | End: 2022-04-26 | Stop reason: HOSPADM

## 2022-04-25 RX ADMIN — HYDROMORPHONE HYDROCHLORIDE 1 MG: 1 INJECTION, SOLUTION INTRAMUSCULAR; INTRAVENOUS; SUBCUTANEOUS at 05:43

## 2022-04-25 RX ADMIN — TIOTROPIUM BROMIDE AND OLODATEROL 2 PUFF: 3.124; 2.736 SPRAY, METERED RESPIRATORY (INHALATION) at 08:19

## 2022-04-25 RX ADMIN — DIPHENHYDRAMINE HCL 25 MG: 25 TABLET ORAL at 12:52

## 2022-04-25 RX ADMIN — GUAIFENESIN 600 MG: 600 TABLET ORAL at 21:34

## 2022-04-25 RX ADMIN — SODIUM CHLORIDE: 9 INJECTION, SOLUTION INTRAVENOUS at 01:55

## 2022-04-25 RX ADMIN — PREGABALIN 75 MG: 75 CAPSULE ORAL at 08:59

## 2022-04-25 RX ADMIN — HYDROMORPHONE HYDROCHLORIDE 1 MG: 1 INJECTION, SOLUTION INTRAMUSCULAR; INTRAVENOUS; SUBCUTANEOUS at 01:48

## 2022-04-25 RX ADMIN — TAMSULOSIN HYDROCHLORIDE 0.4 MG: 0.4 CAPSULE ORAL at 09:04

## 2022-04-25 RX ADMIN — PROPOFOL 70 MG: 10 INJECTION, EMULSION INTRAVENOUS at 16:56

## 2022-04-25 RX ADMIN — SODIUM CHLORIDE, PRESERVATIVE FREE 10 ML: 5 INJECTION INTRAVENOUS at 09:01

## 2022-04-25 RX ADMIN — PROPOFOL 150 MCG/KG/MIN: 10 INJECTION, EMULSION INTRAVENOUS at 16:57

## 2022-04-25 RX ADMIN — PREGABALIN 150 MG: 75 CAPSULE ORAL at 21:37

## 2022-04-25 RX ADMIN — ACETAMINOPHEN 650 MG: 325 TABLET ORAL at 12:52

## 2022-04-25 RX ADMIN — HYDROMORPHONE HYDROCHLORIDE 1 MG: 1 INJECTION, SOLUTION INTRAMUSCULAR; INTRAVENOUS; SUBCUTANEOUS at 17:24

## 2022-04-25 RX ADMIN — ONDANSETRON 4 MG: 2 INJECTION INTRAMUSCULAR; INTRAVENOUS at 09:08

## 2022-04-25 RX ADMIN — POLYETHYLENE GLYCOL 3350 17 G: 17 POWDER, FOR SOLUTION ORAL at 21:39

## 2022-04-25 RX ADMIN — HYDROMORPHONE HYDROCHLORIDE 1 MG: 1 INJECTION, SOLUTION INTRAMUSCULAR; INTRAVENOUS; SUBCUTANEOUS at 12:31

## 2022-04-25 RX ADMIN — LORATADINE 10 MG: 10 TABLET ORAL at 09:03

## 2022-04-25 RX ADMIN — HYDROMORPHONE HYDROCHLORIDE 1 MG: 1 INJECTION, SOLUTION INTRAMUSCULAR; INTRAVENOUS; SUBCUTANEOUS at 09:00

## 2022-04-25 RX ADMIN — TAMSULOSIN HYDROCHLORIDE 0.4 MG: 0.4 CAPSULE ORAL at 21:35

## 2022-04-25 RX ADMIN — LIDOCAINE HYDROCHLORIDE 100 MG: 20 INJECTION, SOLUTION EPIDURAL; INFILTRATION; INTRACAUDAL; PERINEURAL at 16:56

## 2022-04-25 RX ADMIN — AZELASTINE HYDROCHLORIDE 1 SPRAY: 137 SPRAY, METERED NASAL at 21:34

## 2022-04-25 RX ADMIN — GUAIFENESIN 600 MG: 600 TABLET ORAL at 08:59

## 2022-04-25 RX ADMIN — ZOLPIDEM TARTRATE 10 MG: 5 TABLET ORAL at 21:43

## 2022-04-25 RX ADMIN — Medication 40 MG: at 06:54

## 2022-04-25 RX ADMIN — SACUBITRIL AND VALSARTAN 1 TABLET: 49; 51 TABLET, FILM COATED ORAL at 21:43

## 2022-04-25 RX ADMIN — PRAVASTATIN SODIUM 40 MG: 40 TABLET ORAL at 18:23

## 2022-04-25 RX ADMIN — CEFTRIAXONE 1000 MG: 1 INJECTION, POWDER, FOR SOLUTION INTRAMUSCULAR; INTRAVENOUS at 05:47

## 2022-04-25 ASSESSMENT — PULMONARY FUNCTION TESTS
PIF_VALUE: 1
PIF_VALUE: 0
PIF_VALUE: 1
PIF_VALUE: 1
PIF_VALUE: 0
PIF_VALUE: 1
PIF_VALUE: 0
PIF_VALUE: 1
PIF_VALUE: 1
PIF_VALUE: 0
PIF_VALUE: 1

## 2022-04-25 ASSESSMENT — PAIN DESCRIPTION - ORIENTATION
ORIENTATION: RIGHT
ORIENTATION: RIGHT
ORIENTATION: MID
ORIENTATION: MID
ORIENTATION: RIGHT
ORIENTATION: RIGHT

## 2022-04-25 ASSESSMENT — PAIN DESCRIPTION - ONSET
ONSET: ON-GOING

## 2022-04-25 ASSESSMENT — PAIN SCALES - WONG BAKER
WONGBAKER_NUMERICALRESPONSE: 0
WONGBAKER_NUMERICALRESPONSE: 2
WONGBAKER_NUMERICALRESPONSE: 4
WONGBAKER_NUMERICALRESPONSE: 2
WONGBAKER_NUMERICALRESPONSE: 6
WONGBAKER_NUMERICALRESPONSE: 4

## 2022-04-25 ASSESSMENT — PAIN SCALES - GENERAL
PAINLEVEL_OUTOF10: 4
PAINLEVEL_OUTOF10: 7
PAINLEVEL_OUTOF10: 3
PAINLEVEL_OUTOF10: 0
PAINLEVEL_OUTOF10: 4
PAINLEVEL_OUTOF10: 4
PAINLEVEL_OUTOF10: 8
PAINLEVEL_OUTOF10: 3
PAINLEVEL_OUTOF10: 5
PAINLEVEL_OUTOF10: 6
PAINLEVEL_OUTOF10: 7
PAINLEVEL_OUTOF10: 7
PAINLEVEL_OUTOF10: 5
PAINLEVEL_OUTOF10: 3
PAINLEVEL_OUTOF10: 4
PAINLEVEL_OUTOF10: 4
PAINLEVEL_OUTOF10: 9
PAINLEVEL_OUTOF10: 4

## 2022-04-25 ASSESSMENT — PAIN DESCRIPTION - LOCATION
LOCATION: FLANK
LOCATION: ABDOMEN
LOCATION: FLANK
LOCATION: FLANK
LOCATION: ABDOMEN
LOCATION: FLANK

## 2022-04-25 ASSESSMENT — PAIN DESCRIPTION - FREQUENCY
FREQUENCY: CONTINUOUS

## 2022-04-25 ASSESSMENT — PAIN DESCRIPTION - PAIN TYPE
TYPE: ACUTE PAIN
TYPE: SURGICAL PAIN;ACUTE PAIN
TYPE: ACUTE PAIN

## 2022-04-25 ASSESSMENT — PAIN DESCRIPTION - DESCRIPTORS
DESCRIPTORS: SHARP
DESCRIPTORS: ACHING
DESCRIPTORS: ACHING

## 2022-04-25 NOTE — PROGRESS NOTES
Pharmacy Medication Reconciliation Note     List of medications patient is currently taking is complete. Source of information:   1. Conversation with pt at bedside   2. Fill hx     Allergies   Allergen Reactions    Ipratropium Bromide Hfa Shortness Of Breath    Atrovent Nasal Spray [Ipratropium] Other (See Comments)     Chest tightness    Doxycycline Hives    Morphine Other (See Comments)     \"makes me feel funny\"      Nitroglycerin Other (See Comments)     Severe hypotension (went from 373 to 66 systolic)    Sulfa Antibiotics Rash    Vicodin [Hydrocodone-Acetaminophen] Rash       Notes regarding home medications:   1. Pt takes Coumadin for hx of PE. Goal 2-3. Normal regimen is 2.5 mg (5 mg x 0.5) every Sun, Tue, Thu; 5 mg (5 mg x 1) all other days. Dose was reduced to 2.5 mg daily due to pt starting Prednisone taper and Levaquin last week. Last dose of Prednisone was 4/24. Levaquin has not been ordered this admit. 2. Elizabeth Dai will be bringing in his own supply of Albuterol for nebulizer. He has had issues with our med and would like to use his own. He normally uses TID. 3. Lyrica 75 mg; takes on cap in AM and 2 caps in PM. Corrected   4. Astelin nasal spray; 1 spray at HS.      Jaylan Orellana, [facility]  4/25/2022  8:51 AM

## 2022-04-25 NOTE — PROGRESS NOTES
Pt rounded on this morning Q2h, whiteboard updated, and needs assessed. Pt given PRN analgesic for pain rated 8/10 in the R flank (see eMAR). Pt NPO for Ureteroscopy, possible laser lithotripsy with Dr. Darcy Do this afternoon. Pt waiting to speak with the MD and sign consent in Pre-Op. Pt has no further needs or concerns at this time. Call light within reach, pt verbalized understanding to call prior to ambulating. Will continue to monitor and reassess.    Electronically signed by Enid Reynolds RN on 4/25/2022 at 10:55 AM

## 2022-04-25 NOTE — PROGRESS NOTES
PACU Transfer Note    Vitals:    04/25/22 1739   BP: 116/62   Pulse: 78   Resp: 20   Temp:    SpO2: 91%       In: 230 [P.O.:30; I.V.:200]  Out: -     Pain assessment:  present - adequately treated  Pain Level: 4 (acceptable per pt )    Report given to Receiving unit RN.  Pt to room 4128    4/25/2022 5:40 PM

## 2022-04-25 NOTE — PROGRESS NOTES
Pt returned from surgery this evening. Pt states pain is managed well with PRN analgesics (see eMAR). Pt ordering dinner, calls appropriately, will continue to monitor and reassess.    Electronically signed by Priya Davila RN on 4/25/2022 at 7:04 PM

## 2022-04-25 NOTE — PROGRESS NOTES
Pt was given Albuterol nebulizer treatment. Pt claims he is allergic to Proventil but not albuterol. Symptoms of \"allergic reaction\" were consistent with side effects of Albuterol (shakiness, tremors). This RT made RN aware and messaged pharmacy. Side effects of Albuterol were explained to patient.

## 2022-04-25 NOTE — PROGRESS NOTES
Patient admitted to PACU # 9 from OR at 1716 post CYSTOSCOPY, RIGHT STENT INSERTION - Right   per Gina. Attached to PACU monitoring system and report received from anesthesia provider. Patient was reported to be hemodynamically stable during procedure. Patient sleepy from anesthesia.  Will continue to monitor

## 2022-04-25 NOTE — PROGRESS NOTES
Pt called out and reported shakiness. Pt took albuterol recently. Vital sign checked, vital sign stable. Education provided the side effects of albuterol which including shakiness and tachy. Pt asked to talk to the RT who gave him albuterol . Call made to RT Will and warm blanket provide to pt. Will continue to monitor.

## 2022-04-25 NOTE — PROGRESS NOTES
[facility] Progress Note  4/25/2022 7:14 AM  Subjective:   Admit Date: 4/24/2022      Chief Complaint: My abd still hurts     Interval History: rt CVA pain persists---for stent this Am   Warfarin being held--INR=1.62   Aferile     Diet: Diet NPO  Medications:   Scheduled Meds:   sacubitril-valsartan  1 tablet Oral BID    metoprolol succinate  50 mg Oral Daily    polyethylene glycol  17 g Oral Nightly    pravastatin  40 mg Oral Nightly    pregabalin  75 mg Oral TID    tiotropium-olodaterol  2 puff Inhalation Daily    tamsulosin  0.4 mg Oral BID    cefTRIAXone (ROCEPHIN) IV  1,000 mg IntraVENous Q24H    sodium chloride flush  5-40 mL IntraVENous 2 times per day    warfarin placeholder: dosing by pharmacy   Other RX Placeholder    guaiFENesin  600 mg Oral BID    azelastine  1 spray Each Nostril BID    loratadine  10 mg Oral Daily    esomeprazole  40 mg Oral QAM AC     Continuous Infusions:   sodium chloride      sodium chloride 75 mL/hr at 04/25/22 0155       Review of Systems -   General ROS: afebrile  Respiratory ROS: no cough, shortness of breath or wheezing  Cardiovascular ROS: no chest pain  Musculoskeletal ROS:positive for - low back pain   Neurological ROS: no TIA or stroke symptoms    Objective:   Vitals: BP 99/60   Pulse 69   Temp 98 °F (36.7 °C) (Oral)   Resp 18   Ht 5' 7\" (1.702 m)   Wt 225 lb 5 oz (102.2 kg)   SpO2 93%   BMI 35.29 kg/m²   General appearance: alert and cooperative with exam  HEENT: Head: Normocephalic, no lesions, without obvious abnormality.   Neck: no adenopathy, no carotid bruit, no JVD, supple, symmetrical, trachea midline and thyroid not enlarged, symmetric, no tenderness/mass/nodules  Lungs: rales bibasilar  Heart: regular rate and rhythm, S1, S2 normal, no murmur, click, rub or gallop  Abdomen: 1+ tender rt flank   Extremities: tr edema   Neurologic: Mental status: Alert, oriented, thought content appropriate    Admission on 04/24/2022   Component Date Value Ref Range Status    Color, UA 04/24/2022 DARK YELLOW* Straw/Yellow Final    Clarity, UA 04/24/2022 Clear  Clear Final    Glucose, Ur 04/24/2022 Negative  Negative mg/dL Final    Bilirubin Urine 04/24/2022 Negative  Negative Final    Ketones, Urine 04/24/2022 Negative  Negative mg/dL Final    Specific Gravity, UA 04/24/2022 1.020  1.005 - 1.030 Final    Blood, Urine 04/24/2022 MODERATE* Negative Final    pH, UA 04/24/2022 6.0  5.0 - 8.0 Final    Protein, UA 04/24/2022 Negative  Negative mg/dL Final    Urobilinogen, Urine 04/24/2022 1.0  <2.0 E.U./dL Final    Nitrite, Urine 04/24/2022 POSITIVE* Negative Final    Leukocyte Esterase, Urine 04/24/2022 Negative  Negative Final    Microscopic Examination 04/24/2022 YES   Final    Urine Type 04/24/2022 NotGiven   Final    Urine Reflex to Culture 04/24/2022 Not Indicated   Final    WBC 04/24/2022 6.4  4.0 - 11.0 K/uL Final    RBC 04/24/2022 4.91  4.20 - 5.90 M/uL Final    Hemoglobin 04/24/2022 15.1  13.5 - 17.5 g/dL Final    Hematocrit 04/24/2022 45.1  40.5 - 52.5 % Final    MCV 04/24/2022 91.9  80.0 - 100.0 fL Final    MCH 04/24/2022 30.7  26.0 - 34.0 pg Final    MCHC 04/24/2022 33.4  31.0 - 36.0 g/dL Final    RDW 04/24/2022 14.0  12.4 - 15.4 % Final    Platelets 47/92/3419 177  135 - 450 K/uL Final    MPV 04/24/2022 6.9  5.0 - 10.5 fL Final    Neutrophils % 04/24/2022 57.4  % Final    Lymphocytes % 04/24/2022 30.4  % Final    Monocytes % 04/24/2022 11.5  % Final    Eosinophils % 04/24/2022 0.4  % Final    Basophils % 04/24/2022 0.3  % Final    Neutrophils Absolute 04/24/2022 3.7  1.7 - 7.7 K/uL Final    Lymphocytes Absolute 04/24/2022 1.9  1.0 - 5.1 K/uL Final    Monocytes Absolute 04/24/2022 0.7  0.0 - 1.3 K/uL Final    Eosinophils Absolute 04/24/2022 0.0  0.0 - 0.6 K/uL Final    Basophils Absolute 04/24/2022 0.0  0.0 - 0.2 K/uL Final    Sodium 04/24/2022 140  136 - 145 mmol/L Final    Potassium reflex Magnesium 04/24/2022 3.5  3.5 - 5.1 mmol/L Final    Chloride 04/24/2022 103  99 - 110 mmol/L Final    CO2 04/24/2022 25  21 - 32 mmol/L Final    Anion Gap 04/24/2022 12  3 - 16 Final    Glucose 04/24/2022 99  70 - 99 mg/dL Final    BUN 04/24/2022 15  7 - 20 mg/dL Final    CREATININE 04/24/2022 1.1  0.8 - 1.3 mg/dL Final    GFR Non- 04/24/2022 >60  >60 Final    Comment: >60 mL/min/1.73m2 EGFR, calc. for ages 25 and older using the  MDRD formula (not corrected for weight), is valid for stable  renal function.  GFR  04/24/2022 >60  >60 Final    Comment: Chronic Kidney Disease: less than 60 ml/min/1.73 sq.m. Kidney Failure: less than 15 ml/min/1.73 sq.m. Results valid for patients 18 years and older.  Calcium 04/24/2022 10.1  8.3 - 10.6 mg/dL Final    Magnesium 04/24/2022 1.90  1.80 - 2.40 mg/dL Final    Hyaline Casts, UA 04/24/2022 10* 0 - 8 /LPF Final    WBC, UA 04/24/2022 2  0 - 5 /HPF Final    RBC, UA 04/24/2022 31* 0 - 4 /HPF Final    Epithelial Cells, UA 04/24/2022 1  0 - 5 /HPF Final    Comment: Urinalysis microscopic performed using the  automated methodology (AUWI analyzer).  Protime 04/25/2022 18.7* 9.9 - 12.7 sec Final    Comment: Effective 6-30-21 09:00am EST  Please note reference ranges have  changed for PT and INR Testing.  INR 04/25/2022 1.62* 0.88 - 1.12 Final    Comment: Effective 6/30/21 at 09:00am EST    Normal: 0.88 - 1.12  Therapeutic: 2.0 - 3.0  Pros. Valve: 2.5 - 3.5  AMI: 2.0 - 3.0           Assessment & Plan:   Principal Problem:    Calculous pyelonephritis--cont rocephin---await stent per urology ---await urine cx   Active Problems:    Essential hypertension, benign--Continue current therapy      NICM (nonischemic cardiomyopathy) (HCC)    On warfarin therapy--on hold for cysto and stent placement     Ureteral colic  Resolved Problems:    * No resolved hospital problems.  *  Cont iv fluids and rocephin--await cysto and urine C&S   Please note that over 35 minutes was spent in evaluating the patient, review of records and results, discussion with staff/family, etc.    Roland Machado MD

## 2022-04-25 NOTE — BRIEF OP NOTE
Brief Postoperative Note      Patient: Roby Felipe  YOB: 1960  MRN: [id number]    Date of Procedure: 4/25/2022    Pre-Op Diagnosis: right ureteral calculus    Post-Op Diagnosis: Same       Procedure(s):  CYSTOSCOPY, RIGHT STENT INSERTION    Surgeon(s):  Lola Vicente MD    Assistant:  * No surgical staff found *    Anesthesia: General    Estimated Blood Loss (mL): Minimal    Complications: None    Specimens:   * No specimens in log *    Implants:  Implant Name Type Inv.  Item Serial No.  Lot No. LRB No. Used Action   STENT URET 6FR L26CM PERCFLX HYDR+ Quique Harjeet [id number]  [facility] 6FR L26CM PERCFLX HYDR+ [employer] Pending sale to [facility]- [id number] Right 1 Implanted         Drains: * No LDAs found *    Findings: distal right ureteral stone    Electronically signed by Lola Vicente MD on 4/25/2022 at 5:12 PM

## 2022-04-25 NOTE — CONSULTS
Consulting Physician: Dr Ling Tomlin    Reason for Consult: right flank pain    History of Present Illness: Sebastien Milner is a 57 y.o. male with a history of recurrent stones. He takes coumadin due to history of PE. He developed severe right flank pain last week. He was treated with levaquin from our office for possible UTI. Culture was no growth. He came to the ED yesterday with persistent uncontrolled pain and low grade fever. Rocephin was started. Urine remains nitrite positive.       Past Medical History:   Past Medical History:   Diagnosis Date    Bronchiolitis obliterans ([address]     Bundle branch block, right     Cardiomyopathy ([address]     Chest pain 9/24/2019    COVID-19 virus vaccine not available     Fibromyalgia     GERD (gastroesophageal reflux disease)     Hepatitis 1979    unsure of which type    Hx of blood clots     Hyperlipemia     Hypertension     IBS (irritable bowel syndrome)     Kidney stone     over thirty kidney stones    Neuropathy     right side-chest    Pneumothorax 2011    Right    Prostatitis     Pulmonary embolism on right [facility]) 2011    upper lobe-coumadin 2011    Sacroiliitis [facility])        Past Surgical History:  Past Surgical History:   Procedure Laterality Date    CHOLECYSTECTOMY  2007    COLONOSCOPY  9/21/2012    dr Adalberto Vicente  05/17/2019    RIGHT SIDED URETEROSCOPY  Diagnostic, retrograde pyelogram and fulguration of previously resected bladder tumor base    CYSTOSCOPY Right 5/17/2019    RIGHT SIDED URETEROSCOPY FLUGERATION  OF BLADDER performed by Courtney Ledbetter MD at [facility] Right 7/2/2021    CYSTOURETHROSCOPY WITH RIGHT RETROGRADE PYELOGRAPHY AND PLACEMENT OF RIGHT DOUBLE J STENT performed by Luna Burgos DO at [address] [place] [address] ([facility])  2015    LITHOTRIPSY      PACEMAKER PLACEMENT      [address] opening larger in sinuses    UPPER GASTROINTESTINAL ENDOSCOPY  3/28/2014    dr Ottoniel Del Rio Year: Not on file    Unstable Housing in the Last Year: Not on file       Family History:  Family History   Problem Relation Age of Onset    High Blood Pressure Mother     Dementia Mother     Cancer Father         Pancreatic Ca       Meds:   Current Facility-Administered Medications: azelastine (ASTELIN) 0.1 % nasal spray 1 spray, 1 spray, Each Nostril, Nightly  pregabalin (LYRICA) capsule 75 mg, 75 mg, Oral, Daily  pregabalin (LYRICA) capsule 150 mg, 150 mg, Oral, Nightly  diphenhydrAMINE (BENADRYL) tablet 25 mg, 25 mg, Oral, Q6H PRN  sodium chloride flush 0.9 % injection 5-40 mL, 5-40 mL, IntraVENous, 2 times per day  sodium chloride flush 0.9 % injection 5-40 mL, 5-40 mL, IntraVENous, PRN  0.9 % sodium chloride infusion, , IntraVENous, PRN  fentaNYL (SUBLIMAZE) injection 25 mcg, 25 mcg, IntraVENous, Q5 Min PRN  HYDROmorphone (DILAUDID) injection 0.5 mg, 0.5 mg, IntraVENous, Q5 Min PRN  oxyCODONE (ROXICODONE) immediate release tablet 5 mg, 5 mg, Oral, Once PRN  ondansetron (ZOFRAN) injection 4 mg, 4 mg, IntraVENous, Once PRN  HYDROmorphone (DILAUDID) injection 1 mg, 1 mg, IntraVENous, Q3H PRN  sacubitril-valsartan (ENTRESTO) 49-51 MG per tablet 1 tablet, 1 tablet, Oral, BID  metoprolol succinate (TOPROL XL) extended release tablet 50 mg, 50 mg, Oral, Daily  polyethylene glycol (GLYCOLAX) packet 17 g, 17 g, Oral, Nightly  pravastatin (PRAVACHOL) tablet 40 mg, 40 mg, Oral, Nightly  sodium chloride (Inhalant) 3 % nebulizer solution 4 mL, 4 mL, Nebulization, Q8H PRN  tiotropium-olodaterol (STIOLTO) 2.5-2.5 MCG/ACT inhaler 2 puff, 2 puff, Inhalation, Daily  tamsulosin (FLOMAX) capsule 0.4 mg, 0.4 mg, Oral, BID  zolpidem (AMBIEN) tablet 10 mg, 10 mg, Oral, Nightly PRN  cefTRIAXone (ROCEPHIN) 1000 mg IVPB in 50 mL D5W minibag, 1,000 mg, IntraVENous, Q24H  sodium chloride flush 0.9 % injection 5-40 mL, 5-40 mL, IntraVENous, 2 times per day  sodium chloride flush 0.9 % injection 5-40 mL, 5-40 mL, IntraVENous, PRN  0.9 % sodium chloride infusion, , IntraVENous, PRN  ondansetron (ZOFRAN-ODT) disintegrating tablet 4 mg, 4 mg, Oral, Q8H PRN **OR** ondansetron (ZOFRAN) injection 4 mg, 4 mg, IntraVENous, Q6H PRN  acetaminophen (TYLENOL) tablet 650 mg, 650 mg, Oral, Q6H PRN **OR** acetaminophen (TYLENOL) suppository 650 mg, 650 mg, Rectal, Q6H PRN  0.9 % sodium chloride infusion, , IntraVENous, Continuous  warfarin placeholder: dosing by pharmacy, , Other, RX Placeholder  methocarbamol (ROBAXIN) tablet 500 mg, 500 mg, Oral, 4x Daily PRN  benzocaine-menthol (CEPACOL SORE THROAT) lozenge 1 lozenge, 1 lozenge, Oral, Q2H PRN  guaiFENesin (MUCINEX) extended release tablet 600 mg, 600 mg, Oral, BID  flavoxATE (URISPAS) tablet 100 mg, 100 mg, Oral, TID PRN  loratadine (CLARITIN) tablet 10 mg, 10 mg, Oral, Daily  esomeprazole (NEXIUM) delayed release capsule 40 mg, 40 mg, Oral, QAM AC    Review of Systems:  10 Systems were reviewed and negative except as in HPI    Vitals:  /83   Pulse 75   Temp 99 °F (37.2 °C) (Oral)   Resp 16   Ht 5' 7\" (1.702 m)   Wt 225 lb 5 oz (102.2 kg)   SpO2 91%   BMI 35.29 kg/m²     Intake/Output Summary (Last 24 hours) at 4/25/2022 1555  Last data filed at 4/25/2022 1100  Gross per 24 hour   Intake 30 ml   Output 325 ml   Net -295 ml       Physical Exam:  General Appearance: Alert and oriented, cooperative, no distress, appears stated age  Head: Normocephalic, without obvious abnormality, atraumatic  Back: right CVA tenderness  Lungs: respirations unlabored  Abdomen: Soft, non-tender, non-distended, no masses  Skin: Skin color, texture, turgor normal, no rashes or lesions  Neurologic: no gross deficits  Male :   Nonpalpable bladder   Right CVA tenderness       MARIA T Not indicated    Labs:  CBC   Lab Results   Component Value Date    WBC 6.4 04/24/2022    RBC 4.91 04/24/2022    HGB 15.1 04/24/2022    HCT 45.1 04/24/2022    MCV 91.9 04/24/2022    MCH 30.7 04/24/2022    MCHC 33.4 04/24/2022    RDW 14.0 04/24/2022     04/24/2022    MPV 6.9 04/24/2022     BMP   Lab Results   Component Value Date     04/24/2022    K 3.5 04/24/2022     04/24/2022    CO2 25 04/24/2022    BUN 15 04/24/2022    CREATININE 1.1 04/24/2022    GLUCOSE 99 04/24/2022    CALCIUM 10.1 04/24/2022       Urinalysis:   Lab Results   Component Value Date    COLORU DARK YELLOW 04/24/2022    GLUCOSEU Negative 04/24/2022    GLUCOSEU NEGATIVE 01/24/2012    BLOODU MODERATE 04/24/2022    NITRU POSITIVE 04/24/2022    LEUKOCYTESUR Negative 04/24/2022       Imaging:  Pertinent images and radiologist's report were reviewed independently  Right obstructive uropathy.  5 mm stone in the mid to distal right ureter   with mild proximal dilation.       Bilateral nephrolithiasis also noted. Impression/Plan: right ureteral calculus, possible UTI  Cysto with right ureteral stent insertion this afternoon.   He will need interval ureteroscopy    Anurag Kirkland MD 4/43/46733:40 PM

## 2022-04-25 NOTE — ANESTHESIA POSTPROCEDURE EVALUATION
[facility] [address] Department of Anesthesiology  Post-Anesthesia Note       Name:  Richi Roman                                  Age:  57 y.o. MRN:  [id number]     Last Vitals & Oxygen Saturation: /62   Pulse 78   Temp 97.4 °F (36.3 °C) (Temporal)   Resp 20   Ht 5' 7\" (1.702 m)   Wt 225 lb 5 oz (102.2 kg)   SpO2 91%   BMI 35.29 kg/m²   Patient Vitals for the past 4 hrs:   BP Temp Temp src Pulse Resp SpO2   04/25/22 1739 116/62 -- -- 78 20 91 %   04/25/22 1730 126/81 -- -- 78 18 92 %   04/25/22 1726 102/66 -- -- 85 18 96 %   04/25/22 1721 (!) 108/57 -- -- 84 18 97 %   04/25/22 1717 (!) 108/59 97.4 °F (36.3 °C) Temporal 85 12 94 %       Level of consciousness:  Awake, alert    Respiratory: Respirations easy, no distress. Stable. Cardiovascular: Hemodynamically stable. Hydration: Adequate. PONV: Adequately managed. Post-op pain: Adequately controlled. Post-op assessment: Tolerated anesthetic well without complication. Complications:  None.     Opal Nolan MD  April 25, 2022   6:32 PM

## 2022-04-25 NOTE — ANESTHESIA PRE PROCEDURE
[facility] Department of Anesthesiology  Pre-Anesthesia Evaluation/Consultation       Name:  Colby Mead  : 1960  Age:  57 y.o.                                            MRN:  [id number]  Date: 2022           Surgeon: Lauren King):  Sandra Gay MD    Procedure: Procedure(s):  RIGHT URETEROSCOPY POSSIBLE LASER LITHOTRIPSY     Allergies   Allergen Reactions    Ipratropium Bromide Hfa Shortness Of Breath    Atrovent Nasal Spray [Ipratropium] Other (See Comments)     Chest tightness    Doxycycline Hives    Morphine Other (See Comments)     \"makes me feel funny\"      Nitroglycerin Other (See Comments)     Severe hypotension (went from 892 to 66 systolic)    Sulfa Antibiotics Rash    Vicodin [Hydrocodone-Acetaminophen] Rash     Patient Active Problem List   Diagnosis    Nephrolithiasis    Pneumonia    Essential hypertension, benign    Back pain    URTI (acute upper respiratory infection)    Irritable bowel syndrome    Pure hypercholesterolemia    Allergic rhinitis    Right lower quadrant abdominal pain    Precordial pain    Bronchiolitis obliterans (HCC)    Bronchitis    Pulmonary embolus (HCC)    Coronary artery disease due to calcified coronary lesion    Atypical chest pain    Headache    Lightheaded    Leg pain, right    DDD (degenerative disc disease), lumbar    Acute non-recurrent maxillary sinusitis    Shortness of breath    Nausea    Transient cerebral ischemia    History of pulmonary embolism    NICM (nonischemic cardiomyopathy) (HCC)    Confusion    Right arm weakness    Elevated INR    Acute on chronic systolic heart failure (HCC)    LBBB (left bundle branch block)    Acute confusional state    Urinary tract infection without hematuria    Encounter for adjustment of cardiac resynchronization therapy defibrillator (CRT-D)    Biventricular ICD (implantable cardioverter-defibrillator) in place    Chronic anticoagulation    Paroxysmal atrial fibrillation ([address]    Hypercalcemia    Primary hyperparathyroidism ([address]    On warfarin therapy    Perinephric hematoma    Renal hematoma, right    Postoperative hemorrhagic shock    Acute blood loss anemia    Metabolic acidosis    Tachycardia    Iron deficiency anemia due to chronic blood loss    Thrombocytopenia (HCC)    Hypoxia    Acute UTI    Abdominal wall hematoma    Acute right flank pain    Intractable vomiting with nausea    Stenosis of ureteropelvic junction (UPJ)-right    Elevated liver enzymes    Acute flank pain    Colicky LLQ abdominal pain    Arm pain    Dysuria    Fever    Episode of shaking    Post-nasal drip    Left foot pain    Pain in scapula    Skin lesions    Otalgia    Pleurisy    Chills    Swelling of calf    Ureteral colic    Gastroesophageal reflux disease with esophagitis without hemorrhage    Chest pain    Epistaxis    Right calf pain    Calculous pyelonephritis     Past Medical History:   Diagnosis Date    Bronchiolitis obliterans (HCC)     Bundle branch block, right     Cardiomyopathy ([address]     Chest pain 9/24/2019    COVID-19 virus vaccine not available     Fibromyalgia     GERD (gastroesophageal reflux disease)     Hepatitis 1979    unsure of which type    Hx of blood clots     Hyperlipemia     Hypertension     IBS (irritable bowel syndrome)     Kidney stone     over thirty kidney stones    Neuropathy     right side-chest    Pneumothorax 2011    Right    Prostatitis     Pulmonary embolism on right [facility]) 2011    upper lobe-coumadin 2011    Sacroiliitis [facility])      Past Surgical History:   Procedure Laterality Date    CHOLECYSTECTOMY  2007    COLONOSCOPY  9/21/2012    dr Maralyn Nageotte  05/17/2019    RIGHT SIDED URETEROSCOPY  Diagnostic, retrograde pyelogram and fulguration of previously resected bladder tumor base    CYSTOSCOPY Right 5/17/2019    RIGHT SIDED URETEROSCOPY FLUGERATION  OF BLADDER performed by Leydi Frias Ramón Fernandez MD at [address]. Right 7/2/2021    CYSTOURETHROSCOPY WITH RIGHT RETROGRADE PYELOGRAPHY AND PLACEMENT OF RIGHT DOUBLE J STENT performed by Lacey Villanueva DO at [facility]  2015    LITHOTRIPSY      PACEMAKER PLACEMENT      [address] opening larger in sinuses    UPPER GASTROINTESTINAL ENDOSCOPY  3/28/2014    dr Luciana Hicks N/A 2/1/2021    EGD BIOPSY performed by Kaylyn Cam MD at [facility] History     Tobacco Use    Smoking status: Never Smoker    Smokeless tobacco: Never Used   Vaping Use    Vaping Use: Never used   Substance Use Topics    Alcohol use: No    Drug use: No     Medications  No current facility-administered medications on file prior to encounter.      Current Outpatient Medications on File Prior to Encounter   Medication Sig Dispense Refill    Phenazopyridine HCl (AZO-DINE URINARY ANALGESIC PO) Take 2 tablets by mouth daily as needed       methocarbamol (ROBAXIN) 500 MG tablet Take 500 mg by mouth 4 times daily as needed (musclw spasms)      loratadine (CLARITIN) 10 MG tablet Take 10 mg by mouth daily      levoFLOXacin (LEVAQUIN) 500 MG tablet Take 500 mg by mouth daily      zolpidem (AMBIEN) 10 MG tablet TAKE 1 TABLET BY MOUTH AT  NIGHT AS NEEDED FOR SLEEP 90 tablet 0    sodium chloride, Inhalant, 3 % nebulizer solution Take 4 mLs by nebulization every 8 hours as needed for Other or Cough (cough) 360 mL 5    ENTRESTO 49-51 MG per tablet TAKE 1 TABLET BY MOUTH  TWICE DAILY 180 tablet 1    flavoxATE (URISPAS) 100 MG tablet Take 1 tablet by mouth 3 times daily as needed for Muscle spasms 60 tablet 3    albuterol (PROVENTIL) (2.5 MG/3ML) 0.083% nebulizer solution Take 3 mLs by nebulization every 4 hours as needed for Wheezing or Shortness of Breath (Patient taking differently: Take 2.5 mg by nebulization in the morning, at noon, and at bedtime ) 360 mL 5    furosemide (LASIX) 20 MG tablet Take 1 tablet by mouth 2 times daily as needed (SOB) 90 tablet 1    STIOLTO RESPIMAT 2.5-2.5 MCG/ACT AERS INHALE 2 PUFFS INTO THE LUNGS DAILY 4 g 5    azelastine (ASTELIN) 0.1 % nasal spray 2 sprays by Nasal route 2 times daily Use in each nostril as directed (Patient taking differently: 1 spray by Nasal route at bedtime Use in each nostril as directed) 60 mL 3    dexlansoprazole (DEXILANT) 60 MG CPDR delayed release capsule Take 1 capsule by mouth daily 90 capsule 3    dicyclomine (BENTYL) 10 MG capsule TAKE ONE CAPSULE BY MOUTH  FOUR TIMES DAILY AS NEEDED 360 capsule 1    pregabalin (LYRICA) 75 MG capsule TAKE 1 CAPSULE BY MOUTH IN  THE MORNING AND 2 CAPSULES  IN THE EVENING 270 capsule 0    tamsulosin (FLOMAX) 0.4 MG capsule TAKE ONE CAPSULE BY MOUTH TWO TIMES A DAY 60 capsule 11    triamcinolone (KENALOG) 0.1 % cream Apply topically to affected areas 2 times daily.  45 g 2    linaCLOtide (LINZESS) 72 MCG CAPS capsule Take 1 capsule by mouth every morning (before breakfast) 4 capsule 0    metoprolol succinate (TOPROL XL) 25 MG extended release tablet TAKE 1 TABLET BY MOUTH  TWICE DAILY 180 tablet 3    pravastatin (PRAVACHOL) 40 MG tablet Take 40 mg by mouth daily      melatonin 3 MG TABS tablet Take 3 mg by mouth nightly as needed Taking half tablet      warfarin (COUMADIN) 5 MG tablet Take 1 tablet by mouth daily Except 2.5 mg Tues and Thursday (Patient taking differently: Take 2.5 mg by mouth daily Changed a few days ago as directed by [facility] [phone]) 90 tablet 3    guaiFENesin (MUCINEX) 600 MG extended release tablet Take 600 mg by mouth 2 times daily      ondansetron (ZOFRAN) 4 MG tablet Take 1 tablet by mouth every 8 hours as needed for Nausea or Vomiting 20 tablet 0    Cholecalciferol (VITAMIN D3) 1.25 MG (44591 UT) CAPS Take 1 capsule by mouth once a week 12 capsule 3    albuterol sulfate  (90 Base) MCG/ACT inhaler INHALE 2 PUFFS INTO THE LUNGS EVERY 4 HOURS AS NEEDED FOR WHEEZING OR SHORTNESS OF BREATH 1 Inhaler 0    polyethylene glycol (MIRALAX) PACK packet Take 17 g by mouth nightly       aspirin 81 MG tablet Take 81 mg by mouth daily       Current Facility-Administered Medications   Medication Dose Route Frequency Provider Last Rate Last Admin    azelastine (ASTELIN) 0.1 % nasal spray 1 spray  1 spray Each Nostril Nightly Andie Everett MD        pregabalin (LYRICA) capsule 75 mg  75 mg Oral Daily Andie Everett MD   75 mg at 04/25/22 0859    pregabalin (LYRICA) capsule 150 mg  150 mg Oral Nightly Andie Everett MD        diphenhydrAMINE (BENADRYL) tablet 25 mg  25 mg Oral Q6H PRN Andie Everett MD   25 mg at 04/25/22 1252    HYDROmorphone (DILAUDID) injection 1 mg  1 mg IntraVENous Q3H PRN Andie Everett MD   1 mg at 04/25/22 1231    sacubitril-valsartan (ENTRESTO) 49-51 MG per tablet 1 tablet  1 tablet Oral BID Andie Everett MD   1 tablet at 04/24/22 2106    metoprolol succinate (TOPROL XL) extended release tablet 50 mg  50 mg Oral Daily Andie Everett MD        polyethylene glycol [facility]) packet 17 g  17 g Oral Nightly Andie Everett MD        pravastatin (PRAVACHOL) tablet 40 mg  40 mg Oral Nightly Andie Everett MD   40 mg at 04/24/22 2106    sodium chloride (Inhalant) 3 % nebulizer solution 4 mL  4 mL Nebulization Q8H PRN Andie Everett MD        tiotropium-olodaterol (STIOLTO) 2.5-2.5 MCG/ACT inhaler 2 puff  2 puff Inhalation Daily Andie Everett MD   2 puff at 04/25/22 0819    tamsulosin (FLOMAX) capsule 0.4 mg  0.4 mg Oral BID Andie Everett MD   0.4 mg at 04/25/22 8163    zolpidem (AMBIEN) tablet 10 mg  10 mg Oral Nightly PRN Andie Everett MD        cefTRIAXone (ROCEPHIN) 1000 mg IVPB in 50 mL D5W minibag  1,000 mg IntraVENous Q24H Andie Everett MD   Stopped at 04/25/22 7544    sodium chloride flush 0.9 % injection 5-40 mL  5-40 mL IntraVENous 2 times per day Andie Everett MD   10 mL at 22 0901    sodium chloride flush 0.9 % injection 5-40 mL  5-40 mL IntraVENous PRN Olvin Gao MD        0.9 % sodium chloride infusion   IntraVENous PRN Olvin Gao MD        ondansetron (ZOFRAN-ODT) disintegrating tablet 4 mg  4 mg Oral Q8H PRN Olvin Goa MD        Or    ondansetron [facility]) injection 4 mg  4 mg IntraVENous Q6H PRN Olvin Gao MD   4 mg at 22 0908    acetaminophen (TYLENOL) tablet 650 mg  650 mg Oral Q6H PRN Olvin Gao MD   650 mg at 22 1252    Or    acetaminophen (TYLENOL) suppository 650 mg  650 mg Rectal Q6H PRN Olvin Gao MD        0.9 % sodium chloride infusion   IntraVENous Continuous Olvin Gao MD 75 mL/hr at 22 0155 New Bag at 22 0155    warfarin placeholder: dosing by pharmacy   Other Crystal Corea MD   Given at 22 1044    methocarbamol (ROBAXIN) tablet 500 mg  500 mg Oral 4x Daily PRN Olvin Gao MD        benzocaine-menthol (CEPACOL SORE THROAT) lozenge 1 lozenge  1 lozenge Oral Q2H PRN Olvin Gao MD   1 lozenge at 22 1553    guaiFENesin (MUCINEX) extended release tablet 600 mg  600 mg Oral BID Olvin Gao MD   600 mg at 22 0859    flavoxATE (URISPAS) tablet 100 mg  100 mg Oral TID PRN Olvin Gao MD        loratadine (CLARITIN) tablet 10 mg  10 mg Oral Daily Olvin Gao MD   10 mg at 22 0903    esomeprazole (NEXIUM) delayed release capsule 40 mg  40 mg Oral QAM AC Olvin Gao MD   40 mg at 22 0654     Vital Signs (Current)   Vitals:    22 1355   BP: 135/83   Pulse: 75   Resp: 16   Temp: 99 °F (37.2 °C)   SpO2: 91%     Vital Signs Statistics (for past 48 hrs)     Temp  Av.1 °F (36.7 °C)  Min: 97.4 °F (36.3 °C)   Min taken time: 22  Max: 99.2 °F (37.3 °C)   Max taken time: 22 0937  Pulse  Av.8  Min: 62   Min taken time: 22  Max: 68   Max taken time: 22 1105  Resp  Av.8 Min: 15   Min taken time: 22 1105  Max: 25   Max taken time: 22 0820  BP  Min: 95/63   Min taken time: 22 185  Max: 135/83   Max taken time: 22 1355  MAP (mmHg)  Av.7  Min: 76   Min taken time: 22 185  Max: 100   Max taken time: 22 1355  SpO2  Av.8 %  Min: 90 %   Min taken time: 22  Max: 96 %   Max taken time: 22 2140    BP Readings from Last 3 Encounters:   22 135/83   22 118/76   22 110/76     BMI  Body mass index is 35.29 kg/m². Estimated body mass index is 35.29 kg/m² as calculated from the following:    Height as of this encounter: 5' 7\" (1.702 m). Weight as of this encounter: 225 lb 5 oz (102.2 kg).     CBC   Lab Results   Component Value Date    WBC 6.4 2022    RBC 4.91 2022    HGB 15.1 2022    HCT 45.1 2022    MCV 91.9 2022    RDW 14.0 2022     2022     CMP    Lab Results   Component Value Date     2022    K 3.5 2022     2022    CO2 25 2022    BUN 15 2022    CREATININE 1.1 2022    GFRAA >60 2022    GFRAA >60 2013    AGRATIO 1.8 2022    LABGLOM >60 2022    GLUCOSE 99 2022    PROT 6.6 2022    PROT 5.9 2013    CALCIUM 10.1 2022    BILITOT 0.5 2022    ALKPHOS 122 2022    AST 15 2022    ALT 17 2022     BMP    Lab Results   Component Value Date     2022    K 3.5 2022     2022    CO2 25 2022    BUN 15 2022    CREATININE 1.1 2022    CALCIUM 10.1 2022    GFRAA >60 2022    GFRAA >60 2013    LABGLOM >60 2022    GLUCOSE 99 2022     POCGlucose  Recent Labs     22  1145   GLUCOSE 99      Coags    Lab Results   Component Value Date    PROTIME 18.7 2022    PROTIME 37.6 2016    INR 1.62 2022    APTT 50.5      HCG (If Applicable) No results found for: PREGTESTUR, PREGSERUM, HCG, HCGQUANT   ABGs   Lab Results   Component Value Date    PHART 7.317 04/11/2013    PO2ART 62.2 04/11/2013    FFR7VGV 51.7 04/11/2013    KRS4ILP 25.7 04/11/2013    BEART -0.6 04/11/2013    S1THGSBM 92.8 04/11/2013      Type & Screen (If Applicable)  No results found for: LABABO, LABRH                         BMI: Wt Readings from Last 3 Encounters:       NPO Status:   Date of last liquid consumption: 04/24/22   Time of last liquid consumption: 2100   Date of last solid food consumption: 04/24/22      Time of last solid consumption: 2100       Anesthesia Evaluation  Patient summary reviewed   history of anesthetic complications (With general anesthesia): PONV. Airway: Mallampati: III  TM distance: >3 FB   Neck ROM: full   Dental:    (+) poor dentition      Pulmonary:normal exam    (+) COPD:                             Cardiovascular:  Exercise tolerance: good (>4 METS),   (+) hypertension (EF 40):, angina: no interval change, pacemaker (placed 2017 per pt): AICD, CAD:, dysrhythmias (rbbb): atrial fibrillation, CHF (EF 40):,       ECG reviewed  Rhythm: irregular  Rate: normal  Echocardiogram reviewed         Beta Blocker:  Dose within 24 Hrs         Neuro/Psych:   (+) neuromuscular disease:, TIA (no residual effects per pt), headaches:, psychiatric history:depression/anxiety             GI/Hepatic/Renal:   (+) GERD: well controlled, liver disease:,           Endo/Other:    (+) blood dyscrasia (warfarin 2 days ago): anticoagulation therapy, arthritis:., .                 Abdominal:   (+) obese,           Vascular:   + DVT (Hx), PE (Hx). Other Findings:           Anesthesia Plan      MAC     ASA 4       Induction: intravenous. MIPS: Postoperative opioids intended and Prophylactic antiemetics administered. Anesthetic plan and risks discussed with patient and spouse. Plan discussed with CRNA.             This pre-anesthesia assessment may be used as a history and physical.    DOS STAFF ADDENDUM:    Pt seen and examined, chart reviewed (including anesthesia, drug and allergy history). No interval changes to history and physical examination. Anesthetic plan, risks, benefits, alternatives, and personnel involved discussed with patient. Questions and concerns addressed. Patient(family) verbalized an understanding and agrees to proceed.       Zohra Quinn MD  April 25, 2022  3:37 PM

## 2022-04-25 NOTE — TELEPHONE ENCOUNTER
Patient scheduled for OV today. He is currently inpatient. Last saw Dr. Demetri Kennedy 7/2021 and rec 6 mos FU. Please call to reschedule.

## 2022-04-26 VITALS
TEMPERATURE: 98.3 F | BODY MASS INDEX: 35.36 KG/M2 | HEIGHT: 67 IN | SYSTOLIC BLOOD PRESSURE: 100 MMHG | OXYGEN SATURATION: 92 % | WEIGHT: 225.31 LBS | RESPIRATION RATE: 16 BRPM | HEART RATE: 106 BPM | DIASTOLIC BLOOD PRESSURE: 64 MMHG

## 2022-04-26 LAB
ANION GAP SERPL CALCULATED.3IONS-SCNC: 9 MMOL/L (ref 3–16)
BUN BLDV-MCNC: 13 MG/DL (ref 7–20)
CALCIUM SERPL-MCNC: 9.8 MG/DL (ref 8.3–10.6)
CHLORIDE BLD-SCNC: 107 MMOL/L (ref 99–110)
CO2: 23 MMOL/L (ref 21–32)
CREAT SERPL-MCNC: 1 MG/DL (ref 0.8–1.3)
GFR AFRICAN AMERICAN: >60
GFR NON-AFRICAN AMERICAN: >60
GLUCOSE BLD-MCNC: 113 MG/DL (ref 70–99)
INR BLD: 1.36 (ref 0.88–1.12)
POTASSIUM SERPL-SCNC: 3.9 MMOL/L (ref 3.5–5.1)
PROTHROMBIN TIME: 15.6 SEC (ref 9.9–12.7)
SODIUM BLD-SCNC: 139 MMOL/L (ref 136–145)

## 2022-04-26 PROCEDURE — 87086 URINE CULTURE/COLONY COUNT: CPT

## 2022-04-26 PROCEDURE — 6370000000 HC RX 637 (ALT 250 FOR IP): Performed by: UROLOGY

## 2022-04-26 PROCEDURE — 94640 AIRWAY INHALATION TREATMENT: CPT

## 2022-04-26 PROCEDURE — 36415 COLL VENOUS BLD VENIPUNCTURE: CPT

## 2022-04-26 PROCEDURE — 6360000002 HC RX W HCPCS: Performed by: UROLOGY

## 2022-04-26 PROCEDURE — 2580000003 HC RX 258: Performed by: UROLOGY

## 2022-04-26 PROCEDURE — 85610 PROTHROMBIN TIME: CPT

## 2022-04-26 PROCEDURE — 6370000000 HC RX 637 (ALT 250 FOR IP): Performed by: INTERNAL MEDICINE

## 2022-04-26 PROCEDURE — 80048 BASIC METABOLIC PNL TOTAL CA: CPT

## 2022-04-26 PROCEDURE — 94761 N-INVAS EAR/PLS OXIMETRY MLT: CPT

## 2022-04-26 PROCEDURE — 99239 HOSP IP/OBS DSCHRG MGMT >30: CPT | Performed by: INTERNAL MEDICINE

## 2022-04-26 RX ORDER — OXYCODONE HYDROCHLORIDE AND ACETAMINOPHEN 5; 325 MG/1; MG/1
1 TABLET ORAL EVERY 4 HOURS PRN
Qty: 20 TABLET | Refills: 0 | Status: SHIPPED | OUTPATIENT
Start: 2022-04-26 | End: 2022-04-29

## 2022-04-26 RX ORDER — OXYCODONE HYDROCHLORIDE AND ACETAMINOPHEN 5; 325 MG/1; MG/1
1 TABLET ORAL EVERY 4 HOURS PRN
Status: DISCONTINUED | OUTPATIENT
Start: 2022-04-26 | End: 2022-04-26 | Stop reason: HOSPADM

## 2022-04-26 RX ORDER — PHENAZOPYRIDINE HYDROCHLORIDE 200 MG/1
200 TABLET, FILM COATED ORAL 3 TIMES DAILY PRN
Status: DISCONTINUED | OUTPATIENT
Start: 2022-04-26 | End: 2022-04-26 | Stop reason: HOSPADM

## 2022-04-26 RX ORDER — CEFUROXIME AXETIL 250 MG/1
250 TABLET ORAL 2 TIMES DAILY
Qty: 14 TABLET | Refills: 0 | Status: SHIPPED | OUTPATIENT
Start: 2022-04-26 | End: 2022-05-03

## 2022-04-26 RX ADMIN — LORATADINE 10 MG: 10 TABLET ORAL at 08:49

## 2022-04-26 RX ADMIN — SACUBITRIL AND VALSARTAN 1 TABLET: 49; 51 TABLET, FILM COATED ORAL at 08:48

## 2022-04-26 RX ADMIN — OXYCODONE AND ACETAMINOPHEN 1 TABLET: 5; 325 TABLET ORAL at 09:00

## 2022-04-26 RX ADMIN — TIOTROPIUM BROMIDE AND OLODATEROL 2 PUFF: 3.124; 2.736 SPRAY, METERED RESPIRATORY (INHALATION) at 08:25

## 2022-04-26 RX ADMIN — HYDROMORPHONE HYDROCHLORIDE 1 MG: 1 INJECTION, SOLUTION INTRAMUSCULAR; INTRAVENOUS; SUBCUTANEOUS at 05:18

## 2022-04-26 RX ADMIN — GUAIFENESIN 600 MG: 600 TABLET ORAL at 08:48

## 2022-04-26 RX ADMIN — CEFTRIAXONE 1000 MG: 1 INJECTION, POWDER, FOR SOLUTION INTRAMUSCULAR; INTRAVENOUS at 05:14

## 2022-04-26 RX ADMIN — TAMSULOSIN HYDROCHLORIDE 0.4 MG: 0.4 CAPSULE ORAL at 08:48

## 2022-04-26 RX ADMIN — Medication 40 MG: at 06:00

## 2022-04-26 RX ADMIN — PREGABALIN 75 MG: 75 CAPSULE ORAL at 08:48

## 2022-04-26 RX ADMIN — FLAVOXATE HYDROCHLORIDE 100 MG: 100 TABLET ORAL at 01:04

## 2022-04-26 RX ADMIN — SODIUM CHLORIDE: 9 INJECTION, SOLUTION INTRAVENOUS at 01:09

## 2022-04-26 RX ADMIN — ONDANSETRON 4 MG: 2 INJECTION INTRAMUSCULAR; INTRAVENOUS at 05:34

## 2022-04-26 ASSESSMENT — PAIN SCALES - GENERAL
PAINLEVEL_OUTOF10: 9
PAINLEVEL_OUTOF10: 8

## 2022-04-26 ASSESSMENT — PAIN DESCRIPTION - DESCRIPTORS: DESCRIPTORS: ACHING

## 2022-04-26 NOTE — PROGRESS NOTES
[facility] Progress Note  4/26/2022 7:37 AM  Subjective:   Admit Date: 4/24/2022      Chief Complaint: I feel OK     Interval History: Ureteral stent placed yest  Urine cx was not sent despite being nitrite pos--will send now    His rt flank pain is improved --he is req percocet to taper from dilaudid as iv now   Some intermittent dyspkagia--cont PPI   He is agreable to Dc today         Diet: ADULT DIET; Regular  Medications:   Scheduled Meds:   azelastine  1 spray Each Nostril Nightly    pregabalin  75 mg Oral Daily    pregabalin  150 mg Oral Nightly    sacubitril-valsartan  1 tablet Oral BID    metoprolol succinate  50 mg Oral Daily    polyethylene glycol  17 g Oral Nightly    pravastatin  40 mg Oral Nightly    tiotropium-olodaterol  2 puff Inhalation Daily    tamsulosin  0.4 mg Oral BID    cefTRIAXone (ROCEPHIN) IV  1,000 mg IntraVENous Q24H    sodium chloride flush  5-40 mL IntraVENous 2 times per day    warfarin placeholder: dosing by pharmacy   Other RX Placeholder    guaiFENesin  600 mg Oral BID    loratadine  10 mg Oral Daily    esomeprazole  40 mg Oral QAM AC     Continuous Infusions:   sodium chloride      sodium chloride 75 mL/hr at 04/26/22 0109       Review of Systems -   General ROS: afebrile  Respiratory ROS: no cough, shortness of breath or wheezing  Cardiovascular ROS: no chest pain  Musculoskeletal ROS:positive for - low back pain   Neurological ROS: no TIA or stroke symptoms    Objective:   Vitals: BP (!) 88/53   Pulse 77   Temp 98.4 °F (36.9 °C) (Oral)   Resp 16   Ht 5' 7\" (1.702 m)   Wt 225 lb 5 oz (102.2 kg)   SpO2 90%   BMI 35.29 kg/m²   General appearance: alert and cooperative with exam  HEENT: Head: Normocephalic, no lesions, without obvious abnormality.   Neck: no adenopathy, no carotid bruit, no JVD, supple, symmetrical, trachea midline and thyroid not enlarged, symmetric, no tenderness/mass/nodules  Lungs: clear to auscultation bilaterally  Heart: regular rate and rhythm, S1, S2 normal, no murmur, click, rub or gallop  Abdomen: rt flank min tender   Extremities: extremities normal, atraumatic, no cyanosis or edema  Neurologic: Mental status: Alert, oriented, thought content appropriate    Admission on 04/24/2022   Component Date Value Ref Range Status    Color, UA 04/24/2022 DARK YELLOW* Straw/Yellow Final    Clarity, UA 04/24/2022 Clear  Clear Final    Glucose, Ur 04/24/2022 Negative  Negative mg/dL Final    Bilirubin Urine 04/24/2022 Negative  Negative Final    Ketones, Urine 04/24/2022 Negative  Negative mg/dL Final    Specific Gravity, UA 04/24/2022 1.020  1.005 - 1.030 Final    Blood, Urine 04/24/2022 MODERATE* Negative Final    pH, UA 04/24/2022 6.0  5.0 - 8.0 Final    Protein, UA 04/24/2022 Negative  Negative mg/dL Final    Urobilinogen, Urine 04/24/2022 1.0  <2.0 E.U./dL Final    Nitrite, Urine 04/24/2022 POSITIVE* Negative Final    Leukocyte Esterase, Urine 04/24/2022 Negative  Negative Final    Microscopic Examination 04/24/2022 YES   Final    Urine Type 04/24/2022 NotGiven   Final    Urine Reflex to Culture 04/24/2022 Not Indicated   Final    WBC 04/24/2022 6.4  4.0 - 11.0 K/uL Final    RBC 04/24/2022 4.91  4.20 - 5.90 M/uL Final    Hemoglobin 04/24/2022 15.1  13.5 - 17.5 g/dL Final    Hematocrit 04/24/2022 45.1  40.5 - 52.5 % Final    MCV 04/24/2022 91.9  80.0 - 100.0 fL Final    MCH 04/24/2022 30.7  26.0 - 34.0 pg Final    MCHC 04/24/2022 33.4  31.0 - 36.0 g/dL Final    RDW 04/24/2022 14.0  12.4 - 15.4 % Final    Platelets 70/44/0312 177  135 - 450 K/uL Final    MPV 04/24/2022 6.9  5.0 - 10.5 fL Final    Neutrophils % 04/24/2022 57.4  % Final    Lymphocytes % 04/24/2022 30.4  % Final    Monocytes % 04/24/2022 11.5  % Final    Eosinophils % 04/24/2022 0.4  % Final    Basophils % 04/24/2022 0.3  % Final    Neutrophils Absolute 04/24/2022 3.7  1.7 - 7.7 K/uL Final    Lymphocytes Absolute 04/24/2022 1.9  1.0 - 5.1 K/uL Final    Monocytes Absolute 04/24/2022 0.7  0.0 - 1.3 K/uL Final    Eosinophils Absolute 04/24/2022 0.0  0.0 - 0.6 K/uL Final    Basophils Absolute 04/24/2022 0.0  0.0 - 0.2 K/uL Final    Sodium 04/24/2022 140  136 - 145 mmol/L Final    Potassium reflex Magnesium 04/24/2022 3.5  3.5 - 5.1 mmol/L Final    Chloride 04/24/2022 103  99 - 110 mmol/L Final    CO2 04/24/2022 25  21 - 32 mmol/L Final    Anion Gap 04/24/2022 12  3 - 16 Final    Glucose 04/24/2022 99  70 - 99 mg/dL Final    BUN 04/24/2022 15  7 - 20 mg/dL Final    CREATININE 04/24/2022 1.1  0.8 - 1.3 mg/dL Final    GFR Non- 04/24/2022 >60  >60 Final    Comment: >60 mL/min/1.73m2 EGFR, calc. for ages 25 and older using the  MDRD formula (not corrected for weight), is valid for stable  renal function.  GFR  04/24/2022 >60  >60 Final    Comment: Chronic Kidney Disease: less than 60 ml/min/1.73 sq.m. Kidney Failure: less than 15 ml/min/1.73 sq.m. Results valid for patients 18 years and older.  Calcium 04/24/2022 10.1  8.3 - 10.6 mg/dL Final    Magnesium 04/24/2022 1.90  1.80 - 2.40 mg/dL Final    Hyaline Casts, UA 04/24/2022 10* 0 - 8 /LPF Final    WBC, UA 04/24/2022 2  0 - 5 /HPF Final    RBC, UA 04/24/2022 31* 0 - 4 /HPF Final    Epithelial Cells, UA 04/24/2022 1  0 - 5 /HPF Final    Comment: Urinalysis microscopic performed using the  automated methodology (AUWI analyzer).  Protime 04/25/2022 18.7* 9.9 - 12.7 sec Final    Comment: Effective 6-30-21 09:00am EST  Please note reference ranges have  changed for PT and INR Testing.  INR 04/25/2022 1.62* 0.88 - 1.12 Final    Comment: Effective 6/30/21 at 09:00am EST    Normal: 0.88 - 1.12  Therapeutic: 2.0 - 3.0  Pros.  Valve: 2.5 - 3.5  AMI: 2.0 - 3.0      Magnesium 04/25/2022 2.00  1.80 - 2.40 mg/dL Final    Protime 04/26/2022 15.6* 9.9 - 12.7 sec Final    Comment: Effective 6-30-21 09:00am EST  Please note reference ranges have  changed for PT and INR Testing.  INR 04/26/2022 1.36* 0.88 - 1.12 Final    Comment: Effective 6/30/21 at 09:00am EST    Normal: 0.88 - 1.12  Therapeutic: 2.0 - 3.0  Pros. Valve: 2.5 - 3.5  AMI: 2.0 - 3.0           Assessment & Plan:   Principal Problem:    Calculous pyelonephritis--sned urine for cx--cont ceftin as o/p--DC to home today   Active Problems:    Essential hypertension, benign--Continue current therapy      NICM (nonischemic cardiomyopathy) (HCC)--Continue current therapy      On warfarin therapy    Ureteral colic  Resolved Problems:    * No resolved hospital problems.  *  Resume warfarin--DC to home today--DS dictated    condit on dc--stable   Please note that over 35 minutes was spent in evaluating the patient, review of records and results, discussion with staff/family, etc.    Mailni Camarillo MD

## 2022-04-26 NOTE — OP NOTE
[address] [place] [address] Ling Berkowitz [address]                                OPERATIVE REPORT    PATIENT NAME: Steven Alvarez                        :        1960  MED REC NO:   [id number]                          ROOM:       0243  ACCOUNT NO:   [de-identified]                           ADMIT DATE: 2022  PROVIDER:     Manfred Mcgee MD    DATE OF PROCEDURE:  2022    PREOPERATIVE DIAGNOSIS:  Right ureteral calculus, possible urinary tract  infection. POSTOPERATIVE DIAGNOSIS:  Right ureteral calculus, possible urinary  tract infection. PROCEDURE PERFORMED:  Cystoscopy, right ureteral stent insertion  6-Singaporean x 26 cm. SURGEON:  Manfred Mcgee MD    ANESTHESIA:  TIVA. COMPLICATIONS:  None. BLOOD LOSS:  Minimal.    INDICATION:  A 67-year-old gentleman with a history of recurrent stones  presents with severe right flank pain. Urine was nitrite positive. Initial urine culture was negative but today he reported low-grade  fevers. He is here for stent insertion. PROCEDURE:  He was ready on IV Rocephin. He was taken to the cystoscopy  suite. He moved onto the table. Anesthesia was induced. His position  was changed to the lithotomy position. His genitalia were prepped and  draped. The 21-Singaporean cystoscope advanced easily through the urethra  into the bladder. There were no urethral abnormalities. The prostate  was nonobstructive. The bladder was normal.  A 0.035 ZIPwire was  advanced into the right ureteral orifice. Initially, resistance was met  in the mid to distal right ureter. I was eventually able to manipulate  the wire beyond the obstruction up to the kidney where it curled. This  was confirmed under fluoroscopy. Then a 6-Singaporean x 26-cm double-J stent  was advanced over the wire in the typical fashion. The wire was  withdrawn.   Fluoroscopy showed the stent was in the appropriate  position. The bladder was then drained and the scope was withdrawn. He  has awakened and transferred to recovery. We will plan definitive  ureteroscopy in approximately 1-2 weeks.         Natalie Medina MD    D: 04/25/2022 17:25:40       T: 04/25/2022 17:29:28     [email or web address]  Job#: [id number]     Doc#: [id number]    CC:

## 2022-04-26 NOTE — PROGRESS NOTES
Pt started taking his home meds Azo for urinary pain this afternoon. He said he's been taking it at home as needed. Collected and given to pharmacy, called on call urology Dr. Lonny Hutton. New order made and carried out. Dr. Jason Corley informed about heating pad. New orders made and carried out.  Electronically signed by Helena Vega RN on 4/26/2022 at 12:23 AM

## 2022-04-26 NOTE — DISCHARGE SUMMARY
[address] [facility]                               DISCHARGE SUMMARY    PATIENT NAME: Kristin Baron                        :        1960  MED REC NO:   [id number]                          ROOM:       3612  ACCOUNT NO:   [de-identified]                           ADMIT DATE: 2022  PROVIDER:     Kiki Frank MD                  DISCHARGE DATE:  2022        DIAGNOSIS ON ADMISSION:  Right ureteral colic. DIAGNOSES ON DISCHARGE:  1.  Calculous pyelonephritis. 2.  Right ureteral colic. 3.  Essential hypertension. 4.  Nonischemic cardiomyopathy. 5.  On warfarin therapy. HISTORY OF PRESENT ILLNESS:  The patient is a 19-year-old white male  admitted through emergency. He presented with a history of progressive  right flank pain over the past several days. He described his pain as  being persistent and radiating down across the flank and into the  testicle area and consistent with his usual ureteral colic. It had been  going on for three to four days. He described the pain as 9 out of 10  with some dysuria and frequency. He saw Dr. Amanda Vasquez, his urologist.  He  was started on Levaquin. He presented to the ER with progressive  symptoms. He has had multiple ureteral stones in the past and he says  this feels just like his ureteral stones that he has had in the past.   Evaluation in the ER included a CT scan that did show a right ureteral  stone associated with some hydronephrosis. There was also some swelling  of the right kidney area suggestive of possible early pyelonephritis. His INR was 2.9. He was admitted because of all of the above. He was  begun on IV Rocephin. Despite his urine being nitrite positive, a  culture was not sent and will be sent prior to discharge.   He was seen  by the Urology service and had a stent placed in the right ureter  yesterday, on , by Dr. Daryle Buoy without incident. Clinically,  he has improved with exception of some intermittent nausea. He is  taking diet well. He did require Dilaudid for pain and will be  transitioned to Percocet. Laboratory data is remarkable for near  discharge BUN and creatinine of 23 and 1.3, potassium of 3.9. His INR  was 1.36 and he will be restarted on his warfarin. His urinalysis as  mentioned was nitrite positive. Culture will be sent today as  apparently one was not sent on admission. The plan is for him to be  discharged home later today as he is feeling better. DISCHARGE MEDICATIONS:  He will resume his maintenance meds at home  including albuterol inhaler and albuterol nebulizer q.4 p.r.n. He is to  also have Astelin nasal spray, warfarin that is going to be regulated by  the Coumadin clinic, aspirin 81 mg daily, Claritin 10 mg daily, Dexilant  60 mg daily, Entresto 49/51 b.i.d., flavoxate 100 mg, Urispas 3 times a  day as needed for spasm, Lasix 20 mg daily, methocarbamol 500 mg 4 times  a day p.r.n. for back pain, pravastatin 40 mg daily, Lyrica 75 one in  the morning and two at night, Stiolto Respimat 2 puffs daily, Flomax 0.4  mg b.i.d., Zolpidem 10 mg at bedtime. He is to stop his Levaquin and  will be started on Ceftin 250 b.i.d. for a total of 7 days additional.    FOLLOWUP:  He will follow up with Dr. Flora Colvin in a week and follow up  with the Urology service as directed relative to the ureteral stent.     Paco Coker MD    D: 04/26/2022 8:09:17       T: 04/26/2022 16:46:22     [email or web address]  Job#: [id number]     Doc#: [id number]    CC:  Yaritza Barton MD

## 2022-04-26 NOTE — CARE COORDINATION
INITIAL CASE MANAGEMENT ASSESSMENT    Reviewed chart, met with patient to assess possible discharge needs. Explained Case Management role/services. Living Situation: Patient lives with his wife in a condo with a level entry. ADLs: Independent     DME: Oxygen from [facility]    PT/OT Recs: None     Active Services: None     Transportation: Active /Wife will transport     Medications: [facility]/No barriers    PCP: Ok Ozuna MD      HD/PD: N/A    PLAN/COMMENTS: Patient plans to return to home with his wife. SW/CM provided contact information for patient or family to call with any questions. SW/CM will follow and assist as needed.   Electronically signed by Duc Ruby RN on 4/26/2022 at 9:03 AM

## 2022-04-26 NOTE — PLAN OF CARE
Problem: Discharge Planning  Goal: Discharge to home or other facility with appropriate resources  4/26/2022 0938 by Makenna Hoffman RN  Outcome: Progressing  4/26/2022 0209 by Julius Yap RN  Outcome: Progressing     Problem: Pain  Goal: Verbalizes/displays adequate comfort level or baseline comfort level  4/26/2022 0938 by Makenna Hoffman RN  Outcome: Progressing  4/26/2022 0209 by Julius Yap RN  Outcome: Progressing     Problem: ABCDS Injury Assessment  Goal: Absence of physical injury  4/26/2022 0938 by Makenna Hoffman RN  Outcome: Progressing  4/26/2022 0209 by Julius Yap RN  Outcome: Progressing     Problem: Safety - Adult  Goal: Free from fall injury  Outcome: Progressing

## 2022-04-26 NOTE — PLAN OF CARE
Problem: Discharge Planning  Goal: Discharge to home or other facility with appropriate resources  Outcome: Progressing     Problem: Pain  Goal: Verbalizes/displays adequate comfort level or baseline comfort level  Outcome: Progressing    Problem: ABCDS Injury Assessment  Goal: Absence of physical injury  Outcome: Progressing

## 2022-04-26 NOTE — PROGRESS NOTES
This RT went to speak with the patient regarding the home \"albuterol\" that was brought in. Explained to patient pharmacy could not verify the medication because an order was not placed due to being under the care of Dr. Daniela Deshpande. This RT attempted to reach Dr. Rogelio Valenzuela without success. The patient said \"it's ok, I'm leaving tomorrow anyway. \" With Margarita Jenkins RN present, this RT returned the five \"albuterol\" back to the patient.  Electronically signed by Any Tam RCP on 4/25/2022 at 9:15 PM      Witness Electronically signed by Kayden Cosby RN on 4/25/2022 at 9:14 PM

## 2022-04-26 NOTE — ACP (ADVANCE CARE PLANNING)
Advance Care Planning     Advance Care Planning Activator (Inpatient)  Conversation Note      Date of ACP Conversation: 4/26/2022     Conversation Conducted with: Patient with Decision Making Capacity    ACP Activator: Lupillo Vazquez RN    Health Care Decision Maker:     Current Designated Health Care Decision Maker:     Primary Decision Maker: Colleen Mariee - [phone]    Care Preferences    Ventilation: \"If you were in your present state of health and suddenly became very ill and were unable to breathe on your own, what would your preference be about the use of a ventilator (breathing machine) if it were available to you? \"      Would the patient desire the use of ventilator (breathing machine)?: yes    \"If your health worsens and it becomes clear that your chance of recovery is unlikely, what would your preference be about the use of a ventilator (breathing machine) if it were available to you? \"     Would the patient desire the use of ventilator (breathing machine)?: No      Resuscitation  \"CPR works best to restart the heart when there is a sudden event, like a heart attack, in someone who is otherwise healthy. Unfortunately, CPR does not typically restart the heart for people who have serious health conditions or who are very sick. \"    \"In the event your heart stopped as a result of an underlying serious health condition, would you want attempts to be made to restart your heart (answer \"yes\" for attempt to resuscitate) or would you prefer a natural death (answer \"no\" for do not attempt to resuscitate)? \" yes       [] Yes   [x] No   Educated Patient / Donis Public regarding differences between Advance Directives and portable DNR orders.     Length of ACP Conversation in minutes:  3    Conversation Outcomes:  [] ACP discussion completed  [] Existing advance directive reviewed with patient; no changes to patient's previously recorded wishes  [] New Advance Directive completed  [] Portable Do Not Rescitate prepared for Provider review and signature  [] POLST/POST/MOLST/MOST prepared for Provider review and signature      Follow-up plan:    [] Schedule follow-up conversation to continue planning  [] Referred individual to Provider for additional questions/concerns   [] Advised patient/agent/surrogate to review completed ACP document and update if needed with changes in condition, patient preferences or care setting    [x] This note routed to one or more involved healthcare providers

## 2022-04-28 ENCOUNTER — TELEPHONE (OUTPATIENT)
Dept: CASE MANAGEMENT | Age: 62
End: 2022-04-28

## 2022-04-28 LAB — URINE CULTURE, ROUTINE: NORMAL

## 2022-04-28 NOTE — TELEPHONE ENCOUNTER
First Lung Cancer Screening Recommendation Reminder Letter mailed to patient. Most recent smoking history reviewed.

## 2022-05-03 RX ORDER — FUROSEMIDE 20 MG/1
20 TABLET ORAL 2 TIMES DAILY PRN
Qty: 180 TABLET | Refills: 3 | Status: SHIPPED | OUTPATIENT
Start: 2022-05-03 | End: 2022-06-23

## 2022-05-05 ENCOUNTER — ANTI-COAG VISIT (OUTPATIENT)
Dept: PHARMACY | Age: 62
End: 2022-05-05
Payer: COMMERCIAL

## 2022-05-05 DIAGNOSIS — Z79.01 CHRONIC ANTICOAGULATION: ICD-10-CM

## 2022-05-05 DIAGNOSIS — I26.93 SINGLE SUBSEGMENTAL PULMONARY EMBOLISM WITHOUT ACUTE COR PULMONALE (HCC): Primary | ICD-10-CM

## 2022-05-05 LAB — INTERNATIONAL NORMALIZATION RATIO, POC: 1.4

## 2022-05-05 PROCEDURE — 85610 PROTHROMBIN TIME: CPT

## 2022-05-05 PROCEDURE — 99211 OFF/OP EST MAY X REQ PHY/QHP: CPT

## 2022-05-05 NOTE — PROGRESS NOTES
Toyin Atwood is a 57 y.o. here for warfarin management. Carmen Oquendo had an INR test today. Results were reviewed and appropriate warfarin management was completed. This visit was performed as: An in person visit. Protocols were followed with precautions to reduce the spread of COVID-19. Patient verifies current dosing regimen: No: taking 2.5mg qd     Warfarin medication reviewed and updated on the patient 's home medication list: Yes   All other medications reviewed and updated on the patient 's home medication list: Yes     Lab Results   Component Value Date    INR 1.4 2022    INR 1.36 (H) 2022    INR 1.62 (H) 2022           Anticoagulation Summary  As of 2022    INR goal:  2.0-3.0   TTR:  36.4 % (6.5 y)   INR used for dosin.4 (2022)   Warfarin maintenance plan:  2.5 mg (5 mg x 0.5) every Sun, Tue, Thu; 5 mg (5 mg x 1) all other days   Weekly warfarin total:  27.5 mg   Plan last modified:  Wilbert York RPH (10/21/2021)   Next INR check:     Priority:  Maintenance   Target end date: Indefinite    Indications    Pulmonary embolus (HCC) [I26.99]  Chronic anticoagulation [Z79.01]             Anticoagulation Episode Summary     INR check location:  Anticoagulation Clinic    Preferred lab:      Send INR reminders to:  [facility]    Comments:  EPIC - finger stick every 2-3 weeks        Anticoagulation Care Providers     Provider Role Specialty Phone number    Adal Douglas MD Referring Internal Medicine [phone]          Warfarin assessment / plan:     Patient has been taking 2.5mg qd d/t urethral stent placement. Has been having bleeding, he is going to continue this dose till stent removed next Tuesday.  He will up dose depending on amount of bleeding post removal    Description    Taking 2.5mg qd    Call [phone] with signs or symptoms of bleeding or ANY medication changes (including antibiotics, steroids, over-the-counter medications or herbal supplements). If significant bleeding occurs or if you fall and hit your head, please seek immediate medical attention. Keep the number of servings of vitamin K containing foods (dark green, leafy vegetables) the same each week. Please call if this changes. Limit alcohol intake. Please call if this changes. Immunization History   Administered Date(s) Administered    COVID-19, Sena [place], Primary or Immunocompromised, PF, 100mcg/0.5mL 03/09/2021, 04/08/2021, 12/15/2021    Influenza, MDCK Quadv, IM, PF (Flucelvax 2 yrs and older) 10/26/2021    Influenza, Quadv, IM, PF (6 mo and older Fluzone, Flulaval, Fluarix, and 3 yrs and older Afluria) 10/27/2017, 09/23/2019, 10/02/2020    Influenza, Freda Blight, Recombinant, IM PF (Flublok 18 yrs and older) 10/03/2018    Pneumococcal Conjugate 13-valent ([id number]) 09/11/2015    Pneumococcal Conjugate Vaccine 08/15/2017    Pneumococcal Polysaccharide ([id number]) 08/01/2007, 12/19/2012    Tdap (Boostrix, Adacel) 08/19/2014           Orders Placed This Encounter   Procedures    POCT INR     This external order was created through the results console. No orders of the defined types were placed in this encounter. Reviewed AVS with patient / caregiver.     Billing Points:  Basic Education (disease state, med overview, expected symptoms) - 1 point       CLINICAL PHARMACY CONSULT: MED RECONCILIATION/REVIEW ADDENDUM    For Pharmacy Admin Tracking Only     Intervention Detail: Dose Adjustment: 3, reason: Therapy De-escalation   Total # of Interventions Recommended: 3   Total # of Interventions Accepted: 3   Time Spent (min): 15

## 2022-05-12 ENCOUNTER — NURSE ONLY (OUTPATIENT)
Dept: CARDIOLOGY CLINIC | Age: 62
End: 2022-05-12
Payer: COMMERCIAL

## 2022-05-12 ENCOUNTER — TELEPHONE (OUTPATIENT)
Dept: PULMONOLOGY | Age: 62
End: 2022-05-12

## 2022-05-12 ENCOUNTER — OFFICE VISIT (OUTPATIENT)
Dept: CARDIOLOGY CLINIC | Age: 62
End: 2022-05-12
Payer: COMMERCIAL

## 2022-05-12 VITALS
DIASTOLIC BLOOD PRESSURE: 68 MMHG | BODY MASS INDEX: 35.16 KG/M2 | WEIGHT: 224 LBS | HEIGHT: 67 IN | SYSTOLIC BLOOD PRESSURE: 114 MMHG | OXYGEN SATURATION: 94 % | HEART RATE: 88 BPM

## 2022-05-12 DIAGNOSIS — I48.0 PAROXYSMAL ATRIAL FIBRILLATION (HCC): ICD-10-CM

## 2022-05-12 DIAGNOSIS — I42.8 NICM (NONISCHEMIC CARDIOMYOPATHY) (HCC): ICD-10-CM

## 2022-05-12 DIAGNOSIS — I50.23 ACUTE ON CHRONIC SYSTOLIC HEART FAILURE (HCC): ICD-10-CM

## 2022-05-12 DIAGNOSIS — I50.22 CHRONIC SYSTOLIC CHF (CONGESTIVE HEART FAILURE), NYHA CLASS 2 (HCC): ICD-10-CM

## 2022-05-12 DIAGNOSIS — I50.31 ACUTE DIASTOLIC HEART FAILURE (HCC): Primary | ICD-10-CM

## 2022-05-12 DIAGNOSIS — Z95.810 BIVENTRICULAR ICD (IMPLANTABLE CARDIOVERTER-DEFIBRILLATOR) IN PLACE: ICD-10-CM

## 2022-05-12 DIAGNOSIS — I44.7 LBBB (LEFT BUNDLE BRANCH BLOCK): ICD-10-CM

## 2022-05-12 PROBLEM — J84.09: Status: ACTIVE | Noted: 2022-05-12

## 2022-05-12 PROCEDURE — 93284 PRGRMG EVAL IMPLANTABLE DFB: CPT | Performed by: INTERNAL MEDICINE

## 2022-05-12 PROCEDURE — 3017F COLORECTAL CA SCREEN DOC REV: CPT | Performed by: NURSE PRACTITIONER

## 2022-05-12 PROCEDURE — 93284 PRGRMG EVAL IMPLANTABLE DFB: CPT | Performed by: NURSE PRACTITIONER

## 2022-05-12 PROCEDURE — 1111F DSCHRG MED/CURRENT MED MERGE: CPT | Performed by: NURSE PRACTITIONER

## 2022-05-12 PROCEDURE — 99214 OFFICE O/P EST MOD 30 MIN: CPT | Performed by: NURSE PRACTITIONER

## 2022-05-12 PROCEDURE — 1036F TOBACCO NON-USER: CPT | Performed by: NURSE PRACTITIONER

## 2022-05-12 PROCEDURE — G8417 CALC BMI ABV UP PARAM F/U: HCPCS | Performed by: NURSE PRACTITIONER

## 2022-05-12 PROCEDURE — G8427 DOCREV CUR MEDS BY ELIG CLIN: HCPCS | Performed by: NURSE PRACTITIONER

## 2022-05-12 PROCEDURE — 93290 INTERROG DEV EVAL ICPMS IP: CPT | Performed by: INTERNAL MEDICINE

## 2022-05-12 RX ORDER — SPIRONOLACTONE 25 MG/1
25 TABLET ORAL DAILY
Qty: 90 TABLET | Refills: 3 | Status: SHIPPED | OUTPATIENT
Start: 2022-05-12 | End: 2022-05-16

## 2022-05-12 NOTE — PROGRESS NOTES
The [facility], [address] [place] [address] [address] [facility] CenterYves ferrell [place] [address]  [phone]    PrimaryCare Doctor:  Donis Blanco MD  Primary Cardiologist: Dr. Nasim Patricia    Chief Complaint   Patient presents with    Follow-up     no cc       History of Present Illness:  Radha Armenta is a 57 y.o. male with  PMH non ischemic CM, S/P BiV ICD 2017 -turned off at his request D/T anxiety issues now set VVI 40 with defibrillator programmng on, 2016 CP/abn stress>LHC with normal cors, found EF 40% at that time, Atrial Tach 2018>underwent DCCV 9/2018 >declined multaq D/T side effects, AF,  PE 2011, RLE DVT 2013 - on coumadin, GERD, HLP, HTN, kidney stones, sacroilitis. Never smoked. ARDS 2011. Bronchiolitis Obliterans and nocturnal hypoxia - wears 2L NC at night (follows with Dr. Bernice Madrigal). Retired. Patient presents to [facility] cardiology for follow up for heart failure. Patient has called [facility] office with weight gain. At times, taking extra lasix helps. He was taking 20mg TID, cut back to 2 tabs BID because he \"was getting dried out. \"    Last seen in office 7/2021. Had laser to kidney stones at [facility]rology center yesterday. He has been drinking extra fluid over last few weeks to flush hiskidney stones. Denies CP, SOB, LH, dizziness, orthopnea, syncope, edema currently. He rides stationary bike for 30min 3x/week. He has noticed some increased SOB on some days that improves if he takes an extra lasix. His wt Is up ~ 10lbs- he thinks it is from eating too much. His abd is distended. Device check today indicates decrease thoracic impedence which would suggest increased fluid. Review of Systems:   General: Denies fever, chills  Skin: Denies skin changes, rash, itching, lesions.   HEENT: Denies headache, dizziness, vision changes, nosebleeds, sore throat, nasal drainage  RESP:Denies wheeze, cough  CARD: Denies palpitations,  murmur  GI:Denies nausea, vomiting, heartburn, loss of appetite, change in bowels  : Denies frequency, pain, incontinence, polyuria  VASC: Denies claudication, leg cramps, clots  MUSC/SKEL: Denies pain, stiffness, arthritis  PSYCH: Denies anxiety, depression, stress  NEURO: Denies numbness, tingling, weakness,change in mood or memory  HEME: Denies abn bruising, bleeding, anemia  ENDO: Denies intolerance to heat, cold, excessive thirst or hunger, hx thyroid disease    /68   Pulse 88   Ht 5' 7\" (1.702 m)   Wt 224 lb (101.6 kg)   SpO2 94%   BMI 35.08 kg/m²   Wt Readings from Last 3 Encounters:   05/12/22 224 lb (101.6 kg)   05/11/22 224 lb (101.6 kg)   04/26/22 225 lb 5 oz (102.2 kg)       Physical Exam:  GEN: Appears well, no acute distress  SKIN: Pink, warm, dry. Nails without clubbing. HEENT: PERRLA. Normocephalic, atraumatic. Neck supple. No adenopathy. LUNG: AP diameter normal. Diminished bilateral bases. No wheeze or ronchi. Respiratory effort normal.  HEART: S1S2 A/R. No JVD. No carotid bruit. No murmur, rub or gallop. ABD: Large, distended, sl firm. +BS X 4 quads. No hepatomegaly. EXT: Radial and pedal pulses 2+ and symmetric. Without varicosities. No edema. MUSCSKEL: Good ROM X4 extremities. No deformity. NEURO: A/O X3. Calm and cooperative. Past Medical History:   has a past medical history of Bronchiolitis obliterans ([address] Bundle branch block, right, Cardiomyopathy ([address] Chest pain, COVID-19 virus vaccine not available, Fibromyalgia, GERD (gastroesophageal reflux disease), Hepatitis, Hx of blood clots, Hyperlipemia, Hypertension, IBS (irritable bowel syndrome), Kidney stone, Neuropathy, Pneumothorax, Prostatitis, Pulmonary embolism on right ([address] and Sacroiliitis ([address] Surgical History:   has a past surgical history that includes Cholecystectomy (2007); Colonoscopy (9/21/2012); Lithotripsy; sinus surgery; Upper gastrointestinal endoscopy (3/28/2014); hernia repair (2015); pacemaker placement; Cystocopy (05/17/2019);  Cystoscopy (Right, 5/17/2019); Upper gastrointestinal endoscopy (N/A, 2/1/2021); Cystoscopy (Right, 7/2/2021); and Cystoscopy (Right, 4/25/2022). Social History:   reports that he has never smoked. He has never used smokeless tobacco. He reports that he does not drink alcohol and does not use drugs. Family History:   Family History   Problem Relation Age of Onset    High Blood Pressure Mother     Dementia Mother     Cancer Father         Pancreatic Ca       HomeMedications:  Prior to Admission medications    Medication Sig Start Date End Date Taking?  Authorizing Provider   pregabalin (LYRICA) 75 MG capsule TAKE 1 CAPSULE BY MOUTH IN  THE MORNING AND 2 CAPSULES  IN THE EVENING 5/8/22 8/8/22 Yes Carmita Solorio MD   furosemide (LASIX) 20 MG tablet TAKE 1 TABLET BY MOUTH 2 TIMES DAILY AS NEEDED (SOB) 5/3/22  Yes Wilbur Rashid MD   Phenazopyridine HCl (AZO-DINE URINARY ANALGESIC PO) Take 2 tablets by mouth daily as needed    Yes Historical Provider, MD   methocarbamol (ROBAXIN) 500 MG tablet Take 500 mg by mouth 4 times daily as needed (musclw spasms)   Yes Historical Provider, MD   loratadine (CLARITIN) 10 MG tablet Take 10 mg by mouth daily   Yes Historical Provider, MD   sodium chloride, Inhalant, 3 % nebulizer solution Take 4 mLs by nebulization every 8 hours as needed for Other or Cough (cough) 3/14/22  Yes Shruti Liao, APRN - CNP   ENTRESTO 49-51 MG per tablet TAKE 1 TABLET BY MOUTH  TWICE DAILY 2/14/22  Yes Wilbur Rashid MD   flavoxATE (URISPAS) 100 MG tablet Take 1 tablet by mouth 3 times daily as needed for Muscle spasms 1/28/22  Yes Carmita Solorio MD   albuterol (PROVENTIL) (2.5 MG/3ML) 0.083% nebulizer solution Take 3 mLs by nebulization every 4 hours as needed for Wheezing or Shortness of Breath  Patient taking differently: Take 2.5 mg by nebulization in the morning, at noon, and at bedtime  1/19/22  Yes Humera Lo MD   STIOLTO RESPIMAT 2.5-2.5 MCG/ACT AERS INHALE 2 PUFFS INTO THE LUNGS DAILY 1/10/22  Yes Any Benitez MD   azelastine (ASTELIN) 0.1 % nasal spray 2 sprays by Nasal route 2 times daily Use in each nostril as directed  Patient taking differently: 1 spray by Nasal route at bedtime Use in each nostril as directed 1/10/22  Yes Andie Everett MD   dexlansoprazole (DEXILANT) 60 MG CPDR delayed release capsule Take 1 capsule by mouth daily 12/20/21  Yes Andie Everett MD   dicyclomine (BENTYL) 10 MG capsule TAKE ONE CAPSULE BY MOUTH  FOUR TIMES DAILY AS NEEDED 12/16/21  Yes Andie Everett MD   tamsulosin (FLOMAX) 0.4 MG capsule TAKE ONE CAPSULE BY MOUTH TWO TIMES A DAY 10/18/21  Yes Andie Everett MD   triamcinolone (KENALOG) 0.1 % cream Apply topically to affected areas 2 times daily.  10/4/21  Yes Griselda Burgos MD   linaCLOtide Bert Benito) 72 MCG CAPS capsule Take 1 capsule by mouth every morning (before breakfast) 10/4/21  Yes Griselda Burgos MD   metoprolol succinate (TOPROL XL) 25 MG extended release tablet TAKE 1 TABLET BY MOUTH  TWICE DAILY 9/30/21  Yes Andie Everett MD   pravastatin (PRAVACHOL) 40 MG tablet Take 40 mg by mouth daily   Yes Historical Provider, MD   melatonin 3 MG TABS tablet Take 3 mg by mouth nightly as needed Taking half tablet   Yes Historical Provider, MD   warfarin (COUMADIN) 5 MG tablet Take 1 tablet by mouth daily Except 2.5 mg Tues and Thursday  Patient taking differently: Take 2.5 mg by mouth daily Changed a few days ago as directed by [facility] [phone] 9/20/21  Yes Andie Everett MD   guaiFENesin (MUCINEX) 600 MG extended release tablet Take 600 mg by mouth 2 times daily   Yes Historical Provider, MD   ondansetron (ZOFRAN) 4 MG tablet Take 1 tablet by mouth every 8 hours as needed for Nausea or Vomiting 6/18/21  Yes BRENNA Jo - CNP   Cholecalciferol (VITAMIN D3) 1.25 MG (90052 UT) CAPS Take 1 capsule by mouth once a week 9/15/20  Yes Andie Everett MD   albuterol sulfate  (90 Base) MCG/ACT inhaler INHALE 2 PUFFS INTO THE LUNGS EVERY 4 HOURS AS NEEDED FOR WHEEZING OR SHORTNESS OF BREATH 9/1/20  Yes BRENNA Shah CNP   polyethylene glycol (MIRALAX) PACK packet Take 17 g by mouth nightly    Yes Historical Provider, MD   aspirin 81 MG tablet Take 81 mg by mouth daily   Yes Historical Provider, MD        Allergies:  Ipratropium bromide hfa, Atrovent nasal spray [ipratropium], Doxycycline, Morphine, Nitroglycerin, Sulfa antibiotics, and Vicodin [hydrocodone-acetaminophen]       LABS: Results reviewed with patient today. CBC:   Lab Results   Component Value Date    WBC 6.4 04/24/2022    WBC 6.6 01/12/2022    WBC 6.3 10/18/2021    RBC 4.91 04/24/2022    RBC 5.21 01/12/2022    RBC 5.28 10/18/2021    HGB 15.1 04/24/2022    HGB 15.9 01/12/2022    HGB 16.1 10/18/2021    HCT 45.1 04/24/2022    HCT 47.7 01/12/2022    HCT 48.6 10/18/2021    MCV 91.9 04/24/2022    MCV 91.7 01/12/2022    MCV 92.0 10/18/2021    RDW 14.0 04/24/2022    RDW 13.4 01/12/2022    RDW 13.1 10/18/2021     04/24/2022     01/12/2022     10/18/2021     BMP:  Lab Results   Component Value Date     04/26/2022     04/24/2022     03/22/2022    K 3.9 04/26/2022    K 3.5 04/24/2022    K 4.3 03/22/2022    K 4.1 01/12/2022    K 5.8 10/18/2021    K 3.8 07/02/2021     04/26/2022     04/24/2022     03/22/2022    CO2 23 04/26/2022    CO2 25 04/24/2022    CO2 22 03/22/2022    BUN 13 04/26/2022    BUN 15 04/24/2022    BUN 11 03/22/2022    CREATININE 1.0 04/26/2022    CREATININE 1.1 04/24/2022    CREATININE 1.1 03/22/2022     BNP:   Lab Results   Component Value Date    PROBNP 46 10/18/2021    PROBNP 93 04/25/2021    PROBNP 73 03/17/2021       Parameters:   > 450 pg/mL under age 48  > 900 pg/mL ages 54-65  > 1800 pg/mL over age 76    Iron Studies:  No results found for: TIBC, FERRITIN  GLUCOSE:   No results for input(s): GLUCOSE in the last 72 hours.   LIVER PROFILE:   Lab Results   Component Value Date    AST 15 01/12/2022    ALT 17 01/12/2022    LIPASE 25.0 07/01/2021    AMYLASE 44 11/08/2017    LABALBU 4.2 01/12/2022    BILIDIR 1.1 12/26/2018    BILITOT 0.5 01/12/2022    ALKPHOS 122 01/12/2022     PT/INR:   Lab Results   Component Value Date    PROTIME 15.6 04/26/2022    PROTIME 37.6 01/29/2016    INR 1.4 05/05/2022    INR 1.36 04/26/2022     Cardiac Enzymes:  Lab Results   Component Value Date    CKTOTAL 69 08/14/2017    CKMB 0.65 08/17/2012    CKMB 0.75 05/17/2011    TROPONINI <0.01 04/25/2021     FASTING LIPID PANEL:  Lab Results   Component Value Date    CHOL 147 01/12/2022    HDL 38 01/12/2022    HDL 65 11/10/2011    LDLCALC 76 01/12/2022    TRIG 166 01/12/2022       Cardiac Imaging: Reports reviewed with patient today. ECHO:    Summary 9/25/19   Mild concentric LVH with normal LV size and wall motion. EF is    50%.   Paradoxical septal motion secondary to paced rhythm   Trivial mitral regurgitation is present.   The left atrium is normal in size.   The right ventricle is normal in size and function. Pacing wire seen.     Summary 11/8/2017 (S/P BiV placement)   Normal left ventricular wall thickness. Ejection fraction is visually   estimated to be 40-45%.  Aniket Pham is septal hypokinesis (secondary to Pacing)   Trivial mitral & tricuspid regurgitation is present.   The left atrial size is normal.   Pacer / ICD wire is visualized in the right ventricle.   There appears to be normal right ventricular size and function     Echo: 7/24/17  Left ventricle size is normal. Mild concentric left ventricular hypertrophy  is present. Global ejection fraction is moderately decreased and estimated  from 30 % to 35%. Diastolic filling parameters suggests grade I diastolic  dysfunction . There is abnormal (paradoxical) septal motion consistent with left bundle  branch block. Mitral valve is structurally normal.  Trivial to mild mitral regurgitation is present.   The left atrial size is normal.  A bubble study was performed and fails to show evidence of right to left  Shunting. Echo: 11/9/16  Dilated LV with severely reduced systolic function. EF 30%. Anterior, septal  and apical akinesis. Mild mitral regurgitation is present. The left atrial size is normal.  Normal right ventricular size and function. NM Cardiac Stress Test 8/26/2020  Summary     No evidence of reversible ischemia.    Jim Alphonse is a moderate to large sized, moderate intensity, fixed septal and     inferior wall defect which is most consistent with LBBB induced artifact and     diaphragm attenuation artifact.     Normal LV size and systolic function. Ejection fraction: 50 %    Overall findings represent a low risk study. Stress Test: 7/21/16  SPECT perfusion images: On the stress corrected images there is a moderate defect in the septum of the left ventricle. At rest, partially redistributes especially the posterior lateral component. Complete redistribution is not present.   JYUT:  68 %      (Normal is at least 62% for women and 53% for men)  LV wall motion:   There is significant hypokinesia of the ventricular septum  LVEDV: 117ml  (Normal is up to 100ml for women and 150ml for men)  Transient dilatation ratio:   1.0      (Normal is up to 1.3 for women and 1.2 for men)    Cardiac Angiography:   Cath: 7/21/16  #1-coronary angiography summary: Normal coronaries in a left   dominant system  A-left main coronary is normal  B-the LAD has minor irregularities and no significant stenoses  C-the circumflex is dominant and has minor irregularities and no   significant stenoses  D-the right coronary is a small nondominant vessel and is   angiographically normal  #2-ventriculography in the ADKINS projection demonstrates mild   global left ventricular dysfunction ejection fractions   approximately 40  #3-aortic pressure is approximately 150/80 left ventricular   pressures 150/14 there is no gradient on pullback  #5-VPXJZ are no complications  #0-TFG patient will be treated medically for left ventricular   dysfunction in the setting of normal coronaries    [facility] 3/2/2021  OVERALL IMPRESSION:  1. Slightly above normal right heart pressure with slightly elevated  pulmonary capillary wedge pressure of 17 mmHg. 2.  Normal cardiac output and cardiac indices. Thoracic Impedence/Device interrogation: Reviewed by me 5/12/2022. Decreased thoracic impedence indicating hypervolemia. Assessment/Plan:      1.) Acute on chronic systolic heart failure: EF 30% improved to 50%- last ECHO 9/2019. NICM. Decreased thoracici impedence associated with wt gain, SOB, abd distention suggests he is fluid overloaded. Most likely exacerbated by drinking extra fluid for kidney stones. SOB is most likely multifactorial with chronic lung dz. Suggested increasing entresto - declines. Agrees to add spironolactone today. He will need to cut back on fluid intake now. NYHA Class III   Stage C  Diuretic: lasix 20mg BID  Beta Blocker: Toprol XL  ACEi/ARB/ARNI: entresto  Aldosterone Antagonist: add zach 25mg daily  SGLT2 Inhibitor: none  2gm Na diet, daily weight, 64 oz fluid restriction  Avoid NSAIDS and other nephrotoxic meds  Cardiac Rehab: Not indicated for EF>35%  ICD: s/p BiV ICD- Bi V off, now VVI 40 with defibrillator ON    2.) Paroxysmal Atrial Fib: Managed by Dr. Anette Nicole. Declined multaq and Skylao  CHADSVASC- 2    HASBLED-  Thromboembolic risk reduction: warfarin INR goal 2-3 managed by clinic  Rate Control: Toprol XL  Rhythm Control:  Not a candidate for ablation with BiV ICD  EP to follow device remotely    3. ) Hyperlipidemia:   LDL Goal < 70  Statin: pravachol            Low cholesterol diet  Liver fx 2 months after initiation then q6mos  Lipid panel annually    4.) Hx RLE DVT/PE: On coumadin     Instructions:   See AVS    FU 6 weeks, repeat BNP,BMP 2 weeks.     I appreciate the opportunity of cooperating in the care of this individual.    LEE Rangel, APRN - CNP, CNP, 5/12/2022,3:07 PM

## 2022-05-12 NOTE — RESULT ENCOUNTER NOTE
Device interrogation reviewed. Thoracic impedance is lower (I.e. more fluid build-up in lungs). Please assess for CHF signs/sx.

## 2022-05-12 NOTE — PROGRESS NOTES
Pt seen in clinic today for annual cardiac device interrogation. Their device is a BTK 3 chamber BiV CRTD  Based on threshold, impedance, and intrinsic sensing graphs viewed today, the device appears to be functioning with minimal deviation from established trends. Remaining battery life is 2.95v  Pt is sensitive to pacing and threshold tests as such requests they not be run in office. Pt programmed to VVI 40bpm as a result. AP 0%                 RP 0%                CRTP 0%    No new episodes since last remote check. TI HF monitor reveals pt is high fluid episode beginning today. Rx: warfarin (COUMADIN) 5 MG tablet   Take 1 tablet by mouth daily Except 2.5 mg Tues and ThursdayPatient taking differently: Take 2.5 mg by mouth daily Except 5 mg on Monday, Wedneday, Friday  furosemide (LASIX) 20 MG tablet 1 tab po bid - pt notes he is only taking QD  metoprolol succinate (TOPROL XL) 25 MG extended release tablet 1 tab po bid - (dosage as listed in Epic)  ENTRESTO 49-51 MG per tablet TAKE 1 TABLET BY MOUTH TWICE DAILY    Pt was informed of findings today and general questions have been answered with regard to device. Home monitoring hardware is transmitting on schedule. Results discussed with or to be reviewed by EP    Pt to see AASHISH Hendrix in clinic today following their device check.  -results discussed with Aashish Peña prior to appt.

## 2022-05-12 NOTE — TELEPHONE ENCOUNTER
My Burleson calls back. Via My Chart My Burleson ask for order to repeat CT of chest back on 5/6. Per Dr. Hope Carey reviewing chart will have My Burleson wait to repeat CTuntil we see him for his ov in August, and they can further discuss order. Past CT's show lungs are stable\".   My Burleson acknowledged he will keep his appointment for August.

## 2022-05-12 NOTE — PATIENT INSTRUCTIONS
Patient Education        Learning About Heart Failure Zones  What are heart failure zones? Heart failure zones give you an easy way to see changes in your heart failure symptoms. They also tell you when you need to get help. Check every day to seewhich zone you are in. Green zone. You are doing well. This is where you want to be.  Your weight is stable. It's not going up or down.  You breathe easily.  You are sleeping well. You are able to lie flat without shortness of breath.  You can do your usual activities. Yellow zone. Be careful. Your symptoms are changing. Call your doctor.  You have new or increased shortness of breath.  You are dizzy or lightheaded, or you feel like you may faint.  You have sudden weight gain, such as more than 2 to 3 pounds in a day or 5 pounds in a week. (Your doctor may suggest a different range of weight gain.)   You have increased swelling in your legs, ankles, or feet.  You are so tired or weak that you can't do your usual activities.  You are not sleeping well. Shortness of breath wakes you up at night. You need extra pillows. Red zone. This is an emergency. Call 911. You have symptoms of sudden heart failure. For example:   You have severe trouble breathing.  You cough up pink, foamy mucus.  You have a new irregular or fast heartbeat. You have symptoms of a heart attack. These may include:   Chest pain or pressure, or a strange feeling in the chest.   Sweating.  Shortness of breath.  Nausea or vomiting.  Pain, pressure, or a strange feeling in the back, neck, jaw, or upper belly or in one or both shoulders or arms.  Lightheadedness or sudden weakness.  A fast or irregular heartbeat. If you have symptoms of a heart attack: After you call 911, the  may tell you to chew 1 adult-strength or 2 to 4 low-doseaspirin. Wait for an ambulance. Do not try to drive yourself. Follow-up care is a key part of your treatment and safety.  Be sure to make and go to all appointments, and call your doctor if you are having problems. It's also a good idea to know your test results and keep alist of the medicines you take. Where can you learn more? Go to https://rocco.MOOI. org and sign in to your "[employer] account. Enter T174 in the [facility] box to learn more about \"Learning About Heart Failure Zones. \"     If you do not have an account, please click on the \"Sign Up Now\" link. Current as of: January 10, 2022               Content Version: 13.2  © 2006-2022 [employer]. Care instructions adapted under license by [facility] ([facility]). If you have questions about a medical condition or this instruction, always ask your healthcare professional. [address] any warranty or liability for your use of this information. Patient Education        Limiting Sodium With Heart Failure: Care Instructions  Your Care Instructions     Sodium causes your body to hold on to extra water. This may cause your heartfailure symptoms to get worse. Limiting sodium may help you feel better. People get most of their sodium from processed foods. Fast food and restaurant meals also tend to be very high in sodium. Your doctor may suggest that you limit sodium. Your doctor can tell you how much sodium is right for you. An example is less than 3,000 mg a day. This includes all the salt you eat incooked or packaged foods. Follow-up care is a key part of your treatment and safety. Be sure to make and go to all appointments, and call your doctor if you are having problems. It's also a good idea to know your test results and keep alist of the medicines you take. How can you care for yourself at home? Read food labels   Read food labels on cans and food packages. The labels tell you how much sodium is in each serving. Make sure that you look at the serving size.  If you eat more than the serving size, you have eaten more sodium than is listed for one serving.  Food labels also tell you the Percent Daily Value for sodium. Choose products with low Percent Daily Values for sodium.  Be aware that sodium can come in forms other than salt, including monosodium glutamate (MSG), sodium citrate, and sodium bicarbonate (baking soda). MSG is often added to Asian food. You can sometimes ask for food without MSG or salt. Buy low-sodium foods   Buy foods that are labeled \"unsalted\" (no salt added), \"sodium-free\" (less than 5 mg of sodium per serving), or \"low-sodium\" (140 mg or less of sodium per serving). A food labeled \"light sodium\" has less than half of the full-sodium version of that food. Foods labeled \"reduced-sodium\" may still have too much sodium.  Buy fresh vegetables or plain, frozen vegetables. Buy low-sodium versions of canned vegetables, soups, and other canned goods. Prepare low-sodium meals   Use less salt each day when cooking. Reducing salt in this way will help you adjust to the taste. Do not add salt after cooking. Take the salt shaker off the table.  Flavor your food with garlic, lemon juice, onion, vinegar, herbs, and spices instead of salt. Do not use soy sauce, steak sauce, onion salt, garlic salt, or ketchup on your food.  Make your own salad dressings, sauces, and ketchup without adding salt.  Use less salt (or none) when recipes call for it. You can often use half the salt a recipe calls for without losing flavor. Other dishes like rice, pasta, and grains do not need added salt.  Rinse canned vegetables. This removes some--but not all--of the salt.  Avoid water that has a naturally high sodium content or that has been treated with water softeners, which add sodium. If you buy bottled water, read the label and choose a sodium-free brand. Avoid high-sodium foods, such as:   Smoked, cured, salted, and canned meat, fish, and poultry.  Ham, gerard, hot dogs, and luncheon meats.    Regular, hard, and processed cheese and regular peanut butter.  Crackers with salted tops.  Frozen prepared meals.  Canned and dried soups, broths, and bouillon, unless labeled sodium-free or low-sodium.  Canned vegetables, unless labeled sodium-free or low-sodium.  Salted snack foods such as chips and pretzels.  Western Mikayla fries, pizza, tacos, and other fast foods.  Pickles, olives, ketchup, and other condiments, especially soy sauce, unless labeled sodium-free or low-sodium. If you cannot cook for yourself   Have family members or friends help you, or have someone cook low-sodium meals.  Check with your local senior nutrition program to find out where meals are served and whether they offer a low-sodium option. You can often find these programs through your local health department or hospital.   Have meals delivered to your home. Most [facility] have a Meals on [employer] AL[employer]ia. These programs provide one hot meal a day for older adults, delivered to their homes. Ask whether these meals are low-sodium. Let them know that you are on a low-sodium diet. Where can you learn more? Go to https://CafÃ© Canusapepiceweb.[employer].. org and sign in to your [employer] account. Enter A166 in the [facility] box to learn more about \"Limiting Sodium With Heart Failure: Care Instructions. \"     If you do not have an account, please click on the \"Sign Up Now\" link. Current as of: January 10, 2022               Content Version: 13.2  © 2006-2022 Healthwise, [employer]. Care instructions adapted under license by [facility] ([facility]). If you have questions about a medical condition or this instruction, always ask your healthcare professional. [place] [address] any warranty or liability for your use of this information. Patient Education        Learning About Restricting Fluids When You Have Heart Failure  Why is it important to limit fluids when you have heart failure?      With heart failure, having too much fluid in your body can lower sodium levels in the blood. It can also cause symptoms such as swelling. Limiting fluids, if your doctor tells you to, can help balance your body's sodium level. It mayalso help you feel better. How can you care for yourself at home?  Find a way of tracking the fluids you take in that works for you. Here are two methods you can try:  ? Write down how much you drink throughout the day. ? Keep a container filled with the amount of liquid allowed for the day. As you drink liquids during the day, such as a 6-ounce cup of coffee, pour that same amount out of the container. When the container is empty, you've had your liquid for the day.  Count any foods that will melt (such as ice cream, gelatin, or flavored ice treats) or liquid foods (such as soup) as part of your fluids for the day. Also count the liquid in canned fruits and vegetables as part of your daily intake, or drain them well before serving.  Space your liquids throughout the day. Then you won't be tempted to drink more than the amount your doctor recommends.  To relieve thirst without taking in extra water, try chewing gum, sucking on hard candy (sugarless if you have diabetes), or rinsing your mouth with water and spitting it out. Where can you learn more? Go to https://Nfocus NeuromedicalbisiCass Art.Horizontal Systems. org and sign in to your [employer] account. Enter F350 in the [facility] box to learn more about \"Learning About Restricting Fluids When You Have Heart Failure. \"     If you do not have an account, please click on the \"Sign Up Now\" link. Current as of: January 10, 2022               Content Version: 13.2  © 8051-0007 Healthwise, Incorporated. Care instructions adapted under license by [facility] ([facility]). If you have questions about a medical condition or this instruction, always ask your healthcare professional. [place] [address] any warranty or liability for your use of this information.

## 2022-05-13 ENCOUNTER — TELEPHONE (OUTPATIENT)
Dept: PHARMACY | Age: 62
End: 2022-05-13

## 2022-05-13 ENCOUNTER — TELEPHONE (OUTPATIENT)
Dept: CARDIOLOGY CLINIC | Age: 62
End: 2022-05-13

## 2022-05-13 NOTE — TELEPHONE ENCOUNTER
El [place] called in this afternoon, he would like to speak with Joaquim Severance he states she sent him a message earlier today that he doesn't understand the message she sent. He can be reached at [phone].

## 2022-05-13 NOTE — TELEPHONE ENCOUNTER
Returned call to patient. Relayed device interrogation message from [employer]. Patient verbalized and confirmed understanding.

## 2022-05-16 ENCOUNTER — TELEPHONE (OUTPATIENT)
Dept: CARDIOLOGY CLINIC | Age: 62
End: 2022-05-16

## 2022-05-16 NOTE — TELEPHONE ENCOUNTER
Gricelda Zuñiga routed conversation to [facility] Nurse 1 hour ago (1:05 PM)     Gricelda Zuñiga 1 hour ago (1:05 PM)          Katherine Ferron called in this afternoon, he states his blood pressure cup at home states his blood pressure is irregular he would like a call on what he should do.        He can be reached at [phone].

## 2022-05-16 NOTE — TELEPHONE ENCOUNTER
SHANA Martin    Please review and advise further instructions. Thanks. Patient saw you last week in f/u 5/12/22. I called him as you can see multiple communications from the patient just today. He states he took 2 doses of newly prescribed spironolactone last friday and Saturday with increase in upper body cramps and slightly more noticeable increase in SOB. He also notes that his BP cuff reading states \"irregular heart beat. \" BP has been stable 109/78. He is not willing to go back spironolactone but is willing to slowly go up on his Janki Schiller (was not ready at office visit.)     His device check 5/12 was suggestive that he was in fluid overload and today per Darien's update below from remote reading that he is more dry. Reminder kidney stone removal per laser 5/11/22 and reports 5 days of Levaquin before and after 5/11/22-symptoms of upper body cramping as well with therapy. Nash Dutta, no AFIB or arrhythmia on remote today?

## 2022-05-16 NOTE — TELEPHONE ENCOUNTER
My Burleson called in this afternoon, he states his blood pressure cup at home states his blood pressure is irregular he would like a call on what he should do. He can be reached at [phone].

## 2022-05-16 NOTE — TELEPHONE ENCOUNTER
Pt calls intoday asking if changes were made to his implanted CRTD. I informed Pt that his device parameters were not altered even temporarily on his visit last week. Pt notes that since starting aldactone that his heart has been acting very strangely. Pt has stopped taking aldactone. Remote transmission from pt device shows thoracic impedance is now trending high(dry) @ 97ohms. Pt would like further guidance as to what to do.

## 2022-05-16 NOTE — TELEPHONE ENCOUNTER
Mason Forrest stopped by the office this morning wanting to talk to AASHISH Martin. He is saying that the spironolactone (ALDACTONE) 25 MG tablet   Is not agreeing with him and he needs to switch it.      You can reach Mason Forrest at #[phone]

## 2022-05-17 NOTE — TELEPHONE ENCOUNTER
Reviewed remote device check. No evidence of AF. Thoracic impendence is slightly elevated indicating euvolemia/possibly hypovolemia. Spoke to patient. He is feeling better now that he stopped spironolactone - will cont to hold. His BP has been stable at home.

## 2022-05-18 ENCOUNTER — ANTI-COAG VISIT (OUTPATIENT)
Dept: PHARMACY | Age: 62
End: 2022-05-18
Payer: COMMERCIAL

## 2022-05-18 DIAGNOSIS — I27.82 CHRONIC PULMONARY EMBOLISM, UNSPECIFIED PULMONARY EMBOLISM TYPE, UNSPECIFIED WHETHER ACUTE COR PULMONALE PRESENT (HCC): Primary | ICD-10-CM

## 2022-05-18 DIAGNOSIS — Z79.01 CHRONIC ANTICOAGULATION: ICD-10-CM

## 2022-05-18 LAB — INTERNATIONAL NORMALIZATION RATIO, POC: 3.9

## 2022-05-18 PROCEDURE — 85610 PROTHROMBIN TIME: CPT

## 2022-05-18 PROCEDURE — 99211 OFF/OP EST MAY X REQ PHY/QHP: CPT

## 2022-05-18 NOTE — PROGRESS NOTES
Cesia Ashley is a 57 y.o. here for warfarin management. Janene Benitez had an INR test today. Results were reviewed and appropriate warfarin management was completed. This visit was performed as: An in person visit. Protocols were followed with precautions to reduce the spread of COVID-19. Patient verifies current dosing regimen: Yes     Warfarin medication reviewed and updated on the patient 's home medication list: Yes   All other medications reviewed and updated on the patient 's home medication list: No: no changes     Lab Results   Component Value Date    INR 3.9 05/18/2022    INR 1.4 05/05/2022    INR 1.36 (H) 04/26/2022       Patient Findings     Positives:  Upcoming invasive procedure, Other complaints    Negatives:  Signs/symptoms of bleeding, Change in medications, Change in diet/appetite, Bruising    Comments:  Reports not feeling well - Janene Benitez believes it to be allergies  Planned procedure in 2 days, but report COVID test didn't take. Janene Benitez believes it will be rescheduled          Anticoagulation Summary  As of 5/18/2022    INR goal:  2.0-3.0   TTR:  36.4 % (6.5 y)   INR used for dosing:  3.9 (5/18/2022)   Warfarin maintenance plan:  5 mg (5 mg x 1) every Mon, Fri; 2.5 mg (5 mg x 0.5) all other days   Weekly warfarin total:  22.5 mg   Plan last modified:  Alpa Yao, [facility] (5/18/2022)   Next INR check:  6/1/2022   Priority:  Maintenance   Target end date: Indefinite    Indications    Pulmonary embolus (HCC) [I26.99]  Chronic anticoagulation [Z79.01]             Anticoagulation Episode Summary     INR check location:  Anticoagulation Clinic    Preferred lab:      Send INR reminders to:  [facility]    Comments:  EPIC - finger stick every 2-3 weeks        Anticoagulation Care Providers     Provider Role Specialty Phone number    Tom Reyes MD Referring Internal Medicine [phone]          Warfarin assessment / plan:     Appears well  Supra-therapeutic INR. Minnie Helms   Denies signs and symptoms of bleeding/bruising. Denies medication changes. Denies change in appetite. Denies decrease in vitamin K intake. He did have a procedure 5/10. Warfarin held 5/9, 5/10 and 5/11. Restarted warfarin 5/12 at his \"regular dose\" which appears to be 5mg daily EXCEPT 2.5mg every Sunday, Tuesday and Thursday. So, back on warfarin for only 6 days. Noted at his last visit 5/5/22 to have urethra stent placement and bleeding. Taking a lower dose of warfarin 2.5mg daily at that time until stent removal.     Over the past 6 days reprots to have received 20mg of warfarin. Warfarin dosing has been changed quite a bit lately as well as hospitalization 4/24-4/26 and some procedures post discharge. Will hold warfarin today and decrease his weekly warfarin dose from his regular dosing schedule. Description    Warfarin 2.5mg daily EXCEPT 5mg every Monday and Friday    Call [phone] with signs or symptoms of bleeding or ANY medication changes (including antibiotics, steroids, over-the-counter medications or herbal supplements). If significant bleeding occurs or if you fall and hit your head, please seek immediate medical attention. Keep the number of servings of vitamin K containing foods (dark green, leafy vegetables) the same each week. Please call if this changes. Limit alcohol intake. Please call if this changes.           Immunization History   Administered Date(s) Administered    AISLINNID-19, Yulissa Mosley, Primary or Immunocompromised, PF, 100mcg/0.5mL 03/09/2021, 04/08/2021, 12/15/2021    Influenza, MDCK Quadv, IM, PF (Flucelvax 2 yrs and older) 10/26/2021    Influenza, Quadv, IM, PF (6 mo and older Fluzone, Flulaval, Fluarix, and 3 yrs and older Afluria) 10/27/2017, 09/23/2019, 10/02/2020    Influenza, Radhika Cates, Recombinant, IM PF (Flublok 18 yrs and older) 10/03/2018    Pneumococcal Conjugate 13-valent ([id number]) 09/11/2015    Pneumococcal Conjugate Vaccine 08/15/2017    Pneumococcal Polysaccharide ([id number]) 08/01/2007, 12/19/2012    Tdap (Boostrix, Adacel) 08/19/2014           Orders Placed This Encounter   Procedures    POCT INR     This external order was created through the results console. No orders of the defined types were placed in this encounter. Reviewed AVS with patient / caregiver.     Billing Points:  Adjust dosage and/or reconcile meds (fill pill box) </= 5 medications - 2 points       CLINICAL PHARMACY CONSULT: MED RECONCILIATION/REVIEW ADDENDUM    For Pharmacy Admin Tracking Only     Intervention Detail: Dose Adjustment: 1, reason: Therapy De-escalation   Total # of Interventions Recommended: 1   Total # of Interventions Accepted: 1   Time Spent (min): 20

## 2022-05-18 NOTE — TELEPHONE ENCOUNTER
Reported procedure 5/10. Warfarin held 5/9, 5/10 and 5/11.      Next INR 5/18    Lisa Ivy PharmD, [address] [facility]  Anticoagulation Service  [phone]

## 2022-05-23 PROBLEM — Z00.00 PREVENTATIVE HEALTH CARE: Status: ACTIVE | Noted: 2022-05-23

## 2022-05-24 ENCOUNTER — TELEPHONE (OUTPATIENT)
Dept: CARDIOLOGY CLINIC | Age: 62
End: 2022-05-24

## 2022-05-24 DIAGNOSIS — Z79.899 ON STATIN THERAPY: Primary | ICD-10-CM

## 2022-05-24 NOTE — TELEPHONE ENCOUNTER
Prashanth Damon called in this afternoon wanting to know if AASHISH Martin can put a lab order in for him to get his cholesterol checked?        You can reach Prashanth Damon at #[phone]

## 2022-05-24 NOTE — TELEPHONE ENCOUNTER
Called patient. [facility] message from [facility]. Patient was adamant about having a lipid panel since he said his last one was abnormal.  Will also place order for lipid panel as requested, OK per Jade.

## 2022-05-24 NOTE — TELEPHONE ENCOUNTER
Reviewed last office note from Kwesi Soto NP 5/12/22 recommending BNP/BMP in 2 weeks and she recommend lipid panel annually. He completed a lipid panel 1/12/22 so will need a repeat lipid 1/2023. Veronica did place the orders for the BMP/BNP to be completed in 2 weeks from his follow up on the 12th. Please call to notify Florinda Luis.

## 2022-05-27 ENCOUNTER — TELEPHONE (OUTPATIENT)
Dept: CASE MANAGEMENT | Age: 62
End: 2022-05-27

## 2022-06-01 ENCOUNTER — ANTI-COAG VISIT (OUTPATIENT)
Dept: PHARMACY | Age: 62
End: 2022-06-01
Payer: COMMERCIAL

## 2022-06-01 DIAGNOSIS — Z79.01 CHRONIC ANTICOAGULATION: ICD-10-CM

## 2022-06-01 DIAGNOSIS — I27.82 CHRONIC PULMONARY EMBOLISM, UNSPECIFIED PULMONARY EMBOLISM TYPE, UNSPECIFIED WHETHER ACUTE COR PULMONALE PRESENT (HCC): Primary | ICD-10-CM

## 2022-06-01 LAB — INTERNATIONAL NORMALIZATION RATIO, POC: 1.3

## 2022-06-01 PROCEDURE — 85610 PROTHROMBIN TIME: CPT

## 2022-06-01 PROCEDURE — 99211 OFF/OP EST MAY X REQ PHY/QHP: CPT

## 2022-06-01 NOTE — PROGRESS NOTES
Jasper Green is a 57 y.o. here for warfarin management. Igor Flood had an INR test today. Results were reviewed and appropriate warfarin management was completed. This visit was performed as: An in person visit. Protocols were followed with precautions to reduce the spread of COVID-19. Patient verifies current dosing regimen: Yes     Warfarin medication reviewed and updated on the patient 's home medication list: Yes   All other medications reviewed and updated on the patient 's home medication list: Yes     Lab Results   Component Value Date    INR 1.3 2022    INR 3.9 2022    INR 1.4 2022       Patient Findings     Positives:  Change in medications    Negatives:  Signs/symptoms of thrombosis, Signs/symptoms of bleeding, Change in diet/appetite, Bruising    Comments:  Two doses of prednisone over the weekend          Anticoagulation Summary  As of 2022    INR goal:  2.0-3.0   TTR:  36.4 % (6.5 y)   INR used for dosin.3 (2022)   Warfarin maintenance plan:  5 mg (5 mg x 1) every Mon, Wed, Fri; 2.5 mg (5 mg x 0.5) all other days   Weekly warfarin total:  25 mg   Plan last modified:  Rebekah Payne, [facility] (2022)   Next INR check:  6/15/2022   Priority:  Maintenance   Target end date: Indefinite    Indications    Pulmonary embolus (HCC) [I26.99]  Chronic anticoagulation [Z79.01]             Anticoagulation Episode Summary     INR check location:  Anticoagulation Clinic    Preferred lab:      Send INR reminders to:  [facility]    Comments:  EPIC - finger stick every 2-3 weeks        Anticoagulation Care Providers     Provider Role Specialty Phone number    Alexander Bolanos MD Referring Internal Medicine [phone]          Warfarin assessment / plan:     Appears well  Sub-therapeutic INR  . Denies missed doses. Denies signs or symptoms of clotting. Denies signs or symptoms of a stroke  Took two doses of prednisone this weekend.       Cannot pinpoint reason fir decreased INR. Will see PCP today. Will call if any medications changes. Will increase warfarin today and tomorrow and then resume same weekly warfarin dose. Description    Take Warfarin 7.5mg today and take Warfarin 7.5mg tomorrow    THEN NEW DOSE: Warfarin 2.5mg daily EXCEPT 5mg every Monday, Wednesday and Friday    Call [phone] with signs or symptoms of bleeding or ANY medication changes (including antibiotics, steroids, over-the-counter medications or herbal supplements). If significant bleeding occurs or if you fall and hit your head, please seek immediate medical attention. Keep the number of servings of vitamin K containing foods (dark green, leafy vegetables) the same each week. Please call if this changes. Limit alcohol intake. Please call if this changes. Immunization History   Administered Date(s) Administered    COVID-19, [employer]V, Primary or Immunocompromised, PF, 100mcg/0.5mL 03/09/2021, 04/08/2021, 12/15/2021    Influenza, MDCK Quadv, IM, PF (Flucelvax 2 yrs and older) 10/26/2021    Influenza, Quadv, IM, PF (6 mo and older Fluzone, Flulaval, Fluarix, and 3 yrs and older Afluria) 10/27/2017, 09/23/2019, 10/02/2020    Influenza, Ora Axel, Recombinant, IM PF (Flublok 18 yrs and older) 10/03/2018    Pneumococcal Conjugate 13-valent ([id number]) 09/11/2015    Pneumococcal Conjugate Vaccine 08/15/2017    Pneumococcal Polysaccharide ([id number]) 08/01/2007, 12/19/2012    Tdap (Boostrix, Adacel) 08/19/2014           Orders Placed This Encounter   Procedures    POCT INR     This external order was created through the results console. No orders of the defined types were placed in this encounter. Reviewed AVS with patient / caregiver.     Billing Points:  Adjust dosage and/or reconcile meds (fill pill box) </= 5 medications - 2 points       CLINICAL PHARMACY CONSULT: MED RECONCILIATION/REVIEW ADDENDUM    For Pharmacy Admin Tracking Only     Intervention Detail: Dose Adjustment: 1, reason: Therapy Optimization   Total # of Interventions Recommended: 1   Total # of Interventions Accepted: 1   Time Spent (min): 15

## 2022-06-06 ENCOUNTER — HOSPITAL ENCOUNTER (OUTPATIENT)
Age: 62
Discharge: HOME | End: 2022-06-06
Payer: COMMERCIAL

## 2022-06-06 ENCOUNTER — HOSPITAL ENCOUNTER (OUTPATIENT)
Dept: GENERAL RADIOLOGY | Age: 62
Discharge: HOME | End: 2022-06-06
Payer: COMMERCIAL

## 2022-06-06 DIAGNOSIS — N13.2 HYDRONEPHROSIS WITH RENAL AND URETERAL CALCULUS OBSTRUCTION: ICD-10-CM

## 2022-06-06 PROCEDURE — 74018 RADEX ABDOMEN 1 VIEW: CPT

## 2022-06-10 ENCOUNTER — TELEPHONE (OUTPATIENT)
Dept: CARDIOLOGY CLINIC | Age: 62
End: 2022-06-10

## 2022-06-10 NOTE — TELEPHONE ENCOUNTER
Wilbert Conroy called in this morning stating that his cholesterol is high and doesn't think his Pravastatin is working. Wants to know if Dr. Vinod Lu wants to switch it. He also wanted to let Dr. Vinod Lu know that he had a carotid scan done today and once he gets the results he will call and go over those.      You can reach Wilbert Conroy at #[phone]

## 2022-06-10 NOTE — TELEPHONE ENCOUNTER
I have reviewed Jovon's chart. He does not have any known atherosclerotic disease therefore LDL of 92 is acceptable on current Pravachol therapy.   He has not tolerated other statin therapy in the past

## 2022-06-15 ENCOUNTER — ANTI-COAG VISIT (OUTPATIENT)
Dept: PHARMACY | Age: 62
End: 2022-06-15
Payer: COMMERCIAL

## 2022-06-15 LAB — INTERNATIONAL NORMALIZATION RATIO, POC: 3

## 2022-06-15 PROCEDURE — 85610 PROTHROMBIN TIME: CPT

## 2022-06-15 PROCEDURE — 99211 OFF/OP EST MAY X REQ PHY/QHP: CPT

## 2022-06-15 NOTE — PROGRESS NOTES
Linden Tam is a 57 y.o. here for warfarin management. Randy Gonzalez had an INR test today. Results were reviewed and appropriate warfarin management was completed. This visit was performed as: An in person visit. Protocols were followed with precautions to reduce the spread of COVID-19. Patient verifies current dosing regimen: Yes     Warfarin medication reviewed and updated on the patient 's home medication list: Yes   All other medications reviewed and updated on the patient 's home medication list: No: No changes     Lab Results   Component Value Date    INR 3.0 06/15/2022    INR 1.3 06/01/2022    INR 3.9 05/18/2022       Patient Findings     Positives:  Change in medications    Negatives:  Signs/symptoms of thrombosis, Signs/symptoms of bleeding, Change in health, Change in diet/appetite, Bruising    Comments:  Did take Keflex for 6 days          Anticoagulation Summary  As of 6/15/2022    INR goal:  2.0-3.0   TTR:  36.5 % (6.6 y)   INR used for dosing:  3.0 (6/15/2022)   Warfarin maintenance plan:  5 mg (5 mg x 1) every Mon, Wed, Fri; 2.5 mg (5 mg x 0.5) all other days   Weekly warfarin total:  25 mg   Plan last modified:  Herberth Almendarez, [address] [facility] (6/1/2022)   Next INR check:  7/8/2022   Priority:  Maintenance   Target end date: Indefinite    Indications    Pulmonary embolus (HCC) [I26.99]  Chronic anticoagulation [Z79.01]             Anticoagulation Episode Summary     INR check location:  Anticoagulation Clinic    Preferred lab:      Send INR reminders to:  [facility]    Comments:  EPIC - finger stick every 2-3 weeks        Anticoagulation Care Providers     Provider Role Specialty Phone number    Sandra Gamble MD Referring Internal Medicine [phone]          Warfarin assessment / plan:     Patient was on keflex for six days for a UTI. Appears well. No changes affecting warfarin therapy were noted. No acute findings. INR within goal range.      No change to warfarin therapy today. Immunization History   Administered Date(s) Administered    COVID-19, Celina Dues, Primary or Immunocompromised, PF, 100mcg/0.5mL 03/09/2021, 04/08/2021, 12/15/2021    Influenza, MDCK Quadv, IM, PF (Flucelvax 2 yrs and older) 10/26/2021    Influenza, Quadv, IM, PF (6 mo and older Fluzone, Flulaval, Fluarix, and 3 yrs and older Afluria) 10/27/2017, 09/23/2019, 10/02/2020    Influenza, Rebeca Memory, Recombinant, IM PF (Flublok 18 yrs and older) 10/03/2018    Pneumococcal Conjugate 13-valent ([id number]) 09/11/2015    Pneumococcal Conjugate Vaccine 08/15/2017    Pneumococcal Polysaccharide ([id number]) 08/01/2007, 12/19/2012    Tdap (Boostrix, Adacel) 08/19/2014           Orders Placed This Encounter   Procedures    POCT INR     This external order was created through the results console. No orders of the defined types were placed in this encounter. Reviewed AVS with patient / caregiver. Billing Points:  0 billing points this visit       CLINICAL PHARMACY CONSULT: MED RECONCILIATION/REVIEW ADDENDUM    For Pharmacy Admin Tracking Only     Intervention Detail:    Total # of Interventions Recommended: 0   Total # of Interventions Accepted: 0   Time Spent (min): P.O. Valeria Flower 4 Student  Reviewed By:   I have seen the patient and reviewed the progress note written by the PharmD Candidate. I agree with this assesment and plan.    Estephanie Benton, [facility], PharmD 6/15/22 10:04 AM

## 2022-06-16 NOTE — PROGRESS NOTES
[facility], [address] [place] [address] [address] [place] [address]  [phone]    PrimaryCare Doctor:  Nghia Felix MD  Primary Cardiologist: Dr. Jill Winters    Chief Complaint   Patient presents with    Follow-up     no cc       History of Present Illness:  Roby Felipe is a 60 y.o. male with  PMH non ischemic CM, S/P BiV ICD 2017 -turned off at his request D/T anxiety issues now set VVI 40 with defibrillator programmng on, 2016 CP/abn stress>LHC with normal cors, found EF 40% at that time, Atrial Tach 2018>underwent DCCV 9/2018 >declined multaq D/T side effects, AF,  PE 2011, RLE DVT 2013 - on coumadin, GERD, HLP, HTN, kidney stones, sacroilitis. Never smoked. ARDS 2011. Bronchiolitis Obliterans and nocturnal hypoxia - wears 2L NC at night (follows with Dr. Concha Hager). Retired. Patient presents to [facility] cardiology for follow up for heart failure. Last OV he was hypervolemic from increase fluid intake. He tried spironolactone and states he did not tolerate - now stopped. States it \"made me feel funny. \"  Feeling better today. Denies CP, SOB, LH, dizziness, orthopnea, syncope, edema currently. Abd is distended. He rides stationary bike for 30min 3x/week. Says he walked 23 miles last week. Discussed reduced EF on most recent ECHO. He declines BiV pacing and is reluctant to add or increase medications. He worries about sided effects. Discussed at length and he still prefers to do his own research regarding HF therapies. He does agree to add farxiga today. Review of Systems:   General: Denies fever, chills  Skin: Denies skin changes, rash, itching, lesions.   HEENT: Denies headache, dizziness, vision changes, nosebleeds, sore throat, nasal drainage  RESP:Denies wheeze, cough  CARD: Denies palpitations,  murmur  GI:Denies nausea, vomiting, heartburn, loss of appetite, change in bowels  : Denies frequency, pain, incontinence, polyuria  VASC: Denies claudication, leg cramps, clots  MUSC/SKEL: Denies pain, stiffness, arthritis  PSYCH: Denies anxiety, depression, stress  NEURO: Denies numbness, tingling, weakness,change in mood or memory  HEME: Denies abn bruising, bleeding, anemia  ENDO: Denies intolerance to heat, cold, excessive thirst or hunger, hx thyroid disease    /64   Pulse 74   Ht 5' 6\" (1.676 m)   Wt 224 lb (101.6 kg)   SpO2 97%   BMI 36.15 kg/m²   Wt Readings from Last 3 Encounters:   06/23/22 224 lb (101.6 kg)   06/17/22 224 lb (101.6 kg)   06/01/22 224 lb 3.2 oz (101.7 kg)       Physical Exam:  GEN: Appears well, no acute distress  SKIN: Pink, warm, dry. Nails without clubbing. HEENT: PERRLA. Normocephalic, atraumatic. Neck supple. No adenopathy. LUNG: AP diameter normal. Diminished left base. No wheeze or ronchi. Respiratory effort normal.  HEART: S1S2 A/R. No JVD. No carotid bruit. No murmur, rub or gallop. ABD: Large, distended, sl firm. +BS X 4 quads. No hepatomegaly. EXT: Radial and pedal pulses 2+ and symmetric. Without varicosities. No edema. MUSCSKEL: Good ROM X4 extremities. No deformity. NEURO: A/O X3. Calm and cooperative. Past Medical History:   has a past medical history of Bronchiolitis obliterans ([address] Bundle branch block, right, Cardiomyopathy ([address] Chest pain, COVID-19 virus vaccine not available, Fibromyalgia, GERD (gastroesophageal reflux disease), Hepatitis, Hx of blood clots, Hyperlipemia, Hypertension, IBS (irritable bowel syndrome), Kidney stone, Neuropathy, Pneumothorax, Prostatitis, Pulmonary embolism on right ([address] and Sacroiliitis ([address] Surgical History:   has a past surgical history that includes Cholecystectomy (2007); Colonoscopy (09/21/2012); Lithotripsy; sinus surgery; Upper gastrointestinal endoscopy (03/28/2014); hernia repair (2015); pacemaker placement; Cystocopy (05/17/2019); Cystoscopy (Right, 05/17/2019); Upper gastrointestinal endoscopy (N/A, 02/01/2021); Cystoscopy (Right, 07/02/2021);  Cystoscopy (Right, 04/25/2022); and Colonoscopy (11/30/2018). Social History:   reports that he has never smoked. He has never used smokeless tobacco. He reports that he does not drink alcohol and does not use drugs. Family History:   Family History   Problem Relation Age of Onset    High Blood Pressure Mother     Dementia Mother     Cancer Father         Pancreatic Ca    Cancer Paternal Uncle     Cancer Paternal Uncle     Cancer Paternal Uncle        HomeMedications:  Prior to Admission medications    Medication Sig Start Date End Date Taking? Authorizing Provider   furosemide (LASIX) 20 MG tablet Take 1 tablet by mouth 2 times daily 6/23/22  Yes New Javier, APRN - CNP   dapagliflozin (FARXIGA) 10 MG tablet Take 1 tablet by mouth every morning 6/23/22  Yes Veronica Monet APRN - CNP   methylPREDNISolone (MEDROL) 4 MG tablet Take 1 tablet by mouth 2 times daily for 5 days 6/22/22 6/27/22 Yes Davian Adhikari MD   amoxicillin-clavulanate (AUGMENTIN) 875-125 MG per tablet Take 1 tablet by mouth 2 times daily for 7 days 6/22/22 6/29/22 Yes Teri Pabon APRN - CNP   zolpidem (AMBIEN) 10 MG tablet Take by mouth nightly as needed for Sleep.    Yes Historical Provider, MD   pregabalin (LYRICA) 75 MG capsule TAKE 1 CAPSULE BY MOUTH IN  THE MORNING AND 2 CAPSULES  IN THE EVENING 5/8/22 8/8/22 Yes Davian Adhikari MD   Phenazopyridine HCl (AZO-DINE URINARY ANALGESIC PO) Take 2 tablets by mouth daily as needed    Yes Historical Provider, MD   methocarbamol (ROBAXIN) 500 MG tablet Take 500 mg by mouth 4 times daily as needed (musclw spasms)   Yes Historical Provider, MD   loratadine (CLARITIN) 10 MG tablet Take 10 mg by mouth daily   Yes Historical Provider, MD   ENTRESTO 49-51 MG per tablet TAKE 1 TABLET BY MOUTH  TWICE DAILY 2/14/22  Yes Arsalan Hernandez MD   flavoxATE (URISPAS) 100 MG tablet Take 1 tablet by mouth 3 times daily as needed for Muscle spasms 1/28/22  Yes Davian Adhikari MD   albuterol (PROVENTIL) (2.5 MG/3ML) 0.083% nebulizer solution Take 3 mLs by nebulization every 4 hours as needed for Wheezing or Shortness of Breath  Patient taking differently: Take 2.5 mg by nebulization in the morning, at noon, and at bedtime  1/19/22  Yes Rin Gaxiola MD   STIOLTO RESPIMAT 2.5-2.5 MCG/ACT AERS INHALE 2 PUFFS INTO THE LUNGS DAILY 1/10/22  Yes Rin Gaxiola MD   azelastine (ASTELIN) 0.1 % nasal spray 2 sprays by Nasal route 2 times daily Use in each nostril as directed  Patient taking differently: 1 spray by Nasal route at bedtime Use in each nostril as directed 1/10/22  Yes Susan Pina MD   dexlansoprazole (DEXILANT) 60 MG CPDR delayed release capsule Take 1 capsule by mouth daily 12/20/21  Yes Susan Pina MD   dicyclomine (BENTYL) 10 MG capsule TAKE ONE CAPSULE BY MOUTH  FOUR TIMES DAILY AS NEEDED 12/16/21  Yes Susan Pina MD   tamsulosin (FLOMAX) 0.4 MG capsule TAKE ONE CAPSULE BY MOUTH TWO TIMES A DAY 10/18/21  Yes Susan Pina MD   metoprolol succinate (TOPROL XL) 25 MG extended release tablet TAKE 1 TABLET BY MOUTH  TWICE DAILY 9/30/21  Yes Susan Pina MD   pravastatin (PRAVACHOL) 40 MG tablet Take 40 mg by mouth daily   Yes Historical Provider, MD   melatonin 3 MG TABS tablet Take 3 mg by mouth nightly as needed Taking half tablet   Yes Historical Provider, MD   warfarin (COUMADIN) 5 MG tablet Take 1 tablet by mouth daily Except 2.5 mg Tues and Thursday  Patient taking differently: Take 2.5 mg by mouth daily EXCEPT 5mg every Monday, Wednesday and Friday or as directed by [facility] [phone] 9/20/21  Yes Susan Pina MD   guaiFENesin (MUCINEX) 600 MG extended release tablet Take 600 mg by mouth 2 times daily   Yes Historical Provider, MD   ondansetron (ZOFRAN) 4 MG tablet Take 1 tablet by mouth every 8 hours as needed for Nausea or Vomiting 6/18/21  Yes Merline Peppers, APRN - CNP   Cholecalciferol (VITAMIN D3) 1.25 MG (70890 UT) CAPS Take 1 capsule by mouth once a week 9/15/20  Yes Axel Kendrick MD   albuterol sulfate  (90 Base) MCG/ACT inhaler INHALE 2 PUFFS INTO THE LUNGS EVERY 4 HOURS AS NEEDED FOR WHEEZING OR SHORTNESS OF BREATH 9/1/20  Yes BRENNA Leong - CNP   polyethylene glycol (MIRALAX) PACK packet Take 17 g by mouth nightly    Yes Historical Provider, MD   aspirin 81 MG tablet Take 81 mg by mouth daily   Yes Historical Provider, MD        Allergies:  Atrovent nasal spray [ipratropium], Ipratropium bromide hfa, Proventil [albuterol], Doxycycline, Morphine, Nitroglycerin, Sulfa antibiotics, and Vicodin [hydrocodone-acetaminophen]       LABS: Results reviewed with patient today.     CBC:   Lab Results   Component Value Date    WBC 6.4 04/24/2022    WBC 6.6 01/12/2022    WBC 6.3 10/18/2021    RBC 4.91 04/24/2022    RBC 5.21 01/12/2022    RBC 5.28 10/18/2021    HGB 15.1 04/24/2022    HGB 15.9 01/12/2022    HGB 16.1 10/18/2021    HCT 45.1 04/24/2022    HCT 47.7 01/12/2022    HCT 48.6 10/18/2021    MCV 91.9 04/24/2022    MCV 91.7 01/12/2022    MCV 92.0 10/18/2021    RDW 14.0 04/24/2022    RDW 13.4 01/12/2022    RDW 13.1 10/18/2021     04/24/2022     01/12/2022     10/18/2021     BMP:  Lab Results   Component Value Date     06/06/2022     04/26/2022     04/24/2022    K 4.3 06/06/2022    K 3.9 04/26/2022    K 3.5 04/24/2022    K 4.3 03/22/2022    K 5.8 10/18/2021    K 3.8 07/02/2021     06/06/2022     04/26/2022     04/24/2022    CO2 21 06/06/2022    CO2 23 04/26/2022    CO2 25 04/24/2022    BUN 8 06/06/2022    BUN 13 04/26/2022    BUN 15 04/24/2022    CREATININE 0.9 06/06/2022    CREATININE 1.0 04/26/2022    CREATININE 1.1 04/24/2022     BNP:   Lab Results   Component Value Date    PROBNP 62 06/06/2022    PROBNP 46 10/18/2021    PROBNP 93 04/25/2021       Parameters:   > 450 pg/mL under age 48  > 900 pg/mL ages 54-65  > 1800 pg/mL over age 76    Iron Studies:  No results found for: TIBC, FERRITIN  GLUCOSE: No results for input(s): GLUCOSE in the last 72 hours. LIVER PROFILE:   Lab Results   Component Value Date    AST 22 06/06/2022    ALT 24 06/06/2022    LIPASE 25.0 07/01/2021    AMYLASE 44 11/08/2017    LABALBU 4.1 06/06/2022    BILIDIR 1.1 12/26/2018    BILITOT 0.6 06/06/2022    ALKPHOS 119 06/06/2022     PT/INR:   Lab Results   Component Value Date    PROTIME 15.6 04/26/2022    PROTIME 37.6 01/29/2016    INR 3.0 06/15/2022    INR 1.36 04/26/2022     Cardiac Enzymes:  Lab Results   Component Value Date    CKTOTAL 69 08/14/2017    CKMB 0.65 08/17/2012    CKMB 0.75 05/17/2011    TROPONINI <0.01 04/25/2021     FASTING LIPID PANEL:  Lab Results   Component Value Date    CHOL 172 06/06/2022    HDL 37 06/06/2022    HDL 65 11/10/2011    LDLCALC 92 06/06/2022    TRIG 216 06/06/2022       Cardiac Imaging: Reports reviewed with patient today. EKG:     Stress Test: 7/21/16  SPECT perfusion images: On the stress corrected images there is a moderate defect in the septum of the left ventricle. At rest, partially redistributes especially the posterior lateral component. Complete redistribution is not present.   MKKT:  98 %      (Normal is at least 62% for women and 53% for men)  LV wall motion:   There is significant hypokinesia of the ventricular septum  LVEDV: 117ml  (Normal is up to 100ml for women and 150ml for men)  Transient dilatation ratio:   1.0      (Normal is up to 1.3 for women and 1.2 for men)    Cath: 7/21/16  #1-coronary angiography summary: Normal coronaries in a left   dominant system  A-left main coronary is normal  B-the LAD has minor irregularities and no significant stenoses  C-the circumflex is dominant and has minor irregularities and no   significant stenoses  D-the right coronary is a small nondominant vessel and is   angiographically normal  #2-ventriculography in the ADKINS projection demonstrates mild   global left ventricular dysfunction ejection fractions   approximately 40  #3-aortic pressure is approximately 150/80 left ventricular   pressures 150/14 there is no gradient on pullback  #8-YEEHT are no complications  #7-BRR patient will be treated medically for left ventricular   dysfunction in the setting of normal coronaries    Echo: 11/9/16  Dilated LV with severely reduced systolic function. EF 30%. Anterior, septal  and apical akinesis. Mild mitral regurgitation is present. The left atrial size is normal.  Normal right ventricular size and function.     Echo: 7/24/17  Left ventricle size is normal. Mild concentric left ventricular hypertrophy  is present. Global ejection fraction is moderately decreased and estimated  from 30 % to 35%. Diastolic filling parameters suggests grade I diastolic  dysfunction . There is abnormal (paradoxical) septal motion consistent with left bundle  branch block. Mitral valve is structurally normal.  Trivial to mild mitral regurgitation is present. The left atrial size is normal.  A bubble study was performed and fails to show evidence of right to left  Shunting. Summary 11/8/2017 (S/P BiV placement)   Normal left ventricular wall thickness. Ejection fraction is visually   estimated to be 40-45%.  Robin Levans is septal hypokinesis (secondary to Pacing)   Trivial mitral & tricuspid regurgitation is present.   The left atrial size is normal.   Pacer / ICD wire is visualized in the right ventricle.   There appears to be normal right ventricular size and function      Summary 9/25/19   Mild concentric LVH with normal LV size and wall motion. EF is    50%.   Paradoxical septal motion secondary to paced rhythm   Trivial mitral regurgitation is present.   The left atrium is normal in size.   The right ventricle is normal in size and function. Pacing wire seen.       NM Cardiac Stress Test 8/26/2020  Summary     No evidence of reversible ischemia.    Robin Levans is a moderate to large sized, moderate intensity, fixed septal and     inferior wall defect which is most consistent with LBBB induced artifact and     diaphragm attenuation artifact.     Normal LV size and systolic function. Ejection fraction: 50 %    Overall findings represent a low risk study. [address] 3/2/2021  OVERALL IMPRESSION:  1. Slightly above normal right heart pressure with slightly elevated  pulmonary capillary wedge pressure of 17 mmHg. 2.  Normal cardiac output and cardiac indices. 6/17/2022 Complete ECHO  Summary  Normal left ventricular wall thickness. The left ventricle appears normal in size. Mildly decreased left ventricular systolic function with an estimated ejection fraction of 25-30% . Anterior, anterior septum, inferior septum are hypokinetic  Diastolic filling parameters suggest grade I diastolic dysfunction. The right ventricle is normal in size and function. No clinically significant valvular heart disease      Thoracic Impedence/Device interrogation: 6/22/2022      Assessment/Plan:      1.) Acute on chronic combined systolic heart failure/diastolic Gr 1, NICM (normal cors 2016): EF 30% improved to 50%. Recent ECHO 6/17/2022 with reduced EF 25-30% (HK anterior, anterior septum, inferior septum similar to previous). Device check volume above baseline but may be trending down indicating rising fluid levels. Clinically he feels better. SOB at baseline (chronic lung dz). Wt stable. No BLE edema. Abd distention is unchanged. Suggested increasing entresto - declines. Agrees to add farxiga. NYHA Class III   Stage C  Diuretic: lasix 20mg BID  Beta Blocker: Toprol XL  ACEi/ARB/ARNI: entresto   Aldosterone Antagonist: stopped D/T side effects  SGLT2 Inhibitor: Agrees to try farxiga- samples given today  Stable creatinine, euvolemic without aggressive diuresing, no hypoglycemia, no UTI, no hypotension- expect initial rise in creat ~ 4 weeks, will recheck BMP in 6 weeks and pt to FU 2 mos.   2gm Na diet, daily weight, 64 oz fluid restriction  Avoid NSAIDS and other nephrotoxic meds  Cardiac Rehab: Not indicated for EF>35%  ICD: s/p BiV ICD- Bi V off, now VVI 40 with defibrillator ON  Offered BiV pacing again today - patient declines. 2.) Paroxysmal Atrial Fib: Managed by Dr. Susie Hopson. Declined multaq and Amio  Last Device check without any recent episodes of AF/AT or VF/VT.    CHADSVASC- 2    HASBLED-  Thromboembolic risk reduction: warfarin INR goal 2-3 managed by clinic  Rate/Rate Control: Toprol XL  Not a candidate for ablation with BiV ICD  EP to follow device remotely    3. ) Hyperlipidemia:   LDL Goal < 70  Statin: pravachol            Low cholesterol diet  Liver fx 2 months after initiation then q6mos  Lipid panel annually    4.) Hx RLE DVT/PE: On coumadin-lifelong     Instructions:   See AVS    I appreciate the opportunity of cooperating in the care of this individual.    BRENNA Andersen - CNP, CNP, 6/23/2022,10:06 AM

## 2022-06-17 ENCOUNTER — HOSPITAL ENCOUNTER (OUTPATIENT)
Dept: CARDIOLOGY | Age: 62
Discharge: HOME | End: 2022-06-17
Payer: COMMERCIAL

## 2022-06-17 DIAGNOSIS — I50.31 ACUTE DIASTOLIC HEART FAILURE (HCC): ICD-10-CM

## 2022-06-17 LAB
LV EF: 28 %
LVEF MODALITY: NORMAL

## 2022-06-17 PROCEDURE — 93306 TTE W/DOPPLER COMPLETE: CPT

## 2022-06-22 ENCOUNTER — NURSE ONLY (OUTPATIENT)
Dept: CARDIOLOGY CLINIC | Age: 62
End: 2022-06-22
Payer: COMMERCIAL

## 2022-06-22 ENCOUNTER — TELEPHONE (OUTPATIENT)
Dept: CARDIOLOGY CLINIC | Age: 62
End: 2022-06-22

## 2022-06-22 DIAGNOSIS — Z95.810 BIVENTRICULAR ICD (IMPLANTABLE CARDIOVERTER-DEFIBRILLATOR) IN PLACE: ICD-10-CM

## 2022-06-22 DIAGNOSIS — I44.7 LBBB (LEFT BUNDLE BRANCH BLOCK): ICD-10-CM

## 2022-06-22 DIAGNOSIS — I42.8 NICM (NONISCHEMIC CARDIOMYOPATHY) (HCC): ICD-10-CM

## 2022-06-22 DIAGNOSIS — I50.22 CHRONIC SYSTOLIC (CONGESTIVE) HEART FAILURE (HCC): ICD-10-CM

## 2022-06-22 PROBLEM — Z00.00 PREVENTATIVE HEALTH CARE: Status: RESOLVED | Noted: 2022-05-23 | Resolved: 2022-06-22

## 2022-06-22 PROCEDURE — 93297 REM INTERROG DEV EVAL ICPMS: CPT | Performed by: NURSE PRACTITIONER

## 2022-06-22 PROCEDURE — G2066 INTER DEVC REMOTE 30D: HCPCS | Performed by: NURSE PRACTITIONER

## 2022-06-22 NOTE — TELEPHONE ENCOUNTER
Patient is scheduled to FU in office tomorrow. Can we get remote device check/ thoracic impedence today so I'' have for review at his appointment tomorrow?   Thx

## 2022-06-22 NOTE — PROGRESS NOTES
Remote transmission received from patient's CRT-D monitor at home (programmed VVI RV @ 40bpm per 11.2017 specialty comments). Transmission shows normal sensing and pacing function. ST/AT/SVT noted (Toprol XL, warfarin). Thoracic impedance is above reference line w slight decreasing trend noted. End of 31-day monitoring period 6/22/22. NP will review. See interrogation under cardiology tab in the [address] [place] [address] [address] field for more details. Will continue to monitor remotely.

## 2022-06-22 NOTE — TELEPHONE ENCOUNTER
He was already on the remote schedule in anticipation of tomorrow's appt. Report completed and routed to you via [employer]. Thanks!

## 2022-06-23 ENCOUNTER — OFFICE VISIT (OUTPATIENT)
Dept: CARDIOLOGY CLINIC | Age: 62
End: 2022-06-23
Payer: COMMERCIAL

## 2022-06-23 ENCOUNTER — TELEPHONE (OUTPATIENT)
Dept: CARDIOLOGY CLINIC | Age: 62
End: 2022-06-23

## 2022-06-23 VITALS
DIASTOLIC BLOOD PRESSURE: 64 MMHG | HEART RATE: 74 BPM | OXYGEN SATURATION: 97 % | BODY MASS INDEX: 36 KG/M2 | SYSTOLIC BLOOD PRESSURE: 120 MMHG | HEIGHT: 66 IN | WEIGHT: 224 LBS

## 2022-06-23 DIAGNOSIS — I50.23 ACUTE ON CHRONIC SYSTOLIC HEART FAILURE (HCC): Primary | ICD-10-CM

## 2022-06-23 PROCEDURE — G8427 DOCREV CUR MEDS BY ELIG CLIN: HCPCS | Performed by: NURSE PRACTITIONER

## 2022-06-23 PROCEDURE — 1036F TOBACCO NON-USER: CPT | Performed by: NURSE PRACTITIONER

## 2022-06-23 PROCEDURE — G8417 CALC BMI ABV UP PARAM F/U: HCPCS | Performed by: NURSE PRACTITIONER

## 2022-06-23 PROCEDURE — 99214 OFFICE O/P EST MOD 30 MIN: CPT | Performed by: NURSE PRACTITIONER

## 2022-06-23 PROCEDURE — 3017F COLORECTAL CA SCREEN DOC REV: CPT | Performed by: NURSE PRACTITIONER

## 2022-06-23 RX ORDER — FUROSEMIDE 20 MG/1
20 TABLET ORAL 2 TIMES DAILY
Qty: 180 TABLET | Refills: 3
Start: 2022-06-23

## 2022-06-23 NOTE — TELEPHONE ENCOUNTER
Veronica     Patient seen this morning in office. Okay to switch to [place] based on insurance coverage?

## 2022-06-23 NOTE — TELEPHONE ENCOUNTER
Called patient to let him know of message below. He v/u. Patient states he would just like to start jardiance 10 daily and not take the farxiga samples. Please advise if this is ok. medication list updated. Please sign medication.

## 2022-06-23 NOTE — TELEPHONE ENCOUNTER
Donis Hawk from [address] [place] [address] called in this afternoon, she states insurance company is requiring  patient to try Metformin first.        She can be reached at [phone].

## 2022-06-23 NOTE — TELEPHONE ENCOUNTER
Vinayak Carter from [address] [place] [address] called in this morning, she states that Ying Kathrynker is not a covered medication under patients insurance plan,Jardiance is the preferred product. She can be reached at [phone].

## 2022-06-23 NOTE — PATIENT INSTRUCTIONS
Patient Education        Learning About Heart Failure Zones  What are heart failure zones? Heart failure zones give you an easy way to see changes in your heart failure symptoms. They also tell you when you need to get help. Check every day to seewhich zone you are in. Green zone. You are doing well. This is where you want to be.  Your weight is stable. It's not going up or down.  You breathe easily.  You are sleeping well. You are able to lie flat without shortness of breath.  You can do your usual activities. Yellow zone. Be careful. Your symptoms are changing. Call your doctor.  You have new or increased shortness of breath.  You are dizzy or lightheaded, or you feel like you may faint.  You have sudden weight gain, such as more than 2 to 3 pounds in a day or 5 pounds in a week. (Your doctor may suggest a different range of weight gain.)   You have increased swelling in your legs, ankles, or feet.  You are so tired or weak that you can't do your usual activities.  You are not sleeping well. Shortness of breath wakes you up at night. You need extra pillows. Red zone. This is an emergency. Call 911. You have symptoms of sudden heart failure. For example:   You have severe trouble breathing.  You cough up pink, foamy mucus.  You have a new irregular or fast heartbeat. You have symptoms of a heart attack. These may include:   Chest pain or pressure, or a strange feeling in the chest.   Sweating.  Shortness of breath.  Nausea or vomiting.  Pain, pressure, or a strange feeling in the back, neck, jaw, or upper belly or in one or both shoulders or arms.  Lightheadedness or sudden weakness.  A fast or irregular heartbeat. If you have symptoms of a heart attack: After you call 911, the  may tell you to chew 1 adult-strength or 2 to 4 low-doseaspirin. Wait for an ambulance. Do not try to drive yourself. Follow-up care is a key part of your treatment and safety.  Be sure to make and go to all appointments, and call your doctor if you are having problems. It's also a good idea to know your test results and keep alist of the medicines you take. Where can you learn more? Go to [email or web address]://[email or web address].[employer]. org and sign in to your [employer] account. Enter T174 in the [employer] box to learn more about \"Learning About Heart Failure Zones. \"     If you do not have an account, please click on the \"Sign Up Now\" link. Current as of: January 10, 2022               Content Version: 13.3  © 7244-7737 Healthwise, Incorporated. Care instructions adapted under license by [facility] ([facility]). If you have questions about a medical condition or this instruction, always ask your healthcare professional. [address] any warranty or liability for your use of this information.

## 2022-06-23 NOTE — TELEPHONE ENCOUNTER
Please call pharmacy to discuss he is taking Jardiance to treat heart failure and not diabetes. Therefore we do not need him to try Metformin. Also facilitate with insurance company if needed.

## 2022-06-23 NOTE — TELEPHONE ENCOUNTER
Please facilitate below. Have patient switch samples, send script to preferred pharmacy for provider signature. Thanks.

## 2022-06-23 NOTE — TELEPHONE ENCOUNTER
Yes, please change to jardiance 10mg daily #90 RF X3. . I did send him home with farxiga samples. He can take those and then change to jardiance. If he has an issue with that then he can  jardiance and just start that. Continue with same plan to check labs in 6 weeks and FU 2 mos.   Thx!

## 2022-06-24 NOTE — TELEPHONE ENCOUNTER
Randy Gonzalez called in this afternoon wanting to know the status of prior authorization on his Jardiance?      You can reach Randy Gonzalez at #[phone]

## 2022-06-24 NOTE — TELEPHONE ENCOUNTER
Got the PA approval for pts Jardiance 10mg, called [facility] pharmacy, spoke with Julianna Florian, pharmacist, pt has a $0 copay    [phone] (home)   Pt notified and verbalized understanding

## 2022-06-27 ENCOUNTER — TELEPHONE (OUTPATIENT)
Dept: PHARMACY | Age: 62
End: 2022-06-27

## 2022-06-27 ENCOUNTER — TELEPHONE (OUTPATIENT)
Dept: CARDIOLOGY CLINIC | Age: 62
End: 2022-06-27

## 2022-06-27 NOTE — TELEPHONE ENCOUNTER
Please see message below regarding heart function and relationship to Covid vaccine. I attempted to answer but he want Dr Lynda Cam to review and answer. Let him know that he is out of the office this week.

## 2022-06-27 NOTE — TELEPHONE ENCOUNTER
Karo Bedoya is calling wanting to know if all the Covid shots and booster could cause his EF on his Echo's to down from 50% to 30% in the last couple of years? ?    Echo- 9/24/2019=  EF 50%  Echo 6/17/2022 = EF 30 %       Karo Bedoya can be reached at [phone]

## 2022-06-27 NOTE — TELEPHONE ENCOUNTER
Zuhair Parry called to inform us that he is starting a course of azithromycin and methylprednisolone today for a sinus infection. He was scheduled for his next INR next week on 7/8, so I moved his appointment up to this coming Friday 7/1 instead.   He will also be starting on Jardiance soon from his cardiologist. Kiko Ponce, PharmD 06/27/22  1:33 PM

## 2022-07-01 ENCOUNTER — TELEPHONE (OUTPATIENT)
Dept: CARDIOLOGY CLINIC | Age: 62
End: 2022-07-01

## 2022-07-01 NOTE — TELEPHONE ENCOUNTER
Twan Honeycutt called in this afternoon, he has some questions concerning the Jardiance he started, he states it gives him headaches. He can be reached at [phone].

## 2022-07-01 NOTE — TELEPHONE ENCOUNTER
Veronica     Discussed symptoms with patient and he reports worsening fatigue and headaches with taking this medication. He was seen by urology today and it was their recommendation that he discontinue this medication given his kidney disease. Please advise.

## 2022-07-03 ENCOUNTER — TELEPHONE (OUTPATIENT)
Dept: CASE MANAGEMENT | Age: 62
End: 2022-07-03

## 2022-07-03 NOTE — TELEPHONE ENCOUNTER
Per Dr Savanna Vazquez on 5/12/2022, he states patient may wait until his OV with him in August 2022 to further discuss repeating CT Chest at that time. Patient was given this information per office staff. Patient has scheduled OV on 8/10/2022 with Dr Shasha Baird.

## 2022-07-05 ENCOUNTER — ANTI-COAG VISIT (OUTPATIENT)
Dept: PHARMACY | Age: 62
End: 2022-07-05
Payer: COMMERCIAL

## 2022-07-05 LAB — INTERNATIONAL NORMALIZATION RATIO, POC: 6.8

## 2022-07-05 PROCEDURE — 85610 PROTHROMBIN TIME: CPT

## 2022-07-05 PROCEDURE — 99211 OFF/OP EST MAY X REQ PHY/QHP: CPT

## 2022-07-05 RX ORDER — SACUBITRIL AND VALSARTAN 49; 51 MG/1; MG/1
TABLET, FILM COATED ORAL
Qty: 270 TABLET | Refills: 3 | Status: SHIPPED | OUTPATIENT
Start: 2022-07-05 | End: 2022-10-07 | Stop reason: DRUGHIGH

## 2022-07-05 NOTE — PROGRESS NOTES
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 Sana Lemos had an INR test today. Results were reviewed and appropriate warfarin management was completed. This visit was performed as: An in person visit. Protocols were followed with precautions to reduce the spread of COVID-19. Patient verifies current dosing regimen: Yes     Warfarin medication reviewed and updated on the patient 's home medication list: Yes   All other medications reviewed and updated on the patient 's home medication list: No new medications     Lab Results   Component Value Date    INR 6.8 2022    INR 3.0 06/15/2022    INR 1.3 2022       Patient Findings     Positives:  Change in medications, Bruising    Negatives:  Signs/symptoms of thrombosis, Signs/symptoms of bleeding, Change in health, Missed doses, Change in diet/appetite    Comments:  Started azithromycin on 22  Increased Entresto 49-51 to 1.5 tablets          Anticoagulation Summary  As of 2022    INR goal:  2.0-3.0   TTR:  36.2 % (6.6 y)   INR used for dosin.8 (2022)   Warfarin maintenance plan:  5 mg (5 mg x 1) every Mon, Wed, Fri; 2.5 mg (5 mg x 0.5) all other days   Weekly warfarin total:  25 mg   Plan last modified:  Ramiro Guerrero [facility] (2022)   Next INR check:  2022   Priority:  Maintenance   Target end date: Indefinite    Indications    Pulmonary embolus (HCC) [I26.99]  Chronic anticoagulation [Z79.01]             Anticoagulation Episode Summary     INR check location:  Anticoagulation Clinic    Preferred lab:      Send INR reminders to:  [facility]    Comments:  EPIC - finger stick every 2-3 weeks        Anticoagulation Care Providers     Provider Role Specialty Phone number    Jon Barrera MD Referring Internal Medicine [phone]          Warfarin assessment / plan:   Patient appears well. No complaints regarding warfarin therapy.   Patient had increase heat exposure over the weekend and also began azithromycin on 6-27-22. Both will cause INR elevations. Has slight bruising on belly from golfing where the golf club was rubbing. No change to weekly warfarin dosing. Description    Hold warfarin on 7-5, 7-6, 7-7-22  Continue: Warfarin 2.5mg daily EXCEPT 5mg every Monday, Wednesday and Friday    Call [phone] with signs or symptoms of bleeding or ANY medication changes (including antibiotics, steroids, over-the-counter medications or herbal supplements). If significant bleeding occurs or if you fall and hit your head, please seek immediate medical attention. Keep the number of servings of vitamin K containing foods (dark green, leafy vegetables) the same each week. Please call if this changes. Limit alcohol intake. Please call if this changes. Immunization History   Administered Date(s) Administered    COVID-19, MODERNA BLUE border, Primary or Immunocompromised, (age 12y+), IM, 100 mcg/0.5mL 03/09/2021, 04/08/2021, 12/15/2021    Influenza, MDCK Quadv, IM, PF (Flucelvax 2 yrs and older) 10/26/2021    Influenza, Melene Murphy, IM, PF (6 mo and older Fluzone, Flulaval, Fluarix, and 3 yrs and older Afluria) 10/27/2017, 09/23/2019, 10/02/2020    Influenza, Melene Murphy, Recombinant, IM PF (Flublok 18 yrs and older) 10/03/2018    Pneumococcal Conjugate 13-valent ([id number]) 09/11/2015    Pneumococcal Conjugate Vaccine 08/15/2017    Pneumococcal Polysaccharide ([id number]) 08/01/2007, 12/19/2012    Tdap (Boostrix, Adacel) 08/19/2014           Orders Placed This Encounter   Procedures    POCT INR     This external order was created through the results console. No orders of the defined types were placed in this encounter. Reviewed AVS with patient / caregiver.     Billing Points:  Adjust dosage and/or reconcile meds (fill pill box) </= 5 medications - 2 points       CLINICAL PHARMACY CONSULT: MED RECONCILIATION/REVIEW ADDENDUM    For Pharmacy Admin Tracking

## 2022-07-05 NOTE — TELEPHONE ENCOUNTER
Spoke with Caroline Lawrence, send script to preferred pharmacy, BMP already placed with timing of SHANA Martin instruction and switched SHANA Martin f/u for one with Dr. Patricia Kapadia. At this time, Caroline Lawrence has no q/c. He did mention switching to Coreg from Toprol-XL at his next visit.

## 2022-07-05 NOTE — TELEPHONE ENCOUNTER
I just spoke to Mr. Lawson Interiaon. He agreed to increase his Entresto 49/51 to 1-1/2 tablets p.o. twice daily. He will need BMP in 4 weeks. He will need an appointment to see me week after his lab work. Crystal Medel

## 2022-07-06 NOTE — TELEPHONE ENCOUNTER
I suggested increasing entresto previously and he refused. We can try that if he is agreeable now. Increased dose would be 97/103mg twice daily. We do not samples of this dose - we will need to place the order for this is he wants it.

## 2022-07-08 ENCOUNTER — NURSE ONLY (OUTPATIENT)
Dept: CARDIOLOGY CLINIC | Age: 62
End: 2022-07-08

## 2022-07-08 ENCOUNTER — TELEPHONE (OUTPATIENT)
Dept: CARDIOLOGY CLINIC | Age: 62
End: 2022-07-08

## 2022-07-08 NOTE — PROGRESS NOTES
Remote transmission received 07.08.22 from patient's CRT-D monitor at home (programmed VVI RV @ 40bpm per 11.2017 specialty comments). Transmission shows normal sensing and pacing function. No new arrhythmias/events recorded since last remote 06.22.22. Ap 0%  RVp 0%    Thoracic impedance is normal/above reference line. EP physician will review. See interrogation under cardiology tab in the [address] [place] [address] [address] field for more details. Will continue to monitor remotely.
negative - No discharge, No redness

## 2022-07-08 NOTE — TELEPHONE ENCOUNTER
Please let patient know the medication would not cause his symptoms. Advise that he can take tylenol for the discomfort.

## 2022-07-08 NOTE — TELEPHONE ENCOUNTER
Pt has not run any fever, not red or hot to the touch. He said that this has started when he increased the water pill 2.5 tablets for a couple of days, that his swelling is almost back to normal.    He also was also told to increase his entresto to 49/51 at his last visit and he is wondering if that can be a cause. He states he has not had chest pain,(says it feels like there is a knife sticking in his pacemaker). He isn't sure the completely and would like answers so he know what he can do over the weekend.

## 2022-07-08 NOTE — TELEPHONE ENCOUNTER
Please reach out to patient and advise that his device does not show that he received any shocks and that his device is functioning normally. Please let him know that there may be pains every now and then near the device, and it is most likely due to nerves/scar tissue from the implant of the device. Ask him if there is any swelling or redness at the site of his device. Ask him if he has been running a fever.

## 2022-07-08 NOTE — TELEPHONE ENCOUNTER
Pt called to speak to someone, says his device was shocking him yesterday on and off. Would like to speak to someone.     Nevin Self #  [phone]

## 2022-07-08 NOTE — TELEPHONE ENCOUNTER
Remote transmission received 07.08.22 from patient's CRT-D monitor at home (programmed VVI RV @ 40bpm per 11.2017 specialty comments). Transmission shows normal sensing and pacing function. No new arrhythmias/events recorded since last remote 06.22.22. Ap 0%  RVp 0%    Thoracic impedance is normal/above reference line. EP physician will review. See interrogation under cardiology tab in the [address] [place] [address] [address] field for more details. Will continue to monitor remotely.

## 2022-07-12 ENCOUNTER — TELEPHONE (OUTPATIENT)
Dept: PHARMACY | Age: 62
End: 2022-07-12

## 2022-07-12 ENCOUNTER — ANTI-COAG VISIT (OUTPATIENT)
Dept: PHARMACY | Age: 62
End: 2022-07-12
Payer: COMMERCIAL

## 2022-07-12 DIAGNOSIS — Z79.01 CHRONIC ANTICOAGULATION: Primary | ICD-10-CM

## 2022-07-12 LAB — INTERNATIONAL NORMALIZATION RATIO, POC: 1.8

## 2022-07-12 PROCEDURE — 85610 PROTHROMBIN TIME: CPT

## 2022-07-12 PROCEDURE — 99211 OFF/OP EST MAY X REQ PHY/QHP: CPT

## 2022-07-12 NOTE — PROGRESS NOTES
Dmitry Stubbs is a 60 y.o. here for warfarin management. Elroy Head had an INR test today. Results were reviewed and appropriate warfarin management was completed. This visit was performed as: An in person visit. Protocols were followed with precautions to reduce the spread of COVID-19. Patient verifies current dosing regimen: Yes     Warfarin medication reviewed and updated on the patient 's home medication list: Yes   All other medications reviewed and updated on the patient 's home medication list: Yes     Lab Results   Component Value Date    INR 1.8 2022    INR 6.8 2022    INR 3.0 06/15/2022       Patient Findings     Positives:  Change in health, Change in medications    Negatives:  Signs/symptoms of thrombosis, Signs/symptoms of bleeding, Missed doses, Change in diet/appetite, Bruising    Comments:  Starting taking amoxicillin and prednisone on 22 to treat a sinus infection. Anticoagulation Summary  As of 2022    INR goal:  2.0-3.0   TTR:  36.2 % (6.6 y)   INR used for dosin.8 (2022)   Warfarin maintenance plan:  5 mg (5 mg x 1) every Mon, Wed, Fri; 2.5 mg (5 mg x 0.5) all other days   Weekly warfarin total:  25 mg   Plan last modified:  Aditi Gonsales [facility] (2022)   Next INR check:  2022   Priority:  Maintenance   Target end date: Indefinite    Indications    Pulmonary embolus (HCC) [I26.99]  Chronic anticoagulation [Z79.01]             Anticoagulation Episode Summary     INR check location:  Anticoagulation Clinic    Preferred lab:      Send INR reminders to:  [facility]    Comments:  EPIC - finger stick every 2-3 weeks        Anticoagulation Care Providers     Provider Role Specialty Phone number    Sky Tan MD Referring Internal Medicine [phone]          Warfarin assessment / plan:     Appears well  Sub-therapeutic INR     Denies missed doses. Denies increased vitamin K intake.   Denies signs or symptoms of clotting. Medication changes. Amoxicillin 750 mg daily and Prednisone 20 mg daily (patient is not tapering) started to treat sinus infection on Saturday 7-9-22. Will continue this week until Saturday 7-16-22. Last week on 7-5 patient had increased INR of 6.8 and held warfarin for 3 days. Was experiencing sinus infection and started antibiotics and steroids for a one week course on Saturday 7-9-22. Patient reports he is taking the prednisone differently than prescribed (2 tablets daily instead of tapering). INR is 1.8 today which is low but prednisone and amoxicillin may cause increase. Because the patient is close to range but other medications may cause increase, recommend decreasing usual dose to 2.5 mg this Wednesday and Thursday, then continue usual dose. Will see patient back in 2 weeks. Patient is leaving for vacation tomorrow. Counseled to take AVS on trip incase of medical emergency. Description    TAKE 2.5 mg (half tablet) Tomorrow (7-13) and Friday (7-15)   THEN CONTINUE: Warfarin 2.5mg daily EXCEPT 5mg every Monday, Wednesday and Friday    Call [phone] with signs or symptoms of bleeding or ANY medication changes (including antibiotics, steroids, over-the-counter medications or herbal supplements). If significant bleeding occurs or if you fall and hit your head, please seek immediate medical attention. Keep the number of servings of vitamin K containing foods (dark green, leafy vegetables) the same each week. Please call if this changes. Limit alcohol intake. Please call if this changes.           Immunization History   Administered Date(s) Administered    COVID-19, MODERNA BLUE border, Primary or Immunocompromised, (age 12y+), IM, 100 mcg/0.5mL 03/09/2021, 04/08/2021, 12/15/2021    Influenza, MDCK Quadv, IM, PF (Flucelvax 2 yrs and older) 10/26/2021    Influenza, Quadv, IM, PF (6 mo and older Fluzone, Flulaval, Fluarix, and 3 yrs and older Afluria) 10/27/2017, 09/23/2019, 10/02/2020    Influenza, Quadv, Recombinant, IM PF (Flublok 18 yrs and older) 10/03/2018    Pneumococcal Conjugate 13-valent ([id number]) 09/11/2015    Pneumococcal Conjugate Vaccine 08/15/2017    Pneumococcal Polysaccharide ([id number]) 08/01/2007, 12/19/2012    Tdap (Boostrix, Adacel) 08/19/2014           Orders Placed This Encounter   Procedures    POCT INR     This external order was created through the results console. No orders of the defined types were placed in this encounter. Reviewed AVS with patient / caregiver.     Billing Points:  Adjust dosage and/or reconcile meds (fill pill box) </= 5 medications - 2 points       CLINICAL PHARMACY CONSULT: MED RECONCILIATION/REVIEW ADDENDUM    For Pharmacy Admin Tracking Only     Intervention Detail: Dose Adjustment: 1, reason: Therapy Optimization   Total # of Interventions Recommended: 1   Total # of Interventions Accepted: 1  Time Spent (min): 20     Joshua Winn, PharmD student  Reviewed by: Lico Remy RPh, PRS 7/12/2022  4:37 PM

## 2022-07-12 NOTE — TELEPHONE ENCOUNTER
Going out of town and wanted INR checked. Added to the 4pm visit today.      [place], PharmD, [facility]  Anticoagulation Service  [phone]

## 2022-07-26 ENCOUNTER — NURSE ONLY (OUTPATIENT)
Dept: CARDIOLOGY CLINIC | Age: 62
End: 2022-07-26
Payer: COMMERCIAL

## 2022-07-26 DIAGNOSIS — I42.8 NICM (NONISCHEMIC CARDIOMYOPATHY) (HCC): ICD-10-CM

## 2022-07-26 DIAGNOSIS — I50.23 ACUTE ON CHRONIC SYSTOLIC HEART FAILURE (HCC): ICD-10-CM

## 2022-07-26 DIAGNOSIS — I44.7 LBBB (LEFT BUNDLE BRANCH BLOCK): ICD-10-CM

## 2022-07-26 DIAGNOSIS — Z95.810 BIVENTRICULAR ICD (IMPLANTABLE CARDIOVERTER-DEFIBRILLATOR) IN PLACE: Primary | ICD-10-CM

## 2022-07-26 PROCEDURE — 93297 REM INTERROG DEV EVAL ICPMS: CPT | Performed by: INTERNAL MEDICINE

## 2022-07-26 PROCEDURE — 93296 REM INTERROG EVL PM/IDS: CPT | Performed by: INTERNAL MEDICINE

## 2022-07-26 PROCEDURE — 93295 DEV INTERROG REMOTE 1/2/MLT: CPT | Performed by: INTERNAL MEDICINE

## 2022-07-27 ENCOUNTER — ANTI-COAG VISIT (OUTPATIENT)
Dept: PHARMACY | Age: 62
End: 2022-07-27
Payer: COMMERCIAL

## 2022-07-27 DIAGNOSIS — I26.99 OTHER ACUTE PULMONARY EMBOLISM, UNSPECIFIED WHETHER ACUTE COR PULMONALE PRESENT (HCC): ICD-10-CM

## 2022-07-27 DIAGNOSIS — Z79.01 CHRONIC ANTICOAGULATION: Primary | ICD-10-CM

## 2022-07-27 LAB — INR BLD: 3.4

## 2022-07-27 PROCEDURE — 85610 PROTHROMBIN TIME: CPT

## 2022-07-27 PROCEDURE — 99211 OFF/OP EST MAY X REQ PHY/QHP: CPT

## 2022-07-27 NOTE — PROGRESS NOTES
Kirsten Lr is a 60 y.o. here for warfarin management. Davion Kline had an INR test today. Results were reviewed and appropriate warfarin management was completed. This visit was performed as: An in person visit. Protocols were followed with precautions to reduce the spread of COVID-19. Patient verifies current dosing regimen: Yes     Warfarin medication reviewed and updated on the patient 's home medication list: Yes   All other medications reviewed and updated on the patient 's home medication list: Yes     Lab Results   Component Value Date    INR 3.40 07/27/2022    INR 1.8 07/12/2022    INR 6.8 07/05/2022       Patient Findings       Negatives:  Signs/symptoms of thrombosis, Signs/symptoms of bleeding, Change in health, Missed doses, Change in medications, Change in diet/appetite, Bruising            Anticoagulation Summary  As of 7/27/2022      INR goal:  2.0-3.0   TTR:  36.4 % (6.7 y)   INR used for dosing:  3.40 (7/27/2022)   Warfarin maintenance plan:  5 mg (5 mg x 1) every Mon, Fri; 2.5 mg (5 mg x 0.5) all other days   Weekly warfarin total:  22.5 mg   Plan last modified:  Teresa Sin (7/27/2022)   Next INR check:  8/10/2022   Priority:  Maintenance   Target end date: Indefinite    Indications    Pulmonary embolus (HCC) [I26.99]  Chronic anticoagulation [Z79.01]                 Anticoagulation Episode Summary       INR check location:  Anticoagulation Clinic    Preferred lab:      Send INR reminders to:  [facility]    Comments:  EPIC - finger stick every 2-3 weeks            Anticoagulation Care Providers       Provider Role Specialty Phone number    Thomas Leone MD Referring Internal Medicine [phone]            Warfarin assessment / plan:     Appears well  Supra-therapeutic INR. Denies signs and symptoms of bleeding/bruising. Denies extra warfarin doses. Denies change in appetite. Denies illness, fever, vomiting or diarrhea.      Patient has stopped taking amoxicillin. Will be continuing prednisone indefinitely. Prednisone is likely contributing to high INR. Since this will be a long course and INR is 3.4 today, decrease dose 10% to warfarin 2.5mg daily except 5mg every Monday and Friday. Instructed patient to call us if prednisone is going to be discontinued. Description    NEW DOSE: Warfarin 2.5mg daily EXCEPT 5mg every Monday and Friday    Call [phone] with signs or symptoms of bleeding or ANY medication changes (including antibiotics, steroids, over-the-counter medications or herbal supplements). If significant bleeding occurs or if you fall and hit your head, please seek immediate medical attention. Keep the number of servings of vitamin K containing foods (dark green, leafy vegetables) the same each week. Please call if this changes. Limit alcohol intake. Please call if this changes. Immunization History   Administered Date(s) Administered    COVID-19, MODERNA BLUE border, Primary or Immunocompromised, (age 12y+), IM, 100 mcg/0.5mL 03/09/2021, 04/08/2021, 12/15/2021    Influenza, MDCK Quadv, IM, PF (Flucelvax 2 yrs and older) 10/26/2021    Influenza, Quadv, IM, PF (6 mo and older Fluzone, Flulaval, Fluarix, and 3 yrs and older Afluria) 10/27/2017, 09/23/2019, 10/02/2020    Influenza, Stevan Golas, Recombinant, IM PF (Flublok 18 yrs and older) 10/03/2018    Pneumococcal Conjugate 13-valent ([id number]) 09/11/2015    Pneumococcal Conjugate Vaccine 08/15/2017    Pneumococcal Polysaccharide ([id number]) 08/01/2007, 12/19/2012    Tdap (Boostrix, Adacel) 08/19/2014           Orders Placed This Encounter   Procedures    Protime-INR     This external order was created through the results console. No orders of the defined types were placed in this encounter. Reviewed AVS with patient / caregiver.     Billing Points:  Adjust dosage and/or reconcile meds (fill pill box) </= 5 medications - 2 points       CLINICAL PHARMACY CONSULT: MED RECONCILIATION/REVIEW ADDENDUM    For Pharmacy Admin Tracking Only    Intervention Detail: Dose Adjustment: 1, reason: Therapy Optimization  Total # of Interventions Recommended: 1  Total # of Interventions Accepted: 1  Time Spent (min): 20    I have seen the patient and reviewed the progress note written by the PharmD Candidate. I agree with this assesment and plan.    Elsie Jeong, [facility], PharmD 7/27/22 9:58 AM

## 2022-08-10 ENCOUNTER — ANTI-COAG VISIT (OUTPATIENT)
Dept: PHARMACY | Age: 62
End: 2022-08-10
Payer: COMMERCIAL

## 2022-08-10 ENCOUNTER — OFFICE VISIT (OUTPATIENT)
Dept: PULMONOLOGY | Age: 62
End: 2022-08-10
Payer: COMMERCIAL

## 2022-08-10 VITALS
TEMPERATURE: 97.1 F | BODY MASS INDEX: 37.19 KG/M2 | WEIGHT: 231.4 LBS | HEART RATE: 70 BPM | HEIGHT: 66 IN | DIASTOLIC BLOOD PRESSURE: 70 MMHG | RESPIRATION RATE: 21 BRPM | OXYGEN SATURATION: 96 % | SYSTOLIC BLOOD PRESSURE: 115 MMHG

## 2022-08-10 DIAGNOSIS — Z79.01 CHRONIC ANTICOAGULATION: ICD-10-CM

## 2022-08-10 DIAGNOSIS — I50.31 ACUTE DIASTOLIC HEART FAILURE (HCC): ICD-10-CM

## 2022-08-10 DIAGNOSIS — J42 BRONCHIOLITIS OBLITERANS (HCC): Primary | ICD-10-CM

## 2022-08-10 DIAGNOSIS — I50.23 ACUTE ON CHRONIC SYSTOLIC HEART FAILURE (HCC): ICD-10-CM

## 2022-08-10 DIAGNOSIS — I26.99 OTHER ACUTE PULMONARY EMBOLISM, UNSPECIFIED WHETHER ACUTE COR PULMONALE PRESENT (HCC): Primary | ICD-10-CM

## 2022-08-10 LAB
ANION GAP SERPL CALCULATED.3IONS-SCNC: 11 MMOL/L (ref 3–16)
BUN BLDV-MCNC: 12 MG/DL (ref 7–20)
CALCIUM SERPL-MCNC: 10.7 MG/DL (ref 8.3–10.6)
CHLORIDE BLD-SCNC: 103 MMOL/L (ref 99–110)
CO2: 27 MMOL/L (ref 21–32)
CREAT SERPL-MCNC: 1.1 MG/DL (ref 0.8–1.3)
GFR AFRICAN AMERICAN: >60
GFR NON-AFRICAN AMERICAN: >60
GLUCOSE BLD-MCNC: 126 MG/DL (ref 70–99)
INTERNATIONAL NORMALIZATION RATIO, POC: 6.2
POTASSIUM SERPL-SCNC: 4.6 MMOL/L (ref 3.5–5.1)
PRO-BNP: 27 PG/ML (ref 0–124)
SODIUM BLD-SCNC: 141 MMOL/L (ref 136–145)

## 2022-08-10 PROCEDURE — 99214 OFFICE O/P EST MOD 30 MIN: CPT | Performed by: INTERNAL MEDICINE

## 2022-08-10 PROCEDURE — 3023F SPIROM DOC REV: CPT | Performed by: INTERNAL MEDICINE

## 2022-08-10 PROCEDURE — 99211 OFF/OP EST MAY X REQ PHY/QHP: CPT

## 2022-08-10 PROCEDURE — G8427 DOCREV CUR MEDS BY ELIG CLIN: HCPCS | Performed by: INTERNAL MEDICINE

## 2022-08-10 PROCEDURE — G8417 CALC BMI ABV UP PARAM F/U: HCPCS | Performed by: INTERNAL MEDICINE

## 2022-08-10 PROCEDURE — 3017F COLORECTAL CA SCREEN DOC REV: CPT | Performed by: INTERNAL MEDICINE

## 2022-08-10 PROCEDURE — 85610 PROTHROMBIN TIME: CPT

## 2022-08-10 PROCEDURE — 1036F TOBACCO NON-USER: CPT | Performed by: INTERNAL MEDICINE

## 2022-08-10 ASSESSMENT — ENCOUNTER SYMPTOMS: RESPIRATORY NEGATIVE: 1

## 2022-08-10 NOTE — PROGRESS NOTES
Kirsten Lr is a 60 y.o. here for warfarin management. Davion Kline had an INR test today. Results were reviewed and appropriate warfarin management was completed. This visit was performed as: An in person visit. Protocols were followed with precautions to reduce the spread of COVID-19. Patient verifies current dosing regimen: Yes     Warfarin medication reviewed and updated on the patient 's home medication list: Yes   All other medications reviewed and updated on the patient 's home medication list: No: no changes     Lab Results   Component Value Date    INR 6.2 08/10/2022    INR 3.40 2022    INR 1.8 2022           Anticoagulation Summary  As of 8/10/2022      INR goal:  2.0-3.0   TTR:  36.2 % (6.8 y)   INR used for dosin.2 (8/10/2022)   Warfarin maintenance plan:  5 mg (5 mg x 1) every Mon, Fri; 2.5 mg (5 mg x 0.5) all other days   Weekly warfarin total:  22.5 mg   Plan last modified:  Teresa Sin (2022)   Next INR check:  8/15/2022   Priority:  Maintenance   Target end date: Indefinite    Indications    Pulmonary embolus (HCC) [I26.99]  Chronic anticoagulation [Z79.01]                 Anticoagulation Episode Summary       INR check location:  Anticoagulation Clinic    Preferred lab:      Send INR reminders to:  [facility]    Comments:  EPIC - finger stick every 2-3 weeks            Anticoagulation Care Providers       Provider Role Specialty Phone number    Thomas Leone MD Referring Internal Medicine [phone]            Warfarin assessment / plan:     Appears well  Supra-therapeutic INR. Medication changes. Davion Kline was on a 6 day course of prednisone last week. He finished this on Saturday, 22. He was taking a reduced dose of warfarin while on the steroids. His INR is very elevated today at 6.2. It is surprising that the INR is so elevated when he stopped the steroids 4 days ago. He denies any bruising or bleeding at this time.  He was counseled to seek immediate medical attention for any significant bleeding or if he should fall and hit his head. We will hold his warfarin for 3 days. He will take warfarin 2.5mg on Saturday and 4mg on Sunday. He will return on Monday 8-15-22 for his next INR. Description    Hold warfarin today(8-10), tomorrow(8-11), and Friday(8-12). Take warfarin 2.5mg on Saturday 8-13  Take warfarin 5mg on Sunday 8-14. Call [phone] with signs or symptoms of bleeding or ANY medication changes (including antibiotics, steroids, over-the-counter medications or herbal supplements). If significant bleeding occurs or if you fall and hit your head, please seek immediate medical attention. Keep the number of servings of vitamin K containing foods (dark green, leafy vegetables) the same each week. Please call if this changes. Limit alcohol intake. Please call if this changes. Immunization History   Administered Date(s) Administered    COVID-19, MODERNA BLUE border, Primary or Immunocompromised, (age 12y+), IM, 100 mcg/0.5mL 03/09/2021, 04/08/2021, 12/15/2021    Influenza, MDCK Quadv, IM, PF (Flucelvax 2 yrs and older) 10/26/2021    Influenza, Quadv, IM, PF (6 mo and older Fluzone, Flulaval, Fluarix, and 3 yrs and older Afluria) 10/27/2017, 09/23/2019, 10/02/2020    Influenza, Noel [place], Recombinant, IM PF (Flublok 18 yrs and older) 10/03/2018    Pneumococcal Conjugate 13-valent ([id number]) 09/11/2015    Pneumococcal Conjugate Vaccine 08/15/2017    Pneumococcal Polysaccharide ([id number]) 08/01/2007, 12/19/2012    Tdap (Boostrix, Adacel) 08/19/2014           Orders Placed This Encounter   Procedures    POCT INR     This external order was created through the results console. No orders of the defined types were placed in this encounter. Reviewed AVS with patient / caregiver.     Billing Points:  Adjust dosage and/or reconcile meds (fill pill box) </= 5 medications - 2 points       CLINICAL

## 2022-08-10 NOTE — PROGRESS NOTES
[address], [facility]   Italia Bergeron (:  1960) is a 60 y.o. male,Established patient, here for evaluation of the following chief complaint(s):  Follow-up (6 months )         ASSESSMENT/PLAN:  1. Bronchiolitis obliterans ([facility] [address]  Assessment & Plan:  -Has been stable since . -FEV1 50%  -On Stiolto, albuterol as needed  -Having issues with fatigue he thinks is related to beta-blocker but no overt dyspnea.  -Nebulizer machine is old we will order new one as is over 5 years      Return in about 6 months (around 2/10/2023). Subjective   SUBJECTIVE/OBJECTIVE:  Shortness of breath stable. Initially diagnosed with BSO in  status post therapy. Since then symptoms and objective measures have been largely stable. He remains on bronchodilator therapy for symptomatic relief. Review of Systems   Constitutional: Negative. Respiratory: Negative. Cardiovascular: Negative. Neurological: Negative. Psychiatric/Behavioral: Negative. Objective   Physical Exam    Gen:  No acute distress. Eyes: EOMI. Anicteric sclera. No conjunctival injection. ENT: No discharge. oropharynx clear. External appearance of ears and nose normal.  Neck: Trachea midline. No mass   Resp:  No crackles. No wheezes. No rhonchi. No dullness on percussion. CV: Regular rate. Regular rhythm. No murmur or rub. No edema. Neuro:  no focal neurologic deficit. Moves all extremities  Psych: Awake and alert, Oriented x 3. Judgement and insight appropriate. Mood stable. I spent 31 minutes on this case today, >50% was face-to-face in discussion of the diagnosis and the coordination of care in regards to the treatment plan. All questions answered. An electronic signature was used to authenticate this note.     --Jacky Garcia MD

## 2022-08-10 NOTE — ASSESSMENT & PLAN NOTE
-Has been stable since 1997.   -FEV1 50%  -On Stiolto, albuterol as needed  -Having issues with fatigue he thinks is related to beta-blocker but no overt dyspnea.  -Nebulizer machine is old we will order new one as is over 5 years

## 2022-08-15 ENCOUNTER — ANTI-COAG VISIT (OUTPATIENT)
Dept: PHARMACY | Age: 62
End: 2022-08-15
Payer: COMMERCIAL

## 2022-08-15 DIAGNOSIS — I26.99 OTHER ACUTE PULMONARY EMBOLISM, UNSPECIFIED WHETHER ACUTE COR PULMONALE PRESENT (HCC): Primary | ICD-10-CM

## 2022-08-15 DIAGNOSIS — Z79.01 CHRONIC ANTICOAGULATION: ICD-10-CM

## 2022-08-15 LAB — INR BLD: 2.4

## 2022-08-15 PROCEDURE — 99211 OFF/OP EST MAY X REQ PHY/QHP: CPT

## 2022-08-15 PROCEDURE — 85610 PROTHROMBIN TIME: CPT

## 2022-08-15 NOTE — PROGRESS NOTES
Freddie Gomez is a 60 y.o. here for warfarin management. Mike Purcell had an INR test today. Results were reviewed and appropriate warfarin management was completed. This visit was performed as: An in person visit. Protocols were followed with precautions to reduce the spread of COVID-19. Patient verifies current dosing regimen: Yes     Warfarin medication reviewed and updated on the patient 's home medication list: Yes   All other medications reviewed and updated on the patient 's home medication list: No: no changes     Lab Results   Component Value Date    INR 2.40 08/15/2022    INR 6.2 08/10/2022    INR 3.40 2022       Patient Findings       Negatives:  Signs/symptoms of thrombosis, Signs/symptoms of bleeding, Change in health, Missed doses, Change in medications, Change in diet/appetite, Bruising            Anticoagulation Summary  As of 8/15/2022      INR goal:  2.0-3.0   TTR:  36.3 % (6.8 y)   INR used for dosin.40 (8/15/2022)   Warfarin maintenance plan:  5 mg (5 mg x 1) every Mon, Wed, Fri; 2.5 mg (5 mg x 0.5) all other days   Weekly warfarin total:  25 mg   Plan last modified:  Mallorie Worley (8/15/2022)   Next INR check:  2022   Priority:  Maintenance   Target end date: Indefinite    Indications    Pulmonary embolus (HCC) [I26.99]  Chronic anticoagulation [Z79.01]                 Anticoagulation Episode Summary       INR check location:  Anticoagulation Clinic    Preferred lab:      Send INR reminders to:  [facility]    Comments:  EPIC - finger stick every 2-3 weeks            Anticoagulation Care Providers       Provider Role Specialty Phone number    Monica Burks MD Referring Internal Medicine [phone]            Warfarin assessment / plan:     Appears well. No acute findings. INR within goal range. Last INR was 6.2. Had been on a course of Prednisone that he finished 4 days prior to that reading. Warfarin was held for 3 days.      INR back in range.  Will return a weekly warfarin dose of 25mg. He had been stable earlier this year at 27.5mg per week. Fluctuating most recently due to steroid and antibiotic therapy. Will restart at just a slightly lower dose. Will see patient back on 8-26 to recheck his INR. Description    NEW DOSE: Take warfarin 2.5mg daily except for 5mg on Monday, Wednesday, and Friday    Call [phone] with signs or symptoms of bleeding or ANY medication changes (including antibiotics, steroids, over-the-counter medications or herbal supplements). If significant bleeding occurs or if you fall and hit your head, please seek immediate medical attention. Keep the number of servings of vitamin K containing foods (dark green, leafy vegetables) the same each week. Please call if this changes. Limit alcohol intake. Please call if this changes. Immunization History   Administered Date(s) Administered    COVID-19, MODERNA BLUE border, Primary or Immunocompromised, (age 12y+), IM, 100 mcg/0.5mL 03/09/2021, 04/08/2021, 12/15/2021    Influenza, MDCK Quadv, IM, PF (Flucelvax 2 yrs and older) 10/26/2021    Influenza, Quadv, IM, PF (6 mo and older Fluzone, Flulaval, Fluarix, and 3 yrs and older Afluria) 10/27/2017, 09/23/2019, 10/02/2020    Influenza, Rojean [place], Recombinant, IM PF (Flublok 18 yrs and older) 10/03/2018    Pneumococcal Conjugate 13-valent ([id number]) 09/11/2015    Pneumococcal Conjugate Vaccine 08/15/2017    Pneumococcal Polysaccharide ([id number]) 08/01/2007, 12/19/2012    Tdap (Boostrix, Adacel) 08/19/2014           Orders Placed This Encounter   Procedures    Protime-INR     This external order was created through the results console. No orders of the defined types were placed in this encounter. Reviewed AVS with patient / caregiver.     Billing Points:  Adjust dosage and/or reconcile meds (fill pill box) </= 5 medications - 2 points       CLINICAL PHARMACY CONSULT: MED RECONCILIATION/REVIEW ADDENDUM    For Pharmacy Admin Tracking Only    Intervention Detail: Dose Adjustment: 1, reason: Therapy Optimization  Total # of Interventions Recommended: 1  Total # of Interventions Accepted: 1  Time Spent (min): 15

## 2022-08-17 ENCOUNTER — OFFICE VISIT (OUTPATIENT)
Dept: CARDIOLOGY CLINIC | Age: 62
End: 2022-08-17
Payer: COMMERCIAL

## 2022-08-17 VITALS
DIASTOLIC BLOOD PRESSURE: 66 MMHG | WEIGHT: 232 LBS | BODY MASS INDEX: 36.41 KG/M2 | HEIGHT: 67 IN | OXYGEN SATURATION: 97 % | HEART RATE: 68 BPM | SYSTOLIC BLOOD PRESSURE: 122 MMHG

## 2022-08-17 DIAGNOSIS — I26.99 PULMONARY EMBOLISM, UNSPECIFIED CHRONICITY, UNSPECIFIED PULMONARY EMBOLISM TYPE, UNSPECIFIED WHETHER ACUTE COR PULMONALE PRESENT (HCC): ICD-10-CM

## 2022-08-17 DIAGNOSIS — I50.22 CHRONIC SYSTOLIC (CONGESTIVE) HEART FAILURE (HCC): ICD-10-CM

## 2022-08-17 DIAGNOSIS — I48.0 PAROXYSMAL ATRIAL FIBRILLATION (HCC): ICD-10-CM

## 2022-08-17 DIAGNOSIS — I10 ESSENTIAL HYPERTENSION: ICD-10-CM

## 2022-08-17 DIAGNOSIS — I25.10 CORONARY ARTERY DISEASE DUE TO CALCIFIED CORONARY LESION: Primary | ICD-10-CM

## 2022-08-17 DIAGNOSIS — E78.5 HYPERLIPIDEMIA, UNSPECIFIED HYPERLIPIDEMIA TYPE: ICD-10-CM

## 2022-08-17 DIAGNOSIS — I25.84 CORONARY ARTERY DISEASE DUE TO CALCIFIED CORONARY LESION: Primary | ICD-10-CM

## 2022-08-17 DIAGNOSIS — I42.8 NICM (NONISCHEMIC CARDIOMYOPATHY) (HCC): ICD-10-CM

## 2022-08-17 PROCEDURE — 99214 OFFICE O/P EST MOD 30 MIN: CPT | Performed by: INTERNAL MEDICINE

## 2022-08-17 PROCEDURE — 1036F TOBACCO NON-USER: CPT | Performed by: INTERNAL MEDICINE

## 2022-08-17 PROCEDURE — 93000 ELECTROCARDIOGRAM COMPLETE: CPT | Performed by: INTERNAL MEDICINE

## 2022-08-17 PROCEDURE — 3017F COLORECTAL CA SCREEN DOC REV: CPT | Performed by: INTERNAL MEDICINE

## 2022-08-17 PROCEDURE — G8417 CALC BMI ABV UP PARAM F/U: HCPCS | Performed by: INTERNAL MEDICINE

## 2022-08-17 PROCEDURE — G8427 DOCREV CUR MEDS BY ELIG CLIN: HCPCS | Performed by: INTERNAL MEDICINE

## 2022-08-17 RX ORDER — PLECANATIDE 3 MG/1
TABLET ORAL
COMMUNITY
Start: 2022-08-05

## 2022-08-17 RX ORDER — CARVEDILOL 6.25 MG/1
6.25 TABLET ORAL 2 TIMES DAILY
Qty: 60 TABLET | Refills: 3 | Status: SHIPPED | OUTPATIENT
Start: 2022-08-17 | End: 2022-08-23 | Stop reason: ALTCHOICE

## 2022-08-17 NOTE — PROGRESS NOTES
[facility]      Cardiology Progress Note    Tea Conte  1960 August 17, 2022    CC:  \"I still have heart racing. \"     HPI:  The patient is 60 y.o. male with a past medical history significant for for a prior PE in 2011 and nonischemic cardiomyopathy. He was hospitalized at [facility] AT [facility] with chest pain and had an abnormal stress that led to a left heart catheterization on 7/21/16. His coronary arteries were normal but he had LV dysfunction with a LVEF of 40% at that time. A repeat echocardiogram demonstrated his LVEF to be 30% despite optimal medical therapy. Entresto therapy was initiated and he was interested in pursuing CRT. He underwent Bi-V ICD implant on 10/10/17. Biv pacing turned off,  now set at VVI 40 with defibrillator programming ON. Also with a hx of atrial tach and underwent DCCV 9/2018, AFIB, HLD, HTN, ARDS 2011, bronchiolitis obliterans and nocturnal hypoxia-O2 2L nightly managed per Pulmonary. Today, he reports continued heart racing symptoms. Patient denies exertional chest pain/pressure, dyspnea at rest, GUEVARA, PND, orthopnea, palpitations, lightheadedness, weight changes, changes in LE edema, and syncope. He reports medical therapy compliance and tolerating. He has obtained COVID vaccine. He reports no abnormal bruising or bleeding. Walking for exercise.        Past Medical History:   Diagnosis Date    Bronchiolitis obliterans ([address]     Bundle branch block, right     Cardiomyopathy ([address]     Chest pain 9/24/2019    COVID-19 virus vaccine not available     Fibromyalgia     GERD (gastroesophageal reflux disease)     Hepatitis 1979    unsure of which type    Hx of blood clots     Hyperlipemia     Hypertension     IBS (irritable bowel syndrome)     Kidney stone     over thirty kidney stones    Neuropathy     right side-chest    Pneumothorax 2011    Right    Prostatitis     Pulmonary embolism on right [facility]) 2011    upper lobe-coumadin 2011    Sacroiliitis [facility])      Past Surgical History:   Procedure Laterality Date    CHOLECYSTECTOMY  2007    COLONOSCOPY  09/21/2012    dr Iván mercedes    COLONOSCOPY  11/30/2018    daren--francine 2029=8    CYSTOSCOPY  05/17/2019    RIGHT SIDED URETEROSCOPY  Diagnostic, retrograde pyelogram and fulguration of previously resected bladder tumor base    CYSTOSCOPY Right 05/17/2019    RIGHT SIDED URETEROSCOPY FLUGERATION  OF BLADDER performed by Brea Rodriguez MD at [address] Right 07/02/2021    CYSTOURETHROSCOPY WITH RIGHT RETROGRADE PYELOGRAPHY AND PLACEMENT OF RIGHT DOUBLE J STENT performed by Adeola Haile DO at [address] Right 04/25/2022    CYSTOSCOPY, RIGHT STENT INSERTION performed by Brea Rodriguez MD at [address] [facility]  2015    LITHOTRIPSY      [facility] opening larger in sinuses    UPPER GASTROINTESTINAL ENDOSCOPY  03/28/2014    dr Iván mercedes    UPPER GASTROINTESTINAL ENDOSCOPY N/A 02/01/2021    EGD BIOPSY performed by Juan Osei MD at [address] History   Problem Relation Age of Onset    High Blood Pressure Mother     Dementia Mother     Cancer Father         Pancreatic Ca    Cancer Paternal Uncle     Cancer Paternal Uncle     Cancer Paternal Uncle      Social History     Tobacco Use    Smoking status: Never    Smokeless tobacco: Never   Vaping Use    Vaping Use: Never used   Substance Use Topics    Alcohol use: No    Drug use: No       Allergies   Allergen Reactions    Atrovent Nasal Spray [Ipratropium] Anaphylaxis and Other (See Comments)     Chest tightness    Ipratropium Bromide Hfa Shortness Of Breath    Proventil [Albuterol] Shortness Of Breath    Doxycycline Hives    Nitroglycerin Other (See Comments)     Severe hypotension (went from 437 to 66 systolic)    Sulfa Antibiotics Rash    Vicodin [Hydrocodone-Acetaminophen] Rash     Current Outpatient Medications   Medication Sig Dispense Refill    TRULANCE 3 MG TABS TAKE 1 TABLET BY MOUTH DAILY      pregabalin (LYRICA) 75 MG capsule TAKE 1 CAPSULE BY MOUTH IN  THE MORNING AND 2 CAPSULES  IN THE EVENING 270 capsule 0    zolpidem (AMBIEN) 10 MG tablet TAKE 1 TABLET BY MOUTH AT  NIGHT AS NEEDED FOR SLEEP 90 tablet 0    ENTRESTO 49-51 MG per tablet Take 1.5 tablets twice daily by mouth 270 tablet 3    warfarin (COUMADIN) 5 MG tablet Take 0.5 tablets by mouth daily EXCEPT 5mg every Monday, Wednesday and Friday or as directed by [facility] [phone] (Patient taking differently: Take 2.5 mg by mouth daily EXCEPT 5mg every Monday, Wednesday, and Friday or as directed by [facility] [phone]) 90 tablet 3    furosemide (LASIX) 20 MG tablet Take 1 tablet by mouth 2 times daily 180 tablet 3    empagliflozin (JARDIANCE) 10 MG tablet Take 1 tablet by mouth daily 90 tablet 3    Phenazopyridine HCl (AZO-DINE URINARY ANALGESIC PO) Take 2 tablets by mouth daily as needed       methocarbamol (ROBAXIN) 500 MG tablet Take 500 mg by mouth 4 times daily as needed (musclw spasms)      loratadine (CLARITIN) 10 MG tablet Take 10 mg by mouth daily      flavoxATE (URISPAS) 100 MG tablet Take 1 tablet by mouth 3 times daily as needed for Muscle spasms 60 tablet 3    albuterol (PROVENTIL) (2.5 MG/3ML) 0.083% nebulizer solution Take 3 mLs by nebulization every 4 hours as needed for Wheezing or Shortness of Breath (Patient taking differently: Take 2.5 mg by nebulization in the morning, at noon, and at bedtime) 360 mL 5    STIOLTO RESPIMAT 2.5-2.5 MCG/ACT AERS INHALE 2 PUFFS INTO THE LUNGS DAILY 4 g 5    azelastine (ASTELIN) 0.1 % nasal spray 2 sprays by Nasal route 2 times daily Use in each nostril as directed (Patient taking differently: 1 spray by Nasal route at bedtime Use in each nostril as directed) 60 mL 3    dexlansoprazole (DEXILANT) 60 MG CPDR delayed release capsule Take 1 capsule by mouth daily 90 capsule 3    dicyclomine (BENTYL) 10 MG capsule TAKE ONE CAPSULE BY MOUTH  FOUR TIMES DAILY AS NEEDED 360 capsule 1    tamsulosin (FLOMAX) 0.4 MG capsule TAKE ONE CAPSULE BY MOUTH TWO TIMES A DAY 60 capsule 11    metoprolol succinate (TOPROL XL) 25 MG extended release tablet TAKE 1 TABLET BY MOUTH  TWICE DAILY 180 tablet 3    pravastatin (PRAVACHOL) 40 MG tablet Take 40 mg by mouth daily      melatonin 3 MG TABS tablet Take 3 mg by mouth nightly as needed Taking half tablet      guaiFENesin (MUCINEX) 600 MG extended release tablet Take 600 mg by mouth 2 times daily      ondansetron (ZOFRAN) 4 MG tablet Take 1 tablet by mouth every 8 hours as needed for Nausea or Vomiting 20 tablet 0    Cholecalciferol (VITAMIN D3) 1.25 MG (21010 UT) CAPS Take 1 capsule by mouth once a week 12 capsule 3    albuterol sulfate  (90 Base) MCG/ACT inhaler INHALE 2 PUFFS INTO THE LUNGS EVERY 4 HOURS AS NEEDED FOR WHEEZING OR SHORTNESS OF BREATH 1 Inhaler 0    polyethylene glycol (MIRALAX) PACK packet Take 17 g by mouth nightly       aspirin 81 MG tablet Take 81 mg by mouth daily       No current facility-administered medications for this visit. Review of Systems:  Constitutional: no unanticipated weight loss. There's been no change in energy level, sleep pattern, or activity level. No fevers, chills. Eyes: No visual changes or diplopia. No scleral icterus. ENT: No Headaches, hearing loss or vertigo. No mouth sores or sore throat. Cardiovascular: as reviewed in HPI  Respiratory: No cough or wheezing, no sputum production. No hematemesis. Gastrointestinal: No abdominal pain, appetite loss, blood in stools. No change in bowel or bladder habits. Genitourinary: No dysuria, trouble voiding, or hematuria. Musculoskeletal:  No gait disturbance, no joint complaints. Integumentary: No rash or pruritis. Neurological: No headache, diplopia, change in muscle strength, numbness or tingling. Psychiatric: No anxiety or depression. Endocrine: No temperature intolerance.  No excessive thirst, fluid intake, or urination. No tremor. Hematologic/Lymphatic: No abnormal bruising or bleeding, blood clots or swollen lymph nodes. Allergic/Immunologic: No nasal congestion or hives. Physical Exam:   /66   Pulse 68   Ht 5' 7\" (1.702 m)   Wt 232 lb (105.2 kg)   SpO2 97%   BMI 36.34 kg/m²   Wt Readings from Last 3 Encounters:   08/17/22 232 lb (105.2 kg)   08/10/22 231 lb 6.4 oz (105 kg)   06/23/22 224 lb (101.6 kg)     Constitutional: The patient is oriented to person, place, and time. Appears well-developed and well-nourished. In no acute distress. Head: Normocephalic and atraumatic. Pupils equal and round. Neck: Neck supple. No JVP or carotid bruit appreciated. No mass and no thyromegaly present. No lymphadenopathy present. Cardiovascular: Normal rate. Normal heart sounds. Exam reveals no gallop and no friction rub. No murmur heard. Pulmonary/Chest: Effort normal and breath sounds normal. No respiratory distress. No wheezes, rhonchi or rales. Abdominal: Soft, non-tender. Bowel sounds are normal. Exhibits no organomegaly, mass or bruit. Extremities: No edema. No cyanosis or clubbing. Pulses are 2+ radial and carotid bilaterally. Neurological: No gross cranial nerve deficit. Coordination normal.   Skin: Skin is warm and dry. There is no rash or diaphoresis. Psychiatric: Patient has a normal mood and affect. Speech is normal and behavior is normal.     Lab Review:   Lab Results   Component Value Date/Time    TRIG 216 06/06/2022 12:11 PM    HDL 37 06/06/2022 12:11 PM    HDL 65 11/10/2011 06:05 AM    LDLCALC 92 06/06/2022 12:11 PM    LABVLDL 43 06/06/2022 12:11 PM       Lab Results   Component Value Date/Time    BUN 12 08/10/2022 10:48 AM    CREATININE 1.1 08/10/2022 10:48 AM     EKG Interpretation: 2/14/18: sinus rhythm with LBBB     11/13/19: Sinus rhythm LBBB     2/28/20: Sinus rhythm LBBB     2/25/21: SR with LBBB.      7/21/21: Sinus  Rhythm with Left bundle branch block.      Image Review:     ECHO 11/28/17  Summary   Normal left ventricular wall thickness. Ejection fraction is visually   estimated to be 40-45%. There is septal hypokinesis (secondary to Pacing)   Trivial mitral & tricuspid regurgitation is present. The left atrial size is normal.   Pacer / ICD wire is visualized in the right ventricle. There appears to be normal right ventricular size and function. Echo: 7/24/17  Left ventricle size is normal. Mild concentric left ventricular hypertrophy  is present. Global ejection fraction is moderately decreased and estimated  from 30 % to 35%. Diastolic filling parameters suggests grade I diastolic  dysfunction . There is abnormal (paradoxical) septal motion consistent with left bundle  branch block. Mitral valve is structurally normal.  Trivial to mild mitral regurgitation is present. The left atrial size is normal.  A bubble study was performed and fails to show evidence of right to left  shunting. Echo: 11/9/16  Dilated LV with severely reduced systolic function. EF 30%. Anterior, septal  and apical akinesis. Mild mitral regurgitation is present. The left atrial size is normal.  Normal right ventricular size and function. Stress Test:  7/21/16  SPECT perfusion images: On the stress corrected images there is a moderate defect in the septum of the left ventricle. At rest, partially redistributes especially the posterior lateral component. Complete redistribution is not present.   LVEF:  49 %      (Normal is at least 62% for women and 53% for men)  LV wall motion:   There is significant hypokinesia of the ventricular septum  LVEDV: 117ml  (Normal is up to 100ml for women and 150ml for men)  Transient dilatation ratio:   1.0      (Normal is up to 1.3 for women and 1.2 for men)     Cath: 7/21/16  #1-coronary angiography summary: Normal coronaries in a left   dominant system  A-left main coronary is normal  B-the LAD has minor irregularities and no significant stenoses  C-the circumflex is dominant and has minor irregularities and no   significant stenoses  D-the right coronary is a small nondominant vessel and is   angiographically normal  #2-ventriculography in the ADKINS projection demonstrates mild   global left ventricular dysfunction ejection fractions   approximately 40  #3-aortic pressure is approximately 150/80 left ventricular   pressures 150/14 there is no gradient on pullback  #6-VUATL are no complications  #1-AAV patient will be treated medically for left ventricular   dysfunction in the setting of normal coronaries     ECHO 9/25/19  Mild concentric LVH with normal LV size and wall motion. EF is 50%. Paradoxical septal motion secondary to paced rhythm  Trivial mitral regurgitation is present. The left atrium is normal in size. The right ventricle is normal in size and function. Pacing wire seen. Stress study:8/26/20  No evidence of reversible ischemia. There is a moderate to large sized, moderate intensity, fixed septal and    inferior wall defect which is most consistent with LBBB induced artifact and    diaphragm attenuation artifact. Normal LV size and systolic function. Overall findings represent a low risk study. Echo: 10/2020  - Left ventricle: The cavity size is normal. Wall thickness is    normal. Systolic function was mildly reduced. The calculated    ejection fraction was 42%. Normal diastolic function. - Ventricular septum: Septal motion is paradoxical septal motion    consistent with a conduction abnormality or paced rhythm. - Right ventricle: Pacer wire noted in right ventricle. - Inferior vena cava: The vessel was normal in size; the    respirophasic diameter changes were in the normal range (&gt;= 50%);    findings are consistent with normal central venous pressure. RHC: 3/2/21  HEMODYNAMIC DATA:  The right atrial pressure mean was 6 with oxygen  saturation of 78%. RV pressure was 39 systolic with oxygen saturation  of 77%.   PA pressure was 30/13 with mean of 22. Pulmonary capillary  wedge pressure mean was 17 mmHg. Cardiac output by thermodilution  method was 4.81 liters per minute. Index was 2.34 liters per minute per  body surface area. By Georganna Guard method, cardiac output was 6.88 liters per  minute with a cardiac index of 3.35 liters per minute per body surface  area. OVERALL IMPRESSION:  1. Slightly above normal right heart pressure with slightly elevated  pulmonary capillary wedge pressure of 17 mmHg. 2.  Normal cardiac output and cardiac indices. In view of the above findings, we will restart the patient on diuretic  therapy and we will follow his symptoms. Echo: 6/2022   Normal left ventricular wall thickness. The left ventricle appears normal in size. Mildly decreased left ventricular systolic function with an estimated   ejection fraction of 25-30% . Anterior, anterior septum, inferior septum are hypokinetic   Diastolic filling parameters suggest grade I diastolic dysfunction. The right ventricle is normal in size and function. No clinically significant valvular heart disease    Assessment/Plan:     Nonischemic Cardiomyopathy/chronic systolic heart failure   He had Biv -ICD placed by Dr. Becca Moreno for underlying cardiomyopathy with left bundle branch block. His LHC with no CAD, last stress normal 8/2020, last echocardiogram from 10/2020 revaled LVEF 42%, RHC 3/2/21 slightly above normal right heart pressure with slightly elevated pulmonary wedge pressure 17mmHg, normal cardiac output and cardiac indices. Echo 6/2022 LVEF 25-30%, grade I DD.     -Currently denies any symptoms of dyspnea on exertion, PND orthopnea or leg edema however he notes weight gain since he last saw SHANA Martin 6/23/22.   -Per our flow sheet, he is up 8#.   -He states he is working on low salt diet, reducing sugar and carbs.    -He appears euvolemic on clinical exam today  -BMP, BNP stable 8/10/22  -device interrogation 7/26/22.  -On Entresto 49-51mg 1.5 tablets BID  -Instructed to increase to 49-51 mg 2 tablets BID (0830, 2030) and repeat BMP in 2-3 weeks. Script sent in for  mg BID. -Notes some fatigue with Toprol-XL 25 mg BID, discussed taking 50 mg nightly at 4pm spacing out from [place] but he would like to go back on Coreg 6.25 mg BID at 1030/2230.   -we again reviewed his echo 6/2022 at length.   -follow up in 3-4 months with echo prior.   -We will repeat BMP in 1 month after Entresto is increased    Pulmonary Embolism   Patient is on Warfarin therapy. Denies any abnormal bruising or bleeding  or recurrence. INR managed per [facility] clinic. He denies any recurrence and has remained stable. Hypertension, essential   Blood pressure remains stable. Continue Toprol XL 50 mg daily at 4pm.  BMP from 8/10/22 stable. Hyperlipidemia, unspecified   Last lipid profile 5/15/20 is within acceptable limits except , LDLc 70. Repeat lipid 2/26/21 stable except low HDL 39, LDLc 92. LFT remain abnormal but stable 7/3/21. He is on pravastatin therapy as he had myalgias on lipitor and has been denied PCSK9 inhibitors since he has no known atherosclerotic disease. 6/6/22 LDLC 92. Again encouraged low fat/chol/carb diet, weight loss, walking program.     Paroxsymal atrial fibrillation  Patient denies any further palpitations except he feels his heart racing. He has remained stable. His last ICD check 7/2022 reviewed and stable. Physical exam normal rate and rhyhtm. Will follow him with routine device interrogations. Bronchiolitis obliterans, choric cough and lung nodules  Pulmonology for bronchiolitis obliterans and is on medical therapy and she has recommended pulmonary rehab and started him on symbicort. We will see him back in 3-4 months with echo prior. Thank you very much for allowing me to participate in the care of your patient. Please do not hesitate to contact me if you have any questions.     Sincerely,  Prudy Pickup, MD [place]  [facility], [address] [place] [address]  Ph: [phone]  Fax: [phone]      This note was scribed in the presence of Dr. Adriana Diehl MD by Jay Trevino RN.

## 2022-08-17 NOTE — PATIENT INSTRUCTIONS
1) INCREASE ENTRESTO 49-51 MG (2 TABLETS) TWICE DAILY STARTING 8/17/22 UNTIL RUNS OUTTHEN WILL TAKE  MG TWICE THEREAFTER  2) LABS IN 2-3 WEEKS FROM 8/17/22  3) WORK ON LOW SALT DIET, LOW FAT/CHOL/CARB DIET, 4) WEIGHT LOSS AND WALKING PROGRAM   4) ECHO BETWEEN 3-4 MONTHS THEN F/U WITH DR. Yuridia Avelar AFTER

## 2022-08-23 ENCOUNTER — TELEPHONE (OUTPATIENT)
Dept: CARDIOLOGY CLINIC | Age: 62
End: 2022-08-23

## 2022-08-23 ENCOUNTER — TELEPHONE (OUTPATIENT)
Dept: PHARMACY | Age: 62
End: 2022-08-23

## 2022-08-23 RX ORDER — METOPROLOL SUCCINATE 25 MG/1
25 TABLET, EXTENDED RELEASE ORAL DAILY
Qty: 90 TABLET | Refills: 3
Start: 2022-08-23

## 2022-08-23 NOTE — TELEPHONE ENCOUNTER
Supa King called in this afternoon stating that he was in office back on 8/17/22 and Dr. Kasie Rodriguez changed some medications.    -On Entresto 49-51mg 1.5 tablets BID  Instructed to increase to 49-51 mg 2 tablets   -Notes some fatigue with Toprol-XL 25 mg BID, discussed taking 50 mg nightly at 4pm spacing out from [place] but he would like to go back on Coreg 6.25 mg BID at 1030/2230. Supa King states that he didn't like the way the Coreg was making him feel, so he went back on the Metoprolol. He also said that he is only taking 1 and half tablets of the [place] for now until the Coreg is out of his system and then he will start taking the 2 tablets.     You can reach Supa King at #[phone] drug use; driving or other dangerous activities under the influence; availability of treatment for abuse. --Sexuality: Discussed sexually transmitted diseases, partner selection, use of condoms, avoidance of unintended pregnancy  and contraceptive alternatives. --Injury prevention: Discussed safety belts, safety helmets, smoke detector, smoking near bedding or upholstery. --Dental health: Discussed importance of regular tooth brushing, flossing, and dental visits. --Immunizations reviewed. --Discussed benefits of screening colonoscopy. --After hours service discussed with patient    3.  Follow up as needed for acute illness

## 2022-08-26 ENCOUNTER — ANTI-COAG VISIT (OUTPATIENT)
Dept: PHARMACY | Age: 62
End: 2022-08-26
Payer: COMMERCIAL

## 2022-08-26 DIAGNOSIS — I26.99 OTHER ACUTE PULMONARY EMBOLISM, UNSPECIFIED WHETHER ACUTE COR PULMONALE PRESENT (HCC): Primary | ICD-10-CM

## 2022-08-26 DIAGNOSIS — Z79.01 CHRONIC ANTICOAGULATION: ICD-10-CM

## 2022-08-26 LAB — INR BLD: 2.6

## 2022-08-26 PROCEDURE — 99211 OFF/OP EST MAY X REQ PHY/QHP: CPT

## 2022-08-26 PROCEDURE — 85610 PROTHROMBIN TIME: CPT

## 2022-08-26 NOTE — PROGRESS NOTES
Fiorella Ortiz is a 60 y.o. here for warfarin management. Natalie Thakur had an INR test today. Results were reviewed and appropriate warfarin management was completed. This visit was performed as: An in person visit. Protocols were followed with precautions to reduce the spread of COVID-19. Patient verifies current dosing regimen: Yes     Warfarin medication reviewed and updated on the patient 's home medication list: Yes   All other medications reviewed and updated on the patient 's home medication list: No: no changes     Lab Results   Component Value Date    INR 2.60 2022    INR 2.40 08/15/2022    INR 6.2 08/10/2022       Patient Findings       Positives:  Upcoming invasive procedure, Change in medications    Negatives:  Signs/symptoms of thrombosis, Signs/symptoms of bleeding, Change in health, Missed doses, Change in diet/appetite, Bruising    Comments:  Endoscopy on , will hold warfarin x5 prior per physician instructions. Azithromycin - - low interaction. Called  - no warfarin change at that time. Anticoagulation Summary  As of 2022      INR goal:  2.0-3.0   TTR:  36.5 % (6.8 y)   INR used for dosin.60 (2022)   Warfarin maintenance plan:  5 mg (5 mg x 1) every Mon, Wed, Fri; 2.5 mg (5 mg x 0.5) all other days   Weekly warfarin total:  25 mg   Plan last modified:  Yas Queen (8/15/2022)   Next INR check:  2022   Priority:  Maintenance   Target end date:   Indefinite    Indications    Pulmonary embolus (HCC) [I26.99]  Chronic anticoagulation [Z79.01]                 Anticoagulation Episode Summary       INR check location:  Anticoagulation Clinic    Preferred lab:      Send INR reminders to:  Jeremy STAFF    Comments:  EPIC - finger stick every 2-3 weeks            Anticoagulation Care Providers       Provider Role Specialty Phone number    Marian Elizondo MD Referring Internal Medicine [phone]            Warfarin assessment / plan:     Appears well. No acute findings. INR within goal range. No change to warfarin therapy today. Patient still dealing with some type of acute infection, Z-jerry didn't seem to help. Will call his MD today to see if he can try something else and will let us know if anything is prescribed. He has an endoscopy scheduled for 9/8, for which he will hold his warfarin starting 9/3 per physician instructions. then restart the night of the procedure. We will recheck his INR ~1 week after on 9/16. Description    CONTINUE DOSE: Take warfarin 2.5mg daily except for 5mg on Monday, Wednesday, and Friday    Call [phone] with signs or symptoms of bleeding or ANY medication changes (including antibiotics, steroids, over-the-counter medications or herbal supplements). If significant bleeding occurs or if you fall and hit your head, please seek immediate medical attention. Keep the number of servings of vitamin K containing foods (dark green, leafy vegetables) the same each week. Please call if this changes. Limit alcohol intake. Please call if this changes. Immunization History   Administered Date(s) Administered    COVID-19, MODERNA BLUE border, Primary or Immunocompromised, (age 12y+), IM, 100 mcg/0.5mL 03/09/2021, 04/08/2021, 12/15/2021, 07/23/2022    Influenza, FLUARIX, FLULAVAL, (age 10 mo+) AND AFLURIA, FLUZONE (age 1 y+), PF 10/27/2017, 09/23/2019, 10/02/2020    Influenza, FLUBLOK, (age 25 y+), PF 10/03/2018    Influenza, FLUCELVAX, (age 10 mo+), MDCK, PF 10/26/2021    Pneumococcal Conjugate 13-valent ([id number]) 09/11/2015    Pneumococcal Conjugate Vaccine 08/15/2017    Pneumococcal Polysaccharide ([id number]) 08/01/2007, 12/19/2012    Tdap (Boostrix, Adacel) 08/19/2014           Orders Placed This Encounter   Procedures    Protime-INR     This external order was created through the results console.         No orders of the defined types were placed in this encounter. Reviewed AVS with patient / caregiver.     Billing Points:  0 billing points this visit       CLINICAL PHARMACY CONSULT: MED RECONCILIATION/REVIEW ADDENDUM    For Pharmacy Admin Tracking Only    Intervention Detail:   Total # of Interventions Recommended: 0  Total # of Interventions Accepted: 0  Time Spent (min): 15

## 2022-08-27 NOTE — TELEPHONE ENCOUNTER
Problem: Pain  Goal: #Acceptable pain level achieved/maintained at rest using NRS/Faces  Description: This goal is used for patients who can self-report.  Acceptable means the level is at or below the identified comfort/function goal.  Outcome: Outcome Met, Continue evaluating goal progress toward completion     Problem: Pressure Injury, Risk for  Goal: # Skin remains intact  Outcome: Outcome Met, Continue evaluating goal progress toward completion     Problem: VTE, Risk for  Goal: # No s/s of VTE  Outcome: Outcome Met, Continue evaluating goal progress toward completion      Pt had blood work drawn last week and he was calling for the results and to see what his next step is. Please call the patient.

## 2022-08-30 ENCOUNTER — TELEPHONE (OUTPATIENT)
Dept: PHARMACY | Age: 62
End: 2022-08-30

## 2022-08-30 ENCOUNTER — NURSE ONLY (OUTPATIENT)
Dept: CARDIOLOGY CLINIC | Age: 62
End: 2022-08-30
Payer: COMMERCIAL

## 2022-08-30 DIAGNOSIS — Z95.810 BIVENTRICULAR ICD (IMPLANTABLE CARDIOVERTER-DEFIBRILLATOR) IN PLACE: Primary | ICD-10-CM

## 2022-08-30 DIAGNOSIS — I50.22 CHRONIC SYSTOLIC HF (HEART FAILURE) (HCC): ICD-10-CM

## 2022-08-30 DIAGNOSIS — I42.9 CARDIOMYOPATHY, UNSPECIFIED TYPE (HCC): ICD-10-CM

## 2022-08-30 PROCEDURE — 93297 REM INTERROG DEV EVAL ICPMS: CPT | Performed by: NURSE PRACTITIONER

## 2022-08-30 PROCEDURE — G2066 INTER DEVC REMOTE 30D: HCPCS | Performed by: NURSE PRACTITIONER

## 2022-08-30 NOTE — PROGRESS NOTES
Remote transmission received from patient's CRT-D monitor at home (programmed VVI RV @ 40bpm per 11.2017 specialty comments). Transmission shows normal sensing and pacing function. ST/AT/SVT noted (Toprol XL, warfarin). Ap 0%  RVp 0%    Thoracic impedance is normal/above reference line. NP will review. See interrogation under cardiology tab in the [address] [place] [address] [address] field for more details. Will continue to monitor remotely. (End of 31-day monitoring period 8/30/22).

## 2022-08-31 ENCOUNTER — TELEPHONE (OUTPATIENT)
Dept: PHARMACY | Age: 62
End: 2022-08-31

## 2022-08-31 ENCOUNTER — HOSPITAL ENCOUNTER (OUTPATIENT)
Dept: GENERAL RADIOLOGY | Age: 62
Discharge: HOME | End: 2022-08-31
Payer: COMMERCIAL

## 2022-08-31 ENCOUNTER — HOSPITAL ENCOUNTER (OUTPATIENT)
Age: 62
Discharge: HOME | End: 2022-08-31
Payer: COMMERCIAL

## 2022-08-31 DIAGNOSIS — M25.559 HIP PAIN: ICD-10-CM

## 2022-08-31 PROCEDURE — 73521 X-RAY EXAM HIPS BI 2 VIEWS: CPT

## 2022-08-31 NOTE — TELEPHONE ENCOUNTER
Forrest Castaneda called to let us know that he started a 10 day course of amoxicillin. I advised him to reduce his warfarin dose to 2.5mg today(Wednesday) and Friday. He will call with any unusual bruising or bleeding.     [address]  Anticoagulation Service  [phone]

## 2022-09-01 NOTE — PROGRESS NOTES
[facility] time___0800_________        Surgery time_______0930_____    Take the following medications with a sip of water: Follow your MD/Surgeons pre-procedure instructions regarding your medications     Do not eat or drink anything after 12:00 midnight prior to your surgery. This includes water chewing gum, mints and ice chips. You may brush your teeth and gargle the morning of your surgery, but do not swallow the water     Please see your family doctor/pediatrician for a history and physical and/or concerning medications. Bring any test results/reports from your physicians office. If you are under the care of a heart doctor or specialist doctor, please be aware that you may be asked to them for clearance    You may be asked to stop blood thinners such as Coumadin, Plavix, Fragmin, Lovenox, etc., or any anti-inflammatories such as:  Aspirin, Ibuprofen, Advil, Naproxen prior to your surgery. We also ask that you stop any OTC medications such as fish oil, vitamin E, glucosamine, garlic, Multivitamins, COQ 10, etc.    We ask that you do not smoke 24 hours prior to surgery  We ask that you do not  drink any alcoholic beverages 24 hours prior to surgery     You must make arrangements for a responsible adult to take you home after your surgery. For your safety you will not be allowed to leave alone or drive yourself home. Your surgery will be cancelled if you do not have a ride home. Also for your safety, it is strongly suggested that someone stay with you the first 24 hours after your surgery. A parent or legal guardian must accompany a child scheduled for surgery and plan to stay at the hospital until the child is discharged. Please do not bring other children with you. For your comfort, please wear simple loose fitting clothing to the hospital.  Please do not bring valuables.     Do not wear any make-up or nail polish on your fingers or toes      For your safety, please do not wear any jewelry or body piercing's on the day of surgery. All jewelry must be removed. If you have dentures, they will be removed before going to operating room. For your convenience, we will provide you with a container. If you wear contact lenses or glasses, they will be removed, please bring a case for them. If you have a living will and a durable power of  for healthcare, please bring in a copy. As part of our patient safety program to minimize surgical site infections, we ask you to do the following:    Please notify your surgeon if you develop any illness between         now and the  day of your surgery. This includes a cough, cold, fever, sore throat, nausea,         or vomiting, and diarrhea, etc.   Please notify your surgeon if you experience dizziness, shortness         of breath or blurred vision between now and the time of your surgery. Do not shave your operative site 96 hours prior to surgery. For face and neck surgery, men may use an electric razor 48 hours   prior to surgery. You may shower the night before surgery or the morning of   your surgery with an antibacterial soap. You will need to bring a photo ID and insurance card    [facility] has an onsite pharmacy, would you like to utilize our pharmacy     If you will be staying overnight and use a C-pap machine, please bring   your C-pap to hospital     Our goal is to provide you with excellent care, therefore, visitors will be limited to two(2) in the room at a time so that we may focus on providing this care for you. Please contact pre-admission testing if you have any further questions. [facility] phone number:  [phone] PAT fax number:  [phone]  Please note these are generalized instructions for all surgical cases, you may be provided with more specific instructions according to your surgery.     C-Difficile admission screening and protocol:       * Admitted with diarrhea? [] YES    [x]  NO     *Prior history of C-Diff. In last 3 months? [] YES    [x]  NO     *Antibiotic use in the past 6-8 weeks? [x]  NO    []  YES                 If yes, which ANTIBIOTIC AND REASON______     *Prior hospitalization or nursing home in the last month? []  YES    [x]  NO        SAFETY FIRST. .call before you fall

## 2022-09-06 ENCOUNTER — ANESTHESIA EVENT (OUTPATIENT)
Dept: ENDOSCOPY | Age: 62
End: 2022-09-06
Payer: COMMERCIAL

## 2022-09-08 ENCOUNTER — ANESTHESIA (OUTPATIENT)
Dept: ENDOSCOPY | Age: 62
End: 2022-09-08
Payer: COMMERCIAL

## 2022-09-08 ENCOUNTER — HOSPITAL ENCOUNTER (OUTPATIENT)
Age: 62
Setting detail: OUTPATIENT SURGERY
Discharge: HOME | End: 2022-09-08
Attending: INTERNAL MEDICINE | Admitting: INTERNAL MEDICINE
Payer: COMMERCIAL

## 2022-09-08 VITALS
DIASTOLIC BLOOD PRESSURE: 68 MMHG | WEIGHT: 234.79 LBS | HEIGHT: 66 IN | TEMPERATURE: 98 F | BODY MASS INDEX: 37.73 KG/M2 | SYSTOLIC BLOOD PRESSURE: 96 MMHG | OXYGEN SATURATION: 96 % | RESPIRATION RATE: 20 BRPM | HEART RATE: 87 BPM

## 2022-09-08 DIAGNOSIS — R13.10 DYSPHAGIA, UNSPECIFIED TYPE: ICD-10-CM

## 2022-09-08 PROCEDURE — 3609012400 HC EGD TRANSORAL BIOPSY SINGLE/MULTIPLE: Performed by: INTERNAL MEDICINE

## 2022-09-08 PROCEDURE — 3609019100 HC ESOPHAGEAL DILATION: Performed by: INTERNAL MEDICINE

## 2022-09-08 PROCEDURE — 3700000000 HC ANESTHESIA ATTENDED CARE: Performed by: INTERNAL MEDICINE

## 2022-09-08 PROCEDURE — 6370000000 HC RX 637 (ALT 250 FOR IP): Performed by: ANESTHESIOLOGY

## 2022-09-08 PROCEDURE — 7100000000 HC PACU RECOVERY - FIRST 15 MIN: Performed by: INTERNAL MEDICINE

## 2022-09-08 PROCEDURE — 7100000010 HC PHASE II RECOVERY - FIRST 15 MIN: Performed by: INTERNAL MEDICINE

## 2022-09-08 PROCEDURE — 7100000011 HC PHASE II RECOVERY - ADDTL 15 MIN: Performed by: INTERNAL MEDICINE

## 2022-09-08 PROCEDURE — 88305 TISSUE EXAM BY PATHOLOGIST: CPT

## 2022-09-08 PROCEDURE — 2580000003 HC RX 258: Performed by: NURSE ANESTHETIST, CERTIFIED REGISTERED

## 2022-09-08 PROCEDURE — 2709999900 HC NON-CHARGEABLE SUPPLY: Performed by: INTERNAL MEDICINE

## 2022-09-08 PROCEDURE — 7100000001 HC PACU RECOVERY - ADDTL 15 MIN: Performed by: INTERNAL MEDICINE

## 2022-09-08 PROCEDURE — 6360000002 HC RX W HCPCS: Performed by: NURSE ANESTHETIST, CERTIFIED REGISTERED

## 2022-09-08 PROCEDURE — 2500000003 HC RX 250 WO HCPCS: Performed by: NURSE ANESTHETIST, CERTIFIED REGISTERED

## 2022-09-08 RX ORDER — SODIUM CHLORIDE 0.9 % (FLUSH) 0.9 %
5-40 SYRINGE (ML) INJECTION PRN
Status: DISCONTINUED | OUTPATIENT
Start: 2022-09-08 | End: 2022-09-08 | Stop reason: HOSPADM

## 2022-09-08 RX ORDER — ONDANSETRON 2 MG/ML
4 INJECTION INTRAMUSCULAR; INTRAVENOUS
Status: DISCONTINUED | OUTPATIENT
Start: 2022-09-08 | End: 2022-09-08 | Stop reason: HOSPADM

## 2022-09-08 RX ORDER — SODIUM CHLORIDE 9 MG/ML
INJECTION, SOLUTION INTRAVENOUS PRN
Status: DISCONTINUED | OUTPATIENT
Start: 2022-09-08 | End: 2022-09-08 | Stop reason: HOSPADM

## 2022-09-08 RX ORDER — PROPOFOL 10 MG/ML
INJECTION, EMULSION INTRAVENOUS PRN
Status: DISCONTINUED | OUTPATIENT
Start: 2022-09-08 | End: 2022-09-08 | Stop reason: SDUPTHER

## 2022-09-08 RX ORDER — DROPERIDOL 2.5 MG/ML
0.62 INJECTION, SOLUTION INTRAMUSCULAR; INTRAVENOUS
Status: DISCONTINUED | OUTPATIENT
Start: 2022-09-08 | End: 2022-09-08 | Stop reason: HOSPADM

## 2022-09-08 RX ORDER — PROPOFOL 10 MG/ML
INJECTION, EMULSION INTRAVENOUS CONTINUOUS PRN
Status: DISCONTINUED | OUTPATIENT
Start: 2022-09-08 | End: 2022-09-08 | Stop reason: SDUPTHER

## 2022-09-08 RX ORDER — SODIUM CHLORIDE 9 MG/ML
INJECTION, SOLUTION INTRAVENOUS CONTINUOUS PRN
Status: DISCONTINUED | OUTPATIENT
Start: 2022-09-08 | End: 2022-09-08 | Stop reason: SDUPTHER

## 2022-09-08 RX ORDER — SODIUM CHLORIDE 0.9 % (FLUSH) 0.9 %
5-40 SYRINGE (ML) INJECTION EVERY 12 HOURS SCHEDULED
Status: DISCONTINUED | OUTPATIENT
Start: 2022-09-08 | End: 2022-09-08 | Stop reason: HOSPADM

## 2022-09-08 RX ORDER — LIDOCAINE HYDROCHLORIDE 20 MG/ML
INJECTION, SOLUTION EPIDURAL; INFILTRATION; INTRACAUDAL; PERINEURAL PRN
Status: DISCONTINUED | OUTPATIENT
Start: 2022-09-08 | End: 2022-09-08 | Stop reason: SDUPTHER

## 2022-09-08 RX ORDER — GLYCOPYRROLATE 0.2 MG/ML
INJECTION INTRAMUSCULAR; INTRAVENOUS PRN
Status: DISCONTINUED | OUTPATIENT
Start: 2022-09-08 | End: 2022-09-08 | Stop reason: SDUPTHER

## 2022-09-08 RX ORDER — OXYCODONE HYDROCHLORIDE AND ACETAMINOPHEN 5; 325 MG/1; MG/1
1 TABLET ORAL ONCE
Status: COMPLETED | OUTPATIENT
Start: 2022-09-08 | End: 2022-09-08

## 2022-09-08 RX ADMIN — PROPOFOL 80 MG: 10 INJECTION, EMULSION INTRAVENOUS at 09:43

## 2022-09-08 RX ADMIN — LIDOCAINE HYDROCHLORIDE 80 MG: 20 INJECTION, SOLUTION EPIDURAL; INFILTRATION; INTRACAUDAL; PERINEURAL at 09:43

## 2022-09-08 RX ADMIN — OXYCODONE AND ACETAMINOPHEN 1 TABLET: 5; 325 TABLET ORAL at 09:13

## 2022-09-08 RX ADMIN — PROPOFOL 180 MCG/KG/MIN: 10 INJECTION, EMULSION INTRAVENOUS at 09:43

## 2022-09-08 RX ADMIN — GLYCOPYRROLATE 0.2 MG: 0.2 INJECTION, SOLUTION INTRAMUSCULAR; INTRAVENOUS at 09:40

## 2022-09-08 RX ADMIN — SODIUM CHLORIDE: 9 INJECTION, SOLUTION INTRAVENOUS at 09:40

## 2022-09-08 ASSESSMENT — PAIN SCALES - GENERAL
PAINLEVEL_OUTOF10: 7
PAINLEVEL_OUTOF10: 0

## 2022-09-08 ASSESSMENT — PAIN DESCRIPTION - DESCRIPTORS
DESCRIPTORS: ACHING
DESCRIPTORS: ACHING

## 2022-09-08 ASSESSMENT — PAIN DESCRIPTION - ORIENTATION: ORIENTATION: LEFT

## 2022-09-08 ASSESSMENT — PAIN DESCRIPTION - LOCATION: LOCATION: FLANK

## 2022-09-08 NOTE — OP NOTE
Endoscopy Note    Patient: Lila Baca  : 1960  Acct#:     Procedure: Esophagogastroduodenoscopy with biopsy, esophageal dilation                         Date:  2022     Surgeon:   Betty Morrow DO    Referring Physician:  Salty Canada MD    Indications: This is a 60y.o. year old male who presents today with  dysphagia, history of gastric polyps . Postoperative Diagnosis:   1) The esophagus was normal without evidence of esophagitis, Gagnon's esophagus, strictures, rings, furrowing, masses, or nodules. There were no features of eosinophilic esophagitis noted. The gastroesophageal junction was at 40 cm from the incisors. 2) There was a small 2 cm sliding hiatal hernia noted. 3) There were multiple polyps from 4mm-10mm noted in the fundus and body of the stomach. These were sampled with cold biopsy forceps 4) There was a mild amount of food noted in the fundus of the stomach. 5) Mild erythema of the antrum. Biopsies were obtained from the antrum and body of the stomach to rule out active gastritis and H.pylori gastritis. 6) There was a normal appearing duodenal bulb and second portion of the duodenum. 7) The ampulla was normal appearing. 8) An emperic 54 Fr Lao dilation was undertaken. The dilator passed with minimal resistance. No heme was noted on the dilator. Anesthesia:  The patient was administered TIVA per anesthesiology team.  Please see their operative records for full details of medications administered. Consent:  The patient or their legal guardian has signed an informed consent, and is aware of the potential risks, benefits, alternatives, and potential complications of this procedure. These include, but are not limited to hemorrhage, bleeding, post procedural pain, perforation, phlebitis, aspiration, hypotension, hypoxia, cardiovascular events such as arryhthmia, and possibly death. Description of Procedure:  The patient was then taken to the endoscopy suite, placed in the left lateral decubitus position and the above IV sedation was administrered. The Olympus video endoscope was placed through the patient's oropharynx without difficulty to the extent of the 2nd portion of the duodenum. Both forward and retroflexed views of the stomach were obtained. Findings:    1) The esophagus was normal without evidence of esophagitis, Gagnon's esophagus, strictures, rings, furrowing, masses, or nodules. There were no features of eosinophilic esophagitis noted. The gastroesophageal junction was at 40 cm from the incisors. 2) There was a small 2 cm sliding hiatal hernia noted. 3) There were multiple polyps from 4mm-10mm noted in the fundus and body of the stomach. These were sampled with cold biopsy forceps 4) There was a mild amount of food noted in the fundus of the stomach. 5) Mild erythema of the antrum. Biopsies were obtained from the antrum and body of the stomach to rule out active gastritis and H.pylori gastritis. 6) There was a normal appearing duodenal bulb and second portion of the duodenum. 7) The ampulla was normal appearing. The scope was then withdrawn back into the stomach, it was decompressed, and the scope was completely withdrawn. An emperic 54 Fr Lao dilation was undertaken. The dilator passed with minimal resistance. No heme was noted on the dilator. The patient tolerated the procedure well and was taken to the post anesthesia care unit in good condition. Estimated blood loss: <5ml    ID Type Source Tests Collected by Time Destination   A : Gastric body polyp bx Gastric Gastric SURGICAL PATHOLOGY Liliam Cavazos, DO 9/8/2022 0946    B : Gastric bx Gastric Gastric SURGICAL PATHOLOGY Liliamdayton Hernandez., DO 9/8/2022 7547            Impression:   1) See post procedure diagnoses    Recommendations:   1) Advance diet as tolerated  2) The patient had biopsies taken today.   The patient should call for results in 7 days if they have not heard from our office. Our number is [phone]   3) The patient had a esophageal dilation performed today. The patient is instructed to call with any post procedural pain, vomiting of blood, dark black stools, shortness of breath, fever, chills, or other abnormal symptoms.           Ihsan Freedman, DO  [address] [facility]

## 2022-09-08 NOTE — ANESTHESIA PRE PROCEDURE
[facility] Department of Anesthesiology  Pre-Anesthesia Evaluation/Consultation       Name:  Donna Bolton  : 1960  Age:  60 y.o.                                            MRN:  [id number]  Date: 2022           Surgeon: Surgeon(s):  John Greenwood DO    Procedure: Procedure(s):  ESOPHAGOGASTRODUODENOSCOPY WITH ESOPHAGEAL DILATION     Allergies   Allergen Reactions    Atrovent Nasal Spray [Ipratropium] Anaphylaxis and Other (See Comments)     Chest tightness    Ipratropium Bromide Hfa Shortness Of Breath    Proventil [Albuterol] Shortness Of Breath    Doxycycline Hives    Nitroglycerin Other (See Comments)     Severe hypotension (went from 581 to 66 systolic)    Sulfa Antibiotics Rash    Vicodin [Hydrocodone-Acetaminophen] Rash     Patient Active Problem List   Diagnosis    Nephrolithiasis    Pneumonia    Essential hypertension, benign    Back pain    URTI (acute upper respiratory infection)    Irritable bowel syndrome    Pure hypercholesterolemia    Allergic rhinitis    Right lower quadrant abdominal pain    Precordial pain    Bronchiolitis obliterans (HCC)    Bronchitis    Pulmonary embolus (HCC)    Coronary artery disease due to calcified coronary lesion    Atypical chest pain    Headache    Lightheaded    Leg pain, right    DDD (degenerative disc disease), lumbar    Acute non-recurrent maxillary sinusitis    Shortness of breath    Nausea    Transient cerebral ischemia    History of pulmonary embolism    NICM (nonischemic cardiomyopathy) (HCC)    Confusion    Right arm weakness    Elevated INR    Acute on chronic systolic heart failure (HCC)    LBBB (left bundle branch block)    Acute confusional state    Urinary tract infection without hematuria    Encounter for adjustment of cardiac resynchronization therapy defibrillator (CRT-D)    Biventricular ICD (implantable cardioverter-defibrillator) in place    Chronic anticoagulation    Paroxysmal atrial fibrillation ([address]    Hypercalcemia    Primary hyperparathyroidism ([address]    On warfarin therapy    Perinephric hematoma    Renal hematoma, right    Postoperative hemorrhagic shock    Acute blood loss anemia    Metabolic acidosis    Tachycardia    Iron deficiency anemia due to chronic blood loss    Thrombocytopenia (HCC)    Hypoxia    Acute UTI    Abdominal wall hematoma    Acute right flank pain    Intractable vomiting with nausea    Stenosis of ureteropelvic junction (UPJ)-right    Elevated liver enzymes    Acute flank pain    Colicky LLQ abdominal pain    Arm pain    Dysuria    Fever    Episode of shaking    Post-nasal drip    Left foot pain    Pain in scapula    Skin lesions    Otalgia    Pleurisy    Chills    Swelling of calf    Ureteral colic    Gastroesophageal reflux disease with esophagitis without hemorrhage    Chest pain    Epistaxis    Right calf pain    Calculous pyelonephritis    Other alveolar and parieto-alveolar conditions ([address]     Past Medical History:   Diagnosis Date    Bronchiolitis obliterans ([address]     Bundle branch block, right     Cardiomyopathy ([address]     Chest pain 9/24/2019    COVID-19 virus vaccine not available     Fibromyalgia     GERD (gastroesophageal reflux disease)     Hepatitis 1979    unsure of which type    Hx of blood clots     Hyperlipemia     Hypertension     IBS (irritable bowel syndrome)     Kidney stone     over thirty kidney stones    Neuropathy     right side-chest    Pneumothorax 2011    Right    Prostatitis     Pulmonary embolism on right [facility]) 2011    upper lobe-coumadin 2011    Sacroiliitis [facility])      Past Surgical History:   Procedure Laterality Date    CHOLECYSTECTOMY  2007    COLONOSCOPY  09/21/2012    dr Sol mercedes    COLONOSCOPY  11/30/2018    daren--francine 2029=8    CYSTOSCOPY  05/17/2019    RIGHT SIDED URETEROSCOPY  Diagnostic, retrograde pyelogram and fulguration of previously resected bladder tumor base    CYSTOSCOPY Right 05/17/2019    RIGHT SIDED URETEROSCOPY FLUGERATION  OF BLADDER performed by Karen Bentley MD at [address] [facility]. Right 07/02/2021    CYSTOURETHROSCOPY WITH RIGHT RETROGRADE PYELOGRAPHY AND PLACEMENT OF RIGHT DOUBLE J STENT performed by Maycol Yip DO at [address] [facility]. Right 04/25/2022    CYSTOSCOPY, RIGHT STENT INSERTION performed by Karen Bentley MD at [address] [facility] [address]  2015    LITHOTRIPSY      PACEMAKER PLACEMENT      SINUS SURGERY      Made opening larger in sinuses    UPPER GASTROINTESTINAL ENDOSCOPY  03/28/2014    dr Susana Yost [facility] ENDOSCOPY N/A 02/01/2021    EGD BIOPSY performed by Danny Gudino MD at [address] History     Tobacco Use    Smoking status: Never    Smokeless tobacco: Never   Vaping Use    Vaping Use: Never used   Substance Use Topics    Alcohol use: No    Drug use: No     Medications  No current facility-administered medications on file prior to encounter.      Current Outpatient Medications on File Prior to Encounter   Medication Sig Dispense Refill    Phenazopyridine HCl (AZO-DINE URINARY ANALGESIC PO) Take 2 tablets by mouth daily as needed       methocarbamol (ROBAXIN) 500 MG tablet Take 500 mg by mouth 4 times daily as needed (musclw spasms)      flavoxATE (URISPAS) 100 MG tablet Take 1 tablet by mouth 3 times daily as needed for Muscle spasms 60 tablet 3    albuterol (PROVENTIL) (2.5 MG/3ML) 0.083% nebulizer solution Take 3 mLs by nebulization every 4 hours as needed for Wheezing or Shortness of Breath (Patient taking differently: Take 2.5 mg by nebulization in the morning, at noon, and at bedtime) 360 mL 5    STIOLTO RESPIMAT 2.5-2.5 MCG/ACT AERS INHALE 2 PUFFS INTO THE LUNGS DAILY 4 g 5    azelastine (ASTELIN) 0.1 % nasal spray 2 sprays by Nasal route 2 times daily Use in each nostril as directed (Patient taking differently: 1 spray by Nasal route at bedtime Use in each nostril as directed) 60 mL 3    dexlansoprazole (DEXILANT) 60 MG CPDR delayed release capsule Take 1 capsule by mouth daily 90 capsule 3    dicyclomine (BENTYL) 10 MG capsule TAKE ONE CAPSULE BY MOUTH  FOUR TIMES DAILY AS NEEDED 360 capsule 1    tamsulosin (FLOMAX) 0.4 MG capsule TAKE ONE CAPSULE BY MOUTH TWO TIMES A DAY 60 capsule 11    pravastatin (PRAVACHOL) 40 MG tablet Take 40 mg by mouth daily      melatonin 3 MG TABS tablet Take 3 mg by mouth nightly as needed Taking half tablet      guaiFENesin (MUCINEX) 600 MG extended release tablet Take 600 mg by mouth 2 times daily      ondansetron (ZOFRAN) 4 MG tablet Take 1 tablet by mouth every 8 hours as needed for Nausea or Vomiting 20 tablet 0    Cholecalciferol (VITAMIN D3) 1.25 MG (67808 UT) CAPS Take 1 capsule by mouth once a week 12 capsule 3    albuterol sulfate  (90 Base) MCG/ACT inhaler INHALE 2 PUFFS INTO THE LUNGS EVERY 4 HOURS AS NEEDED FOR WHEEZING OR SHORTNESS OF BREATH 1 Inhaler 0    polyethylene glycol (MIRALAX) PACK packet Take 17 g by mouth nightly       aspirin 81 MG tablet Take 81 mg by mouth daily       Current Facility-Administered Medications   Medication Dose Route Frequency Provider Last Rate Last Admin    sodium chloride flush 0.9 % injection 5-40 mL  5-40 mL IntraVENous 2 times per day Maurisio Garrett MD        sodium chloride flush 0.9 % injection 5-40 mL  5-40 mL IntraVENous PRN Maurisio Garrett MD        0.9 % sodium chloride infusion   IntraVENous PRN Maurisio Garrett MD        oxyCODONE-acetaminophen (PERCOCET) 5-325 MG per tablet 1 tablet  1 tablet Oral Once Maurisio Garrett MD         Vital Signs (Current)   Vitals:    22 0810   BP: (!) 152/94   Pulse: 88   Resp: 18   Temp: 97.6 °F (36.4 °C)   SpO2: 95%     Vital Signs Statistics (for past 48 hrs)     Temp  Av.6 °F (36.4 °C)  Min: 97.6 °F (36.4 °C)   Min taken time: 22 0810  Max: 97.6 °F (36.4 °C)   Max taken time: 22 0810  Pulse Av  Min: 80   Min taken time: 22 0810  Max: 88   Max taken time: 22 0810  Resp  Av  Min: 25   Min taken time: 22 0810  Max: 18   Max taken time: 22 0810  BP  Min: 152/94   Min taken time: 22 0810  Max: 152/94   Max taken time: 22 0810  SpO2  Av %  Min: 95 %   Min taken time: 22 0810  Max: 95 %   Max taken time: 22 0810    BP Readings from Last 3 Encounters:   22 (!) 152/94   22 118/68   22 121/70     BMI  Body mass index is 37.9 kg/m². Estimated body mass index is 37.9 kg/m² as calculated from the following:    Height as of this encounter: 5' 6\" (1.676 m). Weight as of this encounter: 234 lb 12.6 oz (106.5 kg).     CBC   Lab Results   Component Value Date/Time    WBC 6.4 2022 11:45 AM    RBC 4.91 2022 11:45 AM    HGB 15.1 2022 11:45 AM    HCT 45.1 2022 11:45 AM    MCV 91.9 2022 11:45 AM    RDW 14.0 2022 11:45 AM     2022 11:45 AM     CMP    Lab Results   Component Value Date/Time     08/10/2022 10:48 AM    K 4.6 08/10/2022 10:48 AM    K 3.5 2022 11:45 AM     08/10/2022 10:48 AM    CO2 27 08/10/2022 10:48 AM    BUN 12 08/10/2022 10:48 AM    CREATININE 1.1 08/10/2022 10:48 AM    GFRAA >60 08/10/2022 10:48 AM    GFRAA >60 2013 10:50 AM    AGRATIO 1.9 2022 12:11 PM    LABGLOM >60 08/10/2022 10:48 AM    GLUCOSE 126 08/10/2022 10:48 AM    PROT 6.3 2022 12:11 PM    PROT 5.9 2013 06:10 AM    CALCIUM 10.7 08/10/2022 10:48 AM    BILITOT 0.6 2022 12:11 PM    ALKPHOS 119 2022 12:11 PM    AST 22 2022 12:11 PM    ALT 24 2022 12:11 PM     BMP    Lab Results   Component Value Date/Time     08/10/2022 10:48 AM    K 4.6 08/10/2022 10:48 AM    K 3.5 2022 11:45 AM     08/10/2022 10:48 AM    CO2 27 08/10/2022 10:48 AM    BUN 12 08/10/2022 10:48 AM    CREATININE 1.1 08/10/2022 10:48 AM    CALCIUM 10.7 08/10/2022 10:48 AM GFRAA >60 08/10/2022 10:48 AM    GFRAA >60 05/02/2013 10:50 AM    LABGLOM >60 08/10/2022 10:48 AM    GLUCOSE 126 08/10/2022 10:48 AM     POCGlucose  No results for input(s): GLUCOSE in the last 72 hours. Coags    Lab Results   Component Value Date/Time    PROTIME 15.6 04/26/2022 06:27 AM    PROTIME 37.6 01/29/2016 10:58 AM    INR 2.60 08/26/2022 10:18 AM    APTT 50.5 04/24/2021 11:20 PM     HCG (If Applicable) No results found for: PREGTESTUR, PREGSERUM, HCG, HCGQUANT   ABGs   Lab Results   Component Value Date/Time    PHART 7.317 04/11/2013 10:25 PM    PO2ART 62.2 04/11/2013 10:25 PM    MJV1XWM 51.7 04/11/2013 10:25 PM    SYO1RZX 25.7 04/11/2013 10:25 PM    BEART -0.6 04/11/2013 10:25 PM    M4UJQEGZ 92.8 04/11/2013 10:25 PM      Type & Screen (If Applicable)  No results found for: LABABO, LABRH                         BMI: Wt Readings from Last 3 Encounters:       NPO Status:   Date of last liquid consumption: 09/08/22   Time of last liquid consumption: 1200   Date of last solid food consumption: 09/07/22      Time of last solid consumption: 2330       Anesthesia Evaluation  Patient summary reviewed   history of anesthetic complications (With general anesthesia): PONV.   Airway: Mallampati: III  TM distance: >3 FB   Neck ROM: full     Dental:    (+) poor dentition      Pulmonary:normal exam    (+) COPD:                             Cardiovascular:  Exercise tolerance: good (>4 METS),   (+) hypertension (EF 40):, angina: no interval change, pacemaker (placed 2017 per pt): AICD, CAD:, dysrhythmias (rbbb): atrial fibrillation, CHF (EF 40):,       ECG reviewed  Rhythm: irregular  Rate: normal  Echocardiogram reviewed         Beta Blocker:  Dose within 24 Hrs         Neuro/Psych:   (+) neuromuscular disease:, TIA (no residual effects per pt), headaches:, psychiatric history:depression/anxiety             GI/Hepatic/Renal:   (+) GERD: well controlled, liver disease:, morbid obesity     (-) bowel prep       Endo/Other: (+) blood dyscrasia (warfarin): anticoagulation therapy, arthritis:., .                 Abdominal:   (+) obese,           Vascular:   + DVT (Hx), PE (Hx). Other Findings:             Anesthesia Plan      MAC     ASA 4     (62 yo M with PMHx of afib, RBBB, CHF (LVEF 40), CAD, AICD/PPM, COPD who presents for EGD and dilation. States he is having flank pain with kidney stones and did not take his pain medication. Will order medication for flank pain. He also asks if numbing medication could be used for the EGD. Can plan on benzocaine/lidocaine LTA spray. Discussed risks and benefits to sedation including nausea, vomiting, allergic reaction, headache, delayed cognitive recovery, stroke, heart attack, respiratory depression, and death which patient understood and agreed to proceed. The patient was given the opportunity to ask questions and all questions were answered to the patient's satisfaction.  )  Induction: intravenous. MIPS: Postoperative opioids intended and Prophylactic antiemetics administered. Anesthetic plan and risks discussed with patient and spouse. Plan discussed with CRNA. This pre-anesthesia assessment may be used as a history and physical.    DOS STAFF ADDENDUM:    Pt seen and examined, chart reviewed (including anesthesia, drug and allergy history). No interval changes to history and physical examination. Anesthetic plan, risks, benefits, alternatives, and personnel involved discussed with patient. Questions and concerns addressed. Patient(family) verbalized an understanding and agrees to proceed.       Audley Lombard, MD  September 8, 2022  8:45 AM

## 2022-09-08 NOTE — H&P
Pre-operative History and Physical    Patient: Augusto Gay  : 1960  Acct#:     Intended Procedure:  EGD    HISTORY OF PRESENT ILLNESS:  The patient is a 60 y.o. male  who presents for/due to dysphagia      Past Medical History:        Diagnosis Date    Bronchiolitis obliterans ([address]     Bundle branch block, right     Cardiomyopathy ([address]     Chest pain 2019    COVID-19 virus vaccine not available     Fibromyalgia     GERD (gastroesophageal reflux disease)     Hepatitis     unsure of which type    Hx of blood clots     Hyperlipemia     Hypertension     IBS (irritable bowel syndrome)     Kidney stone     over thirty kidney stones    Neuropathy     right side-chest    Pneumothorax 2011    Right    Prostatitis     Pulmonary embolism on right ([facility] 2011    upper lobe-coumadin 2011    Sacroiliitis [facility])      Past Surgical History:        Procedure Laterality Date    CHOLECYSTECTOMY      COLONOSCOPY  2012    dr Aren mercedes    COLONOSCOPY  2018    daren--francine 2029=8    CYSTOSCOPY  2019    RIGHT SIDED URETEROSCOPY  Diagnostic, retrograde pyelogram and fulguration of previously resected bladder tumor base    CYSTOSCOPY Right 2019    RIGHT SIDED URETEROSCOPY FLUGERATION  OF BLADDER performed by Dominic Bonner MD at [facility] Right 2021    CYSTOURETHROSCOPY WITH RIGHT RETROGRADE PYELOGRAPHY AND PLACEMENT OF RIGHT DOUBLE J STENT performed by Alexander Warner DO at [facility] Right 2022    CYSTOSCOPY, RIGHT STENT INSERTION performed by Dominic Bonner MD at [address] [place] [address]      LITHOTRIPSY      [facility] opening larger in sinuses    UPPER GASTROINTESTINAL ENDOSCOPY  2014    dr Maureen Freeman N/A 2021    EGD BIOPSY performed by Song Ortiz MD at [address] [place] [address]     Medications Prior to Admission:   No current facility-administered medications on file prior to encounter.      Current Outpatient Medications on File Prior to Encounter   Medication Sig Dispense Refill    Phenazopyridine HCl (AZO-DINE URINARY ANALGESIC PO) Take 2 tablets by mouth daily as needed       methocarbamol (ROBAXIN) 500 MG tablet Take 500 mg by mouth 4 times daily as needed (musclw spasms)      flavoxATE (URISPAS) 100 MG tablet Take 1 tablet by mouth 3 times daily as needed for Muscle spasms 60 tablet 3    albuterol (PROVENTIL) (2.5 MG/3ML) 0.083% nebulizer solution Take 3 mLs by nebulization every 4 hours as needed for Wheezing or Shortness of Breath (Patient taking differently: Take 2.5 mg by nebulization in the morning, at noon, and at bedtime) 360 mL 5    STIOLTO RESPIMAT 2.5-2.5 MCG/ACT AERS INHALE 2 PUFFS INTO THE LUNGS DAILY 4 g 5    azelastine (ASTELIN) 0.1 % nasal spray 2 sprays by Nasal route 2 times daily Use in each nostril as directed (Patient taking differently: 1 spray by Nasal route at bedtime Use in each nostril as directed) 60 mL 3    dexlansoprazole (DEXILANT) 60 MG CPDR delayed release capsule Take 1 capsule by mouth daily 90 capsule 3    dicyclomine (BENTYL) 10 MG capsule TAKE ONE CAPSULE BY MOUTH  FOUR TIMES DAILY AS NEEDED 360 capsule 1    tamsulosin (FLOMAX) 0.4 MG capsule TAKE ONE CAPSULE BY MOUTH TWO TIMES A DAY 60 capsule 11    pravastatin (PRAVACHOL) 40 MG tablet Take 40 mg by mouth daily      melatonin 3 MG TABS tablet Take 3 mg by mouth nightly as needed Taking half tablet      guaiFENesin (MUCINEX) 600 MG extended release tablet Take 600 mg by mouth 2 times daily      ondansetron (ZOFRAN) 4 MG tablet Take 1 tablet by mouth every 8 hours as needed for Nausea or Vomiting 20 tablet 0    Cholecalciferol (VITAMIN D3) 1.25 MG (00234 UT) CAPS Take 1 capsule by mouth once a week 12 capsule 3    albuterol sulfate  (90 Base) MCG/ACT inhaler INHALE 2 PUFFS INTO THE LUNGS EVERY 4 HOURS AS NEEDED FOR WHEEZING OR SHORTNESS OF BREATH 1 Inhaler 0    polyethylene glycol (MIRALAX) PACK packet Take 17 g by mouth nightly       aspirin 81 MG tablet Take 81 mg by mouth daily          Allergies:  Atrovent nasal spray [ipratropium], Ipratropium bromide hfa, Proventil [albuterol], Doxycycline, Nitroglycerin, Sulfa antibiotics, and Vicodin [hydrocodone-acetaminophen]    Social History:   TOBACCO:   reports that he has never smoked. He has never used smokeless tobacco.  ETOH:   reports no history of alcohol use. DRUGS:   reports no history of drug use. PHYSICAL EXAM:      Vital Signs: BP (!) 152/94   Pulse 88   Temp 97.6 °F (36.4 °C) (Temporal)   Resp 18   Ht 5' 6\" (1.676 m)   Wt 234 lb 12.6 oz (106.5 kg)   SpO2 95%   BMI 37.90 kg/m²    Airway: No stridor or wheezing noted. Good air movement  Pulmonary: without wheezes. Clear to auscultation  Cardiac:regular rate and rhythm without loud murmurs  Abdomen:soft, nontender,  Bowel sounds present    Pre-Procedure Assessment / Plan:  1) Dysphagia    ASA Grade:  ASA 3 - Patient with moderate systemic disease with functional limitations  Mallampati Classification:  Class II    Level of Sedation Plan:Deep sedation    Post Procedure plan: Return to same level of care    I assessed the patient and find that the patient is in satisfactory condition to proceed with the planned procedure and sedation plan. I have explained the risk, benefits, and alternatives to the procedure; the patient understands and agrees to proceed. The patient was counseled at length about the risks of raymond Covid-19 during their perioperative period and any recovery window from their procedure. The patient was made aware that raymond Covid-19  may worsen their prognosis for recovering from their procedure  and lend to a higher morbidity and/or mortality risk. All material risks, benefits, and reasonable alternatives including postponing the procedure were discussed.  The patient does wish to proceed with the

## 2022-09-08 NOTE — DISCHARGE INSTRUCTIONS
Continue Dexilant daily  OK to resume Coumadin tonight  The patient had biopsies taken today. The patient should call for results in 7 days if they have not heard from our office. Our number is [phone]     The patient had a esophageal dilation performed today. The patient is instructed to call with any post procedural pain, vomiting of blood, dark black stools, shortness of breath, fever, chills, or other abnormal symptoms. Upper GI Endoscopy: What to Expect at [facility]   After you have an endoscopy, you will stay at the hospital or clinic for 1 to 2 hours. This will allow the medicine to wear off. You will be able to go home after your doctor or nurse checks to make sure you are not having any problems. You may have to stay overnight if you had treatment during the test. You may have a sore throat for a day or two after the test.   This care sheet gives you a general idea about what to expect after the test.   How can you care for yourself at home? Activity   Rest as much as you need to after you go home. You should be able to go back to your usual activities the day after the test.  Diet   Follow your doctor's directions for eating after the test.   Drink plenty of fluids (unless your doctor has told you not to). Medications   If you have a sore throat the day after the test, use an over-the-counter spray to numb your throat. Follow-up care is a key part of your treatment and safety. Be sure to make and go to all appointments, and call your doctor if you are having problems. It's also a good idea to know your test results and keep a list of the medicines you take. When should you call for help? Call 911 anytime you think you may need emergency care. For example, call if:   You passed out (lost consciousness). You cough up blood. You vomit blood or what looks like coffee grounds. You pass maroon or very bloody stools.   Call your doctor now or seek immediate medical care if:   You have trouble swallowing. You have belly pain. Your stools are black and tarlike or have streaks of blood. You are sick to your stomach or cannot keep fluids down. Watch closely for changes in your health, and be sure to contact your doctor if:   Your throat still hurts after a day or two. You do not get better as expected.

## 2022-09-15 ENCOUNTER — APPOINTMENT (OUTPATIENT)
Dept: PHYSICAL THERAPY | Age: 62
End: 2022-09-15
Payer: COMMERCIAL

## 2022-09-16 ENCOUNTER — ANTI-COAG VISIT (OUTPATIENT)
Dept: PHARMACY | Age: 62
End: 2022-09-16
Payer: COMMERCIAL

## 2022-09-16 DIAGNOSIS — Z79.01 CHRONIC ANTICOAGULATION: ICD-10-CM

## 2022-09-16 DIAGNOSIS — I26.99 OTHER ACUTE PULMONARY EMBOLISM, UNSPECIFIED WHETHER ACUTE COR PULMONALE PRESENT (HCC): Primary | ICD-10-CM

## 2022-09-16 LAB — INR BLD: 1.6

## 2022-09-16 PROCEDURE — 99211 OFF/OP EST MAY X REQ PHY/QHP: CPT

## 2022-09-16 PROCEDURE — 85610 PROTHROMBIN TIME: CPT

## 2022-09-16 NOTE — PROGRESS NOTES
Kiara Baron is a 60 y.o. here for warfarin management. Nevin Mayo had an INR test today. Results were reviewed and appropriate warfarin management was completed. This visit was performed as: An in person visit. Protocols were followed with precautions to reduce the spread of COVID-19. Patient verifies current dosing regimen: Yes     Warfarin medication reviewed and updated on the patient 's home medication list: yes: All other medications reviewed and updated on the patient 's home medication list: No:  Took torsemide for 10 days. Lab Results   Component Value Date    INR 1.60 2022    INR 2.60 2022    INR 2.40 08/15/2022       Patient Findings       Positives:  Change in medications    Negatives:  Signs/symptoms of bleeding, Change in health, Change in activity, Missed doses, Extra doses, Change in diet/appetite, Bruising    Comments:  Was prescribed torsemide  for 10 days - expected to increase the INR. Anticoagulation Summary  As of 2022      INR goal:  2.0-3.0   TTR:  36.7 % (6.9 y)   INR used for dosin.60 (2022)   Warfarin maintenance plan:  5 mg (5 mg x 1) every Mon, Wed, Fri; 2.5 mg (5 mg x 0.5) all other days   Weekly warfarin total:  25 mg   Plan last modified:  Catherine Hutton (8/15/2022)   Next INR check:  2022   Priority:  Maintenance   Target end date: Indefinite    Indications    Pulmonary embolus (HCC) [I26.99]  Chronic anticoagulation [Z79.01]                 Anticoagulation Episode Summary       INR check location:  Anticoagulation Clinic    Preferred lab:      Send INR reminders to:  [facility]    Comments:  EPIC - finger stick every 2-3 weeks            Anticoagulation Care Providers       Provider Role Specialty Phone number    Eduarda Boles MD Referring Internal Medicine [phone]            Warfarin assessment / plan:     Appears well  Sub-therapeutic INR     Denies missed doses.   Denies increased vitamin K intake. Denies increased activity. Denies signs or symptoms of clotting. Started torsemide which is expected to increase the INR, however, did not. INR low today 1.6. No reason to explain this. Instructed to Take warfarin 7.5 mg today and 5 mg tomorrow ONLY. THEN CONTINUE DOSE: Take warfarin 2.5mg daily except for 5mg on Monday, Wednesday, and Friday. Return in 2 weeks. Description    Take warfarin 7.5 mg today and 5 mg tomorrow ONLY     THEN CONTINUE DOSE: Take warfarin 2.5mg daily except for 5mg on Monday, Wednesday, and Friday    Call [phone] with signs or symptoms of bleeding or ANY medication changes (including antibiotics, steroids, over-the-counter medications or herbal supplements). If significant bleeding occurs or if you fall and hit your head, please seek immediate medical attention. Keep the number of servings of vitamin K containing foods (dark green, leafy vegetables) the same each week. Please call if this changes. Limit alcohol intake. Please call if this changes. Immunization History   Administered Date(s) Administered    COVID-19, MODERNA BLUE border, Primary or Immunocompromised, (age 12y+), IM, 100 mcg/0.5mL 03/09/2021, 04/08/2021, 12/15/2021, 07/23/2022    Influenza, FLUARIX, FLULAVAL, FLUZONE (age 10 mo+) AND AFLURIA, (age 1 y+), PF, 0.5mL 10/27/2017, 09/23/2019, 10/02/2020    Influenza, FLUBLOK, (age 25 y+), PF, 0.5mL 10/03/2018    Influenza, FLUCELVAX, (age 10 mo+), MDCK, PF, 0.5mL 10/26/2021    Pneumococcal Conjugate 13-valent ([id number]) 09/11/2015    Pneumococcal Conjugate Vaccine 08/15/2017    Pneumococcal Polysaccharide ([id number]) 08/01/2007, 12/19/2012    Tdap (Boostrix, Adacel) 08/19/2014           Orders Placed This Encounter   Procedures    Protime-INR     This external order was created through the results console. No orders of the defined types were placed in this encounter. Reviewed AVS with patient / caregiver.     Billing Points:  Adjust dosage and/or reconcile meds (fill pill box) </= 5 medications - 2 points       CLINICAL PHARMACY CONSULT: MED RECONCILIATION/REVIEW ADDENDUM    For Pharmacy Admin Tracking Only    Intervention Detail: Dose Adjustment: 1, reason: Therapy Optimization  Total # of Interventions Recommended: 1  Total # of Interventions Accepted: 1  Time Spent (min): 20

## 2022-09-20 ENCOUNTER — HOSPITAL ENCOUNTER (OUTPATIENT)
Dept: GENERAL RADIOLOGY | Age: 62
Discharge: HOME | End: 2022-09-20
Payer: COMMERCIAL

## 2022-09-20 ENCOUNTER — HOSPITAL ENCOUNTER (OUTPATIENT)
Dept: PHYSICAL THERAPY | Age: 62
Setting detail: THERAPIES SERIES
Discharge: HOME | End: 2022-09-20
Payer: COMMERCIAL

## 2022-09-20 ENCOUNTER — HOSPITAL ENCOUNTER (OUTPATIENT)
Age: 62
Discharge: HOME | End: 2022-09-20
Payer: COMMERCIAL

## 2022-09-20 DIAGNOSIS — N20.0 CALCULUS OF KIDNEY: ICD-10-CM

## 2022-09-20 PROCEDURE — 74018 RADEX ABDOMEN 1 VIEW: CPT

## 2022-09-20 NOTE — FLOWSHEET NOTE
[facility], [facility]  [address] [facility] [place]  Phone: [phone]   Fax:     [phone]    Physical Therapy  Cancellation/No-show Note  Patient Name:  Cinthia Li  :  1960   Date:  2022  Cancelled visits to date: 0  No-shows to date: 1 - initial eval     Patient status for today's appointment patient:  []  Cancelled  []  Rescheduled appointment  [x]  No-show     Reason given by patient:  []  Patient ill  []  Conflicting appointment  []  No transportation    []  Conflict with work  [x]  No reason given  []  Other:     Comments:      Phone call information:   []  Phone call made today to patient at _ time at number provided:      []  Patient answered, conversation as follows:    []  Patient did not answer, message left as follows:  [x]  Phone call not made today    Electronically signed by:  Mallory Lopez, [id number] [id number]

## 2022-09-22 ENCOUNTER — APPOINTMENT (OUTPATIENT)
Dept: PHYSICAL THERAPY | Age: 62
End: 2022-09-22
Payer: COMMERCIAL

## 2022-09-22 ENCOUNTER — TELEPHONE (OUTPATIENT)
Dept: PULMONOLOGY | Age: 62
End: 2022-09-22

## 2022-09-22 NOTE — TELEPHONE ENCOUNTER
Patient calls in today requesting a Nebulizer order be sent to [facility] .      Attn: Sangita Bryant

## 2022-09-22 NOTE — TELEPHONE ENCOUNTER
SW patient regarding this request. He would like a new order for a portable nebulizer machine sent to [facility], which he would be paying for out of pocket and not going through insurance. Is it ok to send a new order for him? Message routed to Dr. Hira Lucas to advise.

## 2022-09-23 ENCOUNTER — APPOINTMENT (OUTPATIENT)
Dept: PHYSICAL THERAPY | Age: 62
End: 2022-09-23
Payer: COMMERCIAL

## 2022-09-27 ENCOUNTER — HOSPITAL ENCOUNTER (OUTPATIENT)
Dept: PHYSICAL THERAPY | Age: 62
Setting detail: THERAPIES SERIES
Discharge: HOME | End: 2022-09-27
Payer: COMMERCIAL

## 2022-09-27 PROCEDURE — 97530 THERAPEUTIC ACTIVITIES: CPT

## 2022-09-27 PROCEDURE — 97161 PT EVAL LOW COMPLEX 20 MIN: CPT

## 2022-09-27 NOTE — PLAN OF CARE
[facility], [facility]  [address] [facility] [place], [facility]  Phone: [phone]   Fax:     [phone]                                                       Physical Therapy Certification    Dear Josselyn Brumfield MD,    We had the pleasure of evaluating the following patient for physical therapy services at [facility]. A summary of our findings can be found in the initial assessment below. This includes our plan of care. If you have any questions or concerns regarding these findings, please do not hesitate to contact me at the office phone number checked above. Thank you for the referral.       Physician Signature:_______________________________Date:__________________  By signing above (or electronic signature), therapists plan is approved by physician        Patient: Italia Bergeron   : 1960   MRN: [id number]  Referring Physician: Josselyn Brumfield MD      Evaluation Date: 2022      Medical Diagnosis Information:  Medical Diagnosis: DDD (degenerative disc disease), lumbar [M51.36]  Hip arthritis [M16.10]   Treatment Diagnosis: back and hip pain, dec strength and flexibility limiting ADLs                                         Insurance information: PT Insurance Information: [facility] - NO ESTIM; 60 vpcy, no auth       Precautions/ Contra-indications:   Latex Allergy:  [x]NO      []YES  Preferred Language for Healthcare:   [x]English       []other:    C-SSRS Triggered by Intake questionnaire (Past 2 wk assessment ):   [x] No, Questionnaire did not trigger screening.   [] Yes, Patient intake triggered C-SSRS Screening      [] C-SSRS Screening completed  [] PCP notified via Epic     SUBJECTIVE: Patient stated complaint: Pt with long h/o back pain that intermittently flares up.   Pt notes still does hep from prior PT about 3 times per week ( notes since flare up - irritation with piriformis, but other stretches are ok - PT recommends to pt to hold pirif str now). Pt notes recent trip to [facility] with a lot of walking last year starting the pain and it has gradually increased to this level. Pt c/o pain up to 7/10 in back and hips and thighs (L>R). Pt denies NT or pain past knees. He was recently on steroids for sinus infections which helped his back, no injections b/c he is unable to do them now due to other medical concerns.      Unable to dalia chlorine in pool due to breathing; regular back pain levels have been 3-4/10 before the flare up     Relevant Medical History:Additional Pertinent Hx: HTN, fibromyalgia, h/o bld clots cancer ( bladder 2021), pacemaker, RA  Functional Scale/Score: FOTO = hip 54; back 47    Pain Scale: 7/10  Easing factors: heat, ice -   Provocative factors: hep increasing pain now since flare up; standing too long (5- 10 min)     Type: [x]Constant   []Intermittent  []Radiating []Localized []other:     Numbness/Tingling: none     Occupation/School: retired     Living Status/Prior Level of Function: Independent with ADLs and IADLs,     OBJECTIVE:   Palpation: TTP B LPVM into PSIS and glutes     Functional Mobility/Transfers: independent     Posture: inc lumbar lordosis     Gait: (include devices/WB status) WFL    Bandages/Dressings/Incisions: NA    Repeated Movements:NT    ROM  Comments   Lumbar Flex 50 Pain    Lumbar Ext 5 Pain      ROM LEFT RIGHT Comments   Lumbar Side Bend 12 15 Inc pain    Lumbar Rotation      Quadrant      Hip Flexion      Hip Abd      Hip ER      Hip IR      Hip Extension      Knee Ext      Knee Flex      Hamstring Flex -30 -45    Piriformis  SKC Max tight  Max tight  Mod tight  Mod tight     Samy test                Myotomes/Strength Left  Right Comments   [x]ALL NORMAL MYOTOMES      Hip Abd 4+ 5    Hip Ext   Unable to bridge due to pain    Hip flexion (L1-L2 femoral) 4+ 4+    Knee extension (L2-L4 femoral) 5 5    Knee flexion (S1 sciatic) 4+ 4+ Dorsiflexion (L4-L5 deep peroneal) 5 5    Great Toe Ext (L5 deep peroneal nerve)      Ankle Eversion (S1-S2 super peroneal)      Ankle PF(S1-S2 tibial)      Multifidus      Transverse Ab        Dermatomes Normal Abnormal Comments   [x]ALL NORMAL            inguinal area (L1)  [] []    anterior mid-thigh (L2) [] []    distal ant thigh/med knee (L3) [] []    medial lower leg and foot (L4) [] []    lateral lower leg and foot (L5) [] []    posterior calf (S1) [] []    medial calcaneus (S2) [] []      ReflexesNT Normal Abnormal Comments   []ALL NORMAL            S1-2 Seated achilles [] []    S1-2 Prone knee bend [] []    L3-4 Patellar tendon [] []    C5-6 Biceps [] []    C6 Brachioradialis [] []    C7-8 Triceps [] []    Clonus [] []    Babinski [] []    Ellington's [] []      Joint mobility:    []Normal    [x]Hypo   []Hyper      Orthopedic Special Tests:    Neural dynamic tension testing Normal Abnormal Comments   Slump Test  - Degree of knee flexion:  [] []    SLR  [] []    0-30 [] []    30-70 [] []    Femoral nerve (L2-4) [] []       Normal Abnormal N/A Comments   Fwd Bend-aberrant or innominate mvmt) [] [] []    Trendelenburg [] [] []    Kemps/Quadrant [] [] []    Nini Bors [] [] []    LIZETH/Donnell [] [] []    Hip scour [] [] []    Supine to sit [] [] []    Prone knee bend [] [] []           Hip thrust [] [] []    SI distraction/compression [] [] []    Sacral Spring/thrust [] [] []    Man p-tx     With relief  (wt 227)        [x] Patient history, allergies, meds reviewed. Medical chart reviewed. See intake form. Review Of Systems (ROS):  [x]Performed Review of systems (Integumentary, CardioPulmonary, Neurological) by intake and observation. Intake form has been scanned into medical record. Patient has been instructed to contact their primary care physician regarding ROS issues if not already being addressed at this time.       Co-morbidities/Complexities (which will affect course of rehabilitation):   []None Arthritic conditions   []Rheumatoid arthritis (M05.9)  []Osteoarthritis (M19.91)   Cardiovascular conditions   []Hypertension (I10)  []Hyperlipidemia (E78.5)  []Angina pectoris (I20)  []Atherosclerosis (I70)  []CVA Musculoskeletal conditions   []Disc pathology   []Congenital spine pathologies   []Prior surgical intervention  []Osteoporosis (M81.8)  []Osteopenia (M85.8)   Endocrine conditions   []Hypothyroid (E03.9)  []Hyperthyroid Gastrointestinal conditions   []Constipation (U79.75)   Metabolic conditions   []Morbid obesity (E66.01)  []Diabetes type 1(E10.65) or 2 (E11.65)   []Neuropathy (G60.9)     Pulmonary conditions   []Asthma (J45)  []Coughing   []COPD (J44.9)   Psychological Disorders  []Anxiety (F41.9)  []Depression (F32.9)   []Other:   [x]Other:     See PMH above      Barriers to/and or personal factors that will affect rehab potential:              []Age  []Sex    []Smoker              []Motivation/Lack of Motivation                        [x]Co-Morbidities              []Cognitive Function, education/learning barriers              []Environmental, home barriers              []profession/work barriers  [x]past PT/medical experience  []other:  Justification:     Falls Risk Assessment (30 days):   [x] Falls Risk assessed and no intervention required.   [] Falls Risk assessed and Patient requires intervention due to being higher risk   TUG score (>12s at risk):     [] Falls education provided, including:         ASSESSMENT:   Functional Impairments:     [x]Noted lumbar/proximal hip hypomobility   []Noted lumbosacral and/or generalized hypermobility   [x]Decreased Lumbosacral/hip/LE functional ROM   [x]Decreased core/proximal hip strength and neuromuscular control    [x]Decreased LE functional strength    []Abnormal reflexes/sensation/myotomal/dermatomal deficits  []Reduced balance/proprioceptive control    []other:      Functional Activity Limitations (from functional questionnaire and intake)   [x]Reduced Justification  [x] A history of present problem with:  [] no personal factors and/or comorbidities that impact the plan of care;  []1-2 personal factors and/or comorbidities that impact the plan of care  [x]3 personal factors and/or comorbidities that impact the plan of care  [x] An examination of body systems using standardized tests and measures addressing any of the following: body structures and functions (impairments), activity limitations, and/or participation restrictions;:  [] a total of 1-2 or more elements   [x] a total of 3 or more elements   [] a total of 4 or more elements   [x] A clinical presentation with:  [x] stable and/or uncomplicated characteristics   [] evolving clinical presentation with changing characteristics  [] unstable and unpredictable characteristics;   [x] Clinical decision making of [x] low, [] moderate, [] high complexity using standardized patient assessment instrument and/or measurable assessment of functional outcome. [x] EVAL (LOW) 68283 (typically 20 minutes face-to-face)  [] EVAL (MOD) 77113 (typically 30 minutes face-to-face)  [] EVAL (HIGH) 72756 (typically 45 minutes face-to-face)  [] RE-EVAL     PLAN: Begin PT focusing on: proximal hip mobilizations, LB mobs, LB core activation, proximal hip activation, and HEP    Frequency/Duration:  2 days per week for 2-4 Weeks:  Interventions:  [x]  Therapeutic exercise including: strength training, ROM, for LE, Glutes and core   [x]  NMR activation and proprioception for glutes , LE and Core   [x]  Manual therapy as indicated for Hip complex, LE and spine to include: Dry Needling/IASTM, STM, PROM, Gr I-IV mobilizations, manipulation. [x]  Modalities as needed that may include: thermal agents, E-stim, Biofeedback, US, iontophoresis as indicated  [x]  Patient education on joint protection, postural re-education, activity modification, progression of HEP.   [x]  Aquatic exercise including: strength training, ROM and balance for LE, Glutes and core     HEP instruction: see flowsheet     GOALS:  Patient stated goal: \" a lot less pain\"  [] Progressing: [] Met: [] Not Met: [] Adjusted  Therapist goals for Patient:   Short Term Goals: To be achieved in: 2 weeks  1. Independent in HEP and progression per patient tolerance, in order to prevent re-injury. [] Progressing: [] Met: [] Not Met: [] Adjusted  2. Patient will have a decrease in pain by 10-20% to facilitate improvement in movement, function, and ADLs as indicated by Functional Deficits. [] Progressing: [] Met: [] Not Met: [] Adjusted    Long Term Goals: To be achieved in: at d/c  1. Increase FOTO functional outcome score from 47 to 64 to assist with reaching prior level of function. [] Progressing: [] Met: [] Not Met: [] Adjusted  2. Patient will have a decrease in pain by 30-40% to facilitate improvement in movement, function, and ADLs as indicated by Functional Deficits. [] Progressing: [] Met: [] Not Met: [] Adjusted  3. Patient will demonstrate increased AROM to [facility], mod tightness B LE to allow for proper joint functioning as indicated by patients Functional Deficits. [] Progressing: [] Met: [] Not Met: [] Adjusted  4. Patient will demonstrate an increase in Strength to good proximal hip and core activation to allow for proper functional mobility as indicated by patients Functional Deficits. [] Progressing: [] Met: [] Not Met: [] Adjusted  5. Patient will return to standing to shower without increased symptoms or restriction. [] Progressing: [] Met: [] Not Met: [] Adjusted  6. \"Resume normal hep and ADLs\"(patient specific functional goal)    [] Progressing: [] Met: [] Not Met: [] Adjusted     Electronically signed by:  Preston Killian, PTMPT [id number]        Note: If patient does not return for scheduled/recommended follow up visits, this note will serve as a discharge from care along with the most recent update on progress.

## 2022-09-27 NOTE — FLOWSHEET NOTE
[facility]  [address] [facility]  Phone: [phone]   Fax:     [phone]    Physical Therapy Treatment Note/ Progress Report:     Date:  2022    Patient Name:  Marianne Sauer    :  1960  MRN: [id number]    Pertinent Medical History: Additional Pertinent Hx: HTN, fibromyalgia, h/o bld clots cancer ( bladder ), pacemaker, RA    Medical/Treatment Diagnosis Information:  Medical Diagnosis: DDD (degenerative disc disease), lumbar [M51.36]  Hip arthritis [M16.10]  Treatment Diagnosis: back and hip pain, dec strength and flexibility limiting ADLs    Insurance/Certification information:  PT Insurance Information: [facility] - NO ESTIM; 61 vpcy, no auth  Physician Information:  Osbaldo Baron MD  Plan of care signed (Y/N): Y    Date of Patient follow up with Physician:      Progress Report: []  Yes  [x]  No     Date Range for reporting period:  Beginnin2022  Ending:    Progress report due (10 Rx/or 30 days whichever is less):      Recertification due (POC duration/ or 90 days whichever is less):     Visit # POC/ Insurance Allowable Auth Needed    61 vpcy []Yes    [x]No     Functional Outcomes Measure:   Date Assessed: at eval  Test:FOTO  Score:47    Pain level:  7/10     History of Injury:Patient stated complaint: Pt with long h/o back pain that intermittently flares up. Pt has been in PT several times in the past for his back and states he still does hep from prior PT about 3 times per week ( notes since flare up - irritation with piriformis, but other stretches are ok - PT recommends to pt to hold pirif str now). Pt notes recent trip to [facility] with a lot of walking last year started the pain and it has gradually increased to this level. Pt c/o pain up to 7/10 in back and hips and thighs (L>R). Pt denies NT or pain past knees.   He was recently on steroids for sinus infections which helped his back, no injections b/c he is unable to do them now due to other medical concerns. Unable to dalia chlorine in pool due to breathing issues; regular back pain levels have been 3-4/10 before the flare up        SUBJECTIVE:  See eval    OBJECTIVE:   Observation:   Test measurements:      RESTRICTIONS/PRECAUTIONS:     Exercises/Interventions:     Therapeutic Ex (72868)   Min:5 Resistance/Reps Notes/Cues     To cont for hep:  SKC, TA, GS and gentle LTR, seated HSS, man p-tx from wife at home (holding on pirif str now due to pain)   NuStep     Seated lumbar flex with Swiss Ball L/C/R    Add set     HL TB abd     Progress GS to bridge as able      Pirif str - gentle - add when able     PPT          SL open book str           Therapeutic Activity (05316) Min: 10 See below                         NMR re-education (66385)   Min:                         Manual Intervention (48448) Min:      Man p-tx with LE on grn ball add Progress to mech p-tx if helpful/tolerated - pt was uncomfortable supine lying x 10 min with MHP    STM to B LB add    Modalities  Min: 10     MHP low back X 10 min supine with LE on wedge - pt was set for 15 min but had to sit up after 9 min to due feeling uncomfortable supine lying; discussed trial of man p-tx with ball before trying mech p-tx b/c of this  No ESTIM - pacemaker and insurance  No US - bladder cancer 2021      Other Therapeutic Activities:  Pt was educated on PT POC, Diagnosis, Prognosis, pathomechanics as well as frequency and duration of scheduling future physical therapy appointments. Time was also taken on this day to answer all patient questions and participation in PT. Reviewed appointment policy in detail with patient and patient verbalized understanding. X 10     Home Exercise Program: Patient instructed in the following for HEP:   . Patient verbalized/demonstrated understanding and was issued written handout.       Therapeutic Exercise and NMR EXR  [x] (51301) Provided verbal/tactile cueing for activities related to strengthening, flexibility, endurance, ROM  for improvements in proximal hip and core control with self care, mobility, lifting and ambulation.  [] (06679) Provided verbal/tactile cueing for activities related to improving balance, coordination, kinesthetic sense, posture, motor skill, proprioception  to assist with core control in self care, mobility, lifting, and ambulation. Therapeutic Activities:    [x] (56642 or 29853) Provided verbal/tactile cueing for activities related to improving balance, coordination, kinesthetic sense, posture, motor skill, proprioception and motor activation to allow for proper function  with self care and ADLs  [] (36896) Provided training and instruction to the patient for proper core and proximal hip recruitment and positioning with ambulation re-education     Home Exercise Program:    [x] (15842) Reviewed/Progressed HEP activities related to strengthening, flexibility, endurance, ROM of core, proximal hip and LE for functional self-care, mobility, lifting and ambulation   [] (06510) Reviewed/Progressed HEP activities related to improving balance, coordination, kinesthetic sense, posture, motor skill, proprioception of core, proximal hip and LE for self care, mobility, lifting, and ambulation      Manual Treatments:  PROM / STM / Oscillations-Mobs:  G-I, II, III, IV (PA's, Inf., Post.)  [] (66595) Provided manual therapy to mobilize proximal hip and LS spine soft tissue/joints for the purpose of modulating pain, promoting relaxation,  increasing ROM, reducing/eliminating soft tissue swelling/inflammation/restriction, improving soft tissue extensibility and allowing for proper ROM for normal function with self care, mobility, lifting and ambulation.        Charges:  Timed Code Treatment Minutes: 15   Total Treatment Minutes: 45     [x] EVAL (LOW) 19948 (typically 20 minutes face-to-face)  [] EVAL (MOD) 42967 (typically 30 minutes face-to-face)  [] EVAL (HIGH) 50494 (typically 45 minutes face-to-face)  [] RE-EVAL     [] YX(48217) x     [] Dry needle 1 or 2 Muscles (55838)  [] NMR (89346) x     [] Dry needle 3+ Muscles (54490)  [] Manual (28176) x     [] Ultrasound (47198) x  [x] TA (85626) x     [] Mech Traction (60421)  [] ES(attended) (73198)     [] ES (un) (63237):   [] Vasopump (31372) [] Ionto (17269)   [] Other:    GOALS:  Patient stated goal: \" a lot less pain\"  [] Progressing: [] Met: [] Not Met: [] Adjusted  Therapist goals for Patient:   Short Term Goals: To be achieved in: 2 weeks  1. Independent in HEP and progression per patient tolerance, in order to prevent re-injury. [] Progressing: [] Met: [] Not Met: [] Adjusted  2. Patient will have a decrease in pain by 10-20% to facilitate improvement in movement, function, and ADLs as indicated by Functional Deficits. [] Progressing: [] Met: [] Not Met: [] Adjusted     Long Term Goals: To be achieved in: at d/c  1. Increase FOTO functional outcome score from 47 to 64 to assist with reaching prior level of function. [] Progressing: [] Met: [] Not Met: [] Adjusted  2. Patient will have a decrease in pain by 30-40% to facilitate improvement in movement, function, and ADLs as indicated by Functional Deficits. [] Progressing: [] Met: [] Not Met: [] Adjusted  3. Patient will demonstrate increased AROM to [facility] lumbar spine, mod tightness B LE to allow for proper joint functioning as indicated by patients Functional Deficits. [] Progressing: [] Met: [] Not Met: [] Adjusted  4. Patient will demonstrate an increase in Strength to good proximal hip and core activation to allow for proper functional mobility as indicated by patients Functional Deficits. [] Progressing: [] Met: [] Not Met: [] Adjusted  5. Patient will return to standing to shower without increased symptoms or restriction. [] Progressing: [] Met: [] Not Met: [] Adjusted  6.  \"Resume normal hep and ADLs\"(patient specific functional goal)    [] Progressing: [] Met: [] Not Met: [] Adjusted         ASSESSMENT:  9/27: trial of gentle ROM, LP core stab ex and gentle STM/man p-tx for pain; pt is limited to some degree with what he can do in PT due to co-morbidities     Treatment/Activity Tolerance:  [x] Patient tolerated treatment well [] Patient limited by fatique  [] Patient limited by pain  [] Patient limited by other medical complications  [] Other:     Overall Progression Towards Functional goals/ Treatment Progress Update:  [] Patient is progressing as expected towards functional goals listed. [] Progression is slowed due to complexities/Impairments listed. [] Progression has been slowed due to co-morbidities. [x] Plan just implemented, too soon to assess goals progression <30days   [] Goals require adjustment due to lack of progress  [] Patient is not progressing as expected and requires additional follow up with physician  [] Other:    Prognosis for POC: [x] Good [] Fair  [] Poor    Patient requires continued skilled intervention: [x] Yes  [] No        PLAN: See eval  [] Continue per plan of care [] Alter current plan (see comments)  [x] Plan of care initiated [] Hold pending MD visit [] Discharge    Electronically signed by: Maxim Bradford, PTMPT [id number]    Note: If patient does not return for scheduled/recommended follow up visits, this note will serve as a discharge from care along with the most recent update on progress.

## 2022-09-28 ENCOUNTER — OFFICE VISIT (OUTPATIENT)
Dept: PULMONOLOGY | Age: 62
End: 2022-09-28
Payer: COMMERCIAL

## 2022-09-28 ENCOUNTER — ANTI-COAG VISIT (OUTPATIENT)
Dept: PHARMACY | Age: 62
End: 2022-09-28
Payer: COMMERCIAL

## 2022-09-28 VITALS
WEIGHT: 232 LBS | HEIGHT: 66 IN | BODY MASS INDEX: 37.28 KG/M2 | DIASTOLIC BLOOD PRESSURE: 80 MMHG | SYSTOLIC BLOOD PRESSURE: 115 MMHG | RESPIRATION RATE: 21 BRPM | TEMPERATURE: 97.1 F | HEART RATE: 77 BPM | OXYGEN SATURATION: 97 %

## 2022-09-28 DIAGNOSIS — R06.02 SHORTNESS OF BREATH: Primary | ICD-10-CM

## 2022-09-28 DIAGNOSIS — Z79.01 CHRONIC ANTICOAGULATION: ICD-10-CM

## 2022-09-28 DIAGNOSIS — J42 BRONCHIOLITIS OBLITERANS (HCC): ICD-10-CM

## 2022-09-28 DIAGNOSIS — I26.99 OTHER ACUTE PULMONARY EMBOLISM, UNSPECIFIED WHETHER ACUTE COR PULMONALE PRESENT (HCC): Primary | ICD-10-CM

## 2022-09-28 LAB — INTERNATIONAL NORMALIZATION RATIO, POC: 3

## 2022-09-28 PROCEDURE — 99214 OFFICE O/P EST MOD 30 MIN: CPT | Performed by: INTERNAL MEDICINE

## 2022-09-28 PROCEDURE — G8417 CALC BMI ABV UP PARAM F/U: HCPCS | Performed by: INTERNAL MEDICINE

## 2022-09-28 PROCEDURE — 85610 PROTHROMBIN TIME: CPT

## 2022-09-28 PROCEDURE — 99211 OFF/OP EST MAY X REQ PHY/QHP: CPT

## 2022-09-28 PROCEDURE — 1036F TOBACCO NON-USER: CPT | Performed by: INTERNAL MEDICINE

## 2022-09-28 PROCEDURE — 3017F COLORECTAL CA SCREEN DOC REV: CPT | Performed by: INTERNAL MEDICINE

## 2022-09-28 PROCEDURE — G8427 DOCREV CUR MEDS BY ELIG CLIN: HCPCS | Performed by: INTERNAL MEDICINE

## 2022-09-28 PROCEDURE — 3023F SPIROM DOC REV: CPT | Performed by: INTERNAL MEDICINE

## 2022-09-28 ASSESSMENT — ENCOUNTER SYMPTOMS: RESPIRATORY NEGATIVE: 1

## 2022-09-28 NOTE — ASSESSMENT & PLAN NOTE
- Likely multifactorial his FEV1 is 50% but has been largely stable all shortness of breath is worsening.   Suspect there is some component of deconditioning and obesity playing a role.  -Has a repeat CT chest ordered pending, he is going for CT abdomen/pelvis tomorrow so we will call and see if this can get s added on

## 2022-09-28 NOTE — PROGRESS NOTES
[address] [place]   Hai Hodge (:  1960) is a 60 y.o. male,Established patient, here for evaluation of the following chief complaint(s):  Follow-up (Lung nodule/increased SOB walking )         ASSESSMENT/PLAN:  1. Shortness of breath  Assessment & Plan:  - Likely multifactorial his FEV1 is 50% but has been largely stable all shortness of breath is worsening. Suspect there is some component of deconditioning and obesity playing a role.  -Has a repeat CT chest ordered pending, he is going for CT abdomen/pelvis tomorrow so we will call and see if this can get s added on  2. Bronchiolitis obliterans ([address]  Assessment & Plan:  -Has been stable since . -FEV1 50%  -On Stiolto, albuterol as needed  -We will trial a sample of Trelegy, hold Stiolto during this period, restart Stiolto following Trelegy see if there is symptomatic improvement with ICS      Return in about 3 months (around 2022). Subjective   SUBJECTIVE/OBJECTIVE:  Shortness of breath stable. Initially diagnosed with BSO in  status post therapy. Since then symptoms and objective measures have been largely stable. He remains on bronchodilator therapy for symptomatic relief. Review of Systems   Constitutional: Negative. Respiratory: Negative. Cardiovascular: Negative. Neurological: Negative. Psychiatric/Behavioral: Negative. Objective   Physical Exam    Gen:  No acute distress. Eyes: EOMI. Anicteric sclera. No conjunctival injection. ENT: No discharge. oropharynx clear. External appearance of ears and nose normal.  Neck: Trachea midline. No mass   Resp:  No crackles. No wheezes. No rhonchi. No dullness on percussion. CV: Regular rate. Regular rhythm. No murmur or rub. No edema. Neuro:  no focal neurologic deficit. Moves all extremities  Psych: Awake and alert, Oriented x 3. Judgement and insight appropriate. Mood stable.          An electronic signature was used to authenticate this note.     --Dasia Boudreaux MD

## 2022-09-28 NOTE — PROGRESS NOTES
Carmen Valadez is a 60 y.o. here for warfarin management. Reginald Mahan had an INR test today. Results were reviewed and appropriate warfarin management was completed. This visit was performed as: An in person visit. Protocols were followed with precautions to reduce the spread of COVID-19. Patient verifies current dosing regimen: Yes     Warfarin medication reviewed and updated on the patient 's home medication list: Yes   All other medications reviewed and updated on the patient 's home medication list: No: He is doing a trial of Trelegy. Will not add to list until the results are known. Lab Results   Component Value Date    INR 3.0 09/28/2022    INR 1.60 09/16/2022    INR 2.60 08/26/2022       Patient Findings       Positives:  Change in medications    Comments:  Now taking a trial of Trelegy. If it is successful, the Trelegy will replace the Stiolto            Anticoagulation Summary  As of 9/28/2022      INR goal:  2.0-3.0   TTR:  36.9 % (6.9 y)   INR used for dosing:  3.0 (9/28/2022)   Warfarin maintenance plan:  5 mg (5 mg x 1) every Mon, Wed, Fri; 2.5 mg (5 mg x 0.5) all other days   Weekly warfarin total:  25 mg   Plan last modified:  Loan Jama (8/15/2022)   Next INR check:  10/19/2022   Priority:  Maintenance   Target end date: Indefinite    Indications    Pulmonary embolus ([address] [I26.99]  Chronic anticoagulation [Z79.01]                 Anticoagulation Episode Summary       INR check location:  Anticoagulation Clinic    Preferred lab:      Send INR reminders to:  [facility]    Comments:  EPIC - finger stick every 2-3 weeks            Anticoagulation Care Providers       Provider Role Specialty Phone number    Cortez Zuñiga MD Referring Internal Medicine [phone]            Warfarin assessment / plan:   Reginald Mahan will do a trial of Trelegy. If it is successful, he states the Trelegy will replace the [address]. Appears well.       No changes affecting warfarin therapy were noted.   No acute findings. INR within goal range. No change to warfarin therapy today. Description    CONTINUE DOSE: Take warfarin 2.5mg daily except for 5mg on Monday, Wednesday, and Friday    Call [phone] with signs or symptoms of bleeding or ANY medication changes (including antibiotics, steroids, over-the-counter medications or herbal supplements). If significant bleeding occurs or if you fall and hit your head, please seek immediate medical attention. Keep the number of servings of vitamin K containing foods (dark green, leafy vegetables) the same each week. Please call if this changes. Limit alcohol intake. Please call if this changes. Immunization History   Administered Date(s) Administered    COVID-19, MODERNA BLUE border, Primary or Immunocompromised, (age 12y+), IM, 100 mcg/0.5mL 03/09/2021, 04/08/2021, 12/15/2021, 07/23/2022    Influenza, FLUARIX, FLULAVAL, FLUZONE (age 10 mo+) AND AFLURIA, (age 1 y+), PF, 0.5mL 10/27/2017, 09/23/2019, 10/02/2020    Influenza, FLUBLOK, (age 25 y+), PF, 0.5mL 10/03/2018    Influenza, FLUCELVAX, (age 10 mo+), MDCK, PF, 0.5mL 10/26/2021    Pneumococcal Conjugate 13-valent ([id number]) 09/11/2015    Pneumococcal Conjugate Vaccine 08/15/2017    Pneumococcal Polysaccharide ([id number]) 08/01/2007, 12/19/2012    Tdap (Boostrix, Adacel) 08/19/2014           Orders Placed This Encounter   Procedures    POCT INR     This external order was created through the results console. No orders of the defined types were placed in this encounter. Reviewed AVS with patient / caregiver.     Billing Points:  0 billing points this visit       CLINICAL PHARMACY CONSULT: MED RECONCILIATION/REVIEW ADDENDUM    For Pharmacy Admin Tracking Only    Intervention Detail:   Total # of Interventions Recommended: 0  Total # of Interventions Accepted: 0  Time Spent (min): 20

## 2022-09-28 NOTE — ASSESSMENT & PLAN NOTE
-Has been stable since 1997.   -FEV1 50%  -On Stiolto, albuterol as needed  -We will trial a sample of Trelegy, hold Stiolto during this period, restart Stiolto following Trelegy see if there is symptomatic improvement with ICS

## 2022-09-29 ENCOUNTER — HOSPITAL ENCOUNTER (OUTPATIENT)
Dept: CT IMAGING | Age: 62
Discharge: HOME | End: 2022-09-29
Payer: COMMERCIAL

## 2022-09-29 ENCOUNTER — APPOINTMENT (OUTPATIENT)
Dept: PHYSICAL THERAPY | Age: 62
End: 2022-09-29
Payer: COMMERCIAL

## 2022-09-29 DIAGNOSIS — C67.9 MALIGNANT NEOPLASM OF URINARY BLADDER, UNSPECIFIED SITE (HCC): ICD-10-CM

## 2022-09-29 DIAGNOSIS — R10.9 ABDOMINAL PAIN, UNSPECIFIED ABDOMINAL LOCATION: ICD-10-CM

## 2022-09-29 PROCEDURE — 74178 CT ABD&PLV WO CNTR FLWD CNTR: CPT | Performed by: UROLOGY

## 2022-09-29 PROCEDURE — 6360000004 HC RX CONTRAST MEDICATION: Performed by: UROLOGY

## 2022-09-29 RX ADMIN — IOPAMIDOL 75 ML: 755 INJECTION, SOLUTION INTRAVENOUS at 09:41

## 2022-09-30 ENCOUNTER — APPOINTMENT (OUTPATIENT)
Dept: PHYSICAL THERAPY | Age: 62
End: 2022-09-30
Payer: COMMERCIAL

## 2022-10-03 ENCOUNTER — HOSPITAL ENCOUNTER (OUTPATIENT)
Dept: PHYSICAL THERAPY | Age: 62
Setting detail: THERAPIES SERIES
Discharge: HOME | End: 2022-10-03
Payer: COMMERCIAL

## 2022-10-03 NOTE — FLOWSHEET NOTE
[facility], [facility]  [address] [facility], [facility]  Phone: [phone]   Fax:     [phone]    Physical Therapy  Cancellation/No-show Note  Patient Name:  Nico Barros  :  1960   Date:  10/03/2022  Cancelled visits to date: 0  No-shows to date: 1    Patient status for today's appointment patient:  []  Cancelled  []  Rescheduled appointment  [x]  No-show     Reason given by patient:  []  Patient ill  []  Conflicting appointment  []  No transportation    []  Conflict with work  []  No reason given  [x]  Other:     Comments:  pt reports he forgot apt    Phone call information:   [x]  Phone call made today to patient at _ 12:15time at number provided:      [x]  Patient answered, conversation as follows: called re: NS, reports he forgot apt. Also reports may want to cancel remaining visits due to not a lot the therapist can do. I advised I will have primary PT call him to address his concerns.       []  Patient did not answer, message left as follows:  []  Phone call not made today    Electronically signed by:  Luis Matias, PTA  [id number]

## 2022-10-04 ENCOUNTER — HOSPITAL ENCOUNTER (OUTPATIENT)
Dept: PHYSICAL THERAPY | Age: 62
Setting detail: THERAPIES SERIES
Discharge: HOME | End: 2022-10-04
Payer: COMMERCIAL

## 2022-10-04 PROCEDURE — 97112 NEUROMUSCULAR REEDUCATION: CPT

## 2022-10-04 PROCEDURE — 97110 THERAPEUTIC EXERCISES: CPT

## 2022-10-04 PROCEDURE — 97140 MANUAL THERAPY 1/> REGIONS: CPT

## 2022-10-04 NOTE — FLOWSHEET NOTE
[facility], [facility]  [address] [facility] [place], [facility]  Phone: [phone]   Fax:     [phone]    Physical Therapy Treatment Note/ Progress Report:     Date:  10/04/2022    Patient Name:  Nate Rivero    :  1960  MRN: [id number]    Pertinent Medical History: Additional Pertinent Hx: HTN, fibromyalgia, h/o bld clots cancer ( bladder ), pacemaker, RA    Medical/Treatment Diagnosis Information:  Medical Diagnosis: DDD (degenerative disc disease), lumbar [M51.36]  Hip arthritis [M16.10]  Treatment Diagnosis: back and hip pain, dec strength and flexibility limiting ADLs    Insurance/Certification information:  PT Insurance Information: [facility] - NO ESTIM; 60 vpcy, no auth  Physician Information:  Arnold Hirsch MD  Plan of care signed (Y/N): Y    Date of Patient follow up with Physician:      Progress Report: []  Yes  [x]  No     Date Range for reporting period:  Beginnin2022  Ending:    Progress report due (10 Rx/or 30 days whichever is less):      Recertification due (POC duration/ or 90 days whichever is less):     Visit # POC/ Insurance Allowable Auth Needed    60 vpcy []Yes    [x]No     Functional Outcomes Measure:   Date Assessed: at eval  Test:FOTO  Score:47    Pain level:  8/10     History of Injury:Patient stated complaint: Pt with long h/o back pain that intermittently flares up. Pt has been in PT several times in the past for his back and states he still does hep from prior PT about 3 times per week ( notes since flare up - irritation with piriformis, but other stretches are ok - PT recommends to pt to hold pirif str now). Pt notes recent trip to [facility] with a lot of walking last year started the pain and it has gradually increased to this level. Pt c/o pain up to 7/10 in back and hips and thighs (L>R). Pt denies NT or pain past knees.   He was recently on steroids for (79183) Provided verbal/tactile cueing for activities related to strengthening, flexibility, endurance, ROM  for improvements in proximal hip and core control with self care, mobility, lifting and ambulation.  [] (85318) Provided verbal/tactile cueing for activities related to improving balance, coordination, kinesthetic sense, posture, motor skill, proprioception  to assist with core control in self care, mobility, lifting, and ambulation. Therapeutic Activities:    [x] (45616 or 04862) Provided verbal/tactile cueing for activities related to improving balance, coordination, kinesthetic sense, posture, motor skill, proprioception and motor activation to allow for proper function  with self care and ADLs  [] (32041) Provided training and instruction to the patient for proper core and proximal hip recruitment and positioning with ambulation re-education     Home Exercise Program:    [x] (90248) Reviewed/Progressed HEP activities related to strengthening, flexibility, endurance, ROM of core, proximal hip and LE for functional self-care, mobility, lifting and ambulation   [] (86963) Reviewed/Progressed HEP activities related to improving balance, coordination, kinesthetic sense, posture, motor skill, proprioception of core, proximal hip and LE for self care, mobility, lifting, and ambulation      Manual Treatments:  PROM / STM / Oscillations-Mobs:  G-I, II, III, IV (PA's, Inf., Post.)  [] (21768) Provided manual therapy to mobilize proximal hip and LS spine soft tissue/joints for the purpose of modulating pain, promoting relaxation,  increasing ROM, reducing/eliminating soft tissue swelling/inflammation/restriction, improving soft tissue extensibility and allowing for proper ROM for normal function with self care, mobility, lifting and ambulation.        Charges:  Timed Code Treatment Minutes: 38   Total Treatment Minutes: 38     [] EVAL (LOW) 39569 (typically 20 minutes face-to-face)  [] EVAL (MOD) 47985 (typically 30 minutes face-to-face)  [] EVAL (HIGH) 43534 (typically 45 minutes face-to-face)  [] RE-EVAL     [x] NT(60792) x     [] Dry needle 1 or 2 Muscles (46696)  [x] NMR (21706) x     [] Dry needle 3+ Muscles (54365)  [x] Manual (01729) x     [] Ultrasound (57907) x  [] TA (71037) x     [] Mech Traction (51368)  [] ES(attended) (64425)     [] ES (un) (19455):   [] Vasopump (22898) [] Ionto (50837)   [] Other:    GOALS:  Patient stated goal: \" a lot less pain\"  [] Progressing: [] Met: [] Not Met: [] Adjusted  Therapist goals for Patient:   Short Term Goals: To be achieved in: 2 weeks  1. Independent in HEP and progression per patient tolerance, in order to prevent re-injury. [] Progressing: [] Met: [] Not Met: [] Adjusted  2. Patient will have a decrease in pain by 10-20% to facilitate improvement in movement, function, and ADLs as indicated by Functional Deficits. [] Progressing: [] Met: [] Not Met: [] Adjusted     Long Term Goals: To be achieved in: at d/c  1. Increase FOTO functional outcome score from 47 to 64 to assist with reaching prior level of function. [] Progressing: [] Met: [] Not Met: [] Adjusted  2. Patient will have a decrease in pain by 30-40% to facilitate improvement in movement, function, and ADLs as indicated by Functional Deficits. [] Progressing: [] Met: [] Not Met: [] Adjusted  3. Patient will demonstrate increased AROM to [facility] lumbar spine, mod tightness B LE to allow for proper joint functioning as indicated by patients Functional Deficits. [] Progressing: [] Met: [] Not Met: [] Adjusted  4. Patient will demonstrate an increase in Strength to good proximal hip and core activation to allow for proper functional mobility as indicated by patients Functional Deficits. [] Progressing: [] Met: [] Not Met: [] Adjusted  5. Patient will return to standing to shower without increased symptoms or restriction. [] Progressing: [] Met: [] Not Met: [] Adjusted  6.  \"Resume normal hep and ADLs\"(patient specific functional goal)    [] Progressing: [] Met: [] Not Met: [] Adjusted         ASSESSMENT:  9/27: trial of gentle ROM, LP core stab ex and gentle STM/man p-tx for pain; pt is limited to some degree with what he can do in PT due to co-morbidities   10/4 pain dec to 5/10 after session    Treatment/Activity Tolerance:  [x] Patient tolerated treatment well [] Patient limited by fatique  [] Patient limited by pain  [] Patient limited by other medical complications  [] Other:     Overall Progression Towards Functional goals/ Treatment Progress Update:  [] Patient is progressing as expected towards functional goals listed. [] Progression is slowed due to complexities/Impairments listed. [] Progression has been slowed due to co-morbidities. [x] Plan just implemented, too soon to assess goals progression <30days   [] Goals require adjustment due to lack of progress  [] Patient is not progressing as expected and requires additional follow up with physician  [] Other:    Prognosis for POC: [x] Good [] Fair  [] Poor    Patient requires continued skilled intervention: [x] Yes  [] No        PLAN: proceed gradually with gentle ex   [x] Continue per plan of care [] Alter current plan (see comments)  [] Plan of care initiated [] Hold pending MD visit [] Discharge    Electronically signed by: Jennifer Murray, PTMPT [id number]    Note: If patient does not return for scheduled/recommended follow up visits, this note will serve as a discharge from care along with the most recent update on progress.

## 2022-10-05 ENCOUNTER — TELEPHONE (OUTPATIENT)
Dept: PULMONOLOGY | Age: 62
End: 2022-10-05

## 2022-10-05 NOTE — TELEPHONE ENCOUNTER
[facility] He would like a 60 day supply of Trelegy sent to [facility] on [address]. Also he would like Martita to call in or take care care of the C9  form for him to have a scan.

## 2022-10-11 ENCOUNTER — APPOINTMENT (OUTPATIENT)
Dept: PHYSICAL THERAPY | Age: 62
End: 2022-10-11
Payer: COMMERCIAL

## 2022-10-13 ENCOUNTER — APPOINTMENT (OUTPATIENT)
Dept: PHYSICAL THERAPY | Age: 62
End: 2022-10-13
Payer: COMMERCIAL

## 2022-10-17 ENCOUNTER — ANTI-COAG VISIT (OUTPATIENT)
Dept: PHARMACY | Age: 62
End: 2022-10-17
Payer: COMMERCIAL

## 2022-10-17 ENCOUNTER — HOSPITAL ENCOUNTER (OUTPATIENT)
Dept: GENERAL RADIOLOGY | Age: 62
Discharge: HOME | End: 2022-10-17
Payer: COMMERCIAL

## 2022-10-17 ENCOUNTER — HOSPITAL ENCOUNTER (OUTPATIENT)
Age: 62
Discharge: HOME | End: 2022-10-17
Payer: COMMERCIAL

## 2022-10-17 DIAGNOSIS — I26.99 OTHER ACUTE PULMONARY EMBOLISM, UNSPECIFIED WHETHER ACUTE COR PULMONALE PRESENT (HCC): Primary | ICD-10-CM

## 2022-10-17 DIAGNOSIS — Z79.01 CHRONIC ANTICOAGULATION: ICD-10-CM

## 2022-10-17 DIAGNOSIS — R52 PAIN: ICD-10-CM

## 2022-10-17 LAB — INR BLD: 2.6

## 2022-10-17 PROCEDURE — 71046 X-RAY EXAM CHEST 2 VIEWS: CPT

## 2022-10-17 PROCEDURE — 85610 PROTHROMBIN TIME: CPT

## 2022-10-17 PROCEDURE — 99211 OFF/OP EST MAY X REQ PHY/QHP: CPT

## 2022-10-17 PROCEDURE — 70210 X-RAY EXAM OF SINUSES: CPT

## 2022-10-17 NOTE — PROGRESS NOTES
Antoine nAn is a 60 y.o. here for warfarin management. Volodymyr Cousins had an INR test today. Results were reviewed and appropriate warfarin management was completed. This visit was performed as: An in person visit. Protocols were followed with precautions to reduce the spread of COVID-19. Patient verifies current warfarin dosing regimen: Yes     Warfarin medication reviewed and updated on the patient 's home medication list: Yes   All other medications reviewed and updated on the patient 's home medication list: Yes     Lab Results   Component Value Date    INR 2.6 10/17/2022    INR 3.0 2022    INR 1.60 2022       Patient Findings       Positives:  Change in medications    Negatives:  Signs/symptoms of thrombosis, Signs/symptoms of bleeding, Change in health, Missed doses, Change in diet/appetite, Bruising    Comments:  Just finished amoxicillin and is now starting Levaquin x7 days today. Anticoagulation Summary  As of 10/17/2022      INR goal:  2.0-3.0   TTR:  37.4 % (7 y)   INR used for dosin.6 (10/17/2022)   Warfarin maintenance plan:  5 mg (5 mg x 1) every Mon, Wed, Fri; 2.5 mg (5 mg x 0.5) all other days   Weekly warfarin total:  25 mg   Plan last modified:  Jazmyn Sanchez (8/15/2022)   Next INR check:  2022   Priority:  Maintenance   Target end date: Indefinite    Indications    Pulmonary embolus (HCC) [I26.99]  Chronic anticoagulation [Z79.01]                 Anticoagulation Episode Summary       INR check location:  Anticoagulation Clinic    Preferred lab:      Send INR reminders to:  [facility]    Comments:  EPIC - finger stick every 2-3 weeks            Anticoagulation Care Providers       Provider Role Specialty Phone number    Rossi Santos MD Referring Internal Medicine [phone]            Warfarin assessment / plan:     Appears well. No changes affecting warfarin therapy were noted. No acute findings. INR within goal range. No change to warfarin therapy today. He is starting on a 7 day course of Levaquin today for respiratory infection. Will have him adjust his warfarin dose slightly this week to account for interaction. He will take 2.5mg daily this week only, then return to previous dosing. Pt requests another check in 2 weeks. Description    This week only, take 2.5mg every day. (Due to Levaquin interaction)    CONTINUE DOSE: Take warfarin 2.5mg daily except for 5mg on Monday, Wednesday, and Friday    Call [phone] with signs or symptoms of bleeding or ANY medication changes (including antibiotics, steroids, over-the-counter medications or herbal supplements). If significant bleeding occurs or if you fall and hit your head, please seek immediate medical attention. Keep the number of servings of vitamin K containing foods (dark green, leafy vegetables) the same each week. Please call if this changes. Limit alcohol intake. Please call if this changes. Immunization History   Administered Date(s) Administered    COVID-19, MODERNA BLUE border, Primary or Immunocompromised, (age 12y+), IM, 100 mcg/0.5mL 03/09/2021, 04/08/2021, 12/15/2021, 07/23/2022    Influenza, FLUARIX, FLULAVAL, FLUZONE (age 10 mo+) AND AFLURIA, (age 1 y+), PF, 0.5mL 10/27/2017, 09/23/2019, 10/02/2020    Influenza, FLUBLOK, (age 25 y+), PF, 0.5mL 10/03/2018    Influenza, FLUCELVAX, (age 10 mo+), MDCK, PF, 0.5mL 10/26/2021    Pneumococcal Conjugate 13-valent ([id number]) 09/11/2015    Pneumococcal Conjugate Vaccine 08/15/2017    Pneumococcal Polysaccharide ([id number]) 08/01/2007, 12/19/2012    Tdap (Boostrix, Adacel) 08/19/2014           Orders Placed This Encounter   Procedures    Protime-INR     This external order was created through the results console. No orders of the defined types were placed in this encounter. Reviewed AVS with patient / caregiver.     Billing Points:  Adjust dosage and/or reconcile meds (fill pill box) </= 5 medications - 2 points       CLINICAL PHARMACY CONSULT: MED RECONCILIATION/REVIEW ADDENDUM    For Pharmacy Admin Tracking Only    Intervention Detail: Dose Adjustment: 1, reason: Interaction  Total # of Interventions Recommended: 1  Total # of Interventions Accepted: 1  Time Spent (min): 15    Tanner Cunningham PharmD 10/17/22  3:01 PM

## 2022-10-18 ENCOUNTER — APPOINTMENT (OUTPATIENT)
Dept: PHYSICAL THERAPY | Age: 62
End: 2022-10-18
Payer: COMMERCIAL

## 2022-10-26 ENCOUNTER — OFFICE VISIT (OUTPATIENT)
Dept: ENT CLINIC | Age: 62
End: 2022-10-26
Payer: COMMERCIAL

## 2022-10-26 VITALS
WEIGHT: 235 LBS | HEIGHT: 66 IN | BODY MASS INDEX: 37.77 KG/M2 | SYSTOLIC BLOOD PRESSURE: 115 MMHG | DIASTOLIC BLOOD PRESSURE: 76 MMHG | HEART RATE: 105 BPM

## 2022-10-26 DIAGNOSIS — M26.623 BILATERAL TEMPOROMANDIBULAR JOINT PAIN: Primary | ICD-10-CM

## 2022-10-26 DIAGNOSIS — R04.0 EPISTAXIS: ICD-10-CM

## 2022-10-26 PROCEDURE — G8417 CALC BMI ABV UP PARAM F/U: HCPCS | Performed by: OTOLARYNGOLOGY

## 2022-10-26 PROCEDURE — 3074F SYST BP LT 130 MM HG: CPT | Performed by: OTOLARYNGOLOGY

## 2022-10-26 PROCEDURE — G8484 FLU IMMUNIZE NO ADMIN: HCPCS | Performed by: OTOLARYNGOLOGY

## 2022-10-26 PROCEDURE — 3017F COLORECTAL CA SCREEN DOC REV: CPT | Performed by: OTOLARYNGOLOGY

## 2022-10-26 PROCEDURE — G8427 DOCREV CUR MEDS BY ELIG CLIN: HCPCS | Performed by: OTOLARYNGOLOGY

## 2022-10-26 PROCEDURE — 3078F DIAST BP <80 MM HG: CPT | Performed by: OTOLARYNGOLOGY

## 2022-10-26 PROCEDURE — 99213 OFFICE O/P EST LOW 20 MIN: CPT | Performed by: OTOLARYNGOLOGY

## 2022-10-26 PROCEDURE — 1036F TOBACCO NON-USER: CPT | Performed by: OTOLARYNGOLOGY

## 2022-10-26 NOTE — PROGRESS NOTES
[address] [facility] SURGERY  Follow up      Patient Name: Maria De Jesus Magaña  Medical Record Number:  [id number]  Primary Care Physician:  Kristy Pabon MD  Date of Consultation: 10/26/2022    Chief Complaint: jaw pain        Interval History    Patient presents for jaw pain. He says he has been having issues with swallowing. He says he has some discomfort in the jaw when swallowing. He saw a dentist and had some dental work done. Has occasional jaw pain as well. REVIEW OF SYSTEMS  As above    PHYSICAL EXAM  GENERAL: No Acute Distress, Alert and Oriented, no Hoarseness, strong voice  EYES: EOMI, Anti-icteric  HENT:   Head: Normocephalic and atraumatic. Face:  Symmetric, facial nerve intact, no sinus tenderness  Right Ear: Normal external ear, normal external auditory canal, intact tympanic membrane with normal mobility and aerated middle ear  Left Ear: Normal external ear, normal external auditory canal, intact tympanic membrane with normal mobility and aerated middle ear  Mouth/Oral Cavity:  audible pop of left TMJ when opening and closing mouth with discomfort of both TMJs  Oropharynx/Larynx:  normal oropharynx  Nose:Normal external nasal appearance. NECK: Normal range of motion, no thyromegaly, trachea is midline, no lymphadenopathy, no neck masses, no crepitus          ASSESSMENT/PLAN  1. Bilateral temporomandibular joint pain  I feel as though he has TMJ arthralgia. I am sending him to physical therapy for this. [address]    2. Epistaxis  He has not had bleeding in a long time. I have performed a head and neck physical exam personally or was physically present during the key or critical portions of the service. This note was generated completely or in part utilizing Dragon dictation speech recognition software.   Occasionally, words are mistranscribed and despite editing, the text may contain inaccuracies due to incorrect word recognition. If further clarification is needed please contact the office at [phone].

## 2022-10-27 ENCOUNTER — TELEPHONE (OUTPATIENT)
Dept: PULMONOLOGY | Age: 62
End: 2022-10-27

## 2022-10-27 NOTE — TELEPHONE ENCOUNTER
Called and informed Shayne Moreno, Dr. Nomi Gastelum would like for him to find his physician of record regarding his WC case, as he normally does not handle these types of cases. Shayne Moreno thought Dr. Nomi Gastelum agreed to see him for [address] [place] [address]. I encouraged Shayne Moreno to call [address] [place] [address] to see what he can find out about getting reassigned to another physician.

## 2022-11-01 ENCOUNTER — HOSPITAL ENCOUNTER (EMERGENCY)
Age: 62
Discharge: HOME | End: 2022-11-01
Attending: EMERGENCY MEDICINE
Payer: COMMERCIAL

## 2022-11-01 ENCOUNTER — APPOINTMENT (OUTPATIENT)
Dept: CT IMAGING | Age: 62
End: 2022-11-01
Payer: COMMERCIAL

## 2022-11-01 ENCOUNTER — APPOINTMENT (OUTPATIENT)
Dept: GENERAL RADIOLOGY | Age: 62
End: 2022-11-01
Payer: COMMERCIAL

## 2022-11-01 VITALS
RESPIRATION RATE: 12 BRPM | DIASTOLIC BLOOD PRESSURE: 106 MMHG | BODY MASS INDEX: 37.61 KG/M2 | HEART RATE: 82 BPM | WEIGHT: 233.03 LBS | SYSTOLIC BLOOD PRESSURE: 117 MMHG | OXYGEN SATURATION: 90 % | TEMPERATURE: 97.6 F

## 2022-11-01 DIAGNOSIS — R10.9 FLANK PAIN: Primary | ICD-10-CM

## 2022-11-01 LAB
A/G RATIO: 1.3 (ref 1.1–2.2)
ALBUMIN SERPL-MCNC: 3.9 G/DL (ref 3.4–5)
ALP BLD-CCNC: 121 U/L (ref 40–129)
ALT SERPL-CCNC: 23 U/L (ref 10–40)
ANION GAP SERPL CALCULATED.3IONS-SCNC: 13 MMOL/L (ref 3–16)
AST SERPL-CCNC: 30 U/L (ref 15–37)
BACTERIA: ABNORMAL /HPF
BASOPHILS ABSOLUTE: 0 K/UL (ref 0–0.2)
BASOPHILS RELATIVE PERCENT: 0.5 %
BILIRUB SERPL-MCNC: 0.3 MG/DL (ref 0–1)
BILIRUBIN URINE: NEGATIVE
BLOOD, URINE: ABNORMAL
BUN BLDV-MCNC: 9 MG/DL (ref 7–20)
CALCIUM SERPL-MCNC: 10.6 MG/DL (ref 8.3–10.6)
CHLORIDE BLD-SCNC: 104 MMOL/L (ref 99–110)
CLARITY: CLEAR
CO2: 19 MMOL/L (ref 21–32)
COLOR: YELLOW
CREAT SERPL-MCNC: 1.1 MG/DL (ref 0.8–1.3)
EOSINOPHILS ABSOLUTE: 0.2 K/UL (ref 0–0.6)
EOSINOPHILS RELATIVE PERCENT: 2.5 %
EPITHELIAL CELLS, UA: 2 /HPF (ref 0–5)
GFR SERPL CREATININE-BSD FRML MDRD: >60 ML/MIN/{1.73_M2}
GLUCOSE BLD-MCNC: 181 MG/DL (ref 70–99)
GLUCOSE URINE: 250 MG/DL
HCT VFR BLD CALC: 48.4 % (ref 40.5–52.5)
HEMOGLOBIN: 16.6 G/DL (ref 13.5–17.5)
HYALINE CASTS: 3 /LPF (ref 0–8)
INR BLD: 3.01 (ref 0.87–1.14)
KETONES, URINE: NEGATIVE MG/DL
LEUKOCYTE ESTERASE, URINE: ABNORMAL
LYMPHOCYTES ABSOLUTE: 1.7 K/UL (ref 1–5.1)
LYMPHOCYTES RELATIVE PERCENT: 20.3 %
MCH RBC QN AUTO: 30.9 PG (ref 26–34)
MCHC RBC AUTO-ENTMCNC: 34.2 G/DL (ref 31–36)
MCV RBC AUTO: 90.4 FL (ref 80–100)
MICROSCOPIC EXAMINATION: YES
MONOCYTES ABSOLUTE: 0.7 K/UL (ref 0–1.3)
MONOCYTES RELATIVE PERCENT: 8.3 %
NEUTROPHILS ABSOLUTE: 5.8 K/UL (ref 1.7–7.7)
NEUTROPHILS RELATIVE PERCENT: 68.4 %
NITRITE, URINE: NEGATIVE
PDW BLD-RTO: 13.7 % (ref 12.4–15.4)
PH UA: 6.5 (ref 5–8)
PLATELET # BLD: 173 K/UL (ref 135–450)
PMV BLD AUTO: 7.8 FL (ref 5–10.5)
POTASSIUM REFLEX MAGNESIUM: 5.1 MMOL/L (ref 3.5–5.1)
PROTEIN UA: ABNORMAL MG/DL
PROTHROMBIN TIME: 31.4 SEC (ref 11.7–14.5)
RBC # BLD: 5.36 M/UL (ref 4.2–5.9)
RBC UA: 48 /HPF (ref 0–4)
REASON FOR REJECTION: NORMAL
REJECTED TEST: NORMAL
SODIUM BLD-SCNC: 136 MMOL/L (ref 136–145)
SPECIFIC GRAVITY UA: 1.01 (ref 1–1.03)
TOTAL PROTEIN: 6.9 G/DL (ref 6.4–8.2)
TROPONIN: <0.01 NG/ML
URINE TYPE: ABNORMAL
UROBILINOGEN, URINE: 0.2 E.U./DL
WBC # BLD: 8.5 K/UL (ref 4–11)
WBC UA: 6 /HPF (ref 0–5)

## 2022-11-01 PROCEDURE — 96374 THER/PROPH/DIAG INJ IV PUSH: CPT

## 2022-11-01 PROCEDURE — 85610 PROTHROMBIN TIME: CPT

## 2022-11-01 PROCEDURE — 85025 COMPLETE CBC W/AUTO DIFF WBC: CPT

## 2022-11-01 PROCEDURE — 93005 ELECTROCARDIOGRAM TRACING: CPT | Performed by: EMERGENCY MEDICINE

## 2022-11-01 PROCEDURE — 71045 X-RAY EXAM CHEST 1 VIEW: CPT

## 2022-11-01 PROCEDURE — 6370000000 HC RX 637 (ALT 250 FOR IP): Performed by: EMERGENCY MEDICINE

## 2022-11-01 PROCEDURE — 81001 URINALYSIS AUTO W/SCOPE: CPT

## 2022-11-01 PROCEDURE — 99285 EMERGENCY DEPT VISIT HI MDM: CPT

## 2022-11-01 PROCEDURE — 84484 ASSAY OF TROPONIN QUANT: CPT

## 2022-11-01 PROCEDURE — 80053 COMPREHEN METABOLIC PANEL: CPT

## 2022-11-01 PROCEDURE — 96372 THER/PROPH/DIAG INJ SC/IM: CPT

## 2022-11-01 PROCEDURE — 6360000002 HC RX W HCPCS: Performed by: EMERGENCY MEDICINE

## 2022-11-01 PROCEDURE — 74176 CT ABD & PELVIS W/O CONTRAST: CPT

## 2022-11-01 RX ORDER — DICYCLOMINE HYDROCHLORIDE 10 MG/1
10 CAPSULE ORAL ONCE
Status: COMPLETED | OUTPATIENT
Start: 2022-11-01 | End: 2022-11-01

## 2022-11-01 RX ORDER — OXYCODONE HYDROCHLORIDE 5 MG/1
5 TABLET ORAL EVERY 8 HOURS PRN
Qty: 9 TABLET | Refills: 0 | Status: SHIPPED | OUTPATIENT
Start: 2022-11-01 | End: 2022-11-04

## 2022-11-01 RX ORDER — ONDANSETRON 4 MG/1
4 TABLET, ORALLY DISINTEGRATING ORAL ONCE
Status: COMPLETED | OUTPATIENT
Start: 2022-11-01 | End: 2022-11-01

## 2022-11-01 RX ADMIN — ONDANSETRON 4 MG: 4 TABLET, ORALLY DISINTEGRATING ORAL at 13:19

## 2022-11-01 RX ADMIN — HYDROMORPHONE HYDROCHLORIDE 0.5 MG: 1 INJECTION, SOLUTION INTRAMUSCULAR; INTRAVENOUS; SUBCUTANEOUS at 13:19

## 2022-11-01 RX ADMIN — HYDROMORPHONE HYDROCHLORIDE 0.5 MG: 1 INJECTION, SOLUTION INTRAMUSCULAR; INTRAVENOUS; SUBCUTANEOUS at 15:40

## 2022-11-01 RX ADMIN — DICYCLOMINE HYDROCHLORIDE 10 MG: 10 CAPSULE ORAL at 16:59

## 2022-11-01 ASSESSMENT — PAIN SCALES - GENERAL
PAINLEVEL_OUTOF10: 8
PAINLEVEL_OUTOF10: 8
PAINLEVEL_OUTOF10: 7
PAINLEVEL_OUTOF10: 8
PAINLEVEL_OUTOF10: 6
PAINLEVEL_OUTOF10: 5

## 2022-11-01 ASSESSMENT — PAIN DESCRIPTION - ORIENTATION
ORIENTATION: RIGHT

## 2022-11-01 ASSESSMENT — PAIN - FUNCTIONAL ASSESSMENT
PAIN_FUNCTIONAL_ASSESSMENT: 0-10
PAIN_FUNCTIONAL_ASSESSMENT: 0-10

## 2022-11-01 ASSESSMENT — PAIN DESCRIPTION - LOCATION
LOCATION: ABDOMEN;FLANK
LOCATION: ABDOMEN;FLANK
LOCATION: FLANK

## 2022-11-01 ASSESSMENT — PAIN DESCRIPTION - DESCRIPTORS: DESCRIPTORS: SHARP;ACHING

## 2022-11-01 NOTE — DISCHARGE INSTRUCTIONS
You have been prescribed an opiate medication, oxycodone. Opiates can be addictive, even in small quantities. Take only what you need to bear your pain. Return any unused portion to the pharmacy from which you obtained it. Opiates may also be sedating. While this is not a problem under normal circumstances, when taken with alcohol or while driving or operating heavy machinery, this medicine could cause you to become too sleepy and/or hurt yourself or others. Therefore, it is very important that you DO NOT DRIVE, DRINK ALCOHOL, OR OPERATE HEAVY MACHINERY WHILE TAKING THE MEDICINE(S) LISTED ABOVE.

## 2022-11-01 NOTE — ED PROVIDER NOTES
[address] [facility] EMERGENCY DEPARTMENT    CHIEF COMPLAINT  Flank Pain (Pt states history of kidney stones, pt states \" I think I have one now\" )       HISTORY OF PRESENT ILLNESS  Benitez Juares is a 60 y.o. male who presents to the ED with complaint of right-sided flank pain. Symptoms started 3-4 days ago. Stabbing and now dull and sometimes sharp, pain 8.5/10 intensity. Started taking Flomax and Percocet he had left over from previous diagnosis of ureteral stones -- this feels similar. Denies fever, CP. Complains of SOB he attributes to the pain. No nausea/vomiting/diarrhea. Complains of dysuria. Took some pyridium yesterday which helped. Has noted gross hematuria. No other complaints, modifying factors or associated symptoms.      I have reviewed the following from the nursing documentation:    Past Medical History:   Diagnosis Date    Bronchiolitis obliterans ([address]     Bundle branch block, right     Cardiomyopathy ([address]     Chest pain 9/24/2019    COVID-19 virus vaccine not available     Fibromyalgia     GERD (gastroesophageal reflux disease)     Hepatitis 1979    unsure of which type    Hx of blood clots     Hyperlipemia     Hypertension     IBS (irritable bowel syndrome)     Kidney stone     over thirty kidney stones    Neuropathy     right side-chest    Pneumothorax 2011    Right    Prostatitis     Pulmonary embolism on right [facility]) 2011    upper lobe-coumadin 2011    Sacroiliitis [facility])      Past Surgical History:   Procedure Laterality Date    CHOLECYSTECTOMY  2007    COLONOSCOPY  09/21/2012    dr Yuni mercedes    COLONOSCOPY  11/30/2018    daren--francine 2029=8    CYSTOSCOPY  05/17/2019    RIGHT SIDED URETEROSCOPY  Diagnostic, retrograde pyelogram and fulguration of previously resected bladder tumor base    CYSTOSCOPY Right 05/17/2019    RIGHT SIDED URETEROSCOPY FLUGERATION  OF BLADDER performed by Sarkis Bal MD at [facility] Right 07/02/2021    CYSTOURETHROSCOPY WITH RIGHT RETROGRADE PYELOGRAPHY AND PLACEMENT OF RIGHT DOUBLE J STENT performed by Asia Perez DO at [address] Right 04/25/2022    CYSTOSCOPY, RIGHT STENT INSERTION performed by Chelsie Jorge MD at [address] [facility] N/A 9/8/2022    ESOPHAGEAL DILATATION performed by Joel Boyce DO at [address] [facility]  2015    LITHOTRIPSY      [facility] opening larger in sinuses    UPPER GASTROINTESTINAL ENDOSCOPY  03/28/2014    dr Martita mercedes    UPPER GASTROINTESTINAL ENDOSCOPY N/A 02/01/2021    EGD BIOPSY performed by Sanjeev Menendez MD at [place] [address] N/A 9/8/2022    EGD BIOPSY performed by Joel Boyce DO at [facility] ENDOSCOPY     Family History   Problem Relation Age of Onset    High Blood Pressure Mother     Dementia Mother     Cancer Father         Pancreatic Ca    Cancer Paternal Uncle     Cancer Paternal Uncle     Cancer Paternal Uncle      Social History     Socioeconomic History    Marital status:      Spouse name: Not on file    Number of children: Not on file    Years of education: Not on file    Highest education level: Not on file   Occupational History    Not on file   Tobacco Use    Smoking status: Never    Smokeless tobacco: Never   Vaping Use    Vaping Use: Never used   Substance and Sexual Activity    Alcohol use: No    Drug use: No    Sexual activity: Yes     Partners: Female     Comment: wife   Other Topics Concern    Not on file   Social History Narrative    Not on file     Social Determinants of Health     Financial Resource Strain: Low Risk     Difficulty of Paying Living Expenses: Not hard at all   Food Insecurity: No Food Insecurity    Worried About Running Out of Food in the Last Year: Never true    Ran Out of Food in the Last Year: Never true   Transportation Needs: Not on file   Physical Activity: Not on file   Stress: Not on file   Social Connections: Not on file   Intimate Partner Violence: Not on file   Housing Stability: Not on file     No current facility-administered medications for this encounter. Current Outpatient Medications   Medication Sig Dispense Refill    oxyCODONE (ROXICODONE) 5 MG immediate release tablet Take 1 tablet by mouth every 8 hours as needed for Pain for up to 3 days.  9 tablet 0    ENTRESTO 49-51 MG per tablet Take 1  tablets twice daily by mouth 270 tablet 3    zolpidem (AMBIEN) 10 MG tablet TAKE 1 TABLET BY MOUTH AT  NIGHT AS NEEDED FOR SLEEP 90 tablet 0    torsemide (DEMADEX) 20 MG tablet Take 1 tablet by mouth in the morning and at bedtime for 10 days 20 tablet 0    metoprolol succinate (TOPROL XL) 25 MG extended release tablet Take 1 tablet by mouth daily 90 tablet 3    TRULANCE 3 MG TABS Patient stated he takes 1/2 tablet daily      pregabalin (LYRICA) 75 MG capsule TAKE 1 CAPSULE BY MOUTH IN  THE MORNING AND 2 CAPSULES  IN THE EVENING 270 capsule 0    warfarin (COUMADIN) 5 MG tablet Take 0.5 tablets by mouth daily EXCEPT 5mg every Monday, Wednesday and Friday or as directed by [facility] Coumadin [facility] [phone] 90 tablet 3    furosemide (LASIX) 20 MG tablet Take 1 tablet by mouth 2 times daily 180 tablet 3    Phenazopyridine HCl (AZO-DINE URINARY ANALGESIC PO) Take 2 tablets by mouth daily as needed       methocarbamol (ROBAXIN) 500 MG tablet Take 500 mg by mouth 4 times daily as needed (musclw spasms)      flavoxATE (URISPAS) 100 MG tablet Take 1 tablet by mouth 3 times daily as needed for Muscle spasms 60 tablet 3    albuterol (PROVENTIL) (2.5 MG/3ML) 0.083% nebulizer solution Take 3 mLs by nebulization every 4 hours as needed for Wheezing or Shortness of Breath (Patient taking differently: Take 2.5 mg by nebulization in the morning, at noon, and at bedtime) 360 mL 5    STIOLTO RESPIMAT 2.5-2.5 MCG/ACT AERS INHALE 2 PUFFS INTO THE LUNGS DAILY 4 g 5    azelastine (ASTELIN) 0.1 % nasal spray 2 sprays by Nasal route 2 times daily Use in each nostril as directed 60 mL 3    dexlansoprazole (DEXILANT) 60 MG CPDR delayed release capsule Take 1 capsule by mouth daily 90 capsule 3    dicyclomine (BENTYL) 10 MG capsule TAKE ONE CAPSULE BY MOUTH  FOUR TIMES DAILY AS NEEDED 360 capsule 1    tamsulosin (FLOMAX) 0.4 MG capsule TAKE ONE CAPSULE BY MOUTH TWO TIMES A DAY 60 capsule 11    pravastatin (PRAVACHOL) 40 MG tablet Take 40 mg by mouth daily      melatonin 3 MG TABS tablet Take 3 mg by mouth nightly as needed Taking half tablet      guaiFENesin (MUCINEX) 600 MG extended release tablet Take 600 mg by mouth 2 times daily      ondansetron (ZOFRAN) 4 MG tablet Take 1 tablet by mouth every 8 hours as needed for Nausea or Vomiting 20 tablet 0    Cholecalciferol (VITAMIN D3) 1.25 MG (46843 UT) CAPS Take 1 capsule by mouth once a week 12 capsule 3    albuterol sulfate  (90 Base) MCG/ACT inhaler INHALE 2 PUFFS INTO THE LUNGS EVERY 4 HOURS AS NEEDED FOR WHEEZING OR SHORTNESS OF BREATH 1 Inhaler 0    polyethylene glycol (MIRALAX) PACK packet Take 17 g by mouth nightly       aspirin 81 MG tablet Take 81 mg by mouth daily       Allergies   Allergen Reactions    Atrovent Nasal Spray [Ipratropium] Anaphylaxis and Other (See Comments)     Chest tightness    Ipratropium Bromide Hfa Shortness Of Breath    Proventil [Albuterol] Shortness Of Breath    Doxycycline Hives    Nitroglycerin Other (See Comments)     Severe hypotension (went from 511 to 66 systolic)    Sulfa Antibiotics Rash    Vicodin [Hydrocodone-Acetaminophen] Rash       REVIEW OF SYSTEMS  10 systems reviewed, pertinent positives and negatives per HPI, otherwise noted to be negative. PHYSICAL EXAM  ED Triage Vitals [11/01/22 1251]   BP Temp Temp Source Heart Rate Resp SpO2 Height Weight   126/83 97.4 °F (36.3 °C) Oral 94 20 100 % -- 233 lb 0.4 oz (105.7 kg)     General appearance: Awake and alert. Cooperative. No acute distress. HENT: Normocephalic. Atraumatic.  Mucous membranes are moist.  Neck: Supple. Eyes: PERRL. EOMI. Heart/Chest: RRR. No murmurs. Lungs: Respirations unlabored. CTAB. Good air exchange. Speaking comfortably in full sentences. Abdomen: Soft. Non-tender. Non-distended. No rebound or guarding. R CVA TTP. Musculoskeletal: No extremity edema. No deformity. No tenderness in the extremities. All extremities neurovascularly intact. Skin: Warm and dry. No acute rashes. Neurological: Alert and oriented. CN II-XII intact. Gait normal.  Psychiatric: Mood/affect: normal      LABS  I have reviewed all labs for this visit.    Results for orders placed or performed during the hospital encounter of 11/01/22   Comprehensive Metabolic Panel w/ Reflex to MG   Result Value Ref Range    Sodium 136 136 - 145 mmol/L    Potassium reflex Magnesium 5.1 3.5 - 5.1 mmol/L    Chloride 104 99 - 110 mmol/L    CO2 19 (L) 21 - 32 mmol/L    Anion Gap 13 3 - 16    Glucose 181 (H) 70 - 99 mg/dL    BUN 9 7 - 20 mg/dL    Creatinine 1.1 0.8 - 1.3 mg/dL    Est, Glom Filt Rate >60 >60    Calcium 10.6 8.3 - 10.6 mg/dL    Total Protein 6.9 6.4 - 8.2 g/dL    Albumin 3.9 3.4 - 5.0 g/dL    Albumin/Globulin Ratio 1.3 1.1 - 2.2    Total Bilirubin 0.3 0.0 - 1.0 mg/dL    Alkaline Phosphatase 121 40 - 129 U/L    ALT 23 10 - 40 U/L    AST 30 15 - 37 U/L   Urinalysis with Microscopic   Result Value Ref Range    Color, UA Yellow Straw/Yellow    Clarity, UA Clear Clear    Glucose, Ur 250 (A) Negative mg/dL    Bilirubin Urine Negative Negative    Ketones, Urine Negative Negative mg/dL    Specific Gravity, UA 1.015 1.005 - 1.030    Blood, Urine MODERATE (A) Negative    pH, UA 6.5 5.0 - 8.0    Protein, UA TRACE (A) Negative mg/dL    Urobilinogen, Urine 0.2 <2.0 E.U./dL    Nitrite, Urine Negative Negative    Leukocyte Esterase, Urine TRACE (A) Negative    Microscopic Examination YES     Urine Type NotGiven     Bacteria, UA None Seen None Seen /HPF    Hyaline Casts, UA 3 0 - 8 /LPF    WBC, UA 6 (H) 0 - 5 /HPF    RBC, UA 48 (H) 0 - 4 /HPF    Epithelial Cells, UA 2 0 - 5 /HPF   SPECIMEN REJECTION   Result Value Ref Range    Rejected Test CBCWD     Reason for Rejection see below    CBC with Auto Differential   Result Value Ref Range    WBC 8.5 4.0 - 11.0 K/uL    RBC 5.36 4.20 - 5.90 M/uL    Hemoglobin 16.6 13.5 - 17.5 g/dL    Hematocrit 48.4 40.5 - 52.5 %    MCV 90.4 80.0 - 100.0 fL    MCH 30.9 26.0 - 34.0 pg    MCHC 34.2 31.0 - 36.0 g/dL    RDW 13.7 12.4 - 15.4 %    Platelets 639 943 - 290 K/uL    MPV 7.8 5.0 - 10.5 fL    Neutrophils % 68.4 %    Lymphocytes % 20.3 %    Monocytes % 8.3 %    Eosinophils % 2.5 %    Basophils % 0.5 %    Neutrophils Absolute 5.8 1.7 - 7.7 K/uL    Lymphocytes Absolute 1.7 1.0 - 5.1 K/uL    Monocytes Absolute 0.7 0.0 - 1.3 K/uL    Eosinophils Absolute 0.2 0.0 - 0.6 K/uL    Basophils Absolute 0.0 0.0 - 0.2 K/uL   Troponin   Result Value Ref Range    Troponin <0.01 <0.01 ng/mL   Protime-INR   Result Value Ref Range    Protime 31.4 (H) 11.7 - 14.5 sec    INR 3.01 (H) 0.87 - 1.14       RADIOLOGY  I have reviewed all radiographic studies for this visit. XR CHEST PORTABLE   Final Result   No radiographic evidence of acute pulmonary disease. CT ABDOMEN PELVIS WO CONTRAST Additional Contrast? None   Final Result   No acute abdominopelvic abnormality. Bilateral, nonobstructive nephrolithiasis, very similar to 09/29/2022. No   urolithiasis is identified. EKG interpreted by myself. Rate: 92  Rhythm: Atrial fibrillation with PVCs  Axis: -51  Intervals:    ST Segments: no acute abnormality  T waves: no acute abnormality  Comparison: Compared to 8/17/22, rhythm is atrial fibrillation with PVCs from sinus  Impression: Atrial fibrillation with PVCs, left bundle branch block    ED COURSE/MDM  Patient seen and evaluated. Old records reviewed. Labs and imaging reviewed and results discussed with patient/family to extent possible.       Patient presents with complaint of right flank pain.  Patient is hemodynamically stable. Afebrile and nontoxic. His urinalysis demonstrates some hematuria but is not consistent with urinary tract infection. He underwent CT abdomen and pelvis that showed no acute findings but did show bilateral nonobstructive nephrolithiasis. No ureterolithiasis. CBC no leukocytosis or anemia. During the emergency department course the patient complained of some chest pain which he described as spasm of his chest wall. He states that he has had this happen before when he has flank pain. An EKG was obtained and showed atrial fibrillation with occasional PVC but no acute ischemic abnormality. Troponin within normal limits. Chest x-ray nothing acute. I do believe is discomfort is related to ACS given the reassuring work-up and the atypical nature of his pain. In the absence of tachycardia, tachypnea, hypoxia, or pleuritic chest pain and without clinical signs or symptoms of DVT I am not concerned for pulmonary embolism. Moreover the patient is already anticoagulated on warfarin. INR therapeutic. Given reassuring exam, vital signs, and diagnostic diagnostics, I believe the patient is appropriate for discharge. I wonder if his presentation may be consistent with a recently passed kidney stone. In any event his presentation is not consistent with pyelonephritis, aortic pathology. He will be discharged home with instructions to follow closely with his PCP in several days. He was prescribed a short course of oxycodone for breakthrough pain. Usual opiate precautions communicated. Usual strict return precautions communicated. Is this patient to be included in the SEP-1 Core Measure? No   Exclusion criteria - the patient is NOT to be included for SEP-1 Core Measure due to: Infection is not suspected    IGenesis MD, am the primary clinician of record.      During the patient's ED course, the patient was given:  Medications   HYDROmorphone (DILAUDID) injection 0.5 mg (0.5 mg IntraMUSCular Given 11/1/22 1319)   ondansetron (ZOFRAN-ODT) disintegrating tablet 4 mg (4 mg Oral Given 11/1/22 1319)   HYDROmorphone (DILAUDID) injection 0.5 mg (0.5 mg IntraVENous Given 11/1/22 1540)   dicyclomine (BENTYL) capsule 10 mg (10 mg Oral Given 11/1/22 1659)        All questions were answered and the patient/family expressed understanding and agreement with the plan. PROCEDURES  None    CRITICAL CARE  N/A    CLINICAL IMPRESSION  1. Flank pain        DISPOSITION   discharge     Israel Cheadle, MD    Note: This chart was created using voice recognition dictation software. Efforts were made by me to ensure accuracy, however some errors may be present due to limitations of this technology and occasionally words are not transcribed correctly.        Israel Cheadle, MD  11/01/22 4363       Israel Cheadle, MD  11/01/22 2000

## 2022-11-02 ENCOUNTER — APPOINTMENT (OUTPATIENT)
Dept: PHARMACY | Age: 62
End: 2022-11-02
Payer: COMMERCIAL

## 2022-11-02 ENCOUNTER — TELEPHONE (OUTPATIENT)
Dept: PHARMACY | Age: 62
End: 2022-11-02

## 2022-11-02 LAB
EKG DIAGNOSIS: NORMAL
EKG Q-T INTERVAL: 430 MS
EKG QRS DURATION: 144 MS
EKG QTC CALCULATION (BAZETT): 531 MS
EKG R AXIS: -51 DEGREES
EKG T AXIS: 91 DEGREES
EKG VENTRICULAR RATE: 92 BPM

## 2022-11-02 PROCEDURE — 93010 ELECTROCARDIOGRAM REPORT: CPT | Performed by: INTERNAL MEDICINE

## 2022-11-02 NOTE — TELEPHONE ENCOUNTER
Gladys Patterson called to let us know he was in the ED 11/1 and they cjecked his INR. Canceled 11/2 appt. I called and advised no change to warfarin therapy. Only medication ordered by ED was oxycodone. Current warfarin dose is 2.5mg daily except for 5mg on Monday, Wednesday, and Friday    Rescheduled appt to 11/16    Bel LindseyD, [address] [facility]  [phone]    For Pharmacy Admin Tracking Only    Intervention Detail:   Total # of Interventions Recommended:  Total # of Interventions Accepted:   Time Spent (min): 10

## 2022-11-14 ENCOUNTER — NURSE ONLY (OUTPATIENT)
Dept: CARDIOLOGY CLINIC | Age: 62
End: 2022-11-14
Payer: COMMERCIAL

## 2022-11-14 DIAGNOSIS — I50.22 CHRONIC SYSTOLIC HF (HEART FAILURE) (HCC): ICD-10-CM

## 2022-11-14 DIAGNOSIS — Z95.810 BIVENTRICULAR ICD (IMPLANTABLE CARDIOVERTER-DEFIBRILLATOR) IN PLACE: Primary | ICD-10-CM

## 2022-11-14 DIAGNOSIS — I42.9 CARDIOMYOPATHY, UNSPECIFIED TYPE (HCC): ICD-10-CM

## 2022-11-14 PROCEDURE — 93297 REM INTERROG DEV EVAL ICPMS: CPT | Performed by: INTERNAL MEDICINE

## 2022-11-14 PROCEDURE — 93295 DEV INTERROG REMOTE 1/2/MLT: CPT | Performed by: INTERNAL MEDICINE

## 2022-11-14 PROCEDURE — 93296 REM INTERROG EVL PM/IDS: CPT | Performed by: INTERNAL MEDICINE

## 2022-11-14 NOTE — PROGRESS NOTES
Remote transmission received from patient's CRT-D monitor at home (programmed VVI RV @ 40bpm per 11.2017 specialty comments). Transmission shows normal sensing and pacing function. No new arrhythmias/ or events. Ap 0%  RVp 0%    Thoracic impedance is trending up from reference line @ 100.1 ohm. EP will review. See interrogation under cardiology tab in the [address] [place] [address] [address] field for more details. Will continue to monitor remotely. (End of 91-day monitoring period 11/14/22).

## 2022-11-15 PROBLEM — R05.1 ACUTE COUGH: Status: ACTIVE | Noted: 2017-02-21

## 2022-11-30 ENCOUNTER — TELEPHONE (OUTPATIENT)
Dept: CARDIOLOGY CLINIC | Age: 62
End: 2022-11-30

## 2022-11-30 DIAGNOSIS — I25.10 CORONARY ARTERY DISEASE DUE TO CALCIFIED CORONARY LESION: Primary | ICD-10-CM

## 2022-11-30 DIAGNOSIS — I25.84 CORONARY ARTERY DISEASE DUE TO CALCIFIED CORONARY LESION: Primary | ICD-10-CM

## 2022-11-30 NOTE — TELEPHONE ENCOUNTER
Mya Garcia called in this morning asking for Dr. Vaishnavi Lux to put in lab orders so he can have them drawn prior to his appointment next week.      You can reach Mya Garcia at #[phone]

## 2022-12-05 ENCOUNTER — TELEPHONE (OUTPATIENT)
Dept: PHARMACY | Age: 62
End: 2022-12-05

## 2022-12-05 NOTE — TELEPHONE ENCOUNTER
William Reynaga called. I returned his call. He asked that I call back. Digna Monroe, PharmD, [address] [facility]  [phone]    For Pharmacy Admin Tracking Only    Intervention Detail:   Total # of Interventions Recommended:    Total # of Interventions Accepted:   Time Spent (min): 5

## 2022-12-06 ENCOUNTER — HOSPITAL ENCOUNTER (OUTPATIENT)
Dept: NON INVASIVE DIAGNOSTICS | Age: 62
Discharge: HOME | End: 2022-12-06
Payer: COMMERCIAL

## 2022-12-06 ENCOUNTER — TELEPHONE (OUTPATIENT)
Dept: CARDIOLOGY CLINIC | Age: 62
End: 2022-12-06

## 2022-12-06 ENCOUNTER — HOSPITAL ENCOUNTER (OUTPATIENT)
Age: 62
Discharge: HOME | End: 2022-12-06
Payer: COMMERCIAL

## 2022-12-06 DIAGNOSIS — I25.10 CORONARY ARTERY DISEASE DUE TO CALCIFIED CORONARY LESION: ICD-10-CM

## 2022-12-06 DIAGNOSIS — I25.84 CORONARY ARTERY DISEASE DUE TO CALCIFIED CORONARY LESION: ICD-10-CM

## 2022-12-06 DIAGNOSIS — I42.8 NICM (NONISCHEMIC CARDIOMYOPATHY) (HCC): ICD-10-CM

## 2022-12-06 DIAGNOSIS — I50.22 CHRONIC SYSTOLIC (CONGESTIVE) HEART FAILURE (HCC): ICD-10-CM

## 2022-12-06 LAB
ALBUMIN SERPL-MCNC: 3.8 G/DL (ref 3.4–5)
ALP BLD-CCNC: 133 U/L (ref 40–129)
ALT SERPL-CCNC: 21 U/L (ref 10–40)
ANION GAP SERPL CALCULATED.3IONS-SCNC: 8 MMOL/L (ref 3–16)
AST SERPL-CCNC: 19 U/L (ref 15–37)
BILIRUB SERPL-MCNC: 0.4 MG/DL (ref 0–1)
BILIRUBIN DIRECT: <0.2 MG/DL (ref 0–0.3)
BILIRUBIN, INDIRECT: ABNORMAL MG/DL (ref 0–1)
BUN BLDV-MCNC: 10 MG/DL (ref 7–20)
CALCIUM SERPL-MCNC: 10.5 MG/DL (ref 8.3–10.6)
CHLORIDE BLD-SCNC: 107 MMOL/L (ref 99–110)
CHOLESTEROL, FASTING: 167 MG/DL (ref 0–199)
CO2: 27 MMOL/L (ref 21–32)
CREAT SERPL-MCNC: 1.1 MG/DL (ref 0.8–1.3)
GFR SERPL CREATININE-BSD FRML MDRD: >60 ML/MIN/{1.73_M2}
GLUCOSE BLD-MCNC: 137 MG/DL (ref 70–99)
HDLC SERPL-MCNC: 38 MG/DL (ref 40–60)
LDL CHOLESTEROL CALCULATED: 91 MG/DL
POTASSIUM SERPL-SCNC: 4.3 MMOL/L (ref 3.5–5.1)
SODIUM BLD-SCNC: 142 MMOL/L (ref 136–145)
TOTAL PROTEIN: 6.8 G/DL (ref 6.4–8.2)
TRIGLYCERIDE, FASTING: 192 MG/DL (ref 0–150)
VLDLC SERPL CALC-MCNC: 38 MG/DL

## 2022-12-06 PROCEDURE — 80076 HEPATIC FUNCTION PANEL: CPT

## 2022-12-06 PROCEDURE — 36415 COLL VENOUS BLD VENIPUNCTURE: CPT

## 2022-12-06 PROCEDURE — 93306 TTE W/DOPPLER COMPLETE: CPT

## 2022-12-06 PROCEDURE — 80048 BASIC METABOLIC PNL TOTAL CA: CPT

## 2022-12-06 PROCEDURE — 80061 LIPID PANEL: CPT

## 2022-12-06 NOTE — TELEPHONE ENCOUNTER
Echocardiogram shows LVEF at 35%. This is improved from before. Continue current cardiac medications per result note. Please make him aware.

## 2022-12-06 NOTE — TELEPHONE ENCOUNTER
Melinda Castellano called into the office wanting to check on the results of his echo. Melinda Castellano stated that the tech \"didn't know what he was doing and caused him great discomfort. \"  Melinda Castellano is willing to redo his echo if the results are inconclusive.       Melinda Castellano can be reached at: [phone]

## 2022-12-08 ENCOUNTER — ANTI-COAG VISIT (OUTPATIENT)
Dept: PHARMACY | Age: 62
End: 2022-12-08
Payer: COMMERCIAL

## 2022-12-08 ENCOUNTER — OFFICE VISIT (OUTPATIENT)
Dept: CARDIOLOGY CLINIC | Age: 62
End: 2022-12-08
Payer: COMMERCIAL

## 2022-12-08 VITALS
OXYGEN SATURATION: 96 % | BODY MASS INDEX: 37.12 KG/M2 | SYSTOLIC BLOOD PRESSURE: 108 MMHG | DIASTOLIC BLOOD PRESSURE: 70 MMHG | WEIGHT: 230 LBS | HEART RATE: 96 BPM

## 2022-12-08 DIAGNOSIS — I50.22 CHRONIC SYSTOLIC HF (HEART FAILURE) (HCC): ICD-10-CM

## 2022-12-08 DIAGNOSIS — I26.99 PULMONARY EMBOLISM, UNSPECIFIED CHRONICITY, UNSPECIFIED PULMONARY EMBOLISM TYPE, UNSPECIFIED WHETHER ACUTE COR PULMONALE PRESENT (HCC): ICD-10-CM

## 2022-12-08 DIAGNOSIS — I42.8 NICM (NONISCHEMIC CARDIOMYOPATHY) (HCC): Primary | ICD-10-CM

## 2022-12-08 DIAGNOSIS — I26.99 OTHER ACUTE PULMONARY EMBOLISM, UNSPECIFIED WHETHER ACUTE COR PULMONALE PRESENT (HCC): Primary | ICD-10-CM

## 2022-12-08 DIAGNOSIS — Z79.01 CHRONIC ANTICOAGULATION: ICD-10-CM

## 2022-12-08 DIAGNOSIS — I10 ESSENTIAL HYPERTENSION: ICD-10-CM

## 2022-12-08 DIAGNOSIS — I48.0 PAROXYSMAL ATRIAL FIBRILLATION (HCC): ICD-10-CM

## 2022-12-08 DIAGNOSIS — E78.5 HYPERLIPIDEMIA, UNSPECIFIED HYPERLIPIDEMIA TYPE: ICD-10-CM

## 2022-12-08 LAB — INR BLD: 4.1

## 2022-12-08 PROCEDURE — 1036F TOBACCO NON-USER: CPT | Performed by: INTERNAL MEDICINE

## 2022-12-08 PROCEDURE — 3078F DIAST BP <80 MM HG: CPT | Performed by: INTERNAL MEDICINE

## 2022-12-08 PROCEDURE — G8417 CALC BMI ABV UP PARAM F/U: HCPCS | Performed by: INTERNAL MEDICINE

## 2022-12-08 PROCEDURE — 99214 OFFICE O/P EST MOD 30 MIN: CPT | Performed by: INTERNAL MEDICINE

## 2022-12-08 PROCEDURE — G8484 FLU IMMUNIZE NO ADMIN: HCPCS | Performed by: INTERNAL MEDICINE

## 2022-12-08 PROCEDURE — 3074F SYST BP LT 130 MM HG: CPT | Performed by: INTERNAL MEDICINE

## 2022-12-08 PROCEDURE — 3017F COLORECTAL CA SCREEN DOC REV: CPT | Performed by: INTERNAL MEDICINE

## 2022-12-08 PROCEDURE — G8427 DOCREV CUR MEDS BY ELIG CLIN: HCPCS | Performed by: INTERNAL MEDICINE

## 2022-12-08 PROCEDURE — 85610 PROTHROMBIN TIME: CPT

## 2022-12-08 PROCEDURE — 99211 OFF/OP EST MAY X REQ PHY/QHP: CPT

## 2022-12-08 RX ORDER — FUROSEMIDE 20 MG/1
TABLET ORAL
Qty: 150 TABLET | Refills: 3
Start: 2022-12-08

## 2022-12-08 NOTE — PROGRESS NOTES
[address]      Cardiology Progress Note    Alicia Green  1960 December 8, 2022    CC:  \"I've had a head cold for the last 4 weeks. \"     HPI:  The patient is 60 y.o. male with a past medical history significant for for a prior PE in 2011 and nonischemic cardiomyopathy. He was hospitalized at [facility] with chest pain and had an abnormal stress that led to a left heart catheterization on 7/21/16. His coronary arteries were normal but he had LV dysfunction with a LVEF of 40% at that time. A repeat echocardiogram demonstrated his LVEF to be 30% despite optimal medical therapy. Entresto therapy was initiated and he was interested in pursuing CRT. He underwent Bi-V ICD implant on 10/10/17. Biv pacing turned off,  now set at VVI 40 with defibrillator programming ON. Also with a hx of atrial tach and underwent DCCV 9/2018, AFIB, HLD, HTN, ARDS 2011, bronchiolitis obliterans and nocturnal hypoxia-O2 2L nightly managed per Pulmonary. 8/17/22 follow up, he reports continued heart racing symptoms. Today, he reports that he has had a \"head cold\" for the last 4 weeks. Amoxicillin is \"helping. \" He did see Dr. Sherren Bar, COVID-negative. He notes GUEVARA with walking long distances. Otherwise he denies any cardiac sounding complaints. Patient denies exertional chest pain/pressure, dyspnea at rest,  PND, orthopnea, palpitations, lightheadedness, weight changes, changes in LE edema, and syncope. He admits to medical therapy compliance and tolerating.        Past Medical History:   Diagnosis Date    Bronchiolitis obliterans ([address]     Bundle branch block, right     Cardiomyopathy ([address]     Chest pain 9/24/2019    COVID-19 virus vaccine not available     Fibromyalgia     GERD (gastroesophageal reflux disease)     Hepatitis 1979    unsure of which type    Hx of blood clots     Hyperlipemia     Hypertension     IBS (irritable bowel syndrome)     Kidney stone     over thirty kidney stones    Neuropathy     right Comments)     Chest tightness    Ipratropium Bromide Hfa Shortness Of Breath    Proventil [Albuterol] Shortness Of Breath    Doxycycline Hives    Nitroglycerin Other (See Comments)     Severe hypotension (went from 086 to 66 systolic)    Sulfa Antibiotics Rash    Vicodin [Hydrocodone-Acetaminophen] Rash     Current Outpatient Medications   Medication Sig Dispense Refill    amoxicillin (AMOXIL) 250 MG capsule Take 1 capsule by mouth 3 times daily for 10 days 30 capsule 0    pregabalin (LYRICA) 75 MG capsule TAKE 1 CAPSULE BY MOUTH IN  THE MORNING 2 CAPSULES IN  THE EVENING 270 capsule 0    tamsulosin (FLOMAX) 0.4 MG capsule TAKE ONE CAPSULE BY MOUTH TWO TIMES A DAY 60 capsule 11    ENTRESTO 49-51 MG per tablet Take 1  tablets twice daily by mouth 270 tablet 3    zolpidem (AMBIEN) 10 MG tablet TAKE 1 TABLET BY MOUTH AT  NIGHT AS NEEDED FOR SLEEP 90 tablet 0    metoprolol succinate (TOPROL XL) 25 MG extended release tablet Take 1 tablet by mouth daily 90 tablet 3    TRULANCE 3 MG TABS Patient stated he takes 1/2 tablet daily      warfarin (COUMADIN) 5 MG tablet Take 0.5 tablets by mouth daily EXCEPT 5mg every Monday, Wednesday and Friday or as directed by [facility] Coumadin Service [phone] 90 tablet 3    furosemide (LASIX) 20 MG tablet Take 1 tablet by mouth 2 times daily 180 tablet 3    Phenazopyridine HCl (AZO-DINE URINARY ANALGESIC PO) Take 2 tablets by mouth daily as needed       methocarbamol (ROBAXIN) 500 MG tablet Take 500 mg by mouth 4 times daily as needed (musclw spasms)      flavoxATE (URISPAS) 100 MG tablet Take 1 tablet by mouth 3 times daily as needed for Muscle spasms 60 tablet 3    albuterol (PROVENTIL) (2.5 MG/3ML) 0.083% nebulizer solution Take 3 mLs by nebulization every 4 hours as needed for Wheezing or Shortness of Breath (Patient taking differently: Take 2.5 mg by nebulization in the morning, at noon, and at bedtime) 360 mL 5    STIOLTO RESPIMAT 2.5-2.5 MCG/ACT AERS INHALE 2 PUFFS INTO THE LUNGS DAILY 4 g 5    azelastine (ASTELIN) 0.1 % nasal spray 2 sprays by Nasal route 2 times daily Use in each nostril as directed 60 mL 3    dexlansoprazole (DEXILANT) 60 MG CPDR delayed release capsule Take 1 capsule by mouth daily 90 capsule 3    dicyclomine (BENTYL) 10 MG capsule TAKE ONE CAPSULE BY MOUTH  FOUR TIMES DAILY AS NEEDED 360 capsule 1    pravastatin (PRAVACHOL) 40 MG tablet Take 40 mg by mouth daily      melatonin 3 MG TABS tablet Take 3 mg by mouth nightly as needed Taking half tablet      guaiFENesin (MUCINEX) 600 MG extended release tablet Take 600 mg by mouth 2 times daily      ondansetron (ZOFRAN) 4 MG tablet Take 1 tablet by mouth every 8 hours as needed for Nausea or Vomiting 20 tablet 0    Cholecalciferol (VITAMIN D3) 1.25 MG (02381 UT) CAPS Take 1 capsule by mouth once a week 12 capsule 3    albuterol sulfate  (90 Base) MCG/ACT inhaler INHALE 2 PUFFS INTO THE LUNGS EVERY 4 HOURS AS NEEDED FOR WHEEZING OR SHORTNESS OF BREATH 1 Inhaler 0    polyethylene glycol (MIRALAX) PACK packet Take 17 g by mouth nightly       aspirin 81 MG tablet Take 81 mg by mouth daily      torsemide (DEMADEX) 20 MG tablet Take 1 tablet by mouth in the morning and at bedtime for 10 days 20 tablet 0     No current facility-administered medications for this visit. Review of Systems:  Constitutional: no unanticipated weight loss. There's been no change in energy level, sleep pattern, or activity level. No fevers, chills. Eyes: No visual changes or diplopia. No scleral icterus. ENT: No Headaches, hearing loss or vertigo. No mouth sores or sore throat. Cardiovascular: as reviewed in HPI  Respiratory: No cough or wheezing, no sputum production. No hematemesis. Gastrointestinal: No abdominal pain, appetite loss, blood in stools. No change in bowel or bladder habits. Genitourinary: No dysuria, trouble voiding, or hematuria. Musculoskeletal:  No gait disturbance, no joint complaints.   Integumentary: No rash or pruritis. Neurological: No headache, diplopia, change in muscle strength, numbness or tingling. Psychiatric: No anxiety or depression. Endocrine: No temperature intolerance. No excessive thirst, fluid intake, or urination. No tremor. Hematologic/Lymphatic: No abnormal bruising or bleeding, blood clots or swollen lymph nodes. Allergic/Immunologic: No nasal congestion or hives. Physical Exam:   /70   Pulse 96   Wt 230 lb (104.3 kg)   SpO2 96%   BMI 37.12 kg/m²   Wt Readings from Last 3 Encounters:   12/08/22 230 lb (104.3 kg)   12/05/22 231 lb (104.8 kg)   11/16/22 233 lb (105.7 kg)     Constitutional: The patient is oriented to person, place, and time. Appears well-developed and well-nourished. In no acute distress. Head: Normocephalic and atraumatic. Pupils equal and round. Neck: Neck supple. No JVP or carotid bruit appreciated. No mass and no thyromegaly present. No lymphadenopathy present. Cardiovascular: Normal rate. Normal heart sounds. Exam reveals no gallop and no friction rub. No murmur heard. Pulmonary/Chest: Effort normal and breath sounds normal. No respiratory distress. No wheezes, rhonchi or rales. Abdominal: Soft, non-tender. Bowel sounds are normal. Exhibits no organomegaly, mass or bruit. Extremities: No edema. No cyanosis or clubbing. Pulses are 2+ radial and carotid bilaterally. Neurological: No gross cranial nerve deficit. Coordination normal.   Skin: Skin is warm and dry. There is no rash or diaphoresis. Psychiatric: Patient has a normal mood and affect.  Speech is normal and behavior is normal.     Lab Review:   Lab Results   Component Value Date/Time    TRIG 216 06/06/2022 12:11 PM    HDL 38 12/06/2022 12:08 PM    HDL 65 11/10/2011 06:05 AM    LDLCALC 91 12/06/2022 12:08 PM    LABVLDL 38 12/06/2022 12:08 PM       Lab Results   Component Value Date/Time    BUN 10 12/06/2022 12:08 PM    CREATININE 1.1 12/06/2022 12:08 PM     EKG Interpretation:   2/14/18: sinus rhythm with LBBB  11/13/19: Sinus rhythm LBBB  2/28/20: Sinus rhythm LBBB  2/25/21: SR with LBBB.   7/21/21: Sinus  Rhythm with Left bundle branch block. 12/8/22 not completed     Image Review:     ECHO 11/28/17  Summary   Normal left ventricular wall thickness. Ejection fraction is visually   estimated to be 40-45%. There is septal hypokinesis (secondary to Pacing)   Trivial mitral & tricuspid regurgitation is present. The left atrial size is normal.   Pacer / ICD wire is visualized in the right ventricle. There appears to be normal right ventricular size and function. Echo: 7/24/17  Left ventricle size is normal. Mild concentric left ventricular hypertrophy  is present. Global ejection fraction is moderately decreased and estimated  from 30 % to 35%. Diastolic filling parameters suggests grade I diastolic  dysfunction . There is abnormal (paradoxical) septal motion consistent with left bundle  branch block. Mitral valve is structurally normal.  Trivial to mild mitral regurgitation is present. The left atrial size is normal.  A bubble study was performed and fails to show evidence of right to left  shunting. Echo: 11/9/16  Dilated LV with severely reduced systolic function. EF 30%. Anterior, septal  and apical akinesis. Mild mitral regurgitation is present. The left atrial size is normal.  Normal right ventricular size and function. Stress Test:  7/21/16  SPECT perfusion images: On the stress corrected images there is a moderate defect in the septum of the left ventricle. At rest, partially redistributes especially the posterior lateral component. Complete redistribution is not present.   LVEF:  49 %      (Normal is at least 62% for women and 53% for men)  LV wall motion:   There is significant hypokinesia of the ventricular septum  LVEDV: 117ml  (Normal is up to 100ml for women and 150ml for men)  Transient dilatation ratio:   1.0      (Normal is up to 1.3 for women and 1.2 for men) Cath: 7/21/16  #1-coronary angiography summary: Normal coronaries in a left   dominant system  A-left main coronary is normal  B-the LAD has minor irregularities and no significant stenoses  C-the circumflex is dominant and has minor irregularities and no   significant stenoses  D-the right coronary is a small nondominant vessel and is   angiographically normal  #2-ventriculography in the ADKINS projection demonstrates mild   global left ventricular dysfunction ejection fractions   approximately 40  #3-aortic pressure is approximately 150/80 left ventricular   pressures 150/14 there is no gradient on pullback  #5-JLYGB are no complications  #5-VDY patient will be treated medically for left ventricular   dysfunction in the setting of normal coronaries     ECHO 9/25/19  Mild concentric LVH with normal LV size and wall motion. EF is 50%. Paradoxical septal motion secondary to paced rhythm  Trivial mitral regurgitation is present. The left atrium is normal in size. The right ventricle is normal in size and function. Pacing wire seen. Stress study:8/26/20  No evidence of reversible ischemia. There is a moderate to large sized, moderate intensity, fixed septal and    inferior wall defect which is most consistent with LBBB induced artifact and    diaphragm attenuation artifact. Normal LV size and systolic function. Overall findings represent a low risk study. Echo: 10/2020  - Left ventricle: The cavity size is normal. Wall thickness is    normal. Systolic function was mildly reduced. The calculated    ejection fraction was 42%. Normal diastolic function. - Ventricular septum: Septal motion is paradoxical septal motion    consistent with a conduction abnormality or paced rhythm. - Right ventricle: Pacer wire noted in right ventricle. - Inferior vena cava:  The vessel was normal in size; the    respirophasic diameter changes were in the normal range (&gt;= 50%);    findings are consistent with normal central venous pressure. RHC: 3/2/21  HEMODYNAMIC DATA:  The right atrial pressure mean was 6 with oxygen  saturation of 78%. RV pressure was 39 systolic with oxygen saturation  of 77%. PA pressure was 30/13 with mean of 22. Pulmonary capillary  wedge pressure mean was 17 mmHg. Cardiac output by thermodilution  method was 4.81 liters per minute. Index was 2.34 liters per minute per  body surface area. By Rosalita Can method, cardiac output was 6.88 liters per  minute with a cardiac index of 3.35 liters per minute per body surface area. OVERALL IMPRESSION:  1. Slightly above normal right heart pressure with slightly elevated  pulmonary capillary wedge pressure of 17 mmHg. 2.  Normal cardiac output and cardiac indices. In view of the above findings, we will restart the patient on diuretic  therapy and we will follow his symptoms. Echo: 6/2022   Normal left ventricular wall thickness. The left ventricle appears normal in size. Mildly decreased left ventricular systolic function with an estimated   ejection fraction of 25-30% . Anterior, anterior septum, inferior septum are hypokinetic   Diastolic filling parameters suggest grade I diastolic dysfunction. The right ventricle is normal in size and function. No clinically significant valvular heart disease    Echo: 12/6/22  LV is mildly dilated with moderately reduced Ejection fraction is visually   estimated to be 35 %. Global hypokinesis. Definity was refused by patient   The right ventricle is normal in size and function. Pacer / ICD wire is visualized in the right ventricle    Assessment/Plan:     Nonischemic Cardiomyopathy/chronic systolic heart failure   He had Biv -ICD placed by Dr. Reggie Smith for underlying cardiomyopathy with left bundle branch block.  His LHC with no CAD, last stress normal 8/2020, last echocardiogram from 10/2020 revaled LVEF 42%, RHC 3/2/21 slightly above normal right heart pressure with slightly elevated pulmonary wedge pressure 17mmHg, normal cardiac output and cardiac indices. Echo 6/2022 LVEF 25-30%, grade I DD. Echo 6/2022,  12/6/22.   -device interrogation 11/14/22.    -today,  denies any symptoms of dyspnea on exertion, PND orthopnea or leg edema.   -He states he is working on low salt diet, reducing sugar and carbs. -He appears euvolemic on clinical exam today  -echo improved to 35%. -[facility] 12/6/22  -at his visit, we had instructed to increase to 49-51 mg 2 tablets BID (0830, 2030) but had lightheadedness and reduce to 49-51 mg BID. -he is also on lasix 20 mg tabs 2.5 tabs once daily.   -Toprol-XL 25 mg daily. -he is not on aldactone for possible  interaction with other medical therapy. -he reports he spoke with Dr. Delila Dakins regarding initiating Jardiance 10 mg daily, however, Mya Garcia reports that Dr. Delila Dakins did not want him to start therapy. I will discuss further with Dr. Kristal Recinos. Pulmonary Embolism   Patient is on Warfarin therapy. Denies any abnormal bruising or bleeding  or recurrence. INR managed per [address] [place] [address]. He denies any recurrence and has remained stable. Hypertension, essential   Blood pressure remains stable. Continue Toprol XL 25 mg daily. [facility] 12/6/22 stable. Hyperlipidemia, unspecified   Last lipid profile 5/15/20 is within acceptable limits except , LDLc 70. Repeat lipid 2/26/21 stable except low HDL 39, LDLc 92. LFT remain abnormal but stable 7/3/21. He is on pravastatin therapy as he had myalgias on lipitor and has been denied PCSK9 inhibitors since he has no known atherosclerotic disease. 6/6/22 LDLC 92. 12/6/22 LDLc 91,  with improvement in TRG. Again encouraged low fat/chol/carb diet, weight loss, walking program. Will monitor closely. Paroxsymal atrial fibrillation  Patient denies any further palpitations except he feels his heart racing. He has remained stable. His last ICD check 7/2022 reviewed and stable. Physical exam normal rate and rhyhtm.  Will follow him with routine device interrogations. Pt on chronic coumadin therapy. Bronchiolitis obliterans, choric cough and lung nodules  Pulmonology for bronchiolitis obliterans and is on medical therapy and she has recommended pulmonary rehab and started him on symbicort. Instructed to obtain flu vaccine this season, annually and obtain COVID-19 vaccine per guidelines. We will see him back between 3-4 months. Thank you very much for allowing me to participate in the care of your patient. Please do not hesitate to contact me if you have any questions. Sincerely,  Siri Canada MD      [facility] [address] [place] [address]  Ph: [phone]  Fax: [phone]      This note was scribed in the presence of Dr. Benito Romero MD by Carmel Coleman RN.

## 2022-12-08 NOTE — PROGRESS NOTES
Koko Soler is a 60 y.o. here for warfarin management. Benito Hernandez had an INR test today. Results were reviewed and appropriate warfarin management was completed. This visit was performed as: An in person visit. Protocols were followed with precautions to reduce the spread of COVID-19. Patient verifies current warfarin dosing regimen: Yes     Warfarin medication reviewed and updated on the patient 's home medication list: Yes   All other medications reviewed and updated on the patient 's home medication list: Yes: patient currently on amoxicillin for 10 days and robitussin     Lab Results   Component Value Date    INR 4.10 2022    INR 2.25 (H) 2022    INR 3.01 (H) 2022       Patient Findings       Positives:  Change in medications    Negatives:  Signs/symptoms of bleeding, Missed doses, Extra doses, Change in diet/appetite, Bruising    Comments:  Patient states he started amoxicillin and Robitussin 2 days ago              Anticoagulation Summary  As of 2022      INR goal:  2.0-3.0   TTR:  38.0 % (7.1 y)   INR used for dosin.10 (2022)   Warfarin maintenance plan:  5 mg (5 mg x 1) every Mon, Wed, Fri; 2.5 mg (5 mg x 0.5) all other days; Starting 2022   Weekly warfarin total:  25 mg   Plan last modified:  Marjorie Arzola (8/15/2022)   Next INR check:  2022   Priority:  Maintenance   Target end date: Indefinite    Indications    Pulmonary embolus (HCC) [I26.99]  Chronic anticoagulation [Z79.01]                 Anticoagulation Episode Summary       INR check location:  Anticoagulation Clinic    Preferred lab:      Send INR reminders to:  [facility]    Comments:  EPIC - finger stick every 2-3 weeks            Anticoagulation Care Providers       Provider Role Specialty Phone number    Tamera Haile MD Referring Internal Medicine [phone]            There were no vitals taken for this visit.     Warfarin assessment / plan:     Appears well  Supra-therapeutic INR. Denies signs and symptoms of bleeding/bruising. Medication changes. Patient is has been taking amoxicillin and robitussin for the past 2 days. He will be on this for 10 days. Recommended to hold dose today and Sunday 12/11; then resume normal dosing    Description    HOLD warfarin Today 12/8 and Sunday 12/11     THEN CONTINUE: Take warfarin 2.5mg daily except for 5mg on Monday, Wednesday, and Friday    Call [phone] with signs or symptoms of bleeding or ANY medication changes (including antibiotics, steroids, over-the-counter medications or herbal supplements). If significant bleeding occurs or if you fall and hit your head, please seek immediate medical attention. Keep the number of servings of vitamin K containing foods (dark green, leafy vegetables) the same each week. Please call if this changes. Limit alcohol intake. Please call if this changes. Immunization History   Administered Date(s) Administered    COVID-19, MODERNA BLUE border, Primary or Immunocompromised, (age 12y+), IM, 100 mcg/0.5mL 03/09/2021, 04/08/2021, 12/15/2021, 07/23/2022    Influenza, FLUAD, (age 72 y+), Adjuvanted, 0.5mL 10/27/2022    Influenza, FLUARIX, FLULAVAL, FLUZONE (age 10 mo+) AND AFLURIA, (age 1 y+), PF, 0.5mL 10/27/2017, 09/23/2019, 10/02/2020    Influenza, FLUBLOK, (age 25 y+), PF, 0.5mL 10/03/2018    Influenza, FLUCELVAX, (age 10 mo+), MDCK, PF, 0.5mL 10/26/2021    Pneumococcal Conjugate 13-valent ([id number]) 09/11/2015    Pneumococcal Conjugate Vaccine 08/15/2017    Pneumococcal Polysaccharide ([id number]) 08/01/2007, 12/19/2012    Tdap (Boostrix, Adacel) 08/19/2014           Orders Placed This Encounter   Procedures    Protime-INR     This external order was created through the results console. No orders of the defined types were placed in this encounter. Reviewed AVS with patient / caregiver.     Billing Points:  Adjust dosage and/or reconcile meds (fill pill box) </= 5 medications - 2 points       CLINICAL PHARMACY CONSULT: MED RECONCILIATION/REVIEW ADDENDUM    For Pharmacy Admin Tracking Only    Intervention Detail: Dose Adjustment: 2, reason: Therapy Optimization  Total # of Interventions Recommended: 2  Total # of Interventions Accepted: 2  Time Spent (min): 20

## 2022-12-12 RX ORDER — PRAVASTATIN SODIUM 40 MG
TABLET ORAL
Qty: 90 TABLET | Refills: 3 | Status: SHIPPED | OUTPATIENT
Start: 2022-12-12

## 2022-12-13 ENCOUNTER — TELEPHONE (OUTPATIENT)
Dept: CARDIOLOGY CLINIC | Age: 62
End: 2022-12-13

## 2022-12-13 NOTE — TELEPHONE ENCOUNTER
Dr. Allie Archibald had asked you at his last follow up on 12/8/22: he reported he spoke with Dr. Laird Gilford regarding initiating Jardiance 10 mg daily, however, Ritika Sewell reports that Dr. Laird Gilford did not want him to start therapy. He requested you speak/discuss further with Dr. Liliana Rivera.

## 2022-12-13 NOTE — TELEPHONE ENCOUNTER
yMa Garcia called in this afternoon wanting to talk to Espinoza Allred [facility]. Mya Garcia said that Dr. Vaishnavi Lux and Dr. Delila Dakins were supposed to get together and talk about his BP. Mya Garcia is wanting to know if Dr. Vaishnavi Lux had a chance to get with Dr. Delila Dakins?     You can reach Mya Garcia at #[phone]

## 2022-12-20 ENCOUNTER — HOSPITAL ENCOUNTER (OUTPATIENT)
Dept: GENERAL RADIOLOGY | Age: 62
Discharge: HOME | End: 2022-12-20
Payer: COMMERCIAL

## 2022-12-20 ENCOUNTER — ANTI-COAG VISIT (OUTPATIENT)
Dept: PHARMACY | Age: 62
End: 2022-12-20
Payer: COMMERCIAL

## 2022-12-20 ENCOUNTER — HOSPITAL ENCOUNTER (OUTPATIENT)
Age: 62
Discharge: HOME | End: 2022-12-20
Payer: COMMERCIAL

## 2022-12-20 DIAGNOSIS — R06.02 SHORTNESS OF BREATH: ICD-10-CM

## 2022-12-20 DIAGNOSIS — I26.99 OTHER ACUTE PULMONARY EMBOLISM, UNSPECIFIED WHETHER ACUTE COR PULMONALE PRESENT (HCC): Primary | ICD-10-CM

## 2022-12-20 DIAGNOSIS — Z79.01 CHRONIC ANTICOAGULATION: ICD-10-CM

## 2022-12-20 LAB — INR BLD: 4

## 2022-12-20 PROCEDURE — 85610 PROTHROMBIN TIME: CPT

## 2022-12-20 PROCEDURE — 99211 OFF/OP EST MAY X REQ PHY/QHP: CPT

## 2022-12-20 PROCEDURE — 71046 X-RAY EXAM CHEST 2 VIEWS: CPT

## 2022-12-20 NOTE — PROGRESS NOTES
Judie Crawford is a 60 y.o. here for warfarin management. Gustavo Cherry had an INR test today. Results were reviewed and appropriate warfarin management was completed. This visit was performed as: An in person visit. Protocols were followed with precautions to reduce the spread of COVID-19. Patient verifies current warfarin dosing regimen: Yes     Warfarin medication reviewed and updated on the patient 's home medication list: Yes   All other medications reviewed and updated on the patient 's home medication list: No: patient  has been on amoxicillin and takes last dose today. He also started steroids yesterday and will be on it for 7 days. Lab Results   Component Value Date    INR 4.00 2022    INR 4.10 2022    INR 2.25 (H) 2022       Patient Findings       Positives:  Change in medications    Negatives:  Signs/symptoms of bleeding, Change in health, Missed doses, Extra doses, Change in diet/appetite, Bruising    Comments:  Patient has been on amoxicillin for 10 days and is on his last dose. He is started steroids yesterday. Anticoagulation Summary  As of 2022      INR goal:  2.0-3.0   TTR:  37.8 % (7.1 y)   INR used for dosin.00 (2022)   Warfarin maintenance plan:  5 mg (5 mg x 1) every Mon, Wed, Fri; 2.5 mg (5 mg x 0.5) all other days; Starting 2022   Weekly warfarin total:  25 mg   Plan last modified:  Niharika Whiteside (8/15/2022)   Next INR check:  1/3/2023   Priority:  Maintenance   Target end date:   Indefinite    Indications    Pulmonary embolus (HCC) [I26.99]  Chronic anticoagulation [Z79.01]                 Anticoagulation Episode Summary       INR check location:  Anticoagulation Clinic    Preferred lab:      Send INR reminders to:  [facility]    Comments:  EPIC - finger stick every 2-3 weeks            Anticoagulation Care Providers       Provider Role Specialty Phone number    Hunter Jaquez MD Referring Internal Medicine [phone]            There were no vitals taken for this visit. Warfarin assessment / plan:     Appears well  Supra-therapeutic INR. Denies signs and symptoms of bleeding/bruising. Denies extra warfarin doses. Patient has been on amoxicillin for COVID and started a steroid yesterday. He will be on the steroid for 7 days. His INR was 4 today. We recommended him hold his warfarin dose today only then taking only a 2.5 mg (1/2 tablet) tomorrow then resuming normal dose until next visit. Description    HOLD warfarin Today 12/20 ONLY; Then tomorrow take 2.5 mg (1/2 tablet)    THEN CONTINUE: Take warfarin 2.5mg daily except for 5mg on Monday, Wednesday, and Friday    Call [phone] with signs or symptoms of bleeding or ANY medication changes (including antibiotics, steroids, over-the-counter medications or herbal supplements). If significant bleeding occurs or if you fall and hit your head, please seek immediate medical attention. Keep the number of servings of vitamin K containing foods (dark green, leafy vegetables) the same each week. Please call if this changes. Limit alcohol intake. Please call if this changes.           Immunization History   Administered Date(s) Administered    COVID-19, MODERNA BLUE border, Primary or Immunocompromised, (age 12y+), IM, 100 mcg/0.5mL 03/09/2021, 04/08/2021, 12/15/2021, 07/23/2022    Influenza, FLUAD, (age 72 y+), Adjuvanted, 0.5mL 10/27/2022    Influenza, FLUARIX, FLULAVAL, FLUZONE (age 10 mo+) AND AFLURIA, (age 1 y+), PF, 0.5mL 10/27/2017, 09/23/2019, 10/02/2020    Influenza, FLUBLOK, (age 25 y+), PF, 0.5mL 10/03/2018    Influenza, FLUCELVAX, (age 10 mo+), MDCK, PF, 0.5mL 10/26/2021    Pneumococcal Conjugate 13-valent ([id number]) 09/11/2015    Pneumococcal Conjugate Vaccine 08/15/2017    Pneumococcal Polysaccharide ([id number]) 08/01/2007, 12/19/2012    Tdap (Boostrix, Adacel) 08/19/2014           Orders Placed This Encounter   Procedures Protime-INR     This external order was created through the results console. No orders of the defined types were placed in this encounter. Reviewed AVS with patient / caregiver.     Billing Points:  Adjust dosage and/or reconcile meds (fill pill box) </= 5 medications - 2 points       CLINICAL PHARMACY CONSULT: MED RECONCILIATION/REVIEW ADDENDUM    For Pharmacy Admin Tracking Only    Intervention Detail: Dose Adjustment: 1, reason: Therapy De-escalation  Total # of Interventions Recommended: 1  Total # of Interventions Accepted: 1  Time Spent (min): 20  Reviewed by:  Sree Mondragon [facility] [place] [place]

## 2022-12-22 NOTE — LETTER
[address] [facility] [phone]  [address] [facility],[address] [phone]    Pacemaker/Defibrillator Clinic    [birth year]      [address] [facility]      Dear Billie Quinn    This letter is to inform you that we received the transmission from your monitor at home that checks your implanted heart device. The next date your monitor will automatically transmit will be 9/28. If your report needs attention we will notify you. Your device and monitor are wireless and most transmit cellularly, but please periodically check your monitor is still plugged in to the electrical outlet. If you still use the telephone land line to send please ensure the connection to the phone kirk is secure. This will help to ensure successful automatic transmissions in the future. Also, the monitor needs to be close to you while sleeping at night. Please be aware that the remote device transmission sites are periodically monitored only during regular business hours during which simultaneous in-office device clinics are being run. If your transmission requires attention, we will contact you as soon as possible. **PLEASE NOTE** that our [facility] policy and processes are changing to ensure a more seamless approach for all parties involved, allowing more time for our nurses to address patient issues and concerns. We will no longer be sending letters for NORMAL remote transmissions. You will be contacted by phone if your transmission requires attention (as previously done), and letters will only be sent regarding monitor disconnections or missed transmissions if you are unable to be reached by phone. Please do not be alarmed by this new process, as we will continue to contact you if your transmission report requires attention. This will be your final \"remote received\" letter.   From this point forward, the [facility] will be utilizing the Cyclosporine Pregnancy And Lactation Text: This medication is Pregnancy Category C and it isn't know if it is safe during pregnancy. This medication is excreted in breast milk.

## 2022-12-30 ENCOUNTER — TELEPHONE (OUTPATIENT)
Dept: CARDIOLOGY CLINIC | Age: 62
End: 2022-12-30

## 2023-01-04 RX ORDER — FUROSEMIDE 20 MG/1
TABLET ORAL
Qty: 225 TABLET | Refills: 3 | Status: SHIPPED | OUTPATIENT
Start: 2023-01-04

## 2023-01-04 RX ORDER — METOPROLOL SUCCINATE 25 MG/1
25 TABLET, EXTENDED RELEASE ORAL DAILY
Qty: 90 TABLET | Refills: 3 | Status: SHIPPED | OUTPATIENT
Start: 2023-01-04

## 2023-01-04 NOTE — TELEPHONE ENCOUNTER
Medication Refill    Medication needing refilled:  furosemide (LASIX)  metoprolol succinate (TOPROL XL)    Dosage of the medication:  20 MG tablet   25 MG extended release tablet     How are you taking this medication (QD, BID, TID, QID, PRN):  2.5 tablets twice daily  Take 1 tablet by mouth daily    30 or 90 day supply called in: 90 day supply     When will you run out of your medication:    Which Pharmacy are we sending the medication to?:      [address] [place] [address] ([facility] ) - [address] [phone] Marcela Nath [phone]   [place] [address] [address] [address]   Phone:  [phone]  Fax:  [phone]

## 2023-01-05 ENCOUNTER — ANTI-COAG VISIT (OUTPATIENT)
Dept: PHARMACY | Age: 63
End: 2023-01-05
Payer: COMMERCIAL

## 2023-01-05 DIAGNOSIS — I26.99 OTHER ACUTE PULMONARY EMBOLISM, UNSPECIFIED WHETHER ACUTE COR PULMONALE PRESENT (HCC): Primary | ICD-10-CM

## 2023-01-05 DIAGNOSIS — Z79.01 CHRONIC ANTICOAGULATION: ICD-10-CM

## 2023-01-05 LAB — INTERNATIONAL NORMALIZATION RATIO, POC: 5.8

## 2023-01-05 PROCEDURE — 99211 OFF/OP EST MAY X REQ PHY/QHP: CPT | Performed by: SPEECH-LANGUAGE PATHOLOGIST

## 2023-01-05 PROCEDURE — 85610 PROTHROMBIN TIME: CPT | Performed by: SPEECH-LANGUAGE PATHOLOGIST

## 2023-01-05 NOTE — PROGRESS NOTES
Hien Bundy is a 60 y.o. here for warfarin management. Keith Chinchilla had an INR test today. Results were reviewed and appropriate warfarin management was completed. This visit was performed as: An in person visit. Protocols were followed with precautions to reduce the spread of COVID-19. Patient verifies current warfarin dosing regimen: Yes     Warfarin medication reviewed and updated on the patient 's home medication list: Yes   All other medications reviewed and updated on the patient 's home medication list: Yes     Lab Results   Component Value Date    INR 5.8 2023    INR 4.00 2022    INR 4.10 2022       Patient Findings       Negatives:  Signs/symptoms of thrombosis, Signs/symptoms of bleeding, Change in health, Change in alcohol use, Emergency department visit, Missed doses, Extra doses, Change in medications, Change in diet/appetite, Hospital admission, Bruising            Anticoagulation Summary  As of 2023      INR goal:  2.0-3.0   TTR:  37.6 % (7.2 y)   INR used for dosin.8 (2023)   Warfarin maintenance plan:  5 mg (5 mg x 1) every Mon, Wed, Fri; 2.5 mg (5 mg x 0.5) all other days; Starting 2023   Weekly warfarin total:  25 mg   Plan last modified:  Justine Cadena (8/15/2022)   Next INR check:  2023   Priority:  Maintenance   Target end date: Indefinite    Indications    Pulmonary embolus (HCC) [I26.99]  Chronic anticoagulation [Z79.01]                 Anticoagulation Episode Summary       INR check location:  Anticoagulation Clinic    Preferred lab:      Send INR reminders to:  [facility]    Comments:  EPIC - finger stick every 2-3 weeks            Anticoagulation Care Providers       Provider Role Specialty Phone number    Jagdeep Finch MD Referring Internal Medicine [phone]            There were no vitals taken for this visit. Warfarin assessment / plan:     Appears well  Supra-therapeutic INR.      Denies signs and symptoms of bleeding/bruising. Denies medication changes. Denies extra warfarin doses. Denies increased alcohol intake. Denies change in appetite. Denies recent hospitalization / ED visit  Denies decrease in vitamin K intake. Denies changes in physical activity. Patient's INR supratherapeutic at 5.8 today. His INR has been elevated for 2 other visits prior to this one. Patient had been therapeutic on this dose for months before. Will HOLD warfarin for 3 days and continue normal dosing regimen. Will consider changes to regimen at next appointment on 1/13 if INR is still supratherapeutic. Patient advised to monitor for S/S of bleeding, and to seek immediate attention for any severe cuts or falls. Description    HOLD warfarin Today 1/5, Tomorrow 1/6, and The next day 1/7    THEN CONTINUE: Take warfarin 2.5mg daily except for 5mg on Monday, Wednesday, and Friday    Call [phone] with signs or symptoms of bleeding or ANY medication changes (including antibiotics, steroids, over-the-counter medications or herbal supplements). If significant bleeding occurs or if you fall and hit your head, please seek immediate medical attention. Keep the number of servings of vitamin K containing foods (dark green, leafy vegetables) the same each week. About 4 times a week (M,W,F,Sun). Please call if this changes. Limit alcohol intake. Please call if this changes.           Immunization History   Administered Date(s) Administered    COVID-19, MODERNA BLUE border, Primary or Immunocompromised, (age 12y+), IM, 100 mcg/0.5mL 03/09/2021, 04/08/2021, 12/15/2021, 07/23/2022    COVID-19, MODERNA Bivalent BOOSTER, (age 12y+), IM, 48 mcg/0.5 mL 12/30/2022    Influenza, FLUAD, (age 72 y+), Adjuvanted, 0.5mL 10/27/2022    Influenza, FLUARIX, FLULAVAL, FLUZONE (age 10 mo+) AND AFLURIA, (age 1 y+), PF, 0.5mL 10/27/2017, 09/23/2019, 10/02/2020    Influenza, FLUBLOK, (age 25 y+), PF, 0.5mL 10/03/2018    Influenza, FLUCELVAX, (age 10 mo+), MDCK, PF, 0.5mL 10/26/2021    Pneumococcal Conjugate 13-valent ([id number]) 09/11/2015    Pneumococcal Conjugate Vaccine 08/15/2017    Pneumococcal Polysaccharide ([id number]) 08/01/2007, 12/19/2012    Tdap (Boostrix, Adacel) 08/19/2014           Orders Placed This Encounter   Procedures    POCT INR     This external order was created through the results console. No orders of the defined types were placed in this encounter. Reviewed AVS with patient / caregiver.     Billing Points:  Adjust dosage and/or reconcile meds (fill pill box) </= 5 medications - 2 points       CLINICAL PHARMACY CONSULT: MED RECONCILIATION/REVIEW ADDENDUM    For Pharmacy Admin Tracking Only    Intervention Detail: Dose Adjustment: 1, reason: Therapy De-escalation  Total # of Interventions Recommended: 1  Total # of Interventions Accepted: 1  Time Spent (min): 20

## 2023-01-07 ENCOUNTER — HOSPITAL ENCOUNTER (EMERGENCY)
Age: 63
Discharge: HOME | End: 2023-01-07
Payer: COMMERCIAL

## 2023-01-07 ENCOUNTER — APPOINTMENT (OUTPATIENT)
Dept: GENERAL RADIOLOGY | Age: 63
End: 2023-01-07
Payer: COMMERCIAL

## 2023-01-07 ENCOUNTER — APPOINTMENT (OUTPATIENT)
Dept: CT IMAGING | Age: 63
End: 2023-01-07
Payer: COMMERCIAL

## 2023-01-07 VITALS
DIASTOLIC BLOOD PRESSURE: 80 MMHG | OXYGEN SATURATION: 95 % | BODY MASS INDEX: 37.4 KG/M2 | WEIGHT: 231.7 LBS | HEART RATE: 90 BPM | SYSTOLIC BLOOD PRESSURE: 124 MMHG | RESPIRATION RATE: 16 BRPM | TEMPERATURE: 97.7 F

## 2023-01-07 DIAGNOSIS — R31.9 HEMATURIA, UNSPECIFIED TYPE: Primary | ICD-10-CM

## 2023-01-07 DIAGNOSIS — N20.0 NEPHROLITHIASIS: ICD-10-CM

## 2023-01-07 DIAGNOSIS — M54.42 ACUTE BACK PAIN WITH SCIATICA, LEFT: ICD-10-CM

## 2023-01-07 DIAGNOSIS — Z85.51 HISTORY OF BLADDER CANCER: ICD-10-CM

## 2023-01-07 LAB
A/G RATIO: 1.5 (ref 1.1–2.2)
ALBUMIN SERPL-MCNC: 3.6 G/DL (ref 3.4–5)
ALP BLD-CCNC: 117 U/L (ref 40–129)
ALT SERPL-CCNC: 35 U/L (ref 10–40)
ANION GAP SERPL CALCULATED.3IONS-SCNC: 10 MMOL/L (ref 3–16)
AST SERPL-CCNC: 23 U/L (ref 15–37)
BACTERIA: ABNORMAL /HPF
BASOPHILS ABSOLUTE: 0 K/UL (ref 0–0.2)
BASOPHILS RELATIVE PERCENT: 0.6 %
BILIRUB SERPL-MCNC: 0.3 MG/DL (ref 0–1)
BILIRUBIN URINE: NEGATIVE
BLOOD, URINE: ABNORMAL
BUN BLDV-MCNC: 9 MG/DL (ref 7–20)
CALCIUM SERPL-MCNC: 10.6 MG/DL (ref 8.3–10.6)
CHLORIDE BLD-SCNC: 104 MMOL/L (ref 99–110)
CLARITY: CLEAR
CO2: 22 MMOL/L (ref 21–32)
COLOR: YELLOW
CREAT SERPL-MCNC: 1.1 MG/DL (ref 0.8–1.3)
EOSINOPHILS ABSOLUTE: 0.2 K/UL (ref 0–0.6)
EOSINOPHILS RELATIVE PERCENT: 4.1 %
EPITHELIAL CELLS, UA: 1 /HPF (ref 0–5)
GFR SERPL CREATININE-BSD FRML MDRD: >60 ML/MIN/{1.73_M2}
GLUCOSE BLD-MCNC: 254 MG/DL (ref 70–99)
GLUCOSE URINE: 500 MG/DL
GRANULAR CASTS: PRESENT
HCT VFR BLD CALC: 48.3 % (ref 40.5–52.5)
HEMOGLOBIN: 15.5 G/DL (ref 13.5–17.5)
HYALINE CASTS: 11 /LPF (ref 0–8)
HYALINE CASTS: PRESENT
KETONES, URINE: NEGATIVE MG/DL
LEUKOCYTE ESTERASE, URINE: NEGATIVE
LIPASE: 15 U/L (ref 13–60)
LYMPHOCYTES ABSOLUTE: 1.4 K/UL (ref 1–5.1)
LYMPHOCYTES RELATIVE PERCENT: 26.7 %
MCH RBC QN AUTO: 29.9 PG (ref 26–34)
MCHC RBC AUTO-ENTMCNC: 32 G/DL (ref 31–36)
MCV RBC AUTO: 93.3 FL (ref 80–100)
MICROSCOPIC EXAMINATION: YES
MONOCYTES ABSOLUTE: 0.5 K/UL (ref 0–1.3)
MONOCYTES RELATIVE PERCENT: 9.2 %
NEUTROPHILS ABSOLUTE: 3.2 K/UL (ref 1.7–7.7)
NEUTROPHILS RELATIVE PERCENT: 59.4 %
NITRITE, URINE: NEGATIVE
PDW BLD-RTO: 13.5 % (ref 12.4–15.4)
PH UA: 6 (ref 5–8)
PLATELET # BLD: 150 K/UL (ref 135–450)
PMV BLD AUTO: 7.7 FL (ref 5–10.5)
POTASSIUM REFLEX MAGNESIUM: 4.2 MMOL/L (ref 3.5–5.1)
PROTEIN UA: ABNORMAL MG/DL
RBC # BLD: 5.17 M/UL (ref 4.2–5.9)
RBC UA: 112 /HPF (ref 0–4)
SODIUM BLD-SCNC: 136 MMOL/L (ref 136–145)
SPECIFIC GRAVITY UA: 1.02 (ref 1–1.03)
TOTAL PROTEIN: 6 G/DL (ref 6.4–8.2)
URINE REFLEX TO CULTURE: ABNORMAL
URINE TYPE: ABNORMAL
UROBILINOGEN, URINE: 0.2 E.U./DL
WBC # BLD: 5.4 K/UL (ref 4–11)
WBC UA: 5 /HPF (ref 0–5)

## 2023-01-07 PROCEDURE — 85025 COMPLETE CBC W/AUTO DIFF WBC: CPT

## 2023-01-07 PROCEDURE — 71046 X-RAY EXAM CHEST 2 VIEWS: CPT

## 2023-01-07 PROCEDURE — 74176 CT ABD & PELVIS W/O CONTRAST: CPT

## 2023-01-07 PROCEDURE — 83690 ASSAY OF LIPASE: CPT

## 2023-01-07 PROCEDURE — 87086 URINE CULTURE/COLONY COUNT: CPT

## 2023-01-07 PROCEDURE — 99284 EMERGENCY DEPT VISIT MOD MDM: CPT

## 2023-01-07 PROCEDURE — 81001 URINALYSIS AUTO W/SCOPE: CPT

## 2023-01-07 PROCEDURE — 80053 COMPREHEN METABOLIC PANEL: CPT

## 2023-01-07 PROCEDURE — 6370000000 HC RX 637 (ALT 250 FOR IP): Performed by: PHYSICIAN ASSISTANT

## 2023-01-07 RX ORDER — METAXALONE 800 MG/1
800 TABLET ORAL ONCE
Status: COMPLETED | OUTPATIENT
Start: 2023-01-07 | End: 2023-01-07

## 2023-01-07 RX ORDER — OXYCODONE AND ACETAMINOPHEN 7.5; 325 MG/1; MG/1
.5-1 TABLET ORAL EVERY 8 HOURS PRN
Qty: 8 TABLET | Refills: 0 | Status: SHIPPED | OUTPATIENT
Start: 2023-01-07 | End: 2023-01-12

## 2023-01-07 RX ORDER — METAXALONE 800 MG/1
800 TABLET ORAL
Qty: 16 TABLET | Refills: 0 | Status: SHIPPED | OUTPATIENT
Start: 2023-01-07 | End: 2023-01-17

## 2023-01-07 RX ORDER — ACETAMINOPHEN 325 MG/1
650 TABLET ORAL ONCE
Status: COMPLETED | OUTPATIENT
Start: 2023-01-07 | End: 2023-01-07

## 2023-01-07 RX ADMIN — ACETAMINOPHEN 325MG 650 MG: 325 TABLET ORAL at 12:26

## 2023-01-07 RX ADMIN — METAXALONE 800 MG: 800 TABLET ORAL at 12:27

## 2023-01-07 ASSESSMENT — PAIN DESCRIPTION - ORIENTATION: ORIENTATION: RIGHT;LEFT

## 2023-01-07 ASSESSMENT — PAIN DESCRIPTION - PAIN TYPE: TYPE: ACUTE PAIN

## 2023-01-07 ASSESSMENT — PAIN SCALES - GENERAL
PAINLEVEL_OUTOF10: 9

## 2023-01-07 ASSESSMENT — PAIN DESCRIPTION - LOCATION: LOCATION: BACK;FLANK

## 2023-01-07 ASSESSMENT — PAIN DESCRIPTION - DESCRIPTORS: DESCRIPTORS: PATIENT UNABLE TO DESCRIBE

## 2023-01-07 ASSESSMENT — PAIN - FUNCTIONAL ASSESSMENT: PAIN_FUNCTIONAL_ASSESSMENT: 0-10

## 2023-01-07 NOTE — ED PROVIDER NOTES
[address] [facility]        Pt Name: Julius Blakely  MRN: [id number]  [place] 1960  Date of evaluation: 1/7/2023  Provider: Mamadou Kendrick PA-C  PCP: Quentin Doran MD  Note Started: 11:55 AM EST 1/7/23      AMALIA. I have evaluated this patient. My supervising physician was available for consultation. CHIEF COMPLAINT       Chief Complaint   Patient presents with    Back Pain    Flank Pain     C/o bilateral lower back and bilateral flank pain with it being worse on the left since Wednesday. Sig hx of kidney stones, states that he has been xtra flomax since Wednesday with no relief. Denies urinary retention. HISTORY OF PRESENT ILLNESS: 1 or more Elements     History From: The patient    Limitations to history : None    Julius Blakely is a 60 y.o. male who presents to the emergency department with complaint of left flank pain and left low back pain. The patient reports also mild on the right worse on the left. History low back pain and sciatica. Takes occasional Percocet. Did not have Percocet this morning. Patient states her son got some carpeting in the house. States she has been moving some items around in the house \"nothing extremely heavy\". He reports no bowel or bladder dysfunction. No saddle anesthesia. He does report some dysuria but no urgency or frequency. Denies any nausea, vomiting, diarrhea. No fevers or chills. History of kidney stones 10 mm on the left. Concerned he may have a kidney stone and a back strain. He comes in for evaluation. Nursing Notes were all reviewed and agreed with or any disagreements were addressed in the HPI. REVIEW OF SYSTEMS :      Review of Systems    Positives and Pertinent negatives as per HPI.      SURGICAL HISTORY     Past Surgical History:   Procedure Laterality Date    CHOLECYSTECTOMY  2007    COLONOSCOPY  09/21/2012    dr Lexus mercedes    COLONOSCOPY  11/30/2018 daren--francine 2029=8    CYSTOSCOPY  05/17/2019    RIGHT SIDED URETEROSCOPY  Diagnostic, retrograde pyelogram and fulguration of previously resected bladder tumor base    CYSTOSCOPY Right 05/17/2019    RIGHT SIDED URETEROSCOPY FLUGERATION  OF BLADDER performed by Keiko Mayberry MD at [address] Right 07/02/2021    CYSTOURETHROSCOPY WITH RIGHT RETROGRADE PYELOGRAPHY AND PLACEMENT OF RIGHT DOUBLE J STENT performed by Eugenia Mayer DO at [address] Right 04/25/2022    CYSTOSCOPY, RIGHT STENT INSERTION performed by Keiko Mayberry MD at [address] 9/8/2022    ESOPHAGEAL DILATATION performed by Greyson Sanchez, DO at [address]   2015    LITHOTRIPSY      [facility] opening larger in sinuses    UPPER GASTROINTESTINAL ENDOSCOPY  03/28/2014    dr Maria Del Carmen David N/A 02/01/2021    EGD BIOPSY performed by Mary Hope MD at [address] [place] [address] 9/8/2022    EGD BIOPSY performed by Greyson Sanchez, DO at [address] [facility]       Previous Medications    ALBUTEROL (PROVENTIL) (2.5 MG/3ML) 0.083% NEBULIZER SOLUTION    Take 3 mLs by nebulization every 4 hours as needed for Wheezing or Shortness of Breath    ALBUTEROL SULFATE  (90 BASE) MCG/ACT INHALER    INHALE 2 PUFFS INTO THE LUNGS EVERY 4 HOURS AS NEEDED FOR WHEEZING OR SHORTNESS OF BREATH    ASPIRIN 81 MG TABLET    Take 81 mg by mouth daily    AZELASTINE (ASTELIN) 0.1 % NASAL SPRAY    2 sprays by Nasal route 2 times daily Use in each nostril as directed    CHOLECALCIFEROL (VITAMIN D3) 1.25 MG (78450 UT) CAPS    Take 1 capsule by mouth once a week    DEXILANT 60 MG CPDR DELAYED RELEASE CAPSULE    TAKE 1 CAPSULE BY MOUTH  DAILY    DICYCLOMINE (BENTYL) 10 MG CAPSULE    TAKE ONE CAPSULE BY MOUTH  FOUR TIMES DAILY AS NEEDED    ENTRESTO 49-51 MG PER TABLET    Take 1  tablets twice daily by mouth    FLAVOXATE (URISPAS) 100 MG TABLET    Take 1 tablet by mouth 3 times daily as needed for Muscle spasms    FUROSEMIDE (LASIX) 20 MG TABLET    2.5 tablets twice daily    GUAIFENESIN (MUCINEX) 600 MG EXTENDED RELEASE TABLET    Take 600 mg by mouth 2 times daily    MELATONIN 3 MG TABS TABLET    Take 3 mg by mouth nightly as needed Taking half tablet    METHOCARBAMOL (ROBAXIN) 500 MG TABLET    Take 500 mg by mouth 4 times daily as needed (musclw spasms)    METOPROLOL SUCCINATE (TOPROL XL) 25 MG EXTENDED RELEASE TABLET    Take 1 tablet by mouth daily    ONDANSETRON (ZOFRAN) 4 MG TABLET    Take 1 tablet by mouth every 8 hours as needed for Nausea or Vomiting    PHENAZOPYRIDINE HCL (AZO-DINE URINARY ANALGESIC PO)    Take 2 tablets by mouth daily as needed     POLYETHYLENE GLYCOL (MIRALAX) PACK PACKET    Take 17 g by mouth nightly     PRAVASTATIN (PRAVACHOL) 40 MG TABLET    TAKE 1 TABLET BY MOUTH  DAILY    PREGABALIN (LYRICA) 75 MG CAPSULE    TAKE 1 CAPSULE BY MOUTH IN  THE MORNING 2 CAPSULES IN  THE EVENING    STIOLTO RESPIMAT 2.5-2.5 MCG/ACT AERS    INHALE 2 PUFFS INTO THE LUNGS DAILY    TAMSULOSIN (FLOMAX) 0.4 MG CAPSULE    TAKE ONE CAPSULE BY MOUTH TWO TIMES A DAY    TRULANCE 3 MG TABS    Take 3 mg by mouth daily    WARFARIN (COUMADIN) 5 MG TABLET    Take 0.5 tablets by mouth daily EXCEPT 5mg every Monday, Wednesday and Friday or as directed by [facility] [phone]       ALLERGIES     Atrovent nasal spray [ipratropium], Ipratropium bromide hfa, Proventil [albuterol], Doxycycline, Nitroglycerin, Sulfa antibiotics, and Vicodin [hydrocodone-acetaminophen]    FAMILYHISTORY       Family History   Problem Relation Age of Onset    High Blood Pressure Mother     Dementia Mother     Cancer Father         Pancreatic Ca    Cancer Paternal Uncle     Cancer Paternal Uncle     Cancer Paternal Uncle         SOCIAL HISTORY       Social History     Tobacco Use Smoking status: Never    Smokeless tobacco: Never   Vaping Use    Vaping Use: Never used   Substance Use Topics    Alcohol use: No    Drug use: No       SCREENINGS        Drake Coma Scale  Eye Opening: Spontaneous  Best Verbal Response: Oriented  Best Motor Response: Obeys commands  Drake Coma Scale Score: 15                CIWA Assessment  BP: (!) 130/91  Heart Rate: (!) 101           PHYSICAL EXAM  1 or more Elements     ED Triage Vitals [01/07/23 1134]   BP Temp Temp Source Heart Rate Resp SpO2 Height Weight   (!) 130/91 97.7 °F (36.5 °C) Oral (!) 101 18 94 % -- 231 lb 11.3 oz (105.1 kg)       Physical Exam  Vitals and nursing note reviewed. Constitutional:       Appearance: Normal appearance. He is well-developed. He is obese. HENT:      Head: Normocephalic and atraumatic. Right Ear: External ear normal.      Left Ear: External ear normal.      Mouth/Throat:      Mouth: Mucous membranes are moist.      Pharynx: Oropharynx is clear. Eyes:      General: No scleral icterus. Right eye: No discharge. Left eye: No discharge. Conjunctiva/sclera: Conjunctivae normal.   Cardiovascular:      Rate and Rhythm: Normal rate and regular rhythm. Heart sounds: Normal heart sounds. Pulmonary:      Effort: Pulmonary effort is normal.      Breath sounds: Normal breath sounds. Abdominal:      General: Abdomen is flat. Bowel sounds are normal.      Palpations: Abdomen is soft. Tenderness: There is no abdominal tenderness. There is no right CVA tenderness or left CVA tenderness. Musculoskeletal:         General: Tenderness present. Normal range of motion. Cervical back: Normal range of motion and neck supple. Right lower leg: No edema. Left lower leg: No edema. Comments: The patient without CVA tenderness. The patient with tenderness when I apply pressure over the paralumbar structures and SI on the left. Skin:     General: Skin is warm and dry.    Neurological: General: No focal deficit present. Mental Status: He is alert and oriented to person, place, and time. Mental status is at baseline. Psychiatric:         Mood and Affect: Mood normal.         Behavior: Behavior normal.         Thought Content: Thought content normal.         Judgment: Judgment normal.         DIAGNOSTIC RESULTS   LABS:    Labs Reviewed   COMPREHENSIVE METABOLIC PANEL W/ REFLEX TO MG FOR LOW K - Abnormal; Notable for the following components:       Result Value    Glucose 254 (*)     Total Protein 6.0 (*)     All other components within normal limits   URINALYSIS WITH REFLEX TO CULTURE - Abnormal; Notable for the following components:    Glucose, Ur 500 (*)     Blood, Urine LARGE (*)     Protein, UA TRACE (*)     All other components within normal limits   MICROSCOPIC URINALYSIS - Abnormal; Notable for the following components:    Hyaline Casts, UA 11 (*)     RBC,  (*)     Hyaline Casts, UA Present (*)     Granular Casts, UA Present (*)     All other components within normal limits   CULTURE, URINE   CBC WITH AUTO DIFFERENTIAL   LIPASE       When ordered only abnormal lab results are displayed. All other labs were within normal range or not returned as of this dictation. EKG: When ordered, EKG's are interpreted by the Emergency Department Physician in the absence of a cardiologist.  Please see their note for interpretation of EKG. RADIOLOGY:   Non-plain film images such as CT, Ultrasound and MRI are read by the radiologist. Plain radiographic images are visualized and preliminarily interpreted by the ED Provider with the below findings:    Chest x-ray viewed by myself and interpreted by the radiologist shows no acute cardiopulmonary abnormality. CT scan interpreted by radiology shows nephrolithiasis on the left without ureterolithiasis or evidence of bladder tumors.     Interpretation per the Radiologist below, if available at the time of this note:    [address] CONTRAST Additional Contrast? None   Final Result   No acute pathology in the abdomen and pelvis.         XR CHEST (2 VW)   Final Result   No acute process.           No results found.    No results found.    PROCEDURES   Unless otherwise noted below, none     Procedures    CRITICAL CARE TIME (.cctime)       AST MEDICAL HISTORY      has a past medical history of Bronchiolitis obliterans (HCC), Bundle branch block, right, Cardiomyopathy (HCC), Chest pain (9/24/2019), COVID-19 virus vaccine not available, Fibromyalgia, GERD (gastroesophageal reflux disease), Hepatitis (1979), blood clots, Hyperlipemia, Hypertension, IBS (irritable bowel syndrome), Kidney stone, Neuropathy, Pneumothorax (2011), Prostatitis, Pulmonary embolism on right (HCC) (2011), and Sacroiliitis (HCC).     EMERGENCY DEPARTMENT COURSE and DIFFERENTIAL DIAGNOSIS/MDM:   Vitals:    Vitals:    01/07/23 1134   BP: (!) 130/91   Pulse: (!) 101   Resp: 18   Temp: 97.7 °F (36.5 °C)   TempSrc: Oral   SpO2: 94%   Weight: 231 lb 11.3 oz (105.1 kg)       Patient was given the following medications:  Medications   metaxalone (SKELAXIN) tablet 800 mg (800 mg Oral Given 1/7/23 1227)   acetaminophen (TYLENOL) tablet 650 mg (650 mg Oral Given 1/7/23 1226)             Is this patient to be included in the SEP-1 Core Measure due to severe sepsis or septic shock?   No   Exclusion criteria - the patient is NOT to be included for SEP-1 Core Measure due to:  Infection is not suspected    Chronic Conditions affecting care: Bladder cancer nephrolithiasis   has a past medical history of Bronchiolitis obliterans (HCC), Bundle branch block, right, Cardiomyopathy (HCC), Chest pain (9/24/2019), COVID-19 virus vaccine not available, Fibromyalgia, GERD (gastroesophageal reflux disease), Hepatitis (1979), blood clots, Hyperlipemia, Hypertension, IBS (irritable bowel syndrome), Kidney stone, Neuropathy, Pneumothorax (2011), Prostatitis, Pulmonary embolism on right (HCC) (2011), and  Sacroiliitis ([facility] [address] CONSULTS: (Who and What was discussed)  None    Social Determinants : None    Records Reviewed (Source): N/A    CC/HPI Summary, DDx, ED Course, and Reassessment: Patient presenting with left low back pain and concerned kidney stone. Also noting some dysuria and hematuria. Patient known left nephrolithiasis. CT scan showing no concerns, but does show evidence of residual nephrolithiasis. Blood noted in urine to suggest hematuria. May have passed a stone but cannot prove at this time. The patient was given Skelaxin 800 mg #1 and Tylenol 650 mg #1. Patient has some improvement. I did obtain chest x-ray showing no acute cardiopulmonary abnormality. Patient states he has bladder cancer. CT scan not showing bladder tumor. States bladder unremarkable. Patient does have an appoint with his urologist on Wednesday. He will follow-up. I will prescribe Percocet 7.5/325 taking 1/2-1 every 6 or 8 hours as needed #8. He does have prednisone at home which she will initiate x5 days for back pain/anti-inflammatory properties. Also prescribing Skelaxin 800 mg 1 every 6 or 8 hours #16. He may continue Tylenol 1000 mg 3 times daily. Heat and lidocaine patches suggested. The patient did express understanding of his diagnosis and the treatment plan. Disposition Considerations (tests considered but not done, Adm  It vs D/C, Shared Decision Making, Pt Expectation of Test or Tx.):  this patient will be discharged to home with diagnosis of left low back pain and residual nephrolithiasis. He may have passed a stone with hematuria noted. No stone in bladder or ureter or urethra. Patient will follow with his urologist on Wednesday as previously scheduled. We will control pain and provide muscle relaxer. He does have prednisone at home which he will utilize. Cannot use standard NSAIDs as he is on Coumadin. I am the Primary Clinician of Record. FINAL IMPRESSION      1.  Hematuria, unspecified type    2. Nephrolithiasis    3. Acute back pain with sciatica, left    4. History of bladder cancer          DISPOSITION/PLAN     DISPOSITION Decision To Discharge 01/07/2023 01:24:21 PM      PATIENT REFERRED TO:  Your urologist    Schedule an appointment as soon as possible for a visit on 1/11/2023      Nikkie Munguia MD  [address] [facility]  [phone]    Schedule an appointment as soon as possible for a visit       [facility] Emergency Department  [address] [address]  [phone]  Go to   If symptoms worsen    DISCHARGE MEDICATIONS:  New Prescriptions    METAXALONE (SKELAXIN) 800 MG TABLET    Take 1 tablet by mouth every 6-8 hours as needed for Pain    OXYCODONE-ACETAMINOPHEN (PERCOCET) 7.5-325 MG PER TABLET    Take 0.5-1 tablets by mouth every 8 hours as needed for Pain for up to 5 days. Intended supply: 30 days Max Daily Amount: 3 tablets       DISCONTINUED MEDICATIONS:  Discontinued Medications    No medications on file              (Please note that portions of this note were completed with a voice recognition program.  Efforts were made to edit the dictations but occasionally words are mis-transcribed. )    Rey Pimentel PA-C (electronically signed)        Rey Pimentel PA-C  01/07/23 [id number]

## 2023-01-07 NOTE — ED TRIAGE NOTES
C/o bilateral lower back and bilateral flank pain with it being worse on the left since Wednesday. Sig hx of kidney stones, states that he has been xtra flomax since Wednesday with no relief. Denies urinary retention.

## 2023-01-07 NOTE — DISCHARGE INSTRUCTIONS
CT scan showing left nephrolithiasis. Urine shows evidence of hematuria to suggest the passage of a stone. Cannot confirm. No sign of UTI. No sign of bladder cancer with history of bladder cancer. On examination tenderness over the left SI region. I suspect a low back strain. I do recommend prednisone 10 mg daily for next 5 days. I do recommend Tylenol 1000 mg 3 times daily for primary pain control. I did prescribe Percocet 7.5 #8 for the more severe pain. Continue use of lidocaine patch. Stretching and heat may benefit.

## 2023-01-08 LAB — URINE CULTURE, ROUTINE: NORMAL

## 2023-01-10 ENCOUNTER — TELEPHONE (OUTPATIENT)
Dept: CARDIOLOGY CLINIC | Age: 63
End: 2023-01-10

## 2023-01-10 RX ORDER — FUROSEMIDE 20 MG/1
50 TABLET ORAL DAILY
Qty: 225 TABLET | Refills: 3
Start: 2023-01-10

## 2023-01-10 NOTE — TELEPHONE ENCOUNTER
Fritz Gordon called in today he stated that he picked up his Lasix and he noticed that it says 2.5 tabs 2 times a day but, that was not talked about he said he should only be doing this once a day     furosemide (LASIX) 20 MG tablet [[id number]]     Order Details  Dose, Route, Frequency: As Directed   Dispense Quantity: 225 tablet Refills: 3          Si.5 tablets twice daily         Start Date: 23 End Date: --   Written Date: 23 Expiration Date: 24   Original Order:  furosemide (LASIX) 20 MG tablet [[id number]]       Fritz Gordon can be reached at [phone]

## 2023-01-10 NOTE — TELEPHONE ENCOUNTER
Please see below and advise. Looks like lasix was changed during last ov 12/08/2022 to 20 mg tablets twice a day but on ov note it states :he is also on lasix 20 mg tabs 2.5 tabs once daily.

## 2023-01-12 ENCOUNTER — ANTI-COAG VISIT (OUTPATIENT)
Dept: PHARMACY | Age: 63
End: 2023-01-12
Payer: COMMERCIAL

## 2023-01-12 ENCOUNTER — HOSPITAL ENCOUNTER (OUTPATIENT)
Age: 63
Discharge: HOME | End: 2023-01-12
Payer: COMMERCIAL

## 2023-01-12 ENCOUNTER — OFFICE VISIT (OUTPATIENT)
Dept: PULMONOLOGY | Age: 63
End: 2023-01-12
Payer: COMMERCIAL

## 2023-01-12 ENCOUNTER — HOSPITAL ENCOUNTER (OUTPATIENT)
Dept: GENERAL RADIOLOGY | Age: 63
Discharge: HOME | End: 2023-01-12
Payer: COMMERCIAL

## 2023-01-12 VITALS
SYSTOLIC BLOOD PRESSURE: 125 MMHG | RESPIRATION RATE: 21 BRPM | TEMPERATURE: 97.1 F | BODY MASS INDEX: 36.96 KG/M2 | HEIGHT: 66 IN | OXYGEN SATURATION: 97 % | DIASTOLIC BLOOD PRESSURE: 80 MMHG | WEIGHT: 230 LBS | HEART RATE: 117 BPM

## 2023-01-12 DIAGNOSIS — R06.02 SOB (SHORTNESS OF BREATH): Primary | ICD-10-CM

## 2023-01-12 DIAGNOSIS — J42 BRONCHIOLITIS OBLITERANS (HCC): ICD-10-CM

## 2023-01-12 DIAGNOSIS — R06.02 SOB (SHORTNESS OF BREATH): ICD-10-CM

## 2023-01-12 DIAGNOSIS — Z79.01 CHRONIC ANTICOAGULATION: ICD-10-CM

## 2023-01-12 DIAGNOSIS — I27.82 CHRONIC PULMONARY EMBOLISM WITH ACUTE COR PULMONALE, UNSPECIFIED PULMONARY EMBOLISM TYPE (HCC): Primary | ICD-10-CM

## 2023-01-12 DIAGNOSIS — I26.09 CHRONIC PULMONARY EMBOLISM WITH ACUTE COR PULMONALE, UNSPECIFIED PULMONARY EMBOLISM TYPE (HCC): Primary | ICD-10-CM

## 2023-01-12 LAB — INTERNATIONAL NORMALIZATION RATIO, POC: 2.4

## 2023-01-12 PROCEDURE — 99214 OFFICE O/P EST MOD 30 MIN: CPT | Performed by: INTERNAL MEDICINE

## 2023-01-12 PROCEDURE — 1036F TOBACCO NON-USER: CPT | Performed by: INTERNAL MEDICINE

## 2023-01-12 PROCEDURE — G8417 CALC BMI ABV UP PARAM F/U: HCPCS | Performed by: INTERNAL MEDICINE

## 2023-01-12 PROCEDURE — 3074F SYST BP LT 130 MM HG: CPT | Performed by: INTERNAL MEDICINE

## 2023-01-12 PROCEDURE — 3079F DIAST BP 80-89 MM HG: CPT | Performed by: INTERNAL MEDICINE

## 2023-01-12 PROCEDURE — 3017F COLORECTAL CA SCREEN DOC REV: CPT | Performed by: INTERNAL MEDICINE

## 2023-01-12 PROCEDURE — G8484 FLU IMMUNIZE NO ADMIN: HCPCS | Performed by: INTERNAL MEDICINE

## 2023-01-12 PROCEDURE — G8427 DOCREV CUR MEDS BY ELIG CLIN: HCPCS | Performed by: INTERNAL MEDICINE

## 2023-01-12 PROCEDURE — 85610 PROTHROMBIN TIME: CPT

## 2023-01-12 PROCEDURE — 99211 OFF/OP EST MAY X REQ PHY/QHP: CPT

## 2023-01-12 PROCEDURE — 3023F SPIROM DOC REV: CPT | Performed by: INTERNAL MEDICINE

## 2023-01-12 PROCEDURE — 71046 X-RAY EXAM CHEST 2 VIEWS: CPT

## 2023-01-12 ASSESSMENT — ENCOUNTER SYMPTOMS: RESPIRATORY NEGATIVE: 1

## 2023-01-12 NOTE — PROGRESS NOTES
[address], SLEEP, AND CRITICAL CARE   Alicia Green (:  1960) is a 60 y.o. male,Established patient, here for evaluation of the following chief complaint(s):  Follow-up         ASSESSMENT/PLAN:  1. SOB (shortness of breath)  Assessment & Plan:   - Slightly worsened today, as he was recently ill treated with steroids with some modest improvement.  -Chest x-ray today without evidence of pneumonia, will defer antibiotics  Orders:  -     XR CHEST (2 VW); Future  2. Bronchiolitis obliterans ([facility] [address]  Assessment & Plan:  -Has been stable since . -FEV1 50%  -On Stiolto, albuterol as needed  -Transiently on Trelegy but no improvement in symptoms over now back to [facility] in about 6 months (around 2023). Subjective   SUBJECTIVE/OBJECTIVE:  Likely more short of breath at baseline. Initially diagnosed with BSO in  status post therapy. Since then symptoms and objective measures have been largely stable. He remains on bronchodilator therapy for symptomatic relief.  -Attempted Trelegy last visit but no improvement in symptoms now back down to [place]  -Recently treated for acute bronchitis      Review of Systems   Constitutional: Negative. Respiratory: Negative. Cardiovascular: Negative. Neurological: Negative. Psychiatric/Behavioral: Negative. Objective   Physical Exam    Gen:  No acute distress. Eyes: EOMI. Anicteric sclera. No conjunctival injection. ENT: No discharge. oropharynx clear. External appearance of ears and nose normal.  Neck: Trachea midline. No mass   Resp:  No crackles. No wheezes. No rhonchi. No dullness on percussion. CV: Regular rate. Regular rhythm. No murmur or rub. No edema. Neuro:  no focal neurologic deficit. Moves all extremities  Psych: Awake and alert, Oriented x 3. Judgement and insight appropriate. Mood stable.     Chest x-ray images reviewed personally by me, to rotation as follows:  -No acute airspace process bilaterally     An electronic signature was used to authenticate this note.     --Faviola Woodruff MD

## 2023-01-12 NOTE — PROGRESS NOTES
Brittney Silvestre is a 60 y.o. here for warfarin management. Ayad Gonsalez had an INR test today. Results were reviewed and appropriate warfarin management was completed. This visit was performed as: An in person visit. Protocols were followed with precautions to reduce the spread of COVID-19. Patient verifies current warfarin dosing regimen: Yes     Warfarin medication reviewed and updated on the patient 's home medication list: Yes   All other medications reviewed and updated on the patient 's home medication list: Yes : He will start a 7 day course of Medrol today. Lab Results   Component Value Date    INR 2.4 2023    INR 5.8 2023    INR 4.00 2022       Patient Findings       Positives:  Change in health, Bruising    Negatives:  Signs/symptoms of bleeding, Missed doses, Change in medications, Change in diet/appetite    Comments:  Bruise on right big toe where he stubbed it. Finished a 2 week course of prednisone on 23. Went to ED on 23 for hip pain. Will start medrol 4mg twice daily for 7 days. Also on tizanidine. Anticoagulation Summary  As of 2023      INR goal:  2.0-3.0   TTR:  37.5 % (7.2 y)   INR used for dosin.4 (2023)   Warfarin maintenance plan:  5 mg (5 mg x 1) every Mon, Wed, Fri; 2.5 mg (5 mg x 0.5) all other days; Starting 2023   Weekly warfarin total:  25 mg   Plan last modified:  Shelbie Sandifer (8/15/2022)   Next INR check:  2023   Priority:  Maintenance   Target end date:   Indefinite    Indications    Pulmonary embolus ([facility] [address] [I26.99]  Chronic anticoagulation [Z79.01]                 Anticoagulation Episode Summary       INR check location:  Anticoagulation Clinic    Preferred lab:      Send INR reminders to:  Jeremy STAFF    Comments:  EPIC - finger stick every 2-3 weeks            Anticoagulation Care Providers       Provider Role Specialty Phone number    Yong Archer MD Referring Internal Medicine [phone] There were no vitals taken for this visit. Warfarin assessment / plan:   Benito Hernandez states that he finished a 2 week course of prednisone on 1-5-23. He then presented to the ED on 1-7-23 with intractable hip pain and hematuria. He was given pain medication(Percocet)and a prescription for Skelaxin. Benito Hernandez reached out to Dr. Dann Anna yesterday about the continuing pain. Benito Hernandez was then prescribed Medrol 4mg twice daily x 7 days along with prn tizanidine. Although his INR is in range today at 2.4, we will decrease his warfarin to 2.5mg daily while he takes the Medrol. He will resume his previous warfarin dosing when the steroid is finished. He will call with any further changes. Description    Take warfarin 2.5mg(1/2 tablet) daily while on the steroid(Medrol)  THEN CONTINUE: Take warfarin 2.5mg daily except for 5mg on Monday, Wednesday, and Friday    Call [phone] with signs or symptoms of bleeding or ANY medication changes (including antibiotics, steroids, over-the-counter medications or herbal supplements). If significant bleeding occurs or if you fall and hit your head, please seek immediate medical attention. Keep the number of servings of vitamin K containing foods (dark green, leafy vegetables) the same each week. About 4 times a week (M,W,F,Sun). Please call if this changes. Limit alcohol intake. Please call if this changes.           Immunization History   Administered Date(s) Administered    COVID-19, MODERNA BLUE border, Primary or Immunocompromised, (age 12y+), IM, 100 mcg/0.5mL 03/09/2021, 04/08/2021, 12/15/2021, 07/23/2022    COVID-19, MODERNA Bivalent BOOSTER, (age 12y+), IM, 48 mcg/0.5 mL 12/30/2022    Influenza, FLUAD, (age 72 y+), Adjuvanted, 0.5mL 10/27/2022    Influenza, FLUARIX, FLULAVAL, FLUZONE (age 10 mo+) AND AFLURIA, (age 1 y+), PF, 0.5mL 10/27/2017, 09/23/2019, 10/02/2020    Influenza, FLUBLOK, (age 25 y+), PF, 0.5mL 10/03/2018    Influenza, FLUCELVAX, (age 10 mo+), MDCK, PF, 0.5mL 10/26/2021    Pneumococcal Conjugate 13-valent ([id number]) 09/11/2015    Pneumococcal Conjugate Vaccine 08/15/2017    Pneumococcal Polysaccharide ([id number]) 08/01/2007, 12/19/2012    Tdap (Boostrix, Adacel) 08/19/2014           Orders Placed This Encounter   Procedures    POCT INR     This external order was created through the results console. No orders of the defined types were placed in this encounter. Reviewed AVS with patient / caregiver.     Billing Points:  Adjust dosage and/or reconcile meds (fill pill box) </= 5 medications - 2 points       CLINICAL PHARMACY CONSULT: MED RECONCILIATION/REVIEW ADDENDUM    For Pharmacy Admin Tracking Only    Intervention Detail: Dose Adjustment: 1, reason: Therapy Optimization  Total # of Interventions Recommended: 1  Total # of Interventions Accepted: 1  Time Spent (min): 20

## 2023-01-12 NOTE — ASSESSMENT & PLAN NOTE
-Has been stable since 1997.   -FEV1 50%  -On Stiolto, albuterol as needed  -Transiently on Trelegy but no improvement in symptoms over now back to [employer]

## 2023-01-12 NOTE — ASSESSMENT & PLAN NOTE
- Slightly worsened today, as he was recently ill treated with steroids with some modest improvement.  -Chest x-ray today without evidence of pneumonia, will defer antibiotics

## 2023-01-13 ENCOUNTER — TELEPHONE (OUTPATIENT)
Dept: PULMONOLOGY | Age: 63
End: 2023-01-13

## 2023-01-13 DIAGNOSIS — J42 BRONCHIOLITIS OBLITERANS (HCC): Primary | ICD-10-CM

## 2023-01-13 NOTE — TELEPHONE ENCOUNTER
Pt states he is running a fever 99.1 and loosing his voice   Would like something called in     [facility]

## 2023-01-17 ENCOUNTER — NURSE ONLY (OUTPATIENT)
Dept: CARDIOLOGY CLINIC | Age: 63
End: 2023-01-17
Payer: COMMERCIAL

## 2023-01-17 DIAGNOSIS — I42.8 NICM (NONISCHEMIC CARDIOMYOPATHY) (HCC): ICD-10-CM

## 2023-01-17 DIAGNOSIS — Z95.810 BIVENTRICULAR ICD (IMPLANTABLE CARDIOVERTER-DEFIBRILLATOR) IN PLACE: Primary | ICD-10-CM

## 2023-01-17 DIAGNOSIS — I50.22 CHRONIC SYSTOLIC HF (HEART FAILURE) (HCC): ICD-10-CM

## 2023-01-17 PROCEDURE — G2066 INTER DEVC REMOTE 30D: HCPCS | Performed by: NURSE PRACTITIONER

## 2023-01-17 PROCEDURE — 93297 REM INTERROG DEV EVAL ICPMS: CPT | Performed by: NURSE PRACTITIONER

## 2023-01-17 RX ORDER — LEVOFLOXACIN 500 MG/1
500 TABLET, FILM COATED ORAL DAILY
Qty: 5 TABLET | Refills: 0 | Status: SHIPPED | OUTPATIENT
Start: 2023-01-17 | End: 2023-01-22

## 2023-01-17 NOTE — TELEPHONE ENCOUNTER
How long have you had the cough? 7 days  Have you tried anything OTC? Yes. Mucinex and Robotussin, Predinsone,(for 2 days from Dr. Elijah Torres) Nic Shelton. Are you coughing anything up? Yes. What color? Yellow-Green  Have you had a fever? 100.5 over weekend. Are you more SOB than normal? Yes. Pt has been using nebulizer x4 days  Pt requesting an antibiotic.

## 2023-01-18 NOTE — PROGRESS NOTES
Remote transmission received from patient's CRT-D monitor at home (programmed VVI RV @ 40bpm per 11.2017 specialty comments). Transmission shows normal sensing and pacing function. No new arrhythmias/ or events. Ap 0%  RVp 0%    Thoracic impedance is trending up from reference line @ 102.4 ohm. NP will review. See interrogation under cardiology tab in the [address] [place] [address] [address] field for more details. Will continue to monitor remotely. (End of 31-day monitoring period 1/17/23).

## 2023-01-24 ENCOUNTER — TELEPHONE (OUTPATIENT)
Dept: PULMONOLOGY | Age: 63
End: 2023-01-24

## 2023-01-24 ENCOUNTER — ANTI-COAG VISIT (OUTPATIENT)
Dept: PHARMACY | Age: 63
End: 2023-01-24
Payer: COMMERCIAL

## 2023-01-24 ENCOUNTER — APPOINTMENT (OUTPATIENT)
Dept: PHARMACY | Age: 63
End: 2023-01-24
Payer: COMMERCIAL

## 2023-01-24 DIAGNOSIS — R05.2 SUBACUTE COUGH: Primary | ICD-10-CM

## 2023-01-24 DIAGNOSIS — I26.99 OTHER ACUTE PULMONARY EMBOLISM, UNSPECIFIED WHETHER ACUTE COR PULMONALE PRESENT (HCC): Primary | ICD-10-CM

## 2023-01-24 DIAGNOSIS — Z79.01 CHRONIC ANTICOAGULATION: ICD-10-CM

## 2023-01-24 LAB — INR BLD: 1.7

## 2023-01-24 PROCEDURE — 85610 PROTHROMBIN TIME: CPT

## 2023-01-24 PROCEDURE — 99211 OFF/OP EST MAY X REQ PHY/QHP: CPT

## 2023-01-24 NOTE — TELEPHONE ENCOUNTER
Mike Purcell calling requesting an appointment due to his cough. He still has 1 day left on his antibiotics and no other symptoms other than the cough which he states is driving him nuts. He is scheduled for 2/14/23 but stated he was supposed to come back in 2 weeks.

## 2023-01-24 NOTE — PROGRESS NOTES
Maddison Veras is a 60 y.o. here for warfarin management. Vince Gonsalez had an INR test today. Results were reviewed and appropriate warfarin management was completed. This visit was performed as: An in person visit. Protocols were followed with precautions to reduce the spread of COVID-19. Patient verifies current warfarin dosing regimen: Yes     Warfarin medication reviewed and updated on the patient 's home medication list: Yes   All other medications reviewed and updated on the patient 's home medication list: Yes     Lab Results   Component Value Date    INR 1.70 2023    INR 2.4 2023    INR 5.8 2023       Patient Findings       Positives:  Change in health (URI, coughing since last week), Change in activity (walking daily now with the new year), Change in medications (Finished prednisone , started levofloxacin 500 mg daily for 5 days ())    Negatives:  Signs/symptoms of thrombosis (numbness in right leg, no increased swelling), Signs/symptoms of bleeding, Change in alcohol use, Upcoming invasive procedure, Emergency department visit, Upcoming dental procedure, Missed doses, Extra doses, Change in diet/appetite, Hospital admission, Bruising            Anticoagulation Summary  As of 2023      INR goal:  2.0-3.0   TTR:  37.6 % (7.2 y)   INR used for dosin.70 (2023)   Warfarin maintenance plan:  5 mg (5 mg x 1) every Mon, Wed, Fri; 2.5 mg (5 mg x 0.5) all other days; Starting 2023   Weekly warfarin total:  25 mg   Plan last modified:  Berline Halsted (8/15/2022)   Next INR check:  2023   Priority:  Maintenance   Target end date:   Indefinite    Indications    Pulmonary embolus (HCC) [I26.99]  Chronic anticoagulation [Z79.01]                 Anticoagulation Episode Summary       INR check location:  Anticoagulation Clinic    Preferred lab:      Send INR reminders to:  [facility]    Comments:  EPIC - finger stick every 2-3 weeks  Consent: 1/24/23          Anticoagulation Care Providers       Provider Role Specialty Phone number    Dylan Murphy MD Referring Internal Medicine [phone]            There were no vitals taken for this visit. Warfarin assessment / plan:     Appears well  Sub-therapeutic INR     Denies missed doses. Denies increased vitamin K intake. Denies alcohol changes. Medication changes. Patient just finished a course of oral steroids on 1/21 and started taking levofloxacin 500 mg daily for 5 days  (1/22-1/27)  Recent increased activity. Signs or symptoms of clotting. Patient has numbness and tingling in right leg. No swelling. Likely related to hip pain he has been experiencing. Patient's INR in subtherapeutic today at 1.7. Patient does endorse starting to go on walks every day since new years where he previously did not. Patient was on 2.5 mg daily (30% decrease) since last INR check. The lower dose and the increased activity could contribute to the lower INR today. As patient is on levofloxacin, which can increase the anticoagulant effect of warfarin, will not give boost dose today. Will take warfarin 2.5 mg instead of 5 mg on 1/27 and then continue his previous warfarin regimen of 5 mg Monday, Wednesday, and Friday and 2.5 mg all other days. Will reassess INR in 2 weeks. Description    Take 2.5 mg on 1/27 instead of 5 mg  THEN CONTINUE: Take warfarin 2.5mg daily except for 5mg on Monday, Wednesday, and Friday    Call [phone] with signs or symptoms of bleeding or ANY medication changes (including antibiotics, steroids, over-the-counter medications or herbal supplements). If significant bleeding occurs or if you fall and hit your head, please seek immediate medical attention. Keep the number of servings of vitamin K containing foods (dark green, leafy vegetables) the same each week. About 4 times a week (M,W,F,Sun). Please call if this changes. Limit alcohol intake.  Please call if this changes. Immunization History   Administered Date(s) Administered    COVID-19, MODERNA BLUE border, Primary or Immunocompromised, (age 12y+), IM, 100 mcg/0.5mL 03/09/2021, 04/08/2021, 12/15/2021, 07/23/2022    COVID-19, MODERNA Bivalent BOOSTER, (age 12y+), IM, 48 mcg/0.5 mL 12/30/2022    Influenza, FLUAD, (age 72 y+), Adjuvanted, 0.5mL 10/27/2022    Influenza, FLUARIX, FLULAVAL, FLUZONE (age 10 mo+) AND AFLURIA, (age 1 y+), PF, 0.5mL 10/27/2017, 09/23/2019, 10/02/2020    Influenza, FLUBLOK, (age 25 y+), PF, 0.5mL 10/03/2018    Influenza, FLUCELVAX, (age 10 mo+), MDCK, PF, 0.5mL 10/26/2021    Pneumococcal Conjugate 13-valent ([id number]) 09/11/2015    Pneumococcal Conjugate Vaccine 08/15/2017    Pneumococcal Polysaccharide ([id number]) 08/01/2007, 12/19/2012    Tdap (Boostrix, Adacel) 08/19/2014           Orders Placed This Encounter   Procedures    Protime-INR     This external order was created through the results console. No orders of the defined types were placed in this encounter. Reviewed AVS with patient / caregiver.     Billing Points:  Adjust dosage and/or reconcile meds (fill pill box) </= 5 medications - 2 points       CLINICAL PHARMACY CONSULT: MED RECONCILIATION/REVIEW ADDENDUM    For Pharmacy Admin Tracking Only    Intervention Detail: Dose Adjustment: 1, reason: Therapy Optimization  Total # of Interventions Recommended: 1  Total # of Interventions Accepted: 1  Time Spent (min): 15    Thank you,    Reed Matthew, PharmD Candidate [id number]

## 2023-01-25 ENCOUNTER — TELEPHONE (OUTPATIENT)
Dept: PULMONOLOGY | Age: 63
End: 2023-01-25

## 2023-01-25 DIAGNOSIS — R05.2 SUBACUTE COUGH: Primary | ICD-10-CM

## 2023-01-25 NOTE — TELEPHONE ENCOUNTER
The meds called into are not manufactured any longer  They need a new medication to replace it  thanks

## 2023-01-25 NOTE — TELEPHONE ENCOUNTER
Pt called  Appt made How Severe Is Your Skin Lesion?: mild Has Your Skin Lesion Been Treated?: not been treated Is This A New Presentation, Or A Follow-Up?: Skin Lesion Additional History: Patient states lesion will resolve and return.

## 2023-01-26 ENCOUNTER — OFFICE VISIT (OUTPATIENT)
Dept: PULMONOLOGY | Age: 63
End: 2023-01-26
Payer: COMMERCIAL

## 2023-01-26 VITALS
TEMPERATURE: 97.9 F | HEIGHT: 66 IN | RESPIRATION RATE: 18 BRPM | OXYGEN SATURATION: 96 % | WEIGHT: 227 LBS | DIASTOLIC BLOOD PRESSURE: 80 MMHG | BODY MASS INDEX: 36.48 KG/M2 | SYSTOLIC BLOOD PRESSURE: 124 MMHG | HEART RATE: 103 BPM

## 2023-01-26 DIAGNOSIS — R05.2 SUBACUTE COUGH: Primary | ICD-10-CM

## 2023-01-26 PROCEDURE — G8484 FLU IMMUNIZE NO ADMIN: HCPCS | Performed by: INTERNAL MEDICINE

## 2023-01-26 PROCEDURE — 3079F DIAST BP 80-89 MM HG: CPT | Performed by: INTERNAL MEDICINE

## 2023-01-26 PROCEDURE — 3017F COLORECTAL CA SCREEN DOC REV: CPT | Performed by: INTERNAL MEDICINE

## 2023-01-26 PROCEDURE — 99214 OFFICE O/P EST MOD 30 MIN: CPT | Performed by: INTERNAL MEDICINE

## 2023-01-26 PROCEDURE — 3074F SYST BP LT 130 MM HG: CPT | Performed by: INTERNAL MEDICINE

## 2023-01-26 PROCEDURE — G8417 CALC BMI ABV UP PARAM F/U: HCPCS | Performed by: INTERNAL MEDICINE

## 2023-01-26 PROCEDURE — 1036F TOBACCO NON-USER: CPT | Performed by: INTERNAL MEDICINE

## 2023-01-26 PROCEDURE — G8427 DOCREV CUR MEDS BY ELIG CLIN: HCPCS | Performed by: INTERNAL MEDICINE

## 2023-01-26 ASSESSMENT — ENCOUNTER SYMPTOMS: RESPIRATORY NEGATIVE: 1

## 2023-01-26 NOTE — PROGRESS NOTES
[address] [facility]   Kaela Joseph (:  1960) is a 60 y.o. male,Established patient, here for evaluation of the following chief complaint(s):  Follow-up         ASSESSMENT/PLAN:  1. Subacute cough  Assessment & Plan:   - Suspect this is postinfectious from recent acute bronchitis  -Continue to monitor  -We will give him a Breztri sample to use for the next couple of weeks when he runs out he will go back to [facility]  -No need for further antibiotics at this time appears to be clinically improving  -We will try brompheniramine at a different pharmacy, if out of stock will evaluate for other possible cough suppressants. He thinks Tessalon Perles helped some he has those.  -Has not responded well to promethazine in the past  Orders:  -     phenylephrine-bromphen-dm 2.5-1-5 MG/5ML ELIX elixir; Take 5 mLs by mouth every 4 hours as needed (cough), Disp-118 mL, R-1Normal      Return in about 3 months (around 2023). Subjective   SUBJECTIVE/OBJECTIVE:  Presents for acute visit for lingering cough. Was recently seen for acute bronchitis completed antibiotics think his cough has improved but is continued to linger. Scant clear mucus production.  -Attempted to call in phenylephrine/brompheniramine/DM but pharmacy told him it was no longer manufactured, we will check with other pharmacies. Review of Systems   Constitutional: Negative. Respiratory: Negative. Cardiovascular: Negative. Neurological: Negative. Psychiatric/Behavioral: Negative. Objective   Physical Exam    Gen:  No acute distress. Eyes: EOMI. Anicteric sclera. No conjunctival injection. ENT: No discharge. oropharynx clear. External appearance of ears and nose normal.  Neck: Trachea midline. No mass   Resp:  No crackles. No wheezes. No rhonchi. No dullness on percussion. CV: Regular rate. Regular rhythm. No murmur or rub. No edema. Neuro:  no focal neurologic deficit. Moves all extremities  Psych: Awake and alert, Oriented x 3. Judgement and insight appropriate. Mood stable. An electronic signature was used to authenticate this note.     --Na Romero MD

## 2023-01-26 NOTE — ASSESSMENT & PLAN NOTE
- Suspect this is postinfectious from recent acute bronchitis  -Continue to monitor  -We will give him a Breztri sample to use for the next couple of weeks when he runs out he will go back to [facility]  -No need for further antibiotics at this time appears to be clinically improving  -We will try brompheniramine at a different pharmacy, if out of stock will evaluate for other possible cough suppressants.   He thinks Tessalon Perles helped some he has those.  -Has not responded well to promethazine in the past

## 2023-01-26 NOTE — TELEPHONE ENCOUNTER
Spoke to pharmacist at [facility]. Pharmacist states Phenlyephrine component is no longer available. Pseudoifed is available to order.     Call routed to Giulia Burns

## 2023-01-27 RX ORDER — BROMPHENIRAMINE MALEATE, PSEUDOEPHEDRINE HYDROCHLORIDE, AND DEXTROMETHORPHAN HYDROBROMIDE 2; 30; 10 MG/5ML; MG/5ML; MG/5ML
5 SYRUP ORAL 4 TIMES DAILY PRN
Qty: 473 ML | Refills: 1 | Status: SHIPPED | OUTPATIENT
Start: 2023-01-27

## 2023-01-27 NOTE — TELEPHONE ENCOUNTER
Martha Enciso   This message was supposed to be routed  to . Are you still ok with this rx for Pedro Bishop?   I do see that you sent rx in. ty

## 2023-01-27 NOTE — TELEPHONE ENCOUNTER
Sent in     1. Subacute cough     - pseudoephedrine (SUDAFED SINUS CONGESTION 24HR) 240 MG TB24 extended release tablet; Take 1 tablet by mouth daily  Dispense: 14 tablet;  Refill: 0

## 2023-02-08 ENCOUNTER — ANTI-COAG VISIT (OUTPATIENT)
Dept: PHARMACY | Age: 63
End: 2023-02-08
Payer: COMMERCIAL

## 2023-02-08 DIAGNOSIS — Z79.01 CHRONIC ANTICOAGULATION: ICD-10-CM

## 2023-02-08 DIAGNOSIS — I26.99 OTHER ACUTE PULMONARY EMBOLISM, UNSPECIFIED WHETHER ACUTE COR PULMONALE PRESENT (HCC): Primary | ICD-10-CM

## 2023-02-08 LAB — INTERNATIONAL NORMALIZATION RATIO, POC: 3.5

## 2023-02-08 PROCEDURE — 99211 OFF/OP EST MAY X REQ PHY/QHP: CPT | Performed by: SPEECH-LANGUAGE PATHOLOGIST

## 2023-02-08 PROCEDURE — 85610 PROTHROMBIN TIME: CPT | Performed by: SPEECH-LANGUAGE PATHOLOGIST

## 2023-02-08 NOTE — PROGRESS NOTES
Wellington Weir is a 60 y.o. here for warfarin management. Judit Soria had an INR test today. Results were reviewed and appropriate warfarin management was completed. This visit was performed as: An in person visit. Protocols were followed with precautions to reduce the spread of COVID-19. Patient verifies current warfarin dosing regimen: Yes     Warfarin medication reviewed and updated on the patient 's home medication list: Yes   All other medications reviewed and updated on the patient 's home medication list: No: no changes     Lab Results   Component Value Date    INR 3.5 02/08/2023    INR 1.70 01/24/2023    INR 2.4 01/12/2023           Anticoagulation Summary  As of 2/8/2023      INR goal:  2.0-3.0   TTR:  37.7 % (7.3 y)   INR used for dosing:  3.5 (2/8/2023)   Warfarin maintenance plan:  5 mg (5 mg x 1) every Mon, Wed, Fri; 2.5 mg (5 mg x 0.5) all other days; Starting 2/8/2023   Weekly warfarin total:  25 mg   Plan last modified:  Dereje Decker (8/15/2022)   Next INR check:     Priority:  Maintenance   Target end date: Indefinite    Indications    Pulmonary embolus (HCC) [I26.99]  Chronic anticoagulation [Z79.01]                 Anticoagulation Episode Summary       INR check location:  Anticoagulation Clinic    Preferred lab:      Send INR reminders to:  [facility] STAFF    Comments:  EPIC - finger stick every 2-3 weeks  Consent: 1/24/23          Anticoagulation Care Providers       Provider Role Specialty Phone number    Ashlyn Velasco MD Referring Internal Medicine [phone]            There were no vitals taken for this visit. Warfarin assessment / plan:     Appears well  Supra-therapeutic INR. Denies signs and symptoms of bleeding/bruising. Denies medication changes. Denies extra warfarin doses. Denies increased alcohol intake. Denies change in appetite. Patient is in great spirits today.  He denies any changes in his medications and is very consistent with his vitamin K intake 4 times a week on Monday, Wednesday, Friday, and Sunday. Denies alcohol use. Patient explained that his INR is always going too high or too low. Unclear what is causing the volatile values. He was previously on Levaquin and oral steroids back in January but has finished treatment and not taken any new antibiotics or steroids since then. With an INR of 3.5, plan to HOLD warfarin dose today ONLY and then continue current regimen of warfarin 2.5mg daily except for 5mg on Monday, Wednesday, and Friday. He also plans to eat an extra serving of greens tonight (2/8). Advised patient to reach out with any signs or symptoms of bleeding or bruising or new medications. Follow up and reassess INR in 2 weeks. Description    Take 2.5 mg on 1/27 instead of 5 mg  OTHERWISE CONTINUE: Warfarin 2.5mg daily except for 5mg on Monday, Wednesday, and Friday    Call [phone] with signs or symptoms of bleeding or ANY medication changes (including antibiotics, steroids, over-the-counter medications or herbal supplements). If significant bleeding occurs or if you fall and hit your head, please seek immediate medical attention. Keep the number of servings of vitamin K containing foods (dark green, leafy vegetables) the same each week. About 4 times a week (M,W,F,Sun). Please call if this changes. Limit alcohol intake. Please call if this changes.           Immunization History   Administered Date(s) Administered    COVID-19, MODERNA BLUE border, Primary or Immunocompromised, (age 12y+), IM, 100 mcg/0.5mL 03/09/2021, 04/08/2021, 12/15/2021, 07/23/2022    COVID-19, MODERNA Bivalent BOOSTER, (age 12y+), IM, 48 mcg/0.5 mL 12/30/2022    Influenza, FLUAD, (age 72 y+), Adjuvanted, 0.5mL 10/27/2022    Influenza, FLUARIX, FLULAVAL, FLUZONE (age 10 mo+) AND AFLURIA, (age 1 y+), PF, 0.5mL 10/27/2017, 09/23/2019, 10/02/2020    Influenza, FLUBLOK, (age 25 y+), PF, 0.5mL 10/03/2018    Influenza, FLUCELVAX, (age 10 mo+), MDCK, PF, 0.5mL 10/26/2021    Pneumococcal Conjugate 13-valent ([id number]) 09/11/2015    Pneumococcal Conjugate Vaccine 08/15/2017    Pneumococcal Polysaccharide ([id number]) 08/01/2007, 12/19/2012    Tdap (Boostrix, Adacel) 08/19/2014           Orders Placed This Encounter   Procedures    POCT INR     This external order was created through the results console.        No orders of the defined types were placed in this encounter.       Reviewed AVS with patient / caregiver.    Billing Points:  Adjust dosage and/or reconcile meds (fill pill box) </= 5 medications - 2 points     For Pharmacy Admin Tracking Only    Intervention Detail: Dose Adjustment: 2, reason: Therapy De-escalation  Total # of Interventions Recommended: 2  Total # of Interventions Accepted: 2  Time Spent (min): 20      Carly Torres, PharmD Candidate 2023

## 2023-02-15 ENCOUNTER — HOSPITAL ENCOUNTER (OUTPATIENT)
Dept: CT IMAGING | Age: 63
Discharge: HOME | End: 2023-02-15
Payer: COMMERCIAL

## 2023-02-15 DIAGNOSIS — J42 BRONCHIOLITIS OBLITERANS (HCC): ICD-10-CM

## 2023-02-15 PROCEDURE — 71250 CT THORAX DX C-: CPT

## 2023-02-17 ENCOUNTER — TELEPHONE (OUTPATIENT)
Dept: PULMONOLOGY | Age: 63
End: 2023-02-17

## 2023-02-17 NOTE — TELEPHONE ENCOUNTER
SW patient and asked where he got it done? I do not see anything his chart for a recent CT scan. Asked if he could have patient call [facility] to have them load it under the imaging results. Report is in the encounters under hospital encounter radiology for 2-. Please advise on this patient.

## 2023-02-17 NOTE — TELEPHONE ENCOUNTER
Lan Duarte is very unhappy that without the images, Dr Meghan Peterson will not review the CT results with him. Advised him Dr Meghan Peterson is gone for the day and I would relay the message.

## 2023-02-17 NOTE — TELEPHONE ENCOUNTER
Shayne Moreno had a CT Chest done he says Wednesday and would like to review it with Dr Nomi Gastelum. Thank you.

## 2023-02-17 NOTE — TELEPHONE ENCOUNTER
[facility] Radiology Dept and Joshua Do. She states she put a ticket in for IT because something is not loading properly.

## 2023-02-20 NOTE — TELEPHONE ENCOUNTER
Dr. Merari Cristobal I received this email regarding the images for Ena Hodge and why we could not view them:   The report and images are in EPIC. The problem was the test was order 5/10/22 so it was buried because he has had a lot of other imaging since then. In Chart Review, go to Imaging and click on Service Date.  This will bring the study to the top of the list.   Message routed to Dr. Merari Cristobal

## 2023-02-22 ENCOUNTER — ANTI-COAG VISIT (OUTPATIENT)
Dept: PHARMACY | Age: 63
End: 2023-02-22
Payer: COMMERCIAL

## 2023-02-22 DIAGNOSIS — Z79.01 CHRONIC ANTICOAGULATION: ICD-10-CM

## 2023-02-22 DIAGNOSIS — I26.99 PULMONARY EMBOLISM, UNSPECIFIED CHRONICITY, UNSPECIFIED PULMONARY EMBOLISM TYPE, UNSPECIFIED WHETHER ACUTE COR PULMONALE PRESENT (HCC): Primary | ICD-10-CM

## 2023-02-22 LAB — INTERNATIONAL NORMALIZATION RATIO, POC: 4.2

## 2023-02-22 PROCEDURE — 85610 PROTHROMBIN TIME: CPT

## 2023-02-22 PROCEDURE — 99211 OFF/OP EST MAY X REQ PHY/QHP: CPT

## 2023-02-22 NOTE — PROGRESS NOTES
Al Mcintosh is a 60 y.o. here for warfarin management. Jimmy Elmore had an INR test today. Results were reviewed and appropriate warfarin management was completed. This visit was performed as: An in person visit. Protocols were followed with precautions to reduce the spread of COVID-19. Patient verifies current warfarin dosing regimen: Yes     Warfarin medication reviewed and updated on the patient 's home medication list: Yes   All other medications reviewed and updated on the patient 's home medication list: No: no changes     Lab Results   Component Value Date    INR 4.2 2023    INR 3.5 2023    INR 1.70 2023       Patient Findings       Negatives:  Signs/symptoms of bleeding, Change in health, Missed doses, Change in medications, Change in diet/appetite, Bruising            Anticoagulation Summary  As of 2023      INR goal:  2.0-3.0   TTR:  37.5 % (7.3 y)   INR used for dosin.2 (2023)   Warfarin maintenance plan:  5 mg (5 mg x 1) every e, Fri; 2.5 mg (5 mg x 0.5) all other days; Starting 2023   Weekly warfarin total:  22.5 mg   Plan last modified:  Rey Chery RPH (2023)   Next INR check:  3/8/2023   Priority:  Maintenance   Target end date: Indefinite    Indications    Pulmonary embolus (HCC) [I26.99]  Chronic anticoagulation [Z79.01]                 Anticoagulation Episode Summary       INR check location:  Anticoagulation Clinic    Preferred lab:      Send INR reminders to:  [facility] STAFF    Comments:  EPIC - finger stick every 2-3 weeks  Consent: 23          Anticoagulation Care Providers       Provider Role Specialty Phone number    Julisa Garibay MD Referring Internal Medicine [phone]            There were no vitals taken for this visit. Warfarin assessment / plan:     Appears well  Supra-therapeutic INR. Denies signs and symptoms of bleeding/bruising. Denies medication changes.   Denies extra warfarin doses. Denies increased alcohol intake. Denies change in appetite. Denies illness, fever, vomiting or diarrhea. Denies cranberry or grapefruit juice intake. It is unclear why the INR is so elevated today. He is not currently on any antibiotics or steroids. He has no recent medication changes. No fever or diarrhea noted. He correctly identified his current warfarin regimen. He does not drink alcohol, cranberry or grapefruit juice. His INR was also elevated at his last visit with us. He denies any bruising or bleeding issues at this time. He was counseled to seek immediate medical attention for any significant bleeding or if he should fall and hit his head. For INR of 4.2, we will hold his warfarin today and decrease his weekly dose by 10%. He will return in 2 weeks for his next INR. Description    HOLD warfarin dose today ONLY and then  NEW DOSE: Warfarin 2.5mg daily except for 5mg on Tuesday and Friday    Call [phone] with signs or symptoms of bleeding or ANY medication changes (including antibiotics, steroids, over-the-counter medications or herbal supplements). If significant bleeding occurs or if you fall and hit your head, please seek immediate medical attention. Keep the number of servings of vitamin K containing foods (dark green, leafy vegetables) the same each week. About 4 times a week (M,W,F,Sun). Please call if this changes. Limit alcohol intake. Please call if this changes.           Immunization History   Administered Date(s) Administered    COVID-19, MODERNA BLUE border, Primary or Immunocompromised, (age 12y+), IM, 100 mcg/0.5mL 03/09/2021, 04/08/2021, 12/15/2021, 07/23/2022    COVID-19, MODERNA Bivalent BOOSTER, (age 12y+), IM, 48 mcg/0.5 mL 12/30/2022    Influenza, FLUAD, (age 72 y+), Adjuvanted, 0.5mL 10/27/2022    Influenza, FLUARIX, FLULAVAL, FLUZONE (age 10 mo+) AND AFLURIA, (age 1 y+), PF, 0.5mL 10/27/2017, 09/23/2019, 10/02/2020    Influenza, FLUBLOK, (age 25 y+), PF, 0.5mL 10/03/2018    Influenza, FLUCELVAX, (age 10 mo+), MDCK, PF, 0.5mL 10/26/2021    Pneumococcal Conjugate 13-valent ([id number]) 09/11/2015    Pneumococcal Conjugate Vaccine 08/15/2017    Pneumococcal Polysaccharide ([id number]) 08/01/2007, 12/19/2012    Tdap (Boostrix, Adacel) 08/19/2014           Orders Placed This Encounter   Procedures    POCT INR     This external order was created through the results console. No orders of the defined types were placed in this encounter. Reviewed AVS with patient / caregiver.     Billing Points:  Adjust dosage and/or reconcile meds (fill pill box) </= 5 medications - 2 points     For Pharmacy Admin Tracking Only    Intervention Detail: Dose Adjustment: 1, reason: Therapy Optimization  Total # of Interventions Recommended: 1  Total # of Interventions Accepted: 1  Time Spent (min): 20

## 2023-02-27 ENCOUNTER — APPOINTMENT (OUTPATIENT)
Dept: CT IMAGING | Age: 63
End: 2023-02-27
Payer: COMMERCIAL

## 2023-02-27 ENCOUNTER — HOSPITAL ENCOUNTER (EMERGENCY)
Age: 63
Discharge: HOME | End: 2023-02-27
Payer: COMMERCIAL

## 2023-02-27 ENCOUNTER — APPOINTMENT (OUTPATIENT)
Dept: GENERAL RADIOLOGY | Age: 63
End: 2023-02-27
Payer: COMMERCIAL

## 2023-02-27 VITALS
OXYGEN SATURATION: 94 % | RESPIRATION RATE: 20 BRPM | DIASTOLIC BLOOD PRESSURE: 94 MMHG | TEMPERATURE: 98.4 F | BODY MASS INDEX: 35.67 KG/M2 | HEART RATE: 84 BPM | HEIGHT: 67 IN | SYSTOLIC BLOOD PRESSURE: 148 MMHG | WEIGHT: 227.29 LBS

## 2023-02-27 DIAGNOSIS — R07.89 CHEST WALL PAIN: ICD-10-CM

## 2023-02-27 DIAGNOSIS — R10.9 BILATERAL FLANK PAIN: Primary | ICD-10-CM

## 2023-02-27 DIAGNOSIS — Z20.822 LAB TEST NEGATIVE FOR COVID-19 VIRUS: ICD-10-CM

## 2023-02-27 LAB
ALBUMIN SERPL-MCNC: 4.1 G/DL (ref 3.4–5)
ALP BLD-CCNC: 140 U/L (ref 40–129)
ALT SERPL-CCNC: 32 U/L (ref 10–40)
ANION GAP SERPL CALCULATED.3IONS-SCNC: 10 MMOL/L (ref 3–16)
AST SERPL-CCNC: 21 U/L (ref 15–37)
BACTERIA: NORMAL /HPF
BASOPHILS ABSOLUTE: 0.1 K/UL (ref 0–0.2)
BASOPHILS RELATIVE PERCENT: 1 %
BILIRUB SERPL-MCNC: 0.5 MG/DL (ref 0–1)
BILIRUBIN DIRECT: <0.2 MG/DL (ref 0–0.3)
BILIRUBIN URINE: NEGATIVE
BILIRUBIN, INDIRECT: ABNORMAL MG/DL (ref 0–1)
BLOOD, URINE: NEGATIVE
BUN BLDV-MCNC: 9 MG/DL (ref 7–20)
CALCIUM SERPL-MCNC: 10.9 MG/DL (ref 8.3–10.6)
CHLORIDE BLD-SCNC: 103 MMOL/L (ref 99–110)
CLARITY: CLEAR
CO2: 26 MMOL/L (ref 21–32)
COLOR: ABNORMAL
CREAT SERPL-MCNC: 1 MG/DL (ref 0.8–1.3)
EKG ATRIAL RATE: 85 BPM
EKG DIAGNOSIS: NORMAL
EKG P AXIS: 73 DEGREES
EKG P-R INTERVAL: 300 MS
EKG Q-T INTERVAL: 392 MS
EKG QRS DURATION: 146 MS
EKG QTC CALCULATION (BAZETT): 466 MS
EKG R AXIS: 231 DEGREES
EKG T AXIS: 40 DEGREES
EKG VENTRICULAR RATE: 85 BPM
EOSINOPHILS ABSOLUTE: 0.3 K/UL (ref 0–0.6)
EOSINOPHILS RELATIVE PERCENT: 4.4 %
EPITHELIAL CELLS, UA: 1 /HPF (ref 0–5)
GFR SERPL CREATININE-BSD FRML MDRD: >60 ML/MIN/{1.73_M2}
GLUCOSE BLD-MCNC: 130 MG/DL (ref 70–99)
GLUCOSE URINE: NEGATIVE MG/DL
HCT VFR BLD CALC: 49.1 % (ref 40.5–52.5)
HEMOGLOBIN: 16.6 G/DL (ref 13.5–17.5)
HYALINE CASTS: 2 /LPF (ref 0–8)
KETONES, URINE: NEGATIVE MG/DL
LEUKOCYTE ESTERASE, URINE: ABNORMAL
LIPASE: 16 U/L (ref 13–60)
LYMPHOCYTES ABSOLUTE: 1.5 K/UL (ref 1–5.1)
LYMPHOCYTES RELATIVE PERCENT: 26.4 %
MCH RBC QN AUTO: 30.5 PG (ref 26–34)
MCHC RBC AUTO-ENTMCNC: 33.9 G/DL (ref 31–36)
MCV RBC AUTO: 90.1 FL (ref 80–100)
MICROSCOPIC EXAMINATION: YES
MONOCYTES ABSOLUTE: 0.6 K/UL (ref 0–1.3)
MONOCYTES RELATIVE PERCENT: 9.8 %
NEUTROPHILS ABSOLUTE: 3.3 K/UL (ref 1.7–7.7)
NEUTROPHILS RELATIVE PERCENT: 58.4 %
NITRITE, URINE: POSITIVE
PDW BLD-RTO: 13.5 % (ref 12.4–15.4)
PH UA: 7.5 (ref 5–8)
PLATELET # BLD: 182 K/UL (ref 135–450)
PMV BLD AUTO: 7.6 FL (ref 5–10.5)
POTASSIUM REFLEX MAGNESIUM: 4.1 MMOL/L (ref 3.5–5.1)
PROTEIN UA: ABNORMAL MG/DL
RBC # BLD: 5.45 M/UL (ref 4.2–5.9)
RBC UA: 2 /HPF (ref 0–4)
SARS-COV-2, NAAT: NOT DETECTED
SODIUM BLD-SCNC: 139 MMOL/L (ref 136–145)
SPECIFIC GRAVITY UA: 1.01 (ref 1–1.03)
TOTAL PROTEIN: 7.3 G/DL (ref 6.4–8.2)
TROPONIN: <0.01 NG/ML
URINE REFLEX TO CULTURE: ABNORMAL
URINE TYPE: ABNORMAL
UROBILINOGEN, URINE: 1 E.U./DL
WBC # BLD: 5.7 K/UL (ref 4–11)
WBC UA: 1 /HPF (ref 0–5)

## 2023-02-27 PROCEDURE — 93005 ELECTROCARDIOGRAM TRACING: CPT | Performed by: NURSE PRACTITIONER

## 2023-02-27 PROCEDURE — 93010 ELECTROCARDIOGRAM REPORT: CPT | Performed by: INTERNAL MEDICINE

## 2023-02-27 PROCEDURE — 96374 THER/PROPH/DIAG INJ IV PUSH: CPT

## 2023-02-27 PROCEDURE — 71260 CT THORAX DX C+: CPT

## 2023-02-27 PROCEDURE — 80048 BASIC METABOLIC PNL TOTAL CA: CPT

## 2023-02-27 PROCEDURE — 84484 ASSAY OF TROPONIN QUANT: CPT

## 2023-02-27 PROCEDURE — 83690 ASSAY OF LIPASE: CPT

## 2023-02-27 PROCEDURE — 81001 URINALYSIS AUTO W/SCOPE: CPT

## 2023-02-27 PROCEDURE — 85025 COMPLETE CBC W/AUTO DIFF WBC: CPT

## 2023-02-27 PROCEDURE — 2580000003 HC RX 258: Performed by: NURSE PRACTITIONER

## 2023-02-27 PROCEDURE — 99285 EMERGENCY DEPT VISIT HI MDM: CPT

## 2023-02-27 PROCEDURE — 6360000004 HC RX CONTRAST MEDICATION: Performed by: NURSE PRACTITIONER

## 2023-02-27 PROCEDURE — 71046 X-RAY EXAM CHEST 2 VIEWS: CPT

## 2023-02-27 PROCEDURE — 80076 HEPATIC FUNCTION PANEL: CPT

## 2023-02-27 PROCEDURE — 6360000002 HC RX W HCPCS: Performed by: NURSE PRACTITIONER

## 2023-02-27 PROCEDURE — 87635 SARS-COV-2 COVID-19 AMP PRB: CPT

## 2023-02-27 RX ORDER — 0.9 % SODIUM CHLORIDE 0.9 %
1000 INTRAVENOUS SOLUTION INTRAVENOUS ONCE
Status: COMPLETED | OUTPATIENT
Start: 2023-02-27 | End: 2023-02-27

## 2023-02-27 RX ORDER — KETOROLAC TROMETHAMINE 30 MG/ML
15 INJECTION, SOLUTION INTRAMUSCULAR; INTRAVENOUS ONCE
Status: COMPLETED | OUTPATIENT
Start: 2023-02-27 | End: 2023-02-27

## 2023-02-27 RX ORDER — ASPIRIN 81 MG/1
324 TABLET, CHEWABLE ORAL ONCE
Status: DISCONTINUED | OUTPATIENT
Start: 2023-02-27 | End: 2023-02-27

## 2023-02-27 RX ADMIN — KETOROLAC TROMETHAMINE 15 MG: 30 INJECTION, SOLUTION INTRAMUSCULAR; INTRAVENOUS at 14:36

## 2023-02-27 RX ADMIN — SODIUM CHLORIDE 1000 ML: 9 INJECTION, SOLUTION INTRAVENOUS at 14:24

## 2023-02-27 RX ADMIN — IOPAMIDOL 75 ML: 755 INJECTION, SOLUTION INTRAVENOUS at 15:20

## 2023-02-27 ASSESSMENT — PAIN DESCRIPTION - ORIENTATION: ORIENTATION: LEFT

## 2023-02-27 ASSESSMENT — PAIN SCALES - GENERAL
PAINLEVEL_OUTOF10: 9
PAINLEVEL_OUTOF10: 9
PAINLEVEL_OUTOF10: 10

## 2023-02-27 ASSESSMENT — PAIN DESCRIPTION - LOCATION: LOCATION: ABDOMEN;FLANK

## 2023-02-27 ASSESSMENT — HEART SCORE: ECG: 0

## 2023-02-27 ASSESSMENT — PAIN - FUNCTIONAL ASSESSMENT
PAIN_FUNCTIONAL_ASSESSMENT: 0-10
PAIN_FUNCTIONAL_ASSESSMENT: 0-10

## 2023-02-27 NOTE — DISCHARGE INSTRUCTIONS
Return to the emergency department for new or worsening symptoms including, not limited to, developing chest pain, by nausea, vomiting, dizziness, passing out, feeling as if you are going to pass out, shortness of breath, sweats or other symptoms/concerns. Please follow-up with your cardiologist-Dr. Ondina Stephen for reevaluation by calling tomorrow to schedule appointment. Please call your urologist-Dr. Nicole Brown for evaluation of your chronic bilateral flank pain with intrarenal stones as you state that he is going to place ureteral stents. Continue home medications. Follow-up with your PCP-Dr. Tao Diaz by calling tomorrow to schedule appointment for evaluation.

## 2023-02-27 NOTE — ED NOTES
Discharge and education instructions reviewed. Patient verbalized understanding, teach-back successful. Patient denied questions at this time. No acute distress noted. Patient instructed to follow-up as noted - return to emergency department if symptoms worsen. Patient verbalized understanding. Discharged per EDMD with discharged instructions.      Wilda Ivey RN  02/27/23 [id number]

## 2023-02-28 NOTE — ED PROVIDER NOTES
I did not perform an evaluation of Edna Mercer but was asked to review his EKG. EKG interpreted by myself.    Sinus rhythm with first-degree AV block and rate 85, R axis of 231, ,  and QTc 466, no ST elevations, nonspecific ST changes, nonspecific intraventricular block present, no significant change compared EKG from 11/16/2022        Familia Dunn MD  02/27/23 2125

## 2023-03-03 ENCOUNTER — TELEPHONE (OUTPATIENT)
Dept: PHARMACY | Age: 63
End: 2023-03-03

## 2023-03-03 ENCOUNTER — HOSPITAL ENCOUNTER (OUTPATIENT)
Dept: VASCULAR LAB | Age: 63
Discharge: HOME | End: 2023-03-03
Payer: COMMERCIAL

## 2023-03-03 DIAGNOSIS — M79.652 LEFT THIGH PAIN: ICD-10-CM

## 2023-03-03 PROCEDURE — 93971 EXTREMITY STUDY: CPT

## 2023-03-03 ASSESSMENT — ENCOUNTER SYMPTOMS
COUGH: 1
SHORTNESS OF BREATH: 0
WHEEZING: 0
ANAL BLEEDING: 0
ABDOMINAL PAIN: 0
BACK PAIN: 0
SORE THROAT: 0
NAUSEA: 0
VOMITING: 0
EYE PAIN: 0
DIARRHEA: 0

## 2023-03-03 NOTE — TELEPHONE ENCOUNTER
Returned call from Fred Nguyen RE: new medication. Vanessa Eugene began a three day dourse of Levaquin 250 mg daily. He sis not call to inform clinic. Today he is calling stating he was continuing his Levaquin 250 mg daily times three more days. Instructed him to hold his warfarin today, then return to his routine weekly dosing of warfarin 2.5 mg daily except 5 mg on Tuesdays and Fridays. Minda Monet [facility], [address] [place] [address]  Anticoagulation Clinic  [address] [place] [address]   Abner Michael [address]  [phone]      For Pharmacy [address]  Only    Intervention Detail: Dose Adjustment: 1, reason: Therapy Optimization  Total # of Interventions Recommended: 1  Total # of Interventions Accepted: 1  Time Spent (min): 10

## 2023-03-04 LAB — URINE CULTURE, ROUTINE: NORMAL

## 2023-03-04 NOTE — ED PROVIDER NOTES
[address] [facility]        Pt Name: Edna Mercer  MRN: [id number]  [place] 1960  Date of evaluation: 2/27/2023  Provider: BRENNA Melgoza - CNP  PCP: Graciela Paulino MD  Note Started: 8:06 PM EST 3/3/23      AMALIA. I have evaluated this patient. My supervising physician was available for consultation. CHIEF COMPLAINT       Chief Complaint   Patient presents with    Flank Pain     Pt arrives with c/o left flank/abd pain. Pt states he has a kidney stone and his pain medication is not controlling the pain. Pt reports blood in his urine       HISTORY OF PRESENT ILLNESS: 1 or more Elements     History from : Patient    Limitations to history : None    Edna Mercer is a 60 y.o. nontoxic, well-appearing, mildly distressed male who presents to the emergency department with exacerbation of bilateral flank pain greater on the left described as \"sharp\" rated severity of 10/10. He also endorses acute onset yesterday with constant, nonradiating, nonpleuritic, exertional, left-sided chest pain that onset at 1300 hrs. on yesterday as he is watching television. He states \"I am not worried about my heart\". Accompanying symptoms include lightheadedness, dry cough, headache, change in color of urine, and dysuria. Denies ripping or tearing sensation, nausea, vomiting, dizziness, presyncope, shortness of breath, fever, chills, sweats, change in ability to smell/taste, hemoptysis, leg/calf pain or swelling, body aches, diarrhea, urinary frequency, urgency, or urinary retention. Patient states that he has been taking his Percocet for the point pain but this is not helping. Nursing Notes were all reviewed and agreed with or any disagreements were addressed in the HPI. REVIEW OF SYSTEMS :      Review of Systems   Constitutional:  Negative for chills, diaphoresis, fatigue and fever. HENT:  Negative for congestion and sore throat.     Eyes: Negative for pain and visual disturbance. Respiratory:  Positive for cough. Negative for shortness of breath and wheezing. Cardiovascular:  Positive for chest pain. Negative for leg swelling. Gastrointestinal:  Negative for abdominal pain, anal bleeding, diarrhea, nausea and vomiting. Genitourinary:  Positive for dysuria, flank pain and hematuria. Negative for difficulty urinating, frequency and urgency. Musculoskeletal:  Negative for back pain and neck pain. Skin:  Negative for rash and wound. Neurological:  Positive for light-headedness and headaches. Negative for dizziness. Positives and Pertinent negatives as per HPI.      SURGICAL HISTORY     Past Surgical History:   Procedure Laterality Date    CHOLECYSTECTOMY  2007    COLONOSCOPY  09/21/2012    dr Rina mercedes    COLONOSCOPY  11/30/2018    daren--francine 2029=8    CYSTOSCOPY  05/17/2019    RIGHT SIDED URETEROSCOPY  Diagnostic, retrograde pyelogram and fulguration of previously resected bladder tumor base    CYSTOSCOPY Right 05/17/2019    RIGHT SIDED URETEROSCOPY FLUGERATION  OF BLADDER performed by Larry Ortiz MD at [address] Right 07/02/2021    CYSTOURETHROSCOPY WITH RIGHT RETROGRADE PYELOGRAPHY AND PLACEMENT OF RIGHT DOUBLE J STENT performed by Sunil Whitman DO at [address] Right 04/25/2022    CYSTOSCOPY, RIGHT STENT INSERTION performed by Larry Ortiz MD at [address] [place] [address] 9/8/2022    ESOPHAGEAL DILATATION performed by Flaco Maria DO at [address] [place] [address]  2015    LITHOTRIPSY      [facility] opening larger in sinuses    UPPER GASTROINTESTINAL ENDOSCOPY  03/28/2014    dr Christina Gallo N/A 02/01/2021    EGD BIOPSY performed by Richard Pacheco MD at [facility] 9/8/2022    EGD BIOPSY performed by Flaco Maria DO at [facility] ENDOSCOPY       CURRENTMEDICATIONS       Discharge Medication List as of 2/27/2023  5:18 PM        CONTINUE these medications which have NOT CHANGED    Details   brompheniramine-pseudoephedrine-DM (BROMFED DM) 2-30-10 MG/5ML syrup Take 5 mLs by mouth 4 times daily as needed for Cough, Disp-473 mL, R-1Normal      pseudoephedrine (SUDAFED SINUS CONGESTION 24HR) 240 MG TB24 extended release tablet Take 1 tablet by mouth daily, Disp-14 tablet, R-0Normal      !! phenylephrine-bromphen-dm 2.5-1-5 MG/5ML ELIX elixir Take 5 mLs by mouth every 4 hours as needed (cough), Disp-118 mL, R-1Normal      dexlansoprazole (DEXILANT) 60 MG CPDR delayed release capsule TAKE 1 CAPSULE BY MOUTH  DAILY, Disp-90 capsule, R-3Requesting 1 year supplyNormal      pregabalin (LYRICA) 75 MG capsule TAKE 2 CAPSULES BY MOUTH IN THE  MORNING AND 2 CAPSULES IN THE  EVENING, Disp-360 capsule, R-0Normal      !! phenylephrine-bromphen-dm 2.5-1-5 MG/5ML ELIX elixir Take 5 mLs by mouth every 4 hours as needed (for cough), Disp-118 mL, R-1Normal      zolpidem (AMBIEN) 10 MG tablet TAKE 1 TABLET BY MOUTH AT  NIGHT AS NEEDED FOR SLEEP, Disp-90 tablet, R-0Normal      tiZANidine (ZANAFLEX) 4 MG tablet Take 1 tablet by mouth in the morning and at bedtime, Disp-20 tablet, R-0Normal      methylPREDNISolone (MEDROL) 4 MG tablet Take 1 tablet by mouth 2 times daily, Disp-14 tablet, R-0Normal      furosemide (LASIX) 20 MG tablet Take 2.5 tablets by mouth daily, Disp-225 tablet, R-3Adjust Sig      metoprolol succinate (TOPROL XL) 25 MG extended release tablet Take 1 tablet by mouth daily, Disp-90 tablet, R-3Normal      pravastatin (PRAVACHOL) 40 MG tablet TAKE 1 TABLET BY MOUTH  DAILY, Disp-90 tablet, R-3Requesting 1 year supplyNormal      tamsulosin (FLOMAX) 0.4 MG capsule TAKE ONE CAPSULE BY MOUTH TWO TIMES A DAY, Disp-60 capsule, R-11REFILL REQUESTED WED 11/2/22 @@ 3:48PMNormal      ENTRESTO 49-51 MG per tablet Take 1  tablets twice daily by mouth, Disp-270 tablet, R-3, DAWRequesting 1 year supplyNO PRINT      TRULANCE 3 MG TABS Take 3 mg by mouth daily, DAWHistorical Med      warfarin (COUMADIN) 5 MG tablet Take 0.5 tablets by mouth daily EXCEPT 5mg every Monday, Wednesday and Friday or as directed by [facility] Coumadin Service [phone], Disp-90 tablet, R-3REFILL REQUESTED TUES 7/5/22 @@ 12:41PMNormal      Phenazopyridine HCl (AZO-DINE URINARY ANALGESIC PO) Take 2 tablets by mouth daily as neededHistorical Med      methocarbamol (ROBAXIN) 500 MG tablet Take 500 mg by mouth 4 times daily as needed (musclw spasms)Historical Med      flavoxATE (URISPAS) 100 MG tablet Take 1 tablet by mouth 3 times daily as needed for Muscle spasms, Disp-60 tablet, R-3Normal      albuterol (PROVENTIL) (2.5 MG/3ML) 0.083% nebulizer solution Take 3 mLs by nebulization every 4 hours as needed for Wheezing or Shortness of Breath, Disp-360 mL, R-5REFILL REQUESTED 4/1/21 @@ 12:45 P. Becky Hilding 2.5-2.5 MCG/ACT AERS INHALE 2 PUFFS INTO THE LUNGS DAILY, Disp-4 g, R-5REFILL REQUESTED FRI 1/7/22 @@ 1:45PMNormal      azelastine (ASTELIN) 0.1 % nasal spray 2 sprays by Nasal route 2 times daily Use in each nostril as directed, Disp-60 mL, R-3Normal      dicyclomine (BENTYL) 10 MG capsule TAKE ONE CAPSULE BY MOUTH  FOUR TIMES DAILY AS NEEDED, Disp-360 capsule, R-1Normal      melatonin 3 MG TABS tablet Take 3 mg by mouth nightly as needed Taking half tabletHistorical Med      guaiFENesin (MUCINEX) 600 MG extended release tablet Take 600 mg by mouth 2 times dailyHistorical Med      ondansetron (ZOFRAN) 4 MG tablet Take 1 tablet by mouth every 8 hours as needed for Nausea or Vomiting, Disp-20 tablet, R-0Normal      Cholecalciferol (VITAMIN D3) 1.25 MG (23935 UT) CAPS Take 1 capsule by mouth once a week, Disp-12 capsule,R-3Normal      albuterol sulfate  (90 Base) MCG/ACT inhaler INHALE 2 PUFFS INTO THE LUNGS EVERY 4 HOURS AS NEEDED FOR WHEEZING OR SHORTNESS OF BREATH, Disp-1 Inhaler,R-0Normal      polyethylene glycol (MIRALAX) PACK packet Take 17 g by mouth nightlyHistorical Med      aspirin 81 MG tablet Take 81 mg by mouth dailyHistorical Med       !! - Potential duplicate medications found. Please discuss with provider. ALLERGIES     Atrovent nasal spray [ipratropium], Ipratropium bromide hfa, Proventil [albuterol], Doxycycline, Macrobid [nitrofurantoin], Nitroglycerin, Sulfa antibiotics, and Vicodin [hydrocodone-acetaminophen]    FAMILYHISTORY       Family History   Problem Relation Age of Onset    High Blood Pressure Mother     Dementia Mother     Cancer Father         Pancreatic Ca    Cancer Paternal Uncle     Cancer Paternal Uncle     Cancer Paternal Uncle         SOCIAL HISTORY       Social History     Tobacco Use    Smoking status: Never     Passive exposure: Past    Smokeless tobacco: Never   Vaping Use    Vaping Use: Never used   Substance Use Topics    Alcohol use: No    Drug use: No       SCREENINGS        [place] Coma Scale  Eye Opening: Spontaneous  Best Verbal Response: Oriented  Best Motor Response: Obeys commands  [place]rake Coma Scale Score: 15        Heart Score for chest pain patients  History: Slightly Suspicious  ECG: Normal  Patient Age: > 39 and < 65 years  *Risk factors for Atherosclerotic disease: Obesity, Hypercholesterolemia, Hypertension  Risk Factors: > 3 Risk factors or history of atherosclerotic disease*  Troponin: < 1X normal limit  Heart Score Total: 3       CIWA Assessment  BP: (!) 148/94  Heart Rate: 84           PHYSICAL EXAM  1 or more Elements     ED Triage Vitals [02/27/23 1321]   BP Temp Temp Source Heart Rate Resp SpO2 Height Weight   133/74 98.1 °F (36.7 °C) Oral 86 17 92 % 5' 7\" (1.702 m) 227 lb 4.7 oz (103.1 kg)       Physical Exam  Vitals and nursing note reviewed. Constitutional:       Appearance: Normal appearance. He is not diaphoretic. HENT:      Head: Normocephalic and atraumatic.       Right Ear: External ear normal.      Left Ear: External ear normal.      Nose: Nose normal.      Mouth/Throat:      Mouth: Mucous membranes are moist.   Eyes:      General:         Right eye: No discharge. Left eye: No discharge. Extraocular Movements: Extraocular movements intact. Cardiovascular:      Rate and Rhythm: Normal rate and regular rhythm. Heart sounds: No murmur heard. No friction rub. No gallop. Pulmonary:      Effort: Pulmonary effort is normal. No respiratory distress. Breath sounds: No wheezing, rhonchi or rales. Abdominal:      General: Bowel sounds are normal.      Palpations: Abdomen is soft. Tenderness: There is abdominal tenderness in the right lower quadrant and left lower quadrant. There is right CVA tenderness and left CVA tenderness. There is no guarding or rebound. Musculoskeletal:         General: Normal range of motion. Cervical back: Normal range of motion and neck supple. Skin:     General: Skin is warm and dry. Capillary Refill: Capillary refill takes less than 2 seconds. Neurological:      Mental Status: He is alert and oriented to person, place, and time.    Psychiatric:         Mood and Affect: Mood normal.         Behavior: Behavior normal.         DIAGNOSTIC RESULTS   LABS:    I have reviewed and interpreted all of the currently available lab results from this visit:  Results for orders placed or performed during the hospital encounter of 02/27/23   COVID-19, Rapid    Specimen: Nasopharyngeal Swab   Result Value Ref Range    SARS-CoV-2, NAAT Not Detected Not Detected   CBC with Auto Differential   Result Value Ref Range    WBC 5.7 4.0 - 11.0 K/uL    RBC 5.45 4.20 - 5.90 M/uL    Hemoglobin 16.6 13.5 - 17.5 g/dL    Hematocrit 49.1 40.5 - 52.5 %    MCV 90.1 80.0 - 100.0 fL    MCH 30.5 26.0 - 34.0 pg    MCHC 33.9 31.0 - 36.0 g/dL    RDW 13.5 12.4 - 15.4 %    Platelets 414 688 - 202 K/uL    MPV 7.6 5.0 - 10.5 fL    Neutrophils % 58.4 %    Lymphocytes % 26.4 %    Monocytes % 9.8 %    Eosinophils % 4.4 %    Basophils % 1.0 %    Neutrophils Absolute 3.3 1.7 - 7.7 K/uL    Lymphocytes Absolute 1.5 1.0 - 5.1 K/uL    Monocytes Absolute 0.6 0.0 - 1.3 K/uL    Eosinophils Absolute 0.3 0.0 - 0.6 K/uL    Basophils Absolute 0.1 0.0 - 0.2 K/uL   Basic Metabolic Panel w/ Reflex to MG   Result Value Ref Range    Sodium 139 136 - 145 mmol/L    Potassium reflex Magnesium 4.1 3.5 - 5.1 mmol/L    Chloride 103 99 - 110 mmol/L    CO2 26 21 - 32 mmol/L    Anion Gap 10 3 - 16    Glucose 130 (H) 70 - 99 mg/dL    BUN 9 7 - 20 mg/dL    Creatinine 1.0 0.8 - 1.3 mg/dL    Est, Glom Filt Rate >60 >60    Calcium 10.9 (H) 8.3 - 10.6 mg/dL   Hepatic Function Panel   Result Value Ref Range    Total Protein 7.3 6.4 - 8.2 g/dL    Albumin 4.1 3.4 - 5.0 g/dL    Alkaline Phosphatase 140 (H) 40 - 129 U/L    ALT 32 10 - 40 U/L    AST 21 15 - 37 U/L    Total Bilirubin 0.5 0.0 - 1.0 mg/dL    Bilirubin, Direct <0.2 0.0 - 0.3 mg/dL    Bilirubin, Indirect see below 0.0 - 1.0 mg/dL   Lipase   Result Value Ref Range    Lipase 16.0 13.0 - 60.0 U/L   Troponin   Result Value Ref Range    Troponin <0.01 <0.01 ng/mL   Urinalysis with Reflex to Culture    Specimen: Urine   Result Value Ref Range    Color, UA DARK YELLOW (A) Straw/Yellow    Clarity, UA Clear Clear    Glucose, Ur Negative Negative mg/dL    Bilirubin Urine Negative Negative    Ketones, Urine Negative Negative mg/dL    Specific Gravity, UA 1.009 1.005 - 1.030    Blood, Urine Negative Negative    pH, UA 7.5 5.0 - 8.0    Protein, UA TRACE (A) Negative mg/dL    Urobilinogen, Urine 1.0 <2.0 E.U./dL    Nitrite, Urine POSITIVE (A) Negative    Leukocyte Esterase, Urine TRACE (A) Negative    Microscopic Examination YES     Urine Type Cleancatch     Urine Reflex to Culture Not Indicated    Microscopic Urinalysis   Result Value Ref Range    Bacteria, UA None Seen None Seen /HPF    Hyaline Casts, UA 2 0 - 8 /LPF    WBC, UA 1 0 - 5 /HPF    RBC, UA 2 0 - 4 /HPF    Epithelial Cells, UA 1 0 - 5 /HPF   EKG 12 Lead   Result Value Ref Range    Ventricular Rate 85 BPM    Atrial Rate 85 BPM    P-R Interval 300 ms    QRS Duration 146 ms    Q-T Interval 392 ms    QTc Calculation (Bazett) 466 ms    P Axis 73 degrees    R Axis 231 degrees    T Axis 40 degrees    Diagnosis       Sinus rhythm with 1st degree A-V blockNon-specific intra-ventricular conduction blockConfirmed by HAQ MD, Reyes Jump ([id number]) on 2/27/2023 2:37:42 PM         When ordered only abnormal lab results are displayed. All other labs were within normal range or not returned as of this dictation. EKG: When ordered, EKG's are interpreted by the Emergency Department Physician in the absence of a cardiologist.  Please see their note for interpretation of EKG. RADIOLOGY:   Non-plain film images such as CT, Ultrasound and MRI are read by the radiologist. Plain radiographic images are visualized and preliminarily interpreted by the ED Provider with the below findings:      Interpretation per the Radiologist below, if available at the time of this note:    CT CHEST ABDOMEN PELVIS W CONTRAST Additional Contrast? None   Final Result      Stable appearance of the chest.  No new lung disease. Abdomen/Pelvis: This is compared with 01/07/2023 liver: Liver is normal density. No enhancing   masses. Normal enhancement of the intrahepatic vasculature. Spleen:  Normal size. No enhancing masses      Pancreas: No enhancing masses. No ductal dilation. No adjacent fatty   stranding. Gallbladder resected. Clips in the gallbladder fossa      Bile ducts: No biliary ductal dilation      Adrenals: The adrenal glands are unremarkable      Kidneys: Mild right-sided hydronephrosis. The right ureter is not dilated. No stones along the pathway of either ureter or within the bladder. Bilateralrenal stones. Hypodense nodule consistent with a cyst on the right   kidney which is large and unchanged.   Another high density nodule at the posterior aspect of the right kidney. This has a Hounsfield units of 49. It   has a small calcification within it. It is heterogeneous but this is   unchanged since multiple previous studies. It has a small amount of fat   within it. Mild perinephric stranding. GI: No small bowel dilation. No colonic wall thickening. No large mass. The stomach is unremarkable in appearance but it is underdistended. Mesentery: No enlarged lymphadenopathy. No free fluid. No free gas. Small amount of fat extends into the umbilicus. Aorta: Aorta is of normal size. Negative for dissection. IVC is   unremarkable. Celiac axis and SMA are patent. Portal vein is patent. PELVIS      GI: No small bowel dilation. No colonic wall thickening. No enlarged   lymphadenopathy. Appendix is unremarkable in appearance. No free fluid in   the pelvis. : The bladder is unremarkable in appearance. No large mass. Phleboliths   in the pelvis. Prostate is unremarkable. Osseous: Spondylosis. Facet arthropathy. Fusion of the SI joints. IMPRESSION:   1. Mild caliectasis of the right kidney but no obstructing lesions. There is   a large parapelvic cyst.   2. Multiple nonobstructing renal stones. 3. 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 XR CHEST (2 VW)   Final Result   No radiographic evidence of acute pulmonary disease. XR CHEST (2 VW)    Result Date: 2/27/2023  EXAMINATION: TWO XRAY VIEWS OF THE CHEST 2/27/2023 2:01 pm COMPARISON: Chest x-ray dated 01/12/2023. HISTORY: ORDERING SYSTEM PROVIDED HISTORY: cards w/u TECHNOLOGIST PROVIDED HISTORY: Reason for exam:->cards w/u Reason for Exam: cards w/u FINDINGS: LINES/TUBES/OTHER: Left subclavian AICD is again noted. HEART/MEDIASTINUM: The cardiomediastinal silhouette is within normal limits.  PLEURA/LUNGS: There are no focal consolidations or pleural effusions. There is no appreciable pneumothorax. BONES/SOFT TISSUE: No acute abnormality. No radiographic evidence of acute pulmonary disease. VL Extremity Venous Left    Result Date: 3/3/2023  Lower Extremities DVT Study  Demographics   Patient Name        Keren Cee   Date of Study       03/03/2023  Gender               Male   Patient Number      [id number]  Date of Birth        1960   Visit Number        [id number]   Age                  60 year(s)   Accession Number    [id number]  Room Number   Corporate ID        [id number]     Eliz Whaley RVT   Ordering Physician              Interpreting         [facility]                                  Physician            Michela Hansen MD  Procedure Type of Study:   Veins:Lower Extremities DVT Study, VL EXTREMITY VENOUS DUPLEX LEFT. Indications for Study:Leg pain. Impressions Left Impression No evidence of deep vein or superficial vein thrombosis involving the left lower extremity and the right common femoral vein. Conclusions   Summary   No evidence of deep vein or superficial vein thrombosis involving the left  lower extremity and the contralateral proximal right common femoral vein. Signature   ------------------------------------------------------------------  Electronically signed by Michela Hansen MD  (Interpreting physician) on 03/03/2023 at 04:34 PM  ------------------------------------------------------------------  Patient Status:Routine. Study [address] [facility]. Technical Quality:Adequate visualization. Velocities are measured in cm/s ; Diameters are measured in mm Right Lower Extremities DVT Study Measurements Right 2D Measurements +--------------+----------+---------------+----------+ ! Location      ! Visualized! Compressibility! Thrombosis! +--------------+----------+---------------+----------+ ! Common Femoral!Yes       ! Yes            ! None      ! +--------------+----------+---------------+----------+ Right Doppler Measurements +--------------+------+------+------------+ ! Location      ! Signal!Reflux! Reflux (sec)! +--------------+------+------+------------+ ! Common Femoral!Phasic!      !            ! +--------------+------+------+------------+ Left Lower Extremities DVT Study Measurements Left 2D Measurements +------------------------+----------+---------------+----------+ ! Location                ! Visualized! Compressibility! Thrombosis! +------------------------+----------+---------------+----------+ ! Sapheno Femoral Junction! Yes       ! Yes            ! None      ! +------------------------+----------+---------------+----------+ ! GSV Thigh               ! Yes       ! Yes            ! None      ! +------------------------+----------+---------------+----------+ ! Common Femoral          !Yes       ! Yes            ! None      ! +------------------------+----------+---------------+----------+ ! Prox Femoral            !Yes       ! Yes            ! None      ! +------------------------+----------+---------------+----------+ ! Mid Femoral             !Yes       ! Yes            ! None      ! +------------------------+----------+---------------+----------+ ! Dist Femoral            !Yes       ! Yes            ! None      ! +------------------------+----------+---------------+----------+ ! Deep Femoral            !Yes       ! Yes            ! None      ! +------------------------+----------+---------------+----------+ ! Popliteal               !Yes       ! Yes            ! None      ! +------------------------+----------+---------------+----------+ ! GSV Below Knee          ! Yes       ! Yes            ! None      ! +------------------------+----------+---------------+----------+ ! Gastroc                 ! Yes       ! Yes            ! None      ! +------------------------+----------+---------------+----------+ ! Soleal                  !Yes       ! Yes            ! None      ! +------------------------+----------+---------------+----------+ ! PTV                     ! Yes       ! Yes            ! None      ! +------------------------+----------+---------------+----------+ ! ATV                     ! Yes       ! Yes            ! None      ! +------------------------+----------+---------------+----------+ ! Peroneal                !Yes       ! Yes            ! None      ! +------------------------+----------+---------------+----------+ ! GSV Calf                ! Yes       ! Yes            ! None      ! +------------------------+----------+---------------+----------+ ! SSV                     ! Yes       ! Yes            ! None      ! +------------------------+----------+---------------+----------+ Left Doppler Measurements +--------------+------+------+------------+ ! Location      ! Signal!Reflux! Reflux (sec)! +--------------+------+------+------------+ ! Common Femoral!Phasic!      !            ! +--------------+------+------+------------+ ! Popliteal     !Phasic!      !            ! +--------------+------+------+------------+    CT CHEST ABDOMEN PELVIS W CONTRAST Additional Contrast? None    Result Date: 2/27/2023  EXAMINATION: CT OF THE CHEST, ABDOMEN, AND PELVIS WITH CONTRAST 2/27/2023 2:55 pm TECHNIQUE: CT of the chest, abdomen and pelvis was performed with the administration of intravenous contrast. Multiplanar reformatted images are provided for review. Automated exposure control, iterative reconstruction, and/or weight based adjustment of the mA/kV was utilized to reduce the radiation dose to as low as reasonably achievable.  COMPARISON: 02/15/2023 HISTORY: ORDERING SYSTEM PROVIDED HISTORY: cp/flank and abd pain TECHNOLOGIST PROVIDED HISTORY: Reason for exam:->cp/flank and abd pain Additional Contrast?->None Decision Support Exception - unselect if not a suspected or confirmed emergency medical condition->Emergency Medical Condition (MA) Reason for Exam: cp/left flank and abd pain FINDINGS: Chest: Mediastinum: No enlarged lymphadenopathy. Small lymph nodes are scattered. No esophageal thickening. Heart: Heart size is normal. No pericardial effusion. Great vessels: the great vessels are patent and unremarkable. Aorta: Aorta is patent and unremarkable. Negative for aneurysm or dissection. Lungs/pleura: Low innumerable tiny centrilobular nodules in the left upper lobe and the superior aspect of the lower lobes. This is unchanged. This extends lower lobes. Mild atelectasis in the lingula. This is also unchanged. No new lung disease. Trachea and bronchi are patent. Soft Tissues: No enlarged axillary adenopathy. No subcutaneous mass. Thyroid is unremarkable. Osseous: No suspicious lytic or sclerotic lesions. Stable appearance of the chest.  No new lung disease. Abdomen/Pelvis: This is compared with 01/07/2023 liver: Liver is normal density. No enhancing masses. Normal enhancement of the intrahepatic vasculature. Spleen:  Normal size. No enhancing masses Pancreas: No enhancing masses. No ductal dilation. No adjacent fatty stranding. Gallbladder resected. Clips in the gallbladder fossa Bile ducts: No biliary ductal dilation Adrenals: The adrenal glands are unremarkable Kidneys: Mild right-sided hydronephrosis. The right ureter is not dilated. No stones along the pathway of either ureter or within the bladder. Bilateralrenal stones. Hypodense nodule consistent with a cyst on the right kidney which is large and unchanged. Another high density nodule at the posterior aspect of the right kidney. This has a Hounsfield units of 49. It has a small calcification within it. It is heterogeneous but this is unchanged since multiple previous studies. It has a small amount of fat within it. Mild perinephric stranding. GI: No small bowel dilation. No colonic wall thickening.   No large mass. The stomach is unremarkable in appearance but it is underdistended. Mesentery: No enlarged lymphadenopathy. No free fluid. No free gas. Small amount of fat extends into the umbilicus. Aorta: Aorta is of normal size. Negative for dissection. IVC is unremarkable. Celiac axis and SMA are patent. Portal vein is patent. PELVIS GI: No small bowel dilation. No colonic wall thickening. No enlarged lymphadenopathy. Appendix is unremarkable in appearance. No free fluid in the pelvis. : The bladder is unremarkable in appearance. No large mass. Phleboliths in the pelvis. Prostate is unremarkable. Osseous: Spondylosis. Facet arthropathy. Fusion of the SI joints. IMPRESSION: 1. Mild caliectasis of the right kidney but no obstructing lesions. There is a large parapelvic cyst. 2. Multiple nonobstructing renal stones. 3. 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 PROCEDURES   Unless otherwise noted below, none     Procedures    CRITICAL CARE TIME (.cctime)   0 minutes    PAST MEDICAL HISTORY     Mr. Sunil Bailey has a past medical history of Bronchiolitis obliterans ([address] Bundle branch block, right, Cardiomyopathy ([address] Chest pain (9/24/2019), COVID-19 virus vaccine not available, Fibromyalgia, GERD (gastroesophageal reflux disease), Hepatitis (1979), blood clots, Hyperlipemia, Hypertension, IBS (irritable bowel syndrome), Kidney stone, Neuropathy, Pneumothorax (2011), Prostatitis, Pulmonary embolism on right ([address] (2011), and Sacroiliitis ([address]      Chronic Conditions affecting Care: None    EMERGENCY DEPARTMENT COURSE and DIFFERENTIAL DIAGNOSIS/MDM:   Vitals:    Vitals:    02/27/23 1321 02/27/23 1734   BP: 133/74 (!) 148/94   Pulse: 86 84   Resp: 17 20   Temp: 98.1 °F (36.7 °C) 98.4 °F (36.9 °C)   TempSrc: Oral Oral   SpO2: 92% 94%   Weight: 227 lb 4.7 oz (103.1 kg)    Height: 5' 7\" (1.702 m) Alternate diagnoses were considered less likely based on history and physical.  Considered kidney stone, pyelonephritis, UTI, appendicitis, bowel obstruction, diverticulitis, gastroenteritis, acute coronary syndrome, pulmonary embolism, COPD/asthma, pneumonia, musculoskeletal, reflux/PUD/gastritis, pneumothorax, CHF, thoracic aortic dissection, anxiety, other. Tanvir Lebron is a 60 y.o. nontoxic, well-appearing, mildly distressed male who presents to the emergency department with exacerbation of bilateral flank pain greater on the left described as \"sharp\" rated severity of 10/10. He also endorses acute onset yesterday with constant, nonradiating, nonpleuritic, exertional, left-sided chest pain that onset at 1300 hrs. on yesterday as he is watching television. He states \"I am not worried about my heart\". Accompanying symptoms include lightheadedness, dry cough, headache, change in color of urine, and dysuria. Denies ripping or tearing sensation, nausea, vomiting, dizziness, presyncope, shortness of breath, fever, chills, sweats, change in ability to smell/taste, hemoptysis, leg/calf pain or swelling, body aches, diarrhea, urinary frequency, urgency, or urinary retention. Patient states that he has been taking his Percocet for the point pain but this is not helping. I will obtain CBC, BMP, hepatic function panel, Lipase, troponin, urine Fluxid culture, urine microscopic, COVID-19, CT chest abdomen pelvis, and EKG. Work-up pending. Patient medicated as below. Patient initially refuses Toradol states that he wants Dilaudid. Shi Madrigal He later also declined the lidocaine infusion.          Patient was given the following medications:  Medications   ketorolac (TORADOL) injection 15 mg (15 mg IntraVENous Given 2/27/23 1436)   0.9 % sodium chloride bolus (0 mLs IntraVENous Stopped 2/27/23 1546)   iopamidol (ISOVUE-370) 76 % injection 75 mL (75 mLs IntraVENous Given 2/27/23 1520)     Work-up reveals:  CBC: No leukocytosis or anemia, unremarkable  Metabolic panel: No electrolyte derangement or renal dysfunction, hyperglycemic at 130 mg/dL and [email or web address] otherwise unremarkable  Hepatic function panel: Alkaline phosphatase elevated 140 but otherwise unremarkable  Lipase: [email or web address]  Troponin: <0.01  Urine reflex to culture: Color dark yellow, protein trace, nitrate positive, leukocyte trace, otherwise unremarkable  Microscopic urinalysis: Inconsistent UTI with no bacteria seen, WBC 1, RBC 2, unremarkable. COVID-19: Not detected  CT chest abdomen pelvis: As noted above identifying stable appearance of the chest.  No new lung disease. Kidneys exhibit a mild right-sided hydronephrosis. The right ureter is not dilated. No stones along the pathway of either ureter or within the bladder. Bilateral renal stones. Hypodense nodule consistent with a cyst on the right kidney which is large and unchanged. No other high density nodule at the posterior aspect of the right kidney. This has a Hounsfield unit of 49. It has a small calcification within it. It is heterogeneous but this is unchanged since multiple previous studies. It has a small amount of fat within it. Mild perinephric stranding. Is this patient to be included in the SEP-1 Core Measure due to severe sepsis or septic shock? No   Exclusion criteria - the patient is NOT to be included for SEP-1 Core Measure due to: Infection is not suspected    CONSULTS: (Who and What was discussed)  IP CONSULT TO UROLOGY  Discussion with Other Profesionals :   Urology-Dr. Edward Velázquez-urology. Discussed patient's HPI, ED work-up, results, and treatment. Dr. Stephany Canchola recommends that the patient may follow-up as an outpatient with Dr. James Perez later this week. .      Social Determinants : None    Records Reviewed : None    CC/HPI Summary, DDx, ED Course, and Reassessment: Patient resting company on stretcher with eyes open with stable vital signs. Given he has no obstructive uropathy, has a heart score of 3, and is afebrile with stable vital signs believe he is both safe and appropriate discharged home with outpatient follow-up. Patient will follow-up on an outpatient basis and take medications as noted below. Strict return precautions. Patient verbalized understand agreeable with plan for discharge and follow-up. Disposition Considerations (include 1 Tests not done, Shared Decision Making, Pt Expectation of Test or Tx.):   Discharged with outpatient follow-up      I am the Primary Clinician of Record. FINAL IMPRESSION      1. Bilateral flank pain    2. Chest wall pain    3.  Lab test negative for COVID-19 virus          DISPOSITION/PLAN     DISPOSITION Decision to Discharge    PATIENT REFERRED TO:  Hudson Light MD  [address] [place] [address] Sherri Abraham [address]    In 1 day      [address]  [phone]    Call in 1 day      [address]  [phone]    Call in 1 day        DISCHARGE MEDICATIONS:  Discharge Medication List as of 2/27/2023  5:18 PM      Continue home medications    DISCONTINUED MEDICATIONS:  Discharge Medication List as of 2/27/2023  5:18 PM                 (Please note that portions of this note were completed with a voice recognition program.  Efforts were made to edit the dictations but occasionally words are mis-transcribed.)    BRENNA Seals CNP (electronically signed)           BRENNA Seals CNP  03/03/23 2033

## 2023-03-07 ENCOUNTER — NURSE ONLY (OUTPATIENT)
Dept: CARDIOLOGY CLINIC | Age: 63
End: 2023-03-07

## 2023-03-07 DIAGNOSIS — I42.8 NONISCHEMIC CARDIOMYOPATHY (HCC): ICD-10-CM

## 2023-03-07 DIAGNOSIS — I50.22 CHRONIC SYSTOLIC (CONGESTIVE) HEART FAILURE (HCC): ICD-10-CM

## 2023-03-07 DIAGNOSIS — Z95.810 BIVENTRICULAR ICD (IMPLANTABLE CARDIOVERTER-DEFIBRILLATOR) IN PLACE: Primary | ICD-10-CM

## 2023-03-07 NOTE — PROGRESS NOTES
Remote transmission received from patient's CRT-D monitor at home (programmed VVI RV @ 40bpm per 11.2017 specialty comments). Transmission shows normal sensing and pacing function. No new arrhythmias/events recorded. EP physician will review. Ap 0%  RVp 0%    Thoracic impedance is normal/above reference line. NP will review. End of 91-day monitoring period 3/7/23. See interrogation under cardiology tab in the [address] [place] [address] [address] field for more details. Will continue to monitor remotely.

## 2023-03-09 ENCOUNTER — OFFICE VISIT (OUTPATIENT)
Dept: CARDIOLOGY CLINIC | Age: 63
End: 2023-03-09
Payer: COMMERCIAL

## 2023-03-09 ENCOUNTER — TELEPHONE (OUTPATIENT)
Dept: CARDIOLOGY CLINIC | Age: 63
End: 2023-03-09

## 2023-03-09 ENCOUNTER — ANTI-COAG VISIT (OUTPATIENT)
Dept: PHARMACY | Age: 63
End: 2023-03-09
Payer: COMMERCIAL

## 2023-03-09 VITALS
DIASTOLIC BLOOD PRESSURE: 66 MMHG | BODY MASS INDEX: 35.94 KG/M2 | OXYGEN SATURATION: 97 % | SYSTOLIC BLOOD PRESSURE: 114 MMHG | HEIGHT: 67 IN | WEIGHT: 229 LBS | HEART RATE: 96 BPM

## 2023-03-09 DIAGNOSIS — I42.8 NON-ISCHEMIC CARDIOMYOPATHY (HCC): Primary | ICD-10-CM

## 2023-03-09 DIAGNOSIS — Z86.711 HISTORY OF PULMONARY EMBOLISM: ICD-10-CM

## 2023-03-09 DIAGNOSIS — J42 BRONCHIOLITIS OBLITERANS (HCC): ICD-10-CM

## 2023-03-09 DIAGNOSIS — I50.22 CHRONIC SYSTOLIC (CONGESTIVE) HEART FAILURE (HCC): ICD-10-CM

## 2023-03-09 DIAGNOSIS — I26.99 OTHER ACUTE PULMONARY EMBOLISM, UNSPECIFIED WHETHER ACUTE COR PULMONALE PRESENT (HCC): Primary | ICD-10-CM

## 2023-03-09 DIAGNOSIS — I10 ESSENTIAL HYPERTENSION: ICD-10-CM

## 2023-03-09 DIAGNOSIS — I48.0 PAROXYSMAL ATRIAL FIBRILLATION (HCC): ICD-10-CM

## 2023-03-09 DIAGNOSIS — Z79.01 CHRONIC ANTICOAGULATION: ICD-10-CM

## 2023-03-09 LAB — INR BLD: 1.4

## 2023-03-09 PROCEDURE — 3017F COLORECTAL CA SCREEN DOC REV: CPT | Performed by: INTERNAL MEDICINE

## 2023-03-09 PROCEDURE — 3078F DIAST BP <80 MM HG: CPT | Performed by: INTERNAL MEDICINE

## 2023-03-09 PROCEDURE — G8427 DOCREV CUR MEDS BY ELIG CLIN: HCPCS | Performed by: INTERNAL MEDICINE

## 2023-03-09 PROCEDURE — 99213 OFFICE O/P EST LOW 20 MIN: CPT | Performed by: INTERNAL MEDICINE

## 2023-03-09 PROCEDURE — G8417 CALC BMI ABV UP PARAM F/U: HCPCS | Performed by: INTERNAL MEDICINE

## 2023-03-09 PROCEDURE — G8484 FLU IMMUNIZE NO ADMIN: HCPCS | Performed by: INTERNAL MEDICINE

## 2023-03-09 PROCEDURE — 1036F TOBACCO NON-USER: CPT | Performed by: INTERNAL MEDICINE

## 2023-03-09 PROCEDURE — 3074F SYST BP LT 130 MM HG: CPT | Performed by: INTERNAL MEDICINE

## 2023-03-09 PROCEDURE — 3023F SPIROM DOC REV: CPT | Performed by: INTERNAL MEDICINE

## 2023-03-09 PROCEDURE — 93000 ELECTROCARDIOGRAM COMPLETE: CPT | Performed by: INTERNAL MEDICINE

## 2023-03-09 RX ORDER — PREDNISONE 10 MG/1
10 TABLET ORAL 2 TIMES DAILY
COMMUNITY

## 2023-03-09 RX ORDER — BEMPEDOIC ACID 180 MG/1
180 TABLET, FILM COATED ORAL DAILY
Qty: 21 TABLET | Refills: 0 | COMMUNITY
Start: 2023-03-09

## 2023-03-09 RX ORDER — BEMPEDOIC ACID 180 MG/1
180 TABLET, FILM COATED ORAL DAILY
Qty: 30 TABLET | Refills: 5 | Status: SHIPPED | OUTPATIENT
Start: 2023-03-09 | End: 2023-03-09 | Stop reason: SDUPTHER

## 2023-03-09 NOTE — PROGRESS NOTES
Arti Goldsmith is a 60 y.o. here for warfarin management. Ray Jenkins had an INR test today. Results were reviewed and appropriate warfarin management was completed. This visit was performed as: An in person visit. Protocols were followed with precautions to reduce the spread of COVID-19. Patient verifies current warfarin dosing regimen: Yes     Warfarin medication reviewed and updated on the patient 's home medication list: Yes   All other medications reviewed and updated on the patient 's home medication list: Yes     Lab Results   Component Value Date    INR 1.40 2023    INR 4.2 2023    INR 3.5 2023       Patient Findings       Positives:  Change in health, Missed doses, Change in medications    Negatives:  Signs/symptoms of bleeding, Change in alcohol use, Change in diet/appetite, Bruising    Comments:  Levaquin prescribed  for 3 days. We were not informed. Levaquin continued for 3 more days, last dose on 3/7, we had him hold warfarin 3/3 due to the drug interaction  Ketoralac first dose 3/7 - every 8 hrs as needed #15. Prednisone 10 mg twice daily 3/7 for 1 week, then 10mg daily for 1 week. Significant interaction w warfarin  Slim fast with vitamin K 20 mcg per scoop started 3/6 once daily. Plans to continue. Significant interaction with warfarin. Took warfarin 2.5mg 3/7 (vs 5mg)            Anticoagulation Summary  As of 3/9/2023      INR goal:  2.0-3.0   TTR:  37.4 % (7.4 y)   INR used for dosin.40 (3/9/2023)   Warfarin maintenance plan:  5 mg (5 mg x 1) every Mon, Wed, Fri; 2.5 mg (5 mg x 0.5) all other days; Starting 3/9/2023   Weekly warfarin total:  25 mg   Plan last modified:  Adore Santana (3/9/2023)   Next INR check:  3/21/2023   Priority:  Maintenance   Target end date:   Indefinite    Indications    Pulmonary embolus (HCC) [I26.99]  Chronic anticoagulation [Z79.01]                 Anticoagulation Episode Summary       INR check location:  Anticoagulation Clinic    Preferred lab:      Send INR reminders to:  [facility]    Comments:  EPIC - finger stick every 2-3 weeks  Consent: 1/24/23          Anticoagulation Care Providers       Provider Role Specialty Phone number    Cortez Zuñiga MD Referring Internal Medicine [phone]            There were no vitals taken for this visit. Warfarin assessment / plan:     Appears well  Sub-therapeutic INR     Denies alcohol changes. Missed dose(s) Took 2.5 mg on Tuesday 3/7. Increased Vitamin K intake. Medication changes. Prednisone, ketoralac, and Levaquin. Patient was pleasant at visit today with no complaints. Patient reported medication and vitamin changes as noted in findings. Many factors affecting his INR. Recently we decreased his warfarin dose from a 25mg weekly dose to a 22.5mg weekly dose. He plans to continue daily Slimfast w Vitamin K. Given this information, we will take him back to this 25mg weekly dose. One more week of prednisone will help increase his INR at this time. We will give a higher dose today as well. Vitamin K in his diet remained the same at 4 servings per week. He did have [place] this week which is one with a higher amount of vitamin K than his typical options. I advised patient to call us with any signs/symptoms of bleeding/bruising especially with taking Ketoralac and Prednisone, and to call us with any further medication or dietary changes. Description    Take Warfarin 5 mg TODAY ONLY 3/9/23    THEN NEW DOSE: Warfarin 2.5mg daily except for 5mg on Monday, Wednesday, and Friday. Started Slim Fast daily 3/6/23 (20 mcg of Vitamin K) (decreases INR) Call if this changes. Call [phone] with signs or symptoms of bleeding or ANY medication changes (including antibiotics, steroids, over-the-counter medications or herbal supplements).        If significant bleeding occurs or if you fall and hit your head, please seek immediate medical attention. Keep the number of servings of vitamin K containing foods (dark green, leafy vegetables) the same each week. About 4 times a week (M,W,F,Sun). Please call if this changes. Limit alcohol intake. Please call if this changes. Immunization History   Administered Date(s) Administered    COVID-19, MODERNA BLUE border, Primary or Immunocompromised, (age 12y+), IM, 100 mcg/0.5mL 2021, 2021, 12/15/2021, 2022    COVID-19, MODERNA Bivalent BOOSTER, (age 12y+), IM, 48 mcg/0.5 mL 2022    Influenza, FLUAD, (age 72 y+), Adjuvanted, 0.5mL 10/27/2022    Influenza, FLUARIX, FLULAVAL, FLUZONE (age 10 mo+) AND AFLURIA, (age 1 y+), PF, 0.5mL 10/27/2017, 2019, 10/02/2020    Influenza, FLUBLOK, (age 25 y+), PF, 0.5mL 10/03/2018    Influenza, FLUCELVAX, (age 10 mo+), MDCK, PF, 0.5mL 10/26/2021    Pneumococcal Conjugate 13-valent ([id number]) 2015    Pneumococcal Conjugate Vaccine 08/15/2017    Pneumococcal Polysaccharide ([id number]) 2007, 2012    Tdap (Boostrix, Adacel) 2014       Orders Placed This Encounter   Procedures    Protime-INR     This external order was created through the results console. No orders of the defined types were placed in this encounter. Reviewed AVS with patient / caregiver.     Billing Points:  Basic Education (disease state, med overview, expected symptoms) - 1 point   Adjust dosage and/or reconcile meds (fill pill box) </= 5 medications - 2 points     For Pharmacy Admin Tracking Only    Intervention Detail: Adherence Monitorin  Total # of Interventions Recommended: 2  Total # of Interventions Accepted: 2  Time Spent (min): 20     Adore Taylor PharmD Candidate 2023 3/10/2023 10:40 AM

## 2023-03-09 NOTE — TELEPHONE ENCOUNTER
Prepared samples for PT. Nexletol 180mg. Gave 3 boxes, 21 tablets total.  Lot# [id number] Exp: 04/2026. PT received samples during O/V today.

## 2023-03-09 NOTE — PROGRESS NOTES
[facility]      Cardiology Progress Note    Hortencia Howe  1960 March 9, 2023    CC:  \" I don't feel that great. \"     HPI:  The patient is 60 y.o. male with a past medical history significant for for a prior PE in 2011 and nonischemic cardiomyopathy. He was hospitalized at [facility] with chest pain and had an abnormal stress that led to a left heart catheterization on 7/21/16. His coronary arteries were normal but he had LV dysfunction with a LVEF of 40% at that time. A repeat echocardiogram demonstrated his LVEF to be 30% despite optimal medical therapy. Entresto therapy was initiated and he was interested in pursuing CRT. He underwent Bi-V ICD implant on 10/10/17. Biv pacing turned off,  now set at VVI 40 with defibrillator programming ON. Also with a hx of atrial tach and underwent DCCV 9/2018, AFIB, HLD, HTN, ARDS 2011, bronchiolitis obliterans and nocturnal hypoxia-O2 2L nightly managed per Pulmonary. Patient returns for follow up. He continues to report shortness of breath. Feels worn out when walking from his condo to the car. His weight remains stable. He reports joint pain. Denies any exertional chest pain/pressure, dyspnea at rest,  PND, orthopnea, palpitations, lightheadedness, weight changes, changes in LE edema, and syncope.        Past Medical History:   Diagnosis Date    Bronchiolitis obliterans ([address]     Bundle branch block, right     Cardiomyopathy ([address]     Chest pain 9/24/2019    COVID-19 virus vaccine not available     Fibromyalgia     GERD (gastroesophageal reflux disease)     Hepatitis 1979    unsure of which type    Hx of blood clots     Hyperlipemia     Hypertension     IBS (irritable bowel syndrome)     Kidney stone     over thirty kidney stones    Neuropathy     right side-chest    Pneumothorax 2011    Right    Prostatitis     Pulmonary embolism on right [facility]) 2011    upper lobe-coumadin 2011    Sacroiliitis [facility])      Past Surgical History:   Procedure Laterality Date    CHOLECYSTECTOMY  2007    COLONOSCOPY  09/21/2012    dr Chuy mercedes    COLONOSCOPY  11/30/2018    daren--francine 2029=8    CYSTOSCOPY  05/17/2019    RIGHT SIDED URETEROSCOPY  Diagnostic, retrograde pyelogram and fulguration of previously resected bladder tumor base    CYSTOSCOPY Right 05/17/2019    RIGHT SIDED URETEROSCOPY FLUGERATION  OF BLADDER performed by Lion Kohler MD at [address] Right 07/02/2021    CYSTOURETHROSCOPY WITH RIGHT RETROGRADE PYELOGRAPHY AND PLACEMENT OF RIGHT DOUBLE J STENT performed by Micah Sauer DO at [address] Right 04/25/2022    CYSTOSCOPY, RIGHT STENT INSERTION performed by Lion Kohler MD at [address] 9/8/2022    ESOPHAGEAL DILATATION performed by John Greenwood DO at [address] [facility] [place]note 2]  2015    LITHOTRIPSY      [facility] opening larger in sinuses    UPPER GASTROINTESTINAL ENDOSCOPY  03/28/2014    dr Chuy mercedes    UPPER GASTROINTESTINAL ENDOSCOPY N/A 02/01/2021    EGD BIOPSY performed by Jackelin Beasley MD at [address] [place] [address] N/A 9/8/2022    EGD BIOPSY performed by John Greenwood DO at [facility] ENDOSCOPY     Family History   Problem Relation Age of Onset    High Blood Pressure Mother     Dementia Mother     Cancer Father         Pancreatic Ca    Cancer Paternal Uncle     Cancer Paternal Uncle     Cancer Paternal Uncle      Social History     Tobacco Use    Smoking status: Never     Passive exposure: Past    Smokeless tobacco: Never   Vaping Use    Vaping Use: Never used   Substance Use Topics    Alcohol use: No    Drug use: No       Allergies   Allergen Reactions    Atrovent Nasal Spray [Ipratropium] Anaphylaxis and Other (See Comments)     Chest tightness    Ipratropium Bromide Hfa Shortness Of Breath    Proventil [Albuterol] Shortness Of Breath    Doxycycline Hives    Macrobid [Nitrofurantoin] Hives    Nitroglycerin Other (See Comments)     Severe hypotension (went from 139 to 66 systolic)    Sulfa Antibiotics Rash    Vicodin [Hydrocodone-Acetaminophen] Rash     Current Outpatient Medications   Medication Sig Dispense Refill    ketorolac (TORADOL) 10 MG tablet Take 1 tablet by mouth every 8 hours as needed for Pain 15 tablet 0    brompheniramine-pseudoephedrine-DM (BROMFED DM) 2-30-10 MG/5ML syrup Take 5 mLs by mouth 4 times daily as needed for Cough 473 mL 1    pseudoephedrine (SUDAFED SINUS CONGESTION 24HR) 240 MG TB24 extended release tablet Take 1 tablet by mouth daily 14 tablet 0    phenylephrine-bromphen-dm 2.5-1-5 MG/5ML ELIX elixir Take 5 mLs by mouth every 4 hours as needed (cough) 118 mL 1    dexlansoprazole (DEXILANT) 60 MG CPDR delayed release capsule TAKE 1 CAPSULE BY MOUTH  DAILY 90 capsule 3    pregabalin (LYRICA) 75 MG capsule TAKE 2 CAPSULES BY MOUTH IN THE  MORNING AND 2 CAPSULES IN THE  EVENING 360 capsule 0    phenylephrine-bromphen-dm 2.5-1-5 MG/5ML ELIX elixir Take 5 mLs by mouth every 4 hours as needed (for cough) 118 mL 1    zolpidem (AMBIEN) 10 MG tablet TAKE 1 TABLET BY MOUTH AT  NIGHT AS NEEDED FOR SLEEP 90 tablet 0    tiZANidine (ZANAFLEX) 4 MG tablet Take 1 tablet by mouth in the morning and at bedtime 20 tablet 0    furosemide (LASIX) 20 MG tablet Take 2.5 tablets by mouth daily 225 tablet 3    metoprolol succinate (TOPROL XL) 25 MG extended release tablet Take 1 tablet by mouth daily 90 tablet 3    pravastatin (PRAVACHOL) 40 MG tablet TAKE 1 TABLET BY MOUTH  DAILY 90 tablet 3    tamsulosin (FLOMAX) 0.4 MG capsule TAKE ONE CAPSULE BY MOUTH TWO TIMES A DAY 60 capsule 11    ENTRESTO 49-51 MG per tablet Take 1  tablets twice daily by mouth 270 tablet 3    TRULANCE 3 MG TABS Take 3 mg by mouth daily      warfarin (COUMADIN) 5 MG tablet Take 0.5 tablets by mouth daily EXCEPT 5mg every Monday, Wednesday and Friday or as directed by [facility] [phone] (Patient  taking differently: Take 2.5 mg by mouth daily EXCEPT 5mg every Tuesday and Friday or as directed by [facility] [phone]) 90 tablet 3    Phenazopyridine HCl (AZO-DINE URINARY ANALGESIC PO) Take 2 tablets by mouth daily as needed      methocarbamol (ROBAXIN) 500 MG tablet Take 500 mg by mouth 4 times daily as needed (musclw spasms)      flavoxATE (URISPAS) 100 MG tablet Take 1 tablet by mouth 3 times daily as needed for Muscle spasms 60 tablet 3    albuterol (PROVENTIL) (2.5 MG/3ML) 0.083% nebulizer solution Take 3 mLs by nebulization every 4 hours as needed for Wheezing or Shortness of Breath (Patient taking differently: Take 2.5 mg by nebulization in the morning, at noon, and at bedtime) 360 mL 5    STIOLTO RESPIMAT 2.5-2.5 MCG/ACT AERS INHALE 2 PUFFS INTO THE LUNGS DAILY 4 g 5    azelastine (ASTELIN) 0.1 % nasal spray 2 sprays by Nasal route 2 times daily Use in each nostril as directed 60 mL 3    dicyclomine (BENTYL) 10 MG capsule TAKE ONE CAPSULE BY MOUTH  FOUR TIMES DAILY AS NEEDED 360 capsule 1    melatonin 3 MG TABS tablet Take 3 mg by mouth nightly as needed Taking half tablet      guaiFENesin (MUCINEX) 600 MG extended release tablet Take 600 mg by mouth 2 times daily      ondansetron (ZOFRAN) 4 MG tablet Take 1 tablet by mouth every 8 hours as needed for Nausea or Vomiting 20 tablet 0    Cholecalciferol (VITAMIN D3) 1.25 MG (75937 UT) CAPS Take 1 capsule by mouth once a week 12 capsule 3    albuterol sulfate  (90 Base) MCG/ACT inhaler INHALE 2 PUFFS INTO THE LUNGS EVERY 4 HOURS AS NEEDED FOR WHEEZING OR SHORTNESS OF BREATH 1 Inhaler 0    polyethylene glycol (MIRALAX) PACK packet Take 17 g by mouth nightly      aspirin 81 MG tablet Take 81 mg by mouth daily       No current facility-administered medications for this visit. Review of Systems:  Constitutional: no unanticipated weight loss. There's been no change in energy level, sleep pattern, or activity level. No fevers, chills. Eyes: No visual changes or diplopia. No scleral icterus. ENT: No Headaches, hearing loss or vertigo. No mouth sores or sore throat. Cardiovascular: as reviewed in HPI  Respiratory: No cough or wheezing, no sputum production. No hematemesis. Gastrointestinal: No abdominal pain, appetite loss, blood in stools. No change in bowel or bladder habits. Genitourinary: No dysuria, trouble voiding, or hematuria. Musculoskeletal:  No gait disturbance, no joint complaints. Integumentary: No rash or pruritis. Neurological: No headache, diplopia, change in muscle strength, numbness or tingling. Psychiatric: No anxiety or depression. Endocrine: No temperature intolerance. No excessive thirst, fluid intake, or urination. No tremor. Hematologic/Lymphatic: No abnormal bruising or bleeding, blood clots or swollen lymph nodes. Allergic/Immunologic: No nasal congestion or hives. Physical Exam:   /66   Pulse 96   Ht 5' 7\" (1.702 m)   Wt 229 lb (103.9 kg)   SpO2 97%   BMI 35.87 kg/m²   Wt Readings from Last 3 Encounters:   03/09/23 229 lb (103.9 kg)   03/02/23 230 lb 3.2 oz (104.4 kg)   02/28/23 229 lb (103.9 kg)     Constitutional: The patient is oriented to person, place, and time. Appears well-developed and well-nourished. In no acute distress. Head: Normocephalic and atraumatic. Pupils equal and round. Neck: Neck supple. No JVP or carotid bruit appreciated. No mass and no thyromegaly present. No lymphadenopathy present. Cardiovascular: Normal rate. Normal heart sounds. Exam reveals no gallop and no friction rub. No murmur heard. Pulmonary/Chest: Effort normal and breath sounds normal. No respiratory distress. No wheezes, rhonchi or rales. Abdominal: Soft, non-tender. Bowel sounds are normal. Exhibits no organomegaly, mass or bruit. Extremities: No edema. No cyanosis or clubbing. Pulses are 2+ radial and carotid bilaterally. Neurological: No gross cranial nerve deficit.  Coordination normal. Skin: Skin is warm and dry. There is no rash or diaphoresis. Psychiatric: Patient has a normal mood and affect. Speech is normal and behavior is normal.     Lab Review:   Lab Results   Component Value Date/Time    TRIG 216 06/06/2022 12:11 PM    HDL 38 12/06/2022 12:08 PM    HDL 65 11/10/2011 06:05 AM    LDLCALC 91 12/06/2022 12:08 PM    LABVLDL 38 12/06/2022 12:08 PM       Lab Results   Component Value Date/Time    BUN 9 02/27/2023 02:12 PM    CREATININE 1.0 02/27/2023 02:12 PM     EKG Interpretation:   2/14/18: sinus rhythm with LBBB  11/13/19: Sinus rhythm LBBB  2/28/20: Sinus rhythm LBBB  2/25/21: SR with LBBB.   7/21/21: Sinus  Rhythm with Left bundle branch block. 12/8/22 not completed     Image Review:     ECHO 11/28/17  Summary   Normal left ventricular wall thickness. Ejection fraction is visually   estimated to be 40-45%. There is septal hypokinesis (secondary to Pacing)   Trivial mitral & tricuspid regurgitation is present. The left atrial size is normal.   Pacer / ICD wire is visualized in the right ventricle. There appears to be normal right ventricular size and function. Echo: 7/24/17  Left ventricle size is normal. Mild concentric left ventricular hypertrophy  is present. Global ejection fraction is moderately decreased and estimated  from 30 % to 35%. Diastolic filling parameters suggests grade I diastolic  dysfunction . There is abnormal (paradoxical) septal motion consistent with left bundle  branch block. Mitral valve is structurally normal.  Trivial to mild mitral regurgitation is present. The left atrial size is normal.  A bubble study was performed and fails to show evidence of right to left  shunting. Echo: 11/9/16  Dilated LV with severely reduced systolic function. EF 30%. Anterior, septal  and apical akinesis. Mild mitral regurgitation is present. The left atrial size is normal.  Normal right ventricular size and function.      Stress Test:  7/21/16  SPECT perfusion images: On the stress corrected images there is a moderate defect in the septum of the left ventricle. At rest, partially redistributes especially the posterior lateral component. Complete redistribution is not present. LVEF:  49 %      (Normal is at least 62% for women and 53% for men)  LV wall motion:   There is significant hypokinesia of the ventricular septum  LVEDV: 117ml  (Normal is up to 100ml for women and 150ml for men)  Transient dilatation ratio:   1.0      (Normal is up to 1.3 for women and 1.2 for men)     Cath: 7/21/16  #1-coronary angiography summary: Normal coronaries in a left   dominant system  A-left main coronary is normal  B-the LAD has minor irregularities and no significant stenoses  C-the circumflex is dominant and has minor irregularities and no   significant stenoses  D-the right coronary is a small nondominant vessel and is   angiographically normal  #2-ventriculography in the ADKINS projection demonstrates mild   global left ventricular dysfunction ejection fractions   approximately 40  #3-aortic pressure is approximately 150/80 left ventricular   pressures 150/14 there is no gradient on pullback  #4-WYNHE are no complications  #7-IHE patient will be treated medically for left ventricular   dysfunction in the setting of normal coronaries     ECHO 9/25/19  Mild concentric LVH with normal LV size and wall motion. EF is 50%. Paradoxical septal motion secondary to paced rhythm  Trivial mitral regurgitation is present. The left atrium is normal in size. The right ventricle is normal in size and function. Pacing wire seen. Stress study:8/26/20  No evidence of reversible ischemia. There is a moderate to large sized, moderate intensity, fixed septal and    inferior wall defect which is most consistent with LBBB induced artifact and    diaphragm attenuation artifact. Normal LV size and systolic function. Overall findings represent a low risk study.          Echo: 10/2020  - Left ventricle: The cavity size is normal. Wall thickness is    normal. Systolic function was mildly reduced. The calculated    ejection fraction was 42%. Normal diastolic function. - Ventricular septum: Septal motion is paradoxical septal motion    consistent with a conduction abnormality or paced rhythm. - Right ventricle: Pacer wire noted in right ventricle. - Inferior vena cava: The vessel was normal in size; the    respirophasic diameter changes were in the normal range (&gt;= 50%);    findings are consistent with normal central venous pressure. RHC: 3/2/21  HEMODYNAMIC DATA:  The right atrial pressure mean was 6 with oxygen  saturation of 78%. RV pressure was 39 systolic with oxygen saturation  of 77%. PA pressure was 30/13 with mean of 22. Pulmonary capillary  wedge pressure mean was 17 mmHg. Cardiac output by thermodilution  method was 4.81 liters per minute. Index was 2.34 liters per minute per  body surface area. By Mitchael Sake method, cardiac output was 6.88 liters per  minute with a cardiac index of 3.35 liters per minute per body surface area. OVERALL IMPRESSION:  1. Slightly above normal right heart pressure with slightly elevated  pulmonary capillary wedge pressure of 17 mmHg. 2.  Normal cardiac output and cardiac indices. In view of the above findings, we will restart the patient on diuretic  therapy and we will follow his symptoms. Echo: 6/2022   Normal left ventricular wall thickness. The left ventricle appears normal in size. Mildly decreased left ventricular systolic function with an estimated   ejection fraction of 25-30% . Anterior, anterior septum, inferior septum are hypokinetic   Diastolic filling parameters suggest grade I diastolic dysfunction. The right ventricle is normal in size and function. No clinically significant valvular heart disease    Echo: 12/6/22  LV is mildly dilated with moderately reduced Ejection fraction is visually   estimated to be 35 %.    Global hypokinesis. Definity was refused by patient   The right ventricle is normal in size and function. Pacer / ICD wire is visualized in the right ventricle    CT Chest ABD/[facility] 2/27/23      Chest:       Mediastinum: No enlarged lymphadenopathy. Small lymph nodes are scattered. No esophageal thickening. Heart: Heart size is normal. No pericardial effusion. Great vessels: the great vessels are patent and unremarkable. Aorta: Aorta is patent and unremarkable. Negative for aneurysm or dissection. Lungs/pleura: Low innumerable tiny centrilobular nodules in the left upper   lobe and the superior aspect of the lower lobes. This is unchanged. This   extends lower lobes. Mild atelectasis in the lingula. This is also   unchanged. No new lung disease. Trachea and bronchi are patent. Soft Tissues: No enlarged axillary adenopathy. No subcutaneous mass. Thyroid   is unremarkable. Osseous: No suspicious lytic or sclerotic lesions. Venous doppler: 3/3/2023     Left Impression   No evidence of deep vein or superficial vein thrombosis involving the left   lower extremity and the right common femoral vein. Conclusions        Summary        No evidence of deep vein or superficial vein thrombosis involving the left    lower extremity and the contralateral proximal right common femoral vein. Assessment/Plan:     Nonischemic Cardiomyopathy/chronic systolic heart failure   He had Biv -ICD placed by Dr. Ria Bell for underlying cardiomyopathy with left bundle branch block. His LHC with no CAD, last stress normal 8/2020, last echocardiogram from 10/2020 revaled LVEF 42%, RHC 3/2/21 slightly above normal right heart pressure with slightly elevated pulmonary wedge pressure 17mmHg, normal cardiac output and cardiac indices. Echo 6/2022 LVEF 25-30%, grade I DD.  Echo 6/2022 and 12/6/22.     3/9/2023: remote device interrogation 3/7/2023; thoracic impedence within range indicating euvolemia. Normal sensing and pacing. No new arrhythmias. AP 0%,  0%. Pulmonary Embolism   Patient is on Warfarin therapy. Denies any abnormal bruising or bleeding. INR managed at [facility]. Most recent INR on file is 4.2 (2/22/23). Hypertension, essential   Blood pressure remains stable. Continue Toprol XL 25 mg daily. BMP 2/27/2023 stable. Hyperlipidemia, unspecified   Reviewed lipid and hepatic panel from 12/6/2022. Both WNL. Reports joint pain/myalgias. Will discontinue Pravastatin and try Nexletol 180 mg daily. Repeat a fasting lipid profile in 3 months. Paroxsymal atrial fibrillation  Patient denies any further palpitations except he feels his heart racing. He has remained stable. No arrhythmias noted on most recent remote device interrogation (3/7/2023). Continue Warfarin for thromboembolic risk reduction. Bronchiolitis obliterans, choric cough and lung nodules  Managed per Pulmonology. On Symbicort. Instructed to obtain flu vaccine this season, annually and obtain COVID-19 vaccine per guidelines. We will see him back in 6 months. Thank you very much for allowing me to participate in the care of your patient. Please do not hesitate to contact me if you have any questions. Sincerely,  Dayana Tran MD      [address]   [place] [address]  Ph: [phone]  Fax: [phone]    This note was scribed in the presence of Dr. Dayana Tran MD by Ilia White RN  I   Physician Attestation:  The scribes documentation has been prepared under my direction and personally reviewed by me in its entirety. I confirm that the note above accurately reflects all work, treatment, procedures, and medical decision making performed by me.

## 2023-03-10 ENCOUNTER — TELEPHONE (OUTPATIENT)
Dept: CARDIOLOGY CLINIC | Age: 63
End: 2023-03-10

## 2023-03-10 PROCEDURE — 99212 OFFICE O/P EST SF 10 MIN: CPT

## 2023-03-10 PROCEDURE — 85610 PROTHROMBIN TIME: CPT

## 2023-03-21 ENCOUNTER — ANTI-COAG VISIT (OUTPATIENT)
Dept: PHARMACY | Age: 63
End: 2023-03-21
Payer: COMMERCIAL

## 2023-03-21 DIAGNOSIS — Z79.01 CHRONIC ANTICOAGULATION: ICD-10-CM

## 2023-03-21 DIAGNOSIS — I26.99 OTHER ACUTE PULMONARY EMBOLISM, UNSPECIFIED WHETHER ACUTE COR PULMONALE PRESENT (HCC): Primary | ICD-10-CM

## 2023-03-21 LAB — INR BLD: 5.1

## 2023-03-21 NOTE — PROGRESS NOTES
About 4 times a week (M,W,F,Sun). Please call if this changes. Limit alcohol intake. Please call if this changes. Immunization History   Administered Date(s) Administered    COVID-19, MODERNA BLUE border, Primary or Immunocompromised, (age 12y+), IM, 100 mcg/0.5mL 2021, 2021, 12/15/2021, 2022    COVID-19, MODERNA Bivalent BOOSTER, (age 12y+), IM, 48 mcg/0.5 mL 2022    Influenza, FLUAD, (age 72 y+), Adjuvanted, 0.5mL 10/27/2022    Influenza, FLUARIX, FLULAVAL, FLUZONE (age 10 mo+) AND AFLURIA, (age 1 y+), PF, 0.5mL 10/27/2017, 2019, 10/02/2020    Influenza, FLUBLOK, (age 25 y+), PF, 0.5mL 10/03/2018    Influenza, FLUCELVAX, (age 10 mo+), MDCK, PF, 0.5mL 10/26/2021    Pneumococcal Conjugate 13-valent ([id number]) 2015    Pneumococcal Conjugate Vaccine 08/15/2017    Pneumococcal Polysaccharide ([id number]) 2007, 2012    Tdap (Boostrix, Adacel) 2014           Orders Placed This Encounter   Procedures    Protime-INR     This external order was created through the results console. No orders of the defined types were placed in this encounter. Reviewed AVS with patient / caregiver.     Billing Points:  Basic Education (disease state, med overview, expected symptoms) - 1 point   Adjust dosage and/or reconcile meds (fill pill box) </= 5 medications - 2 points     For Pharmacy Admin Tracking Only    Intervention Detail: Adherence Monitorin and Dose Adjustment: 1, reason: Therapy De-escalation  Total # of Interventions Recommended: 1  Total # of Interventions Accepted: 1  Time Spent (min): 20       Bel CarrollD Candidate 2023 3/21/2023 2:59 PM

## 2023-03-22 ENCOUNTER — TELEPHONE (OUTPATIENT)
Dept: CARDIOLOGY CLINIC | Age: 63
End: 2023-03-22

## 2023-03-22 NOTE — TELEPHONE ENCOUNTER
My Chart Response to patient:    We received you message about being on metoprolol succinate/Toprol-XL 25 mg twice daily versus newer prescription 25 mg daily send over 1/4/2023. Are you taking 25 mg twice daily? ?    It appears this may have all occurred back in August when you had reported fatigue on Toprol-XL 25 mg twice daily and wanted to go back on your prior Coreg 6.25 mg twice daily. Then it appear you reported to our office that you went back on the metoprolol but it was put in for 25 mg daily due to fatigue on 25 mg twice daily. And it appears no changes since that time as to why the newer prescription was sent in 1/4/2023        If you have always been back on the metoprolol succinate/Toprol-XL 25 mg twice daily, continue that and I will update your medication list and Dr. Lynda Cam. Please advise?

## 2023-03-22 NOTE — TELEPHONE ENCOUNTER
Vince Gonsalez called in this morning, he would like to clarify how many metoprolol he is to take, he states the last bottle said take 2 daily the new one he just received says take 1 daily. He can be reached at [phone].

## 2023-03-24 PROBLEM — R20.0 NUMBNESS IN FEET: Status: ACTIVE | Noted: 2023-03-24

## 2023-03-28 ENCOUNTER — TELEPHONE (OUTPATIENT)
Dept: PHARMACY | Age: 63
End: 2023-03-28

## 2023-03-28 NOTE — TELEPHONE ENCOUNTER
Returned call from Fiorella Laketon left on clinic voice mail. He states he was given a Depo-Medrol 40 mg injection on 3-24-23. He also stared methylprednisolone one tablet BID from stock he had at home. He held his warfarin on 3-28-23, concerned his INR would get too high. He has an appointment with our clinic on 3-29-23. Will re-evaluate at that time. No complaints regarding warfarin therapy at this time. Tobias Giron [facility], [facility]  Anticoagulation Clinic  [address] [place] [address]  [phone]      For Pharmacy Admin Tracking Only    Intervention Detail:   Total # of Interventions Recommended: 0  Total # of Interventions Accepted: 0  Time Spent (min): 15

## 2023-03-29 ENCOUNTER — TELEPHONE (OUTPATIENT)
Dept: CARDIOLOGY CLINIC | Age: 63
End: 2023-03-29

## 2023-03-29 ENCOUNTER — ANTI-COAG VISIT (OUTPATIENT)
Dept: PHARMACY | Age: 63
End: 2023-03-29
Payer: COMMERCIAL

## 2023-03-29 DIAGNOSIS — Z79.01 CHRONIC ANTICOAGULATION: Primary | ICD-10-CM

## 2023-03-29 LAB — INTERNATIONAL NORMALIZATION RATIO, POC: 1.5

## 2023-03-29 PROCEDURE — 99211 OFF/OP EST MAY X REQ PHY/QHP: CPT

## 2023-03-29 PROCEDURE — 85610 PROTHROMBIN TIME: CPT

## 2023-03-29 NOTE — TELEPHONE ENCOUNTER
Sanjuanita Marr from [facility] MRI department called wanting to check the status on a Cardiac/MRI request form. Sanjuanita Marr said it was sent a week ago, Gurjit Garvey was not in office for me to ask her if she was aware of the fax.       Aidan Miles provided a callback number for the department: [phone]

## 2023-03-29 NOTE — PROGRESS NOTES
visit. Warfarin assessment / plan:     Appears well today. Cheryle Becerra states that he had a Depo Medrol injection last Thursday(3-23-23). He also started a 5 day course of methylprednisolone, which he will finish this evening. He held his warfarin yesterday(3-28) due to to the steroids. We would expect the steroid to increase the INR, so it is surprising that his INR is so low at 1.5 today. We did hold his warfarin last Tuesday and Wednesday(3-21 and 3-22) for INR of 5.1. As noted above, he also held the warfarin yesterday. He has not had any more vitamin k than usual. He correctly identified his current dosing regimen. For INR of 1.5, we will have him take 7.5mg of warfarin today and 5mg tomorrow(3-30). He will then resume his previous dosing. He will return in 2 weeks for his next INR. Description    Take warfarin 7.5mg(1 & 1/2 tablets) TODAY ONLY  Take warfarin 5mg(1 tablet) TOMORROW ONLY then CONTINUE: Warfarin 2.5mg daily except for 5mg on Monday, Wednesday, and Friday. Started Slim Fast daily 3/6/23 (20 mcg of Vitamin K) (decreases INR) Call if this changes. Call [phone] with signs or symptoms of bleeding or ANY medication changes (including antibiotics, steroids, over-the-counter medications or herbal supplements). If significant bleeding occurs or if you fall and hit your head, please seek immediate medical attention. Keep the number of servings of vitamin K containing foods (dark green, leafy vegetables) the same each week. About 4 times a week (M,W,F,Sun). Please call if this changes. Limit alcohol intake. Please call if this changes.           Immunization History   Administered Date(s) Administered    COVID-19, MODERNA BLUE border, Primary or Immunocompromised, (age 12y+), IM, 100 mcg/0.5mL 03/09/2021, 04/08/2021, 12/15/2021, 07/23/2022    COVID-19, MODERNA Bivalent BOOSTER, (age 12y+), IM, 48 mcg/0.5 mL 12/30/2022    Influenza, FLUAD, (age 72 y+), Adjuvanted, 0.5mL 10/27/2022

## 2023-03-30 RX ORDER — METOPROLOL SUCCINATE 25 MG/1
25 TABLET, EXTENDED RELEASE ORAL 2 TIMES DAILY
Qty: 180 TABLET | Refills: 4 | Status: SHIPPED | OUTPATIENT
Start: 2023-03-30

## 2023-03-30 RX ORDER — SACUBITRIL AND VALSARTAN 49; 51 MG/1; MG/1
TABLET, FILM COATED ORAL
Qty: 180 TABLET | Refills: 4 | Status: SHIPPED | OUTPATIENT
Start: 2023-03-30

## 2023-03-30 NOTE — TELEPHONE ENCOUNTER
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 Intolerance with myalgias with Lipitor but never tried Crestor. Currently on Pravastatin and side effects with Nexletol. He does not want to do injectable as previously PA was too cost prohibitive. I looked in his chart and he has not trial on Crestor and is willing to try since his LDLc 91 12/6/2023. Please advise dosing if agreeable and we will repeat the fasting liver/lipid in 3-4 months time. Refills sent in for Toprol-Xl and Entresto to mail order.

## 2023-03-30 NOTE — TELEPHONE ENCOUNTER
Corby Slade called again. Keith Chinchilla is scheduled for an MRI today. They faxed in the form, Rashad Hand is completing the form and faxing it back.

## 2023-03-30 NOTE — TELEPHONE ENCOUNTER
Sana Lemos called in this morning, he states he is unclear on the metoprol dosages, and he states if he is to take 2 pills daily he will need new script. He can be reached at [phone].

## 2023-04-14 PROBLEM — M54.2 NECK PAIN: Status: ACTIVE | Noted: 2023-04-14

## 2023-04-14 PROBLEM — M54.31 SCIATICA OF RIGHT SIDE: Status: ACTIVE | Noted: 2023-04-14

## 2023-04-18 ENCOUNTER — NURSE ONLY (OUTPATIENT)
Dept: CARDIOLOGY CLINIC | Age: 63
End: 2023-04-18
Payer: COMMERCIAL

## 2023-04-18 DIAGNOSIS — I50.23 ACUTE ON CHRONIC SYSTOLIC HEART FAILURE (HCC): ICD-10-CM

## 2023-04-18 DIAGNOSIS — I42.8 NICM (NONISCHEMIC CARDIOMYOPATHY) (HCC): ICD-10-CM

## 2023-04-18 DIAGNOSIS — I44.7 LBBB (LEFT BUNDLE BRANCH BLOCK): ICD-10-CM

## 2023-04-18 DIAGNOSIS — Z95.810 BIVENTRICULAR ICD (IMPLANTABLE CARDIOVERTER-DEFIBRILLATOR) IN PLACE: Primary | ICD-10-CM

## 2023-04-18 PROCEDURE — G2066 INTER DEVC REMOTE 30D: HCPCS | Performed by: NURSE PRACTITIONER

## 2023-04-18 PROCEDURE — 93297 REM INTERROG DEV EVAL ICPMS: CPT | Performed by: NURSE PRACTITIONER

## 2023-04-19 NOTE — PROGRESS NOTES
Remote transmission received from patient's CRT-D monitor at home (programmed VVI RV @ 40bpm per 11.2017 specialty comments). Transmission shows normal sensing and pacing function. No new arrhythmias/events recorded. Ap 0%  RVp 0%    Thoracic impedance is normal/above reference line. End of 31-day monitoring period 4/18/23. NP will review. See interrogation under cardiology tab in the [address] [place] [address] [address] field for more details. Will continue to monitor remotely.

## 2023-04-20 ENCOUNTER — TELEPHONE (OUTPATIENT)
Dept: PHARMACY | Age: 63
End: 2023-04-20

## 2023-04-20 NOTE — TELEPHONE ENCOUNTER
Lance Kaur called today to inform us that he will be starting a 7 day course of Augmentin today. He has been off of his warfarin for the past 5 days for an injection in his back yesterday. Since his INR is already quite low, we advised him to restart his warfarin at his usual dose and follow up as planned.     For Pharmacy Admin Tracking Only    Intervention Detail: Adherence Monitorin  Total # of Interventions Recommended: 1  Total # of Interventions Accepted: 1  Time Spent (min): 10

## 2023-04-21 ENCOUNTER — HOSPITAL ENCOUNTER (OUTPATIENT)
Dept: CT IMAGING | Age: 63
Discharge: HOME | End: 2023-04-21
Payer: COMMERCIAL

## 2023-04-21 DIAGNOSIS — R10.9 ABDOMINAL PAIN, UNSPECIFIED ABDOMINAL LOCATION: ICD-10-CM

## 2023-04-21 DIAGNOSIS — R30.0 DYSURIA: ICD-10-CM

## 2023-04-21 DIAGNOSIS — Z87.442 PERSONAL HISTORY OF URINARY CALCULI: ICD-10-CM

## 2023-04-21 PROCEDURE — 74176 CT ABD & PELVIS W/O CONTRAST: CPT

## 2023-05-03 ENCOUNTER — OFFICE VISIT (OUTPATIENT)
Dept: PULMONOLOGY | Age: 63
End: 2023-05-03
Payer: COMMERCIAL

## 2023-05-03 VITALS
HEART RATE: 74 BPM | WEIGHT: 230 LBS | OXYGEN SATURATION: 94 % | TEMPERATURE: 98.2 F | BODY MASS INDEX: 36.96 KG/M2 | HEIGHT: 66 IN | DIASTOLIC BLOOD PRESSURE: 72 MMHG | SYSTOLIC BLOOD PRESSURE: 124 MMHG

## 2023-05-03 DIAGNOSIS — R05.2 SUBACUTE COUGH: ICD-10-CM

## 2023-05-03 DIAGNOSIS — J42 BRONCHIOLITIS OBLITERANS (HCC): Primary | ICD-10-CM

## 2023-05-03 PROCEDURE — 3023F SPIROM DOC REV: CPT | Performed by: INTERNAL MEDICINE

## 2023-05-03 PROCEDURE — 3017F COLORECTAL CA SCREEN DOC REV: CPT | Performed by: INTERNAL MEDICINE

## 2023-05-03 PROCEDURE — 1036F TOBACCO NON-USER: CPT | Performed by: INTERNAL MEDICINE

## 2023-05-03 PROCEDURE — G8427 DOCREV CUR MEDS BY ELIG CLIN: HCPCS | Performed by: INTERNAL MEDICINE

## 2023-05-03 PROCEDURE — 3078F DIAST BP <80 MM HG: CPT | Performed by: INTERNAL MEDICINE

## 2023-05-03 PROCEDURE — 3074F SYST BP LT 130 MM HG: CPT | Performed by: INTERNAL MEDICINE

## 2023-05-03 PROCEDURE — G8417 CALC BMI ABV UP PARAM F/U: HCPCS | Performed by: INTERNAL MEDICINE

## 2023-05-03 PROCEDURE — 99214 OFFICE O/P EST MOD 30 MIN: CPT | Performed by: INTERNAL MEDICINE

## 2023-05-03 RX ORDER — FLUTICASONE FUROATE, UMECLIDINIUM BROMIDE AND VILANTEROL TRIFENATATE 100; 62.5; 25 UG/1; UG/1; UG/1
1 POWDER RESPIRATORY (INHALATION) DAILY
Qty: 1 EACH | Refills: 11 | COMMUNITY
Start: 2023-05-03

## 2023-05-03 ASSESSMENT — ENCOUNTER SYMPTOMS: RESPIRATORY NEGATIVE: 1

## 2023-05-03 NOTE — PROGRESS NOTES
[address] CR[facility]   Nakul Sandoval (:  1960) is a 60 y.o. male,Established patient, here for evaluation of the following chief complaint(s):  Follow-up (Discuss CT scan )         ASSESSMENT/PLAN:  1. Bronchiolitis obliterans ([address]  Assessment & Plan:  -Has been stable since . -FEV1 50%  -On Stiolto, albuterol as needed  -Transiently on Trelegy, previously said there was no improvement but now he states that he thought that it helped. We will provide sample 100 Trelegy  Orders:  -     fluticasone-umeclidin-vilant (TRELEGY ELLIPTA) 100-62.5-25 MCG/ACT AEPB inhaler; Inhale 1 puff into the lungs daily, Disp-1 each, R-11Lot # [id number] Sep 2024Sample  2. Subacute cough  Assessment & Plan:   - Has resolved        Return in about 6 months (around 11/3/2023). Subjective   SUBJECTIVE/OBJECTIVE:  Cough has resolved, no longer on Sudafed or cough drops. Still has some dyspnea with exertion he feels like Trelegy helped in the past      Review of Systems   Constitutional: Negative. Respiratory: Negative. Cardiovascular: Negative. Neurological: Negative. Psychiatric/Behavioral: Negative. Objective   Physical Exam    Gen:  No acute distress. Eyes: EOMI. Anicteric sclera. No conjunctival injection. ENT: No discharge. oropharynx clear. External appearance of ears and nose normal.  Neck: Trachea midline. No mass   Resp:  No crackles. No wheezes. No rhonchi. No dullness on percussion. CV: Regular rate. Regular rhythm. No murmur or rub. No edema. Neuro:  no focal neurologic deficit. Moves all extremities  Psych: Awake and alert, Oriented x 3. Judgement and insight appropriate. Mood stable. An electronic signature was used to authenticate this note.     --Patricia Catherine MD

## 2023-05-03 NOTE — ASSESSMENT & PLAN NOTE
-Has been stable since 1997. -FEV1 50%  -On Stiolto, albuterol as needed  -Transiently on Trelegy, previously said there was no improvement but now he states that he thought that it helped.   We will provide sample 100 Trelegy

## 2023-05-04 ENCOUNTER — APPOINTMENT (OUTPATIENT)
Dept: PHARMACY | Age: 63
End: 2023-05-04
Payer: COMMERCIAL

## 2023-05-04 ENCOUNTER — ANTI-COAG VISIT (OUTPATIENT)
Dept: PHARMACY | Age: 63
End: 2023-05-04

## 2023-05-04 DIAGNOSIS — Z79.01 CHRONIC ANTICOAGULATION: ICD-10-CM

## 2023-05-04 DIAGNOSIS — I26.99 PULMONARY EMBOLISM, UNSPECIFIED CHRONICITY, UNSPECIFIED PULMONARY EMBOLISM TYPE, UNSPECIFIED WHETHER ACUTE COR PULMONALE PRESENT (HCC): Primary | ICD-10-CM

## 2023-05-04 LAB — INTERNATIONAL NORMALIZATION RATIO, POC: 3.1

## 2023-05-04 NOTE — PROGRESS NOTES
[facility]  Telephone Visit    Lab Results   Component Value Date    INR 3.05 (H) 05/03/2023    INR 3.1 05/03/2023    INR 3.3 04/12/2023       Anticoagulation Summary  As of 5/4/2023      INR goal:  2.0-3.0   TTR:  37.1 % (7.5 y)   INR used for dosing:  3.1 (5/3/2023)   Warfarin maintenance plan:  5 mg (5 mg x 1) every Mon, Wed, Fri; 2.5 mg (5 mg x 0.5) all other days   Weekly warfarin total:  25 mg   Plan last modified:  Adore Santana (3/9/2023)   Next INR check:  5/17/2023   Priority:  Maintenance   Target end date: Indefinite    Indications    Pulmonary embolus ([address] [I26.99]  Chronic anticoagulation [Z79.01]                 Anticoagulation Episode Summary       INR check location:  Anticoagulation Clinic    Preferred lab:      Send INR reminders to:  [address] [place] [address]    Comments:  EPIC - finger stick every 2-3 weeks  Consent: 1/24/23          Anticoagulation Care Providers       Provider Role Specialty Phone number    Carmen Tavarez MD Referring Internal Medicine [phone]              Assessment / Plan:  Spoke with Mavis Calles to report INR from MD's office on 5-3-23. Instructed him to have a portion of vitamin K greens today. He states doing well no complaints regarding warfarin therapy. Description    POC INR at Dr. Agatha Callahan office = 3.05    Have a portion of vitamin K greens  CONTINUE: Warfarin 2.5mg daily except for 5mg on Monday, Wednesday, and Friday. Call [phone] with signs or symptoms of bleeding or ANY medication changes (including antibiotics, steroids, over-the-counter medications or herbal supplements). If significant bleeding occurs or if you fall and hit your head, please seek immediate medical attention. Keep the number of servings of vitamin K containing foods (dark green, leafy vegetables) the same each week. About 4 times a week (M,W,F,Sun). Please call if this changes. Limit alcohol intake.  Please call if this

## 2023-05-17 ENCOUNTER — ANTI-COAG VISIT (OUTPATIENT)
Dept: PHARMACY | Age: 63
End: 2023-05-17
Payer: COMMERCIAL

## 2023-05-17 DIAGNOSIS — Z79.01 CHRONIC ANTICOAGULATION: Primary | ICD-10-CM

## 2023-05-17 LAB — INTERNATIONAL NORMALIZATION RATIO, POC: 3.5

## 2023-05-17 PROCEDURE — 85610 PROTHROMBIN TIME: CPT

## 2023-05-17 PROCEDURE — 99211 OFF/OP EST MAY X REQ PHY/QHP: CPT

## 2023-05-17 NOTE — PROGRESS NOTES
Lala Hutton is a 60 y.o. here for warfarin management. William Reynaga had an INR test today. Results were reviewed and appropriate warfarin management was completed. Patient verifies current warfarin dosing regimen: Yes     Warfarin medication reviewed and updated on the patient 's home medication list: Yes   All other medications reviewed and updated on the patient 's home medication list: No: no changes     Lab Results   Component Value Date    INR 3.5 05/17/2023    INR 3.05 (H) 05/03/2023    INR 3.1 05/03/2023     Patient Findings       Negatives:  Signs/symptoms of bleeding, Change in health, Missed doses, Change in medications, Change in diet/appetite, Bruising          Anticoagulation Summary  As of 5/17/2023      INR goal:  2.0-3.0   TTR:  36.9 % (7.6 y)   INR used for dosing:  3.5 (5/17/2023)   Warfarin maintenance plan:  5 mg (5 mg x 1) every Tue, Fri; 2.5 mg (5 mg x 0.5) all other days   Weekly warfarin total:  22.5 mg   Plan last modified:  Antonio Willett, [facility] (5/17/2023)   Next INR check:  5/31/2023   Priority:  Maintenance   Target end date: Indefinite    Indications    Pulmonary embolus (HCC) [I26.99]  Chronic anticoagulation [Z79.01]                 Anticoagulation Episode Summary       INR check location:  Anticoagulation Clinic    Preferred lab:      Send INR reminders to:  [facility] STAFF    Comments:  EPIC - finger stick every 2-3 weeks  Consent: 1/24/23          Anticoagulation Care Providers       Provider Role Specialty Phone number    Shereen Cottrell MD Referring Internal Medicine [phone]          There were no vitals taken for this visit. Warfarin assessment / plan:     William Reynaga complains of back pain today. He recently started physical therapy to help with this. Supra-therapeutic INR of 3.5 today. His INR has been on the high side for the past two months.  Although he denies any unusual bruising or bleeding at this time, we will hold his warfarin today and then

## 2023-05-18 LAB
PATHOLOGY/CYTOLOGY REPORT: NORMAL
PATHOLOGY/CYTOLOGY REPORT: NORMAL

## 2023-05-22 RX ORDER — FUROSEMIDE 20 MG/1
TABLET ORAL
Qty: 180 TABLET | Refills: 3 | Status: SHIPPED | OUTPATIENT
Start: 2023-05-22

## 2023-05-22 NOTE — TELEPHONE ENCOUNTER
Last OV:  3/9/23  Next OV: 9/12/23  Last refill: 1/10/23  #225  3 R/F  Most recent Labs: 5/3/23  Last EKG (if needed): 3/9/23

## 2023-05-26 PROBLEM — K42.9 UMBILICAL HERNIA WITHOUT OBSTRUCTION AND WITHOUT GANGRENE: Status: ACTIVE | Noted: 2023-05-26

## 2023-05-30 ENCOUNTER — TELEPHONE (OUTPATIENT)
Dept: CARDIOLOGY CLINIC | Age: 63
End: 2023-05-30

## 2023-05-30 NOTE — TELEPHONE ENCOUNTER
Pt called in stating pacing part of device is supposed to be turned off but he believes it was turned back on & would like it to be turned back off. Pt has been experiencing a tingling sensation in chest, SOB making it hard to eat, and a HR of 120bpm at rest x 2 days. Pt sent in transmission and it appears there have been no changes to device. No new episodes recorded. Please advise. Report uploaded for review.

## 2023-05-31 ENCOUNTER — ANTI-COAG VISIT (OUTPATIENT)
Dept: PHARMACY | Age: 63
End: 2023-05-31
Payer: COMMERCIAL

## 2023-05-31 DIAGNOSIS — I27.82 CHRONIC PULMONARY EMBOLISM WITH ACUTE COR PULMONALE, UNSPECIFIED PULMONARY EMBOLISM TYPE (HCC): Primary | ICD-10-CM

## 2023-05-31 DIAGNOSIS — Z79.01 CHRONIC ANTICOAGULATION: ICD-10-CM

## 2023-05-31 DIAGNOSIS — I26.09 CHRONIC PULMONARY EMBOLISM WITH ACUTE COR PULMONALE, UNSPECIFIED PULMONARY EMBOLISM TYPE (HCC): Primary | ICD-10-CM

## 2023-05-31 LAB — INTERNATIONAL NORMALIZATION RATIO, POC: 2.2

## 2023-05-31 PROCEDURE — 99211 OFF/OP EST MAY X REQ PHY/QHP: CPT

## 2023-05-31 PROCEDURE — 85610 PROTHROMBIN TIME: CPT

## 2023-05-31 NOTE — TELEPHONE ENCOUNTER
Left patient vm updating on device interrogation and no indication of holding fluid. Requested return call to discuss further.

## 2023-05-31 NOTE — TELEPHONE ENCOUNTER
Amparo Bermeo, APRN - CNP   5/30/2023  5:37 PM EDT Back to Top      Remote device check completed. Thoracic impedence within range indicating euvolemia. Jacquelin Elaine MD   5/30/2023  1:42 PM EDT       Device interrogation reviewed.

## 2023-05-31 NOTE — PROGRESS NOTES
Leda Danielson is a 60 y.o. here for warfarin management. Brett De Oliveira had an INR test today. Results were reviewed and appropriate warfarin management was completed. Patient verifies current warfarin dosing regimen: Yes     Warfarin medication reviewed and updated on the patient 's home medication list: Yes   All other medications reviewed and updated on the patient 's home medication list: Yes: 7 day course of amoxicillin     Lab Results   Component Value Date    INR 2.2 2023    INR 3.5 2023    INR 3.05 (H) 2023     Patient Findings       Positives:  Change in health, Missed doses, Change in medications    Negatives:  Signs/symptoms of bleeding, Change in diet/appetite, Bruising    Comments:  Started  a 10 day course of amoxicillin 250mg three times daily on 23. Held warfarin Saturday and  with start of amoxicillin          Anticoagulation Summary  As of 2023      INR goal:  2.0-3.0   TTR:  37.0 % (7.6 y)   INR used for dosin.2 (2023)   Warfarin maintenance plan:  5 mg (5 mg x 1) every Tue, Fri; 2.5 mg (5 mg x 0.5) all other days   Weekly warfarin total:  22.5 mg   Plan last modified:  Sujit Harp, [facility] (2023)   Next INR check:  2023   Priority:  Maintenance   Target end date: Indefinite    Indications    Pulmonary embolus (HCC) [I26.99]  Chronic anticoagulation [Z79.01]                 Anticoagulation Episode Summary       INR check location:  Anticoagulation Clinic    Preferred lab:      Send INR reminders to:  [facility] STAFF    Comments:  EPIC - finger stick every 2-3 weeks  Consent: 23          Anticoagulation Care Providers       Provider Role Specialty Phone number    Evelyn Machado MD Referring Internal Medicine [phone]          There were no vitals taken for this visit. Warfarin assessment / plan:     Patient appears well today.  He states that he has a sinus infection and started a 7 day course of

## 2023-06-01 ENCOUNTER — TELEPHONE (OUTPATIENT)
Dept: CARDIOLOGY CLINIC | Age: 63
End: 2023-06-01

## 2023-06-01 NOTE — TELEPHONE ENCOUNTER
Called patient with results. He initially stated he was not having symptoms and called because he was requesting to have the pacing mechanism turned off from his device. After further discussion, he states when he was hospitalized in 2016 during an MRI, the pacing mechanism was turned off and his symptoms of SOB and chest pain subsided. He states this past weekend, he started feeling the same symptoms so \"someone must have turned the pacing mechanism back on. \"     I suggested the patient come to the office on Monday for a device check. Patient transferred to the  to be placed on the schedule.

## 2023-06-01 NOTE — TELEPHONE ENCOUNTER
James Hallman called in stating that he needs his Device turned off because he can't eat or sleep. This been going on every since Friday 5/26/23.       James Hallman can be reached at [phone]

## 2023-06-01 NOTE — TELEPHONE ENCOUNTER
Josefina Lopez call in stating that he was returning a phone call about his device report.     Josefina Lopez can be reached at [phone]

## 2023-06-02 ENCOUNTER — NURSE ONLY (OUTPATIENT)
Dept: CARDIOLOGY CLINIC | Age: 63
End: 2023-06-02

## 2023-06-02 DIAGNOSIS — Z95.810 BIVENTRICULAR ICD (IMPLANTABLE CARDIOVERTER-DEFIBRILLATOR) IN PLACE: Primary | ICD-10-CM

## 2023-06-02 DIAGNOSIS — I48.0 PAROXYSMAL ATRIAL FIBRILLATION (HCC): ICD-10-CM

## 2023-06-02 DIAGNOSIS — I50.23 ACUTE ON CHRONIC SYSTOLIC HEART FAILURE (HCC): ICD-10-CM

## 2023-06-02 DIAGNOSIS — I44.7 LBBB (LEFT BUNDLE BRANCH BLOCK): ICD-10-CM

## 2023-06-02 DIAGNOSIS — R00.0 TACHYCARDIA: ICD-10-CM

## 2023-06-02 NOTE — PROGRESS NOTES
Patient comes in for programming evaluation on their [employer] [id number] Visia AF MRI VR. Pt came in today reporting pacemaker was turned back on and causing symptoms of SOB and chest spasms. All sensing and pacing parameters are within normal range. Pacing is off. Battery life 64%  AP 0%.  0%. 2 NSVT episodes on 5/13/23  1 ATR episode on 5/13/23   No changes made this Session. Please see interrogation for more detail. TI is within normal range. Follow up in 3 months in office or remotely. Home Monitor paired, last transmission on 5/30/2023.

## 2023-06-05 ENCOUNTER — TELEPHONE (OUTPATIENT)
Dept: PHARMACY | Age: 63
End: 2023-06-05

## 2023-06-05 NOTE — TELEPHONE ENCOUNTER
Started on prednisone and ceftin  Taking 10 mg BID prednisone and ceftin     Will have him reduce his dose tomorrow night to 2.5 mg     Moved his appt up to this Friday to make any further adjustments.

## 2023-06-09 ENCOUNTER — ANTI-COAG VISIT (OUTPATIENT)
Dept: PHARMACY | Age: 63
End: 2023-06-09
Payer: COMMERCIAL

## 2023-06-09 DIAGNOSIS — I26.99 OTHER ACUTE PULMONARY EMBOLISM, UNSPECIFIED WHETHER ACUTE COR PULMONALE PRESENT (HCC): Primary | ICD-10-CM

## 2023-06-09 DIAGNOSIS — Z79.01 CHRONIC ANTICOAGULATION: ICD-10-CM

## 2023-06-09 LAB — INR BLD: 3

## 2023-06-09 PROCEDURE — 85610 PROTHROMBIN TIME: CPT

## 2023-06-09 PROCEDURE — 99212 OFFICE O/P EST SF 10 MIN: CPT

## 2023-06-09 NOTE — PROGRESS NOTES
warfarin therapy. INR today is within goal range. Was prescribed ceftin and prednisone on 6/2 for an upper respiratory infection/sinus infection. He has been taking the prednisone differently than prescribed - 1 tablet twice a day. He was instructed to only take 2.5 mg warfarin instead of 5 mg last Tuesday (6/6) by Axel Castillo due to interaction with the prednisone. Will have him take 2.5 mg today and 6/13 instead of 5 mg due to the interaction with the prednisone to try to prevent a high INR. After that, he will resume his previous maintenance dose of 2.5 mg every day except 5 mg on Tuesday and Friday. Description    Take warfarin 2.5 mg (1/2 tablet) today-6/15, then  CONTINUE: Warfarin 2.5mg daily except for 5mg on Tuesday and Friday. Call [phone] with signs or symptoms of bleeding or ANY medication changes (including antibiotics, steroids, over-the-counter medications or herbal supplements). If significant bleeding occurs or if you fall and hit your head, please seek immediate medical attention. Keep the number of servings of vitamin K containing foods (dark green, leafy vegetables) the same each week. About 4 times a week (M,W,F,Sun). Please call if this changes. Limit alcohol intake. Please call if this changes.         Immunization History   Administered Date(s) Administered    COVID-19, MODERNA BLUE border, Primary or Immunocompromised, (age 12y+), IM, 100 mcg/0.5mL 03/09/2021, 04/08/2021, 12/15/2021, 07/23/2022    COVID-19, MODERNA Bivalent, (age 12y+), IM, 48 mcg/0.5 mL 12/30/2022    Influenza, FLUAD, (age 72 y+), Adjuvanted, 0.5mL 10/27/2022    Influenza, FLUARIX, FLULAVAL, FLUZONE (age 10 mo+) AND AFLURIA, (age 1 y+), PF, 0.5mL 10/27/2017, 09/23/2019, 10/02/2020    Influenza, FLUBLOK, (age 25 y+), PF, 0.5mL 10/03/2018    Influenza, FLUCELVAX, (age 10 mo+), MDCK, PF, 0.5mL 10/26/2021    Pneumococcal Conjugate Vaccine 08/15/2017    Pneumococcal, PCV-13, PREVNAR 13, (age 6w+), IM,

## 2023-06-14 ENCOUNTER — HOSPITAL ENCOUNTER (INPATIENT)
Age: 63
LOS: 1 days | Discharge: HOME | DRG: 309 | End: 2023-06-17
Attending: EMERGENCY MEDICINE | Admitting: INTERNAL MEDICINE
Payer: COMMERCIAL

## 2023-06-14 ENCOUNTER — APPOINTMENT (OUTPATIENT)
Dept: GENERAL RADIOLOGY | Age: 63
DRG: 309 | End: 2023-06-14
Payer: COMMERCIAL

## 2023-06-14 ENCOUNTER — APPOINTMENT (OUTPATIENT)
Dept: CT IMAGING | Age: 63
DRG: 309 | End: 2023-06-14
Payer: COMMERCIAL

## 2023-06-14 DIAGNOSIS — I42.9 CARDIOMYOPATHY, UNSPECIFIED TYPE (HCC): ICD-10-CM

## 2023-06-14 DIAGNOSIS — R00.2 PALPITATIONS: ICD-10-CM

## 2023-06-14 DIAGNOSIS — R10.9 ACUTE LEFT FLANK PAIN: ICD-10-CM

## 2023-06-14 DIAGNOSIS — R51.9 NONINTRACTABLE HEADACHE, UNSPECIFIED CHRONICITY PATTERN, UNSPECIFIED HEADACHE TYPE: ICD-10-CM

## 2023-06-14 DIAGNOSIS — E86.0 DEHYDRATION: ICD-10-CM

## 2023-06-14 DIAGNOSIS — R10.9 ACUTE ABDOMINAL PAIN: Primary | ICD-10-CM

## 2023-06-14 DIAGNOSIS — R55 NEAR SYNCOPE: ICD-10-CM

## 2023-06-14 DIAGNOSIS — N30.00 ACUTE CYSTITIS WITHOUT HEMATURIA: ICD-10-CM

## 2023-06-14 LAB
ALBUMIN SERPL-MCNC: 3.6 G/DL (ref 3.4–5)
ALBUMIN/GLOB SERPL: 1.3 {RATIO} (ref 1.1–2.2)
ALP SERPL-CCNC: 98 U/L (ref 40–129)
ALT SERPL-CCNC: 26 U/L (ref 10–40)
ANION GAP SERPL CALCULATED.3IONS-SCNC: 11 MMOL/L (ref 3–16)
AST SERPL-CCNC: 21 U/L (ref 15–37)
BACTERIA URNS QL MICRO: ABNORMAL /HPF
BASOPHILS # BLD: 0.1 K/UL (ref 0–0.2)
BASOPHILS NFR BLD: 1 %
BILIRUB SERPL-MCNC: 0.4 MG/DL (ref 0–1)
BILIRUB UR QL STRIP.AUTO: NEGATIVE
BUN SERPL-MCNC: 11 MG/DL (ref 7–20)
CALCIUM SERPL-MCNC: 10.6 MG/DL (ref 8.3–10.6)
CHARACTER UR: ABNORMAL
CHLORIDE SERPL-SCNC: 104 MMOL/L (ref 99–110)
CLARITY UR: CLEAR
CO2 SERPL-SCNC: 24 MMOL/L (ref 21–32)
COLOR UR: YELLOW
CREAT SERPL-MCNC: 1.2 MG/DL (ref 0.8–1.3)
CRYSTALS URNS MICRO: ABNORMAL /HPF
DEPRECATED RDW RBC AUTO: 14 % (ref 12.4–15.4)
EOSINOPHIL # BLD: 0.3 K/UL (ref 0–0.6)
EOSINOPHIL NFR BLD: 3.1 %
EPI CELLS #/AREA URNS AUTO: 2 /HPF (ref 0–5)
GFR SERPLBLD CREATININE-BSD FMLA CKD-EPI: >60 ML/MIN/{1.73_M2}
GLUCOSE SERPL-MCNC: 229 MG/DL (ref 70–99)
GLUCOSE UR STRIP.AUTO-MCNC: 100 MG/DL
HCT VFR BLD AUTO: 44.9 % (ref 40.5–52.5)
HGB BLD-MCNC: 15.6 G/DL (ref 13.5–17.5)
HGB UR QL STRIP.AUTO: ABNORMAL
HYALINE CASTS #/AREA URNS AUTO: 5 /LPF (ref 0–8)
INR PPP: 2.36 (ref 0.84–1.16)
KETONES UR STRIP.AUTO-MCNC: NEGATIVE MG/DL
LACTATE BLDV-SCNC: 1.9 MMOL/L (ref 0.4–1.9)
LEUKOCYTE ESTERASE UR QL STRIP.AUTO: ABNORMAL
LIPASE SERPL-CCNC: 20 U/L (ref 13–60)
LYMPHOCYTES # BLD: 2.3 K/UL (ref 1–5.1)
LYMPHOCYTES NFR BLD: 20.8 %
MCH RBC QN AUTO: 32 PG (ref 26–34)
MCHC RBC AUTO-ENTMCNC: 34.9 G/DL (ref 31–36)
MCV RBC AUTO: 91.9 FL (ref 80–100)
MONOCYTES # BLD: 1.2 K/UL (ref 0–1.3)
MONOCYTES NFR BLD: 11.2 %
NEUTROPHILS # BLD: 7 K/UL (ref 1.7–7.7)
NEUTROPHILS NFR BLD: 63.9 %
NITRITE UR QL STRIP.AUTO: NEGATIVE
NT-PROBNP SERPL-MCNC: 95 PG/ML (ref 0–124)
PH UR STRIP.AUTO: 7 [PH] (ref 5–8)
PLATELET # BLD AUTO: 196 K/UL (ref 135–450)
PLATELET BLD QL SMEAR: ADEQUATE
PMV BLD AUTO: 7.9 FL (ref 5–10.5)
POTASSIUM SERPL-SCNC: 4.3 MMOL/L (ref 3.5–5.1)
PROT SERPL-MCNC: 6.3 G/DL (ref 6.4–8.2)
PROT UR STRIP.AUTO-MCNC: 30 MG/DL
PROTHROMBIN TIME: 25.7 SEC (ref 11.5–14.8)
RBC # BLD AUTO: 4.88 M/UL (ref 4.2–5.9)
RBC #/AREA URNS HPF: ABNORMAL /HPF (ref 0–4)
SODIUM SERPL-SCNC: 139 MMOL/L (ref 136–145)
SP GR UR STRIP.AUTO: 1.05 (ref 1–1.03)
TROPONIN, HIGH SENSITIVITY: 20 NG/L (ref 0–22)
TROPONIN, HIGH SENSITIVITY: 23 NG/L (ref 0–22)
UA COMPLETE W REFLEX CULTURE PNL UR: YES
UA DIPSTICK W REFLEX MICRO PNL UR: YES
URN SPEC COLLECT METH UR: ABNORMAL
UROBILINOGEN UR STRIP-ACNC: 1 E.U./DL
WBC # BLD AUTO: 11 K/UL (ref 4–11)
WBC #/AREA URNS AUTO: 10 /HPF (ref 0–5)

## 2023-06-14 PROCEDURE — 2580000003 HC RX 258: Performed by: INTERNAL MEDICINE

## 2023-06-14 PROCEDURE — 87040 BLOOD CULTURE FOR BACTERIA: CPT

## 2023-06-14 PROCEDURE — 96365 THER/PROPH/DIAG IV INF INIT: CPT

## 2023-06-14 PROCEDURE — 71045 X-RAY EXAM CHEST 1 VIEW: CPT

## 2023-06-14 PROCEDURE — 99285 EMERGENCY DEPT VISIT HI MDM: CPT

## 2023-06-14 PROCEDURE — 85025 COMPLETE CBC W/AUTO DIFF WBC: CPT

## 2023-06-14 PROCEDURE — 6370000000 HC RX 637 (ALT 250 FOR IP): Performed by: INTERNAL MEDICINE

## 2023-06-14 PROCEDURE — A4216 STERILE WATER/SALINE, 10 ML: HCPCS | Performed by: EMERGENCY MEDICINE

## 2023-06-14 PROCEDURE — 6370000000 HC RX 637 (ALT 250 FOR IP): Performed by: EMERGENCY MEDICINE

## 2023-06-14 PROCEDURE — 2500000003 HC RX 250 WO HCPCS: Performed by: EMERGENCY MEDICINE

## 2023-06-14 PROCEDURE — 6360000002 HC RX W HCPCS: Performed by: EMERGENCY MEDICINE

## 2023-06-14 PROCEDURE — 6360000004 HC RX CONTRAST MEDICATION: Performed by: EMERGENCY MEDICINE

## 2023-06-14 PROCEDURE — 84484 ASSAY OF TROPONIN QUANT: CPT

## 2023-06-14 PROCEDURE — 83690 ASSAY OF LIPASE: CPT

## 2023-06-14 PROCEDURE — 81001 URINALYSIS AUTO W/SCOPE: CPT

## 2023-06-14 PROCEDURE — 70450 CT HEAD/BRAIN W/O DYE: CPT

## 2023-06-14 PROCEDURE — 96375 TX/PRO/DX INJ NEW DRUG ADDON: CPT

## 2023-06-14 PROCEDURE — G0378 HOSPITAL OBSERVATION PER HR: HCPCS

## 2023-06-14 PROCEDURE — 96361 HYDRATE IV INFUSION ADD-ON: CPT

## 2023-06-14 PROCEDURE — 2580000003 HC RX 258: Performed by: EMERGENCY MEDICINE

## 2023-06-14 PROCEDURE — 83880 ASSAY OF NATRIURETIC PEPTIDE: CPT

## 2023-06-14 PROCEDURE — 71260 CT THORAX DX C+: CPT

## 2023-06-14 PROCEDURE — 93005 ELECTROCARDIOGRAM TRACING: CPT | Performed by: EMERGENCY MEDICINE

## 2023-06-14 PROCEDURE — 80053 COMPREHEN METABOLIC PANEL: CPT

## 2023-06-14 PROCEDURE — 83605 ASSAY OF LACTIC ACID: CPT

## 2023-06-14 PROCEDURE — 85610 PROTHROMBIN TIME: CPT

## 2023-06-14 PROCEDURE — 74177 CT ABD & PELVIS W/CONTRAST: CPT

## 2023-06-14 PROCEDURE — 87086 URINE CULTURE/COLONY COUNT: CPT

## 2023-06-14 RX ORDER — FENTANYL CITRATE 50 UG/ML
25 INJECTION, SOLUTION INTRAMUSCULAR; INTRAVENOUS ONCE
Status: COMPLETED | OUTPATIENT
Start: 2023-06-14 | End: 2023-06-14

## 2023-06-14 RX ORDER — ERGOCALCIFEROL 1.25 MG/1
50000 CAPSULE ORAL WEEKLY
Status: DISCONTINUED | OUTPATIENT
Start: 2023-06-14 | End: 2023-06-15

## 2023-06-14 RX ORDER — FUROSEMIDE 20 MG/1
20 TABLET ORAL DAILY
Status: DISCONTINUED | OUTPATIENT
Start: 2023-06-15 | End: 2023-06-17 | Stop reason: HOSPADM

## 2023-06-14 RX ORDER — OXYCODONE HYDROCHLORIDE AND ACETAMINOPHEN 5; 325 MG/1; MG/1
1 TABLET ORAL ONCE
Status: COMPLETED | OUTPATIENT
Start: 2023-06-14 | End: 2023-06-14

## 2023-06-14 RX ORDER — ONDANSETRON 2 MG/ML
4 INJECTION INTRAMUSCULAR; INTRAVENOUS ONCE
Status: COMPLETED | OUTPATIENT
Start: 2023-06-14 | End: 2023-06-14

## 2023-06-14 RX ORDER — LORATADINE 10 MG/1
10 TABLET ORAL DAILY
COMMUNITY

## 2023-06-14 RX ORDER — WARFARIN SODIUM 2.5 MG/1
2.5 TABLET ORAL
Status: COMPLETED | OUTPATIENT
Start: 2023-06-14 | End: 2023-06-14

## 2023-06-14 RX ORDER — 0.9 % SODIUM CHLORIDE 0.9 %
500 INTRAVENOUS SOLUTION INTRAVENOUS ONCE
Status: COMPLETED | OUTPATIENT
Start: 2023-06-14 | End: 2023-06-14

## 2023-06-14 RX ORDER — SODIUM CHLORIDE 9 MG/ML
INJECTION, SOLUTION INTRAVENOUS PRN
Status: DISCONTINUED | OUTPATIENT
Start: 2023-06-14 | End: 2023-06-17 | Stop reason: HOSPADM

## 2023-06-14 RX ORDER — ONDANSETRON 2 MG/ML
4 INJECTION INTRAMUSCULAR; INTRAVENOUS EVERY 6 HOURS PRN
Status: DISCONTINUED | OUTPATIENT
Start: 2023-06-14 | End: 2023-06-17 | Stop reason: HOSPADM

## 2023-06-14 RX ORDER — ASPIRIN 81 MG/1
324 TABLET, CHEWABLE ORAL ONCE
Status: COMPLETED | OUTPATIENT
Start: 2023-06-14 | End: 2023-06-14

## 2023-06-14 RX ORDER — SODIUM CHLORIDE 0.9 % (FLUSH) 0.9 %
5-40 SYRINGE (ML) INJECTION EVERY 12 HOURS SCHEDULED
Status: DISCONTINUED | OUTPATIENT
Start: 2023-06-14 | End: 2023-06-17 | Stop reason: HOSPADM

## 2023-06-14 RX ORDER — ROSUVASTATIN CALCIUM 20 MG/1
20 TABLET, COATED ORAL DAILY
Status: DISCONTINUED | OUTPATIENT
Start: 2023-06-15 | End: 2023-06-15

## 2023-06-14 RX ORDER — ASPIRIN 81 MG/1
81 TABLET, CHEWABLE ORAL DAILY
Status: DISCONTINUED | OUTPATIENT
Start: 2023-06-15 | End: 2023-06-17 | Stop reason: HOSPADM

## 2023-06-14 RX ORDER — METOPROLOL SUCCINATE 25 MG/1
25 TABLET, EXTENDED RELEASE ORAL 2 TIMES DAILY
Status: DISCONTINUED | OUTPATIENT
Start: 2023-06-14 | End: 2023-06-15

## 2023-06-14 RX ORDER — DICYCLOMINE HYDROCHLORIDE 10 MG/1
10 CAPSULE ORAL 3 TIMES DAILY PRN
Status: DISCONTINUED | OUTPATIENT
Start: 2023-06-14 | End: 2023-06-17 | Stop reason: HOSPADM

## 2023-06-14 RX ORDER — WARFARIN SODIUM 2.5 MG/1
2.5 TABLET ORAL DAILY
Status: DISCONTINUED | OUTPATIENT
Start: 2023-06-15 | End: 2023-06-14

## 2023-06-14 RX ORDER — ONDANSETRON 4 MG/1
4 TABLET, ORALLY DISINTEGRATING ORAL EVERY 8 HOURS PRN
Status: DISCONTINUED | OUTPATIENT
Start: 2023-06-14 | End: 2023-06-17 | Stop reason: HOSPADM

## 2023-06-14 RX ORDER — FLAVOXATE HYDROCHLORIDE 100 MG/1
100 TABLET ORAL 3 TIMES DAILY PRN
Status: DISCONTINUED | OUTPATIENT
Start: 2023-06-14 | End: 2023-06-17 | Stop reason: HOSPADM

## 2023-06-14 RX ORDER — SODIUM CHLORIDE 0.9 % (FLUSH) 0.9 %
5-40 SYRINGE (ML) INJECTION PRN
Status: DISCONTINUED | OUTPATIENT
Start: 2023-06-14 | End: 2023-06-17 | Stop reason: HOSPADM

## 2023-06-14 RX ORDER — ERGOCALCIFEROL 1.25 MG/1
50000 CAPSULE ORAL WEEKLY
Status: DISCONTINUED | OUTPATIENT
Start: 2023-06-21 | End: 2023-06-14

## 2023-06-14 RX ORDER — SODIUM CHLORIDE 9 MG/ML
INJECTION, SOLUTION INTRAVENOUS CONTINUOUS
Status: DISCONTINUED | OUTPATIENT
Start: 2023-06-14 | End: 2023-06-15

## 2023-06-14 RX ORDER — POLYETHYLENE GLYCOL 3350 17 G/17G
17 POWDER, FOR SOLUTION ORAL DAILY PRN
Status: DISCONTINUED | OUTPATIENT
Start: 2023-06-14 | End: 2023-06-17 | Stop reason: HOSPADM

## 2023-06-14 RX ADMIN — OXYCODONE HYDROCHLORIDE AND ACETAMINOPHEN 1 TABLET: 5; 325 TABLET ORAL at 20:27

## 2023-06-14 RX ADMIN — ONDANSETRON 4 MG: 2 INJECTION INTRAMUSCULAR; INTRAVENOUS at 16:35

## 2023-06-14 RX ADMIN — ASPIRIN 81 MG 324 MG: 81 TABLET ORAL at 18:48

## 2023-06-14 RX ADMIN — IOPAMIDOL 75 ML: 755 INJECTION, SOLUTION INTRAVENOUS at 16:55

## 2023-06-14 RX ADMIN — WARFARIN SODIUM 2.5 MG: 2.5 TABLET ORAL at 23:49

## 2023-06-14 RX ADMIN — SODIUM CHLORIDE, PRESERVATIVE FREE 10 ML: 5 INJECTION INTRAVENOUS at 23:48

## 2023-06-14 RX ADMIN — CEFTRIAXONE 1000 MG: 1 INJECTION, POWDER, FOR SOLUTION INTRAMUSCULAR; INTRAVENOUS at 18:48

## 2023-06-14 RX ADMIN — FAMOTIDINE 20 MG: 10 INJECTION, SOLUTION INTRAVENOUS at 16:40

## 2023-06-14 RX ADMIN — SODIUM CHLORIDE 500 ML: 9 INJECTION, SOLUTION INTRAVENOUS at 17:07

## 2023-06-14 RX ADMIN — SODIUM CHLORIDE: 9 INJECTION, SOLUTION INTRAVENOUS at 23:47

## 2023-06-14 RX ADMIN — SACUBITRIL AND VALSARTAN 1 TABLET: 49; 51 TABLET, FILM COATED ORAL at 23:49

## 2023-06-14 RX ADMIN — METOPROLOL SUCCINATE 25 MG: 25 TABLET, EXTENDED RELEASE ORAL at 23:49

## 2023-06-14 RX ADMIN — FENTANYL CITRATE 25 MCG: 50 INJECTION, SOLUTION INTRAMUSCULAR; INTRAVENOUS at 17:19

## 2023-06-14 RX ADMIN — SODIUM CHLORIDE 500 ML: 9 INJECTION, SOLUTION INTRAVENOUS at 16:24

## 2023-06-14 ASSESSMENT — PAIN DESCRIPTION - ORIENTATION
ORIENTATION: LEFT;LOWER;UPPER
ORIENTATION: LEFT;LOWER
ORIENTATION: LEFT

## 2023-06-14 ASSESSMENT — LIFESTYLE VARIABLES
HOW OFTEN DO YOU HAVE A DRINK CONTAINING ALCOHOL: NEVER
HOW MANY STANDARD DRINKS CONTAINING ALCOHOL DO YOU HAVE ON A TYPICAL DAY: PATIENT DOES NOT DRINK

## 2023-06-14 ASSESSMENT — PAIN DESCRIPTION - DESCRIPTORS
DESCRIPTORS: SHARP;THROBBING
DESCRIPTORS: SHARP
DESCRIPTORS: ACHING;DISCOMFORT

## 2023-06-14 ASSESSMENT — PAIN DESCRIPTION - LOCATION
LOCATION: ABDOMEN

## 2023-06-14 ASSESSMENT — PAIN - FUNCTIONAL ASSESSMENT
PAIN_FUNCTIONAL_ASSESSMENT: 0-10
PAIN_FUNCTIONAL_ASSESSMENT: ACTIVITIES ARE NOT PREVENTED

## 2023-06-14 ASSESSMENT — PAIN SCALES - GENERAL
PAINLEVEL_OUTOF10: 9
PAINLEVEL_OUTOF10: 7

## 2023-06-14 NOTE — ED NOTES
EDMD made aware of lowered BP; states to add another bolus of normal saline 500mL through the IV once returned from [address], RN  06/14/23 2668

## 2023-06-14 NOTE — ED NOTES
EDMD made aware of patients pain level; states no new orders at this time they will be going in to discuss results soon and can talk to him about Left sided abdominal pain      Alice Mello, RN  06/14/23 8482

## 2023-06-14 NOTE — ED NOTES
Handoff report given to Sherni Anne RN; all questions and concerns answered; receiving RN acknowledged and had no further questions at this time      Bela Pelletier RN  06/14/23 0189

## 2023-06-14 NOTE — ED TRIAGE NOTES
Patient arrived to the ED via from home w/ complaints of chest pain and abdominal pain. Patient/EMS reports states chest pain: been having fast HR all morning and chest has been hurting as well took daily 81mg ASA warfarin; denies pain in chest upon arrival to ED ; HX of CHF, pacemaker and defibrillator in place (defibrillator is only working at the moment per patient); no medication was given en route to hospital   Abdominal pain: Started today states he feels constipated but had a BM before coming to the ED;  Hx of kidney stones    Patient A&O x 4, VSS w/ exception of elevated HR,

## 2023-06-14 NOTE — ED NOTES
Attempted x1 to get IV by Uma SELF; patient refusing to be stuck again w/o US IV; will inform charge RN to see who is can attempt for US IV access      Rigoberto Schmid RN  06/14/23 9810

## 2023-06-14 NOTE — ED NOTES
Patient updated on scans that will be preformed; patient states he will not be doing IV contrast d/t no being able to tolerate it because it gives him serious Heart burn;     EDMD will be made aware of patient concerns about IV contrast scans      Bela Pelletier RN  06/14/23 9919

## 2023-06-14 NOTE — ED PROVIDER NOTES
([address] 2011    upper lobe-coumadin 2011    Sacroiliitis [facility])          SURGICAL HISTORY       Past Surgical History:   Procedure Laterality Date    CHOLECYSTECTOMY  2007    COLONOSCOPY  09/21/2012    dr Janeth mercedes    COLONOSCOPY  11/30/2018    daren--francine 2029=8    CYSTOSCOPY  05/17/2019    RIGHT SIDED URETEROSCOPY  Diagnostic, retrograde pyelogram and fulguration of previously resected bladder tumor base    CYSTOSCOPY Right 05/17/2019    RIGHT SIDED URETEROSCOPY FLUGERATION  OF BLADDER performed by Maria De Jesus Sunshine MD at [address] Right 07/02/2021    CYSTOURETHROSCOPY WITH RIGHT RETROGRADE PYELOGRAPHY AND PLACEMENT OF RIGHT DOUBLE J STENT performed by Nikki Fitzgerald DO at [address] Right 04/25/2022    CYSTOSCOPY, RIGHT STENT INSERTION performed by Maria De Jesus Sunshine MD at [address] 9/8/2022    ESOPHAGEAL DILATATION performed by Julianna Shepherd., DO at [address]   2015    LITHOTRIPSY      [facility] opening larger in sinuses    UPPER GASTROINTESTINAL ENDOSCOPY  03/28/2014    dr Dirk Torres N/A 02/01/2021    EGD BIOPSY performed by Isabelle Kimball MD at [address] [place] [address] 9/8/2022    [facility]       Previous Medications    ALBUTEROL (PROVENTIL) (2.5 MG/3ML) 0.083% NEBULIZER SOLUTION    Take 3 mLs by nebulization every 4 hours as needed for Wheezing or Shortness of Breath    ALBUTEROL SULFATE  (90 BASE) MCG/ACT INHALER    INHALE 2 PUFFS INTO THE LUNGS EVERY 4 HOURS AS NEEDED FOR WHEEZING OR SHORTNESS OF BREATH    ASPIRIN 81 MG TABLET    Take 1 tablet by mouth daily    AZELASTINE (ASTELIN) 0.1 % NASAL SPRAY    2 sprays by Nasal route 2 times daily Use in each nostril as directed    CHOLECALCIFEROL (VITAMIN D3) 1.25 MG

## 2023-06-14 NOTE — ED NOTES
Patient requesting to order cherry jello, vanilla pudding and fruit cup ; placed w/ dietary      Rich Trimble RN  06/14/23 2016

## 2023-06-14 NOTE — ED NOTES
This RN at bedside patient abdominal pain is still present and 9/10; however not feeling nauseated like before; EDMD will be made aware and see if any new orders will be prescribed      Liz Raphael RN  06/14/23 3760

## 2023-06-14 NOTE — ED NOTES
Introduced self to pt. Pt connected to cardiac monitor. Alert and oriented. Call light within reach.       Desire Calabrese RN  06/14/23 1958

## 2023-06-15 PROBLEM — R00.2 PALPITATIONS: Status: ACTIVE | Noted: 2023-06-15

## 2023-06-15 LAB
ANION GAP SERPL CALCULATED.3IONS-SCNC: 4 MMOL/L (ref 3–16)
BASOPHILS # BLD: 0 K/UL (ref 0–0.2)
BASOPHILS NFR BLD: 0.6 %
BUN SERPL-MCNC: 10 MG/DL (ref 7–20)
CALCIUM SERPL-MCNC: 9.8 MG/DL (ref 8.3–10.6)
CHLORIDE SERPL-SCNC: 105 MMOL/L (ref 99–110)
CO2 SERPL-SCNC: 30 MMOL/L (ref 21–32)
CREAT SERPL-MCNC: 1 MG/DL (ref 0.8–1.3)
DEPRECATED RDW RBC AUTO: 14 % (ref 12.4–15.4)
EKG DIAGNOSIS: NORMAL
EKG Q-T INTERVAL: 340 MS
EKG QRS DURATION: 140 MS
EKG QTC CALCULATION (BAZETT): 502 MS
EKG R AXIS: -47 DEGREES
EKG T AXIS: 110 DEGREES
EKG VENTRICULAR RATE: 131 BPM
EOSINOPHIL # BLD: 0.2 K/UL (ref 0–0.6)
EOSINOPHIL NFR BLD: 4 %
GFR SERPLBLD CREATININE-BSD FMLA CKD-EPI: >60 ML/MIN/{1.73_M2}
GLUCOSE SERPL-MCNC: 137 MG/DL (ref 70–99)
HCT VFR BLD AUTO: 39.5 % (ref 40.5–52.5)
HEMOCCULT STL QL: NORMAL
HGB BLD-MCNC: 13.6 G/DL (ref 13.5–17.5)
INR PPP: 2.83 (ref 0.84–1.16)
LYMPHOCYTES # BLD: 1.7 K/UL (ref 1–5.1)
LYMPHOCYTES NFR BLD: 30.5 %
MCH RBC QN AUTO: 31.9 PG (ref 26–34)
MCHC RBC AUTO-ENTMCNC: 34.4 G/DL (ref 31–36)
MCV RBC AUTO: 92.9 FL (ref 80–100)
MONOCYTES # BLD: 0.6 K/UL (ref 0–1.3)
MONOCYTES NFR BLD: 10.7 %
NEUTROPHILS # BLD: 2.9 K/UL (ref 1.7–7.7)
NEUTROPHILS NFR BLD: 54.2 %
PLATELET # BLD AUTO: 148 K/UL (ref 135–450)
PMV BLD AUTO: 7.5 FL (ref 5–10.5)
POTASSIUM SERPL-SCNC: 4.1 MMOL/L (ref 3.5–5.1)
PROTHROMBIN TIME: 29.6 SEC (ref 11.5–14.8)
RBC # BLD AUTO: 4.25 M/UL (ref 4.2–5.9)
SODIUM SERPL-SCNC: 139 MMOL/L (ref 136–145)
WBC # BLD AUTO: 5.4 K/UL (ref 4–11)

## 2023-06-15 PROCEDURE — 96361 HYDRATE IV INFUSION ADD-ON: CPT

## 2023-06-15 PROCEDURE — 6370000000 HC RX 637 (ALT 250 FOR IP): Performed by: INTERNAL MEDICINE

## 2023-06-15 PROCEDURE — 94640 AIRWAY INHALATION TREATMENT: CPT

## 2023-06-15 PROCEDURE — 94760 N-INVAS EAR/PLS OXIMETRY 1: CPT

## 2023-06-15 PROCEDURE — 6370000000 HC RX 637 (ALT 250 FOR IP): Performed by: NURSE PRACTITIONER

## 2023-06-15 PROCEDURE — 2580000003 HC RX 258: Performed by: INTERNAL MEDICINE

## 2023-06-15 PROCEDURE — 85025 COMPLETE CBC W/AUTO DIFF WBC: CPT

## 2023-06-15 PROCEDURE — 99223 1ST HOSP IP/OBS HIGH 75: CPT | Performed by: INTERNAL MEDICINE

## 2023-06-15 PROCEDURE — 93010 ELECTROCARDIOGRAM REPORT: CPT | Performed by: INTERNAL MEDICINE

## 2023-06-15 PROCEDURE — 96366 THER/PROPH/DIAG IV INF ADDON: CPT

## 2023-06-15 PROCEDURE — 82270 OCCULT BLOOD FECES: CPT

## 2023-06-15 PROCEDURE — G0378 HOSPITAL OBSERVATION PER HR: HCPCS

## 2023-06-15 PROCEDURE — 6360000002 HC RX W HCPCS: Performed by: INTERNAL MEDICINE

## 2023-06-15 PROCEDURE — 36415 COLL VENOUS BLD VENIPUNCTURE: CPT

## 2023-06-15 PROCEDURE — 85610 PROTHROMBIN TIME: CPT

## 2023-06-15 PROCEDURE — 80048 BASIC METABOLIC PNL TOTAL CA: CPT

## 2023-06-15 RX ORDER — CETIRIZINE HYDROCHLORIDE 10 MG/1
10 TABLET ORAL DAILY
Status: DISCONTINUED | OUTPATIENT
Start: 2023-06-15 | End: 2023-06-17 | Stop reason: HOSPADM

## 2023-06-15 RX ORDER — ERGOCALCIFEROL 1.25 MG/1
50000 CAPSULE ORAL WEEKLY
Status: DISCONTINUED | OUTPATIENT
Start: 2023-06-16 | End: 2023-06-17 | Stop reason: HOSPADM

## 2023-06-15 RX ORDER — ALBUTEROL SULFATE 2.5 MG/3ML
2.5 SOLUTION RESPIRATORY (INHALATION) 3 TIMES DAILY
Status: DISCONTINUED | OUTPATIENT
Start: 2023-06-15 | End: 2023-06-15

## 2023-06-15 RX ORDER — PRAVASTATIN SODIUM 40 MG
40 TABLET ORAL EVERY EVENING
Status: DISCONTINUED | OUTPATIENT
Start: 2023-06-15 | End: 2023-06-17 | Stop reason: HOSPADM

## 2023-06-15 RX ORDER — TAMSULOSIN HYDROCHLORIDE 0.4 MG/1
0.4 CAPSULE ORAL 2 TIMES DAILY
Status: DISCONTINUED | OUTPATIENT
Start: 2023-06-15 | End: 2023-06-17 | Stop reason: HOSPADM

## 2023-06-15 RX ORDER — PANTOPRAZOLE SODIUM 40 MG/1
40 TABLET, DELAYED RELEASE ORAL
Status: DISCONTINUED | OUTPATIENT
Start: 2023-06-15 | End: 2023-06-15

## 2023-06-15 RX ORDER — GUAIFENESIN 600 MG/1
600 TABLET, EXTENDED RELEASE ORAL 2 TIMES DAILY
Status: DISCONTINUED | OUTPATIENT
Start: 2023-06-15 | End: 2023-06-17 | Stop reason: HOSPADM

## 2023-06-15 RX ORDER — ALBUTEROL SULFATE 2.5 MG/3ML
2.5 SOLUTION RESPIRATORY (INHALATION) EVERY 8 HOURS
Status: DISCONTINUED | OUTPATIENT
Start: 2023-06-15 | End: 2023-06-15

## 2023-06-15 RX ORDER — FLUTICASONE PROPIONATE 50 MCG
1 SPRAY, SUSPENSION (ML) NASAL DAILY
COMMUNITY

## 2023-06-15 RX ORDER — METOPROLOL SUCCINATE 25 MG/1
25 TABLET, EXTENDED RELEASE ORAL NIGHTLY
Status: DISCONTINUED | OUTPATIENT
Start: 2023-06-15 | End: 2023-06-17 | Stop reason: HOSPADM

## 2023-06-15 RX ORDER — DEXLANSOPRAZOLE 60 MG/1
60 CAPSULE, DELAYED RELEASE ORAL DAILY
Status: DISCONTINUED | OUTPATIENT
Start: 2023-06-15 | End: 2023-06-15 | Stop reason: CLARIF

## 2023-06-15 RX ORDER — ALBUTEROL SULFATE 90 UG/1
2 AEROSOL, METERED RESPIRATORY (INHALATION) EVERY 4 HOURS PRN
Status: DISCONTINUED | OUTPATIENT
Start: 2023-06-15 | End: 2023-06-17 | Stop reason: HOSPADM

## 2023-06-15 RX ORDER — FLUTICASONE PROPIONATE 44 UG/1
1 AEROSOL, METERED RESPIRATORY (INHALATION) 2 TIMES DAILY
Status: DISCONTINUED | OUTPATIENT
Start: 2023-06-15 | End: 2023-06-17 | Stop reason: HOSPADM

## 2023-06-15 RX ORDER — WARFARIN SODIUM 2.5 MG/1
2.5 TABLET ORAL
Status: COMPLETED | OUTPATIENT
Start: 2023-06-15 | End: 2023-06-15

## 2023-06-15 RX ORDER — PREGABALIN 75 MG/1
150 CAPSULE ORAL NIGHTLY
Status: DISCONTINUED | OUTPATIENT
Start: 2023-06-15 | End: 2023-06-17 | Stop reason: HOSPADM

## 2023-06-15 RX ORDER — METOPROLOL SUCCINATE 25 MG/1
37.5 TABLET, EXTENDED RELEASE ORAL DAILY
Status: DISCONTINUED | OUTPATIENT
Start: 2023-06-16 | End: 2023-06-17 | Stop reason: HOSPADM

## 2023-06-15 RX ORDER — POLYETHYLENE GLYCOL 3350 17 G/17G
17 POWDER, FOR SOLUTION ORAL NIGHTLY
Status: DISCONTINUED | OUTPATIENT
Start: 2023-06-15 | End: 2023-06-17 | Stop reason: HOSPADM

## 2023-06-15 RX ORDER — METHOCARBAMOL 500 MG/1
500 TABLET, FILM COATED ORAL 4 TIMES DAILY PRN
Status: DISCONTINUED | OUTPATIENT
Start: 2023-06-15 | End: 2023-06-17 | Stop reason: HOSPADM

## 2023-06-15 RX ORDER — METOPROLOL SUCCINATE 25 MG/1
25 TABLET, EXTENDED RELEASE ORAL 2 TIMES DAILY
Status: DISCONTINUED | OUTPATIENT
Start: 2023-06-15 | End: 2023-06-15

## 2023-06-15 RX ORDER — ZOLPIDEM TARTRATE 5 MG/1
10 TABLET ORAL NIGHTLY PRN
Status: DISCONTINUED | OUTPATIENT
Start: 2023-06-15 | End: 2023-06-17 | Stop reason: HOSPADM

## 2023-06-15 RX ORDER — AZELASTINE 1 MG/ML
2 SPRAY, METERED NASAL 2 TIMES DAILY
Status: DISCONTINUED | OUTPATIENT
Start: 2023-06-15 | End: 2023-06-17 | Stop reason: HOSPADM

## 2023-06-15 RX ORDER — ERGOCALCIFEROL 1.25 MG/1
50000 CAPSULE ORAL WEEKLY
Status: DISCONTINUED | OUTPATIENT
Start: 2023-06-15 | End: 2023-06-15

## 2023-06-15 RX ORDER — ALBUTEROL SULFATE 2.5 MG/3ML
2.5 SOLUTION RESPIRATORY (INHALATION) EVERY 4 HOURS PRN
Status: DISCONTINUED | OUTPATIENT
Start: 2023-06-15 | End: 2023-06-17 | Stop reason: HOSPADM

## 2023-06-15 RX ORDER — DEXLANSOPRAZOLE 60 MG/1
60 CAPSULE, DELAYED RELEASE ORAL DAILY
Status: DISCONTINUED | OUTPATIENT
Start: 2023-06-15 | End: 2023-06-17 | Stop reason: HOSPADM

## 2023-06-15 RX ORDER — LANOLIN ALCOHOL/MO/W.PET/CERES
3 CREAM (GRAM) TOPICAL NIGHTLY PRN
Status: DISCONTINUED | OUTPATIENT
Start: 2023-06-15 | End: 2023-06-17 | Stop reason: HOSPADM

## 2023-06-15 RX ORDER — PREGABALIN 75 MG/1
75 CAPSULE ORAL DAILY
Status: DISCONTINUED | OUTPATIENT
Start: 2023-06-15 | End: 2023-06-17 | Stop reason: HOSPADM

## 2023-06-15 RX ORDER — PRAVASTATIN SODIUM 40 MG
40 TABLET ORAL NIGHTLY
Status: DISCONTINUED | OUTPATIENT
Start: 2023-06-15 | End: 2023-06-15

## 2023-06-15 RX ORDER — LEVALBUTEROL 1.25 MG/.5ML
1.25 SOLUTION, CONCENTRATE RESPIRATORY (INHALATION) EVERY 8 HOURS SCHEDULED
Status: DISCONTINUED | OUTPATIENT
Start: 2023-06-15 | End: 2023-06-16

## 2023-06-15 RX ORDER — OXYCODONE HYDROCHLORIDE AND ACETAMINOPHEN 5; 325 MG/1; MG/1
1 TABLET ORAL EVERY 12 HOURS PRN
Status: DISCONTINUED | OUTPATIENT
Start: 2023-06-15 | End: 2023-06-16

## 2023-06-15 RX ADMIN — METOPROLOL SUCCINATE 25 MG: 25 TABLET, EXTENDED RELEASE ORAL at 12:55

## 2023-06-15 RX ADMIN — ALBUTEROL SULFATE 2.5 MG: 2.5 SOLUTION RESPIRATORY (INHALATION) at 16:37

## 2023-06-15 RX ADMIN — SODIUM CHLORIDE: 9 INJECTION, SOLUTION INTRAVENOUS at 14:27

## 2023-06-15 RX ADMIN — PRAVASTATIN SODIUM 40 MG: 40 TABLET ORAL at 00:37

## 2023-06-15 RX ADMIN — PREGABALIN 150 MG: 75 CAPSULE ORAL at 00:37

## 2023-06-15 RX ADMIN — ZOLPIDEM TARTRATE 10 MG: 5 TABLET ORAL at 00:37

## 2023-06-15 RX ADMIN — TAMSULOSIN HYDROCHLORIDE 0.4 MG: 0.4 CAPSULE ORAL at 09:01

## 2023-06-15 RX ADMIN — ALBUTEROL SULFATE 2.5 MG: 2.5 SOLUTION RESPIRATORY (INHALATION) at 07:50

## 2023-06-15 RX ADMIN — SACUBITRIL AND VALSARTAN 1 TABLET: 49; 51 TABLET, FILM COATED ORAL at 09:01

## 2023-06-15 RX ADMIN — WARFARIN SODIUM 2.5 MG: 2.5 TABLET ORAL at 17:25

## 2023-06-15 RX ADMIN — AZELASTINE HYDROCHLORIDE 2 SPRAY: 137 SPRAY, METERED NASAL at 18:42

## 2023-06-15 RX ADMIN — PRAVASTATIN SODIUM 40 MG: 40 TABLET ORAL at 18:42

## 2023-06-15 RX ADMIN — GUAIFENESIN 600 MG: 600 TABLET ORAL at 20:57

## 2023-06-15 RX ADMIN — ZOLPIDEM TARTRATE 10 MG: 5 TABLET ORAL at 20:58

## 2023-06-15 RX ADMIN — SODIUM CHLORIDE, PRESERVATIVE FREE 10 ML: 5 INJECTION INTRAVENOUS at 20:59

## 2023-06-15 RX ADMIN — PREGABALIN 75 MG: 75 CAPSULE ORAL at 12:55

## 2023-06-15 RX ADMIN — ASPIRIN 81 MG 81 MG: 81 TABLET ORAL at 12:55

## 2023-06-15 RX ADMIN — CETIRIZINE HYDROCHLORIDE 10 MG: 10 TABLET, FILM COATED ORAL at 09:01

## 2023-06-15 RX ADMIN — TAMSULOSIN HYDROCHLORIDE 0.4 MG: 0.4 CAPSULE ORAL at 18:42

## 2023-06-15 RX ADMIN — GUAIFENESIN 600 MG: 600 TABLET ORAL at 09:01

## 2023-06-15 RX ADMIN — POLYETHYLENE GLYCOL 3350 17 G: 17 POWDER, FOR SOLUTION ORAL at 00:38

## 2023-06-15 RX ADMIN — METOPROLOL SUCCINATE 25 MG: 25 TABLET, EXTENDED RELEASE ORAL at 20:57

## 2023-06-15 RX ADMIN — FUROSEMIDE 20 MG: 20 TABLET ORAL at 09:01

## 2023-06-15 RX ADMIN — SACUBITRIL AND VALSARTAN 1 TABLET: 49; 51 TABLET, FILM COATED ORAL at 20:57

## 2023-06-15 RX ADMIN — POLYETHYLENE GLYCOL 3350 17 G: 17 POWDER, FOR SOLUTION ORAL at 20:57

## 2023-06-15 RX ADMIN — OXYCODONE AND ACETAMINOPHEN 1 TABLET: 5; 325 TABLET ORAL at 14:28

## 2023-06-15 RX ADMIN — ALBUTEROL SULFATE 2.5 MG: 2.5 SOLUTION RESPIRATORY (INHALATION) at 23:51

## 2023-06-15 RX ADMIN — PREGABALIN 150 MG: 75 CAPSULE ORAL at 20:58

## 2023-06-15 RX ADMIN — CEFTRIAXONE SODIUM 2000 MG: 2 INJECTION, POWDER, FOR SOLUTION INTRAMUSCULAR; INTRAVENOUS at 09:07

## 2023-06-15 RX ADMIN — FLAVOXATE HYDROCHLORIDE 100 MG: 100 TABLET ORAL at 23:18

## 2023-06-15 RX ADMIN — EMPAGLIFLOZIN 10 MG: 10 TABLET, FILM COATED ORAL at 15:05

## 2023-06-15 RX ADMIN — PANTOPRAZOLE SODIUM 40 MG: 40 TABLET, DELAYED RELEASE ORAL at 06:03

## 2023-06-15 RX ADMIN — METHOCARBAMOL 500 MG: 500 TABLET ORAL at 20:57

## 2023-06-15 ASSESSMENT — PAIN DESCRIPTION - DESCRIPTORS
DESCRIPTORS: DULL
DESCRIPTORS: ACHING
DESCRIPTORS: ACHING

## 2023-06-15 ASSESSMENT — PAIN DESCRIPTION - LOCATION
LOCATION: BACK

## 2023-06-15 ASSESSMENT — PAIN SCALES - GENERAL
PAINLEVEL_OUTOF10: 2

## 2023-06-15 ASSESSMENT — PAIN DESCRIPTION - ORIENTATION
ORIENTATION: LOWER;LEFT;MID
ORIENTATION: LOWER

## 2023-06-15 ASSESSMENT — PAIN DESCRIPTION - PAIN TYPE: TYPE: CHRONIC PAIN

## 2023-06-15 ASSESSMENT — PAIN - FUNCTIONAL ASSESSMENT: PAIN_FUNCTIONAL_ASSESSMENT: PREVENTS OR INTERFERES SOME ACTIVE ACTIVITIES AND ADLS

## 2023-06-15 NOTE — PROGRESS NOTES
Patient has three bottles of medications from home, Trulance, Flavoxate and Dexilant. Pharmacy advised to put them in patient's draw in med room to be picked up later.

## 2023-06-15 NOTE — PROGRESS NOTES
Clinical Pharmacy Note  Warfarin Consult    Prince Reynaga is a 60 y.o. male receiving warfarin managed by pharmacy. Patient being bridged with none. Warfarin Indication: DVT/PE hx  Target INR range: 2-3   Dose prior to admission: 5 mg Tues/Fri and 2.5 mg all other days    Current warfarin drug-drug interactions: ceftriaxone    Recent Labs     06/14/23  1533 06/15/23  0431   HGB 15.6 13.6   HCT 44.9 39.5*   INR 2.36* 2.83*         Assessment/Plan:    Warfarin 2.5 mg tonight. Daily PT/INR until stable within therapeutic range. Thank you for the consult. Will continue to follow. Mely Boateng, PharmD.   6/15/2023  7:53 AM

## 2023-06-15 NOTE — PROGRESS NOTES
Contacted the covering physician to get his medications ordered and the oxygen that he uses at night at home. Pt then satisfied and refused some of the medication stating that it was too late at night. Currently without need and agrees to call with any concern or need.

## 2023-06-15 NOTE — PROGRESS NOTES
Pt admitted to room 5121. Walked from the stretcher, into the bathroom to clean up. Telemetry placed after, confirmed with the monitor room, sinus rhythm. Vitals and assessment as charted. Pt oriented to the room, denies pain.

## 2023-06-15 NOTE — H&P
1845   BP: 100/81 110/71  114/78   Pulse: 93  91 90   Resp: 23 26 26   Temp:       TempSrc:       SpO2: 94%  94% 96%   Weight:       Height:           Medications Prior to Admission     Prior to Admission medications    Medication Sig Start Date End Date Taking? Authorizing Provider   predniSONE (DELTASONE) 10 MG tablet Take two tablets by mouth twice a day for three days, then take one tablet by mouth twice a day for three days, then take one tablet daily for 3 days.  6/2/23   Kaitlin Plascencia MD   pravastatin (PRAVACHOL) 40 MG tablet TAKE 1 TABLET BY MOUTH AT NIGHT 5/23/23   Kaitlin Plascencia MD   furosemide (LASIX) 20 MG tablet TAKE 1 TABLET BY MOUTH 2 TIMES DAILY AS NEEDED (SOB) 5/22/23   Kevin Valdez MD   zolpidem (AMBIEN) 10 MG tablet TAKE 1 TABLET BY MOUTH AT NIGHT  AS NEEDED FOR SLEEP 5/18/23 8/18/23  Kaitlin Plascencia MD   pregabalin (LYRICA) 75 MG capsule TAKE 1 CAPSULE BY MOUTH IN THE  MORNING AND 2 CAPSULES IN THE  EVENING 5/12/23 8/12/23  Kaitlin Plascencia MD   fluticasone-umeclidin-vilant (TRELEGY ELLIPTA) 846-53.5-94 MCG/ACT AEPB inhaler Inhale 1 puff into the lungs daily 5/3/23   Kena Faust MD   vitamin D (ERGOCALCIFEROL) 1.25 MG (76188 UT) CAPS capsule TAKE 1 CAPSULE BY MOUTH ONCE A WEEK 4/14/23   Kaitlin Plascencia MD   flavoxATE (URISPAS) 100 MG tablet TAKE 1 TABLET BY MOUTH THREE TIMES DAILY AS NEEDED FOR MUSCLE SPASMS 4/4/23   Kaitlin Plascencia MD   rosuvastatin (CRESTOR) 20 MG tablet Take 1 tablet by mouth daily 4/4/23   Kevin Valdez MD   metoprolol succinate (TOPROL XL) 25 MG extended release tablet Take 1 tablet by mouth in the morning and at bedtime 3/30/23   Kevin Valdez MD   ENTRESTO 49-51 MG per tablet Take 1  tablets twice daily by mouth 3/30/23   Kevin Valdez MD   ketorolac (TORADOL) 10 MG tablet Take 1 tablet by mouth every 8 hours as needed for Pain 3/24/23 3/23/24  Kaitlin Plascencia MD   methocarbamol (ROBAXIN) 500 MG tablet Take 1 tablet by mouth 4 times daily as needed

## 2023-06-15 NOTE — ED NOTES
Report given to Kenisha De Dios RN. SBAR discussed. All questions answered. RN verbalized understanding.       Desire Calabrese RN  06/14/23 2698

## 2023-06-15 NOTE — PROGRESS NOTES
Kenyatta Lebron MD    Hospitalist Progress Note      Name:  Tyshawn Boyd /Age/Sex: 1960  (60 y.o. male)   MRN & CSN:  [id number] & [id number] Admission Date/Time: 2023  3:10 PM   Location:  [id number] PCP: Aquiles Hassan MD       I saw and examined the patient on 6/15/2023 at 8:25 AM.    Hospital Day: 2  Barriers to discharge: 1 day to home  Assessment and Plan:     60 y.o. male with pmh of cardiomyopathy, GERD, hyperlipidemia, irritable bowel syndrome status post pacemaker placement presenting after an episode of near syncope with palpitations. Near syncope associated with palpitations. Defibrillator in place, cardiology following, may need to interrogate defibrillator. 2D echo per cardiology if needed  Probably UTI, on empiric antibiotics in the ER,  we will continue, follow-up cultures  Dehydration. On IV fluids. Chronic anticoagulation with Coumadin. Pharmacy assistance appreciated      Lovenox for DVT prophylaxis  Full code     Subjective:     Patient seen and examined at bedside. Laying comfortably in bed not in acute distress. No acute events reported overnight. Patient denies any chest pain, fever, headache, shortness of breath, abdominal pain, nausea or vomiting, diarrhea or new extremity weakness    Objective: Intake/Output Summary (Last 24 hours) at 6/15/2023 0825  Last data filed at 6/15/2023 0733  Gross per 24 hour   Intake 2488. 91 ml   Output 450 ml   Net 2038.91 ml      Vitals:   Vitals:    06/15/23 0751   BP:    Pulse: 71   Resp:    Temp:    SpO2:        Ten point ROS reviewed negative, unless as noted above    Physical Exam:     GENERAL APPEARANCE: not in any acute distress,  appears comfortable. NECK: Supple, no JVD.   CARDIOVASCULAR: Regular rate and rhythm, no murmurs, rubs or gallops  LUNGS: clear to auscultation bilaterally   ABDOMEN: normoactive bowel sounds, soft non-tender and non distended  EXTREMITIES: No edema  MUSCULOSKELETAL S: normal movement and

## 2023-06-15 NOTE — PROGRESS NOTES
Medication Reconciliation    List of medications patient is currently taking is complete. Source of information: 1. Conversation with patient at bedside                                      2. EPIC records      Notes regarding home medications:   1. Patient is on warfarin for h/o VTE.  Current regimen is 2.5 mg daily except 5 mg on Tues/Friday

## 2023-06-15 NOTE — CONSULTS
Referring Physician: * No referring provider recorded for this case *  Reason for Consultation: Tachyarrhythmias  Chief Complaint: Heart palpitation and dizziness      Subjective:   History of Present Illness:  Camden Ramirez is a 60 y.o. patient with prior history of nonischemic cardiomyopathy prior PE, bronchiolitis obliterans hypertension who presented to the hospital with complaints of heart racing. He noticed the symptoms for last 48 hours. He said he was sitting in a chair and his heart rate will certainly take off and will be up to 140 bpm.  He had another episode yesterday which was associated with lightheadedness and dizziness. He states his symptoms come rather randomly and recently his heart rate will jump from 7080 beats minute 230 to 140 bpm.  He denies any fever chills or rigors. He has been suffering from a tract infection for last few weeks    In the emergency room his EKG showed tachyarrhythmia with a heart rate of 131 bpm.  He is currently being treated with antibiotics. His heart rate is back into 80 bpm with improvement in his symptoms    I have been asked to provide consultation regarding further management and testing. Review of Systems:   All 12 point review of symptoms completed. Pertinent positives identified in the HPI, all other review of symptoms negative as below. Past Medical History:   has a past medical history of Bronchiolitis obliterans ([address] Bundle branch block, right, CAD (coronary artery disease), Cancer ([address] Cardiomyopathy ([address] Chest pain, CHF (congestive heart failure) ([address] COPD (chronic obstructive pulmonary disease) ([address] COVID-19 virus vaccine not available, Fibromyalgia, GERD (gastroesophageal reflux disease), Hepatitis, Hx of blood clots, Hyperlipemia, Hypertension, IBS (irritable bowel syndrome), Kidney stone, Neuropathy, Pneumothorax, Prostatitis, Pulmonary embolism on right ([address] and Sacroiliitis ([address]     Surgical History:   has a past surgical

## 2023-06-15 NOTE — CONSULTS
Clinical Pharmacy Note  Warfarin Consult    Cinthya Cole is a 60 y.o. male receiving warfarin managed by pharmacy. Patient being bridged with none. Warfarin Indication: DVT/PE hx  Target INR range: 2-3   Dose prior to admission: 5 mg Tues/Fri and 2.5 mg all other days    Current warfarin drug-drug interactions:     Recent Labs     06/14/23  1533   HGB 15.6   HCT 44.9   INR 2.36*       Assessment/Plan:    Warfarin 2.5 mg tonight. Daily PT/INR until stable within therapeutic range. Thank you for the consult. Will continue to follow.   Medina Barber, [facility]

## 2023-06-15 NOTE — PROGRESS NOTES
4 Eyes Skin Assessment     NAME:  Richa Blanco  YOB: 1960  MEDICAL RECORD NUMBER:  [id number]    The patient is being assessed for  Admission    I agree that at least one RN has performed a thorough Head to Toe Skin Assessment on the patient. ALL assessment sites listed below have been assessed. Areas assessed by both nurses:    Head, Face, Ears, Shoulders, Back, Chest, Arms, Elbows, Hands, Sacrum. Buttock, Coccyx, Ischium, Legs. Feet and Heels, and Under Medical Devices         Does the Patient have a Wound?  No noted wound(s)       Casimiro Prevention initiated by RN: No  Wound Care Orders initiated by RN: No    Pressure Injury (Stage 3,4, Unstageable, DTI, NWPT, and Complex wounds) if present, place Wound referral order by RN under : No    New Ostomies, if present place, Ostomy referral order under : No     Nurse 1 eSignature: Electronically signed by Kesha Bosch RN on 6/15/23 at 2:17 AM EDT    **SHARE this note so that the co-signing nurse can place an eSignature**    Nurse 2 eSignature: Electronically signed by Rogelio Cabrales RN on 6/15/23 at 2:19 AM EDT

## 2023-06-16 ENCOUNTER — APPOINTMENT (OUTPATIENT)
Dept: GENERAL RADIOLOGY | Age: 63
DRG: 309 | End: 2023-06-16
Payer: COMMERCIAL

## 2023-06-16 LAB
BACTERIA UR CULT: ABNORMAL
GLUCOSE BLD-MCNC: 97 MG/DL (ref 70–99)
INR PPP: 3.05 (ref 0.84–1.16)
ORGANISM: ABNORMAL
PERFORMED ON: NORMAL
PROTHROMBIN TIME: 31.3 SEC (ref 11.5–14.8)

## 2023-06-16 PROCEDURE — 94640 AIRWAY INHALATION TREATMENT: CPT

## 2023-06-16 PROCEDURE — 6370000000 HC RX 637 (ALT 250 FOR IP): Performed by: INTERNAL MEDICINE

## 2023-06-16 PROCEDURE — 94760 N-INVAS EAR/PLS OXIMETRY 1: CPT

## 2023-06-16 PROCEDURE — 6360000002 HC RX W HCPCS: Performed by: INTERNAL MEDICINE

## 2023-06-16 PROCEDURE — 2580000003 HC RX 258: Performed by: INTERNAL MEDICINE

## 2023-06-16 PROCEDURE — 85610 PROTHROMBIN TIME: CPT

## 2023-06-16 PROCEDURE — 36415 COLL VENOUS BLD VENIPUNCTURE: CPT

## 2023-06-16 PROCEDURE — 2060000000 HC ICU INTERMEDIATE R&B

## 2023-06-16 PROCEDURE — 74018 RADEX ABDOMEN 1 VIEW: CPT

## 2023-06-16 PROCEDURE — 6370000000 HC RX 637 (ALT 250 FOR IP): Performed by: NURSE PRACTITIONER

## 2023-06-16 RX ORDER — FLUTICASONE PROPIONATE 50 MCG
1 SPRAY, SUSPENSION (ML) NASAL DAILY
Status: DISCONTINUED | OUTPATIENT
Start: 2023-06-16 | End: 2023-06-17 | Stop reason: HOSPADM

## 2023-06-16 RX ORDER — OXYCODONE HYDROCHLORIDE AND ACETAMINOPHEN 5; 325 MG/1; MG/1
1 TABLET ORAL EVERY 8 HOURS PRN
Status: DISCONTINUED | OUTPATIENT
Start: 2023-06-16 | End: 2023-06-17 | Stop reason: HOSPADM

## 2023-06-16 RX ADMIN — FUROSEMIDE 20 MG: 20 TABLET ORAL at 08:48

## 2023-06-16 RX ADMIN — CETIRIZINE HYDROCHLORIDE 10 MG: 10 TABLET, FILM COATED ORAL at 08:48

## 2023-06-16 RX ADMIN — OXYCODONE AND ACETAMINOPHEN 1 TABLET: 5; 325 TABLET ORAL at 10:36

## 2023-06-16 RX ADMIN — PREGABALIN 150 MG: 75 CAPSULE ORAL at 20:38

## 2023-06-16 RX ADMIN — DEXLANSOPRAZOLE 60 MG: 60 CAPSULE, DELAYED RELEASE ORAL at 08:47

## 2023-06-16 RX ADMIN — ZOLPIDEM TARTRATE 10 MG: 5 TABLET ORAL at 20:38

## 2023-06-16 RX ADMIN — POLYETHYLENE GLYCOL 3350 17 G: 17 POWDER, FOR SOLUTION ORAL at 20:37

## 2023-06-16 RX ADMIN — TAMSULOSIN HYDROCHLORIDE 0.4 MG: 0.4 CAPSULE ORAL at 08:48

## 2023-06-16 RX ADMIN — SODIUM CHLORIDE, PRESERVATIVE FREE 10 ML: 5 INJECTION INTRAVENOUS at 08:50

## 2023-06-16 RX ADMIN — METHOCARBAMOL 500 MG: 500 TABLET ORAL at 15:14

## 2023-06-16 RX ADMIN — FLUTICASONE PROPIONATE 1 SPRAY: 50 SPRAY, METERED NASAL at 15:12

## 2023-06-16 RX ADMIN — EMPAGLIFLOZIN 10 MG: 10 TABLET, FILM COATED ORAL at 10:28

## 2023-06-16 RX ADMIN — TAMSULOSIN HYDROCHLORIDE 0.4 MG: 0.4 CAPSULE ORAL at 18:06

## 2023-06-16 RX ADMIN — TIOTROPIUM BROMIDE INHALATION SPRAY 2 PUFF: 3.12 SPRAY, METERED RESPIRATORY (INHALATION) at 08:00

## 2023-06-16 RX ADMIN — ALBUTEROL SULFATE 2.5 MG: 2.5 SOLUTION RESPIRATORY (INHALATION) at 07:59

## 2023-06-16 RX ADMIN — PREGABALIN 75 MG: 75 CAPSULE ORAL at 13:09

## 2023-06-16 RX ADMIN — AZELASTINE HYDROCHLORIDE 2 SPRAY: 137 SPRAY, METERED NASAL at 13:09

## 2023-06-16 RX ADMIN — CEFTRIAXONE SODIUM 2000 MG: 2 INJECTION, POWDER, FOR SOLUTION INTRAMUSCULAR; INTRAVENOUS at 08:55

## 2023-06-16 RX ADMIN — SACUBITRIL AND VALSARTAN 1 TABLET: 49; 51 TABLET, FILM COATED ORAL at 08:47

## 2023-06-16 RX ADMIN — METOPROLOL SUCCINATE 37.5 MG: 25 TABLET, EXTENDED RELEASE ORAL at 13:09

## 2023-06-16 RX ADMIN — GUAIFENESIN 600 MG: 600 TABLET ORAL at 20:38

## 2023-06-16 RX ADMIN — PRAVASTATIN SODIUM 40 MG: 40 TABLET ORAL at 18:06

## 2023-06-16 RX ADMIN — ASPIRIN 81 MG 81 MG: 81 TABLET ORAL at 10:28

## 2023-06-16 RX ADMIN — ERGOCALCIFEROL 50000 UNITS: 1.25 CAPSULE ORAL at 10:28

## 2023-06-16 RX ADMIN — ALBUTEROL SULFATE 2.5 MG: 2.5 SOLUTION RESPIRATORY (INHALATION) at 20:25

## 2023-06-16 RX ADMIN — SACUBITRIL AND VALSARTAN 1 TABLET: 49; 51 TABLET, FILM COATED ORAL at 20:37

## 2023-06-16 RX ADMIN — SODIUM CHLORIDE, PRESERVATIVE FREE 10 ML: 5 INJECTION INTRAVENOUS at 20:39

## 2023-06-16 RX ADMIN — METOPROLOL SUCCINATE 25 MG: 25 TABLET, EXTENDED RELEASE ORAL at 20:38

## 2023-06-16 RX ADMIN — GUAIFENESIN 600 MG: 600 TABLET ORAL at 08:48

## 2023-06-16 ASSESSMENT — PAIN DESCRIPTION - LOCATION
LOCATION: BACK
LOCATION: BACK

## 2023-06-16 ASSESSMENT — PAIN SCALES - GENERAL
PAINLEVEL_OUTOF10: 6
PAINLEVEL_OUTOF10: 7
PAINLEVEL_OUTOF10: 2
PAINLEVEL_OUTOF10: 6
PAINLEVEL_OUTOF10: 2

## 2023-06-16 ASSESSMENT — PAIN DESCRIPTION - DESCRIPTORS
DESCRIPTORS: DULL
DESCRIPTORS: DULL

## 2023-06-16 ASSESSMENT — PAIN DESCRIPTION - ORIENTATION
ORIENTATION: MID;RIGHT;LEFT
ORIENTATION: RIGHT;LEFT;LOWER;MID

## 2023-06-16 NOTE — PROGRESS NOTES
Brittni Olsen MD    Hospitalist Progress Note      Name:  Amador Arias /Age/Sex: 1960  (60 y.o. male)   MRN & CSN:  [id number] & [id number] Admission Date/Time: 2023  3:10 PM   Location:  [id number] PCP: Laura Kaplan MD       I saw and examined the patient on 2023 at 5:07 PM.    Hospital Day: 3     Assessment and Plan:     60 y.o. male with pmh of cardiomyopathy, GERD, hyperlipidemia, irritable bowel syndrome status post pacemaker placement presenting after an episode of near syncope with palpitations. Near syncope associated with palpitations. Defibrillator in place, cardiology following, may need to interrogate defibrillator. Seen by cardiology, noted that patient had wide-complex tachycardia on arrival most likely due to PSVT, cardiology adjusted beta-blocker with plan to outpatient follow-up with EP. Patient report he has another episode of near syncope this morning we will reach out to cardiology to see if inpatient evaluation of further medication of adjustment is needed  Probably UTI, on empiric antibiotics in the ER,  we will continue, follow-up cultures  Dehydration. On IV fluids. Chronic anticoagulation with Coumadin. Pharmacy assistance appreciated  Abdominal pain will do KUB        Lovenox for DVT prophylaxis  Full code       Subjective:     Patient seen and examined at bedside. Had another episode of near syncope this morning. Reports he probably passed kidney stone this am and needs pain meds    Objective: Intake/Output Summary (Last 24 hours) at 2023 1707  Last data filed at 2023 1306  Gross per 24 hour   Intake 1210 ml   Output 2675 ml   Net -1465 ml      Vitals:   Vitals:    23 1511   BP: 135/84   Pulse: 77   Resp: 16   Temp: 97.6 °F (36.4 °C)   SpO2: 94%       Ten point ROS reviewed negative, unless as noted above    Physical Exam:     GENERAL APPEARANCE: not in any acute distress,  appears comfortable.    NECK: Supple, no

## 2023-06-16 NOTE — PROGRESS NOTES
Patient is refusing Flovent MDI. States he \"doesn't take it at home\". Tried to educate that sometimes different meds are ordered in the hospital but he still refused.

## 2023-06-16 NOTE — PROGRESS NOTES
Clinical Pharmacy Note  Warfarin Consult    Awilda Fisher is a 60 y.o. male receiving warfarin managed by pharmacy. Patient being bridged with none. Warfarin Indication: DVT/PE hx  Target INR range: 2-3   Dose prior to admission: 5 mg Tues/Fri and 2.5 mg all other days    Current warfarin drug-drug interactions: ceftriaxone    Recent Labs     06/14/23  1533 06/15/23  0431 06/16/23  0509   HGB 15.6 13.6  --    HCT 44.9 39.5*  --    INR 2.36* 2.83* 3.05*         Assessment/Plan:    Warfarin HOLD mg tonight due to elevated INR Daily PT/INR until stable within therapeutic range. Thank you for the consult. Will continue to follow.   Joseluis Kelly, [facility], 6/16/2023 2:13 PM

## 2023-06-17 VITALS
HEIGHT: 66 IN | RESPIRATION RATE: 18 BRPM | TEMPERATURE: 97.9 F | OXYGEN SATURATION: 97 % | SYSTOLIC BLOOD PRESSURE: 129 MMHG | HEART RATE: 70 BPM | WEIGHT: 227.51 LBS | DIASTOLIC BLOOD PRESSURE: 89 MMHG | BODY MASS INDEX: 36.56 KG/M2

## 2023-06-17 LAB
ANION GAP SERPL CALCULATED.3IONS-SCNC: 10 MMOL/L (ref 3–16)
BUN SERPL-MCNC: 8 MG/DL (ref 7–20)
CALCIUM SERPL-MCNC: 10.1 MG/DL (ref 8.3–10.6)
CHLORIDE SERPL-SCNC: 105 MMOL/L (ref 99–110)
CO2 SERPL-SCNC: 25 MMOL/L (ref 21–32)
CREAT SERPL-MCNC: 1.1 MG/DL (ref 0.8–1.3)
GFR SERPLBLD CREATININE-BSD FMLA CKD-EPI: >60 ML/MIN/{1.73_M2}
GLUCOSE SERPL-MCNC: 109 MG/DL (ref 70–99)
INR PPP: 2.35 (ref 0.84–1.16)
POTASSIUM SERPL-SCNC: 3.8 MMOL/L (ref 3.5–5.1)
PROTHROMBIN TIME: 25.6 SEC (ref 11.5–14.8)
SODIUM SERPL-SCNC: 140 MMOL/L (ref 136–145)

## 2023-06-17 PROCEDURE — 6360000002 HC RX W HCPCS: Performed by: INTERNAL MEDICINE

## 2023-06-17 PROCEDURE — 94760 N-INVAS EAR/PLS OXIMETRY 1: CPT

## 2023-06-17 PROCEDURE — 85610 PROTHROMBIN TIME: CPT

## 2023-06-17 PROCEDURE — 6370000000 HC RX 637 (ALT 250 FOR IP): Performed by: INTERNAL MEDICINE

## 2023-06-17 PROCEDURE — 36415 COLL VENOUS BLD VENIPUNCTURE: CPT

## 2023-06-17 PROCEDURE — 2580000003 HC RX 258: Performed by: INTERNAL MEDICINE

## 2023-06-17 PROCEDURE — 80048 BASIC METABOLIC PNL TOTAL CA: CPT

## 2023-06-17 PROCEDURE — 94640 AIRWAY INHALATION TREATMENT: CPT

## 2023-06-17 PROCEDURE — 6370000000 HC RX 637 (ALT 250 FOR IP): Performed by: NURSE PRACTITIONER

## 2023-06-17 PROCEDURE — 4B02XTZ MEASUREMENT OF CARDIAC DEFIBRILLATOR, EXTERNAL APPROACH: ICD-10-PCS | Performed by: INTERNAL MEDICINE

## 2023-06-17 RX ORDER — WARFARIN SODIUM 2.5 MG/1
2.5 TABLET ORAL
Status: DISCONTINUED | OUTPATIENT
Start: 2023-06-17 | End: 2023-06-17 | Stop reason: HOSPADM

## 2023-06-17 RX ORDER — METOPROLOL SUCCINATE 25 MG/1
37.5 TABLET, EXTENDED RELEASE ORAL EVERY MORNING
Qty: 30 TABLET | Refills: 3 | Status: SHIPPED | OUTPATIENT
Start: 2023-06-17

## 2023-06-17 RX ORDER — LEVALBUTEROL TARTRATE 45 UG/1
1 AEROSOL, METERED ORAL EVERY 8 HOURS PRN
Qty: 1 EACH | Refills: 0 | Status: SHIPPED | OUTPATIENT
Start: 2023-06-17 | End: 2024-06-16

## 2023-06-17 RX ORDER — ONDANSETRON 4 MG/1
4 TABLET, FILM COATED ORAL EVERY 8 HOURS PRN
Qty: 20 TABLET | Refills: 0 | Status: SHIPPED | OUTPATIENT
Start: 2023-06-17

## 2023-06-17 RX ORDER — METOPROLOL SUCCINATE 25 MG/1
25 TABLET, EXTENDED RELEASE ORAL NIGHTLY
Qty: 30 TABLET | Refills: 3 | Status: SHIPPED | OUTPATIENT
Start: 2023-06-17

## 2023-06-17 RX ADMIN — GUAIFENESIN 600 MG: 600 TABLET ORAL at 08:46

## 2023-06-17 RX ADMIN — FLUTICASONE PROPIONATE 1 PUFF: 44 AEROSOL, METERED RESPIRATORY (INHALATION) at 08:57

## 2023-06-17 RX ADMIN — FLUTICASONE PROPIONATE 1 SPRAY: 50 SPRAY, METERED NASAL at 08:47

## 2023-06-17 RX ADMIN — SACUBITRIL AND VALSARTAN 1 TABLET: 49; 51 TABLET, FILM COATED ORAL at 08:45

## 2023-06-17 RX ADMIN — CEFTRIAXONE SODIUM 2000 MG: 2 INJECTION, POWDER, FOR SOLUTION INTRAMUSCULAR; INTRAVENOUS at 08:50

## 2023-06-17 RX ADMIN — DEXLANSOPRAZOLE 60 MG: 60 CAPSULE, DELAYED RELEASE ORAL at 08:45

## 2023-06-17 RX ADMIN — CETIRIZINE HYDROCHLORIDE 10 MG: 10 TABLET, FILM COATED ORAL at 08:45

## 2023-06-17 RX ADMIN — TAMSULOSIN HYDROCHLORIDE 0.4 MG: 0.4 CAPSULE ORAL at 08:46

## 2023-06-17 RX ADMIN — TIOTROPIUM BROMIDE INHALATION SPRAY 2 PUFF: 3.12 SPRAY, METERED RESPIRATORY (INHALATION) at 08:57

## 2023-06-17 RX ADMIN — SODIUM CHLORIDE, PRESERVATIVE FREE 10 ML: 5 INJECTION INTRAVENOUS at 08:44

## 2023-06-17 RX ADMIN — FUROSEMIDE 20 MG: 20 TABLET ORAL at 08:45

## 2023-06-17 RX ADMIN — ASPIRIN 81 MG 81 MG: 81 TABLET ORAL at 10:20

## 2023-06-17 RX ADMIN — EMPAGLIFLOZIN 10 MG: 10 TABLET, FILM COATED ORAL at 10:20

## 2023-06-17 NOTE — DISCHARGE SUMMARY
Discharge Summary  Ritchie Banegas MD     Name:  Blayne Lopez /Age/Sex: 1960  (60 y.o. male)   MRN & CSN:  [id number] & [id number] Admission Date/Time: 2023  3:10 PM   Attending:  Ritchie Banegas MD Discharging Physician: Ritchie Banegas MD     Hospital Course:     60 y.o. male with pmh of cardiomyopathy, GERD, hyperlipidemia, irritable bowel syndrome status post pacemaker placement presenting after an episode of near syncope with palpitations. Near syncope associated with palpitations. Defibrillator in place, cardiology following, Cardiology interrogated defibrillator- noted that patient had wide-complex tachycardia on arrival most likely due to PSVT, cardiology adjusted beta-blocker with plan to outpatient follow-up with EP. Now asymptomatic heart rate better controlled. Acute cystitis with hematuria completed antibiotic course while inpatient  Chronic anticoagulation with Coumadin. Pharmacy assistance appreciated          Lovenox for DVT prophylaxis  Full code          The patient expressed appropriate understanding of and agreement with the discharge recommendations, medications, and plan. Consults this admission:  IP CONSULT TO PRIMARY CARE PROVIDER  IP CONSULT TO CARDIOLOGY  PHARMACY TO DOSE WARFARIN    Discharge Instruction:     Follow up appointments:     No follow-up provider specified.     Primary care physician:  within 2 weeks    Diet:  cardiac diet     Activity: activity as tolerated    Disposition: Discharged to:   [x]Home, []C, []SNF, []Acute Rehab, []Hospice     Condition on discharge: Stable    Discharge Medications:        Medication List        START taking these medications      empagliflozin 10 MG tablet  Commonly known as: JARDIANCE  Take 1 tablet by mouth daily  Start taking on: 2023            CHANGE how you take these medications      furosemide 20 MG tablet  Commonly known as: LASIX  TAKE 1 TABLET BY MOUTH 2 TIMES DAILY AS NEEDED (SOB)  What changed: See

## 2023-06-17 NOTE — PLAN OF CARE
Problem: Discharge Planning  Goal: Discharge to home or other facility with appropriate resources  6/15/2023 1609 by Kenn Gallegos RN  Outcome: Progressing  Flowsheets (Taken 6/15/2023 9822)  Discharge to home or other facility with appropriate resources: Identify barriers to discharge with patient and caregiver       Problem: Pain  Goal: Verbalizes/displays adequate comfort level or baseline comfort level  6/15/2023 1609 by Kenn Gallegos RN  Outcome: Progressing       Problem: Safety - Adult  Goal: Free from fall injury  6/15/2023 1609 by Kenn Gallegos RN  Outcome: Progressing       Problem: ABCDS Injury Assessment  Goal: Absence of physical injury  6/15/2023 1609 by Kenn Gallegos RN  Outcome: Progressing     Problem: Chronic Conditions and Co-morbidities  Goal: Patient's chronic conditions and co-morbidity symptoms are monitored and maintained or improved  6/15/2023 1609 by Kenn Gallegos RN  Outcome: Progressing  [address] (Taken 6/15/2023 0856)  Care Plan - Patient's Chronic Conditions and Co-Morbidity Symptoms are Monitored and Maintained or Improved: Monitor and assess patient's chronic conditions and comorbid symptoms for stability, deterioration, or improvement     Problem: Respiratory - Adult  Goal: Achieves optimal ventilation and oxygenation  6/15/2023 1609 by Kenn Gallegos RN  Outcome: Progressing  Flowsheets (Taken 6/15/2023 0856)  Achieves optimal ventilation and oxygenation: Assess for changes in respiratory status       Problem: Cardiovascular - Adult  Goal: Maintains optimal cardiac output and hemodynamic stability  6/15/2023 1609 by Kenn Gallegos RN  Outcome: Progressing  Flowsheets (Taken 6/15/2023 0856)  Maintains optimal cardiac output and hemodynamic stability: Monitor blood pressure and heart rate     Problem: Skin/Tissue Integrity - Adult  Goal: Skin integrity remains intact  Outcome: Progressing  Flowsheets (Taken 6/15/2023 0856)  Skin Integrity Remains Intact: Monitor for areas of redness and/or
Problem: Discharge Planning  Goal: Discharge to home or other facility with appropriate resources  6/17/2023 1058 by Tom Madrigal RN  Outcome: Adequate for Discharge       Problem: Pain  Goal: Verbalizes/displays adequate comfort level or baseline comfort level  6/17/2023 1058 by Tom Madrigal RN  Outcome: Adequate for Discharge       Problem: Safety - Adult  Goal: Free from fall injury  6/17/2023 1058 by Tom Madrigal RN  Outcome: Adequate for Discharge       Problem: ABCDS Injury Assessment  Goal: Absence of physical injury  6/17/2023 1058 by Tom Madrigal RN  Outcome: Adequate for Discharge       Problem: Chronic Conditions and Co-morbidities  Goal: Patient's chronic conditions and co-morbidity symptoms are monitored and maintained or improved  6/17/2023 1058 by Tom Madrigal RN  Outcome: Adequate for Discharge       Problem: Respiratory - Adult  Goal: Achieves optimal ventilation and oxygenation  6/17/2023 1058 by Tom Madrigal RN  Outcome: Adequate for Discharge  Flowsheets (Taken 6/17/2023 0840)  Achieves optimal ventilation and oxygenation: Assess for changes in respiratory status       Problem: Cardiovascular - Adult  Goal: Maintains optimal cardiac output and hemodynamic stability  6/17/2023 1058 by Tom Madrigal RN  Outcome: Adequate for Discharge  Flowsheets (Taken 6/17/2023 0840)  Maintains optimal cardiac output and hemodynamic stability: Monitor blood pressure and heart rate       Problem: Skin/Tissue Integrity - Adult  Goal: Skin integrity remains intact  6/17/2023 1058 by Tom Madrigal RN  Outcome: Adequate for Discharge  Flowsheets (Taken 6/17/2023 0840)  Skin Integrity Remains Intact: Monitor for areas of redness and/or skin breakdown       Problem: Musculoskeletal - Adult  Goal: Return ADL status to a safe level of function  6/17/2023 1058 by Tom Madrigal RN  Outcome: Adequate for Discharge       Problem: Gastrointestinal - Adult  Goal: Maintains or returns to baseline bowel function  6/17/2023 1058 by
Problem: Discharge Planning  Goal: Discharge to home or other facility with appropriate resources  Outcome: Progressing  Flowsheets (Taken 6/15/2023 0027)  Discharge to home or other facility with appropriate resources:   Identify barriers to discharge with patient and caregiver   Identify discharge learning needs (meds, wound care, etc)     Problem: Pain  Goal: Verbalizes/displays adequate comfort level or baseline comfort level  Outcome: Progressing  Flowsheets (Taken 6/15/2023 0234)  Verbalizes/displays adequate comfort level or baseline comfort level:   Encourage patient to monitor pain and request assistance   Assess pain using appropriate pain scale   Administer analgesics based on type and severity of pain and evaluate response   Implement non-pharmacological measures as appropriate and evaluate response     Problem: Safety - Adult  Goal: Free from fall injury  Outcome: Progressing  Flowsheets (Taken 6/15/2023 0234)  Free From Fall Injury: Instruct family/caregiver on patient safety     Problem: ABCDS Injury Assessment  Goal: Absence of physical injury  Outcome: Progressing     Problem: Chronic Conditions and Co-morbidities  Goal: Patient's chronic conditions and co-morbidity symptoms are monitored and maintained or improved  Outcome: Progressing  Flowsheets (Taken 6/15/2023 0234)  Care Plan - Patient's Chronic Conditions and Co-Morbidity Symptoms are Monitored and Maintained or Improved:   Monitor and assess patient's chronic conditions and comorbid symptoms for stability, deterioration, or improvement   Collaborate with multidisciplinary team to address chronic and comorbid conditions and prevent exacerbation or deterioration     Problem: Cardiovascular - Adult  Goal: Maintains optimal cardiac output and hemodynamic stability  Outcome: Progressing  Flowsheets (Taken 6/15/2023 0234)  Maintains optimal cardiac output and hemodynamic stability:   Monitor blood pressure and heart rate   Assess for signs of
Problem: Discharge Planning  Goal: Discharge to home or other facility with appropriate resources  Outcome: Progressing  Flowsheets (Taken 6/16/2023 0834)  Discharge to home or other facility with appropriate resources: Identify barriers to discharge with patient and caregiver     Problem: Pain  Goal: Verbalizes/displays adequate comfort level or baseline comfort level  Outcome: Progressing     Problem: Safety - Adult  Goal: Free from fall injury  Outcome: Progressing     Problem: ABCDS Injury Assessment  Goal: Absence of physical injury  Outcome: Progressing     Problem: Chronic Conditions and Co-morbidities  Goal: Patient's chronic conditions and co-morbidity symptoms are monitored and maintained or improved  Outcome: Progressing  Flowsheets (Taken 6/16/2023 0834)  Care Plan - Patient's Chronic Conditions and Co-Morbidity Symptoms are Monitored and Maintained or Improved: Monitor and assess patient's chronic conditions and comorbid symptoms for stability, deterioration, or improvement     Problem: Respiratory - Adult  Goal: Achieves optimal ventilation and oxygenation  Outcome: Progressing     Problem: Cardiovascular - Adult  Goal: Maintains optimal cardiac output and hemodynamic stability  Outcome: Progressing  Flowsheets (Taken 6/16/2023 0834)  Maintains optimal cardiac output and hemodynamic stability: Monitor blood pressure and heart rate     Problem: Skin/Tissue Integrity - Adult  Goal: Skin integrity remains intact  Outcome: Progressing  Flowsheets (Taken 6/16/2023 0834)  Skin Integrity Remains Intact: Monitor for areas of redness and/or skin breakdown     Problem: Musculoskeletal - Adult  Goal: Return ADL status to a safe level of function  Outcome: Progressing  Flowsheets (Taken 6/16/2023 0834)  Return ADL Status to a Safe Level of Function: Administer medication as ordered     Problem: Gastrointestinal - Adult  Goal: Maintains or returns to baseline bowel function  Outcome: Progressing  Flowsheets
Problem: Pain  Goal: Verbalizes/displays adequate comfort level or baseline comfort level  6/16/2023 0035 by Ramón Carlin RN  Outcome: Progressing  Flowsheets (Taken 6/16/2023 0035)  Verbalizes/displays adequate comfort level or baseline comfort level:   Encourage patient to monitor pain and request assistance   Assess pain using appropriate pain scale   Administer analgesics based on type and severity of pain and evaluate response     Problem: Safety - Adult  Goal: Free from fall injury  6/16/2023 0035 by Ramón Carlin RN  Outcome: Progressing     Problem: Chronic Conditions and Co-morbidities  Goal: Patient's chronic conditions and co-morbidity symptoms are monitored and maintained or improved  6/16/2023 0035 by Ramón Carlin RN  Outcome: Progressing     Problem: Respiratory - Adult  Goal: Achieves optimal ventilation and oxygenation  6/16/2023 0035 by Ramón Carlin RN  Outcome: Progressing
cardiac output and hemodynamic stability: Monitor blood pressure and heart rate     Problem: Skin/Tissue Integrity - Adult  Goal: Skin integrity remains intact  6/17/2023 0408 by Vivien Garrett RN  Outcome: Progressing  Flowsheets (Taken 6/16/2023 1622 by Sanjuanita Cabrera RN)  Skin Integrity Remains Intact: Monitor for areas of redness and/or skin breakdown  6/16/2023 1620 by Sanjuanita Cabrera RN  Outcome: Progressing  Flowsheets (Taken 6/16/2023 0834)  Skin Integrity Remains Intact: Monitor for areas of redness and/or skin breakdown     Problem: Musculoskeletal - Adult  Goal: Return ADL status to a safe level of function  6/17/2023 0408 by Vivien Garrett RN  Outcome: Progressing  6/16/2023 1620 by Sanjuanita Cabrera RN  Outcome: Progressing  Flowsheets (Taken 6/16/2023 0834)  Return ADL Status to a Safe Level of Function: Administer medication as ordered     Problem: Gastrointestinal - Adult  Goal: Maintains or returns to baseline bowel function  6/17/2023 0408 by Vivien Garrett RN  Outcome: Progressing  6/16/2023 1620 by Sanjuanita Cabrera RN  Outcome: Progressing  [address] (Taken 6/16/2023 1749)  Maintains or returns to baseline bowel function: Assess bowel function     Problem: Genitourinary - Adult  Goal: Absence of urinary retention  6/17/2023 0408 by Vivien Garrett RN  Outcome: Progressing  6/16/2023 1620 by Sanjuanita Cabrera RN  Outcome: Progressing  [address] (Taken 6/16/2023 0834)  Absence of urinary retention: Assess patients ability to void and empty bladder     Problem: Infection - Adult  Goal: Absence of infection at discharge  6/17/2023 0408 by Vivien Garrett RN  Outcome: Progressing  6/16/2023 1620 by Sanjuanita Cabrera RN  Outcome: Progressing  Flowsheets (Taken 6/16/2023 0834)  Absence of infection at discharge: Assess and monitor for signs and symptoms of infection  Goal: Absence of infection during hospitalization  6/17/2023 0408 by Vivien Garrett RN  Outcome: Progressing  6/16/2023 1620 by Sanjuanita Cabrera RN  Outcome:

## 2023-06-17 NOTE — DISCHARGE INSTR - COC
Care:81407} for {GREATER/LESS:[id number]} 30 days.      Update Admission H&P: {CHP DME Changes in QJQRU:[id number]}    PHYSICIAN SIGNATURE:  {Esignature:[id number]}

## 2023-06-17 NOTE — PROGRESS NOTES
Clinical Pharmacy Note  Warfarin Consult    Zheng vEans is a 60 y.o. male receiving warfarin managed by pharmacy. Patient being bridged with none. Warfarin Indication: DVT/PE hx  Target INR range: 2-3   Dose prior to admission: 5 mg Tues/Fri and 2.5 mg all other days    Current warfarin drug-drug interactions: ceftriaxone    Recent Labs     06/14/23  1533 06/15/23  0431 06/16/23  0509 06/17/23  0708   HGB 15.6 13.6  --   --    HCT 44.9 39.5*  --   --    INR 2.36* 2.83* 3.05* 2.35*         Assessment/Plan:    Will dose warfarin 2.5mg PO x 1 tonight. Daily PT/INR until stable within therapeutic range. Thank you for the consult. Will continue to follow.     Chanel Sanchez [facility]  6/17/2023 9:19 AM

## 2023-06-17 NOTE — PROGRESS NOTES
Patient discharged to home. Peripheral IV and heart monitor removed. Home medications returned to patient upon discharged. Discharge instructions and medications discussed with patient. Patient verbalized understanding; AVS signed. Patient left floor via wheelchair.

## 2023-06-18 LAB
BACTERIA BLD CULT ORG #2: NORMAL
BACTERIA BLD CULT: NORMAL

## 2023-06-22 ENCOUNTER — TELEPHONE (OUTPATIENT)
Dept: CARDIOLOGY CLINIC | Age: 63
End: 2023-06-22

## 2023-06-22 NOTE — TELEPHONE ENCOUNTER
Julieta Hutchinson called to ck on some medications that were changed when he was in the hospital. He wants to discuss and find out if he is suppose to be on [address] and [address].       Julieta Barakat # [phone]

## 2023-06-22 NOTE — TELEPHONE ENCOUNTER
Julieta Hutchinson called back to see if someone can look at his previous message. He was wanting a call before the end of the day since Dr Hernan Desai is here./ Sent to [address] [place] [address] since Sandhya Newell is off.       Julieta Barakat # [phone]

## 2023-06-23 NOTE — TELEPHONE ENCOUNTER
Contacted patient to discuss medications. He was recently discharged from the hospital and started on Jardiance. Asked if he should continue taking. Informed per Dr. Shaina Ariza note, that Liz Doyle was being restarted. Also informed him that I see no Ozempic listed. He v/u.        Nonischemic cardiomyopathy  -No clinical evidence of congestive heart failure  -Patient currently on Toprol and Entresto  -Did not tolerate Aldactone in the past  -Was on Jardiance but is currently not on it  -Will restart Liz Doyle

## 2023-06-28 ENCOUNTER — TELEPHONE (OUTPATIENT)
Dept: PULMONOLOGY | Age: 63
End: 2023-06-28

## 2023-06-29 ENCOUNTER — TELEPHONE (OUTPATIENT)
Dept: PULMONOLOGY | Age: 63
End: 2023-06-29

## 2023-06-29 ENCOUNTER — HOSPITAL ENCOUNTER (OUTPATIENT)
Dept: CT IMAGING | Age: 63
Discharge: HOME | End: 2023-06-29
Attending: INTERNAL MEDICINE
Payer: COMMERCIAL

## 2023-06-29 ENCOUNTER — ANTI-COAG VISIT (OUTPATIENT)
Dept: PHARMACY | Age: 63
End: 2023-06-29
Payer: COMMERCIAL

## 2023-06-29 DIAGNOSIS — I26.99 OTHER ACUTE PULMONARY EMBOLISM, UNSPECIFIED WHETHER ACUTE COR PULMONALE PRESENT (HCC): Primary | ICD-10-CM

## 2023-06-29 DIAGNOSIS — Z79.01 CHRONIC ANTICOAGULATION: ICD-10-CM

## 2023-06-29 DIAGNOSIS — R05.3 CHRONIC COUGH: Primary | ICD-10-CM

## 2023-06-29 DIAGNOSIS — R05.3 CHRONIC COUGH: ICD-10-CM

## 2023-06-29 LAB — INR BLD: 1.9

## 2023-06-29 PROCEDURE — 99212 OFFICE O/P EST SF 10 MIN: CPT

## 2023-06-29 PROCEDURE — 85610 PROTHROMBIN TIME: CPT

## 2023-06-29 PROCEDURE — 70486 CT MAXILLOFACIAL W/O DYE: CPT

## 2023-07-06 PROBLEM — R22.0 SCALP LUMP: Status: ACTIVE | Noted: 2023-07-06

## 2023-07-13 ENCOUNTER — OFFICE VISIT (OUTPATIENT)
Dept: SURGERY | Age: 63
End: 2023-07-13
Payer: COMMERCIAL

## 2023-07-13 ENCOUNTER — ANTI-COAG VISIT (OUTPATIENT)
Dept: PHARMACY | Age: 63
End: 2023-07-13
Attending: INTERNAL MEDICINE
Payer: COMMERCIAL

## 2023-07-13 ENCOUNTER — TELEPHONE (OUTPATIENT)
Dept: SURGERY | Age: 63
End: 2023-07-13

## 2023-07-13 VITALS — WEIGHT: 225 LBS | BODY MASS INDEX: 36.32 KG/M2

## 2023-07-13 DIAGNOSIS — K42.0 INCARCERATED UMBILICAL HERNIA: Primary | ICD-10-CM

## 2023-07-13 DIAGNOSIS — Z79.01 CHRONIC ANTICOAGULATION: Primary | ICD-10-CM

## 2023-07-13 DIAGNOSIS — I26.99 OTHER ACUTE PULMONARY EMBOLISM, UNSPECIFIED WHETHER ACUTE COR PULMONALE PRESENT (HCC): ICD-10-CM

## 2023-07-13 LAB — INR BLD: 2.7

## 2023-07-13 PROCEDURE — 99211 OFF/OP EST MAY X REQ PHY/QHP: CPT | Performed by: SPEECH-LANGUAGE PATHOLOGIST

## 2023-07-13 PROCEDURE — 99204 OFFICE O/P NEW MOD 45 MIN: CPT | Performed by: SURGERY

## 2023-07-13 PROCEDURE — G8427 DOCREV CUR MEDS BY ELIG CLIN: HCPCS | Performed by: SURGERY

## 2023-07-13 PROCEDURE — 85610 PROTHROMBIN TIME: CPT | Performed by: SPEECH-LANGUAGE PATHOLOGIST

## 2023-07-13 PROCEDURE — G8417 CALC BMI ABV UP PARAM F/U: HCPCS | Performed by: SURGERY

## 2023-07-13 RX ORDER — PREDNISONE 10 MG/1
20 TABLET ORAL 2 TIMES DAILY
COMMUNITY
Start: 2023-07-11 | End: 2023-07-18

## 2023-07-13 ASSESSMENT — ENCOUNTER SYMPTOMS: ABDOMINAL PAIN: 1

## 2023-07-13 NOTE — PROGRESS NOTES
Igor Guzmán is a 58 y.o. here for warfarin management. Tyshawn Salinas had an INR test today. Results were reviewed and appropriate warfarin management was completed. Patient verifies current warfarin dosing regimen: Yes     Warfarin medication reviewed and updated on the patient 's home medication list: Yes   All other medications reviewed and updated on the patient 's home medication list: Yes, Prednisone 10 mg tablets     Lab Results   Component Value Date    INR 2.70 2023    INR 1.90 2023    INR 2.35 (H) 2023     Patient Findings       Positives:  Missed doses, Change in medications    Negatives:  Signs/symptoms of bleeding, Extra doses, Change in diet/appetite, Bruising          Anticoagulation Summary  As of 2023      INR goal:  2.0-3.0   TTR:  37.6 % (7.7 y)   INR used for dosin.70 (2023)   Warfarin maintenance plan:  5 mg (5 mg x 1) every Tue, Fri; 2.5 mg (5 mg x 0.5) all other days   Weekly warfarin total:  22.5 mg   Plan last modified:  Doroteo Walter, [facility] (2023)   Next INR check:  2023   Priority:  Maintenance   Target end date: Indefinite    Indications    Pulmonary embolus (HCC) [I26.99]  Chronic anticoagulation [Z79.01]                 Anticoagulation Episode Summary       INR check location:  Anticoagulation Clinic    Preferred lab:      Send INR reminders to:  [address] [place] [address] [place] [facility]    Comments:  EPIC - finger stick every 2-3 weeks  Consent: 23          Anticoagulation Care Providers       Provider Role Specialty Phone number    Radha Pyle MD Referring Internal Medicine [phone]          There were no vitals taken for this visit. Warfarin assessment / plan:     Patient appears well today. No changes affecting warfarin therapy were noted. No acute findings with regards to warfarin therapy. INR today is within goal range. Instructed to continue the same weekly warfarin dose.       His INR today was within goal range at

## 2023-07-13 NOTE — TELEPHONE ENCOUNTER
Patient calling wanting to speak with Dr. Adriano Christianson. He did not tell me what he wanted to ask. Please call patient at [phone].

## 2023-07-13 NOTE — PROGRESS NOTES
tablet by mouth daily   Yes Historical Provider, MD         Allergies   Allergen Reactions    Atrovent Nasal Spray [Ipratropium] Anaphylaxis and Other (See Comments)     Chest tightness    Ipratropium Bromide Hfa Shortness Of Breath    Proventil [Albuterol] Shortness Of Breath    Crestor [Rosuvastatin] Myalgia    Doxycycline Hives    Macrobid [Nitrofurantoin] Hives    Nexletol [Bempedoic Acid]      Hives neck and chest     Nitroglycerin Other (See Comments)     Severe hypotension (went from 098 to 66 systolic)    Sulfa Antibiotics Rash    Vicodin [Hydrocodone-Acetaminophen] Rash       Social History     Socioeconomic History    Marital status:      Spouse name: Not on file    Number of children: Not on file    Years of education: Not on file    Highest education level: Not on file   Occupational History    Not on file   Tobacco Use    Smoking status: Never     Passive exposure: Past    Smokeless tobacco: Never   Vaping Use    Vaping Use: Never used   Substance and Sexual Activity    Alcohol use: No    Drug use: No    Sexual activity: Yes     Partners: Female     Comment: wife   Other Topics Concern    Not on file   Social History Narrative    Not on file     Social Determinants of Health     Financial Resource Strain: Low Risk     Difficulty of Paying Living Expenses: Not hard at all   Food Insecurity: No Food Insecurity    Worried About Running Out of Food in the Last Year: Never true    Ran Out of Food in the Last Year: Never true   Transportation Needs: Unknown    Lack of Transportation (Medical): Not on file    Lack of Transportation (Non-Medical):  No   Physical Activity: Not on file   Stress: Not on file   Social Connections: Not on file   Intimate Partner Violence: Not on file   Housing Stability: Unknown    Unable to Pay for Housing in the Last Year: Not on file    Number of Places Lived in the Last Year: Not on file    Unstable Housing in the Last Year: No       Family History   Problem Relation Age

## 2023-07-24 ENCOUNTER — TELEPHONE (OUTPATIENT)
Dept: CARDIOLOGY CLINIC | Age: 63
End: 2023-07-24

## 2023-07-24 NOTE — TELEPHONE ENCOUNTER
Pt called said since he was in the hospital his prescription is for METOPROLOL is messed up. He uses [facility] ([facility] ) - [email or web address] [phone] Humberto Shaye [phone]         He also needs clarification on dosage. He says  he has been taking 1 pill in  the am and 1 in the pm. Not what the script says      Please clarify and send refill to above pharmacy.       Sánchez Jose # [phone]

## 2023-07-25 ENCOUNTER — HOSPITAL ENCOUNTER (OUTPATIENT)
Age: 63
Discharge: HOME | End: 2023-07-25
Payer: COMMERCIAL

## 2023-07-25 ENCOUNTER — ANTI-COAG VISIT (OUTPATIENT)
Dept: PHARMACY | Age: 63
End: 2023-07-25
Attending: INTERNAL MEDICINE
Payer: COMMERCIAL

## 2023-07-25 ENCOUNTER — HOSPITAL ENCOUNTER (OUTPATIENT)
Dept: GENERAL RADIOLOGY | Age: 63
Discharge: HOME | End: 2023-07-25
Payer: COMMERCIAL

## 2023-07-25 DIAGNOSIS — Z79.01 CHRONIC ANTICOAGULATION: ICD-10-CM

## 2023-07-25 DIAGNOSIS — I26.99 OTHER ACUTE PULMONARY EMBOLISM, UNSPECIFIED WHETHER ACUTE COR PULMONALE PRESENT (HCC): Primary | ICD-10-CM

## 2023-07-25 LAB — INR BLD: 4

## 2023-07-25 PROCEDURE — 74018 RADEX ABDOMEN 1 VIEW: CPT

## 2023-07-25 PROCEDURE — 85610 PROTHROMBIN TIME: CPT

## 2023-07-25 PROCEDURE — 99212 OFFICE O/P EST SF 10 MIN: CPT

## 2023-07-25 NOTE — PROGRESS NOTES
Matt Armenta is a 58 y.o. here for warfarin management. Boston Javier had an INR test today. Results were reviewed and appropriate warfarin management was completed. Patient verifies current warfarin dosing regimen: Yes     Warfarin medication reviewed and updated on the patient 's home medication list: Yes   All other medications reviewed and updated on the patient 's home medication list: No: no changes      Lab Results   Component Value Date    INR 4.00 2023    INR 2.70 2023    INR 1.90 2023       Anticoagulation Summary  As of 2023      INR goal:  2.0-3.0   TTR:  37.5 % (7.7 y)   INR used for dosin.00 (2023)   Warfarin maintenance plan:  5 mg (5 mg x 1) every Tue, Fri; 2.5 mg (5 mg x 0.5) all other days   Weekly warfarin total:  22.5 mg   Plan last modified:  Sushma Hubbard, [facility] (2023)   Next INR check:  2023   Priority:  Maintenance   Target end date: Indefinite    Indications    Pulmonary embolus (HCC) [I26.99]  Chronic anticoagulation [Z79.01]                 Anticoagulation Episode Summary       INR check location:  Anticoagulation Clinic    Preferred lab:      Send INR reminders to:  [address] [place] [address] [place] [facility]    Comments:  EPIC - finger stick every 2-3 weeks  Consent: 23          Anticoagulation Care Providers       Provider Role Specialty Phone number    Angela Conte MD Referring Internal Medicine [phone]          There were no vitals taken for this visit. Warfarin assessment / plan:     Appears well  Supra-therapeutic INR. Denies extra warfarin doses. Denies increased alcohol intake. Denies change in appetite. Denies illness, fever, vomiting or diarrhea. Denies decrease in vitamin K intake. Medication changes. His INR today is above goal range at 4.0. Patient was taking prednisone tablets for 6 days for persistent cough. He took his last dose of prednisone on .      He was instructed to take warfarin 2.5 mg on

## 2023-07-25 NOTE — TELEPHONE ENCOUNTER
1) Per Dr. Sd Arnold instruction with recent hospitalization, the prescription should be as follows. Please update patient and update preferred pharmacy as directed per the aptient. Thanks.     -We will increase Toprol-XL to 37.5 mg p.o. daily and continue Toprol-XL 25 mg in the evening. -    2) Also per Dr. Cy Lawler, please cancel patient's f/u with him on 9/12/2023 and schedule to be seen in the office with Dr. Khadar Hope as per recent admission 6/14-6/17/23. Any further questions, please ask. Thanks.

## 2023-07-28 ENCOUNTER — HOSPITAL ENCOUNTER (INPATIENT)
Age: 63
LOS: 3 days | Discharge: HOME | DRG: 392 | End: 2023-07-31
Attending: EMERGENCY MEDICINE | Admitting: STUDENT IN AN ORGANIZED HEALTH CARE EDUCATION/TRAINING PROGRAM
Payer: COMMERCIAL

## 2023-07-28 ENCOUNTER — APPOINTMENT (OUTPATIENT)
Dept: GENERAL RADIOLOGY | Age: 63
DRG: 392 | End: 2023-07-28
Payer: COMMERCIAL

## 2023-07-28 ENCOUNTER — APPOINTMENT (OUTPATIENT)
Dept: CT IMAGING | Age: 63
DRG: 392 | End: 2023-07-28
Payer: COMMERCIAL

## 2023-07-28 DIAGNOSIS — Q45.3 PANCREATIC ABNORMALITY: ICD-10-CM

## 2023-07-28 DIAGNOSIS — K85.90 PANCREATITIS, UNSPECIFIED PANCREATITIS TYPE: ICD-10-CM

## 2023-07-28 DIAGNOSIS — R10.9 LEFT FLANK PAIN: ICD-10-CM

## 2023-07-28 DIAGNOSIS — R07.9 CHEST PAIN, UNSPECIFIED TYPE: Primary | ICD-10-CM

## 2023-07-28 DIAGNOSIS — R79.1 SUBTHERAPEUTIC INTERNATIONAL NORMALIZED RATIO (INR): ICD-10-CM

## 2023-07-28 LAB
ALBUMIN SERPL-MCNC: 3.9 G/DL (ref 3.4–5)
ALBUMIN/GLOB SERPL: 1.3 {RATIO} (ref 1.1–2.2)
ALP SERPL-CCNC: 105 U/L (ref 40–129)
ALT SERPL-CCNC: 24 U/L (ref 10–40)
AMORPH SED URNS QL MICRO: ABNORMAL /HPF
AMYLASE SERPL-CCNC: 38 U/L (ref 25–115)
ANION GAP SERPL CALCULATED.3IONS-SCNC: 9 MMOL/L (ref 3–16)
AST SERPL-CCNC: 20 U/L (ref 15–37)
BASOPHILS # BLD: 0.1 K/UL (ref 0–0.2)
BASOPHILS NFR BLD: 1.1 %
BILIRUB SERPL-MCNC: 0.6 MG/DL (ref 0–1)
BILIRUB UR QL STRIP.AUTO: NEGATIVE
BUN SERPL-MCNC: 10 MG/DL (ref 7–20)
CALCIUM SERPL-MCNC: 11 MG/DL (ref 8.3–10.6)
CASTS #/AREA URNS LPF: ABNORMAL /LPF
CHLORIDE SERPL-SCNC: 101 MMOL/L (ref 99–110)
CLARITY UR: CLEAR
CO2 SERPL-SCNC: 28 MMOL/L (ref 21–32)
COARSE GRAN CASTS #/AREA URNS LPF: ABNORMAL /LPF (ref 0–2)
COLOR UR: YELLOW
CREAT SERPL-MCNC: 1 MG/DL (ref 0.8–1.3)
D DIMER: <0.27 UG/ML FEU (ref 0–0.6)
DEPRECATED RDW RBC AUTO: 13 % (ref 12.4–15.4)
EKG ATRIAL RATE: 75 BPM
EKG ATRIAL RATE: 79 BPM
EKG DIAGNOSIS: NORMAL
EKG DIAGNOSIS: NORMAL
EKG P AXIS: 66 DEGREES
EKG P AXIS: 72 DEGREES
EKG P-R INTERVAL: 190 MS
EKG P-R INTERVAL: 272 MS
EKG Q-T INTERVAL: 414 MS
EKG Q-T INTERVAL: 440 MS
EKG QRS DURATION: 150 MS
EKG QRS DURATION: 156 MS
EKG QTC CALCULATION (BAZETT): 474 MS
EKG QTC CALCULATION (BAZETT): 491 MS
EKG R AXIS: -56 DEGREES
EKG R AXIS: -56 DEGREES
EKG T AXIS: 105 DEGREES
EKG T AXIS: 111 DEGREES
EKG VENTRICULAR RATE: 75 BPM
EKG VENTRICULAR RATE: 79 BPM
EOSINOPHIL # BLD: 0.2 K/UL (ref 0–0.6)
EOSINOPHIL NFR BLD: 3.4 %
EPI CELLS #/AREA URNS HPF: ABNORMAL /HPF (ref 0–5)
GFR SERPLBLD CREATININE-BSD FMLA CKD-EPI: >60 ML/MIN/{1.73_M2}
GLUCOSE SERPL-MCNC: 162 MG/DL (ref 70–99)
GLUCOSE UR STRIP.AUTO-MCNC: NEGATIVE MG/DL
HCT VFR BLD AUTO: 48.2 % (ref 40.5–52.5)
HGB BLD-MCNC: 16.3 G/DL (ref 13.5–17.5)
HGB UR QL STRIP.AUTO: ABNORMAL
HYALINE CASTS #/AREA URNS LPF: ABNORMAL /LPF (ref 0–2)
INR PPP: 1.74 (ref 0.84–1.16)
KETONES UR STRIP.AUTO-MCNC: NEGATIVE MG/DL
LEUKOCYTE ESTERASE UR QL STRIP.AUTO: NEGATIVE
LIPASE SERPL-CCNC: 17 U/L (ref 13–60)
LYMPHOCYTES # BLD: 1.7 K/UL (ref 1–5.1)
LYMPHOCYTES NFR BLD: 23.9 %
MCH RBC QN AUTO: 31.4 PG (ref 26–34)
MCHC RBC AUTO-ENTMCNC: 33.7 G/DL (ref 31–36)
MCV RBC AUTO: 93.3 FL (ref 80–100)
MONOCYTES # BLD: 0.6 K/UL (ref 0–1.3)
MONOCYTES NFR BLD: 9.1 %
NEUTROPHILS # BLD: 4.3 K/UL (ref 1.7–7.7)
NEUTROPHILS NFR BLD: 62.5 %
NITRITE UR QL STRIP.AUTO: NEGATIVE
PH UR STRIP.AUTO: 6 [PH] (ref 5–8)
PLATELET # BLD AUTO: 179 K/UL (ref 135–450)
PMV BLD AUTO: 7.4 FL (ref 5–10.5)
POTASSIUM SERPL-SCNC: 3.8 MMOL/L (ref 3.5–5.1)
PROT SERPL-MCNC: 7 G/DL (ref 6.4–8.2)
PROT UR STRIP.AUTO-MCNC: NEGATIVE MG/DL
PROTHROMBIN TIME: 20.3 SEC (ref 11.5–14.8)
RBC # BLD AUTO: 5.17 M/UL (ref 4.2–5.9)
RBC #/AREA URNS HPF: ABNORMAL /HPF (ref 0–4)
REASON FOR REJECTION: NORMAL
REJECTED TEST: NORMAL
SODIUM SERPL-SCNC: 138 MMOL/L (ref 136–145)
SP GR UR STRIP.AUTO: 1.01 (ref 1–1.03)
TROPONIN, HIGH SENSITIVITY: 19 NG/L (ref 0–22)
TROPONIN, HIGH SENSITIVITY: 19 NG/L (ref 0–22)
UA COMPLETE W REFLEX CULTURE PNL UR: ABNORMAL
UA DIPSTICK W REFLEX MICRO PNL UR: YES
URN SPEC COLLECT METH UR: ABNORMAL
UROBILINOGEN UR STRIP-ACNC: 0.2 E.U./DL
WBC # BLD AUTO: 6.9 K/UL (ref 4–11)
WBC #/AREA URNS HPF: ABNORMAL /HPF (ref 0–5)

## 2023-07-28 PROCEDURE — 6370000000 HC RX 637 (ALT 250 FOR IP): Performed by: STUDENT IN AN ORGANIZED HEALTH CARE EDUCATION/TRAINING PROGRAM

## 2023-07-28 PROCEDURE — 99285 EMERGENCY DEPT VISIT HI MDM: CPT

## 2023-07-28 PROCEDURE — 84484 ASSAY OF TROPONIN QUANT: CPT

## 2023-07-28 PROCEDURE — 2580000003 HC RX 258: Performed by: STUDENT IN AN ORGANIZED HEALTH CARE EDUCATION/TRAINING PROGRAM

## 2023-07-28 PROCEDURE — 93010 ELECTROCARDIOGRAM REPORT: CPT | Performed by: INTERNAL MEDICINE

## 2023-07-28 PROCEDURE — 82150 ASSAY OF AMYLASE: CPT

## 2023-07-28 PROCEDURE — 71046 X-RAY EXAM CHEST 2 VIEWS: CPT

## 2023-07-28 PROCEDURE — 85025 COMPLETE CBC W/AUTO DIFF WBC: CPT

## 2023-07-28 PROCEDURE — 81001 URINALYSIS AUTO W/SCOPE: CPT

## 2023-07-28 PROCEDURE — 6370000000 HC RX 637 (ALT 250 FOR IP): Performed by: EMERGENCY MEDICINE

## 2023-07-28 PROCEDURE — 94761 N-INVAS EAR/PLS OXIMETRY MLT: CPT

## 2023-07-28 PROCEDURE — 6360000002 HC RX W HCPCS

## 2023-07-28 PROCEDURE — 94640 AIRWAY INHALATION TREATMENT: CPT

## 2023-07-28 PROCEDURE — 80053 COMPREHEN METABOLIC PANEL: CPT

## 2023-07-28 PROCEDURE — 74176 CT ABD & PELVIS W/O CONTRAST: CPT

## 2023-07-28 PROCEDURE — 1200000000 HC SEMI PRIVATE

## 2023-07-28 PROCEDURE — 85379 FIBRIN DEGRADATION QUANT: CPT

## 2023-07-28 PROCEDURE — 93005 ELECTROCARDIOGRAM TRACING: CPT | Performed by: EMERGENCY MEDICINE

## 2023-07-28 PROCEDURE — 83690 ASSAY OF LIPASE: CPT

## 2023-07-28 PROCEDURE — 85610 PROTHROMBIN TIME: CPT

## 2023-07-28 RX ORDER — METHOCARBAMOL 500 MG/1
500 TABLET, FILM COATED ORAL 4 TIMES DAILY PRN
Status: DISCONTINUED | OUTPATIENT
Start: 2023-07-28 | End: 2023-07-31 | Stop reason: HOSPADM

## 2023-07-28 RX ORDER — ASPIRIN 81 MG/1
81 TABLET, CHEWABLE ORAL DAILY
Status: DISCONTINUED | OUTPATIENT
Start: 2023-07-29 | End: 2023-07-31 | Stop reason: HOSPADM

## 2023-07-28 RX ORDER — METOPROLOL SUCCINATE 25 MG/1
25 TABLET, EXTENDED RELEASE ORAL NIGHTLY
Status: DISCONTINUED | OUTPATIENT
Start: 2023-07-28 | End: 2023-07-31 | Stop reason: HOSPADM

## 2023-07-28 RX ORDER — ALBUTEROL SULFATE 90 UG/1
2 AEROSOL, METERED RESPIRATORY (INHALATION) EVERY 6 HOURS PRN
COMMUNITY

## 2023-07-28 RX ORDER — SODIUM CHLORIDE 0.9 % (FLUSH) 0.9 %
5-40 SYRINGE (ML) INJECTION PRN
Status: DISCONTINUED | OUTPATIENT
Start: 2023-07-28 | End: 2023-07-31 | Stop reason: HOSPADM

## 2023-07-28 RX ORDER — CETIRIZINE HYDROCHLORIDE 10 MG/1
10 TABLET ORAL DAILY
Status: DISCONTINUED | OUTPATIENT
Start: 2023-07-29 | End: 2023-07-31 | Stop reason: HOSPADM

## 2023-07-28 RX ORDER — FLAVOXATE HYDROCHLORIDE 100 MG/1
100 TABLET ORAL 4 TIMES DAILY PRN
Status: DISCONTINUED | OUTPATIENT
Start: 2023-07-28 | End: 2023-07-31 | Stop reason: HOSPADM

## 2023-07-28 RX ORDER — SODIUM CHLORIDE, SODIUM LACTATE, POTASSIUM CHLORIDE, CALCIUM CHLORIDE 600; 310; 30; 20 MG/100ML; MG/100ML; MG/100ML; MG/100ML
INJECTION, SOLUTION INTRAVENOUS CONTINUOUS
Status: DISCONTINUED | OUTPATIENT
Start: 2023-07-29 | End: 2023-07-28

## 2023-07-28 RX ORDER — ASPIRIN 81 MG/1
324 TABLET, CHEWABLE ORAL ONCE
Status: COMPLETED | OUTPATIENT
Start: 2023-07-28 | End: 2023-07-28

## 2023-07-28 RX ORDER — GUAIFENESIN 600 MG/1
600 TABLET, EXTENDED RELEASE ORAL 2 TIMES DAILY PRN
Status: DISCONTINUED | OUTPATIENT
Start: 2023-07-28 | End: 2023-07-31 | Stop reason: HOSPADM

## 2023-07-28 RX ORDER — ALBUTEROL SULFATE 90 UG/1
2 AEROSOL, METERED RESPIRATORY (INHALATION) EVERY 6 HOURS PRN
Status: DISCONTINUED | OUTPATIENT
Start: 2023-07-28 | End: 2023-07-28

## 2023-07-28 RX ORDER — SODIUM CHLORIDE, SODIUM LACTATE, POTASSIUM CHLORIDE, CALCIUM CHLORIDE 600; 310; 30; 20 MG/100ML; MG/100ML; MG/100ML; MG/100ML
INJECTION, SOLUTION INTRAVENOUS CONTINUOUS
Status: DISCONTINUED | OUTPATIENT
Start: 2023-07-28 | End: 2023-07-29

## 2023-07-28 RX ORDER — ALBUTEROL SULFATE 2.5 MG/3ML
2.5 SOLUTION RESPIRATORY (INHALATION) EVERY 6 HOURS PRN
Status: DISCONTINUED | OUTPATIENT
Start: 2023-07-28 | End: 2023-07-28

## 2023-07-28 RX ORDER — OXYCODONE HYDROCHLORIDE 5 MG/1
5 TABLET ORAL EVERY 4 HOURS PRN
Status: DISCONTINUED | OUTPATIENT
Start: 2023-07-28 | End: 2023-07-31 | Stop reason: HOSPADM

## 2023-07-28 RX ORDER — POLYETHYLENE GLYCOL 3350 17 G/17G
17 POWDER, FOR SOLUTION ORAL DAILY PRN
Status: DISCONTINUED | OUTPATIENT
Start: 2023-07-28 | End: 2023-07-31 | Stop reason: HOSPADM

## 2023-07-28 RX ORDER — FLUTICASONE PROPIONATE 50 MCG
1 SPRAY, SUSPENSION (ML) NASAL DAILY
Status: DISCONTINUED | OUTPATIENT
Start: 2023-07-29 | End: 2023-07-31 | Stop reason: HOSPADM

## 2023-07-28 RX ORDER — ONDANSETRON 4 MG/1
4 TABLET, ORALLY DISINTEGRATING ORAL EVERY 8 HOURS PRN
Status: DISCONTINUED | OUTPATIENT
Start: 2023-07-28 | End: 2023-07-31 | Stop reason: HOSPADM

## 2023-07-28 RX ORDER — SODIUM CHLORIDE, SODIUM LACTATE, POTASSIUM CHLORIDE, CALCIUM CHLORIDE 600; 310; 30; 20 MG/100ML; MG/100ML; MG/100ML; MG/100ML
INJECTION, SOLUTION INTRAVENOUS CONTINUOUS
Status: DISCONTINUED | OUTPATIENT
Start: 2023-07-28 | End: 2023-07-28

## 2023-07-28 RX ORDER — ENOXAPARIN SODIUM 100 MG/ML
30 INJECTION SUBCUTANEOUS 2 TIMES DAILY
Status: DISCONTINUED | OUTPATIENT
Start: 2023-07-28 | End: 2023-07-31

## 2023-07-28 RX ORDER — ZOLPIDEM TARTRATE 5 MG/1
10 TABLET ORAL NIGHTLY PRN
Status: DISCONTINUED | OUTPATIENT
Start: 2023-07-28 | End: 2023-07-31 | Stop reason: HOSPADM

## 2023-07-28 RX ORDER — ALBUTEROL SULFATE 2.5 MG/3ML
SOLUTION RESPIRATORY (INHALATION)
Status: COMPLETED
Start: 2023-07-28 | End: 2023-07-28

## 2023-07-28 RX ORDER — PREGABALIN 75 MG/1
75 CAPSULE ORAL DAILY
Status: DISCONTINUED | OUTPATIENT
Start: 2023-07-29 | End: 2023-07-31 | Stop reason: HOSPADM

## 2023-07-28 RX ORDER — SODIUM CHLORIDE 9 MG/ML
INJECTION, SOLUTION INTRAVENOUS PRN
Status: DISCONTINUED | OUTPATIENT
Start: 2023-07-28 | End: 2023-07-31 | Stop reason: HOSPADM

## 2023-07-28 RX ORDER — OXYCODONE HYDROCHLORIDE AND ACETAMINOPHEN 5; 325 MG/1; MG/1
1 TABLET ORAL EVERY 6 HOURS PRN
Status: ON HOLD | COMMUNITY
End: 2023-07-31 | Stop reason: SDUPTHER

## 2023-07-28 RX ORDER — PRAVASTATIN SODIUM 40 MG
40 TABLET ORAL NIGHTLY
Status: DISCONTINUED | OUTPATIENT
Start: 2023-07-28 | End: 2023-07-31 | Stop reason: HOSPADM

## 2023-07-28 RX ORDER — OXYCODONE HYDROCHLORIDE AND ACETAMINOPHEN 5; 325 MG/1; MG/1
2 TABLET ORAL ONCE
Status: COMPLETED | OUTPATIENT
Start: 2023-07-28 | End: 2023-07-28

## 2023-07-28 RX ORDER — AZELASTINE 1 MG/ML
2 SPRAY, METERED NASAL 2 TIMES DAILY
Status: DISCONTINUED | OUTPATIENT
Start: 2023-07-28 | End: 2023-07-31 | Stop reason: HOSPADM

## 2023-07-28 RX ORDER — METOPROLOL SUCCINATE 25 MG/1
37.5 TABLET, EXTENDED RELEASE ORAL EVERY MORNING
Status: DISCONTINUED | OUTPATIENT
Start: 2023-07-29 | End: 2023-07-31 | Stop reason: HOSPADM

## 2023-07-28 RX ORDER — LIDOCAINE 4 G/G
2 PATCH TOPICAL ONCE
Status: COMPLETED | OUTPATIENT
Start: 2023-07-28 | End: 2023-07-28

## 2023-07-28 RX ORDER — SODIUM CHLORIDE 0.9 % (FLUSH) 0.9 %
5-40 SYRINGE (ML) INJECTION EVERY 12 HOURS SCHEDULED
Status: DISCONTINUED | OUTPATIENT
Start: 2023-07-28 | End: 2023-07-31 | Stop reason: HOSPADM

## 2023-07-28 RX ORDER — ALBUTEROL SULFATE 2.5 MG/3ML
2.5 SOLUTION RESPIRATORY (INHALATION) EVERY 4 HOURS PRN
Status: DISCONTINUED | OUTPATIENT
Start: 2023-07-28 | End: 2023-07-31 | Stop reason: HOSPADM

## 2023-07-28 RX ORDER — ONDANSETRON 2 MG/ML
4 INJECTION INTRAMUSCULAR; INTRAVENOUS EVERY 6 HOURS PRN
Status: DISCONTINUED | OUTPATIENT
Start: 2023-07-28 | End: 2023-07-31 | Stop reason: HOSPADM

## 2023-07-28 RX ORDER — PREGABALIN 75 MG/1
150 CAPSULE ORAL NIGHTLY
Status: DISCONTINUED | OUTPATIENT
Start: 2023-07-28 | End: 2023-07-31 | Stop reason: HOSPADM

## 2023-07-28 RX ORDER — TAMSULOSIN HYDROCHLORIDE 0.4 MG/1
0.4 CAPSULE ORAL 2 TIMES DAILY
Status: DISCONTINUED | OUTPATIENT
Start: 2023-07-28 | End: 2023-07-31 | Stop reason: HOSPADM

## 2023-07-28 RX ORDER — FUROSEMIDE 20 MG/1
20 TABLET ORAL EVERY EVENING
Status: DISCONTINUED | OUTPATIENT
Start: 2023-07-28 | End: 2023-07-31 | Stop reason: HOSPADM

## 2023-07-28 RX ORDER — ALBUTEROL SULFATE 2.5 MG/3ML
2.5 SOLUTION RESPIRATORY (INHALATION)
Status: DISCONTINUED | OUTPATIENT
Start: 2023-07-29 | End: 2023-07-31 | Stop reason: HOSPADM

## 2023-07-28 RX ORDER — PANTOPRAZOLE SODIUM 40 MG/1
40 TABLET, DELAYED RELEASE ORAL
Status: DISCONTINUED | OUTPATIENT
Start: 2023-07-28 | End: 2023-07-29

## 2023-07-28 RX ADMIN — AZELASTINE HYDROCHLORIDE 2 SPRAY: 137 SPRAY, METERED NASAL at 21:56

## 2023-07-28 RX ADMIN — POLYETHYLENE GLYCOL 3350 17 G: 17 POWDER, FOR SOLUTION ORAL at 22:24

## 2023-07-28 RX ADMIN — OXYCODONE HYDROCHLORIDE 5 MG: 5 TABLET ORAL at 21:44

## 2023-07-28 RX ADMIN — SODIUM CHLORIDE, POTASSIUM CHLORIDE, SODIUM LACTATE AND CALCIUM CHLORIDE: 600; 310; 30; 20 INJECTION, SOLUTION INTRAVENOUS at 18:31

## 2023-07-28 RX ADMIN — FUROSEMIDE 20 MG: 20 TABLET ORAL at 21:44

## 2023-07-28 RX ADMIN — GUAIFENESIN 600 MG: 600 TABLET ORAL at 21:44

## 2023-07-28 RX ADMIN — SACUBITRIL AND VALSARTAN 1 TABLET: 49; 51 TABLET, FILM COATED ORAL at 20:41

## 2023-07-28 RX ADMIN — ALBUTEROL SULFATE 2.5 MG: 2.5 SOLUTION RESPIRATORY (INHALATION) at 22:43

## 2023-07-28 RX ADMIN — ZOLPIDEM TARTRATE 10 MG: 5 TABLET ORAL at 20:51

## 2023-07-28 RX ADMIN — Medication 10 ML: at 20:42

## 2023-07-28 RX ADMIN — PREGABALIN 150 MG: 75 CAPSULE ORAL at 20:40

## 2023-07-28 RX ADMIN — METHOCARBAMOL 500 MG: 500 TABLET ORAL at 21:43

## 2023-07-28 RX ADMIN — PRAVASTATIN SODIUM 40 MG: 40 TABLET ORAL at 20:41

## 2023-07-28 RX ADMIN — OXYCODONE HYDROCHLORIDE AND ACETAMINOPHEN 2 TABLET: 5; 325 TABLET ORAL at 08:42

## 2023-07-28 RX ADMIN — TAMSULOSIN HYDROCHLORIDE 0.4 MG: 0.4 CAPSULE ORAL at 20:41

## 2023-07-28 RX ADMIN — METOPROLOL SUCCINATE 25 MG: 25 TABLET, EXTENDED RELEASE ORAL at 20:41

## 2023-07-28 RX ADMIN — ASPIRIN 324 MG: 81 TABLET, CHEWABLE ORAL at 15:21

## 2023-07-28 RX ADMIN — SODIUM CHLORIDE, PRESERVATIVE FREE 10 ML: 5 INJECTION INTRAVENOUS at 18:31

## 2023-07-28 ASSESSMENT — ENCOUNTER SYMPTOMS
NAUSEA: 1
VOMITING: 0
BACK PAIN: 0
COUGH: 0
SHORTNESS OF BREATH: 1
ABDOMINAL PAIN: 1

## 2023-07-28 ASSESSMENT — PAIN SCALES - GENERAL
PAINLEVEL_OUTOF10: 7
PAINLEVEL_OUTOF10: 4
PAINLEVEL_OUTOF10: 8
PAINLEVEL_OUTOF10: 4
PAINLEVEL_OUTOF10: 7
PAINLEVEL_OUTOF10: 8
PAINLEVEL_OUTOF10: 8
PAINLEVEL_OUTOF10: 10

## 2023-07-28 ASSESSMENT — PAIN DESCRIPTION - PAIN TYPE
TYPE: ACUTE PAIN
TYPE: ACUTE PAIN
TYPE: ACUTE PAIN;CHRONIC PAIN

## 2023-07-28 ASSESSMENT — PAIN - FUNCTIONAL ASSESSMENT
PAIN_FUNCTIONAL_ASSESSMENT: ACTIVITIES ARE NOT PREVENTED
PAIN_FUNCTIONAL_ASSESSMENT: ACTIVITIES ARE NOT PREVENTED
PAIN_FUNCTIONAL_ASSESSMENT: 0-10
PAIN_FUNCTIONAL_ASSESSMENT: ACTIVITIES ARE NOT PREVENTED

## 2023-07-28 ASSESSMENT — PAIN DESCRIPTION - ONSET
ONSET: ON-GOING
ONSET: ON-GOING

## 2023-07-28 ASSESSMENT — PAIN DESCRIPTION - LOCATION
LOCATION: CHEST
LOCATION: BACK
LOCATION: FLANK
LOCATION: FLANK

## 2023-07-28 ASSESSMENT — PAIN DESCRIPTION - FREQUENCY
FREQUENCY: CONTINUOUS
FREQUENCY: INTERMITTENT
FREQUENCY: CONTINUOUS

## 2023-07-28 ASSESSMENT — PAIN DESCRIPTION - DESCRIPTORS
DESCRIPTORS: SHARP
DESCRIPTORS: STABBING
DESCRIPTORS: DULL
DESCRIPTORS: PRESSURE

## 2023-07-28 ASSESSMENT — LIFESTYLE VARIABLES
HOW MANY STANDARD DRINKS CONTAINING ALCOHOL DO YOU HAVE ON A TYPICAL DAY: PATIENT DOES NOT DRINK
HOW OFTEN DO YOU HAVE A DRINK CONTAINING ALCOHOL: NEVER

## 2023-07-28 ASSESSMENT — PAIN DESCRIPTION - ORIENTATION
ORIENTATION: LEFT
ORIENTATION: RIGHT
ORIENTATION: LEFT
ORIENTATION: LEFT

## 2023-07-28 NOTE — PROGRESS NOTES
4 Eyes Skin Assessment     NAME:  Cece Mackey  YOB: 1960  MEDICAL RECORD NUMBER:  [id number]    The patient is being assessed for  Admission    I agree that at least one RN has performed a thorough Head to Toe Skin Assessment on the patient. ALL assessment sites listed below have been assessed. Areas assessed by both nurses:    Head, Face, Ears, Shoulders, Back, Chest, Arms, Elbows, Hands, Sacrum. Buttock, Coccyx, Ischium, Legs. Feet and Heels, and Under Medical Devices         Does the Patient have a Wound?  No noted wound(s)       Casimiro Prevention initiated by RN: No  Wound Care Orders initiated by RN: No    Pressure Injury (Stage 3,4, Unstageable, DTI, NWPT, and Complex wounds) if present, place Wound referral order by RN under : No    New Ostomies, if present place, Ostomy referral order under : No     Nurse 1 eSignature: Electronically signed by Blank Schwartz RN on 7/28/23 at 6:52 PM EDT    **SHARE this note so that the co-signing nurse can place an eSignature**    Nurse 2 eSignature: {Esignature:[id number]}

## 2023-07-28 NOTE — PROGRESS NOTES
Pharmacy Medication Reconciliation Note     List of medications patient is currently taking is complete. Source of information:   1. Conversation with patient   2. EMR    Notes regarding home medications:   1. Patient receieved all of his morning home medication doses today before presenting to the ER. 2. Patient is on warfarin 2.5 mg daily EXCEPT 5 mg on Tuesdays and Fridays. Patient had his warfarin dose today. Patient on warfarin for hx of PE, goal INR 2-3. Patient is managed here at the [facility].       [address] [place] [address] Avenue, PharmD  7/28/2023 3:56 PM

## 2023-07-29 LAB
ALBUMIN SERPL-MCNC: 3.9 G/DL (ref 3.4–5)
ALBUMIN/GLOB SERPL: 1.6 {RATIO} (ref 1.1–2.2)
ALP SERPL-CCNC: 108 U/L (ref 40–129)
ALT SERPL-CCNC: 43 U/L (ref 10–40)
ANION GAP SERPL CALCULATED.3IONS-SCNC: 6 MMOL/L (ref 3–16)
AST SERPL-CCNC: 46 U/L (ref 15–37)
BASOPHILS # BLD: 0 K/UL (ref 0–0.2)
BASOPHILS NFR BLD: 0.7 %
BILIRUB SERPL-MCNC: 0.9 MG/DL (ref 0–1)
BUN SERPL-MCNC: 10 MG/DL (ref 7–20)
CALCIUM SERPL-MCNC: 10.5 MG/DL (ref 8.3–10.6)
CHLORIDE SERPL-SCNC: 103 MMOL/L (ref 99–110)
CO2 SERPL-SCNC: 29 MMOL/L (ref 21–32)
CREAT SERPL-MCNC: 0.9 MG/DL (ref 0.8–1.3)
DEPRECATED RDW RBC AUTO: 13 % (ref 12.4–15.4)
EOSINOPHIL # BLD: 0.2 K/UL (ref 0–0.6)
EOSINOPHIL NFR BLD: 3.8 %
GFR SERPLBLD CREATININE-BSD FMLA CKD-EPI: >60 ML/MIN/{1.73_M2}
GLUCOSE SERPL-MCNC: 119 MG/DL (ref 70–99)
HCT VFR BLD AUTO: 42.4 % (ref 40.5–52.5)
HGB BLD-MCNC: 14.9 G/DL (ref 13.5–17.5)
INR PPP: 1.78 (ref 0.84–1.16)
LIPASE SERPL-CCNC: 18 U/L (ref 13–60)
LYMPHOCYTES # BLD: 1.8 K/UL (ref 1–5.1)
LYMPHOCYTES NFR BLD: 31.7 %
MCH RBC QN AUTO: 32.2 PG (ref 26–34)
MCHC RBC AUTO-ENTMCNC: 35 G/DL (ref 31–36)
MCV RBC AUTO: 91.9 FL (ref 80–100)
MONOCYTES # BLD: 0.6 K/UL (ref 0–1.3)
MONOCYTES NFR BLD: 10.9 %
NEUTROPHILS # BLD: 2.9 K/UL (ref 1.7–7.7)
NEUTROPHILS NFR BLD: 52.9 %
PLATELET # BLD AUTO: 163 K/UL (ref 135–450)
PMV BLD AUTO: 7.5 FL (ref 5–10.5)
POTASSIUM SERPL-SCNC: 3.7 MMOL/L (ref 3.5–5.1)
PROT SERPL-MCNC: 6.3 G/DL (ref 6.4–8.2)
PROTHROMBIN TIME: 20.6 SEC (ref 11.5–14.8)
RBC # BLD AUTO: 4.61 M/UL (ref 4.2–5.9)
SODIUM SERPL-SCNC: 138 MMOL/L (ref 136–145)
TRIGL SERPL-MCNC: 188 MG/DL (ref 0–150)
WBC # BLD AUTO: 5.5 K/UL (ref 4–11)

## 2023-07-29 PROCEDURE — 85610 PROTHROMBIN TIME: CPT

## 2023-07-29 PROCEDURE — 99223 1ST HOSP IP/OBS HIGH 75: CPT | Performed by: STUDENT IN AN ORGANIZED HEALTH CARE EDUCATION/TRAINING PROGRAM

## 2023-07-29 PROCEDURE — 94640 AIRWAY INHALATION TREATMENT: CPT

## 2023-07-29 PROCEDURE — 80053 COMPREHEN METABOLIC PANEL: CPT

## 2023-07-29 PROCEDURE — 6370000000 HC RX 637 (ALT 250 FOR IP): Performed by: STUDENT IN AN ORGANIZED HEALTH CARE EDUCATION/TRAINING PROGRAM

## 2023-07-29 PROCEDURE — 83690 ASSAY OF LIPASE: CPT

## 2023-07-29 PROCEDURE — 6360000002 HC RX W HCPCS: Performed by: INTERNAL MEDICINE

## 2023-07-29 PROCEDURE — 85025 COMPLETE CBC W/AUTO DIFF WBC: CPT

## 2023-07-29 PROCEDURE — 84478 ASSAY OF TRIGLYCERIDES: CPT

## 2023-07-29 PROCEDURE — 94761 N-INVAS EAR/PLS OXIMETRY MLT: CPT

## 2023-07-29 PROCEDURE — 1200000000 HC SEMI PRIVATE

## 2023-07-29 PROCEDURE — 36415 COLL VENOUS BLD VENIPUNCTURE: CPT

## 2023-07-29 PROCEDURE — 2580000003 HC RX 258: Performed by: STUDENT IN AN ORGANIZED HEALTH CARE EDUCATION/TRAINING PROGRAM

## 2023-07-29 RX ORDER — POLYETHYLENE GLYCOL 3350 17 G/17G
17 POWDER, FOR SOLUTION ORAL 2 TIMES DAILY PRN
Status: DISCONTINUED | OUTPATIENT
Start: 2023-07-29 | End: 2023-07-31 | Stop reason: HOSPADM

## 2023-07-29 RX ORDER — WARFARIN SODIUM 5 MG/1
5 TABLET ORAL
Status: COMPLETED | OUTPATIENT
Start: 2023-07-29 | End: 2023-07-29

## 2023-07-29 RX ADMIN — METOPROLOL SUCCINATE 25 MG: 25 TABLET, EXTENDED RELEASE ORAL at 21:47

## 2023-07-29 RX ADMIN — SODIUM CHLORIDE, POTASSIUM CHLORIDE, SODIUM LACTATE AND CALCIUM CHLORIDE: 600; 310; 30; 20 INJECTION, SOLUTION INTRAVENOUS at 02:35

## 2023-07-29 RX ADMIN — METOPROLOL SUCCINATE 37.5 MG: 25 TABLET, EXTENDED RELEASE ORAL at 10:48

## 2023-07-29 RX ADMIN — FUROSEMIDE 20 MG: 20 TABLET ORAL at 17:22

## 2023-07-29 RX ADMIN — SACUBITRIL AND VALSARTAN 1 TABLET: 49; 51 TABLET, FILM COATED ORAL at 20:49

## 2023-07-29 RX ADMIN — PRAVASTATIN SODIUM 40 MG: 40 TABLET ORAL at 20:49

## 2023-07-29 RX ADMIN — ALBUTEROL SULFATE 2.5 MG: 2.5 SOLUTION RESPIRATORY (INHALATION) at 21:07

## 2023-07-29 RX ADMIN — CETIRIZINE HYDROCHLORIDE 10 MG: 10 TABLET, FILM COATED ORAL at 07:55

## 2023-07-29 RX ADMIN — ALBUTEROL SULFATE 2.5 MG: 2.5 SOLUTION RESPIRATORY (INHALATION) at 08:57

## 2023-07-29 RX ADMIN — WARFARIN SODIUM 5 MG: 5 TABLET ORAL at 12:24

## 2023-07-29 RX ADMIN — OXYCODONE HYDROCHLORIDE 5 MG: 5 TABLET ORAL at 13:07

## 2023-07-29 RX ADMIN — PREGABALIN 150 MG: 75 CAPSULE ORAL at 20:49

## 2023-07-29 RX ADMIN — OXYCODONE HYDROCHLORIDE 5 MG: 5 TABLET ORAL at 08:08

## 2023-07-29 RX ADMIN — PREGABALIN 75 MG: 75 CAPSULE ORAL at 07:54

## 2023-07-29 RX ADMIN — METHOCARBAMOL 500 MG: 500 TABLET ORAL at 17:22

## 2023-07-29 RX ADMIN — SACUBITRIL AND VALSARTAN 1 TABLET: 49; 51 TABLET, FILM COATED ORAL at 07:55

## 2023-07-29 RX ADMIN — OXYCODONE HYDROCHLORIDE 5 MG: 5 TABLET ORAL at 21:46

## 2023-07-29 RX ADMIN — TAMSULOSIN HYDROCHLORIDE 0.4 MG: 0.4 CAPSULE ORAL at 07:54

## 2023-07-29 RX ADMIN — ZOLPIDEM TARTRATE 10 MG: 5 TABLET ORAL at 23:12

## 2023-07-29 RX ADMIN — POLYETHYLENE GLYCOL 3350 17 G: 17 POWDER, FOR SOLUTION ORAL at 13:07

## 2023-07-29 RX ADMIN — PANTOPRAZOLE SODIUM 40 MG: 40 TABLET, DELAYED RELEASE ORAL at 08:04

## 2023-07-29 RX ADMIN — AZELASTINE HYDROCHLORIDE 2 SPRAY: 137 SPRAY, METERED NASAL at 20:51

## 2023-07-29 RX ADMIN — TAMSULOSIN HYDROCHLORIDE 0.4 MG: 0.4 CAPSULE ORAL at 21:46

## 2023-07-29 RX ADMIN — FLUTICASONE PROPIONATE 1 SPRAY: 50 SPRAY, METERED NASAL at 07:55

## 2023-07-29 RX ADMIN — ALBUTEROL SULFATE 2.5 MG: 2.5 SOLUTION RESPIRATORY (INHALATION) at 16:12

## 2023-07-29 RX ADMIN — Medication 10 ML: at 20:50

## 2023-07-29 RX ADMIN — ASPIRIN 81 MG: 81 TABLET, CHEWABLE ORAL at 07:55

## 2023-07-29 ASSESSMENT — PAIN DESCRIPTION - PAIN TYPE
TYPE: ACUTE PAIN
TYPE: ACUTE PAIN

## 2023-07-29 ASSESSMENT — PAIN DESCRIPTION - FREQUENCY
FREQUENCY: INTERMITTENT
FREQUENCY: INTERMITTENT

## 2023-07-29 ASSESSMENT — PAIN DESCRIPTION - LOCATION
LOCATION: BACK
LOCATION: ABDOMEN;FLANK
LOCATION: FLANK;ABDOMEN
LOCATION: ABDOMEN
LOCATION: ABDOMEN;FLANK

## 2023-07-29 ASSESSMENT — PAIN DESCRIPTION - ORIENTATION
ORIENTATION: LEFT
ORIENTATION: RIGHT;LEFT;LOWER
ORIENTATION: LEFT

## 2023-07-29 ASSESSMENT — PAIN SCALES - GENERAL
PAINLEVEL_OUTOF10: 8
PAINLEVEL_OUTOF10: 7
PAINLEVEL_OUTOF10: 2
PAINLEVEL_OUTOF10: 8
PAINLEVEL_OUTOF10: 5
PAINLEVEL_OUTOF10: 7
PAINLEVEL_OUTOF10: 9

## 2023-07-29 ASSESSMENT — PAIN - FUNCTIONAL ASSESSMENT
PAIN_FUNCTIONAL_ASSESSMENT: ACTIVITIES ARE NOT PREVENTED

## 2023-07-29 ASSESSMENT — PAIN DESCRIPTION - DESCRIPTORS
DESCRIPTORS: SHARP

## 2023-07-29 ASSESSMENT — PAIN DESCRIPTION - ONSET
ONSET: ON-GOING
ONSET: ON-GOING

## 2023-07-29 NOTE — PROGRESS NOTES
Patient c/o of PCA perfume being too strong this charge nurse made aware of the situation per primary RN. PM meds given per [facility], patient states he takes lasix and mucinex @ home, patient also requesting Gylcolax, primary RN made aware.

## 2023-07-29 NOTE — PROGRESS NOTES
Clinical Pharmacy Note  Warfarin Consult    Erickson Fernández is a 58 y.o. male receiving warfarin managed by pharmacy. Patient also receiving Lovenox. Warfarin Indication: hx of PE  Target INR range: 2-3   Dose prior to admission: 5 mg on Tuesdays and Fridays and 2.5 mg all other days    Current warfarin drug-drug interactions: -    Recent Labs     07/28/23  0910 07/29/23  0637 07/29/23  0935   HGB 16.3 14.9  --    HCT 48.2 42.4  --    INR 1.74*  --  1.78*       Assessment/Plan:    Warfarin 5 mg today. Daily PT/INR until stable within therapeutic range. Thank you for the consult. Will continue to follow.      Garima Samayoa PharmD  7/29/2023 11:29 AM

## 2023-07-29 NOTE — H&P
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        Externally one time    rub into dry hair and scalp completely. leave

 on for 10 minutes. rinse fully.                 29 Nov, 2017    8 Aug, 2018    1 dose       Active







RESULTS

No Results



PROCEDURES

No Known procedures



INSTRUCTIONS





MEDICATIONS ADMINISTERED

No Known Medications



MEDICAL (GENERAL) HISTORY







                    Type                Description         Date

 

                    Medical History     Failure to thrive     

 

                    Medical History     heart murmur         

 

                    Surgical History    tubes               2016

 

                    Hospitalization History    dehydration          

 

                          Hospitalization History    Accidental overdose on Clonidine on her siblings medication,

 was life flighted to [facility] Bronchiolitis obliterans  - Continue home inhaler therapy    Constipation  - Continue MiraLAX and plecanatide (patient supplied)      Insomnia  - Continue Ambien    F/E/N: Diabetic diet  PPx: Warfarin  Dispo: Pending improvement in abdominal pain    Juanita Sanchez MD MD  7/29/2023 4:46 PM    Documentation was done using voice recognition dragon software. Every effort was made to ensure accuracy; however, inadvertent unintentional computerized transcription errors may be present.

## 2023-07-29 NOTE — FLOWSHEET NOTE
New orders received (See MAR) Meds given per [facility] and patient very upset that he was given P.O. pain medication instead of IV pain meds, complaining of 8 out of 10 sharp pain to left flank and demanding Miralax. Placed call to MD, awaiting response.

## 2023-07-29 NOTE — CONSULTS
GASTROENTEROLOGY INPATIENT CONSULTATION:        IDENTIFYING DATA/REASON FOR CONSULTATION   PATIENT:  Rajiv Bernstein  MRN:  [id number]  ADMIT DATE: 7/28/2023  TIME OF EVALUATION: 7/29/2023 7:01 AM  HOSPITAL STAY:   LOS: 1 day     REASON FOR CONSULTATION:  Pancreatitis     HISTORY OF PRESENT ILLNESS   Donnia Oppenheim is a 70-year-old male with a PMH of PE, IBS, HTN COPD, CHF, and Afib who presents with left lower quadrant abdominal pain. Patient reports he has had left lower quadrant abdominal pain for the past 1 month. He reports it started after he was hospitalized approximately 1 month ago. He denies any nausea or vomiting. He reports he does have a history of constipation. He does use MiraLAX for his constipation but was recently started on Trulance. He reports the Trulance will cause explosive diarrhea. He reports he did have a bowel movement yesterday evening and this helped with his pain. Patient attributes a lot of his pain to prior kidney stones. He was recently seen by his urologist who did not feel like any of his kidney stones were contributing to his current pain. Patient has had a prior EGD by Dr. Jose Jose but has not had a prior colonoscopy. Patient reports no prior history of any acute pancreatitis. Patient has had his gallbladder removed. No significant alcohol use. CT on admission with some calcifications suggestive of chronic pancreatitis. EGD (9/22) with Dr. Jose Jose with small hiatal hernia, gastric polyps, and gastric biopsies negative for H. pylori.     PAST MEDICAL, SURGICAL, FAMILY, and SOCIAL HISTORY     Past Medical History:   Diagnosis Date    Ankylosing spondylitis (HCC)     Atrial fibrillation     Bronchiolitis obliterans (HCC)     CAD (coronary artery disease)     Cardiomyopathy (HCC)     CHF (congestive heart failure) (HCC)     Chronic anticoagulation     COPD (chronic obstructive pulmonary disease) (HCC)     GERD (gastroesophageal reflux disease)     Hepatitis 1979 HSM   Ext: No edema   Neuro: No asterixis   Skin: No jaundice, spider angiomas, pan erythema    LABS AND IMAGING     Recent Results (from the past 24 hour(s))   Urinalysis with Reflex to Culture    Collection Time: 07/28/23  8:06 AM    Specimen: Urine   Result Value Ref Range    Color, UA Yellow Straw/Yellow    Clarity, UA Clear Clear    Glucose, Ur Negative Negative mg/dL    Bilirubin Urine Negative Negative    Ketones, Urine Negative Negative mg/dL    Specific Gravity, UA 1.011 1.005 - 1.030    Blood, Urine SMALL (A) Negative    pH, UA 6.0 5.0 - 8.0    Protein, UA Negative Negative mg/dL    Urobilinogen, Urine 0.2 <2.0 E.U./dL    Nitrite, Urine Negative Negative    Leukocyte Esterase, Urine Negative Negative    Microscopic Examination YES     Urine Type NotGiven     Urine Reflex to Culture Not Indicated    Microscopic Urinalysis    Collection Time: 07/28/23  8:06 AM   Result Value Ref Range    Hyaline Casts, UA 0-2 0 - 2 /LPF    Coarse Casts, UA 0-2 0 - 2 /LPF    Cellular Cast, UA 1-3 (A) None Seen /LPF    WBC, UA 0-2 0 - 5 /HPF    RBC, UA 0-2 0 - 4 /HPF    Epithelial Cells, UA 0-1 0 - 5 /HPF    Amorphous, UA Rare /HPF   EKG 12 Lead    Collection Time: 07/28/23  9:04 AM   Result Value Ref Range    Ventricular Rate 79 BPM    Atrial Rate 79 BPM    P-R Interval 272 ms    QRS Duration 150 ms    Q-T Interval 414 ms    QTc Calculation (Bazett) 474 ms    P Axis 72 degrees    R Axis -56 degrees    T Axis 111 degrees    Diagnosis       Sinus rhythm with prolonged AV conductionLeft axis deviationLeft bundle branch blockProlonged QTAbnormal ECGWhen compared with ECG of 14-JUN-2023 15:17,Sinus rhythm has replaced Wide QRS tachycardia  (SVT with aberrant conduction; not VT)Confirmed by Erick Tovar, [address] ([id number]) on 7/28/2023 3:18:56 PM     SPECIMEN REJECTION    Collection Time: 07/28/23  9:08 AM   Result Value Ref Range    Rejected Test CBCWD, CMP, LIP     Reason for Rejection see below    Protime-INR    Collection Time:

## 2023-07-29 NOTE — FLOWSHEET NOTE
Patient refused Protonix stated he takes Nexium which is not on his current med list. Wesly Anthony is on current med list but patient brought Nexium from home. Placed a sticky note for the MD to address.

## 2023-07-29 NOTE — PLAN OF CARE
Problem: Discharge Planning  Goal: Discharge to home or other facility with appropriate resources  Outcome: Progressing     Problem: Pain  Goal: Verbalizes/displays adequate comfort level or baseline comfort level  Outcome: Progressing     Problem: Respiratory - Adult  Goal: Achieves optimal ventilation and oxygenation  Outcome: Progressing

## 2023-07-29 NOTE — FLOWSHEET NOTE
Patient has had several complaints since start of shift and as soon as you correct the complaint he starts complaining of another issue. Patient requesting Home meds Mucinex, Lasix, Miralax, and Robaxin. Dr. Lidia Conde called and message left to return call, awaiting response and patient updated. Patient continue calling out requesting home meds after this RN explained we were waiting a return call.

## 2023-07-29 NOTE — PROGRESS NOTES
Patient alert and oriented, VSS, sitting up in bed eating breakfast. Patient c/o left flank pain, denies chest pain. Patient denies n/v, diarrhea, SOB. Patient concerned about some of his home medications, Dr. Silvio Strange notified, will address during rounds. Patient denies further needs at this time. Bed in lowest and locked position. Patient UAL and tolerating well. Non-slip socks on, call light in reach.

## 2023-07-29 NOTE — PLAN OF CARE
Problem: Discharge Planning  Goal: Discharge to home or other facility with appropriate resources  Outcome: Progressing  Flowsheets (Taken 7/28/2023 1755 by Rohit Saldaña RN)  Discharge to home or other facility with appropriate resources:   Identify barriers to discharge with patient and caregiver   Identify discharge learning needs (meds, wound care, etc)   Refer to discharge planning if patient needs post-hospital services based on physician order or complex needs related to functional status, cognitive ability or social support system   Arrange for needed discharge resources and transportation as appropriate     Problem: Pain  Goal: Verbalizes/displays adequate comfort level or baseline comfort level  Outcome: Progressing

## 2023-07-30 ENCOUNTER — APPOINTMENT (OUTPATIENT)
Dept: GENERAL RADIOLOGY | Age: 63
DRG: 392 | End: 2023-07-30
Payer: COMMERCIAL

## 2023-07-30 LAB
ALBUMIN SERPL-MCNC: 3.1 G/DL (ref 3.4–5)
ALBUMIN/GLOB SERPL: 1.4 {RATIO} (ref 1.1–2.2)
ALP SERPL-CCNC: 98 U/L (ref 40–129)
ALT SERPL-CCNC: 49 U/L (ref 10–40)
ANION GAP SERPL CALCULATED.3IONS-SCNC: 5 MMOL/L (ref 3–16)
AST SERPL-CCNC: 60 U/L (ref 15–37)
BASOPHILS # BLD: 0 K/UL (ref 0–0.2)
BASOPHILS NFR BLD: 0.7 %
BILIRUB SERPL-MCNC: 0.6 MG/DL (ref 0–1)
BUN SERPL-MCNC: 10 MG/DL (ref 7–20)
CALCIUM SERPL-MCNC: 9.9 MG/DL (ref 8.3–10.6)
CHLORIDE SERPL-SCNC: 103 MMOL/L (ref 99–110)
CO2 SERPL-SCNC: 32 MMOL/L (ref 21–32)
CREAT SERPL-MCNC: 1 MG/DL (ref 0.8–1.3)
DEPRECATED RDW RBC AUTO: 12.9 % (ref 12.4–15.4)
EOSINOPHIL # BLD: 0.2 K/UL (ref 0–0.6)
EOSINOPHIL NFR BLD: 4 %
GFR SERPLBLD CREATININE-BSD FMLA CKD-EPI: >60 ML/MIN/{1.73_M2}
GLUCOSE SERPL-MCNC: 126 MG/DL (ref 70–99)
HCT VFR BLD AUTO: 38.9 % (ref 40.5–52.5)
HGB BLD-MCNC: 13.3 G/DL (ref 13.5–17.5)
INR PPP: 1.81 (ref 0.84–1.16)
LYMPHOCYTES # BLD: 1.6 K/UL (ref 1–5.1)
LYMPHOCYTES NFR BLD: 30.3 %
MCH RBC QN AUTO: 31.7 PG (ref 26–34)
MCHC RBC AUTO-ENTMCNC: 34.2 G/DL (ref 31–36)
MCV RBC AUTO: 92.8 FL (ref 80–100)
MONOCYTES # BLD: 0.6 K/UL (ref 0–1.3)
MONOCYTES NFR BLD: 12.1 %
NEUTROPHILS # BLD: 2.8 K/UL (ref 1.7–7.7)
NEUTROPHILS NFR BLD: 52.9 %
PLATELET # BLD AUTO: 152 K/UL (ref 135–450)
PMV BLD AUTO: 7.4 FL (ref 5–10.5)
POTASSIUM SERPL-SCNC: 3.7 MMOL/L (ref 3.5–5.1)
PROT SERPL-MCNC: 5.3 G/DL (ref 6.4–8.2)
PROTHROMBIN TIME: 20.9 SEC (ref 11.5–14.8)
RBC # BLD AUTO: 4.19 M/UL (ref 4.2–5.9)
SODIUM SERPL-SCNC: 140 MMOL/L (ref 136–145)
WBC # BLD AUTO: 5.3 K/UL (ref 4–11)

## 2023-07-30 PROCEDURE — 94640 AIRWAY INHALATION TREATMENT: CPT

## 2023-07-30 PROCEDURE — 6370000000 HC RX 637 (ALT 250 FOR IP): Performed by: STUDENT IN AN ORGANIZED HEALTH CARE EDUCATION/TRAINING PROGRAM

## 2023-07-30 PROCEDURE — 94760 N-INVAS EAR/PLS OXIMETRY 1: CPT

## 2023-07-30 PROCEDURE — 74018 RADEX ABDOMEN 1 VIEW: CPT

## 2023-07-30 PROCEDURE — 85610 PROTHROMBIN TIME: CPT

## 2023-07-30 PROCEDURE — 2580000003 HC RX 258: Performed by: STUDENT IN AN ORGANIZED HEALTH CARE EDUCATION/TRAINING PROGRAM

## 2023-07-30 PROCEDURE — 80053 COMPREHEN METABOLIC PANEL: CPT

## 2023-07-30 PROCEDURE — 99233 SBSQ HOSP IP/OBS HIGH 50: CPT | Performed by: STUDENT IN AN ORGANIZED HEALTH CARE EDUCATION/TRAINING PROGRAM

## 2023-07-30 PROCEDURE — 6360000002 HC RX W HCPCS: Performed by: INTERNAL MEDICINE

## 2023-07-30 PROCEDURE — 1200000000 HC SEMI PRIVATE

## 2023-07-30 PROCEDURE — 85025 COMPLETE CBC W/AUTO DIFF WBC: CPT

## 2023-07-30 PROCEDURE — 36415 COLL VENOUS BLD VENIPUNCTURE: CPT

## 2023-07-30 RX ORDER — WARFARIN SODIUM 5 MG/1
5 TABLET ORAL
Status: COMPLETED | OUTPATIENT
Start: 2023-07-30 | End: 2023-07-30

## 2023-07-30 RX ORDER — SIMETHICONE 80 MG
160 TABLET,CHEWABLE ORAL 3 TIMES DAILY PRN
Status: DISCONTINUED | OUTPATIENT
Start: 2023-07-30 | End: 2023-07-31 | Stop reason: HOSPADM

## 2023-07-30 RX ORDER — DICYCLOMINE HYDROCHLORIDE 10 MG/1
10 CAPSULE ORAL 4 TIMES DAILY PRN
Status: DISCONTINUED | OUTPATIENT
Start: 2023-07-30 | End: 2023-07-31 | Stop reason: HOSPADM

## 2023-07-30 RX ORDER — FUROSEMIDE 20 MG/1
30 TABLET ORAL EVERY MORNING
Status: DISCONTINUED | OUTPATIENT
Start: 2023-07-30 | End: 2023-07-31 | Stop reason: HOSPADM

## 2023-07-30 RX ORDER — ACETAMINOPHEN 325 MG/1
650 TABLET ORAL EVERY 4 HOURS PRN
Status: DISCONTINUED | OUTPATIENT
Start: 2023-07-30 | End: 2023-07-31 | Stop reason: HOSPADM

## 2023-07-30 RX ORDER — ESOMEPRAZOLE MAGNESIUM 40 MG/1
40 CAPSULE, DELAYED RELEASE ORAL
Status: DISCONTINUED | OUTPATIENT
Start: 2023-07-31 | End: 2023-07-31 | Stop reason: HOSPADM

## 2023-07-30 RX ADMIN — SIMETHICONE 160 MG: 80 TABLET, CHEWABLE ORAL at 17:35

## 2023-07-30 RX ADMIN — PREGABALIN 150 MG: 75 CAPSULE ORAL at 21:33

## 2023-07-30 RX ADMIN — POLYETHYLENE GLYCOL 3350 17 G: 17 POWDER, FOR SOLUTION ORAL at 21:35

## 2023-07-30 RX ADMIN — FUROSEMIDE 30 MG: 20 TABLET ORAL at 10:07

## 2023-07-30 RX ADMIN — Medication 10 ML: at 21:34

## 2023-07-30 RX ADMIN — TAMSULOSIN HYDROCHLORIDE 0.4 MG: 0.4 CAPSULE ORAL at 21:33

## 2023-07-30 RX ADMIN — AZELASTINE HYDROCHLORIDE 2 SPRAY: 137 SPRAY, METERED NASAL at 21:41

## 2023-07-30 RX ADMIN — WARFARIN SODIUM 5 MG: 5 TABLET ORAL at 15:23

## 2023-07-30 RX ADMIN — ALBUTEROL SULFATE 2.5 MG: 2.5 SOLUTION RESPIRATORY (INHALATION) at 11:29

## 2023-07-30 RX ADMIN — GUAIFENESIN 600 MG: 600 TABLET ORAL at 10:10

## 2023-07-30 RX ADMIN — ALBUTEROL SULFATE 2.5 MG: 2.5 SOLUTION RESPIRATORY (INHALATION) at 00:57

## 2023-07-30 RX ADMIN — Medication 20 MG: at 05:48

## 2023-07-30 RX ADMIN — DICYCLOMINE HYDROCHLORIDE 10 MG: 10 CAPSULE ORAL at 23:38

## 2023-07-30 RX ADMIN — METHOCARBAMOL 500 MG: 500 TABLET ORAL at 23:37

## 2023-07-30 RX ADMIN — DICYCLOMINE HYDROCHLORIDE 10 MG: 10 CAPSULE ORAL at 17:35

## 2023-07-30 RX ADMIN — ACETAMINOPHEN 650 MG: 325 TABLET ORAL at 10:10

## 2023-07-30 RX ADMIN — FUROSEMIDE 20 MG: 20 TABLET ORAL at 17:35

## 2023-07-30 RX ADMIN — METOPROLOL SUCCINATE 37.5 MG: 25 TABLET, EXTENDED RELEASE ORAL at 08:19

## 2023-07-30 RX ADMIN — FLUTICASONE PROPIONATE 1 SPRAY: 50 SPRAY, METERED NASAL at 08:18

## 2023-07-30 RX ADMIN — ASPIRIN 81 MG: 81 TABLET, CHEWABLE ORAL at 08:18

## 2023-07-30 RX ADMIN — ZOLPIDEM TARTRATE 10 MG: 5 TABLET ORAL at 23:38

## 2023-07-30 RX ADMIN — SIMETHICONE 160 MG: 80 TABLET, CHEWABLE ORAL at 10:07

## 2023-07-30 RX ADMIN — CETIRIZINE HYDROCHLORIDE 10 MG: 10 TABLET, FILM COATED ORAL at 08:18

## 2023-07-30 RX ADMIN — PREGABALIN 75 MG: 75 CAPSULE ORAL at 11:52

## 2023-07-30 RX ADMIN — TAMSULOSIN HYDROCHLORIDE 0.4 MG: 0.4 CAPSULE ORAL at 08:19

## 2023-07-30 RX ADMIN — PRAVASTATIN SODIUM 40 MG: 40 TABLET ORAL at 17:35

## 2023-07-30 RX ADMIN — SACUBITRIL AND VALSARTAN 1 TABLET: 49; 51 TABLET, FILM COATED ORAL at 08:18

## 2023-07-30 RX ADMIN — METOPROLOL SUCCINATE 25 MG: 25 TABLET, EXTENDED RELEASE ORAL at 23:38

## 2023-07-30 RX ADMIN — OXYCODONE HYDROCHLORIDE 5 MG: 5 TABLET ORAL at 15:23

## 2023-07-30 RX ADMIN — ALBUTEROL SULFATE 2.5 MG: 2.5 SOLUTION RESPIRATORY (INHALATION) at 15:47

## 2023-07-30 RX ADMIN — SACUBITRIL AND VALSARTAN 1 TABLET: 49; 51 TABLET, FILM COATED ORAL at 21:33

## 2023-07-30 RX ADMIN — OXYCODONE HYDROCHLORIDE 5 MG: 5 TABLET ORAL at 23:39

## 2023-07-30 ASSESSMENT — PAIN DESCRIPTION - PAIN TYPE
TYPE: CHRONIC PAIN
TYPE: CHRONIC PAIN

## 2023-07-30 ASSESSMENT — PAIN DESCRIPTION - LOCATION
LOCATION: BACK
LOCATION: ABDOMEN
LOCATION: BACK
LOCATION: ABDOMEN

## 2023-07-30 ASSESSMENT — PAIN DESCRIPTION - FREQUENCY
FREQUENCY: INTERMITTENT
FREQUENCY: INTERMITTENT

## 2023-07-30 ASSESSMENT — PAIN DESCRIPTION - DESCRIPTORS
DESCRIPTORS: DULL
DESCRIPTORS: ACHING;SPASM
DESCRIPTORS: ACHING
DESCRIPTORS: DULL

## 2023-07-30 ASSESSMENT — PAIN DESCRIPTION - ORIENTATION
ORIENTATION: RIGHT;LEFT;MID
ORIENTATION: RIGHT;LEFT;LOWER
ORIENTATION: RIGHT;LEFT;LOWER
ORIENTATION: LEFT;LOWER
ORIENTATION: RIGHT;LEFT;MID
ORIENTATION: RIGHT

## 2023-07-30 ASSESSMENT — PAIN SCALES - GENERAL
PAINLEVEL_OUTOF10: 4
PAINLEVEL_OUTOF10: 7
PAINLEVEL_OUTOF10: 8
PAINLEVEL_OUTOF10: 7
PAINLEVEL_OUTOF10: 7
PAINLEVEL_OUTOF10: 4

## 2023-07-30 ASSESSMENT — PAIN DESCRIPTION - ONSET
ONSET: ON-GOING
ONSET: ON-GOING

## 2023-07-30 ASSESSMENT — PAIN - FUNCTIONAL ASSESSMENT
PAIN_FUNCTIONAL_ASSESSMENT: ACTIVITIES ARE NOT PREVENTED

## 2023-07-30 NOTE — PROGRESS NOTES
Patient requesting nexium to increase to 40mg, requesting gas x. States he takes lasix bid, reflected on home med list as bid but ordered daily in the evening. MD paged for med correction. Home med for chronic constipation, Plecanatide not here at the hospital. Patient called his wife and requested that medication be brought in. Patient refusing lovenox dose despite subtherapeutic INR. States \"you guys almost killed me with that med in the past, im not taking it. Just increase my warfarin instead.  \"

## 2023-07-30 NOTE — FLOWSHEET NOTE
Patient refused to allow the PCA into room after she took vitals stating that she had on to much perfume, she did not smell of any perfume. Patient comes out of room looking for nurse instead of using call light and requested this nurse to give meds that were timed for a later time. Patient educated. Patient requested PRN Oxycodone, which was given and a few minutes later put call light on requesting a pain pill when reminded when he had received his pain pill he stated \"Oh, that's right, I am thinking about Percocet. \" Administered patient morning Nexium per order 20 MG and patient stated he takes two at home, informed would need the doctor to change the dose before this RN could give another pill. Patient stated \"Let me guess, you have to call him. Don't bother he should be in this morning to see me. \" Charge nurse notified of above.

## 2023-07-30 NOTE — PROGRESS NOTES
INPATIENT CONSULTATION:    IDENTIFYING DATA/REASON FOR CONSULTATION   PATIENT:  Joe Barry  MRN:  [id number]  ADMIT DATE: 7/28/2023  TIME OF EVALUATION: 7/30/2023 9:13 AM  HOSPITAL STAY:   LOS: 2 days   CONSULTING PHYSICIAN:  REASON FOR CONSULTATION:    Subjective:    Patient with some persistent LLQ abdominal pain. He is tolerating breakfast with no issue. He had a BM overnight. MEDICATIONS   SCHEDULED:  warfarin placeholder: dosing by pharmacy, , RX Placeholder  Plecanatide, 3 mg, Daily  esomeprazole, 20 mg, QAM AC  sodium chloride flush, 5-40 mL, 2 times per day  enoxaparin, 30 mg, BID  aspirin, 81 mg, Daily  sacubitril-valsartan, 1 tablet, BID  fluticasone, 1 spray, Daily  cetirizine, 10 mg, Daily  metoprolol succinate, 37.5 mg, QAM  metoprolol succinate, 25 mg, Nightly  pregabalin, 75 mg, Daily  pregabalin, 150 mg, Nightly  pravastatin, 40 mg, Nightly  tamsulosin, 0.4 mg, BID  azelastine, 2 spray, BID  furosemide, 20 mg, QPM  albuterol, 2.5 mg, Q8H RT      FLUIDS/DRIPS:     sodium chloride       PRNs: polyethylene glycol, 17 g, BID PRN  sodium chloride flush, 5-40 mL, PRN  sodium chloride, , PRN  ondansetron, 4 mg, Q8H PRN   Or  ondansetron, 4 mg, Q6H PRN  flavoxATE, 100 mg, 4x Daily PRN  zolpidem, 10 mg, Nightly PRN  oxyCODONE, 5 mg, Q4H PRN  guaiFENesin, 600 mg, BID PRN  methocarbamol, 500 mg, 4x Daily PRN  polyethylene glycol, 17 g, Daily PRN  albuterol, 2.5 mg, Q4H PRN      ALLERGIES:  He [unfilled]      PHYSICAL EXAM   [unfilled]   I/O last 3 completed shifts: In: 2348 [P.O.:1320;  I.V.:1028]  Out: -   Oxygen Therapy:  @[id number])@  @[id number])@  @[id number])@    Physical Exam:  Gen: Resting in bed, NAD   CV: RRR no MRG   Pul: CTAB   Abd: Good bowel sounds throughout, no scars, soft, mild LLQ tenderness, no masses, no HSM   Ext: No edema   Neuro: No asterixis   Skin: No jaundice, spider angiomas, pan erythema     LABS AND IMAGING     Recent Results (from the past 24 hour(s))   Protime-INR    Collection Time: 07/29/23  9:35 AM   Result Value Ref Range    Protime 20.6 (H) 11.5 - 14.8 sec    INR 1.78 (H) 0.84 - 1.16   CBC with Auto Differential    Collection Time: 07/30/23  6:19 AM   Result Value Ref Range    WBC 5.3 4.0 - 11.0 K/uL    RBC 4.19 (L) 4.20 - 5.90 M/uL    Hemoglobin 13.3 (L) 13.5 - 17.5 g/dL    Hematocrit 38.9 (L) 40.5 - 52.5 %    MCV 92.8 80.0 - 100.0 fL    MCH 31.7 26.0 - 34.0 pg    MCHC 34.2 31.0 - 36.0 g/dL    RDW 12.9 12.4 - 15.4 %    Platelets 942 656 - 343 K/uL    MPV 7.4 5.0 - 10.5 fL    Neutrophils % 52.9 %    Lymphocytes % 30.3 %    Monocytes % 12.1 %    Eosinophils % 4.0 %    Basophils % 0.7 %    Neutrophils Absolute 2.8 1.7 - 7.7 K/uL    Lymphocytes Absolute 1.6 1.0 - 5.1 K/uL    Monocytes Absolute 0.6 0.0 - 1.3 K/uL    Eosinophils Absolute 0.2 0.0 - 0.6 K/uL    Basophils Absolute 0.0 0.0 - 0.2 K/uL   Comprehensive Metabolic Panel    Collection Time: 07/30/23  6:19 AM   Result Value Ref Range    Sodium 140 136 - 145 mmol/L    Potassium 3.7 3.5 - 5.1 mmol/L    Chloride 103 99 - 110 mmol/L    CO2 32 21 - 32 mmol/L    Anion Gap 5 3 - 16    Glucose 126 (H) 70 - 99 mg/dL    BUN 10 7 - 20 mg/dL    Creatinine 1.0 0.8 - 1.3 mg/dL    Est, Glom Filt Rate >60 >60    Calcium 9.9 8.3 - 10.6 mg/dL    Total Protein 5.3 (L) 6.4 - 8.2 g/dL    Albumin 3.1 (L) 3.4 - 5.0 g/dL    Albumin/Globulin Ratio 1.4 1.1 - 2.2    Total Bilirubin 0.6 0.0 - 1.0 mg/dL    Alkaline Phosphatase 98 40 - 129 U/L    ALT 49 (H) 10 - 40 U/L    AST 60 (H) 15 - 37 U/L     Other Labs    Imaging    ASSESSMENT AND RECOMMENDATIONS   Edin Miller is a 69-year-old male with a PMH of PE, IBS, HTN COPD, CHF, and Afib who presents with left lower quadrant abdominal pain. Patient reports he has had left lower quadrant abdominal pain for the past 1 month.        IMPRESSION:     LLQ Abdominal Pain  Constipation   Pancreatic Calcifications      RECOMMENDATIONS:    -LLQ abdominal pain-I suspect related to

## 2023-07-30 NOTE — PLAN OF CARE
Problem: Discharge Planning  Goal: Discharge to home or other facility with appropriate resources  7/29/2023 2221 by Gerald Ren RN  Outcome: Progressing  7/29/2023 1643 by Denny Burkitt, RN  Outcome: Progressing     Problem: Pain  Goal: Verbalizes/displays adequate comfort level or baseline comfort level  7/29/2023 2221 by Gerald Ren RN  Outcome: Progressing  7/29/2023 1643 by Denny Burkitt, RN  Outcome: Progressing     Problem: Respiratory - Adult  Goal: Achieves optimal ventilation and oxygenation  7/29/2023 2221 by Gerald Ren RN  Outcome: Progressing  7/29/2023 1643 by Denny Burkitt, RN  Outcome: Progressing

## 2023-07-30 NOTE — PROGRESS NOTES
Clinical Pharmacy Note  Warfarin Consult    Ashly Lam is a 58 y.o. male receiving warfarin managed by pharmacy. Patient being bridged with Lovenox (ordered but patient refusing doses). Warfarin Indication: hx of PE  Target INR range: 2-3   Dose prior to admission: 5 mg on Tuesdays and Fridays and 2.5 mg all other days    Current warfarin drug-drug interactions: -    Recent Labs     07/28/23  0910 07/29/23  0637 07/29/23  0935 07/30/23  0619 07/30/23  1048   HGB 16.3 14.9  --  13.3*  --    HCT 48.2 42.4  --  38.9*  --    INR 1.74*  --  1.78*  --  1.81*       Assessment/Plan:    Warfarin 5 mg again today. Daily PT/INR until stable within therapeutic range. Thank you for the consult. Will continue to follow.     Christa Samayoa, PharmD  7/30/2023 11:18 AM

## 2023-07-31 VITALS
BODY MASS INDEX: 36.21 KG/M2 | OXYGEN SATURATION: 93 % | SYSTOLIC BLOOD PRESSURE: 138 MMHG | HEIGHT: 66 IN | TEMPERATURE: 97.5 F | DIASTOLIC BLOOD PRESSURE: 86 MMHG | HEART RATE: 75 BPM | WEIGHT: 225.31 LBS | RESPIRATION RATE: 16 BRPM

## 2023-07-31 LAB
ALBUMIN SERPL-MCNC: 3.5 G/DL (ref 3.4–5)
ALBUMIN/GLOB SERPL: 1.5 {RATIO} (ref 1.1–2.2)
ALP SERPL-CCNC: 112 U/L (ref 40–129)
ALT SERPL-CCNC: 50 U/L (ref 10–40)
ANION GAP SERPL CALCULATED.3IONS-SCNC: 7 MMOL/L (ref 3–16)
AST SERPL-CCNC: 34 U/L (ref 15–37)
BASOPHILS # BLD: 0 K/UL (ref 0–0.2)
BASOPHILS NFR BLD: 1 %
BILIRUB SERPL-MCNC: 0.5 MG/DL (ref 0–1)
BUN SERPL-MCNC: 9 MG/DL (ref 7–20)
CALCIUM SERPL-MCNC: 10.3 MG/DL (ref 8.3–10.6)
CHLORIDE SERPL-SCNC: 102 MMOL/L (ref 99–110)
CO2 SERPL-SCNC: 30 MMOL/L (ref 21–32)
CREAT SERPL-MCNC: 1 MG/DL (ref 0.8–1.3)
DEPRECATED RDW RBC AUTO: 12.9 % (ref 12.4–15.4)
EOSINOPHIL # BLD: 0.2 K/UL (ref 0–0.6)
EOSINOPHIL NFR BLD: 4.8 %
GFR SERPLBLD CREATININE-BSD FMLA CKD-EPI: >60 ML/MIN/{1.73_M2}
GLUCOSE SERPL-MCNC: 125 MG/DL (ref 70–99)
HCT VFR BLD AUTO: 42.2 % (ref 40.5–52.5)
HGB BLD-MCNC: 14.4 G/DL (ref 13.5–17.5)
INR PPP: 2.2 (ref 0.84–1.16)
LYMPHOCYTES # BLD: 1.5 K/UL (ref 1–5.1)
LYMPHOCYTES NFR BLD: 32 %
MCH RBC QN AUTO: 31.5 PG (ref 26–34)
MCHC RBC AUTO-ENTMCNC: 34.1 G/DL (ref 31–36)
MCV RBC AUTO: 92.3 FL (ref 80–100)
MONOCYTES # BLD: 0.5 K/UL (ref 0–1.3)
MONOCYTES NFR BLD: 10.1 %
NEUTROPHILS # BLD: 2.5 K/UL (ref 1.7–7.7)
NEUTROPHILS NFR BLD: 52.1 %
PLATELET # BLD AUTO: 164 K/UL (ref 135–450)
PMV BLD AUTO: 7.7 FL (ref 5–10.5)
POTASSIUM SERPL-SCNC: 3.9 MMOL/L (ref 3.5–5.1)
PROT SERPL-MCNC: 5.8 G/DL (ref 6.4–8.2)
PROTHROMBIN TIME: 24.3 SEC (ref 11.5–14.8)
RBC # BLD AUTO: 4.57 M/UL (ref 4.2–5.9)
SODIUM SERPL-SCNC: 139 MMOL/L (ref 136–145)
WBC # BLD AUTO: 4.7 K/UL (ref 4–11)

## 2023-07-31 PROCEDURE — 2580000003 HC RX 258: Performed by: STUDENT IN AN ORGANIZED HEALTH CARE EDUCATION/TRAINING PROGRAM

## 2023-07-31 PROCEDURE — 6370000000 HC RX 637 (ALT 250 FOR IP): Performed by: STUDENT IN AN ORGANIZED HEALTH CARE EDUCATION/TRAINING PROGRAM

## 2023-07-31 PROCEDURE — 85025 COMPLETE CBC W/AUTO DIFF WBC: CPT

## 2023-07-31 PROCEDURE — 2700000000 HC OXYGEN THERAPY PER DAY

## 2023-07-31 PROCEDURE — 85610 PROTHROMBIN TIME: CPT

## 2023-07-31 PROCEDURE — 94640 AIRWAY INHALATION TREATMENT: CPT

## 2023-07-31 PROCEDURE — 36415 COLL VENOUS BLD VENIPUNCTURE: CPT

## 2023-07-31 PROCEDURE — 80053 COMPREHEN METABOLIC PANEL: CPT

## 2023-07-31 PROCEDURE — 6360000002 HC RX W HCPCS: Performed by: INTERNAL MEDICINE

## 2023-07-31 PROCEDURE — 94760 N-INVAS EAR/PLS OXIMETRY 1: CPT

## 2023-07-31 RX ORDER — FUROSEMIDE 20 MG/1
20 TABLET ORAL SEE ADMIN INSTRUCTIONS
Qty: 45 TABLET | Refills: 3 | Status: SHIPPED | OUTPATIENT
Start: 2023-07-31

## 2023-07-31 RX ORDER — WARFARIN SODIUM 2.5 MG/1
2.5 TABLET ORAL
Status: DISCONTINUED | OUTPATIENT
Start: 2023-07-31 | End: 2023-07-31 | Stop reason: HOSPADM

## 2023-07-31 RX ORDER — OXYCODONE HYDROCHLORIDE AND ACETAMINOPHEN 5; 325 MG/1; MG/1
1 TABLET ORAL EVERY 6 HOURS PRN
Qty: 12 TABLET | Refills: 0 | Status: SHIPPED | OUTPATIENT
Start: 2023-07-31 | End: 2023-08-03

## 2023-07-31 RX ADMIN — TAMSULOSIN HYDROCHLORIDE 0.4 MG: 0.4 CAPSULE ORAL at 09:04

## 2023-07-31 RX ADMIN — METHOCARBAMOL 500 MG: 500 TABLET ORAL at 06:09

## 2023-07-31 RX ADMIN — CETIRIZINE HYDROCHLORIDE 10 MG: 10 TABLET, FILM COATED ORAL at 09:02

## 2023-07-31 RX ADMIN — FUROSEMIDE 30 MG: 20 TABLET ORAL at 09:01

## 2023-07-31 RX ADMIN — ALBUTEROL SULFATE 2.5 MG: 2.5 SOLUTION RESPIRATORY (INHALATION) at 00:19

## 2023-07-31 RX ADMIN — ALBUTEROL SULFATE 2.5 MG: 2.5 SOLUTION RESPIRATORY (INHALATION) at 08:15

## 2023-07-31 RX ADMIN — Medication 10 ML: at 09:04

## 2023-07-31 RX ADMIN — ESOMEPRAZOLE MAGNESIUM 40 MG: 40 CAPSULE, DELAYED RELEASE ORAL at 06:10

## 2023-07-31 RX ADMIN — SACUBITRIL AND VALSARTAN 1 TABLET: 49; 51 TABLET, FILM COATED ORAL at 09:01

## 2023-07-31 ASSESSMENT — PAIN SCALES - GENERAL
PAINLEVEL_OUTOF10: 2
PAINLEVEL_OUTOF10: 3
PAINLEVEL_OUTOF10: 2
PAINLEVEL_OUTOF10: 2

## 2023-07-31 ASSESSMENT — PAIN DESCRIPTION - DESCRIPTORS: DESCRIPTORS: SPASM

## 2023-07-31 ASSESSMENT — PAIN - FUNCTIONAL ASSESSMENT: PAIN_FUNCTIONAL_ASSESSMENT: ACTIVITIES ARE NOT PREVENTED

## 2023-07-31 ASSESSMENT — PAIN DESCRIPTION - LOCATION: LOCATION: BACK

## 2023-07-31 ASSESSMENT — PAIN DESCRIPTION - ORIENTATION: ORIENTATION: RIGHT;LEFT

## 2023-07-31 NOTE — PLAN OF CARE
Problem: Discharge Planning  Goal: Discharge to home or other facility with appropriate resources  7/31/2023 0933 by Kal Timmons RN  Outcome: Completed  Flowsheets (Taken 7/30/2023 2358 by Jigna Chahal RN)  Discharge to home or other facility with appropriate resources:   Identify barriers to discharge with patient and caregiver   Arrange for needed discharge resources and transportation as appropriate   Identify discharge learning needs (meds, wound care, etc)  7/30/2023 2356 by Jigna Chahal RN  Outcome: Progressing     Problem: Pain  Goal: Verbalizes/displays adequate comfort level or baseline comfort level  7/31/2023 0933 by Kal Timmons RN  Outcome: Completed  7/30/2023 2356 by Jigna Chahal RN  Outcome: Progressing     Problem: Respiratory - Adult  Goal: Achieves optimal ventilation and oxygenation  7/31/2023 0933 by Kal Timmons RN  Outcome: Completed  Flowsheets (Taken 7/30/2023 2358 by Jigna Chahal RN)  Achieves optimal ventilation and oxygenation:   Assess for changes in respiratory status   Assess for changes in mentation and behavior   Position to facilitate oxygenation and minimize respiratory effort   Oxygen supplementation based on oxygen saturation or arterial blood gases  7/30/2023 2356 by Jigna Chahal RN  Outcome: Progressing     Problem: Safety - Adult  Goal: Free from fall injury  Outcome: Completed

## 2023-07-31 NOTE — PROGRESS NOTES
Clinical Pharmacy Note  Warfarin Consult    Pete Gallo is a 58 y.o. male receiving warfarin managed by pharmacy. Patient being bridged with Lovenox (ordered but patient refusing doses). Warfarin Indication: hx of PE  Target INR range: 2-3   Dose prior to admission: 5 mg on Tuesdays and Fridays and 2.5 mg all other days    Current warfarin drug-drug interactions: -    Recent Labs     07/29/23  0637 07/29/23  0935 07/30/23  0619 07/30/23  1048 07/31/23  0702   HGB 14.9  --  13.3*  --  14.4   HCT 42.4  --  38.9*  --  42.2   INR  --  1.78*  --  1.81* 2.20*         Assessment/Plan:    INR in range today  Warfarin 2.5 mg again today. Daily PT/INR until stable within therapeutic range. Thank you for the consult. Will continue to follow.     ANITA Ray [facility]   7/31/2023 8:59 AM

## 2023-07-31 NOTE — DISCHARGE INSTR - DIET
Good nutrition is important when healing from an illness, injury, or surgery. Follow any nutrition recommendations given to you during your hospital stay. If you were given an oral nutrition supplement while in the hospital, continue to take this supplement at home. You can take it with meals, in-between meals, and/or before bedtime. These supplements can be purchased at most local grocery stores, pharmacies, and chain [employer]-stores. If you have any questions about your diet or nutrition, call the hospital and ask for the dietitian.     Low Sodium Diet as tolerated

## 2023-07-31 NOTE — PROGRESS NOTES
Patient alert and oriented x4, VSS, sitting up in bed. Patient denies n/v, diarrhea, SOB, pain. Patient states MD saw him and he will be discharged today. This RN called pharmacy to obtain home medications to return to patient at discharge. Patient UAL and tolerating well, denies needs at this time. Bed in lowest and locked position, call light in reach.

## 2023-07-31 NOTE — CARE COORDINATION
CASE MANAGEMENT DISCHARGE SUMMARY:    DISCHARGE DATE: 07/31/2023    DISCHARGED TO: Home      TRANSPORTATION: Spouse to transport. TIME: TBD by pt and bedside RN       PREFERRED PHARMACY:      [address] [place] [address] , [address] [place] [address]. [address] [place] [address]  Phone: [phone] Fax: [phone]    COMMENTS: Pt from home with spouse. Plan is to return when medically ready. Denies needs. Spouse will transport at discharge.         Electronically signed by Zakia Bates RN on 7/31/2023 at 10:37 AM  Ph: [phone]  Fax: [phone]

## 2023-07-31 NOTE — CARE COORDINATION
Case Management Assessment  Initial Evaluation    Date/Time of Evaluation: 7/31/2023 10:30 AM  Assessment Completed by: Rubens Hernandez RN    If patient is discharged prior to next notation, then this note serves as note for discharge by case management. Patient Name: Orlin Dean                   YOB: 1960  Diagnosis: Left flank pain [R10.9]  Subtherapeutic international normalized ratio (INR) [R79.1]  Pancreatic abnormality [Q45.3]  Pancreatitis, unspecified pancreatitis type [K85.90]  Chest pain, unspecified type [R07.9]                   Date / Time: 7/28/2023  7:46 AM    Patient Admission Status: Inpatient   Readmission Risk (Low < 19, Mod (19-27), High > 27): Readmission Risk Score: 16.3    Current PCP: Kely Bhardwaj MD  PCP verified by CM? (P) Yes    Chart Reviewed: Yes      History Provided by: (P) Patient  Patient Orientation: (P) Alert and Oriented, Person, Place, Situation, Self    Patient Cognition: (P) Alert    Hospitalization in the last 30 days (Readmission):  No    If yes, Readmission Assessment in CM Navigator will be completed.     Advance Directives:      Code Status: Full Code   Patient's Primary Decision Maker is: (P) Legal Next of Kin    Primary Decision Maker: Adelaida Javier - Spouse - [phone]    Discharge Planning:    Patient lives with: (P) Spouse/Significant Other Type of Home: (P) House  Primary Care Giver: (P) Self  Patient Support Systems include: (P) Spouse/Significant Other   Current Financial resources: (P) None  Current community resources: (P) None  Current services prior to admission: (P) None            Current DME:              Type of Home Care services:  (P) None    ADLS  Prior functional level: (P) Independent in ADLs/IADLs  Current functional level: (P) Independent in ADLs/IADLs    PT AM-PAC:   /24  OT AM-PAC:   /24    Family can provide assistance at DC: (P) Yes  Would you like Case Management to discuss the discharge plan with any other family

## 2023-07-31 NOTE — ACP (ADVANCE CARE PLANNING)
Advance Care Planning     Advance Care Planning Activator (Inpatient)  Conversation Note      Date of ACP Conversation: 7/31/2023     Conversation Conducted with: Patient with Decision Making Capacity    ACP Activator: Judith Delgado RN      Health Care Decision Maker:     Current Designated Health Care Decision Maker:     Primary Decision Maker: Teddy Abebe [facility] - [phone]    Care Preferences    Ventilation: \"If you were in your present state of health and suddenly became very ill and were unable to breathe on your own, what would your preference be about the use of a ventilator (breathing machine) if it were available to you? \"      Would the patient desire the use of ventilator (breathing machine)?: yes    \"If your health worsens and it becomes clear that your chance of recovery is unlikely, what would your preference be about the use of a ventilator (breathing machine) if it were available to you? \"     Would the patient desire the use of ventilator (breathing machine)?: Yes      Resuscitation  \"CPR works best to restart the heart when there is a sudden event, like a heart attack, in someone who is otherwise healthy. Unfortunately, CPR does not typically restart the heart for people who have serious health conditions or who are very sick. \"    \"In the event your heart stopped as a result of an underlying serious health condition, would you want attempts to be made to restart your heart (answer \"yes\" for attempt to resuscitate) or would you prefer a natural death (answer \"no\" for do not attempt to resuscitate)? \" yes       [] Yes   [x] No   Educated Patient / Chuy Florian regarding differences between Advance Directives and portable DNR orders.     Length of ACP Conversation in minutes:  5    Conversation Outcomes:  ACP discussion completed    Follow-up plan:    [] Schedule follow-up conversation to continue planning  [] Referred individual to Provider for additional questions/concerns   [] Advised patient/agent/surrogate to review completed ACP document and update if needed with changes in condition, patient preferences or care setting    [x] This note routed to one or more involved healthcare providers      Electronically signed by Dg Frias RN on 7/31/2023 at 10:34 AM  Ph: [phone]  Fax: [phone]

## 2023-07-31 NOTE — PROGRESS NOTES
Reviewed discharge and follow up information with patient, answered all questions. Patient without s/s of distress at discharge, ambulatory. Patient waiting medication delivery from pharmacy, and wife to arrive to transport home.

## 2023-07-31 NOTE — TELEPHONE ENCOUNTER
Medication:   Requested Prescriptions     Pending Prescriptions Disp Refills    furosemide (LASIX) 20 MG tablet [Pharmacy Med Name: Furosemide 20 MG Oral Tablet] 450 tablet 3     Sig: TAKE 2 AND 1/2 TABLETS BY MOUTH  TWICE DAILY        Last Filled:  2023    Patient Phone Number: [phone] (home)     Last appt: 3/9/2023   Next appt: 2023  Last ek2023    Last OARRS:   RX Monitoring 2023   Attestation -   Periodic Controlled Substance Monitoring No signs of potential drug abuse or diversion identified.

## 2023-07-31 NOTE — PROGRESS NOTES
INPATIENT PROGRESS NOTE        IDENTIFYING DATA/REASON FOR CONSULTATION   PATIENT:  Katy Machado  MRN:  [id number]  ADMIT DATE: 7/28/2023  TIME OF EVALUATION: 7/31/2023 7:15 AM  HOSPITAL STAY:   LOS: 3 days   CONSULTING PHYSICIAN: Yvonne Waddell MD   REASON FOR CONSULTATION:    Subjective:    Patient seen in follow up. No new complaints. Feeling better. Had BM yesterday.   KUB showed nonspecific bowel gas pattern      MEDICATIONS   SCHEDULED:  furosemide, 30 mg, QAM  esomeprazole, 40 mg, QAM AC  warfarin placeholder: dosing by pharmacy, , RX Placeholder  Plecanatide, 3 mg, Daily  sodium chloride flush, 5-40 mL, 2 times per day  enoxaparin, 30 mg, BID  aspirin, 81 mg, Daily  sacubitril-valsartan, 1 tablet, BID  fluticasone, 1 spray, Daily  cetirizine, 10 mg, Daily  metoprolol succinate, 37.5 mg, QAM  metoprolol succinate, 25 mg, Nightly  pregabalin, 75 mg, Daily  pregabalin, 150 mg, Nightly  pravastatin, 40 mg, Nightly  tamsulosin, 0.4 mg, BID  azelastine, 2 spray, BID  furosemide, 20 mg, QPM  albuterol, 2.5 mg, Q8H RT      FLUIDS/DRIPS:     sodium chloride       PRNs: simethicone, 160 mg, TID PRN  acetaminophen, 650 mg, Q4H PRN  dicyclomine, 10 mg, 4x Daily PRN  polyethylene glycol, 17 g, BID PRN  sodium chloride flush, 5-40 mL, PRN  sodium chloride, , PRN  ondansetron, 4 mg, Q8H PRN   Or  ondansetron, 4 mg, Q6H PRN  flavoxATE, 100 mg, 4x Daily PRN  zolpidem, 10 mg, Nightly PRN  oxyCODONE, 5 mg, Q4H PRN  guaiFENesin, 600 mg, BID PRN  methocarbamol, 500 mg, 4x Daily PRN  polyethylene glycol, 17 g, Daily PRN  albuterol, 2.5 mg, Q4H PRN      ALLERGIES:    Allergies   Allergen Reactions    Atrovent Nasal Spray [Ipratropium] Anaphylaxis and Other (See Comments)     Chest tightness    Ipratropium Bromide Hfa Shortness Of Breath    Proventil [Albuterol] Shortness Of Breath    Crestor [Rosuvastatin] Myalgia    Doxycycline Hives    Macrobid [Nitrofurantoin] Hives    Nexletol [Bempedoic Acid]      Hives neck and chest

## 2023-08-04 NOTE — PROGRESS NOTES
Physician Progress Note      PATIENT:               Figueroa Cazares  CSN #:                  [id number]  :                       1960  ADMIT DATE:       2023 7:46 AM  DISCH DATE:        2023 11:27 AM  RESPONDING  PROVIDER #:        Cait Diggs MD          QUERY TEXT:    Pt admitted with flank pain . Noted documentation of constipation on   by   ordered GI  consultant. If possible, please document in progress notes and   discharge summary:      The medical record reflects the following:  Risk Factors: IBS, copd, chf  Clinical Indicators: Documentation per GI of-LLQ Abdominal Pain suspect 2/2   constipation. Pain improved. Having BMs. Pancreatic Calcifications. Recommend followup EUS as outpt  Treatment: Gi consult, MiraLAX and plecanatide    Thank you, Kitty Jimenez RN CDS CCDS  [email or web address]  Options provided:  -- Flank, ABD pain is secondary to constipation present on admission  -- Flank ,abd pain is  due to pancreatic calcifications  -- Other - I will add my own diagnosis  -- Disagree - Not applicable / Not valid  -- Disagree - Clinically unable to determine / Unknown  -- Refer to Clinical Documentation Reviewer    PROVIDER RESPONSE TEXT:    Flank , abd pain is secondary to constipation present on admission. Query created by:  Elaine Jimenez on 8/3/2023 11:33 AM      Electronically signed by:  Cait Diggs MD 2023 3:13 PM

## 2023-08-09 ENCOUNTER — ANTI-COAG VISIT (OUTPATIENT)
Dept: PHARMACY | Age: 63
End: 2023-08-09
Attending: INTERNAL MEDICINE
Payer: COMMERCIAL

## 2023-08-09 ENCOUNTER — HOSPITAL ENCOUNTER (OUTPATIENT)
Age: 63
Discharge: HOME | End: 2023-08-09
Payer: COMMERCIAL

## 2023-08-09 ENCOUNTER — HOSPITAL ENCOUNTER (OUTPATIENT)
Dept: GENERAL RADIOLOGY | Age: 63
Discharge: HOME | End: 2023-08-09
Attending: INTERNAL MEDICINE
Payer: COMMERCIAL

## 2023-08-09 DIAGNOSIS — Z79.01 CHRONIC ANTICOAGULATION: ICD-10-CM

## 2023-08-09 DIAGNOSIS — I26.99 OTHER ACUTE PULMONARY EMBOLISM, UNSPECIFIED WHETHER ACUTE COR PULMONALE PRESENT (HCC): Primary | ICD-10-CM

## 2023-08-09 DIAGNOSIS — R06.89 DIFFICULTY BREATHING: ICD-10-CM

## 2023-08-09 LAB — INR BLD: 2.8

## 2023-08-09 PROCEDURE — 71046 X-RAY EXAM CHEST 2 VIEWS: CPT

## 2023-08-09 PROCEDURE — 85610 PROTHROMBIN TIME: CPT

## 2023-08-09 PROCEDURE — 99213 OFFICE O/P EST LOW 20 MIN: CPT

## 2023-08-09 NOTE — PROGRESS NOTES
plan:     Patient appears well today. No changes affecting warfarin therapy were noted. No acute findings with regards to warfarin therapy. INR today is within goal range. Instructed to continue the same weekly warfarin dose. INR is back within range today. He was started on levaquin 250mg x7 days yesterday for an upper respiratory infection, but no other changes reported. I will have him take a lower dose this coming Friday only (2.5mg), to adjust slightly for the levaquin. I also let him know to call us if they change his antibiotic or add a steroid to give us a call. Description    DO NOT TAKE warfarin today only, then     CONTINUE: Warfarin 2.5mg daily except for 5mg on Tuesday and Friday. Call [phone] with signs or symptoms of bleeding or ANY medication changes (including antibiotics, steroids, over-the-counter medications or herbal supplements). If significant bleeding occurs or if you fall and hit your head, please seek immediate medical attention. Keep the number of servings of vitamin K containing foods (dark green, leafy vegetables) the same each week. About 4 times a week (M,W,F,Sun). Please call if this changes. Limit alcohol intake. Please call if this changes.         Immunization History   Administered Date(s) Administered    COVID-19, MODERNA BLUE border, Primary or Immunocompromised, (age 12y+), IM, 100 mcg/0.5mL 03/09/2021, 04/08/2021, 12/15/2021, 07/23/2022    COVID-19, MODERNA Bivalent, (age 12y+), IM, 48 mcg/0.5 mL 12/30/2022    Influenza, FLUAD, (age 72 y+), Adjuvanted, 0.5mL 10/27/2022    Influenza, FLUARIX, FLULAVAL, FLUZONE (age 10 mo+) AND AFLURIA, (age 1 y+), PF, 0.5mL 10/27/2017, 09/23/2019, 10/02/2020    Influenza, FLUBLOK, (age 25 y+), PF, 0.5mL 10/03/2018    Influenza, FLUCELVAX, (age 10 mo+), MDCK, PF, 0.5mL 10/26/2021    Pneumococcal Conjugate Vaccine 08/15/2017    Pneumococcal, PCV-13, PREVNAR 13, (age 6w+), IM, 0.5mL 09/11/2015

## 2023-08-14 ENCOUNTER — TELEPHONE (OUTPATIENT)
Dept: PULMONOLOGY | Age: 63
End: 2023-08-14

## 2023-08-14 DIAGNOSIS — J42 BRONCHIOLITIS OBLITERANS (HCC): Primary | ICD-10-CM

## 2023-08-14 RX ORDER — ALBUTEROL SULFATE 2.5 MG/3ML
2.5 SOLUTION RESPIRATORY (INHALATION) EVERY 6 HOURS PRN
Qty: 120 EACH | Refills: 3 | Status: SHIPPED | OUTPATIENT
Start: 2023-08-14

## 2023-08-14 RX ORDER — SODIUM CHLORIDE FOR INHALATION 0.9 %
3 VIAL, NEBULIZER (ML) INHALATION EVERY 6 HOURS PRN
Qty: 120 ML | Refills: 4 | Status: SHIPPED | OUTPATIENT
Start: 2023-08-14 | End: 2023-09-13

## 2023-08-14 NOTE — FLOWSHEET NOTE
Preoperative Screening for Elective Surgery/Invasive Procedures While COVID-19 present in the community     Have you tested positive or have been told to self-isolate for COVID-19 like symptoms within the past 28 days? Do you currently have any of the following symptoms? Fever >100.0 F or 99.9 F in immunocompromised patients? New onset cough, shortness of breath or difficulty breathing? New onset sore throat, myalgia (muscle aches and pains), headache, loss of taste/smell or diarrhea? Have you had a potential exposure to COVID-19 within the past 14 days by:  Close contact with a confirmed case? Close contact with a healthcare worker,  or essential infrastructure worker (grocery store, [employer], gas station, public utilities or transportation)? Do you reside in a congregate setting such as; skilled nursing facility, adult home, correctional facility, homeless shelter or other institutional setting? Have you had recent travel to a known COVID-19 hotspot? No to all above       * Admitted with diarrhea? [] YES    [x]  NO     *Prior history of C-Diff. In last 3 months? [] YES    [x]  NO     *Antibiotic use in the past 6-8 weeks? []  NO    [x]  YES      If yes, which: REASON___levofloxacin-pancreatitis______________     *Prior hospitalization or nursing home in the last month? []  YES    [x]  NO     SAFETY FIRST. .call before you fall    [address]    Day of Surgery:     8/30/23             Arrival time__1000__________        Surgery time__1130__________    Do not eat or drink anything after 12:00 midnight prior to your surgery. This includes water chewing gum, mints and ice chips- the Day of Surgery. You may brush your teeth and gargle the morning of your surgery, but do not swallow the water     Please see your family doctor/pediatrician for a history and physical and/or questions concerning medications.    Bring any test

## 2023-08-14 NOTE — TELEPHONE ENCOUNTER
Patient states he needs albuterol nebulizer solutions refilled and sodium chloride to use with it.   Not seeing on current med list.  Will send to dr Andrew Reaves

## 2023-08-16 ENCOUNTER — TELEPHONE (OUTPATIENT)
Dept: PULMONOLOGY | Age: 63
End: 2023-08-16

## 2023-08-16 NOTE — TELEPHONE ENCOUNTER
Franny Razo calls in requesting Dr. Andrew Reaves if possible to call him back to discuss CT of abdomen he had done in ER on 7/28/2023. \" A lot of big words and not sure what they mean,\". \"ER doc did not explain in laymen terms to understand. \" I suggested he also reach out to his PCP as well,But wanted to talk with Dr. Andrew Reaves.   Call back # [phone]

## 2023-08-18 ENCOUNTER — OFFICE VISIT (OUTPATIENT)
Dept: CARDIOLOGY CLINIC | Age: 63
End: 2023-08-18
Payer: COMMERCIAL

## 2023-08-18 VITALS
OXYGEN SATURATION: 96 % | HEART RATE: 82 BPM | DIASTOLIC BLOOD PRESSURE: 74 MMHG | HEIGHT: 66 IN | SYSTOLIC BLOOD PRESSURE: 128 MMHG | BODY MASS INDEX: 36 KG/M2 | WEIGHT: 224 LBS

## 2023-08-18 DIAGNOSIS — Z86.711 HISTORY OF PULMONARY EMBOLISM: ICD-10-CM

## 2023-08-18 DIAGNOSIS — I10 ESSENTIAL HYPERTENSION: ICD-10-CM

## 2023-08-18 DIAGNOSIS — I50.22 CHRONIC SYSTOLIC HF (HEART FAILURE) (HCC): ICD-10-CM

## 2023-08-18 DIAGNOSIS — I42.8 NICM (NONISCHEMIC CARDIOMYOPATHY) (HCC): Primary | ICD-10-CM

## 2023-08-18 DIAGNOSIS — I48.0 PAROXYSMAL ATRIAL FIBRILLATION (HCC): ICD-10-CM

## 2023-08-18 DIAGNOSIS — E78.5 HYPERLIPIDEMIA, UNSPECIFIED HYPERLIPIDEMIA TYPE: ICD-10-CM

## 2023-08-18 DIAGNOSIS — M79.604 RIGHT LEG PAIN: ICD-10-CM

## 2023-08-18 PROCEDURE — 3078F DIAST BP <80 MM HG: CPT | Performed by: INTERNAL MEDICINE

## 2023-08-18 PROCEDURE — G8427 DOCREV CUR MEDS BY ELIG CLIN: HCPCS | Performed by: INTERNAL MEDICINE

## 2023-08-18 PROCEDURE — 1111F DSCHRG MED/CURRENT MED MERGE: CPT | Performed by: INTERNAL MEDICINE

## 2023-08-18 PROCEDURE — 3017F COLORECTAL CA SCREEN DOC REV: CPT | Performed by: INTERNAL MEDICINE

## 2023-08-18 PROCEDURE — 99214 OFFICE O/P EST MOD 30 MIN: CPT | Performed by: INTERNAL MEDICINE

## 2023-08-18 PROCEDURE — G8417 CALC BMI ABV UP PARAM F/U: HCPCS | Performed by: INTERNAL MEDICINE

## 2023-08-18 PROCEDURE — 3074F SYST BP LT 130 MM HG: CPT | Performed by: INTERNAL MEDICINE

## 2023-08-18 PROCEDURE — 1036F TOBACCO NON-USER: CPT | Performed by: INTERNAL MEDICINE

## 2023-08-18 RX ORDER — FUROSEMIDE 20 MG/1
20 TABLET ORAL DAILY
Qty: 90 TABLET | Refills: 3 | Status: SHIPPED | OUTPATIENT
Start: 2023-08-18

## 2023-08-18 RX ORDER — SACUBITRIL AND VALSARTAN 49; 51 MG/1; MG/1
TABLET, FILM COATED ORAL
Qty: 270 TABLET | Refills: 3 | Status: SHIPPED | OUTPATIENT
Start: 2023-08-18

## 2023-08-18 NOTE — PATIENT INSTRUCTIONS
1) Increase Entresto from 49-51 mg- 1 tablet twice daily to 1.5 tablets twice daily. 2) Decrease lasix to 20 mg daily from 20 mg twice daily   3) Labs-----BMP and MG (right calf cramp) in 4 weeks. 4) Will repeat echo prior to next f/u in 6-7 months week prior to f/u visit. 5) Keep working on low salt diet   6) Walking program   7) Weight loss.

## 2023-08-18 NOTE — PROGRESS NOTES
abnormality or paced rhythm. - Right ventricle: Pacer wire noted in right ventricle. - Inferior vena cava: The vessel was normal in size; the    respirophasic diameter changes were in the normal range (&gt;= 50%);    findings are consistent with normal central venous pressure. RHC: 3/2/21  HEMODYNAMIC DATA:  The right atrial pressure mean was 6 with oxygen  saturation of 78%. RV pressure was 39 systolic with oxygen saturation  of 77%. PA pressure was 30/13 with mean of 22. Pulmonary capillary  wedge pressure mean was 17 mmHg. Cardiac output by thermodilution  method was 4.81 liters per minute. Index was 2.34 liters per minute per  body surface area. By Pearle Hamman method, cardiac output was 6.88 liters per  minute with a cardiac index of 3.35 liters per minute per body surface area. OVERALL IMPRESSION:  1. Slightly above normal right heart pressure with slightly elevated  pulmonary capillary wedge pressure of 17 mmHg. 2.  Normal cardiac output and cardiac indices. In view of the above findings, we will restart the patient on diuretic  therapy and we will follow his symptoms. Echo: 6/2022   Normal left ventricular wall thickness. The left ventricle appears normal in size. Mildly decreased left ventricular systolic function with an estimated   ejection fraction of 25-30% . Anterior, anterior septum, inferior septum are hypokinetic   Diastolic filling parameters suggest grade I diastolic dysfunction. The right ventricle is normal in size and function. No clinically significant valvular heart disease    Echo: 12/6/22  LV is mildly dilated with moderately reduced Ejection fraction is visually   estimated to be 35 %. Global hypokinesis. Definity was refused by patient   The right ventricle is normal in size and function. Pacer / ICD wire is visualized in the right ventricle    CT Chest ABD/Pelvix 2/27/23      Chest:       Mediastinum: No enlarged lymphadenopathy.   Small lymph nodes are

## 2023-08-21 ENCOUNTER — TELEPHONE (OUTPATIENT)
Dept: CARDIOLOGY CLINIC | Age: 63
End: 2023-08-21

## 2023-08-21 NOTE — TELEPHONE ENCOUNTER
Pt called in to get refills but dosages were changed per pt. Pt also wanted to check with Dr Cy Lawler if he possibly has the symptoms of ATTRCM ??  Sent message  to RN before MA'S   Medication Refill    Medication needing refilled: ENTRESTO    Dosage of the medication:49-51mg    How are you taking this medication (QD, BID, TID, QID, PRN):Take 1.5 tablets twice daily by mouth    30 or 90 day supply called in: 1 yr supply    When will you run out of your medication:dosage was changed    Which Pharmacy are we sending the medication to?:  [address] ([facility]) - [place] [address] [place] [address] [phone] Rani Lockwood [phone]   [address] [place] [address], [place]         Medication Refill    Medication needing refilled: METOPROLOL    Dosage of the medication: CHECK WITH DR/ PT SAID THIS DOSAGE WAS CHANGED     How are you taking this medication (QD, BID, TID, QID, PRN):SEE ABOVE     30 or 90 day supply called in:UNKNOWN    When will you run out of your [address] [place] [address] [facility] sending the [facility] to?:  [address] ([facility]) - [place] [phone] Rani Lockwood [phone]   [address] [place] [address] [place]

## 2023-08-22 RX ORDER — SACUBITRIL AND VALSARTAN 49; 51 MG/1; MG/1
TABLET, FILM COATED ORAL
Qty: 270 TABLET | Refills: 3 | Status: SHIPPED | OUTPATIENT
Start: 2023-08-22

## 2023-08-22 RX ORDER — METOPROLOL SUCCINATE 25 MG/1
TABLET, EXTENDED RELEASE ORAL
Qty: 225 TABLET | Refills: 3 | Status: SHIPPED | OUTPATIENT
Start: 2023-08-22

## 2023-08-23 ENCOUNTER — ANTI-COAG VISIT (OUTPATIENT)
Dept: PHARMACY | Age: 63
End: 2023-08-23
Attending: INTERNAL MEDICINE
Payer: COMMERCIAL

## 2023-08-23 DIAGNOSIS — I26.99 OTHER ACUTE PULMONARY EMBOLISM, UNSPECIFIED WHETHER ACUTE COR PULMONALE PRESENT (HCC): Primary | ICD-10-CM

## 2023-08-23 DIAGNOSIS — Z79.01 CHRONIC ANTICOAGULATION: ICD-10-CM

## 2023-08-23 LAB — INTERNATIONAL NORMALIZATION RATIO, POC: 3.6

## 2023-08-23 PROCEDURE — 99211 OFF/OP EST MAY X REQ PHY/QHP: CPT

## 2023-08-23 PROCEDURE — 85610 PROTHROMBIN TIME: CPT

## 2023-08-23 NOTE — PROGRESS NOTES
Bruno Pavon is a 58 y.o. here for warfarin management. Dorinda Lafleur had an INR test today. Results were reviewed and appropriate warfarin management was completed. Patient verifies current warfarin dosing regimen: Yes     Warfarin medication reviewed and updated on the patient 's home medication list: Yes   All other medications reviewed and updated on the patient 's home medication list: Yes     Lab Results   Component Value Date    INR 3.6 08/23/2023    INR 2.8 08/09/2023    INR 2.20 (H) 07/31/2023     Patient Findings       Positives:  Upcoming invasive procedure, Change in diet/appetite    Negatives:  Signs/symptoms of thrombosis, Signs/symptoms of bleeding, Laboratory test error suspected, Change in health, Change in alcohol use, Change in activity, Emergency department visit, Upcoming dental procedure, Missed doses, Extra doses, Change in medications, Hospital admission, Bruising, Other complaints    Comments:  Endoscopy 8/30, holding warfarin x5 days (declined lovenox)  Had no greens this past week          Anticoagulation Summary  As of 8/23/2023      INR goal:  2.0-3.0   TTR:  37.5 % (7.8 y)   INR used for dosing:  3.6 (8/23/2023)   Warfarin maintenance plan:  5 mg (5 mg x 1) every Tue, Fri; 2.5 mg (5 mg x 0.5) all other days   Weekly warfarin total:  22.5 mg   Plan last modified:  Dayron [facility] (5/17/2023)   Next INR check:  9/13/2023   Priority:  Maintenance   Target end date:   Indefinite    Indications    Pulmonary embolus (HCC) [I26.99]  Chronic anticoagulation [Z79.01]                 Anticoagulation Episode Summary       INR check location:  Anticoagulation Clinic    Preferred lab:      Send INR reminders to:  [address] [place] [address] [facility] [employer]    Comments:  EPIC - finger stick every 2-3 weeks  Consent: 1/24/23          Anticoagulation Care Providers       Provider Role Specialty Phone number    Yadi Martines MD Referring Internal Medicine [phone]          There were no vitals taken

## 2023-08-25 NOTE — TELEPHONE ENCOUNTER
Sent patient MyChart message update:     Ena Blank,     I am in all day clinic with Dr. Richard Isaacs but wanted to get you his message asap. Dr. Richard Isaacs reviewed your chart and the likelihood of ATTR CM is quite low and he would like to discuss it with you at your next follow up that we scheduled on 9/12/2023. Thanks and have a good weekend.    Michelle Martinez

## 2023-08-25 NOTE — TELEPHONE ENCOUNTER
I reviewed Jovon's chart but it likelihood of ATTR CM is quite low.   I will discuss with him during his next visit

## 2023-08-28 ENCOUNTER — ANESTHESIA EVENT (OUTPATIENT)
Dept: ENDOSCOPY | Age: 63
End: 2023-08-28
Payer: COMMERCIAL

## 2023-08-30 ENCOUNTER — HOSPITAL ENCOUNTER (OUTPATIENT)
Age: 63
Setting detail: OUTPATIENT SURGERY
Discharge: HOME | End: 2023-08-30
Attending: INTERNAL MEDICINE | Admitting: INTERNAL MEDICINE
Payer: COMMERCIAL

## 2023-08-30 ENCOUNTER — ANESTHESIA (OUTPATIENT)
Dept: ENDOSCOPY | Age: 63
End: 2023-08-30
Payer: COMMERCIAL

## 2023-08-30 VITALS
HEIGHT: 67 IN | DIASTOLIC BLOOD PRESSURE: 79 MMHG | OXYGEN SATURATION: 100 % | WEIGHT: 223.99 LBS | RESPIRATION RATE: 20 BRPM | HEART RATE: 65 BPM | SYSTOLIC BLOOD PRESSURE: 129 MMHG | BODY MASS INDEX: 35.16 KG/M2 | TEMPERATURE: 97.1 F

## 2023-08-30 DIAGNOSIS — K86.1 CHRONIC PANCREATITIS, UNSPECIFIED PANCREATITIS TYPE (HCC): ICD-10-CM

## 2023-08-30 PROCEDURE — 2580000003 HC RX 258: Performed by: NURSE ANESTHETIST, CERTIFIED REGISTERED

## 2023-08-30 PROCEDURE — 7100000001 HC PACU RECOVERY - ADDTL 15 MIN: Performed by: INTERNAL MEDICINE

## 2023-08-30 PROCEDURE — 6360000002 HC RX W HCPCS: Performed by: NURSE ANESTHETIST, CERTIFIED REGISTERED

## 2023-08-30 PROCEDURE — 88305 TISSUE EXAM BY PATHOLOGIST: CPT

## 2023-08-30 PROCEDURE — 7100000011 HC PHASE II RECOVERY - ADDTL 15 MIN: Performed by: INTERNAL MEDICINE

## 2023-08-30 PROCEDURE — 3700000000 HC ANESTHESIA ATTENDED CARE: Performed by: INTERNAL MEDICINE

## 2023-08-30 PROCEDURE — 7100000000 HC PACU RECOVERY - FIRST 15 MIN: Performed by: INTERNAL MEDICINE

## 2023-08-30 PROCEDURE — 3700000001 HC ADD 15 MINUTES (ANESTHESIA): Performed by: INTERNAL MEDICINE

## 2023-08-30 PROCEDURE — 3609012400 HC EGD TRANSORAL BIOPSY SINGLE/MULTIPLE: Performed by: INTERNAL MEDICINE

## 2023-08-30 PROCEDURE — 2709999900 HC NON-CHARGEABLE SUPPLY: Performed by: INTERNAL MEDICINE

## 2023-08-30 PROCEDURE — 3609013500 HC EGD REMOVAL TUMOR POLYP/OTHER LESION SNARE TECH: Performed by: INTERNAL MEDICINE

## 2023-08-30 PROCEDURE — 2500000003 HC RX 250 WO HCPCS: Performed by: NURSE ANESTHETIST, CERTIFIED REGISTERED

## 2023-08-30 PROCEDURE — 3609018500 HC EGD US SCOPE W/ADJACENT STRUCTURES: Performed by: INTERNAL MEDICINE

## 2023-08-30 PROCEDURE — 7100000010 HC PHASE II RECOVERY - FIRST 15 MIN: Performed by: INTERNAL MEDICINE

## 2023-08-30 RX ORDER — PROPOFOL 10 MG/ML
INJECTION, EMULSION INTRAVENOUS CONTINUOUS PRN
Status: DISCONTINUED | OUTPATIENT
Start: 2023-08-30 | End: 2023-08-30 | Stop reason: SDUPTHER

## 2023-08-30 RX ORDER — ONDANSETRON 2 MG/ML
4 INJECTION INTRAMUSCULAR; INTRAVENOUS
Status: DISCONTINUED | OUTPATIENT
Start: 2023-08-30 | End: 2023-08-30 | Stop reason: HOSPADM

## 2023-08-30 RX ORDER — LIDOCAINE HYDROCHLORIDE 20 MG/ML
INJECTION, SOLUTION EPIDURAL; INFILTRATION; INTRACAUDAL; PERINEURAL PRN
Status: DISCONTINUED | OUTPATIENT
Start: 2023-08-30 | End: 2023-08-30 | Stop reason: SDUPTHER

## 2023-08-30 RX ORDER — SODIUM CHLORIDE 0.9 % (FLUSH) 0.9 %
5-40 SYRINGE (ML) INJECTION EVERY 12 HOURS SCHEDULED
Status: DISCONTINUED | OUTPATIENT
Start: 2023-08-30 | End: 2023-08-30 | Stop reason: HOSPADM

## 2023-08-30 RX ORDER — SODIUM CHLORIDE 0.9 % (FLUSH) 0.9 %
5-40 SYRINGE (ML) INJECTION PRN
Status: DISCONTINUED | OUTPATIENT
Start: 2023-08-30 | End: 2023-08-30 | Stop reason: HOSPADM

## 2023-08-30 RX ORDER — DIPHENHYDRAMINE HYDROCHLORIDE 50 MG/ML
12.5 INJECTION INTRAMUSCULAR; INTRAVENOUS
Status: DISCONTINUED | OUTPATIENT
Start: 2023-08-30 | End: 2023-08-30 | Stop reason: HOSPADM

## 2023-08-30 RX ORDER — SODIUM CHLORIDE 9 MG/ML
INJECTION, SOLUTION INTRAVENOUS PRN
Status: DISCONTINUED | OUTPATIENT
Start: 2023-08-30 | End: 2023-08-30 | Stop reason: HOSPADM

## 2023-08-30 RX ORDER — PROPOFOL 10 MG/ML
INJECTION, EMULSION INTRAVENOUS PRN
Status: DISCONTINUED | OUTPATIENT
Start: 2023-08-30 | End: 2023-08-30 | Stop reason: SDUPTHER

## 2023-08-30 RX ORDER — SODIUM CHLORIDE 9 MG/ML
INJECTION, SOLUTION INTRAVENOUS CONTINUOUS PRN
Status: DISCONTINUED | OUTPATIENT
Start: 2023-08-30 | End: 2023-08-30 | Stop reason: SDUPTHER

## 2023-08-30 RX ADMIN — LIDOCAINE HYDROCHLORIDE 100 MG: 20 INJECTION, SOLUTION EPIDURAL; INFILTRATION; INTRACAUDAL; PERINEURAL at 12:00

## 2023-08-30 RX ADMIN — SODIUM CHLORIDE: 9 INJECTION, SOLUTION INTRAVENOUS at 11:56

## 2023-08-30 RX ADMIN — PROPOFOL 100 MG: 10 INJECTION, EMULSION INTRAVENOUS at 12:00

## 2023-08-30 RX ADMIN — PROPOFOL 180 MCG/KG/MIN: 10 INJECTION, EMULSION INTRAVENOUS at 12:00

## 2023-08-30 ASSESSMENT — PAIN DESCRIPTION - LOCATION: LOCATION: ABDOMEN

## 2023-08-30 ASSESSMENT — PAIN DESCRIPTION - DESCRIPTORS: DESCRIPTORS: ACHING

## 2023-08-30 ASSESSMENT — PAIN SCALES - GENERAL
PAINLEVEL_OUTOF10: 0
PAINLEVEL_OUTOF10: 3
PAINLEVEL_OUTOF10: 0

## 2023-08-30 ASSESSMENT — PAIN DESCRIPTION - ORIENTATION: ORIENTATION: LOWER

## 2023-08-30 ASSESSMENT — PAIN DESCRIPTION - PAIN TYPE: TYPE: CHRONIC PAIN

## 2023-08-30 NOTE — DISCHARGE INSTRUCTIONS
Impression:  40-50 hyperplastic-appearing gastric polyps. 7 of the large ones were removed for pathology. H. Pylori biopsies obtained. A few tiny calcifications in the pancreas. Otherwise the pancreas was echogenic consistent with fatty infiltration. 3mm cyst in the body of the pancreas. Normal bile duct s/p cholecystectomy. Recommendations:  1. Clear liquid diet advance as tolerated. 2.  I put in for repeat MRI in 1 year to monitor the tiny cyst seen today. 3.  Will need repeat EGD in 6 months if polyps are hyperplastic. 4.  Please check [facility] portal for biopsy results in 1 week. If you do not know how to check the [facility] portal, call [phone] for instructions. 5. Resume coumadin this evening. Blane Prince MD  [facility]      Endoscopy Discharge Instructions    Call with any questions or concerns. You may be drowsy or lightheaded after receiving sedation. DO NOT operate  a vehicle (automobile, bicycle, motorcycle, machinery, or power tools), no  alcoholic beverages, and do not make any important decisions today. Plan on bed rest or quiet relaxation today. Resume normal activities in the morning. Resume normal activity tomorrow unless otherwise advised by your physician. Eat a light first meal, avoiding spicy and fatty foods, then resume normal diet unless  you are told otherwise by your physician. If the intravenous medication site is painful, apply warm compresses on the site until the soreness is relieved and elevate the arm above the heart. Call your physician if no improvement  in 2-3 days. POSSIBLE SYMPTOMS TO WATCH:     1. fever (greater than 100) 5.  increased abdominal bloating   2. severe pain   6. excessive bleeding   3. nausea and vomiting  7. chest pain   4. chills    8. shortness of breath       Notify us if these problems occur     Expected as normal and remedies:  Sore throat: use over the counter throat

## 2023-08-30 NOTE — PROGRESS NOTES
Patient admitted to PACU # 5 from [facility] at 1238 post ENDOSCOPIC ULTRASOUND  EGD BIOPSY  EGD POLYP SNARE per DR Gonzales. Attached to PACU monitoring system and report received from anesthesia provider. Patient was reported to be hemodynamically stable during procedure.   Patient sleeping on admission

## 2023-08-30 NOTE — OP NOTE
Endoscopy Note    Patient: Ashly Lam   : 1960  Acct#:     Procedure: Endoscopic ultrasound  EGD with snare polypectomy  EGD with biopsy    Surgeon:  Delores Linares MD    Referring Physician:  Dr. Ramona Lugo and Dr. Demarco Sewell    Anesthesia:  MAC per Anesthesia. Indications: This is a 58y.o. year old male who presents today with chronic calcific pancreatitis. He has a PMH of PE, IBS, HTN COPD, CHF, and Afib and was admitted 2023 with left lower quadrant abdominal pain of 1 month duration. No nausea or vomiting. On miralax for chronic constipation. CT showed calcifications suggestive of chronic pancreatitis. Abdominal pain was felt from constipation. Asked for EUS for evaluation of the pancreatic calcifications. Consent: Risks, benefits, and alternatives were explained and informed consent was obtained. Monitoring:  Patient was monitored with continuous pulse oximetry, telemetry, and intermittent blood pressures. Details of the Procedure: The patient was then taken to the endoscopy suite. A time-out was performed. The patient and staff were in agreement as to the correct patient and procedure. The above anesthesia was administered by the anesthesia department. The patient was placed in the left lateral position. The Olympus videoendoscope was placed in the patient's mouth and under direct visualization passed into the esophagus and advanced without difficulty to the 2nd portion of the duodenum. Views were good, patient toleration was good. Retroflexion was performed in the stomach. Findings:  1. The esophagus appeared normal without evidence of Gagnon's esophagus or reflux esophagitis. 2.  There were 40-50 polyps in the stomach with old adherent blood. These were concerning for hyperplastic polyps. 7 of the large of these were removed with hot snare polypectomy. These measured 8-12mm in size. I then took biopsies from the antrum and body to evaluate for H. Pylori.   3.

## 2023-09-06 DIAGNOSIS — I50.22 CHRONIC SYSTOLIC HF (HEART FAILURE) (HCC): ICD-10-CM

## 2023-09-06 DIAGNOSIS — M79.604 RIGHT LEG PAIN: ICD-10-CM

## 2023-09-06 DIAGNOSIS — I42.8 NICM (NONISCHEMIC CARDIOMYOPATHY) (HCC): ICD-10-CM

## 2023-09-06 LAB
ANION GAP SERPL CALCULATED.3IONS-SCNC: 12 MMOL/L (ref 3–16)
BUN SERPL-MCNC: 8 MG/DL (ref 7–20)
CALCIUM SERPL-MCNC: 10.4 MG/DL (ref 8.3–10.6)
CHLORIDE SERPL-SCNC: 100 MMOL/L (ref 99–110)
CO2 SERPL-SCNC: 29 MMOL/L (ref 21–32)
CREAT SERPL-MCNC: 1.1 MG/DL (ref 0.8–1.3)
GFR SERPLBLD CREATININE-BSD FMLA CKD-EPI: >60 ML/MIN/{1.73_M2}
GLUCOSE SERPL-MCNC: 226 MG/DL (ref 70–99)
MAGNESIUM SERPL-MCNC: 2.1 MG/DL (ref 1.8–2.4)
POTASSIUM SERPL-SCNC: 4.1 MMOL/L (ref 3.5–5.1)
SODIUM SERPL-SCNC: 141 MMOL/L (ref 136–145)

## 2023-09-12 ENCOUNTER — TELEPHONE (OUTPATIENT)
Dept: PULMONOLOGY | Age: 63
End: 2023-09-12

## 2023-09-12 PROCEDURE — 93296 REM INTERROG EVL PM/IDS: CPT | Performed by: INTERNAL MEDICINE

## 2023-09-12 PROCEDURE — 93295 DEV INTERROG REMOTE 1/2/MLT: CPT | Performed by: INTERNAL MEDICINE

## 2023-09-12 NOTE — TELEPHONE ENCOUNTER
Complains of cough and SOB  Duration months  Cough with sputum production? A little bit  Color no color  Fever? no  Any other Symptoms? no  Any current treatment tried? Tessalon perles, robitussin  Using inhalers? yes do they help? no  Pharmacy? [facility] [address] called in asking to immediately see Dr Dominic Gamble. I offered him the next available appointment in 3 weeks and he said no. He said he has a cough, so his PCP is refusing to give him his flu shot, which is also holding up his covid shot. Unless he gets his cough under control he can not get vaccinated. He wants to come in and see Dominic Gamble immediately, or get something to get his cough under control. He said everything he's been given in the past hasn't helped.

## 2023-09-13 ENCOUNTER — ANTI-COAG VISIT (OUTPATIENT)
Dept: PHARMACY | Age: 63
End: 2023-09-13
Attending: INTERNAL MEDICINE
Payer: COMMERCIAL

## 2023-09-13 DIAGNOSIS — I26.99 OTHER ACUTE PULMONARY EMBOLISM, UNSPECIFIED WHETHER ACUTE COR PULMONALE PRESENT (HCC): Primary | ICD-10-CM

## 2023-09-13 DIAGNOSIS — Z79.01 CHRONIC ANTICOAGULATION: ICD-10-CM

## 2023-09-13 LAB — INR BLD: 3.4

## 2023-09-13 PROCEDURE — 85610 PROTHROMBIN TIME: CPT

## 2023-09-13 PROCEDURE — 99211 OFF/OP EST MAY X REQ PHY/QHP: CPT

## 2023-09-13 NOTE — PROGRESS NOTES
2y+), SC/IM, 0.5mL 2007, 2012    TDaP, ADACEL (age 6y-58y), Veryl Clore (age 10y+), IM, 0.5mL 2014       Orders Placed This Encounter   Procedures    Protime-INR     This external order was created through the results console. No orders of the defined types were placed in this encounter. Reviewed AVS with patient / caregiver. Billing Points:  BASIC ASSESSMENT UNCOMPLICATED (including but not limited to: vital signs; physical assessment screening; review of secondary markers like lab results, allergies, home readings; instructions on treatment plan and basic education; assessment of medication list with one med change and compliance) - 2 points     For Pharmacy Admin Tracking Only    Intervention Detail: Adherence Monitorin and Dose Adjustment: 2, reason: Therapy Optimization  Total # of Interventions Recommended: 2  Total # of Interventions Accepted: 2  Time Spent (min): 15      I have seen the patient and reviewed the progress note written by the PharmD Candidate. I agree with this assesment and plan.    Richa Bethea, [facility], PharmD 23 4:01 PM

## 2023-09-21 ENCOUNTER — TELEPHONE (OUTPATIENT)
Dept: PULMONOLOGY | Age: 63
End: 2023-09-21

## 2023-09-21 NOTE — TELEPHONE ENCOUNTER
Can something similar to the AirPhysio be prescribed to get mucous out of Jovon's chest He saw an ad on Facebook and wonders if he can get something like that prescribed to him.

## 2023-09-28 ENCOUNTER — APPOINTMENT (OUTPATIENT)
Dept: CT IMAGING | Age: 63
End: 2023-09-28
Payer: COMMERCIAL

## 2023-09-28 ENCOUNTER — HOSPITAL ENCOUNTER (EMERGENCY)
Age: 63
Discharge: HOME | End: 2023-09-28
Payer: COMMERCIAL

## 2023-09-28 VITALS
HEIGHT: 67 IN | HEART RATE: 98 BPM | BODY MASS INDEX: 34.74 KG/M2 | WEIGHT: 221.34 LBS | SYSTOLIC BLOOD PRESSURE: 136 MMHG | TEMPERATURE: 98 F | DIASTOLIC BLOOD PRESSURE: 83 MMHG | RESPIRATION RATE: 18 BRPM | OXYGEN SATURATION: 93 %

## 2023-09-28 DIAGNOSIS — R10.84 GENERALIZED ABDOMINAL PAIN: Primary | ICD-10-CM

## 2023-09-28 DIAGNOSIS — T50.905A ADVERSE EFFECT OF DRUG, INITIAL ENCOUNTER: ICD-10-CM

## 2023-09-28 DIAGNOSIS — R74.8 ELEVATED LIVER ENZYMES: ICD-10-CM

## 2023-09-28 DIAGNOSIS — R82.71 ASB (ASYMPTOMATIC BACTERIURIA): ICD-10-CM

## 2023-09-28 PROBLEM — K12.1 STOMATITIS: Status: ACTIVE | Noted: 2023-09-28

## 2023-09-28 LAB
ALBUMIN SERPL-MCNC: 4.1 G/DL (ref 3.4–5)
ALP SERPL-CCNC: 129 U/L (ref 40–129)
ALT SERPL-CCNC: 48 U/L (ref 10–40)
ANION GAP SERPL CALCULATED.3IONS-SCNC: 10 MMOL/L (ref 3–16)
AST SERPL-CCNC: 45 U/L (ref 15–37)
BACTERIA URNS QL MICRO: ABNORMAL /HPF
BASOPHILS # BLD: 0.1 K/UL (ref 0–0.2)
BASOPHILS NFR BLD: 0.9 %
BILIRUB DIRECT SERPL-MCNC: <0.2 MG/DL (ref 0–0.3)
BILIRUB INDIRECT SERPL-MCNC: ABNORMAL MG/DL (ref 0–1)
BILIRUB SERPL-MCNC: 0.9 MG/DL (ref 0–1)
BILIRUB UR QL STRIP.AUTO: NEGATIVE
BUN SERPL-MCNC: 9 MG/DL (ref 7–20)
CALCIUM SERPL-MCNC: 11.2 MG/DL (ref 8.3–10.6)
CHLORIDE SERPL-SCNC: 102 MMOL/L (ref 99–110)
CLARITY UR: CLEAR
CO2 SERPL-SCNC: 28 MMOL/L (ref 21–32)
COLOR UR: YELLOW
CREAT SERPL-MCNC: 0.9 MG/DL (ref 0.8–1.3)
DEPRECATED RDW RBC AUTO: 12.9 % (ref 12.4–15.4)
EOSINOPHIL # BLD: 0.2 K/UL (ref 0–0.6)
EOSINOPHIL NFR BLD: 2.8 %
EPI CELLS #/AREA URNS AUTO: 1 /HPF (ref 0–5)
GFR SERPLBLD CREATININE-BSD FMLA CKD-EPI: >60 ML/MIN/{1.73_M2}
GLUCOSE SERPL-MCNC: 129 MG/DL (ref 70–99)
GLUCOSE UR STRIP.AUTO-MCNC: NEGATIVE MG/DL
HCT VFR BLD AUTO: 45.6 % (ref 40.5–52.5)
HGB BLD-MCNC: 15.6 G/DL (ref 13.5–17.5)
HGB UR QL STRIP.AUTO: NEGATIVE
HYALINE CASTS #/AREA URNS AUTO: 1 /LPF (ref 0–8)
KETONES UR STRIP.AUTO-MCNC: 15 MG/DL
LEUKOCYTE ESTERASE UR QL STRIP.AUTO: ABNORMAL
LIPASE SERPL-CCNC: 18 U/L (ref 13–60)
LYMPHOCYTES # BLD: 1.6 K/UL (ref 1–5.1)
LYMPHOCYTES NFR BLD: 26.5 %
MCH RBC QN AUTO: 31 PG (ref 26–34)
MCHC RBC AUTO-ENTMCNC: 34.2 G/DL (ref 31–36)
MCV RBC AUTO: 90.5 FL (ref 80–100)
MONOCYTES # BLD: 0.6 K/UL (ref 0–1.3)
MONOCYTES NFR BLD: 9.9 %
NEUTROPHILS # BLD: 3.7 K/UL (ref 1.7–7.7)
NEUTROPHILS NFR BLD: 59.9 %
NITRITE UR QL STRIP.AUTO: NEGATIVE
PH UR STRIP.AUTO: 7.5 [PH] (ref 5–8)
PLATELET # BLD AUTO: 203 K/UL (ref 135–450)
PMV BLD AUTO: 8 FL (ref 5–10.5)
POTASSIUM SERPL-SCNC: 3.6 MMOL/L (ref 3.5–5.1)
PROT SERPL-MCNC: 7 G/DL (ref 6.4–8.2)
PROT UR STRIP.AUTO-MCNC: 30 MG/DL
RBC # BLD AUTO: 5.05 M/UL (ref 4.2–5.9)
RBC CLUMPS #/AREA URNS AUTO: 2 /HPF (ref 0–4)
SODIUM SERPL-SCNC: 140 MMOL/L (ref 136–145)
SP GR UR STRIP.AUTO: 1.01 (ref 1–1.03)
UA COMPLETE W REFLEX CULTURE PNL UR: ABNORMAL
UA DIPSTICK W REFLEX MICRO PNL UR: YES
URN SPEC COLLECT METH UR: ABNORMAL
UROBILINOGEN UR STRIP-ACNC: 0.2 E.U./DL
WBC # BLD AUTO: 6.2 K/UL (ref 4–11)
WBC #/AREA URNS AUTO: 8 /HPF (ref 0–5)

## 2023-09-28 PROCEDURE — 99285 EMERGENCY DEPT VISIT HI MDM: CPT

## 2023-09-28 PROCEDURE — 80076 HEPATIC FUNCTION PANEL: CPT

## 2023-09-28 PROCEDURE — 74177 CT ABD & PELVIS W/CONTRAST: CPT

## 2023-09-28 PROCEDURE — 6360000002 HC RX W HCPCS: Performed by: NURSE PRACTITIONER

## 2023-09-28 PROCEDURE — 83690 ASSAY OF LIPASE: CPT

## 2023-09-28 PROCEDURE — 85025 COMPLETE CBC W/AUTO DIFF WBC: CPT

## 2023-09-28 PROCEDURE — 96374 THER/PROPH/DIAG INJ IV PUSH: CPT

## 2023-09-28 PROCEDURE — 80048 BASIC METABOLIC PNL TOTAL CA: CPT

## 2023-09-28 PROCEDURE — 2580000003 HC RX 258: Performed by: NURSE PRACTITIONER

## 2023-09-28 PROCEDURE — 6360000004 HC RX CONTRAST MEDICATION: Performed by: NURSE PRACTITIONER

## 2023-09-28 PROCEDURE — 96372 THER/PROPH/DIAG INJ SC/IM: CPT

## 2023-09-28 PROCEDURE — 81001 URINALYSIS AUTO W/SCOPE: CPT

## 2023-09-28 RX ORDER — DICYCLOMINE HYDROCHLORIDE 10 MG/1
10 CAPSULE ORAL 4 TIMES DAILY
Qty: 28 CAPSULE | Refills: 0 | Status: SHIPPED | OUTPATIENT
Start: 2023-09-28 | End: 2023-10-05

## 2023-09-28 RX ORDER — ONDANSETRON 2 MG/ML
4 INJECTION INTRAMUSCULAR; INTRAVENOUS ONCE
Status: COMPLETED | OUTPATIENT
Start: 2023-09-28 | End: 2023-09-28

## 2023-09-28 RX ORDER — DICYCLOMINE HYDROCHLORIDE 10 MG/ML
20 INJECTION INTRAMUSCULAR ONCE
Status: COMPLETED | OUTPATIENT
Start: 2023-09-28 | End: 2023-09-28

## 2023-09-28 RX ORDER — 0.9 % SODIUM CHLORIDE 0.9 %
1000 INTRAVENOUS SOLUTION INTRAVENOUS ONCE
Status: COMPLETED | OUTPATIENT
Start: 2023-09-28 | End: 2023-09-28

## 2023-09-28 RX ORDER — ONDANSETRON 4 MG/1
4 TABLET, ORALLY DISINTEGRATING ORAL 3 TIMES DAILY PRN
Qty: 21 TABLET | Refills: 0 | Status: SHIPPED | OUTPATIENT
Start: 2023-09-28

## 2023-09-28 RX ADMIN — ONDANSETRON 4 MG: 2 INJECTION INTRAMUSCULAR; INTRAVENOUS at 06:56

## 2023-09-28 RX ADMIN — IOPAMIDOL 75 ML: 755 INJECTION, SOLUTION INTRAVENOUS at 06:49

## 2023-09-28 RX ADMIN — SODIUM CHLORIDE 1000 ML: 9 INJECTION, SOLUTION INTRAVENOUS at 06:56

## 2023-09-28 RX ADMIN — DICYCLOMINE HYDROCHLORIDE 20 MG: 10 INJECTION, SOLUTION INTRAMUSCULAR at 06:22

## 2023-09-28 ASSESSMENT — PATIENT HEALTH QUESTIONNAIRE - PHQ9
SUM OF ALL RESPONSES TO PHQ QUESTIONS 1-9: 0
2. FEELING DOWN, DEPRESSED OR HOPELESS: 0
1. LITTLE INTEREST OR PLEASURE IN DOING THINGS: 0
SUM OF ALL RESPONSES TO PHQ9 QUESTIONS 1 & 2: 0
SUM OF ALL RESPONSES TO PHQ QUESTIONS 1-9: 0

## 2023-09-28 ASSESSMENT — ENCOUNTER SYMPTOMS
NAUSEA: 1
ABDOMINAL PAIN: 1
EYE PAIN: 0
SORE THROAT: 0
BACK PAIN: 0
VOMITING: 1
SHORTNESS OF BREATH: 0
CONSTIPATION: 0
COUGH: 0
DIARRHEA: 1
ANAL BLEEDING: 0

## 2023-09-28 ASSESSMENT — PAIN SCALES - GENERAL
PAINLEVEL_OUTOF10: 8
PAINLEVEL_OUTOF10: 8

## 2023-09-28 ASSESSMENT — PAIN DESCRIPTION - DESCRIPTORS
DESCRIPTORS: CRAMPING
DESCRIPTORS: CRAMPING

## 2023-09-28 ASSESSMENT — PAIN DESCRIPTION - ORIENTATION
ORIENTATION: LEFT
ORIENTATION: LEFT

## 2023-09-28 ASSESSMENT — PAIN DESCRIPTION - LOCATION
LOCATION: ABDOMEN
LOCATION: ABDOMEN

## 2023-09-28 NOTE — ED PROVIDER NOTES
mis-transcribed.)    BRENNA Hancock CNP (electronically signed)      .        BRENNA Hancock CNP  09/28/23 9519

## 2023-09-28 NOTE — ED TRIAGE NOTES
Pt into ED from home c/o L sided abd cramping, 8/10 after attempting to drink colonoscopy prep. Pt scheduled for procedure this morning. Was unable to keep prep down, x2 episodes of vomiting. Pt states he called GI with no return call.  VSS

## 2023-09-28 NOTE — DISCHARGE INSTRUCTIONS
Return to the emergency department for new or worsening symptoms including, not limited to, developing inability to tolerate food or drink, seeing blood in your vomit or stool, passing out, feeling severe going to pass out, fever, chills, sweats, other symptoms/concerns. Follow-up with your primary care doctor as well as with your GI by calling today to schedule follow-up appointments. I spoke with 1 of Dr. Rivera Quiet partners, Dr. Griselda Ramsey and he recommends that you reschedule your colonoscopy. Please call today in order to do so. Medications as prescribed.

## 2023-09-28 NOTE — ED NOTES
Spoke with pre-op about patient's scheduled procedure, she will let endoscopy know patient is in ER and see how they would like to proceed.       Rhonda Wild RN  09/28/23 3683

## 2023-09-28 NOTE — ED NOTES
Discharge and education instructions reviewed. Patient verbalized understanding, teach-back successful. Patient denied questions at this time. No acute distress noted. Patient instructed to follow-up as noted - return to emergency department if symptoms worsen. Patient verbalized understanding. Discharged per EDMD with discharged instructions.        Rosangela Conroy RN  09/28/23 [id number]

## 2023-10-04 ENCOUNTER — ANTI-COAG VISIT (OUTPATIENT)
Dept: PHARMACY | Age: 63
End: 2023-10-04
Attending: INTERNAL MEDICINE
Payer: COMMERCIAL

## 2023-10-04 DIAGNOSIS — I26.99 OTHER ACUTE PULMONARY EMBOLISM, UNSPECIFIED WHETHER ACUTE COR PULMONALE PRESENT (HCC): Primary | ICD-10-CM

## 2023-10-04 DIAGNOSIS — Z79.01 CHRONIC ANTICOAGULATION: ICD-10-CM

## 2023-10-04 LAB — INTERNATIONAL NORMALIZATION RATIO, POC: 2.3

## 2023-10-04 PROCEDURE — 99212 OFFICE O/P EST SF 10 MIN: CPT

## 2023-10-04 PROCEDURE — 85610 PROTHROMBIN TIME: CPT

## 2023-10-04 NOTE — PROGRESS NOTES
Figueroa Cazares is a 58 y.o. here for warfarin management. [place] had an INR test today. Results were reviewed and appropriate warfarin management was completed. Patient verifies current warfarin dosing regimen: Yes     Warfarin medication reviewed and updated on the patient 's home medication list: Yes   All other medications reviewed and updated on the patient 's home medication list: Yes     Lab Results   Component Value Date    INR 2.3 10/04/2023    INR 3.40 2023    INR 3.6 2023     Patient Findings       Positives:  Change in medications, Change in diet/appetite    Negatives:  Signs/symptoms of thrombosis, Signs/symptoms of bleeding, Bruising    Comments:  Getting ready to start Levaquin  Not eating his green vegetables          Anticoagulation Summary  As of 10/4/2023      INR goal:  2.0-3.0   TTR:  37.5 % (7.8 y)   INR used for dosin.3 (10/4/2023)   Warfarin maintenance plan:  5 mg (5 mg x 1) every Tue, Fri; 2.5 mg (5 mg x 0.5) all other days   Weekly warfarin total:  22.5 mg   Plan last modified:  N[facility] (2023)   Next INR check:  10/13/2023   Priority:  High   Target end date: Indefinite    Indications    Pulmonary embolus ([address]) [I26.99]  Chronic anticoagulation [Z79.01]                 Anticoagulation Episode Summary       INR check location:  Anticoagulation Clinic    Preferred lab:      Send INR reminders to:  [address] [place] [address] [place] [employer]    Comments:  EPIC - finger stick every 2-3 weeks  Consent: 23          Anticoagulation Care Providers       Provider Role Specialty Phone number    Jeremiah Sanchez MD Referring Internal Medicine [phone]          There were no vitals taken for this visit. Warfarin assessment / plan:     Patient appears well today. Reports he has been better. Recent endoscopy with multiple polyps removed. Going to  Levaquin prescription today. Anticipate Levaquin to increase the INR.      Not eating his regular green

## 2023-10-05 PROBLEM — R19.5 DARK STOOLS: Status: ACTIVE | Noted: 2023-10-05

## 2023-10-11 ENCOUNTER — TELEPHONE (OUTPATIENT)
Dept: PULMONOLOGY | Age: 63
End: 2023-10-11

## 2023-10-11 NOTE — TELEPHONE ENCOUNTER
Complains of cough and SOB  Duration several weeks  Cough with sputum production? Hard to get up  Color slightly green  Fever? no  Any other Symptoms? Little sore throat  Any current treatment tried? Cough drops  Using inhalers? yes  do they help? Not really  Pharmacy? Jerod Huizar said he wants to talk to Dr Nona Reynolds about what to do about his condition. Thank you.

## 2023-10-13 ENCOUNTER — ANTI-COAG VISIT (OUTPATIENT)
Dept: PHARMACY | Age: 63
End: 2023-10-13
Attending: INTERNAL MEDICINE
Payer: COMMERCIAL

## 2023-10-13 DIAGNOSIS — Z79.01 CHRONIC ANTICOAGULATION: ICD-10-CM

## 2023-10-13 DIAGNOSIS — I26.09 PULMONARY EMBOLISM WITH ACUTE COR PULMONALE, UNSPECIFIED CHRONICITY, UNSPECIFIED PULMONARY EMBOLISM TYPE (HCC): Primary | ICD-10-CM

## 2023-10-13 LAB — INTERNATIONAL NORMALIZATION RATIO, POC: 3.2

## 2023-10-13 PROCEDURE — 99211 OFF/OP EST MAY X REQ PHY/QHP: CPT

## 2023-10-13 PROCEDURE — 85610 PROTHROMBIN TIME: CPT

## 2023-10-13 NOTE — PROGRESS NOTES
over-the-counter medications or herbal supplements). If significant bleeding occurs or if you fall and hit your head, please seek immediate medical attention. Keep the number of servings of vitamin K containing foods (dark green, leafy vegetables) the same each week. About 4 times a week (M,W,F,Sun). Please call if this changes. Limit alcohol intake. Please call if this changes. Immunization History   Administered Date(s) Administered    COVID-19, MODERNA BLUE border, Primary or Immunocompromised, (age 12y+), IM, 100 mcg/0.5mL 2021, 2021, 12/15/2021, 2022    COVID-19, MODERNA Bivalent, (age 12y+), IM, 48 mcg/0.5 mL 2022    Influenza, FLUAD, (age 72 y+), Adjuvanted, 0.5mL 10/27/2022    Influenza, FLUARIX, FLULAVAL, FLUZONE (age 10 mo+) AND AFLURIA, (age 1 y+), PF, 0.5mL 10/27/2017, 2019, 10/02/2020    Influenza, FLUBLOK, (age 25 y+), PF, 0.5mL 10/03/2018    Influenza, FLUCELVAX, (age 10 mo+), MDCK, PF, 0.5mL 10/26/2021    Pneumococcal Conjugate Vaccine 08/15/2017    Pneumococcal, PCV-13, PREVNAR 13, (age 6w+), IM, 0.5mL 2015    Pneumococcal, PPSV23, PNEUMOVAX 23, (age 2y+), SC/IM, 0.5mL 2007, 2012    TDaP, ADACEL (age 6y-58y), BOOSTRIX (age 10y+), IM, 0.5mL 2014       Orders Placed This Encounter   Procedures    POCT INR     This external order was created through the results console. No orders of the defined types were placed in this encounter. Reviewed AVS with patient / caregiver.     Billing Points:  Basic Education (disease state, med overview, expected symptoms) - 1 point      For Pharmacy Admin Tracking Only    Intervention Detail: Adherence Monitorin and Dose Adjustment: 1, reason: Therapy Optimization  Total # of Interventions Recommended: 2  Total # of Interventions Accepted: 2  Time Spent (min): 15

## 2023-10-18 ENCOUNTER — OFFICE VISIT (OUTPATIENT)
Dept: PULMONOLOGY | Age: 63
End: 2023-10-18
Payer: COMMERCIAL

## 2023-10-18 VITALS
HEART RATE: 83 BPM | BODY MASS INDEX: 34.37 KG/M2 | TEMPERATURE: 97.2 F | RESPIRATION RATE: 18 BRPM | WEIGHT: 219 LBS | SYSTOLIC BLOOD PRESSURE: 130 MMHG | OXYGEN SATURATION: 92 % | DIASTOLIC BLOOD PRESSURE: 84 MMHG | HEIGHT: 67 IN

## 2023-10-18 DIAGNOSIS — R05.3 CHRONIC COUGH: Primary | ICD-10-CM

## 2023-10-18 DIAGNOSIS — J44.81 BRONCHIOLITIS OBLITERANS: ICD-10-CM

## 2023-10-18 PROCEDURE — 3017F COLORECTAL CA SCREEN DOC REV: CPT | Performed by: INTERNAL MEDICINE

## 2023-10-18 PROCEDURE — 3078F DIAST BP <80 MM HG: CPT | Performed by: INTERNAL MEDICINE

## 2023-10-18 PROCEDURE — 99214 OFFICE O/P EST MOD 30 MIN: CPT | Performed by: INTERNAL MEDICINE

## 2023-10-18 PROCEDURE — 1036F TOBACCO NON-USER: CPT | Performed by: INTERNAL MEDICINE

## 2023-10-18 PROCEDURE — 3023F SPIROM DOC REV: CPT | Performed by: INTERNAL MEDICINE

## 2023-10-18 PROCEDURE — G8427 DOCREV CUR MEDS BY ELIG CLIN: HCPCS | Performed by: INTERNAL MEDICINE

## 2023-10-18 PROCEDURE — G8484 FLU IMMUNIZE NO ADMIN: HCPCS | Performed by: INTERNAL MEDICINE

## 2023-10-18 PROCEDURE — G8417 CALC BMI ABV UP PARAM F/U: HCPCS | Performed by: INTERNAL MEDICINE

## 2023-10-18 PROCEDURE — 3074F SYST BP LT 130 MM HG: CPT | Performed by: INTERNAL MEDICINE

## 2023-10-18 RX ORDER — BENZONATATE 100 MG/1
100 CAPSULE ORAL 3 TIMES DAILY PRN
Qty: 90 CAPSULE | Refills: 0 | Status: SHIPPED | OUTPATIENT
Start: 2023-10-18 | End: 2023-11-17

## 2023-10-18 RX ORDER — BROMPHENIRAMINE MALEATE, PSEUDOEPHEDRINE HYDROCHLORIDE, AND DEXTROMETHORPHAN HYDROBROMIDE 2; 30; 10 MG/5ML; MG/5ML; MG/5ML
5 SYRUP ORAL 4 TIMES DAILY PRN
Qty: 473 ML | Refills: 1 | Status: SHIPPED | OUTPATIENT
Start: 2023-10-18

## 2023-10-18 NOTE — PROGRESS NOTES
[address] [place],[address], SLEEP, AND CRITICAL CARE   Rajiv Bernstein (:  1960) is a 58 y.o. male,Established patient, here for evaluation of the following chief complaint(s):  Follow-up and Shortness of Breath         ASSESSMENT/PLAN:  1. Chronic cough  Assessment & Plan:   - Is a recurrent problem, could be due to obstructive lung disease, cannot rule out other common etiologies such as postnasal drip, habitual cough  -Given persistence of symptoms and negative work-up and negative improvement on empiric therapies we will plan for bronchoscopy. We will micro sample for possible ARIEL  -Bromfed and Tessalon Perles  -Astelin  Orders:  -     brompheniramine-pseudoephedrine-DM (BROMFED DM) 2-30-10 MG/5ML syrup; Take 5 mLs by mouth 4 times daily as needed for Cough, Disp-473 mL, R-1Normal  -     benzonatate (TESSALON PERLES) 100 MG capsule; Take 1 capsule by mouth 3 times daily as needed for Cough, Disp-90 capsule, R-0Normal  2. Bronchiolitis obliterans  Assessment & Plan:  -Has been stable since . -FEV1 50%  -Albuterol as needed        Return in about 3 months (around 2024). Subjective   SUBJECTIVE/OBJECTIVE:  Cough returned. This is a recurrent issue. Has not found anything to give him any sustained relief. Clear sputum production. Denies fevers chills. Review of Systems   Constitutional: Negative. Cardiovascular: Negative. Neurological: Negative. Psychiatric/Behavioral: Negative. Objective   Physical Exam    Gen:  No acute distress. Eyes: EOMI. Anicteric sclera. No conjunctival injection. ENT: No discharge. oropharynx clear. External appearance of ears and nose normal.  Neck: Trachea midline. No mass   Resp:  No crackles. No wheezes. No rhonchi. No dullness on percussion. CV: Regular rate. Regular rhythm. No murmur or rub. No edema. Neuro:  no focal neurologic deficit. Moves all extremities  Psych: Awake and alert, Oriented x 3.   Judgement and

## 2023-10-19 PROBLEM — R05.3 CHRONIC COUGH: Status: ACTIVE | Noted: 2017-02-21

## 2023-10-19 NOTE — PROGRESS NOTES
[facility] Pre-Admission Testing Electronic Communication Worksheet for OR/ENDO Procedures        Patient: Tita Turcios    DOS: 11/2/23    Arrival Time: 11AM    Surgery Time:12NOON    Meds to Bed:  [] YES    [x]  NO    Transportation Confirmed: [x] YES    []  NO WIFE    History and Physical:  [] YES    []  NO  [x] N/A  If yes, please list doctor or Urgent Care and date of H&P: [facility] DR. Vinayak Vasquez    Additional Clearance(Cardiac, Pulmonary, etc):  [] YES    [x]  NO    Pre-Admission Testing Visit:  [] YES    [x]  NO If no, do labs/testing need to be done DOS?   [] YES    [x]  NO    Medication Reconciliation Complete:  [x] YES    []  NO        Additional Notes:   PATIENT HAS PACEMAKER-BIOTRONIC, ON OXYGEN AT 2L/NC NIGHTLY    CONTINUOUS COUGH              Interview Complete: [x] YES    []  NO          Yvonne Craft RN  3:46 PM

## 2023-10-19 NOTE — PROGRESS NOTES
[address] time__11:00__________        Surgery time___12:00_________    Take the following medications with a sip of water: Follow your MD/Surgeons pre-procedure instructions regarding your medications     Do not eat or drink anything after 12:00 midnight prior to your surgery. This includes water chewing gum, mints and ice chips. You may brush your teeth and gargle the morning of your surgery, but do not swallow the water     Please see your family doctor/pediatrician for a history and physical and/or concerning medications. Bring any test results/reports from your physicians office. If you are under the care of a heart doctor or specialist doctor, please be aware that you may be asked to them for clearance    You may be asked to stop blood thinners such as Coumadin, Plavix, Fragmin, Lovenox, etc., or any anti-inflammatories such as:  Aspirin, Ibuprofen, Advil, Naproxen prior to your surgery. We also ask that you stop any OTC medications such as fish oil, vitamin E, glucosamine, garlic, Multivitamins, COQ 10, etc. MAY TAKE TYLENOL    We ask that you do not smoke 24 hours prior to surgery  We ask that you do not  drink any alcoholic beverages 24 hours prior to surgery     You must make arrangements for a responsible adult to take you home after your surgery. For your safety you will not be allowed to leave alone or drive yourself home. Your surgery will be cancelled if you do not have a ride home. Also for your safety, it is strongly suggested that someone stay with you the first 24 hours after your surgery. A parent or legal guardian must accompany a child scheduled for surgery and plan to stay at the hospital until the child is discharged. Please do not bring other children with you. For your comfort, please wear simple loose fitting clothing to the hospital.  Please do not bring valuables.     Do not wear any make-up or nail polish on

## 2023-10-19 NOTE — ASSESSMENT & PLAN NOTE
- Is a recurrent problem, could be due to obstructive lung disease, cannot rule out other common etiologies such as postnasal drip, habitual cough  -Given persistence of symptoms and negative work-up and negative improvement on empiric therapies we will plan for bronchoscopy.   We will micro sample for possible ARIEL  -Bromfed and Tessalon Perles  -Astelin

## 2023-10-31 ENCOUNTER — TELEPHONE (OUTPATIENT)
Dept: PULMONOLOGY | Age: 63
End: 2023-10-31

## 2023-10-31 NOTE — TELEPHONE ENCOUNTER
Ely Caicedo says he's had a cough for about 2 weeks, it's getting better, no other symptoms, and wants to know if the bronch can still be done if he's coughing.

## 2023-11-02 ENCOUNTER — TELEPHONE (OUTPATIENT)
Dept: PHARMACY | Age: 63
End: 2023-11-02

## 2023-11-02 ENCOUNTER — HOSPITAL ENCOUNTER (OUTPATIENT)
Age: 63
Setting detail: OUTPATIENT SURGERY
Discharge: HOME | End: 2023-11-02
Attending: INTERNAL MEDICINE | Admitting: INTERNAL MEDICINE
Payer: COMMERCIAL

## 2023-11-02 VITALS
DIASTOLIC BLOOD PRESSURE: 74 MMHG | BODY MASS INDEX: 33.81 KG/M2 | RESPIRATION RATE: 18 BRPM | WEIGHT: 215.39 LBS | TEMPERATURE: 96.9 F | SYSTOLIC BLOOD PRESSURE: 109 MMHG | OXYGEN SATURATION: 92 % | HEIGHT: 67 IN | HEART RATE: 75 BPM

## 2023-11-02 DIAGNOSIS — R05.3 CHRONIC COUGH: ICD-10-CM

## 2023-11-02 LAB
APPEARANCE BRONCH: ABNORMAL
CLOT SPEC QL: ABNORMAL
COLOR BRONCH: ABNORMAL
EOSINOPHIL NFR BRONCH MANUAL: 2 %
INR PPP: 2.97 (ref 0.84–1.16)
LYMPHOCYTES NFR BRONCH MANUAL: 5 % (ref 5–10)
MACROPHAGES NFR BRONCH MANUAL: 12 % (ref 90–95)
NEUTROPHILS NFR BRONCH MANUAL: 81 % (ref 5–10)
NUC CELL # BRONCH MANUAL: 143 /CUMM
PNEUMONIA PANEL MOLECULAR: NORMAL
PROTHROMBIN TIME: 30.7 SEC (ref 11.5–14.8)
RBC # FLD MANUAL: 425 /CUMM
REPORT: NORMAL
SPECIMEN VOL FLD: 13 ML
TOTAL CELLS COUNTED BRONCH: 100

## 2023-11-02 PROCEDURE — 2720000010 HC SURG SUPPLY STERILE: Performed by: INTERNAL MEDICINE

## 2023-11-02 PROCEDURE — 7100000010 HC PHASE II RECOVERY - FIRST 15 MIN: Performed by: INTERNAL MEDICINE

## 2023-11-02 PROCEDURE — 88112 CYTOPATH CELL ENHANCE TECH: CPT

## 2023-11-02 PROCEDURE — 87070 CULTURE OTHR SPECIMN AEROBIC: CPT

## 2023-11-02 PROCEDURE — 36415 COLL VENOUS BLD VENIPUNCTURE: CPT

## 2023-11-02 PROCEDURE — 6370000000 HC RX 637 (ALT 250 FOR IP): Performed by: INTERNAL MEDICINE

## 2023-11-02 PROCEDURE — 7100000011 HC PHASE II RECOVERY - ADDTL 15 MIN: Performed by: INTERNAL MEDICINE

## 2023-11-02 PROCEDURE — 87102 FUNGUS ISOLATION CULTURE: CPT

## 2023-11-02 PROCEDURE — 87206 SMEAR FLUORESCENT/ACID STAI: CPT

## 2023-11-02 PROCEDURE — 87116 MYCOBACTERIA CULTURE: CPT

## 2023-11-02 PROCEDURE — 3609010800 HC BRONCHOSCOPY ALVEOLAR LAVAGE: Performed by: INTERNAL MEDICINE

## 2023-11-02 PROCEDURE — 99152 MOD SED SAME PHYS/QHP 5/>YRS: CPT | Performed by: INTERNAL MEDICINE

## 2023-11-02 PROCEDURE — 85610 PROTHROMBIN TIME: CPT

## 2023-11-02 PROCEDURE — 2709999900 HC NON-CHARGEABLE SUPPLY: Performed by: INTERNAL MEDICINE

## 2023-11-02 PROCEDURE — 89051 BODY FLUID CELL COUNT: CPT

## 2023-11-02 PROCEDURE — 6360000002 HC RX W HCPCS: Performed by: INTERNAL MEDICINE

## 2023-11-02 PROCEDURE — 88312 SPECIAL STAINS GROUP 1: CPT

## 2023-11-02 PROCEDURE — 31624 DX BRONCHOSCOPE/LAVAGE: CPT | Performed by: INTERNAL MEDICINE

## 2023-11-02 PROCEDURE — 88305 TISSUE EXAM BY PATHOLOGIST: CPT

## 2023-11-02 PROCEDURE — 88185 FLOWCYTOMETRY/TC ADD-ON: CPT

## 2023-11-02 PROCEDURE — 2500000003 HC RX 250 WO HCPCS: Performed by: INTERNAL MEDICINE

## 2023-11-02 PROCEDURE — 87205 SMEAR GRAM STAIN: CPT

## 2023-11-02 PROCEDURE — 88184 FLOWCYTOMETRY/ TC 1 MARKER: CPT

## 2023-11-02 PROCEDURE — 87633 RESP VIRUS 12-25 TARGETS: CPT

## 2023-11-02 RX ORDER — LIDOCAINE HYDROCHLORIDE 20 MG/ML
JELLY TOPICAL PRN
Status: DISCONTINUED | OUTPATIENT
Start: 2023-11-02 | End: 2023-11-02 | Stop reason: ALTCHOICE

## 2023-11-02 RX ORDER — SODIUM CHLORIDE 9 MG/ML
INJECTION, SOLUTION INTRAVENOUS PRN
Status: DISCONTINUED | OUTPATIENT
Start: 2023-11-02 | End: 2023-11-02 | Stop reason: HOSPADM

## 2023-11-02 RX ORDER — MIDAZOLAM HYDROCHLORIDE 1 MG/ML
INJECTION INTRAMUSCULAR; INTRAVENOUS PRN
Status: DISCONTINUED | OUTPATIENT
Start: 2023-11-02 | End: 2023-11-02 | Stop reason: ALTCHOICE

## 2023-11-02 RX ORDER — SODIUM CHLORIDE 0.9 % (FLUSH) 0.9 %
5-40 SYRINGE (ML) INJECTION EVERY 12 HOURS SCHEDULED
Status: DISCONTINUED | OUTPATIENT
Start: 2023-11-02 | End: 2023-11-02 | Stop reason: HOSPADM

## 2023-11-02 RX ORDER — FENTANYL CITRATE 50 UG/ML
INJECTION, SOLUTION INTRAMUSCULAR; INTRAVENOUS PRN
Status: DISCONTINUED | OUTPATIENT
Start: 2023-11-02 | End: 2023-11-02 | Stop reason: ALTCHOICE

## 2023-11-02 RX ORDER — LIDOCAINE HYDROCHLORIDE 20 MG/ML
INJECTION, SOLUTION EPIDURAL; INFILTRATION; INTRACAUDAL; PERINEURAL PRN
Status: DISCONTINUED | OUTPATIENT
Start: 2023-11-02 | End: 2023-11-02 | Stop reason: ALTCHOICE

## 2023-11-02 RX ORDER — SODIUM CHLORIDE 0.9 % (FLUSH) 0.9 %
5-40 SYRINGE (ML) INJECTION PRN
Status: DISCONTINUED | OUTPATIENT
Start: 2023-11-02 | End: 2023-11-02 | Stop reason: HOSPADM

## 2023-11-02 ASSESSMENT — PAIN - FUNCTIONAL ASSESSMENT: PAIN_FUNCTIONAL_ASSESSMENT: NONE - DENIES PAIN

## 2023-11-02 NOTE — H&P
Fiberoptic bronchoscopy history:     Nursing History and Physical reviewed and agreed upon: For additional findings, please see my notes in EPIC. Patient is allergic to atrovent nasal spray [ipratropium], ipratropium bromide hfa, nitroglycerin, crestor [rosuvastatin], doxycycline, macrobid [nitrofurantoin], nexletol [bempedoic acid], sulfa antibiotics, and vicodin [hydrocodone-acetaminophen].     Past Medical History:   Diagnosis Date    Abnormal CT scan 08/2023    spots on pancrease    Ankylosing spondylitis (HCC)     Atrial fibrillation     Bronchiolitis obliterans     CAD (coronary artery disease)     Cardiomyopathy (HCC)     CHF (congestive heart failure) (HCC)     Chronic anticoagulation     COPD (chronic obstructive pulmonary disease) (HCC)     GERD (gastroesophageal reflux disease)     Hepatitis 1979    Hx of blood clots     Hyperlipidemia     Hyperparathyroidism ([address])     Hypertension     IBS (irritable bowel syndrome)     Kidney stones     Oxygen dependent 08/2023    wears 2L/NC at night    Pneumothorax 2011    Prostatitis     Pulmonary embolism [facility])        Past Surgical History:   Procedure Laterality Date    CHOLECYSTECTOMY  2007    COLONOSCOPY  09/21/2012    COLONOSCOPY  11/30/2018    CYSTOSCOPY  05/17/2019    RIGHT SIDED URETEROSCOPY  Diagnostic, retrograde pyelogram and fulguration of previously resected bladder tumor base    CYSTOSCOPY Right 05/17/2019    RIGHT SIDED URETEROSCOPY FLUGERATION  OF BLADDER performed by Melvin Rodriguez MD at [facility] Right 07/02/2021    CYSTOURETHROSCOPY WITH RIGHT RETROGRADE PYELOGRAPHY AND PLACEMENT OF RIGHT DOUBLE J STENT performed by Quang Hansen DO at [facility] Right 04/25/2022    CYSTOSCOPY, RIGHT STENT INSERTION performed by Melvin Rodriguez MD at [facility] 09/08/2022    ESOPHAGEAL DILATATION performed by Sharan Gorman DO at [address] [place] [address]  2015    LITHOTRIPSY Procedure Plan   Return to same level of care   ______________________     The risks and benefits as well as alternatives to the procedure have been discussed with the patient. The patient understands and agrees to proceed.     Rafael Egan MD

## 2023-11-02 NOTE — PROCEDURES
Bronchoscopy Procedure Note    Date of Operation: 11/2/2023    Surgeon: Brianna Elmore MD    Anesthesia: Versed 4 mg Fentanyl 100 mcg    Operation: Flexible fiberoptic bronchoscopy, diagnostic / therapeutic    Estimated Blood Loss: Minimal    Complications: None    Indications and History:  The patient is a 58 y.o. male with chronic cough  . The risks, benefits, complications, treatment options and expected outcomes were discussed with the patient. The possibilities of reaction to medication, pulmonary aspiration, perforation of a viscus(Pneumothorax), bleeding, respiratory failure and failure to diagnose a condition and creating a complication requiring transfusion or operation were discussed with the patient who freely signed the consent. Description of Procedure: The patient was taken to the endoscopy suite, identified as [facility] and the procedure verified as Flexible Fiberoptic Bronchoscopy. A Time Out was held and the above information confirmed. After the induction of topical nasopharyngeal anesthesia, the patient was positioned supine and the bronchoscope was passed through the right nare . The vocal cords were visualized, and 1% lidocaine was topically placed onto the cords. The cords were normal. The scope was then passed into the trachea. Additional 1% lidocaine was used topically within the airway. Careful inspection of the tracheal lumen was accomplished. The scope was sequentially passed into all airways.        Endobronchial findings:    Trachea: Normal mucosa  Mary: Normal mucosa  Right main bronchus: Normal mucosa  Right upper lobe bronchus including anterior, apical and posterior until the fourth generation bronchi: Normal mucosa  Right intermedius bronchus: Normal mucosa  Right middle lobe: Normal mucosa  Right lower lobe superior segment uptill 3rd generation bronchi: Normal mucosa  Left main bronchus: Normal mucosa  Left upper lobe including Lingula, anterior segment, and

## 2023-11-02 NOTE — PROGRESS NOTES
Report received from [facility]. Pt ap. O2 sat 93% on 1L per nc. O2 off. Occasionally coughing up pink tinge sputum. Wife present.

## 2023-11-02 NOTE — DISCHARGE INSTRUCTIONS
Bronchoscopy: What to Expect at [address]     Bronchoscopy lets your doctor look at your airway through a tube called a bronchoscope. Afterward, you may feel tired for 1 or 2 days. Your mouth may feel very dry for several hours after the procedure. You may also have a sore throat and a hoarse voice for a few days. Sucking on throat lozenges or gargling with warm salt water may help soothe your sore throat. If a sample of tissue (biopsy) was taken, you may spit up a small amount of blood or have bloody saliva. This is normal.  Ask your doctor when you can drive again. Do not smoke for at least 24 hours. This care sheet gives you a general idea about how long it will take for you to recover. But each person recovers at a different pace. Follow the steps below to get better as quickly as possible. How can you care for yourself at home? Activity    Do not eat anything for 2 hours after the procedure. Rest when you feel tired. Getting enough sleep will help you recover. Avoid strenuous activities, such as bicycle riding, jogging, weight lifting, or aerobic exercise, until your doctor says it is okay. Ask your doctor when you can drive again. Diet    You can eat your normal diet. If your stomach is upset, try bland, low-fat foods like plain rice, broiled chicken, toast, and yogurt. If it is painful to swallow, start out with cold drinks, flavored ice pops, and ice cream. Next, try soft foods like pudding, yogurt, canned or cooked fruit, scrambled eggs, and mashed potatoes. Avoid eating hard or scratchy foods like chips or raw vegetables. Avoid orange or tomato juice and other acidic foods that can sting the throat. Drink plenty of fluids to avoid becoming dehydrated (unless your doctor tells you not to). Medicines    Take pain medicines exactly as directed. If the doctor gave you a prescription medicine for pain, take it as prescribed.   If you are not taking a prescription pain

## 2023-11-02 NOTE — PROGRESS NOTES
Pt tolerates ice chips. Soreness in chest but tolerable per pt. Soreness when he takes a deep breath and coughs. VSS. O2 sat 93% on R/A.

## 2023-11-02 NOTE — TELEPHONE ENCOUNTER
Pt had a procedure today and his INR was checked prior to procedure. It was 2.97. So pt wanted to cancel and reschedule appt for 2-3 weeks down the road. He reports no other changes and will continue same warfarin dosing until seen on 11/20/23.     Kaveh Morley, PharmD 11/2/2023 3:42 PM   [facility]  [phone]

## 2023-11-02 NOTE — PROGRESS NOTES
Discharge instructions reviewed with pt and wife. Both express an understanding of instructions. Up to chair. Pt dressing with wife's assistance. Called [employer] for car.

## 2023-11-02 NOTE — PROGRESS NOTES
Received from [facility]. Admitted to Phase 2 care. Awake and alert, respirations easy and even. Oriented to room and surroundings. Moist cough noted. No complaints.

## 2023-11-03 LAB
ACID FAST STN SPEC QL: NORMAL
LOEFFLER MB STN SPEC: NORMAL

## 2023-11-04 LAB
BACTERIA SPEC RESP CULT: NORMAL
GRAM STN SPEC: NORMAL

## 2023-11-07 RX ORDER — AZITHROMYCIN 250 MG/1
250 TABLET, FILM COATED ORAL SEE ADMIN INSTRUCTIONS
Qty: 6 TABLET | Refills: 0 | Status: SHIPPED | OUTPATIENT
Start: 2023-11-07 | End: 2023-11-12

## 2023-11-13 LAB
FUNGUS SPEC CULT: NORMAL
LOEFFLER MB STN SPEC: NORMAL

## 2023-11-14 ENCOUNTER — HOSPITAL ENCOUNTER (EMERGENCY)
Age: 63
Discharge: HOME | End: 2023-11-14
Attending: STUDENT IN AN ORGANIZED HEALTH CARE EDUCATION/TRAINING PROGRAM
Payer: COMMERCIAL

## 2023-11-14 ENCOUNTER — APPOINTMENT (OUTPATIENT)
Dept: CT IMAGING | Age: 63
End: 2023-11-14
Payer: COMMERCIAL

## 2023-11-14 VITALS
OXYGEN SATURATION: 97 % | WEIGHT: 220.24 LBS | SYSTOLIC BLOOD PRESSURE: 95 MMHG | TEMPERATURE: 97.2 F | HEART RATE: 78 BPM | BODY MASS INDEX: 34.49 KG/M2 | RESPIRATION RATE: 18 BRPM | DIASTOLIC BLOOD PRESSURE: 68 MMHG

## 2023-11-14 DIAGNOSIS — R79.89 ELEVATED BRAIN NATRIURETIC PEPTIDE (BNP) LEVEL: ICD-10-CM

## 2023-11-14 DIAGNOSIS — R06.09 DYSPNEA ON EXERTION: Primary | ICD-10-CM

## 2023-11-14 DIAGNOSIS — R00.2 PALPITATIONS: ICD-10-CM

## 2023-11-14 LAB
ACID FAST STN SPEC QL: NORMAL
ALBUMIN SERPL-MCNC: 3.9 G/DL (ref 3.4–5)
ALBUMIN/GLOB SERPL: 1.6 {RATIO} (ref 1.1–2.2)
ALP SERPL-CCNC: 100 U/L (ref 40–129)
ALT SERPL-CCNC: 20 U/L (ref 10–40)
ANION GAP SERPL CALCULATED.3IONS-SCNC: 9 MMOL/L (ref 3–16)
APTT BLD: 44.7 SEC (ref 22.7–35.9)
AST SERPL-CCNC: 19 U/L (ref 15–37)
BACTERIA URNS QL MICRO: ABNORMAL /HPF
BASOPHILS # BLD: 0.1 K/UL (ref 0–0.2)
BASOPHILS NFR BLD: 1 %
BILIRUB SERPL-MCNC: 0.3 MG/DL (ref 0–1)
BILIRUB UR QL STRIP.AUTO: NEGATIVE
BUN SERPL-MCNC: 9 MG/DL (ref 7–20)
CALCIUM SERPL-MCNC: 10.9 MG/DL (ref 8.3–10.6)
CHLORIDE SERPL-SCNC: 101 MMOL/L (ref 99–110)
CLARITY UR: CLEAR
CO2 SERPL-SCNC: 29 MMOL/L (ref 21–32)
COLOR UR: YELLOW
CREAT SERPL-MCNC: 1.1 MG/DL (ref 0.8–1.3)
DEPRECATED RDW RBC AUTO: 13.4 % (ref 12.4–15.4)
EOSINOPHIL # BLD: 0.2 K/UL (ref 0–0.6)
EOSINOPHIL NFR BLD: 3.1 %
EPI CELLS #/AREA URNS AUTO: 1 /HPF (ref 0–5)
FLUAV RNA UPPER RESP QL NAA+PROBE: NEGATIVE
FLUBV AG NPH QL: NEGATIVE
GFR SERPLBLD CREATININE-BSD FMLA CKD-EPI: >60 ML/MIN/{1.73_M2}
GLUCOSE SERPL-MCNC: 116 MG/DL (ref 70–99)
GLUCOSE UR STRIP.AUTO-MCNC: NEGATIVE MG/DL
HCT VFR BLD AUTO: 47.7 % (ref 40.5–52.5)
HGB BLD-MCNC: 16.2 G/DL (ref 13.5–17.5)
HGB UR QL STRIP.AUTO: ABNORMAL
HYALINE CASTS #/AREA URNS AUTO: 1 /LPF (ref 0–8)
INR PPP: 3.49 (ref 0.84–1.16)
KETONES UR STRIP.AUTO-MCNC: NEGATIVE MG/DL
LACTATE BLDV-SCNC: 1.5 MMOL/L (ref 0.4–1.9)
LEUKOCYTE ESTERASE UR QL STRIP.AUTO: ABNORMAL
LYMPHOCYTES # BLD: 1.4 K/UL (ref 1–5.1)
LYMPHOCYTES NFR BLD: 26.1 %
MCH RBC QN AUTO: 30.9 PG (ref 26–34)
MCHC RBC AUTO-ENTMCNC: 33.9 G/DL (ref 31–36)
MCV RBC AUTO: 91.2 FL (ref 80–100)
MONOCYTES # BLD: 0.6 K/UL (ref 0–1.3)
MONOCYTES NFR BLD: 12 %
MYCOBACTERIUM SPEC CULT: NORMAL
NEUTROPHILS # BLD: 3.1 K/UL (ref 1.7–7.7)
NEUTROPHILS NFR BLD: 57.8 %
NITRITE UR QL STRIP.AUTO: NEGATIVE
NT-PROBNP SERPL-MCNC: 282 PG/ML (ref 0–124)
PH UR STRIP.AUTO: 7.5 [PH] (ref 5–8)
PLATELET # BLD AUTO: 212 K/UL (ref 135–450)
PMV BLD AUTO: 7.6 FL (ref 5–10.5)
POTASSIUM SERPL-SCNC: 4.1 MMOL/L (ref 3.5–5.1)
PROT SERPL-MCNC: 6.4 G/DL (ref 6.4–8.2)
PROT UR STRIP.AUTO-MCNC: 30 MG/DL
PROTHROMBIN TIME: 34.7 SEC (ref 11.5–14.8)
RBC # BLD AUTO: 5.23 M/UL (ref 4.2–5.9)
RBC CLUMPS #/AREA URNS AUTO: 47 /HPF (ref 0–4)
SARS-COV-2 RDRP RESP QL NAA+PROBE: NOT DETECTED
SODIUM SERPL-SCNC: 139 MMOL/L (ref 136–145)
SP GR UR STRIP.AUTO: 1.01 (ref 1–1.03)
TROPONIN, HIGH SENSITIVITY: 18 NG/L (ref 0–22)
TROPONIN, HIGH SENSITIVITY: 21 NG/L (ref 0–22)
UA COMPLETE W REFLEX CULTURE PNL UR: ABNORMAL
UA DIPSTICK W REFLEX MICRO PNL UR: YES
URN SPEC COLLECT METH UR: ABNORMAL
UROBILINOGEN UR STRIP-ACNC: 1 E.U./DL
WBC # BLD AUTO: 5.3 K/UL (ref 4–11)
WBC #/AREA URNS AUTO: 7 /HPF (ref 0–5)

## 2023-11-14 PROCEDURE — 83880 ASSAY OF NATRIURETIC PEPTIDE: CPT

## 2023-11-14 PROCEDURE — 81001 URINALYSIS AUTO W/SCOPE: CPT

## 2023-11-14 PROCEDURE — 93005 ELECTROCARDIOGRAM TRACING: CPT | Performed by: STUDENT IN AN ORGANIZED HEALTH CARE EDUCATION/TRAINING PROGRAM

## 2023-11-14 PROCEDURE — 85730 THROMBOPLASTIN TIME PARTIAL: CPT

## 2023-11-14 PROCEDURE — 87804 INFLUENZA ASSAY W/OPTIC: CPT

## 2023-11-14 PROCEDURE — 85025 COMPLETE CBC W/AUTO DIFF WBC: CPT

## 2023-11-14 PROCEDURE — 84484 ASSAY OF TROPONIN QUANT: CPT

## 2023-11-14 PROCEDURE — 87635 SARS-COV-2 COVID-19 AMP PRB: CPT

## 2023-11-14 PROCEDURE — 80053 COMPREHEN METABOLIC PANEL: CPT

## 2023-11-14 PROCEDURE — 71260 CT THORAX DX C+: CPT

## 2023-11-14 PROCEDURE — 99285 EMERGENCY DEPT VISIT HI MDM: CPT

## 2023-11-14 PROCEDURE — 6360000004 HC RX CONTRAST MEDICATION: Performed by: STUDENT IN AN ORGANIZED HEALTH CARE EDUCATION/TRAINING PROGRAM

## 2023-11-14 PROCEDURE — 85610 PROTHROMBIN TIME: CPT

## 2023-11-14 PROCEDURE — 2580000003 HC RX 258: Performed by: STUDENT IN AN ORGANIZED HEALTH CARE EDUCATION/TRAINING PROGRAM

## 2023-11-14 PROCEDURE — 83605 ASSAY OF LACTIC ACID: CPT

## 2023-11-14 RX ORDER — 0.9 % SODIUM CHLORIDE 0.9 %
500 INTRAVENOUS SOLUTION INTRAVENOUS ONCE
Status: COMPLETED | OUTPATIENT
Start: 2023-11-14 | End: 2023-11-14

## 2023-11-14 RX ADMIN — SODIUM CHLORIDE 500 ML: 9 INJECTION, SOLUTION INTRAVENOUS at 17:16

## 2023-11-14 RX ADMIN — IOPAMIDOL 75 ML: 755 INJECTION, SOLUTION INTRAVENOUS at 17:39

## 2023-11-14 ASSESSMENT — PATIENT HEALTH QUESTIONNAIRE - PHQ9
SUM OF ALL RESPONSES TO PHQ QUESTIONS 1-9: 0
1. LITTLE INTEREST OR PLEASURE IN DOING THINGS: 0
SUM OF ALL RESPONSES TO PHQ QUESTIONS 1-9: 0
2. FEELING DOWN, DEPRESSED OR HOPELESS: 0
SUM OF ALL RESPONSES TO PHQ9 QUESTIONS 1 & 2: 0

## 2023-11-14 ASSESSMENT — PAIN SCALES - GENERAL: PAINLEVEL_OUTOF10: 7

## 2023-11-14 ASSESSMENT — PAIN DESCRIPTION - DESCRIPTORS: DESCRIPTORS: ACHING

## 2023-11-14 ASSESSMENT — PAIN DESCRIPTION - ORIENTATION: ORIENTATION: LEFT

## 2023-11-14 ASSESSMENT — PAIN DESCRIPTION - PAIN TYPE: TYPE: ACUTE PAIN

## 2023-11-14 ASSESSMENT — PAIN DESCRIPTION - LOCATION: LOCATION: FLANK

## 2023-11-14 ASSESSMENT — PAIN DESCRIPTION - FREQUENCY: FREQUENCY: CONTINUOUS

## 2023-11-14 ASSESSMENT — PAIN DESCRIPTION - ONSET: ONSET: ON-GOING

## 2023-11-15 ENCOUNTER — TELEPHONE (OUTPATIENT)
Dept: CARDIOLOGY CLINIC | Age: 63
End: 2023-11-15

## 2023-11-15 ENCOUNTER — TELEPHONE (OUTPATIENT)
Dept: PHARMACY | Age: 63
End: 2023-11-15

## 2023-11-15 LAB
EKG ATRIAL RATE: 102 BPM
EKG DIAGNOSIS: NORMAL
EKG P AXIS: 49 DEGREES
EKG P-R INTERVAL: 320 MS
EKG Q-T INTERVAL: 330 MS
EKG QRS DURATION: 138 MS
EKG QTC CALCULATION (BAZETT): 430 MS
EKG R AXIS: -74 DEGREES
EKG T AXIS: 94 DEGREES
EKG VENTRICULAR RATE: 102 BPM

## 2023-11-15 PROCEDURE — 93010 ELECTROCARDIOGRAM REPORT: CPT | Performed by: INTERNAL MEDICINE

## 2023-11-15 NOTE — DISCHARGE INSTRUCTIONS
For the next 3 days double your Lasix dose. Follow-up with your primary care physician as well as a cardiologist for further discussions regarding your recent visit here. Take your other medications as prescribed.   Return if you develop any new or worsening symptoms as discussed

## 2023-11-15 NOTE — TELEPHONE ENCOUNTER
Medication Refill    Medication needing refilled:  ENTRESTO     Dosage of the medication:  49-51 MG per tablet     How are you taking this medication (QD, BID, TID, QID, PRN):  Take 1.5 tablets twice daily by mouth     30 or 90 day supply called in:  270 tablet       Which Pharmacy are we sending the medication to?:      [address] [address] [phone] - F [phone]  [address] [place] [address]  Phone: [phone]  Fax: [phone]

## 2023-11-15 NOTE — ED PROVIDER NOTES
[address]      EMERGENCY MEDICINE     Pt Name: Tate Ortiz  MRN: [id number]  [facility] 1960  Date of evaluation: 11/14/2023  Provider: Al Dubon MD    CHIEF COMPLAINT       Chief Complaint   Patient presents with    Palpitations     Pt reports being seen by PCP today due to lightheadedness that started this morning. Pt was seen by PCP and sent here due to \"heart rate in the 130's\". Pt alert and oriented at this time with no signs of distress noted. Pt endorses left sided flank pain rating it as a 7/10. HISTORY OF PRESENT ILLNESS   Tate Ortiz is a 58 y.o. male who presents to the emergency department for dyspnea exertion today as well as lightheadedness and tachycardia. He was seen at his primary care physician's office was concerned with this patient's extensive history of pulmonary embolisms and DVTs and sent him in for further diagnostic evaluation.   Patient denies having any chest pain at this time    PASTMEDICAL HISTORY     Past Medical History:   Diagnosis Date    Abnormal CT scan 08/2023    spots on pancrease    Ankylosing spondylitis (HCC)     Atrial fibrillation     Bronchiolitis obliterans     CAD (coronary artery disease)     Cardiomyopathy (HCC)     CHF (congestive heart failure) (HCC)     Chronic anticoagulation     COPD (chronic obstructive pulmonary disease) (HCC)     GERD (gastroesophageal reflux disease)     Hepatitis 1979    Hx of blood clots     Hyperlipidemia     Hyperparathyroidism ([address])     Hypertension     IBS (irritable bowel syndrome)     Kidney stones     Oxygen dependent 08/2023    wears 2L/NC at night    Pneumothorax 2011    Prostatitis     Pulmonary embolism [facility])        Patient Active Problem List   Diagnosis Code    Nephrolithiasis N20.0    Pneumonia J18.9    Essential hypertension, benign I10    Back pain M54.9    URTI (acute upper respiratory infection) J06.9    Irritable bowel syndrome K58.9    Pure

## 2023-11-15 NOTE — TELEPHONE ENCOUNTER
Called/spoke to PT. He does not need a refill. He is requesting a dosage change on his Entresto. He currently takes 1.5 tablets BID. He is requesting to go back to 1 tablet BID because he states the higher dosage makes him feel weird. If we cannot go to back to 1 tablet BID then we must contact PT's insurance because they are requesting additional information from the Dr. As to why PT is taking this type of dose of Entresto. [phone].

## 2023-11-15 NOTE — ED NOTES
Discharge and education instructions reviewed. Patient verbalized understanding, teach-back successful. Patient denied questions at this time. No acute distress noted. Patient instructed to follow-up as noted - return to emergency department if symptoms worsen. Patient verbalized understanding. Discharged per EDMD with discharge instructions.        Alli Pina RN  11/14/23 [id number]

## 2023-11-15 NOTE — TELEPHONE ENCOUNTER
I received a call from the patient's provider stating they checked his INR yesterday and wanted to report the result to us. I reached out to Daryl Gustafson directly to go over his result of 3.49 and instructed him to hold today's dose, then reduce his weekly dose to 5mg on Friday and 2.5mg all other days. He was originally due to come in for an INR check next Monday 11/20, I pushed it back to 12/6. He will call with any other changes or problems.       Alpa Martinez, PharmD 11/15/23 2:45 PM

## 2023-11-20 LAB
FUNGUS SPEC CULT: NORMAL
LOEFFLER MB STN SPEC: NORMAL

## 2023-11-20 RX ORDER — SACUBITRIL AND VALSARTAN 49; 51 MG/1; MG/1
TABLET, FILM COATED ORAL
Qty: 270 TABLET | Refills: 3 | Status: SHIPPED | OUTPATIENT
Start: 2023-11-20 | End: 2023-11-21

## 2023-11-20 RX ORDER — METOPROLOL SUCCINATE 25 MG/1
TABLET, EXTENDED RELEASE ORAL
Qty: 225 TABLET | Refills: 3 | Status: SHIPPED | OUTPATIENT
Start: 2023-11-20

## 2023-11-20 NOTE — TELEPHONE ENCOUNTER
Patient requesting a 30 day supply to go to [facility] but wants 90 day supply to go to [facility].      Medication Refill    Medication needing refilled:  metoprolol succinate (TOPROL XL)   ENTRESTO     Dosage of the medication:  25 MG extended release tablet   49-51 MG per tablet     How are you taking this medication (QD, BID, TID, QID, PRN):  Take 37.5 mg (1.5 tablet) each morning and 25 mg (1 tablet) each evening   Take 1.5 tablets twice daily by mouth     30 or 90 day supply called in:  30 day supply    When will you run out of your medication:  Currently out    Which Pharmacy are we sending the medication to?:  [facility] , [facility] - F [phone]     FOR 90 DAY SUPPLY PLEASE SEND TO:  [facility], [address]

## 2023-11-20 NOTE — TELEPHONE ENCOUNTER
Last OV: 8/18/23  Next OV: X due in 6-7 months  Last refill: 8/22/23  Most recent Labs: 11/14/23  Last EKG (if needed): 11/14/23

## 2023-11-21 LAB
ACID FAST STN SPEC QL: NORMAL
MYCOBACTERIUM SPEC CULT: NORMAL

## 2023-11-21 RX ORDER — SACUBITRIL AND VALSARTAN 49; 51 MG/1; MG/1
TABLET, FILM COATED ORAL
Qty: 90 TABLET | Refills: 0 | Status: SHIPPED | OUTPATIENT
Start: 2023-11-21

## 2023-11-21 RX ORDER — FUROSEMIDE 20 MG/1
10 TABLET ORAL DAILY
Qty: 90 TABLET | Refills: 3
Start: 2023-11-21

## 2023-11-21 NOTE — TELEPHONE ENCOUNTER
Ynaeth Donovan is requesting additional information from the  As to why PT is taking this type of dose of Entresto. [phone]. Spoke with patient regarding 11/15 tele and refill for optim Entresto. Sent to [employer] per request for 1 month while we figures this out.

## 2023-11-21 NOTE — TELEPHONE ENCOUNTER
Spoke with patient regarding 11/15 tele and refill for optim Entresto. Sent to [facility] per request for 1 month while we figures this out.

## 2023-11-22 PROBLEM — M79.652 PAIN OF LEFT THIGH: Status: ACTIVE | Noted: 2023-11-22

## 2023-11-22 PROBLEM — M79.605 PAIN OF LEFT LOWER EXTREMITY: Status: ACTIVE | Noted: 2023-11-22

## 2023-11-22 PROBLEM — M70.61 TROCHANTERIC BURSITIS OF RIGHT HIP: Status: ACTIVE | Noted: 2023-11-22

## 2023-11-22 NOTE — TELEPHONE ENCOUNTER
MA's and PA Dept  I contacted the number I was provided below and all they  needed was a PA on Entresto. No additional information required. Will send to PA Dept. To initiate. Last time PA completed was 11/19/2021 and 2022. Please follow and facilitate from this point on. Once PA obtained. Script to [employer]. needs sent.

## 2023-11-24 LAB — PATHOLOGY/CYTOLOGY REPORT: NORMAL

## 2023-11-24 NOTE — TELEPHONE ENCOUNTER
Submitted PA for ENTRESTO  Via CMM Key: [id number] STATUS: NOT SENT    NEED NYHA CLASS PLEASE    Follow up done daily; if no response in three days we will refax for status check. If another three days goes by with no response we will call the insurance for status.

## 2023-11-27 LAB
FUNGUS SPEC CULT: NORMAL
LOEFFLER MB STN SPEC: NORMAL

## 2023-11-28 LAB
ACID FAST STN SPEC QL: NORMAL
MYCOBACTERIUM SPEC CULT: NORMAL

## 2023-12-04 ENCOUNTER — ANTI-COAG VISIT (OUTPATIENT)
Dept: PHARMACY | Age: 63
End: 2023-12-04
Attending: INTERNAL MEDICINE
Payer: COMMERCIAL

## 2023-12-04 DIAGNOSIS — I26.99 OTHER ACUTE PULMONARY EMBOLISM, UNSPECIFIED WHETHER ACUTE COR PULMONALE PRESENT (HCC): Primary | ICD-10-CM

## 2023-12-04 DIAGNOSIS — Z79.01 CHRONIC ANTICOAGULATION: ICD-10-CM

## 2023-12-04 LAB
FUNGUS SPEC CULT: NORMAL
INTERNATIONAL NORMALIZATION RATIO, POC: 3.2
LOEFFLER MB STN SPEC: NORMAL

## 2023-12-04 PROCEDURE — 99212 OFFICE O/P EST SF 10 MIN: CPT

## 2023-12-04 PROCEDURE — 85610 PROTHROMBIN TIME: CPT

## 2023-12-04 NOTE — PROGRESS NOTES
Venessa Chapman is a 58 y.o. here for warfarin management. Sánchez Jose had an INR test today. Results were reviewed and appropriate warfarin management was completed. Patient verifies current warfarin dosing regimen: Yes     Warfarin medication reviewed and updated on the patient 's home medication list: Yes   All other medications reviewed and updated on the patient 's home medication list: Yes     Lab Results   Component Value Date    INR 3.2 12/04/2023    INR 3.49 (H) 11/14/2023    INR 3.44 (H) 11/14/2023     Patient Findings       Negatives:  Signs/symptoms of thrombosis, Signs/symptoms of bleeding, Laboratory test error suspected, Change in health, Change in alcohol use, Change in activity, Upcoming invasive procedure, Emergency department visit, Upcoming dental procedure, Missed doses, Extra doses, Change in medications, Change in diet/appetite, Hospital admission, Bruising, Other complaints          Anticoagulation Summary  As of 12/4/2023      INR goal:  2.0-3.0   TTR:  37.1 % (8 y)   INR used for dosing:  3.2 (12/4/2023)   Warfarin maintenance plan:  5 mg (5 mg x 1) every Fri; 2.5 mg (5 mg x 0.5) all other days   Weekly warfarin total:  20 mg   Plan last modified:  Chris Bell, [facility] (12/4/2023)   Next INR check:  12/18/2023   Priority:  Maintenance   Target end date: Indefinite    Indications    Pulmonary embolus ([address]) [I26.99]  Chronic anticoagulation [Z79.01]                 Anticoagulation Episode Summary       INR check location:  Anticoagulation Clinic    Preferred lab:      Send INR reminders to:  [address] [place] [address] [place] [employer]    Comments:  EPIC - finger stick every 2-3 weeks  Consent: 1/24/23          Anticoagulation Care Providers       Provider Role Specialty Phone number    Alexnadrea Noble MD Referring Internal Medicine [phone]          There were no vitals taken for this visit. Warfarin assessment / plan:     Appears well  Supra-therapeutic INR.         INR above goal again

## 2023-12-05 LAB
ACID FAST STN SPEC QL: NORMAL
MYCOBACTERIUM SPEC CULT: NORMAL

## 2023-12-12 LAB
ACID FAST STN SPEC QL: NORMAL
MYCOBACTERIUM SPEC CULT: NORMAL

## 2023-12-12 PROCEDURE — 93295 DEV INTERROG REMOTE 1/2/MLT: CPT | Performed by: INTERNAL MEDICINE

## 2023-12-12 PROCEDURE — 93296 REM INTERROG EVL PM/IDS: CPT | Performed by: INTERNAL MEDICINE

## 2023-12-13 DIAGNOSIS — J44.81 BRONCHIOLITIS OBLITERANS: ICD-10-CM

## 2023-12-14 ENCOUNTER — APPOINTMENT (OUTPATIENT)
Dept: CT IMAGING | Age: 63
End: 2023-12-14
Payer: COMMERCIAL

## 2023-12-14 ENCOUNTER — HOSPITAL ENCOUNTER (EMERGENCY)
Age: 63
Discharge: HOME | End: 2023-12-14
Attending: EMERGENCY MEDICINE
Payer: COMMERCIAL

## 2023-12-14 VITALS
HEIGHT: 66 IN | DIASTOLIC BLOOD PRESSURE: 70 MMHG | TEMPERATURE: 98.7 F | OXYGEN SATURATION: 96 % | SYSTOLIC BLOOD PRESSURE: 109 MMHG | WEIGHT: 222.22 LBS | RESPIRATION RATE: 21 BRPM | BODY MASS INDEX: 35.71 KG/M2 | HEART RATE: 89 BPM

## 2023-12-14 DIAGNOSIS — R10.30 LOWER ABDOMINAL PAIN: ICD-10-CM

## 2023-12-14 DIAGNOSIS — N28.1 RENAL CYST, RIGHT: Primary | ICD-10-CM

## 2023-12-14 DIAGNOSIS — R11.0 NAUSEA: ICD-10-CM

## 2023-12-14 LAB
ALBUMIN SERPL-MCNC: 3.8 G/DL (ref 3.4–5)
ALBUMIN/GLOB SERPL: 1.4 {RATIO} (ref 1.1–2.2)
ALP SERPL-CCNC: 99 U/L (ref 40–129)
ALT SERPL-CCNC: 22 U/L (ref 10–40)
AMORPHOUS: PRESENT
ANION GAP SERPL CALCULATED.3IONS-SCNC: 4 MMOL/L (ref 3–16)
AST SERPL-CCNC: 17 U/L (ref 15–37)
BACTERIA URNS QL MICRO: ABNORMAL /HPF
BASOPHILS # BLD: 0 K/UL (ref 0–0.2)
BASOPHILS NFR BLD: 0.6 %
BILIRUB SERPL-MCNC: 0.5 MG/DL (ref 0–1)
BILIRUB UR QL STRIP.AUTO: NEGATIVE
BUN SERPL-MCNC: 9 MG/DL (ref 7–20)
CALCIUM SERPL-MCNC: 10.3 MG/DL (ref 8.3–10.6)
CHLORIDE SERPL-SCNC: 99 MMOL/L (ref 99–110)
CLARITY UR: ABNORMAL
CO2 SERPL-SCNC: 36 MMOL/L (ref 21–32)
COLOR UR: YELLOW
CREAT SERPL-MCNC: 1 MG/DL (ref 0.8–1.3)
DEPRECATED RDW RBC AUTO: 13.8 % (ref 12.4–15.4)
EOSINOPHIL # BLD: 0.2 K/UL (ref 0–0.6)
EOSINOPHIL NFR BLD: 3.4 %
EPI CELLS #/AREA URNS AUTO: 1 /HPF (ref 0–5)
GFR SERPLBLD CREATININE-BSD FMLA CKD-EPI: >60 ML/MIN/{1.73_M2}
GLUCOSE SERPL-MCNC: 130 MG/DL (ref 70–99)
GLUCOSE UR STRIP.AUTO-MCNC: 500 MG/DL
HCT VFR BLD AUTO: 46.9 % (ref 40.5–52.5)
HGB BLD-MCNC: 15.6 G/DL (ref 13.5–17.5)
HGB UR QL STRIP.AUTO: ABNORMAL
HYALINE CASTS #/AREA URNS AUTO: 2 /LPF (ref 0–8)
INR PPP: 3.03 (ref 0.84–1.16)
KETONES UR STRIP.AUTO-MCNC: NEGATIVE MG/DL
LEUKOCYTE ESTERASE UR QL STRIP.AUTO: NEGATIVE
LIPASE SERPL-CCNC: 17 U/L (ref 13–60)
LYMPHOCYTES # BLD: 1.3 K/UL (ref 1–5.1)
LYMPHOCYTES NFR BLD: 21.5 %
MCH RBC QN AUTO: 30.6 PG (ref 26–34)
MCHC RBC AUTO-ENTMCNC: 33.3 G/DL (ref 31–36)
MCV RBC AUTO: 91.8 FL (ref 80–100)
MONOCYTES # BLD: 0.5 K/UL (ref 0–1.3)
MONOCYTES NFR BLD: 9 %
MUCUS: PRESENT
NEUTROPHILS # BLD: 4 K/UL (ref 1.7–7.7)
NEUTROPHILS NFR BLD: 65.5 %
NITRITE UR QL STRIP.AUTO: NEGATIVE
PH UR STRIP.AUTO: 7.5 [PH] (ref 5–8)
PLATELET # BLD AUTO: 170 K/UL (ref 135–450)
PMV BLD AUTO: 7.4 FL (ref 5–10.5)
POTASSIUM SERPL-SCNC: 3.7 MMOL/L (ref 3.5–5.1)
PROT SERPL-MCNC: 6.5 G/DL (ref 6.4–8.2)
PROT UR STRIP.AUTO-MCNC: NEGATIVE MG/DL
PROTHROMBIN TIME: 31.2 SEC (ref 11.5–14.8)
RBC # BLD AUTO: 5.11 M/UL (ref 4.2–5.9)
RBC CLUMPS #/AREA URNS AUTO: 6 /HPF (ref 0–4)
SODIUM SERPL-SCNC: 139 MMOL/L (ref 136–145)
SP GR UR STRIP.AUTO: 1.01 (ref 1–1.03)
UA COMPLETE W REFLEX CULTURE PNL UR: ABNORMAL
UA DIPSTICK W REFLEX MICRO PNL UR: YES
URN SPEC COLLECT METH UR: ABNORMAL
UROBILINOGEN UR STRIP-ACNC: 0.2 E.U./DL
WBC # BLD AUTO: 6.1 K/UL (ref 4–11)
WBC #/AREA URNS AUTO: 5 /HPF (ref 0–5)

## 2023-12-14 PROCEDURE — 81001 URINALYSIS AUTO W/SCOPE: CPT

## 2023-12-14 PROCEDURE — 83690 ASSAY OF LIPASE: CPT

## 2023-12-14 PROCEDURE — 6360000002 HC RX W HCPCS: Performed by: EMERGENCY MEDICINE

## 2023-12-14 PROCEDURE — 96374 THER/PROPH/DIAG INJ IV PUSH: CPT

## 2023-12-14 PROCEDURE — 96375 TX/PRO/DX INJ NEW DRUG ADDON: CPT

## 2023-12-14 PROCEDURE — 6360000004 HC RX CONTRAST MEDICATION: Performed by: EMERGENCY MEDICINE

## 2023-12-14 PROCEDURE — 85025 COMPLETE CBC W/AUTO DIFF WBC: CPT

## 2023-12-14 PROCEDURE — 36415 COLL VENOUS BLD VENIPUNCTURE: CPT

## 2023-12-14 PROCEDURE — 74177 CT ABD & PELVIS W/CONTRAST: CPT

## 2023-12-14 PROCEDURE — 80053 COMPREHEN METABOLIC PANEL: CPT

## 2023-12-14 PROCEDURE — 99285 EMERGENCY DEPT VISIT HI MDM: CPT

## 2023-12-14 PROCEDURE — 85610 PROTHROMBIN TIME: CPT

## 2023-12-14 RX ORDER — MORPHINE SULFATE 10 MG/ML
6 INJECTION, SOLUTION INTRAMUSCULAR; INTRAVENOUS ONCE
Status: DISCONTINUED | OUTPATIENT
Start: 2023-12-14 | End: 2023-12-14

## 2023-12-14 RX ORDER — ONDANSETRON 2 MG/ML
4 INJECTION INTRAMUSCULAR; INTRAVENOUS ONCE
Status: COMPLETED | OUTPATIENT
Start: 2023-12-14 | End: 2023-12-14

## 2023-12-14 RX ORDER — ONDANSETRON 4 MG/1
4 TABLET, ORALLY DISINTEGRATING ORAL 3 TIMES DAILY PRN
Qty: 21 TABLET | Refills: 0 | Status: SHIPPED | OUTPATIENT
Start: 2023-12-14

## 2023-12-14 RX ORDER — ALBUTEROL SULFATE 2.5 MG/3ML
SOLUTION RESPIRATORY (INHALATION)
Qty: 150 ML | Refills: 11 | Status: SHIPPED | OUTPATIENT
Start: 2023-12-14

## 2023-12-14 RX ADMIN — IOPAMIDOL 75 ML: 755 INJECTION, SOLUTION INTRAVENOUS at 13:49

## 2023-12-14 RX ADMIN — HYDROMORPHONE HYDROCHLORIDE 0.25 MG: 1 INJECTION, SOLUTION INTRAMUSCULAR; INTRAVENOUS; SUBCUTANEOUS at 13:35

## 2023-12-14 RX ADMIN — ONDANSETRON 4 MG: 2 INJECTION INTRAMUSCULAR; INTRAVENOUS at 13:35

## 2023-12-14 ASSESSMENT — PAIN SCALES - GENERAL
PAINLEVEL_OUTOF10: 10
PAINLEVEL_OUTOF10: 10

## 2023-12-14 NOTE — ED PROVIDER NOTES
embolism ([address]). Records Reviewed: NA      Disposition Considerations: Internal medicine office visit note from 11/22/2023 for left thigh pain, warfarin therapy and essential hypertension, ED visit note on 11/14/2023 for dyspnea on exertion and palpitations, ED visit note on 9/28/2023 for generalized abdominal pain  I am the Primary Clinician of Record. FINAL IMPRESSION    1. Renal cyst, right    2. Lower abdominal pain    3. Nausea           DISPOSITION/PLAN:   Pt discharged home in stable condition. PATIENT REFERRED TO:   Eleazar Hurst MD  [facility] [address]  [phone]    Call today  For a follow up appointment. Sree Randolph [address]    Call today  For a follow up appointment. Yamilet Rock, [facility] [address]  [phone]    Call today  For a follow up appointment.        DISCHARGE MEDICATIONS:   New Prescriptions    ONDANSETRON (ZOFRAN-ODT) 4 MG DISINTEGRATING TABLET    Take 1 tablet by mouth 3 times daily as needed for Nausea or Vomiting        DISCONTINUED MEDICATIONS:   Discontinued Medications    ONDANSETRON (ZOFRAN-ODT) 4 MG DISINTEGRATING TABLET    Take 1 tablet by mouth 3 times daily as needed for Nausea or Vomiting              (Please note that portions of this note were completed with a voice recognition program.  Efforts were made to edit the dictations but occasionally words are mis-transcribed.)       Keyanna Blas MD (electronically signed)              Keyanna Blas MD  12/14/23 4975

## 2023-12-14 NOTE — ED NOTES
Pt arrived to the ed via [facility], Pt states Abdominal pain left and right side, pt states \"light headed when using the bathroom\"  pt states that pain is a 10/10 on pain scale, pt is alert and orient, vss afebrile      Tom Solis RN  12/14/23 [id number]

## 2023-12-14 NOTE — ED NOTES
[facility] Cardiology Consultation    Jamison Le Patient Status:  Inpatient    1931 MRN [id number]   [facility] 7NE-A Attending Chaparro Grier MD   Hosp Day # 0 PCP Prasanna Jamison MD     Reason for Consultation:  CAD.   HTN      Hist Pt returned ftom ct scan at this time      Srinivasa Wong RN  12/14/23 4932 [Pregabalin]     CONFUSION    Comment:After two doses of Lyrica, pt c/o  blurred vision             , inabilty to focus, unable to get out of bed  Sulfa Antibiotics           Comment: In childhood - doesn't know reaction    Medications:  • metoprolol Lemuel Reyes Extremities: No LE edema. No clubbing or cyanosis  Neurologic: moves all 4 to command. Limited verbal response. .  Skin: Warm and dry.      Labs:      HEM:  Recent Labs   Lab  01/22/19   2241   WBC  10.7   HGB  13.4   HCT  37.8   PLT  247.0       Chem:

## 2023-12-14 NOTE — DISCHARGE INSTRUCTIONS
Call today or tomorrow to follow up with Vandana Mckenzie MD  in 3-4 days. Also be sure to follow-up with your urologist and GI specialist as discussed in the emergency room. Avoid eating any spicy food and milk type products, drinks that have caffeine in it. Use ibuprofen or Tylenol (unless prescribed medications that have Tylenol in it) for pain. You can take over the counter Ibuprofen (advil) tablets (4 every 8 hours or 3 every 6 hours or 2 every 4 hours)    Return to the Emergency Department within 8 - 12 hours if you have any of the following: worsening of pain in your abdomen, no food sounds good to you, you continue to vomit, you develop a fever, pain goes to your back or testicles, or you have pain in the abdomen when going over a bump in the car or when you jump up and down, or for any other concerns you may have.

## 2023-12-27 ENCOUNTER — ANTI-COAG VISIT (OUTPATIENT)
Dept: PHARMACY | Age: 63
End: 2023-12-27
Attending: INTERNAL MEDICINE
Payer: COMMERCIAL

## 2023-12-27 DIAGNOSIS — Z79.01 CHRONIC ANTICOAGULATION: ICD-10-CM

## 2023-12-27 DIAGNOSIS — I26.99 OTHER ACUTE PULMONARY EMBOLISM, UNSPECIFIED WHETHER ACUTE COR PULMONALE PRESENT (HCC): Primary | ICD-10-CM

## 2023-12-27 LAB — INTERNATIONAL NORMALIZATION RATIO, POC: 2.8

## 2023-12-27 PROCEDURE — 85610 PROTHROMBIN TIME: CPT

## 2023-12-27 PROCEDURE — 99211 OFF/OP EST MAY X REQ PHY/QHP: CPT

## 2023-12-27 NOTE — PROGRESS NOTES
Vanesa Allred is a 58 y.o. here for warfarin management. Hugo Murdock had an INR test today. Results were reviewed and appropriate warfarin management was completed. Patient verifies current warfarin dosing regimen: Yes     Warfarin medication reviewed and updated on the patient 's home medication list: Yes   All other medications reviewed and updated on the patient 's home medication list: Yes     Lab Results   Component Value Date    INR 2.8 2023    INR 3.03 (H) 2023    INR 3.2 2023     Patient Findings       Positives:  Missed doses, Change in medications    Negatives:  Signs/symptoms of thrombosis, Signs/symptoms of bleeding, Laboratory test error suspected, Change in health, Change in alcohol use, Change in activity, Upcoming invasive procedure, Emergency department visit, Upcoming dental procedure, Extra doses, Change in diet/appetite, Hospital admission, Bruising, Other complaints    Comments:  Took levaquin x7 days, finishes tomorrow. Skipped 2 warfarin doses to counteract this          Anticoagulation Summary  As of 2023      INR goal:  2.0-3.0   TTR:  37.2 % (8 y)   INR used for dosin.8 (2023)   Warfarin maintenance plan:  5 mg (5 mg x 1) every Fri; 2.5 mg (5 mg x 0.5) all other days   Weekly warfarin total:  20 mg   Plan last modified:  Jaylen Minaya, [facility] (2023)   Next INR check:  2024   Priority:  Maintenance   Target end date:   Indefinite    Indications    Pulmonary embolus ([address]) [I26.99]  Chronic anticoagulation [Z79.01]                 Anticoagulation Episode Summary       INR check location:  Anticoagulation Clinic    Preferred lab:      Send INR reminders to:  [address] [place] [address] [place] [employer]AFF    Comments:  EPIC - finger stick every 2-3 weeks  Consent: 23          Anticoagulation Care Providers       Provider Role Specialty Phone number    Vandana Mckenzie MD Referring Internal Medicine [phone]          There were no vitals taken for this

## 2024-01-01 ENCOUNTER — HOSPITAL ENCOUNTER (INPATIENT)
Age: 64
LOS: 4 days | Discharge: HOME | End: 2024-01-05
Attending: EMERGENCY MEDICINE | Admitting: STUDENT IN AN ORGANIZED HEALTH CARE EDUCATION/TRAINING PROGRAM
Payer: COMMERCIAL

## 2024-01-01 ENCOUNTER — APPOINTMENT (OUTPATIENT)
Dept: CT IMAGING | Age: 64
End: 2024-01-01
Payer: COMMERCIAL

## 2024-01-01 DIAGNOSIS — R10.9 FLANK PAIN: Primary | ICD-10-CM

## 2024-01-01 PROBLEM — N28.1 RENAL CYST, RIGHT: Status: ACTIVE | Noted: 2024-01-01

## 2024-01-01 LAB
ALBUMIN SERPL-MCNC: 4.1 G/DL (ref 3.4–5)
ALBUMIN/GLOB SERPL: 2.2 {RATIO} (ref 1.1–2.2)
ALP SERPL-CCNC: 100 U/L (ref 40–129)
ALT SERPL-CCNC: 33 U/L (ref 10–40)
AMORPHOUS: PRESENT
ANION GAP SERPL CALCULATED.3IONS-SCNC: 10 MMOL/L (ref 3–16)
AST SERPL-CCNC: 37 U/L (ref 15–37)
BACTERIA URNS QL MICRO: ABNORMAL /HPF
BASOPHILS # BLD: 0 K/UL (ref 0–0.2)
BASOPHILS NFR BLD: 0.7 %
BILIRUB SERPL-MCNC: 0.7 MG/DL (ref 0–1)
BILIRUB UR QL STRIP.AUTO: ABNORMAL
BUN SERPL-MCNC: 11 MG/DL (ref 7–20)
CALCIUM OXALATE CRYSTALS: PRESENT
CALCIUM SERPL-MCNC: 10.6 MG/DL (ref 8.3–10.6)
CHLORIDE SERPL-SCNC: 101 MMOL/L (ref 99–110)
CLARITY UR: CLEAR
CO2 SERPL-SCNC: 30 MMOL/L (ref 21–32)
COLOR UR: ABNORMAL
CREAT SERPL-MCNC: 1 MG/DL (ref 0.8–1.3)
DEPRECATED RDW RBC AUTO: 13.8 % (ref 12.4–15.4)
EOSINOPHIL # BLD: 0.1 K/UL (ref 0–0.6)
EOSINOPHIL NFR BLD: 2.6 %
EPI CELLS #/AREA URNS AUTO: 2 /HPF (ref 0–5)
GFR SERPLBLD CREATININE-BSD FMLA CKD-EPI: >60 ML/MIN/{1.73_M2}
GLUCOSE SERPL-MCNC: 124 MG/DL (ref 70–99)
GLUCOSE UR STRIP.AUTO-MCNC: NEGATIVE MG/DL
HCT VFR BLD AUTO: 45.5 % (ref 40.5–52.5)
HGB BLD-MCNC: 15.2 G/DL (ref 13.5–17.5)
HGB UR QL STRIP.AUTO: NEGATIVE
HYALINE CASTS #/AREA URNS AUTO: 2 /LPF (ref 0–8)
KETONES UR STRIP.AUTO-MCNC: ABNORMAL MG/DL
LEUKOCYTE ESTERASE UR QL STRIP.AUTO: ABNORMAL
LYMPHOCYTES # BLD: 1.5 K/UL (ref 1–5.1)
LYMPHOCYTES NFR BLD: 27.2 %
MCH RBC QN AUTO: 30.8 PG (ref 26–34)
MCHC RBC AUTO-ENTMCNC: 33.5 G/DL (ref 31–36)
MCV RBC AUTO: 92 FL (ref 80–100)
MONOCYTES # BLD: 0.6 K/UL (ref 0–1.3)
MONOCYTES NFR BLD: 10.4 %
MUCUS: PRESENT
NEUTROPHILS # BLD: 3.4 K/UL (ref 1.7–7.7)
NEUTROPHILS NFR BLD: 59.1 %
NITRITE UR QL STRIP.AUTO: POSITIVE
PH UR STRIP.AUTO: 6 [PH] (ref 5–8)
PLATELET # BLD AUTO: 154 K/UL (ref 135–450)
PMV BLD AUTO: 7.8 FL (ref 5–10.5)
POTASSIUM SERPL-SCNC: 3.7 MMOL/L (ref 3.5–5.1)
PROT SERPL-MCNC: 6 G/DL (ref 6.4–8.2)
PROT UR STRIP.AUTO-MCNC: 30 MG/DL
RBC # BLD AUTO: 4.95 M/UL (ref 4.2–5.9)
RBC CLUMPS #/AREA URNS AUTO: 2 /HPF (ref 0–4)
SODIUM SERPL-SCNC: 141 MMOL/L (ref 136–145)
SP GR UR STRIP.AUTO: 1.02 (ref 1–1.03)
UA DIPSTICK W REFLEX MICRO PNL UR: YES
URN SPEC COLLECT METH UR: ABNORMAL
UROBILINOGEN UR STRIP-ACNC: 1 E.U./DL
WBC # BLD AUTO: 5.7 K/UL (ref 4–11)
WBC #/AREA URNS AUTO: 7 /HPF (ref 0–5)

## 2024-01-01 PROCEDURE — 99285 EMERGENCY DEPT VISIT HI MDM: CPT

## 2024-01-01 PROCEDURE — 6360000004 HC RX CONTRAST MEDICATION: Performed by: EMERGENCY MEDICINE

## 2024-01-01 PROCEDURE — 1200000000 HC SEMI PRIVATE

## 2024-01-01 PROCEDURE — 6370000000 HC RX 637 (ALT 250 FOR IP): Performed by: INTERNAL MEDICINE

## 2024-01-01 PROCEDURE — 2580000003 HC RX 258: Performed by: INTERNAL MEDICINE

## 2024-01-01 PROCEDURE — 81001 URINALYSIS AUTO W/SCOPE: CPT

## 2024-01-01 PROCEDURE — 85025 COMPLETE CBC W/AUTO DIFF WBC: CPT

## 2024-01-01 PROCEDURE — 96372 THER/PROPH/DIAG INJ SC/IM: CPT

## 2024-01-01 PROCEDURE — 6370000000 HC RX 637 (ALT 250 FOR IP): Performed by: EMERGENCY MEDICINE

## 2024-01-01 PROCEDURE — 74177 CT ABD & PELVIS W/CONTRAST: CPT

## 2024-01-01 PROCEDURE — 80053 COMPREHEN METABOLIC PANEL: CPT

## 2024-01-01 PROCEDURE — 6360000002 HC RX W HCPCS: Performed by: EMERGENCY MEDICINE

## 2024-01-01 PROCEDURE — 6360000002 HC RX W HCPCS: Performed by: INTERNAL MEDICINE

## 2024-01-01 RX ORDER — ACETAMINOPHEN 500 MG
1000 TABLET ORAL EVERY 8 HOURS PRN
Status: DISCONTINUED | OUTPATIENT
Start: 2024-01-01 | End: 2024-01-05 | Stop reason: HOSPADM

## 2024-01-01 RX ORDER — PRAVASTATIN SODIUM 40 MG
40 TABLET ORAL NIGHTLY
Status: DISCONTINUED | OUTPATIENT
Start: 2024-01-01 | End: 2024-01-05 | Stop reason: HOSPADM

## 2024-01-01 RX ORDER — METOPROLOL SUCCINATE 25 MG/1
25 TABLET, EXTENDED RELEASE ORAL NIGHTLY
Status: DISCONTINUED | OUTPATIENT
Start: 2024-01-01 | End: 2024-01-02 | Stop reason: SDUPTHER

## 2024-01-01 RX ORDER — ONDANSETRON 4 MG/1
4 TABLET, ORALLY DISINTEGRATING ORAL ONCE
Status: COMPLETED | OUTPATIENT
Start: 2024-01-01 | End: 2024-01-01

## 2024-01-01 RX ORDER — KETOROLAC TROMETHAMINE 15 MG/ML
15 INJECTION, SOLUTION INTRAMUSCULAR; INTRAVENOUS ONCE
Status: COMPLETED | OUTPATIENT
Start: 2024-01-01 | End: 2024-01-01

## 2024-01-01 RX ORDER — PREGABALIN 75 MG/1
75 CAPSULE ORAL DAILY
Status: DISCONTINUED | OUTPATIENT
Start: 2024-01-02 | End: 2024-01-02 | Stop reason: SDUPTHER

## 2024-01-01 RX ORDER — ASPIRIN 81 MG/1
81 TABLET, CHEWABLE ORAL DAILY
Status: DISCONTINUED | OUTPATIENT
Start: 2024-01-02 | End: 2024-01-03

## 2024-01-01 RX ORDER — HYDROCODONE BITARTRATE AND ACETAMINOPHEN 5; 325 MG/1; MG/1
1 TABLET ORAL EVERY 4 HOURS PRN
Status: DISCONTINUED | OUTPATIENT
Start: 2024-01-01 | End: 2024-01-01

## 2024-01-01 RX ORDER — PREGABALIN 75 MG/1
150 CAPSULE ORAL NIGHTLY
Status: DISCONTINUED | OUTPATIENT
Start: 2024-01-01 | End: 2024-01-02 | Stop reason: SDUPTHER

## 2024-01-01 RX ORDER — SODIUM CHLORIDE 0.9 % (FLUSH) 0.9 %
5-40 SYRINGE (ML) INJECTION PRN
Status: DISCONTINUED | OUTPATIENT
Start: 2024-01-01 | End: 2024-01-05 | Stop reason: HOSPADM

## 2024-01-01 RX ORDER — POLYETHYLENE GLYCOL 3350 17 G/17G
17 POWDER, FOR SOLUTION ORAL DAILY PRN
Status: DISCONTINUED | OUTPATIENT
Start: 2024-01-01 | End: 2024-01-05 | Stop reason: HOSPADM

## 2024-01-01 RX ORDER — SODIUM CHLORIDE 9 MG/ML
INJECTION, SOLUTION INTRAVENOUS PRN
Status: DISCONTINUED | OUTPATIENT
Start: 2024-01-01 | End: 2024-01-05 | Stop reason: HOSPADM

## 2024-01-01 RX ORDER — DIPHENHYDRAMINE HYDROCHLORIDE 50 MG/ML
25 INJECTION INTRAMUSCULAR; INTRAVENOUS ONCE
Status: DISCONTINUED | OUTPATIENT
Start: 2024-01-01 | End: 2024-01-01

## 2024-01-01 RX ORDER — ALBUTEROL SULFATE 90 UG/1
2 AEROSOL, METERED RESPIRATORY (INHALATION) EVERY 6 HOURS PRN
Status: DISCONTINUED | OUTPATIENT
Start: 2024-01-01 | End: 2024-01-05 | Stop reason: HOSPADM

## 2024-01-01 RX ORDER — FUROSEMIDE 20 MG/1
10 TABLET ORAL DAILY
Status: DISCONTINUED | OUTPATIENT
Start: 2024-01-02 | End: 2024-01-04

## 2024-01-01 RX ORDER — OXYCODONE HYDROCHLORIDE AND ACETAMINOPHEN 5; 325 MG/1; MG/1
1 TABLET ORAL EVERY 4 HOURS PRN
Status: DISCONTINUED | OUTPATIENT
Start: 2024-01-01 | End: 2024-01-05 | Stop reason: HOSPADM

## 2024-01-01 RX ORDER — TAMSULOSIN HYDROCHLORIDE 0.4 MG/1
0.4 CAPSULE ORAL 2 TIMES DAILY
Status: DISCONTINUED | OUTPATIENT
Start: 2024-01-01 | End: 2024-01-05 | Stop reason: HOSPADM

## 2024-01-01 RX ORDER — SODIUM CHLORIDE 0.9 % (FLUSH) 0.9 %
5-40 SYRINGE (ML) INJECTION EVERY 12 HOURS SCHEDULED
Status: DISCONTINUED | OUTPATIENT
Start: 2024-01-01 | End: 2024-01-05 | Stop reason: HOSPADM

## 2024-01-01 RX ORDER — METOPROLOL SUCCINATE 25 MG/1
37.5 TABLET, EXTENDED RELEASE ORAL DAILY
Status: DISCONTINUED | OUTPATIENT
Start: 2024-01-02 | End: 2024-01-02 | Stop reason: SDUPTHER

## 2024-01-01 RX ORDER — PANTOPRAZOLE SODIUM 40 MG/1
40 TABLET, DELAYED RELEASE ORAL
Status: DISCONTINUED | OUTPATIENT
Start: 2024-01-02 | End: 2024-01-02

## 2024-01-01 RX ADMIN — POLYETHYLENE GLYCOL 3350 17 G: 17 POWDER, FOR SOLUTION ORAL at 23:42

## 2024-01-01 RX ADMIN — ONDANSETRON 4 MG: 4 TABLET, ORALLY DISINTEGRATING ORAL at 14:09

## 2024-01-01 RX ADMIN — KETOROLAC TROMETHAMINE 15 MG: 15 INJECTION, SOLUTION INTRAMUSCULAR; INTRAVENOUS at 14:08

## 2024-01-01 RX ADMIN — IOPAMIDOL 75 ML: 755 INJECTION, SOLUTION INTRAVENOUS at 15:55

## 2024-01-01 RX ADMIN — HYDROMORPHONE HYDROCHLORIDE 0.5 MG: 1 INJECTION, SOLUTION INTRAMUSCULAR; INTRAVENOUS; SUBCUTANEOUS at 18:00

## 2024-01-01 RX ADMIN — AMPICILLIN SODIUM AND SULBACTAM SODIUM 3000 MG: 2; 1 INJECTION, POWDER, FOR SOLUTION INTRAMUSCULAR; INTRAVENOUS at 21:19

## 2024-01-01 RX ADMIN — HYDROMORPHONE HYDROCHLORIDE 0.5 MG: 1 INJECTION, SOLUTION INTRAMUSCULAR; INTRAVENOUS; SUBCUTANEOUS at 14:09

## 2024-01-01 ASSESSMENT — PAIN SCALES - GENERAL
PAINLEVEL_OUTOF10: 8
PAINLEVEL_OUTOF10: 10
PAINLEVEL_OUTOF10: 9

## 2024-01-01 ASSESSMENT — PAIN DESCRIPTION - DESCRIPTORS: DESCRIPTORS: SHARP

## 2024-01-01 ASSESSMENT — PAIN - FUNCTIONAL ASSESSMENT: PAIN_FUNCTIONAL_ASSESSMENT: ACTIVITIES ARE NOT PREVENTED

## 2024-01-01 ASSESSMENT — PAIN DESCRIPTION - FREQUENCY: FREQUENCY: INTERMITTENT

## 2024-01-01 ASSESSMENT — PAIN DESCRIPTION - ORIENTATION: ORIENTATION: RIGHT;LEFT

## 2024-01-01 ASSESSMENT — PAIN DESCRIPTION - PAIN TYPE: TYPE: ACUTE PAIN

## 2024-01-01 ASSESSMENT — PAIN DESCRIPTION - LOCATION
LOCATION: ABDOMEN
LOCATION: FLANK

## 2024-01-01 NOTE — ED PROVIDER NOTES
file   Social History Narrative    Not on file     Social Determinants of Health     Financial Resource Strain: Low Risk  (2/28/2023)    Overall Financial Resource Strain (CARDIA)     Difficulty of Paying Living Expenses: Not hard at all   Food Insecurity: Not on file (2/28/2023)   Transportation Needs: Unknown (2/28/2023)    PRAPARE - Transportation     Lack of Transportation (Medical): Not on file     Lack of Transportation (Non-Medical): No   Physical Activity: Not on file   Stress: Not on file   Social Connections: Not on file   Intimate Partner Violence: Not on file   Housing Stability: Unknown (2/28/2023)    Housing Stability Vital Sign     Unable to Pay for Housing in the Last Year: Not on file     Number of Places Lived in the Last Year: Not on file     Unstable Housing in the Last Year: No     Current Facility-Administered Medications   Medication Dose Route Frequency Provider Last Rate Last Admin    diphenhydrAMINE (BENADRYL) injection 25 mg  25 mg IntraVENous Once Fred Blank MD        cefTRIAXone (ROCEPHIN) 1,000 mg in sodium chloride 0.9 % 50 mL IVPB (mini-bag)  1,000 mg IntraVENous Once Fred Blank MD         Current Outpatient Medications   Medication Sig Dispense Refill    sacubitril-valsartan (ENTRESTO) 24-26 MG per tablet Take 1 tablet by mouth 2 times daily 60 tablet 3    albuterol (PROVENTIL) (2.5 MG/3ML) 0.083% nebulizer solution PLACE 1 VIAL (3 ML) IN NEBULIZER AND INHALE CONTENTS EVERY SIX HOURS IF NEEDED FOR WHEEZING 150 mL 11    ondansetron (ZOFRAN-ODT) 4 MG disintegrating tablet Take 1 tablet by mouth 3 times daily as needed for Nausea or Vomiting 21 tablet 0    azithromycin (ZITHROMAX) 250 MG tablet Take daily for ten days. 10 tablet 0    pregabalin (LYRICA) 75 MG capsule TAKE 1 CAPSULE BY MOUTH IN THE  MORNING AND 2 CAPSULES IN THE  EVENING 270 capsule 0    zolpidem (AMBIEN) 10 MG tablet TAKE 1 TABLET BY MOUTH AT NIGHT  AS NEEDED FOR SLEEP 90 tablet 0    furosemide (LASIX) 20 MG  expressed understanding and agreement with the plan.     PROCEDURES  None    CRITICAL CARE  N/A    CLINICAL IMPRESSION  1. Intractable abdominal pain        DISPOSITION  Decision To Admit 01/01/2024 07:43:57 PM     Fred Blank MD    Note: This chart was created using voice recognition dictation software. Efforts were made by me to ensure accuracy, however some errors may be present due to limitations of this technology and occasionally words are not transcribed correctly.      Fred Blank MD  01/01/24 2027

## 2024-01-02 ENCOUNTER — APPOINTMENT (OUTPATIENT)
Dept: NUCLEAR MEDICINE | Age: 64
End: 2024-01-02
Payer: COMMERCIAL

## 2024-01-02 LAB
ANION GAP SERPL CALCULATED.3IONS-SCNC: 5 MMOL/L (ref 3–16)
BASOPHILS # BLD: 0 K/UL (ref 0–0.2)
BASOPHILS NFR BLD: 0.5 %
BUN SERPL-MCNC: 11 MG/DL (ref 7–20)
CALCIUM SERPL-MCNC: 9.4 MG/DL (ref 8.3–10.6)
CHLORIDE SERPL-SCNC: 102 MMOL/L (ref 99–110)
CO2 SERPL-SCNC: 31 MMOL/L (ref 21–32)
CREAT SERPL-MCNC: 1.1 MG/DL (ref 0.8–1.3)
DEPRECATED RDW RBC AUTO: 13.9 % (ref 12.4–15.4)
EOSINOPHIL # BLD: 0.2 K/UL (ref 0–0.6)
EOSINOPHIL NFR BLD: 3.3 %
GFR SERPLBLD CREATININE-BSD FMLA CKD-EPI: >60 ML/MIN/{1.73_M2}
GLUCOSE SERPL-MCNC: 146 MG/DL (ref 70–99)
HCT VFR BLD AUTO: 43.7 % (ref 40.5–52.5)
HGB BLD-MCNC: 14.7 G/DL (ref 13.5–17.5)
INR PPP: 2.4 (ref 0.84–1.16)
LYMPHOCYTES # BLD: 1 K/UL (ref 1–5.1)
LYMPHOCYTES NFR BLD: 18.2 %
MCH RBC QN AUTO: 31.1 PG (ref 26–34)
MCHC RBC AUTO-ENTMCNC: 33.6 G/DL (ref 31–36)
MCV RBC AUTO: 92.5 FL (ref 80–100)
MONOCYTES # BLD: 0.5 K/UL (ref 0–1.3)
MONOCYTES NFR BLD: 8.1 %
NEUTROPHILS # BLD: 4 K/UL (ref 1.7–7.7)
NEUTROPHILS NFR BLD: 69.9 %
PLATELET # BLD AUTO: 161 K/UL (ref 135–450)
PMV BLD AUTO: 8.4 FL (ref 5–10.5)
POTASSIUM SERPL-SCNC: 4 MMOL/L (ref 3.5–5.1)
PROTHROMBIN TIME: 26.1 SEC (ref 11.5–14.8)
RBC # BLD AUTO: 4.72 M/UL (ref 4.2–5.9)
SODIUM SERPL-SCNC: 138 MMOL/L (ref 136–145)
WBC # BLD AUTO: 5.8 K/UL (ref 4–11)

## 2024-01-02 PROCEDURE — 2580000003 HC RX 258: Performed by: INTERNAL MEDICINE

## 2024-01-02 PROCEDURE — 78708 K FLOW/FUNCT IMAGE W/DRUG: CPT

## 2024-01-02 PROCEDURE — 6360000002 HC RX W HCPCS: Performed by: INTERNAL MEDICINE

## 2024-01-02 PROCEDURE — 36415 COLL VENOUS BLD VENIPUNCTURE: CPT

## 2024-01-02 PROCEDURE — 6370000000 HC RX 637 (ALT 250 FOR IP): Performed by: INTERNAL MEDICINE

## 2024-01-02 PROCEDURE — 1200000000 HC SEMI PRIVATE

## 2024-01-02 PROCEDURE — 99223 1ST HOSP IP/OBS HIGH 75: CPT | Performed by: STUDENT IN AN ORGANIZED HEALTH CARE EDUCATION/TRAINING PROGRAM

## 2024-01-02 PROCEDURE — 3430000000 HC RX DIAGNOSTIC RADIOPHARMACEUTICAL

## 2024-01-02 PROCEDURE — 85610 PROTHROMBIN TIME: CPT

## 2024-01-02 PROCEDURE — A9562 TC99M MERTIATIDE: HCPCS

## 2024-01-02 PROCEDURE — 80048 BASIC METABOLIC PNL TOTAL CA: CPT

## 2024-01-02 PROCEDURE — 6360000002 HC RX W HCPCS: Performed by: STUDENT IN AN ORGANIZED HEALTH CARE EDUCATION/TRAINING PROGRAM

## 2024-01-02 PROCEDURE — 6370000000 HC RX 637 (ALT 250 FOR IP): Performed by: STUDENT IN AN ORGANIZED HEALTH CARE EDUCATION/TRAINING PROGRAM

## 2024-01-02 PROCEDURE — 85025 COMPLETE CBC W/AUTO DIFF WBC: CPT

## 2024-01-02 RX ORDER — OMEPRAZOLE 20 MG/1
20 CAPSULE, DELAYED RELEASE ORAL DAILY
Status: DISCONTINUED | OUTPATIENT
Start: 2024-01-02 | End: 2024-01-05 | Stop reason: HOSPADM

## 2024-01-02 RX ORDER — ALBUTEROL SULFATE 2.5 MG/3ML
2.5 SOLUTION RESPIRATORY (INHALATION)
Status: DISCONTINUED | OUTPATIENT
Start: 2024-01-02 | End: 2024-01-02

## 2024-01-02 RX ORDER — ALBUTEROL SULFATE 2.5 MG/3ML
2.5 SOLUTION RESPIRATORY (INHALATION) EVERY 4 HOURS PRN
Status: DISCONTINUED | OUTPATIENT
Start: 2024-01-02 | End: 2024-01-05 | Stop reason: HOSPADM

## 2024-01-02 RX ORDER — SODIUM CHLORIDE 9 MG/ML
INJECTION, SOLUTION INTRAVENOUS CONTINUOUS
Status: DISCONTINUED | OUTPATIENT
Start: 2024-01-02 | End: 2024-01-05

## 2024-01-02 RX ORDER — PREGABALIN 75 MG/1
75 CAPSULE ORAL DAILY
Status: DISCONTINUED | OUTPATIENT
Start: 2024-01-03 | End: 2024-01-03

## 2024-01-02 RX ORDER — ZOLPIDEM TARTRATE 5 MG/1
5 TABLET ORAL NIGHTLY PRN
Status: DISCONTINUED | OUTPATIENT
Start: 2024-01-02 | End: 2024-01-05 | Stop reason: HOSPADM

## 2024-01-02 RX ORDER — METOPROLOL SUCCINATE 25 MG/1
25 TABLET, EXTENDED RELEASE ORAL NIGHTLY
Status: DISCONTINUED | OUTPATIENT
Start: 2024-01-02 | End: 2024-01-05 | Stop reason: HOSPADM

## 2024-01-02 RX ORDER — PREGABALIN 75 MG/1
150 CAPSULE ORAL NIGHTLY
Status: DISCONTINUED | OUTPATIENT
Start: 2024-01-02 | End: 2024-01-05 | Stop reason: HOSPADM

## 2024-01-02 RX ORDER — ONDANSETRON 2 MG/ML
4 INJECTION INTRAMUSCULAR; INTRAVENOUS EVERY 8 HOURS PRN
Status: DISCONTINUED | OUTPATIENT
Start: 2024-01-02 | End: 2024-01-05 | Stop reason: HOSPADM

## 2024-01-02 RX ORDER — METOPROLOL SUCCINATE 25 MG/1
37.5 TABLET, EXTENDED RELEASE ORAL DAILY
Status: DISCONTINUED | OUTPATIENT
Start: 2024-01-03 | End: 2024-01-05 | Stop reason: HOSPADM

## 2024-01-02 RX ORDER — WARFARIN SODIUM 2.5 MG/1
2.5 TABLET ORAL
Status: COMPLETED | OUTPATIENT
Start: 2024-01-02 | End: 2024-01-02

## 2024-01-02 RX ADMIN — METOPROLOL SUCCINATE 25 MG: 25 TABLET, EXTENDED RELEASE ORAL at 22:37

## 2024-01-02 RX ADMIN — FUROSEMIDE 10 MG: 20 TABLET ORAL at 08:21

## 2024-01-02 RX ADMIN — AMPICILLIN SODIUM AND SULBACTAM SODIUM 3000 MG: 2; 1 INJECTION, POWDER, FOR SOLUTION INTRAMUSCULAR; INTRAVENOUS at 11:40

## 2024-01-02 RX ADMIN — SODIUM CHLORIDE: 9 INJECTION, SOLUTION INTRAVENOUS at 13:17

## 2024-01-02 RX ADMIN — WARFARIN SODIUM 2.5 MG: 2.5 TABLET ORAL at 22:37

## 2024-01-02 RX ADMIN — ASPIRIN 81 MG 81 MG: 81 TABLET ORAL at 09:19

## 2024-01-02 RX ADMIN — TAMSULOSIN HYDROCHLORIDE 0.4 MG: 0.4 CAPSULE ORAL at 23:55

## 2024-01-02 RX ADMIN — PREGABALIN 75 MG: 25 CAPSULE ORAL at 11:05

## 2024-01-02 RX ADMIN — AMPICILLIN SODIUM AND SULBACTAM SODIUM 3000 MG: 2; 1 INJECTION, POWDER, FOR SOLUTION INTRAMUSCULAR; INTRAVENOUS at 17:46

## 2024-01-02 RX ADMIN — ZOLPIDEM TARTRATE 5 MG: 5 TABLET ORAL at 22:37

## 2024-01-02 RX ADMIN — SODIUM CHLORIDE, PRESERVATIVE FREE 10 ML: 5 INJECTION INTRAVENOUS at 08:37

## 2024-01-02 RX ADMIN — SODIUM CHLORIDE: 9 INJECTION, SOLUTION INTRAVENOUS at 22:40

## 2024-01-02 RX ADMIN — AMPICILLIN SODIUM AND SULBACTAM SODIUM 3000 MG: 2; 1 INJECTION, POWDER, FOR SOLUTION INTRAMUSCULAR; INTRAVENOUS at 04:34

## 2024-01-02 RX ADMIN — METOPROLOL SUCCINATE 37.5 MG: 25 TABLET, FILM COATED, EXTENDED RELEASE ORAL at 11:04

## 2024-01-02 RX ADMIN — TECHNESCAN TC 99M MERTIATIDE 10 MILLICURIE: 1 INJECTION, POWDER, LYOPHILIZED, FOR SOLUTION INTRAVENOUS at 14:05

## 2024-01-02 RX ADMIN — HYDROMORPHONE HYDROCHLORIDE 1 MG: 1 INJECTION, SOLUTION INTRAMUSCULAR; INTRAVENOUS; SUBCUTANEOUS at 12:36

## 2024-01-02 RX ADMIN — PREGABALIN 150 MG: 75 CAPSULE ORAL at 23:55

## 2024-01-02 RX ADMIN — OMEPRAZOLE 20 MG: 20 CAPSULE, DELAYED RELEASE ORAL at 09:30

## 2024-01-02 RX ADMIN — OXYCODONE AND ACETAMINOPHEN 1 TABLET: 5; 325 TABLET ORAL at 05:22

## 2024-01-02 RX ADMIN — OXYCODONE AND ACETAMINOPHEN 1 TABLET: 5; 325 TABLET ORAL at 19:47

## 2024-01-02 RX ADMIN — SACUBITRIL AND VALSARTAN 1 TABLET: 24; 26 TABLET, FILM COATED ORAL at 09:19

## 2024-01-02 RX ADMIN — ONDANSETRON 4 MG: 2 INJECTION INTRAMUSCULAR; INTRAVENOUS at 19:47

## 2024-01-02 RX ADMIN — PRAVASTATIN SODIUM 40 MG: 10 TABLET ORAL at 20:24

## 2024-01-02 RX ADMIN — TAMSULOSIN HYDROCHLORIDE 0.4 MG: 0.4 CAPSULE ORAL at 08:22

## 2024-01-02 RX ADMIN — AMPICILLIN SODIUM AND SULBACTAM SODIUM 3000 MG: 2; 1 INJECTION, POWDER, FOR SOLUTION INTRAMUSCULAR; INTRAVENOUS at 22:45

## 2024-01-02 RX ADMIN — SACUBITRIL AND VALSARTAN 1 TABLET: 24; 26 TABLET, FILM COATED ORAL at 20:24

## 2024-01-02 RX ADMIN — ONDANSETRON 4 MG: 2 INJECTION INTRAMUSCULAR; INTRAVENOUS at 11:06

## 2024-01-02 ASSESSMENT — PAIN SCALES - GENERAL
PAINLEVEL_OUTOF10: 7
PAINLEVEL_OUTOF10: 0
PAINLEVEL_OUTOF10: 9
PAINLEVEL_OUTOF10: 9

## 2024-01-02 ASSESSMENT — PAIN DESCRIPTION - ORIENTATION
ORIENTATION: MID
ORIENTATION: RIGHT

## 2024-01-02 ASSESSMENT — PAIN DESCRIPTION - ONSET: ONSET: PROGRESSIVE

## 2024-01-02 ASSESSMENT — PAIN DESCRIPTION - FREQUENCY: FREQUENCY: INTERMITTENT

## 2024-01-02 ASSESSMENT — PAIN DESCRIPTION - LOCATION
LOCATION: ABDOMEN
LOCATION: FLANK

## 2024-01-02 ASSESSMENT — PAIN DESCRIPTION - PAIN TYPE: TYPE: ACUTE PAIN

## 2024-01-02 ASSESSMENT — PAIN DESCRIPTION - DESCRIPTORS
DESCRIPTORS: ACHING
DESCRIPTORS: STABBING

## 2024-01-02 NOTE — CONSULTS
Consulting Physician: Denis Hall DO     Reason for Consult: R renal cyst    History of Present Illness: Jovon Javier is a 63 y.o. male PMH a-fib, CAD, CHF, HLD, HTN, nephrolithiasis, bladder cancer 4/2019. Patient is well known to the urology service.     Presents to ER w/ right flank pain yesterday, this is his 3rd ER visit for this. CTAP this admission reveals known prominent right peripelvic cyst causing mild compression of renal pelvis resulting mild caliectasis. Cyst has not been obstructing in the past. He recently completed course of levaquin for UTI. Saw Dr. Wallace in office on 12/21/23 for evaluation of right renal cyst which has been stable in size since CTU in 9/2022, with a little more right renal pelvic dilation. Dr. Wallace ordered lasix renal scan to be completed outpatient.    On admission, patient UA is nitrite positive c/w acute infection, serum WBC 5.7, Cr 1.0 (baseline). He has been afebrile, VSS.     Past Medical History:   Past Medical History:   Diagnosis Date    Abnormal CT scan 08/2023    spots on pancrease    Ankylosing spondylitis (HCC)     Atrial fibrillation     Bronchiolitis obliterans     CAD (coronary artery disease)     Cardiomyopathy (HCC)     CHF (congestive heart failure) (HCC)     Chronic anticoagulation     COPD (chronic obstructive pulmonary disease) (HCC)     GERD (gastroesophageal reflux disease)     Hepatitis 1979    Hx of blood clots     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    wears 2L/NC at night    Pneumothorax 2011    Prostatitis     Pulmonary embolism (HCC)        Past Surgical History:  Past Surgical History:   Procedure Laterality Date    BRONCHOSCOPY N/A 11/2/2023    BRONCHOSCOPY WITH BRONCHIOLAR ALVEOLAR LAVAGE performed by Rafael Christy MD at [facility] ENDOSCOPY    CHOLECYSTECTOMY  2007    COLONOSCOPY  09/21/2012    COLONOSCOPY  11/30/2018    CYSTOSCOPY       Partners: Female     Comment: wife   Other Topics Concern    Not on file   Social History Narrative    Not on file     Social Determinants of Health     Financial Resource Strain: Low Risk  (2/28/2023)    Overall Financial Resource Strain (CARDIA)     Difficulty of Paying Living Expenses: Not hard at all   Food Insecurity: Not on file (2/28/2023)   Transportation Needs: Unknown (2/28/2023)    PRAPARE - Transportation     Lack of Transportation (Medical): Not on file     Lack of Transportation (Non-Medical): No   Physical Activity: Not on file   Stress: Not on file   Social Connections: Not on file   Intimate Partner Violence: Not on file   Housing Stability: Unknown (2/28/2023)    Housing Stability Vital Sign     Unable to Pay for Housing in the Last Year: Not on file     Number of Places Lived in the Last Year: Not on file     Unstable Housing in the Last Year: No       Family History:  Family History   Problem Relation Age of Onset    Heart Disease Mother     High Blood Pressure Mother     Dementia Mother     Cancer Father         Pancreatic Ca    Breast Cancer Paternal Aunt     Cancer Paternal Uncle     Cancer Paternal Uncle     Cancer Paternal Uncle        Meds:   Current Facility-Administered Medications: warfarin placeholder: dosing by pharmacy, , Other, RX Placeholder  sodium chloride flush 0.9 % injection 5-40 mL, 5-40 mL, IntraVENous, 2 times per day  sodium chloride flush 0.9 % injection 5-40 mL, 5-40 mL, IntraVENous, PRN  0.9 % sodium chloride infusion, , IntraVENous, PRN  polyethylene glycol (GLYCOLAX) packet 17 g, 17 g, Oral, Daily PRN  acetaminophen (TYLENOL) tablet 1,000 mg, 1,000 mg, Oral, Q8H PRN  ampicillin-sulbactam (UNASYN) 3,000 mg in sodium chloride 0.9 % 100 mL IVPB (mini-bag), 3,000 mg, IntraVENous, Q6H  HYDROmorphone (DILAUDID) injection 1 mg, 1 mg, IntraVENous, Q4H PRN  albuterol sulfate HFA (PROVENTIL;VENTOLIN;PROAIR) 108 (90 Base) MCG/ACT inhaler 2 puff, 2 puff, Inhalation, Q6H

## 2024-01-02 NOTE — PROGRESS NOTES
Dr. Jeffery Estrella called to update about soft BP as well as starting malignance fluids as patient feels he is dehydrated. This RN spoke to Dr. Flores, updated her on patient. See new orders placed.

## 2024-01-02 NOTE — ED NOTES
Call placed to on call provider. VM with call back number left regarding pt's allergy to NORCO, requesting alternative pain medications. As well as pt's BP's trending hypotensive after repositioning, and cuff adjustments. Pt has no current continuous IV fluids.

## 2024-01-02 NOTE — ED NOTES
Pt given turkey sandwich. Pt resting in bed with no signs of acute distress, RR easy and unlabored. Blankets applied, call light in reach, bed in lowest position, updated on plan of care, all questions answered.

## 2024-01-02 NOTE — ED NOTES
Pt called for PRN but when I arrived he said he didn't want an MDI Albuterol, he wanted an HHN. When I told him he didn't have an order for that he said to \"just forget it\".

## 2024-01-02 NOTE — PROGRESS NOTES
Medication Reconciliation    List of medications patient is currently taking is complete.     Source of information: 1. Conversation with patient/RN                                      2. EPIC records     Warfarin dose PTA: 5 mg on Fri and 2.5 mg all other days  Indication: Hx of PE  Goal INR: 2-3    Alfred Espinosa RP, PharmD, BCPS  1/2/2024 11:37 AM

## 2024-01-02 NOTE — H&P
CAPSULE BY MOUTH  DAILY (Patient taking differently: Take 1 capsule by mouth daily TAKE 1 CAPSULE BY MOUTH  DAILY) 90 capsule 3    TRULANCE 3 MG TABS Take 3 mg by mouth daily as needed      Phenazopyridine HCl (AZO-DINE URINARY ANALGESIC PO) Take 2 tablets by mouth daily as needed      guaiFENesin (MUCINEX) 600 MG extended release tablet Take 1 tablet by mouth 2 times daily as needed for Congestion      polyethylene glycol (MIRALAX) PACK packet Take 17 g by mouth nightly      aspirin 81 MG tablet Take 1 tablet by mouth daily         Allergies:  Atrovent nasal spray [ipratropium], Ipratropium bromide hfa, Nitroglycerin, Crestor [rosuvastatin], Doxycycline, Macrobid [nitrofurantoin], Nexletol [bempedoic acid], Sulfa antibiotics, and Vicodin [hydrocodone-acetaminophen]    Social History:   TOBACCO:   reports that he has never smoked. He has been exposed to tobacco smoke. He has never used smokeless tobacco.     ETOH:   reports no history of alcohol use.      Family History:       Problem Relation Age of Onset    Heart Disease Mother     High Blood Pressure Mother     Dementia Mother     Cancer Father         Pancreatic Ca    Breast Cancer Paternal Aunt     Cancer Paternal Uncle     Cancer Paternal Uncle     Cancer Paternal Uncle        ROS:     All other systems reviewed and are negative.    PHYSICAL EXAM:  /66   Pulse 91   Temp 97.5 °F (36.4 °C) (Oral)   Resp 14   Ht 1.676 m (5' 6\")   Wt 100 kg (220 lb 7.4 oz)   SpO2 100%   BMI 35.58 kg/m²       General Appearance:    Alert, cooperative, no distress, appears stated age   Head:    Normocephalic, without obvious abnormality, atraumatic   Eyes:    PERRL, conjunctiva/corneas clear, EOM's intact, fundi     benign, both eyes              Nose:   Nares normal, septum midline, mucosa normal, no drainage    or sinus tenderness   Throat:   Lips, mucosa, and tongue normal; teeth and gums normal   Neck:   Supple, symmetrical, trachea midline, no adenopathy;         01/01/2024 02:52 PM    BACTERIA None Seen 01/01/2024 02:52 PM    CLARITYU Clear 01/01/2024 02:52 PM    SPECGRAV 1.023 01/01/2024 02:52 PM    LEUKOCYTESUR SMALL 01/01/2024 02:52 PM    BLOODU Negative 01/01/2024 02:52 PM    GLUCOSEU Negative 01/01/2024 02:52 PM    GLUCOSEU NEGATIVE 01/24/2012 10:05 PM    AMORPHOUS Present 01/01/2024 02:52 PM     ABG:    Lab Results   Component Value Date/Time    NHD6WFF 25.7 04/11/2013 10:25 PM    BEART -0.6 04/11/2013 10:25 PM    Q4UMUWLM 92.8 04/11/2013 10:25 PM    PHART 7.317 04/11/2013 10:25 PM    THGBART 12.8 04/11/2013 10:25 PM    THGBART 14.1 02/24/2011 05:36 AM    ZQY0IHT 51.7 04/11/2013 10:25 PM    PO2ART 62.2 04/11/2013 10:25 PM    HLY9MPK 52.6 04/11/2013 10:25 PM       Renal scan pending    Assessment & Plan:    Jovon Javier is a 63 y.o. male who was admitted with Renal cyst, right.  Principal Problem:    Renal cyst, right  Recurrent nephrolithiasis  Papillary urothelial neoplasm, history of  - Continue Unasyn, urine culture pending  -Lasix renal scan pending for renal cysts  -Pain medication    History of pancreatitis  - Not currently symptomatic, monitor     GERD  - Continue PPI        Cardiomyopathy status post ICD  Hypertension  -Continue home meds     Hyperlipidemia  - Continue statin     Previous pulmonary embolism  -Continue warfarin     Bronchiolitis obliterans  - Continue home inhaler therapy     Constipation  - Continue MiraLAX        Insomnia  - Continue Ambien     F/E/N: Diabetic diet  PPx: Warfarin  Dispo: Pending improvement in abdominal pain     Jeffery Estrella MD   1/2/2024 3:45 PM    Documentation was done using voice recognition dragon software.  Every effort was made to ensure accuracy; however, inadvertent unintentional computerized transcription errors may be present.

## 2024-01-02 NOTE — PROGRESS NOTES
Dr. Etsrella paged via perfect serve, talked with Dr. Flores's, nurse practitioner. Updated that patient would like an albuterol nebulizer treatment instead of an albuterol inhaler. NP to call this RN back after discussing with Dr. Flores.

## 2024-01-03 PROCEDURE — 6360000002 HC RX W HCPCS: Performed by: STUDENT IN AN ORGANIZED HEALTH CARE EDUCATION/TRAINING PROGRAM

## 2024-01-03 PROCEDURE — 6370000000 HC RX 637 (ALT 250 FOR IP): Performed by: INTERNAL MEDICINE

## 2024-01-03 PROCEDURE — 1200000000 HC SEMI PRIVATE

## 2024-01-03 PROCEDURE — 94640 AIRWAY INHALATION TREATMENT: CPT

## 2024-01-03 PROCEDURE — 94760 N-INVAS EAR/PLS OXIMETRY 1: CPT

## 2024-01-03 PROCEDURE — 99233 SBSQ HOSP IP/OBS HIGH 50: CPT | Performed by: STUDENT IN AN ORGANIZED HEALTH CARE EDUCATION/TRAINING PROGRAM

## 2024-01-03 PROCEDURE — 2580000003 HC RX 258: Performed by: STUDENT IN AN ORGANIZED HEALTH CARE EDUCATION/TRAINING PROGRAM

## 2024-01-03 PROCEDURE — 2580000003 HC RX 258: Performed by: INTERNAL MEDICINE

## 2024-01-03 PROCEDURE — 6360000002 HC RX W HCPCS: Performed by: INTERNAL MEDICINE

## 2024-01-03 PROCEDURE — 2700000000 HC OXYGEN THERAPY PER DAY

## 2024-01-03 PROCEDURE — 6370000000 HC RX 637 (ALT 250 FOR IP): Performed by: STUDENT IN AN ORGANIZED HEALTH CARE EDUCATION/TRAINING PROGRAM

## 2024-01-03 RX ORDER — PREGABALIN 75 MG/1
75 CAPSULE ORAL
Status: DISCONTINUED | OUTPATIENT
Start: 2024-01-03 | End: 2024-01-05 | Stop reason: HOSPADM

## 2024-01-03 RX ORDER — ASPIRIN 81 MG/1
81 TABLET, CHEWABLE ORAL
Status: DISCONTINUED | OUTPATIENT
Start: 2024-01-03 | End: 2024-01-05 | Stop reason: HOSPADM

## 2024-01-03 RX ORDER — 0.9 % SODIUM CHLORIDE 0.9 %
500 INTRAVENOUS SOLUTION INTRAVENOUS ONCE
Status: COMPLETED | OUTPATIENT
Start: 2024-01-03 | End: 2024-01-03

## 2024-01-03 RX ORDER — SODIUM CHLORIDE 9 MG/ML
INJECTION, SOLUTION INTRAVENOUS CONTINUOUS
Status: ACTIVE | OUTPATIENT
Start: 2024-01-03 | End: 2024-01-03

## 2024-01-03 RX ADMIN — HYDROMORPHONE HYDROCHLORIDE 1 MG: 1 INJECTION, SOLUTION INTRAMUSCULAR; INTRAVENOUS; SUBCUTANEOUS at 13:56

## 2024-01-03 RX ADMIN — METOPROLOL SUCCINATE 25 MG: 25 TABLET, EXTENDED RELEASE ORAL at 21:29

## 2024-01-03 RX ADMIN — ZOLPIDEM TARTRATE 5 MG: 5 TABLET ORAL at 23:28

## 2024-01-03 RX ADMIN — OXYCODONE AND ACETAMINOPHEN 1 TABLET: 5; 325 TABLET ORAL at 16:09

## 2024-01-03 RX ADMIN — OMEPRAZOLE 20 MG: 20 CAPSULE, DELAYED RELEASE ORAL at 06:28

## 2024-01-03 RX ADMIN — AMPICILLIN SODIUM AND SULBACTAM SODIUM 3000 MG: 2; 1 INJECTION, POWDER, FOR SOLUTION INTRAMUSCULAR; INTRAVENOUS at 16:14

## 2024-01-03 RX ADMIN — PRAVASTATIN SODIUM 40 MG: 10 TABLET ORAL at 21:26

## 2024-01-03 RX ADMIN — POLYETHYLENE GLYCOL 3350 17 G: 17 POWDER, FOR SOLUTION ORAL at 16:34

## 2024-01-03 RX ADMIN — HYDROMORPHONE HYDROCHLORIDE 1 MG: 1 INJECTION, SOLUTION INTRAMUSCULAR; INTRAVENOUS; SUBCUTANEOUS at 21:27

## 2024-01-03 RX ADMIN — AMPICILLIN SODIUM AND SULBACTAM SODIUM 3000 MG: 2; 1 INJECTION, POWDER, FOR SOLUTION INTRAMUSCULAR; INTRAVENOUS at 21:33

## 2024-01-03 RX ADMIN — HYDROMORPHONE HYDROCHLORIDE 1 MG: 1 INJECTION, SOLUTION INTRAMUSCULAR; INTRAVENOUS; SUBCUTANEOUS at 04:48

## 2024-01-03 RX ADMIN — AMPICILLIN SODIUM AND SULBACTAM SODIUM 3000 MG: 2; 1 INJECTION, POWDER, FOR SOLUTION INTRAMUSCULAR; INTRAVENOUS at 10:48

## 2024-01-03 RX ADMIN — ALBUTEROL SULFATE 2.5 MG: 2.5 SOLUTION RESPIRATORY (INHALATION) at 18:50

## 2024-01-03 RX ADMIN — PREGABALIN 75 MG: 75 CAPSULE ORAL at 12:06

## 2024-01-03 RX ADMIN — TAMSULOSIN HYDROCHLORIDE 0.4 MG: 0.4 CAPSULE ORAL at 21:24

## 2024-01-03 RX ADMIN — TAMSULOSIN HYDROCHLORIDE 0.4 MG: 0.4 CAPSULE ORAL at 09:51

## 2024-01-03 RX ADMIN — PREGABALIN 150 MG: 75 CAPSULE ORAL at 21:27

## 2024-01-03 RX ADMIN — ALBUTEROL SULFATE 2.5 MG: 2.5 SOLUTION RESPIRATORY (INHALATION) at 05:23

## 2024-01-03 RX ADMIN — SODIUM CHLORIDE 500 ML: 9 INJECTION, SOLUTION INTRAVENOUS at 08:16

## 2024-01-03 RX ADMIN — ONDANSETRON 4 MG: 2 INJECTION INTRAMUSCULAR; INTRAVENOUS at 04:48

## 2024-01-03 RX ADMIN — ONDANSETRON 4 MG: 2 INJECTION INTRAMUSCULAR; INTRAVENOUS at 08:14

## 2024-01-03 RX ADMIN — AMPICILLIN SODIUM AND SULBACTAM SODIUM 3000 MG: 2; 1 INJECTION, POWDER, FOR SOLUTION INTRAMUSCULAR; INTRAVENOUS at 04:42

## 2024-01-03 ASSESSMENT — PAIN DESCRIPTION - DESCRIPTORS
DESCRIPTORS: OTHER (COMMENT)
DESCRIPTORS: OTHER (COMMENT)
DESCRIPTORS: ACHING;STABBING
DESCRIPTORS: STABBING

## 2024-01-03 ASSESSMENT — PAIN DESCRIPTION - ORIENTATION
ORIENTATION: RIGHT

## 2024-01-03 ASSESSMENT — PAIN DESCRIPTION - LOCATION
LOCATION: FLANK
LOCATION: ABDOMEN
LOCATION: ABDOMEN
LOCATION: FLANK;BACK

## 2024-01-03 ASSESSMENT — PAIN SCALES - GENERAL
PAINLEVEL_OUTOF10: 9
PAINLEVEL_OUTOF10: 3
PAINLEVEL_OUTOF10: 9
PAINLEVEL_OUTOF10: 9
PAINLEVEL_OUTOF10: 7
PAINLEVEL_OUTOF10: 9
PAINLEVEL_OUTOF10: 0

## 2024-01-03 ASSESSMENT — PAIN DESCRIPTION - PAIN TYPE
TYPE: ACUTE PAIN
TYPE: ACUTE PAIN

## 2024-01-03 ASSESSMENT — PAIN DESCRIPTION - FREQUENCY
FREQUENCY: CONTINUOUS
FREQUENCY: CONTINUOUS

## 2024-01-03 ASSESSMENT — PAIN - FUNCTIONAL ASSESSMENT
PAIN_FUNCTIONAL_ASSESSMENT: PREVENTS OR INTERFERES SOME ACTIVE ACTIVITIES AND ADLS
PAIN_FUNCTIONAL_ASSESSMENT: PREVENTS OR INTERFERES SOME ACTIVE ACTIVITIES AND ADLS
PAIN_FUNCTIONAL_ASSESSMENT: ACTIVITIES ARE NOT PREVENTED
PAIN_FUNCTIONAL_ASSESSMENT: ACTIVITIES ARE NOT PREVENTED

## 2024-01-03 ASSESSMENT — PAIN DESCRIPTION - ONSET
ONSET: ON-GOING
ONSET: ON-GOING

## 2024-01-03 NOTE — PROGRESS NOTES
Handoff / report completed with BARBARA Lemus to resume care. Patient in bed, VSS. All questions answered.

## 2024-01-03 NOTE — PROGRESS NOTES
Pt Name: Jovon Javier  Medical Record Number: [id number]  Date of Birth 1960   Today's Date: 1/3/2024      Subjective:  NAEON. Patient doing well this AM. Asking about going home.    ROS: Constitutional: No fever    Vitals:  Vitals:    01/03/24 0511 01/03/24 0518 01/03/24 0523 01/03/24 0758   BP:    (!) 75/58   Pulse:   (!) 115 (!) 129   Resp:  18 18 18   Temp:    98.7 °F (37.1 °C)   TempSrc:    Oral   SpO2:   95% 93%   Weight: 98.4 kg (216 lb 14.9 oz)      Height:         I/O last 3 completed shifts:  In: 10 [I.V.:10]  Out: 1275 [Urine:1275]    Exam:  General: Awake, oriented, no acute distress  Respiratory: Nonlabored breathing  Abdomen: Soft, non-tender, non-distended, no masses  : spontaneously voids  Skin: Skin color, texture, turgor normal, no rashes or lesions  Neurologic: no gross deficits    CURRENT MEDICATIONS   Scheduled Meds:   sodium chloride  500 mL IntraVENous Once    warfarin placeholder: dosing by pharmacy   Other RX Placeholder    omeprazole  20 mg Oral Daily    pregabalin  75 mg Oral Daily    pregabalin  150 mg Oral Nightly    metoprolol succinate  37.5 mg Oral Daily    metoprolol succinate  25 mg Oral Nightly    sodium chloride flush  5-40 mL IntraVENous 2 times per day    ampicillin-sulbactam  3,000 mg IntraVENous Q6H    aspirin  81 mg Oral Daily    furosemide  10 mg Oral Daily    pravastatin  40 mg Oral Nightly    sacubitril-valsartan  1 tablet Oral BID    tamsulosin  0.4 mg Oral BID     Continuous Infusions:   sodium chloride 75 mL/hr at 01/02/24 2240    sodium chloride       PRN Meds:.ondansetron, albuterol, zolpidem, sodium chloride flush, sodium chloride, polyethylene glycol, acetaminophen, HYDROmorphone, albuterol sulfate HFA, oxyCODONE-acetaminophen    LABS     Recent Labs     01/01/24  1407 01/02/24  2107   WBC 5.7 5.8   HGB 15.2 14.7   HCT 45.5 43.7    161    138   K 3.7 4.0    102   CO2 30 31   BUN 11 11   CREATININE 1.0 1.1   CALCIUM 10.6 9.4   INR  --

## 2024-01-03 NOTE — PROGRESS NOTES
Patient admitted to room 4123 from ED. Patient alert and oriented x4. Patient denies pain. Vital signs recorded. See flowsheet. Patial nightly medications administered, patient states he wants to space them out. See MAR. Patient oriented to room, call light and needed items within reach. Bed locked in lowest position.

## 2024-01-03 NOTE — CARE COORDINATION
Case Management Assessment  Initial Evaluation    Date/Time of Evaluation: 1/3/2024 3:31 PM  Assessment Completed by: Ce Brito RN    If patient is discharged prior to next notation, then this note serves as note for discharge by case management.    Patient Name: Jovon Javier                   YOB: 1960  Diagnosis: Flank pain [R10.9]  Renal cyst, right [N28.1]                   Date / Time: 1/1/2024  1:29 PM    Patient Admission Status: Inpatient   Readmission Risk (Low < 19, Mod (19-27), High > 27): Readmission Risk Score: 15.8    Current PCP: Juan C Dumont MD  PCP verified by CM? (P) Yes    Chart Reviewed: Yes      History Provided by: (P) Patient  Patient Orientation: (P) Alert and Oriented    Patient Cognition: (P) Alert    Hospitalization in the last 30 days (Readmission):  No    If yes, Readmission Assessment in CM Navigator will be completed.    Advance Directives:      Code Status: Full Code   Patient's Primary Decision Maker is: (P) Legal Next of Kin    Primary Decision Maker: Adelaida Javier - Spouse - [phone]    Discharge Planning:    Patient lives with: (P) Spouse/Significant Other Type of Home: (P) House (condo)  Primary Care Giver: (P) Self  Patient Support Systems include: (P) Spouse/Significant Other   Current Financial resources: (P) Other (Comment) (Commercial)  Current community resources: (P) None  Current services prior to admission: (P) Oxygen Therapy (Medical Services 2L/M at hs)            Current DME:              Type of Home Care services:  (P) None    ADLS  Prior functional level: (P) Independent in ADLs/IADLs  Current functional level: (P) Independent in ADLs/IADLs    PT AM-PAC:   /24  OT AM-PAC:   /24    Family can provide assistance at DC: (P) Yes  Would you like Case Management to discuss the discharge plan with any other family members/significant others, and if so, who? (P) No  Plans to Return to Present Housing: (P) Yes  Other Identified Issues/Barriers to  Decision Maker is: Legal Next of Kin   PCP Verified by CM Yes   Last Visit to PCP Within last 3 months   Prior Functional Level Independent in ADLs/IADLs   Current Functional Level Independent in ADLs/IADLs   Can patient return to prior living arrangement Yes   Ability to make needs known: Good   Family able to assist with home care needs: Yes   Would you like for me to discuss the discharge plan with any other family members/significant others, and if so, who? No   Financial Resources Other (Comment)  (Commercial)   Community Resources None   CM/SW Referral Other (see comment)  (discharge plan)   Discharge Planning   Type of Residence House  (condo)   Living Arrangements Spouse/Significant Other   Current Services Prior To Admission Oxygen Therapy  (Medical Services 2L/M at hs)   Potential Assistance Needed N/A   DME Ordered? No   Potential Assistance Purchasing Medications No   Type of Home Care Services None   Patient expects to be discharged to: House  (condo)   Follow Up Appointment: Best Day/Time  Monday AM   One/Two Story Residence Two story, live on first floor   # of Interior Steps 13   Interior Rails Right   Lift Chair Available Yes   History of falls? 0   Services At/After Discharge   Transition of Care Consult (CM Consult) Other  (none)   Services At/After Discharge None   [place] Resource Information Provided? No   Mode of Transport at Discharge Other (see comment)  (wife)   Confirm Follow Up Transport Family     Electronically signed by Ce Brito RN on 1/3/2024 at 3:35 PM

## 2024-01-03 NOTE — PLAN OF CARE
Problem: Discharge Planning  Goal: Discharge to home or other facility with appropriate resources  1/3/2024 1244 by Maria Elena Cook, RN  Outcome: Progressing  1/3/2024 0231 by Maxine Yousif, RN  Outcome: Progressing     Problem: Pain  Goal: Verbalizes/displays adequate comfort level or baseline comfort level  1/3/2024 1244 by Maria Elena Cook, RN  Outcome: Progressing  1/3/2024 0231 by Maxine Yousif, RN  Outcome: Progressing

## 2024-01-03 NOTE — RT PROTOCOL NOTE
RT Inhaler-Nebulizer Bronchodilator Protocol Note    There is a bronchodilator order in the chart from a provider indicating to follow the RT Bronchodilator Protocol and there is an “Initiate RT Inhaler-Nebulizer Bronchodilator Protocol” order as well (see protocol at bottom of note).    CXR Findings:  No results found.    The findings from the last RT Protocol Assessment were as follows:   History Pulmonary Disease: None or smoker <15 pack years  Respiratory Pattern: Dyspnea on exertion or RR 21-25 bpm  Breath Sounds: Clear breath sounds  Cough: Strong, spontaneous, non-productive  Indication for Bronchodilator Therapy: Decreased or absent breath sounds  Bronchodilator Assessment Score: 2    Aerosolized bronchodilator medication orders have been revised according to the RT Inhaler-Nebulizer Bronchodilator Protocol below.    Respiratory Therapist to perform RT Therapy Protocol Assessment initially then follow the protocol.  Repeat RT Therapy Protocol Assessment PRN for score 0-3 or on second treatment, BID, and PRN for scores above 3.    No Indications - adjust the frequency to every 6 hours PRN wheezing or bronchospasm, if no treatments needed after 48 hours then discontinue using Per Protocol order mode.     If indication present, adjust the RT bronchodilator orders based on the Bronchodilator Assessment Score as indicated below.  Use Inhaler orders unless patient has one or more of the following: on home nebulizer, not able to hold breath for 10 seconds, is not alert and oriented, cannot activate and use MDI correctly, or respiratory rate 25 breaths per minute or more, then use the equivalent nebulizer order(s) with same Frequency and PRN reasons based on the score.  If a patient is on this medication at home then do not decrease Frequency below that used at home.    0-3 - enter or revise RT bronchodilator order(s) to equivalent RT Bronchodilator order with Frequency of every 4 hours PRN for wheezing or increased

## 2024-01-03 NOTE — PROGRESS NOTES
Clinical Pharmacy Note  Warfarin Consult    Jovon Javier is a 63 y.o. male receiving warfarin managed by pharmacy.  Patient being bridged with none.    Warfarin Indication: Hx of DVT/PE  Target INR range: 2-3   Dose prior to admission: 5 mg on Fri and 2.5 mg all other days    Current warfarin drug-drug interactions:     Recent Labs     01/01/24  1407 01/02/24  2107   HGB 15.2 14.7   HCT 45.5 43.7   INR  --  2.40*       Assessment/Plan:    Warfarin 2.5 mg tonight. Daily PT/INR until stable within therapeutic range.     Thank you for the consult.  Will continue to follow.  Frantz Stuart, PharmD

## 2024-01-03 NOTE — PROGRESS NOTES
4 Eyes Skin Assessment     NAME:  Jovon Javier  YOB: 1960  MEDICAL RECORD NUMBER:  [id number]    The patient is being assessed for  Admission    I agree that at least one RN has performed a thorough Head to Toe Skin Assessment on the patient. ALL assessment sites listed below have been assessed.      Areas assessed by both nurses:    Head, Face, Ears, Shoulders, Back, Chest, Arms, Elbows, Hands, Sacrum. Buttock, Coccyx, Ischium, Legs. Feet and Heels, and Under Medical Devices         Does the Patient have a Wound? No noted wound(s)       Casimiro Prevention initiated by RN: No  Wound Care Orders initiated by RN: No    Pressure Injury (Stage 3,4, Unstageable, DTI, NWPT, and Complex wounds) if present, place Wound referral order by RN under : No    New Ostomies, if present place, Ostomy referral order under : No     Nurse 1 eSignature: Electronically signed by Maxine Yousif RN on 1/2/24 at 11:11 PM EST    **SHARE this note so that the co-signing nurse can place an eSignature**    Nurse 2 eSignature: Electronically signed by Chelle Greco RN on 1/2/24 at 11:12 PM EST

## 2024-01-04 LAB
ANION GAP SERPL CALCULATED.3IONS-SCNC: 2 MMOL/L (ref 3–16)
BASOPHILS # BLD: 0 K/UL (ref 0–0.2)
BASOPHILS NFR BLD: 0.7 %
BUN SERPL-MCNC: 10 MG/DL (ref 7–20)
CALCIUM SERPL-MCNC: 9.6 MG/DL (ref 8.3–10.6)
CHLORIDE SERPL-SCNC: 104 MMOL/L (ref 99–110)
CO2 SERPL-SCNC: 34 MMOL/L (ref 21–32)
CREAT SERPL-MCNC: 1 MG/DL (ref 0.8–1.3)
DEPRECATED RDW RBC AUTO: 14 % (ref 12.4–15.4)
EOSINOPHIL # BLD: 0.1 K/UL (ref 0–0.6)
EOSINOPHIL NFR BLD: 3.4 %
GFR SERPLBLD CREATININE-BSD FMLA CKD-EPI: >60 ML/MIN/{1.73_M2}
GLUCOSE SERPL-MCNC: 129 MG/DL (ref 70–99)
HCT VFR BLD AUTO: 40.3 % (ref 40.5–52.5)
HGB BLD-MCNC: 13.4 G/DL (ref 13.5–17.5)
INR PPP: 2.17 (ref 0.84–1.16)
LYMPHOCYTES # BLD: 0.8 K/UL (ref 1–5.1)
LYMPHOCYTES NFR BLD: 18.8 %
MCH RBC QN AUTO: 31.1 PG (ref 26–34)
MCHC RBC AUTO-ENTMCNC: 33.1 G/DL (ref 31–36)
MCV RBC AUTO: 93.9 FL (ref 80–100)
MONOCYTES # BLD: 0.3 K/UL (ref 0–1.3)
MONOCYTES NFR BLD: 7.2 %
NEUTROPHILS # BLD: 2.9 K/UL (ref 1.7–7.7)
NEUTROPHILS NFR BLD: 69.9 %
PLATELET # BLD AUTO: 115 K/UL (ref 135–450)
PMV BLD AUTO: 7.9 FL (ref 5–10.5)
POTASSIUM SERPL-SCNC: 4.7 MMOL/L (ref 3.5–5.1)
PROTHROMBIN TIME: 24.1 SEC (ref 11.5–14.8)
RBC # BLD AUTO: 4.3 M/UL (ref 4.2–5.9)
SODIUM SERPL-SCNC: 140 MMOL/L (ref 136–145)
WBC # BLD AUTO: 4.2 K/UL (ref 4–11)

## 2024-01-04 PROCEDURE — 6360000002 HC RX W HCPCS: Performed by: INTERNAL MEDICINE

## 2024-01-04 PROCEDURE — 94760 N-INVAS EAR/PLS OXIMETRY 1: CPT

## 2024-01-04 PROCEDURE — 2580000003 HC RX 258: Performed by: INTERNAL MEDICINE

## 2024-01-04 PROCEDURE — 6360000002 HC RX W HCPCS: Performed by: STUDENT IN AN ORGANIZED HEALTH CARE EDUCATION/TRAINING PROGRAM

## 2024-01-04 PROCEDURE — 94640 AIRWAY INHALATION TREATMENT: CPT

## 2024-01-04 PROCEDURE — 99233 SBSQ HOSP IP/OBS HIGH 50: CPT | Performed by: STUDENT IN AN ORGANIZED HEALTH CARE EDUCATION/TRAINING PROGRAM

## 2024-01-04 PROCEDURE — 6370000000 HC RX 637 (ALT 250 FOR IP): Performed by: STUDENT IN AN ORGANIZED HEALTH CARE EDUCATION/TRAINING PROGRAM

## 2024-01-04 PROCEDURE — 36415 COLL VENOUS BLD VENIPUNCTURE: CPT

## 2024-01-04 PROCEDURE — 85025 COMPLETE CBC W/AUTO DIFF WBC: CPT

## 2024-01-04 PROCEDURE — 6370000000 HC RX 637 (ALT 250 FOR IP): Performed by: INTERNAL MEDICINE

## 2024-01-04 PROCEDURE — 80048 BASIC METABOLIC PNL TOTAL CA: CPT

## 2024-01-04 PROCEDURE — 1200000000 HC SEMI PRIVATE

## 2024-01-04 PROCEDURE — 85610 PROTHROMBIN TIME: CPT

## 2024-01-04 PROCEDURE — 2700000000 HC OXYGEN THERAPY PER DAY

## 2024-01-04 RX ORDER — WARFARIN SODIUM 5 MG/1
5 TABLET ORAL ONCE
Status: COMPLETED | OUTPATIENT
Start: 2024-01-04 | End: 2024-01-04

## 2024-01-04 RX ORDER — WARFARIN SODIUM 5 MG/1
5 TABLET ORAL
Status: DISCONTINUED | OUTPATIENT
Start: 2024-01-04 | End: 2024-01-04

## 2024-01-04 RX ORDER — CETIRIZINE HYDROCHLORIDE 10 MG/1
10 TABLET ORAL DAILY
Status: DISCONTINUED | OUTPATIENT
Start: 2024-01-04 | End: 2024-01-05 | Stop reason: HOSPADM

## 2024-01-04 RX ADMIN — PREGABALIN 75 MG: 75 CAPSULE ORAL at 11:11

## 2024-01-04 RX ADMIN — ONDANSETRON 4 MG: 2 INJECTION INTRAMUSCULAR; INTRAVENOUS at 23:48

## 2024-01-04 RX ADMIN — ACETAMINOPHEN 1000 MG: 500 TABLET ORAL at 20:21

## 2024-01-04 RX ADMIN — SODIUM CHLORIDE: 9 INJECTION, SOLUTION INTRAVENOUS at 16:05

## 2024-01-04 RX ADMIN — OXYCODONE AND ACETAMINOPHEN 1 TABLET: 5; 325 TABLET ORAL at 12:19

## 2024-01-04 RX ADMIN — OMEPRAZOLE 20 MG: 20 CAPSULE, DELAYED RELEASE ORAL at 06:25

## 2024-01-04 RX ADMIN — ALBUTEROL SULFATE 2.5 MG: 2.5 SOLUTION RESPIRATORY (INHALATION) at 09:03

## 2024-01-04 RX ADMIN — AMPICILLIN SODIUM AND SULBACTAM SODIUM 3000 MG: 2; 1 INJECTION, POWDER, FOR SOLUTION INTRAMUSCULAR; INTRAVENOUS at 16:05

## 2024-01-04 RX ADMIN — PRAVASTATIN SODIUM 40 MG: 10 TABLET ORAL at 20:15

## 2024-01-04 RX ADMIN — METOPROLOL SUCCINATE 37.5 MG: 25 TABLET, EXTENDED RELEASE ORAL at 11:11

## 2024-01-04 RX ADMIN — AMPICILLIN SODIUM AND SULBACTAM SODIUM 3000 MG: 2; 1 INJECTION, POWDER, FOR SOLUTION INTRAMUSCULAR; INTRAVENOUS at 09:25

## 2024-01-04 RX ADMIN — SACUBITRIL AND VALSARTAN 1 TABLET: 24; 26 TABLET, FILM COATED ORAL at 09:19

## 2024-01-04 RX ADMIN — POLYETHYLENE GLYCOL 3350 17 G: 17 POWDER, FOR SOLUTION ORAL at 20:21

## 2024-01-04 RX ADMIN — WARFARIN SODIUM 5 MG: 5 TABLET ORAL at 13:08

## 2024-01-04 RX ADMIN — HYDROMORPHONE HYDROCHLORIDE 1 MG: 1 INJECTION, SOLUTION INTRAMUSCULAR; INTRAVENOUS; SUBCUTANEOUS at 01:56

## 2024-01-04 RX ADMIN — SODIUM CHLORIDE, PRESERVATIVE FREE 10 ML: 5 INJECTION INTRAVENOUS at 09:21

## 2024-01-04 RX ADMIN — ASPIRIN 81 MG: 81 TABLET, CHEWABLE ORAL at 11:11

## 2024-01-04 RX ADMIN — SODIUM CHLORIDE: 9 INJECTION, SOLUTION INTRAVENOUS at 01:58

## 2024-01-04 RX ADMIN — HYDROMORPHONE HYDROCHLORIDE 1 MG: 1 INJECTION, SOLUTION INTRAMUSCULAR; INTRAVENOUS; SUBCUTANEOUS at 09:20

## 2024-01-04 RX ADMIN — CETIRIZINE HYDROCHLORIDE 10 MG: 10 TABLET, FILM COATED ORAL at 16:04

## 2024-01-04 RX ADMIN — TAMSULOSIN HYDROCHLORIDE 0.4 MG: 0.4 CAPSULE ORAL at 09:19

## 2024-01-04 RX ADMIN — TAMSULOSIN HYDROCHLORIDE 0.4 MG: 0.4 CAPSULE ORAL at 20:15

## 2024-01-04 RX ADMIN — ONDANSETRON 4 MG: 2 INJECTION INTRAMUSCULAR; INTRAVENOUS at 09:24

## 2024-01-04 RX ADMIN — ALBUTEROL SULFATE 2.5 MG: 2.5 SOLUTION RESPIRATORY (INHALATION) at 22:25

## 2024-01-04 RX ADMIN — PREGABALIN 150 MG: 75 CAPSULE ORAL at 22:35

## 2024-01-04 RX ADMIN — ZOLPIDEM TARTRATE 5 MG: 5 TABLET ORAL at 22:33

## 2024-01-04 RX ADMIN — AMPICILLIN SODIUM AND SULBACTAM SODIUM 3000 MG: 2; 1 INJECTION, POWDER, FOR SOLUTION INTRAMUSCULAR; INTRAVENOUS at 04:57

## 2024-01-04 RX ADMIN — METOPROLOL SUCCINATE 25 MG: 25 TABLET, EXTENDED RELEASE ORAL at 22:33

## 2024-01-04 RX ADMIN — SACUBITRIL AND VALSARTAN 1 TABLET: 24; 26 TABLET, FILM COATED ORAL at 20:15

## 2024-01-04 RX ADMIN — OXYCODONE AND ACETAMINOPHEN 1 TABLET: 5; 325 TABLET ORAL at 23:48

## 2024-01-04 RX ADMIN — HYDROMORPHONE HYDROCHLORIDE 1 MG: 1 INJECTION, SOLUTION INTRAMUSCULAR; INTRAVENOUS; SUBCUTANEOUS at 13:39

## 2024-01-04 RX ADMIN — ONDANSETRON 4 MG: 2 INJECTION INTRAMUSCULAR; INTRAVENOUS at 01:56

## 2024-01-04 ASSESSMENT — PAIN - FUNCTIONAL ASSESSMENT
PAIN_FUNCTIONAL_ASSESSMENT: ACTIVITIES ARE NOT PREVENTED

## 2024-01-04 ASSESSMENT — PAIN SCALES - GENERAL
PAINLEVEL_OUTOF10: 9
PAINLEVEL_OUTOF10: 8
PAINLEVEL_OUTOF10: 8
PAINLEVEL_OUTOF10: 5
PAINLEVEL_OUTOF10: 7

## 2024-01-04 ASSESSMENT — PAIN DESCRIPTION - DESCRIPTORS
DESCRIPTORS: ACHING
DESCRIPTORS: ACHING
DESCRIPTORS: STABBING
DESCRIPTORS: SHARP
DESCRIPTORS: STABBING

## 2024-01-04 ASSESSMENT — PAIN DESCRIPTION - ORIENTATION
ORIENTATION: RIGHT

## 2024-01-04 ASSESSMENT — PAIN DESCRIPTION - LOCATION
LOCATION: BACK
LOCATION: FLANK
LOCATION: FLANK
LOCATION: BACK
LOCATION: FLANK

## 2024-01-04 ASSESSMENT — PAIN DESCRIPTION - PAIN TYPE: TYPE: ACUTE PAIN

## 2024-01-04 ASSESSMENT — PAIN SCALES - WONG BAKER: WONGBAKER_NUMERICALRESPONSE: 0

## 2024-01-04 ASSESSMENT — PAIN DESCRIPTION - ONSET: ONSET: ON-GOING

## 2024-01-04 ASSESSMENT — PAIN DESCRIPTION - FREQUENCY: FREQUENCY: INTERMITTENT

## 2024-01-04 NOTE — PROGRESS NOTES
Urology     I conferred with Dr Peters - he had not seen the Sept 2022 CT urogram - there is no UPJ obstruction    The current upper pole dilation is related to the cyst.    We are in agreement to proceed with a right renal cyst and aspiration as an outpatient when off all anticoagulation therapy.    My office will schedule this and work with Dr Dumont to manage his coumadin.    Urology is available PRN.

## 2024-01-04 NOTE — PROGRESS NOTES
Clinical Pharmacy Note  Warfarin Consult    Jovon Javier is a 63 y.o. male receiving warfarin managed by pharmacy.  Patient being bridged with none.    Warfarin Indication: Hx of DVT/PE  Target INR range: 2-3   Dose prior to admission: 5 mg on Fri and 2.5 mg all other days    Current warfarin drug-drug interactions:     Recent Labs     01/01/24  1407 01/02/24  2107 01/04/24  1120   HGB 15.2 14.7 13.4*   HCT 45.5 43.7 40.3*   INR  --  2.40* 2.17*         Assessment/Plan:    Warfarin 2.5 mg given on 1/2/24 at midnight.   Labs were ordered on 1/3 but not collected.    Will give 5 mg dose now to accommodate for no dose last night.    I discussed all this with Jovon he's in agreement with plan. He is telling me that the procedure he needs is next Wed and he'll need to be off Warfarin five days prior ( which would mean tomorrow 1/5/24)     Daily PT/INR until stable within therapeutic range.     Sandra Quigley, [facility]

## 2024-01-04 NOTE — PROGRESS NOTES
Patient requesting claritin for itchiness due to our laundry detergent. Message left for Dr. Estrella.

## 2024-01-04 NOTE — PLAN OF CARE
Problem: Discharge Planning  Goal: Discharge to home or other facility with appropriate resources  1/4/2024 1041 by Viry Anaya RN  Outcome: Progressing  1/4/2024 0115 by Maxine Yousif RN  Outcome: Progressing     Problem: Pain  Goal: Verbalizes/displays adequate comfort level or baseline comfort level  1/4/2024 1041 by Viry Anaya RN  Outcome: Progressing  1/4/2024 0115 by Maxine Yousif, RN  Outcome: Progressing     Problem: Safety - Adult  Goal: Free from fall injury  1/4/2024 1041 by Viry Anaya RN  Outcome: Progressing  1/4/2024 0115 by Maxine Yousif, RN  Outcome: Progressing

## 2024-01-04 NOTE — PLAN OF CARE
Problem: Discharge Planning  Goal: Discharge to home or other facility with appropriate resources  1/4/2024 0115 by Maxine Yousif, RN  Outcome: Progressing     Problem: Pain  Goal: Verbalizes/displays adequate comfort level or baseline comfort level  1/4/2024 0115 by Maxine Yousif, RN  Outcome: Progressing     Problem: Safety - Adult  Goal: Free from fall injury  Outcome: Progressing

## 2024-01-04 NOTE — PROGRESS NOTES
Patient says he is refusing lab draws due to being stuck 9 times by the same person and won't do it unless they get the \"proper equipment\". Patient also says he won't take metoprolol with entresto even though they are scheduled together. States he will take metoprolol in 1 hour.   Electronically signed by Viry Anaya RN on 1/4/2024 at 9:28 AM

## 2024-01-04 NOTE — PROGRESS NOTES
[facility]     INPATIENT PROGRESS NOTE  Name: Jovon Javier  /Age/Sex: 1960 (63 y.o. male)   MRN & CSN: [id number] & [id number]  Admission Date/Time: 2024  1:29 PM   [id number]  Current Hospital Day: Hospital Day: 3   Principal Problem: Renal cyst, right  HPI     Patient seen and examined. Hypotensive this morning, patient feels lightheaded. No abdominal pain. Has has nausea    All other review of systems negative unless noted above.  VITALS VITALS RANGE (24 hours)  [unfilled]  [unfilled]    INS/OUTS WEIGHTS    Intake/Output Summary (Last 24 hours) at 1/3/2024 2350  Last data filed at 1/3/2024 0518  Gross per 24 hour   Intake --   Output 300 ml   Net -300 ml      No intake/output data recorded. [unfilled]       PHYSICAL EXAM   General Appearance:    Alert, cooperative, no distress, appears stated age   Head:    Normocephalic, without obvious abnormality, atraumatic   Eyes:    PERRL, conjunctiva/corneas clear, EOM's intact, fundi     benign, both eyes              Nose:   Nares normal, septum midline, mucosa normal, no drainage    or sinus tenderness   Throat:   Lips, mucosa, and tongue normal; teeth and gums normal   Neck:   Supple, symmetrical, trachea midline, no adenopathy;        thyroid:  No enlargement/tenderness/nodules; no carotid    bruit or JVD   Back:     Symmetric, no curvature, ROM normal, no CVA tenderness   Lungs:     Clear to auscultation bilaterally, respirations unlabored   Chest wall:    No tenderness or deformity   Heart:    Regular rate and rhythm, S1 and S2 normal, no murmur, rub   or gallop   Abdomen:     Soft, non-tender, bowel sounds active all four quadrants,     no masses, no organomegaly               Extremities:   Extremities normal, atraumatic, no cyanosis or edema   Pulses:   2+ and symmetric all extremities   Skin:   Skin color, texture, turgor normal, no rashes or lesions   Lymph nodes:   Cervical, supraclavicular,  ICD  Hypertension  -Continue home meds     Hyperlipidemia  - Continue statin     Previous pulmonary embolism  -Continue warfarin     Bronchiolitis obliterans  - Continue home inhaler therapy     Constipation  - Continue MiraLAX        Insomnia  - Continue Ambien     F/E/N: Diabetic diet  PPx: Warfarin  Dispo: possible cyst treatment    Jeffery Estrella MD 1/3/2024 11:50 PM

## 2024-01-05 ENCOUNTER — APPOINTMENT (OUTPATIENT)
Dept: GENERAL RADIOLOGY | Age: 64
DRG: 699 | End: 2024-01-05
Payer: COMMERCIAL

## 2024-01-05 ENCOUNTER — HOSPITAL ENCOUNTER (INPATIENT)
Age: 64
LOS: 5 days | Discharge: HOME | DRG: 699 | End: 2024-01-16
Attending: EMERGENCY MEDICINE | Admitting: STUDENT IN AN ORGANIZED HEALTH CARE EDUCATION/TRAINING PROGRAM
Payer: COMMERCIAL

## 2024-01-05 VITALS
WEIGHT: 227.74 LBS | SYSTOLIC BLOOD PRESSURE: 99 MMHG | BODY MASS INDEX: 36.6 KG/M2 | RESPIRATION RATE: 17 BRPM | HEIGHT: 66 IN | TEMPERATURE: 97.6 F | HEART RATE: 67 BPM | DIASTOLIC BLOOD PRESSURE: 66 MMHG | OXYGEN SATURATION: 96 %

## 2024-01-05 DIAGNOSIS — R07.9 CHEST PAIN, UNSPECIFIED TYPE: Primary | ICD-10-CM

## 2024-01-05 LAB
ALBUMIN SERPL-MCNC: 3.6 G/DL (ref 3.4–5)
ALBUMIN/GLOB SERPL: 1.6 {RATIO} (ref 1.1–2.2)
ALP SERPL-CCNC: 90 U/L (ref 40–129)
ALT SERPL-CCNC: 33 U/L (ref 10–40)
ANION GAP SERPL CALCULATED.3IONS-SCNC: 0 MMOL/L (ref 3–16)
ANION GAP SERPL CALCULATED.3IONS-SCNC: 2 MMOL/L (ref 3–16)
APTT BLD: 33.5 SEC (ref 22.7–35.9)
AST SERPL-CCNC: 21 U/L (ref 15–37)
BASOPHILS # BLD: 0 K/UL (ref 0–0.2)
BASOPHILS # BLD: 0 K/UL (ref 0–0.2)
BASOPHILS NFR BLD: 0.8 %
BASOPHILS NFR BLD: 1 %
BILIRUB SERPL-MCNC: 0.3 MG/DL (ref 0–1)
BUN SERPL-MCNC: 7 MG/DL (ref 7–20)
BUN SERPL-MCNC: 9 MG/DL (ref 7–20)
CALCIUM SERPL-MCNC: 9.3 MG/DL (ref 8.3–10.6)
CALCIUM SERPL-MCNC: 9.8 MG/DL (ref 8.3–10.6)
CHLORIDE SERPL-SCNC: 106 MMOL/L (ref 99–110)
CHLORIDE SERPL-SCNC: 106 MMOL/L (ref 99–110)
CO2 SERPL-SCNC: 28 MMOL/L (ref 21–32)
CO2 SERPL-SCNC: 35 MMOL/L (ref 21–32)
CREAT SERPL-MCNC: 0.9 MG/DL (ref 0.8–1.3)
CREAT SERPL-MCNC: 0.9 MG/DL (ref 0.8–1.3)
DEPRECATED RDW RBC AUTO: 13.4 % (ref 12.4–15.4)
DEPRECATED RDW RBC AUTO: 13.8 % (ref 12.4–15.4)
EOSINOPHIL # BLD: 0.1 K/UL (ref 0–0.6)
EOSINOPHIL # BLD: 0.2 K/UL (ref 0–0.6)
EOSINOPHIL NFR BLD: 4.1 %
EOSINOPHIL NFR BLD: 4.9 %
FLUAV RNA UPPER RESP QL NAA+PROBE: NEGATIVE
FLUBV AG NPH QL: NEGATIVE
GFR SERPLBLD CREATININE-BSD FMLA CKD-EPI: >60 ML/MIN/{1.73_M2}
GFR SERPLBLD CREATININE-BSD FMLA CKD-EPI: >60 ML/MIN/{1.73_M2}
GLUCOSE SERPL-MCNC: 113 MG/DL (ref 70–99)
GLUCOSE SERPL-MCNC: 89 MG/DL (ref 70–99)
HCT VFR BLD AUTO: 36.8 % (ref 40.5–52.5)
HCT VFR BLD AUTO: 38.9 % (ref 40.5–52.5)
HGB BLD-MCNC: 12.3 G/DL (ref 13.5–17.5)
HGB BLD-MCNC: 13.2 G/DL (ref 13.5–17.5)
INR PPP: 1.97 (ref 0.84–1.16)
INR PPP: 2.1 (ref 0.84–1.16)
LYMPHOCYTES # BLD: 0.8 K/UL (ref 1–5.1)
LYMPHOCYTES # BLD: 1.1 K/UL (ref 1–5.1)
LYMPHOCYTES NFR BLD: 23.8 %
LYMPHOCYTES NFR BLD: 36.5 %
MCH RBC QN AUTO: 31.1 PG (ref 26–34)
MCH RBC QN AUTO: 31.5 PG (ref 26–34)
MCHC RBC AUTO-ENTMCNC: 33.6 G/DL (ref 31–36)
MCHC RBC AUTO-ENTMCNC: 34 G/DL (ref 31–36)
MCV RBC AUTO: 92.7 FL (ref 80–100)
MCV RBC AUTO: 92.8 FL (ref 80–100)
MONOCYTES # BLD: 0.3 K/UL (ref 0–1.3)
MONOCYTES # BLD: 0.4 K/UL (ref 0–1.3)
MONOCYTES NFR BLD: 10.9 %
MONOCYTES NFR BLD: 11.2 %
NEUTROPHILS # BLD: 1.4 K/UL (ref 1.7–7.7)
NEUTROPHILS # BLD: 1.9 K/UL (ref 1.7–7.7)
NEUTROPHILS NFR BLD: 47.2 %
NEUTROPHILS NFR BLD: 59.6 %
PLATELET # BLD AUTO: 104 K/UL (ref 135–450)
PLATELET # BLD AUTO: 114 K/UL (ref 135–450)
PMV BLD AUTO: 7.7 FL (ref 5–10.5)
PMV BLD AUTO: 7.9 FL (ref 5–10.5)
POTASSIUM SERPL-SCNC: 4.4 MMOL/L (ref 3.5–5.1)
POTASSIUM SERPL-SCNC: 4.4 MMOL/L (ref 3.5–5.1)
PROT SERPL-MCNC: 5.8 G/DL (ref 6.4–8.2)
PROTHROMBIN TIME: 22.4 SEC (ref 11.5–14.8)
PROTHROMBIN TIME: 23.5 SEC (ref 11.5–14.8)
RBC # BLD AUTO: 3.96 M/UL (ref 4.2–5.9)
RBC # BLD AUTO: 4.19 M/UL (ref 4.2–5.9)
SARS-COV-2 RDRP RESP QL NAA+PROBE: NOT DETECTED
SODIUM SERPL-SCNC: 136 MMOL/L (ref 136–145)
SODIUM SERPL-SCNC: 141 MMOL/L (ref 136–145)
TROPONIN, HIGH SENSITIVITY: 22 NG/L (ref 0–22)
WBC # BLD AUTO: 3.1 K/UL (ref 4–11)
WBC # BLD AUTO: 3.2 K/UL (ref 4–11)

## 2024-01-05 PROCEDURE — 93005 ELECTROCARDIOGRAM TRACING: CPT | Performed by: EMERGENCY MEDICINE

## 2024-01-05 PROCEDURE — 6370000000 HC RX 637 (ALT 250 FOR IP): Performed by: EMERGENCY MEDICINE

## 2024-01-05 PROCEDURE — 87804 INFLUENZA ASSAY W/OPTIC: CPT

## 2024-01-05 PROCEDURE — 87635 SARS-COV-2 COVID-19 AMP PRB: CPT

## 2024-01-05 PROCEDURE — 94680 O2 UPTK RST&XERS DIR SIMPLE: CPT

## 2024-01-05 PROCEDURE — 6370000000 HC RX 637 (ALT 250 FOR IP): Performed by: STUDENT IN AN ORGANIZED HEALTH CARE EDUCATION/TRAINING PROGRAM

## 2024-01-05 PROCEDURE — 36415 COLL VENOUS BLD VENIPUNCTURE: CPT

## 2024-01-05 PROCEDURE — 6370000000 HC RX 637 (ALT 250 FOR IP): Performed by: INTERNAL MEDICINE

## 2024-01-05 PROCEDURE — 85730 THROMBOPLASTIN TIME PARTIAL: CPT

## 2024-01-05 PROCEDURE — 80053 COMPREHEN METABOLIC PANEL: CPT

## 2024-01-05 PROCEDURE — 99285 EMERGENCY DEPT VISIT HI MDM: CPT

## 2024-01-05 PROCEDURE — 85610 PROTHROMBIN TIME: CPT

## 2024-01-05 PROCEDURE — 85025 COMPLETE CBC W/AUTO DIFF WBC: CPT

## 2024-01-05 PROCEDURE — 2580000003 HC RX 258: Performed by: INTERNAL MEDICINE

## 2024-01-05 PROCEDURE — 99239 HOSP IP/OBS DSCHRG MGMT >30: CPT | Performed by: STUDENT IN AN ORGANIZED HEALTH CARE EDUCATION/TRAINING PROGRAM

## 2024-01-05 PROCEDURE — 84484 ASSAY OF TROPONIN QUANT: CPT

## 2024-01-05 PROCEDURE — 71045 X-RAY EXAM CHEST 1 VIEW: CPT

## 2024-01-05 RX ORDER — AMOXICILLIN AND CLAVULANATE POTASSIUM 875; 125 MG/1; MG/1
1 TABLET, FILM COATED ORAL 2 TIMES DAILY
Qty: 6 TABLET | Refills: 0 | Status: ON HOLD | OUTPATIENT
Start: 2024-01-05 | End: 2024-01-08

## 2024-01-05 RX ORDER — ASPIRIN 81 MG/1
243 TABLET, CHEWABLE ORAL ONCE
Status: COMPLETED | OUTPATIENT
Start: 2024-01-05 | End: 2024-01-05

## 2024-01-05 RX ORDER — PREGABALIN 25 MG/1
75 CAPSULE ORAL ONCE
Status: COMPLETED | OUTPATIENT
Start: 2024-01-06 | End: 2024-01-06

## 2024-01-05 RX ORDER — OXYCODONE HYDROCHLORIDE AND ACETAMINOPHEN 5; 325 MG/1; MG/1
1 TABLET ORAL ONCE
Status: COMPLETED | OUTPATIENT
Start: 2024-01-06 | End: 2024-01-06

## 2024-01-05 RX ADMIN — OXYCODONE AND ACETAMINOPHEN 1 TABLET: 5; 325 TABLET ORAL at 04:48

## 2024-01-05 RX ADMIN — TAMSULOSIN HYDROCHLORIDE 0.4 MG: 0.4 CAPSULE ORAL at 08:28

## 2024-01-05 RX ADMIN — PREGABALIN 75 MG: 75 CAPSULE ORAL at 12:10

## 2024-01-05 RX ADMIN — POLYETHYLENE GLYCOL 3350 17 G: 17 POWDER, FOR SOLUTION ORAL at 12:15

## 2024-01-05 RX ADMIN — CETIRIZINE HYDROCHLORIDE 10 MG: 10 TABLET, FILM COATED ORAL at 08:28

## 2024-01-05 RX ADMIN — ASPIRIN 81 MG: 81 TABLET, CHEWABLE ORAL at 12:11

## 2024-01-05 RX ADMIN — OXYCODONE AND ACETAMINOPHEN 1 TABLET: 5; 325 TABLET ORAL at 13:00

## 2024-01-05 RX ADMIN — METOPROLOL SUCCINATE 37.5 MG: 25 TABLET, EXTENDED RELEASE ORAL at 08:28

## 2024-01-05 RX ADMIN — SODIUM CHLORIDE: 9 INJECTION, SOLUTION INTRAVENOUS at 04:49

## 2024-01-05 RX ADMIN — OMEPRAZOLE 20 MG: 20 CAPSULE, DELAYED RELEASE ORAL at 08:28

## 2024-01-05 RX ADMIN — ASPIRIN 243 MG: 81 TABLET, CHEWABLE ORAL at 23:36

## 2024-01-05 ASSESSMENT — PAIN DESCRIPTION - LOCATION
LOCATION: FLANK
LOCATION: FLANK

## 2024-01-05 ASSESSMENT — PAIN DESCRIPTION - ONSET: ONSET: ON-GOING

## 2024-01-05 ASSESSMENT — PAIN DESCRIPTION - DESCRIPTORS
DESCRIPTORS: SHARP
DESCRIPTORS: ACHING;DISCOMFORT

## 2024-01-05 ASSESSMENT — PAIN DESCRIPTION - PAIN TYPE: TYPE: ACUTE PAIN

## 2024-01-05 ASSESSMENT — PAIN DESCRIPTION - ORIENTATION
ORIENTATION: RIGHT
ORIENTATION: RIGHT

## 2024-01-05 ASSESSMENT — PAIN DESCRIPTION - FREQUENCY: FREQUENCY: INTERMITTENT

## 2024-01-05 ASSESSMENT — PAIN SCALES - GENERAL
PAINLEVEL_OUTOF10: 6
PAINLEVEL_OUTOF10: 8

## 2024-01-05 ASSESSMENT — PAIN - FUNCTIONAL ASSESSMENT: PAIN_FUNCTIONAL_ASSESSMENT: ACTIVITIES ARE NOT PREVENTED

## 2024-01-05 NOTE — PROGRESS NOTES
[facility]    Respiratory Therapy   Home Oxygen Evaluation        Name: Jovon Javier  Medical Record Number: [id number]  Age: 63 y.o.  Gender:  male   : 1960  Today's date: 2024  Room: [address]-      Assessment        BP 99/66   Pulse 67   Temp 97.6 °F (36.4 °C) (Oral)   Resp 17   Ht 1.676 m (5' 6\")   Wt 103.3 kg (227 lb 11.8 oz)   SpO2 96%   BMI 36.76 kg/m²     Patient Active Problem List   Diagnosis    Nephrolithiasis    Pneumonia    Essential hypertension, benign    Back pain    URTI (acute upper respiratory infection)    Irritable bowel syndrome    Pure hypercholesterolemia    Allergic rhinitis    Dizziness    Right lower quadrant abdominal pain    Precordial pain    Bronchiolitis obliterans    Bronchitis    Pulmonary embolus (HCC)    Coronary artery disease due to calcified coronary lesion    Atypical chest pain    Headache    Lightheaded    Leg pain, right    DDD (degenerative disc disease), lumbar    Acute non-recurrent maxillary sinusitis    SOB (shortness of breath)    Chronic cough    Nausea    Transient cerebral ischemia    History of pulmonary embolism    Cardiomyopathy (HCC)    Confusion    Right arm weakness    Elevated INR    Acute on chronic systolic heart failure (HCC)    LBBB (left bundle branch block)    Acute confusional state    Urinary tract infection without hematuria    Encounter for adjustment of cardiac resynchronization therapy defibrillator (CRT-D)    Biventricular ICD (implantable cardioverter-defibrillator) in place    Chronic anticoagulation    PAF (paroxysmal atrial fibrillation) (HCC)    Hypercalcemia    Primary hyperparathyroidism (HCC)    On warfarin therapy    Perinephric hematoma    Renal hematoma, right    Postoperative hemorrhagic shock    Acute blood loss anemia    Metabolic acidosis    Tachyarrhythmia    Iron deficiency anemia due to chronic blood loss    Thrombocytopenia (HCC)    Hypoxia    Acute UTI    Abdominal wall  hematoma    Acute right flank pain    Intractable vomiting with nausea    Stenosis of ureteropelvic junction (UPJ)-right    Elevated liver enzymes    Acute abdominal pain    Colicky LLQ abdominal pain    Arm pain    Dysuria    Fever    Episode of shaking    Post-nasal drip    Left foot pain    Pain in scapula    Skin lesions    Otalgia    Pleurisy    Chills    Swelling of calf    Ureteral colic    Gastroesophageal reflux disease with esophagitis without hemorrhage    Chest pain    Epistaxis    Right calf pain    Calculous pyelonephritis    Other alveolar and parieto-alveolar conditions (HCC)    Numbness in feet    Neck pain    Sciatica of right side    Umbilical hernia without obstruction and without gangrene    Near syncope    Palpitations    Scalp lump    Pancreatitis, unspecified pancreatitis type    Stomatitis    Dark stools    Pain of left thigh    Trochanteric bursitis of right hip    Renal cyst, right       Social History:  Social History     Tobacco Use    Smoking status: Never     Passive exposure: Past    Smokeless tobacco: Never   Vaping Use    Vaping Use: Never used   Substance Use Topics    Alcohol use: No    Drug use: Never       Patient Room Air saturation at rest 87 %  Patient Room Air saturation upon ambulation 86 %  Patient ambulated 3   minutes.  (Minimum of 3 minutes unless Sp02 < 88% prior to 3 minute roz)    Oxygen saturations of 88% or less on RA qualifies patient for Home Oxygen    Patient resting on 0  lmp  with an oxygen saturation of  87 %     Patient ambulated on 1 lpm with an oxygen saturation of 90%    Patient ambulated on 2 lpm with an oxygen saturation of 89%      Qualifying patient for home oxygen with ambulation and continuous flow  @ 2 lpm.      In your clinical assessment does the Patient Require Portable Oxygen Tanks?    Yes               Patient/caregiver was educated on Home Oxygen process:  Yes      Level of patient/caregiver understanding able to:   [] Verbalize

## 2024-01-05 NOTE — PROGRESS NOTES
Discharge instructions gone over with pt and spouse, verbalized understanding. Rx sent to pt's preferred pharmacy. IV removed w/o complications. Pt transported via wheelchair by PCA. Electronically signed by Roby Harris RN on 1/5/2024 at 4:23 PM

## 2024-01-05 NOTE — PLAN OF CARE
Problem: Discharge Planning  Goal: Discharge to home or other facility with appropriate resources  1/4/2024 2339 by Justyna Santana RN  Outcome: Progressing  1/4/2024 1041 by Viry Anaya RN  Outcome: Progressing     Problem: Pain  Goal: Verbalizes/displays adequate comfort level or baseline comfort level  1/4/2024 2339 by Justyna Santana RN  Outcome: Progressing  Flowsheets (Taken 1/4/2024 2338)  Verbalizes/displays adequate comfort level or baseline comfort level:   Encourage patient to monitor pain and request assistance   Assess pain using appropriate pain scale   Administer analgesics based on type and severity of pain and evaluate response   Implement non-pharmacological measures as appropriate and evaluate response   Consider cultural and social influences on pain and pain management   Notify Licensed Independent Practitioner if interventions unsuccessful or patient reports new pain  1/4/2024 1041 by Viry Anaya RN  Outcome: Progressing     Problem: Safety - Adult  Goal: Free from fall injury  1/4/2024 2339 by Justyna Santana RN  Outcome: Progressing  1/4/2024 1041 by Viry Anaya RN  Outcome: Progressing

## 2024-01-05 NOTE — CARE COORDINATION
DISCHARGE SUMMARY     DATE OF DISCHARGE: 1/5/24    DISCHARGE DESTINATION: Home     HOME CARE: No    HEMODIALYSIS: No    TRANSPORTATION: Private Car    NEW DME ORDERED: yes    COMMENTS: Patient requires continuous oxygen at 2L/M . He states that he has a portable oxygen concentrator at home that his wife will bring when she picks him up.Electronically signed by Ce Brito RN on 1/5/2024 at 2:13 PM

## 2024-01-05 NOTE — PROGRESS NOTES
[facility]     INPATIENT PROGRESS NOTE  Name: Jovon Javier  /Age/Sex: 1960 (63 y.o. male)   MRN & CSN: [id number] & [id number]  Admission Date/Time: 2024  1:29 PM   [id number]  Current Hospital Day: Hospital Day: 4   Principal Problem: Renal cyst, right  HPI     Patient seen and examined. No issues overnight. Reports abdominal pain refractory to any oral pain medications. Afebrile. Has a menagerie of sugar beverages at bedside     All other review of systems negative unless noted above.  VITALS VITALS RANGE (24 hours)  [unfilled]  [unfilled]    INS/OUTS WEIGHTS    Intake/Output Summary (Last 24 hours) at 2024 2351  Last data filed at 2024 2331  Gross per 24 hour   Intake 5309.46 ml   Output 300 ml   Net 5009.46 ml      I/O this shift:  In: 4329.5 [P.O.:620; I.V.:2509.5; IV Piggyback:1200]  Out: 300 [Urine:300] [unfilled]       PHYSICAL EXAM   General Appearance:    Alert, cooperative, no distress, appears stated age   Head:    Normocephalic, without obvious abnormality, atraumatic   Eyes:    PERRL, conjunctiva/corneas clear, EOM's intact, fundi     benign, both eyes              Nose:   Nares normal, septum midline, mucosa normal, no drainage    or sinus tenderness   Throat:   Lips, mucosa, and tongue normal; teeth and gums normal   Neck:   Supple, symmetrical, trachea midline, no adenopathy;        thyroid:  No enlargement/tenderness/nodules; no carotid    bruit or JVD   Back:     Symmetric, no curvature, ROM normal, no CVA tenderness   Lungs:     Clear to auscultation bilaterally, respirations unlabored   Chest wall:    No tenderness or deformity   Heart:    Regular rate and rhythm, S1 and S2 normal, no murmur, rub   or gallop   Abdomen:     Soft, non-tender, bowel sounds active all four quadrants,     no masses, no organomegaly               Extremities:   Extremities normal, atraumatic, no cyanosis or edema   Pulses:   2+ and symmetric  Resume home BP meds, hold lasix     History of pancreatitis  - Not currently symptomatic, monitor     GERD  - Continue PPI        Cardiomyopathy status post ICD  Hypertension  -Continue home meds     Hyperlipidemia  - Continue statin     Previous pulmonary embolism  -Continue warfarin     Bronchiolitis obliterans  - Continue home inhaler therapy     Constipation  - Continue MiraLAX        Insomnia  - Continue Ambien     F/E/N: Diabetic diet  PPx: Warfarin, hold tomorrow   Dispo: pain control    Jeffery Estrella MD 1/4/2024 11:51 PM

## 2024-01-05 NOTE — PROGRESS NOTES
Clinical Pharmacy Note  Warfarin Consult    Jovon Javier is a 63 y.o. male receiving warfarin managed by pharmacy.  Patient being bridged with none.    Warfarin Indication: Hx of DVT/PE  Target INR range: 2-3   Dose prior to admission: 5 mg on Fri and 2.5 mg all other days    Current warfarin drug-drug interactions:     Recent Labs     01/02/24  2107 01/04/24  1120 01/05/24  0553   HGB 14.7 13.4* 12.3*   HCT 43.7 40.3* 36.8*   INR 2.40* 2.17* 2.10*         Assessment/Plan:    He has a procedure next Wed and he'll need to be off Warfarin five days prior ( which would mean starting today.     Discharging home. Hold warfarin 5 days as instructed  Resume if okay with doctor after procedure and follow up in clinic with appointment as scheduled.     Daily PT/INR until stable within therapeutic range.     Ion [facility]

## 2024-01-05 NOTE — PLAN OF CARE
Problem: Discharge Planning  Goal: Discharge to home or other facility with appropriate resources  1/5/2024 0946 by Roby Harris RN  Outcome: Progressing  Flowsheets (Taken 1/5/2024 0946)  Discharge to home or other facility with appropriate resources: Identify barriers to discharge with patient and caregiver     Problem: Pain  Goal: Verbalizes/displays adequate comfort level or baseline comfort level  1/5/2024 0946 by Roby Harris RN  Outcome: Progressing  Flowsheets (Taken 1/5/2024 0946)  Verbalizes/displays adequate comfort level or baseline comfort level:   Encourage patient to monitor pain and request assistance   Administer analgesics based on type and severity of pain and evaluate response   Assess pain using appropriate pain scale   Implement non-pharmacological measures as appropriate and evaluate response  Note: Pt educated on 0-10 pain scale. Pt educated on non-pharmacological pain interventions. Pain medications given as needed per MAR.      Problem: Safety - Adult  Goal: Free from fall injury  1/5/2024 0946 by Roby Harris RN  Outcome: Progressing  Flowsheets (Taken 1/5/2024 0946)  Free From Fall Injury: Instruct family/caregiver on patient safety  Note: Potential fall hazards identified and removed accordingly. Pt educated to use call light for assistance. Bed locked, in lowest position.     Problem: Respiratory - Adult  Goal: Achieves optimal ventilation and oxygenation  Outcome: Progressing  Flowsheets (Taken 1/5/2024 0946)  Achieves optimal ventilation and oxygenation:   Assess for changes in respiratory status   Position to facilitate oxygenation and minimize respiratory effort   Assess for changes in mentation and behavior   Oxygen supplementation based on oxygen saturation or arterial blood gases

## 2024-01-05 NOTE — PROGRESS NOTES
[place] [facility]     INPATIENT PROGRESS NOTE  Name: Jovon Javier  /Age/Sex: 1960 (63 y.o. male)   MRN & CSN: [id number] & [id number]  Admission Date/Time: 2024  1:29 PM   [id number]  Current Hospital Day: Hospital Day: 5   Principal Problem: Renal cyst, right  HPI     Patient seen and examined. No issues overnight. Pain controlled with oral medications. No nausea or vomiting. Afebrile. No dysuria. Tolerating regular diet    All other review of systems negative unless noted above.  VITALS VITALS RANGE (24 hours)  [unfilled]  [unfilled]    INS/OUTS WEIGHTS    Intake/Output Summary (Last 24 hours) at 2024 1301  Last data filed at 2024 0629  Gross per 24 hour   Intake 4629.46 ml   Output 600 ml   Net 4029.46 ml      No intake/output data recorded. [unfilled]       PHYSICAL EXAM   General Appearance:    Alert, cooperative, no distress, appears stated age   Head:    Normocephalic, without obvious abnormality, atraumatic   Eyes:    PERRL, conjunctiva/corneas clear, EOM's intact, fundi     benign, both eyes              Nose:   Nares normal, septum midline, mucosa normal, no drainage    or sinus tenderness   Throat:   Lips, mucosa, and tongue normal; teeth and gums normal   Neck:   Supple, symmetrical, trachea midline, no adenopathy;        thyroid:  No enlargement/tenderness/nodules; no carotid    bruit or JVD   Back:     Symmetric, no curvature, ROM normal, no CVA tenderness   Lungs:     Clear to auscultation bilaterally, respirations unlabored   Chest wall:    No tenderness or deformity   Heart:    Regular rate and rhythm, S1 and S2 normal, no murmur, rub   or gallop   Abdomen:     Soft, non-tender, bowel sounds active all four quadrants,     no masses, no organomegaly               Extremities:   Extremities normal, atraumatic, no cyanosis or edema   Pulses:   2+ and symmetric all extremities   Skin:   Skin color, texture, turgor normal, no rashes

## 2024-01-06 PROBLEM — R07.9 CHEST PAIN, UNSPECIFIED: Status: ACTIVE | Noted: 2024-01-06

## 2024-01-06 LAB
INR PPP: 1.99 (ref 0.84–1.16)
PROTHROMBIN TIME: 22.5 SEC (ref 11.5–14.8)
TROPONIN, HIGH SENSITIVITY: 18 NG/L (ref 0–22)
TROPONIN, HIGH SENSITIVITY: 19 NG/L (ref 0–22)
TROPONIN, HIGH SENSITIVITY: 19 NG/L (ref 0–22)
TROPONIN, HIGH SENSITIVITY: 22 NG/L (ref 0–22)

## 2024-01-06 PROCEDURE — 2700000000 HC OXYGEN THERAPY PER DAY

## 2024-01-06 PROCEDURE — 1200000000 HC SEMI PRIVATE

## 2024-01-06 PROCEDURE — 36415 COLL VENOUS BLD VENIPUNCTURE: CPT

## 2024-01-06 PROCEDURE — 6370000000 HC RX 637 (ALT 250 FOR IP): Performed by: EMERGENCY MEDICINE

## 2024-01-06 PROCEDURE — 99221 1ST HOSP IP/OBS SF/LOW 40: CPT | Performed by: INTERNAL MEDICINE

## 2024-01-06 PROCEDURE — 6370000000 HC RX 637 (ALT 250 FOR IP): Performed by: INTERNAL MEDICINE

## 2024-01-06 PROCEDURE — 6360000002 HC RX W HCPCS: Performed by: INTERNAL MEDICINE

## 2024-01-06 PROCEDURE — 94640 AIRWAY INHALATION TREATMENT: CPT

## 2024-01-06 PROCEDURE — 96376 TX/PRO/DX INJ SAME DRUG ADON: CPT

## 2024-01-06 PROCEDURE — 96375 TX/PRO/DX INJ NEW DRUG ADDON: CPT

## 2024-01-06 PROCEDURE — G0378 HOSPITAL OBSERVATION PER HR: HCPCS

## 2024-01-06 PROCEDURE — 84484 ASSAY OF TROPONIN QUANT: CPT

## 2024-01-06 PROCEDURE — 85610 PROTHROMBIN TIME: CPT

## 2024-01-06 RX ORDER — METOPROLOL SUCCINATE 25 MG/1
25 TABLET, EXTENDED RELEASE ORAL NIGHTLY
Status: DISCONTINUED | OUTPATIENT
Start: 2024-01-06 | End: 2024-01-16 | Stop reason: HOSPADM

## 2024-01-06 RX ORDER — ZOLPIDEM TARTRATE 5 MG/1
10 TABLET ORAL NIGHTLY PRN
Status: DISCONTINUED | OUTPATIENT
Start: 2024-01-06 | End: 2024-01-16 | Stop reason: HOSPADM

## 2024-01-06 RX ORDER — TAMSULOSIN HYDROCHLORIDE 0.4 MG/1
0.4 CAPSULE ORAL 2 TIMES DAILY
Status: DISCONTINUED | OUTPATIENT
Start: 2024-01-06 | End: 2024-01-16 | Stop reason: HOSPADM

## 2024-01-06 RX ORDER — PREGABALIN 75 MG/1
75 CAPSULE ORAL DAILY
Status: DISCONTINUED | OUTPATIENT
Start: 2024-01-06 | End: 2024-01-16 | Stop reason: HOSPADM

## 2024-01-06 RX ORDER — AZELASTINE 1 MG/ML
2 SPRAY, METERED NASAL 2 TIMES DAILY
Status: DISCONTINUED | OUTPATIENT
Start: 2024-01-06 | End: 2024-01-16 | Stop reason: HOSPADM

## 2024-01-06 RX ORDER — FLAVOXATE HYDROCHLORIDE 100 MG/1
100 TABLET ORAL 4 TIMES DAILY PRN
Status: DISCONTINUED | OUTPATIENT
Start: 2024-01-06 | End: 2024-01-16 | Stop reason: HOSPADM

## 2024-01-06 RX ORDER — DEXLANSOPRAZOLE 60 MG/1
60 CAPSULE, DELAYED RELEASE ORAL DAILY
Status: DISCONTINUED | OUTPATIENT
Start: 2024-01-06 | End: 2024-01-09

## 2024-01-06 RX ORDER — POLYETHYLENE GLYCOL 3350 17 G/17G
17 POWDER, FOR SOLUTION ORAL NIGHTLY
Status: DISCONTINUED | OUTPATIENT
Start: 2024-01-06 | End: 2024-01-13

## 2024-01-06 RX ORDER — AMOXICILLIN AND CLAVULANATE POTASSIUM 875; 125 MG/1; MG/1
1 TABLET, FILM COATED ORAL 2 TIMES DAILY
Status: DISCONTINUED | OUTPATIENT
Start: 2024-01-06 | End: 2024-01-09

## 2024-01-06 RX ORDER — ALBUTEROL SULFATE 90 UG/1
2 AEROSOL, METERED RESPIRATORY (INHALATION) EVERY 6 HOURS PRN
Status: DISCONTINUED | OUTPATIENT
Start: 2024-01-06 | End: 2024-01-16 | Stop reason: HOSPADM

## 2024-01-06 RX ORDER — OXYCODONE HYDROCHLORIDE AND ACETAMINOPHEN 5; 325 MG/1; MG/1
1 TABLET ORAL EVERY 4 HOURS PRN
Status: DISCONTINUED | OUTPATIENT
Start: 2024-01-06 | End: 2024-01-16 | Stop reason: HOSPADM

## 2024-01-06 RX ORDER — PREGABALIN 75 MG/1
150 CAPSULE ORAL NIGHTLY
Status: DISCONTINUED | OUTPATIENT
Start: 2024-01-06 | End: 2024-01-16 | Stop reason: HOSPADM

## 2024-01-06 RX ORDER — FLUTICASONE PROPIONATE 50 MCG
1 SPRAY, SUSPENSION (ML) NASAL DAILY
Status: DISCONTINUED | OUTPATIENT
Start: 2024-01-06 | End: 2024-01-16 | Stop reason: HOSPADM

## 2024-01-06 RX ORDER — ASPIRIN 81 MG/1
81 TABLET, CHEWABLE ORAL DAILY
Status: DISCONTINUED | OUTPATIENT
Start: 2024-01-06 | End: 2024-01-16 | Stop reason: HOSPADM

## 2024-01-06 RX ORDER — GUAIFENESIN 600 MG/1
600 TABLET, EXTENDED RELEASE ORAL 2 TIMES DAILY PRN
Status: DISCONTINUED | OUTPATIENT
Start: 2024-01-06 | End: 2024-01-16 | Stop reason: HOSPADM

## 2024-01-06 RX ORDER — FUROSEMIDE 20 MG/1
10 TABLET ORAL DAILY
Status: DISCONTINUED | OUTPATIENT
Start: 2024-01-06 | End: 2024-01-16 | Stop reason: HOSPADM

## 2024-01-06 RX ORDER — PRAVASTATIN SODIUM 10 MG
40 TABLET ORAL ONCE
Status: COMPLETED | OUTPATIENT
Start: 2024-01-06 | End: 2024-01-06

## 2024-01-06 RX ORDER — BROMPHENIRAMINE MALEATE, PSEUDOEPHEDRINE HYDROCHLORIDE, AND DEXTROMETHORPHAN HYDROBROMIDE 2; 30; 10 MG/5ML; MG/5ML; MG/5ML
5 SYRUP ORAL 4 TIMES DAILY PRN
Status: DISCONTINUED | OUTPATIENT
Start: 2024-01-06 | End: 2024-01-16 | Stop reason: HOSPADM

## 2024-01-06 RX ORDER — METHOCARBAMOL 500 MG/1
500 TABLET, FILM COATED ORAL 4 TIMES DAILY PRN
Status: DISCONTINUED | OUTPATIENT
Start: 2024-01-06 | End: 2024-01-16 | Stop reason: HOSPADM

## 2024-01-06 RX ORDER — METOPROLOL SUCCINATE 25 MG/1
37.5 TABLET, EXTENDED RELEASE ORAL ONCE
Status: COMPLETED | OUTPATIENT
Start: 2024-01-06 | End: 2024-01-06

## 2024-01-06 RX ORDER — METOPROLOL SUCCINATE 25 MG/1
37.5 TABLET, EXTENDED RELEASE ORAL DAILY
Status: DISCONTINUED | OUTPATIENT
Start: 2024-01-06 | End: 2024-01-16 | Stop reason: HOSPADM

## 2024-01-06 RX ORDER — PRAVASTATIN SODIUM 40 MG
40 TABLET ORAL NIGHTLY
Status: DISCONTINUED | OUTPATIENT
Start: 2024-01-06 | End: 2024-01-16 | Stop reason: HOSPADM

## 2024-01-06 RX ORDER — PANTOPRAZOLE SODIUM 40 MG/1
40 TABLET, DELAYED RELEASE ORAL
Status: DISCONTINUED | OUTPATIENT
Start: 2024-01-07 | End: 2024-01-06 | Stop reason: ALTCHOICE

## 2024-01-06 RX ADMIN — PREGABALIN 75 MG: 75 CAPSULE ORAL at 11:34

## 2024-01-06 RX ADMIN — SACUBITRIL AND VALSARTAN 1 TABLET: 24; 26 TABLET, FILM COATED ORAL at 22:13

## 2024-01-06 RX ADMIN — POLYETHYLENE GLYCOL 3350 17 G: 17 POWDER, FOR SOLUTION ORAL at 22:14

## 2024-01-06 RX ADMIN — ALBUTEROL SULFATE 2 PUFF: 90 AEROSOL, METERED RESPIRATORY (INHALATION) at 22:39

## 2024-01-06 RX ADMIN — METOPROLOL SUCCINATE 37.5 MG: 25 TABLET, EXTENDED RELEASE ORAL at 01:51

## 2024-01-06 RX ADMIN — PRAVASTATIN SODIUM 40 MG: 40 TABLET ORAL at 22:13

## 2024-01-06 RX ADMIN — PREGABALIN 150 MG: 75 CAPSULE ORAL at 22:13

## 2024-01-06 RX ADMIN — FLUTICASONE PROPIONATE 1 SPRAY: 50 SPRAY, METERED NASAL at 11:32

## 2024-01-06 RX ADMIN — AMOXICILLIN AND CLAVULANATE POTASSIUM 1 TABLET: 875; 125 TABLET, FILM COATED ORAL at 11:34

## 2024-01-06 RX ADMIN — OXYCODONE HYDROCHLORIDE AND ACETAMINOPHEN 1 TABLET: 5; 325 TABLET ORAL at 00:08

## 2024-01-06 RX ADMIN — PRAVASTATIN SODIUM 40 MG: 10 TABLET ORAL at 01:51

## 2024-01-06 RX ADMIN — HYDROMORPHONE HYDROCHLORIDE 0.5 MG: 1 INJECTION, SOLUTION INTRAMUSCULAR; INTRAVENOUS; SUBCUTANEOUS at 22:30

## 2024-01-06 RX ADMIN — HYDROMORPHONE HYDROCHLORIDE 0.5 MG: 1 INJECTION, SOLUTION INTRAMUSCULAR; INTRAVENOUS; SUBCUTANEOUS at 18:43

## 2024-01-06 RX ADMIN — TAMSULOSIN HYDROCHLORIDE 0.4 MG: 0.4 CAPSULE ORAL at 22:14

## 2024-01-06 RX ADMIN — ASPIRIN 81 MG: 81 TABLET, CHEWABLE ORAL at 11:35

## 2024-01-06 RX ADMIN — PREGABALIN 75 MG: 25 CAPSULE ORAL at 00:18

## 2024-01-06 RX ADMIN — FUROSEMIDE 10 MG: 20 TABLET ORAL at 11:33

## 2024-01-06 RX ADMIN — OXYCODONE AND ACETAMINOPHEN 1 TABLET: 5; 325 TABLET ORAL at 08:00

## 2024-01-06 RX ADMIN — METOPROLOL SUCCINATE 25 MG: 25 TABLET, EXTENDED RELEASE ORAL at 22:13

## 2024-01-06 RX ADMIN — SACUBITRIL AND VALSARTAN 1 TABLET: 24; 26 TABLET, FILM COATED ORAL at 11:34

## 2024-01-06 RX ADMIN — METOPROLOL SUCCINATE 37.5 MG: 25 TABLET, EXTENDED RELEASE ORAL at 11:32

## 2024-01-06 RX ADMIN — TAMSULOSIN HYDROCHLORIDE 0.4 MG: 0.4 CAPSULE ORAL at 11:33

## 2024-01-06 RX ADMIN — HYDROMORPHONE HYDROCHLORIDE 0.5 MG: 1 INJECTION, SOLUTION INTRAMUSCULAR; INTRAVENOUS; SUBCUTANEOUS at 14:31

## 2024-01-06 ASSESSMENT — PAIN SCALES - GENERAL
PAINLEVEL_OUTOF10: 7
PAINLEVEL_OUTOF10: 6
PAINLEVEL_OUTOF10: 8
PAINLEVEL_OUTOF10: 8
PAINLEVEL_OUTOF10: 7
PAINLEVEL_OUTOF10: 7
PAINLEVEL_OUTOF10: 8
PAINLEVEL_OUTOF10: 8
PAINLEVEL_OUTOF10: 10
PAINLEVEL_OUTOF10: 6
PAINLEVEL_OUTOF10: 6

## 2024-01-06 ASSESSMENT — PAIN DESCRIPTION - ORIENTATION
ORIENTATION: RIGHT
ORIENTATION: UPPER
ORIENTATION: RIGHT

## 2024-01-06 ASSESSMENT — PAIN DESCRIPTION - LOCATION
LOCATION: FLANK
LOCATION: CHEST
LOCATION: FLANK
LOCATION: ABDOMEN
LOCATION: FLANK

## 2024-01-06 ASSESSMENT — PAIN DESCRIPTION - DESCRIPTORS
DESCRIPTORS: ACHING
DESCRIPTORS: ACHING
DESCRIPTORS: DISCOMFORT
DESCRIPTORS: SHARP

## 2024-01-06 ASSESSMENT — PAIN - FUNCTIONAL ASSESSMENT
PAIN_FUNCTIONAL_ASSESSMENT: ACTIVITIES ARE NOT PREVENTED

## 2024-01-06 NOTE — DISCHARGE INSTR - COC
Continuity of Care Form    Patient Name: Jovon Javier   :  1960  MRN:  [id number]    Admit date:  2024  Discharge date:  ***    Code Status Order: Prior   Advance Directives:     Admitting Physician:  No admitting provider for patient encounter.  PCP: Juan C Dumont MD    Discharging Nurse: ***  Discharging Hospital Unit/Room#: 043/E-43  Discharging Unit Phone Number: ***    Emergency Contact:   Extended Emergency Contact Information  Primary Emergency Contact: Adelaida Javier  Address: [address]           [address]  Home Phone: [phone]  Mobile Phone: [phone]  Relation: Spouse  Secondary Emergency Contact: Frantz Javier           [facility]  Home Phone: [phone]  Relation: Child    Past Surgical History:  Past Surgical History:   Procedure Laterality Date    BRONCHOSCOPY N/A 2023    BRONCHOSCOPY WITH BRONCHIOLAR ALVEOLAR LAVAGE performed by Rafael Christy MD at [facility] ENDOSCOPY    CHOLECYSTECTOMY      COLONOSCOPY  2012    COLONOSCOPY  2018    CYSTOSCOPY  2019    RIGHT SIDED URETEROSCOPY  Diagnostic, retrograde pyelogram and fulguration of previously resected bladder tumor base    CYSTOSCOPY Right 2019    RIGHT SIDED URETEROSCOPY FLUGERATION  OF BLADDER performed by Perlita Fuentes MD at [facility] OR    CYSTOSCOPY Right 2021    CYSTOURETHROSCOPY WITH RIGHT RETROGRADE PYELOGRAPHY AND PLACEMENT OF RIGHT DOUBLE J STENT performed by Arthur Vazquez DO at [facility] OR    CYSTOSCOPY Right 2022    CYSTOSCOPY, RIGHT STENT INSERTION performed by Perlita Fuentes MD at [facility] OR    ESOPHAGEAL DILATATION N/A 2022    ESOPHAGEAL DILATATION performed by Remington Hutchinson Jr., DO at [facility] ENDOSCOPY    HERNIA REPAIR  2015    LITHOTRIPSY      PACEMAKER PLACEMENT      with defib-\"they turned off pacemaker part\"    SINUS SURGERY      UPPER GASTROINTESTINAL ENDOSCOPY

## 2024-01-06 NOTE — PROGRESS NOTES
4 Eyes Skin Assessment     NAME:  Jovon Javier  YOB: 1960  MEDICAL RECORD NUMBER:  [id number]    The patient is being assessed for  Admission    I agree that at least one RN has performed a thorough Head to Toe Skin Assessment on the patient. ALL assessment sites listed below have been assessed.      Areas assessed by both nurses:    Head, Face, Ears, Shoulders, Back, Chest, Sacrum. Buttock, Coccyx, Ischium, Legs. Feet and Heels, and Under Medical Devices         Does the Patient have a Wound? No noted wound(s)       Casimiro Prevention initiated by RN: No  Wound Care Orders initiated by RN: No    Pressure Injury (Stage 3,4, Unstageable, DTI, NWPT, and Complex wounds) if present, place Wound referral order by RN under : No    New Ostomies, if present place, Ostomy referral order under : No     Nurse 1 eSignature: Electronically signed by Barry sAhraf RN on 1/6/24 at 3:06 AM EST    **SHARE this note so that the co-signing nurse can place an eSignature**    Nurse 2 eSignature: Electronically signed by Odette Gilbert RN on 1/6/24 at 6:06 AM EST

## 2024-01-06 NOTE — ED PROVIDER NOTES
[facility] EMERGENCY DEPARTMENT    EMERGENCY DEPARTMENT ENCOUNTER        Patient Name: Jovon Javier  MRN: [id number]  Birthdate 1960  Date of evaluation: 1/5/2024  PCP: Juan C Dumont MD  Note Started: 10:34 PM EST 1/5/24    CHIEF COMPLAINT       Chest Pain and Shortness of Breath (Patient brought to the ER via EMS from home with complaint of chest pain and shortness of breath for the past 1 hour. )      HISTORY OF PRESENT ILLNESS: 1 or more Elements       Jovon Javier is a 63 y.o. male with history of CHF, CAD, atrial fibrillation on warfarin who presents to the emergency department for evaluation of chest pain and shortness of breath.  He reports he was just discharged from the hospital today.  He was admitted for  work up of renal cyst. Says after he went home and took a nap, he woke up around 7 PM with chest pain and shortness of breath.  Says the pain radiates up towards the jaw.    He is on 2 L nasal cannula O2, which he reports wearing at nighttime.     No other complaints, modifying factors or associated symptoms.     History obtained by the patient unless stated otherwise as above on HPI.   No limitations unless specified as above on HPI.     Past medical history:   Past Medical History:   Diagnosis Date    Abnormal CT scan 08/2023    spots on pancrease    Ankylosing spondylitis (HCC)     Atrial fibrillation     Bronchiolitis obliterans     CAD (coronary artery disease)     Cardiomyopathy (HCC)     CHF (congestive heart failure) (HCC)     Chronic anticoagulation     COPD (chronic obstructive pulmonary disease) (HCC)     GERD (gastroesophageal reflux disease)     Hepatitis 1979    Hx of blood clots     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    wears 2L/NC at night    Pneumothorax 2011    Prostatitis     Pulmonary embolism (HCC)        Past surgical history:   Past Surgical History:     Platelets 114 (*)     Lymphocytes Absolute 0.8 (*)     All other components within normal limits   COMPREHENSIVE METABOLIC PANEL - Abnormal; Notable for the following components:    CO2 35 (*)     Anion Gap 0 (*)     Glucose 113 (*)     Total Protein 5.8 (*)     All other components within normal limits   PROTIME-INR - Abnormal; Notable for the following components:    Protime 22.4 (*)     INR 1.97 (*)     All other components within normal limits   COVID-19, RAPID   RAPID INFLUENZA A/B ANTIGENS   TROPONIN   APTT   TROPONIN       When ordered only abnormal lab results are displayed. All other labs were within normal range or not returned as of this dictation.    EKG  The EKG, as interpreted by myself, in the emergency department in the absence of a cardiologist.    The Ekg interpreted by me shows  Normal sinus rhythm with a rate of 71  Axis is left  Left bundle branch block  QTc is normal  Intervals and Durations are unremarkable.      ST Segments: Nonspecific ST changes  No significant change from prior EKG dated December 20, 2023    RADIOLOGY:     Non-plain film images such as CT, Ultrasound and MRI are read by the radiologist. Plain radiographic images personally reviewed.     Interpretation per the Radiologist below, if available at the time of this note:  XR CHEST PORTABLE   Final Result   No radiographic evidence of acute pulmonary abnormality seen.  Overall, no   significant changes from the prior study are identified.           NM KIDNEY W FLOW AND FUNCTION W PHARMACOLOGICAL INTERVENTION    Result Date: 1/2/2024  EXAMINATION: NUCLEAR MEDICINE RENAL SCAN 1/2/2024 TECHNIQUE: Following hydration,  Tc-99m-MAG3 per protocol was administered intravenously.   40 mg of lasix was administered at approximately 15 mins. Dynamic flow and standard sequential images of the kidneys were acquired in posterior projection. Renograms of kidneys and cortices were generated and analyzed. COMPARISON: CT abdomen pelvis dated

## 2024-01-06 NOTE — H&P
Chronic anticoagulation     COPD (chronic obstructive pulmonary disease) (HCC)     GERD (gastroesophageal reflux disease)     Hepatitis 1979    Hx of blood clots     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    wears 2L/NC at night    Pneumothorax 2011    Prostatitis     Pulmonary embolism (HCC)          Past Surgical History:   Procedure Laterality Date    BRONCHOSCOPY N/A 11/2/2023    BRONCHOSCOPY WITH BRONCHIOLAR ALVEOLAR LAVAGE performed by Rafael Christy MD at [facility] ENDOSCOPY    CHOLECYSTECTOMY  2007    COLONOSCOPY  09/21/2012    COLONOSCOPY  11/30/2018    CYSTOSCOPY  05/17/2019    RIGHT SIDED URETEROSCOPY  Diagnostic, retrograde pyelogram and fulguration of previously resected bladder tumor base    CYSTOSCOPY Right 05/17/2019    RIGHT SIDED URETEROSCOPY FLUGERATION  OF BLADDER performed by Perlita Fuentes MD at [facility] OR    CYSTOSCOPY Right 07/02/2021    CYSTOURETHROSCOPY WITH RIGHT RETROGRADE PYELOGRAPHY AND PLACEMENT OF RIGHT DOUBLE J STENT performed by Arthur Vazquez DO at [facility] OR    CYSTOSCOPY Right 04/25/2022    CYSTOSCOPY, RIGHT STENT INSERTION performed by Perlita Fuentes MD at [facility] OR    ESOPHAGEAL DILATATION N/A 09/08/2022    ESOPHAGEAL DILATATION performed by Remington Hutchinson Jr., DO at [facility] ENDOSCOPY    HERNIA REPAIR  2015    LITHOTRIPSY      PACEMAKER PLACEMENT      with defib-\"they turned off pacemaker part\"    SINUS SURGERY      UPPER GASTROINTESTINAL ENDOSCOPY  03/28/2014    UPPER GASTROINTESTINAL ENDOSCOPY N/A 02/01/2021    EGD BIOPSY performed by Juan C Mittal MD at [facility] ENDOSCOPY    UPPER GASTROINTESTINAL ENDOSCOPY N/A 09/08/2022    EGD BIOPSY performed by Remington Hutchinson Jr., DO at [facility] ENDOSCOPY    UPPER GASTROINTESTINAL ENDOSCOPY N/A 08/30/2023    ENDOSCOPIC ULTRASOUND performed by Hernando Perez MD at [facility] ENDOSCOPY    UPPER GASTROINTESTINAL ENDOSCOPY      CO2 35 (H) 21 - 32 mmol/L    Anion Gap 0 (L) 3 - 16    Glucose 113 (H) 70 - 99 mg/dL    BUN 7 7 - 20 mg/dL    Creatinine 0.9 0.8 - 1.3 mg/dL    Est, Glom Filt Rate >60 >60    Calcium 9.8 8.3 - 10.6 mg/dL    Potassium 4.4 3.5 - 5.1 mmol/L    Total Protein 5.8 (L) 6.4 - 8.2 g/dL    Albumin 3.6 3.4 - 5.0 g/dL    Albumin/Globulin Ratio 1.6 1.1 - 2.2    Total Bilirubin 0.3 0.0 - 1.0 mg/dL    Alkaline Phosphatase 90 40 - 129 U/L    ALT 33 10 - 40 U/L    AST 21 15 - 37 U/L   COVID-19, Rapid    Collection Time: 01/05/24 10:33 PM    Specimen: Nasopharyngeal Swab   Result Value Ref Range    SARS-CoV-2, NAAT Not Detected Not Detected   Rapid influenza A/B antigens    Collection Time: 01/05/24 10:33 PM    Specimen: Nasopharyngeal   Result Value Ref Range    Rapid Influenza A Ag Negative Negative    Rapid Influenza B Ag Negative Negative   Protime-INR    Collection Time: 01/05/24 10:33 PM   Result Value Ref Range    Protime 22.4 (H) 11.5 - 14.8 sec    INR 1.97 (H) 0.84 - 1.16   APTT    Collection Time: 01/05/24 10:33 PM   Result Value Ref Range    aPTT 33.5 22.7 - 35.9 sec   Troponin    Collection Time: 01/06/24 12:10 AM   Result Value Ref Range    Troponin, High Sensitivity 19 0 - 22 ng/L   Protime-INR    Collection Time: 01/06/24  7:17 AM   Result Value Ref Range    Protime 22.5 (H) 11.5 - 14.8 sec    INR 1.99 (H) 0.84 - 1.16   Troponin    Collection Time: 01/06/24  7:17 AM   Result Value Ref Range    Troponin, High Sensitivity 19 0 - 22 ng/L       ASSESSMENT:      Principal Problem:    Chest pain, unspecified  Active Problems:    Acute right flank pain    Essential hypertension, benign    Bronchiolitis obliterans    Coronary artery disease due to calcified coronary lesion    Chronic anticoagulation    PLAN:    This morning the patient is without chest pain and his flank pain is minimal currently.  He is scheduled for surgical procedure on Wednesday.  He was hoping that he might build to get his procedure done sooner, but

## 2024-01-06 NOTE — PROGRESS NOTES
Patient admitted from ED, AO X4,, and accompanied by wife. Vitals are stable. Patient is on 2l nasal canula. Patient is complaining of right flank pain of 7. Patient oriented to room. Call light within reach.

## 2024-01-06 NOTE — PLAN OF CARE
Problem: Discharge Planning  Goal: Discharge to home or other facility with appropriate resources  1/6/2024 1832 by Behzad Delvalle RN  Outcome: Progressing  1/6/2024 0559 by Barry Ashraf RN  Outcome: Progressing  Flowsheets  Taken 1/6/2024 0315  Discharge to home or other facility with appropriate resources: Identify barriers to discharge with patient and caregiver  Taken 1/6/2024 0310  Discharge to home or other facility with appropriate resources: Identify barriers to discharge with patient and caregiver     Problem: Safety - Adult  Goal: Free from fall injury  1/6/2024 1832 by Behzad Delvalle RN  Outcome: Progressing  1/6/2024 0559 by Barry Ashraf RN  Outcome: Progressing     Problem: ABCDS Injury Assessment  Goal: Absence of physical injury  1/6/2024 1832 by Behzad Delvalle RN  Outcome: Progressing  1/6/2024 0559 by Barry Ashraf RN  Outcome: Progressing

## 2024-01-06 NOTE — PROGRESS NOTES
Patient now reports that his flank pain is too bad to go home.  He is requesting dilaudid for pain and states he is not able to be discharged.  He is ambulating about the room.

## 2024-01-07 LAB
GLUCOSE BLD-MCNC: 93 MG/DL (ref 70–99)
PERFORMED ON: NORMAL
TROPONIN, HIGH SENSITIVITY: 16 NG/L (ref 0–22)

## 2024-01-07 PROCEDURE — 6360000002 HC RX W HCPCS: Performed by: INTERNAL MEDICINE

## 2024-01-07 PROCEDURE — 6370000000 HC RX 637 (ALT 250 FOR IP): Performed by: INTERNAL MEDICINE

## 2024-01-07 PROCEDURE — 94640 AIRWAY INHALATION TREATMENT: CPT

## 2024-01-07 PROCEDURE — 99232 SBSQ HOSP IP/OBS MODERATE 35: CPT | Performed by: INTERNAL MEDICINE

## 2024-01-07 PROCEDURE — G0378 HOSPITAL OBSERVATION PER HR: HCPCS

## 2024-01-07 PROCEDURE — 94760 N-INVAS EAR/PLS OXIMETRY 1: CPT

## 2024-01-07 PROCEDURE — 96375 TX/PRO/DX INJ NEW DRUG ADDON: CPT

## 2024-01-07 PROCEDURE — 2700000000 HC OXYGEN THERAPY PER DAY

## 2024-01-07 PROCEDURE — 84484 ASSAY OF TROPONIN QUANT: CPT

## 2024-01-07 PROCEDURE — 36415 COLL VENOUS BLD VENIPUNCTURE: CPT

## 2024-01-07 PROCEDURE — 96376 TX/PRO/DX INJ SAME DRUG ADON: CPT

## 2024-01-07 PROCEDURE — 1200000000 HC SEMI PRIVATE

## 2024-01-07 RX ORDER — ALBUTEROL SULFATE 2.5 MG/3ML
2.5 SOLUTION RESPIRATORY (INHALATION)
Status: DISCONTINUED | OUTPATIENT
Start: 2024-01-07 | End: 2024-01-07

## 2024-01-07 RX ORDER — ONDANSETRON 2 MG/ML
4 INJECTION INTRAMUSCULAR; INTRAVENOUS EVERY 6 HOURS PRN
Status: DISCONTINUED | OUTPATIENT
Start: 2024-01-07 | End: 2024-01-16 | Stop reason: HOSPADM

## 2024-01-07 RX ORDER — ALBUTEROL SULFATE 2.5 MG/3ML
2.5 SOLUTION RESPIRATORY (INHALATION)
Status: DISCONTINUED | OUTPATIENT
Start: 2024-01-07 | End: 2024-01-13

## 2024-01-07 RX ADMIN — ALBUTEROL SULFATE 2.5 MG: 2.5 SOLUTION RESPIRATORY (INHALATION) at 12:16

## 2024-01-07 RX ADMIN — METOPROLOL SUCCINATE 37.5 MG: 25 TABLET, EXTENDED RELEASE ORAL at 12:13

## 2024-01-07 RX ADMIN — HYDROMORPHONE HYDROCHLORIDE 0.5 MG: 1 INJECTION, SOLUTION INTRAMUSCULAR; INTRAVENOUS; SUBCUTANEOUS at 02:28

## 2024-01-07 RX ADMIN — SACUBITRIL AND VALSARTAN 1 TABLET: 24; 26 TABLET, FILM COATED ORAL at 09:37

## 2024-01-07 RX ADMIN — TAMSULOSIN HYDROCHLORIDE 0.4 MG: 0.4 CAPSULE ORAL at 21:14

## 2024-01-07 RX ADMIN — ONDANSETRON 4 MG: 2 INJECTION INTRAMUSCULAR; INTRAVENOUS at 09:33

## 2024-01-07 RX ADMIN — PREGABALIN 150 MG: 75 CAPSULE ORAL at 22:24

## 2024-01-07 RX ADMIN — SACUBITRIL AND VALSARTAN 1 TABLET: 24; 26 TABLET, FILM COATED ORAL at 21:13

## 2024-01-07 RX ADMIN — FLUTICASONE PROPIONATE 1 SPRAY: 50 SPRAY, METERED NASAL at 09:39

## 2024-01-07 RX ADMIN — METOPROLOL SUCCINATE 25 MG: 25 TABLET, EXTENDED RELEASE ORAL at 21:14

## 2024-01-07 RX ADMIN — OXYCODONE AND ACETAMINOPHEN 1 TABLET: 5; 325 TABLET ORAL at 21:14

## 2024-01-07 RX ADMIN — PRAVASTATIN SODIUM 40 MG: 40 TABLET ORAL at 21:14

## 2024-01-07 RX ADMIN — FUROSEMIDE 10 MG: 20 TABLET ORAL at 09:36

## 2024-01-07 RX ADMIN — TAMSULOSIN HYDROCHLORIDE 0.4 MG: 0.4 CAPSULE ORAL at 09:37

## 2024-01-07 RX ADMIN — AMOXICILLIN AND CLAVULANATE POTASSIUM 1 TABLET: 875; 125 TABLET, FILM COATED ORAL at 21:13

## 2024-01-07 RX ADMIN — POLYETHYLENE GLYCOL 3350 17 G: 17 POWDER, FOR SOLUTION ORAL at 21:15

## 2024-01-07 RX ADMIN — ASPIRIN 81 MG: 81 TABLET, CHEWABLE ORAL at 09:36

## 2024-01-07 RX ADMIN — PREGABALIN 75 MG: 75 CAPSULE ORAL at 12:13

## 2024-01-07 RX ADMIN — ALBUTEROL SULFATE 2.5 MG: 2.5 SOLUTION RESPIRATORY (INHALATION) at 16:49

## 2024-01-07 RX ADMIN — DEXLANSOPRAZOLE 60 MG: 60 CAPSULE, DELAYED RELEASE ORAL at 12:15

## 2024-01-07 ASSESSMENT — PAIN SCALES - GENERAL
PAINLEVEL_OUTOF10: 8
PAINLEVEL_OUTOF10: 7
PAINLEVEL_OUTOF10: 7
PAINLEVEL_OUTOF10: 8

## 2024-01-07 ASSESSMENT — PAIN DESCRIPTION - ORIENTATION: ORIENTATION: RIGHT

## 2024-01-07 ASSESSMENT — PAIN DESCRIPTION - LOCATION: LOCATION: ABDOMEN;FLANK

## 2024-01-07 NOTE — PROGRESS NOTES
Patient complaining of dizziness and nausea. Patient refusing bed alarm, educated on fall precautions and not getting out of bed while dizzy.    Attending physician called, message left with answering service regarding anti-nausea medication. New order placed for ondansetron.    Electronically signed by Behzad Delvalle RN on 1/7/24 at 8:54 AM EST

## 2024-01-07 NOTE — PROGRESS NOTES
Patient states he has lightheadedness and sob, walks out of his room looking for staff. Denies chest pain at this time. Patient rates pain 8/10 in R flank and Abdomen. Patient refuses prn po pain medication, states \"the only thing that works is dilaudid.\" Patient states doesn't want to take Augmentin states \" he doesn't need it, and has adverse reactions to it.\" This nurse ask what the reaction is and patient states \" I don't know, I just have it on my list at home that I don't take it.\" Patient has on 2L of oxygen via nasal cannula pulse ox is 99 %, patient states he has shortness of breath. Call placed to respiratory for prn albuterol treatment, humidifier added to oxygen at this time per patient request. Patient also states he has requested prn medications and is informed that he has orders placed for medications he is inquiring about.  Warm blankets provided per request. Call light is provided.     Electronically signed by Dianne Gonzalez RN on 1/6/2024 at 11:24 PM    Patient is requesting prn nausea medication. States that he needs it because he is going to \"barf\". Patient has been redirected multiple times during the night, he is very insisitent on what orders he wants placed. Message sent via perfect serve     Electronically signed by Dianne Gonzalez RN on 1/7/2024 at 4:42 AM    This nurse educates patient how orders are placed, and how this nurse is unable to just \"give him medicine\". Patient is provided ginger ale, and saltine crackers to assist with nausea. Call light in reach     Electronically signed by Dianne Gonzalez RN on 1/7/2024 at 4:50 AM

## 2024-01-07 NOTE — PROGRESS NOTES
[place] [facility] Medicine Note      Chief Complaint: I am nauseated and lightheaded    Subjective/Interval History:    Patient complains of nausea and lightheadedness today.  Flank pain is not bad right now but it is intermittently quite severe he reports.  He states there is \"no way\" he could go home today.  He states his pain continues to be too problematic to be at home.  No other new problems noted.    No further chest pain.    No chest pain or shortness breath. No cough or sputum. No nausea, vomiting, diarrhea. No abdominal pain. No dysuria.  The remainder of the review of systems is negative.     PMH, PSH, FH/SH reviewed and unchanged as documented in the H&P personally documented at admission 24    Medication list reviewed    Objective:    BP (!) 146/86   Pulse 72   Temp 97.6 °F (36.4 °C) (Oral)   Resp 16   Ht 1.676 m (5' 6\")   Wt 104.7 kg (230 lb 14.4 oz)   SpO2 96%   BMI 37.27 kg/m²   Temp  Av.3 °F (36.8 °C)  Min: 97.6 °F (36.4 °C)  Max: 98.9 °F (37.2 °C)    RRR  Chest-respirations are easy.  The chest is clear throughout.  Abd-BS+, soft, NTND.  Small easily reducible umbilical hernia.  Abdominal exam is entirely benign.  Ext- trace edema of the lower legs    The Following Labs Were Reviewed Today:     Recent Results (from the past 24 hour(s))   Troponin    Collection Time: 24 11:55 AM   Result Value Ref Range    Troponin, High Sensitivity 22 0 - 22 ng/L   Troponin    Collection Time: 24  9:53 PM   Result Value Ref Range    Troponin, High Sensitivity 18 0 - 22 ng/L   Troponin    Collection Time: 24  4:06 AM   Result Value Ref Range    Troponin, High Sensitivity 16 0 - 22 ng/L   POCT Glucose    Collection Time: 24  8:49 AM   Result Value Ref Range    POC Glucose 93 70 - 99 mg/dl    Performed on ACCU-CHEK        ASSESSMENT/PLAN:      Principal Problem:    Chest pain, unspecified-the pain is resolved.  This is felt to be noncardiac.  His troponins have been negative.

## 2024-01-07 NOTE — CARE COORDINATION
Case Management Assessment  Initial Evaluation    Date/Time of Evaluation: 1/7/2024 3:57 PM  Assessment Completed by: ERNESTINE TIPTON RN    If patient is discharged prior to next notation, then this note serves as note for discharge by case management.    Patient Name: Jovon Javier                   YOB: 1960  Diagnosis: Chest pain, unspecified [R07.9]                   Date / Time: 1/5/2024 10:01 PM    Patient Admission Status: Observation   Readmission Risk (Low < 19, Mod (19-27), High > 27): Readmission Risk Score: 17    Current PCP: Juan C Dumont MD  PCP verified by CM? Yes    Chart Reviewed: Yes      History Provided by:    Patient Orientation: Alert and Oriented    Patient Cognition:      Hospitalization in the last 30 days (Readmission):  Yes    Readmission Assessment  Number of Days since last admission?: 1-7 days  Previous Disposition: Home with Family  Who is being Interviewed: Patient  What was the patient's/caregiver's perception as to why they think they needed to return back to the hospital?: Other (Comment) (R flank pain)  Did you visit your Primary Care Physician after you left the hospital, before you returned this time?: No  Why weren't you able to visit your PCP?: Did not have an appointment  Did you see a specialist, such as Cardiac, Pulmonary, Orthopedic Physician, etc. after you left the hospital?: No  Who advised the patient to return to the hospital?: Self-referral  Does the patient report anything that got in the way of taking their medications?: No  In our efforts to provide the best possible care to you and others like you, can you think of anything that we could have done to help you after you left the hospital the first time, so that you might not have needed to return so soon?: Other (Comment) (\"I was discharged too soon.\")     Advance Directives:      Code Status: Full Code   Patient's Primary Decision Maker is: Named in Scanned ACP Document    Primary Decision Maker:  return home to prior setting with prior services. Family to transport. No other dc needs identified at this time. Case Management to continue to follow.    The Plan for Transition of Care is related to the following treatment goals of Chest pain, unspecified [R07.9]    ERNESTINE TIPTON RN  Case Management Department  Ph: [phone]

## 2024-01-07 NOTE — ACP (ADVANCE CARE PLANNING)
Advance Care Planning     Advance Care Planning Activator (Inpatient)  Conversation Note      Date of ACP Conversation: 1/7/2024     Conversation Conducted with: Patient with Decision Making Capacity    ACP Activator: ERNESTINE TIPTON RN    Health Care Decision Maker:     Current Designated Health Care Decision Maker:     Primary Decision Maker: Adelaida Javier - Spouse - [phone]    Care Preferences    Ventilation:  \"If you were in your present state of health and suddenly became very ill and were unable to breathe on your own, what would your preference be about the use of a ventilator (breathing machine) if it were available to you?\"      Would the patient desire the use of ventilator (breathing machine)?: yes    \"If your health worsens and it becomes clear that your chance of recovery is unlikely, what would your preference be about the use of a ventilator (breathing machine) if it were available to you?\"     Would the patient desire the use of ventilator (breathing machine)?: Yes      Resuscitation  \"CPR works best to restart the heart when there is a sudden event, like a heart attack, in someone who is otherwise healthy. Unfortunately, CPR does not typically restart the heart for people who have serious health conditions or who are very sick.\"    \"In the event your heart stopped as a result of an underlying serious health condition, would you want attempts to be made to restart your heart (answer \"yes\" for attempt to resuscitate) or would you prefer a natural death (answer \"no\" for do not attempt to resuscitate)?\" yes       [] Yes   [] No   Educated Patient / Decision Maker regarding differences between Advance Directives and portable DNR orders.    Length of ACP Conversation in minutes:  5    Conversation Outcomes:  ACP discussion completed    Follow-up plan:    [] Schedule follow-up conversation to continue planning  [] Referred individual to Provider for additional questions/concerns   [] Advised

## 2024-01-07 NOTE — PROGRESS NOTES
Patient refusing AM labs. States \"has been poked 12 times just this morning.\" This RN educated patient on reasoning for repeat troponin to rule out cardiac related chest pain. Patient states not having any pain at this time.    Electronically signed by Behzad Delvalle RN on 1/7/24 at 7:29 AM EST

## 2024-01-07 NOTE — PLAN OF CARE
Problem: Discharge Planning  Goal: Discharge to home or other facility with appropriate resources  1/6/2024 2313 by Dianne Gonzalez RN  Outcome: Progressing  1/6/2024 1832 by Behzad Delvalle RN  Outcome: Progressing     Problem: Safety - Adult  Goal: Free from fall injury  1/6/2024 2313 by Dianne Gonzalez RN  Outcome: Progressing  1/6/2024 1832 by Behzad Delvalle RN  Outcome: Progressing     Problem: ABCDS Injury Assessment  Goal: Absence of physical injury  1/6/2024 2313 by Dianne Gonzalez RN  Outcome: Progressing  1/6/2024 1832 by Behzad Delvalle RN  Outcome: Progressing     Problem: Pain  Goal: Verbalizes/displays adequate comfort level or baseline comfort level  Outcome: Progressing

## 2024-01-08 LAB
ANION GAP SERPL CALCULATED.3IONS-SCNC: 4 MMOL/L (ref 3–16)
BASOPHILS # BLD: 0 K/UL (ref 0–0.2)
BASOPHILS NFR BLD: 1.2 %
BUN SERPL-MCNC: 8 MG/DL (ref 7–20)
CALCIUM SERPL-MCNC: 10 MG/DL (ref 8.3–10.6)
CHLORIDE SERPL-SCNC: 106 MMOL/L (ref 99–110)
CO2 SERPL-SCNC: 34 MMOL/L (ref 21–32)
CREAT SERPL-MCNC: 0.7 MG/DL (ref 0.8–1.3)
DEPRECATED RDW RBC AUTO: 13.2 % (ref 12.4–15.4)
EKG ATRIAL RATE: 71 BPM
EKG DIAGNOSIS: NORMAL
EKG P AXIS: 67 DEGREES
EKG P-R INTERVAL: 182 MS
EKG Q-T INTERVAL: 402 MS
EKG QRS DURATION: 150 MS
EKG QTC CALCULATION (BAZETT): 436 MS
EKG R AXIS: -60 DEGREES
EKG T AXIS: 94 DEGREES
EKG VENTRICULAR RATE: 71 BPM
EOSINOPHIL # BLD: 0.1 K/UL (ref 0–0.6)
EOSINOPHIL NFR BLD: 4 %
GFR SERPLBLD CREATININE-BSD FMLA CKD-EPI: >60 ML/MIN/{1.73_M2}
GLUCOSE SERPL-MCNC: 87 MG/DL (ref 70–99)
HCT VFR BLD AUTO: 36.9 % (ref 40.5–52.5)
HGB BLD-MCNC: 12.4 G/DL (ref 13.5–17.5)
LYMPHOCYTES # BLD: 1.1 K/UL (ref 1–5.1)
LYMPHOCYTES NFR BLD: 30.5 %
MCH RBC QN AUTO: 31 PG (ref 26–34)
MCHC RBC AUTO-ENTMCNC: 33.7 G/DL (ref 31–36)
MCV RBC AUTO: 91.9 FL (ref 80–100)
MONOCYTES # BLD: 0.4 K/UL (ref 0–1.3)
MONOCYTES NFR BLD: 12.5 %
NEUTROPHILS # BLD: 1.8 K/UL (ref 1.7–7.7)
NEUTROPHILS NFR BLD: 51.8 %
PLATELET # BLD AUTO: 133 K/UL (ref 135–450)
PMV BLD AUTO: 7.9 FL (ref 5–10.5)
POTASSIUM SERPL-SCNC: 3.5 MMOL/L (ref 3.5–5.1)
RBC # BLD AUTO: 4.01 M/UL (ref 4.2–5.9)
SODIUM SERPL-SCNC: 144 MMOL/L (ref 136–145)
WBC # BLD AUTO: 3.5 K/UL (ref 4–11)

## 2024-01-08 PROCEDURE — 1200000000 HC SEMI PRIVATE

## 2024-01-08 PROCEDURE — 2700000000 HC OXYGEN THERAPY PER DAY

## 2024-01-08 PROCEDURE — 94640 AIRWAY INHALATION TREATMENT: CPT

## 2024-01-08 PROCEDURE — 93010 ELECTROCARDIOGRAM REPORT: CPT | Performed by: INTERNAL MEDICINE

## 2024-01-08 PROCEDURE — 36415 COLL VENOUS BLD VENIPUNCTURE: CPT

## 2024-01-08 PROCEDURE — 6360000002 HC RX W HCPCS: Performed by: INTERNAL MEDICINE

## 2024-01-08 PROCEDURE — 6370000000 HC RX 637 (ALT 250 FOR IP): Performed by: INTERNAL MEDICINE

## 2024-01-08 PROCEDURE — 94760 N-INVAS EAR/PLS OXIMETRY 1: CPT

## 2024-01-08 PROCEDURE — 99232 SBSQ HOSP IP/OBS MODERATE 35: CPT | Performed by: STUDENT IN AN ORGANIZED HEALTH CARE EDUCATION/TRAINING PROGRAM

## 2024-01-08 PROCEDURE — 80048 BASIC METABOLIC PNL TOTAL CA: CPT

## 2024-01-08 PROCEDURE — G0378 HOSPITAL OBSERVATION PER HR: HCPCS

## 2024-01-08 PROCEDURE — 85025 COMPLETE CBC W/AUTO DIFF WBC: CPT

## 2024-01-08 RX ADMIN — METOPROLOL SUCCINATE 37.5 MG: 25 TABLET, EXTENDED RELEASE ORAL at 08:36

## 2024-01-08 RX ADMIN — SACUBITRIL AND VALSARTAN 1 TABLET: 24; 26 TABLET, FILM COATED ORAL at 08:36

## 2024-01-08 RX ADMIN — ASPIRIN 81 MG: 81 TABLET, CHEWABLE ORAL at 08:36

## 2024-01-08 RX ADMIN — AMOXICILLIN AND CLAVULANATE POTASSIUM 1 TABLET: 875; 125 TABLET, FILM COATED ORAL at 20:47

## 2024-01-08 RX ADMIN — PRAVASTATIN SODIUM 40 MG: 40 TABLET ORAL at 20:48

## 2024-01-08 RX ADMIN — POLYETHYLENE GLYCOL 3350 17 G: 17 POWDER, FOR SOLUTION ORAL at 20:47

## 2024-01-08 RX ADMIN — ALBUTEROL SULFATE 2.5 MG: 2.5 SOLUTION RESPIRATORY (INHALATION) at 12:02

## 2024-01-08 RX ADMIN — ALBUTEROL SULFATE 2.5 MG: 2.5 SOLUTION RESPIRATORY (INHALATION) at 16:15

## 2024-01-08 RX ADMIN — DEXLANSOPRAZOLE 60 MG: 60 CAPSULE, DELAYED RELEASE ORAL at 08:35

## 2024-01-08 RX ADMIN — ALBUTEROL SULFATE 2.5 MG: 2.5 SOLUTION RESPIRATORY (INHALATION) at 08:51

## 2024-01-08 RX ADMIN — PREGABALIN 150 MG: 75 CAPSULE ORAL at 20:47

## 2024-01-08 RX ADMIN — FLUTICASONE PROPIONATE 1 SPRAY: 50 SPRAY, METERED NASAL at 08:37

## 2024-01-08 RX ADMIN — PREGABALIN 75 MG: 75 CAPSULE ORAL at 08:36

## 2024-01-08 RX ADMIN — OXYCODONE AND ACETAMINOPHEN 1 TABLET: 5; 325 TABLET ORAL at 17:38

## 2024-01-08 RX ADMIN — FUROSEMIDE 10 MG: 20 TABLET ORAL at 08:37

## 2024-01-08 RX ADMIN — TAMSULOSIN HYDROCHLORIDE 0.4 MG: 0.4 CAPSULE ORAL at 20:47

## 2024-01-08 RX ADMIN — AMOXICILLIN AND CLAVULANATE POTASSIUM 1 TABLET: 875; 125 TABLET, FILM COATED ORAL at 08:36

## 2024-01-08 RX ADMIN — TAMSULOSIN HYDROCHLORIDE 0.4 MG: 0.4 CAPSULE ORAL at 08:36

## 2024-01-08 RX ADMIN — ZOLPIDEM TARTRATE 10 MG: 5 TABLET ORAL at 00:13

## 2024-01-08 RX ADMIN — ZOLPIDEM TARTRATE 10 MG: 5 TABLET ORAL at 20:47

## 2024-01-08 RX ADMIN — METOPROLOL SUCCINATE 25 MG: 25 TABLET, EXTENDED RELEASE ORAL at 20:48

## 2024-01-08 RX ADMIN — ALBUTEROL SULFATE 2.5 MG: 2.5 SOLUTION RESPIRATORY (INHALATION) at 20:33

## 2024-01-08 RX ADMIN — SACUBITRIL AND VALSARTAN 1 TABLET: 24; 26 TABLET, FILM COATED ORAL at 20:47

## 2024-01-08 ASSESSMENT — PAIN SCALES - GENERAL
PAINLEVEL_OUTOF10: 0
PAINLEVEL_OUTOF10: 0

## 2024-01-08 ASSESSMENT — PAIN DESCRIPTION - DESCRIPTORS: DESCRIPTORS: ACHING;DULL

## 2024-01-08 ASSESSMENT — PAIN DESCRIPTION - LOCATION: LOCATION: ABDOMEN

## 2024-01-08 ASSESSMENT — PAIN - FUNCTIONAL ASSESSMENT: PAIN_FUNCTIONAL_ASSESSMENT: PREVENTS OR INTERFERES SOME ACTIVE ACTIVITIES AND ADLS

## 2024-01-08 ASSESSMENT — PAIN DESCRIPTION - ORIENTATION: ORIENTATION: RIGHT

## 2024-01-08 NOTE — CONSULTS
Consulting Physician: Hernando Howell MD     Reason for Consult: readmit for intractable right flank pain    History of Present Illness: Jovon Javier is a 63 y.o. male PMH a-fib (on coumadin), CAD, CHF, HLD, HTN, nephrolithiasis, bladder cancer 4/2019. Patient is well known to the urology service.   Patient previously admitted for right flank pain 2/2 right renal cyst w/ mild obstruction of right collecting system as noted on CTAP 1/1/24. Patient was scheduled for outpatient aspiration of right renal cyst w/ IR, currently holding his coumadin for procedure.     Returned to the ER on 1/6/24, hours after discharge for continued right flank pain, not well managed by PO analgesics. Kidney fx WNL.      Past Medical History:   Past Medical History:   Diagnosis Date    Abnormal CT scan 08/2023    spots on pancrease    Ankylosing spondylitis (HCC)     Atrial fibrillation     Bronchiolitis obliterans     CAD (coronary artery disease)     Cardiomyopathy (HCC)     CHF (congestive heart failure) (HCC)     Chronic anticoagulation     COPD (chronic obstructive pulmonary disease) (HCC)     GERD (gastroesophageal reflux disease)     Hepatitis 1979    Hx of blood clots     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    wears 2L/NC at night    Pneumothorax 2011    Prostatitis     Pulmonary embolism (HCC)        Past Surgical History:  Past Surgical History:   Procedure Laterality Date    BRONCHOSCOPY N/A 11/2/2023    BRONCHOSCOPY WITH BRONCHIOLAR ALVEOLAR LAVAGE performed by Rafael Christy MD at [facility] ENDOSCOPY    CHOLECYSTECTOMY  2007    COLONOSCOPY  09/21/2012    COLONOSCOPY  11/30/2018    CYSTOSCOPY  05/17/2019    RIGHT SIDED URETEROSCOPY  Diagnostic, retrograde pyelogram and fulguration of previously resected bladder tumor base    CYSTOSCOPY Right 05/17/2019    RIGHT SIDED URETEROSCOPY FLUGERATION  OF BLADDER  mg (Patient Supplied), 100 mg, Oral, 4x Daily PRN  fluticasone (FLONASE) 50 MCG/ACT nasal spray 1 spray, 1 spray, Each Nostril, Daily  furosemide (LASIX) tablet 10 mg, 10 mg, Oral, Daily  guaiFENesin (MUCINEX) extended release tablet 600 mg, 600 mg, Oral, BID PRN  methocarbamol (ROBAXIN) tablet 500 mg, 500 mg, Oral, 4x Daily PRN  metoprolol succinate (TOPROL XL) extended release tablet 37.5 mg, 37.5 mg, Oral, Daily  metoprolol succinate (TOPROL XL) extended release tablet 25 mg, 25 mg, Oral, Nightly  polyethylene glycol (GLYCOLAX) packet 17 g, 17 g, Oral, Nightly  pravastatin (PRAVACHOL) tablet 40 mg, 40 mg, Oral, Nightly  pregabalin (LYRICA) capsule 75 mg, 75 mg, Oral, Daily  pregabalin (LYRICA) capsule 150 mg, 150 mg, Oral, Nightly  sacubitril-valsartan (ENTRESTO) 24-26 MG per tablet 1 tablet, 1 tablet, Oral, BID  tamsulosin (FLOMAX) capsule 0.4 mg, 0.4 mg, Oral, BID  zolpidem (AMBIEN) tablet 10 mg, 10 mg, Oral, Nightly PRN  HYDROmorphone (DILAUDID) injection 0.5 mg, 0.5 mg, IntraVENous, Q4H PRN    Review of Systems:  10 Systems were reviewed and negative except as in HPI    Vitals:  BP (!) 147/83   Pulse 71   Temp 98.4 °F (36.9 °C) (Oral)   Resp 18   Ht 1.676 m (5' 6\")   Wt 103.1 kg (227 lb 6.4 oz)   SpO2 93%   BMI 36.70 kg/m²     Intake/Output Summary (Last 24 hours) at 1/8/2024 0803  Last data filed at 1/8/2024 0644  Gross per 24 hour   Intake --   Output 1850 ml   Net -1850 ml       Physical Exam:  General Appearance: Alert and oriented, cooperative, no distress, appears stated age  Head: Normocephalic, without obvious abnormality, atraumatic  Back: right CVA tenderness  Abdomen: Soft, non-tender, non-distended, no masses  Skin: Skin color, texture, turgor normal, no rashes or lesions  Neurologic: no gross deficits   Male :  Nonpalpable bladder  right CVA tenderness  Spontaneously voids  MARIA T Not indicated    Labs:  CBC   Lab Results   Component Value Date/Time    WBC 3.5 01/08/2024 06:11 AM    RBC

## 2024-01-08 NOTE — PLAN OF CARE
Problem: Discharge Planning  Goal: Discharge to home or other facility with appropriate resources  1/7/2024 2247 by Dianne Gonzalez RN  Outcome: Progressing  1/7/2024 1323 by Behzad Delvalle RN  Outcome: Progressing     Problem: Safety - Adult  Goal: Free from fall injury  1/7/2024 2247 by Dianne Gonzalez RN  Outcome: Progressing  1/7/2024 1323 by Behzad Delvalle RN  Outcome: Progressing     Problem: ABCDS Injury Assessment  Goal: Absence of physical injury  1/7/2024 2247 by Dianne Gonzalez RN  Outcome: Progressing  1/7/2024 1323 by Behzad Delvalle RN  Outcome: Progressing     Problem: Pain  Goal: Verbalizes/displays adequate comfort level or baseline comfort level  1/7/2024 2247 by Dianne Gonzalez RN  Outcome: Progressing  1/7/2024 1323 by Behzad Delvalle RN  Outcome: Progressing

## 2024-01-08 NOTE — PLAN OF CARE
Problem: Discharge Planning  Goal: Discharge to home or other facility with appropriate resources  1/8/2024 1046 by Berto Fernandez RN  Outcome: Progressing  1/7/2024 2247 by Dianne Gonzalez RN  Outcome: Progressing     Problem: Safety - Adult  Goal: Free from fall injury  1/8/2024 1046 by Berto Fernandez RN  Outcome: Progressing  1/7/2024 2247 by Dianne Gonzalez RN  Outcome: Progressing     Problem: ABCDS Injury Assessment  Goal: Absence of physical injury  1/8/2024 1046 by Berto Fernandez RN  Outcome: Progressing  1/7/2024 2247 by Dianne Gonzalez RN  Outcome: Progressing     Problem: Pain  Goal: Verbalizes/displays adequate comfort level or baseline comfort level  1/8/2024 1046 by Berto Fernandez RN  Outcome: Progressing  1/7/2024 2247 by Dianne Gonzalez RN  Outcome: Progressing

## 2024-01-08 NOTE — PROGRESS NOTES
[facility] Note      Chief Complaint: I feel bad    Subjective/Interval History:    Patient seen and examined.  No issues overnight.  Continues to have flank pain that is severe.  Requiring IV medications.  Does not report any nausea, vomiting or diarrhea.  Drinking Gatorade again today.  He does not report any chest pain or shortness of breath.  No chest pain or shortness breath. No cough or sputum. No nausea, vomiting, diarrhea. No abdominal pain. No dysuria.  The remainder of the review of systems is negative.     Medication list reviewed    Objective:    /83   Pulse 76   Temp 98.6 °F (37 °C) (Oral)   Resp 16   Ht 1.676 m (5' 6\")   Wt 103.1 kg (227 lb 6.4 oz)   SpO2 96%   BMI 36.70 kg/m²   Temp  Av.5 °F (36.9 °C)  Min: 98.4 °F (36.9 °C)  Max: 98.6 °F (37 °C)    RRR  Chest-respirations are easy.  The chest is clear throughout.  Abd-BS+, soft, NTND.  Small easily reducible umbilical hernia.  Abdominal exam is entirely benign.  Ext- trace edema of the lower legs    The Following Labs Were Reviewed Today:     Recent Results (from the past 24 hour(s))   Basic Metabolic Panel    Collection Time: 24  6:11 AM   Result Value Ref Range    Sodium 144 136 - 145 mmol/L    Potassium 3.5 3.5 - 5.1 mmol/L    Chloride 106 99 - 110 mmol/L    CO2 34 (H) 21 - 32 mmol/L    Anion Gap 4 3 - 16    Glucose 87 70 - 99 mg/dL    BUN 8 7 - 20 mg/dL    Creatinine 0.7 (L) 0.8 - 1.3 mg/dL    Est, Glom Filt Rate >60 >60    Calcium 10.0 8.3 - 10.6 mg/dL   CBC with Auto Differential    Collection Time: 24  6:11 AM   Result Value Ref Range    WBC 3.5 (L) 4.0 - 11.0 K/uL    RBC 4.01 (L) 4.20 - 5.90 M/uL    Hemoglobin 12.4 (L) 13.5 - 17.5 g/dL    Hematocrit 36.9 (L) 40.5 - 52.5 %    MCV 91.9 80.0 - 100.0 fL    MCH 31.0 26.0 - 34.0 pg    MCHC 33.7 31.0 - 36.0 g/dL    RDW 13.2 12.4 - 15.4 %    Platelets 133 (L) 135 - 450 K/uL    MPV 7.9 5.0 - 10.5 fL    Neutrophils % 51.8 %    Lymphocytes % 30.5 %

## 2024-01-08 NOTE — PROGRESS NOTES
Physician Progress Note      PATIENT:               HANG ANGEL  CSN #:                  [id number]  :                       1960  ADMIT DATE:       2024 1:29 PM  DISCH DATE:        2024 4:24 PM  RESPONDING  PROVIDER #:        Jeffery Estrella MD          QUERY TEXT:    Dear Dr. Estrella,  Pt admitted with Renal cyst. Pt noted to have a current positive UA, c/o   dysuria, previous treatment for a UTI and current IV Unasyn. No current urine   culture done. If possible, please document in the progress notes and discharge   summary if you are evaluating and/or treating any of the following:    The medical record reflects the following:  Risk Factors: Hx CAD, Cardiomyopathy, CHF, COPD, on O2 2L/min prn at night,   GERD, HTN, HLD, IBS, PE, bladder Cancer in 2019, and recent UTI, Current renal   cyst  Clinical Indicators:  ED for R flank pain, c/o dysuria, \"completed course   of Levaquin\", UA +.  Urology notes \"On admission, patient UA is nitrite   positive c/w acute infection, serum WBC 5.7, Cr 1.0 (baseline). He has been   afebrile, VSS.\", Urology notes \"UA appears infection, urine culture pending.   Continue abx per primary\". H and P notes \"Continue Unasyn, urine culture   pending\"  No culture noted  Treatment: Rocephin in ED, Unasyn, Urology consult  Thank you,  Casandra Moran RN, CDS  [email or web address]  Options provided:  -- Urinary Tract Infection (UTI) present on admission  -- Bacteriuria only and UTI ruled out  -- Other - I will add my own diagnosis  -- Disagree - Not applicable / Not valid  -- Disagree - Clinically unable to determine / Unknown  -- Refer to Clinical Documentation Reviewer    PROVIDER RESPONSE TEXT:    This patient has bacteriuria only and UTI ruled out.    Query created by: Casandra Kaufman on 2024 1:45 PM      Electronically signed by:  Jeffery Estrella MD 2024 11:19 AM

## 2024-01-09 LAB
INR PPP: 2.56 (ref 0.84–1.16)
PROTHROMBIN TIME: 27.4 SEC (ref 11.5–14.8)

## 2024-01-09 PROCEDURE — 85610 PROTHROMBIN TIME: CPT

## 2024-01-09 PROCEDURE — 2700000000 HC OXYGEN THERAPY PER DAY

## 2024-01-09 PROCEDURE — 1200000000 HC SEMI PRIVATE

## 2024-01-09 PROCEDURE — 99232 SBSQ HOSP IP/OBS MODERATE 35: CPT | Performed by: STUDENT IN AN ORGANIZED HEALTH CARE EDUCATION/TRAINING PROGRAM

## 2024-01-09 PROCEDURE — 6360000002 HC RX W HCPCS: Performed by: INTERNAL MEDICINE

## 2024-01-09 PROCEDURE — 94760 N-INVAS EAR/PLS OXIMETRY 1: CPT

## 2024-01-09 PROCEDURE — 6370000000 HC RX 637 (ALT 250 FOR IP): Performed by: INTERNAL MEDICINE

## 2024-01-09 PROCEDURE — 36415 COLL VENOUS BLD VENIPUNCTURE: CPT

## 2024-01-09 PROCEDURE — 94640 AIRWAY INHALATION TREATMENT: CPT

## 2024-01-09 PROCEDURE — G0378 HOSPITAL OBSERVATION PER HR: HCPCS

## 2024-01-09 PROCEDURE — 2580000003 HC RX 258

## 2024-01-09 PROCEDURE — 6360000002 HC RX W HCPCS

## 2024-01-09 PROCEDURE — 96365 THER/PROPH/DIAG IV INF INIT: CPT

## 2024-01-09 PROCEDURE — 6370000000 HC RX 637 (ALT 250 FOR IP): Performed by: STUDENT IN AN ORGANIZED HEALTH CARE EDUCATION/TRAINING PROGRAM

## 2024-01-09 RX ORDER — PANTOPRAZOLE SODIUM 40 MG/1
40 TABLET, DELAYED RELEASE ORAL
Status: DISCONTINUED | OUTPATIENT
Start: 2024-01-10 | End: 2024-01-09

## 2024-01-09 RX ORDER — OMEPRAZOLE 20 MG/1
40 CAPSULE, DELAYED RELEASE ORAL DAILY
Status: DISCONTINUED | OUTPATIENT
Start: 2024-01-09 | End: 2024-01-16 | Stop reason: HOSPADM

## 2024-01-09 RX ADMIN — PHYTONADIONE 10 MG: 10 INJECTION, EMULSION INTRAMUSCULAR; INTRAVENOUS; SUBCUTANEOUS at 17:38

## 2024-01-09 RX ADMIN — METOPROLOL SUCCINATE 25 MG: 25 TABLET, EXTENDED RELEASE ORAL at 21:13

## 2024-01-09 RX ADMIN — ZOLPIDEM TARTRATE 10 MG: 5 TABLET ORAL at 23:33

## 2024-01-09 RX ADMIN — SACUBITRIL AND VALSARTAN 1 TABLET: 24; 26 TABLET, FILM COATED ORAL at 08:47

## 2024-01-09 RX ADMIN — FLUTICASONE PROPIONATE 1 SPRAY: 50 SPRAY, METERED NASAL at 08:51

## 2024-01-09 RX ADMIN — POLYETHYLENE GLYCOL 3350 17 G: 17 POWDER, FOR SOLUTION ORAL at 21:13

## 2024-01-09 RX ADMIN — OMEPRAZOLE 40 MG: 20 CAPSULE, DELAYED RELEASE ORAL at 11:08

## 2024-01-09 RX ADMIN — PREGABALIN 75 MG: 75 CAPSULE ORAL at 08:47

## 2024-01-09 RX ADMIN — ALBUTEROL SULFATE 2.5 MG: 2.5 SOLUTION RESPIRATORY (INHALATION) at 20:38

## 2024-01-09 RX ADMIN — SACUBITRIL AND VALSARTAN 1 TABLET: 24; 26 TABLET, FILM COATED ORAL at 21:13

## 2024-01-09 RX ADMIN — FUROSEMIDE 10 MG: 20 TABLET ORAL at 08:46

## 2024-01-09 RX ADMIN — TAMSULOSIN HYDROCHLORIDE 0.4 MG: 0.4 CAPSULE ORAL at 21:13

## 2024-01-09 RX ADMIN — OXYCODONE AND ACETAMINOPHEN 1 TABLET: 5; 325 TABLET ORAL at 18:32

## 2024-01-09 RX ADMIN — ALBUTEROL SULFATE 2.5 MG: 2.5 SOLUTION RESPIRATORY (INHALATION) at 08:36

## 2024-01-09 RX ADMIN — ALBUTEROL SULFATE 2.5 MG: 2.5 SOLUTION RESPIRATORY (INHALATION) at 12:47

## 2024-01-09 RX ADMIN — PRAVASTATIN SODIUM 40 MG: 40 TABLET ORAL at 21:13

## 2024-01-09 RX ADMIN — TAMSULOSIN HYDROCHLORIDE 0.4 MG: 0.4 CAPSULE ORAL at 08:47

## 2024-01-09 RX ADMIN — ASPIRIN 81 MG: 81 TABLET, CHEWABLE ORAL at 08:47

## 2024-01-09 RX ADMIN — PREGABALIN 150 MG: 75 CAPSULE ORAL at 21:13

## 2024-01-09 RX ADMIN — METOPROLOL SUCCINATE 37.5 MG: 25 TABLET, EXTENDED RELEASE ORAL at 08:46

## 2024-01-09 ASSESSMENT — PAIN SCALES - GENERAL
PAINLEVEL_OUTOF10: 0
PAINLEVEL_OUTOF10: 0
PAINLEVEL_OUTOF10: 7
PAINLEVEL_OUTOF10: 0

## 2024-01-09 ASSESSMENT — PAIN - FUNCTIONAL ASSESSMENT: PAIN_FUNCTIONAL_ASSESSMENT: PREVENTS OR INTERFERES SOME ACTIVE ACTIVITIES AND ADLS

## 2024-01-09 ASSESSMENT — PAIN DESCRIPTION - DESCRIPTORS: DESCRIPTORS: DULL;ACHING

## 2024-01-09 ASSESSMENT — PAIN DESCRIPTION - LOCATION: LOCATION: ABDOMEN

## 2024-01-09 ASSESSMENT — PAIN DESCRIPTION - ORIENTATION: ORIENTATION: RIGHT

## 2024-01-09 NOTE — PROGRESS NOTES
[facility] Note        Subjective/Interval History:    Patient seen and examined.  No issues overnight.He says he has difficulty swallowing today. Requests different form of PPI. No concern for aspiration.       No chest pain or shortness breath. No cough or sputum. No nausea, vomiting, diarrhea. No abdominal pain. No dysuria.  The remainder of the review of systems is negative.     Medication list reviewed    Objective:    /79   Pulse 66   Temp 98.5 °F (36.9 °C) (Oral)   Resp 16   Ht 1.676 m (5' 6\")   Wt 102.9 kg (226 lb 13.7 oz)   SpO2 97%   BMI 36.62 kg/m²   Temp  Av.3 °F (36.8 °C)  Min: 98.1 °F (36.7 °C)  Max: 98.5 °F (36.9 °C)    RRR  Chest-respirations are easy.  The chest is clear throughout.  Abd-BS+, soft, NTND.  Small easily reducible umbilical hernia.  Abdominal exam is entirely benign.  Ext- trace edema of the lower legs    The Following Labs Were Reviewed Today:     No results found for this or any previous visit (from the past 24 hour(s)).      ASSESSMENT/PLAN:      Principal Problem:    Chest pain, unspecified-resolved, continue to monitor  Active Problems:    Acute right flank pain-right flank pain continues.  Appreciate urology consultation.  Coumadin remains on hold pending upcoming surgery this week.  Zofran for nausea.  Continues to require Dilaudid for pain control.  IR consulted for drainage of cyst, monitor abdominal pain with this.  Dysphagia/Odynophagia  - Will ask speech to see patient, he may need EGD outpatient, Cont PPI for now    Essential hypertension, benign-blood pressure is generally controlled, continue current therapy    Bronchiolitis obliterans-currently on 2 L of oxygen.  Continue home inhaler regimen.    Coronary artery disease due to calcified coronary lesion-asymptomatic currently    Chronic anticoagulation-Coumadin on hold for surgery.    Dispo: Renal cyst aspiration  Jeffery Estrella MD  10:53 AM  2024

## 2024-01-09 NOTE — CARE COORDINATION
1/9 Plan: Return to home with wife. Active with [employer] for home O2. Has transport. Needs aspiration of renal cyst-? 1/10. Electronically signed by Ce Brito RN on 1/9/2024 at 2:07 PM

## 2024-01-09 NOTE — PLAN OF CARE
Problem: Safety - Adult  Goal: Free from fall injury  1/9/2024 0109 by Tish Santacruz, RN  Outcome: Progressing  1/9/2024 0109 by Tish Santacruz RN  Outcome: Progressing     Problem: ABCDS Injury Assessment  Goal: Absence of physical injury  1/9/2024 0109 by Tish Santacruz, RN  Outcome: Progressing  1/9/2024 0109 by Tish Santacruz, RN  Outcome: Progressing     Problem: Pain  Goal: Verbalizes/displays adequate comfort level or baseline comfort level  1/9/2024 0109 by Tish Santacruz, RN  Outcome: Progressing  1/9/2024 0109 by Tish Santacruz, RN  Outcome: Progressing

## 2024-01-09 NOTE — PROGRESS NOTES
Pt Name: Jovon Javier  Medical Record Number: [id number]  Date of Birth 1960   Today's Date: 1/9/2024      Subjective:  NAEON. Pain stable. Planning for IR procedure tomorrow    ROS: Constitutional: No fever    Vitals:  Vitals:    01/08/24 1808 01/08/24 2033 01/08/24 2047 01/09/24 0615   BP:   131/87    Pulse:   81    Resp: 18 18 16    Temp:   98.1 °F (36.7 °C)    TempSrc:   Oral    SpO2:   95%    Weight:    102.9 kg (226 lb 13.7 oz)   Height:         I/O last 3 completed shifts:  In: -   Out: 1850 [Urine:1850]    Exam:  General: Awake, oriented, no acute distress  Respiratory: Nonlabored breathing  Abdomen: Soft, non-tender, non-distended, no masses  : spontaneously voids, R flank pain  Skin: Skin color, texture, turgor normal, no rashes or lesions  Neurologic: no gross deficits    CURRENT MEDICATIONS   Scheduled Meds:   albuterol  2.5 mg Nebulization 4x Daily RT    amoxicillin-clavulanate  1 tablet Oral BID    aspirin  81 mg Oral Daily    azelastine  2 spray Each Nostril BID    dexlansoprazole  60 mg Oral Daily    fluticasone  1 spray Each Nostril Daily    furosemide  10 mg Oral Daily    metoprolol succinate  37.5 mg Oral Daily    metoprolol succinate  25 mg Oral Nightly    polyethylene glycol  17 g Oral Nightly    pravastatin  40 mg Oral Nightly    pregabalin  75 mg Oral Daily    pregabalin  150 mg Oral Nightly    sacubitril-valsartan  1 tablet Oral BID    tamsulosin  0.4 mg Oral BID     Continuous Infusions:  PRN Meds:.ondansetron, oxyCODONE-acetaminophen, albuterol sulfate HFA, brompheniramine-pseudoephedrine-DM, flavoxATE, guaiFENesin, methocarbamol, zolpidem, HYDROmorphone    LABS     Recent Labs     01/08/24  0611   WBC 3.5*   HGB 12.4*   HCT 36.9*   *      K 3.5      CO2 34*   BUN 8   CREATININE 0.7*   CALCIUM 10.0           ASSESSMENT   Hospital day # 0  62yo male w/ right flank pain 2/2 right renal cyst causing mild obstruction of right collecting system, as noted on CTAP

## 2024-01-10 ENCOUNTER — APPOINTMENT (OUTPATIENT)
Dept: CT IMAGING | Age: 64
DRG: 699 | End: 2024-01-10
Payer: COMMERCIAL

## 2024-01-10 LAB
ALBUMIN SERPL-MCNC: 3.3 G/DL (ref 3.4–5)
ALBUMIN/GLOB SERPL: 1.8 {RATIO} (ref 1.1–2.2)
ALP SERPL-CCNC: 80 U/L (ref 40–129)
ALT SERPL-CCNC: 18 U/L (ref 10–40)
ANION GAP SERPL CALCULATED.3IONS-SCNC: 4 MMOL/L (ref 3–16)
AST SERPL-CCNC: 16 U/L (ref 15–37)
BASOPHILS # BLD: 0 K/UL (ref 0–0.2)
BASOPHILS NFR BLD: 0.5 %
BILIRUB SERPL-MCNC: 0.4 MG/DL (ref 0–1)
BUN SERPL-MCNC: 7 MG/DL (ref 7–20)
CALCIUM SERPL-MCNC: 9.9 MG/DL (ref 8.3–10.6)
CHLORIDE SERPL-SCNC: 108 MMOL/L (ref 99–110)
CO2 SERPL-SCNC: 31 MMOL/L (ref 21–32)
CREAT SERPL-MCNC: 0.8 MG/DL (ref 0.8–1.3)
DEPRECATED RDW RBC AUTO: 13.9 % (ref 12.4–15.4)
EOSINOPHIL # BLD: 0.2 K/UL (ref 0–0.6)
EOSINOPHIL NFR BLD: 4.3 %
GFR SERPLBLD CREATININE-BSD FMLA CKD-EPI: >60 ML/MIN/{1.73_M2}
GLUCOSE SERPL-MCNC: 97 MG/DL (ref 70–99)
HCT VFR BLD AUTO: 37.1 % (ref 40.5–52.5)
HGB BLD-MCNC: 12.3 G/DL (ref 13.5–17.5)
INR PPP: 1.1 (ref 0.84–1.16)
LYMPHOCYTES # BLD: 1.1 K/UL (ref 1–5.1)
LYMPHOCYTES NFR BLD: 26.4 %
MCH RBC QN AUTO: 30.8 PG (ref 26–34)
MCHC RBC AUTO-ENTMCNC: 33.3 G/DL (ref 31–36)
MCV RBC AUTO: 92.7 FL (ref 80–100)
MONOCYTES # BLD: 0.4 K/UL (ref 0–1.3)
MONOCYTES NFR BLD: 10 %
NEUTROPHILS # BLD: 2.4 K/UL (ref 1.7–7.7)
NEUTROPHILS NFR BLD: 58.8 %
PLATELET # BLD AUTO: 135 K/UL (ref 135–450)
PMV BLD AUTO: 7.6 FL (ref 5–10.5)
POTASSIUM SERPL-SCNC: 3.8 MMOL/L (ref 3.5–5.1)
PROT SERPL-MCNC: 5.1 G/DL (ref 6.4–8.2)
PROTHROMBIN TIME: 14.2 SEC (ref 11.5–14.8)
RBC # BLD AUTO: 4 M/UL (ref 4.2–5.9)
SODIUM SERPL-SCNC: 143 MMOL/L (ref 136–145)
WBC # BLD AUTO: 4.1 K/UL (ref 4–11)

## 2024-01-10 PROCEDURE — 6370000000 HC RX 637 (ALT 250 FOR IP): Performed by: INTERNAL MEDICINE

## 2024-01-10 PROCEDURE — 99232 SBSQ HOSP IP/OBS MODERATE 35: CPT | Performed by: STUDENT IN AN ORGANIZED HEALTH CARE EDUCATION/TRAINING PROGRAM

## 2024-01-10 PROCEDURE — 94761 N-INVAS EAR/PLS OXIMETRY MLT: CPT

## 2024-01-10 PROCEDURE — 80053 COMPREHEN METABOLIC PANEL: CPT

## 2024-01-10 PROCEDURE — 85610 PROTHROMBIN TIME: CPT

## 2024-01-10 PROCEDURE — 96376 TX/PRO/DX INJ SAME DRUG ADON: CPT

## 2024-01-10 PROCEDURE — 94640 AIRWAY INHALATION TREATMENT: CPT

## 2024-01-10 PROCEDURE — 6360000002 HC RX W HCPCS: Performed by: INTERNAL MEDICINE

## 2024-01-10 PROCEDURE — G0378 HOSPITAL OBSERVATION PER HR: HCPCS

## 2024-01-10 PROCEDURE — 2700000000 HC OXYGEN THERAPY PER DAY

## 2024-01-10 PROCEDURE — 36415 COLL VENOUS BLD VENIPUNCTURE: CPT

## 2024-01-10 PROCEDURE — 85025 COMPLETE CBC W/AUTO DIFF WBC: CPT

## 2024-01-10 RX ADMIN — OXYCODONE AND ACETAMINOPHEN 1 TABLET: 5; 325 TABLET ORAL at 22:49

## 2024-01-10 RX ADMIN — ONDANSETRON 4 MG: 2 INJECTION INTRAMUSCULAR; INTRAVENOUS at 03:29

## 2024-01-10 RX ADMIN — SACUBITRIL AND VALSARTAN 1 TABLET: 24; 26 TABLET, FILM COATED ORAL at 08:30

## 2024-01-10 RX ADMIN — FLUTICASONE PROPIONATE 1 SPRAY: 50 SPRAY, METERED NASAL at 08:28

## 2024-01-10 RX ADMIN — SACUBITRIL AND VALSARTAN 1 TABLET: 24; 26 TABLET, FILM COATED ORAL at 20:03

## 2024-01-10 RX ADMIN — OXYCODONE AND ACETAMINOPHEN 1 TABLET: 5; 325 TABLET ORAL at 10:27

## 2024-01-10 RX ADMIN — PREGABALIN 75 MG: 75 CAPSULE ORAL at 08:29

## 2024-01-10 RX ADMIN — FUROSEMIDE 10 MG: 20 TABLET ORAL at 08:30

## 2024-01-10 RX ADMIN — METOPROLOL SUCCINATE 37.5 MG: 25 TABLET, EXTENDED RELEASE ORAL at 08:29

## 2024-01-10 RX ADMIN — OXYCODONE AND ACETAMINOPHEN 1 TABLET: 5; 325 TABLET ORAL at 03:29

## 2024-01-10 RX ADMIN — HYDROMORPHONE HYDROCHLORIDE 0.5 MG: 1 INJECTION, SOLUTION INTRAMUSCULAR; INTRAVENOUS; SUBCUTANEOUS at 16:59

## 2024-01-10 RX ADMIN — ZOLPIDEM TARTRATE 10 MG: 5 TABLET ORAL at 22:49

## 2024-01-10 RX ADMIN — ALBUTEROL SULFATE 2.5 MG: 2.5 SOLUTION RESPIRATORY (INHALATION) at 16:06

## 2024-01-10 RX ADMIN — ALBUTEROL SULFATE 2.5 MG: 2.5 SOLUTION RESPIRATORY (INHALATION) at 20:48

## 2024-01-10 RX ADMIN — TAMSULOSIN HYDROCHLORIDE 0.4 MG: 0.4 CAPSULE ORAL at 08:30

## 2024-01-10 RX ADMIN — PREGABALIN 150 MG: 75 CAPSULE ORAL at 19:59

## 2024-01-10 RX ADMIN — TAMSULOSIN HYDROCHLORIDE 0.4 MG: 0.4 CAPSULE ORAL at 20:00

## 2024-01-10 RX ADMIN — PRAVASTATIN SODIUM 40 MG: 40 TABLET ORAL at 19:59

## 2024-01-10 RX ADMIN — POLYETHYLENE GLYCOL 3350 17 G: 17 POWDER, FOR SOLUTION ORAL at 19:58

## 2024-01-10 RX ADMIN — ALBUTEROL SULFATE 2.5 MG: 2.5 SOLUTION RESPIRATORY (INHALATION) at 11:50

## 2024-01-10 RX ADMIN — HYDROMORPHONE HYDROCHLORIDE 0.5 MG: 1 INJECTION, SOLUTION INTRAMUSCULAR; INTRAVENOUS; SUBCUTANEOUS at 12:16

## 2024-01-10 RX ADMIN — ONDANSETRON 4 MG: 2 INJECTION INTRAMUSCULAR; INTRAVENOUS at 22:49

## 2024-01-10 RX ADMIN — METOPROLOL SUCCINATE 25 MG: 25 TABLET, EXTENDED RELEASE ORAL at 20:04

## 2024-01-10 RX ADMIN — ALBUTEROL SULFATE 2.5 MG: 2.5 SOLUTION RESPIRATORY (INHALATION) at 08:08

## 2024-01-10 RX ADMIN — ONDANSETRON 4 MG: 2 INJECTION INTRAMUSCULAR; INTRAVENOUS at 12:19

## 2024-01-10 ASSESSMENT — PAIN SCALES - GENERAL
PAINLEVEL_OUTOF10: 8
PAINLEVEL_OUTOF10: 10
PAINLEVEL_OUTOF10: 0
PAINLEVEL_OUTOF10: 6
PAINLEVEL_OUTOF10: 0
PAINLEVEL_OUTOF10: 7

## 2024-01-10 ASSESSMENT — PAIN - FUNCTIONAL ASSESSMENT
PAIN_FUNCTIONAL_ASSESSMENT: PREVENTS OR INTERFERES SOME ACTIVE ACTIVITIES AND ADLS
PAIN_FUNCTIONAL_ASSESSMENT: ACTIVITIES ARE NOT PREVENTED
PAIN_FUNCTIONAL_ASSESSMENT: ACTIVITIES ARE NOT PREVENTED

## 2024-01-10 ASSESSMENT — PAIN DESCRIPTION - LOCATION
LOCATION: ABDOMEN
LOCATION: ABDOMEN
LOCATION: FLANK
LOCATION: ABDOMEN

## 2024-01-10 ASSESSMENT — PAIN DESCRIPTION - ORIENTATION
ORIENTATION: RIGHT

## 2024-01-10 ASSESSMENT — PAIN DESCRIPTION - DESCRIPTORS
DESCRIPTORS: DULL;ACHING
DESCRIPTORS: SHOOTING
DESCRIPTORS: ACHING;DULL

## 2024-01-10 NOTE — PROGRESS NOTES
Pt Name: Jovon Javier  Medical Record Number: [id number]  Date of Birth 1960   Today's Date: 1/10/2024      Subjective:  NAEON. Pain stable. Planning for IR procedure today. INR improved after Vitamin K.    ROS: Constitutional: No fever    Vitals:  Vitals:    01/09/24 2039 01/09/24 2113 01/10/24 0113 01/10/24 0329   BP:  128/66     Pulse: 60 74     Resp: 16   18   Temp:       TempSrc:       SpO2: 97%      Weight:   101.2 kg (223 lb)    Height:         I/O last 3 completed shifts:  In: -   Out: 1000 [Urine:1000]    Exam:  General: Awake, oriented, no acute distress  Respiratory: Nonlabored breathing  Abdomen: Soft, non-tender, non-distended, no masses  : spontaneously voids, R flank pain  Skin: Skin color, texture, turgor normal, no rashes or lesions  Neurologic: no gross deficits    CURRENT MEDICATIONS   Scheduled Meds:   omeprazole  40 mg Oral Daily    albuterol  2.5 mg Nebulization 4x Daily RT    aspirin  81 mg Oral Daily    azelastine  2 spray Each Nostril BID    fluticasone  1 spray Each Nostril Daily    furosemide  10 mg Oral Daily    metoprolol succinate  37.5 mg Oral Daily    metoprolol succinate  25 mg Oral Nightly    polyethylene glycol  17 g Oral Nightly    pravastatin  40 mg Oral Nightly    pregabalin  75 mg Oral Daily    pregabalin  150 mg Oral Nightly    sacubitril-valsartan  1 tablet Oral BID    tamsulosin  0.4 mg Oral BID     Continuous Infusions:  PRN Meds:.ondansetron, oxyCODONE-acetaminophen, albuterol sulfate HFA, brompheniramine-pseudoephedrine-DM, flavoxATE, guaiFENesin, methocarbamol, zolpidem, HYDROmorphone    LABS     Recent Labs     01/08/24  0611 01/09/24  1122 01/10/24  0444 01/10/24  0445   WBC 3.5*  --  4.1  --    HGB 12.4*  --  12.3*  --    HCT 36.9*  --  37.1*  --    *  --  135  --      --  143  --    K 3.5  --  3.8  --      --  108  --    CO2 34*  --  31  --    BUN 8  --  7  --    CREATININE 0.7*  --  0.8  --    CALCIUM 10.0  --  9.9  --    INR  --  2.56*

## 2024-01-10 NOTE — PROGRESS NOTES
[facility] Note        Subjective/Interval History:    Patient seen and examined.  No issues overnight. Requests claritin. For IR drainage today.    No chest pain or shortness breath. No cough or sputum. No nausea, vomiting, diarrhea. No abdominal pain. No dysuria.  The remainder of the review of systems is negative.     Medication list reviewed    Objective:    /81   Pulse 72   Temp 97.7 °F (36.5 °C) (Oral)   Resp 18   Ht 1.676 m (5' 6\")   Wt 101.2 kg (223 lb)   SpO2 93%   BMI 35.99 kg/m²   Temp  Av °F (36.7 °C)  Min: 97.7 °F (36.5 °C)  Max: 98.2 °F (36.8 °C)    RRR  Chest-respirations are easy.  The chest is clear throughout.  Abd-BS+, soft, NTND.  Small easily reducible umbilical hernia.  Abdominal exam is entirely benign.  Ext- trace edema of the lower legs    The Following Labs Were Reviewed Today:     Recent Results (from the past 24 hour(s))   Comprehensive Metabolic Panel    Collection Time: 01/10/24  4:44 AM   Result Value Ref Range    Sodium 143 136 - 145 mmol/L    Potassium 3.8 3.5 - 5.1 mmol/L    Chloride 108 99 - 110 mmol/L    CO2 31 21 - 32 mmol/L    Anion Gap 4 3 - 16    Glucose 97 70 - 99 mg/dL    BUN 7 7 - 20 mg/dL    Creatinine 0.8 0.8 - 1.3 mg/dL    Est, Glom Filt Rate >60 >60    Calcium 9.9 8.3 - 10.6 mg/dL    Total Protein 5.1 (L) 6.4 - 8.2 g/dL    Albumin 3.3 (L) 3.4 - 5.0 g/dL    Albumin/Globulin Ratio 1.8 1.1 - 2.2    Total Bilirubin 0.4 0.0 - 1.0 mg/dL    Alkaline Phosphatase 80 40 - 129 U/L    ALT 18 10 - 40 U/L    AST 16 15 - 37 U/L   CBC with Auto Differential    Collection Time: 01/10/24  4:44 AM   Result Value Ref Range    WBC 4.1 4.0 - 11.0 K/uL    RBC 4.00 (L) 4.20 - 5.90 M/uL    Hemoglobin 12.3 (L) 13.5 - 17.5 g/dL    Hematocrit 37.1 (L) 40.5 - 52.5 %    MCV 92.7 80.0 - 100.0 fL    MCH 30.8 26.0 - 34.0 pg    MCHC 33.3 31.0 - 36.0 g/dL    RDW 13.9 12.4 - 15.4 %    Platelets 135 135 - 450 K/uL    MPV 7.6 5.0 - 10.5 fL    Neutrophils % 58.8 %

## 2024-01-10 NOTE — PROGRESS NOTES
Pt requesting to speak to MD regarding plan of care. Call placed out to MD and message left. Electronically signed by Berto Fernandez RN on 1/10/2024 at 4:01 PM

## 2024-01-10 NOTE — PLAN OF CARE
Problem: Discharge Planning  Goal: Discharge to home or other facility with appropriate resources  Outcome: Progressing     Problem: Safety - Adult  Goal: Free from fall injury  1/10/2024 0850 by Berto Fernandez RN  Outcome: Progressing  1/10/2024 0138 by Tish Santacruz RN  Outcome: Progressing     Problem: ABCDS Injury Assessment  Goal: Absence of physical injury  1/10/2024 0850 by Berto Fernandez RN  Outcome: Progressing  1/10/2024 0138 by Tish Santacruz RN  Outcome: Progressing     Problem: Pain  Goal: Verbalizes/displays adequate comfort level or baseline comfort level  1/10/2024 0850 by Berto Fernandez RN  Outcome: Progressing  1/10/2024 0138 by Tish Santacruz RN  Outcome: Progressing     Problem: Chronic Conditions and Co-morbidities  Goal: Patient's chronic conditions and co-morbidity symptoms are monitored and maintained or improved  Outcome: Progressing

## 2024-01-10 NOTE — PROGRESS NOTES
Pt sent back up from IR. MD unable to do procedure due to pt receiving aspirin on 1/9/24. Per IR MD medication to be held for 5 days prior to procedure. Attending MD made aware of situation. Electronically signed by Berto Fernandez RN on 1/10/2024 at 10:21 AM

## 2024-01-11 ENCOUNTER — APPOINTMENT (OUTPATIENT)
Dept: GENERAL RADIOLOGY | Age: 64
DRG: 699 | End: 2024-01-11
Payer: COMMERCIAL

## 2024-01-11 PROBLEM — N28.1 RENAL CYST: Status: ACTIVE | Noted: 2024-01-11

## 2024-01-11 LAB
BILIRUB UR QL STRIP.AUTO: NEGATIVE
CLARITY UR: CLEAR
COLOR UR: YELLOW
GLUCOSE UR STRIP.AUTO-MCNC: NEGATIVE MG/DL
HGB UR QL STRIP.AUTO: NEGATIVE
KETONES UR STRIP.AUTO-MCNC: NEGATIVE MG/DL
LEUKOCYTE ESTERASE UR QL STRIP.AUTO: NEGATIVE
NITRITE UR QL STRIP.AUTO: NEGATIVE
PH UR STRIP.AUTO: 6.5 [PH] (ref 5–8)
PROT UR STRIP.AUTO-MCNC: NEGATIVE MG/DL
SP GR UR STRIP.AUTO: 1.01 (ref 1–1.03)
UA COMPLETE W REFLEX CULTURE PNL UR: NORMAL
UA DIPSTICK W REFLEX MICRO PNL UR: NORMAL
URN SPEC COLLECT METH UR: NORMAL
UROBILINOGEN UR STRIP-ACNC: 1 E.U./DL

## 2024-01-11 PROCEDURE — 6370000000 HC RX 637 (ALT 250 FOR IP): Performed by: INTERNAL MEDICINE

## 2024-01-11 PROCEDURE — 2700000000 HC OXYGEN THERAPY PER DAY

## 2024-01-11 PROCEDURE — 99232 SBSQ HOSP IP/OBS MODERATE 35: CPT | Performed by: STUDENT IN AN ORGANIZED HEALTH CARE EDUCATION/TRAINING PROGRAM

## 2024-01-11 PROCEDURE — 6360000002 HC RX W HCPCS: Performed by: INTERNAL MEDICINE

## 2024-01-11 PROCEDURE — 81003 URINALYSIS AUTO W/O SCOPE: CPT

## 2024-01-11 PROCEDURE — 1200000000 HC SEMI PRIVATE

## 2024-01-11 PROCEDURE — 94761 N-INVAS EAR/PLS OXIMETRY MLT: CPT

## 2024-01-11 PROCEDURE — 94640 AIRWAY INHALATION TREATMENT: CPT

## 2024-01-11 PROCEDURE — 96376 TX/PRO/DX INJ SAME DRUG ADON: CPT

## 2024-01-11 PROCEDURE — 74018 RADEX ABDOMEN 1 VIEW: CPT

## 2024-01-11 PROCEDURE — 6370000000 HC RX 637 (ALT 250 FOR IP): Performed by: STUDENT IN AN ORGANIZED HEALTH CARE EDUCATION/TRAINING PROGRAM

## 2024-01-11 RX ORDER — DIAZEPAM 5 MG/1
10 TABLET ORAL ONCE
Status: COMPLETED | OUTPATIENT
Start: 2024-01-15 | End: 2024-01-15

## 2024-01-11 RX ADMIN — HYDROMORPHONE HYDROCHLORIDE 0.5 MG: 1 INJECTION, SOLUTION INTRAMUSCULAR; INTRAVENOUS; SUBCUTANEOUS at 15:32

## 2024-01-11 RX ADMIN — HYDROMORPHONE HYDROCHLORIDE 0.5 MG: 1 INJECTION, SOLUTION INTRAMUSCULAR; INTRAVENOUS; SUBCUTANEOUS at 00:01

## 2024-01-11 RX ADMIN — HYDROMORPHONE HYDROCHLORIDE 0.5 MG: 1 INJECTION, SOLUTION INTRAMUSCULAR; INTRAVENOUS; SUBCUTANEOUS at 21:12

## 2024-01-11 RX ADMIN — OXYCODONE AND ACETAMINOPHEN 1 TABLET: 5; 325 TABLET ORAL at 23:35

## 2024-01-11 RX ADMIN — PRAVASTATIN SODIUM 40 MG: 40 TABLET ORAL at 21:11

## 2024-01-11 RX ADMIN — ZOLPIDEM TARTRATE 10 MG: 5 TABLET ORAL at 21:11

## 2024-01-11 RX ADMIN — TAMSULOSIN HYDROCHLORIDE 0.4 MG: 0.4 CAPSULE ORAL at 21:10

## 2024-01-11 RX ADMIN — HYDROMORPHONE HYDROCHLORIDE 0.5 MG: 1 INJECTION, SOLUTION INTRAMUSCULAR; INTRAVENOUS; SUBCUTANEOUS at 04:09

## 2024-01-11 RX ADMIN — METOPROLOL SUCCINATE 37.5 MG: 25 TABLET, EXTENDED RELEASE ORAL at 12:05

## 2024-01-11 RX ADMIN — SACUBITRIL AND VALSARTAN 1 TABLET: 24; 26 TABLET, FILM COATED ORAL at 21:11

## 2024-01-11 RX ADMIN — FUROSEMIDE 10 MG: 20 TABLET ORAL at 09:16

## 2024-01-11 RX ADMIN — ONDANSETRON 4 MG: 2 INJECTION INTRAMUSCULAR; INTRAVENOUS at 23:35

## 2024-01-11 RX ADMIN — HYDROMORPHONE HYDROCHLORIDE 0.5 MG: 1 INJECTION, SOLUTION INTRAMUSCULAR; INTRAVENOUS; SUBCUTANEOUS at 09:16

## 2024-01-11 RX ADMIN — ALBUTEROL SULFATE 2.5 MG: 2.5 SOLUTION RESPIRATORY (INHALATION) at 11:40

## 2024-01-11 RX ADMIN — ALBUTEROL SULFATE 2.5 MG: 2.5 SOLUTION RESPIRATORY (INHALATION) at 08:24

## 2024-01-11 RX ADMIN — OMEPRAZOLE 40 MG: 20 CAPSULE, DELAYED RELEASE ORAL at 05:08

## 2024-01-11 RX ADMIN — ALBUTEROL SULFATE 2.5 MG: 2.5 SOLUTION RESPIRATORY (INHALATION) at 21:24

## 2024-01-11 RX ADMIN — ONDANSETRON 4 MG: 2 INJECTION INTRAMUSCULAR; INTRAVENOUS at 15:34

## 2024-01-11 RX ADMIN — METOPROLOL SUCCINATE 25 MG: 25 TABLET, EXTENDED RELEASE ORAL at 21:11

## 2024-01-11 RX ADMIN — SACUBITRIL AND VALSARTAN 1 TABLET: 24; 26 TABLET, FILM COATED ORAL at 09:16

## 2024-01-11 RX ADMIN — TAMSULOSIN HYDROCHLORIDE 0.4 MG: 0.4 CAPSULE ORAL at 09:16

## 2024-01-11 RX ADMIN — PREGABALIN 150 MG: 75 CAPSULE ORAL at 21:11

## 2024-01-11 RX ADMIN — PREGABALIN 75 MG: 75 CAPSULE ORAL at 12:05

## 2024-01-11 RX ADMIN — ONDANSETRON 4 MG: 2 INJECTION INTRAMUSCULAR; INTRAVENOUS at 05:08

## 2024-01-11 RX ADMIN — FLUTICASONE PROPIONATE 1 SPRAY: 50 SPRAY, METERED NASAL at 09:17

## 2024-01-11 RX ADMIN — POLYETHYLENE GLYCOL 3350 17 G: 17 POWDER, FOR SOLUTION ORAL at 21:09

## 2024-01-11 ASSESSMENT — PAIN SCALES - GENERAL
PAINLEVEL_OUTOF10: 7
PAINLEVEL_OUTOF10: 8

## 2024-01-11 ASSESSMENT — PAIN - FUNCTIONAL ASSESSMENT: PAIN_FUNCTIONAL_ASSESSMENT: ACTIVITIES ARE NOT PREVENTED

## 2024-01-11 ASSESSMENT — PAIN DESCRIPTION - ORIENTATION
ORIENTATION: RIGHT
ORIENTATION: RIGHT

## 2024-01-11 ASSESSMENT — PAIN DESCRIPTION - DESCRIPTORS
DESCRIPTORS: SHARP
DESCRIPTORS: ACHING;SHARP

## 2024-01-11 ASSESSMENT — PAIN DESCRIPTION - LOCATION
LOCATION: ABDOMEN
LOCATION: FLANK
LOCATION: FLANK

## 2024-01-11 NOTE — PROGRESS NOTES
Patient has been ordered to have the following procedure performed by the IR doctor:    \"CT GUIDED RIGHT RENAL CYST ASPIRATION w/ DRAIN PLACEMENT AND SCLEROSIS OF RENAL CYST WITH MODERATE SEDATION\".    The IR physician, Dr. NELLY Peters has met with the patient bedside to discuss risks, benefits and also what the patient can plan to expect.  The patient's questions have been answered and the patient has expressed concern proceeding unless he were to receive full anesthesia as he tells Dr. Peters that he \"doesn't want to feel anything\".    A short discussion followed about the risks associated with general anesthesia and at the present time the patient is agreeable to proceed under moderate sedation.  Dr. Peters will also order Valium, PO to be administered prior to the procedure.     Preparation:   Patient will continue to hold his ASA and the patient's INR will be rechecked to ensure it is < 1.5 for the ordered procedure.   Patient will need to be NPO after MN the night prior to the procedure.     Plan:  The plan is to perform the procedure on Monday, 1- @ approximately 9am.    Please feel free to reach out to me if you have any questions.    Thank you and I appreciate the opportunity to be involved in this patient's care.     OCTAVIA Madden, RN  IR Clinical Coordinator    [facility]  #[phone] (office)  #[phone] (portable)

## 2024-01-11 NOTE — PROGRESS NOTES
[facility] Note        Subjective/Interval History:    Patient seen and examined.  Procedure canceled because of aspirin yesterday.  Patient reports, \"I wanted to take somebody's head off\".  He is frustrated today.  Continues to have diffuse abdominal pain.  Not always localized to cyst.  Reports dysuria today.    No chest pain or shortness breath. No cough or sputum. No nausea, vomiting, diarrhea. No abdominal pain. No dysuria.  The remainder of the review of systems is negative.     Medication list reviewed    Objective:    /66   Pulse 57   Temp 98 °F (36.7 °C) (Oral)   Resp 18   Ht 1.676 m (5' 6\")   Wt 101.2 kg (223 lb 1.7 oz)   SpO2 93%   BMI 36.01 kg/m²   Temp  Av.9 °F (36.6 °C)  Min: 97.8 °F (36.6 °C)  Max: 98 °F (36.7 °C)    RRR  Chest-respirations are easy.  The chest is clear throughout.  Abd-BS+, soft, NTND.  Small easily reducible umbilical hernia.  Abdominal exam is entirely benign.  Ext- trace edema of the lower legs    The Following Labs Were Reviewed Today:     No results found for this or any previous visit (from the past 24 hour(s)).        ASSESSMENT/PLAN:      Principal Problem:    Chest pain, unspecified-resolved, continue to monitor  Active Problems:    Acute right flank pain-right flank pain continues, although he does have other abdominal pain.  I think a lot of his symptoms may be secondary to chronic opioid use because his pattern of pain is diffuse and not consistently localized to his renal cyst.  I discussed with the patient that we have the option of waiting several more days for renal cyst aspiration, I discussed there are risk with indefinite hospitalizations.  He has not had any metabolic abnormalities or vital sign abnormalities that I think would necessitate continued inpatient stay outside the treatment of the cyst.  He says that if he left he would likely come back today because of abdominal pain.  Dysphagia/Odynophagia  -Cont PPI    Essential  hypertension, benign-blood pressure is generally controlled, continue current therapy    Bronchiolitis obliterans-currently on 2 L of oxygen.  Continue home inhaler regimen.    Coronary artery disease due to calcified coronary lesion-asymptomatic currently    Chronic anticoagulation-Coumadin on hold for surgery.    Dispo: Pain control  Jeffery Estrella MD  12:16 PM  1/11/2024

## 2024-01-11 NOTE — PLAN OF CARE
Problem: Discharge Planning  Goal: Discharge to home or other facility with appropriate resources  Outcome: Progressing     Problem: Safety - Adult  Goal: Free from fall injury  1/11/2024 1550 by Behzad Delvalle RN  Outcome: Progressing  1/11/2024 0320 by Tish Santacruz RN  Outcome: Progressing     Problem: ABCDS Injury Assessment  Goal: Absence of physical injury  1/11/2024 1550 by Behzad Delvalle RN  Outcome: Progressing  1/11/2024 0320 by Tish Santacruz RN  Outcome: Progressing     Problem: Pain  Goal: Verbalizes/displays adequate comfort level or baseline comfort level  1/11/2024 1550 by Behzad Delvalle RN  Outcome: Progressing  1/11/2024 0320 by Tish Santacruz RN  Outcome: Progressing     Problem: Chronic Conditions and Co-morbidities  Goal: Patient's chronic conditions and co-morbidity symptoms are monitored and maintained or improved  Outcome: Progressing

## 2024-01-12 PROCEDURE — 6360000002 HC RX W HCPCS: Performed by: INTERNAL MEDICINE

## 2024-01-12 PROCEDURE — 6370000000 HC RX 637 (ALT 250 FOR IP): Performed by: STUDENT IN AN ORGANIZED HEALTH CARE EDUCATION/TRAINING PROGRAM

## 2024-01-12 PROCEDURE — 2700000000 HC OXYGEN THERAPY PER DAY

## 2024-01-12 PROCEDURE — 1200000000 HC SEMI PRIVATE

## 2024-01-12 PROCEDURE — 6370000000 HC RX 637 (ALT 250 FOR IP): Performed by: INTERNAL MEDICINE

## 2024-01-12 PROCEDURE — 99232 SBSQ HOSP IP/OBS MODERATE 35: CPT | Performed by: STUDENT IN AN ORGANIZED HEALTH CARE EDUCATION/TRAINING PROGRAM

## 2024-01-12 PROCEDURE — 94761 N-INVAS EAR/PLS OXIMETRY MLT: CPT

## 2024-01-12 PROCEDURE — 94640 AIRWAY INHALATION TREATMENT: CPT

## 2024-01-12 RX ADMIN — SACUBITRIL AND VALSARTAN 1 TABLET: 24; 26 TABLET, FILM COATED ORAL at 21:52

## 2024-01-12 RX ADMIN — PRAVASTATIN SODIUM 40 MG: 40 TABLET ORAL at 21:52

## 2024-01-12 RX ADMIN — SACUBITRIL AND VALSARTAN 1 TABLET: 24; 26 TABLET, FILM COATED ORAL at 09:34

## 2024-01-12 RX ADMIN — ZOLPIDEM TARTRATE 10 MG: 5 TABLET ORAL at 22:50

## 2024-01-12 RX ADMIN — HYDROMORPHONE HYDROCHLORIDE 0.5 MG: 1 INJECTION, SOLUTION INTRAMUSCULAR; INTRAVENOUS; SUBCUTANEOUS at 01:46

## 2024-01-12 RX ADMIN — TAMSULOSIN HYDROCHLORIDE 0.4 MG: 0.4 CAPSULE ORAL at 09:34

## 2024-01-12 RX ADMIN — HYDROMORPHONE HYDROCHLORIDE 0.5 MG: 1 INJECTION, SOLUTION INTRAMUSCULAR; INTRAVENOUS; SUBCUTANEOUS at 18:13

## 2024-01-12 RX ADMIN — TAMSULOSIN HYDROCHLORIDE 0.4 MG: 0.4 CAPSULE ORAL at 21:52

## 2024-01-12 RX ADMIN — HYDROMORPHONE HYDROCHLORIDE 0.5 MG: 1 INJECTION, SOLUTION INTRAMUSCULAR; INTRAVENOUS; SUBCUTANEOUS at 13:11

## 2024-01-12 RX ADMIN — HYDROMORPHONE HYDROCHLORIDE 0.5 MG: 1 INJECTION, SOLUTION INTRAMUSCULAR; INTRAVENOUS; SUBCUTANEOUS at 22:50

## 2024-01-12 RX ADMIN — PREGABALIN 150 MG: 75 CAPSULE ORAL at 22:49

## 2024-01-12 RX ADMIN — POLYETHYLENE GLYCOL 3350 17 G: 17 POWDER, FOR SOLUTION ORAL at 21:53

## 2024-01-12 RX ADMIN — OMEPRAZOLE 40 MG: 20 CAPSULE, DELAYED RELEASE ORAL at 09:34

## 2024-01-12 RX ADMIN — ONDANSETRON 4 MG: 2 INJECTION INTRAMUSCULAR; INTRAVENOUS at 13:11

## 2024-01-12 RX ADMIN — ONDANSETRON 4 MG: 2 INJECTION INTRAMUSCULAR; INTRAVENOUS at 21:52

## 2024-01-12 RX ADMIN — METOPROLOL SUCCINATE 25 MG: 25 TABLET, EXTENDED RELEASE ORAL at 21:52

## 2024-01-12 RX ADMIN — FLUTICASONE PROPIONATE 1 SPRAY: 50 SPRAY, METERED NASAL at 09:33

## 2024-01-12 RX ADMIN — ALBUTEROL SULFATE 2.5 MG: 2.5 SOLUTION RESPIRATORY (INHALATION) at 16:31

## 2024-01-12 RX ADMIN — FUROSEMIDE 10 MG: 20 TABLET ORAL at 09:34

## 2024-01-12 RX ADMIN — ALBUTEROL SULFATE 2.5 MG: 2.5 SOLUTION RESPIRATORY (INHALATION) at 20:44

## 2024-01-12 RX ADMIN — PREGABALIN 75 MG: 75 CAPSULE ORAL at 13:11

## 2024-01-12 RX ADMIN — ALBUTEROL SULFATE 2.5 MG: 2.5 SOLUTION RESPIRATORY (INHALATION) at 13:24

## 2024-01-12 RX ADMIN — METOPROLOL SUCCINATE 37.5 MG: 25 TABLET, EXTENDED RELEASE ORAL at 13:12

## 2024-01-12 ASSESSMENT — PAIN DESCRIPTION - DESCRIPTORS
DESCRIPTORS: SHARP
DESCRIPTORS: SHARP
DESCRIPTORS: DULL;ACHING

## 2024-01-12 ASSESSMENT — PAIN DESCRIPTION - ORIENTATION
ORIENTATION: RIGHT
ORIENTATION: RIGHT

## 2024-01-12 ASSESSMENT — PAIN SCALES - GENERAL
PAINLEVEL_OUTOF10: 7
PAINLEVEL_OUTOF10: 1
PAINLEVEL_OUTOF10: 7
PAINLEVEL_OUTOF10: 1
PAINLEVEL_OUTOF10: 7

## 2024-01-12 ASSESSMENT — PAIN DESCRIPTION - LOCATION
LOCATION: ABDOMEN
LOCATION: ABDOMEN;FLANK
LOCATION: FLANK

## 2024-01-12 ASSESSMENT — PAIN SCALES - WONG BAKER: WONGBAKER_NUMERICALRESPONSE: 0

## 2024-01-12 NOTE — CARE COORDINATION
1/12 Plan: Return to home with wife. Active with [facility] for home O2. Has transport. Needs aspiration of renal cyst-planned 1/15. Electronically signed by Ce Brito RN on 1/12/2024 at 4:35 PM

## 2024-01-12 NOTE — PROGRESS NOTES
Comprehensive Nutrition Assessment    Type and Reason for Visit:  Initial    Nutrition Recommendations/Plan:   Continue regular diet     Malnutrition Assessment:  Malnutrition Status:  No malnutrition (01/12/24 0990)    Context:  Acute Illness       Nutrition Assessment:    Length of stay. Pt presenting w/ chest pain. Pt currently on regular diet. Pta no hx of significant wt loss. Pt w/ goo dintake recently, finishing most of meals. Nutrtion related labs reviewed. No nutrition concerns at this time. Continue current diet order.    Nutrition Related Findings:    97 glucose, 143 Na, 1/10 BM Wound Type: None       Current Nutrition Intake & Therapies:    Average Meal Intake: %  Average Supplements Intake: None Ordered  ADULT DIET; Regular  Diet NPO Exceptions are: Sips of Water with Meds    Anthropometric Measures:  Height: 167.6 cm (5' 6\")  Ideal Body Weight (IBW): 142 lbs (65 kg)       Current Body Weight: 100 kg (220 lb 7.4 oz),   IBW.    Current BMI (kg/m2): 35.6                               Estimated Daily Nutrient Needs:  Energy Requirements Based On: Kcal/kg  Weight Used for Energy Requirements: Current  Energy (kcal/day): 1500 - 1800 (15 - 18 kcal/kg)  Weight Used for Protein Requirements: Ideal  Protein (g/day): 77 - 97 (1.2 - 1.5 g/kg IBW)  Method Used for Fluid Requirements: 1 ml/kcal  Fluid (ml/day): 1500 - 1800 (1ml/kcal) or per provider    Nutrition Diagnosis:   No nutrition diagnosis at this time     Nutrition Interventions:   Food and/or Nutrient Delivery: Continue Current Diet  Nutrition Education/Counseling: Education not indicated  Coordination of Nutrition Care: Continue to monitor while inpatient       Goals:     Goals: Meet at least 75% of estimated needs, by next RD assessment       Nutrition Monitoring and Evaluation:   Behavioral-Environmental Outcomes: None Identified  Food/Nutrient Intake Outcomes: Food and Nutrient Intake  Physical Signs/Symptoms Outcomes: Biochemical Data, Weight,  Nutrition Focused Physical Findings    Discharge Planning:    No discharge needs at this time     Barry Denny RD  Contact: [phone]

## 2024-01-13 PROCEDURE — 6360000002 HC RX W HCPCS: Performed by: INTERNAL MEDICINE

## 2024-01-13 PROCEDURE — 1200000000 HC SEMI PRIVATE

## 2024-01-13 PROCEDURE — 6370000000 HC RX 637 (ALT 250 FOR IP): Performed by: INTERNAL MEDICINE

## 2024-01-13 PROCEDURE — 94640 AIRWAY INHALATION TREATMENT: CPT

## 2024-01-13 PROCEDURE — 99232 SBSQ HOSP IP/OBS MODERATE 35: CPT | Performed by: STUDENT IN AN ORGANIZED HEALTH CARE EDUCATION/TRAINING PROGRAM

## 2024-01-13 PROCEDURE — 6360000002 HC RX W HCPCS: Performed by: STUDENT IN AN ORGANIZED HEALTH CARE EDUCATION/TRAINING PROGRAM

## 2024-01-13 PROCEDURE — 2700000000 HC OXYGEN THERAPY PER DAY

## 2024-01-13 PROCEDURE — 94761 N-INVAS EAR/PLS OXIMETRY MLT: CPT

## 2024-01-13 PROCEDURE — 6370000000 HC RX 637 (ALT 250 FOR IP): Performed by: STUDENT IN AN ORGANIZED HEALTH CARE EDUCATION/TRAINING PROGRAM

## 2024-01-13 RX ORDER — ALBUTEROL SULFATE 2.5 MG/3ML
2.5 SOLUTION RESPIRATORY (INHALATION)
Status: DISCONTINUED | OUTPATIENT
Start: 2024-01-13 | End: 2024-01-16 | Stop reason: HOSPADM

## 2024-01-13 RX ORDER — POLYETHYLENE GLYCOL 3350 17 G/17G
17 POWDER, FOR SOLUTION ORAL 2 TIMES DAILY PRN
Status: DISCONTINUED | OUTPATIENT
Start: 2024-01-13 | End: 2024-01-16 | Stop reason: HOSPADM

## 2024-01-13 RX ADMIN — TAMSULOSIN HYDROCHLORIDE 0.4 MG: 0.4 CAPSULE ORAL at 20:26

## 2024-01-13 RX ADMIN — HYDROMORPHONE HYDROCHLORIDE 0.5 MG: 1 INJECTION, SOLUTION INTRAMUSCULAR; INTRAVENOUS; SUBCUTANEOUS at 06:49

## 2024-01-13 RX ADMIN — FLUTICASONE PROPIONATE 1 SPRAY: 50 SPRAY, METERED NASAL at 09:09

## 2024-01-13 RX ADMIN — HYDROMORPHONE HYDROCHLORIDE 0.5 MG: 1 INJECTION, SOLUTION INTRAMUSCULAR; INTRAVENOUS; SUBCUTANEOUS at 11:26

## 2024-01-13 RX ADMIN — ONDANSETRON 4 MG: 2 INJECTION INTRAMUSCULAR; INTRAVENOUS at 06:50

## 2024-01-13 RX ADMIN — ALBUTEROL SULFATE 2.5 MG: 2.5 SOLUTION RESPIRATORY (INHALATION) at 20:38

## 2024-01-13 RX ADMIN — PRAVASTATIN SODIUM 40 MG: 40 TABLET ORAL at 20:25

## 2024-01-13 RX ADMIN — PREGABALIN 75 MG: 75 CAPSULE ORAL at 09:08

## 2024-01-13 RX ADMIN — PREGABALIN 150 MG: 75 CAPSULE ORAL at 20:26

## 2024-01-13 RX ADMIN — HYDROMORPHONE HYDROCHLORIDE 0.5 MG: 1 INJECTION, SOLUTION INTRAMUSCULAR; INTRAVENOUS; SUBCUTANEOUS at 20:33

## 2024-01-13 RX ADMIN — ONDANSETRON 4 MG: 2 INJECTION INTRAMUSCULAR; INTRAVENOUS at 17:42

## 2024-01-13 RX ADMIN — POLYETHYLENE GLYCOL 3350 17 G: 17 POWDER, FOR SOLUTION ORAL at 22:03

## 2024-01-13 RX ADMIN — TAMSULOSIN HYDROCHLORIDE 0.4 MG: 0.4 CAPSULE ORAL at 09:07

## 2024-01-13 RX ADMIN — METOPROLOL SUCCINATE 37.5 MG: 25 TABLET, EXTENDED RELEASE ORAL at 09:08

## 2024-01-13 RX ADMIN — FUROSEMIDE 10 MG: 20 TABLET ORAL at 09:07

## 2024-01-13 RX ADMIN — OXYCODONE AND ACETAMINOPHEN 1 TABLET: 5; 325 TABLET ORAL at 17:46

## 2024-01-13 RX ADMIN — OMEPRAZOLE 40 MG: 20 CAPSULE, DELAYED RELEASE ORAL at 06:48

## 2024-01-13 RX ADMIN — ALBUTEROL SULFATE 2.5 MG: 2.5 SOLUTION RESPIRATORY (INHALATION) at 09:21

## 2024-01-13 RX ADMIN — ONDANSETRON 4 MG: 2 INJECTION INTRAMUSCULAR; INTRAVENOUS at 11:28

## 2024-01-13 RX ADMIN — HYDROMORPHONE HYDROCHLORIDE 0.5 MG: 1 INJECTION, SOLUTION INTRAMUSCULAR; INTRAVENOUS; SUBCUTANEOUS at 15:33

## 2024-01-13 RX ADMIN — SACUBITRIL AND VALSARTAN 1 TABLET: 24; 26 TABLET, FILM COATED ORAL at 09:07

## 2024-01-13 RX ADMIN — SACUBITRIL AND VALSARTAN 1 TABLET: 24; 26 TABLET, FILM COATED ORAL at 20:28

## 2024-01-13 ASSESSMENT — PAIN DESCRIPTION - LOCATION
LOCATION: FLANK
LOCATION: ABDOMEN
LOCATION: FLANK

## 2024-01-13 ASSESSMENT — PAIN - FUNCTIONAL ASSESSMENT
PAIN_FUNCTIONAL_ASSESSMENT: ACTIVITIES ARE NOT PREVENTED

## 2024-01-13 ASSESSMENT — PAIN DESCRIPTION - DESCRIPTORS
DESCRIPTORS: ACHING
DESCRIPTORS: ACHING;DISCOMFORT;SHARP;THROBBING
DESCRIPTORS: ACHING
DESCRIPTORS: ACHING

## 2024-01-13 ASSESSMENT — PAIN SCALES - GENERAL
PAINLEVEL_OUTOF10: 8
PAINLEVEL_OUTOF10: 6
PAINLEVEL_OUTOF10: 8
PAINLEVEL_OUTOF10: 7
PAINLEVEL_OUTOF10: 7

## 2024-01-13 ASSESSMENT — PAIN DESCRIPTION - ONSET
ONSET: ON-GOING
ONSET: ON-GOING

## 2024-01-13 ASSESSMENT — PAIN DESCRIPTION - PAIN TYPE
TYPE: ACUTE PAIN
TYPE: ACUTE PAIN

## 2024-01-13 ASSESSMENT — PAIN DESCRIPTION - FREQUENCY
FREQUENCY: INTERMITTENT
FREQUENCY: INTERMITTENT

## 2024-01-13 ASSESSMENT — PAIN DESCRIPTION - ORIENTATION
ORIENTATION: RIGHT
ORIENTATION: LEFT

## 2024-01-13 NOTE — PROGRESS NOTES
[facility] Note        Subjective/Interval History:    Patient seen and examined.  No issues overnight. Continues to have diffuse abdominal pain. No fever or chills. Still requiring IV pain medicines.    No chest pain or shortness breath. No cough or sputum. No nausea, vomiting, diarrhea. No abdominal pain. No dysuria.  The remainder of the review of systems is negative.     Medication list reviewed    Objective:    /66   Pulse 72   Temp 98.3 °F (36.8 °C) (Oral)   Resp 16   Ht 1.676 m (5' 6\")   Wt 100 kg (220 lb 7.4 oz)   SpO2 95%   BMI 35.58 kg/m²   Temp  Av.2 °F (36.8 °C)  Min: 98.1 °F (36.7 °C)  Max: 98.3 °F (36.8 °C)    RRR  Chest-respirations are easy.  The chest is clear throughout.  Abd-BS+, soft, NTND.  Small easily reducible umbilical hernia.  Abdominal exam is entirely benign.  Ext- trace edema of the lower legs    The Following Labs Were Reviewed Today:     No results found for this or any previous visit (from the past 24 hour(s)).        ASSESSMENT/PLAN:      Principal Problem:    Chest pain, unspecified-resolved, continue to monitor  Active Problems:    Acute right flank pain-right flank pain continues, although he does have other abdominal pain.  I think a lot of his symptoms may be secondary to chronic opioid use because his pattern of pain is diffuse and not consistently localized to his renal cyst.  I discussed with the patient that we have the option of waiting several more days for renal cyst aspiration, I discussed there are risk with indefinite hospitalizations.  He has not had any metabolic abnormalities or vital sign abnormalities that I think would necessitate continued inpatient stay outside the treatment of the cyst.  He says that if he left he would likely come back today because of abdominal pain.  Dysphagia/Odynophagia  -Cont PPI    Essential hypertension, benign-blood pressure is generally controlled, continue current therapy    Bronchiolitis

## 2024-01-13 NOTE — PLAN OF CARE

## 2024-01-13 NOTE — PROGRESS NOTES
[facility]        Subjective/Interval History:    Patient seen and examined.  No issues overnight. Continues to have diffuse abdominal pain. No fever or chills. Still requiring IV pain medicines.    No chest pain or shortness breath. No cough or sputum. No nausea, vomiting, diarrhea. No abdominal pain. No dysuria.  The remainder of the review of systems is negative.     Medication list reviewed    Objective:    /75   Pulse 59   Temp 97.9 °F (36.6 °C) (Oral)   Resp 20   Ht 1.676 m (5' 6\")   Wt 100 kg (220 lb 7.4 oz)   SpO2 95%   BMI 35.58 kg/m²   Temp  Av.1 °F (36.7 °C)  Min: 97.9 °F (36.6 °C)  Max: 98.3 °F (36.8 °C)    RRR  Chest-respirations are easy.  The chest is clear throughout.  Abd-BS+, soft, NTND.  Small easily reducible umbilical hernia.  Abdominal exam is entirely benign.  Ext- trace edema of the lower legs    The Following Labs Were Reviewed Today:     No results found for this or any previous visit (from the past 24 hour(s)).        ASSESSMENT/PLAN:      Principal Problem:    Chest pain, unspecified-resolved, continue to monitor  Active Problems:    Acute right flank pain-right flank pain continues, although he does have other abdominal pain.  Plan for cyst drainage 1/15. 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  Continue home inhaler regimen.    Coronary artery disease due to calcified coronary lesion-asymptomatic currently    Chronic anticoagulation-Coumadin on hold for surgery.    Dispo: Pain control  Jeffery Estrella MD  12:00 PM  2024

## 2024-01-14 LAB
INR PPP: 0.97 (ref 0.84–1.16)
PROTHROMBIN TIME: 12.8 SEC (ref 11.5–14.8)

## 2024-01-14 PROCEDURE — 94640 AIRWAY INHALATION TREATMENT: CPT

## 2024-01-14 PROCEDURE — 6360000002 HC RX W HCPCS: Performed by: INTERNAL MEDICINE

## 2024-01-14 PROCEDURE — 6370000000 HC RX 637 (ALT 250 FOR IP): Performed by: INTERNAL MEDICINE

## 2024-01-14 PROCEDURE — 1200000000 HC SEMI PRIVATE

## 2024-01-14 PROCEDURE — 85610 PROTHROMBIN TIME: CPT

## 2024-01-14 PROCEDURE — 6370000000 HC RX 637 (ALT 250 FOR IP): Performed by: STUDENT IN AN ORGANIZED HEALTH CARE EDUCATION/TRAINING PROGRAM

## 2024-01-14 PROCEDURE — 3E0K3TZ INTRODUCTION OF DESTRUCTIVE AGENT INTO GENITOURINARY TRACT, PERCUTANEOUS APPROACH: ICD-10-PCS | Performed by: PSYCHIATRY & NEUROLOGY

## 2024-01-14 PROCEDURE — 99232 SBSQ HOSP IP/OBS MODERATE 35: CPT | Performed by: STUDENT IN AN ORGANIZED HEALTH CARE EDUCATION/TRAINING PROGRAM

## 2024-01-14 PROCEDURE — 2700000000 HC OXYGEN THERAPY PER DAY

## 2024-01-14 PROCEDURE — 36415 COLL VENOUS BLD VENIPUNCTURE: CPT

## 2024-01-14 PROCEDURE — 0T903ZZ DRAINAGE OF RIGHT KIDNEY, PERCUTANEOUS APPROACH: ICD-10-PCS | Performed by: PSYCHIATRY & NEUROLOGY

## 2024-01-14 PROCEDURE — 6360000002 HC RX W HCPCS: Performed by: STUDENT IN AN ORGANIZED HEALTH CARE EDUCATION/TRAINING PROGRAM

## 2024-01-14 PROCEDURE — 94761 N-INVAS EAR/PLS OXIMETRY MLT: CPT

## 2024-01-14 RX ADMIN — ZOLPIDEM TARTRATE 10 MG: 5 TABLET ORAL at 20:03

## 2024-01-14 RX ADMIN — TAMSULOSIN HYDROCHLORIDE 0.4 MG: 0.4 CAPSULE ORAL at 09:01

## 2024-01-14 RX ADMIN — ONDANSETRON 4 MG: 2 INJECTION INTRAMUSCULAR; INTRAVENOUS at 09:01

## 2024-01-14 RX ADMIN — ONDANSETRON 4 MG: 2 INJECTION INTRAMUSCULAR; INTRAVENOUS at 01:10

## 2024-01-14 RX ADMIN — HYDROMORPHONE HYDROCHLORIDE 0.5 MG: 1 INJECTION, SOLUTION INTRAMUSCULAR; INTRAVENOUS; SUBCUTANEOUS at 21:47

## 2024-01-14 RX ADMIN — POLYETHYLENE GLYCOL 3350 17 G: 17 POWDER, FOR SOLUTION ORAL at 20:03

## 2024-01-14 RX ADMIN — PRAVASTATIN SODIUM 40 MG: 40 TABLET ORAL at 20:03

## 2024-01-14 RX ADMIN — HYDROMORPHONE HYDROCHLORIDE 0.5 MG: 1 INJECTION, SOLUTION INTRAMUSCULAR; INTRAVENOUS; SUBCUTANEOUS at 06:04

## 2024-01-14 RX ADMIN — OMEPRAZOLE 40 MG: 20 CAPSULE, DELAYED RELEASE ORAL at 05:58

## 2024-01-14 RX ADMIN — ALBUTEROL SULFATE 2.5 MG: 2.5 SOLUTION RESPIRATORY (INHALATION) at 13:00

## 2024-01-14 RX ADMIN — ONDANSETRON 4 MG: 2 INJECTION INTRAMUSCULAR; INTRAVENOUS at 23:41

## 2024-01-14 RX ADMIN — TAMSULOSIN HYDROCHLORIDE 0.4 MG: 0.4 CAPSULE ORAL at 20:03

## 2024-01-14 RX ADMIN — ALBUTEROL SULFATE 2.5 MG: 2.5 SOLUTION RESPIRATORY (INHALATION) at 19:32

## 2024-01-14 RX ADMIN — SACUBITRIL AND VALSARTAN 1 TABLET: 24; 26 TABLET, FILM COATED ORAL at 09:01

## 2024-01-14 RX ADMIN — PREGABALIN 75 MG: 75 CAPSULE ORAL at 09:01

## 2024-01-14 RX ADMIN — OXYCODONE AND ACETAMINOPHEN 1 TABLET: 5; 325 TABLET ORAL at 23:41

## 2024-01-14 RX ADMIN — PREGABALIN 150 MG: 75 CAPSULE ORAL at 20:02

## 2024-01-14 RX ADMIN — HYDROMORPHONE HYDROCHLORIDE 0.5 MG: 1 INJECTION, SOLUTION INTRAMUSCULAR; INTRAVENOUS; SUBCUTANEOUS at 01:10

## 2024-01-14 RX ADMIN — METOPROLOL SUCCINATE 25 MG: 25 TABLET, EXTENDED RELEASE ORAL at 20:09

## 2024-01-14 RX ADMIN — ONDANSETRON 4 MG: 2 INJECTION INTRAMUSCULAR; INTRAVENOUS at 16:48

## 2024-01-14 RX ADMIN — ALBUTEROL SULFATE 2.5 MG: 2.5 SOLUTION RESPIRATORY (INHALATION) at 09:10

## 2024-01-14 RX ADMIN — SACUBITRIL AND VALSARTAN 1 TABLET: 24; 26 TABLET, FILM COATED ORAL at 20:10

## 2024-01-14 RX ADMIN — METOPROLOL SUCCINATE 37.5 MG: 25 TABLET, EXTENDED RELEASE ORAL at 09:01

## 2024-01-14 RX ADMIN — HYDROMORPHONE HYDROCHLORIDE 0.5 MG: 1 INJECTION, SOLUTION INTRAMUSCULAR; INTRAVENOUS; SUBCUTANEOUS at 15:52

## 2024-01-14 RX ADMIN — HYDROMORPHONE HYDROCHLORIDE 0.5 MG: 1 INJECTION, SOLUTION INTRAMUSCULAR; INTRAVENOUS; SUBCUTANEOUS at 11:22

## 2024-01-14 RX ADMIN — FUROSEMIDE 10 MG: 20 TABLET ORAL at 09:01

## 2024-01-14 ASSESSMENT — PAIN DESCRIPTION - DESCRIPTORS
DESCRIPTORS: ACHING
DESCRIPTORS: DISCOMFORT;THROBBING
DESCRIPTORS: ACHING;SHARP;THROBBING;DISCOMFORT
DESCRIPTORS: ACHING
DESCRIPTORS: ACHING;DISCOMFORT;THROBBING
DESCRIPTORS: ACHING;DISCOMFORT;THROBBING

## 2024-01-14 ASSESSMENT — PAIN DESCRIPTION - ORIENTATION
ORIENTATION: RIGHT
ORIENTATION: RIGHT;LEFT
ORIENTATION: RIGHT
ORIENTATION: RIGHT

## 2024-01-14 ASSESSMENT — PAIN SCALES - GENERAL
PAINLEVEL_OUTOF10: 6
PAINLEVEL_OUTOF10: 7

## 2024-01-14 ASSESSMENT — PAIN DESCRIPTION - LOCATION
LOCATION: FLANK

## 2024-01-14 ASSESSMENT — PAIN DESCRIPTION - FREQUENCY: FREQUENCY: INTERMITTENT

## 2024-01-14 ASSESSMENT — PAIN DESCRIPTION - PAIN TYPE: TYPE: ACUTE PAIN

## 2024-01-14 ASSESSMENT — PAIN DESCRIPTION - ONSET: ONSET: ON-GOING

## 2024-01-14 NOTE — TELEPHONE ENCOUNTER
Patient reports that he got shampoo in his Left eye x 3 days ago. It has continued to burn and appear red.    Ry was prescribed a zpak for respiratory infection. He has been on this medication before, and it did not seem to increase his INR. Since his INR was subtherapeutic 6 days ago, we will continue his current warfarin dosing. He will call with any bruising or bleeding issues. hsi next INr is next week on 1-13-20.   [address]  Anticoagulation Service  [phone]

## 2024-01-14 NOTE — PLAN OF CARE
Problem: Discharge Planning  Goal: Discharge to home or other facility with appropriate resources  1/14/2024 1106 by Kate García RN  Outcome: Progressing  1/14/2024 0228 by Odette Gilbert RN  Outcome: Progressing  Flowsheets (Taken 1/13/2024 2310)  Discharge to home or other facility with appropriate resources: Identify barriers to discharge with patient and caregiver     Problem: Safety - Adult  Goal: Free from fall injury  1/14/2024 1106 by Kate García RN  Outcome: Progressing  1/14/2024 0228 by Odette Gilbert RN  Outcome: Progressing     Problem: ABCDS Injury Assessment  Goal: Absence of physical injury  1/14/2024 1106 by Kate García RN  Outcome: Progressing  1/14/2024 0228 by Odette Gilbert RN  Outcome: Progressing     Problem: Pain  Goal: Verbalizes/displays adequate comfort level or baseline comfort level  1/14/2024 1106 by Kate García RN  Outcome: Progressing  1/14/2024 0228 by Odette Gilbert RN  Outcome: Progressing     Problem: Chronic Conditions and Co-morbidities  Goal: Patient's chronic conditions and co-morbidity symptoms are monitored and maintained or improved  1/14/2024 1106 by Kate García RN  Outcome: Progressing  1/14/2024 0228 by Odette Gilbert RN  Outcome: Progressing  Flowsheets (Taken 1/13/2024 2310)  Care Plan - Patient's Chronic Conditions and Co-Morbidity Symptoms are Monitored and Maintained or Improved: Monitor and assess patient's chronic conditions and comorbid symptoms for stability, deterioration, or improvement

## 2024-01-14 NOTE — PLAN OF CARE
Problem: Discharge Planning  Goal: Discharge to home or other facility with appropriate resources  1/14/2024 0228 by Odette Gilbert RN  Outcome: Progressing  Flowsheets (Taken 1/13/2024 2310)  Discharge to home or other facility with appropriate resources: Identify barriers to discharge with patient and caregiver  1/13/2024 1848 by Kate García RN  Outcome: Progressing     Problem: Safety - Adult  Goal: Free from fall injury  1/14/2024 0228 by Odette Gilbert RN  Outcome: Progressing  1/13/2024 1848 by Kate García RN  Outcome: Progressing     Problem: ABCDS Injury Assessment  Goal: Absence of physical injury  1/14/2024 0228 by Odette Gilbert RN  Outcome: Progressing  1/13/2024 1848 by Kate García RN  Outcome: Progressing     Problem: Pain  Goal: Verbalizes/displays adequate comfort level or baseline comfort level  1/14/2024 0228 by Odette Gilbert RN  Outcome: Progressing  1/13/2024 1848 by Kate García RN  Outcome: Progressing     Problem: Chronic Conditions and Co-morbidities  Goal: Patient's chronic conditions and co-morbidity symptoms are monitored and maintained or improved  1/14/2024 0228 by Odette Gilbert RN  Outcome: Progressing  Flowsheets (Taken 1/13/2024 2310)  Care Plan - Patient's Chronic Conditions and Co-Morbidity Symptoms are Monitored and Maintained or Improved: Monitor and assess patient's chronic conditions and comorbid symptoms for stability, deterioration, or improvement  1/13/2024 1848 by Kate García RN  Outcome: Progressing

## 2024-01-14 NOTE — PROGRESS NOTES
[facility] Note        Subjective/Interval History:    Patient seen and examined.  No issues overnight. Had left sided abdominal pain, improved after bowel movement. No fever or chills. Still requiring IV pain medicines.    No chest pain or shortness breath. No cough or sputum. No nausea, vomiting, diarrhea. No abdominal pain. No dysuria.  The remainder of the review of systems is negative.     Medication list reviewed    Objective:    BP (!) 147/85   Pulse 59   Temp 98 °F (36.7 °C) (Oral)   Resp 17   Ht 1.676 m (5' 6\")   Wt 100 kg (220 lb 7.4 oz)   SpO2 94%   BMI 35.58 kg/m²   Temp  Av °F (36.7 °C)  Min: 97.9 °F (36.6 °C)  Max: 98.1 °F (36.7 °C)    RRR  Chest-respirations are easy.  The chest is clear throughout.  Abd-BS+, soft, NTND.  Small easily reducible umbilical hernia.  Abdominal exam is entirely benign.  Ext- trace edema of the lower legs    The Following Labs Were Reviewed Today:     Recent Results (from the past 24 hour(s))   Protime-INR    Collection Time: 24  4:34 AM   Result Value Ref Range    Protime 12.8 11.5 - 14.8 sec    INR 0.97 0.84 - 1.16           ASSESSMENT/PLAN:      Principal Problem:    Chest pain, unspecified-resolved, continue to monitor  Active Problems:    Acute right flank pain-right flank pain continues, although he does have other abdominal pain.  Plan for cyst drainage 1/15. 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  Continue home inhaler regimen.    Coronary artery disease due to calcified coronary lesion-asymptomatic currently    Chronic anticoagulation-Coumadin on hold for surgery.    Dispo: Pain control  Jeffery Estrella MD  7:24 AM  2024

## 2024-01-15 ENCOUNTER — APPOINTMENT (OUTPATIENT)
Dept: CT IMAGING | Age: 64
DRG: 699 | End: 2024-01-15
Payer: COMMERCIAL

## 2024-01-15 LAB
ALBUMIN SERPL-MCNC: 3.5 G/DL (ref 3.4–5)
ALBUMIN/GLOB SERPL: 1.5 {RATIO} (ref 1.1–2.2)
ALP SERPL-CCNC: 114 U/L (ref 40–129)
ALT SERPL-CCNC: 25 U/L (ref 10–40)
ANION GAP SERPL CALCULATED.3IONS-SCNC: 5 MMOL/L (ref 3–16)
AST SERPL-CCNC: 28 U/L (ref 15–37)
BASOPHILS # BLD: 0 K/UL (ref 0–0.2)
BASOPHILS NFR BLD: 0.9 %
BILIRUB SERPL-MCNC: 0.4 MG/DL (ref 0–1)
BUN SERPL-MCNC: 9 MG/DL (ref 7–20)
CALCIUM SERPL-MCNC: 9.7 MG/DL (ref 8.3–10.6)
CHLORIDE SERPL-SCNC: 103 MMOL/L (ref 99–110)
CO2 SERPL-SCNC: 31 MMOL/L (ref 21–32)
CREAT SERPL-MCNC: 0.9 MG/DL (ref 0.8–1.3)
DEPRECATED RDW RBC AUTO: 13.9 % (ref 12.4–15.4)
EOSINOPHIL # BLD: 0.3 K/UL (ref 0–0.6)
EOSINOPHIL NFR BLD: 7.6 %
GFR SERPLBLD CREATININE-BSD FMLA CKD-EPI: >60 ML/MIN/{1.73_M2}
GLUCOSE SERPL-MCNC: 105 MG/DL (ref 70–99)
HCT VFR BLD AUTO: 39.4 % (ref 40.5–52.5)
HGB BLD-MCNC: 13.2 G/DL (ref 13.5–17.5)
INR PPP: 0.96 (ref 0.84–1.16)
LYMPHOCYTES # BLD: 1 K/UL (ref 1–5.1)
LYMPHOCYTES NFR BLD: 22.9 %
MCH RBC QN AUTO: 31.3 PG (ref 26–34)
MCHC RBC AUTO-ENTMCNC: 33.4 G/DL (ref 31–36)
MCV RBC AUTO: 93.5 FL (ref 80–100)
MONOCYTES # BLD: 0.6 K/UL (ref 0–1.3)
MONOCYTES NFR BLD: 14 %
NEUTROPHILS # BLD: 2.3 K/UL (ref 1.7–7.7)
NEUTROPHILS NFR BLD: 54.6 %
PLATELET # BLD AUTO: 160 K/UL (ref 135–450)
PMV BLD AUTO: 8.3 FL (ref 5–10.5)
POTASSIUM SERPL-SCNC: 4.4 MMOL/L (ref 3.5–5.1)
PROT SERPL-MCNC: 5.9 G/DL (ref 6.4–8.2)
PROTHROMBIN TIME: 12.8 SEC (ref 11.5–14.8)
RBC # BLD AUTO: 4.21 M/UL (ref 4.2–5.9)
SODIUM SERPL-SCNC: 139 MMOL/L (ref 136–145)
WBC # BLD AUTO: 4.3 K/UL (ref 4–11)

## 2024-01-15 PROCEDURE — 94761 N-INVAS EAR/PLS OXIMETRY MLT: CPT

## 2024-01-15 PROCEDURE — 85610 PROTHROMBIN TIME: CPT

## 2024-01-15 PROCEDURE — 1200000000 HC SEMI PRIVATE

## 2024-01-15 PROCEDURE — 94640 AIRWAY INHALATION TREATMENT: CPT

## 2024-01-15 PROCEDURE — 6360000002 HC RX W HCPCS: Performed by: INTERNAL MEDICINE

## 2024-01-15 PROCEDURE — 6360000004 HC RX CONTRAST MEDICATION: Performed by: UROLOGY

## 2024-01-15 PROCEDURE — 94760 N-INVAS EAR/PLS OXIMETRY 1: CPT

## 2024-01-15 PROCEDURE — 80053 COMPREHEN METABOLIC PANEL: CPT

## 2024-01-15 PROCEDURE — 6370000000 HC RX 637 (ALT 250 FOR IP): Performed by: RADIOLOGY

## 2024-01-15 PROCEDURE — C1769 GUIDE WIRE: HCPCS

## 2024-01-15 PROCEDURE — 6370000000 HC RX 637 (ALT 250 FOR IP): Performed by: INTERNAL MEDICINE

## 2024-01-15 PROCEDURE — 99232 SBSQ HOSP IP/OBS MODERATE 35: CPT | Performed by: STUDENT IN AN ORGANIZED HEALTH CARE EDUCATION/TRAINING PROGRAM

## 2024-01-15 PROCEDURE — 85025 COMPLETE CBC W/AUTO DIFF WBC: CPT

## 2024-01-15 PROCEDURE — 6360000002 HC RX W HCPCS: Performed by: RADIOLOGY

## 2024-01-15 PROCEDURE — 6360000002 HC RX W HCPCS: Performed by: STUDENT IN AN ORGANIZED HEALTH CARE EDUCATION/TRAINING PROGRAM

## 2024-01-15 PROCEDURE — 36415 COLL VENOUS BLD VENIPUNCTURE: CPT

## 2024-01-15 PROCEDURE — 2700000000 HC OXYGEN THERAPY PER DAY

## 2024-01-15 PROCEDURE — 2500000003 HC RX 250 WO HCPCS: Performed by: RADIOLOGY

## 2024-01-15 RX ORDER — SODIUM TETRADECYL SULFATE 30 MG/ML
INJECTION, SOLUTION INTRAVENOUS PRN
Status: COMPLETED | OUTPATIENT
Start: 2024-01-15 | End: 2024-01-15

## 2024-01-15 RX ORDER — MIDAZOLAM HYDROCHLORIDE 1 MG/ML
INJECTION INTRAMUSCULAR; INTRAVENOUS PRN
Status: COMPLETED | OUTPATIENT
Start: 2024-01-15 | End: 2024-01-15

## 2024-01-15 RX ORDER — FENTANYL CITRATE 0.05 MG/ML
INJECTION, SOLUTION INTRAMUSCULAR; INTRAVENOUS PRN
Status: COMPLETED | OUTPATIENT
Start: 2024-01-15 | End: 2024-01-15

## 2024-01-15 RX ADMIN — IOPAMIDOL 50 ML: 510 INJECTION, SOLUTION INTRAVASCULAR at 10:32

## 2024-01-15 RX ADMIN — ALBUTEROL SULFATE 2.5 MG: 2.5 SOLUTION RESPIRATORY (INHALATION) at 12:26

## 2024-01-15 RX ADMIN — HYDROMORPHONE HYDROCHLORIDE 0.5 MG: 1 INJECTION, SOLUTION INTRAMUSCULAR; INTRAVENOUS; SUBCUTANEOUS at 12:48

## 2024-01-15 RX ADMIN — DIAZEPAM 10 MG: 5 TABLET ORAL at 08:25

## 2024-01-15 RX ADMIN — ALBUTEROL SULFATE 2.5 MG: 2.5 SOLUTION RESPIRATORY (INHALATION) at 08:57

## 2024-01-15 RX ADMIN — FENTANYL CITRATE 50 MCG: 0.05 INJECTION, SOLUTION INTRAMUSCULAR; INTRAVENOUS at 09:47

## 2024-01-15 RX ADMIN — FLUTICASONE PROPIONATE 1 SPRAY: 50 SPRAY, METERED NASAL at 12:26

## 2024-01-15 RX ADMIN — TAMSULOSIN HYDROCHLORIDE 0.4 MG: 0.4 CAPSULE ORAL at 21:47

## 2024-01-15 RX ADMIN — ONDANSETRON 4 MG: 2 INJECTION INTRAMUSCULAR; INTRAVENOUS at 13:12

## 2024-01-15 RX ADMIN — ZOLPIDEM TARTRATE 10 MG: 5 TABLET ORAL at 21:58

## 2024-01-15 RX ADMIN — METOPROLOL SUCCINATE 25 MG: 25 TABLET, EXTENDED RELEASE ORAL at 21:46

## 2024-01-15 RX ADMIN — TETRADECYL HYDROGEN SULFATE (ESTER) 8 ML: 30 INJECTION, SOLUTION INTRAVENOUS at 10:26

## 2024-01-15 RX ADMIN — ONDANSETRON 4 MG: 2 INJECTION INTRAMUSCULAR; INTRAVENOUS at 23:58

## 2024-01-15 RX ADMIN — OXYCODONE AND ACETAMINOPHEN 1 TABLET: 5; 325 TABLET ORAL at 21:47

## 2024-01-15 RX ADMIN — ONDANSETRON 4 MG: 2 INJECTION INTRAMUSCULAR; INTRAVENOUS at 06:11

## 2024-01-15 RX ADMIN — ALBUTEROL SULFATE 2.5 MG: 2.5 SOLUTION RESPIRATORY (INHALATION) at 21:08

## 2024-01-15 RX ADMIN — HYDROMORPHONE HYDROCHLORIDE 0.5 MG: 1 INJECTION, SOLUTION INTRAMUSCULAR; INTRAVENOUS; SUBCUTANEOUS at 23:54

## 2024-01-15 RX ADMIN — HYDROMORPHONE HYDROCHLORIDE 0.5 MG: 1 INJECTION, SOLUTION INTRAMUSCULAR; INTRAVENOUS; SUBCUTANEOUS at 06:12

## 2024-01-15 RX ADMIN — PREGABALIN 75 MG: 75 CAPSULE ORAL at 12:25

## 2024-01-15 RX ADMIN — METOPROLOL SUCCINATE 37.5 MG: 25 TABLET, EXTENDED RELEASE ORAL at 12:25

## 2024-01-15 RX ADMIN — PRAVASTATIN SODIUM 40 MG: 40 TABLET ORAL at 21:47

## 2024-01-15 RX ADMIN — HYDROMORPHONE HYDROCHLORIDE 0.5 MG: 1 INJECTION, SOLUTION INTRAMUSCULAR; INTRAVENOUS; SUBCUTANEOUS at 16:53

## 2024-01-15 RX ADMIN — PREGABALIN 150 MG: 75 CAPSULE ORAL at 21:47

## 2024-01-15 RX ADMIN — HYDROMORPHONE HYDROCHLORIDE 0.5 MG: 1 INJECTION, SOLUTION INTRAMUSCULAR; INTRAVENOUS; SUBCUTANEOUS at 01:55

## 2024-01-15 RX ADMIN — TAMSULOSIN HYDROCHLORIDE 0.4 MG: 0.4 CAPSULE ORAL at 12:25

## 2024-01-15 RX ADMIN — FENTANYL CITRATE 50 MCG: 0.05 INJECTION, SOLUTION INTRAMUSCULAR; INTRAVENOUS at 09:55

## 2024-01-15 RX ADMIN — FUROSEMIDE 10 MG: 20 TABLET ORAL at 12:24

## 2024-01-15 RX ADMIN — SACUBITRIL AND VALSARTAN 1 TABLET: 24; 26 TABLET, FILM COATED ORAL at 12:25

## 2024-01-15 RX ADMIN — MIDAZOLAM 2 MG: 1 INJECTION INTRAMUSCULAR; INTRAVENOUS at 09:44

## 2024-01-15 ASSESSMENT — PAIN DESCRIPTION - PAIN TYPE
TYPE: ACUTE PAIN
TYPE: ACUTE PAIN

## 2024-01-15 ASSESSMENT — PAIN DESCRIPTION - LOCATION
LOCATION: FLANK

## 2024-01-15 ASSESSMENT — PAIN DESCRIPTION - DESCRIPTORS
DESCRIPTORS: ACHING
DESCRIPTORS: ACHING;DISCOMFORT
DESCRIPTORS: ACHING
DESCRIPTORS: ACHING;DISCOMFORT
DESCRIPTORS: ACHING;THROBBING;SHARP
DESCRIPTORS: ACHING;DISCOMFORT;THROBBING

## 2024-01-15 ASSESSMENT — PAIN DESCRIPTION - ORIENTATION
ORIENTATION: RIGHT

## 2024-01-15 ASSESSMENT — PAIN SCALES - GENERAL
PAINLEVEL_OUTOF10: 0
PAINLEVEL_OUTOF10: 9
PAINLEVEL_OUTOF10: 8
PAINLEVEL_OUTOF10: 9

## 2024-01-15 ASSESSMENT — PAIN DESCRIPTION - ONSET
ONSET: ON-GOING
ONSET: ON-GOING

## 2024-01-15 ASSESSMENT — PAIN - FUNCTIONAL ASSESSMENT
PAIN_FUNCTIONAL_ASSESSMENT: ACTIVITIES ARE NOT PREVENTED
PAIN_FUNCTIONAL_ASSESSMENT: ACTIVITIES ARE NOT PREVENTED
PAIN_FUNCTIONAL_ASSESSMENT: PREVENTS OR INTERFERES SOME ACTIVE ACTIVITIES AND ADLS

## 2024-01-15 ASSESSMENT — PAIN DESCRIPTION - FREQUENCY
FREQUENCY: CONTINUOUS
FREQUENCY: CONTINUOUS

## 2024-01-15 NOTE — PLAN OF CARE
Problem: Discharge Planning  Goal: Discharge to home or other facility with appropriate resources  1/15/2024 0939 by Rosangela Estrada RN  Flowsheets (Taken 1/15/2024 0939)  Discharge to home or other facility with appropriate resources:   Identify barriers to discharge with patient and caregiver   Arrange for needed discharge resources and transportation as appropriate     Problem: ABCDS Injury Assessment  Goal: Absence of physical injury  1/15/2024 0939 by Rosangela Estrada RN  Flowsheets (Taken 1/15/2024 0939)  Absence of Physical Injury: Implement safety measures based on patient assessment     Problem: Pain  Goal: Verbalizes/displays adequate comfort level or baseline comfort level  1/15/2024 0939 by Rosangela Estrada RN  Flowsheets (Taken 1/15/2024 0939)  Verbalizes/displays adequate comfort level or baseline comfort level:   Encourage patient to monitor pain and request assistance   Assess pain using appropriate pain scale

## 2024-01-15 NOTE — PLAN OF CARE
Problem: Discharge Planning  Goal: Discharge to home or other facility with appropriate resources  1/15/2024 0059 by Odette Gilbert RN  Outcome: Progressing  Flowsheets (Taken 1/15/2024 0056)  Discharge to home or other facility with appropriate resources: Identify barriers to discharge with patient and caregiver  1/14/2024 1106 by Kate García RN  Outcome: Progressing     Problem: Safety - Adult  Goal: Free from fall injury  1/15/2024 0059 by Odette Gilbert RN  Outcome: Progressing  1/14/2024 1106 by Kate García RN  Outcome: Progressing     Problem: ABCDS Injury Assessment  Goal: Absence of physical injury  1/15/2024 0059 by Odette Gilbert RN  Outcome: Progressing  Flowsheets (Taken 1/15/2024 0057)  Absence of Physical Injury: Implement safety measures based on patient assessment  1/14/2024 1106 by Kate García RN  Outcome: Progressing     Problem: Pain  Goal: Verbalizes/displays adequate comfort level or baseline comfort level  1/15/2024 0059 by Odette Gilbert RN  Outcome: Progressing  1/14/2024 1106 by Kate García RN  Outcome: Progressing     Problem: Chronic Conditions and Co-morbidities  Goal: Patient's chronic conditions and co-morbidity symptoms are monitored and maintained or improved  1/15/2024 0059 by Odette Gilbert RN  Outcome: Progressing  Flowsheets (Taken 1/15/2024 0056)  Care Plan - Patient's Chronic Conditions and Co-Morbidity Symptoms are Monitored and Maintained or Improved: Monitor and assess patient's chronic conditions and comorbid symptoms for stability, deterioration, or improvement  1/14/2024 1106 by Kate García RN  Outcome: Progressing

## 2024-01-15 NOTE — PRE SEDATION
Sedation Pre-Procedure Note    Patient Name: Jovon Javier   YOB: 1960  Room/Bed: U1B-7104/4130-01  Medical Record Number: [id number]  Date: 1/15/2024   Time: 9:44 AM       Indication:  Right renal cyst obstructing the right renal collecting system, request for cyst aspiration and sclerosis    Consent: I have discussed with the patient and/or the patient representative the indication, alternatives, and the possible risks and/or complications of the planned procedure and the anesthesia methods. The patient and/or patient representative appear to understand and agree to proceed.    Vital Signs:   Vitals:    01/15/24 0942   BP: (!) 140/76   Pulse: 74   Resp: 20   Temp:    SpO2: 96%       Past Medical History:   has a past medical history of Abnormal CT scan, Ankylosing spondylitis (HCC), Atrial fibrillation, Bronchiolitis obliterans, CAD (coronary artery disease), Cardiomyopathy (HCC), CHF (congestive heart failure) (HCC), Chronic anticoagulation, COPD (chronic obstructive pulmonary disease) (HCC), GERD (gastroesophageal reflux disease), Hepatitis, Hx of blood clots, Hyperlipidemia, Hyperparathyroidism (HCC), Hypertension, IBS (irritable bowel syndrome), Kidney stones, Oxygen dependent, Pneumothorax, Prostatitis, and Pulmonary embolism ([facility]).    Past Surgical History:   has a past surgical history that includes Cholecystectomy (2007); Colonoscopy (09/21/2012); Lithotripsy; sinus surgery; Upper gastrointestinal endoscopy (03/28/2014); hernia repair (2015); pacemaker placement; Cystocopy (05/17/2019); Cystoscopy (Right, 05/17/2019); Upper gastrointestinal endoscopy (N/A, 02/01/2021); Cystoscopy (Right, 07/02/2021); Cystoscopy (Right, 04/25/2022); Colonoscopy (11/30/2018); Upper gastrointestinal endoscopy (N/A, 09/08/2022); Esophagus dilation (N/A, 09/08/2022); Upper gastrointestinal endoscopy (N/A, 08/30/2023); Upper gastrointestinal endoscopy (08/30/2023); Upper gastrointestinal endoscopy (08/30/2023); and    brompheniramine-pseudoephedrine-DM (BROMFED DM) 2-30-10 MG/5ML syrup Take 5 mLs by mouth 4 times daily as needed for Cough 10/18/23   Rafael Christy MD   methocarbamol (ROBAXIN) 500 MG tablet TAKE 1 TABLET BY MOUTH FOUR TIMES A DAY AS NEEDED (MUSCLE SPASMS) 10/9/23   Juan C Dumont MD   warfarin (COUMADIN) 5 MG tablet Take 0.5 tablets by mouth daily EXCEPT 5mg every Tuesday and Friday or as directed by [facility] [phone] 9/27/23   Juan C Dumont MD   albuterol sulfate HFA (PROVENTIL;VENTOLIN;PROAIR) 108 (90 Base) MCG/ACT inhaler Inhale 2 puffs into the lungs every 6 hours as needed for Wheezing    Laura Verdugo MD   flavoxATE (URISPAS) 100 MG tablet TAKE 1 TABLET BY MOUTH FOUR TIMES DAILY AS NEEDED FOR SPASMS OF URINARY TRACT 7/5/23   Juan C Dumont MD   fluticasone (FLONASE) 50 MCG/ACT nasal spray 1 spray by Each Nostril route daily    ProviderLaura MD   loratadine (CLARITIN) 10 MG tablet Take 1 tablet by mouth daily    ProviderLaura MD   pravastatin (PRAVACHOL) 40 MG tablet TAKE 1 TABLET BY MOUTH AT NIGHT 5/23/23   Juan C Dumont MD   vitamin D (ERGOCALCIFEROL) 1.25 MG (05614 UT) CAPS capsule TAKE 1 CAPSULE BY MOUTH ONCE A WEEK  Patient taking differently: Take 1 capsule by mouth once a week On Wednesdays 4/14/23   Juan C Dumont MD   azelastine (ASTELIN) 0.1 % nasal spray 2 sprays by Nasal route 2 times daily Use in each nostril as directed 3/17/23   Juan C Dumont MD   dexlansoprazole (DEXILANT) 60 MG CPDR delayed release capsule TAKE 1 CAPSULE BY MOUTH  DAILY  Patient taking differently: Take 1 capsule by mouth daily TAKE 1 CAPSULE BY MOUTH  DAILY 1/25/23   Juan C Dumont MD   TRULANCE 3 MG TABS Take 3 mg by mouth daily as needed 8/5/22   ProviderLaura MD   Phenazopyridine HCl (AZO-DINE URINARY ANALGESIC PO) Take 2 tablets by mouth daily as needed    Provider, MD mamadou SanchezFENesin (MUCINEX) 600 MG extended release tablet Take 1

## 2024-01-15 NOTE — PROGRESS NOTES
Pt Name: Jovon Javier  Medical Record Number: [id number]  Date of Birth 1960   Today's Date: 1/15/2024      Subjective:  NAEON. Still requiring IV pain medications for pain. Scheduled for procedure today, off the floor when I came by.    ROS: Constitutional: No fever    Vitals:  Vitals:    01/15/24 0155 01/15/24 0225 01/15/24 0544 01/15/24 0612   BP:       Pulse:       Resp: 16 17  16   Temp:       TempSrc:       SpO2:       Weight:   99 kg (218 lb 3.2 oz)    Height:         I/O last 3 completed shifts:  In: -   Out: 2700 [Urine:2700]    Exam:  General: Awake, oriented, no acute distress  Respiratory: Nonlabored breathing  Abdomen: Soft, non-tender, non-distended, no masses  : spontaneously voids, R flank pain  Skin: Skin color, texture, turgor normal, no rashes or lesions  Neurologic: no gross deficits    CURRENT MEDICATIONS   Scheduled Meds:   albuterol  2.5 mg Nebulization TID RT    diazePAM  10 mg Oral Once    omeprazole  40 mg Oral Daily    [Held by provider] aspirin  81 mg Oral Daily    azelastine  2 spray Each Nostril BID    fluticasone  1 spray Each Nostril Daily    furosemide  10 mg Oral Daily    metoprolol succinate  37.5 mg Oral Daily    metoprolol succinate  25 mg Oral Nightly    pravastatin  40 mg Oral Nightly    pregabalin  75 mg Oral Daily    pregabalin  150 mg Oral Nightly    sacubitril-valsartan  1 tablet Oral BID    tamsulosin  0.4 mg Oral BID     Continuous Infusions:  PRN Meds:.polyethylene glycol, ondansetron, oxyCODONE-acetaminophen, albuterol sulfate HFA, brompheniramine-pseudoephedrine-DM, flavoxATE, guaiFENesin, methocarbamol, zolpidem, HYDROmorphone    LABS     Recent Labs     01/14/24  0434   INR 0.97           ASSESSMENT   Hospital day # 4  64yo male w/ right flank pain 2/2 right renal cyst causing mild obstruction of right collecting system, as noted on CTAP 1/1/24  Lasix renal scan 1/2/23 reveals localized obstruction at upper pole of right kidney   Admitted for management of  pain until procedure today w/ IR  INR 0.97 today  PLAN   Patient scheduled for aspiration of right renal cyst w/ IR this morning.   Patient ok to discharge after procedure and will follow up with Dr. Wallace in 2 weeks for office visit.      BRENNA Lala - CNP 1/15/2024 7:40 AM

## 2024-01-15 NOTE — PROGRESS NOTES
Pt back from procedure. VSS. AOX4. No further needs at this time. Call light within reach.     Electronically signed by Rosangela Estrada RN on 1/15/2024 at 11:59 AM

## 2024-01-15 NOTE — BRIEF OP NOTE
Brief Postoperative Note    Jovon Javier  YOB: 1960  [id number]    Pre-operative Diagnosis: Right renal cyst with compression on the renal pelvis with secondary hydronephrosis    Post-operative Diagnosis: Same    Procedure: Successful CT guided right renal cyst aspiration/sclerosis     Anesthesia: Moderate Sedation    Surgeons: Sonido Peters MD    Estimated Blood Loss: <10 mL    Complications: None    Specimens: Was Not Obtained    Findings: See fully dictated IR report    Electronically signed by Sonido Peters MD on 1/15/2024 at 12:45 PM

## 2024-01-15 NOTE — CARE COORDINATION
1/15 Plan: Return to home with wife. Renal Cyst aspiration today. Urology ok to d/c after procedure. O2 dependent 2L/M w/Medical Services. Electronically signed by Ce Brito RN on 1/15/2024 at 10:23 AM

## 2024-01-16 VITALS
BODY MASS INDEX: 35 KG/M2 | TEMPERATURE: 98.7 F | HEART RATE: 62 BPM | WEIGHT: 217.8 LBS | DIASTOLIC BLOOD PRESSURE: 65 MMHG | HEIGHT: 66 IN | OXYGEN SATURATION: 98 % | SYSTOLIC BLOOD PRESSURE: 102 MMHG | RESPIRATION RATE: 16 BRPM

## 2024-01-16 PROBLEM — R07.9 CHEST PAIN, UNSPECIFIED: Status: RESOLVED | Noted: 2024-01-06 | Resolved: 2024-01-16

## 2024-01-16 LAB
INR PPP: 0.95 (ref 0.84–1.16)
PROTHROMBIN TIME: 12.7 SEC (ref 11.5–14.8)

## 2024-01-16 PROCEDURE — 6370000000 HC RX 637 (ALT 250 FOR IP): Performed by: INTERNAL MEDICINE

## 2024-01-16 PROCEDURE — 2700000000 HC OXYGEN THERAPY PER DAY

## 2024-01-16 PROCEDURE — 6360000002 HC RX W HCPCS: Performed by: STUDENT IN AN ORGANIZED HEALTH CARE EDUCATION/TRAINING PROGRAM

## 2024-01-16 PROCEDURE — 94640 AIRWAY INHALATION TREATMENT: CPT

## 2024-01-16 PROCEDURE — 94760 N-INVAS EAR/PLS OXIMETRY 1: CPT

## 2024-01-16 PROCEDURE — 6370000000 HC RX 637 (ALT 250 FOR IP): Performed by: STUDENT IN AN ORGANIZED HEALTH CARE EDUCATION/TRAINING PROGRAM

## 2024-01-16 PROCEDURE — 36415 COLL VENOUS BLD VENIPUNCTURE: CPT

## 2024-01-16 PROCEDURE — 85610 PROTHROMBIN TIME: CPT

## 2024-01-16 RX ADMIN — FLUTICASONE PROPIONATE 1 SPRAY: 50 SPRAY, METERED NASAL at 08:57

## 2024-01-16 RX ADMIN — ASPIRIN 81 MG: 81 TABLET, CHEWABLE ORAL at 09:39

## 2024-01-16 RX ADMIN — TAMSULOSIN HYDROCHLORIDE 0.4 MG: 0.4 CAPSULE ORAL at 08:56

## 2024-01-16 RX ADMIN — POLYETHYLENE GLYCOL 3350 17 G: 17 POWDER, FOR SOLUTION ORAL at 01:45

## 2024-01-16 RX ADMIN — PREGABALIN 75 MG: 75 CAPSULE ORAL at 08:56

## 2024-01-16 RX ADMIN — OMEPRAZOLE 40 MG: 20 CAPSULE, DELAYED RELEASE ORAL at 05:37

## 2024-01-16 RX ADMIN — ALBUTEROL SULFATE 2.5 MG: 2.5 SOLUTION RESPIRATORY (INHALATION) at 08:46

## 2024-01-16 RX ADMIN — FUROSEMIDE 10 MG: 20 TABLET ORAL at 08:56

## 2024-01-16 ASSESSMENT — PAIN DESCRIPTION - PAIN TYPE: TYPE: ACUTE PAIN

## 2024-01-16 ASSESSMENT — PAIN DESCRIPTION - DESCRIPTORS: DESCRIPTORS: ACHING

## 2024-01-16 ASSESSMENT — PAIN DESCRIPTION - FREQUENCY: FREQUENCY: CONTINUOUS

## 2024-01-16 ASSESSMENT — PAIN SCALES - GENERAL
PAINLEVEL_OUTOF10: 9
PAINLEVEL_OUTOF10: 0

## 2024-01-16 ASSESSMENT — PAIN DESCRIPTION - ONSET: ONSET: ON-GOING

## 2024-01-16 ASSESSMENT — PAIN DESCRIPTION - LOCATION: LOCATION: FLANK

## 2024-01-16 ASSESSMENT — PAIN - FUNCTIONAL ASSESSMENT: PAIN_FUNCTIONAL_ASSESSMENT: ACTIVITIES ARE NOT PREVENTED

## 2024-01-16 ASSESSMENT — PAIN SCALES - WONG BAKER: WONGBAKER_NUMERICALRESPONSE: 0

## 2024-01-16 ASSESSMENT — PAIN DESCRIPTION - ORIENTATION: ORIENTATION: RIGHT

## 2024-01-16 NOTE — PROGRESS NOTES
Pt continues to complain of right flank pain continuously . Is receiving Dilaudid and Percocet PRN . His B/P has been running on the soft end . Informed him that we would have to be careful with giving these meds with lower B/P . Dressing to right flank with small amt dry drainage . Wife at the bedside .

## 2024-01-16 NOTE — CARE COORDINATION
DISCHARGE SUMMARY     DATE OF DISCHARGE: 1/16/24    DISCHARGE DESTINATION: Home with wife    HOME CARE: No    HEMODIALYSIS: No    TRANSPORTATION: Private Car    NEW DME ORDERED: no    COMMENTS: Discharge to home with wife. Patient's wife will bring Oxygen tank for transport to home.Electronically signed by Ce Brito RN on 1/16/2024 at 1:24 PM

## 2024-01-16 NOTE — PLAN OF CARE
Problem: Discharge Planning  Goal: Discharge to home or other facility with appropriate resources  1/16/2024 0912 by Rosangela Estrada RN  Flowsheets (Taken 1/16/2024 0912)  Discharge to home or other facility with appropriate resources:   Identify barriers to discharge with patient and caregiver   Arrange for needed discharge resources and transportation as appropriate     Problem: ABCDS Injury Assessment  Goal: Absence of physical injury  1/16/2024 0912 by Rosangela Estrada RN  Flowsheets (Taken 1/16/2024 0912)  Absence of Physical Injury: Implement safety measures based on patient assessment     Problem: Pain  Goal: Verbalizes/displays adequate comfort level or baseline comfort level  1/16/2024 0912 by Rosangela Estrada RN  Flowsheets (Taken 1/16/2024 0912)  Verbalizes/displays adequate comfort level or baseline comfort level:   Encourage patient to monitor pain and request assistance   Assess pain using appropriate pain scale   Administer analgesics based on type and severity of pain and evaluate response

## 2024-01-16 NOTE — PROGRESS NOTES
Pt Name: Jovon Javier  Medical Record Number: [id number]  Date of Birth 1960   Today's Date: 1/16/2024      Subjective:  NAEON. Patient reports continued right flank pain, he reports this is from the procedure. Unsure why he is having continued pain as it should be improved after renal cyst aspiration.     ROS: Constitutional: No fever    Vitals:  Vitals:    01/15/24 2146 01/15/24 2350 01/15/24 2354 01/16/24 0654   BP:  103/64     Pulse:  62     Resp: 18 18 (!) 1    Temp:       TempSrc:       SpO2:  98%     Weight:    98.8 kg (217 lb 12.8 oz)   Height:         I/O last 3 completed shifts:  In: -   Out: 1100 [Urine:1100]    Exam:  General: Awake, oriented, no acute distress  Respiratory: Nonlabored breathing  Abdomen: Soft, non-tender, non-distended, no masses  : spontaneously voids, R flank pain  Skin: Skin color, texture, turgor normal, no rashes or lesions  Neurologic: no gross deficits    CURRENT MEDICATIONS   Scheduled Meds:   albuterol  2.5 mg Nebulization TID RT    omeprazole  40 mg Oral Daily    [Held by provider] aspirin  81 mg Oral Daily    azelastine  2 spray Each Nostril BID    fluticasone  1 spray Each Nostril Daily    furosemide  10 mg Oral Daily    metoprolol succinate  37.5 mg Oral Daily    metoprolol succinate  25 mg Oral Nightly    pravastatin  40 mg Oral Nightly    pregabalin  75 mg Oral Daily    pregabalin  150 mg Oral Nightly    sacubitril-valsartan  1 tablet Oral BID    tamsulosin  0.4 mg Oral BID     Continuous Infusions:  PRN Meds:.polyethylene glycol, ondansetron, oxyCODONE-acetaminophen, albuterol sulfate HFA, brompheniramine-pseudoephedrine-DM, flavoxATE, guaiFENesin, methocarbamol, zolpidem, HYDROmorphone    LABS     Recent Labs     01/14/24  0434 01/15/24  0705   WBC  --  4.3   HGB  --  13.2*   HCT  --  39.4*   PLT  --  160   NA  --  139   K  --  4.4   CL  --  103   CO2  --  31   BUN  --  9   CREATININE  --  0.9   CALCIUM  --  9.7   INR 0.97 0.96   AST  --  28   ALT  --  25

## 2024-01-16 NOTE — PLAN OF CARE
Problem: Discharge Planning  Goal: Discharge to home or other facility with appropriate resources  1/16/2024 1408 by Rosangela Estrada RN  Outcome: Completed  1/16/2024 0912 by Rosangela Estrada RN  Flowsheets (Taken 1/16/2024 0912)  Discharge to home or other facility with appropriate resources:   Identify barriers to discharge with patient and caregiver   Arrange for needed discharge resources and transportation as appropriate  1/16/2024 0257 by Ryan Harris RN  Outcome: Progressing  Flowsheets (Taken 1/15/2024 2000)  Discharge to home or other facility with appropriate resources:   Identify barriers to discharge with patient and caregiver   Arrange for needed discharge resources and transportation as appropriate   Identify discharge learning needs (meds, wound care, etc)     Problem: Safety - Adult  Goal: Free from fall injury  1/16/2024 1408 by Rosangela Estrada RN  Outcome: Completed  1/16/2024 0257 by Ryan Harris RN  Outcome: Progressing     Problem: ABCDS Injury Assessment  Goal: Absence of physical injury  1/16/2024 1408 by Rosangela Estrada RN  Outcome: Completed  1/16/2024 0912 by Rosangela Estrada RN  Flowsheets (Taken 1/16/2024 0912)  Absence of Physical Injury: Implement safety measures based on patient assessment  1/16/2024 0257 by Ryan Harris RN  Outcome: Progressing     Problem: Pain  Goal: Verbalizes/displays adequate comfort level or baseline comfort level  1/16/2024 1408 by Rosangela Estrada RN  Outcome: Completed  1/16/2024 0912 by Rosangela Estrada RN  Flowsheets (Taken 1/16/2024 0912)  Verbalizes/displays adequate comfort level or baseline comfort level:   Encourage patient to monitor pain and request assistance   Assess pain using appropriate pain scale   Administer analgesics based on type and severity of pain and evaluate response  1/16/2024 0257 by Ryan Harris RN  Outcome: Progressing     Problem: Chronic Conditions and Co-morbidities  Goal: Patient's chronic conditions

## 2024-01-16 NOTE — PLAN OF CARE
Problem: Discharge Planning  Goal: Discharge to home or other facility with appropriate resources  Outcome: Progressing  Flowsheets (Taken 1/15/2024 2000)  Discharge to home or other facility with appropriate resources:   Identify barriers to discharge with patient and caregiver   Arrange for needed discharge resources and transportation as appropriate   Identify discharge learning needs (meds, wound care, etc)     Problem: Safety - Adult  Goal: Free from fall injury  Outcome: Progressing     Problem: ABCDS Injury Assessment  Goal: Absence of physical injury  Outcome: Progressing     Problem: Pain  Goal: Verbalizes/displays adequate comfort level or baseline comfort level  Outcome: Progressing     Problem: Chronic Conditions and Co-morbidities  Goal: Patient's chronic conditions and co-morbidity symptoms are monitored and maintained or improved  Outcome: Progressing  Flowsheets (Taken 1/15/2024 2000)  Care Plan - Patient's Chronic Conditions and Co-Morbidity Symptoms are Monitored and Maintained or Improved:   Monitor and assess patient's chronic conditions and comorbid symptoms for stability, deterioration, or improvement   Collaborate with multidisciplinary team to address chronic and comorbid conditions and prevent exacerbation or deterioration   Update acute care plan with appropriate goals if chronic or comorbid symptoms are exacerbated and prevent overall improvement and discharge

## 2024-01-16 NOTE — PROGRESS NOTES
[facility] Note        Subjective/Interval History:    Patient seen and examined.  No issues overnight. Had cyst drainage this morning. Still reports abdominal pain    No chest pain or shortness breath. No cough or sputum. No nausea, vomiting, diarrhea. No abdominal pain. No dysuria.  The remainder of the review of systems is negative.     Medication list reviewed    Objective:    /65   Pulse 69   Temp 98.2 °F (36.8 °C) (Oral)   Resp 18   Ht 1.676 m (5' 6\")   Wt 99 kg (218 lb 3.2 oz)   SpO2 99%   BMI 35.22 kg/m²   Temp  Av.2 °F (36.8 °C)  Min: 98 °F (36.7 °C)  Max: 98.3 °F (36.8 °C)    RRR  Chest-respirations are easy.  The chest is clear throughout.  Abd-BS+, soft, NTND.  Small easily reducible umbilical hernia.  Abdominal exam is entirely benign.  Ext- trace edema of the lower legs    The Following Labs Were Reviewed Today:     Recent Results (from the past 24 hour(s))   Protime-INR    Collection Time: 01/15/24  7:05 AM   Result Value Ref Range    Protime 12.8 11.5 - 14.8 sec    INR 0.96 0.84 - 1.16   Comprehensive Metabolic Panel    Collection Time: 01/15/24  7:05 AM   Result Value Ref Range    Sodium 139 136 - 145 mmol/L    Potassium 4.4 3.5 - 5.1 mmol/L    Chloride 103 99 - 110 mmol/L    CO2 31 21 - 32 mmol/L    Anion Gap 5 3 - 16    Glucose 105 (H) 70 - 99 mg/dL    BUN 9 7 - 20 mg/dL    Creatinine 0.9 0.8 - 1.3 mg/dL    Est, Glom Filt Rate >60 >60    Calcium 9.7 8.3 - 10.6 mg/dL    Total Protein 5.9 (L) 6.4 - 8.2 g/dL    Albumin 3.5 3.4 - 5.0 g/dL    Albumin/Globulin Ratio 1.5 1.1 - 2.2    Total Bilirubin 0.4 0.0 - 1.0 mg/dL    Alkaline Phosphatase 114 40 - 129 U/L    ALT 25 10 - 40 U/L    AST 28 15 - 37 U/L   CBC with Auto Differential    Collection Time: 01/15/24  7:05 AM   Result Value Ref Range    WBC 4.3 4.0 - 11.0 K/uL    RBC 4.21 4.20 - 5.90 M/uL    Hemoglobin 13.2 (L) 13.5 - 17.5 g/dL    Hematocrit 39.4 (L) 40.5 - 52.5 %    MCV 93.5 80.0 - 100.0 fL    MCH 31.3 26.0 -  34.0 pg    MCHC 33.4 31.0 - 36.0 g/dL    RDW 13.9 12.4 - 15.4 %    Platelets 160 135 - 450 K/uL    MPV 8.3 5.0 - 10.5 fL    Neutrophils % 54.6 %    Lymphocytes % 22.9 %    Monocytes % 14.0 %    Eosinophils % 7.6 %    Basophils % 0.9 %    Neutrophils Absolute 2.3 1.7 - 7.7 K/uL    Lymphocytes Absolute 1.0 1.0 - 5.1 K/uL    Monocytes Absolute 0.6 0.0 - 1.3 K/uL    Eosinophils Absolute 0.3 0.0 - 0.6 K/uL    Basophils Absolute 0.0 0.0 - 0.2 K/uL           ASSESSMENT/PLAN:      Principal Problem:    Chest pain, unspecified-resolved, continue to monitor  Active Problems:    Acute right flank pain-right flank pain continues, although he does have other abdominal pain.  S/p cyst drainage   Dysphagia/Odynophagia  -Cont PPI    Essential hypertension, benign-blood pressure is generally controlled, continue current therapy    Bronchiolitis obliterans-currently on 2 L of oxygen.  Continue home inhaler regimen.    Coronary artery disease due to calcified coronary lesion-asymptomatic currently    Chronic anticoagulation-Coumadin on hold for surgery, resume coumadin    Dispo: Pain control  Jeffery Estrella MD  7:49 PM  1/15/2024

## 2024-01-16 NOTE — PROGRESS NOTES
Pt discharged. IV taken out. No complications. All paper work reviewed. All questions answered. Pt transported via wheel chair.     Electronically signed by Rosangela Estrada RN on 1/16/2024 at 2:08 PM

## 2024-01-17 ENCOUNTER — TELEPHONE (OUTPATIENT)
Dept: PHARMACY | Age: 64
End: 2024-01-17

## 2024-01-17 ENCOUNTER — APPOINTMENT (OUTPATIENT)
Dept: PHARMACY | Age: 64
End: 2024-01-17
Attending: INTERNAL MEDICINE
Payer: COMMERCIAL

## 2024-01-17 NOTE — TELEPHONE ENCOUNTER
Jovon called to report he just got out of the hospital 1/16 and is not feeling well and doesn't want to come in today.     Lab Results   Component Value Date    INR 0.95 01/16/2024    INR 0.96 01/15/2024    INR 0.97 01/14/2024     INR was held for a procedure.  Will take time to get back in range.     Rescheduled to 1/29/2024.  He declined to come in any sooner.     He reports no medication changes.     Advised to take a one time higher dose of 5mg (vs 2.5mg) and then return to his 2.5mg daily EXCEPT 5mg every Friday dose.     Advise to please call should they add any medications or any si/sx of bleeding or clotting. Seek medical attention right away as needed.     Veronica De La Cruz, PharmD  [facility]  Anticoagulation  [phone]    For Pharmacy Admin Tracking Only    Intervention Detail: Dose Adjustment: 1, reason: Therapy Optimization  Total # of Interventions Recommended: 1  Total # of Interventions Accepted: 1  Time Spent (min): 10

## 2024-01-18 NOTE — TELEPHONE ENCOUNTER
Last OV: 8/18/23  Next OV: not scheduled.  Requested to return in 6 to 7 months.  Last refill: 12/29/23 Filled by Dr. Dumont  Most recent Labs:   Last EKG (if needed):    Scheduled for OV on Thurs, Feb 1, 2024.  Patient said that he is currently taking  Entresto 24/26 one tablet twice a day.

## 2024-01-25 ENCOUNTER — TELEPHONE (OUTPATIENT)
Dept: CARDIOLOGY CLINIC | Age: 64
End: 2024-01-25

## 2024-01-25 NOTE — TELEPHONE ENCOUNTER
Jovon was called to change his appt on 1/29 to next available as [facility] is on vacation that day. I offered him 5/15 at 11:15a as next available spot. And also FF if he was willing to travel. Jovon would like to know if he can be seen sooner as he is not happy his 1/29 appt is being moved a month out.     Please advise.    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

## 2024-01-25 NOTE — PROGRESS NOTES
Physician Progress Note      PATIENT:               HANG ANGEL  CSN #:                  [id number]  :                       1960  ADMIT DATE:       2024 10:01 PM  DISCH DATE:        2024 2:09 PM  RESPONDING  PROVIDER #:        Jeffery Estrella MD          QUERY TEXT:    Patient admitted with Right kidney cyst and underwent drainage of rt kidney   cyst, noted to have atrial fibrillation and is maintained on Coumadin up until   being held for surgery. If possible, please document in progress notes and   discharge summary if you are evaluating and/or treating any of the following:    The medical record reflects the following:  Risk Factors: 63 y.o. male with hx of a-fib, CAD, cardiomyopathy, chronic   anticoagulation, CHF, HTN, HLD, COPD, GERD, hx of blood clots  Clinical Indicators: Pt has atrial fib and is maintained on Coumadin.  Treatment: Pt on Coumadin  Options provided:  -- Secondary hypercoagulable state in a patient with atrial fibrillation  -- Other - I will add my own diagnosis  -- Disagree - Not applicable / Not valid  -- Disagree - Clinically unable to determine / Unknown  -- Refer to Clinical Documentation Reviewer    PROVIDER RESPONSE TEXT:    Provider disagreed with this query.    Query created by: Elijah Ellington on 2024 4:17 PM      QUERY TEXT:    Pt admitted  with Rt Kidney cyst. Pt noted to have hx of CHF.  Last ECHO   on 2022 with EF 35%.  Receiving daily lasix. If possible, please document   in progress notes and discharge summary if you are evaluating and/or treating   any of the following:    The medical record reflects the following:  Risk Factors: 63 y.o. male with hx of CHF, HTN, HLD, Cardiomyopathy, A-fib  Clinical Indicators: Per ED, being evaluated for chest pain and shortness of   breath.  Per  H&P: Pt felt like he was having a little more shortness of   breath at home yesterday. Per IM -1/10 PNs: Ext-trace edema of the lower   legs.  Treatment: daily

## 2024-01-29 ENCOUNTER — APPOINTMENT (OUTPATIENT)
Dept: PHARMACY | Age: 64
End: 2024-01-29
Attending: INTERNAL MEDICINE
Payer: COMMERCIAL

## 2024-01-31 NOTE — PROGRESS NOTES
12.8 oz)     Constitutional: The patient is oriented to person, place, and time. Appears well-developed and well-nourished. In no acute distress. Tongue dry   Head: Normocephalic and atraumatic. Pupils equal and round.  Neck: Neck supple. No JVP or carotid bruit appreciated. No mass and no thyromegaly present. No lymphadenopathy present.  Cardiovascular: Normal rhythm, tachycardia. Normal heart sounds. Exam reveals no gallop and no friction rub. No murmur heard.  Pulmonary/Chest: Effort normal and breath sounds normal. No respiratory distress. Inspiratory wheezes, rales, no rhonchi.   Abdominal: Soft, non-tender. Bowel sounds are normal. Exhibits no organomegaly, mass or bruit.   Extremities: No edema. No cyanosis or clubbing. Pulses are 2+ radial and carotid bilaterally.  Neurological: No gross cranial nerve deficit. Coordination normal.   Skin: Skin is warm and dry. There is no rash or diaphoresis.   Psychiatric: Patient has a normal mood and affect. Speech is normal and behavior is normal.     Lab Review:   Lab Results   Component Value Date/Time    TRIG 188 07/29/2023 06:36 AM    HDL 60 05/03/2023 11:47 AM    HDL 65 11/10/2011 06:05 AM    LDLCALC 72 05/03/2023 11:47 AM       Lab Results   Component Value Date/Time    BUN 9 01/15/2024 07:05 AM    CREATININE 0.9 01/15/2024 07:05 AM     EKG Interpretation:   2/14/18: sinus rhythm with LBBB  11/13/19: Sinus rhythm LBBB  2/28/20: Sinus rhythm LBBB  2/25/21: SR with LBBB.   7/21/21: Sinus  Rhythm with Left bundle branch block.   2/1/24 Undtermined rhythm,possible sinus tachycardia, -Left bundle branch block and left axis.     Image Review:     ECHO 11/28/17  Summary   Normal left ventricular wall thickness. Ejection fraction is visually   estimated to be 40-45%.   There is septal hypokinesis (secondary to Pacing)   Trivial mitral & tricuspid regurgitation is present.   The left atrial size is normal.   Pacer / ICD wire is visualized in the right ventricle.   There

## 2024-02-01 ENCOUNTER — ANTI-COAG VISIT (OUTPATIENT)
Dept: PHARMACY | Age: 64
End: 2024-02-01
Attending: INTERNAL MEDICINE
Payer: COMMERCIAL

## 2024-02-01 ENCOUNTER — HOSPITAL ENCOUNTER (EMERGENCY)
Age: 64
Discharge: HOME | End: 2024-02-01
Attending: EMERGENCY MEDICINE
Payer: COMMERCIAL

## 2024-02-01 ENCOUNTER — APPOINTMENT (OUTPATIENT)
Dept: GENERAL RADIOLOGY | Age: 64
End: 2024-02-01
Payer: COMMERCIAL

## 2024-02-01 ENCOUNTER — OFFICE VISIT (OUTPATIENT)
Dept: CARDIOLOGY CLINIC | Age: 64
End: 2024-02-01
Payer: COMMERCIAL

## 2024-02-01 VITALS
TEMPERATURE: 98.4 F | BODY MASS INDEX: 34.72 KG/M2 | RESPIRATION RATE: 13 BRPM | HEIGHT: 66 IN | SYSTOLIC BLOOD PRESSURE: 107 MMHG | OXYGEN SATURATION: 94 % | WEIGHT: 216.05 LBS | HEART RATE: 88 BPM | DIASTOLIC BLOOD PRESSURE: 84 MMHG

## 2024-02-01 VITALS
DIASTOLIC BLOOD PRESSURE: 64 MMHG | OXYGEN SATURATION: 96 % | BODY MASS INDEX: 34.23 KG/M2 | WEIGHT: 213 LBS | HEART RATE: 60 BPM | SYSTOLIC BLOOD PRESSURE: 114 MMHG | HEIGHT: 66 IN

## 2024-02-01 DIAGNOSIS — I42.8 NICM (NONISCHEMIC CARDIOMYOPATHY) (HCC): Primary | ICD-10-CM

## 2024-02-01 DIAGNOSIS — Z86.711 HISTORY OF PULMONARY EMBOLISM: ICD-10-CM

## 2024-02-01 DIAGNOSIS — R06.09 DYSPNEA ON EXERTION: Primary | ICD-10-CM

## 2024-02-01 DIAGNOSIS — I10 ESSENTIAL HYPERTENSION: ICD-10-CM

## 2024-02-01 DIAGNOSIS — E78.5 HYPERLIPIDEMIA, UNSPECIFIED HYPERLIPIDEMIA TYPE: ICD-10-CM

## 2024-02-01 DIAGNOSIS — I48.0 PAROXYSMAL ATRIAL FIBRILLATION (HCC): ICD-10-CM

## 2024-02-01 DIAGNOSIS — R06.09 DOE (DYSPNEA ON EXERTION): ICD-10-CM

## 2024-02-01 DIAGNOSIS — I50.22 CHRONIC SYSTOLIC HF (HEART FAILURE) (HCC): ICD-10-CM

## 2024-02-01 DIAGNOSIS — R00.2 PALPITATIONS: ICD-10-CM

## 2024-02-01 DIAGNOSIS — R00.0 RACING HEART BEAT: ICD-10-CM

## 2024-02-01 DIAGNOSIS — I26.99 PULMONARY EMBOLISM, UNSPECIFIED CHRONICITY, UNSPECIFIED PULMONARY EMBOLISM TYPE, UNSPECIFIED WHETHER ACUTE COR PULMONALE PRESENT (HCC): Primary | ICD-10-CM

## 2024-02-01 DIAGNOSIS — Z79.01 CHRONIC ANTICOAGULATION: ICD-10-CM

## 2024-02-01 LAB
ALBUMIN SERPL-MCNC: 4.2 G/DL (ref 3.4–5)
ALBUMIN/GLOB SERPL: 1.6 {RATIO} (ref 1.1–2.2)
ALP SERPL-CCNC: 121 U/L (ref 40–129)
ALT SERPL-CCNC: 18 U/L (ref 10–40)
ANION GAP SERPL CALCULATED.3IONS-SCNC: 10 MMOL/L (ref 3–16)
AST SERPL-CCNC: 17 U/L (ref 15–37)
BASOPHILS # BLD: 0.1 K/UL (ref 0–0.2)
BASOPHILS NFR BLD: 0.7 %
BILIRUB SERPL-MCNC: 0.3 MG/DL (ref 0–1)
BUN SERPL-MCNC: 7 MG/DL (ref 7–20)
CALCIUM SERPL-MCNC: 10.8 MG/DL (ref 8.3–10.6)
CHLORIDE SERPL-SCNC: 102 MMOL/L (ref 99–110)
CO2 SERPL-SCNC: 28 MMOL/L (ref 21–32)
CREAT SERPL-MCNC: 0.9 MG/DL (ref 0.8–1.3)
D DIMER: <0.27 UG/ML FEU (ref 0–0.6)
DEPRECATED RDW RBC AUTO: 12.9 % (ref 12.4–15.4)
EOSINOPHIL # BLD: 0.3 K/UL (ref 0–0.6)
EOSINOPHIL NFR BLD: 3.4 %
GFR SERPLBLD CREATININE-BSD FMLA CKD-EPI: >60 ML/MIN/{1.73_M2}
GLUCOSE SERPL-MCNC: 128 MG/DL (ref 70–99)
HCT VFR BLD AUTO: 46.8 % (ref 40.5–52.5)
HGB BLD-MCNC: 16.1 G/DL (ref 13.5–17.5)
INR PPP: 1.6 (ref 0.84–1.16)
INTERNATIONAL NORMALIZATION RATIO, POC: 1.8
LYMPHOCYTES # BLD: 1.9 K/UL (ref 1–5.1)
LYMPHOCYTES NFR BLD: 22.1 %
MCH RBC QN AUTO: 31.3 PG (ref 26–34)
MCHC RBC AUTO-ENTMCNC: 34.5 G/DL (ref 31–36)
MCV RBC AUTO: 90.8 FL (ref 80–100)
MONOCYTES # BLD: 1 K/UL (ref 0–1.3)
MONOCYTES NFR BLD: 11.6 %
NEUTROPHILS # BLD: 5.3 K/UL (ref 1.7–7.7)
NEUTROPHILS NFR BLD: 62.2 %
NT-PROBNP SERPL-MCNC: 115 PG/ML (ref 0–124)
PLATELET # BLD AUTO: 196 K/UL (ref 135–450)
PMV BLD AUTO: 7.5 FL (ref 5–10.5)
POTASSIUM SERPL-SCNC: 3.6 MMOL/L (ref 3.5–5.1)
PROT SERPL-MCNC: 6.8 G/DL (ref 6.4–8.2)
PROTHROMBIN TIME: 19 SEC (ref 11.5–14.8)
RBC # BLD AUTO: 5.16 M/UL (ref 4.2–5.9)
SODIUM SERPL-SCNC: 140 MMOL/L (ref 136–145)
TROPONIN, HIGH SENSITIVITY: 18 NG/L (ref 0–22)
TROPONIN, HIGH SENSITIVITY: 19 NG/L (ref 0–22)
WBC # BLD AUTO: 8.5 K/UL (ref 4–11)

## 2024-02-01 PROCEDURE — 99215 OFFICE O/P EST HI 40 MIN: CPT | Performed by: INTERNAL MEDICINE

## 2024-02-01 PROCEDURE — 3074F SYST BP LT 130 MM HG: CPT | Performed by: INTERNAL MEDICINE

## 2024-02-01 PROCEDURE — G8427 DOCREV CUR MEDS BY ELIG CLIN: HCPCS | Performed by: INTERNAL MEDICINE

## 2024-02-01 PROCEDURE — G8417 CALC BMI ABV UP PARAM F/U: HCPCS | Performed by: INTERNAL MEDICINE

## 2024-02-01 PROCEDURE — 2580000003 HC RX 258: Performed by: EMERGENCY MEDICINE

## 2024-02-01 PROCEDURE — 3078F DIAST BP <80 MM HG: CPT | Performed by: INTERNAL MEDICINE

## 2024-02-01 PROCEDURE — 99285 EMERGENCY DEPT VISIT HI MDM: CPT

## 2024-02-01 PROCEDURE — 85610 PROTHROMBIN TIME: CPT

## 2024-02-01 PROCEDURE — 83880 ASSAY OF NATRIURETIC PEPTIDE: CPT

## 2024-02-01 PROCEDURE — 84484 ASSAY OF TROPONIN QUANT: CPT

## 2024-02-01 PROCEDURE — 3017F COLORECTAL CA SCREEN DOC REV: CPT | Performed by: INTERNAL MEDICINE

## 2024-02-01 PROCEDURE — G8482 FLU IMMUNIZE ORDER/ADMIN: HCPCS | Performed by: INTERNAL MEDICINE

## 2024-02-01 PROCEDURE — 93000 ELECTROCARDIOGRAM COMPLETE: CPT | Performed by: INTERNAL MEDICINE

## 2024-02-01 PROCEDURE — 80053 COMPREHEN METABOLIC PANEL: CPT

## 2024-02-01 PROCEDURE — 1036F TOBACCO NON-USER: CPT | Performed by: INTERNAL MEDICINE

## 2024-02-01 PROCEDURE — 99212 OFFICE O/P EST SF 10 MIN: CPT

## 2024-02-01 PROCEDURE — 85379 FIBRIN DEGRADATION QUANT: CPT

## 2024-02-01 PROCEDURE — 1111F DSCHRG MED/CURRENT MED MERGE: CPT | Performed by: INTERNAL MEDICINE

## 2024-02-01 PROCEDURE — 6370000000 HC RX 637 (ALT 250 FOR IP): Performed by: EMERGENCY MEDICINE

## 2024-02-01 PROCEDURE — 85025 COMPLETE CBC W/AUTO DIFF WBC: CPT

## 2024-02-01 PROCEDURE — 93005 ELECTROCARDIOGRAM TRACING: CPT | Performed by: EMERGENCY MEDICINE

## 2024-02-01 PROCEDURE — 96360 HYDRATION IV INFUSION INIT: CPT

## 2024-02-01 PROCEDURE — 71046 X-RAY EXAM CHEST 2 VIEWS: CPT

## 2024-02-01 RX ORDER — 0.9 % SODIUM CHLORIDE 0.9 %
500 INTRAVENOUS SOLUTION INTRAVENOUS ONCE
Status: COMPLETED | OUTPATIENT
Start: 2024-02-01 | End: 2024-02-01

## 2024-02-01 RX ORDER — OXYCODONE HYDROCHLORIDE 5 MG/1
5 TABLET ORAL ONCE
Status: COMPLETED | OUTPATIENT
Start: 2024-02-01 | End: 2024-02-01

## 2024-02-01 RX ADMIN — SODIUM CHLORIDE 500 ML: 9 INJECTION, SOLUTION INTRAVENOUS at 17:55

## 2024-02-01 RX ADMIN — OXYCODONE HYDROCHLORIDE 5 MG: 5 TABLET ORAL at 17:53

## 2024-02-01 ASSESSMENT — PAIN SCALES - GENERAL
PAINLEVEL_OUTOF10: 0
PAINLEVEL_OUTOF10: 9

## 2024-02-01 ASSESSMENT — PAIN DESCRIPTION - LOCATION: LOCATION: BACK

## 2024-02-01 ASSESSMENT — PAIN DESCRIPTION - FREQUENCY: FREQUENCY: CONTINUOUS

## 2024-02-01 ASSESSMENT — PAIN - FUNCTIONAL ASSESSMENT
PAIN_FUNCTIONAL_ASSESSMENT: 0-10
PAIN_FUNCTIONAL_ASSESSMENT: 0-10

## 2024-02-01 ASSESSMENT — PAIN DESCRIPTION - PAIN TYPE: TYPE: CHRONIC PAIN

## 2024-02-01 ASSESSMENT — PAIN DESCRIPTION - ORIENTATION: ORIENTATION: MID;RIGHT;LEFT

## 2024-02-01 NOTE — PROGRESS NOTES
Jovon Javier is a 63 y.o. here for warfarin management.  Jovon had an INR test today. Results were reviewed and appropriate warfarin management was completed.     Patient verifies current warfarin dosing regimen: Yes     Warfarin medication reviewed and updated on the patient 's home medication list: Yes   All other medications reviewed and updated on the patient 's home medication list: No new medications     Lab Results   Component Value Date    INR 1.8 2024    INR 0.95 2024    INR 0.96 01/15/2024     Patient Findings       Positives:  Other complaints    Negatives:  Signs/symptoms of thrombosis, Signs/symptoms of bleeding, Change in health, Missed doses, Change in medications, Change in diet/appetite, Bruising    Comments:  Difficulty breathing          Anticoagulation Summary  As of 2024      INR goal:  2.0-3.0   TTR:  37.2 % (8 y)   INR used for dosin.8 (2024)   Warfarin maintenance plan:  5 mg (5 mg x 1) every Fri; 2.5 mg (5 mg x 0.5) all other days   Weekly warfarin total:  20 mg   Plan last modified:  Carlotta Dixon RPH (2023)   Next INR check:  2/15/2024   Priority:  Maintenance   Target end date:  Indefinite    Indications    Pulmonary embolus (HCC) [I26.99]  Chronic anticoagulation [Z79.01]                 Anticoagulation Episode Summary       INR check location:  Anticoagulation Clinic    Preferred lab:      Send INR reminders to:  [facility]    Comments:  EPIC - finger stick every 2-3 weeks  Consent: 23          Anticoagulation Care Providers       Provider Role Specialty Phone number    DayanaJuan C jenkins MD Referring Internal Medicine [phone]          There were no vitals taken for this visit.    Warfarin assessment / plan:     Appears well  Sub-therapeutic INR     Denies missed doses.  Denies increased vitamin K intake.  Denies medication changes.  Denies signs or symptoms of clotting.  Denies signs or symptoms of a stroke     I

## 2024-02-01 NOTE — ED NOTES
Patient resting comfortably, respirations easy, unlabored. Patient in no acute distress. Denies needs at this time. Call light within reach, bed in lowest position, side rails up x 2.

## 2024-02-01 NOTE — ED PROVIDER NOTES
[facility] EMERGENCY DEPARTMENT    CHIEF COMPLAINT  Tachycardia     HISTORY OF PRESENT ILLNESS  Jovon Javier is a 63 y.o. male with PMH including prior PE in 2011, PAF (anticoagulated, warfarin), HTN, HLD, and nonischemic cardiomyopathy who presents to the ED with palpitations and GUEVARA. Seen today by his Cardiologist, Dr. Johnson and referred to ED for further evaluation. Pt reports the onset of symptoms last night, worse today. Denies fever. Complains of mild, dull/achy, right-sided, without exacerbating/ameliorating factors, associated with dyspnea that also started today. Mild cough, nonproductive. Denies nausea, vomiting, diarrhea. Constipation yday, improved w/ Miralax. Denies leg swelling/calf pain.     I have reviewed the following from the nursing documentation:    Past Medical History:   Diagnosis Date    Abnormal CT scan 08/2023    spots on pancrease    Ankylosing spondylitis (HCC)     Atrial fibrillation     Bronchiolitis obliterans     CAD (coronary artery disease)     Cardiomyopathy (HCC)     CHF (congestive heart failure) (HCC)     Chronic anticoagulation     COPD (chronic obstructive pulmonary disease) (HCC)     GERD (gastroesophageal reflux disease)     Hepatitis 1979    Hx of blood clots     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    wears 2L/NC at night    Pneumothorax 2011    Prostatitis     Pulmonary embolism (HCC)      Past Surgical History:   Procedure Laterality Date    BRONCHOSCOPY N/A 11/2/2023    BRONCHOSCOPY WITH BRONCHIOLAR ALVEOLAR LAVAGE performed by Rafael Christy MD at [facility] ENDOSCOPY    CHOLECYSTECTOMY  2007    COLONOSCOPY  09/21/2012    COLONOSCOPY  11/30/2018    CYSTOSCOPY  05/17/2019    RIGHT SIDED URETEROSCOPY  Diagnostic, retrograde pyelogram and fulguration of previously resected bladder tumor base    CYSTOSCOPY Right 05/17/2019    RIGHT SIDED URETEROSCOPY  obtained from: pt   - Records reviewed: Dr. Johnson progress note from today shows patient referred to ED for tachycardia, low normal blood pressure, dyspnea    - Tests considered: considered CT abdomen/pelvis, deferred per MDM  - Disposition considerations: considered admit however vitals now reassuring, diagnostics reassuring, pt with no incr WOB    Chronic Conditions: NICM, CHF    I, Fred Blank MD, am the primary clinician of record.     During the patient's ED course, the patient was given:  Medications   sodium chloride 0.9 % bolus 500 mL (0 mLs IntraVENous Stopped 2/1/24 3515)   oxyCODONE (ROXICODONE) immediate release tablet 5 mg (5 mg Oral Given 2/1/24 6057)        Patient was given prescriptions for the following medications. I counseled the patient as to how to take these medications.  Discharge Medication List as of 2/1/2024  7:19 PM          Patient instructed to follow up with:   Juan C Dumont MD  [address] [facility] [id number] [phone]    In 2 days      [facility] Emergency Department  [address] [address]  [phone]    As needed, if symptoms worsen      All questions were answered and the patient/family expressed understanding and agreement with the plan.     PROCEDURES  None    CRITICAL CARE  N/A    CLINICAL IMPRESSION  1. Dyspnea on exertion    2. Palpitations        DISPOSITION  Decision To Discharge 02/01/2024 07:10:59 PM     Fred Blank MD    Note: This chart was created using voice recognition dictation software. Efforts were made by me to ensure accuracy, however some errors may be present due to limitations of this technology and occasionally words are not transcribed correctly.      Fred Blank MD  02/02/24 4743

## 2024-02-01 NOTE — ED TRIAGE NOTES
Patient was seen by Dr. Johnson today and wanted the patient to be seen at the ED due to tachycardia and back pain. History of CHF, COPD, HTN and high cholesterol.

## 2024-02-02 PROBLEM — R10.13 EPIGASTRIC PAIN: Status: ACTIVE | Noted: 2024-02-02

## 2024-02-02 LAB
EKG ATRIAL RATE: 107 BPM
EKG DIAGNOSIS: NORMAL
EKG P AXIS: 71 DEGREES
EKG P-R INTERVAL: 336 MS
EKG Q-T INTERVAL: 380 MS
EKG QRS DURATION: 144 MS
EKG QTC CALCULATION (BAZETT): 507 MS
EKG R AXIS: -79 DEGREES
EKG T AXIS: 77 DEGREES
EKG VENTRICULAR RATE: 107 BPM

## 2024-02-02 PROCEDURE — 93010 ELECTROCARDIOGRAM REPORT: CPT | Performed by: INTERNAL MEDICINE

## 2024-02-09 DIAGNOSIS — J44.81 BRONCHIOLITIS OBLITERANS: ICD-10-CM

## 2024-02-09 RX ORDER — ALBUTEROL SULFATE 90 UG/1
2 AEROSOL, METERED RESPIRATORY (INHALATION) EVERY 6 HOURS PRN
Qty: 18 G | Refills: 5 | Status: SHIPPED | OUTPATIENT
Start: 2024-02-09

## 2024-02-13 ENCOUNTER — TELEPHONE (OUTPATIENT)
Dept: PULMONOLOGY | Age: 64
End: 2024-02-13

## 2024-02-13 NOTE — TELEPHONE ENCOUNTER
Patient said that he called last Thursday for albuterol inhaler. He knows that Dr Christy is out but the prescription was not sent in and he is not happy. I did not see anywhere that there was a note in the system. Please call in his albuterol inhaler. [facility] [address]. Please call patient when it is called in.

## 2024-02-16 RX ORDER — FUROSEMIDE 20 MG/1
10 TABLET ORAL DAILY
Qty: 30 TABLET | Refills: 5 | Status: SHIPPED | OUTPATIENT
Start: 2024-02-16

## 2024-02-16 NOTE — TELEPHONE ENCOUNTER
Last O/V:  24  Next O/V:  Establishing care with [facility] 24  Last Refills 23  Last Labs:  CMP:  24  Last EK24

## 2024-02-19 ENCOUNTER — ANTI-COAG VISIT (OUTPATIENT)
Dept: PHARMACY | Age: 64
End: 2024-02-19
Attending: INTERNAL MEDICINE
Payer: COMMERCIAL

## 2024-02-19 DIAGNOSIS — Z79.01 CHRONIC ANTICOAGULATION: Primary | ICD-10-CM

## 2024-02-19 LAB — INTERNATIONAL NORMALIZATION RATIO, POC: 4.6

## 2024-02-19 PROCEDURE — 99211 OFF/OP EST MAY X REQ PHY/QHP: CPT

## 2024-02-19 PROCEDURE — 85610 PROTHROMBIN TIME: CPT

## 2024-02-19 NOTE — PROGRESS NOTES
Jovon Javier is a 63 y.o. here for warfarin management.  Jovon had an INR test today. Results were reviewed and appropriate warfarin management was completed.     Patient verifies current warfarin dosing regimen: Yes     Warfarin medication reviewed and updated on the patient 's home medication list: Yes   All other medications reviewed and updated on the patient 's home medication list: No: no changes     Lab Results   Component Value Date    INR 4.6 2024    INR 1.60 (H) 2024    INR 1.8 2024     Patient Findings       Negatives:  Signs/symptoms of bleeding, Change in health, Missed doses, Change in medications, Change in diet/appetite, Bruising          Anticoagulation Summary  As of 2024      INR goal:  2.0-3.0   TTR:  37.2 % (8.1 y)   INR used for dosin.6 (2024)   Warfarin maintenance plan:  5 mg (5 mg x 1) every Fri; 2.5 mg (5 mg x 0.5) all other days   Weekly warfarin total:  20 mg   Plan last modified:  Carlotta Dixon RPH (2023)   Next INR check:     Priority:  Maintenance   Target end date:  Indefinite    Indications    Pulmonary embolus (HCC) [I26.99]  Chronic anticoagulation [Z79.01]                 Anticoagulation Episode Summary       INR check location:  Anticoagulation Clinic    Preferred lab:      Send INR reminders to:  [facility]    Comments:  EPIC - finger stick every 2-3 weeks  Consent: 23          Anticoagulation Care Providers       Provider Role Specialty Phone number    TimJuan C MD Referring Internal Medicine [phone]          There were no vitals taken for this visit.    Warfarin assessment / plan:     Appears well  Supra-therapeutic INR of 4.6 today. He denies any bruising or bleeding at this time. I advised him to seek immediate medical attention for any unusual bleeding or if he should fall and hit his head.     Denies medication changes.  Denies any alcohol intake.  Denies change in appetite.  Denies

## 2024-02-23 ENCOUNTER — APPOINTMENT (OUTPATIENT)
Dept: PHARMACY | Age: 64
End: 2024-02-23
Attending: INTERNAL MEDICINE
Payer: COMMERCIAL

## 2024-02-26 ENCOUNTER — OFFICE VISIT (OUTPATIENT)
Dept: PULMONOLOGY | Age: 64
End: 2024-02-26
Payer: COMMERCIAL

## 2024-02-26 ENCOUNTER — ANTI-COAG VISIT (OUTPATIENT)
Dept: PHARMACY | Age: 64
End: 2024-02-26
Attending: INTERNAL MEDICINE
Payer: COMMERCIAL

## 2024-02-26 VITALS
DIASTOLIC BLOOD PRESSURE: 82 MMHG | BODY MASS INDEX: 35.48 KG/M2 | TEMPERATURE: 98 F | SYSTOLIC BLOOD PRESSURE: 127 MMHG | HEIGHT: 66 IN | HEART RATE: 92 BPM | RESPIRATION RATE: 18 BRPM | WEIGHT: 220.8 LBS | OXYGEN SATURATION: 93 %

## 2024-02-26 DIAGNOSIS — R05.3 CHRONIC COUGH: ICD-10-CM

## 2024-02-26 DIAGNOSIS — Z79.01 CHRONIC ANTICOAGULATION: ICD-10-CM

## 2024-02-26 DIAGNOSIS — J40 BRONCHITIS: Primary | ICD-10-CM

## 2024-02-26 DIAGNOSIS — I26.99 OTHER ACUTE PULMONARY EMBOLISM, UNSPECIFIED WHETHER ACUTE COR PULMONALE PRESENT (HCC): Primary | ICD-10-CM

## 2024-02-26 DIAGNOSIS — J44.81 BRONCHIOLITIS OBLITERANS: ICD-10-CM

## 2024-02-26 LAB — INTERNATIONAL NORMALIZATION RATIO, POC: 1.2

## 2024-02-26 PROCEDURE — G8482 FLU IMMUNIZE ORDER/ADMIN: HCPCS | Performed by: INTERNAL MEDICINE

## 2024-02-26 PROCEDURE — 3074F SYST BP LT 130 MM HG: CPT | Performed by: INTERNAL MEDICINE

## 2024-02-26 PROCEDURE — 1036F TOBACCO NON-USER: CPT | Performed by: INTERNAL MEDICINE

## 2024-02-26 PROCEDURE — 3017F COLORECTAL CA SCREEN DOC REV: CPT | Performed by: INTERNAL MEDICINE

## 2024-02-26 PROCEDURE — G8428 CUR MEDS NOT DOCUMENT: HCPCS | Performed by: INTERNAL MEDICINE

## 2024-02-26 PROCEDURE — G8417 CALC BMI ABV UP PARAM F/U: HCPCS | Performed by: INTERNAL MEDICINE

## 2024-02-26 PROCEDURE — 3079F DIAST BP 80-89 MM HG: CPT | Performed by: INTERNAL MEDICINE

## 2024-02-26 PROCEDURE — 99212 OFFICE O/P EST SF 10 MIN: CPT

## 2024-02-26 PROCEDURE — 3023F SPIROM DOC REV: CPT | Performed by: INTERNAL MEDICINE

## 2024-02-26 PROCEDURE — 99214 OFFICE O/P EST MOD 30 MIN: CPT | Performed by: INTERNAL MEDICINE

## 2024-02-26 PROCEDURE — 85610 PROTHROMBIN TIME: CPT

## 2024-02-26 ASSESSMENT — COPD QUESTIONNAIRES
QUESTION8_ENERGYLEVEL: 5
QUESTION3_CHESTTIGHTNESS: 4
QUESTION7_SLEEPQUALITY: 4
QUESTION2_CHESTPHLEGM: 5
QUESTION6_LEAVINGHOUSE: 3
CAT_TOTALSCORE: 35
QUESTION4_WALKINCLINE: 5
QUESTION1_COUGHFREQUENCY: 5

## 2024-02-26 NOTE — PROGRESS NOTES
0.5mL 10/03/2018    Influenza, FLUCELVAX, (age 6 mo+), MDCK, PF, 0.5mL 10/26/2021, 10/26/2023    Pneumococcal Conjugate Vaccine 08/15/2017    Pneumococcal, PCV-13, PREVNAR 13, (age 6w+), IM, 0.5mL 2015    Pneumococcal, PPSV23, PNEUMOVAX 23, (age 2y+), SC/IM, 0.5mL 2007, 2012    TDaP, ADACEL (age 10y-64y), BOOSTRIX (age 10y+), IM, 0.5mL 2014       Orders Placed This Encounter   Procedures    POCT INR     This external order was created through the results console.      No orders of the defined types were placed in this encounter.     Reviewed AVS with patient / caregiver.    Billing Points:  Adjust dosage and/or reconcile meds (fill pill box) </= 5 medications - 2 points  BASIC ASSESSMENT UNCOMPLICATED (including but not limited to: vital signs; physical assessment screening; review of secondary markers like lab results, allergies, home readings; instructions on treatment plan and basic education; assessment of medication list with one med change and compliance) - 2 points     For Pharmacy Admin Tracking Only    Intervention Detail: Adherence Monitorin and Dose Adjustment: 1, reason: Interaction, Therapy Optimization  Total # of Interventions Recommended: 2  Total # of Interventions Accepted: 2  Time Spent (min): 15

## 2024-02-26 NOTE — PROGRESS NOTES
Chief complaint  This is a 63 y.o. year old male  who comes to see me with a chief complaint of   Chief Complaint   Patient presents with    Follow-up    COPD       HPI  Here with a cc of cough    He is an established Dr. Christy patient.  He has been not feeling well for several weeks.  He is coughing up slightly discolored mucus with failure of azithromycin.  Uses only albuterol as needed.  Said he had bronchoscopy back in November and is also here for those results.  Using robtiussin.  Wondering what else he should take if anything      Current Outpatient Medications:     pregabalin (LYRICA) 75 MG capsule, TAKE 1 CAPSULE BY MOUTH IN THE  MORNING AND 2 CAPSULES BY MOUTH  IN THE EVENING, Disp: 270 capsule, Rfl: 0    zolpidem (AMBIEN) 10 MG tablet, TAKE 1 TABLET BY MOUTH AT NIGHT  AS NEEDED FOR SLEEP, Disp: 90 tablet, Rfl: 0    furosemide (LASIX) 20 MG tablet, Take 0.5 tablets by mouth daily, Disp: 30 tablet, Rfl: 5    albuterol sulfate HFA (PROVENTIL;VENTOLIN;PROAIR) 108 (90 Base) MCG/ACT inhaler, Inhale 2 puffs into the lungs every 6 hours as needed for Wheezing, Disp: 18 g, Rfl: 5    dexlansoprazole (DEXILANT) 60 MG CPDR delayed release capsule, TAKE 1 CAPSULE BY MOUTH DAILY, Disp: 90 capsule, Rfl: 3    sacubitril-valsartan (ENTRESTO) 24-26 MG per tablet, Take 1 tablet by mouth 2 times daily, Disp: 180 tablet, Rfl: 3    albuterol (PROVENTIL) (2.5 MG/3ML) 0.083% nebulizer solution, PLACE 1 VIAL (3 ML) IN NEBULIZER AND INHALE CONTENTS EVERY SIX HOURS IF NEEDED FOR WHEEZING, Disp: 150 mL, Rfl: 11    metoprolol succinate (TOPROL XL) 25 MG extended release tablet, Take 37.5 mg (1.5 tablet) each morning and 25 mg (1 tablet) each evening, Disp: 225 tablet, Rfl: 3    tamsulosin (FLOMAX) 0.4 MG capsule, TAKE ONE CAPSULE BY MOUTH TWO TIMES A DAY, Disp: 60 capsule, Rfl: 11    OXYGEN, Inhale 2 L into the lungs at bedtime, Disp: , Rfl:     brompheniramine-pseudoephedrine-DM (BROMFED DM) 2-30-10 MG/5ML syrup, Take 5 mLs by

## 2024-02-28 ENCOUNTER — NURSE ONLY (OUTPATIENT)
Dept: CARDIOLOGY CLINIC | Age: 64
End: 2024-02-28

## 2024-02-28 ENCOUNTER — OFFICE VISIT (OUTPATIENT)
Dept: CARDIOLOGY CLINIC | Age: 64
End: 2024-02-28
Payer: COMMERCIAL

## 2024-02-28 VITALS
BODY MASS INDEX: 33.49 KG/M2 | HEIGHT: 68 IN | SYSTOLIC BLOOD PRESSURE: 126 MMHG | HEART RATE: 107 BPM | OXYGEN SATURATION: 95 % | WEIGHT: 221 LBS | DIASTOLIC BLOOD PRESSURE: 80 MMHG

## 2024-02-28 DIAGNOSIS — I48.0 PAROXYSMAL ATRIAL FIBRILLATION (HCC): ICD-10-CM

## 2024-02-28 DIAGNOSIS — I42.8 NICM (NONISCHEMIC CARDIOMYOPATHY) (HCC): ICD-10-CM

## 2024-02-28 DIAGNOSIS — I10 ESSENTIAL HYPERTENSION: ICD-10-CM

## 2024-02-28 DIAGNOSIS — R00.0 TACHYCARDIA: ICD-10-CM

## 2024-02-28 DIAGNOSIS — I48.0 PAF (PAROXYSMAL ATRIAL FIBRILLATION) (HCC): Primary | ICD-10-CM

## 2024-02-28 DIAGNOSIS — Z95.810 BIVENTRICULAR ICD (IMPLANTABLE CARDIOVERTER-DEFIBRILLATOR) IN PLACE: Primary | ICD-10-CM

## 2024-02-28 DIAGNOSIS — E78.5 HYPERLIPIDEMIA, UNSPECIFIED HYPERLIPIDEMIA TYPE: ICD-10-CM

## 2024-02-28 DIAGNOSIS — I44.7 LBBB (LEFT BUNDLE BRANCH BLOCK): ICD-10-CM

## 2024-02-28 DIAGNOSIS — I42.8 NON-ISCHEMIC CARDIOMYOPATHY (HCC): ICD-10-CM

## 2024-02-28 DIAGNOSIS — I50.22 CHRONIC SYSTOLIC HF (HEART FAILURE) (HCC): ICD-10-CM

## 2024-02-28 PROCEDURE — 1036F TOBACCO NON-USER: CPT | Performed by: INTERNAL MEDICINE

## 2024-02-28 PROCEDURE — G8417 CALC BMI ABV UP PARAM F/U: HCPCS | Performed by: INTERNAL MEDICINE

## 2024-02-28 PROCEDURE — 3017F COLORECTAL CA SCREEN DOC REV: CPT | Performed by: INTERNAL MEDICINE

## 2024-02-28 PROCEDURE — G8482 FLU IMMUNIZE ORDER/ADMIN: HCPCS | Performed by: INTERNAL MEDICINE

## 2024-02-28 PROCEDURE — 93000 ELECTROCARDIOGRAM COMPLETE: CPT | Performed by: INTERNAL MEDICINE

## 2024-02-28 PROCEDURE — 3079F DIAST BP 80-89 MM HG: CPT | Performed by: INTERNAL MEDICINE

## 2024-02-28 PROCEDURE — G8427 DOCREV CUR MEDS BY ELIG CLIN: HCPCS | Performed by: INTERNAL MEDICINE

## 2024-02-28 PROCEDURE — 99215 OFFICE O/P EST HI 40 MIN: CPT | Performed by: INTERNAL MEDICINE

## 2024-02-28 PROCEDURE — 3074F SYST BP LT 130 MM HG: CPT | Performed by: INTERNAL MEDICINE

## 2024-02-28 NOTE — PROGRESS NOTES
Cardiac Electrophysiology Consultation   Date: 2/28/2024   Reason for Consultation: Atrial fibrillation / Device management   Consult Requesting Physician:  Arcadio Johnson MD   Primary Care Physician: Juan C Dumont MD    Chief Complaint:   Chief Complaint   Patient presents with    New Patient     PT concerned with status of pacemaker. (It's on and apparently it is supposed to be off.)     HPI: Jovon Javier is a 63 y.o. patient with a history of atrial fibrillation, PE (2011), anxiety, hypertension, hyperlipidemia, and nonischemic cardiomyopathy who presents today for management of atrial fibrillation. He follows with Dr. Johnson for cardiac needs. He was hospitalized 07/2016 at [facility] with chest pain and had an abnormal stress test that led to a left heart catherization on 7/21/2016. His coronary arteries were normal but he has LV dysfunction with a LVEF of 40%. He underwent a repeat echocardiogram with LVEF to be 30% despite optimal medical therapy. Entresto therapy was initiated and he was interested in pursing CRT. He underwent BiV ICD implant on 10/10/2017 with Dr. Cottrell and his LVEF improved. However, his BiV pacing was turned off due to anxiety related to the device which is now set at VVI 40 with defibrillator programming on. He underwent successful DCCV on 9/21/2018 for AT and was instructed to start on Multaq which he opted to defer due to possible side effects.     He was seen in office with Dr. Potter on 4/19/2021 for follow up for management of atrial fibrillation. He reported that had not been able to do a lot without his heart racing and feeling fatigued and stated that he could not rest or eat with biventricular pacing on.     Today, he presents to office to establish care for management of his device. He reports that he can feel when the device paces him, feeling a vibration and constant cough. He states when pacemaker was turned off he immediately felt better. He felt the symptoms 
the care of Jovon Javier. All questions and concerns were addressed to the patient/family. Alternatives to my treatment were discussed.     ***    Harshil Hayes MD  Cardiac Electrophysiology  [facility]

## 2024-02-29 ENCOUNTER — TELEPHONE (OUTPATIENT)
Dept: CARDIOLOGY CLINIC | Age: 64
End: 2024-02-29

## 2024-02-29 NOTE — TELEPHONE ENCOUNTER
Spoke with pt he does not want to want to turn off ICD therapy. Informed pt if pain is persistent or worsening to go to ED to be evaluated. Pt v/u and agreed to do so.

## 2024-02-29 NOTE — TELEPHONE ENCOUNTER
Pt called back and demanded his device settings be changed back. I reassured pt the only change that was made was the ICD therapies were turned back on. Pt v/u and does not want ICD therapies turned off. Pt would like to know what to do asap.

## 2024-02-29 NOTE — TELEPHONE ENCOUNTER
Pt called in stating he has had a tightness and cramping in chest and an unusual breathing pattern due to the pain since getting the ICD therapies turned back on. Pt states he feels similar to when he was admitted into the hospital for 3 days but not as severe. Pt has several visits to ER for chest pain, palpitations, and tachycardia. No changes were made to pt's device besides ICD therapies were turned back on.

## 2024-03-08 ENCOUNTER — ANTI-COAG VISIT (OUTPATIENT)
Dept: PHARMACY | Age: 64
End: 2024-03-08
Attending: INTERNAL MEDICINE
Payer: COMMERCIAL

## 2024-03-08 DIAGNOSIS — Z79.01 CHRONIC ANTICOAGULATION: Primary | ICD-10-CM

## 2024-03-08 PROCEDURE — 85610 PROTHROMBIN TIME: CPT

## 2024-03-08 PROCEDURE — 99211 OFF/OP EST MAY X REQ PHY/QHP: CPT

## 2024-03-08 RX ORDER — FUROSEMIDE 20 MG/1
10 TABLET ORAL DAILY
Qty: 45 TABLET | Refills: 3 | Status: SHIPPED | OUTPATIENT
Start: 2024-03-08

## 2024-03-08 NOTE — PROGRESS NOTES
Jovon Javier is a 63 y.o. here for warfarin management.  Jovon had an INR test today. Results were reviewed and appropriate warfarin management was completed.     Patient verifies current warfarin dosing regimen: Yes     Warfarin medication reviewed and updated on the patient 's home medication list: Yes   All other medications reviewed and updated on the patient 's home medication list: No: amoxicillin x 7 days     Lab Results   Component Value Date    INR 1.6 2024    INR 1.2 2024    INR 4.6 2024     Patient Findings       Positives:  Change in health, Missed doses, Change in medications    Negatives:  Signs/symptoms of bleeding, Change in diet/appetite, Bruising    Comments:  Finished a course of prednisone yesterday. Is currently on a 7 day course of amoxicillin, which started 3-6-24          Anticoagulation Summary  As of 3/8/2024      INR goal:  2.0-3.0   TTR:  37.1 % (8.1 y)   INR used for dosin.6 (3/8/2024)   Warfarin maintenance plan:  5 mg (5 mg x 1) every Fri; 2.5 mg (5 mg x 0.5) all other days   Weekly warfarin total:  20 mg   Plan last modified:  Carlotta Dixon [facility] (2023)   Next INR check:  3/22/2024   Priority:  Maintenance   Target end date:  Indefinite    Indications    Pulmonary embolus (HCC) [I26.99]  Chronic anticoagulation [Z79.01]                 Anticoagulation Episode Summary       INR check location:  Anticoagulation Clinic    Preferred lab:      Send INR reminders to:  [facility]    Comments:  EPIC - finger stick every 2-3 weeks  Consent: 23          Anticoagulation Care Providers       Provider Role Specialty Phone number    Juan C Dumont MD Referring Internal Medicine [phone]          There were no vitals taken for this visit.    Warfarin assessment / plan:     Appears well  Sub-therapeutic INR of 1.6 today.     Denies missed doses.  Denies increased vitamin K intake.     Jovon finished a 7 day course of prednisone

## 2024-03-08 NOTE — TELEPHONE ENCOUNTER
Requested Prescriptions     Pending Prescriptions Disp Refills    furosemide (LASIX) 20 MG tablet 30 tablet 5     Sig: Take 0.5 tablets by mouth daily          Number: 30    Refills: 5    Last Office Visit: 2/28/2024     Next Office Visit: None scheduled    Last Refill: 02/16/2024    Last Labs:  02/01/2024

## 2024-03-12 ENCOUNTER — TELEPHONE (OUTPATIENT)
Dept: PULMONOLOGY | Age: 64
End: 2024-03-12

## 2024-03-12 NOTE — TELEPHONE ENCOUNTER
.Complains of cough and SOB  Duration a week  Cough with sputum production? yes  Color green/yellow  Fever? no  Any other Symptoms? Heart rate is a little high and oxygen is around 90 at rest, 88-89 walking  Any current treatment tried? Nebulizer   Using inhalers? Yes do they help? yes  Pharmacy? [facility]    On amoxocillin now with 1 day left. He's asking to be seen if possible. Please advise.

## 2024-03-13 NOTE — TELEPHONE ENCOUNTER
Pt called in and stated he has Kidney appt tomorrow at 1pm and can't make appt with Dr. Christy. He feel that since he is having some issues with Kidneys he should make Kidney appt. Canceled appt and advised next available appt is in April. Tried to accommodate patient but was still unhappy because he has to wait until April. JOSE

## 2024-03-14 ENCOUNTER — APPOINTMENT (OUTPATIENT)
Dept: GENERAL RADIOLOGY | Age: 64
End: 2024-03-14

## 2024-03-14 ENCOUNTER — APPOINTMENT (OUTPATIENT)
Dept: CT IMAGING | Age: 64
End: 2024-03-14

## 2024-03-14 ENCOUNTER — HOSPITAL ENCOUNTER (EMERGENCY)
Age: 64
Discharge: HOME | End: 2024-03-14
Attending: EMERGENCY MEDICINE

## 2024-03-14 VITALS
TEMPERATURE: 98.2 F | RESPIRATION RATE: 18 BRPM | HEIGHT: 68 IN | OXYGEN SATURATION: 92 % | HEART RATE: 97 BPM | DIASTOLIC BLOOD PRESSURE: 96 MMHG | SYSTOLIC BLOOD PRESSURE: 141 MMHG | BODY MASS INDEX: 33.91 KG/M2 | WEIGHT: 223.77 LBS

## 2024-03-14 DIAGNOSIS — R10.84 GENERALIZED ABDOMINAL PAIN: Primary | ICD-10-CM

## 2024-03-14 DIAGNOSIS — R05.2 SUBACUTE COUGH: ICD-10-CM

## 2024-03-14 LAB
ALBUMIN SERPL-MCNC: 3.7 G/DL (ref 3.4–5)
ALBUMIN/GLOB SERPL: 1.3 {RATIO} (ref 1.1–2.2)
ALP SERPL-CCNC: 104 U/L (ref 40–129)
ALT SERPL-CCNC: 23 U/L (ref 10–40)
ANION GAP SERPL CALCULATED.3IONS-SCNC: 7 MMOL/L (ref 3–16)
AST SERPL-CCNC: 22 U/L (ref 15–37)
BACTERIA URNS QL MICRO: ABNORMAL /HPF
BASOPHILS # BLD: 0 K/UL (ref 0–0.2)
BASOPHILS NFR BLD: 0.7 %
BILIRUB SERPL-MCNC: 0.3 MG/DL (ref 0–1)
BILIRUB UR QL STRIP.AUTO: NEGATIVE
BUN SERPL-MCNC: 13 MG/DL (ref 7–20)
CALCIUM SERPL-MCNC: 10.6 MG/DL (ref 8.3–10.6)
CHLORIDE SERPL-SCNC: 100 MMOL/L (ref 99–110)
CLARITY UR: CLEAR
CO2 SERPL-SCNC: 31 MMOL/L (ref 21–32)
COLOR UR: YELLOW
CREAT SERPL-MCNC: 0.9 MG/DL (ref 0.8–1.3)
DEPRECATED RDW RBC AUTO: 13.1 % (ref 12.4–15.4)
EOSINOPHIL # BLD: 0.2 K/UL (ref 0–0.6)
EOSINOPHIL NFR BLD: 3.8 %
EPI CELLS #/AREA URNS AUTO: 1 /HPF (ref 0–5)
GFR SERPLBLD CREATININE-BSD FMLA CKD-EPI: >60 ML/MIN/{1.73_M2}
GLUCOSE SERPL-MCNC: 204 MG/DL (ref 70–99)
GLUCOSE UR STRIP.AUTO-MCNC: 500 MG/DL
HCT VFR BLD AUTO: 44.8 % (ref 40.5–52.5)
HGB BLD-MCNC: 15.2 G/DL (ref 13.5–17.5)
HGB UR QL STRIP.AUTO: ABNORMAL
HYALINE CASTS #/AREA URNS AUTO: 0 /LPF (ref 0–8)
KETONES UR STRIP.AUTO-MCNC: NEGATIVE MG/DL
LEUKOCYTE ESTERASE UR QL STRIP.AUTO: ABNORMAL
LIPASE SERPL-CCNC: 16 U/L (ref 13–60)
LYMPHOCYTES # BLD: 1.3 K/UL (ref 1–5.1)
LYMPHOCYTES NFR BLD: 23.2 %
MCH RBC QN AUTO: 31 PG (ref 26–34)
MCHC RBC AUTO-ENTMCNC: 34.1 G/DL (ref 31–36)
MCV RBC AUTO: 91 FL (ref 80–100)
MONOCYTES # BLD: 0.6 K/UL (ref 0–1.3)
MONOCYTES NFR BLD: 10.3 %
NEUTROPHILS # BLD: 3.4 K/UL (ref 1.7–7.7)
NEUTROPHILS NFR BLD: 62 %
NITRITE UR QL STRIP.AUTO: NEGATIVE
PH UR STRIP.AUTO: 7 [PH] (ref 5–8)
PLATELET # BLD AUTO: 143 K/UL (ref 135–450)
PMV BLD AUTO: 7.3 FL (ref 5–10.5)
POTASSIUM SERPL-SCNC: 4 MMOL/L (ref 3.5–5.1)
PROT SERPL-MCNC: 6.5 G/DL (ref 6.4–8.2)
PROT UR STRIP.AUTO-MCNC: NEGATIVE MG/DL
RBC # BLD AUTO: 4.92 M/UL (ref 4.2–5.9)
RBC CLUMPS #/AREA URNS AUTO: 17 /HPF (ref 0–4)
SODIUM SERPL-SCNC: 138 MMOL/L (ref 136–145)
SP GR UR STRIP.AUTO: 1.02 (ref 1–1.03)
UA COMPLETE W REFLEX CULTURE PNL UR: ABNORMAL
UA DIPSTICK W REFLEX MICRO PNL UR: YES
URN SPEC COLLECT METH UR: ABNORMAL
UROBILINOGEN UR STRIP-ACNC: 1 E.U./DL
WBC # BLD AUTO: 5.4 K/UL (ref 4–11)
WBC #/AREA URNS AUTO: 8 /HPF (ref 0–5)

## 2024-03-14 PROCEDURE — 96372 THER/PROPH/DIAG INJ SC/IM: CPT | Performed by: EMERGENCY MEDICINE

## 2024-03-14 PROCEDURE — 83690 ASSAY OF LIPASE: CPT

## 2024-03-14 PROCEDURE — 71046 X-RAY EXAM CHEST 2 VIEWS: CPT

## 2024-03-14 PROCEDURE — 6370000000 HC RX 637 (ALT 250 FOR IP): Performed by: EMERGENCY MEDICINE

## 2024-03-14 PROCEDURE — 99284 EMERGENCY DEPT VISIT MOD MDM: CPT | Performed by: EMERGENCY MEDICINE

## 2024-03-14 PROCEDURE — 80053 COMPREHEN METABOLIC PANEL: CPT

## 2024-03-14 PROCEDURE — 85025 COMPLETE CBC W/AUTO DIFF WBC: CPT

## 2024-03-14 PROCEDURE — 81001 URINALYSIS AUTO W/SCOPE: CPT

## 2024-03-14 PROCEDURE — 74176 CT ABD & PELVIS W/O CONTRAST: CPT

## 2024-03-14 PROCEDURE — 6360000002 HC RX W HCPCS: Performed by: EMERGENCY MEDICINE

## 2024-03-14 RX ORDER — FENTANYL CITRATE 50 UG/ML
50 INJECTION, SOLUTION INTRAMUSCULAR; INTRAVENOUS ONCE
Status: COMPLETED | OUTPATIENT
Start: 2024-03-14 | End: 2024-03-14

## 2024-03-14 RX ORDER — CEFUROXIME AXETIL 250 MG/1
500 TABLET ORAL ONCE
Status: COMPLETED | OUTPATIENT
Start: 2024-03-14 | End: 2024-03-14

## 2024-03-14 RX ORDER — CEFUROXIME AXETIL 250 MG/1
250 TABLET ORAL 2 TIMES DAILY
Qty: 14 TABLET | Refills: 0 | Status: SHIPPED | OUTPATIENT
Start: 2024-03-14 | End: 2024-03-21

## 2024-03-14 RX ORDER — GUAIFENESIN/DEXTROMETHORPHAN 100-10MG/5
5 SYRUP ORAL 3 TIMES DAILY PRN
Qty: 120 ML | Refills: 0 | Status: SHIPPED | OUTPATIENT
Start: 2024-03-14 | End: 2024-03-24

## 2024-03-14 RX ADMIN — CEFUROXIME AXETIL 500 MG: 250 TABLET ORAL at 06:04

## 2024-03-14 RX ADMIN — FENTANYL CITRATE 50 MCG: 50 INJECTION INTRAMUSCULAR; INTRAVENOUS at 05:21

## 2024-03-14 ASSESSMENT — PAIN DESCRIPTION - FREQUENCY: FREQUENCY: INTERMITTENT

## 2024-03-14 ASSESSMENT — PAIN DESCRIPTION - ORIENTATION: ORIENTATION: LOWER

## 2024-03-14 ASSESSMENT — PAIN DESCRIPTION - ONSET: ONSET: ON-GOING

## 2024-03-14 ASSESSMENT — PAIN DESCRIPTION - LOCATION: LOCATION: ABDOMEN;BACK

## 2024-03-14 ASSESSMENT — PAIN SCALES - GENERAL
PAINLEVEL_OUTOF10: 7
PAINLEVEL_OUTOF10: 7

## 2024-03-14 ASSESSMENT — PAIN DESCRIPTION - DESCRIPTORS: DESCRIPTORS: ACHING

## 2024-03-14 ASSESSMENT — PAIN - FUNCTIONAL ASSESSMENT: PAIN_FUNCTIONAL_ASSESSMENT: ACTIVITIES ARE NOT PREVENTED

## 2024-03-14 NOTE — ED NOTES
D/C: Order noted for d/c. Pt confirmed d/c paperwork  have correct name. Discharge and education instructions reviewed with patient. Teach-back successful.  Pt verbalized understanding. Pt denied questions at this time. No acute distress noted. Patient instructed to follow-up as noted - return to emergency department if symptoms worsen. Patient verbalized understanding. Discharged per EDMD with discharge instructions. Pt discharged to private vehicle. Patient stable upon departure. Thanked patient for choosing [facility] for care.

## 2024-03-15 PROBLEM — R73.09 ABNORMAL GLUCOSE: Status: ACTIVE | Noted: 2024-03-15

## 2024-03-15 NOTE — ED PROVIDER NOTES
[facility] [place] EMERGENCY DEPARTMENT  EMERGENCY DEPARTMENT ENCOUNTER        Pt Name: Jovon Javier  MRN: [id number]  Birthdate 1960  Date of evaluation: 3/14/2024  Provider: Jose Burger MD  PCP: Juan C Dumont MD  Note Started: 6:26 AM EDT 3/15/24    CHIEF COMPLAINT       Chief Complaint   Patient presents with    Abdominal Pain     Lower abd pain that radiates to back; states has also been having burning with urination; states has been battling a cold and has been on abx       HISTORY OF PRESENT ILLNESS: 1 or more Elements   History From: Patient        Jovon Javier is a 63 y.o. male who presents for evaluation of lower back pain dysuria and cough.  Patient reports having a cough over the past few days.  He reports he was prescribed antibiotics by PCP but is concerned he may developing pneumonia.  Denies shortness of breath, or pain or swelling to lower extremities.  He denies fevers.  He does report right-sided lower back pain and dysuria.  He reports has a history of recurrent kidney stones associated with urinary tract infections.  He denies gross hematuria or changes in bowel function.  He reports he is taking prescribed occasions for symptoms without improvement.     Nursing Notes were all reviewed and agreed with or any disagreements were addressed in the HPI.    REVIEW OF SYSTEMS :      Review of Systems    Positives and Pertinent negatives as per HPI.     SURGICAL HISTORY     Past Surgical History:   Procedure Laterality Date    BRONCHOSCOPY N/A 11/2/2023    BRONCHOSCOPY WITH BRONCHIOLAR ALVEOLAR LAVAGE performed by Rafael Christy MD at [facility] ENDOSCOPY    CHOLECYSTECTOMY  2007    COLONOSCOPY  09/21/2012    COLONOSCOPY  11/30/2018    CYSTOSCOPY  05/17/2019    RIGHT SIDED URETEROSCOPY  Diagnostic, retrograde pyelogram and fulguration of previously resected bladder tumor base    CYSTOSCOPY Right 05/17/2019    RIGHT SIDED URETEROSCOPY FLUGERATION  OF BLADDER performed by Perlita OWEN

## 2024-03-18 ENCOUNTER — TELEPHONE (OUTPATIENT)
Dept: PULMONOLOGY | Age: 64
End: 2024-03-18

## 2024-03-18 NOTE — TELEPHONE ENCOUNTER
Patient is asking if he can get an order sent to [facility] for a portable o2. He is tired of draggng his tank around.

## 2024-03-20 ENCOUNTER — APPOINTMENT (OUTPATIENT)
Dept: GENERAL RADIOLOGY | Age: 64
DRG: 191 | End: 2024-03-20
Payer: COMMERCIAL

## 2024-03-20 ENCOUNTER — HOSPITAL ENCOUNTER (INPATIENT)
Age: 64
LOS: 6 days | Discharge: HOME | DRG: 191 | End: 2024-03-26
Attending: EMERGENCY MEDICINE | Admitting: STUDENT IN AN ORGANIZED HEALTH CARE EDUCATION/TRAINING PROGRAM
Payer: COMMERCIAL

## 2024-03-20 ENCOUNTER — TELEPHONE (OUTPATIENT)
Dept: PULMONOLOGY | Age: 64
End: 2024-03-20

## 2024-03-20 DIAGNOSIS — R06.02 SHORTNESS OF BREATH: ICD-10-CM

## 2024-03-20 DIAGNOSIS — J96.01 ACUTE RESPIRATORY FAILURE WITH HYPOXIA (HCC): Primary | ICD-10-CM

## 2024-03-20 DIAGNOSIS — Z71.89 GOALS OF CARE, COUNSELING/DISCUSSION: ICD-10-CM

## 2024-03-20 PROBLEM — J44.1 COPD EXACERBATION (HCC): Status: ACTIVE | Noted: 2024-03-20

## 2024-03-20 LAB
ALBUMIN SERPL-MCNC: 3.9 G/DL (ref 3.4–5)
ALBUMIN/GLOB SERPL: 1.6 {RATIO} (ref 1.1–2.2)
ALP SERPL-CCNC: 79 U/L (ref 40–129)
ALT SERPL-CCNC: 18 U/L (ref 10–40)
ANION GAP SERPL CALCULATED.3IONS-SCNC: 8 MMOL/L (ref 3–16)
AST SERPL-CCNC: 19 U/L (ref 15–37)
BASE EXCESS BLDV CALC-SCNC: 8.8 MMOL/L
BASOPHILS # BLD: 0 K/UL (ref 0–0.2)
BASOPHILS NFR BLD: 0.6 %
BILIRUB SERPL-MCNC: 0.3 MG/DL (ref 0–1)
BUN SERPL-MCNC: 12 MG/DL (ref 7–20)
CALCIUM SERPL-MCNC: 10.9 MG/DL (ref 8.3–10.6)
CHLORIDE SERPL-SCNC: 104 MMOL/L (ref 99–110)
CO2 BLDV-SCNC: 38 MMOL/L
CO2 SERPL-SCNC: 30 MMOL/L (ref 21–32)
COHGB MFR BLDV: 1.5 %
CREAT SERPL-MCNC: 1 MG/DL (ref 0.8–1.3)
DEPRECATED RDW RBC AUTO: 12.8 % (ref 12.4–15.4)
EOSINOPHIL # BLD: 0.2 K/UL (ref 0–0.6)
EOSINOPHIL NFR BLD: 3.6 %
FLUAV RNA UPPER RESP QL NAA+PROBE: NEGATIVE
FLUBV AG NPH QL: NEGATIVE
GFR SERPLBLD CREATININE-BSD FMLA CKD-EPI: >60 ML/MIN/{1.73_M2}
GLUCOSE SERPL-MCNC: 143 MG/DL (ref 70–99)
HCO3 BLDV-SCNC: 36 MMOL/L (ref 23–29)
HCT VFR BLD AUTO: 41.1 % (ref 40.5–52.5)
HGB BLD-MCNC: 14.2 G/DL (ref 13.5–17.5)
LYMPHOCYTES # BLD: 0.9 K/UL (ref 1–5.1)
LYMPHOCYTES NFR BLD: 19.1 %
MCH RBC QN AUTO: 31 PG (ref 26–34)
MCHC RBC AUTO-ENTMCNC: 34.6 G/DL (ref 31–36)
MCV RBC AUTO: 89.5 FL (ref 80–100)
METHGB MFR BLDV: 0.5 %
MONOCYTES # BLD: 0.5 K/UL (ref 0–1.3)
MONOCYTES NFR BLD: 10.1 %
NEUTROPHILS # BLD: 3 K/UL (ref 1.7–7.7)
NEUTROPHILS NFR BLD: 66.6 %
NT-PROBNP SERPL-MCNC: 112 PG/ML (ref 0–124)
O2 THERAPY: ABNORMAL
PCO2 BLDV: 58.9 MMHG (ref 40–50)
PH BLDV: 7.39 [PH] (ref 7.35–7.45)
PLATELET # BLD AUTO: 144 K/UL (ref 135–450)
PMV BLD AUTO: 7.3 FL (ref 5–10.5)
PO2 BLDV: 41 MMHG
POTASSIUM SERPL-SCNC: 3.8 MMOL/L (ref 3.5–5.1)
PROT SERPL-MCNC: 6.3 G/DL (ref 6.4–8.2)
RBC # BLD AUTO: 4.59 M/UL (ref 4.2–5.9)
SAO2 % BLDV: 79 %
SARS-COV-2 RDRP RESP QL NAA+PROBE: NOT DETECTED
SODIUM SERPL-SCNC: 142 MMOL/L (ref 136–145)
TROPONIN, HIGH SENSITIVITY: 18 NG/L (ref 0–22)
WBC # BLD AUTO: 4.5 K/UL (ref 4–11)

## 2024-03-20 PROCEDURE — 2700000000 HC OXYGEN THERAPY PER DAY

## 2024-03-20 PROCEDURE — 1200000000 HC SEMI PRIVATE

## 2024-03-20 PROCEDURE — 96374 THER/PROPH/DIAG INJ IV PUSH: CPT

## 2024-03-20 PROCEDURE — 36415 COLL VENOUS BLD VENIPUNCTURE: CPT

## 2024-03-20 PROCEDURE — 87804 INFLUENZA ASSAY W/OPTIC: CPT

## 2024-03-20 PROCEDURE — 94640 AIRWAY INHALATION TREATMENT: CPT

## 2024-03-20 PROCEDURE — 6360000002 HC RX W HCPCS

## 2024-03-20 PROCEDURE — 80053 COMPREHEN METABOLIC PANEL: CPT

## 2024-03-20 PROCEDURE — 83880 ASSAY OF NATRIURETIC PEPTIDE: CPT

## 2024-03-20 PROCEDURE — 99285 EMERGENCY DEPT VISIT HI MDM: CPT

## 2024-03-20 PROCEDURE — 82803 BLOOD GASES ANY COMBINATION: CPT

## 2024-03-20 PROCEDURE — 87635 SARS-COV-2 COVID-19 AMP PRB: CPT

## 2024-03-20 PROCEDURE — 71045 X-RAY EXAM CHEST 1 VIEW: CPT

## 2024-03-20 PROCEDURE — 93005 ELECTROCARDIOGRAM TRACING: CPT

## 2024-03-20 PROCEDURE — 85025 COMPLETE CBC W/AUTO DIFF WBC: CPT

## 2024-03-20 PROCEDURE — 2580000003 HC RX 258: Performed by: STUDENT IN AN ORGANIZED HEALTH CARE EDUCATION/TRAINING PROGRAM

## 2024-03-20 PROCEDURE — 94760 N-INVAS EAR/PLS OXIMETRY 1: CPT

## 2024-03-20 PROCEDURE — 84484 ASSAY OF TROPONIN QUANT: CPT

## 2024-03-20 RX ORDER — GUAIFENESIN 600 MG/1
600 TABLET, EXTENDED RELEASE ORAL 2 TIMES DAILY PRN
Status: DISCONTINUED | OUTPATIENT
Start: 2024-03-20 | End: 2024-03-26 | Stop reason: HOSPADM

## 2024-03-20 RX ORDER — PREDNISONE 20 MG/1
40 TABLET ORAL DAILY
Status: DISCONTINUED | OUTPATIENT
Start: 2024-03-21 | End: 2024-03-21

## 2024-03-20 RX ORDER — ACETAMINOPHEN 325 MG/1
650 TABLET ORAL EVERY 6 HOURS PRN
Status: DISCONTINUED | OUTPATIENT
Start: 2024-03-20 | End: 2024-03-26 | Stop reason: HOSPADM

## 2024-03-20 RX ORDER — METHYLPREDNISOLONE SODIUM SUCCINATE 125 MG/2ML
125 INJECTION, POWDER, LYOPHILIZED, FOR SOLUTION INTRAMUSCULAR; INTRAVENOUS ONCE
Status: COMPLETED | OUTPATIENT
Start: 2024-03-20 | End: 2024-03-20

## 2024-03-20 RX ORDER — ONDANSETRON 4 MG/1
4 TABLET, ORALLY DISINTEGRATING ORAL EVERY 8 HOURS PRN
Status: DISCONTINUED | OUTPATIENT
Start: 2024-03-20 | End: 2024-03-26 | Stop reason: HOSPADM

## 2024-03-20 RX ORDER — ONDANSETRON 2 MG/ML
4 INJECTION INTRAMUSCULAR; INTRAVENOUS EVERY 6 HOURS PRN
Status: DISCONTINUED | OUTPATIENT
Start: 2024-03-20 | End: 2024-03-26 | Stop reason: HOSPADM

## 2024-03-20 RX ORDER — PREGABALIN 75 MG/1
75 CAPSULE ORAL DAILY
Status: DISCONTINUED | OUTPATIENT
Start: 2024-03-21 | End: 2024-03-26 | Stop reason: HOSPADM

## 2024-03-20 RX ORDER — PRAVASTATIN SODIUM 40 MG
40 TABLET ORAL NIGHTLY
Status: DISCONTINUED | OUTPATIENT
Start: 2024-03-21 | End: 2024-03-26 | Stop reason: HOSPADM

## 2024-03-20 RX ORDER — POLYETHYLENE GLYCOL 3350 17 G/17G
17 POWDER, FOR SOLUTION ORAL DAILY PRN
Status: DISCONTINUED | OUTPATIENT
Start: 2024-03-20 | End: 2024-03-22 | Stop reason: SDUPTHER

## 2024-03-20 RX ORDER — ACETAMINOPHEN 650 MG/1
650 SUPPOSITORY RECTAL EVERY 6 HOURS PRN
Status: DISCONTINUED | OUTPATIENT
Start: 2024-03-20 | End: 2024-03-26 | Stop reason: HOSPADM

## 2024-03-20 RX ORDER — ENOXAPARIN SODIUM 100 MG/ML
30 INJECTION SUBCUTANEOUS 2 TIMES DAILY
Status: DISCONTINUED | OUTPATIENT
Start: 2024-03-20 | End: 2024-03-21

## 2024-03-20 RX ORDER — POLYETHYLENE GLYCOL 3350 17 G/17G
17 POWDER, FOR SOLUTION ORAL NIGHTLY
Status: DISCONTINUED | OUTPATIENT
Start: 2024-03-21 | End: 2024-03-24

## 2024-03-20 RX ORDER — SODIUM CHLORIDE 9 MG/ML
INJECTION, SOLUTION INTRAVENOUS PRN
Status: DISCONTINUED | OUTPATIENT
Start: 2024-03-20 | End: 2024-03-26 | Stop reason: HOSPADM

## 2024-03-20 RX ORDER — ZOLPIDEM TARTRATE 5 MG/1
10 TABLET ORAL NIGHTLY PRN
Status: DISCONTINUED | OUTPATIENT
Start: 2024-03-20 | End: 2024-03-26 | Stop reason: HOSPADM

## 2024-03-20 RX ORDER — TESTOSTERONE CYPIONATE 200 MG/ML
200 INJECTION, SOLUTION INTRAMUSCULAR WEEKLY
COMMUNITY

## 2024-03-20 RX ORDER — OXYCODONE HYDROCHLORIDE 5 MG/1
5 TABLET ORAL ONCE
Status: COMPLETED | OUTPATIENT
Start: 2024-03-21 | End: 2024-03-21

## 2024-03-20 RX ORDER — PANTOPRAZOLE SODIUM 40 MG/1
40 TABLET, DELAYED RELEASE ORAL
Status: DISCONTINUED | OUTPATIENT
Start: 2024-03-21 | End: 2024-03-22

## 2024-03-20 RX ORDER — METOPROLOL SUCCINATE 25 MG/1
37.5 TABLET, EXTENDED RELEASE ORAL DAILY
Status: DISCONTINUED | OUTPATIENT
Start: 2024-03-21 | End: 2024-03-26 | Stop reason: HOSPADM

## 2024-03-20 RX ORDER — PREGABALIN 75 MG/1
150 CAPSULE ORAL NIGHTLY
Status: DISCONTINUED | OUTPATIENT
Start: 2024-03-21 | End: 2024-03-26 | Stop reason: HOSPADM

## 2024-03-20 RX ORDER — SODIUM CHLORIDE 0.9 % (FLUSH) 0.9 %
5-40 SYRINGE (ML) INJECTION PRN
Status: DISCONTINUED | OUTPATIENT
Start: 2024-03-20 | End: 2024-03-26 | Stop reason: HOSPADM

## 2024-03-20 RX ORDER — SODIUM CHLORIDE 0.9 % (FLUSH) 0.9 %
5-40 SYRINGE (ML) INJECTION EVERY 12 HOURS SCHEDULED
Status: DISCONTINUED | OUTPATIENT
Start: 2024-03-20 | End: 2024-03-26 | Stop reason: HOSPADM

## 2024-03-20 RX ORDER — ASPIRIN 81 MG/1
81 TABLET, CHEWABLE ORAL DAILY
Status: DISCONTINUED | OUTPATIENT
Start: 2024-03-21 | End: 2024-03-26 | Stop reason: HOSPADM

## 2024-03-20 RX ORDER — LANOLIN ALCOHOL/MO/W.PET/CERES
6 CREAM (GRAM) TOPICAL NIGHTLY PRN
Status: DISCONTINUED | OUTPATIENT
Start: 2024-03-20 | End: 2024-03-26 | Stop reason: HOSPADM

## 2024-03-20 RX ORDER — IPRATROPIUM BROMIDE AND ALBUTEROL SULFATE 2.5; .5 MG/3ML; MG/3ML
2 SOLUTION RESPIRATORY (INHALATION) ONCE
Status: DISCONTINUED | OUTPATIENT
Start: 2024-03-20 | End: 2024-03-20

## 2024-03-20 RX ORDER — ALBUTEROL SULFATE 2.5 MG/3ML
2.5 SOLUTION RESPIRATORY (INHALATION) EVERY 4 HOURS PRN
Status: DISCONTINUED | OUTPATIENT
Start: 2024-03-20 | End: 2024-03-26 | Stop reason: HOSPADM

## 2024-03-20 RX ORDER — ALBUTEROL SULFATE 2.5 MG/3ML
5 SOLUTION RESPIRATORY (INHALATION) ONCE
Status: COMPLETED | OUTPATIENT
Start: 2024-03-20 | End: 2024-03-20

## 2024-03-20 RX ORDER — IPRATROPIUM BROMIDE AND ALBUTEROL SULFATE 2.5; .5 MG/3ML; MG/3ML
2 SOLUTION RESPIRATORY (INHALATION)
Status: DISCONTINUED | OUTPATIENT
Start: 2024-03-20 | End: 2024-03-20

## 2024-03-20 RX ORDER — CETIRIZINE HYDROCHLORIDE 10 MG/1
5 TABLET ORAL DAILY
Status: DISCONTINUED | OUTPATIENT
Start: 2024-03-21 | End: 2024-03-22

## 2024-03-20 RX ADMIN — METHYLPREDNISOLONE SODIUM SUCCINATE 125 MG: 125 INJECTION INTRAMUSCULAR; INTRAVENOUS at 16:21

## 2024-03-20 RX ADMIN — SODIUM CHLORIDE, PRESERVATIVE FREE 10 ML: 5 INJECTION INTRAVENOUS at 21:06

## 2024-03-20 RX ADMIN — ALBUTEROL SULFATE 5 MG: 2.5 SOLUTION RESPIRATORY (INHALATION) at 16:50

## 2024-03-20 ASSESSMENT — PAIN SCALES - GENERAL: PAINLEVEL_OUTOF10: 7

## 2024-03-20 ASSESSMENT — PAIN - FUNCTIONAL ASSESSMENT: PAIN_FUNCTIONAL_ASSESSMENT: NONE - DENIES PAIN

## 2024-03-20 ASSESSMENT — PAIN DESCRIPTION - DESCRIPTORS: DESCRIPTORS: ACHING

## 2024-03-20 ASSESSMENT — PAIN DESCRIPTION - LOCATION: LOCATION: ABDOMEN

## 2024-03-20 NOTE — TELEPHONE ENCOUNTER
Patient calling for the script for the portable o2 to be sent to [facility] they are waiting for the settings/dosage before he can get the o2.

## 2024-03-20 NOTE — ED TRIAGE NOTES
Pt arrives ambulatory for eval of increasing sob onset 2 days ago. Pt is using 2L NC at this time which he normally only use at night. O2 91% on 2L. Pt had a cough as well. At rest pts O2 955 on 2L  Pt has own pulse ox that is consistent with ours. Pt will alert staff if any changes while waiting.

## 2024-03-20 NOTE — ED NOTES
Pt ambulated on home 2 L of oxygen. Pt maintained oxygen saturation above 95% throughout ambulation. Britt Delatorre PA-C made aware.

## 2024-03-20 NOTE — TELEPHONE ENCOUNTER
Patient called, he was at the urology office and called office saying that he is struggling to breathe just with walking. I asked Dr Barlow his opinion and he said he should go to ER to be evaluated.

## 2024-03-20 NOTE — ED PROVIDER NOTES
[facility] EMERGENCY DEPARTMENT  EMERGENCY DEPARTMENT ENCOUNTER        Pt Name: Jovon Javier  MRN: [id number]  Birthdate 1960  Date of evaluation: 3/20/2024  Provider: Salma Delatorre PA-C  PCP: Juan C Dumont MD  Note Started: 3:58 PM EDT 3/20/24       I have seen and evaluated this patient with my supervising physician Layo Severino DO.      CHIEF COMPLAINT       Chief Complaint   Patient presents with    Shortness of Breath     Pt arrives ambulatory for eval of increasing sob onset 2 days ago. Pt is using 2L NC at this time which he normally only use at night. O2 91% on 2L. Pt had a cough as well.        HISTORY OF PRESENT ILLNESS: 1 or more Elements     History From: Patient            Chief Complaint: Shortness of breath    Jovon Javier is a 63 y.o. male who presents to the ED with a concern of shortness of breath that started since Monday, the patient mentioned that he is at 2 L NC at night but has been using it daily since Monday.  He describes that it feels similar to COPD exacerbation.  Patient will desat to the 80s with not oxygen.  Patient mentioned that he has been dealing with cold-like symptoms for 1 week but now is also having a productive cough with greenish to yellowish color.  Denies chills, nausea, vomiting, fevers, abdominal pain, sick contacts.  Patient was seen on 3/14/2024 and was diagnosed with an UTI and started taking Ceftin on Monday, endorses having burning when urinating but it is slowly improving.  Patient has been using nebulizer inhalers more than normal, mention feeling wheezy at home.    Nursing Notes were all reviewed and agreed with or any disagreements were addressed in the HPI.    REVIEW OF SYSTEMS :      Review of Systems    Positives and Pertinent negatives as per HPI.     SURGICAL HISTORY     Past Surgical History:   Procedure Laterality Date    BRONCHOSCOPY N/A 11/2/2023    BRONCHOSCOPY WITH BRONCHIOLAR ALVEOLAR LAVAGE performed by

## 2024-03-21 LAB
ANION GAP SERPL CALCULATED.3IONS-SCNC: 7 MMOL/L (ref 3–16)
BACTERIA URNS QL MICRO: ABNORMAL /HPF
BASOPHILS # BLD: 0 K/UL (ref 0–0.2)
BASOPHILS NFR BLD: 0.1 %
BILIRUB UR QL STRIP.AUTO: NEGATIVE
BUN SERPL-MCNC: 14 MG/DL (ref 7–20)
CALCIUM SERPL-MCNC: 10.6 MG/DL (ref 8.3–10.6)
CHLORIDE SERPL-SCNC: 106 MMOL/L (ref 99–110)
CLARITY UR: CLEAR
CO2 SERPL-SCNC: 29 MMOL/L (ref 21–32)
COLOR UR: YELLOW
CREAT SERPL-MCNC: 0.9 MG/DL (ref 0.8–1.3)
DEPRECATED RDW RBC AUTO: 13.3 % (ref 12.4–15.4)
EKG ATRIAL RATE: 68 BPM
EKG DIAGNOSIS: NORMAL
EKG P AXIS: 66 DEGREES
EKG P-R INTERVAL: 170 MS
EKG Q-T INTERVAL: 426 MS
EKG QRS DURATION: 148 MS
EKG QTC CALCULATION (BAZETT): 452 MS
EKG R AXIS: -59 DEGREES
EKG T AXIS: 93 DEGREES
EKG VENTRICULAR RATE: 68 BPM
EOSINOPHIL # BLD: 0 K/UL (ref 0–0.6)
EOSINOPHIL NFR BLD: 0 %
EPI CELLS #/AREA URNS AUTO: 0 /HPF (ref 0–5)
GFR SERPLBLD CREATININE-BSD FMLA CKD-EPI: >60 ML/MIN/{1.73_M2}
GLUCOSE BLD-MCNC: 204 MG/DL (ref 70–99)
GLUCOSE BLD-MCNC: 239 MG/DL (ref 70–99)
GLUCOSE BLD-MCNC: 264 MG/DL (ref 70–99)
GLUCOSE BLD-MCNC: 294 MG/DL (ref 70–99)
GLUCOSE SERPL-MCNC: 203 MG/DL (ref 70–99)
GLUCOSE UR STRIP.AUTO-MCNC: >=1000 MG/DL
HCT VFR BLD AUTO: 42 % (ref 40.5–52.5)
HGB BLD-MCNC: 14.3 G/DL (ref 13.5–17.5)
HGB UR QL STRIP.AUTO: NEGATIVE
HYALINE CASTS #/AREA URNS AUTO: 1 /LPF (ref 0–8)
INR PPP: 1.81 (ref 0.84–1.16)
KETONES UR STRIP.AUTO-MCNC: ABNORMAL MG/DL
LEUKOCYTE ESTERASE UR QL STRIP.AUTO: NEGATIVE
LYMPHOCYTES # BLD: 0.6 K/UL (ref 1–5.1)
LYMPHOCYTES NFR BLD: 8.8 %
MCH RBC QN AUTO: 30.8 PG (ref 26–34)
MCHC RBC AUTO-ENTMCNC: 34 G/DL (ref 31–36)
MCV RBC AUTO: 90.6 FL (ref 80–100)
MONOCYTES # BLD: 0 K/UL (ref 0–1.3)
MONOCYTES NFR BLD: 0.6 %
NEUTROPHILS # BLD: 5.7 K/UL (ref 1.7–7.7)
NEUTROPHILS NFR BLD: 90.5 %
NITRITE UR QL STRIP.AUTO: NEGATIVE
PERFORMED ON: ABNORMAL
PH UR STRIP.AUTO: 6.5 [PH] (ref 5–8)
PLATELET # BLD AUTO: 165 K/UL (ref 135–450)
PMV BLD AUTO: 7.8 FL (ref 5–10.5)
POTASSIUM SERPL-SCNC: 4.6 MMOL/L (ref 3.5–5.1)
PROT UR STRIP.AUTO-MCNC: NEGATIVE MG/DL
PROTHROMBIN TIME: 20.9 SEC (ref 11.5–14.8)
RBC # BLD AUTO: 4.64 M/UL (ref 4.2–5.9)
RBC CLUMPS #/AREA URNS AUTO: 3 /HPF (ref 0–4)
SODIUM SERPL-SCNC: 142 MMOL/L (ref 136–145)
SP GR UR STRIP.AUTO: 1.03 (ref 1–1.03)
UA DIPSTICK W REFLEX MICRO PNL UR: ABNORMAL
URN SPEC COLLECT METH UR: ABNORMAL
UROBILINOGEN UR STRIP-ACNC: 0.2 E.U./DL
WBC # BLD AUTO: 6.3 K/UL (ref 4–11)
WBC #/AREA URNS AUTO: 1 /HPF (ref 0–5)

## 2024-03-21 PROCEDURE — 6370000000 HC RX 637 (ALT 250 FOR IP): Performed by: STUDENT IN AN ORGANIZED HEALTH CARE EDUCATION/TRAINING PROGRAM

## 2024-03-21 PROCEDURE — 2580000003 HC RX 258: Performed by: STUDENT IN AN ORGANIZED HEALTH CARE EDUCATION/TRAINING PROGRAM

## 2024-03-21 PROCEDURE — 94640 AIRWAY INHALATION TREATMENT: CPT

## 2024-03-21 PROCEDURE — 94760 N-INVAS EAR/PLS OXIMETRY 1: CPT

## 2024-03-21 PROCEDURE — 6360000002 HC RX W HCPCS

## 2024-03-21 PROCEDURE — 36415 COLL VENOUS BLD VENIPUNCTURE: CPT

## 2024-03-21 PROCEDURE — 85025 COMPLETE CBC W/AUTO DIFF WBC: CPT

## 2024-03-21 PROCEDURE — 6360000002 HC RX W HCPCS: Performed by: STUDENT IN AN ORGANIZED HEALTH CARE EDUCATION/TRAINING PROGRAM

## 2024-03-21 PROCEDURE — 81001 URINALYSIS AUTO W/SCOPE: CPT

## 2024-03-21 PROCEDURE — 1200000000 HC SEMI PRIVATE

## 2024-03-21 PROCEDURE — 2700000000 HC OXYGEN THERAPY PER DAY

## 2024-03-21 PROCEDURE — 2580000003 HC RX 258

## 2024-03-21 PROCEDURE — 85610 PROTHROMBIN TIME: CPT

## 2024-03-21 PROCEDURE — 80048 BASIC METABOLIC PNL TOTAL CA: CPT

## 2024-03-21 RX ORDER — INSULIN LISPRO 100 [IU]/ML
0-4 INJECTION, SOLUTION INTRAVENOUS; SUBCUTANEOUS
Status: DISCONTINUED | OUTPATIENT
Start: 2024-03-21 | End: 2024-03-26 | Stop reason: HOSPADM

## 2024-03-21 RX ORDER — OXYCODONE HYDROCHLORIDE AND ACETAMINOPHEN 5; 325 MG/1; MG/1
2 TABLET ORAL EVERY 4 HOURS PRN
Status: DISCONTINUED | OUTPATIENT
Start: 2024-03-21 | End: 2024-03-26 | Stop reason: HOSPADM

## 2024-03-21 RX ORDER — DEXTROSE MONOHYDRATE 100 MG/ML
INJECTION, SOLUTION INTRAVENOUS CONTINUOUS PRN
Status: DISCONTINUED | OUTPATIENT
Start: 2024-03-21 | End: 2024-03-26 | Stop reason: HOSPADM

## 2024-03-21 RX ORDER — METHYLPREDNISOLONE SODIUM SUCCINATE 40 MG/ML
40 INJECTION, POWDER, LYOPHILIZED, FOR SOLUTION INTRAMUSCULAR; INTRAVENOUS DAILY
Status: DISCONTINUED | OUTPATIENT
Start: 2024-03-21 | End: 2024-03-26 | Stop reason: HOSPADM

## 2024-03-21 RX ORDER — WATER 10 ML/10ML
INJECTION INTRAMUSCULAR; INTRAVENOUS; SUBCUTANEOUS
Status: COMPLETED
Start: 2024-03-21 | End: 2024-03-21

## 2024-03-21 RX ORDER — INSULIN LISPRO 100 [IU]/ML
0-4 INJECTION, SOLUTION INTRAVENOUS; SUBCUTANEOUS NIGHTLY
Status: DISCONTINUED | OUTPATIENT
Start: 2024-03-21 | End: 2024-03-26 | Stop reason: HOSPADM

## 2024-03-21 RX ORDER — GLUCAGON 1 MG/ML
1 KIT INJECTION PRN
Status: DISCONTINUED | OUTPATIENT
Start: 2024-03-21 | End: 2024-03-26 | Stop reason: HOSPADM

## 2024-03-21 RX ORDER — OXYCODONE HYDROCHLORIDE AND ACETAMINOPHEN 5; 325 MG/1; MG/1
1 TABLET ORAL EVERY 4 HOURS PRN
Status: DISCONTINUED | OUTPATIENT
Start: 2024-03-21 | End: 2024-03-26 | Stop reason: HOSPADM

## 2024-03-21 RX ORDER — ALBUTEROL SULFATE 2.5 MG/3ML
2.5 SOLUTION RESPIRATORY (INHALATION)
Status: DISCONTINUED | OUTPATIENT
Start: 2024-03-21 | End: 2024-03-26 | Stop reason: HOSPADM

## 2024-03-21 RX ORDER — ALBUTEROL SULFATE 2.5 MG/3ML
SOLUTION RESPIRATORY (INHALATION)
Status: COMPLETED
Start: 2024-03-21 | End: 2024-03-21

## 2024-03-21 RX ADMIN — SODIUM CHLORIDE, PRESERVATIVE FREE 10 ML: 5 INJECTION INTRAVENOUS at 09:46

## 2024-03-21 RX ADMIN — METOPROLOL SUCCINATE 37.5 MG: 25 TABLET, FILM COATED, EXTENDED RELEASE ORAL at 10:39

## 2024-03-21 RX ADMIN — INSULIN LISPRO 2 UNITS: 100 INJECTION, SOLUTION INTRAVENOUS; SUBCUTANEOUS at 17:24

## 2024-03-21 RX ADMIN — OXYCODONE AND ACETAMINOPHEN 1 TABLET: 5; 325 TABLET ORAL at 21:07

## 2024-03-21 RX ADMIN — METHYLPREDNISOLONE SODIUM SUCCINATE 40 MG: 40 INJECTION INTRAMUSCULAR; INTRAVENOUS at 09:46

## 2024-03-21 RX ADMIN — PRAVASTATIN SODIUM 40 MG: 40 TABLET ORAL at 21:06

## 2024-03-21 RX ADMIN — ALBUTEROL SULFATE 2.5 MG: 2.5 SOLUTION RESPIRATORY (INHALATION) at 13:16

## 2024-03-21 RX ADMIN — PREGABALIN 75 MG: 75 CAPSULE ORAL at 09:00

## 2024-03-21 RX ADMIN — PREGABALIN 150 MG: 75 CAPSULE ORAL at 23:28

## 2024-03-21 RX ADMIN — ZOLPIDEM TARTRATE 10 MG: 5 TABLET ORAL at 00:14

## 2024-03-21 RX ADMIN — INSULIN LISPRO 1 UNITS: 100 INJECTION, SOLUTION INTRAVENOUS; SUBCUTANEOUS at 08:52

## 2024-03-21 RX ADMIN — ZOLPIDEM TARTRATE 10 MG: 5 TABLET ORAL at 23:28

## 2024-03-21 RX ADMIN — POLYETHYLENE GLYCOL 3350 17 G: 17 POWDER, FOR SOLUTION ORAL at 21:09

## 2024-03-21 RX ADMIN — OXYCODONE 5 MG: 5 TABLET ORAL at 00:13

## 2024-03-21 RX ADMIN — ALBUTEROL SULFATE 2.5 MG: 2.5 SOLUTION RESPIRATORY (INHALATION) at 20:53

## 2024-03-21 RX ADMIN — PREGABALIN 150 MG: 75 CAPSULE ORAL at 00:05

## 2024-03-21 RX ADMIN — GUAIFENESIN 600 MG: 600 TABLET ORAL at 15:40

## 2024-03-21 RX ADMIN — ACETAMINOPHEN 325MG 650 MG: 325 TABLET ORAL at 15:40

## 2024-03-21 RX ADMIN — SODIUM CHLORIDE, PRESERVATIVE FREE 10 ML: 5 INJECTION INTRAVENOUS at 21:10

## 2024-03-21 RX ADMIN — ASPIRIN 81 MG: 81 TABLET, CHEWABLE ORAL at 08:51

## 2024-03-21 RX ADMIN — ALBUTEROL SULFATE 2.5 MG: 2.5 SOLUTION RESPIRATORY (INHALATION) at 09:46

## 2024-03-21 RX ADMIN — SACUBITRIL AND VALSARTAN 1 TABLET: 24; 26 TABLET, FILM COATED ORAL at 21:06

## 2024-03-21 RX ADMIN — SACUBITRIL AND VALSARTAN 1 TABLET: 24; 26 TABLET, FILM COATED ORAL at 00:06

## 2024-03-21 RX ADMIN — WATER 10 ML: 1 INJECTION INTRAMUSCULAR; INTRAVENOUS; SUBCUTANEOUS at 09:46

## 2024-03-21 RX ADMIN — INSULIN LISPRO 2 UNITS: 100 INJECTION, SOLUTION INTRAVENOUS; SUBCUTANEOUS at 13:37

## 2024-03-21 RX ADMIN — PRAVASTATIN SODIUM 40 MG: 40 TABLET ORAL at 00:06

## 2024-03-21 RX ADMIN — WARFARIN SODIUM 7 MG: 5 TABLET ORAL at 11:37

## 2024-03-21 RX ADMIN — POLYETHYLENE GLYCOL 3350 17 G: 17 POWDER, FOR SOLUTION ORAL at 00:08

## 2024-03-21 ASSESSMENT — PAIN SCALES - GENERAL
PAINLEVEL_OUTOF10: 6
PAINLEVEL_OUTOF10: 3
PAINLEVEL_OUTOF10: 7
PAINLEVEL_OUTOF10: 7
PAINLEVEL_OUTOF10: 0

## 2024-03-21 ASSESSMENT — PAIN DESCRIPTION - ORIENTATION
ORIENTATION: LEFT
ORIENTATION: LOWER

## 2024-03-21 ASSESSMENT — PAIN - FUNCTIONAL ASSESSMENT: PAIN_FUNCTIONAL_ASSESSMENT: ACTIVITIES ARE NOT PREVENTED

## 2024-03-21 ASSESSMENT — PAIN DESCRIPTION - LOCATION
LOCATION: ABDOMEN
LOCATION: ABDOMEN
LOCATION: FLANK

## 2024-03-21 ASSESSMENT — PAIN DESCRIPTION - DESCRIPTORS
DESCRIPTORS: ACHING;DISCOMFORT
DESCRIPTORS: GNAWING

## 2024-03-21 NOTE — FLOWSHEET NOTE
4 Eyes Skin Assessment     NAME:  Jovon Javier  YOB: 1960  MEDICAL RECORD NUMBER:  [id number]    The patient is being assessed for  Admission    I agree that at least one RN has performed a thorough Head to Toe Skin Assessment on the patient. ALL assessment sites listed below have been assessed.      Areas assessed by both nurses:    Head, Face, Ears, Shoulders, Back, Chest, Arms, Elbows, Hands, Sacrum. Buttock, Coccyx, Ischium, Legs. Feet and Heels, and Under Medical Devices         Does the Patient have a Wound? No noted wound(s)       Casimiro Prevention initiated by RN: No  Wound Care Orders initiated by RN: No    Pressure Injury (Stage 3,4, Unstageable, DTI, NWPT, and Complex wounds) if present, place Wound referral order by RN under : No    New Ostomies, if present place, Ostomy referral order under : No     Nurse 1 eSignature: Electronically signed by Barry Ashraf RN on 3/20/24 at 10:34 PM EDT    **SHARE this note so that the co-signing nurse can place an eSignature**    Nurse 2 eSignature: Electronically signed by Herberth Altman RN on 3/21/24 at 12:27 AM EDT

## 2024-03-21 NOTE — CARE COORDINATION
Case Management Assessment  Initial Evaluation    Date/Time of Evaluation: 3/21/2024 3:18 PM  Assessment Completed by: EAGLE Corado    If patient is discharged prior to next notation, then this note serves as note for discharge by case management.    Patient Name: Jovon Javier                   YOB: 1960  Diagnosis: Shortness of breath [R06.02]  COPD exacerbation (HCC) [J44.1]  Goals of care, counseling/discussion [Z71.89]  Acute respiratory failure with hypoxia (HCC) [J96.01]                   Date / Time: 3/20/2024  3:34 PM    Patient Admission Status: Inpatient   Readmission Risk (Low < 19, Mod (19-27), High > 27): Readmission Risk Score: 26.7    Current PCP: Juan C Dumont MD  PCP verified by CM? (P) Yes    Chart Reviewed: Yes      History Provided by: (P) Patient  Patient Orientation: (P) Alert and Oriented, Person, Place, Situation, Self    Patient Cognition: (P) Alert    Hospitalization in the last 30 days (Readmission):  No    If yes, Readmission Assessment in  Navigator will be completed.    Advance Directives:      Code Status: Full Code   Patient's Primary Decision Maker is: (P) Named in Scanned ACP Document    Primary Decision Maker: Adelaida Javier - Spouse - [phone]    Discharge Planning:    Patient lives with: (P) Spouse/Significant Other Type of Home: (P) Other (Comment) (Condo)  Primary Care Giver: (P) Self  Patient Support Systems include: (P) Spouse/Significant Other   Current Financial resources: (P) None  Current community resources: (P) None  Current services prior to admission: (P) Durable Medical Equipment, Oxygen Therapy            Current DME: (P) Cane            Type of Home Care services:  (P) None    ADLS  Prior functional level: (P) Independent in ADLs/IADLs  Current functional level: (P) Independent in ADLs/IADLs    PT AM-PAC:   /24  OT AM-PAC:   /24    Family can provide assistance at DC: (P) Yes  Would you like Case Management to discuss the discharge plan

## 2024-03-21 NOTE — PLAN OF CARE
Problem: Discharge Planning  Goal: Discharge to home or other facility with appropriate resources  Outcome: Progressing  Flowsheets (Taken 3/20/2024 2311)  Discharge to home or other facility with appropriate resources:   Identify barriers to discharge with patient and caregiver   Identify discharge learning needs (meds, wound care, etc)   Refer to discharge planning if patient needs post-hospital services based on physician order or complex needs related to functional status, cognitive ability or social support system     Problem: Pain  Goal: Verbalizes/displays adequate comfort level or baseline comfort level  Outcome: Progressing     Problem: Safety - Adult  Goal: Free from fall injury  Outcome: Progressing     Problem: ABCDS Injury Assessment  Goal: Absence of physical injury  Outcome: Progressing

## 2024-03-22 LAB
ANION GAP SERPL CALCULATED.3IONS-SCNC: 7 MMOL/L (ref 3–16)
BASOPHILS # BLD: 0 K/UL (ref 0–0.2)
BASOPHILS NFR BLD: 0.1 %
BUN SERPL-MCNC: 21 MG/DL (ref 7–20)
CALCIUM SERPL-MCNC: 11.3 MG/DL (ref 8.3–10.6)
CHLORIDE SERPL-SCNC: 104 MMOL/L (ref 99–110)
CO2 SERPL-SCNC: 29 MMOL/L (ref 21–32)
CREAT SERPL-MCNC: 1 MG/DL (ref 0.8–1.3)
DEPRECATED RDW RBC AUTO: 13.1 % (ref 12.4–15.4)
EOSINOPHIL # BLD: 0 K/UL (ref 0–0.6)
EOSINOPHIL NFR BLD: 0 %
GFR SERPLBLD CREATININE-BSD FMLA CKD-EPI: >60 ML/MIN/{1.73_M2}
GLUCOSE BLD-MCNC: 138 MG/DL (ref 70–99)
GLUCOSE BLD-MCNC: 166 MG/DL (ref 70–99)
GLUCOSE BLD-MCNC: 221 MG/DL (ref 70–99)
GLUCOSE BLD-MCNC: 229 MG/DL (ref 70–99)
GLUCOSE SERPL-MCNC: 141 MG/DL (ref 70–99)
HCT VFR BLD AUTO: 43.1 % (ref 40.5–52.5)
HGB BLD-MCNC: 14.6 G/DL (ref 13.5–17.5)
INR PPP: 2.2 (ref 0.84–1.16)
LYMPHOCYTES # BLD: 0.9 K/UL (ref 1–5.1)
LYMPHOCYTES NFR BLD: 7.3 %
MCH RBC QN AUTO: 30.4 PG (ref 26–34)
MCHC RBC AUTO-ENTMCNC: 33.9 G/DL (ref 31–36)
MCV RBC AUTO: 89.5 FL (ref 80–100)
MONOCYTES # BLD: 0.7 K/UL (ref 0–1.3)
MONOCYTES NFR BLD: 5.5 %
NEUTROPHILS # BLD: 10.5 K/UL (ref 1.7–7.7)
NEUTROPHILS NFR BLD: 87.1 %
NT-PROBNP SERPL-MCNC: 250 PG/ML (ref 0–124)
PERFORMED ON: ABNORMAL
PLATELET # BLD AUTO: 184 K/UL (ref 135–450)
PMV BLD AUTO: 7.8 FL (ref 5–10.5)
POTASSIUM SERPL-SCNC: 4.5 MMOL/L (ref 3.5–5.1)
PROCALCITONIN SERPL IA-MCNC: 0.06 NG/ML (ref 0–0.15)
PROTHROMBIN TIME: 24.3 SEC (ref 11.5–14.8)
RBC # BLD AUTO: 4.81 M/UL (ref 4.2–5.9)
SODIUM SERPL-SCNC: 140 MMOL/L (ref 136–145)
WBC # BLD AUTO: 12 K/UL (ref 4–11)

## 2024-03-22 PROCEDURE — 94640 AIRWAY INHALATION TREATMENT: CPT

## 2024-03-22 PROCEDURE — 6360000002 HC RX W HCPCS: Performed by: STUDENT IN AN ORGANIZED HEALTH CARE EDUCATION/TRAINING PROGRAM

## 2024-03-22 PROCEDURE — 6370000000 HC RX 637 (ALT 250 FOR IP): Performed by: INTERNAL MEDICINE

## 2024-03-22 PROCEDURE — 2580000003 HC RX 258: Performed by: STUDENT IN AN ORGANIZED HEALTH CARE EDUCATION/TRAINING PROGRAM

## 2024-03-22 PROCEDURE — 85610 PROTHROMBIN TIME: CPT

## 2024-03-22 PROCEDURE — 1200000000 HC SEMI PRIVATE

## 2024-03-22 PROCEDURE — 6370000000 HC RX 637 (ALT 250 FOR IP): Performed by: STUDENT IN AN ORGANIZED HEALTH CARE EDUCATION/TRAINING PROGRAM

## 2024-03-22 PROCEDURE — 83880 ASSAY OF NATRIURETIC PEPTIDE: CPT

## 2024-03-22 PROCEDURE — 94760 N-INVAS EAR/PLS OXIMETRY 1: CPT

## 2024-03-22 PROCEDURE — 2580000003 HC RX 258

## 2024-03-22 PROCEDURE — 80048 BASIC METABOLIC PNL TOTAL CA: CPT

## 2024-03-22 PROCEDURE — 36415 COLL VENOUS BLD VENIPUNCTURE: CPT

## 2024-03-22 PROCEDURE — 2700000000 HC OXYGEN THERAPY PER DAY

## 2024-03-22 PROCEDURE — 84145 PROCALCITONIN (PCT): CPT

## 2024-03-22 PROCEDURE — 85025 COMPLETE CBC W/AUTO DIFF WBC: CPT

## 2024-03-22 RX ORDER — WATER 10 ML/10ML
INJECTION INTRAMUSCULAR; INTRAVENOUS; SUBCUTANEOUS
Status: COMPLETED
Start: 2024-03-22 | End: 2024-03-22

## 2024-03-22 RX ORDER — LORATADINE 10 MG/1
10 TABLET ORAL DAILY
Status: DISCONTINUED | OUTPATIENT
Start: 2024-03-23 | End: 2024-03-26 | Stop reason: HOSPADM

## 2024-03-22 RX ORDER — WARFARIN SODIUM 2.5 MG/1
2.5 TABLET ORAL
Status: COMPLETED | OUTPATIENT
Start: 2024-03-22 | End: 2024-03-22

## 2024-03-22 RX ORDER — PHENAZOPYRIDINE HYDROCHLORIDE 200 MG/1
200 TABLET, FILM COATED ORAL
Status: DISCONTINUED | OUTPATIENT
Start: 2024-03-22 | End: 2024-03-26 | Stop reason: HOSPADM

## 2024-03-22 RX ORDER — FUROSEMIDE 40 MG/1
40 TABLET ORAL DAILY
Status: DISCONTINUED | OUTPATIENT
Start: 2024-03-23 | End: 2024-03-23

## 2024-03-22 RX ORDER — DEXLANSOPRAZOLE 60 MG/1
60 CAPSULE, DELAYED RELEASE ORAL DAILY
Status: DISCONTINUED | OUTPATIENT
Start: 2024-03-22 | End: 2024-03-26 | Stop reason: HOSPADM

## 2024-03-22 RX ORDER — FUROSEMIDE 10 MG/ML
20 INJECTION INTRAMUSCULAR; INTRAVENOUS DAILY
Status: DISCONTINUED | OUTPATIENT
Start: 2024-03-22 | End: 2024-03-22

## 2024-03-22 RX ORDER — AZITHROMYCIN 500 MG/1
500 TABLET, FILM COATED ORAL ONCE
Status: COMPLETED | OUTPATIENT
Start: 2024-03-22 | End: 2024-03-22

## 2024-03-22 RX ORDER — AZITHROMYCIN 250 MG/1
250 TABLET, FILM COATED ORAL DAILY
Status: COMPLETED | OUTPATIENT
Start: 2024-03-23 | End: 2024-03-26

## 2024-03-22 RX ADMIN — PREGABALIN 150 MG: 75 CAPSULE ORAL at 23:06

## 2024-03-22 RX ADMIN — PREGABALIN 75 MG: 75 CAPSULE ORAL at 09:15

## 2024-03-22 RX ADMIN — PRAVASTATIN SODIUM 40 MG: 40 TABLET ORAL at 20:22

## 2024-03-22 RX ADMIN — SODIUM CHLORIDE, PRESERVATIVE FREE 10 ML: 5 INJECTION INTRAVENOUS at 20:23

## 2024-03-22 RX ADMIN — GUAIFENESIN 600 MG: 600 TABLET ORAL at 09:28

## 2024-03-22 RX ADMIN — SACUBITRIL AND VALSARTAN 1 TABLET: 24; 26 TABLET, FILM COATED ORAL at 20:23

## 2024-03-22 RX ADMIN — WATER 10 ML: 1 INJECTION INTRAMUSCULAR; INTRAVENOUS; SUBCUTANEOUS at 09:20

## 2024-03-22 RX ADMIN — OXYCODONE AND ACETAMINOPHEN 1 TABLET: 5; 325 TABLET ORAL at 20:22

## 2024-03-22 RX ADMIN — ALBUTEROL SULFATE 2.5 MG: 2.5 SOLUTION RESPIRATORY (INHALATION) at 19:29

## 2024-03-22 RX ADMIN — INSULIN LISPRO 1 UNITS: 100 INJECTION, SOLUTION INTRAVENOUS; SUBCUTANEOUS at 17:08

## 2024-03-22 RX ADMIN — SODIUM CHLORIDE, PRESERVATIVE FREE 10 ML: 5 INJECTION INTRAVENOUS at 09:20

## 2024-03-22 RX ADMIN — ALBUTEROL SULFATE 2.5 MG: 2.5 SOLUTION RESPIRATORY (INHALATION) at 08:40

## 2024-03-22 RX ADMIN — METOPROLOL SUCCINATE 37.5 MG: 25 TABLET, FILM COATED, EXTENDED RELEASE ORAL at 09:32

## 2024-03-22 RX ADMIN — PHENAZOPYRIDINE HYDROCHLORIDE 200 MG: 200 TABLET ORAL at 17:05

## 2024-03-22 RX ADMIN — DEXLANSOPRAZOLE 60 MG: 60 CAPSULE, DELAYED RELEASE ORAL at 20:23

## 2024-03-22 RX ADMIN — METHYLPREDNISOLONE SODIUM SUCCINATE 40 MG: 40 INJECTION INTRAMUSCULAR; INTRAVENOUS at 09:16

## 2024-03-22 RX ADMIN — SACUBITRIL AND VALSARTAN 1 TABLET: 24; 26 TABLET, FILM COATED ORAL at 09:16

## 2024-03-22 RX ADMIN — ALBUTEROL SULFATE 2.5 MG: 2.5 SOLUTION RESPIRATORY (INHALATION) at 14:34

## 2024-03-22 RX ADMIN — ZOLPIDEM TARTRATE 10 MG: 5 TABLET ORAL at 23:06

## 2024-03-22 RX ADMIN — AZITHROMYCIN 500 MG: 500 TABLET, FILM COATED ORAL at 11:45

## 2024-03-22 RX ADMIN — POLYETHYLENE GLYCOL 3350 17 G: 17 POWDER, FOR SOLUTION ORAL at 20:22

## 2024-03-22 RX ADMIN — WARFARIN SODIUM 2.5 MG: 2.5 TABLET ORAL at 17:06

## 2024-03-22 RX ADMIN — MOMETASONE FUROATE AND FORMOTEROL FUMARATE DIHYDRATE 2 PUFF: 200; 5 AEROSOL RESPIRATORY (INHALATION) at 19:28

## 2024-03-22 RX ADMIN — CETIRIZINE HYDROCHLORIDE 5 MG: 10 TABLET, FILM COATED ORAL at 09:16

## 2024-03-22 RX ADMIN — ASPIRIN 81 MG: 81 TABLET, CHEWABLE ORAL at 09:16

## 2024-03-22 ASSESSMENT — PAIN DESCRIPTION - LOCATION
LOCATION: ABDOMEN
LOCATION: ABDOMEN

## 2024-03-22 ASSESSMENT — PAIN SCALES - GENERAL
PAINLEVEL_OUTOF10: 0
PAINLEVEL_OUTOF10: 6
PAINLEVEL_OUTOF10: 7
PAINLEVEL_OUTOF10: 5

## 2024-03-22 ASSESSMENT — PAIN DESCRIPTION - DESCRIPTORS
DESCRIPTORS: ACHING
DESCRIPTORS: ACHING

## 2024-03-22 ASSESSMENT — PAIN DESCRIPTION - FREQUENCY: FREQUENCY: INTERMITTENT

## 2024-03-22 ASSESSMENT — PAIN DESCRIPTION - ORIENTATION: ORIENTATION: LOWER

## 2024-03-22 ASSESSMENT — PAIN DESCRIPTION - PAIN TYPE: TYPE: CHRONIC PAIN

## 2024-03-22 NOTE — PLAN OF CARE
Problem: Discharge Planning  Goal: Discharge to home or other facility with appropriate resources  Outcome: Progressing  Flowsheets (Taken 3/21/2024 2000 by Erasmo Carrera, RN)  Discharge to home or other facility with appropriate resources:   Identify barriers to discharge with patient and caregiver   Arrange for needed discharge resources and transportation as appropriate   Identify discharge learning needs (meds, wound care, etc)     Problem: Pain  Goal: Verbalizes/displays adequate comfort level or baseline comfort level  Outcome: Progressing  Flowsheets (Taken 3/21/2024 1940 by Erasmo Carrera, RN)  Verbalizes/displays adequate comfort level or baseline comfort level:   Encourage patient to monitor pain and request assistance   Assess pain using appropriate pain scale   Administer analgesics based on type and severity of pain and evaluate response     Problem: Safety - Adult  Goal: Free from fall injury  Outcome: Progressing     Problem: ABCDS Injury Assessment  Goal: Absence of physical injury  Outcome: Progressing     Problem: Chronic Conditions and Co-morbidities  Goal: Patient's chronic conditions and co-morbidity symptoms are monitored and maintained or improved  Outcome: Progressing  Flowsheets (Taken 3/21/2024 2000 by Erasmo Carrera, RN)  Care Plan - Patient's Chronic Conditions and Co-Morbidity Symptoms are Monitored and Maintained or Improved:   Collaborate with multidisciplinary team to address chronic and comorbid conditions and prevent exacerbation or deterioration   Monitor and assess patient's chronic conditions and comorbid symptoms for stability, deterioration, or improvement

## 2024-03-22 NOTE — PLAN OF CARE
Problem: Discharge Planning  Goal: Discharge to home or other facility with appropriate resources  Recent Flowsheet Documentation  Taken 3/21/2024 2000 by Erasmo Carrera RN  Discharge to home or other facility with appropriate resources:   Identify barriers to discharge with patient and caregiver   Arrange for needed discharge resources and transportation as appropriate   Identify discharge learning needs (meds, wound care, etc)  3/21/2024 1528 by Shannan Shepard RN  Outcome: Progressing     Problem: Pain  Goal: Verbalizes/displays adequate comfort level or baseline comfort level  Recent Flowsheet Documentation  Taken 3/21/2024 1940 by Erasmo Carrera RN  Verbalizes/displays adequate comfort level or baseline comfort level:   Encourage patient to monitor pain and request assistance   Assess pain using appropriate pain scale   Administer analgesics based on type and severity of pain and evaluate response  3/21/2024 1528 by Shannan Shepard RN  Outcome: Progressing     Problem: Safety - Adult  Goal: Free from fall injury  3/21/2024 1528 by Shannan Shepard RN  Outcome: Progressing     Problem: ABCDS Injury Assessment  Goal: Absence of physical injury  3/21/2024 1528 by Shannan Shepard RN  Outcome: Progressing     Problem: Chronic Conditions and Co-morbidities  Goal: Patient's chronic conditions and co-morbidity symptoms are monitored and maintained or improved  Recent Flowsheet Documentation  Taken 3/21/2024 2000 by Erasmo Carrera RN  Care Plan - Patient's Chronic Conditions and Co-Morbidity Symptoms are Monitored and Maintained or Improved:   Collaborate with multidisciplinary team to address chronic and comorbid conditions and prevent exacerbation or deterioration   Monitor and assess patient's chronic conditions and comorbid symptoms for stability, deterioration, or improvement  3/21/2024 1528 by Shannan Shepard RN  Outcome: Progressing

## 2024-03-22 NOTE — H&P
Internal Medicine  History and Physical    Patient's PCP: Juan C Dumont MD  Code Status: Full Code  History Obtained From:  patient    CC: shortness of breath    HPI:       For the past four or five days, the patient reports progressive shortness of breath and intermittently productive cough. Typically uses nasal cannula at night 1-2  L and has required 2 L at rest during the day. No fever or chills. Recently completed antibiotic for UTI.     He does not report any abdominal pain, nausea, vomiting, fever or chills. No lightheadedness or chest pain. Reports adherence with home medications    Past Medical / Surgical History:    Past Medical History:   Diagnosis Date    Abnormal CT scan 08/2023    spots on pancrease    Ankylosing spondylitis (HCC)     Atrial fibrillation     Bronchiolitis obliterans     CAD (coronary artery disease)     Cardiomyopathy (HCC)     CHF (congestive heart failure) (HCC)     Chronic anticoagulation     COPD (chronic obstructive pulmonary disease) (HCC)     GERD (gastroesophageal reflux disease)     Hepatitis 1979    Hx of blood clots     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    wears 2L/NC at night    Pneumothorax 2011    Prostatitis     Pulmonary embolism (HCC)      Past Surgical History:   Procedure Laterality Date    BRONCHOSCOPY N/A 11/2/2023    BRONCHOSCOPY WITH BRONCHIOLAR ALVEOLAR LAVAGE performed by Rafael Christy MD at [facility] ENDOSCOPY    CHOLECYSTECTOMY  2007    COLONOSCOPY  09/21/2012    COLONOSCOPY  11/30/2018    CYSTOSCOPY  05/17/2019    RIGHT SIDED URETEROSCOPY  Diagnostic, retrograde pyelogram and fulguration of previously resected bladder tumor base    CYSTOSCOPY Right 05/17/2019    RIGHT SIDED URETEROSCOPY FLUGERATION  OF BLADDER performed by Perlita Fuentes MD at [facility] OR    CYSTOSCOPY Right 07/02/2021    CYSTOURETHROSCOPY WITH RIGHT RETROGRADE PYELOGRAPHY AND

## 2024-03-22 NOTE — CONSULTS
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    wears 2L/NC at night    Pneumothorax 2011    Prostatitis     Pulmonary embolism (HCC)        Past Surgical History:        Procedure Laterality Date    BRONCHOSCOPY N/A 11/2/2023    BRONCHOSCOPY WITH BRONCHIOLAR ALVEOLAR LAVAGE performed by Rafael Christy MD at [facility] ENDOSCOPY    CHOLECYSTECTOMY  2007    COLONOSCOPY  09/21/2012    COLONOSCOPY  11/30/2018    CYSTOSCOPY  05/17/2019    RIGHT SIDED URETEROSCOPY  Diagnostic, retrograde pyelogram and fulguration of previously resected bladder tumor base    CYSTOSCOPY Right 05/17/2019    RIGHT SIDED URETEROSCOPY FLUGERATION  OF BLADDER performed by Perlita Fuentes MD at [facility] OR    CYSTOSCOPY Right 07/02/2021    CYSTOURETHROSCOPY WITH RIGHT RETROGRADE PYELOGRAPHY AND PLACEMENT OF RIGHT DOUBLE J STENT performed by Arthur Vazquez DO at [facility] OR    CYSTOSCOPY Right 04/25/2022    CYSTOSCOPY, RIGHT STENT INSERTION performed by Perlita Fuentes MD at [facility] OR    ESOPHAGEAL DILATATION N/A 09/08/2022    ESOPHAGEAL DILATATION performed by Remington Hutchinson Jr., DO at [facility] ENDOSCOPY    HERNIA REPAIR  2015    LITHOTRIPSY      PACEMAKER PLACEMENT      with defib-\"they turned off pacemaker part\"    SINUS SURGERY      UPPER GASTROINTESTINAL ENDOSCOPY  03/28/2014    UPPER GASTROINTESTINAL ENDOSCOPY N/A 02/01/2021    EGD BIOPSY performed by Juan C Mittal MD at [facility] ENDOSCOPY    UPPER GASTROINTESTINAL ENDOSCOPY N/A 09/08/2022    EGD BIOPSY performed by Remington Hutchinson Jr., DO at [facility] ENDOSCOPY    UPPER GASTROINTESTINAL ENDOSCOPY N/A 08/30/2023    ENDOSCOPIC ULTRASOUND performed by Hernando Perez MD at [facility] ENDOSCOPY    UPPER GASTROINTESTINAL ENDOSCOPY  08/30/2023    EGD BIOPSY performed by Hernando Perez MD at [facility] ENDOSCOPY    UPPER GASTROINTESTINAL ENDOSCOPY  08/30/2023    EGD POLYP SNARE performed by Hernando 
Nightly  pregabalin (LYRICA) capsule 75 mg, Daily  pregabalin (LYRICA) capsule 150 mg, Nightly  sacubitril-valsartan (ENTRESTO) 24-26 MG per tablet 1 tablet, BID  zolpidem (AMBIEN) tablet 10 mg, Nightly PRN  melatonin tablet 6 mg, Nightly PRN        Allergies:  Atrovent nasal spray [ipratropium], Ipratropium bromide hfa, Nitroglycerin, Crestor [rosuvastatin], Doxycycline, Macrobid [nitrofurantoin], Nexletol [bempedoic acid], Sulfa antibiotics, and Vicodin [hydrocodone-acetaminophen]         Objective:   PHYSICAL EXAM:    Vitals:    03/22/24 0932   BP: 131/76   Pulse: 72   Resp:    Temp: 97.6   SpO2: 95%    Weight - Scale: 99.2 kg (218 lb 11.1 oz)         General Appearance:  Alert, cooperative, no distress, appears stated age.   Head:  Normocephalic, without obvious abnormality, atraumatic.   Eyes:  Pupils equal and round. No scleral icterus.   Mouth: Moist mucosa, no pharyngeal erythema.   Nose: Nares normal. No drainage or sinus tenderness.       Neck: Supple, symmetrical, trachea midline. No adenopathy. No tenderness/mass/nodules. No carotid bruit or JVD       Lungs:   Bilateral coarse crackles.   Chest Wall:  No tenderness or deformity.   Heart:  Regular rate, S1/ S2 normal. No murmur, rub, or gallop.   Abdomen:   Soft, non-tender, bowel sounds active.       Musculoskeletal: No muscle wasting or digital clubbing.   Extremities: Extremities normal, atraumatic. No cyanosis 1-2+ right lower extremity edema.   Pulses: 2+ radial and pedal pulses, symmetric.   Skin: No rashes or lesions.   Pysch: Normal mood and affect. Alert and oriented x 4.   Neurologic: Normal gross motor and sensory exam.         Labs     CBC:   Lab Results   Component Value Date/Time    WBC 12.0 03/22/2024 08:08 AM    RBC 4.81 03/22/2024 08:08 AM    HGB 14.6 03/22/2024 08:08 AM    HCT 43.1 03/22/2024 08:08 AM    MCV 89.5 03/22/2024 08:08 AM    RDW 13.1 03/22/2024 08:08 AM     03/22/2024 08:08 AM     CMP:  Lab Results   Component Value

## 2024-03-22 NOTE — FLOWSHEET NOTE
Pleasant and oriented. Oriented to room and call system. Ambien given per request for sleep. No s/s acute distress.

## 2024-03-23 PROBLEM — J45.21 MILD INTERMITTENT ASTHMA WITH ACUTE EXACERBATION: Status: ACTIVE | Noted: 2024-03-23

## 2024-03-23 LAB
ANION GAP SERPL CALCULATED.3IONS-SCNC: 6 MMOL/L (ref 3–16)
BASOPHILS # BLD: 0 K/UL (ref 0–0.2)
BASOPHILS NFR BLD: 0.1 %
BUN SERPL-MCNC: 20 MG/DL (ref 7–20)
CALCIUM SERPL-MCNC: 10.7 MG/DL (ref 8.3–10.6)
CHLORIDE SERPL-SCNC: 104 MMOL/L (ref 99–110)
CO2 SERPL-SCNC: 28 MMOL/L (ref 21–32)
CREAT SERPL-MCNC: 0.9 MG/DL (ref 0.8–1.3)
DEPRECATED RDW RBC AUTO: 13.6 % (ref 12.4–15.4)
EOSINOPHIL # BLD: 0 K/UL (ref 0–0.6)
EOSINOPHIL NFR BLD: 0.2 %
GFR SERPLBLD CREATININE-BSD FMLA CKD-EPI: >60 ML/MIN/{1.73_M2}
GLUCOSE BLD-MCNC: 129 MG/DL (ref 70–99)
GLUCOSE BLD-MCNC: 163 MG/DL (ref 70–99)
GLUCOSE BLD-MCNC: 198 MG/DL (ref 70–99)
GLUCOSE BLD-MCNC: 321 MG/DL (ref 70–99)
GLUCOSE SERPL-MCNC: 150 MG/DL (ref 70–99)
HCT VFR BLD AUTO: 41.6 % (ref 40.5–52.5)
HGB BLD-MCNC: 14.3 G/DL (ref 13.5–17.5)
INR PPP: 2.44 (ref 0.84–1.16)
LYMPHOCYTES # BLD: 0.7 K/UL (ref 1–5.1)
LYMPHOCYTES NFR BLD: 8.1 %
MCH RBC QN AUTO: 31 PG (ref 26–34)
MCHC RBC AUTO-ENTMCNC: 34.3 G/DL (ref 31–36)
MCV RBC AUTO: 90.4 FL (ref 80–100)
MONOCYTES # BLD: 0.6 K/UL (ref 0–1.3)
MONOCYTES NFR BLD: 6.2 %
NEUTROPHILS # BLD: 7.7 K/UL (ref 1.7–7.7)
NEUTROPHILS NFR BLD: 85.4 %
PERFORMED ON: ABNORMAL
PLATELET # BLD AUTO: 185 K/UL (ref 135–450)
PMV BLD AUTO: 8.2 FL (ref 5–10.5)
POTASSIUM SERPL-SCNC: 4.2 MMOL/L (ref 3.5–5.1)
PROTHROMBIN TIME: 26.4 SEC (ref 11.5–14.8)
RBC # BLD AUTO: 4.6 M/UL (ref 4.2–5.9)
SODIUM SERPL-SCNC: 138 MMOL/L (ref 136–145)
WBC # BLD AUTO: 9 K/UL (ref 4–11)

## 2024-03-23 PROCEDURE — 1200000000 HC SEMI PRIVATE

## 2024-03-23 PROCEDURE — 85025 COMPLETE CBC W/AUTO DIFF WBC: CPT

## 2024-03-23 PROCEDURE — 6370000000 HC RX 637 (ALT 250 FOR IP): Performed by: STUDENT IN AN ORGANIZED HEALTH CARE EDUCATION/TRAINING PROGRAM

## 2024-03-23 PROCEDURE — 6370000000 HC RX 637 (ALT 250 FOR IP): Performed by: INTERNAL MEDICINE

## 2024-03-23 PROCEDURE — 80048 BASIC METABOLIC PNL TOTAL CA: CPT

## 2024-03-23 PROCEDURE — 85610 PROTHROMBIN TIME: CPT

## 2024-03-23 PROCEDURE — 94669 MECHANICAL CHEST WALL OSCILL: CPT

## 2024-03-23 PROCEDURE — 6360000002 HC RX W HCPCS: Performed by: STUDENT IN AN ORGANIZED HEALTH CARE EDUCATION/TRAINING PROGRAM

## 2024-03-23 PROCEDURE — 94640 AIRWAY INHALATION TREATMENT: CPT

## 2024-03-23 PROCEDURE — 2700000000 HC OXYGEN THERAPY PER DAY

## 2024-03-23 PROCEDURE — 94760 N-INVAS EAR/PLS OXIMETRY 1: CPT

## 2024-03-23 PROCEDURE — 6360000002 HC RX W HCPCS: Performed by: INTERNAL MEDICINE

## 2024-03-23 PROCEDURE — 2580000003 HC RX 258: Performed by: STUDENT IN AN ORGANIZED HEALTH CARE EDUCATION/TRAINING PROGRAM

## 2024-03-23 PROCEDURE — 36415 COLL VENOUS BLD VENIPUNCTURE: CPT

## 2024-03-23 RX ORDER — FLAVOXATE HYDROCHLORIDE 100 MG/1
100 TABLET ORAL 4 TIMES DAILY PRN
Status: DISCONTINUED | OUTPATIENT
Start: 2024-03-23 | End: 2024-03-26 | Stop reason: HOSPADM

## 2024-03-23 RX ORDER — FUROSEMIDE 10 MG/ML
20 INJECTION INTRAMUSCULAR; INTRAVENOUS ONCE
Status: COMPLETED | OUTPATIENT
Start: 2024-03-23 | End: 2024-03-23

## 2024-03-23 RX ORDER — WARFARIN SODIUM 2.5 MG/1
1.25 TABLET ORAL
Status: COMPLETED | OUTPATIENT
Start: 2024-03-23 | End: 2024-03-23

## 2024-03-23 RX ORDER — TAMSULOSIN HYDROCHLORIDE 0.4 MG/1
0.4 CAPSULE ORAL 2 TIMES DAILY
Status: DISCONTINUED | OUTPATIENT
Start: 2024-03-23 | End: 2024-03-26 | Stop reason: HOSPADM

## 2024-03-23 RX ADMIN — METOPROLOL SUCCINATE 37.5 MG: 25 TABLET, FILM COATED, EXTENDED RELEASE ORAL at 08:57

## 2024-03-23 RX ADMIN — GUAIFENESIN 600 MG: 600 TABLET ORAL at 09:07

## 2024-03-23 RX ADMIN — PRAVASTATIN SODIUM 40 MG: 40 TABLET ORAL at 20:50

## 2024-03-23 RX ADMIN — ALBUTEROL SULFATE 2.5 MG: 2.5 SOLUTION RESPIRATORY (INHALATION) at 20:19

## 2024-03-23 RX ADMIN — POLYETHYLENE GLYCOL 3350 17 G: 17 POWDER, FOR SOLUTION ORAL at 20:49

## 2024-03-23 RX ADMIN — FUROSEMIDE 20 MG: 10 INJECTION, SOLUTION INTRAMUSCULAR; INTRAVENOUS at 12:44

## 2024-03-23 RX ADMIN — METHYLPREDNISOLONE SODIUM SUCCINATE 40 MG: 40 INJECTION INTRAMUSCULAR; INTRAVENOUS at 08:57

## 2024-03-23 RX ADMIN — ASPIRIN 81 MG: 81 TABLET, CHEWABLE ORAL at 08:58

## 2024-03-23 RX ADMIN — FLAVOXATE HYDROCHLORIDE 100 MG: 100 TABLET ORAL at 14:02

## 2024-03-23 RX ADMIN — SACUBITRIL AND VALSARTAN 1 TABLET: 24; 26 TABLET, FILM COATED ORAL at 20:50

## 2024-03-23 RX ADMIN — OXYCODONE AND ACETAMINOPHEN 1 TABLET: 5; 325 TABLET ORAL at 18:16

## 2024-03-23 RX ADMIN — PHENAZOPYRIDINE HYDROCHLORIDE 200 MG: 200 TABLET ORAL at 08:57

## 2024-03-23 RX ADMIN — DEXLANSOPRAZOLE 60 MG: 60 CAPSULE, DELAYED RELEASE ORAL at 06:25

## 2024-03-23 RX ADMIN — FUROSEMIDE 40 MG: 40 TABLET ORAL at 08:58

## 2024-03-23 RX ADMIN — TAMSULOSIN HYDROCHLORIDE 0.4 MG: 0.4 CAPSULE ORAL at 20:50

## 2024-03-23 RX ADMIN — INSULIN LISPRO 4 UNITS: 100 INJECTION, SOLUTION INTRAVENOUS; SUBCUTANEOUS at 20:54

## 2024-03-23 RX ADMIN — PHENAZOPYRIDINE HYDROCHLORIDE 200 MG: 200 TABLET ORAL at 16:29

## 2024-03-23 RX ADMIN — GUAIFENESIN 600 MG: 600 TABLET ORAL at 22:22

## 2024-03-23 RX ADMIN — ALBUTEROL SULFATE 2.5 MG: 2.5 SOLUTION RESPIRATORY (INHALATION) at 14:19

## 2024-03-23 RX ADMIN — LORATADINE 10 MG: 10 TABLET ORAL at 09:07

## 2024-03-23 RX ADMIN — SODIUM CHLORIDE, PRESERVATIVE FREE 10 ML: 5 INJECTION INTRAVENOUS at 09:00

## 2024-03-23 RX ADMIN — PREGABALIN 75 MG: 75 CAPSULE ORAL at 08:58

## 2024-03-23 RX ADMIN — AZITHROMYCIN 250 MG: 250 TABLET, FILM COATED ORAL at 08:57

## 2024-03-23 RX ADMIN — ZOLPIDEM TARTRATE 10 MG: 5 TABLET ORAL at 23:00

## 2024-03-23 RX ADMIN — OXYCODONE HYDROCHLORIDE AND ACETAMINOPHEN 2 TABLET: 5; 325 TABLET ORAL at 13:40

## 2024-03-23 RX ADMIN — PREGABALIN 150 MG: 75 CAPSULE ORAL at 23:00

## 2024-03-23 RX ADMIN — PHENAZOPYRIDINE HYDROCHLORIDE 200 MG: 200 TABLET ORAL at 12:44

## 2024-03-23 RX ADMIN — ALBUTEROL SULFATE 2.5 MG: 2.5 SOLUTION RESPIRATORY (INHALATION) at 07:50

## 2024-03-23 RX ADMIN — SACUBITRIL AND VALSARTAN 1 TABLET: 24; 26 TABLET, FILM COATED ORAL at 08:57

## 2024-03-23 RX ADMIN — OXYCODONE AND ACETAMINOPHEN 1 TABLET: 5; 325 TABLET ORAL at 00:42

## 2024-03-23 RX ADMIN — WARFARIN SODIUM 1.25 MG: 2.5 TABLET ORAL at 17:24

## 2024-03-23 RX ADMIN — SODIUM CHLORIDE, PRESERVATIVE FREE 10 ML: 5 INJECTION INTRAVENOUS at 20:56

## 2024-03-23 ASSESSMENT — PAIN SCALES - GENERAL
PAINLEVEL_OUTOF10: 0
PAINLEVEL_OUTOF10: 7
PAINLEVEL_OUTOF10: 5
PAINLEVEL_OUTOF10: 8
PAINLEVEL_OUTOF10: 6
PAINLEVEL_OUTOF10: 9
PAINLEVEL_OUTOF10: 5
PAINLEVEL_OUTOF10: 3

## 2024-03-23 ASSESSMENT — PAIN DESCRIPTION - DESCRIPTORS
DESCRIPTORS: SPASM
DESCRIPTORS: ACHING

## 2024-03-23 ASSESSMENT — PAIN DESCRIPTION - LOCATION
LOCATION: ABDOMEN
LOCATION: PELVIS
LOCATION: ABDOMEN
LOCATION: ABDOMEN;CHEST

## 2024-03-23 ASSESSMENT — PAIN DESCRIPTION - ONSET: ONSET: ON-GOING

## 2024-03-23 ASSESSMENT — PAIN DESCRIPTION - FREQUENCY: FREQUENCY: INTERMITTENT

## 2024-03-23 NOTE — PLAN OF CARE
Problem: Pain  Goal: Verbalizes/displays adequate comfort level or baseline comfort level  3/23/2024 1040 by Dallas Tijerina, RN  Outcome: Progressing  3/23/2024 0204 by Sonya Dickey, RN  Outcome: Progressing

## 2024-03-23 NOTE — PLAN OF CARE
Problem: Discharge Planning  Goal: Discharge to home or other facility with appropriate resources  Outcome: Progressing  Flowsheets (Taken 3/22/2024 2023)  Discharge to home or other facility with appropriate resources:   Identify barriers to discharge with patient and caregiver   Arrange for needed discharge resources and transportation as appropriate     Problem: Pain  Goal: Verbalizes/displays adequate comfort level or baseline comfort level  Outcome: Progressing     Problem: Safety - Adult  Goal: Free from fall injury  Outcome: Progressing     Problem: ABCDS Injury Assessment  Goal: Absence of physical injury  Outcome: Progressing     Problem: Chronic Conditions and Co-morbidities  Goal: Patient's chronic conditions and co-morbidity symptoms are monitored and maintained or improved  Outcome: Progressing  Flowsheets (Taken 3/22/2024 2023)  Care Plan - Patient's Chronic Conditions and Co-Morbidity Symptoms are Monitored and Maintained or Improved:   Monitor and assess patient's chronic conditions and comorbid symptoms for stability, deterioration, or improvement   Collaborate with multidisciplinary team to address chronic and comorbid conditions and prevent exacerbation or deterioration     Problem: Respiratory - Adult  Goal: Achieves optimal ventilation and oxygenation  Outcome: Progressing  Flowsheets (Taken 3/22/2024 2023)  Achieves optimal ventilation and oxygenation:   Assess for changes in respiratory status   Assess for changes in mentation and behavior   Position to facilitate oxygenation and minimize respiratory effort   Oxygen supplementation based on oxygen saturation or arterial blood gases   Encourage broncho-pulmonary hygiene including cough, deep breathe, incentive spirometry   Assess and instruct to report shortness of breath or any respiratory difficulty   Respiratory therapy support as indicated     Problem: Skin/Tissue Integrity - Adult  Goal: Skin integrity remains intact  Outcome:

## 2024-03-24 PROBLEM — E11.9 TYPE 2 DIABETES MELLITUS WITHOUT COMPLICATION, WITHOUT LONG-TERM CURRENT USE OF INSULIN (HCC): Status: ACTIVE | Noted: 2024-03-24

## 2024-03-24 LAB
ANION GAP SERPL CALCULATED.3IONS-SCNC: 6 MMOL/L (ref 3–16)
BASOPHILS # BLD: 0 K/UL (ref 0–0.2)
BASOPHILS NFR BLD: 0.1 %
BUN SERPL-MCNC: 23 MG/DL (ref 7–20)
CALCIUM SERPL-MCNC: 10.7 MG/DL (ref 8.3–10.6)
CHLORIDE SERPL-SCNC: 102 MMOL/L (ref 99–110)
CO2 SERPL-SCNC: 29 MMOL/L (ref 21–32)
CREAT SERPL-MCNC: 1.1 MG/DL (ref 0.8–1.3)
DEPRECATED RDW RBC AUTO: 13.6 % (ref 12.4–15.4)
EOSINOPHIL # BLD: 0 K/UL (ref 0–0.6)
EOSINOPHIL NFR BLD: 0 %
GFR SERPLBLD CREATININE-BSD FMLA CKD-EPI: >60 ML/MIN/{1.73_M2}
GLUCOSE BLD-MCNC: 147 MG/DL (ref 70–99)
GLUCOSE BLD-MCNC: 161 MG/DL (ref 70–99)
GLUCOSE BLD-MCNC: 176 MG/DL (ref 70–99)
GLUCOSE BLD-MCNC: 282 MG/DL (ref 70–99)
GLUCOSE BLD-MCNC: 292 MG/DL (ref 70–99)
GLUCOSE BLD-MCNC: 296 MG/DL (ref 70–99)
GLUCOSE SERPL-MCNC: 180 MG/DL (ref 70–99)
HCT VFR BLD AUTO: 41.5 % (ref 40.5–52.5)
HGB BLD-MCNC: 14.2 G/DL (ref 13.5–17.5)
INR PPP: 2.57 (ref 0.84–1.16)
LYMPHOCYTES # BLD: 0.7 K/UL (ref 1–5.1)
LYMPHOCYTES NFR BLD: 9.2 %
MCH RBC QN AUTO: 30.7 PG (ref 26–34)
MCHC RBC AUTO-ENTMCNC: 34.2 G/DL (ref 31–36)
MCV RBC AUTO: 89.8 FL (ref 80–100)
MONOCYTES # BLD: 0.6 K/UL (ref 0–1.3)
MONOCYTES NFR BLD: 7.4 %
NEUTROPHILS # BLD: 6.7 K/UL (ref 1.7–7.7)
NEUTROPHILS NFR BLD: 83.3 %
PERFORMED ON: ABNORMAL
PLATELET # BLD AUTO: 173 K/UL (ref 135–450)
PMV BLD AUTO: 7.8 FL (ref 5–10.5)
POTASSIUM SERPL-SCNC: 4.3 MMOL/L (ref 3.5–5.1)
PROTHROMBIN TIME: 27.4 SEC (ref 11.5–14.8)
RBC # BLD AUTO: 4.62 M/UL (ref 4.2–5.9)
SODIUM SERPL-SCNC: 137 MMOL/L (ref 136–145)
WBC # BLD AUTO: 8 K/UL (ref 4–11)

## 2024-03-24 PROCEDURE — 80048 BASIC METABOLIC PNL TOTAL CA: CPT

## 2024-03-24 PROCEDURE — 2580000003 HC RX 258

## 2024-03-24 PROCEDURE — 6370000000 HC RX 637 (ALT 250 FOR IP): Performed by: INTERNAL MEDICINE

## 2024-03-24 PROCEDURE — 2700000000 HC OXYGEN THERAPY PER DAY

## 2024-03-24 PROCEDURE — 6370000000 HC RX 637 (ALT 250 FOR IP): Performed by: STUDENT IN AN ORGANIZED HEALTH CARE EDUCATION/TRAINING PROGRAM

## 2024-03-24 PROCEDURE — 2580000003 HC RX 258: Performed by: STUDENT IN AN ORGANIZED HEALTH CARE EDUCATION/TRAINING PROGRAM

## 2024-03-24 PROCEDURE — 6360000002 HC RX W HCPCS: Performed by: STUDENT IN AN ORGANIZED HEALTH CARE EDUCATION/TRAINING PROGRAM

## 2024-03-24 PROCEDURE — 36415 COLL VENOUS BLD VENIPUNCTURE: CPT

## 2024-03-24 PROCEDURE — 6360000002 HC RX W HCPCS: Performed by: INTERNAL MEDICINE

## 2024-03-24 PROCEDURE — 94669 MECHANICAL CHEST WALL OSCILL: CPT

## 2024-03-24 PROCEDURE — 85610 PROTHROMBIN TIME: CPT

## 2024-03-24 PROCEDURE — 85025 COMPLETE CBC W/AUTO DIFF WBC: CPT

## 2024-03-24 PROCEDURE — 1200000000 HC SEMI PRIVATE

## 2024-03-24 PROCEDURE — 94640 AIRWAY INHALATION TREATMENT: CPT

## 2024-03-24 PROCEDURE — 94760 N-INVAS EAR/PLS OXIMETRY 1: CPT

## 2024-03-24 RX ORDER — INSULIN LISPRO 100 [IU]/ML
7 INJECTION, SOLUTION INTRAVENOUS; SUBCUTANEOUS ONCE
Status: COMPLETED | OUTPATIENT
Start: 2024-03-24 | End: 2024-03-24

## 2024-03-24 RX ORDER — LACTOBACILLUS RHAMNOSUS GG 10B CELL
1 CAPSULE ORAL
Status: DISCONTINUED | OUTPATIENT
Start: 2024-03-24 | End: 2024-03-26 | Stop reason: HOSPADM

## 2024-03-24 RX ORDER — INSULIN LISPRO 100 [IU]/ML
15 INJECTION, SOLUTION INTRAVENOUS; SUBCUTANEOUS ONCE
Status: COMPLETED | OUTPATIENT
Start: 2024-03-24 | End: 2024-03-24

## 2024-03-24 RX ORDER — INSULIN GLARGINE 100 [IU]/ML
10 INJECTION, SOLUTION SUBCUTANEOUS NIGHTLY
Status: DISCONTINUED | OUTPATIENT
Start: 2024-03-24 | End: 2024-03-26 | Stop reason: HOSPADM

## 2024-03-24 RX ORDER — FUROSEMIDE 10 MG/ML
10 INJECTION INTRAMUSCULAR; INTRAVENOUS ONCE
Status: COMPLETED | OUTPATIENT
Start: 2024-03-24 | End: 2024-03-24

## 2024-03-24 RX ORDER — WARFARIN SODIUM 2.5 MG/1
1.25 TABLET ORAL
Status: COMPLETED | OUTPATIENT
Start: 2024-03-24 | End: 2024-03-24

## 2024-03-24 RX ORDER — POLYETHYLENE GLYCOL 3350 17 G/17G
17 POWDER, FOR SOLUTION ORAL 2 TIMES DAILY
Status: DISCONTINUED | OUTPATIENT
Start: 2024-03-24 | End: 2024-03-26 | Stop reason: HOSPADM

## 2024-03-24 RX ORDER — WATER 10 ML/10ML
INJECTION INTRAMUSCULAR; INTRAVENOUS; SUBCUTANEOUS
Status: COMPLETED
Start: 2024-03-24 | End: 2024-03-24

## 2024-03-24 RX ADMIN — PRAVASTATIN SODIUM 40 MG: 40 TABLET ORAL at 20:56

## 2024-03-24 RX ADMIN — WATER 10 ML: 1 INJECTION INTRAMUSCULAR; INTRAVENOUS; SUBCUTANEOUS at 09:21

## 2024-03-24 RX ADMIN — WARFARIN SODIUM 1.25 MG: 2.5 TABLET ORAL at 17:03

## 2024-03-24 RX ADMIN — SODIUM CHLORIDE, PRESERVATIVE FREE 10 ML: 5 INJECTION INTRAVENOUS at 09:12

## 2024-03-24 RX ADMIN — INSULIN LISPRO 15 UNITS: 100 INJECTION, SOLUTION INTRAVENOUS; SUBCUTANEOUS at 18:36

## 2024-03-24 RX ADMIN — PHENAZOPYRIDINE HYDROCHLORIDE 200 MG: 200 TABLET ORAL at 09:07

## 2024-03-24 RX ADMIN — METOPROLOL SUCCINATE 37.5 MG: 25 TABLET, FILM COATED, EXTENDED RELEASE ORAL at 11:41

## 2024-03-24 RX ADMIN — TAMSULOSIN HYDROCHLORIDE 0.4 MG: 0.4 CAPSULE ORAL at 09:09

## 2024-03-24 RX ADMIN — POLYETHYLENE GLYCOL 3350 17 G: 17 POWDER, FOR SOLUTION ORAL at 13:14

## 2024-03-24 RX ADMIN — TAMSULOSIN HYDROCHLORIDE 0.4 MG: 0.4 CAPSULE ORAL at 20:56

## 2024-03-24 RX ADMIN — SACUBITRIL AND VALSARTAN 1 TABLET: 24; 26 TABLET, FILM COATED ORAL at 20:56

## 2024-03-24 RX ADMIN — ALBUTEROL SULFATE 2.5 MG: 2.5 SOLUTION RESPIRATORY (INHALATION) at 08:16

## 2024-03-24 RX ADMIN — DEXLANSOPRAZOLE 60 MG: 60 CAPSULE, DELAYED RELEASE ORAL at 06:18

## 2024-03-24 RX ADMIN — OXYCODONE AND ACETAMINOPHEN 1 TABLET: 5; 325 TABLET ORAL at 17:09

## 2024-03-24 RX ADMIN — INSULIN LISPRO 2 UNITS: 100 INJECTION, SOLUTION INTRAVENOUS; SUBCUTANEOUS at 17:03

## 2024-03-24 RX ADMIN — ZOLPIDEM TARTRATE 10 MG: 5 TABLET ORAL at 23:01

## 2024-03-24 RX ADMIN — Medication 1 CAPSULE: at 13:14

## 2024-03-24 RX ADMIN — PHENAZOPYRIDINE HYDROCHLORIDE 200 MG: 200 TABLET ORAL at 18:39

## 2024-03-24 RX ADMIN — OXYCODONE AND ACETAMINOPHEN 1 TABLET: 5; 325 TABLET ORAL at 09:16

## 2024-03-24 RX ADMIN — SACUBITRIL AND VALSARTAN 1 TABLET: 24; 26 TABLET, FILM COATED ORAL at 09:08

## 2024-03-24 RX ADMIN — AZITHROMYCIN 250 MG: 250 TABLET, FILM COATED ORAL at 09:08

## 2024-03-24 RX ADMIN — INSULIN GLARGINE 10 UNITS: 100 INJECTION, SOLUTION SUBCUTANEOUS at 20:57

## 2024-03-24 RX ADMIN — ASPIRIN 81 MG: 81 TABLET, CHEWABLE ORAL at 09:09

## 2024-03-24 RX ADMIN — FUROSEMIDE 10 MG: 10 INJECTION, SOLUTION INTRAMUSCULAR; INTRAVENOUS at 11:41

## 2024-03-24 RX ADMIN — FLAVOXATE HYDROCHLORIDE 100 MG: 100 TABLET ORAL at 21:03

## 2024-03-24 RX ADMIN — ALBUTEROL SULFATE 2.5 MG: 2.5 SOLUTION RESPIRATORY (INHALATION) at 12:50

## 2024-03-24 RX ADMIN — LORATADINE 10 MG: 10 TABLET ORAL at 09:21

## 2024-03-24 RX ADMIN — SODIUM CHLORIDE, PRESERVATIVE FREE 10 ML: 5 INJECTION INTRAVENOUS at 20:59

## 2024-03-24 RX ADMIN — PREGABALIN 150 MG: 75 CAPSULE ORAL at 23:00

## 2024-03-24 RX ADMIN — ALBUTEROL SULFATE 2.5 MG: 2.5 SOLUTION RESPIRATORY (INHALATION) at 19:30

## 2024-03-24 RX ADMIN — PHENAZOPYRIDINE HYDROCHLORIDE 200 MG: 200 TABLET ORAL at 11:42

## 2024-03-24 RX ADMIN — POLYETHYLENE GLYCOL 3350 17 G: 17 POWDER, FOR SOLUTION ORAL at 20:57

## 2024-03-24 RX ADMIN — METHYLPREDNISOLONE SODIUM SUCCINATE 40 MG: 40 INJECTION INTRAMUSCULAR; INTRAVENOUS at 09:10

## 2024-03-24 RX ADMIN — INSULIN LISPRO 7 UNITS: 100 INJECTION, SOLUTION INTRAVENOUS; SUBCUTANEOUS at 15:20

## 2024-03-24 RX ADMIN — PREGABALIN 75 MG: 75 CAPSULE ORAL at 11:42

## 2024-03-24 ASSESSMENT — PAIN DESCRIPTION - ORIENTATION
ORIENTATION: LOWER
ORIENTATION: RIGHT;MID
ORIENTATION: MID

## 2024-03-24 ASSESSMENT — PAIN DESCRIPTION - ONSET: ONSET: ON-GOING

## 2024-03-24 ASSESSMENT — PAIN SCALES - GENERAL
PAINLEVEL_OUTOF10: 7
PAINLEVEL_OUTOF10: 5
PAINLEVEL_OUTOF10: 3
PAINLEVEL_OUTOF10: 5
PAINLEVEL_OUTOF10: 2
PAINLEVEL_OUTOF10: 6
PAINLEVEL_OUTOF10: 7

## 2024-03-24 ASSESSMENT — PAIN DESCRIPTION - LOCATION
LOCATION: ABDOMEN
LOCATION: ABDOMEN;BACK
LOCATION: PELVIS
LOCATION: ABDOMEN;BACK

## 2024-03-24 ASSESSMENT — PAIN DESCRIPTION - FREQUENCY: FREQUENCY: INTERMITTENT

## 2024-03-24 ASSESSMENT — PAIN - FUNCTIONAL ASSESSMENT: PAIN_FUNCTIONAL_ASSESSMENT: ACTIVITIES ARE NOT PREVENTED

## 2024-03-24 ASSESSMENT — PAIN DESCRIPTION - DESCRIPTORS
DESCRIPTORS: SHARP
DESCRIPTORS: DISCOMFORT
DESCRIPTORS: SPASM
DESCRIPTORS: DISCOMFORT

## 2024-03-24 NOTE — RT PROTOCOL NOTE
RT Inhaler-Nebulizer Bronchodilator Protocol Note    There is a bronchodilator order in the chart from a provider indicating to follow the RT Bronchodilator Protocol and there is an “Initiate RT Inhaler-Nebulizer Bronchodilator Protocol” order as well (see protocol at bottom of note).    CXR Findings:  No results found.    The findings from the last RT Protocol Assessment were as follows:   History Pulmonary Disease: Chronic pulmonary disease  Respiratory Pattern: Regular pattern and RR 12-20 bpm  Breath Sounds: Slightly diminished and/or crackles  Cough: Strong, productive  Indication for Bronchodilator Therapy: On home bronchodilators  Bronchodilator Assessment Score: 5    Aerosolized bronchodilator medication orders have been revised according to the RT Inhaler-Nebulizer Bronchodilator Protocol below.    Respiratory Therapist to perform RT Therapy Protocol Assessment initially then follow the protocol.  Repeat RT Therapy Protocol Assessment PRN for score 0-3 or on second treatment, BID, and PRN for scores above 3.    No Indications - adjust the frequency to every 6 hours PRN wheezing or bronchospasm, if no treatments needed after 48 hours then discontinue using Per Protocol order mode.     If indication present, adjust the RT bronchodilator orders based on the Bronchodilator Assessment Score as indicated below.  Use Inhaler orders unless patient has one or more of the following: on home nebulizer, not able to hold breath for 10 seconds, is not alert and oriented, cannot activate and use MDI correctly, or respiratory rate 25 breaths per minute or more, then use the equivalent nebulizer order(s) with same Frequency and PRN reasons based on the score.  If a patient is on this medication at home then do not decrease Frequency below that used at home.    0-3 - enter or revise RT bronchodilator order(s) to equivalent RT Bronchodilator order with Frequency of every 4 hours PRN for wheezing or increased work of breathing 
Bronchodilator order with Frequency of every 4 hours PRN for wheezing or increased work of breathing using Per Protocol order mode.        4-6 - enter or revise RT Bronchodilator order(s) to two equivalent RT bronchodilator orders with one order with BID Frequency and one order with Frequency of every 4 hours PRN wheezing or increased work of breathing using Per Protocol order mode.        7-10 - enter or revise RT Bronchodilator order(s) to two equivalent RT bronchodilator orders with one order with TID Frequency and one order with Frequency of every 4 hours PRN wheezing or increased work of breathing using Per Protocol order mode.       11-13 - enter or revise RT Bronchodilator order(s) to one equivalent RT bronchodilator order with QID Frequency and an Albuterol order with Frequency of every 4 hours PRN wheezing or increased work of breathing using Per Protocol order mode.      Greater than 13 - enter or revise RT Bronchodilator order(s) to one equivalent RT bronchodilator order with every 4 hours Frequency and an Albuterol order with Frequency of every 2 hours PRN wheezing or increased work of breathing using Per Protocol order mode.     RT to enter RT Home Evaluation for COPD & MDI Assessment order using Per Protocol order mode.    Electronically signed by Magdiel Mason RCP on 3/23/2024 at 8:25 PM

## 2024-03-24 NOTE — PLAN OF CARE
Problem: Discharge Planning  Goal: Discharge to home or other facility with appropriate resources  Outcome: Progressing  Flowsheets (Taken 3/23/2024 2047)  Discharge to home or other facility with appropriate resources: Identify barriers to discharge with patient and caregiver     Problem: Pain  Goal: Verbalizes/displays adequate comfort level or baseline comfort level  Outcome: Progressing     Problem: Safety - Adult  Goal: Free from fall injury  Outcome: Progressing     Problem: ABCDS Injury Assessment  Goal: Absence of physical injury  Outcome: Progressing     Problem: Chronic Conditions and Co-morbidities  Goal: Patient's chronic conditions and co-morbidity symptoms are monitored and maintained or improved  Outcome: Progressing  Flowsheets (Taken 3/23/2024 2047)  Care Plan - Patient's Chronic Conditions and Co-Morbidity Symptoms are Monitored and Maintained or Improved: Monitor and assess patient's chronic conditions and comorbid symptoms for stability, deterioration, or improvement     Problem: Respiratory - Adult  Goal: Achieves optimal ventilation and oxygenation  Outcome: Progressing     Problem: Skin/Tissue Integrity - Adult  Goal: Skin integrity remains intact  Outcome: Progressing  Flowsheets (Taken 3/23/2024 2047)  Skin Integrity Remains Intact: Monitor for areas of redness and/or skin breakdown     Problem: Musculoskeletal - Adult  Goal: Return mobility to safest level of function  Outcome: Progressing  Goal: Return ADL status to a safe level of function  Outcome: Progressing     Problem: Infection - Adult  Goal: Absence of infection at discharge  Outcome: Progressing  Flowsheets (Taken 3/23/2024 2047)  Absence of infection at discharge: Assess and monitor for signs and symptoms of infection     Problem: Metabolic/Fluid and Electrolytes - Adult  Goal: Electrolytes maintained within normal limits  Outcome: Progressing  Flowsheets (Taken 3/23/2024 2047)  Electrolytes maintained within normal limits:

## 2024-03-24 NOTE — PLAN OF CARE
Problem: Pain  Goal: Verbalizes/displays adequate comfort level or baseline comfort level  3/24/2024 1044 by Rosalba Guzman RN  Outcome: Progressing     Problem: Safety - Adult  Goal: Free from fall injury  3/24/2024 1044 by Rosalba Guzman RN  Outcome: Progressing     Problem: ABCDS Injury Assessment  Goal: Absence of physical injury  3/24/2024 1044 by Rosalba Guzman RN  Outcome: Progressing     Problem: Chronic Conditions and Co-morbidities  Goal: Patient's chronic conditions and co-morbidity symptoms are monitored and maintained or improved  3/24/2024 1044 by Rosalba Guzman RN  Outcome: Progressing     Problem: Respiratory - Adult  Goal: Achieves optimal ventilation and oxygenation  3/24/2024 1044 by Rosalba Guzman RN  Outcome: Progressing     Problem: Skin/Tissue Integrity - Adult  Goal: Skin integrity remains intact  3/24/2024 1044 by Rosalba Guzman RN  Outcome: Progressing     Problem: Musculoskeletal - Adult  Goal: Return mobility to safest level of function  3/24/2024 1044 by Rosalba Guzman RN  Outcome: Progressing     Problem: Musculoskeletal - Adult  Goal: Return ADL status to a safe level of function  3/24/2024 1044 by Rosalba Guzman RN  Outcome: Progressing     Problem: Infection - Adult  Goal: Absence of infection at discharge  3/24/2024 1044 by Rosalba Guzman RN  Outcome: Progressing     Problem: Metabolic/Fluid and Electrolytes - Adult  Goal: Electrolytes maintained within normal limits  3/24/2024 1044 by Rosalba Guzman RN  Outcome: Progressing     Problem: Metabolic/Fluid and Electrolytes - Adult  Goal: Glucose maintained within prescribed range  3/24/2024 1044 by Rosalba Guzman RN  Outcome: Progressing     Problem: Discharge Planning  Goal: Discharge to home or other facility with appropriate resources  3/24/2024 1044 by Rosalba Guzman RN  Outcome: Progressing

## 2024-03-25 ENCOUNTER — TELEPHONE (OUTPATIENT)
Dept: PULMONOLOGY | Age: 64
End: 2024-03-25

## 2024-03-25 PROBLEM — J44.1 COPD EXACERBATION (HCC): Status: RESOLVED | Noted: 2024-03-20 | Resolved: 2024-03-25

## 2024-03-25 LAB
ANION GAP SERPL CALCULATED.3IONS-SCNC: 7 MMOL/L (ref 3–16)
BASOPHILS # BLD: 0 K/UL (ref 0–0.2)
BASOPHILS NFR BLD: 0.1 %
BUN SERPL-MCNC: 24 MG/DL (ref 7–20)
CALCIUM SERPL-MCNC: 10.7 MG/DL (ref 8.3–10.6)
CHLORIDE SERPL-SCNC: 102 MMOL/L (ref 99–110)
CO2 SERPL-SCNC: 30 MMOL/L (ref 21–32)
CREAT SERPL-MCNC: 1 MG/DL (ref 0.8–1.3)
DEPRECATED RDW RBC AUTO: 13.4 % (ref 12.4–15.4)
EOSINOPHIL # BLD: 0 K/UL (ref 0–0.6)
EOSINOPHIL NFR BLD: 0 %
GFR SERPLBLD CREATININE-BSD FMLA CKD-EPI: >60 ML/MIN/{1.73_M2}
GLUCOSE BLD-MCNC: 136 MG/DL (ref 70–99)
GLUCOSE BLD-MCNC: 176 MG/DL (ref 70–99)
GLUCOSE BLD-MCNC: 231 MG/DL (ref 70–99)
GLUCOSE BLD-MCNC: 232 MG/DL (ref 70–99)
GLUCOSE SERPL-MCNC: 173 MG/DL (ref 70–99)
HCT VFR BLD AUTO: 43.7 % (ref 40.5–52.5)
HGB BLD-MCNC: 14.7 G/DL (ref 13.5–17.5)
INR PPP: 2.22 (ref 0.84–1.16)
LYMPHOCYTES # BLD: 0.7 K/UL (ref 1–5.1)
LYMPHOCYTES NFR BLD: 8.3 %
MCH RBC QN AUTO: 30.5 PG (ref 26–34)
MCHC RBC AUTO-ENTMCNC: 33.5 G/DL (ref 31–36)
MCV RBC AUTO: 90.9 FL (ref 80–100)
MONOCYTES # BLD: 0.8 K/UL (ref 0–1.3)
MONOCYTES NFR BLD: 9.1 %
NEUTROPHILS # BLD: 7 K/UL (ref 1.7–7.7)
NEUTROPHILS NFR BLD: 82.5 %
PERFORMED ON: ABNORMAL
PLATELET # BLD AUTO: 194 K/UL (ref 135–450)
PMV BLD AUTO: 8.2 FL (ref 5–10.5)
POTASSIUM SERPL-SCNC: 4.1 MMOL/L (ref 3.5–5.1)
PROTHROMBIN TIME: 24.5 SEC (ref 11.5–14.8)
RBC # BLD AUTO: 4.81 M/UL (ref 4.2–5.9)
SODIUM SERPL-SCNC: 139 MMOL/L (ref 136–145)
WBC # BLD AUTO: 8.5 K/UL (ref 4–11)

## 2024-03-25 PROCEDURE — 6370000000 HC RX 637 (ALT 250 FOR IP): Performed by: INTERNAL MEDICINE

## 2024-03-25 PROCEDURE — 9990000010 HC NO CHARGE VISIT

## 2024-03-25 PROCEDURE — 94760 N-INVAS EAR/PLS OXIMETRY 1: CPT

## 2024-03-25 PROCEDURE — 85610 PROTHROMBIN TIME: CPT

## 2024-03-25 PROCEDURE — 6370000000 HC RX 637 (ALT 250 FOR IP): Performed by: STUDENT IN AN ORGANIZED HEALTH CARE EDUCATION/TRAINING PROGRAM

## 2024-03-25 PROCEDURE — 2700000000 HC OXYGEN THERAPY PER DAY

## 2024-03-25 PROCEDURE — 94669 MECHANICAL CHEST WALL OSCILL: CPT

## 2024-03-25 PROCEDURE — 2580000003 HC RX 258

## 2024-03-25 PROCEDURE — 94640 AIRWAY INHALATION TREATMENT: CPT

## 2024-03-25 PROCEDURE — 6360000002 HC RX W HCPCS: Performed by: STUDENT IN AN ORGANIZED HEALTH CARE EDUCATION/TRAINING PROGRAM

## 2024-03-25 PROCEDURE — 36415 COLL VENOUS BLD VENIPUNCTURE: CPT

## 2024-03-25 PROCEDURE — 80048 BASIC METABOLIC PNL TOTAL CA: CPT

## 2024-03-25 PROCEDURE — 2580000003 HC RX 258: Performed by: STUDENT IN AN ORGANIZED HEALTH CARE EDUCATION/TRAINING PROGRAM

## 2024-03-25 PROCEDURE — 85025 COMPLETE CBC W/AUTO DIFF WBC: CPT

## 2024-03-25 PROCEDURE — 1200000000 HC SEMI PRIVATE

## 2024-03-25 RX ORDER — WATER 10 ML/10ML
INJECTION INTRAMUSCULAR; INTRAVENOUS; SUBCUTANEOUS
Status: COMPLETED
Start: 2024-03-25 | End: 2024-03-25

## 2024-03-25 RX ORDER — DICYCLOMINE HYDROCHLORIDE 10 MG/1
10 CAPSULE ORAL
Status: DISCONTINUED | OUTPATIENT
Start: 2024-03-25 | End: 2024-03-26 | Stop reason: HOSPADM

## 2024-03-25 RX ORDER — PREDNISONE 20 MG/1
40 TABLET ORAL DAILY
Qty: 6 TABLET | Refills: 0 | Status: SHIPPED | OUTPATIENT
Start: 2024-03-25 | End: 2024-03-28

## 2024-03-25 RX ORDER — AZITHROMYCIN 250 MG/1
250 TABLET, FILM COATED ORAL DAILY
Qty: 1 TABLET | Refills: 0 | Status: SHIPPED | OUTPATIENT
Start: 2024-03-26 | End: 2024-03-26 | Stop reason: HOSPADM

## 2024-03-25 RX ORDER — WARFARIN SODIUM 2.5 MG/1
2.5 TABLET ORAL
Status: COMPLETED | OUTPATIENT
Start: 2024-03-25 | End: 2024-03-25

## 2024-03-25 RX ADMIN — OXYCODONE AND ACETAMINOPHEN 1 TABLET: 5; 325 TABLET ORAL at 18:05

## 2024-03-25 RX ADMIN — ALBUTEROL SULFATE 2.5 MG: 2.5 SOLUTION RESPIRATORY (INHALATION) at 21:23

## 2024-03-25 RX ADMIN — AZITHROMYCIN 250 MG: 250 TABLET, FILM COATED ORAL at 08:30

## 2024-03-25 RX ADMIN — WATER 10 ML: 1 INJECTION INTRAMUSCULAR; INTRAVENOUS; SUBCUTANEOUS at 08:25

## 2024-03-25 RX ADMIN — ZOLPIDEM TARTRATE 10 MG: 5 TABLET ORAL at 23:04

## 2024-03-25 RX ADMIN — OXYCODONE AND ACETAMINOPHEN 1 TABLET: 5; 325 TABLET ORAL at 00:22

## 2024-03-25 RX ADMIN — PREGABALIN 75 MG: 75 CAPSULE ORAL at 08:30

## 2024-03-25 RX ADMIN — SODIUM CHLORIDE, PRESERVATIVE FREE 10 ML: 5 INJECTION INTRAVENOUS at 22:03

## 2024-03-25 RX ADMIN — ALBUTEROL SULFATE 2.5 MG: 2.5 SOLUTION RESPIRATORY (INHALATION) at 14:08

## 2024-03-25 RX ADMIN — PREGABALIN 150 MG: 75 CAPSULE ORAL at 22:02

## 2024-03-25 RX ADMIN — PHENAZOPYRIDINE HYDROCHLORIDE 200 MG: 200 TABLET ORAL at 12:40

## 2024-03-25 RX ADMIN — SODIUM CHLORIDE, PRESERVATIVE FREE 10 ML: 5 INJECTION INTRAVENOUS at 08:25

## 2024-03-25 RX ADMIN — TAMSULOSIN HYDROCHLORIDE 0.4 MG: 0.4 CAPSULE ORAL at 08:23

## 2024-03-25 RX ADMIN — SACUBITRIL AND VALSARTAN 1 TABLET: 24; 26 TABLET, FILM COATED ORAL at 08:23

## 2024-03-25 RX ADMIN — TAMSULOSIN HYDROCHLORIDE 0.4 MG: 0.4 CAPSULE ORAL at 22:02

## 2024-03-25 RX ADMIN — INSULIN GLARGINE 10 UNITS: 100 INJECTION, SOLUTION SUBCUTANEOUS at 22:03

## 2024-03-25 RX ADMIN — PHENAZOPYRIDINE HYDROCHLORIDE 200 MG: 200 TABLET ORAL at 18:02

## 2024-03-25 RX ADMIN — SACUBITRIL AND VALSARTAN 1 TABLET: 24; 26 TABLET, FILM COATED ORAL at 22:02

## 2024-03-25 RX ADMIN — DEXLANSOPRAZOLE 60 MG: 60 CAPSULE, DELAYED RELEASE ORAL at 06:45

## 2024-03-25 RX ADMIN — PHENAZOPYRIDINE HYDROCHLORIDE 200 MG: 200 TABLET ORAL at 08:23

## 2024-03-25 RX ADMIN — METOPROLOL SUCCINATE 37.5 MG: 25 TABLET, FILM COATED, EXTENDED RELEASE ORAL at 12:40

## 2024-03-25 RX ADMIN — WARFARIN SODIUM 2.5 MG: 2.5 TABLET ORAL at 18:02

## 2024-03-25 RX ADMIN — LORATADINE 10 MG: 10 TABLET ORAL at 12:40

## 2024-03-25 RX ADMIN — Medication 1 CAPSULE: at 08:23

## 2024-03-25 RX ADMIN — GUAIFENESIN 600 MG: 600 TABLET ORAL at 12:50

## 2024-03-25 RX ADMIN — ALBUTEROL SULFATE 2.5 MG: 2.5 SOLUTION RESPIRATORY (INHALATION) at 08:54

## 2024-03-25 RX ADMIN — POLYETHYLENE GLYCOL 3350 17 G: 17 POWDER, FOR SOLUTION ORAL at 22:03

## 2024-03-25 RX ADMIN — INSULIN LISPRO 1 UNITS: 100 INJECTION, SOLUTION INTRAVENOUS; SUBCUTANEOUS at 18:01

## 2024-03-25 RX ADMIN — METHYLPREDNISOLONE SODIUM SUCCINATE 40 MG: 40 INJECTION INTRAMUSCULAR; INTRAVENOUS at 08:25

## 2024-03-25 RX ADMIN — ASPIRIN 81 MG: 81 TABLET, CHEWABLE ORAL at 08:22

## 2024-03-25 RX ADMIN — PRAVASTATIN SODIUM 40 MG: 40 TABLET ORAL at 22:02

## 2024-03-25 RX ADMIN — DICYCLOMINE HYDROCHLORIDE 10 MG: 10 CAPSULE ORAL at 12:40

## 2024-03-25 RX ADMIN — POLYETHYLENE GLYCOL 3350 17 G: 17 POWDER, FOR SOLUTION ORAL at 12:52

## 2024-03-25 ASSESSMENT — PAIN DESCRIPTION - DESCRIPTORS: DESCRIPTORS: ACHING;DISCOMFORT

## 2024-03-25 ASSESSMENT — PAIN DESCRIPTION - LOCATION
LOCATION: ABDOMEN
LOCATION: ABDOMEN

## 2024-03-25 ASSESSMENT — PAIN SCALES - GENERAL
PAINLEVEL_OUTOF10: 6
PAINLEVEL_OUTOF10: 6
PAINLEVEL_OUTOF10: 0

## 2024-03-25 ASSESSMENT — PAIN DESCRIPTION - ORIENTATION: ORIENTATION: LOWER

## 2024-03-25 NOTE — PLAN OF CARE
Problem: Discharge Planning  Goal: Discharge to home or other facility with appropriate resources  Outcome: Progressing     Problem: Pain  Goal: Verbalizes/displays adequate comfort level or baseline comfort level  Outcome: Progressing     Problem: Safety - Adult  Goal: Free from fall injury  Outcome: Progressing     Problem: ABCDS Injury Assessment  Goal: Absence of physical injury  Outcome: Progressing     Problem: Chronic Conditions and Co-morbidities  Goal: Patient's chronic conditions and co-morbidity symptoms are monitored and maintained or improved  Outcome: Progressing     Problem: Respiratory - Adult  Goal: Achieves optimal ventilation and oxygenation  Outcome: Progressing     Problem: Skin/Tissue Integrity - Adult  Goal: Skin integrity remains intact  Outcome: Progressing     Problem: Musculoskeletal - Adult  Goal: Return mobility to safest level of function  Outcome: Progressing  Goal: Return ADL status to a safe level of function  Outcome: Progressing     Problem: Infection - Adult  Goal: Absence of infection at discharge  Outcome: Progressing     Problem: Metabolic/Fluid and Electrolytes - Adult  Goal: Electrolytes maintained within normal limits  Outcome: Progressing  Goal: Glucose maintained within prescribed range  Outcome: Progressing

## 2024-03-25 NOTE — CARE COORDINATION
CASE MANAGEMENT DISCHARGE SUMMARY: Discharge order noted. Patient returning to home with spouse. On oxygen at 2 lpm. Spouse to bring oxygen tank in for transport home.     DISCHARGE DATE: 3/25/24    DISCHARGED TO HOME     TRANSPORTATION: Spouse             TIME: Afternoon     PREFERRED PHARMACY: [facility]       Ginny DUDLEY, RN  Case Management  [phone]             Electronically signed by Ginny Burger RN on 3/25/2024 at 12:01 PM

## 2024-03-25 NOTE — TELEPHONE ENCOUNTER
Jovon is in the hospital and asking for Dr Christy to see him. He says when he exerts himself he feels pressure on his chest. He says he mentioned this to the ED doctor and they just sent him upstairs. I advised Jovon that Dr Christy has to be invited in and to ask the nurse/hospitalist if they can consult Dr Christy. Jovon wants to see Dr Christy tomorrow. Advised I would send the message.

## 2024-03-26 VITALS
HEART RATE: 64 BPM | RESPIRATION RATE: 16 BRPM | SYSTOLIC BLOOD PRESSURE: 103 MMHG | OXYGEN SATURATION: 96 % | HEIGHT: 66 IN | TEMPERATURE: 98.1 F | DIASTOLIC BLOOD PRESSURE: 57 MMHG | BODY MASS INDEX: 34.76 KG/M2 | WEIGHT: 216.27 LBS

## 2024-03-26 LAB
ANION GAP SERPL CALCULATED.3IONS-SCNC: 5 MMOL/L (ref 3–16)
BUN SERPL-MCNC: 26 MG/DL (ref 7–20)
CALCIUM SERPL-MCNC: 10.5 MG/DL (ref 8.3–10.6)
CHLORIDE SERPL-SCNC: 104 MMOL/L (ref 99–110)
CO2 SERPL-SCNC: 32 MMOL/L (ref 21–32)
CREAT SERPL-MCNC: 1 MG/DL (ref 0.8–1.3)
GFR SERPLBLD CREATININE-BSD FMLA CKD-EPI: 84 ML/MIN/{1.73_M2}
GLUCOSE BLD-MCNC: 126 MG/DL (ref 70–99)
GLUCOSE BLD-MCNC: 160 MG/DL (ref 70–99)
GLUCOSE SERPL-MCNC: 154 MG/DL (ref 70–99)
INR PPP: 2.14 (ref 0.84–1.16)
PERFORMED ON: ABNORMAL
PERFORMED ON: ABNORMAL
POTASSIUM SERPL-SCNC: 4.6 MMOL/L (ref 3.5–5.1)
PROTHROMBIN TIME: 23.8 SEC (ref 11.5–14.8)
SODIUM SERPL-SCNC: 141 MMOL/L (ref 136–145)

## 2024-03-26 PROCEDURE — 85610 PROTHROMBIN TIME: CPT

## 2024-03-26 PROCEDURE — 36415 COLL VENOUS BLD VENIPUNCTURE: CPT

## 2024-03-26 PROCEDURE — 2580000003 HC RX 258: Performed by: STUDENT IN AN ORGANIZED HEALTH CARE EDUCATION/TRAINING PROGRAM

## 2024-03-26 PROCEDURE — 2700000000 HC OXYGEN THERAPY PER DAY

## 2024-03-26 PROCEDURE — 6360000002 HC RX W HCPCS: Performed by: STUDENT IN AN ORGANIZED HEALTH CARE EDUCATION/TRAINING PROGRAM

## 2024-03-26 PROCEDURE — 6370000000 HC RX 637 (ALT 250 FOR IP): Performed by: STUDENT IN AN ORGANIZED HEALTH CARE EDUCATION/TRAINING PROGRAM

## 2024-03-26 PROCEDURE — 6370000000 HC RX 637 (ALT 250 FOR IP): Performed by: INTERNAL MEDICINE

## 2024-03-26 PROCEDURE — 94669 MECHANICAL CHEST WALL OSCILL: CPT

## 2024-03-26 PROCEDURE — 94640 AIRWAY INHALATION TREATMENT: CPT

## 2024-03-26 PROCEDURE — 80048 BASIC METABOLIC PNL TOTAL CA: CPT

## 2024-03-26 PROCEDURE — 94760 N-INVAS EAR/PLS OXIMETRY 1: CPT

## 2024-03-26 RX ORDER — WARFARIN SODIUM 2.5 MG/1
2.5 TABLET ORAL
Status: DISCONTINUED | OUTPATIENT
Start: 2024-03-26 | End: 2024-03-26 | Stop reason: HOSPADM

## 2024-03-26 RX ADMIN — ALBUTEROL SULFATE 2.5 MG: 2.5 SOLUTION RESPIRATORY (INHALATION) at 08:23

## 2024-03-26 RX ADMIN — OXYCODONE AND ACETAMINOPHEN 1 TABLET: 5; 325 TABLET ORAL at 09:43

## 2024-03-26 RX ADMIN — SODIUM CHLORIDE, PRESERVATIVE FREE 10 ML: 5 INJECTION INTRAVENOUS at 09:47

## 2024-03-26 RX ADMIN — ALBUTEROL SULFATE 2.5 MG: 2.5 SOLUTION RESPIRATORY (INHALATION) at 12:45

## 2024-03-26 RX ADMIN — METOPROLOL SUCCINATE 37.5 MG: 25 TABLET, FILM COATED, EXTENDED RELEASE ORAL at 11:29

## 2024-03-26 RX ADMIN — AZITHROMYCIN 250 MG: 250 TABLET, FILM COATED ORAL at 09:02

## 2024-03-26 RX ADMIN — DEXLANSOPRAZOLE 60 MG: 60 CAPSULE, DELAYED RELEASE ORAL at 06:28

## 2024-03-26 RX ADMIN — ASPIRIN 81 MG: 81 TABLET, CHEWABLE ORAL at 11:30

## 2024-03-26 RX ADMIN — PHENAZOPYRIDINE HYDROCHLORIDE 200 MG: 200 TABLET ORAL at 08:56

## 2024-03-26 RX ADMIN — GUAIFENESIN 600 MG: 600 TABLET ORAL at 11:29

## 2024-03-26 RX ADMIN — POLYETHYLENE GLYCOL 3350 17 G: 17 POWDER, FOR SOLUTION ORAL at 11:07

## 2024-03-26 RX ADMIN — PREGABALIN 75 MG: 75 CAPSULE ORAL at 11:29

## 2024-03-26 RX ADMIN — TAMSULOSIN HYDROCHLORIDE 0.4 MG: 0.4 CAPSULE ORAL at 08:56

## 2024-03-26 ASSESSMENT — PAIN DESCRIPTION - ORIENTATION: ORIENTATION: LOWER

## 2024-03-26 ASSESSMENT — PAIN DESCRIPTION - DESCRIPTORS
DESCRIPTORS: STABBING
DESCRIPTORS: STABBING

## 2024-03-26 ASSESSMENT — PAIN DESCRIPTION - LOCATION
LOCATION: ABDOMEN
LOCATION: ABDOMEN

## 2024-03-26 ASSESSMENT — PAIN SCALES - GENERAL
PAINLEVEL_OUTOF10: 7
PAINLEVEL_OUTOF10: 7

## 2024-03-26 NOTE — CARE COORDINATION
Discharge order noted. No needs, brother will likely transport him home.     Requesting to see diabetes educator if they are available.     Respectfully submitted,    Irma MODI, CIRILO  [facility]   [phone]    Electronically signed by LY Nye, LSW on 3/26/2024 at 10:53 AM

## 2024-03-26 NOTE — PROGRESS NOTES
Name: Jovon Javier  /Age/Sex: 1960 (63 y.o. male)   MRN & CSN: [id number] & [id number]  Admission Date/Time: 3/20/2024  3:34 PM   Location: [unfilled] [id number]  Current Hospital Day: Hospital Day: 6   Principal Problem: COPD exacerbation (HCC)  HPI     Patient seen and examined.  No issues overnight.  He is requesting whole-body MRI today.  Breathing does feel better.  No cough or shortness of breath.  No fever or chills.      All other review of systems negative unless noted above.  VITALS     Vitals:    24 0854   BP:    Pulse:    Resp:    Temp:    SpO2: 93%         PHYSICAL EXAM   General Appearance:    Alert, cooperative, no distress, appears stated age   Head:    Normocephalic, without obvious abnormality, atraumatic   Eyes:    PERRL, conjunctiva/corneas clear, EOM's intact, fundi     benign, both eyes              Nose:   Nares normal, septum midline, mucosa normal, no drainage    or sinus tenderness   Throat:   Lips, mucosa, and tongue normal; teeth and gums normal   Neck:   Supple, symmetrical, trachea midline, no adenopathy;        thyroid:  No enlargement/tenderness/nodules; no carotid    bruit or JVD   Back:     Symmetric, no curvature, ROM normal, no CVA tenderness   Lungs:     Clear to auscultation bilaterally, respirations unlabored   Chest wall:    No tenderness or deformity   Heart:    Regular rate and rhythm, S1 and S2 normal, no murmur, rub   or gallop, trace lower extremity edema   Abdomen:     Soft, non-tender, bowel sounds active all four quadrants,     no masses, no organomegaly               Extremities:   Extremities normal, atraumatic, no cyanosis or edema   Pulses:   2+ and symmetric all extremities   Skin:   Skin color, texture, turgor normal, no rashes or lesions   Lymph nodes:   Cervical, supraclavicular, and axillary nodes normal   Neurologic:   CNII-XII intact. Normal strength, sensation and reflexes       throughout     LABS   BMP  Recent Labs     
  Physical Therapy  Initial Evaluation Attempt and Discharge    24    Name: Jovon Javier   : 1960    MRN: [id number]    PT order noted. Patient moving independently in room, denies any therapy needs at this time. PT signing off.    Electronically signed by Hernando Hunter, AJITH on 3/25/2024 at 7:14 AM    
0930- Morning assessment and vital signs complete. Morning labs reviewed. Patient given scheduled medications as prescribed. He is alert and oriented x4 and independent in his room. Patient denies pain at this time. Call light is within reach.   
Clinical Pharmacy Note  Warfarin Consult    Jovon Javier is a 63 y.o. male receiving warfarin managed by pharmacy.     Warfarin Indication: Hx of PE  Target INR range: 2-3   Dose prior to admission: 2.5 mg daily except 5 mg on Fridays.  Managed by [facility] the [facility]     Current warfarin drug-drug interactions: Solumedrol 125 mg IV x 1 dose 3/20/24 followed by Prednisone 40 mg daily thereafter.    Recent Labs     03/24/24  0630 03/25/24  0559 03/26/24  0700   HGB 14.2 14.7  --    HCT 41.5 43.7  --    INR 2.57* 2.22* 2.14*         Assessment/Plan:  INR within goal range 2-3. Trend 1.81->2.2->2.4 -> 2.57 ->2.2->2.14    Suspect the steroids on board for COPD exac are played a role in driving up the INR.    Will dose warfarin 2.5mg again tonight. (Home dose)    Watch for d/c plan, if he leaves with oral steroid he'll need to call the [facility] and get in to have a INR check  this week.     Daily PT/INR until stable within therapeutic range.     Thank you for the consult.  Will continue to follow.    Morgan Acosta RPH  3/26/2024 7:46 AM    
Clinical Pharmacy Note  Warfarin Consult    Jovon Javier is a 63 y.o. male receiving warfarin managed by pharmacy.  Patient being bridged with Lovenox mg/kg.    Warfarin Indication: Hx of PE  Target INR range: 2-3   Dose prior to admission: 2.5 mg daily except 5 mg on Fridays.    Current warfarin drug-drug interactions: Solumedrol 125 mg IV x 1 dose 3/20/24 followed by Prednisone 40 mg daily thereafter.    Recent Labs     03/20/24  1601 03/21/24  0619 03/21/24  0702   HGB 14.2 14.3  --    HCT 41.1 42.0  --    INR  --   --  1.81*       Assessment/Plan:    Warfarin 7 mg this morning. Jovon did not get his 3/20/24 dose.   Suspect the steroids on board for COPD exac will play a role in driving up the INR      Daily PT/INR until stable within therapeutic range.     Thank you for the consult.  Will continue to follow.  Sandra Quigley [facility]   
Clinical Pharmacy Note  Warfarin Consult    Jovon Javier is a 63 y.o. male receiving warfarin managed by pharmacy.  Patient being bridged with Lovenox mg/kg.    Warfarin Indication: Hx of PE  Target INR range: 2-3   Dose prior to admission: 2.5 mg daily except 5 mg on Fridays.    Current warfarin drug-drug interactions: Solumedrol 125 mg IV x 1 dose 3/20/24 followed by Prednisone 40 mg daily thereafter.    Recent Labs     03/20/24  1601 03/21/24  0619 03/21/24  0702 03/22/24  0808   HGB 14.2 14.3  --  14.6   HCT 41.1 42.0  --  43.1   INR  --   --  1.81* 2.20*         Assessment/Plan:  -INR within goal range 2-3, received 7 mg yesterday (no dose 3/20)  -Warfarin 2.5 mg x 1 dose on 3/22 --> typical home dose 5 mg on Friday but will dose conservatively d/t acute illness/bump in INR 3/21 --> 3/22  -Suspect the steroids on board for COPD exac will play a role in driving up the INR   -Daily PT/INR until stable within therapeutic range.     Thank you for the consult.  Will continue to follow.  Cathie Nunez RPH   
Clinical Pharmacy Note  Warfarin Consult    Jovon Javier is a 63 y.o. male receiving warfarin managed by pharmacy.  Patient being bridged with Lovenox mg/kg.    Warfarin Indication: Hx of PE  Target INR range: 2-3   Dose prior to admission: 2.5 mg daily except 5 mg on Fridays.    Current warfarin drug-drug interactions: Solumedrol 125 mg IV x 1 dose 3/20/24 followed by Prednisone 40 mg daily thereafter.    Recent Labs     03/21/24  0619 03/21/24  0702 03/22/24  0808 03/23/24  0704 03/23/24  0705   HGB 14.3  --  14.6  --  14.3   HCT 42.0  --  43.1  --  41.6   INR  --  1.81* 2.20* 2.44*  --          Assessment/Plan:  INR within goal range 2-3. Trend 1.81->2.2->2.4    Suspect the steroids on board for COPD exac are playing a role in driving up the INR.    Will order Warfarin 1.25 mg tonight which is half of his normal regimen. Hopeful this will keep INR below 3.    Daily PT/INR until stable within therapeutic range.     Thank you for the consult.  Will continue to follow.  Sandra Quigley [facility]   
Clinical Pharmacy Note  Warfarin Consult    Jovon Javier is a 63 y.o. male receiving warfarin managed by pharmacy.  Patient being bridged with Lovenox mg/kg.    Warfarin Indication: Hx of PE  Target INR range: 2-3   Dose prior to admission: 2.5 mg daily except 5 mg on Fridays.  Managed by [facility] the [facility]     Current warfarin drug-drug interactions: Solumedrol 125 mg IV x 1 dose 3/20/24 followed by Prednisone 40 mg daily thereafter.    Recent Labs     03/22/24  0808 03/23/24  0704 03/23/24  0705 03/24/24  0630   HGB 14.6  --  14.3 14.2   HCT 43.1  --  41.6 41.5   INR 2.20* 2.44*  --  2.57*         Assessment/Plan:  INR within goal range 2-3. Trend 1.81->2.2->2.4 -> 2.57    Suspect the steroids on board for COPD exac are playing a role in driving up the INR.    Will order Warfarin 1.25 mg again tonight which is half of his normal regimen. Hopeful this will keep INR below 3.  Watch for d/c plan, if he leaves with oral steroid he'll need to call the [facility] and get in to have a INR check early this week.     Daily PT/INR until stable within therapeutic range.     Thank you for the consult.  Will continue to follow.  Sandra Quigley [facility]   
Clinical Pharmacy Note  Warfarin Consult    Jovon Javier is a 63 y.o. male receiving warfarin managed by pharmacy.  Patient being bridged with Lovenox mg/kg.    Warfarin Indication: Hx of PE  Target INR range: 2-3   Dose prior to admission: 2.5 mg daily except 5 mg on Fridays.  Managed by [facility] the [facility]     Current warfarin drug-drug interactions: Solumedrol 125 mg IV x 1 dose 3/20/24 followed by Prednisone 40 mg daily thereafter.    Recent Labs     03/23/24  0704 03/23/24  0705 03/24/24  0630 03/25/24  0559   HGB  --  14.3 14.2 14.7   HCT  --  41.6 41.5 43.7   INR 2.44*  --  2.57* 2.22*         Assessment/Plan:  INR within goal range 2-3. Trend 1.81->2.2->2.4 -> 2.57 ->2.2    Suspect the steroids on board for COPD exac are played a role in driving up the INR.    Jovon's normal dose of Warfarin 2.5 mg has been ordered for tonight.    Watch for d/c plan, if he leaves with oral steroid he'll need to call the [facility] and get in to have a INR check  this week.     Daily PT/INR until stable within therapeutic range.     Thank you for the consult.  Will continue to follow.  Sandra Quigley [facility]   
Occupational Therapy  Jovon KELLY Concepcion  3/25/2024  [id number]    Pt chart reviewed, met w/ pt b/s. Pt Is UAL in his room and has been able to complete all ADLs w/ Ind. He reported no concerns for OT. Anticipate safe return home post d/c from here. OT signing off.     Electronically signed by Sami Rosales, SARAR/L [id number] on 3/25/24 at 12:16 PM EDT    
Patient C/O Dexa glucose monitor via left arm reading blood sugar of 300. Per patient he has not eaten since lunch. Per patient he does feel, \"funny\". This nurse recheck glucose level and is 296. Call to Dr. Hall to notify. See new orders received.   
Patient has no medication orders. Dr. Estrella' on-call number called by this RN for medication orders for patient, and a voicemail delivered. Awaiting response.    Medication orders received and acknowledged via Epic. Medication administered to patient per orders.  
Patient is admitted to room 4258 from ED, AO X 4. Vitals are stable. Patient is oriented to room, call light is placed within reach.   
Patient requesting 2 full pictures of ice water at a time. Fluid restriction education provided to patient. Patient acknowledges and agreeable. Call light in reach.   
Pharmacy Medication Reconciliation Note     List of medications Jovon Javier is currently taking is complete.    Source of information:   1. Conversation with patient at bedside  2. EMR    Notes regarding home medications:   1. Patient reports taking morning medications today PTA in the ED  2. Patient is prescribed warfarin for Afib with a goal INR of 2.0 - 3.0 . Patient has 5 mg tablets and their current dose is 2.5 mg daily except 5 mg on Sundays. Patient is managed by the [facility]. Last dose taken yesterday, 3/19/2024  3. Prescribed testosterone cyp 200 mg injections but has not started taking  4. Takes vitamin D 50,000 units weekly on Wednesdays -- last dose 3/13/2024    Patient denies taking any OTC or herbal medications other than those listed    Nicolas Arauz, Pharmacy Intern  3/20/2024  6:48 PM    
Progress Note  [facility] PROGRESS    Patient: HANG ANGEL  :  1960  PCP: Juan C Dumont MD    Reason for visit: SOB    SUBJECTIVE     History of present illness per Dr. Estrella:  For the past four or five days, the patient reports progressive shortness of breath and intermittently productive cough. Typically uses nasal cannula at night 1-2  L and has required 2 L at rest during the day. No fever or chills. Recently completed antibiotic for UTI.      He does not report any abdominal pain, nausea, vomiting, fever or chills. No lightheadedness or chest pain. Reports adherence with home medications    Last 24hr: unremarkable    Pt feels about the same. Chest still feels tight. Cough is not too bad. Does not feel ready to discharge today.    Review of systems: as above    Past Medical History:   Diagnosis Date    Abnormal CT scan 2023    spots on pancrease    Ankylosing spondylitis (HCC)     Atrial fibrillation     Bronchiolitis obliterans     CAD (coronary artery disease)     Cardiomyopathy (HCC)     CHF (congestive heart failure) (HCC)     Chronic anticoagulation     COPD (chronic obstructive pulmonary disease) (HCC)     GERD (gastroesophageal reflux disease)     Hepatitis     Hx of blood clots     Hyperlipidemia     Hyperparathyroidism (HCC)     Hypertension     IBS (irritable bowel syndrome)     Kidney stones     Oxygen dependent 2023    wears 2L/NC at night    Pneumothorax     Prostatitis     Pulmonary embolism (HCC)      Past Surgical History:   Procedure Laterality Date    BRONCHOSCOPY N/A 2023    BRONCHOSCOPY WITH BRONCHIOLAR ALVEOLAR LAVAGE performed by Rafael Christy MD at [facility] ENDOSCOPY    CHOLECYSTECTOMY      COLONOSCOPY  2012    COLONOSCOPY  2018    CYSTOSCOPY  2019    RIGHT SIDED URETEROSCOPY  Diagnostic, retrograde pyelogram and fulguration of previously resected bladder tumor base    CYSTOSCOPY Right 2019    RIGHT 
Pt discharged per doctor Jeffery Estrella MD. Pt received medication from outpatient pharmacy. Discharge paperwork reviewed with pt. All questions answered. No concerns. IV removed and tolerated well. Pt leaving via wheelchair and family to transport home. Home oxygen in place. Pt was   
Pt discharged per doctor Jeffery Estrella. Pt received medication from outpatient pharmacy. Discharge paperwork reviewed with pt. All questions answered. No concerns. IV removed and tolerated well. Pt left unit via wheelchair transported home by family. Home oxygen in place.   
Pulmonary/Critical Care Progress Note    CC:  Follow-up:  Acute on chronic hypoxemic respiratory failure with SPO2 <90% on room air  Asthma with acute exacerbation  Chronic systolic heart failure    Subjective:  Remains on 2L NC  Breathing back to baseline  Feels weak and would like PT    ROS  SOB improved  Weakness  Some productive cough    Intake/Output Summary (Last 24 hours) at 3/24/2024 1102  Last data filed at 3/24/2024 0915  Gross per 24 hour   Intake 720 ml   Output 1975 ml   Net -1255 ml           PHYSICAL EXAM:  Blood pressure 126/86, pulse 68, temperature 98.2 °F (36.8 °C), temperature source Oral, resp. rate 16, height 1.676 m (5' 6\"), weight 98.2 kg (216 lb 7.9 oz), SpO2 92 %.'  Gen: No distress. Well developed, well-nourished  Eyes: No scleral icterus. No conjunctival injection.   ENT: External appearance of ears and nose normal.  Neck: Trachea midline. No obvious mass. No visible thyroid enlargement     Respiratory: No crackles. No wheezes. No rhonchi. No accessory muscle use. Diminished  Cardiovascular: Regular rate. Regular rhythm. No murmur or rub.  No edema  GI: Non-tender. Non-distended. No hernia. Bowel sounds present  Skin: Warm, dry, normal texture and turgor. No abnormalities on exposed extremities.   Musculoskeletal: No cyanosis. No clubbing. No joint deformity.    Neuro: Moves all four extremities.   Psychiatric:  Normal mood and affect; exhibits normal insight and judgement     Medications:    Scheduled Meds:   warfarin  1.25 mg Oral Once    furosemide  10 mg IntraVENous Once    lactobacillus  1 capsule Oral Daily with breakfast    polyethylene glycol  17 g Oral BID    tamsulosin  0.4 mg Oral BID    azithromycin  250 mg Oral Daily    phenazopyridine  200 mg Oral TID WC    loratadine  10 mg Oral Daily    dexlansoprazole  60 mg Oral Daily    insulin lispro  0-4 Units SubCUTAneous TID WC    insulin lispro  0-4 Units SubCUTAneous Nightly    warfarin placeholder: dosing by pharmacy   Other RX 
05/17/2019    RIGHT SIDED URETEROSCOPY FLUGERATION  OF BLADDER performed by Perlita Fuentes MD at [facility] OR    CYSTOSCOPY Right 07/02/2021    CYSTOURETHROSCOPY WITH RIGHT RETROGRADE PYELOGRAPHY AND PLACEMENT OF RIGHT DOUBLE J STENT performed by Arthur Vazquez DO at [facility] OR    CYSTOSCOPY Right 04/25/2022    CYSTOSCOPY, RIGHT STENT INSERTION performed by Perlita Fuentes MD at [facility] OR    ESOPHAGEAL DILATATION N/A 09/08/2022    ESOPHAGEAL DILATATION performed by Remington Hutchinson Jr., DO at [facility] ENDOSCOPY    HERNIA REPAIR  2015    LITHOTRIPSY      PACEMAKER PLACEMENT      with defib-\"they turned off pacemaker part\"    SINUS SURGERY      UPPER GASTROINTESTINAL ENDOSCOPY  03/28/2014    UPPER GASTROINTESTINAL ENDOSCOPY N/A 02/01/2021    EGD BIOPSY performed by Juan C Mittal MD at [facility] ENDOSCOPY    UPPER GASTROINTESTINAL ENDOSCOPY N/A 09/08/2022    EGD BIOPSY performed by Remington Hutchinson Jr., DO at [facility] ENDOSCOPY    UPPER GASTROINTESTINAL ENDOSCOPY N/A 08/30/2023    ENDOSCOPIC ULTRASOUND performed by Hernando Perez MD at [facility] ENDOSCOPY    UPPER GASTROINTESTINAL ENDOSCOPY  08/30/2023    EGD BIOPSY performed by Hernando Perez MD at [facility] ENDOSCOPY    UPPER GASTROINTESTINAL ENDOSCOPY  08/30/2023    EGD POLYP SNARE performed by Hernando Perez MD at [facility] ENDOSCOPY     Allergies   Allergen Reactions    Atrovent Nasal Spray [Ipratropium] Anaphylaxis and Other (See Comments)     Chest tightness    Ipratropium Bromide Hfa Shortness Of Breath    Nitroglycerin Other (See Comments)     Severe hypotension (went from 139 to 66 systolic)    Crestor [Rosuvastatin] Myalgia    Doxycycline Hives    Macrobid [Nitrofurantoin] Hives    Nexletol [Bempedoic Acid]      Hives neck and chest     Sulfa Antibiotics Rash    Vicodin [Hydrocodone-Acetaminophen] Rash     Family History   Problem Relation Age of Onset    Heart Disease Mother     High Blood Pressure Mother     Dementia Mother     Cancer Father         
91% breathing room air  pCO2 > 50 and pH < 7.35  P/F ratio (pO2 / FIO2) < 300  pO2 decrease or pCO2 increase by 10 mmHg from baseline (if known)  Supplemental oxygen of 40% or more  Presence of respiratory distress, tachypnea, dyspnea, shortness of breath,   wheezing  Unable to speak in complete sentences  Use of accessory muscles to breathe  Extreme anxiety and feeling of impending doom  Tripod position  Confusion/altered mental status/obtunded  Options provided:  -- Acute Respiratory Failure as evidenced by, Please document evidence.  -- Acute Respiratory Failure ruled out after study and Chronic Respiratory   Failure confirmed  -- Other - I will add my own diagnosis  -- Disagree - Not applicable / Not valid  -- Disagree - Clinically unable to determine / Unknown  -- Refer to Clinical Documentation Reviewer    PROVIDER RESPONSE TEXT:    Acute Respiratory Failure ruled out after study and Chronic Respiratory   Failure confirmed.    Query created by: Casandra Kaufman on 3/22/2024 12:43 PM      Electronically signed by:  Jeffery Estrella MD 3/22/2024 11:03 PM          
Labs     03/20/24  1601 03/21/24  0619 03/22/24  0808    142 140   K 3.8 4.6 4.5    106 104   CO2 30 29 29   BUN 12 14 21*   CREATININE 1.0 0.9 1.0   CALCIUM 10.9* 10.6 11.3*     CBC/COAGS  Recent Labs     03/20/24  1601 03/21/24  0619 03/21/24  0702 03/22/24  0808   WBC 4.5 6.3  --  12.0*   HCT 41.1 42.0  --  43.1    165  --  184   INR  --   --  1.81* 2.20*   PROTIME  --   --  20.9* 24.3*     Liver & Pancreas  Recent Labs     03/20/24  1601   AST 19   ALT 18   ALKPHOS 79          IMAGING   No new imaging   Above studies and images were personally reviewed by myself    MEDS   Scheduled Meds:   azithromycin  500 mg Oral Once    [START ON 3/23/2024] azithromycin  250 mg Oral Daily    furosemide  20 mg IntraVENous Daily    insulin lispro  0-4 Units SubCUTAneous TID WC    insulin lispro  0-4 Units SubCUTAneous Nightly    warfarin placeholder: dosing by pharmacy   Other RX Placeholder    methylPREDNISolone  40 mg IntraVENous Daily    albuterol  2.5 mg Nebulization TID RT    sodium chloride flush  5-40 mL IntraVENous 2 times per day    aspirin  81 mg Oral Daily    pantoprazole  40 mg Oral QAM AC    cetirizine  5 mg Oral Daily    metoprolol succinate  37.5 mg Oral Daily    polyethylene glycol  17 g Oral Nightly    pravastatin  40 mg Oral Nightly    pregabalin  75 mg Oral Daily    pregabalin  150 mg Oral Nightly    sacubitril-valsartan  1 tablet Oral BID     Continuous Infusions:   dextrose      sodium chloride       PRN Meds:glucose, dextrose bolus **OR** dextrose bolus, glucagon (rDNA), dextrose, oxyCODONE-acetaminophen **OR** oxyCODONE-acetaminophen, sodium chloride flush, sodium chloride, ondansetron **OR** ondansetron, polyethylene glycol, acetaminophen **OR** acetaminophen, albuterol, guaiFENesin, zolpidem, melatonin     CURRENT HOSPITAL PROBLEMS   Principal Problem:    COPD exacerbation (HCC)  Resolved Problems:    * No resolved hospital problems. *        COPD exacerbation (HCC)  Bronchiolitis 
  IMPRESSION:  Stable appearance of the chest with no acute findings    VB.39    Cultures:  Blood:  Urine:  Sputum:  Strep/Legionella Antigens:  MRSA probe:  Respiratory Viral Panel/Influenza: negative  C.Diff:   COVID19: negative    ECHO    Summary   LV is mildly dilated with moderately reduced Ejection fraction is visually   estimated to be 35 %.   Global hypokinesis.   Definity was refused by patient   The right ventricle is normal in size and function.   Pacer / ICD wire is visualized in the right ventricle.    PFTs    1. Flows:  Flow measurements, as determined by FEV1 and FVC, demonstrate the presence of an obstructive pulmonary defect. The obstruction is moderately severe as defined by a post-bronchodilator FEV1 between 50-59% predicted. Following the administration of bronchodilator a significant improvement in flow measurements was seen in FEV1. Post-bronchodilator FEV1 is 1.66L, 50% predicted. Post-bronchodilator FVC is 2.98L, 70% predicted.      2. Flow volume loop is:  Consistent with an obstructive defect.     3. Lung volumes:  Total lung capacity is normal. Vital capacity is reduced. RV/TLC is elevated.     4. Diffusing capacity:  Diffusion is mildly reduced. When averaged over lung volumes it normalizes.        Summary:  Moderately severe lower airflow obstruction with significant reversal in FEV1.  Air-trapping is present.  Diffusing capacity is mildly reduced, but normalizes when averaged over lung volumes.    Assessment/Plan:     Acute on chronic hypoxemic respiratory failure with SPO2 <90% on room air  Maintain oxygen saturations >90% with supplemental oxygen and wean as tolerated  IS and acapella  At home baseline O2    Asthma with acute exacerbation  Albuterol  Azithromycin  Solumedrol  Dulera d/c'd per patient request    Chronic systolic heart failure  Lasix PO    DVT prophylaxis with coumadin    Thank you for allowing me to participate in the care of this patient. Will 
intermittent asthma with acute exacerbation    Type 2 diabetes mellitus without complication, without long-term current use of insulin (HCC)  Resolved Problems:    Acute respiratory failure with hypoxia (HCC)    Chronic systolic heart failure (HCC)        COPD exacerbation (HCC)  Bronchiolitis obliterans  Acute on chronic hypoxic respiratory failure  - Nebs  - Can discharge on prednisone 40 mg for 3 more days  -We discussed pulmonary rehab, he previously tried but did not see a benefit.  I urged him to reconsider pulmonary rehab    Hyperglycemia  Type 2 diabetes  - low dose sliding scale   -We had a long discussion today about glycemic control.  His A1c was 6.9.  I recommended against starting any oral therapy given his ongoing abdominal issues and his A1c of 6.9.  He would probably would not be a good candidate for SGLT2 given recurrent UTIs, not a good candidate for GLP-1 RA with history of pancreatitis.  I do not think he should start insulin given his A1c is 6.9.  He will follow-up outpatient for ongoing monitoring of hyperglycemia     Recurrent UTI  Renal cyst  History of papillary urothelial neoplasm  - Patient reports recent antibiotic use, repeat urinalysis was unremarkable     Cardiomyopathy status post ICD  Hypertension  -Continue home meds  -He has trace lower extremity edema today 3/2022, he request cardiology consultation.   - Continue Lasix on discharge     Hyperlipidemia  - Continue statin     Previous pulmonary embolism  -Continue warfarin     Constipation  - Continue MiraLAX     GERD  - cont dexilant      Colonoscopy  - Patient requests MRI of bowel inpatient.  I report typically that these types of MRIs are usually followed outpatient.  He also requested MRI of his whole body.  I I recommended against this today     F/E/N: Reg  PPx: Warfarin  Dispo: Medically stable for discharge      Jeffery Estrella MD 3/26/2024 1:58 PM       
Nausea R11.0    Transient cerebral ischemia G45.9    History of pulmonary embolism Z86.711    Cardiomyopathy (Formerly [facility]) I42.9    Confusion R41.0    Right arm weakness R29.898    Elevated INR R79.1    Acute on chronic systolic heart failure (Formerly [facility]) I50.23    LBBB (left bundle branch block) I44.7    Acute confusional state F05    Urinary tract infection without hematuria N39.0    Encounter for adjustment of cardiac resynchronization therapy defibrillator (CRT-D) Z45.02    Biventricular ICD (implantable cardioverter-defibrillator) in place Z95.810    Chronic anticoagulation Z79.01    PAF (paroxysmal atrial fibrillation) (Formerly [facility]) I48.0    Hypercalcemia E83.52    Primary hyperparathyroidism (Formerly [facility]) E21.0    On warfarin therapy Z79.01    Perinephric hematoma S37.019A    Renal hematoma, right S37.011A    Postoperative hemorrhagic shock T81.19XA    Acute blood loss anemia D62    Metabolic acidosis E87.20    Tachyarrhythmia R00.0    Iron deficiency anemia due to chronic blood loss D50.0    Thrombocytopenia (Formerly [facility]) D69.6    Hypoxia R09.02    Acute UTI N39.0    Abdominal wall hematoma S30.1XXA    Acute right flank pain R10.9    Intractable vomiting with nausea R11.2    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    Elevated liver enzymes R74.8    Acute abdominal pain R10.9    Colicky LLQ abdominal pain R10.32    Arm pain M79.603    Dysuria R30.0    Fever R50.9    Episode of shaking R25.1    Post-nasal drip R09.82    Left foot pain M79.672    Pain in scapula M89.8X1    Skin lesions L98.9    Otalgia H92.09    Pleurisy R09.1    Chills R68.83    Swelling of calf M79.89    Ureteral colic N23    Gastroesophageal reflux disease with esophagitis without hemorrhage K21.00    Chest pain R07.9    Epistaxis R04.0    Right calf pain M79.661    Calculous pyelonephritis N20.0    Other alveolar and parieto-alveolar conditions (Formerly [facility]) J84.09    Numbness in feet R20.0    Neck pain M54.2    Sciatica of right side M54.31    Umbilical hernia without 
CGM options.         Teaching Time Diabetes Education:  20 minutes     Electronically signed by POHEBE Davenport on 3/26/2024 at 11:21 AM

## 2024-03-26 NOTE — TELEPHONE ENCOUNTER
Jovon said he can not afford [employer]. He would like us to mail him the oxygen prescription so he can use it to find a different oxygen company.

## 2024-03-26 NOTE — PLAN OF CARE
Problem: Discharge Planning  Goal: Discharge to home or other facility with appropriate resources  Outcome: Progressing     Problem: Pain  Goal: Verbalizes/displays adequate comfort level or baseline comfort level  Outcome: Progressing     Problem: Safety - Adult  Goal: Free from fall injury  Outcome: Progressing     Problem: ABCDS Injury Assessment  Goal: Absence of physical injury  Outcome: Progressing     Problem: Chronic Conditions and Co-morbidities  Goal: Patient's chronic conditions and co-morbidity symptoms are monitored and maintained or improved  Outcome: Progressing     Problem: Respiratory - Adult  Goal: Achieves optimal ventilation and oxygenation  Outcome: Progressing     Problem: Skin/Tissue Integrity - Adult  Goal: Skin integrity remains intact  Outcome: Progressing     Problem: Musculoskeletal - Adult  Goal: Return mobility to safest level of function  Outcome: Progressing     Problem: Musculoskeletal - Adult  Goal: Return ADL status to a safe level of function  Outcome: Progressing     Problem: Infection - Adult  Goal: Absence of infection at discharge  Outcome: Progressing     Problem: Metabolic/Fluid and Electrolytes - Adult  Goal: Electrolytes maintained within normal limits  Outcome: Progressing     Problem: Metabolic/Fluid and Electrolytes - Adult  Goal: Glucose maintained within prescribed range  Outcome: Progressing

## 2024-03-26 NOTE — TELEPHONE ENCOUNTER
Message relayed to patient. He also wants us to send his POC order to [facility] and give him a FU call once completed.

## 2024-03-26 NOTE — PLAN OF CARE
Problem: Discharge Planning  Goal: Discharge to home or other facility with appropriate resources  Outcome: Progressing  Flowsheets (Taken 3/25/2024 2202)  Discharge to home or other facility with appropriate resources: Identify barriers to discharge with patient and caregiver     Problem: Pain  Goal: Verbalizes/displays adequate comfort level or baseline comfort level  Outcome: Progressing     Problem: Safety - Adult  Goal: Free from fall injury  Outcome: Progressing     Problem: ABCDS Injury Assessment  Goal: Absence of physical injury  Outcome: Progressing     Problem: Chronic Conditions and Co-morbidities  Goal: Patient's chronic conditions and co-morbidity symptoms are monitored and maintained or improved  Outcome: Progressing     Problem: Respiratory - Adult  Goal: Achieves optimal ventilation and oxygenation  Outcome: Progressing     Problem: Skin/Tissue Integrity - Adult  Goal: Skin integrity remains intact  Outcome: Progressing     Problem: Musculoskeletal - Adult  Goal: Return mobility to safest level of function  Outcome: Progressing  Goal: Return ADL status to a safe level of function  Outcome: Progressing     Problem: Infection - Adult  Goal: Absence of infection at discharge  Outcome: Progressing     Problem: Metabolic/Fluid and Electrolytes - Adult  Goal: Electrolytes maintained within normal limits  Outcome: Progressing  Goal: Glucose maintained within prescribed range  Outcome: Progressing

## 2024-03-27 ENCOUNTER — TELEPHONE (OUTPATIENT)
Dept: PHARMACY | Age: 64
End: 2024-03-27

## 2024-03-27 NOTE — TELEPHONE ENCOUNTER
Recent hospitalization. Called to schedule f/u.     Reports they prescribed prednisone, but not taking it.  He will call if he starts this.     Lab Results   Component Value Date    INR 2.14 (H) 03/26/2024    INR 2.22 (H) 03/25/2024    INR 2.57 (H) 03/24/2024     Scheduled for 4/3/2024.     Veronica De La Cruz, PharmD  [facility]  Anticoagulation  [phone]    For Pharmacy Admin Tracking Only    Intervention Detail:   Total # of Interventions Recommended:   Total # of Interventions Accepted:   Time Spent (min): 5

## 2024-04-03 ENCOUNTER — APPOINTMENT (OUTPATIENT)
Dept: PHARMACY | Age: 64
End: 2024-04-03
Attending: INTERNAL MEDICINE
Payer: COMMERCIAL

## 2024-04-08 ENCOUNTER — ANTI-COAG VISIT (OUTPATIENT)
Dept: PHARMACY | Age: 64
End: 2024-04-08
Attending: INTERNAL MEDICINE
Payer: COMMERCIAL

## 2024-04-08 ENCOUNTER — HOSPITAL ENCOUNTER (OUTPATIENT)
Dept: CT IMAGING | Age: 64
Discharge: HOME | End: 2024-04-08
Attending: UROLOGY
Payer: COMMERCIAL

## 2024-04-08 DIAGNOSIS — N28.1 CYST OF KIDNEY, ACQUIRED: ICD-10-CM

## 2024-04-08 DIAGNOSIS — Z79.01 CHRONIC ANTICOAGULATION: ICD-10-CM

## 2024-04-08 DIAGNOSIS — N13.9 OBSTRUCTIVE AND REFLUX UROPATHY: ICD-10-CM

## 2024-04-08 DIAGNOSIS — I26.99 PULMONARY EMBOLISM, UNSPECIFIED CHRONICITY, UNSPECIFIED PULMONARY EMBOLISM TYPE, UNSPECIFIED WHETHER ACUTE COR PULMONALE PRESENT (HCC): Primary | ICD-10-CM

## 2024-04-08 DIAGNOSIS — R10.9 ABDOMINAL PAIN, UNSPECIFIED ABDOMINAL LOCATION: ICD-10-CM

## 2024-04-08 LAB — INTERNATIONAL NORMALIZATION RATIO, POC: 2.7

## 2024-04-08 PROCEDURE — 85610 PROTHROMBIN TIME: CPT

## 2024-04-08 PROCEDURE — 99211 OFF/OP EST MAY X REQ PHY/QHP: CPT

## 2024-04-08 PROCEDURE — 6360000004 HC RX CONTRAST MEDICATION: Performed by: UROLOGY

## 2024-04-08 PROCEDURE — 74178 CT ABD&PLV WO CNTR FLWD CNTR: CPT | Performed by: UROLOGY

## 2024-04-08 RX ADMIN — IOPAMIDOL 75 ML: 755 INJECTION, SOLUTION INTRAVENOUS at 09:07

## 2024-04-08 NOTE — PROGRESS NOTES
continue the same weekly warfarin dose.    Jovon had a recent hospitalization on 3-20-24 for SOB/intermittent productive cough. Recently dx: type 2 diabetes non insulin.  A1c= 6.9. He states he is on a diabetic diet.  May start testosterone injections once weekly which can increase the INR.  He will have his first injection on 4-15-24.  No change to warfarin weekly dosing.  Will see in one week.     Description    CONTINUE: Warfarin 2.5mg daily except for 5mg on Friday.    Call [phone] with signs or symptoms of bleeding or ANY medication changes (including antibiotics, steroids, over-the-counter medications or herbal supplements).        If significant bleeding occurs or if you fall and hit your head, please seek immediate medical attention.     Keep the number of servings of vitamin K containing foods (dark green, leafy vegetables) the same each week. About 4 times a week (M,W,F,Sun). Please call if this changes.     Limit alcohol intake. Please call if this changes.        Immunization History   Administered Date(s) Administered    COVID-19, MODERNA BLUE border, Primary or Immunocompromised, (age 12y+), IM, 100 mcg/0.5mL 03/09/2021, 04/08/2021, 12/15/2021, 07/23/2022    COVID-19, MODERNA Bivalent, (age 12y+), IM, 50 mcg/0.5 mL 12/30/2022    COVID-19, MODERNA, (2023-24 formula), (age 12y+), IM, 50mcg/0.5mL 11/16/2023    Influenza, FLUAD, (age 65 y+), Adjuvanted, 0.5mL 10/27/2022    Influenza, FLUARIX, FLULAVAL, FLUZONE (age 6 mo+) AND AFLURIA, (age 3 y+), PF, 0.5mL 10/27/2017, 09/23/2019, 10/02/2020    Influenza, FLUBLOK, (age 18 y+), PF, 0.5mL 10/03/2018    Influenza, FLUCELVAX, (age 6 mo+), MDCK, PF, 0.5mL 10/26/2021, 10/26/2023    Pneumococcal Conjugate Vaccine 08/15/2017    Pneumococcal, PCV-13, PREVNAR 13, (age 6w+), IM, 0.5mL 09/11/2015    Pneumococcal, PPSV23, PNEUMOVAX 23, (age 2y+), SC/IM, 0.5mL 08/01/2007, 12/19/2012    TDaP, ADACEL (age 10y-64y), BOOSTRIX (age 10y+), IM, 0.5mL 08/19/2014       Orders

## 2024-04-19 ENCOUNTER — ANTI-COAG VISIT (OUTPATIENT)
Dept: PHARMACY | Age: 64
End: 2024-04-19
Attending: INTERNAL MEDICINE
Payer: COMMERCIAL

## 2024-04-19 DIAGNOSIS — Z79.01 CHRONIC ANTICOAGULATION: Primary | ICD-10-CM

## 2024-04-19 LAB — INTERNATIONAL NORMALIZATION RATIO, POC: 1.3

## 2024-04-19 PROCEDURE — 85610 PROTHROMBIN TIME: CPT

## 2024-04-19 PROCEDURE — 99212 OFFICE O/P EST SF 10 MIN: CPT

## 2024-04-19 NOTE — PROGRESS NOTES
(age 10y+), IM, 0.5mL 2014       Orders Placed This Encounter   Procedures    POCT INR     This external order was created through the results console.      No orders of the defined types were placed in this encounter.     Reviewed AVS with patient / caregiver.    Billing Points:    Adjust dosage and/or reconcile meds (fill pill box) </= 5 medications - 2 points  BASIC ASSESSMENT UNCOMPLICATED (including but not limited to: vital signs; physical assessment screening; review of secondary markers like lab results, allergies, home readings; instructions on treatment plan and basic education; assessment of medication list with one med change and compliance) - 2 points      For Pharmacy Admin Tracking Only    Intervention Detail: Adherence Monitorin and Dose Adjustment: 1, reason: Therapy Optimization  Total # of Interventions Recommended: 1  Total # of Interventions Accepted: 1  Time Spent (min): Penny Frost, PharmD, BCPS  2024 2:33 PM

## 2024-04-22 ENCOUNTER — APPOINTMENT (OUTPATIENT)
Dept: PHARMACY | Age: 64
End: 2024-04-22
Attending: INTERNAL MEDICINE
Payer: COMMERCIAL

## 2024-04-23 ENCOUNTER — OFFICE VISIT (OUTPATIENT)
Dept: CARDIOLOGY CLINIC | Age: 64
End: 2024-04-23
Payer: COMMERCIAL

## 2024-04-23 VITALS
HEART RATE: 98 BPM | HEIGHT: 66 IN | OXYGEN SATURATION: 96 % | WEIGHT: 212 LBS | SYSTOLIC BLOOD PRESSURE: 110 MMHG | DIASTOLIC BLOOD PRESSURE: 64 MMHG | BODY MASS INDEX: 34.07 KG/M2

## 2024-04-23 DIAGNOSIS — Z95.810 BIVENTRICULAR ICD (IMPLANTABLE CARDIOVERTER-DEFIBRILLATOR) IN PLACE: ICD-10-CM

## 2024-04-23 DIAGNOSIS — I25.10 CORONARY ARTERY DISEASE DUE TO CALCIFIED CORONARY LESION: Primary | ICD-10-CM

## 2024-04-23 DIAGNOSIS — I50.22 CHRONIC SYSTOLIC HF (HEART FAILURE) (HCC): ICD-10-CM

## 2024-04-23 DIAGNOSIS — I48.0 PAF (PAROXYSMAL ATRIAL FIBRILLATION) (HCC): ICD-10-CM

## 2024-04-23 DIAGNOSIS — I42.8 NICM (NONISCHEMIC CARDIOMYOPATHY) (HCC): ICD-10-CM

## 2024-04-23 DIAGNOSIS — E78.5 HYPERLIPIDEMIA, UNSPECIFIED HYPERLIPIDEMIA TYPE: ICD-10-CM

## 2024-04-23 DIAGNOSIS — I10 ESSENTIAL HYPERTENSION: ICD-10-CM

## 2024-04-23 DIAGNOSIS — Z86.711 HISTORY OF PULMONARY EMBOLISM: ICD-10-CM

## 2024-04-23 DIAGNOSIS — I25.84 CORONARY ARTERY DISEASE DUE TO CALCIFIED CORONARY LESION: Primary | ICD-10-CM

## 2024-04-23 PROCEDURE — 3078F DIAST BP <80 MM HG: CPT | Performed by: INTERNAL MEDICINE

## 2024-04-23 PROCEDURE — 3017F COLORECTAL CA SCREEN DOC REV: CPT | Performed by: INTERNAL MEDICINE

## 2024-04-23 PROCEDURE — 93000 ELECTROCARDIOGRAM COMPLETE: CPT | Performed by: INTERNAL MEDICINE

## 2024-04-23 PROCEDURE — 1111F DSCHRG MED/CURRENT MED MERGE: CPT | Performed by: INTERNAL MEDICINE

## 2024-04-23 PROCEDURE — G8427 DOCREV CUR MEDS BY ELIG CLIN: HCPCS | Performed by: INTERNAL MEDICINE

## 2024-04-23 PROCEDURE — 3074F SYST BP LT 130 MM HG: CPT | Performed by: INTERNAL MEDICINE

## 2024-04-23 PROCEDURE — G8417 CALC BMI ABV UP PARAM F/U: HCPCS | Performed by: INTERNAL MEDICINE

## 2024-04-23 PROCEDURE — 1036F TOBACCO NON-USER: CPT | Performed by: INTERNAL MEDICINE

## 2024-04-23 PROCEDURE — 99213 OFFICE O/P EST LOW 20 MIN: CPT | Performed by: INTERNAL MEDICINE

## 2024-04-23 NOTE — PROGRESS NOTES
[facility]      Cardiology Progress Note    Jovon Javier  1960 April 23, 2024    CC:  \"I am feeling better.\"     HPI:  The patient is 63 y.o. male with a past medical history significant for for a prior PE in 2011 and nonischemic cardiomyopathy. He was hospitalized at [facility] with chest pain and had an abnormal stress that led to a left heart catheterization on 7/21/16. His coronary arteries were normal but he had LV dysfunction with a LVEF of 40% at that time. A repeat echocardiogram demonstrated his LVEF to be 30% despite optimal medical therapy. Entresto therapy was initiated and he was interested in pursuing CRT. He underwent Bi-V ICD implant on 10/10/17. Biv pacing turned off,  now set at VVI 40 with defibrillator programming ON.  Also with a hx of atrial tach and underwent DCCV 9/2018, AFIB, HLD, HTN, ARDS 2011, bronchiolitis obliterans and nocturnal hypoxia-O2 2L nightly managed per Pulmonary.       2/1/2024, he presents with family in a wheelchair with oxygen in place per NC 1/2L. He reports \"I am a little SOB with exertion, holding fluid, lightheaded and feels his heart racing.\" The worse it gets with exertion. He took Miralax x2 at the same time and had significant BM. He is not drinking enough water per patient. He feels dehydrated today. Recent admission over last 6 weeks with kidney issues, renal cyst surgery and has not returned to baseline with fatigue, weakness, and tiredness. The patient admits to medical therapy compliance and feels he is tolerating. Onset of the above symptoms started yesterday. Yesterday at home weight 213#, beginning of 1/2024 was 218#.     Most recent visit with Dr. Hayes 2/29/24. Reported that the patient was in normal sinus rhythm with no atrial fibrillation. If AFIB episodes increase then would consider discussion regarding ablation which would be the preferred option given that he is quite symptomatic with biventricular pacing and has requested the

## 2024-04-24 NOTE — TELEPHONE ENCOUNTER
Medication Refill    Medication needing refilled:  sacubitril-valsartan (ENTRESTO)     Dosage of the medication:  24-26 MG per tablet     How are you taking this medication (QD, BID, TID, QID, PRN):  Take 1 tablet by mouth 2 times daily     30 or 90 day supply called in:  90 day      Which Pharmacy are we sending the medication to?:    [facility] - [place], [place] - [address] -  [phone] - F [phone]  [address] [place] [address] [address] [address]  Phone: [phone]  Fax: [phone]

## 2024-04-25 ENCOUNTER — HOSPITAL ENCOUNTER (OUTPATIENT)
Dept: PHYSICAL THERAPY | Age: 64
Setting detail: THERAPIES SERIES
Discharge: HOME | End: 2024-04-25
Attending: INTERNAL MEDICINE
Payer: COMMERCIAL

## 2024-04-25 DIAGNOSIS — R52 PAIN: ICD-10-CM

## 2024-04-25 DIAGNOSIS — Z74.09 DECREASED FUNCTIONAL MOBILITY AND ENDURANCE: ICD-10-CM

## 2024-04-25 DIAGNOSIS — M25.60 STIFFNESS IN JOINT: ICD-10-CM

## 2024-04-25 DIAGNOSIS — R53.1 WEAKNESS: Primary | ICD-10-CM

## 2024-04-25 PROCEDURE — 97161 PT EVAL LOW COMPLEX 20 MIN: CPT

## 2024-04-25 PROCEDURE — 97530 THERAPEUTIC ACTIVITIES: CPT

## 2024-04-25 NOTE — PLAN OF CARE
[facility]- [facility] [address] [place] [address] office: [phone] fax: [phone]     Physical Therapy Initial Evaluation Certification      Dear Juan C Dumont MD,    We had the pleasure of evaluating the following patient for physical therapy services at [facility].  A summary of our findings can be found in the initial assessment below.  This includes our plan of care.  If you have any questions or concerns regarding these findings, please do not hesitate to contact me at the office phone number listed above.  Thank you for the referral.     Physician Signature:_______________________________Date:__________________  By signing above (or electronic signature), therapist’s plan is approved by physician     Pt has been in PT several times for LBP over the past few yrs. ( , , , ). He has been able to keep up with a gentle hep that he tolerates up until this most recent flare up. We are limited with treatment options for pain due to various co-morbidities and limitations (cancer dx, pacemaker, inability to tolerate Aquatic ex due to breathing issues), but will progress with gentle core and LE strengthening ex as pt tolerates.  He states that he receives medical massage for his back every other week.        Physical Therapy: TREATMENT/PROGRESS NOTE   Patient: Jovon Javier (63 y.o. male)   Examination Date: 2024   :  1960 MRN: [id number]   Visit #:   Insurance Allowable Auth Needed   60 vpcy combined visits  []Yes    [x]No    Insurance: Payor: [employer] / Plan: Quarterly - CHOICE PLU / Product Type: *No Product type* /   Insurance ID: [id number] - (Commercial)  Secondary Insurance (if applicable):    Treatment Diagnosis:     ICD-10-CM    1. Weakness  R53.1       2. Stiffness in joint  M25.60       3. Decreased functional mobility and endurance  Z74.09       4. Pain  R52

## 2024-04-30 ENCOUNTER — HOSPITAL ENCOUNTER (OUTPATIENT)
Dept: PHYSICAL THERAPY | Age: 64
Setting detail: THERAPIES SERIES
End: 2024-04-30
Attending: INTERNAL MEDICINE
Payer: COMMERCIAL

## 2024-05-02 ENCOUNTER — ANTI-COAG VISIT (OUTPATIENT)
Dept: PHARMACY | Age: 64
End: 2024-05-02
Attending: INTERNAL MEDICINE
Payer: COMMERCIAL

## 2024-05-02 ENCOUNTER — OFFICE VISIT (OUTPATIENT)
Dept: PULMONOLOGY | Age: 64
End: 2024-05-02
Payer: COMMERCIAL

## 2024-05-02 VITALS
WEIGHT: 217 LBS | RESPIRATION RATE: 18 BRPM | TEMPERATURE: 98.9 F | OXYGEN SATURATION: 96 % | HEIGHT: 69 IN | BODY MASS INDEX: 32.14 KG/M2 | SYSTOLIC BLOOD PRESSURE: 120 MMHG | HEART RATE: 89 BPM | DIASTOLIC BLOOD PRESSURE: 70 MMHG

## 2024-05-02 DIAGNOSIS — Z79.01 CHRONIC ANTICOAGULATION: Primary | ICD-10-CM

## 2024-05-02 DIAGNOSIS — J44.81 BRONCHIOLITIS OBLITERANS (HCC): Primary | ICD-10-CM

## 2024-05-02 LAB — INTERNATIONAL NORMALIZATION RATIO, POC: 2.9

## 2024-05-02 PROCEDURE — 99211 OFF/OP EST MAY X REQ PHY/QHP: CPT | Performed by: INTERNAL MEDICINE

## 2024-05-02 PROCEDURE — 99213 OFFICE O/P EST LOW 20 MIN: CPT | Performed by: INTERNAL MEDICINE

## 2024-05-02 PROCEDURE — 1036F TOBACCO NON-USER: CPT | Performed by: INTERNAL MEDICINE

## 2024-05-02 PROCEDURE — 3078F DIAST BP <80 MM HG: CPT | Performed by: INTERNAL MEDICINE

## 2024-05-02 PROCEDURE — G8427 DOCREV CUR MEDS BY ELIG CLIN: HCPCS | Performed by: INTERNAL MEDICINE

## 2024-05-02 PROCEDURE — 3023F SPIROM DOC REV: CPT | Performed by: INTERNAL MEDICINE

## 2024-05-02 PROCEDURE — 3017F COLORECTAL CA SCREEN DOC REV: CPT | Performed by: INTERNAL MEDICINE

## 2024-05-02 PROCEDURE — 85610 PROTHROMBIN TIME: CPT | Performed by: INTERNAL MEDICINE

## 2024-05-02 PROCEDURE — 3074F SYST BP LT 130 MM HG: CPT | Performed by: INTERNAL MEDICINE

## 2024-05-02 PROCEDURE — G8417 CALC BMI ABV UP PARAM F/U: HCPCS | Performed by: INTERNAL MEDICINE

## 2024-05-02 RX ORDER — FLUTICASONE FUROATE, UMECLIDINIUM BROMIDE AND VILANTEROL TRIFENATATE 200; 62.5; 25 UG/1; UG/1; UG/1
1 POWDER RESPIRATORY (INHALATION) DAILY
Qty: 1 EACH | Refills: 0 | Status: SHIPPED | COMMUNITY
Start: 2024-05-02

## 2024-05-02 NOTE — PROGRESS NOTES
restarts, then continue normal dose.    Description    CONTINUE Warfarin 2.5mg daily except for 5mg on Friday.    Call [phone] with signs or symptoms of bleeding or ANY medication changes (including antibiotics, steroids, over-the-counter medications or herbal supplements).        If significant bleeding occurs or if you fall and hit your head, please seek immediate medical attention.     Keep the number of servings of vitamin K containing foods (dark green, leafy vegetables) the same each week. About 4 times a week (M,W,F,Sun). Please call if this changes.     Limit alcohol intake. Please call if this changes.        Immunization History   Administered Date(s) Administered    COVID-19, MODERNA BLUE border, Primary or Immunocompromised, (age 12y+), IM, 100 mcg/0.5mL 03/09/2021, 04/08/2021, 12/15/2021, 07/23/2022    COVID-19, MODERNA Bivalent, (age 12y+), IM, 50 mcg/0.5 mL 12/30/2022    COVID-19, MODERNA, (2023-24 formula), (age 12y+), IM, 50mcg/0.5mL 11/16/2023    Influenza, FLUAD, (age 65 y+), Adjuvanted, 0.5mL 10/27/2022    Influenza, FLUARIX, FLULAVAL, FLUZONE (age 6 mo+) AND AFLURIA, (age 3 y+), PF, 0.5mL 10/27/2017, 09/23/2019, 10/02/2020    Influenza, FLUBLOK, (age 18 y+), PF, 0.5mL 10/03/2018    Influenza, FLUCELVAX, (age 6 mo+), MDCK, PF, 0.5mL 10/26/2021, 10/26/2023    Pneumococcal Conjugate Vaccine 08/15/2017    Pneumococcal, PCV-13, PREVNAR 13, (age 6w+), IM, 0.5mL 09/11/2015    Pneumococcal, PPSV23, PNEUMOVAX 23, (age 2y+), SC/IM, 0.5mL 08/01/2007, 12/19/2012    TDaP, ADACEL (age 10y-64y), BOOSTRIX (age 10y+), IM, 0.5mL 08/19/2014       Orders Placed This Encounter   Procedures    POCT INR     This external order was created through the results console.      No orders of the defined types were placed in this encounter.     Reviewed AVS with patient / caregiver.    Billing Points:  Adjust dosage and/or reconcile meds (fill pill box) </= 5 medications - 2 points  BASIC ASSESSMENT UNCOMPLICATED

## 2024-05-02 NOTE — ASSESSMENT & PLAN NOTE
-Has been stable since 1997.  -FEV1 50%  -Albuterol as needed  -Cannot rule out some possible underlying asthma as well we will trial Trelegy

## 2024-05-02 NOTE — PROGRESS NOTES
[facility] PULMONARY, SLEEP, AND CRITICAL CARE   Jovon Javier (:  1960) is a 63 y.o. male,Established patient, here for evaluation of the following chief complaint(s):  Follow-up      Assessment & Plan   ASSESSMENT/PLAN:  1. Bronchiolitis obliterans  Assessment & Plan:  -Has been stable since .  -FEV1 50%  -Albuterol as needed  -Cannot rule out some possible underlying asthma as well we will trial Trelegy  Orders:  -     fluticasone-umeclidin-vilant (TRELEGY ELLIPTA) 200-62.5-25 MCG/ACT AEPB inhaler; Inhale 1 puff into the lungs daily, Disp-1 each, R-0Sample        Return in about 4 months (around 2024).         Subjective   SUBJECTIVE/OBJECTIVE:  Recent admission for bronchospasm.  Did better on steroids and inhalers.        Review of Systems   Constitutional: Negative.    Cardiovascular: Negative.    Neurological: Negative.    Psychiatric/Behavioral: Negative.            Objective   Physical Exam    Gen:  No acute distress.   Eyes: EOMI. Anicteric sclera. No conjunctival injection.   ENT: No discharge. oropharynx clear. External appearance of ears and nose normal.  Neck: Trachea midline. No mass   Resp:  No crackles. No wheezes. No rhonchi. No dullness on percussion.  CV: Regular rate. Regular rhythm. No murmur or rub. No edema.    Neuro:  no focal neurologic deficit.  Moves all extremities  Psych: Awake and alert, Oriented x 3.  Judgement and insight appropriate.  Mood stable.    I spent 21 minutes on this case today, >50% was face-to-face in discussion of the diagnosis and the coordination of care in regards to the treatment plan.  All questions answered.      An electronic signature was used to authenticate this note.    --Rafael Christy MD

## 2024-05-08 ENCOUNTER — ANESTHESIA EVENT (OUTPATIENT)
Dept: ENDOSCOPY | Age: 64
End: 2024-05-08
Payer: COMMERCIAL

## 2024-05-09 ENCOUNTER — ANESTHESIA (OUTPATIENT)
Dept: ENDOSCOPY | Age: 64
End: 2024-05-09
Payer: COMMERCIAL

## 2024-05-09 ENCOUNTER — HOSPITAL ENCOUNTER (OUTPATIENT)
Age: 64
Setting detail: OUTPATIENT SURGERY
Discharge: HOME | End: 2024-05-09
Attending: INTERNAL MEDICINE | Admitting: INTERNAL MEDICINE
Payer: COMMERCIAL

## 2024-05-09 VITALS
BODY MASS INDEX: 34.23 KG/M2 | TEMPERATURE: 97 F | RESPIRATION RATE: 16 BRPM | SYSTOLIC BLOOD PRESSURE: 133 MMHG | DIASTOLIC BLOOD PRESSURE: 78 MMHG | HEART RATE: 82 BPM | HEIGHT: 66 IN | WEIGHT: 213 LBS | OXYGEN SATURATION: 95 %

## 2024-05-09 DIAGNOSIS — Z86.010 PERSONAL HISTORY OF COLONIC POLYPS: ICD-10-CM

## 2024-05-09 LAB
GLUCOSE BLD-MCNC: 105 MG/DL (ref 70–99)
INR PPP: 1.26 (ref 0.85–1.15)
PERFORMED ON: ABNORMAL
PROTHROMBIN TIME: 16 SEC (ref 11.9–14.9)

## 2024-05-09 PROCEDURE — 2580000003 HC RX 258: Performed by: ANESTHESIOLOGY

## 2024-05-09 PROCEDURE — 2500000003 HC RX 250 WO HCPCS: Performed by: NURSE ANESTHETIST, CERTIFIED REGISTERED

## 2024-05-09 PROCEDURE — 7100000011 HC PHASE II RECOVERY - ADDTL 15 MIN: Performed by: INTERNAL MEDICINE

## 2024-05-09 PROCEDURE — 7100000000 HC PACU RECOVERY - FIRST 15 MIN: Performed by: INTERNAL MEDICINE

## 2024-05-09 PROCEDURE — 3609010600 HC COLONOSCOPY POLYPECTOMY SNARE/COLD BIOPSY: Performed by: INTERNAL MEDICINE

## 2024-05-09 PROCEDURE — 36415 COLL VENOUS BLD VENIPUNCTURE: CPT

## 2024-05-09 PROCEDURE — 6360000002 HC RX W HCPCS: Performed by: NURSE ANESTHETIST, CERTIFIED REGISTERED

## 2024-05-09 PROCEDURE — 3700000000 HC ANESTHESIA ATTENDED CARE: Performed by: INTERNAL MEDICINE

## 2024-05-09 PROCEDURE — 7100000001 HC PACU RECOVERY - ADDTL 15 MIN: Performed by: INTERNAL MEDICINE

## 2024-05-09 PROCEDURE — 2709999900 HC NON-CHARGEABLE SUPPLY: Performed by: INTERNAL MEDICINE

## 2024-05-09 PROCEDURE — 7100000010 HC PHASE II RECOVERY - FIRST 15 MIN: Performed by: INTERNAL MEDICINE

## 2024-05-09 PROCEDURE — 3700000001 HC ADD 15 MINUTES (ANESTHESIA): Performed by: INTERNAL MEDICINE

## 2024-05-09 PROCEDURE — 85610 PROTHROMBIN TIME: CPT

## 2024-05-09 PROCEDURE — 88305 TISSUE EXAM BY PATHOLOGIST: CPT

## 2024-05-09 RX ORDER — SODIUM CHLORIDE 0.9 % (FLUSH) 0.9 %
5-40 SYRINGE (ML) INJECTION PRN
Status: DISCONTINUED | OUTPATIENT
Start: 2024-05-09 | End: 2024-05-09 | Stop reason: HOSPADM

## 2024-05-09 RX ORDER — SODIUM CHLORIDE 9 MG/ML
INJECTION, SOLUTION INTRAVENOUS PRN
Status: DISCONTINUED | OUTPATIENT
Start: 2024-05-09 | End: 2024-05-09 | Stop reason: HOSPADM

## 2024-05-09 RX ORDER — LIDOCAINE HYDROCHLORIDE 20 MG/ML
INJECTION, SOLUTION INFILTRATION; PERINEURAL PRN
Status: DISCONTINUED | OUTPATIENT
Start: 2024-05-09 | End: 2024-05-09 | Stop reason: SDUPTHER

## 2024-05-09 RX ORDER — PROPOFOL 10 MG/ML
INJECTION, EMULSION INTRAVENOUS CONTINUOUS PRN
Status: DISCONTINUED | OUTPATIENT
Start: 2024-05-09 | End: 2024-05-09 | Stop reason: SDUPTHER

## 2024-05-09 RX ORDER — SODIUM CHLORIDE 0.9 % (FLUSH) 0.9 %
5-40 SYRINGE (ML) INJECTION EVERY 12 HOURS SCHEDULED
Status: DISCONTINUED | OUTPATIENT
Start: 2024-05-09 | End: 2024-05-09 | Stop reason: HOSPADM

## 2024-05-09 RX ORDER — PROPOFOL 10 MG/ML
INJECTION, EMULSION INTRAVENOUS PRN
Status: DISCONTINUED | OUTPATIENT
Start: 2024-05-09 | End: 2024-05-09 | Stop reason: SDUPTHER

## 2024-05-09 RX ADMIN — PROPOFOL 150 MCG/KG/MIN: 10 INJECTION, EMULSION INTRAVENOUS at 09:48

## 2024-05-09 RX ADMIN — LIDOCAINE HYDROCHLORIDE 50 MG: 20 INJECTION, SOLUTION INFILTRATION; PERINEURAL at 09:44

## 2024-05-09 RX ADMIN — SODIUM CHLORIDE: 9 INJECTION, SOLUTION INTRAVENOUS at 09:40

## 2024-05-09 RX ADMIN — PROPOFOL 100 MG: 10 INJECTION, EMULSION INTRAVENOUS at 09:48

## 2024-05-09 ASSESSMENT — PAIN SCALES - GENERAL
PAINLEVEL_OUTOF10: 0
PAINLEVEL_OUTOF10: 0
PAINLEVEL_OUTOF10: 9
PAINLEVEL_OUTOF10: 8

## 2024-05-09 ASSESSMENT — PAIN DESCRIPTION - PAIN TYPE
TYPE: CHRONIC PAIN
TYPE: CHRONIC PAIN

## 2024-05-09 ASSESSMENT — PAIN DESCRIPTION - FREQUENCY: FREQUENCY: INTERMITTENT

## 2024-05-09 ASSESSMENT — PAIN DESCRIPTION - LOCATION
LOCATION: ABDOMEN
LOCATION: ABDOMEN

## 2024-05-09 ASSESSMENT — PAIN DESCRIPTION - ORIENTATION
ORIENTATION: LEFT;LOWER
ORIENTATION: LEFT;LOWER

## 2024-05-09 ASSESSMENT — PAIN DESCRIPTION - ONSET: ONSET: ON-GOING

## 2024-05-09 ASSESSMENT — PAIN DESCRIPTION - DESCRIPTORS: DESCRIPTORS: ACHING;DISCOMFORT

## 2024-05-09 ASSESSMENT — LIFESTYLE VARIABLES: SMOKING_STATUS: 0

## 2024-05-09 NOTE — H&P
and agrees to proceed.     The patient was counseled at length about the risks of raymond Covid-19 during their perioperative period and any recovery window from their procedure.  The patient was made aware that raymond Covid-19  may worsen their prognosis for recovering from their procedure  and lend to a higher morbidity and/or mortality risk.  All material risks, benefits, and reasonable alternatives including postponing the procedure were discussed. The patient does wish to proceed with the procedure at this time.      Remington Hutchinson Jr, DO  5/9/2024

## 2024-05-09 NOTE — ANESTHESIA POSTPROCEDURE EVALUATION
Department of Anesthesiology  Postprocedure Note    Patient: Jovon Javier  MRN: [id number]  YOB: 1960  Date of evaluation: 5/9/2024    Procedure Summary       Date: 05/09/24 Room / Location: [place] [address] / [facility]    Anesthesia Start: 0940 Anesthesia Stop: 1003    Procedure: COLONOSCOPY POLYPECTOMY SNARE BIOPSY Diagnosis:       Personal history of colonic polyps      (Personal history of colonic polyps [Z86.010])    Surgeons: Remington Hutchinson Jr., DO Responsible Provider: Marco Burger MD    Anesthesia Type: MAC ASA Status: 4            Anesthesia Type: MAC    Prince Phase I: Prince Score: 10    Prince Phase II: Prince Score: 10    Anesthesia Post Evaluation    Patient location during evaluation: PACU  Patient participation: complete - patient participated  Level of consciousness: awake  Airway patency: patent  Nausea & Vomiting: no nausea and no vomiting  Cardiovascular status: hemodynamically stable and blood pressure returned to baseline  Respiratory status: spontaneous ventilation, nonlabored ventilation and room air  Hydration status: stable  Comments: Uneventful MAC anesthetic. Mr. Javier was seen resting comfortably following his procedure. Appropriate for return to [facility] for planned discharge home with .   Pain management: adequate    No notable events documented.

## 2024-05-09 NOTE — ANESTHESIA PRE PROCEDURE
[facility] Department of Anesthesiology  Pre-Anesthesia Evaluation/Consultation       Name:  Jovon Javier  : 1960  Age:  63 y.o.                                           MRN:  [id number]  Date: 2024           Surgeon: Surgeon(s):  Remington Hutchinson Jr., DO    Procedure: Procedure(s):  COLONOSCOPY     Allergies   Allergen Reactions    Atrovent Nasal Spray [Ipratropium] Anaphylaxis and Other (See Comments)     Chest tightness    Ipratropium Bromide Hfa Shortness Of Breath    Nitroglycerin Other (See Comments)     Severe hypotension (went from 139 to 66 systolic)    Crestor [Rosuvastatin] Myalgia    Doxycycline Hives    Jardiance [Empagliflozin]      Dr. Dumont discontinued due to hypotension issues     Macrobid [Nitrofurantoin] Hives    Nexletol [Bempedoic Acid]      Hives neck and chest     Sulfa Antibiotics Rash    Vicodin [Hydrocodone-Acetaminophen] Rash     Patient Active Problem List   Diagnosis    Nephrolithiasis    Pneumonia    Essential hypertension, benign    Back pain    URTI (acute upper respiratory infection)    Irritable bowel syndrome    Pure hypercholesterolemia    Allergic rhinitis    Dizziness    Generalized abdominal pain    Right lower quadrant abdominal pain    Precordial pain    Bronchiolitis obliterans    Bronchitis    Coronary artery disease due to calcified coronary lesion    Atypical chest pain    Headache    Lightheaded    Leg pain, right    DDD (degenerative disc disease), lumbar    Acute non-recurrent maxillary sinusitis    SOB (shortness of breath)    Chronic cough    Nausea    Transient cerebral ischemia    History of pulmonary embolism    Cardiomyopathy (HCC)    Confusion    Right arm weakness    Elevated INR    LBBB (left bundle branch block)    Acute confusional state    Urinary tract infection without hematuria    Encounter for adjustment of cardiac resynchronization therapy defibrillator (CRT-D)    Biventricular ICD (implantable cardioverter-defibrillator) in

## 2024-05-09 NOTE — DISCHARGE INSTRUCTIONS
Recommendations:  Await pathology.    The patient had colon polyp(s) successfully removed today. Theres is a small risk for these sites to bleed for up to several weeks out from the procedure. The patient is instructed to call if there is any significant amounts of  rectal bleeding, abdominal pain, dizziness, or other complaints thought to be related to the procedure.      The patient is recommended to have a repeat colonoscopy in 5 years per current screening/surveillance guidelines.     Would resume warfarin tomorrow.     Discharge Instructions for Colonoscopy     Colonoscopy is a visual exam of the lining of the large intestine, also called the bowel or colon, with a colonoscope. A colonoscope is a flexible tube with a light and a viewing device. It allows the doctor to view the inside of the colon through a tiny video camera.     Colonoscopy is performed for many reasons: unexplained anemia , pain, diarrhea , bloody stools, cancer screening, among many other reasons.     Complications from a colonoscopy are rare. Some possible serious complications include perforated bowel (which might require surgery) and bleeding (which could require blood transfusion ). Minor complications include bloating, gas, and cramping that can last for 1-2 days after the procedure.     Because air is put into your colon during the procedure, it is normal to pass large amounts of air from your rectum. You may not have a bowel movement for 1-3 days after the procedure.     What You Will Need:  Someone to drive you home after the procedure     Steps to Take:  Home Care -  Rest when you get home.   Because the sedative will make you drowsy, don't drive, operate machinery, or make important decisions the day of the procedure.  Feelings of bloating, gas, or cramping may persist for 24 hours.   Diet -  Try sips of water first. If tolerated, resume bland food (scrambled eggs, toast, soup) first.  If tolerated, resume regular diet or the diet

## 2024-05-09 NOTE — OP NOTE
Colonoscopy Procedure Note      Patient: Jovon Javier  : 1960  Acct#:     Procedure: Colonoscopy with polypectomy (cold snare)    Date:  2024    Surgeon:  Remington Hutchinson Jr, DO    Referring Physician:  Juan C Dumont MD    Previous Colonoscopy: YES  Date:   Greater than 3 years: yes    Preoperative Diagnosis:  1) Screening colonoscopy- Average risk    Postoperative Diagnosis:  1) A 7 mm polyp in the cecal was removed completely with cold snare polypectomy. 2) Mild left sided diverticulosis was noted.  3) Moderate internal hemorrhoids were noted.     Consent:  The patient or their legal guardian has signed a consent, and is aware of the potential risks, benefits, alternatives, and potential complications of this procedure.  These include, but are not limited to hemorrhage, bleeding, post procedural pain, perforation, phlebitis, aspiration, hypotension, hypoxia, cardiovascular events such as arryhthmia, and possibly death.  Additionally, the possibility of missed colonic polyps and interval colon cancer was discussed in the consent.    Anesthesia:  The patient was administered TIVA per anesthesiology team.  Please see their operative records for full details of medications administered.       Procedure:   An informed consent was obtained from the patient after explanation of indications, benefits, possible risks and complications of the procedure.  The patient was then taken to the endoscopy suite, placed in the left lateral decubitus position, and the above IV anesthesia was administered.    A digital rectal examination was performed and revealed negative without mass, lesions or tenderness, internal hemorrhoids noted.      The Olympus video colonoscope was placed in the patient's rectum under digital direction and advanced to the cecum. The cecum was identified by characteristic anatomy and ballottment. The ileocecal valve was identified.     The preparation was fair.    The terminal

## 2024-05-09 NOTE — PROGRESS NOTES
[facility] PRE-OPERATIVE INSTRUCTIONS    Day of Procedure:5/9                Arrival time: 0745               Surgery time:0915    Take the following medications with a sip of water:  Follow your MD/Surgeons pre-procedure instructions regarding your medications     Do not eat or drink anything after 12:00 midnight prior to your surgery.  This includes water chewing gum, mints and ice chips.   You may brush your teeth and gargle the morning of your surgery, but do not swallow the water     Please see your family doctor/pediatrician for a history and physical and/or concerning medications.   Bring any test results/reports from your physicians office.   If you are under the care of a heart doctor or specialist doctor, please be aware that you may be asked to them for clearance    You may be asked to stop blood thinners such as Coumadin, Plavix, Fragmin, Lovenox, etc., or any anti-inflammatories such as:  Aspirin, Ibuprofen, Advil, Naproxen prior to your surgery.    We also ask that you stop any OTC medications such as fish oil, vitamin E, glucosamine, garlic, Multivitamins, COQ 10, etc.    We ask that you do not smoke 24 hours prior to surgery  We ask that you do not  drink any alcoholic beverages 24 hours prior to surgery     You must make arrangements for a responsible adult to take you home after your surgery.    For your safety you will not be allowed to leave alone or drive yourself home.  Your surgery will be cancelled if you do not have a ride home.     Also for your safety, it is strongly suggested that someone stay with you the first 24 hours after your surgery.     A parent or legal guardian must accompany a child scheduled for surgery and plan to stay at the hospital until the child is discharged.    Please do not bring other children with you.    For your comfort, please wear simple loose fitting clothing to the hospital.  Please do not bring valuables.    Do not wear any make-up or nail polish 
    [facility] Pre-Admission Testing Electronic Communication Worksheet for OR/ENDO Procedures        Patient: Jovon Javier    DOS: 5/9    Transportation Confirmed: [x] YES    []  NO    History and Physical:  [] YES    [x]  NO  [] N/A  If yes, please list doctor or Urgent Care and date of H&P:     Additional Clearance(Cardiac, Pulmonary, etc):  [] YES    [x]  NO    Pre-Admission Testing Visit:  [] YES    [x]  NO If no, do labs/testing need to be done DOS?  [] YES    [x]  NO    Medication Reconciliation Complete:  [x] YES    []  NO        Additional Notes:  Patient aware to hold ASA and Warfarin      Interview Complete: [x] YES    []  NO          Salma Dietz RN  2:54 PM  
Medications administered and monitored by CRNA, see anesthesia record.   
Patient called to department with concerns regarding the colon prep as he is not fully clear yet. Pt spoke with Dr. Hutchinson on the phone and is agreeable to giving the procedure a try. He will be here, just a little tardy.  
Pt is alert and oriented and denies pain at this time, vital signs stable on room air. Tolerating a drink and snack. Discharge instructions given to Pt and spouse, all questions answered. Pt discharged to home with spouse to transport.    
Nasal mucosa clear.  Mouth with normal mucosa  Throat is clear

## 2024-05-23 ENCOUNTER — OFFICE VISIT (OUTPATIENT)
Dept: ENDOCRINOLOGY | Age: 64
End: 2024-05-23
Payer: COMMERCIAL

## 2024-05-23 DIAGNOSIS — E11.9 TYPE 2 DIABETES MELLITUS WITHOUT COMPLICATION, WITHOUT LONG-TERM CURRENT USE OF INSULIN (HCC): Primary | ICD-10-CM

## 2024-05-23 PROCEDURE — G8427 DOCREV CUR MEDS BY ELIG CLIN: HCPCS

## 2024-05-23 PROCEDURE — 97802 MEDICAL NUTRITION INDIV IN: CPT

## 2024-05-23 PROCEDURE — 3044F HG A1C LEVEL LT 7.0%: CPT

## 2024-05-23 PROCEDURE — G8417 CALC BMI ABV UP PARAM F/U: HCPCS

## 2024-05-23 NOTE — PROGRESS NOTES
Acute UTI    Abdominal wall hematoma    Acute right flank pain    Intractable vomiting with nausea    Stenosis of ureteropelvic junction (UPJ)-right    Elevated liver enzymes    Acute abdominal pain    Colicky LLQ abdominal pain    Arm pain    Dysuria    Fever    Episode of shaking    Post-nasal drip    Left foot pain    Pain in scapula    Skin lesions    Otalgia    Pleurisy    Chills    Swelling of calf    Ureteral colic    Gastroesophageal reflux disease with esophagitis without hemorrhage    Chest pain    Epistaxis    Right calf pain    Calculous pyelonephritis    Other alveolar and parieto-alveolar conditions (HCC)    Numbness in feet    Neck pain    Sciatica of right side    Umbilical hernia without obstruction and without gangrene    Near syncope    Palpitations    Scalp lump    Pancreatitis, unspecified pancreatitis type    Stomatitis    Dark stools    Pain of left thigh    Trochanteric bursitis of right hip    Renal cyst, right    Renal cyst    Epigastric pain    Abnormal glucose    Mild intermittent asthma with acute exacerbation    Type 2 diabetes mellitus without complication, without long-term current use of insulin ([facility])       Current Outpatient Medications   Medication Sig Dispense Refill    furosemide (LASIX) 20 MG tablet Take 0.5 tablets by mouth daily 45 tablet 3    zolpidem (AMBIEN) 10 MG tablet Take 1 tablet by mouth as needed at night for sleep 90 tablet 0    pregabalin (LYRICA) 75 MG capsule TAKE 1 CAPSULE BY MOUTH IN THE MORNING AND 2 CAPSULES IN THE EVENING 90 capsule 0    sacubitril-valsartan (ENTRESTO) 24-26 MG per tablet Take 1 tablet by mouth 2 times daily 180 tablet 3    fluticasone-umeclidin-vilant (TRELEGY ELLIPTA) 200-62.5-25 MCG/ACT AEPB inhaler Inhale 1 puff into the lungs daily 1 each 0    glucose monitoring kit 1 kit by Does not apply route daily 1 kit 0    Lancets MISC 1 each by Does not apply route daily 100 each 3    blood glucose monitor strips Test 2 times a day & as needed

## 2024-05-29 DIAGNOSIS — R10.9 ABDOMINAL PAIN, UNSPECIFIED ABDOMINAL LOCATION: ICD-10-CM

## 2024-05-29 DIAGNOSIS — N13.9 OBSTRUCTIVE AND REFLUX UROPATHY: ICD-10-CM

## 2024-05-29 DIAGNOSIS — N28.1 CYST OF KIDNEY, ACQUIRED: ICD-10-CM

## 2024-05-30 ENCOUNTER — ANTI-COAG VISIT (OUTPATIENT)
Dept: PHARMACY | Age: 64
End: 2024-05-30
Attending: INTERNAL MEDICINE
Payer: COMMERCIAL

## 2024-05-30 DIAGNOSIS — Z79.01 CHRONIC ANTICOAGULATION: Primary | ICD-10-CM

## 2024-05-30 NOTE — PROGRESS NOTES
[facility]  Home Care Visit    Lab Results   Component Value Date    INR 1.87 (H) 2024    INR 1.26 (H) 2024    INR 2.9 2024       Anticoagulation Summary  As of 2024      INR goal:  2.0-3.0   TTR:  37.0 % (8.3 y)   INR used for dosin.87 (2024)   Warfarin maintenance plan:  5 mg (5 mg x 1) every Fri; 2.5 mg (5 mg x 0.5) all other days   Weekly warfarin total:  20 mg   Plan last modified:  Cathie Frost RPH (2024)   Next INR check:  2024   Priority:  Maintenance   Target end date:  Indefinite    Indications    Pulmonary embolus (HCC) (Resolved) [I26.99]  Chronic anticoagulation [Z79.01]                 Anticoagulation Episode Summary       INR check location:  Anticoagulation Clinic    Preferred lab:      Send INR reminders to:  [facility]    Comments:  EPIC - finger stick every 2-3 weeks  Consent: 23          Anticoagulation Care Providers       Provider Role Specialty Phone number    Juan C Dumont MD Referring Internal Medicine [phone]              Assessment / Plan:  Pt calls in with the results of a venipuncture INR. No changes or concerns. His INR=1.87, and he was therapeutic at last visit. Will leave dose the same and have pt come into clinic in 2 weeks.        Description    CONTINUE Warfarin 2.5mg daily except for 5mg on Friday.  Hold for 5 days for procedure on , will take 5mg that night he restarts, then continue normal dose    Call [phone] with signs or symptoms of bleeding or ANY medication changes (including antibiotics, steroids, over-the-counter medications or herbal supplements).        If significant bleeding occurs or if you fall and hit your head, please seek immediate medical attention.     Keep the number of servings of vitamin K containing foods (dark green, leafy vegetables) the same each week. About 4 times a week (M,W,F,Sun). Please call if this changes.     Limit alcohol

## 2024-06-01 ENCOUNTER — TELEPHONE (OUTPATIENT)
Dept: FAMILY MEDICINE CLINIC | Age: 64
End: 2024-06-01

## 2024-06-03 NOTE — TELEPHONE ENCOUNTER
Pt called on call provider Saturday, message asking if muscle relaxer and pain med could lower his BP. Attempted X 3,  5min apart to reach pt, he did not answer. Left VM to not take zanaflex and lyrica together as both can lower BP and to stay well hydrated, pt did not call back

## 2024-06-18 ENCOUNTER — TELEPHONE (OUTPATIENT)
Dept: CARDIOLOGY CLINIC | Age: 64
End: 2024-06-18

## 2024-06-18 NOTE — TELEPHONE ENCOUNTER
FYI:  Called patient and made aware recent lab work was not ordered by Dr. Johnson.  He will call ordering provider.  Patient verbalized and confirmed understanding.

## 2024-06-18 NOTE — TELEPHONE ENCOUNTER
Jovon called the office wanting to go over his labs that he completed over the weekend. He states that he wants to know where he stands with his results.    Jovon's callback: [phone]

## 2024-06-20 ENCOUNTER — APPOINTMENT (OUTPATIENT)
Dept: CT IMAGING | Age: 64
DRG: 694 | End: 2024-06-20
Payer: COMMERCIAL

## 2024-06-20 ENCOUNTER — HOSPITAL ENCOUNTER (INPATIENT)
Age: 64
LOS: 3 days | Discharge: HOME | DRG: 694 | End: 2024-06-23
Attending: EMERGENCY MEDICINE | Admitting: STUDENT IN AN ORGANIZED HEALTH CARE EDUCATION/TRAINING PROGRAM
Payer: COMMERCIAL

## 2024-06-20 DIAGNOSIS — R10.9 LEFT FLANK PAIN: Primary | ICD-10-CM

## 2024-06-20 DIAGNOSIS — Z78.9 FAILURE OF OUTPATIENT TREATMENT: ICD-10-CM

## 2024-06-20 DIAGNOSIS — R31.9 HEMATURIA, UNSPECIFIED TYPE: ICD-10-CM

## 2024-06-20 LAB
ALBUMIN SERPL-MCNC: 4 G/DL (ref 3.4–5)
ALBUMIN/GLOB SERPL: 1.4 {RATIO} (ref 1.1–2.2)
ALP SERPL-CCNC: 116 U/L (ref 40–129)
ALT SERPL-CCNC: 20 U/L (ref 10–40)
ANION GAP SERPL CALCULATED.3IONS-SCNC: 9 MMOL/L (ref 3–16)
AST SERPL-CCNC: 32 U/L (ref 15–37)
BACTERIA URNS QL MICRO: ABNORMAL /HPF
BASOPHILS # BLD: 0.1 K/UL (ref 0–0.2)
BASOPHILS NFR BLD: 0.8 %
BILIRUB SERPL-MCNC: 0.6 MG/DL (ref 0–1)
BILIRUB UR QL STRIP.AUTO: ABNORMAL
BILIRUB UR QL STRIP.AUTO: ABNORMAL
BUN SERPL-MCNC: 7 MG/DL (ref 7–20)
CALCIUM SERPL-MCNC: 10.4 MG/DL (ref 8.3–10.6)
CHARACTER UR: ABNORMAL
CHLORIDE SERPL-SCNC: 102 MMOL/L (ref 99–110)
CLARITY UR: ABNORMAL
CLARITY UR: ABNORMAL
CO2 SERPL-SCNC: 27 MMOL/L (ref 21–32)
COLOR UR: ABNORMAL
COLOR UR: ABNORMAL
CREAT SERPL-MCNC: 1 MG/DL (ref 0.8–1.3)
DEPRECATED RDW RBC AUTO: 13.9 % (ref 12.4–15.4)
EOSINOPHIL # BLD: 0.2 K/UL (ref 0–0.6)
EOSINOPHIL NFR BLD: 2.5 %
EPI CELLS #/AREA URNS AUTO: 0 /HPF (ref 0–5)
GFR SERPLBLD CREATININE-BSD FMLA CKD-EPI: 84 ML/MIN/{1.73_M2}
GLUCOSE SERPL-MCNC: 109 MG/DL (ref 70–99)
GLUCOSE UR STRIP.AUTO-MCNC: ABNORMAL MG/DL
GLUCOSE UR STRIP.AUTO-MCNC: NEGATIVE MG/DL
HCT VFR BLD AUTO: 46.6 % (ref 40.5–52.5)
HGB BLD-MCNC: 15.4 G/DL (ref 13.5–17.5)
HGB UR QL STRIP.AUTO: ABNORMAL
HGB UR QL STRIP.AUTO: ABNORMAL
HYALINE CASTS #/AREA URNS AUTO: 3 /LPF (ref 0–8)
INR PPP: 2.54 (ref 0.85–1.15)
KETONES UR STRIP.AUTO-MCNC: ABNORMAL MG/DL
KETONES UR STRIP.AUTO-MCNC: NEGATIVE MG/DL
LEUKOCYTE ESTERASE UR QL STRIP.AUTO: ABNORMAL
LEUKOCYTE ESTERASE UR QL STRIP.AUTO: ABNORMAL
LIPASE SERPL-CCNC: 15 U/L (ref 13–60)
LYMPHOCYTES # BLD: 2.2 K/UL (ref 1–5.1)
LYMPHOCYTES NFR BLD: 27.4 %
MCH RBC QN AUTO: 29.4 PG (ref 26–34)
MCHC RBC AUTO-ENTMCNC: 33.1 G/DL (ref 31–36)
MCV RBC AUTO: 88.8 FL (ref 80–100)
MONOCYTES # BLD: 0.9 K/UL (ref 0–1.3)
MONOCYTES NFR BLD: 10.8 %
MUCUS: PRESENT
NEUTROPHILS # BLD: 4.6 K/UL (ref 1.7–7.7)
NEUTROPHILS NFR BLD: 58.5 %
NITRITE UR QL STRIP.AUTO: ABNORMAL
NITRITE UR QL STRIP.AUTO: POSITIVE
PH UR STRIP.AUTO: 5.5 [PH] (ref 5–8)
PH UR STRIP.AUTO: ABNORMAL [PH] (ref 5–8)
PLATELET # BLD AUTO: 205 K/UL (ref 135–450)
PMV BLD AUTO: 7.8 FL (ref 5–10.5)
POTASSIUM SERPL-SCNC: 4.3 MMOL/L (ref 3.5–5.1)
PROT SERPL-MCNC: 6.8 G/DL (ref 6.4–8.2)
PROT UR STRIP.AUTO-MCNC: 30 MG/DL
PROT UR STRIP.AUTO-MCNC: ABNORMAL MG/DL
PROTHROMBIN TIME: 27.3 SEC (ref 11.9–14.9)
RBC # BLD AUTO: 5.25 M/UL (ref 4.2–5.9)
RBC #/AREA URNS HPF: ABNORMAL /HPF (ref 0–4)
RBC CLUMPS #/AREA URNS AUTO: 85 /HPF (ref 0–4)
SODIUM SERPL-SCNC: 138 MMOL/L (ref 136–145)
SP GR UR STRIP.AUTO: 1.02 (ref 1–1.03)
SP GR UR STRIP.AUTO: ABNORMAL (ref 1–1.03)
UA COMPLETE W REFLEX CULTURE PNL UR: ABNORMAL
UA DIPSTICK W REFLEX MICRO PNL UR: YES
UA DIPSTICK W REFLEX MICRO PNL UR: YES
URN SPEC COLLECT METH UR: ABNORMAL
URN SPEC COLLECT METH UR: ABNORMAL
UROBILINOGEN UR STRIP-ACNC: 1 E.U./DL
UROBILINOGEN UR STRIP-ACNC: ABNORMAL E.U./DL
WBC # BLD AUTO: 7.9 K/UL (ref 4–11)
WBC #/AREA URNS AUTO: 3 /HPF (ref 0–5)
WBC #/AREA URNS HPF: ABNORMAL /HPF (ref 0–5)

## 2024-06-20 PROCEDURE — 83690 ASSAY OF LIPASE: CPT

## 2024-06-20 PROCEDURE — 6370000000 HC RX 637 (ALT 250 FOR IP): Performed by: STUDENT IN AN ORGANIZED HEALTH CARE EDUCATION/TRAINING PROGRAM

## 2024-06-20 PROCEDURE — 94761 N-INVAS EAR/PLS OXIMETRY MLT: CPT

## 2024-06-20 PROCEDURE — 96374 THER/PROPH/DIAG INJ IV PUSH: CPT

## 2024-06-20 PROCEDURE — 2580000003 HC RX 258: Performed by: EMERGENCY MEDICINE

## 2024-06-20 PROCEDURE — 80053 COMPREHEN METABOLIC PANEL: CPT

## 2024-06-20 PROCEDURE — 87086 URINE CULTURE/COLONY COUNT: CPT

## 2024-06-20 PROCEDURE — 96375 TX/PRO/DX INJ NEW DRUG ADDON: CPT

## 2024-06-20 PROCEDURE — 2700000000 HC OXYGEN THERAPY PER DAY

## 2024-06-20 PROCEDURE — 85610 PROTHROMBIN TIME: CPT

## 2024-06-20 PROCEDURE — 36415 COLL VENOUS BLD VENIPUNCTURE: CPT

## 2024-06-20 PROCEDURE — 2580000003 HC RX 258: Performed by: STUDENT IN AN ORGANIZED HEALTH CARE EDUCATION/TRAINING PROGRAM

## 2024-06-20 PROCEDURE — 6360000002 HC RX W HCPCS: Performed by: STUDENT IN AN ORGANIZED HEALTH CARE EDUCATION/TRAINING PROGRAM

## 2024-06-20 PROCEDURE — 74176 CT ABD & PELVIS W/O CONTRAST: CPT

## 2024-06-20 PROCEDURE — 99285 EMERGENCY DEPT VISIT HI MDM: CPT

## 2024-06-20 PROCEDURE — 85025 COMPLETE CBC W/AUTO DIFF WBC: CPT

## 2024-06-20 PROCEDURE — 1200000000 HC SEMI PRIVATE

## 2024-06-20 PROCEDURE — 99223 1ST HOSP IP/OBS HIGH 75: CPT | Performed by: STUDENT IN AN ORGANIZED HEALTH CARE EDUCATION/TRAINING PROGRAM

## 2024-06-20 PROCEDURE — 6360000002 HC RX W HCPCS: Performed by: EMERGENCY MEDICINE

## 2024-06-20 PROCEDURE — 94640 AIRWAY INHALATION TREATMENT: CPT

## 2024-06-20 PROCEDURE — 51798 US URINE CAPACITY MEASURE: CPT

## 2024-06-20 PROCEDURE — 81001 URINALYSIS AUTO W/SCOPE: CPT

## 2024-06-20 RX ORDER — POTASSIUM CHLORIDE 7.45 MG/ML
10 INJECTION INTRAVENOUS PRN
Status: DISCONTINUED | OUTPATIENT
Start: 2024-06-20 | End: 2024-06-23 | Stop reason: HOSPADM

## 2024-06-20 RX ORDER — 0.9 % SODIUM CHLORIDE 0.9 %
1000 INTRAVENOUS SOLUTION INTRAVENOUS ONCE
Status: COMPLETED | OUTPATIENT
Start: 2024-06-20 | End: 2024-06-20

## 2024-06-20 RX ORDER — SODIUM CHLORIDE 0.9 % (FLUSH) 0.9 %
5-40 SYRINGE (ML) INJECTION EVERY 12 HOURS SCHEDULED
Status: DISCONTINUED | OUTPATIENT
Start: 2024-06-20 | End: 2024-06-23 | Stop reason: HOSPADM

## 2024-06-20 RX ORDER — CETIRIZINE HYDROCHLORIDE 10 MG/1
10 TABLET ORAL DAILY
Status: DISCONTINUED | OUTPATIENT
Start: 2024-06-20 | End: 2024-06-23 | Stop reason: HOSPADM

## 2024-06-20 RX ORDER — PREGABALIN 75 MG/1
75 CAPSULE ORAL 2 TIMES DAILY
Status: DISCONTINUED | OUTPATIENT
Start: 2024-06-20 | End: 2024-06-20 | Stop reason: SDUPTHER

## 2024-06-20 RX ORDER — ONDANSETRON 4 MG/1
4 TABLET, ORALLY DISINTEGRATING ORAL EVERY 8 HOURS PRN
Status: DISCONTINUED | OUTPATIENT
Start: 2024-06-20 | End: 2024-06-23 | Stop reason: HOSPADM

## 2024-06-20 RX ORDER — POTASSIUM CHLORIDE 20 MEQ/1
40 TABLET, EXTENDED RELEASE ORAL PRN
Status: DISCONTINUED | OUTPATIENT
Start: 2024-06-20 | End: 2024-06-23 | Stop reason: HOSPADM

## 2024-06-20 RX ORDER — ALBUTEROL SULFATE 2.5 MG/3ML
SOLUTION RESPIRATORY (INHALATION)
Status: DISPENSED
Start: 2024-06-20 | End: 2024-06-20

## 2024-06-20 RX ORDER — ONDANSETRON 2 MG/ML
8 INJECTION INTRAMUSCULAR; INTRAVENOUS ONCE
Status: COMPLETED | OUTPATIENT
Start: 2024-06-20 | End: 2024-06-20

## 2024-06-20 RX ORDER — SODIUM CHLORIDE 9 MG/ML
INJECTION, SOLUTION INTRAVENOUS PRN
Status: DISCONTINUED | OUTPATIENT
Start: 2024-06-20 | End: 2024-06-23 | Stop reason: HOSPADM

## 2024-06-20 RX ORDER — WARFARIN SODIUM 2.5 MG/1
2.5 TABLET ORAL
Status: COMPLETED | OUTPATIENT
Start: 2024-06-20 | End: 2024-06-20

## 2024-06-20 RX ORDER — METOPROLOL SUCCINATE 25 MG/1
25 TABLET, EXTENDED RELEASE ORAL NIGHTLY
Status: DISCONTINUED | OUTPATIENT
Start: 2024-06-20 | End: 2024-06-23 | Stop reason: HOSPADM

## 2024-06-20 RX ORDER — FLUTICASONE PROPIONATE 50 MCG
1 SPRAY, SUSPENSION (ML) NASAL DAILY
Status: DISCONTINUED | OUTPATIENT
Start: 2024-06-20 | End: 2024-06-23 | Stop reason: HOSPADM

## 2024-06-20 RX ORDER — ASPIRIN 81 MG/1
81 TABLET, CHEWABLE ORAL DAILY
Status: DISCONTINUED | OUTPATIENT
Start: 2024-06-20 | End: 2024-06-23 | Stop reason: HOSPADM

## 2024-06-20 RX ORDER — MAGNESIUM SULFATE IN WATER 40 MG/ML
2000 INJECTION, SOLUTION INTRAVENOUS PRN
Status: DISCONTINUED | OUTPATIENT
Start: 2024-06-20 | End: 2024-06-23 | Stop reason: HOSPADM

## 2024-06-20 RX ORDER — SODIUM CHLORIDE 0.9 % (FLUSH) 0.9 %
5-40 SYRINGE (ML) INJECTION PRN
Status: DISCONTINUED | OUTPATIENT
Start: 2024-06-20 | End: 2024-06-23 | Stop reason: HOSPADM

## 2024-06-20 RX ORDER — POLYETHYLENE GLYCOL 3350 17 G/17G
17 POWDER, FOR SOLUTION ORAL DAILY PRN
Status: DISCONTINUED | OUTPATIENT
Start: 2024-06-20 | End: 2024-06-21

## 2024-06-20 RX ORDER — PANTOPRAZOLE SODIUM 40 MG/1
40 TABLET, DELAYED RELEASE ORAL
Status: DISCONTINUED | OUTPATIENT
Start: 2024-06-20 | End: 2024-06-23 | Stop reason: HOSPADM

## 2024-06-20 RX ORDER — TAMSULOSIN HYDROCHLORIDE 0.4 MG/1
0.4 CAPSULE ORAL 2 TIMES DAILY
Status: DISCONTINUED | OUTPATIENT
Start: 2024-06-20 | End: 2024-06-23 | Stop reason: HOSPADM

## 2024-06-20 RX ORDER — ENOXAPARIN SODIUM 100 MG/ML
40 INJECTION SUBCUTANEOUS DAILY
Status: DISCONTINUED | OUTPATIENT
Start: 2024-06-20 | End: 2024-06-20

## 2024-06-20 RX ORDER — KETOROLAC TROMETHAMINE 15 MG/ML
15 INJECTION, SOLUTION INTRAMUSCULAR; INTRAVENOUS ONCE
Status: COMPLETED | OUTPATIENT
Start: 2024-06-20 | End: 2024-06-20

## 2024-06-20 RX ORDER — PRAVASTATIN SODIUM 40 MG
40 TABLET ORAL NIGHTLY
Status: DISCONTINUED | OUTPATIENT
Start: 2024-06-20 | End: 2024-06-23 | Stop reason: HOSPADM

## 2024-06-20 RX ORDER — ONDANSETRON 2 MG/ML
4 INJECTION INTRAMUSCULAR; INTRAVENOUS EVERY 6 HOURS PRN
Status: DISCONTINUED | OUTPATIENT
Start: 2024-06-20 | End: 2024-06-23 | Stop reason: HOSPADM

## 2024-06-20 RX ORDER — METOPROLOL SUCCINATE 25 MG/1
37.5 TABLET, EXTENDED RELEASE ORAL DAILY
Status: DISCONTINUED | OUTPATIENT
Start: 2024-06-20 | End: 2024-06-23 | Stop reason: HOSPADM

## 2024-06-20 RX ORDER — ACETAMINOPHEN 325 MG/1
650 TABLET ORAL EVERY 6 HOURS PRN
Status: DISCONTINUED | OUTPATIENT
Start: 2024-06-20 | End: 2024-06-23 | Stop reason: HOSPADM

## 2024-06-20 RX ORDER — PREGABALIN 75 MG/1
75 CAPSULE ORAL DAILY
Status: DISCONTINUED | OUTPATIENT
Start: 2024-06-20 | End: 2024-06-23 | Stop reason: HOSPADM

## 2024-06-20 RX ORDER — PROCHLORPERAZINE EDISYLATE 5 MG/ML
10 INJECTION INTRAMUSCULAR; INTRAVENOUS EVERY 6 HOURS PRN
Status: DISCONTINUED | OUTPATIENT
Start: 2024-06-20 | End: 2024-06-23 | Stop reason: HOSPADM

## 2024-06-20 RX ORDER — OXYCODONE HYDROCHLORIDE AND ACETAMINOPHEN 5; 325 MG/1; MG/1
1 TABLET ORAL EVERY 6 HOURS PRN
Status: DISCONTINUED | OUTPATIENT
Start: 2024-06-20 | End: 2024-06-21

## 2024-06-20 RX ORDER — ALBUTEROL SULFATE 2.5 MG/3ML
2.5 SOLUTION RESPIRATORY (INHALATION)
Status: DISCONTINUED | OUTPATIENT
Start: 2024-06-20 | End: 2024-06-23 | Stop reason: HOSPADM

## 2024-06-20 RX ORDER — ALBUTEROL SULFATE 2.5 MG/3ML
2.5 SOLUTION RESPIRATORY (INHALATION) EVERY 6 HOURS PRN
Status: DISCONTINUED | OUTPATIENT
Start: 2024-06-20 | End: 2024-06-20

## 2024-06-20 RX ORDER — PREGABALIN 75 MG/1
150 CAPSULE ORAL EVERY EVENING
Status: DISCONTINUED | OUTPATIENT
Start: 2024-06-20 | End: 2024-06-23 | Stop reason: HOSPADM

## 2024-06-20 RX ORDER — FLAVOXATE HYDROCHLORIDE 100 MG/1
100 TABLET ORAL 4 TIMES DAILY PRN
Status: DISCONTINUED | OUTPATIENT
Start: 2024-06-20 | End: 2024-06-23 | Stop reason: HOSPADM

## 2024-06-20 RX ORDER — ACETAMINOPHEN 650 MG/1
650 SUPPOSITORY RECTAL EVERY 6 HOURS PRN
Status: DISCONTINUED | OUTPATIENT
Start: 2024-06-20 | End: 2024-06-23 | Stop reason: HOSPADM

## 2024-06-20 RX ADMIN — ALBUTEROL SULFATE 2.5 MG: 2.5 SOLUTION RESPIRATORY (INHALATION) at 14:01

## 2024-06-20 RX ADMIN — SODIUM CHLORIDE, PRESERVATIVE FREE 10 ML: 5 INJECTION INTRAVENOUS at 20:26

## 2024-06-20 RX ADMIN — ONDANSETRON 8 MG: 2 INJECTION INTRAMUSCULAR; INTRAVENOUS at 03:45

## 2024-06-20 RX ADMIN — ALBUTEROL SULFATE 2.5 MG: 2.5 SOLUTION RESPIRATORY (INHALATION) at 20:26

## 2024-06-20 RX ADMIN — SODIUM CHLORIDE, PRESERVATIVE FREE 10 ML: 5 INJECTION INTRAVENOUS at 15:09

## 2024-06-20 RX ADMIN — ONDANSETRON 4 MG: 2 INJECTION INTRAMUSCULAR; INTRAVENOUS at 06:34

## 2024-06-20 RX ADMIN — HYDROMORPHONE HYDROCHLORIDE 1 MG: 1 INJECTION, SOLUTION INTRAMUSCULAR; INTRAVENOUS; SUBCUTANEOUS at 03:48

## 2024-06-20 RX ADMIN — HYDROMORPHONE HYDROCHLORIDE 0.5 MG: 1 INJECTION, SOLUTION INTRAMUSCULAR; INTRAVENOUS; SUBCUTANEOUS at 12:22

## 2024-06-20 RX ADMIN — OXYCODONE HYDROCHLORIDE AND ACETAMINOPHEN 1 TABLET: 5; 325 TABLET ORAL at 21:31

## 2024-06-20 RX ADMIN — WARFARIN SODIUM 2.5 MG: 2.5 TABLET ORAL at 18:02

## 2024-06-20 RX ADMIN — POLYETHYLENE GLYCOL 3350 17 G: 17 POWDER, FOR SOLUTION ORAL at 18:02

## 2024-06-20 RX ADMIN — PRAVASTATIN SODIUM 40 MG: 40 TABLET ORAL at 21:25

## 2024-06-20 RX ADMIN — PROCHLORPERAZINE EDISYLATE 10 MG: 5 INJECTION INTRAMUSCULAR; INTRAVENOUS at 09:12

## 2024-06-20 RX ADMIN — TAMSULOSIN HYDROCHLORIDE 0.4 MG: 0.4 CAPSULE ORAL at 21:25

## 2024-06-20 RX ADMIN — KETOROLAC TROMETHAMINE 15 MG: 15 INJECTION, SOLUTION INTRAMUSCULAR; INTRAVENOUS at 03:47

## 2024-06-20 RX ADMIN — FLUTICASONE PROPIONATE 1 SPRAY: 50 SPRAY, METERED NASAL at 15:08

## 2024-06-20 RX ADMIN — ONDANSETRON 4 MG: 2 INJECTION INTRAMUSCULAR; INTRAVENOUS at 12:23

## 2024-06-20 RX ADMIN — PREGABALIN 150 MG: 75 CAPSULE ORAL at 18:02

## 2024-06-20 RX ADMIN — SODIUM CHLORIDE 1000 ML: 9 INJECTION, SOLUTION INTRAVENOUS at 03:48

## 2024-06-20 ASSESSMENT — PAIN DESCRIPTION - ORIENTATION
ORIENTATION: LEFT
ORIENTATION: LEFT;LOWER
ORIENTATION: LEFT

## 2024-06-20 ASSESSMENT — PAIN - FUNCTIONAL ASSESSMENT
PAIN_FUNCTIONAL_ASSESSMENT: 0-10
PAIN_FUNCTIONAL_ASSESSMENT: ACTIVITIES ARE NOT PREVENTED
PAIN_FUNCTIONAL_ASSESSMENT: ACTIVITIES ARE NOT PREVENTED

## 2024-06-20 ASSESSMENT — PAIN DESCRIPTION - DESCRIPTORS
DESCRIPTORS: DULL;ACHING
DESCRIPTORS: ACHING;DULL
DESCRIPTORS: STABBING

## 2024-06-20 ASSESSMENT — PAIN DESCRIPTION - LOCATION
LOCATION: FLANK;ABDOMEN
LOCATION: ABDOMEN
LOCATION: FLANK

## 2024-06-20 ASSESSMENT — PAIN DESCRIPTION - ONSET: ONSET: PROGRESSIVE

## 2024-06-20 ASSESSMENT — PAIN SCALES - GENERAL
PAINLEVEL_OUTOF10: 6
PAINLEVEL_OUTOF10: 10
PAINLEVEL_OUTOF10: 9

## 2024-06-20 ASSESSMENT — PAIN DESCRIPTION - PAIN TYPE
TYPE: ACUTE PAIN

## 2024-06-20 ASSESSMENT — PAIN DESCRIPTION - FREQUENCY: FREQUENCY: CONTINUOUS

## 2024-06-20 NOTE — PLAN OF CARE
Problem: Discharge Planning  Goal: Discharge to home or other facility with appropriate resources  Outcome: Progressing  Flowsheets (Taken 6/20/2024 9548)  Discharge to home or other facility with appropriate resources:   Identify barriers to discharge with patient and caregiver   Identify discharge learning needs (meds, wound care, etc)   Refer to discharge planning if patient needs post-hospital services based on physician order or complex needs related to functional status, cognitive ability or social support system   Arrange for needed discharge resources and transportation as appropriate     Problem: Pain  Goal: Verbalizes/displays adequate comfort level or baseline comfort level  Outcome: Progressing     Problem: Safety - Adult  Goal: Free from fall injury  Outcome: Progressing     Problem: Genitourinary - Adult  Goal: Absence of urinary retention  Outcome: Progressing

## 2024-06-20 NOTE — ED NOTES
Report given to Mirela KeyW RN. She verbalized understanding of patient condition and has no further questions

## 2024-06-20 NOTE — CARE COORDINATION
Discharge Planning:  SW received a call back from Dmitry with [employer] [phone].  Dmitry stated [facility] tried to provide pt with a portable oxygen concentrator in 3/24 but pt refused stating he would prefer the tanks.    Dmitry stated he will have his team reach back out to pt to ensure pt would be agreeable to a portable oxygen concentrator and will let this SW know if anything else is needed.   LY [facility]  [phone]  Electronically signed by LY Albrecht on 6/20/2024 at 2:16 PM

## 2024-06-20 NOTE — CONSULTS
non-distended, no masses  Skin: Skin color, texture, turgor normal, no rashes or lesions  Neurologic: no gross deficits   Male :  Nonpalpable bladder  Mild left CVA tenderness      MARIA T Not indicated    Labs:  CBC   Lab Results   Component Value Date/Time    WBC 7.9 06/20/2024 03:42 AM    RBC 5.25 06/20/2024 03:42 AM    HGB 15.4 06/20/2024 03:42 AM    HCT 46.6 06/20/2024 03:42 AM    MCV 88.8 06/20/2024 03:42 AM    MCH 29.4 06/20/2024 03:42 AM    MCHC 33.1 06/20/2024 03:42 AM    RDW 13.9 06/20/2024 03:42 AM     06/20/2024 03:42 AM    MPV 7.8 06/20/2024 03:42 AM     BMP   Lab Results   Component Value Date/Time     06/20/2024 03:42 AM    K 4.3 06/20/2024 03:42 AM     06/20/2024 03:42 AM    CO2 27 06/20/2024 03:42 AM    BUN 7 06/20/2024 03:42 AM    CREATININE 1.0 06/20/2024 03:42 AM    GLUCOSE 109 06/20/2024 03:42 AM    CALCIUM 10.4 06/20/2024 03:42 AM       Urinalysis:   Lab Results   Component Value Date/Time    COLORU ORANGE 06/20/2024 03:42 AM    GLUCOSEU Color Interfer 06/20/2024 03:42 AM    GLUCOSEU NEGATIVE 01/24/2012 10:05 PM    BLOODU Color Interfer 06/20/2024 03:42 AM    NITRU Color Interfer 06/20/2024 03:42 AM    LEUKOCYTESUR Color Interfer 06/20/2024 03:42 AM       Imaging:  Pertinent images and radiologist's report were reviewed independently  IMPRESSION:  1. No acute inflammatory process identified, within the limits of a  noncontrast exam.  2. Bilateral punctate renal calculi. No hydronephrosis.    Impression/Plan:  hematuria - due to renal stones in setting of anticoagulation  Flank pain- etiology is unclear, no ureteral stone or hydro on CT    Plan:  continue symptomatic management.  No surgical intervention at this time      Perlita Fuentes MD 6/20/202412:34 PM

## 2024-06-20 NOTE — PLAN OF CARE
Problem: Discharge Planning  Goal: Discharge to home or other facility with appropriate resources  6/20/2024 1541 by Allie Kim RN  Outcome: Progressing  Flowsheets (Taken 6/20/2024 0830)  Discharge to home or other facility with appropriate resources: Identify barriers to discharge with patient and caregiver  6/20/2024 0701 by Ole Cervantes RN  Outcome: Progressing  Flowsheets (Taken 6/20/2024 0647)  Discharge to home or other facility with appropriate resources:   Identify barriers to discharge with patient and caregiver   Identify discharge learning needs (meds, wound care, etc)   Refer to discharge planning if patient needs post-hospital services based on physician order or complex needs related to functional status, cognitive ability or social support system   Arrange for needed discharge resources and transportation as appropriate     Problem: Pain  Goal: Verbalizes/displays adequate comfort level or baseline comfort level  6/20/2024 1541 by Allie Kim RN  Outcome: Progressing  6/20/2024 0701 by Ole Cervantes RN  Outcome: Progressing     Problem: Safety - Adult  Goal: Free from fall injury  6/20/2024 1541 by Allie Kim RN  Outcome: Progressing  6/20/2024 0701 by Ole Cervantes RN  Outcome: Progressing     Problem: Genitourinary - Adult  Goal: Absence of urinary retention  6/20/2024 1541 by Allie Kim RN  Outcome: Progressing  Flowsheets (Taken 6/20/2024 0830)  Absence of urinary retention: Assess patient’s ability to void and empty bladder  6/20/2024 0701 by Ole Cervantes RN  Outcome: Progressing

## 2024-06-20 NOTE — ED PROVIDER NOTES
Color Interfer (*)     All other components within normal limits   MICROSCOPIC URINALYSIS - Abnormal; Notable for the following components:    RBC, UA  (*)     All other components within normal limits   CBC WITH AUTO DIFFERENTIAL   LIPASE       RADIOLOGY/PROCEDURES  I personally reviewed the images for this case.  CT ABDOMEN PELVIS WO CONTRAST Additional Contrast? None   Final Result   1. No acute inflammatory process identified, within the limits of a   noncontrast exam.   2. Bilateral punctate renal calculi. No hydronephrosis.              Vitals:    06/20/24 0329 06/20/24 0408 06/20/24 0415 06/20/24 0430   BP: 118/88 117/64 113/63 103/62   Pulse: (!) 101 88 84 90   Resp:  16 16 15   Temp: 98.4 °F (36.9 °C)      TempSrc: Oral      SpO2: 93% 96% 97% 98%   Weight: 98.6 kg (217 lb 6 oz)      Height: 1.676 m (5' 6\")          Medications   ketorolac (TORADOL) injection 15 mg (15 mg IntraVENous Given 6/20/24 0347)   HYDROmorphone (DILAUDID) injection 1 mg (1 mg IntraVENous Given 6/20/24 0348)   ondansetron (ZOFRAN) injection 8 mg (8 mg IntraVENous Given 6/20/24 0345)   sodium chloride 0.9 % bolus 1,000 mL (1,000 mLs IntraVENous New Bag 6/20/24 0348)       New Prescriptions    No medications on file       SEP-1 CORE MEASURE DATA  Exclusion criteria: the patient is NOT to be included for sepsis due to:  SIRS criteria are not met    Patient remained stable in the ED. Presents with left flank pain.  History of a left kidney stone.  He states this got worse in the past 2 days.  He has had a kidney stone for  \"6 months\" and sees Dr. Wallace  For urology.  He is on Percocets at home for the pain.  However, Percocet is not controlling his pain.  Patient was given Dilaudid and Toradol in the emergency department.  He seemed to have some relief but states it still hurts.  CT scan of his abdomen did not show any evidence of   any ureteral stone.  There is no hydronephrosis.  However, there is large amount of blood in his urine

## 2024-06-20 NOTE — CARE COORDINATION
Case Management Assessment  Initial Evaluation    Date/Time of Evaluation: 6/20/2024 1:58 PM  Assessment Completed by: LY Albrecht    If patient is discharged prior to next notation, then this note serves as note for discharge by case management.    Patient Name: Jovon Javier                   YOB: 1960  Diagnosis: Left flank pain [R10.9]  Hematuria [R31.9]  Failure of outpatient treatment [Z78.9]  Hematuria, unspecified type [R31.9]                   Date / Time: 6/20/2024  3:25 AM    Patient Admission Status: Inpatient   Readmission Risk (Low < 19, Mod (19-27), High > 27): Readmission Risk Score: 17.7    Current PCP: Juan C Dumont MD  PCP verified by CM? (P) Yes    Chart Reviewed: Yes      History Provided by: (P) Patient  Patient Orientation: (P) Alert and Oriented, Person, Place, Situation, Self    Patient Cognition: (P) Alert    Hospitalization in the last 30 days (Readmission):  No    If yes, Readmission Assessment in  Navigator will be completed.    Advance Directives:      Code Status: Full Code   Patient's Primary Decision Maker is: (P) Legal Next of Kin    Primary Decision Maker: Adelaida Javier - Spouse - [phone]    Discharge Planning:    Patient lives with: (P) Spouse/Significant Other Type of Home: (P) Other (Comment) (Condo- 0 [address])  Primary Care Giver: (P) Self  Patient Support Systems include: (P) Spouse/Significant Other, Children   Current Financial resources: (P) Medicare  Current community resources: (P) None  Current services prior to admission: (P) Oxygen Therapy (Active with [employer].  On 2L O2.  Has portable tanks and is interested in a portable oxygen concentrator.  Does not smoke.)            Current DME:              Type of Home Care services:  (P) None    ADLS  Prior functional level: (P) Assistance with the following:, Cooking, Housework, Shopping  Current functional level: (P) Assistance with the following:, Cooking, Housework, Shopping,

## 2024-06-21 ENCOUNTER — APPOINTMENT (OUTPATIENT)
Dept: GENERAL RADIOLOGY | Age: 64
DRG: 694 | End: 2024-06-21
Payer: COMMERCIAL

## 2024-06-21 LAB
ANION GAP SERPL CALCULATED.3IONS-SCNC: 9 MMOL/L (ref 3–16)
BASOPHILS # BLD: 0 K/UL (ref 0–0.2)
BASOPHILS NFR BLD: 0.7 %
BUN SERPL-MCNC: 8 MG/DL (ref 7–20)
CALCIUM SERPL-MCNC: 9.7 MG/DL (ref 8.3–10.6)
CHLORIDE SERPL-SCNC: 104 MMOL/L (ref 99–110)
CO2 SERPL-SCNC: 26 MMOL/L (ref 21–32)
CREAT SERPL-MCNC: 1.1 MG/DL (ref 0.8–1.3)
DEPRECATED RDW RBC AUTO: 14 % (ref 12.4–15.4)
EOSINOPHIL # BLD: 0.1 K/UL (ref 0–0.6)
EOSINOPHIL NFR BLD: 2.9 %
GFR SERPLBLD CREATININE-BSD FMLA CKD-EPI: 74 ML/MIN/{1.73_M2}
GLUCOSE BLD-MCNC: 106 MG/DL (ref 70–99)
GLUCOSE BLD-MCNC: 72 MG/DL (ref 70–99)
GLUCOSE SERPL-MCNC: 74 MG/DL (ref 70–99)
HCT VFR BLD AUTO: 39.5 % (ref 40.5–52.5)
HGB BLD-MCNC: 12.8 G/DL (ref 13.5–17.5)
INR PPP: 2.48 (ref 0.85–1.15)
LYMPHOCYTES # BLD: 1.5 K/UL (ref 1–5.1)
LYMPHOCYTES NFR BLD: 28.9 %
MCH RBC QN AUTO: 28.9 PG (ref 26–34)
MCHC RBC AUTO-ENTMCNC: 32.5 G/DL (ref 31–36)
MCV RBC AUTO: 88.9 FL (ref 80–100)
MONOCYTES # BLD: 0.5 K/UL (ref 0–1.3)
MONOCYTES NFR BLD: 10.6 %
NEUTROPHILS # BLD: 2.9 K/UL (ref 1.7–7.7)
NEUTROPHILS NFR BLD: 56.9 %
PERFORMED ON: ABNORMAL
PERFORMED ON: NORMAL
PLATELET # BLD AUTO: 160 K/UL (ref 135–450)
PMV BLD AUTO: 7.8 FL (ref 5–10.5)
POTASSIUM SERPL-SCNC: 4.3 MMOL/L (ref 3.5–5.1)
PROTHROMBIN TIME: 26.8 SEC (ref 11.9–14.9)
RBC # BLD AUTO: 4.45 M/UL (ref 4.2–5.9)
SODIUM SERPL-SCNC: 139 MMOL/L (ref 136–145)
WBC # BLD AUTO: 5 K/UL (ref 4–11)

## 2024-06-21 PROCEDURE — 6360000002 HC RX W HCPCS: Performed by: STUDENT IN AN ORGANIZED HEALTH CARE EDUCATION/TRAINING PROGRAM

## 2024-06-21 PROCEDURE — 94761 N-INVAS EAR/PLS OXIMETRY MLT: CPT

## 2024-06-21 PROCEDURE — 6370000000 HC RX 637 (ALT 250 FOR IP): Performed by: STUDENT IN AN ORGANIZED HEALTH CARE EDUCATION/TRAINING PROGRAM

## 2024-06-21 PROCEDURE — 85025 COMPLETE CBC W/AUTO DIFF WBC: CPT

## 2024-06-21 PROCEDURE — 1200000000 HC SEMI PRIVATE

## 2024-06-21 PROCEDURE — 2700000000 HC OXYGEN THERAPY PER DAY

## 2024-06-21 PROCEDURE — 94640 AIRWAY INHALATION TREATMENT: CPT

## 2024-06-21 PROCEDURE — 80048 BASIC METABOLIC PNL TOTAL CA: CPT

## 2024-06-21 PROCEDURE — 99233 SBSQ HOSP IP/OBS HIGH 50: CPT | Performed by: STUDENT IN AN ORGANIZED HEALTH CARE EDUCATION/TRAINING PROGRAM

## 2024-06-21 PROCEDURE — 85610 PROTHROMBIN TIME: CPT

## 2024-06-21 PROCEDURE — 71045 X-RAY EXAM CHEST 1 VIEW: CPT

## 2024-06-21 PROCEDURE — 2580000003 HC RX 258: Performed by: STUDENT IN AN ORGANIZED HEALTH CARE EDUCATION/TRAINING PROGRAM

## 2024-06-21 PROCEDURE — 36415 COLL VENOUS BLD VENIPUNCTURE: CPT

## 2024-06-21 RX ORDER — ZOLPIDEM TARTRATE 5 MG/1
5 TABLET ORAL NIGHTLY PRN
Status: DISCONTINUED | OUTPATIENT
Start: 2024-06-21 | End: 2024-06-23 | Stop reason: HOSPADM

## 2024-06-21 RX ORDER — FUROSEMIDE 10 MG/ML
40 INJECTION INTRAMUSCULAR; INTRAVENOUS ONCE
Status: COMPLETED | OUTPATIENT
Start: 2024-06-21 | End: 2024-06-21

## 2024-06-21 RX ORDER — WARFARIN SODIUM 5 MG/1
5 TABLET ORAL
Status: COMPLETED | OUTPATIENT
Start: 2024-06-21 | End: 2024-06-21

## 2024-06-21 RX ORDER — OXYCODONE HYDROCHLORIDE AND ACETAMINOPHEN 5; 325 MG/1; MG/1
1 TABLET ORAL EVERY 4 HOURS PRN
Status: DISCONTINUED | OUTPATIENT
Start: 2024-06-21 | End: 2024-06-23 | Stop reason: HOSPADM

## 2024-06-21 RX ORDER — POLYETHYLENE GLYCOL 3350 17 G/17G
17 POWDER, FOR SOLUTION ORAL 2 TIMES DAILY PRN
Status: DISCONTINUED | OUTPATIENT
Start: 2024-06-21 | End: 2024-06-23 | Stop reason: HOSPADM

## 2024-06-21 RX ADMIN — TAMSULOSIN HYDROCHLORIDE 0.4 MG: 0.4 CAPSULE ORAL at 20:31

## 2024-06-21 RX ADMIN — PREGABALIN 150 MG: 75 CAPSULE ORAL at 18:36

## 2024-06-21 RX ADMIN — PRAVASTATIN SODIUM 40 MG: 40 TABLET ORAL at 20:31

## 2024-06-21 RX ADMIN — SODIUM CHLORIDE, PRESERVATIVE FREE 10 ML: 5 INJECTION INTRAVENOUS at 08:20

## 2024-06-21 RX ADMIN — ASPIRIN 81 MG: 81 TABLET, CHEWABLE ORAL at 08:19

## 2024-06-21 RX ADMIN — TAMSULOSIN HYDROCHLORIDE 0.4 MG: 0.4 CAPSULE ORAL at 08:19

## 2024-06-21 RX ADMIN — SACUBITRIL AND VALSARTAN 1 TABLET: 24; 26 TABLET, FILM COATED ORAL at 20:31

## 2024-06-21 RX ADMIN — ALBUTEROL SULFATE 2.5 MG: 2.5 SOLUTION RESPIRATORY (INHALATION) at 13:10

## 2024-06-21 RX ADMIN — FUROSEMIDE 40 MG: 10 INJECTION, SOLUTION INTRAMUSCULAR; INTRAVENOUS at 18:36

## 2024-06-21 RX ADMIN — SACUBITRIL AND VALSARTAN 1 TABLET: 24; 26 TABLET, FILM COATED ORAL at 08:19

## 2024-06-21 RX ADMIN — PREGABALIN 75 MG: 75 CAPSULE ORAL at 08:18

## 2024-06-21 RX ADMIN — ZOLPIDEM TARTRATE 5 MG: 5 TABLET, COATED ORAL at 22:14

## 2024-06-21 RX ADMIN — WARFARIN SODIUM 5 MG: 5 TABLET ORAL at 18:36

## 2024-06-21 RX ADMIN — CETIRIZINE HYDROCHLORIDE 10 MG: 10 TABLET, FILM COATED ORAL at 08:20

## 2024-06-21 RX ADMIN — POLYETHYLENE GLYCOL 3350 17 G: 17 POWDER, FOR SOLUTION ORAL at 16:32

## 2024-06-21 RX ADMIN — OXYCODONE HYDROCHLORIDE AND ACETAMINOPHEN 1 TABLET: 5; 325 TABLET ORAL at 20:32

## 2024-06-21 RX ADMIN — FLUTICASONE PROPIONATE 1 SPRAY: 50 SPRAY, METERED NASAL at 08:20

## 2024-06-21 RX ADMIN — OXYCODONE HYDROCHLORIDE AND ACETAMINOPHEN 1 TABLET: 5; 325 TABLET ORAL at 03:27

## 2024-06-21 RX ADMIN — ALBUTEROL SULFATE 2.5 MG: 2.5 SOLUTION RESPIRATORY (INHALATION) at 19:45

## 2024-06-21 RX ADMIN — ALBUTEROL SULFATE 2.5 MG: 2.5 SOLUTION RESPIRATORY (INHALATION) at 08:07

## 2024-06-21 RX ADMIN — METOPROLOL SUCCINATE 37.5 MG: 25 TABLET, EXTENDED RELEASE ORAL at 08:19

## 2024-06-21 RX ADMIN — METOPROLOL SUCCINATE 25 MG: 25 TABLET, EXTENDED RELEASE ORAL at 22:14

## 2024-06-21 ASSESSMENT — PAIN SCALES - GENERAL
PAINLEVEL_OUTOF10: 7
PAINLEVEL_OUTOF10: 7

## 2024-06-21 ASSESSMENT — PAIN DESCRIPTION - DESCRIPTORS: DESCRIPTORS: ACHING

## 2024-06-21 ASSESSMENT — PAIN DESCRIPTION - LOCATION: LOCATION: FLANK

## 2024-06-21 ASSESSMENT — PAIN DESCRIPTION - ORIENTATION: ORIENTATION: LEFT

## 2024-06-21 NOTE — PLAN OF CARE
Problem: Discharge Planning  Goal: Discharge to home or other facility with appropriate resources  Outcome: Progressing     Problem: Pain  Goal: Verbalizes/displays adequate comfort level or baseline comfort level  Outcome: Progressing     Problem: Safety - Adult  Goal: Free from fall injury  Outcome: Progressing     Problem: Genitourinary - Adult  Goal: Absence of urinary retention  Outcome: Progressing

## 2024-06-21 NOTE — PLAN OF CARE
Problem: Discharge Planning  Goal: Discharge to home or other facility with appropriate resources  Outcome: Progressing     Problem: Pain  Goal: Verbalizes/displays adequate comfort level or baseline comfort level  Outcome: Progressing     Problem: Safety - Adult  Goal: Free from fall injury  Outcome: Progressing     Problem: Genitourinary - Adult  Goal: Absence of urinary retention  Outcome: Progressing     Problem: Chronic Conditions and Co-morbidities  Goal: Patient's chronic conditions and co-morbidity symptoms are monitored and maintained or improved  Outcome: Progressing

## 2024-06-21 NOTE — H&P
Value Date/Time    NITRITE neg 05/31/2024 04:37 PM    COLORU DARK YELLOW 06/20/2024 12:15 PM    WBCUA 3 06/20/2024 12:15 PM    RBCUA 85 06/20/2024 12:15 PM    MUCUS Present 06/20/2024 12:15 PM    BACTERIA None Seen 06/20/2024 12:15 PM    CLARITYU CLOUDY 06/20/2024 12:15 PM    SPECGRAV 1.020 05/31/2024 04:37 PM    LEUKOCYTESUR TRACE 06/20/2024 12:15 PM    BLOODU LARGE 06/20/2024 12:15 PM    GLUCOSEU Negative 06/20/2024 12:15 PM    GLUCOSEU NEGATIVE 01/24/2012 10:05 PM    AMORPHOUS Present 01/01/2024 02:52 PM     ABG:    Lab Results   Component Value Date/Time    NLN2JPI 25.7 04/11/2013 10:25 PM    BEART -0.6 04/11/2013 10:25 PM    Y4DVGPMU 92.8 04/11/2013 10:25 PM    PHART 7.317 04/11/2013 10:25 PM    THGBART 12.8 04/11/2013 10:25 PM    THGBART 14.1 02/24/2011 05:36 AM    RAE3LBU 51.7 04/11/2013 10:25 PM    PO2ART 62.2 04/11/2013 10:25 PM    SDU4PSG 52.6 04/11/2013 10:25 PM     CT ABD PELVIS    IMPRESSION:  1. No acute inflammatory process identified, within the limits of a  noncontrast exam.  2. Bilateral punctate renal calculi. No hydronephrosis.      Assessment & Plan:    Jovon Javier is a 63 y.o. male who was admitted with Hematuria.  Principal Problem:    Renal cyst, right  Recurrent nephrolithiasis  Papillary urothelial neoplasm, history of  -Repeat urinalysis and urine culture, he does not report dysuria  - Pain control  - Appreciate urology consultation    History of pancreatitis  - Not currently symptomatic, monitor     GERD  - Continue PPI        Cardiomyopathy status post ICD  Hypertension  -Continue home meds     Hyperlipidemia  - Continue statin     Previous pulmonary embolism  -Continue warfarin     Bronchiolitis obliterans  - Continue home inhaler therapy     Constipation  - Continue MiraLAX           F/E/N: Diabetic diet  PPx: Warfarin  Dispo: Pending improvement in abdominal pain     Jeffery Estrella MD   6/20/2024 11:03 PM    Documentation was done using voice recognition dragon software.  Every

## 2024-06-22 LAB
ANION GAP SERPL CALCULATED.3IONS-SCNC: 8 MMOL/L (ref 3–16)
BACTERIA UR CULT: NORMAL
BASOPHILS # BLD: 0 K/UL (ref 0–0.2)
BASOPHILS NFR BLD: 0.7 %
BUN SERPL-MCNC: 10 MG/DL (ref 7–20)
CALCIUM SERPL-MCNC: 10.4 MG/DL (ref 8.3–10.6)
CHLORIDE SERPL-SCNC: 100 MMOL/L (ref 99–110)
CO2 SERPL-SCNC: 32 MMOL/L (ref 21–32)
CREAT SERPL-MCNC: 0.9 MG/DL (ref 0.8–1.3)
DEPRECATED RDW RBC AUTO: 13.4 % (ref 12.4–15.4)
EOSINOPHIL # BLD: 0.2 K/UL (ref 0–0.6)
EOSINOPHIL NFR BLD: 3.6 %
GFR SERPLBLD CREATININE-BSD FMLA CKD-EPI: >90 ML/MIN/{1.73_M2}
GLUCOSE BLD-MCNC: 116 MG/DL (ref 70–99)
GLUCOSE BLD-MCNC: 126 MG/DL (ref 70–99)
GLUCOSE BLD-MCNC: 81 MG/DL (ref 70–99)
GLUCOSE BLD-MCNC: 98 MG/DL (ref 70–99)
GLUCOSE SERPL-MCNC: 88 MG/DL (ref 70–99)
HCT VFR BLD AUTO: 40.7 % (ref 40.5–52.5)
HGB BLD-MCNC: 13.6 G/DL (ref 13.5–17.5)
INR PPP: 2.22 (ref 0.85–1.15)
LYMPHOCYTES # BLD: 1.4 K/UL (ref 1–5.1)
LYMPHOCYTES NFR BLD: 30.5 %
MCH RBC QN AUTO: 29.4 PG (ref 26–34)
MCHC RBC AUTO-ENTMCNC: 33.5 G/DL (ref 31–36)
MCV RBC AUTO: 87.8 FL (ref 80–100)
MONOCYTES # BLD: 0.6 K/UL (ref 0–1.3)
MONOCYTES NFR BLD: 14.1 %
NEUTROPHILS # BLD: 2.3 K/UL (ref 1.7–7.7)
NEUTROPHILS NFR BLD: 51.1 %
PERFORMED ON: ABNORMAL
PERFORMED ON: ABNORMAL
PERFORMED ON: NORMAL
PERFORMED ON: NORMAL
PLATELET # BLD AUTO: 148 K/UL (ref 135–450)
PMV BLD AUTO: 7.8 FL (ref 5–10.5)
POTASSIUM SERPL-SCNC: 3.9 MMOL/L (ref 3.5–5.1)
PROTHROMBIN TIME: 24.7 SEC (ref 11.9–14.9)
RBC # BLD AUTO: 4.63 M/UL (ref 4.2–5.9)
SODIUM SERPL-SCNC: 140 MMOL/L (ref 136–145)
WBC # BLD AUTO: 4.6 K/UL (ref 4–11)

## 2024-06-22 PROCEDURE — 99232 SBSQ HOSP IP/OBS MODERATE 35: CPT | Performed by: INTERNAL MEDICINE

## 2024-06-22 PROCEDURE — 6370000000 HC RX 637 (ALT 250 FOR IP): Performed by: STUDENT IN AN ORGANIZED HEALTH CARE EDUCATION/TRAINING PROGRAM

## 2024-06-22 PROCEDURE — 85610 PROTHROMBIN TIME: CPT

## 2024-06-22 PROCEDURE — 94761 N-INVAS EAR/PLS OXIMETRY MLT: CPT

## 2024-06-22 PROCEDURE — 2700000000 HC OXYGEN THERAPY PER DAY

## 2024-06-22 PROCEDURE — 2580000003 HC RX 258: Performed by: STUDENT IN AN ORGANIZED HEALTH CARE EDUCATION/TRAINING PROGRAM

## 2024-06-22 PROCEDURE — 94640 AIRWAY INHALATION TREATMENT: CPT

## 2024-06-22 PROCEDURE — 6360000002 HC RX W HCPCS: Performed by: STUDENT IN AN ORGANIZED HEALTH CARE EDUCATION/TRAINING PROGRAM

## 2024-06-22 PROCEDURE — 36415 COLL VENOUS BLD VENIPUNCTURE: CPT

## 2024-06-22 PROCEDURE — 85025 COMPLETE CBC W/AUTO DIFF WBC: CPT

## 2024-06-22 PROCEDURE — 80048 BASIC METABOLIC PNL TOTAL CA: CPT

## 2024-06-22 PROCEDURE — 1200000000 HC SEMI PRIVATE

## 2024-06-22 RX ORDER — WARFARIN SODIUM 2.5 MG/1
2.5 TABLET ORAL
Status: COMPLETED | OUTPATIENT
Start: 2024-06-22 | End: 2024-06-22

## 2024-06-22 RX ORDER — ALBUTEROL SULFATE 2.5 MG/3ML
SOLUTION RESPIRATORY (INHALATION)
Status: DISPENSED
Start: 2024-06-22 | End: 2024-06-23

## 2024-06-22 RX ADMIN — SACUBITRIL AND VALSARTAN 1 TABLET: 24; 26 TABLET, FILM COATED ORAL at 08:50

## 2024-06-22 RX ADMIN — TAMSULOSIN HYDROCHLORIDE 0.4 MG: 0.4 CAPSULE ORAL at 08:50

## 2024-06-22 RX ADMIN — ALBUTEROL SULFATE 2.5 MG: 2.5 SOLUTION RESPIRATORY (INHALATION) at 20:39

## 2024-06-22 RX ADMIN — FLUTICASONE PROPIONATE 1 SPRAY: 50 SPRAY, METERED NASAL at 08:51

## 2024-06-22 RX ADMIN — CETIRIZINE HYDROCHLORIDE 10 MG: 10 TABLET, FILM COATED ORAL at 08:51

## 2024-06-22 RX ADMIN — ZOLPIDEM TARTRATE 5 MG: 5 TABLET, COATED ORAL at 23:12

## 2024-06-22 RX ADMIN — ALBUTEROL SULFATE 2.5 MG: 2.5 SOLUTION RESPIRATORY (INHALATION) at 08:47

## 2024-06-22 RX ADMIN — PREGABALIN 75 MG: 75 CAPSULE ORAL at 08:50

## 2024-06-22 RX ADMIN — SODIUM CHLORIDE, PRESERVATIVE FREE 10 ML: 5 INJECTION INTRAVENOUS at 20:50

## 2024-06-22 RX ADMIN — SODIUM CHLORIDE, PRESERVATIVE FREE 10 ML: 5 INJECTION INTRAVENOUS at 08:49

## 2024-06-22 RX ADMIN — ASPIRIN 81 MG: 81 TABLET, CHEWABLE ORAL at 08:51

## 2024-06-22 RX ADMIN — PREGABALIN 150 MG: 75 CAPSULE ORAL at 17:42

## 2024-06-22 RX ADMIN — WARFARIN SODIUM 2.5 MG: 2.5 TABLET ORAL at 17:42

## 2024-06-22 RX ADMIN — SACUBITRIL AND VALSARTAN 1 TABLET: 24; 26 TABLET, FILM COATED ORAL at 20:49

## 2024-06-22 RX ADMIN — PRAVASTATIN SODIUM 40 MG: 40 TABLET ORAL at 20:50

## 2024-06-22 RX ADMIN — POLYETHYLENE GLYCOL 3350 17 G: 17 POWDER, FOR SOLUTION ORAL at 10:10

## 2024-06-22 RX ADMIN — METOPROLOL SUCCINATE 37.5 MG: 25 TABLET, EXTENDED RELEASE ORAL at 08:48

## 2024-06-22 RX ADMIN — ALBUTEROL SULFATE 2.5 MG: 2.5 SOLUTION RESPIRATORY (INHALATION) at 15:38

## 2024-06-22 RX ADMIN — TAMSULOSIN HYDROCHLORIDE 0.4 MG: 0.4 CAPSULE ORAL at 20:50

## 2024-06-22 RX ADMIN — METOPROLOL SUCCINATE 25 MG: 25 TABLET, EXTENDED RELEASE ORAL at 23:11

## 2024-06-22 ASSESSMENT — PAIN SCALES - GENERAL
PAINLEVEL_OUTOF10: 0
PAINLEVEL_OUTOF10: 0

## 2024-06-22 NOTE — PLAN OF CARE
Problem: Discharge Planning  Goal: Discharge to home or other facility with appropriate resources  6/22/2024 1023 by Allie Das RN  Outcome: Progressing  6/22/2024 0129 by Laine Peng RN  Outcome: Progressing     Problem: Pain  Goal: Verbalizes/displays adequate comfort level or baseline comfort level  6/22/2024 1023 by Allie Das RN  Outcome: Progressing  6/22/2024 0129 by Laine Peng RN  Outcome: Progressing     Problem: Safety - Adult  Goal: Free from fall injury  6/22/2024 1023 by Allie Das RN  Outcome: Progressing  6/22/2024 0129 by Laine Peng RN  Outcome: Progressing     Problem: Genitourinary - Adult  Goal: Absence of urinary retention  6/22/2024 1023 by Allie Das RN  Outcome: Progressing  6/22/2024 0129 by Laine Peng RN  Outcome: Progressing     Problem: Respiratory - Adult  Goal: Achieves optimal ventilation and oxygenation  6/22/2024 1023 by Allie Das RN  Outcome: Progressing  6/22/2024 0129 by Laine Peng RN  Outcome: Progressing     Problem: Chronic Conditions and Co-morbidities  Goal: Patient's chronic conditions and co-morbidity symptoms are monitored and maintained or improved  6/22/2024 1023 by Allie Das RN  Outcome: Progressing  6/22/2024 0129 by Laine Peng RN  Outcome: Progressing

## 2024-06-22 NOTE — PLAN OF CARE
Problem: Discharge Planning  Goal: Discharge to home or other facility with appropriate resources  6/22/2024 0129 by Laine Peng RN  Outcome: Progressing     Problem: Pain  Goal: Verbalizes/displays adequate comfort level or baseline comfort level  6/22/2024 0129 by Laine Peng RN  Outcome: Progressing     Problem: Safety - Adult  Goal: Free from fall injury  6/22/2024 0129 by Laine Peng, RN  Outcome: Progressing     Problem: Genitourinary - Adult  Goal: Absence of urinary retention  6/22/2024 0129 by Laine Peng RN  Outcome: Progressing     Problem: Respiratory - Adult  Goal: Achieves optimal ventilation and oxygenation  Outcome: Progressing     Problem: Chronic Conditions and Co-morbidities  Goal: Patient's chronic conditions and co-morbidity symptoms are monitored and maintained or improved  6/22/2024 0129 by Laine Peng, RN  Outcome: Progressing

## 2024-06-23 VITALS
RESPIRATION RATE: 16 BRPM | DIASTOLIC BLOOD PRESSURE: 93 MMHG | WEIGHT: 215.6 LBS | SYSTOLIC BLOOD PRESSURE: 137 MMHG | OXYGEN SATURATION: 92 % | HEART RATE: 78 BPM | TEMPERATURE: 98.1 F | HEIGHT: 66 IN | BODY MASS INDEX: 34.65 KG/M2

## 2024-06-23 LAB
ANION GAP SERPL CALCULATED.3IONS-SCNC: 6 MMOL/L (ref 3–16)
BASOPHILS # BLD: 0 K/UL (ref 0–0.2)
BASOPHILS NFR BLD: 0.9 %
BUN SERPL-MCNC: 8 MG/DL (ref 7–20)
CALCIUM SERPL-MCNC: 10.3 MG/DL (ref 8.3–10.6)
CHLORIDE SERPL-SCNC: 104 MMOL/L (ref 99–110)
CO2 SERPL-SCNC: 30 MMOL/L (ref 21–32)
CREAT SERPL-MCNC: 0.9 MG/DL (ref 0.8–1.3)
DEPRECATED RDW RBC AUTO: 13.6 % (ref 12.4–15.4)
EOSINOPHIL # BLD: 0.2 K/UL (ref 0–0.6)
EOSINOPHIL NFR BLD: 3.4 %
GFR SERPLBLD CREATININE-BSD FMLA CKD-EPI: >90 ML/MIN/{1.73_M2}
GLUCOSE SERPL-MCNC: 82 MG/DL (ref 70–99)
HCT VFR BLD AUTO: 41.2 % (ref 40.5–52.5)
HGB BLD-MCNC: 13.9 G/DL (ref 13.5–17.5)
INR PPP: 2.3 (ref 0.85–1.15)
LYMPHOCYTES # BLD: 1.4 K/UL (ref 1–5.1)
LYMPHOCYTES NFR BLD: 25.3 %
MCH RBC QN AUTO: 29.3 PG (ref 26–34)
MCHC RBC AUTO-ENTMCNC: 33.7 G/DL (ref 31–36)
MCV RBC AUTO: 87.2 FL (ref 80–100)
MONOCYTES # BLD: 0.7 K/UL (ref 0–1.3)
MONOCYTES NFR BLD: 12.4 %
NEUTROPHILS # BLD: 3.1 K/UL (ref 1.7–7.7)
NEUTROPHILS NFR BLD: 58 %
PLATELET # BLD AUTO: 177 K/UL (ref 135–450)
PMV BLD AUTO: 7.9 FL (ref 5–10.5)
POTASSIUM SERPL-SCNC: 4.1 MMOL/L (ref 3.5–5.1)
PROTHROMBIN TIME: 25.3 SEC (ref 11.9–14.9)
RBC # BLD AUTO: 4.73 M/UL (ref 4.2–5.9)
SODIUM SERPL-SCNC: 140 MMOL/L (ref 136–145)
WBC # BLD AUTO: 5.4 K/UL (ref 4–11)

## 2024-06-23 PROCEDURE — 94760 N-INVAS EAR/PLS OXIMETRY 1: CPT

## 2024-06-23 PROCEDURE — 36415 COLL VENOUS BLD VENIPUNCTURE: CPT

## 2024-06-23 PROCEDURE — 6360000002 HC RX W HCPCS: Performed by: STUDENT IN AN ORGANIZED HEALTH CARE EDUCATION/TRAINING PROGRAM

## 2024-06-23 PROCEDURE — 99238 HOSP IP/OBS DSCHRG MGMT 30/<: CPT | Performed by: INTERNAL MEDICINE

## 2024-06-23 PROCEDURE — 2580000003 HC RX 258: Performed by: STUDENT IN AN ORGANIZED HEALTH CARE EDUCATION/TRAINING PROGRAM

## 2024-06-23 PROCEDURE — 85025 COMPLETE CBC W/AUTO DIFF WBC: CPT

## 2024-06-23 PROCEDURE — 80048 BASIC METABOLIC PNL TOTAL CA: CPT

## 2024-06-23 PROCEDURE — 94640 AIRWAY INHALATION TREATMENT: CPT

## 2024-06-23 PROCEDURE — 85610 PROTHROMBIN TIME: CPT

## 2024-06-23 PROCEDURE — 6370000000 HC RX 637 (ALT 250 FOR IP): Performed by: STUDENT IN AN ORGANIZED HEALTH CARE EDUCATION/TRAINING PROGRAM

## 2024-06-23 RX ORDER — WARFARIN SODIUM 2.5 MG/1
2.5 TABLET ORAL
Status: DISCONTINUED | OUTPATIENT
Start: 2024-06-23 | End: 2024-06-23 | Stop reason: HOSPADM

## 2024-06-23 RX ADMIN — ASPIRIN 81 MG: 81 TABLET, CHEWABLE ORAL at 07:57

## 2024-06-23 RX ADMIN — SODIUM CHLORIDE, PRESERVATIVE FREE 10 ML: 5 INJECTION INTRAVENOUS at 07:58

## 2024-06-23 RX ADMIN — FLUTICASONE PROPIONATE 1 SPRAY: 50 SPRAY, METERED NASAL at 07:57

## 2024-06-23 RX ADMIN — CETIRIZINE HYDROCHLORIDE 10 MG: 10 TABLET, FILM COATED ORAL at 07:57

## 2024-06-23 RX ADMIN — PREGABALIN 75 MG: 75 CAPSULE ORAL at 07:57

## 2024-06-23 RX ADMIN — METOPROLOL SUCCINATE 37.5 MG: 25 TABLET, EXTENDED RELEASE ORAL at 07:57

## 2024-06-23 RX ADMIN — SACUBITRIL AND VALSARTAN 1 TABLET: 24; 26 TABLET, FILM COATED ORAL at 07:57

## 2024-06-23 RX ADMIN — ALBUTEROL SULFATE 2.5 MG: 2.5 SOLUTION RESPIRATORY (INHALATION) at 09:02

## 2024-06-23 RX ADMIN — TAMSULOSIN HYDROCHLORIDE 0.4 MG: 0.4 CAPSULE ORAL at 07:57

## 2024-06-23 RX ADMIN — POLYETHYLENE GLYCOL 3350 17 G: 17 POWDER, FOR SOLUTION ORAL at 06:49

## 2024-06-23 NOTE — CARE COORDINATION
Case Management Discharge Note          Date / Time of Note: 6/23/2024 11:57 AM                  Patient Name: Jovon Javier   YOB: 1960  Diagnosis: Left flank pain [R10.9]  Hematuria [R31.9]  Failure of outpatient treatment [Z78.9]  Hematuria, unspecified type [R31.9]   Date / Time: 6/20/2024  3:25 AM    Financial:  Payor: [facility] / Plan: [facility] - CHOICE PLU / Product Type: *No Product type* /      Pharmacy:    [facility] [address] - P [phone] - F [phone]  [address]  [facility] [address]  Phone: [phone] Fax: [phone]    [facility] - [place], [place] - [address] [place] [address] - P [phone] - F [phone]  [address]  [facility] [address]  [facility] [address]  Phone: [phone] Fax: [phone]    [id number] - [place], [place] - [address] [facility] - P [phone] - F [phone]  [address] [facility] [address]  Phone: [phone] Fax: [phone]      Assistance purchasing medications?: Potential Assistance Purchasing Medications: No (Pt has all meds filled at [facility] or [facility]  Assistance provided by Case Management: None at this time    DISCHARGE Disposition: Home- No Services Needed        Home Oxygen and Respiratory Equipment:  Oxygen needed at discharge?: No  Home Oxygen Company: [employer]  Phone: [phone]  Portable tank available for discharge?: Not Indicated      Transportation:  Transportation PLAN for discharge: family   Mode of Transport: Private Car        IMM Completed:   Not Indicated    Additional CM Notes: SW spoke with pt who confirmed his spouse will transport him home at d/c.  Pt denied the need for any assistance at this time.  Pt is currently not requiring O2.    The Plan for Transition of Care is related to the following treatment goals of Left flank pain [R10.9]  Hematuria [R31.9]  Failure of outpatient treatment

## 2024-06-23 NOTE — PLAN OF CARE
Problem: Discharge Planning  Goal: Discharge to home or other facility with appropriate resources  Outcome: Progressing     Problem: Pain  Goal: Verbalizes/displays adequate comfort level or baseline comfort level  Outcome: Progressing     Problem: Safety - Adult  Goal: Free from fall injury  Outcome: Progressing     Problem: Genitourinary - Adult  Goal: Absence of urinary retention  Outcome: Progressing     Problem: Respiratory - Adult  Goal: Achieves optimal ventilation and oxygenation  Outcome: Progressing     Problem: Chronic Conditions and Co-morbidities  Goal: Patient's chronic conditions and co-morbidity symptoms are monitored and maintained or improved  Outcome: Progressing

## 2024-06-23 NOTE — PLAN OF CARE
Problem: Discharge Planning  Goal: Discharge to home or other facility with appropriate resources  6/23/2024 0837 by Angela Perry RN  Outcome: Progressing  6/23/2024 0245 by Laine Peng RN  Outcome: Progressing     Problem: Pain  Goal: Verbalizes/displays adequate comfort level or baseline comfort level  6/23/2024 0837 by Angela Perry RN  Outcome: Progressing  6/23/2024 0245 by Laine Peng RN  Outcome: Progressing     Problem: Safety - Adult  Goal: Free from fall injury  6/23/2024 0837 by Angela Perry RN  Outcome: Progressing  6/23/2024 0245 by Laine Peng RN  Outcome: Progressing     Problem: Genitourinary - Adult  Goal: Absence of urinary retention  6/23/2024 0837 by Angela Perry RN  Outcome: Progressing  6/23/2024 0245 by Laine Peng RN  Outcome: Progressing     Problem: Chronic Conditions and Co-morbidities  Goal: Patient's chronic conditions and co-morbidity symptoms are monitored and maintained or improved  6/23/2024 0837 by Angela Perry RN  Outcome: Progressing  6/23/2024 0245 by Laine Peng RN  Outcome: Progressing     Problem: Respiratory - Adult  Goal: Achieves optimal ventilation and oxygenation  6/23/2024 0837 by Angela Perry RN  Outcome: Progressing  6/23/2024 0245 by Laine Peng RN  Outcome: Progressing

## 2024-06-23 NOTE — DISCHARGE INSTR - DIET

## 2024-06-24 ENCOUNTER — TELEPHONE (OUTPATIENT)
Dept: PHARMACY | Age: 64
End: 2024-06-24

## 2024-06-24 LAB
GLUCOSE BLD-MCNC: 88 MG/DL (ref 70–99)
PERFORMED ON: NORMAL

## 2024-06-24 NOTE — PROGRESS NOTES
4 Eyes Skin Assessment     NAME:  Jovon Javier  YOB: 1960  MEDICAL RECORD NUMBER:  [id number]    The patient is being assessed for  Admission    I agree that at least one RN has performed a thorough Head to Toe Skin Assessment on the patient. ALL assessment sites listed below have been assessed.      Areas assessed by both nurses:    Head, Face, Ears, Shoulders, Back, Chest, Arms, Elbows, Hands, Sacrum. Buttock, Coccyx, Ischium, Legs. Feet and Heels, and Under Medical Devices         Does the Patient have a Wound? No noted wound(s)       Casimiro Prevention initiated by RN: No  Wound Care Orders initiated by RN: No    Pressure Injury (Stage 3,4, Unstageable, DTI, NWPT, and Complex wounds) if present, place Wound referral order by RN under : No    New Ostomies, if present place, Ostomy referral order under : No     Nurse 1 eSignature: Electronically signed by Ole Cervantes RN on 6/20/24 at 11:30 PM EDT    **SHARE this note so that the co-signing nurse can place an eSignature**    Nurse 2 eSignature: Electronically signed by Norm Kay RN on 6/20/24 at 11:31 PM EDT   
Clinical Pharmacy Note  Warfarin Consult    Jovon Javier is a 63 y.o. male receiving warfarin managed by pharmacy.  Patient being bridged with none.    Warfarin Indication: Hx of PE  Target INR range: 2-3   Dose prior to admission: 5 mg on Fridays, all other days takes 2.5 mg      Recent Labs     06/20/24  0342 06/20/24  0812   HGB 15.4  --    HCT 46.6  --    INR  --  2.54*       Assessment/Plan:    Warfarin 2.5 mg tonight. Daily PT/INR until stable within therapeutic range.     Thank you for the consult.  Will continue to follow.    DILLAN Manjarrez PharmD   06/20/24 10:43 AM      
Clinical Pharmacy Note  Warfarin Consult    Jovon Javier is a 64 y.o. male receiving warfarin managed by pharmacy.  Patient being bridged with none.    Warfarin Indication: Hx of PE  Target INR range: 2-3   Dose prior to admission: 5 mg on Fridays, all other days takes 2.5 mg    Recent Labs     06/20/24  0342 06/20/24  0812 06/21/24  0419   HGB 15.4  --  12.8*   HCT 46.6  --  39.5*   INR  --  2.54* 2.48*     Assessment/Plan:  Warfarin 5 mg tonight.   Daily PT/INR until stable within therapeutic range.     Thank you for the consult.  Will continue to follow.  Cathie Frost PharmD, BCPS  6/21/2024 9:43 AM        
Clinical Pharmacy Note  Warfarin Consult    Jovon Javier is a 64 y.o. male receiving warfarin managed by pharmacy.  Patient being bridged with none.    Warfarin Indication: Hx of PE  Target INR range: 2-3   Dose prior to admission: 5 mg on Fridays, all other days takes 2.5 mg    Recent Labs     06/20/24  0342 06/20/24  0812 06/21/24  0419 06/22/24  0504 06/22/24  0505   HGB 15.4  --  12.8* 13.6  --    HCT 46.6  --  39.5* 40.7  --    INR  --  2.54* 2.48*  --  2.22*     Assessment/Plan:  Warfarin 2.5 mg tonight.   Daily PT/INR until stable within therapeutic range.     Thank you for the consult.  Will continue to follow.  Cathie Frost, PharmD, BCPS  6/22/2024 9:42 AM        
Clinical Pharmacy Note  Warfarin Consult    Jovon Javier is a 64 y.o. male receiving warfarin managed by pharmacy.  Patient being bridged with none.    Warfarin Indication: Hx of PE  Target INR range: 2-3   Dose prior to admission: 5 mg on Fridays, all other days takes 2.5 mg    Recent Labs     06/21/24  0419 06/22/24  0504 06/22/24  0505 06/23/24  0533   HGB 12.8* 13.6  --  13.9   HCT 39.5* 40.7  --  41.2   INR 2.48*  --  2.22* 2.30*     Assessment/Plan:  Warfarin 2.5 mg tonight.   Daily PT/INR until stable within therapeutic range.     Thank you for the consult.  Will continue to follow.  Cathie Frost, PharmD, BCPS  6/23/2024 10:39 AM        
[facility] Note      Chief Complaint: I feel better    Subjective/Interval History:    This morning the patient is resting comfortably.  He reports that he has been able to reduce his oxygen to 2 L and is checking his oxygen saturations regularly.  He is eating and drinking well.  He has no other new complaints.  He does not feel quite well enough to go home yet today.    No chest pain or shortness breath. No cough or sputum. No nausea, vomiting, diarrhea. No abdominal pain. No dysuria.  The remainder of the review of systems is negative.    PMH, PSH, FH/SH reviewed and unchanged as documented in the H&P dated 24    Medication list reviewed    Objective:    /71   Pulse 75   Temp 98.2 °F (36.8 °C) (Oral)   Resp 16   Ht 1.676 m (5' 6\")   Wt 97.2 kg (214 lb 4.8 oz)   SpO2 98%   BMI 34.59 kg/m²   Temp  Av.2 °F (36.8 °C)  Min: 97.5 °F (36.4 °C)  Max: 99 °F (37.2 °C)    RRR  Chest-the chest is clear throughout.  No wheezing or rhonchi or crackles noted.  Respirations are easy.  Abd- BS+, soft, NTND  Ext- no edema    The Following Labs Were Reviewed Today:     Recent Results (from the past 24 hour(s))   POCT Glucose    Collection Time: 24  8:38 PM   Result Value Ref Range    POC Glucose 106 (H) 70 - 99 mg/dl    Performed on ACCU-CHEK    Basic Metabolic Panel    Collection Time: 24  5:04 AM   Result Value Ref Range    Sodium 140 136 - 145 mmol/L    Potassium 3.9 3.5 - 5.1 mmol/L    Chloride 100 99 - 110 mmol/L    CO2 32 21 - 32 mmol/L    Anion Gap 8 3 - 16    Glucose 88 70 - 99 mg/dL    BUN 10 7 - 20 mg/dL    Creatinine 0.9 0.8 - 1.3 mg/dL    Est, Glom Filt Rate >90 >60    Calcium 10.4 8.3 - 10.6 mg/dL   CBC with Auto Differential    Collection Time: 24  5:04 AM   Result Value Ref Range    WBC 4.6 4.0 - 11.0 K/uL    RBC 4.63 4.20 - 5.90 M/uL    Hemoglobin 13.6 13.5 - 17.5 g/dL    Hematocrit 40.7 40.5 - 52.5 %    MCV 87.8 80.0 - 100.0 fL    MCH 29.4 26.0 - 34.0 pg    
Medication Reconciliation    List of medications patient is currently taking is complete.     Source of information: 1. Conversation with patient at bedside                                      2. EPIC records      Allergies  Atrovent nasal spray [ipratropium], Ipratropium bromide hfa, Nitroglycerin, Crestor [rosuvastatin], Doxycycline, Jardiance [empagliflozin], Macrobid [nitrofurantoin], Nexletol [bempedoic acid], Sulfa antibiotics, and Vicodin [hydrocodone-acetaminophen]     Notes regarding home medications:   1. Home warfarin regimen: 5 mg on Fridays, all other days takes 2.5 mg     DILLAN Manjarrez PharmD   06/20/24 11:01 AM                
Patient is being judgmental/insulting in a disrespectful approach about how the staff smell starting shift without having any strong odor/cologne. Patient is requesting for a new nurse. Change nurse has been notified of the patient concerns/behaviors.   
Patient is requesting to talk to the doctor about the medication that patient takes at home which is patient is not getting here; Called placed to the Dr. Estrella (Attending) office and spoke to the nurse. Notified the patient that doctor office has been notified of patient concerns. Will continue to monitor.  
Pt C/o feeling of fullness after he drinks. This RN bladder scanned pt. PVR was 101 ml.Pt stated that he had BM yesterday but would like Glycolax as he normally goes twice a day and feels very full.No c/o pain at this time.Call light and bedside table within reach. Wife is at bedside  
Pt C/o mid abd pain 9/10 and nausea. Pt medicated with prn pain and nausea medication. Pt declined Percocet and requested the Dilaudid instead as his pain.This RN returned the Percocet with a second RN. Pt's wife is at bedside. Call light and bedside table within reach  
Pt C/o shaking after being given IV compazine. Pt's VSS.Pt is alert and oriented with no signs of respiratory distress.Arm Tremors are intermittant and mild in nature.This RN called and spoke with Nicole GARCIA and left message  
Pt admitted from the ED to room 4103.  Ambulates with personal cane at the bedside.  Alert and oriented x4. Vital signs stable.  Respirations easy and unlabored on 2L O2.  Complains of nausea and 7-8/10 flank pain.  Administered PRN Zofran per order.  Given pudding per request before pain medication.  No further complaints or requests voiced.  No signs or symptoms of distress.  Call light within reach.  Will continue to monitor.     
Pt bood pressure 101/68, heart rate= 82.  Scheduled to receive Metoprolol and Entresto.  Dr. Estrella called to notify of vitals.  MD ok'd for medications to be held for the night.  Will continue to monitor.   
Pt given prn compazine for nausea per order  
Pt had episode of vomiting. Pt already had dose of Zofran at 0630. This RN called Dr. Estrella office and requested something additional for nausea.P given emesis bags and mouthwash.Pt stated that he took several of his am medications prior to admission. This RN is not giving am medications at this time d/t nausea. Call light and bedside table within reach  
[facility] Note      Chief Complaint: I feel better    Subjective/Interval History:    This morning the patient is sitting up in the bedside chair.  He states he feels better.  He has weaned to room air and he states he is ready to go home.    No chest pain or shortness breath. No cough or sputum. No nausea, vomiting, diarrhea. No abdominal pain. No dysuria.  The remainder of the review of systems is negative.    PMH, PSH, FH/SH reviewed and unchanged as documented in the H&P dated 24    Medication list reviewed    Objective:    BP (!) 137/93   Pulse 78   Temp 98.1 °F (36.7 °C) (Oral)   Resp 16   Ht 1.676 m (5' 6\")   Wt 97.8 kg (215 lb 9.6 oz)   SpO2 92%   BMI 34.80 kg/m²   Temp  Av.2 °F (36.8 °C)  Min: 98.1 °F (36.7 °C)  Max: 98.3 °F (36.8 °C)    Exam unchanged today:  RRR  Chest-the chest is clear throughout.  No wheezing or rhonchi or crackles noted.  Respirations are easy.  Abd- BS+, soft, NTND  Ext- no edema    The Following Labs Were Reviewed Today:     Recent Results (from the past 24 hour(s))   POCT Glucose    Collection Time: 24  4:50 PM   Result Value Ref Range    POC Glucose 126 (H) 70 - 99 mg/dl    Performed on ACCU-CHEK    POCT Glucose    Collection Time: 24  7:35 PM   Result Value Ref Range    POC Glucose 116 (H) 70 - 99 mg/dl    Performed on ACCU-CHEK    Protime-INR    Collection Time: 24  5:33 AM   Result Value Ref Range    Protime 25.3 (H) 11.9 - 14.9 sec    INR 2.30 (H) 0.85 - 1.15   Basic Metabolic Panel    Collection Time: 24  5:33 AM   Result Value Ref Range    Sodium 140 136 - 145 mmol/L    Potassium 4.1 3.5 - 5.1 mmol/L    Chloride 104 99 - 110 mmol/L    CO2 30 21 - 32 mmol/L    Anion Gap 6 3 - 16    Glucose 82 70 - 99 mg/dL    BUN 8 7 - 20 mg/dL    Creatinine 0.9 0.8 - 1.3 mg/dL    Est, Glom Filt Rate >90 >60    Calcium 10.3 8.3 - 10.6 mg/dL   CBC with Auto Differential    Collection Time: 24  5:33 AM   Result Value Ref Range    
Written and verbal DC instructions reviewed with pt. Pt states understanding. IV removed without complications. Dressing clean dry and intact. Pt awaiting ride to arrive.     Electronically signed by Angela Perry RN on 6/23/2024 at 12:10 PM    
  Neurologic:   CNII-XII intact. Normal strength, sensation and reflexes       throughout     LABS   BMP  Recent Labs     06/20/24  0342 06/21/24  0419    139   K 4.3 4.3    104   CO2 27 26   BUN 7 8   CREATININE 1.0 1.1   CALCIUM 10.4 9.7     CBC/COAGS  Recent Labs     06/20/24  0342 06/20/24  0812 06/21/24  0419   WBC 7.9  --  5.0   HCT 46.6  --  39.5*     --  160   INR  --  2.54* 2.48*   PROTIME  --  27.3* 26.8*     Liver & Pancreas  Recent Labs     06/20/24  0342   AST 32   ALT 20   ALKPHOS 116   ALBUMIN 4.0   LIPASE 15.0        IMAGING   CXR pending    Above studies and images were personally reviewed by myself    MEDS   Scheduled Meds:   warfarin  5 mg Oral Once    sodium chloride flush  5-40 mL IntraVENous 2 times per day    pantoprazole  40 mg Oral QAM AC    fluticasone  1 spray Each Nostril Daily    cetirizine  10 mg Oral Daily    metoprolol succinate  37.5 mg Oral Daily    metoprolol succinate  25 mg Oral Nightly    pravastatin  40 mg Oral Nightly    pregabalin  75 mg Oral Daily    pregabalin  150 mg Oral QPM    sacubitril-valsartan  1 tablet Oral BID    tamsulosin  0.4 mg Oral BID    aspirin  81 mg Oral Daily    albuterol  2.5 mg Nebulization TID RT    warfarin placeholder: dosing by pharmacy   Other RX Placeholder     Continuous Infusions:   sodium chloride       PRN Meds:zolpidem, oxyCODONE-acetaminophen, sodium chloride flush, sodium chloride, potassium chloride **OR** potassium alternative oral replacement **OR** potassium chloride, magnesium sulfate, ondansetron **OR** ondansetron, polyethylene glycol, acetaminophen **OR** acetaminophen, HYDROmorphone, flavoxATE, prochlorperazine     CURRENT HOSPITAL PROBLEMS   Principal Problem:    Hematuria  Resolved Problems:    * No resolved hospital problems. *    Jovon Javier is a 63 y.o. male who was admitted with Hematuria.  Principal Problem:    Renal cyst, right  Recurrent nephrolithiasis  Papillary urothelial neoplasm, history of  -Repeat 
systolic CHF/HFrEF.    Query created by: Justyna Kay on 6/21/2024 3:23 PM      Electronically signed by:  Jeffery Gilbert MD 6/24/2024 5:14 PM

## 2024-06-24 NOTE — TELEPHONE ENCOUNTER
Reached out due to recent hospitalization.  We canceled his appointment due to being hospitalized.     Called today to reschedule.  No medication changes.     Scheduled to 6/27/24    Bel MaldonadoD  [facility]  Anticoagulation  [phone]    For Pharmacy Admin Tracking Only    Intervention Detail:   Total # of Interventions Recommended:   Total # of Interventions Accepted:   Time Spent (min): 5

## 2024-07-08 ENCOUNTER — ANTI-COAG VISIT (OUTPATIENT)
Dept: PHARMACY | Age: 64
End: 2024-07-08
Attending: INTERNAL MEDICINE
Payer: MEDICARE

## 2024-07-08 DIAGNOSIS — Z79.01 CHRONIC ANTICOAGULATION: Primary | ICD-10-CM

## 2024-07-08 LAB
INTERNATIONAL NORMALIZATION RATIO, POC: 2.3
PROTHROMBIN TIME, POC: NORMAL

## 2024-07-08 PROCEDURE — 85610 PROTHROMBIN TIME: CPT

## 2024-07-08 PROCEDURE — 99211 OFF/OP EST MAY X REQ PHY/QHP: CPT

## 2024-07-08 NOTE — PROGRESS NOTES
Jovon Javier is a 64 y.o. here for warfarin management.  Jovon had an INR test today. Results were reviewed and appropriate warfarin management was completed.     Patient verifies current warfarin dosing regimen: Yes     Warfarin medication reviewed and updated on the patient 's home medication list: Yes   All other medications reviewed and updated on the patient 's home medication list: No new medications     Lab Results   Component Value Date    INR 2.3 2024    INR 2.30 (H) 2024    INR 2.22 (H) 2024         Anticoagulation Summary  As of 2024      INR goal:  2.0-3.0   TTR:  37.0 % (8.3 y)   INR used for dosin.3 (2024)   Warfarin maintenance plan:  5 mg (5 mg x 1) every Fri; 2.5 mg (5 mg x 0.5) all other days   Weekly warfarin total:  20 mg   Plan last modified:  Cathie Frost RPH (2024)   Next INR check:  2024   Priority:  Maintenance   Target end date:  Indefinite    Indications    Pulmonary embolus (HCC) (Resolved) [I26.99]  Chronic anticoagulation [Z79.01]                 Anticoagulation Episode Summary       INR check location:  Anticoagulation Clinic    Preferred lab:      Send INR reminders to:  [facility]    Comments:  EPIC - finger stick every 2-3 weeks  Consent: 23          Anticoagulation Care Providers       Provider Role Specialty Phone number    DayanaJuan C jenkins MD Referring Internal Medicine [phone]          There were no vitals taken for this visit.    Warfarin assessment / plan:     Patient appears well today.      No changes affecting warfarin therapy were noted.   No acute findings with regards to warfarin therapy.  INR today is within goal range.     Instructed to continue the same weekly warfarin dose.      Recently hospitalized, INR within goal range during hospitalization as well  Continue same dose of warfarin and recheck INR in one month    Description    CONTINUE Warfarin 2.5mg daily except for 5mg on

## 2024-07-19 ENCOUNTER — TELEPHONE (OUTPATIENT)
Dept: PHARMACY | Age: 64
End: 2024-07-19

## 2024-07-19 PROBLEM — I50.22 CHRONIC SYSTOLIC (CONGESTIVE) HEART FAILURE (HCC): Status: ACTIVE | Noted: 2024-07-19

## 2024-07-19 NOTE — TELEPHONE ENCOUNTER
James Rehman NP called.  Reports Jovon started Flagyl 7/18 for 7 days and will be starting Levaquin 7/19 for 7 days.     Significant interactions with warfarin.     Called Jovon. Advised to take warfarin 2.5mg today 7/19 (vs 5mg) and then take warfarin 2.5mg daily Except do not take warfarin 7/22. We will check his INR 7/24.     His usual dose is Warfarin 2.5mg daily EXCEPT 5mg every Friday.     Veronica De La Cruz, PharmD  [facility]   [facility]  Diabetes Service  [phone]    For Pharmacy Admin Tracking Only    Intervention Detail: Dose Adjustment: 1, reason: Interaction  Total # of Interventions Recommended: 1  Total # of Interventions Accepted: 1  Time Spent (min): 15

## 2024-07-24 ENCOUNTER — ANTI-COAG VISIT (OUTPATIENT)
Dept: PHARMACY | Age: 64
End: 2024-07-24
Attending: INTERNAL MEDICINE
Payer: MEDICARE

## 2024-07-24 DIAGNOSIS — Z79.01 CHRONIC ANTICOAGULATION: Primary | ICD-10-CM

## 2024-07-24 LAB
INR BLD: 1.7
PROTIME: NORMAL SECONDS

## 2024-07-24 PROCEDURE — 85610 PROTHROMBIN TIME: CPT

## 2024-07-24 PROCEDURE — 99211 OFF/OP EST MAY X REQ PHY/QHP: CPT

## 2024-07-24 RX ORDER — ESOMEPRAZOLE MAGNESIUM 20 MG/1
20 GRANULE, DELAYED RELEASE ORAL DAILY
COMMUNITY

## 2024-07-24 NOTE — PROGRESS NOTES
Jovon Javier is a 64 y.o. here for warfarin management.  Jovon had an INR test today. Results were reviewed and appropriate warfarin management was completed.     Patient verifies current warfarin dosing regimen: Yes     Warfarin medication reviewed and updated on the patient 's home medication list: Yes   All other medications reviewed and updated on the patient 's home medication list: Yes  Starting  he was was prescribed 7 days worth of metronidazole and Moxifloxacin. Patient reports not taking Moxifloxacin  Warfarin was held on  and dose was decreased to 2.5 mg for  and  while taking antibiotics   Switched from dexlansoprazole once daily to esomeprazole twice daily       Lab Results   Component Value Date    INR 1.70 2024    INR 2.3 2024    INR 2.30 (H) 2024     Patient Findings       Positives:  Change in health, Missed doses, Change in medications    Negatives:  Signs/symptoms of thrombosis, Signs/symptoms of bleeding, Change in alcohol use, Change in diet/appetite, Bruising    Comments:  Patient reports experiencing frequent episodes of diarrhea - he is following up with PCP  Starting  he was was prescribed 7 days worth of metronidazole and Moxifloxacin. Due to the interactions of these antibiotic he was instructed to take only 2.5 mg (0.5 tab) of warfarin on  ( and ) for the duration he is taking antimicrobials he was also instructed to hold his warfarin dose on .  Patient reports he is only taking the metronidazole, he is not taking the Moxifloxacin as prescribed because it upsets his stomach.           Anticoagulation Summary  As of 2024      INR goal:  2.0-3.0   TTR:  37.1 % (8.3 y)   INR used for dosin.70 (2024)   Warfarin maintenance plan:  5 mg (5 mg x 1) every Fri; 2.5 mg (5 mg x 0.5) all other days   Weekly warfarin total:  20 mg   Plan last modified:  Cathie Frost RPH (2024)   Next INR check:  2024   Priority:

## 2024-08-07 ENCOUNTER — ANTI-COAG VISIT (OUTPATIENT)
Dept: PHARMACY | Age: 64
End: 2024-08-07
Attending: INTERNAL MEDICINE
Payer: MEDICARE

## 2024-08-07 DIAGNOSIS — Z79.01 CHRONIC ANTICOAGULATION: Primary | ICD-10-CM

## 2024-08-07 LAB
INTERNATIONAL NORMALIZATION RATIO, POC: 1.2
PROTHROMBIN TIME, POC: NORMAL

## 2024-08-07 PROCEDURE — 85610 PROTHROMBIN TIME: CPT

## 2024-08-07 PROCEDURE — 99211 OFF/OP EST MAY X REQ PHY/QHP: CPT

## 2024-08-16 ENCOUNTER — TELEPHONE (OUTPATIENT)
Dept: CARDIOLOGY CLINIC | Age: 64
End: 2024-08-16

## 2024-08-16 DIAGNOSIS — J44.81 BRONCHIOLITIS OBLITERANS (HCC): ICD-10-CM

## 2024-08-16 DIAGNOSIS — I42.8 NICM (NONISCHEMIC CARDIOMYOPATHY) (HCC): Primary | ICD-10-CM

## 2024-08-16 DIAGNOSIS — I50.22 CHRONIC SYSTOLIC HF (HEART FAILURE) (HCC): ICD-10-CM

## 2024-08-16 NOTE — TELEPHONE ENCOUNTER
Last echo completed on 12/16/22.  Do you want to wait and discuss with patient at his upcoming OV on 9/3/24?

## 2024-08-16 NOTE — TELEPHONE ENCOUNTER
Jovon called in he would like to know if he needs to get an Echo before his 9/3 appointment with Dr. Johnson.      He can be reached at [phone].

## 2024-08-17 RX ORDER — SODIUM CHLORIDE FOR INHALATION 0.9 %
VIAL, NEBULIZER (ML) INHALATION
Qty: 300 ML | Refills: 4 | Status: SHIPPED | OUTPATIENT
Start: 2024-08-17

## 2024-08-19 NOTE — TELEPHONE ENCOUNTER
Can you please place order for echo.  Not sure if you want the Echo Complete 2D or the Echo (TTE) complete  (PRN contrast/bubble/study/ 3D) that .  Thank you.

## 2024-08-19 NOTE — TELEPHONE ENCOUNTER
RADHA Watkins  When we saw Jovon in follow up on 4/23/24, he was already scheduled for an echo on 5/10/24 but No Showed to the appt. We had discussed it at that visit, so not sure why he did not show up. Please get echo rescheduled then move Dr. Johnson's f/u to after the echocardiogram. Does not need to be stat. Any questions, please ask.

## 2024-08-19 NOTE — TELEPHONE ENCOUNTER
Attempted to call patient.  Left detailed message on voicemail that orders have been placed for Echo.  He can go ahead and schedule Echo and reschedule OV with Dr. Johnson for after his echo is done.

## 2024-08-21 ENCOUNTER — HOSPITAL ENCOUNTER (OUTPATIENT)
Age: 64
Discharge: HOME | End: 2024-08-23
Attending: INTERNAL MEDICINE
Payer: MEDICARE

## 2024-08-21 ENCOUNTER — ANTI-COAG VISIT (OUTPATIENT)
Dept: PHARMACY | Age: 64
End: 2024-08-21
Attending: INTERNAL MEDICINE
Payer: MEDICARE

## 2024-08-21 VITALS
WEIGHT: 209 LBS | BODY MASS INDEX: 33.59 KG/M2 | HEIGHT: 66 IN | DIASTOLIC BLOOD PRESSURE: 80 MMHG | SYSTOLIC BLOOD PRESSURE: 118 MMHG

## 2024-08-21 DIAGNOSIS — I42.8 NICM (NONISCHEMIC CARDIOMYOPATHY) (HCC): ICD-10-CM

## 2024-08-21 DIAGNOSIS — I50.22 CHRONIC SYSTOLIC HF (HEART FAILURE) (HCC): ICD-10-CM

## 2024-08-21 DIAGNOSIS — Z79.01 CHRONIC ANTICOAGULATION: Primary | ICD-10-CM

## 2024-08-21 LAB
ECHO AO ASC DIAM: 3 CM
ECHO AO ASCENDING AORTA INDEX: 1.47 CM/M2
ECHO AO ROOT DIAM: 3 CM
ECHO AO ROOT INDEX: 1.47 CM/M2
ECHO AV AREA PEAK VELOCITY: 2 CM2
ECHO AV AREA VTI: 2.3 CM2
ECHO AV AREA/BSA PEAK VELOCITY: 1 CM2/M2
ECHO AV AREA/BSA VTI: 1.1 CM2/M2
ECHO AV MEAN GRADIENT: 3 MMHG
ECHO AV MEAN VELOCITY: 0.7 M/S
ECHO AV PEAK GRADIENT: 5 MMHG
ECHO AV PEAK VELOCITY: 1.1 M/S
ECHO AV VELOCITY RATIO: 0.64
ECHO AV VTI: 14.8 CM
ECHO BSA: 2.1 M2
ECHO EST RA PRESSURE: 3 MMHG
ECHO LA AREA 4C: 10.2 CM2
ECHO LA DIAMETER INDEX: 1.67 CM/M2
ECHO LA DIAMETER: 3.4 CM
ECHO LA MAJOR AXIS: 4.7 CM
ECHO LA TO AORTIC ROOT RATIO: 1.13
ECHO LA VOL MOD A4C: 18 ML (ref 18–58)
ECHO LA VOLUME INDEX MOD A4C: 9 ML/M2 (ref 16–34)
ECHO LV EDV A2C: 39 ML
ECHO LV EDV A4C: 85 ML
ECHO LV EDV INDEX A4C: 42 ML/M2
ECHO LV EDV NDEX A2C: 19 ML/M2
ECHO LV EF PHYSICIAN: 33 %
ECHO LV EJECTION FRACTION A2C: 21 %
ECHO LV EJECTION FRACTION A4C: 31 %
ECHO LV EJECTION FRACTION BIPLANE: 26 % (ref 55–100)
ECHO LV ESV A2C: 31 ML
ECHO LV ESV A4C: 58 ML
ECHO LV ESV INDEX A2C: 15 ML/M2
ECHO LV ESV INDEX A4C: 28 ML/M2
ECHO LV FRACTIONAL SHORTENING: 11 % (ref 28–44)
ECHO LV INTERNAL DIMENSION DIASTOLE INDEX: 2.16 CM/M2
ECHO LV INTERNAL DIMENSION DIASTOLIC: 4.4 CM (ref 4.2–5.9)
ECHO LV INTERNAL DIMENSION SYSTOLIC INDEX: 1.91 CM/M2
ECHO LV INTERNAL DIMENSION SYSTOLIC: 3.9 CM
ECHO LV IVSD: 1.3 CM (ref 0.6–1)
ECHO LV MASS 2D: 191.3 G (ref 88–224)
ECHO LV MASS INDEX 2D: 93.8 G/M2 (ref 49–115)
ECHO LV POSTERIOR WALL DIASTOLIC: 1.1 CM (ref 0.6–1)
ECHO LV RELATIVE WALL THICKNESS RATIO: 0.5
ECHO LVOT AREA: 3.1 CM2
ECHO LVOT AV VTI INDEX: 0.71
ECHO LVOT DIAM: 2 CM
ECHO LVOT MEAN GRADIENT: 1 MMHG
ECHO LVOT PEAK GRADIENT: 2 MMHG
ECHO LVOT PEAK VELOCITY: 0.7 M/S
ECHO LVOT STROKE VOLUME INDEX: 16.2 ML/M2
ECHO LVOT SV: 33 ML
ECHO LVOT VTI: 10.5 CM
ECHO MV A VELOCITY: 0.51 M/S
ECHO MV E VELOCITY: 1.26 M/S
ECHO MV E/A RATIO: 2.47
ECHO RA AREA 4C: 10.4 CM2
ECHO RA END SYSTOLIC VOLUME APICAL 4 CHAMBER INDEX BSA: 10 ML/M2
ECHO RA VOLUME: 21 ML
ECHO RV BASAL DIMENSION: 2.7 CM
ECHO RV LONGITUDINAL DIMENSION: 5.9 CM
ECHO RV MID DIMENSION: 2.6 CM
INTERNATIONAL NORMALIZATION RATIO, POC: 1.4
PROTHROMBIN TIME, POC: 0

## 2024-08-21 PROCEDURE — 85610 PROTHROMBIN TIME: CPT

## 2024-08-21 PROCEDURE — 93306 TTE W/DOPPLER COMPLETE: CPT

## 2024-08-21 PROCEDURE — 99211 OFF/OP EST MAY X REQ PHY/QHP: CPT

## 2024-08-21 NOTE — PROGRESS NOTES
Jovon Javier is a 64 y.o. here for warfarin management.  Jovon had an INR test today. Results were reviewed and appropriate warfarin management was completed.     Patient verifies current warfarin dosing regimen: Yes     Warfarin medication reviewed and updated on the patient 's home medication list: Yes   All other medications reviewed and updated on the patient 's home medication list: Yes     Lab Results   Component Value Date    INR 1.4 2024    INR 1.2 2024    INR 1.70 2024       Anticoagulation Summary  As of 2024      INR goal:  2.0-3.0   TTR:  36.7% (8.4 y)   INR used for dosin.4 (2024)   Warfarin maintenance plan:  5 mg (5 mg x 1) every Fri; 2.5 mg (5 mg x 0.5) all other days   Weekly warfarin total:  20 mg   Plan last modified:  Cathie Frost RPH (2024)   Next INR check:  2024   Priority:  Maintenance   Target end date:  Indefinite    Indications    Pulmonary embolus (HCC) (Resolved) [I26.99]  Chronic anticoagulation [Z79.01]                 Anticoagulation Episode Summary       INR check location:  Anticoagulation Clinic    Preferred lab:  --    Send INR reminders to:  [facility]    Comments:  EPIC - finger stick every 2-3 weeks  Consent: 23          Anticoagulation Care Providers       Provider Role Specialty Phone number    DayanaJuan C jenkins MD Referring Internal Medicine [phone]          There were no vitals taken for this visit.    Warfarin assessment / plan:     Appears well  Sub-therapeutic INR     Denies missed doses.  Denies increased vitamin K intake.  Denies medication changes.  Denies increased activity.  Denies signs or symptoms of a stroke     With patient low again, will boost for next 2 days and have him cut out greens for next week and see again in 2 weeks    Description    Take 7.5mg today and 5mg thursday  then resume   Warfarin schedule 2.5mg daily except for 5mg on Friday.  Hold greens for next

## 2024-08-28 ENCOUNTER — TELEPHONE (OUTPATIENT)
Dept: CARDIOLOGY CLINIC | Age: 64
End: 2024-08-28

## 2024-08-28 NOTE — PROGRESS NOTES
risk reduction. EKG sinus rhythm.     Bronchiolitis obliterans, choric cough and lung nodules  Managed per Pulmonology. On Symbicort. Dr. Christy. Recent admission.      Instructed to obtain flu vaccine this season, along with any vaccination due annually per guidelines.        Total visit time > 35 minutes; > 50% spend counseling / coordinating care. I reviewed interval history, physical exam, review of data including labs, imaging, development and implementation of treatment plan and coordination of complex care. Counseled on risk factor modifications.      We will plan 6- 7 month follow up     Thank you very much for allowing me to participate in the care of your patient. Please do not hesitate to contact me if you have any questions.    Sincerely,  Arcadio Johnson MD      [facility]  [address], [address]   [place] [address]  Ph: [phone]  Fax: [phone]      This note was scribed in the presence of Dr. DANY Johnson MD by Justyna Lozada RN.  Physician Attestation:  The scribes documentation has been prepared under my direction and personally reviewed by me in its entirety.     I confirm that the note above accurately reflects all work, treatment, procedures, and medical decision making performed by me.

## 2024-09-03 ENCOUNTER — OFFICE VISIT (OUTPATIENT)
Dept: CARDIOLOGY CLINIC | Age: 64
End: 2024-09-03
Payer: MEDICARE

## 2024-09-03 VITALS
DIASTOLIC BLOOD PRESSURE: 82 MMHG | OXYGEN SATURATION: 94 % | BODY MASS INDEX: 33.91 KG/M2 | SYSTOLIC BLOOD PRESSURE: 122 MMHG | WEIGHT: 211 LBS | HEART RATE: 96 BPM | HEIGHT: 66 IN

## 2024-09-03 DIAGNOSIS — I48.0 PAF (PAROXYSMAL ATRIAL FIBRILLATION) (HCC): ICD-10-CM

## 2024-09-03 DIAGNOSIS — I50.22 CHRONIC SYSTOLIC HF (HEART FAILURE) (HCC): ICD-10-CM

## 2024-09-03 DIAGNOSIS — I10 ESSENTIAL HYPERTENSION: ICD-10-CM

## 2024-09-03 DIAGNOSIS — I50.23 ACUTE ON CHRONIC SYSTOLIC HEART FAILURE (HCC): ICD-10-CM

## 2024-09-03 DIAGNOSIS — I10 ESSENTIAL HYPERTENSION, BENIGN: ICD-10-CM

## 2024-09-03 DIAGNOSIS — I42.8 NICM (NONISCHEMIC CARDIOMYOPATHY) (HCC): Primary | ICD-10-CM

## 2024-09-03 DIAGNOSIS — Z86.711 HISTORY OF PULMONARY EMBOLISM: ICD-10-CM

## 2024-09-03 DIAGNOSIS — E78.5 HYPERLIPIDEMIA, UNSPECIFIED HYPERLIPIDEMIA TYPE: ICD-10-CM

## 2024-09-03 PROCEDURE — G8417 CALC BMI ABV UP PARAM F/U: HCPCS | Performed by: INTERNAL MEDICINE

## 2024-09-03 PROCEDURE — 99214 OFFICE O/P EST MOD 30 MIN: CPT | Performed by: INTERNAL MEDICINE

## 2024-09-03 PROCEDURE — 1123F ACP DISCUSS/DSCN MKR DOCD: CPT | Performed by: INTERNAL MEDICINE

## 2024-09-03 PROCEDURE — 3017F COLORECTAL CA SCREEN DOC REV: CPT | Performed by: INTERNAL MEDICINE

## 2024-09-03 PROCEDURE — 1036F TOBACCO NON-USER: CPT | Performed by: INTERNAL MEDICINE

## 2024-09-03 PROCEDURE — 3079F DIAST BP 80-89 MM HG: CPT | Performed by: INTERNAL MEDICINE

## 2024-09-03 PROCEDURE — 3074F SYST BP LT 130 MM HG: CPT | Performed by: INTERNAL MEDICINE

## 2024-09-03 PROCEDURE — G8427 DOCREV CUR MEDS BY ELIG CLIN: HCPCS | Performed by: INTERNAL MEDICINE

## 2024-09-03 RX ORDER — METOPROLOL SUCCINATE 25 MG/1
TABLET, EXTENDED RELEASE ORAL
Qty: 270 TABLET | Refills: 3 | Status: SHIPPED | OUTPATIENT
Start: 2024-09-03

## 2024-09-03 NOTE — PATIENT INSTRUCTIONS
Take metoprolol 1.5 tablets (37.5 mg) twice daily   Keep working on low sodium diet  Exercise regularly

## 2024-09-04 ENCOUNTER — ANTI-COAG VISIT (OUTPATIENT)
Dept: PHARMACY | Age: 64
End: 2024-09-04
Attending: INTERNAL MEDICINE
Payer: MEDICARE

## 2024-09-04 DIAGNOSIS — Z79.01 CHRONIC ANTICOAGULATION: Primary | ICD-10-CM

## 2024-09-04 LAB
INTERNATIONAL NORMALIZATION RATIO, POC: 1.4
PROTHROMBIN TIME, POC: 0

## 2024-09-04 PROCEDURE — 85610 PROTHROMBIN TIME: CPT

## 2024-09-04 PROCEDURE — 99212 OFFICE O/P EST SF 10 MIN: CPT

## 2024-09-04 NOTE — PROGRESS NOTES
This Encounter   Procedures    POCT INR     This external order was created through the results console.      No orders of the defined types were placed in this encounter.     Reviewed AVS with patient / caregiver.    Billing Points:  Adjust dosage and/or reconcile meds (fill pill box) </= 5 medications - 2 points  BASIC ASSESSMENT UNCOMPLICATED (including but not limited to: vital signs; physical assessment screening; review of secondary markers like lab results, allergies, home readings; instructions on treatment plan and basic education; assessment of medication list with one med change and compliance) - 2 points     For Pharmacy Admin Tracking Only    Intervention Detail: Adherence Monitorin and Dose Adjustment: 1, reason: Therapy Optimization  Total # of Interventions Recommended: 1  Total # of Interventions Accepted: 1  Time Spent (min): 15

## 2024-09-05 ENCOUNTER — TELEPHONE (OUTPATIENT)
Dept: CARDIOLOGY CLINIC | Age: 64
End: 2024-09-05

## 2024-09-05 DIAGNOSIS — I50.23 ACUTE ON CHRONIC SYSTOLIC HEART FAILURE (HCC): ICD-10-CM

## 2024-09-05 DIAGNOSIS — Z79.899 ENCOUNTER FOR MONITORING DIURETIC THERAPY: Primary | ICD-10-CM

## 2024-09-05 DIAGNOSIS — Z51.81 ENCOUNTER FOR MONITORING DIURETIC THERAPY: Primary | ICD-10-CM

## 2024-09-12 ENCOUNTER — ANTI-COAG VISIT (OUTPATIENT)
Dept: PHARMACY | Age: 64
End: 2024-09-12
Attending: INTERNAL MEDICINE
Payer: MEDICARE

## 2024-09-12 DIAGNOSIS — Z79.01 CHRONIC ANTICOAGULATION: Primary | ICD-10-CM

## 2024-09-12 LAB
INR BLD: 2.2
PROTIME: NORMAL

## 2024-09-12 PROCEDURE — 85610 PROTHROMBIN TIME: CPT | Performed by: SPEECH-LANGUAGE PATHOLOGIST

## 2024-09-12 PROCEDURE — 99211 OFF/OP EST MAY X REQ PHY/QHP: CPT | Performed by: SPEECH-LANGUAGE PATHOLOGIST

## 2024-09-17 ENCOUNTER — TELEMEDICINE (OUTPATIENT)
Dept: PULMONOLOGY | Age: 64
End: 2024-09-17
Payer: MEDICARE

## 2024-09-17 ENCOUNTER — TELEPHONE (OUTPATIENT)
Dept: PULMONOLOGY | Age: 64
End: 2024-09-17

## 2024-09-17 DIAGNOSIS — J44.81 BRONCHIOLITIS OBLITERANS (HCC): Primary | ICD-10-CM

## 2024-09-17 DIAGNOSIS — J45.40 MODERATE PERSISTENT ASTHMA WITHOUT COMPLICATION: ICD-10-CM

## 2024-09-17 PROBLEM — D69.6 THROMBOCYTOPENIA (HCC): Status: RESOLVED | Noted: 2018-04-08 | Resolved: 2024-09-17

## 2024-09-17 PROBLEM — E21.0 PRIMARY HYPERPARATHYROIDISM (HCC): Status: RESOLVED | Noted: 2018-01-03 | Resolved: 2024-09-17

## 2024-09-17 PROCEDURE — 99214 OFFICE O/P EST MOD 30 MIN: CPT | Performed by: INTERNAL MEDICINE

## 2024-09-17 PROCEDURE — G8428 CUR MEDS NOT DOCUMENT: HCPCS | Performed by: INTERNAL MEDICINE

## 2024-09-17 PROCEDURE — 3017F COLORECTAL CA SCREEN DOC REV: CPT | Performed by: INTERNAL MEDICINE

## 2024-09-17 PROCEDURE — 1123F ACP DISCUSS/DSCN MKR DOCD: CPT | Performed by: INTERNAL MEDICINE

## 2024-09-18 ENCOUNTER — TELEPHONE (OUTPATIENT)
Dept: PULMONOLOGY | Age: 64
End: 2024-09-18

## 2024-09-18 DIAGNOSIS — J01.00 ACUTE NON-RECURRENT MAXILLARY SINUSITIS: Primary | ICD-10-CM

## 2024-09-19 ENCOUNTER — TELEPHONE (OUTPATIENT)
Dept: PULMONOLOGY | Age: 64
End: 2024-09-19

## 2024-09-19 RX ORDER — AMOXICILLIN 500 MG/1
500 CAPSULE ORAL 2 TIMES DAILY
Qty: 14 CAPSULE | Refills: 0 | Status: SHIPPED | OUTPATIENT
Start: 2024-09-19 | End: 2024-09-26

## 2024-09-26 ENCOUNTER — ANTI-COAG VISIT (OUTPATIENT)
Dept: PHARMACY | Age: 64
End: 2024-09-26
Attending: INTERNAL MEDICINE
Payer: MEDICARE

## 2024-09-26 DIAGNOSIS — Z79.01 CHRONIC ANTICOAGULATION: Primary | ICD-10-CM

## 2024-09-26 LAB
INTERNATIONAL NORMALIZATION RATIO, POC: 1.7
PROTHROMBIN TIME, POC: 0

## 2024-09-26 PROCEDURE — 85610 PROTHROMBIN TIME: CPT | Performed by: INTERNAL MEDICINE

## 2024-09-26 PROCEDURE — 99211 OFF/OP EST MAY X REQ PHY/QHP: CPT | Performed by: INTERNAL MEDICINE

## 2024-10-07 ENCOUNTER — TELEPHONE (OUTPATIENT)
Dept: PULMONOLOGY | Age: 64
End: 2024-10-07

## 2024-10-08 ENCOUNTER — APPOINTMENT (OUTPATIENT)
Dept: PHARMACY | Age: 64
End: 2024-10-08
Attending: INTERNAL MEDICINE
Payer: MEDICARE

## 2024-10-10 ENCOUNTER — ANTI-COAG VISIT (OUTPATIENT)
Dept: PHARMACY | Age: 64
End: 2024-10-10
Attending: INTERNAL MEDICINE
Payer: MEDICARE

## 2024-10-10 DIAGNOSIS — Z79.01 CHRONIC ANTICOAGULATION: Primary | ICD-10-CM

## 2024-10-10 LAB
INTERNATIONAL NORMALIZATION RATIO, POC: 1.3
PROTHROMBIN TIME, POC: 0

## 2024-10-10 PROCEDURE — 99211 OFF/OP EST MAY X REQ PHY/QHP: CPT

## 2024-10-10 PROCEDURE — 85610 PROTHROMBIN TIME: CPT

## 2024-10-10 NOTE — PROGRESS NOTES
Jovon Javier is a 64 y.o. here for warfarin management.  Jovon had an INR test today. Results were reviewed and appropriate warfarin management was completed.     Patient verifies current warfarin dosing regimen: Yes     Warfarin medication reviewed and updated on the patient 's home medication list: Yes   All other medications reviewed and updated on the patient 's home medication list: No new medications     Lab Results   Component Value Date    INR 1.3 10/10/2024    INR 1.7 2024    INR 2.20 2024     Patient Findings       Positives:  Upcoming dental procedure    Comments:  `14-18          Anticoagulation Summary  As of 10/10/2024      INR goal:  2.0-3.0   TTR:  36.4% (8.5 y)   INR used for dosin.3 (10/10/2024)   Warfarin maintenance plan:  2.5 mg (5 mg x 0.5) every Mon, Wed, Fri; 5 mg (5 mg x 1) all other days   Weekly warfarin total:  27.5 mg   Plan last modified:  Nirmala Hudson RPH (2024)   Next INR check:  10/30/2024   Priority:  Maintenance   Target end date:  Indefinite    Indications    Pulmonary embolus (HCC) (Resolved) [I26.99]  Chronic anticoagulation [Z79.01]                 Anticoagulation Episode Summary       INR check location:  Anticoagulation Clinic    Preferred lab:  --    Send INR reminders to:  [facility]    Comments:  EPIC - finger stick every 2-3 weeks  Consent: 23          Anticoagulation Care Providers       Provider Role Specialty Phone number    TimJuan C MD Referring Internal Medicine [phone]          There were no vitals taken for this visit.    Warfarin assessment / plan:     Appears well  Sub-therapeutic INR     Denies increased vitamin K intake.  Denies medication changes.  Denies signs or symptoms of clotting.  Denies signs or symptoms of a stroke     Subtherapeutic INR = 1.3.  Jovon had missed his warfarin on Monday and Tuesday of this week due to hold for a tooth extraction that got cancelled.  He will have the

## 2024-10-15 ENCOUNTER — TELEPHONE (OUTPATIENT)
Dept: CARDIOLOGY CLINIC | Age: 64
End: 2024-10-15

## 2024-10-15 NOTE — TELEPHONE ENCOUNTER
Please review and advise on the below preop cardio risk assess. He is on aspirin and warfarin therapy. Last f/u 9/3/24.

## 2024-10-15 NOTE — TELEPHONE ENCOUNTER
CARDIAC CLEARANCE REQUEST    What type of procedure are you having:    Having one tooth extracted.     Are you taking any blood thinners:  yes -  doctors office states if Dr. Johnson thinks he needs to stop then it's up to him.      When is your procedure scheduled for:  Friday Oct 18th, 2024     What physician is performing your procedure: Dr. De Santiago    Phone Number:  [phone]    Fax number to send the letter:   [phone]    Last seen in office 9/3/2024

## 2024-10-16 NOTE — TELEPHONE ENCOUNTER
Letter typed in Epic.  Printed on letter head.  Attached last EKG and last Echo. Faxed to Dr. Stokes's office.    Called office to confirm receipt.  Spoke with Martha.

## 2024-10-16 NOTE — TELEPHONE ENCOUNTER
Please update oral surgeon via letter and update patient that it was sent.      Arcadio Johnson MD      Patient is cleared for surgery from cardiac standpoint and no need to stop any blood thinners        This is a single tooth extraction.     Reviewed new letter faxed today from the oral surgeon. Please include most recent progress note, EKG and echo. Jovon should set up per the oral surgeons instructions to get his INR checked a few days prior to the dental extraction.     Any further questions, let us know.     Placed letter in scan folder.

## 2024-10-16 NOTE — TELEPHONE ENCOUNTER
Jovon stopped by this morning to see if Dr. Johnson has cleared him for surgery and at this time has not been cleared.  I let Jovon know that I will see if Dr. Johnson can sign the CC today and get it faxed over to surgeon.   He would like a call back once it has been faxed and can be reached at [phone]

## 2024-10-16 NOTE — TELEPHONE ENCOUNTER
Rosalie from Dr. De Santiago office called to re-fax CC request, they are needing specific things for clearance.     Placed request in RADHA Johnson folder.

## 2024-10-17 ENCOUNTER — ANTI-COAG VISIT (OUTPATIENT)
Dept: PHARMACY | Age: 64
End: 2024-10-17
Attending: INTERNAL MEDICINE
Payer: MEDICARE

## 2024-10-17 DIAGNOSIS — Z79.01 CHRONIC ANTICOAGULATION: Primary | ICD-10-CM

## 2024-10-17 LAB
INTERNATIONAL NORMALIZATION RATIO, POC: 1.4
PROTHROMBIN TIME, POC: 0

## 2024-10-17 PROCEDURE — 85610 PROTHROMBIN TIME: CPT | Performed by: INTERNAL MEDICINE

## 2024-10-17 PROCEDURE — 99211 OFF/OP EST MAY X REQ PHY/QHP: CPT | Performed by: INTERNAL MEDICINE

## 2024-10-17 NOTE — PROGRESS NOTES
Jovon Javier is a 64 y.o. here for warfarin management.  Jovon had an INR test today. Results were reviewed and appropriate warfarin management was completed.     Patient verifies current warfarin dosing regimen: Yes     Warfarin medication reviewed and updated on the patient 's home medication list: Yes   All other medications reviewed and updated on the patient 's home medication list: No: no changes     Lab Results   Component Value Date    INR 1.4 10/17/2024    INR 1.3 10/10/2024    INR 1.7 2024       Anticoagulation Summary  As of 10/17/2024      INR goal:  2.0-3.0   TTR:  36.3% (8.6 y)   INR used for dosin.4 (10/17/2024)   Warfarin maintenance plan:  2.5 mg (5 mg x 0.5) every Mon, Wed, Fri; 5 mg (5 mg x 1) all other days   Weekly warfarin total:  27.5 mg   Plan last modified:  Nirmala Hudson RPH (2024)   Next INR check:  10/30/2024   Priority:  Maintenance   Target end date:  Indefinite    Indications    Pulmonary embolus (HCC) (Resolved) [I26.99]  Chronic anticoagulation [Z79.01]                 Anticoagulation Episode Summary       INR check location:  Anticoagulation Clinic    Preferred lab:  --    Send INR reminders to:  [facility]    Comments:  EPIC - finger stick every 2-3 weeks  Consent: 23          Anticoagulation Care Providers       Provider Role Specialty Phone number    DayanaJuan C jenkins MD Referring Internal Medicine [phone]          There were no vitals taken for this visit.    Warfarin assessment / plan:     Appears well  Sub-therapeutic INR     Denies increased vitamin K intake.  Denies medication changes.     Missed Warfarin dose(s) on the following dates: has been hold for 5 days for dental procedure tomorrow.  Hold warfarin 10-24 for dental procedure as per oral surgeon's directions   When surgeon tells you to restart warfarin then take warfarin 7.5 mg times the first two days then continue routine weekly warfarin dosing.   CONTINUE

## 2024-10-22 ENCOUNTER — TELEPHONE (OUTPATIENT)
Dept: PULMONOLOGY | Age: 64
End: 2024-10-22

## 2024-10-30 ENCOUNTER — ANTI-COAG VISIT (OUTPATIENT)
Dept: PHARMACY | Age: 64
End: 2024-10-30
Attending: INTERNAL MEDICINE
Payer: MEDICARE

## 2024-10-30 ENCOUNTER — APPOINTMENT (OUTPATIENT)
Dept: PHARMACY | Age: 64
End: 2024-10-30
Attending: INTERNAL MEDICINE
Payer: MEDICARE

## 2024-10-30 DIAGNOSIS — Z79.01 CHRONIC ANTICOAGULATION: Primary | ICD-10-CM

## 2024-10-30 LAB
INTERNATIONAL NORMALIZATION RATIO, POC: 1.6
PROTHROMBIN TIME, POC: 0

## 2024-10-30 PROCEDURE — 85610 PROTHROMBIN TIME: CPT

## 2024-10-30 PROCEDURE — 99211 OFF/OP EST MAY X REQ PHY/QHP: CPT

## 2024-10-30 NOTE — PROGRESS NOTES
low despite extra doses given.      Take warfarin 5 mg on 10-30-24  CONTINUE Warfarin schedule 5mg daily EXCEPT 2.5mg every Monday, Wednesday and Friday    Next INR in three weeks.     Description    Take warfarin 5 mg on 10-30-24  CONTINUE Warfarin schedule 5mg daily EXCEPT 2.5mg every Monday, Wednesday and Friday      Call [phone] with signs or symptoms of bleeding or ANY medication changes (including antibiotics, steroids, over-the-counter medications or herbal supplements).        If significant bleeding occurs or if you fall and hit your head, please seek immediate medical attention.     Keep the number of servings of vitamin K containing foods (dark green, leafy vegetables) the same each week. About 4 times a week (M,W,F,Sun). Please call if this changes.     Limit alcohol intake. Please call if this changes.        Immunization History   Administered Date(s) Administered    COVID-19, MODERNA BLUE border, Primary or Immunocompromised, (age 12y+), IM, 100 mcg/0.5mL 03/09/2021, 04/08/2021, 12/15/2021, 07/23/2022    COVID-19, MODERNA Bivalent, (age 12y+), IM, 50 mcg/0.5 mL 12/30/2022    COVID-19, MODERNA, (age 12y+), IM, 50mcg/0.5mL 11/16/2023    Influenza, FLUAD, (age 65 y+), IM, Quadv, 0.5mL 10/27/2022    Influenza, FLUAD, (age 65 y+), IM, Trivalent PF, 0.5mL 10/14/2024    Influenza, FLUARIX, FLULAVAL, FLUZONE (age 6 mo+) and AFLURIA, (age 3 y+), Quadv PF, 0.5mL 10/27/2017, 09/23/2019, 10/02/2020    Influenza, FLUBLOK, (age 18 y+), Quadv PF, 0.5mL 10/03/2018    Influenza, FLUCELVAX, (age 6 mo+), MDCK, Quadv PF, 0.5mL 10/26/2021, 10/26/2023    Pneumococcal Conjugate Vaccine 08/15/2017    Pneumococcal, PCV-13, PREVNAR 13, (age 6w+), IM, 0.5mL 09/11/2015    Pneumococcal, PPSV23, PNEUMOVAX 23, (age 2y+), SC/IM, 0.5mL 08/01/2007, 12/19/2012    TDaP, ADACEL (age 10y-64y), BOOSTRIX (age 10y+), IM, 0.5mL 08/19/2014       Orders Placed This Encounter   Procedures    POCT INR     This external order was created

## 2024-12-03 ENCOUNTER — ANTI-COAG VISIT (OUTPATIENT)
Dept: PHARMACY | Age: 64
End: 2024-12-03
Attending: INTERNAL MEDICINE
Payer: MEDICARE

## 2024-12-03 DIAGNOSIS — Z79.01 CHRONIC ANTICOAGULATION: Primary | ICD-10-CM

## 2024-12-03 LAB
INTERNATIONAL NORMALIZATION RATIO, POC: 1.7
PROTHROMBIN TIME, POC: 0

## 2024-12-03 PROCEDURE — 85610 PROTHROMBIN TIME: CPT

## 2024-12-03 PROCEDURE — 99211 OFF/OP EST MAY X REQ PHY/QHP: CPT

## 2024-12-05 NOTE — PROGRESS NOTES
Jovon Javier is a 64 y.o. here for warfarin management.  Jovon had an INR test today. Results were reviewed and appropriate warfarin management was completed.     Patient verifies current warfarin dosing regimen: Yes     Warfarin medication reviewed and updated on the patient 's home medication list: Yes   All other medications reviewed and updated on the patient 's home medication list: No new medications     Lab Results   Component Value Date    INR 1.7 2024    INR 1.6 10/30/2024    INR 1.4 10/17/2024     Patient Findings       Negatives:  Signs/symptoms of thrombosis, Signs/symptoms of bleeding, Change in health, Missed doses, Change in medications, Change in diet/appetite, Bruising          Anticoagulation Summary  As of 12/3/2024      INR goal:  2.0-3.0   TTR:  35.8% (8.7 y)   INR used for dosin.7 (12/3/2024)   Warfarin maintenance plan:  2.5 mg (5 mg x 0.5) every Mon, Wed; 5 mg (5 mg x 1) all other days   Weekly warfarin total:  30 mg   Plan last modified:  Rayray Apple RPH (12/3/2024)   Next INR check:  2024   Priority:  Maintenance   Target end date:  Indefinite    Indications    Pulmonary embolus (HCC) (Resolved) [I26.99]  Chronic anticoagulation [Z79.01]                 Anticoagulation Episode Summary       INR check location:  Anticoagulation Clinic    Preferred lab:  --    Send INR reminders to:  [facility]    Comments:  EPIC - finger stick every 2-3 weeks  Consent: 23          Anticoagulation Care Providers       Provider Role Specialty Phone number    DayanaJuan C jenkins MD Referring Internal Medicine [phone]          There were no vitals taken for this visit.    Warfarin assessment / plan:     Appears well  Sub-therapeutic INR     Denies missed doses.  Denies increased vitamin K intake.  Denies medication changes.  Denies signs or symptoms of clotting.  Denies signs or symptoms of a stroke     Subtherapeutic INR today = 1.7  No change to diet or

## 2024-12-19 ENCOUNTER — TELEPHONE (OUTPATIENT)
Dept: CARDIOLOGY CLINIC | Age: 64
End: 2024-12-19

## 2024-12-19 NOTE — TELEPHONE ENCOUNTER
Patient called in requesting an appt with Dr. Johnson.   He states he was recently in the ED and was being treated for feeling light headed.     Please advise and assist with scheduling.     Callback: [phone]

## 2024-12-19 NOTE — TELEPHONE ENCOUNTER
Shruti,   Please check his device to see if it accounts for any lightheadedness.   I do not see an ER visit. Maybe CE didn't update while I was in it.

## 2024-12-23 NOTE — TELEPHONE ENCOUNTER
PCP called this morning wanting to order a tilt table test. He was seen over the weekend for dizziness.     Please assist.    PCP callback: [phone]

## 2024-12-24 NOTE — TELEPHONE ENCOUNTER
Called and spoke to PCP office and let them know that I will check with EP and if it can be scheduled we will schedule and if there is a problem we will call back.

## 2024-12-24 NOTE — TELEPHONE ENCOUNTER
Cardiology called back - they are going to review with their EP when they return on Thursday but it should not be a problem.    If any issues they will call back.    Patient advised

## 2024-12-26 ENCOUNTER — ANTI-COAG VISIT (OUTPATIENT)
Dept: PHARMACY | Age: 64
End: 2024-12-26
Attending: INTERNAL MEDICINE
Payer: MEDICARE

## 2024-12-26 DIAGNOSIS — Z79.01 CHRONIC ANTICOAGULATION: Primary | ICD-10-CM

## 2024-12-26 LAB
INR BLD: 2.5
PROTIME: NORMAL

## 2024-12-26 PROCEDURE — 85610 PROTHROMBIN TIME: CPT

## 2024-12-26 PROCEDURE — 99211 OFF/OP EST MAY X REQ PHY/QHP: CPT

## 2024-12-26 NOTE — PROGRESS NOTES
review of secondary markers like lab results, allergies, home readings; instructions on treatment plan and basic education; assessment of medication list with one med change and compliance) - 2 points     For Pharmacy Admin Tracking Only    Intervention Detail: Adherence Monitorin  Total # of Interventions Recommended: 0  Total # of Interventions Accepted: 0  Time Spent (min): 20   Prince Mejia  PharmD Candidate

## 2025-01-03 ENCOUNTER — TELEPHONE (OUTPATIENT)
Dept: PULMONOLOGY | Age: 65
End: 2025-01-03

## 2025-01-03 NOTE — TELEPHONE ENCOUNTER
omplains of cough and SOB  Duration 1-2 days  Cough with sputum production? yes  Color green/yellow  Fever? no  Any other Symptoms? Sneezing runny nose congested  Any current treatment tried? No cannot take OTC  Using inhalers? No No  do they help?   Pharmacy? [facility]

## 2025-01-06 ENCOUNTER — ENROLLMENT (OUTPATIENT)
Dept: PHARMACY | Facility: CLINIC | Age: 65
End: 2025-01-06

## 2025-01-29 ENCOUNTER — ANTI-COAG VISIT (OUTPATIENT)
Dept: PHARMACY | Age: 65
End: 2025-01-29
Attending: INTERNAL MEDICINE
Payer: MEDICARE

## 2025-01-29 ENCOUNTER — OFFICE VISIT (OUTPATIENT)
Dept: ENT CLINIC | Age: 65
End: 2025-01-29
Payer: MEDICARE

## 2025-01-29 ENCOUNTER — PROCEDURE VISIT (OUTPATIENT)
Dept: AUDIOLOGY | Age: 65
End: 2025-01-29
Payer: MEDICARE

## 2025-01-29 VITALS
WEIGHT: 198 LBS | SYSTOLIC BLOOD PRESSURE: 104 MMHG | HEART RATE: 90 BPM | OXYGEN SATURATION: 95 % | BODY MASS INDEX: 32.99 KG/M2 | HEIGHT: 65 IN | DIASTOLIC BLOOD PRESSURE: 73 MMHG

## 2025-01-29 DIAGNOSIS — I26.99 PULMONARY EMBOLISM, UNSPECIFIED CHRONICITY, UNSPECIFIED PULMONARY EMBOLISM TYPE, UNSPECIFIED WHETHER ACUTE COR PULMONALE PRESENT (HCC): ICD-10-CM

## 2025-01-29 DIAGNOSIS — Z79.01 CHRONIC ANTICOAGULATION: Primary | ICD-10-CM

## 2025-01-29 DIAGNOSIS — H93.13 TINNITUS OF BOTH EARS: ICD-10-CM

## 2025-01-29 DIAGNOSIS — H90.3 SENSORINEURAL HEARING LOSS (SNHL) OF BOTH EARS: Primary | ICD-10-CM

## 2025-01-29 DIAGNOSIS — R42 DIZZINESS: Primary | ICD-10-CM

## 2025-01-29 DIAGNOSIS — Z86.711 HISTORY OF PULMONARY EMBOLISM: ICD-10-CM

## 2025-01-29 DIAGNOSIS — H90.3 SENSORINEURAL HEARING LOSS (SNHL) OF BOTH EARS: ICD-10-CM

## 2025-01-29 LAB
INTERNATIONAL NORMALIZATION RATIO, POC: 1.9
PROTHROMBIN TIME, POC: 0

## 2025-01-29 PROCEDURE — 99212 OFFICE O/P EST SF 10 MIN: CPT | Performed by: PHARMACIST

## 2025-01-29 PROCEDURE — 92567 TYMPANOMETRY: CPT

## 2025-01-29 PROCEDURE — 3078F DIAST BP <80 MM HG: CPT | Performed by: OTOLARYNGOLOGY

## 2025-01-29 PROCEDURE — 1123F ACP DISCUSS/DSCN MKR DOCD: CPT | Performed by: OTOLARYNGOLOGY

## 2025-01-29 PROCEDURE — 92557 COMPREHENSIVE HEARING TEST: CPT

## 2025-01-29 PROCEDURE — 99213 OFFICE O/P EST LOW 20 MIN: CPT | Performed by: OTOLARYNGOLOGY

## 2025-01-29 PROCEDURE — 3074F SYST BP LT 130 MM HG: CPT | Performed by: OTOLARYNGOLOGY

## 2025-01-29 PROCEDURE — 85610 PROTHROMBIN TIME: CPT | Performed by: PHARMACIST

## 2025-01-29 NOTE — PROGRESS NOTES
Orders Placed This Encounter   Procedures    [facility] Medication Mgmt (Anticoagulation) - [place]     Referral Priority:   Routine     Referral Type:   Eval and Treat     Referral Reason:   Specialty Services Required     Requested Specialty:   Pharmacist     Number of Visits Requested:   1    POCT INR     This external order was created through the results console.

## 2025-01-29 NOTE — PROGRESS NOTES
Jovon Javier   1960, 64 y.o. male   [id number]       Referring Provider: Juan C Dumont MD   Referral Type: In an order in Epic    Reason for Visit: Evaluation of suspected change in hearing, tinnitus, or balance.    ADULT AUDIOLOGIC EVALUATION      Jovon Javier is a 64 y.o. male seen today, 1/29/2025 , for an initial audiologic evaluation.  Patient was seen by Marco Causey MD following today's evaluation.    AUDIOLOGIC AND OTHER PERTINENT MEDICAL HISTORY:      Jovon Javier reported that he has had difficulty hearing for a while. He stated that his left ear hears better than the right ear. Patient noted that he has been looking into hearing aids but that they are too expensive. Patient reported that he experiences occasional tinnitus in the right ear that is non-bothersome. He reported occasional vertigo that occurs when he stands for too long or bends down . Patient has a family history of age-related hearing loss.     He denied otalgia, aural fullness, otorrhea, history of falls, history of noise exposure, history of head trauma, and history of ear surgery    Date: 1/29/2025     IMPRESSIONS:      Today's results revealed a Mild sloping to Moderate Sensorineural hearing loss bilaterally. Excellent speech understanding when in quiet. Tympanometry indicates normal middle ear function. Discussed test results and implications with patient. Discussed benefits of amplification pending medical clearance.    Follow medical recommendations of Marco Causey MD.     ASSESSMENT AND FINDINGS:     Otoscopy revealed non-occluding cerumen bilaterally .    RIGHT EAR:  Hearing Sensitivity: Mild sloping to Moderate Sensorineural hearing loss  Speech Recognition Threshold: 35 dB HL  Word Recognition: Excellent 100%, based on NU-6 by-difficulty list at 75m dBHL using recorded speech stimuli.    Tympanometry: Normal peak pressure and compliance, Type A tympanogram, consistent with normal middle ear function.       LEFT

## 2025-01-29 NOTE — PROGRESS NOTES
[facility]  DIVISION OF OTOLARYNGOLOGY- HEAD & NECK SURGERY  CONSULT      Patient Name: Jovon Javier  Medical Record Number:  [id number]  Primary Care Physician:  Juan C Dumont MD  Date of Consultation: 1/29/2025    Chief Complaint: dizziness.         HISTORY OF PRESENT ILLNESS  Jovon is a(n) 64 y.o. male who presents for evaluation of dizziness.    4 month history of dizziness, vertigo room spinning lightheaded. Sometimes gets hearing loss in right.       Duration of episodes: Seconds  Nature of dizziness:   Positional modifier: laying down  Previous episodes: yes  Syncope: Yes, 20 years ago  Hearing loss: Right  Prior audiogram: Yes  Tinnitus: Right  Aural fullness: Bilateral  Otorrhea: None  Prior ear surgery: No  Ear trauma: No  Ear problems as child: No  History of migraines: No  Current/previous treatment of migraines:   Associated visual disturbances: yes, blurriness  Other medical problems: Hearing issues  Family Hx of hearing loss: No  Other family history: none.         REVIEW OF SYSTEMS  As above    PHYSICAL EXAM  GENERAL: No Acute Distress, Alert and Oriented, no Hoarseness, strong voice  EYES: EOMI, Anti-icteric  HENT:   Head: Normocephalic and atraumatic.   Face:  Symmetric, facial nerve intact, no sinus tenderness  Right Ear: Normal external ear, normal external auditory canal, intact tympanic membrane with normal mobility and aerated middle ear  Left Ear: Normal external ear, normal external auditory canal, intact tympanic membrane with normal mobility and aerated middle ear  Mouth/Oral Cavity:  normal lips, Uvula is midline, no mucosal lesions, no trismus  Oropharynx/Larynx:  normal oropharynx,  Nose:Normal external nasal appearance.  Anterior rhinoscopy shows a rightward septal deviation  NECK: Normal range of motion, no thyromegaly, trachea is midline, no lymphadenopathy, no neck masses, no crepitus  Neuro a little bit of subjective dizziness with [place], but no nystagmus    Patient had

## 2025-01-29 NOTE — PATIENT INSTRUCTIONS
ringing sounds in their ears once in a while. You may hear a roar, a hiss, a tinkle, or a buzz. The sound usually lasts only a few minutes. If it goes on all the time, you may have tinnitus.    Tinnitus is usually caused by long-term exposure to loud noise. This damages the nerves in the inner ear. It can occur with all types of hearing loss. It may be a symptom of almost any ear problem.  Tinnitus may be caused by a buildup of earwax. Or, it may be caused by ear infections or certain medicines (especially antibiotics or large amounts of aspirin). You can also hear noises in your ears because of an injury to the ears, drinking too much alcohol or caffeine, or a medical condition.  Other conditions may also contribute to tinnitus, including: head and neck trauma, temporomandibular joint disorder (TMJ), sinus pressure and barometric trauma, traumatic brain injury, metabolic disorders, autoimmune disorders, stress, and high blood pressure.    You may need tests to evaluate your hearing and to find causes of long-lasting tinnitus.  Your doctor may suggest one or more treatments to help you cope with the tinnitus. You can also do things at home to help reduce symptoms.    Follow-up care is a key part of your treatment and safety. Be sure to make and go to all appointments, and call your doctor if you are having problems. It's also a good idea to know your test results and keep a list of the medicines you take.    How can you care for yourself at home?  Limit or cut out alcohol, caffeine, and sodium. They can make your symptoms worse.  Do not smoke or use other tobacco products. Nicotine reduces blood flow to the ear and makes tinnitus worse. If you need help quitting, talk to your doctor about stop-smoking programs and medicines. These can increase your chances of quitting for good.  Talk to your doctor about whether to stop taking aspirin and similar products such as ibuprofen or naproxen.  Get exercise often. It can

## 2025-01-29 NOTE — PROGRESS NOTES
Jovon Javier is a 64 y.o. here for warfarin management.  Jovon had an INR test today. Results were reviewed and appropriate warfarin management was completed.     Patient verifies current warfarin dosing regimen: No: Was not taking correct dose, reports taking 5mg on Wed & Fri only and 2.5mg all other days.     Warfarin medication reviewed and updated on the patient 's home medication list: Yes   All other medications reviewed and updated on the patient 's home medication list: Yes     Lab Results   Component Value Date    INR 1.9 2025    INR 2.50 2024    INR 1.7 2024     Patient Findings       Positives:  Missed doses    Negatives:  Signs/symptoms of thrombosis, Signs/symptoms of bleeding, Laboratory test error suspected, Change in health, Change in alcohol use, Change in activity, Upcoming invasive procedure, Emergency department visit, Upcoming dental procedure, Extra doses, Change in medications, Change in diet/appetite, Hospital admission, Bruising, Other complaints    Comments:  Was taking wrong dose          Anticoagulation Summary  As of 2025      INR goal:  2.0-3.0   TTR:  36.4% (8.8 y)   INR used for dosin.9 (2025)   Warfarin maintenance plan:  5 mg (5 mg x 1) every Mon, Wed, Fri; 2.5 mg (5 mg x 0.5) all other days   Weekly warfarin total:  25 mg   Plan last modified:  Carlotta Dixon [facility] (2025)   Next INR check:  2025   Priority:  Maintenance   Target end date:  Indefinite    Indications    Pulmonary embolus (HCC) (Resolved) [I26.99]  Chronic anticoagulation [Z79.01]                 Anticoagulation Episode Summary       INR check location:  Anticoagulation Clinic    Preferred lab:  --    Send INR reminders to:  [facility]    Comments:  EPIC - finger stick every 2-3 weeks  Consent: 23          Anticoagulation Care Providers       Provider Role Specialty Phone number    Juan C Dumont MD Referring Internal Medicine [phone]

## 2025-02-04 PROBLEM — I50.23 ACUTE ON CHRONIC SYSTOLIC HEART FAILURE (HCC): Status: ACTIVE | Noted: 2024-07-19

## 2025-02-06 ENCOUNTER — TELEPHONE (OUTPATIENT)
Dept: ADMINISTRATIVE | Age: 65
End: 2025-02-06

## 2025-02-06 NOTE — TELEPHONE ENCOUNTER
Pt states to disregard at this time  It should be under new insurance since  He does not have a worker's comp MD at this time  He stopped using inhaler due to SE  He made an appt with Dr Christy to discuss    I called Tom to put the request on hold

## 2025-02-19 ENCOUNTER — ANTI-COAG VISIT (OUTPATIENT)
Dept: PHARMACY | Age: 65
End: 2025-02-19
Attending: INTERNAL MEDICINE
Payer: MEDICARE

## 2025-02-19 DIAGNOSIS — Z79.01 CHRONIC ANTICOAGULATION: ICD-10-CM

## 2025-02-19 DIAGNOSIS — Z86.711 HISTORY OF PULMONARY EMBOLISM: Primary | ICD-10-CM

## 2025-02-19 LAB
INTERNATIONAL NORMALIZATION RATIO, POC: 2
PROTHROMBIN TIME, POC: 0

## 2025-02-19 PROCEDURE — 99211 OFF/OP EST MAY X REQ PHY/QHP: CPT

## 2025-02-19 PROCEDURE — 85610 PROTHROMBIN TIME: CPT

## 2025-02-19 NOTE — PROGRESS NOTES
Jovon Javier is a 64 y.o. here for warfarin management.  Jovon had an INR test today. Results were reviewed and appropriate warfarin management was completed.     Patient verifies current warfarin dosing regimen: Yes     Warfarin medication reviewed and updated on the patient 's home medication list: Yes   All other medications reviewed and updated on the patient 's home medication list: No new medications    Lab Results   Component Value Date    INR 2.0 2025    INR 1.9 2025    INR 2.50 2024     Patient Findings       Negatives:  Signs/symptoms of thrombosis, Signs/symptoms of bleeding, Change in health, Missed doses, Change in medications, Change in diet/appetite, Bruising          Anticoagulation Summary  As of 2025      INR goal:  2.0-3.0   TTR:  36.2% (8.9 y)   INR used for dosin.0 (2025)   Warfarin maintenance plan:  2.5 mg (5 mg x 0.5) every Sun, Tue, u; 5 mg (5 mg x 1) all other days   Weekly warfarin total:  27.5 mg   Plan last modified:  Rayray Apple RPH (2025)   Next INR check:  3/12/2025   Priority:  Maintenance   Target end date:  Indefinite    Indications    History of pulmonary embolism [Z86.711]  Chronic anticoagulation [Z79.01]                 Anticoagulation Episode Summary       INR check location:  Anticoagulation Clinic    Preferred lab:  --    Send INR reminders to:  [facility]    Comments:  EPIC - finger stick every 2-3 weeks  Consent: 23          Anticoagulation Care Providers       Provider Role Specialty Phone number    DayanaJuan C jenkins MD Referring Internal Medicine [phone]          There were no vitals taken for this visit.    Warfarin assessment / plan:     Patient appears well today.      No changes affecting warfarin therapy were noted.   No acute findings with regards to warfarin therapy.  INR today is within goal range.     Instructed to continue the same weekly warfarin dose.    Jovon would like to have

## 2025-02-20 ENCOUNTER — TELEPHONE (OUTPATIENT)
Dept: PULMONOLOGY | Age: 65
End: 2025-02-20

## 2025-02-20 NOTE — TELEPHONE ENCOUNTER
Jovon wants to know why we didn't fill out the form that was faxed to our office by his . He states it's nothing to do with Worker's Comp. He says it's so he can keep his medications. Please call him back.

## 2025-02-21 NOTE — TELEPHONE ENCOUNTER
The form yes and no questions answered and faxed back  They are looking OVN from a worker's comp MD  Pt does not have one at this time

## 2025-02-24 ENCOUNTER — TELEPHONE (OUTPATIENT)
Dept: PHARMACY | Facility: CLINIC | Age: 65
End: 2025-02-24
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
## 2025-02-24 NOTE — TELEPHONE ENCOUNTER
CLINICAL PHARMACY NOTE - Population [facility] Pharmacy Referral    Patient can be scheduled with:  Richard Dey (Andy)-  [facility] Providers: Natty Carpenter, Juan C Dumont, Ada Espinoza, Vandana Sam, Shan Zhou    Received a referral from: Provider: Juan C Dumont MD  to discuss patient’s medications. Called patient to schedule a time to speak with a pharmacist over the telephone.     Called but line busy    Niya Villarreal CP.   [facility] Clinical   [facility] Clinical Pharmacy  Toll free: [phone] Option 1

## 2025-02-25 NOTE — TELEPHONE ENCOUNTER
Left VM to call back to mayelin.    Niya Villarreal CP.   Population Health Clinical   [facility] Clinical Pharmacy  Toll free: [phone] Option 1

## 2025-03-03 ENCOUNTER — HOSPITAL ENCOUNTER (OUTPATIENT)
Dept: CT IMAGING | Age: 65
Discharge: HOME | End: 2025-03-03
Attending: INTERNAL MEDICINE
Payer: MEDICARE
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
## 2025-03-03 PROCEDURE — 70450 CT HEAD/BRAIN W/O DYE: CPT

## 2025-03-03 NOTE — TELEPHONE ENCOUNTER
2nd attempt to contact this patient regarding the previous message  CLINICAL PHARMACY: REFERRAL  Patient unavailable at the time of call. Left following message on home TAD: please call back at toll-free [phone] to retrieve previous message.    [employer] message sent to patient.  If unread, letter will be mailed to home.     For Pharmacy Admin Tracking Only    Program: [facility]  CPA in place:  No  Gap Closed?: No   Time Spent (min): 5

## 2025-03-07 NOTE — TELEPHONE ENCOUNTER
Patient called in to schedule for his Medication review with a pharmacist. He is scheduled for  03/10/25 @ 2:00pm.    Lavonne Mathias CPhT.  Population Health Clinical   [facility] Clinical Pharmacy  Toll free: [phone] Option 1     For Pharmacy Admin Tracking Only    Program: [place]  CPA in place:  No  Recommendation Provided To: Patient/Caregiver: 1 via Telephone  Intervention Detail: Scheduled Appointment  Intervention Accepted By: Patient/Caregiver: 1  Gap Closed?: Yes   Time Spent (min): 5

## 2025-03-10 ENCOUNTER — TELEPHONE (OUTPATIENT)
Dept: PHARMACY | Facility: CLINIC | Age: 65
End: 2025-03-10

## 2025-03-10 NOTE — TELEPHONE ENCOUNTER
[facility] CLINICAL PHARMACY NOTE- Medication Review (Value-Based Care)  Patient outreach to review medications - Spoke with Patient    SUBJECTIVE/OBJECTIVE:   HPI:  Jovon Javier is a 64 y.o. male identified as eligible for medication review. PCP is KAROL CAMPBELL MD.  Due to multiple brand name meds, Medicare insurance and CHF with LVEF <50%      Allergies   Allergen Reactions    Atrovent Nasal Spray [Ipratropium] Anaphylaxis and Other (See Comments)     Chest tightness    Ipratropium Bromide Hfa Shortness Of Breath    Nitroglycerin Other (See Comments)     Severe hypotension (went from 139 to 66 systolic)    Crestor [Rosuvastatin] Myalgia    Doxycycline Hives    Jardiance [Empagliflozin]      Dr. Campbell discontinued due to hypotension issues     Macrobid [Nitrofurantoin] Hives    Nexletol [Bempedoic Acid]      Hives neck and chest     Sulfa Antibiotics Rash    Vicodin [Hydrocodone-Acetaminophen] Rash     Medications:  Current Outpatient Medications   Medication Sig Dispense Refill    pregabalin (LYRICA) 75 MG capsule TAKE 1 CAPSULE BY MOUTH IN THE MORNING AND 2 CAPSULES IN THE EVENING 90 capsule 1    sucralfate (CARAFATE) 1 GM tablet Take 1 tablet by mouth 4 times daily 120 tablet 1    Lancets (ONETOUCH DELICA PLUS [id number]) MISC USE TO TEST BLOOD TWO TIMES A DAY AND AS NEEDED 100 each 11    sacubitril-valsartan (ENTRESTO) 24-26 MG per tablet 1/2 tablet  tablet 3    alendronate (FOSAMAX) 70 MG tablet Take 1 tablet by mouth every 7 days 12 tablet 3    ONETOUCH ULTRA strip TEST 2 TIMES A DAY & AS NEEDED FOR SYMPTOMS OF IRREGULAR BLOOD GLUCOSE. 50 strip 11    albuterol (PROVENTIL) (2.5 MG/3ML) 0.083% nebulizer solution Take 3 mLs by nebulization every 6 hours as needed for Wheezing 150 mL 1    albuterol sulfate HFA (PROVENTIL;VENTOLIN;PROAIR) 108 (90 Base) MCG/ACT inhaler Inhale 2 puffs into the lungs every 6 hours as needed for Wheezing 18 g 5    flavoxATE (URISPAS) 100 MG tablet TAKE 1 TABLET BY

## 2025-03-12 ENCOUNTER — ANTI-COAG VISIT (OUTPATIENT)
Dept: PHARMACY | Age: 65
End: 2025-03-12
Attending: INTERNAL MEDICINE
Payer: MEDICARE

## 2025-03-12 DIAGNOSIS — Z79.01 CHRONIC ANTICOAGULATION: ICD-10-CM

## 2025-03-12 DIAGNOSIS — Z86.711 HISTORY OF PULMONARY EMBOLISM: Primary | ICD-10-CM

## 2025-03-12 LAB
INTERNATIONAL NORMALIZATION RATIO, POC: 1.9
PROTHROMBIN TIME, POC: 0

## 2025-03-12 PROCEDURE — 99212 OFFICE O/P EST SF 10 MIN: CPT | Performed by: PHARMACIST

## 2025-03-12 PROCEDURE — 85610 PROTHROMBIN TIME: CPT | Performed by: PHARMACIST

## 2025-03-12 NOTE — PROGRESS NOTES
Jovon Javier is a 64 y.o. here for warfarin management.  Jovon had an INR test today. Results were reviewed and appropriate warfarin management was completed.     Patient verifies current warfarin dosing regimen: Yes     Warfarin medication reviewed and updated on the patient 's home medication list: Yes   All other medications reviewed and updated on the patient 's home medication list: Yes     Lab Results   Component Value Date    INR 1.9 2025    INR 2.0 2025    INR 1.9 2025     Patient Findings       Negatives:  Signs/symptoms of thrombosis, Signs/symptoms of bleeding, Laboratory test error suspected, Change in health, Change in alcohol use, Change in activity, Upcoming invasive procedure, Emergency department visit, Upcoming dental procedure, Missed doses, Extra doses, Change in medications, Change in diet/appetite, Hospital admission, Bruising, Other complaints          Anticoagulation Summary  As of 3/12/2025      INR goal:  2.0-3.0   TTR:  36.0% (9 y)   INR used for dosin.9 (3/12/2025)   Warfarin maintenance plan:  2.5 mg (5 mg x 0.5) every Tue, Thu; 5 mg (5 mg x 1) all other days   Weekly warfarin total:  30 mg   Plan last modified:  Carlotta Dixon [facility] (3/12/2025)   Next INR check:  3/26/2025   Priority:  Maintenance   Target end date:  Indefinite    Indications    History of pulmonary embolism [Z86.711]  Chronic anticoagulation [Z79.01]                 Anticoagulation Episode Summary       INR check location:  Anticoagulation Clinic    Preferred lab:  --    Send INR reminders to:  [facility]AFF    Comments:  EPIC - finger stick every 2-3 weeks  Consent: 3/12/25          Anticoagulation Care Providers       Provider Role Specialty Phone number    Juan C Dumont MD Referring Internal Medicine [phone]          There were no vitals taken for this visit.    Warfarin assessment / plan:     Appears well  Sub-therapeutic INR        INR just below goal range

## 2025-03-19 PROBLEM — H53.8 BLURRY VISION: Status: ACTIVE | Noted: 2025-03-19

## 2025-03-26 ENCOUNTER — APPOINTMENT (OUTPATIENT)
Dept: PHARMACY | Age: 65
End: 2025-03-26
Attending: INTERNAL MEDICINE
Payer: MEDICARE

## 2025-03-27 ENCOUNTER — ANTI-COAG VISIT (OUTPATIENT)
Dept: PHARMACY | Age: 65
End: 2025-03-27
Attending: INTERNAL MEDICINE
Payer: MEDICARE

## 2025-03-27 ENCOUNTER — OFFICE VISIT (OUTPATIENT)
Dept: CARDIOLOGY CLINIC | Age: 65
End: 2025-03-27
Payer: MEDICARE

## 2025-03-27 VITALS
HEART RATE: 76 BPM | SYSTOLIC BLOOD PRESSURE: 106 MMHG | OXYGEN SATURATION: 97 % | HEIGHT: 66 IN | WEIGHT: 192 LBS | BODY MASS INDEX: 30.86 KG/M2 | DIASTOLIC BLOOD PRESSURE: 66 MMHG

## 2025-03-27 DIAGNOSIS — Z86.711 HISTORY OF PULMONARY EMBOLISM: Primary | ICD-10-CM

## 2025-03-27 DIAGNOSIS — Z79.01 CHRONIC ANTICOAGULATION: ICD-10-CM

## 2025-03-27 DIAGNOSIS — I25.10 ASCVD (ARTERIOSCLEROTIC CARDIOVASCULAR DISEASE): ICD-10-CM

## 2025-03-27 DIAGNOSIS — I50.22 CHRONIC SYSTOLIC HF (HEART FAILURE) (HCC): Primary | ICD-10-CM

## 2025-03-27 DIAGNOSIS — Z86.711 HISTORY OF PULMONARY EMBOLISM: ICD-10-CM

## 2025-03-27 DIAGNOSIS — E78.5 HYPERLIPIDEMIA, UNSPECIFIED HYPERLIPIDEMIA TYPE: ICD-10-CM

## 2025-03-27 DIAGNOSIS — I10 ESSENTIAL HYPERTENSION: ICD-10-CM

## 2025-03-27 PROBLEM — I50.9 CONGESTIVE HEART FAILURE (HCC): Status: ACTIVE | Noted: 2025-03-27

## 2025-03-27 LAB
INTERNATIONAL NORMALIZATION RATIO, POC: 2.5
PROTHROMBIN TIME, POC: 0

## 2025-03-27 PROCEDURE — 1123F ACP DISCUSS/DSCN MKR DOCD: CPT | Performed by: INTERNAL MEDICINE

## 2025-03-27 PROCEDURE — 3074F SYST BP LT 130 MM HG: CPT | Performed by: INTERNAL MEDICINE

## 2025-03-27 PROCEDURE — 3078F DIAST BP <80 MM HG: CPT | Performed by: INTERNAL MEDICINE

## 2025-03-27 PROCEDURE — G2211 COMPLEX E/M VISIT ADD ON: HCPCS | Performed by: INTERNAL MEDICINE

## 2025-03-27 PROCEDURE — 99211 OFF/OP EST MAY X REQ PHY/QHP: CPT | Performed by: INTERNAL MEDICINE

## 2025-03-27 PROCEDURE — 85610 PROTHROMBIN TIME: CPT | Performed by: INTERNAL MEDICINE

## 2025-03-27 PROCEDURE — 99214 OFFICE O/P EST MOD 30 MIN: CPT | Performed by: INTERNAL MEDICINE

## 2025-03-27 RX ORDER — TADALAFIL 5 MG/1
5 TABLET ORAL PRN
COMMUNITY
Start: 2025-01-28

## 2025-03-27 NOTE — PROGRESS NOTES
and Thursdays or as directed by [facility] [phone]  Goal INR = 2-3) 100 tablet 2    Continuous Glucose Sensor (DEXCOM G7 SENSOR) MISC 1 each by Does not apply route every 10 days 9 each 0    fluticasone-umeclidin-vilant (TRELEGY ELLIPTA) 200-62.5-25 MCG/ACT AEPB inhaler Inhale 1 puff into the lungs daily 1 each 0    sodium chloride nebulizer 0.9 % solution PLACE 1 VIAL (3 ML) IN NEBULIZER AND INHALE CONTENTS EVERY SIX HOURS IF NEEDED FOR WHEEZING 300 mL 4    esomeprazole Magnesium (NEXIUM) 20 MG PACK Take 1 packet by mouth daily Takes 2 tablet once daily      tiZANidine (ZANAFLEX) 4 MG tablet TAKE 1 TABLET BY MOUTH EVERY 8 HOURS AS NEEDED 30 tablet 3    glucose monitoring kit 1 kit by Does not apply route daily 1 kit 0    furosemide (LASIX) 20 MG tablet Take 0.5 tablets by mouth daily 45 tablet 3    OXYGEN Inhale 2 L into the lungs at bedtime      fluticasone (FLONASE) 50 MCG/ACT nasal spray 1 spray by Each Nostril route daily      loratadine (CLARITIN) 10 MG tablet Take 1 tablet by mouth daily      Phenazopyridine HCl (AZO-DINE URINARY ANALGESIC PO) Take 2 tablets by mouth daily as needed (urinary spasms/pain)      polyethylene glycol (MIRALAX) PACK packet Take 17 g by mouth nightly      aspirin 81 MG tablet Take 1 tablet by mouth daily       No current facility-administered medications for this visit.     Review of Systems:  Constitutional: no unanticipated weight loss. There's been no change in energy level, sleep pattern, or activity level. No fevers, chills.   Eyes: No visual changes or diplopia. No scleral icterus.  ENT: No Headaches, hearing loss or vertigo. No mouth sores or sore throat.  Cardiovascular: as reviewed in HPI  Respiratory: No cough or wheezing, no sputum production. No hematemesis.    Gastrointestinal: No abdominal pain, appetite loss, blood in stools. No change in bowel or bladder habits.  Genitourinary: No dysuria, trouble voiding, or hematuria.  Musculoskeletal:  No gait 
Cholesterol: 40 mg/dL      Total Cholesterol: 137 mg/dL      Assessment/Plan:       Nonischemic Cardiomyopathy/chronic systolic heart failure   -He had Biv -ICD placed by Dr. Cottrell for underlying cardiomyopathy with left bundle branch block. His C with no CAD, last stress normal 8/2020, last echocardiogram from 10/2020 revaled LVEF 42%, RHC 3/2/21 slightly above normal right heart pressure with slightly elevated pulmonary wedge pressure 17mmHg, normal cardiac output and cardiac indices. Echo 6/2022 LVEF 25-30%, grade I DD. Echo 6/2022 and 12/6/22.   -He did not tolerate Jardiance with low blood pressure. He was not able -to tolerate spironolactone-\"felt weird.\"   -8/2024 echocardiogram results reviewed and compared with the previous echocardiogram.  LV function appears about the same as before 33%.  Will continue current cardiac medications. We discussed up titration however, the patient is reluctant with prior issues with low blood pressure on beta blocker, Entresto and Jardiance. Felt weird on spironolactone.     -Entresto from 24-26 mg 1 tablets BID, Lasix to 10 mg daily along with metoprolol succinate.   -9/3/24 agreeable to increase metoprolol succinate 37.5 mg BID and update us as needed for HR 96 today and to optimize medical therapy.   -ICD with routine remote interrogations, follows in our clinic. 2/27/25.  -Would like for him to try Farxgia  and we will have him stop the Lasix.  If okay with PCP will trial Farxgia  -Will order repeat Echo to further evaluate LVEF  -Will also order Coronary Calcium Score to further evaluate his coronary arteries.    NYHA class II     Abnormal ASCVD risk score  -Patient recent ASCVD risk score is 17.9  -Given though he denies any symptoms to suggest angina, I will proceed with coronary calcium score to rule out underlying CAD    Pulmonary Embolism   Patient is on Warfarin therapy.    Denies any abnormal bruising or bleeding.   INR managed at [facility].

## 2025-04-02 PROBLEM — H57.9 ITCHY EYES: Status: ACTIVE | Noted: 2025-04-02

## 2025-04-08 ENCOUNTER — ANTI-COAG VISIT (OUTPATIENT)
Dept: PHARMACY | Age: 65
End: 2025-04-08
Attending: INTERNAL MEDICINE
Payer: MEDICARE

## 2025-04-08 ENCOUNTER — OFFICE VISIT (OUTPATIENT)
Dept: PULMONOLOGY | Age: 65
End: 2025-04-08
Payer: MEDICARE

## 2025-04-08 VITALS
OXYGEN SATURATION: 95 % | HEART RATE: 98 BPM | SYSTOLIC BLOOD PRESSURE: 100 MMHG | BODY MASS INDEX: 29.82 KG/M2 | WEIGHT: 190 LBS | TEMPERATURE: 97.1 F | DIASTOLIC BLOOD PRESSURE: 60 MMHG | HEIGHT: 67 IN | RESPIRATION RATE: 16 BRPM

## 2025-04-08 DIAGNOSIS — Z86.711 HISTORY OF PULMONARY EMBOLISM: Primary | ICD-10-CM

## 2025-04-08 DIAGNOSIS — J44.81 BRONCHIOLITIS OBLITERANS (HCC): Primary | ICD-10-CM

## 2025-04-08 DIAGNOSIS — Z79.01 CHRONIC ANTICOAGULATION: ICD-10-CM

## 2025-04-08 LAB
INTERNATIONAL NORMALIZATION RATIO, POC: 4.1
PROTHROMBIN TIME, POC: 0

## 2025-04-08 PROCEDURE — 1123F ACP DISCUSS/DSCN MKR DOCD: CPT | Performed by: INTERNAL MEDICINE

## 2025-04-08 PROCEDURE — G2211 COMPLEX E/M VISIT ADD ON: HCPCS | Performed by: INTERNAL MEDICINE

## 2025-04-08 PROCEDURE — 99213 OFFICE O/P EST LOW 20 MIN: CPT | Performed by: INTERNAL MEDICINE

## 2025-04-08 PROCEDURE — 3074F SYST BP LT 130 MM HG: CPT | Performed by: INTERNAL MEDICINE

## 2025-04-08 PROCEDURE — 85610 PROTHROMBIN TIME: CPT

## 2025-04-08 PROCEDURE — 99211 OFF/OP EST MAY X REQ PHY/QHP: CPT

## 2025-04-08 PROCEDURE — 3078F DIAST BP <80 MM HG: CPT | Performed by: INTERNAL MEDICINE

## 2025-04-08 NOTE — PROGRESS NOTES
Jovon Javier is a 64 y.o. here for warfarin management.  Jovon had an INR test today. Results were reviewed and appropriate warfarin management was completed.     Patient verifies current warfarin dosing regimen: Yes     Warfarin medication reviewed and updated on the patient 's home medication list: Yes   All other medications reviewed and updated on the patient 's home medication list: No new medications     Lab Results   Component Value Date    INR 4.1 2025    INR 2.5 2025    INR 1.9 2025     Patient Findings       Negatives:  Signs/symptoms of thrombosis, Signs/symptoms of bleeding, Change in health, Missed doses, Change in medications, Change in diet/appetite, Bruising          Anticoagulation Summary  As of 2025      INR goal:  2.0-3.0   TTR:  36.2% (9 y)   INR used for dosin.1 (2025)   Warfarin maintenance plan:  2.5 mg (5 mg x 0.5) every Tue, Thu; 5 mg (5 mg x 1) all other days   Weekly warfarin total:  30 mg   Plan last modified:  Carlotta Dixon RPH (3/12/2025)   Next INR check:  2025   Priority:  Maintenance   Target end date:  Indefinite    Indications    History of pulmonary embolism [Z86.711]  Chronic anticoagulation [Z79.01]                 Anticoagulation Episode Summary       INR check location:  Anticoagulation Clinic    Preferred lab:  --    Send INR reminders to:  [facility]    Comments:  EPIC - finger stick every 2-3 weeks  Consent: 3/12/25          Anticoagulation Care Providers       Provider Role Specialty Phone number    DayanaJuan C jenkins MD Referring Internal Medicine [phone]          There were no vitals taken for this visit.    Warfarin assessment / plan:     Appears well  Supra-therapeutic INR.     Denies signs and symptoms of bleeding/bruising.  Denies medication changes.  Denies extra warfarin doses.  Denies change in appetite.  Denies decrease in vitamin K intake.     No changes to diet or medications.  No extra

## 2025-04-08 NOTE — PROGRESS NOTES
[facility] PULMONARY, SLEEP, AND CRITICAL CARE   Jovon Javier (:  1960) is a 64 y.o. male,Established patient, here for evaluation of the following chief complaint(s):  Cough and Follow-up  Follow-up wheezing    Assessment & Plan   ASSESSMENT/PLAN:  1. Bronchiolitis obliterans  Assessment & Plan:  -Has been stable since .  -FEV1 50%  -Albuterol as needed  -Cannot rule out some possible underlying asthma as well, does respond well to Trelegy but is currently not taking due to symptoms being well-controlled.            Return in about 3 months (around 2025).         Subjective   SUBJECTIVE/OBJECTIVE:  Wheezing and dyspnea much better controlled.  Is no longer taking Trelegy.  This is the best his symptoms have been in several months.  Takes albuterol infrequently    Cough  Pertinent negatives include no shortness of breath or wheezing.       Review of Systems   Constitutional: Negative.    Respiratory:  Negative for cough, shortness of breath and wheezing.    Cardiovascular: Negative.    Neurological: Negative.    Psychiatric/Behavioral: Negative.            Objective   Physical Exam    Gen:  No acute distress.   Eyes: EOMI. Anicteric sclera. No conjunctival injection.   ENT: No discharge. oropharynx clear. External appearance of ears and nose normal.  Neck: Trachea midline. No mass   Resp:  No crackles. No wheezes. No rhonchi. No dullness on percussion.  CV: Regular rate. Regular rhythm. No murmur or rub. No edema.    Neuro:  no focal neurologic deficit.  Moves all extremities  Psych: Awake and alert, Oriented x 3.  Judgement and insight appropriate.  Mood stable.  I spent 22 minutes on this case today, >50% was face-to-face in discussion of the diagnosis and the coordination of care in regards to the treatment plan.  All questions answered.   An electronic signature was used to authenticate this note.    --Rafael Christy MD

## 2025-04-09 ASSESSMENT — ENCOUNTER SYMPTOMS
COUGH: 0
WHEEZING: 0
SHORTNESS OF BREATH: 0

## 2025-04-09 NOTE — ASSESSMENT & PLAN NOTE
-Has been stable since 1997.  -FEV1 50%  -Albuterol as needed  -Cannot rule out some possible underlying asthma as well, does respond well to Trelegy but is currently not taking due to symptoms being well-controlled.

## 2025-04-14 ENCOUNTER — HOSPITAL ENCOUNTER (INPATIENT)
Age: 65
LOS: 3 days | Discharge: HOME | DRG: 191 | End: 2025-04-17
Attending: STUDENT IN AN ORGANIZED HEALTH CARE EDUCATION/TRAINING PROGRAM | Admitting: STUDENT IN AN ORGANIZED HEALTH CARE EDUCATION/TRAINING PROGRAM
Payer: MEDICARE

## 2025-04-14 ENCOUNTER — APPOINTMENT (OUTPATIENT)
Dept: CT IMAGING | Age: 65
DRG: 191 | End: 2025-04-14
Payer: MEDICARE

## 2025-04-14 DIAGNOSIS — I50.23 ACUTE ON CHRONIC SYSTOLIC CONGESTIVE HEART FAILURE (HCC): ICD-10-CM

## 2025-04-14 DIAGNOSIS — R55 NEAR SYNCOPE: Primary | ICD-10-CM

## 2025-04-14 DIAGNOSIS — R55 SYNCOPE, UNSPECIFIED SYNCOPE TYPE: ICD-10-CM

## 2025-04-14 DIAGNOSIS — R10.13 EPIGASTRIC PAIN: ICD-10-CM

## 2025-04-14 DIAGNOSIS — R79.89 ELEVATED TROPONIN: ICD-10-CM

## 2025-04-14 DIAGNOSIS — I95.9 HYPOTENSION, UNSPECIFIED HYPOTENSION TYPE: ICD-10-CM

## 2025-04-14 DIAGNOSIS — I50.22 CHRONIC SYSTOLIC CONGESTIVE HEART FAILURE (HCC): ICD-10-CM

## 2025-04-14 PROBLEM — J44.1 COPD EXACERBATION (HCC): Status: ACTIVE | Noted: 2025-04-14

## 2025-04-14 LAB
ALBUMIN SERPL-MCNC: 3.8 G/DL (ref 3.4–5)
ALBUMIN/GLOB SERPL: 1.6 {RATIO} (ref 1.1–2.2)
ALP SERPL-CCNC: 95 U/L (ref 40–129)
ALT SERPL-CCNC: 25 U/L (ref 10–40)
ANION GAP SERPL CALCULATED.3IONS-SCNC: 10 MMOL/L (ref 3–16)
APTT BLD: 42.5 SEC (ref 22.1–36.4)
AST SERPL-CCNC: 26 U/L (ref 15–37)
BASOPHILS # BLD: 0 K/UL (ref 0–0.2)
BASOPHILS NFR BLD: 0.7 %
BILIRUB SERPL-MCNC: 0.4 MG/DL (ref 0–1)
BUN SERPL-MCNC: 14 MG/DL (ref 7–20)
CALCIUM SERPL-MCNC: 10.9 MG/DL (ref 8.3–10.6)
CHLORIDE SERPL-SCNC: 105 MMOL/L (ref 99–110)
CO2 SERPL-SCNC: 23 MMOL/L (ref 21–32)
CREAT SERPL-MCNC: 1.3 MG/DL (ref 0.8–1.3)
DEPRECATED RDW RBC AUTO: 15.5 % (ref 12.4–15.4)
EKG ATRIAL RATE: 79 BPM
EKG DIAGNOSIS: NORMAL
EKG P AXIS: 67 DEGREES
EKG P-R INTERVAL: 232 MS
EKG Q-T INTERVAL: 410 MS
EKG QRS DURATION: 146 MS
EKG QTC CALCULATION (BAZETT): 470 MS
EKG R AXIS: -70 DEGREES
EKG T AXIS: 98 DEGREES
EKG VENTRICULAR RATE: 79 BPM
EOSINOPHIL # BLD: 0.1 K/UL (ref 0–0.6)
EOSINOPHIL NFR BLD: 1.7 %
GFR SERPLBLD CREATININE-BSD FMLA CKD-EPI: 61 ML/MIN/{1.73_M2}
GLUCOSE BLD-MCNC: 132 MG/DL (ref 70–99)
GLUCOSE SERPL-MCNC: 127 MG/DL (ref 70–99)
HCT VFR BLD AUTO: 48.2 % (ref 40.5–52.5)
HGB BLD-MCNC: 16 G/DL (ref 13.5–17.5)
INR PPP: 3.75 (ref 0.85–1.15)
LYMPHOCYTES # BLD: 1.1 K/UL (ref 1–5.1)
LYMPHOCYTES NFR BLD: 20.3 %
MAGNESIUM SERPL-MCNC: 1.92 MG/DL (ref 1.8–2.4)
MCH RBC QN AUTO: 28.4 PG (ref 26–34)
MCHC RBC AUTO-ENTMCNC: 33.2 G/DL (ref 31–36)
MCV RBC AUTO: 85.7 FL (ref 80–100)
MONOCYTES # BLD: 0.5 K/UL (ref 0–1.3)
MONOCYTES NFR BLD: 8.9 %
NEUTROPHILS # BLD: 3.6 K/UL (ref 1.7–7.7)
NEUTROPHILS NFR BLD: 68.4 %
NT-PROBNP SERPL-MCNC: 109 PG/ML (ref 0–124)
PERFORMED ON: ABNORMAL
PLATELET # BLD AUTO: 185 K/UL (ref 135–450)
PMV BLD AUTO: 7.5 FL (ref 5–10.5)
POTASSIUM SERPL-SCNC: 4.3 MMOL/L (ref 3.5–5.1)
PROT SERPL-MCNC: 6.2 G/DL (ref 6.4–8.2)
PROTHROMBIN TIME: 36.7 SEC (ref 11.9–14.9)
RBC # BLD AUTO: 5.62 M/UL (ref 4.2–5.9)
SODIUM SERPL-SCNC: 138 MMOL/L (ref 136–145)
TROPONIN, HIGH SENSITIVITY: 24 NG/L (ref 0–22)
TROPONIN, HIGH SENSITIVITY: 26 NG/L (ref 0–22)
WBC # BLD AUTO: 5.3 K/UL (ref 4–11)

## 2025-04-14 PROCEDURE — 84484 ASSAY OF TROPONIN QUANT: CPT

## 2025-04-14 PROCEDURE — 74177 CT ABD & PELVIS W/CONTRAST: CPT

## 2025-04-14 PROCEDURE — 1200000000 HC SEMI PRIVATE

## 2025-04-14 PROCEDURE — 94761 N-INVAS EAR/PLS OXIMETRY MLT: CPT

## 2025-04-14 PROCEDURE — 2700000000 HC OXYGEN THERAPY PER DAY

## 2025-04-14 PROCEDURE — 83735 ASSAY OF MAGNESIUM: CPT

## 2025-04-14 PROCEDURE — 80053 COMPREHEN METABOLIC PANEL: CPT

## 2025-04-14 PROCEDURE — 85730 THROMBOPLASTIN TIME PARTIAL: CPT

## 2025-04-14 PROCEDURE — 6370000000 HC RX 637 (ALT 250 FOR IP): Performed by: STUDENT IN AN ORGANIZED HEALTH CARE EDUCATION/TRAINING PROGRAM

## 2025-04-14 PROCEDURE — 93010 ELECTROCARDIOGRAM REPORT: CPT | Performed by: INTERNAL MEDICINE

## 2025-04-14 PROCEDURE — 6370000000 HC RX 637 (ALT 250 FOR IP): Performed by: INTERNAL MEDICINE

## 2025-04-14 PROCEDURE — 93005 ELECTROCARDIOGRAM TRACING: CPT | Performed by: STUDENT IN AN ORGANIZED HEALTH CARE EDUCATION/TRAINING PROGRAM

## 2025-04-14 PROCEDURE — 83880 ASSAY OF NATRIURETIC PEPTIDE: CPT

## 2025-04-14 PROCEDURE — 6360000002 HC RX W HCPCS: Performed by: STUDENT IN AN ORGANIZED HEALTH CARE EDUCATION/TRAINING PROGRAM

## 2025-04-14 PROCEDURE — 2500000003 HC RX 250 WO HCPCS: Performed by: STUDENT IN AN ORGANIZED HEALTH CARE EDUCATION/TRAINING PROGRAM

## 2025-04-14 PROCEDURE — 6360000004 HC RX CONTRAST MEDICATION: Performed by: STUDENT IN AN ORGANIZED HEALTH CARE EDUCATION/TRAINING PROGRAM

## 2025-04-14 PROCEDURE — 85025 COMPLETE CBC W/AUTO DIFF WBC: CPT

## 2025-04-14 PROCEDURE — 71260 CT THORAX DX C+: CPT

## 2025-04-14 PROCEDURE — 99285 EMERGENCY DEPT VISIT HI MDM: CPT

## 2025-04-14 PROCEDURE — 85610 PROTHROMBIN TIME: CPT

## 2025-04-14 PROCEDURE — 36415 COLL VENOUS BLD VENIPUNCTURE: CPT

## 2025-04-14 RX ORDER — SODIUM CHLORIDE 0.9 % (FLUSH) 0.9 %
5-40 SYRINGE (ML) INJECTION EVERY 12 HOURS SCHEDULED
Status: DISCONTINUED | OUTPATIENT
Start: 2025-04-14 | End: 2025-04-17 | Stop reason: HOSPADM

## 2025-04-14 RX ORDER — SUCRALFATE 1 G/1
1 TABLET ORAL 4 TIMES DAILY
Status: DISCONTINUED | OUTPATIENT
Start: 2025-04-14 | End: 2025-04-17 | Stop reason: HOSPADM

## 2025-04-14 RX ORDER — METOPROLOL SUCCINATE 25 MG/1
37.5 TABLET, EXTENDED RELEASE ORAL DAILY
Status: DISCONTINUED | OUTPATIENT
Start: 2025-04-15 | End: 2025-04-16

## 2025-04-14 RX ORDER — ALBUTEROL SULFATE 0.83 MG/ML
2.5 SOLUTION RESPIRATORY (INHALATION) EVERY 6 HOURS PRN
Status: DISCONTINUED | OUTPATIENT
Start: 2025-04-14 | End: 2025-04-17 | Stop reason: HOSPADM

## 2025-04-14 RX ORDER — ONDANSETRON 2 MG/ML
4 INJECTION INTRAMUSCULAR; INTRAVENOUS EVERY 6 HOURS PRN
Status: DISCONTINUED | OUTPATIENT
Start: 2025-04-14 | End: 2025-04-17 | Stop reason: HOSPADM

## 2025-04-14 RX ORDER — OXYCODONE AND ACETAMINOPHEN 5; 325 MG/1; MG/1
1 TABLET ORAL EVERY 4 HOURS PRN
Refills: 0 | Status: DISCONTINUED | OUTPATIENT
Start: 2025-04-14 | End: 2025-04-17 | Stop reason: HOSPADM

## 2025-04-14 RX ORDER — DEXTROSE MONOHYDRATE 100 MG/ML
INJECTION, SOLUTION INTRAVENOUS CONTINUOUS PRN
Status: DISCONTINUED | OUTPATIENT
Start: 2025-04-14 | End: 2025-04-17 | Stop reason: HOSPADM

## 2025-04-14 RX ORDER — TADALAFIL 5 MG/1
5 TABLET ORAL PRN
Status: DISCONTINUED | OUTPATIENT
Start: 2025-04-14 | End: 2025-04-14

## 2025-04-14 RX ORDER — METOPROLOL SUCCINATE 25 MG/1
37.5 TABLET, EXTENDED RELEASE ORAL DAILY
Status: DISCONTINUED | OUTPATIENT
Start: 2025-04-14 | End: 2025-04-14

## 2025-04-14 RX ORDER — FLAVOXATE HYDROCHLORIDE 100 MG/1
100 TABLET ORAL 4 TIMES DAILY PRN
Status: DISCONTINUED | OUTPATIENT
Start: 2025-04-14 | End: 2025-04-17 | Stop reason: HOSPADM

## 2025-04-14 RX ORDER — SODIUM CHLORIDE 9 MG/ML
INJECTION, SOLUTION INTRAVENOUS PRN
Status: DISCONTINUED | OUTPATIENT
Start: 2025-04-14 | End: 2025-04-17 | Stop reason: HOSPADM

## 2025-04-14 RX ORDER — FLUTICASONE PROPIONATE 50 MCG
1 SPRAY, SUSPENSION (ML) NASAL DAILY
Status: DISCONTINUED | OUTPATIENT
Start: 2025-04-15 | End: 2025-04-17 | Stop reason: HOSPADM

## 2025-04-14 RX ORDER — ACETAMINOPHEN 325 MG/1
650 TABLET ORAL EVERY 6 HOURS PRN
Status: DISCONTINUED | OUTPATIENT
Start: 2025-04-14 | End: 2025-04-17 | Stop reason: HOSPADM

## 2025-04-14 RX ORDER — PRAVASTATIN SODIUM 40 MG
40 TABLET ORAL NIGHTLY
Status: DISCONTINUED | OUTPATIENT
Start: 2025-04-14 | End: 2025-04-17 | Stop reason: HOSPADM

## 2025-04-14 RX ORDER — ONDANSETRON 4 MG/1
4 TABLET, ORALLY DISINTEGRATING ORAL EVERY 8 HOURS PRN
Status: DISCONTINUED | OUTPATIENT
Start: 2025-04-14 | End: 2025-04-17 | Stop reason: HOSPADM

## 2025-04-14 RX ORDER — PREGABALIN 75 MG/1
150 CAPSULE ORAL NIGHTLY
Status: DISCONTINUED | OUTPATIENT
Start: 2025-04-14 | End: 2025-04-17 | Stop reason: HOSPADM

## 2025-04-14 RX ORDER — PREDNISONE 20 MG/1
40 TABLET ORAL DAILY
Status: DISCONTINUED | OUTPATIENT
Start: 2025-04-17 | End: 2025-04-15

## 2025-04-14 RX ORDER — TAMSULOSIN HYDROCHLORIDE 0.4 MG/1
0.4 CAPSULE ORAL 2 TIMES DAILY
Status: DISCONTINUED | OUTPATIENT
Start: 2025-04-14 | End: 2025-04-17 | Stop reason: HOSPADM

## 2025-04-14 RX ORDER — OMEPRAZOLE 20 MG/1
20 CAPSULE, DELAYED RELEASE ORAL DAILY
Status: DISCONTINUED | OUTPATIENT
Start: 2025-04-15 | End: 2025-04-17 | Stop reason: HOSPADM

## 2025-04-14 RX ORDER — POLYETHYLENE GLYCOL 3350 17 G/17G
17 POWDER, FOR SOLUTION ORAL DAILY PRN
Status: DISCONTINUED | OUTPATIENT
Start: 2025-04-14 | End: 2025-04-17 | Stop reason: HOSPADM

## 2025-04-14 RX ORDER — INSULIN LISPRO 100 [IU]/ML
0-8 INJECTION, SOLUTION INTRAVENOUS; SUBCUTANEOUS
Status: DISCONTINUED | OUTPATIENT
Start: 2025-04-14 | End: 2025-04-17 | Stop reason: HOSPADM

## 2025-04-14 RX ORDER — PREGABALIN 75 MG/1
75 CAPSULE ORAL DAILY
Status: DISCONTINUED | OUTPATIENT
Start: 2025-04-15 | End: 2025-04-17 | Stop reason: HOSPADM

## 2025-04-14 RX ORDER — SODIUM CHLORIDE 0.9 % (FLUSH) 0.9 %
5-40 SYRINGE (ML) INJECTION PRN
Status: DISCONTINUED | OUTPATIENT
Start: 2025-04-14 | End: 2025-04-17 | Stop reason: HOSPADM

## 2025-04-14 RX ORDER — IOPAMIDOL 755 MG/ML
75 INJECTION, SOLUTION INTRAVASCULAR
Status: COMPLETED | OUTPATIENT
Start: 2025-04-14 | End: 2025-04-14

## 2025-04-14 RX ORDER — GLUCAGON 1 MG/ML
1 KIT INJECTION PRN
Status: DISCONTINUED | OUTPATIENT
Start: 2025-04-14 | End: 2025-04-17 | Stop reason: HOSPADM

## 2025-04-14 RX ORDER — ESOMEPRAZOLE MAGNESIUM 20 MG/1
20 GRANULE, DELAYED RELEASE ORAL DAILY
Status: DISCONTINUED | OUTPATIENT
Start: 2025-04-14 | End: 2025-04-14

## 2025-04-14 RX ORDER — ACETAMINOPHEN 650 MG/1
650 SUPPOSITORY RECTAL EVERY 6 HOURS PRN
Status: DISCONTINUED | OUTPATIENT
Start: 2025-04-14 | End: 2025-04-17 | Stop reason: HOSPADM

## 2025-04-14 RX ORDER — METHYLPREDNISOLONE SODIUM SUCCINATE 40 MG/ML
40 INJECTION INTRAMUSCULAR; INTRAVENOUS EVERY 6 HOURS
Status: DISCONTINUED | OUTPATIENT
Start: 2025-04-14 | End: 2025-04-15

## 2025-04-14 RX ADMIN — TAMSULOSIN HYDROCHLORIDE 0.4 MG: 0.4 CAPSULE ORAL at 20:20

## 2025-04-14 RX ADMIN — IOPAMIDOL 75 ML: 755 INJECTION, SOLUTION INTRAVENOUS at 14:35

## 2025-04-14 RX ADMIN — PRAVASTATIN SODIUM 40 MG: 40 TABLET ORAL at 20:20

## 2025-04-14 RX ADMIN — PREGABALIN 150 MG: 75 CAPSULE ORAL at 20:22

## 2025-04-14 RX ADMIN — OXYCODONE AND ACETAMINOPHEN 1 TABLET: 325; 5 TABLET ORAL at 22:26

## 2025-04-14 RX ADMIN — WATER 1000 MG: 1 INJECTION INTRAMUSCULAR; INTRAVENOUS; SUBCUTANEOUS at 18:37

## 2025-04-14 RX ADMIN — SODIUM CHLORIDE, PRESERVATIVE FREE 10 ML: 5 INJECTION INTRAVENOUS at 20:21

## 2025-04-14 ASSESSMENT — PAIN SCALES - GENERAL
PAINLEVEL_OUTOF10: 9
PAINLEVEL_OUTOF10: 9

## 2025-04-14 ASSESSMENT — PAIN DESCRIPTION - LOCATION
LOCATION: ABDOMEN
LOCATION: BACK

## 2025-04-14 ASSESSMENT — PAIN DESCRIPTION - DESCRIPTORS: DESCRIPTORS: PRESSURE

## 2025-04-14 ASSESSMENT — PAIN - FUNCTIONAL ASSESSMENT: PAIN_FUNCTIONAL_ASSESSMENT: 0-10

## 2025-04-14 NOTE — PROGRESS NOTES
4 Eyes Skin Assessment     NAME:  Jovon Javier  YOB: 1960  MEDICAL RECORD NUMBER:  [id number]    The patient is being assessed for  Admission    I agree that at least one RN has performed a thorough Head to Toe Skin Assessment on the patient. ALL assessment sites listed below have been assessed.      Areas assessed by both nurses:    Head, Face, Ears, Shoulders, Back, Chest, Arms, Elbows, Hands, Sacrum. Buttock, Coccyx, Ischium, and Legs. Feet and Heels        Does the Patient have a Wound? No noted wound(s)       Casimiro Prevention initiated by RN: No  Wound Care Orders initiated by RN: No    Pressure Injury (Stage 3,4, Unstageable, DTI, NWPT, and Complex wounds) if present, place Wound referral order by RN under : No    New Ostomies, if present place, Ostomy referral order under : No     Nurse 1 eSignature: Electronically signed by Lizzie Collins RN on 4/14/25 at 6:03 PM EDT    **SHARE this note so that the co-signing nurse can place an eSignature**    Nurse 2 eSignature: Electronically signed by Feliberto Keating RN on 4/14/25 at 6:03 PM EDT

## 2025-04-14 NOTE — ED PROVIDER NOTES
[facility] 4W MED SURG      EMERGENCY MEDICINE     Pt Name: Jovon Javier  MRN: [id number]  Birthdate 1960  Date of evaluation: 4/14/2025  Provider: Ruiz Simental MD    CHIEF COMPLAINT     Near syncope     HISTORY OF PRESENT ILLNESS   Jovon Javier is a 64 y.o. male who presents to the emergency department for near syncope yesterday and today with worsening dyspnea on exertion and generalized fatigue, some left-sided chest pain is nonexertional nonpleuritic in nature with no associated vomiting some mild nausea    Went to his primary care physician today for follow-up, was noted to be hypotensive and sent here for further evaluation    Has oxygen orders at home as needed typically wears 1 L as needed and needed to wear all throughout the day.    Last echocardiogram was 8/19/2024, showed an EF of 33%, LV size normal, moderate septal thickening septal motion is consistent with a bundle branch block, moderate global hypokinesis, grade 2 systolic dysfunction with increased LAP    PASTMEDICAL HISTORY     Past Medical History:   Diagnosis Date    Abnormal CT scan 08/2023    spots on pancrease    Ankylosing spondylitis     Atrial fibrillation     Bronchiolitis obliterans (HCC)     CAD (coronary artery disease)     Cardiomyopathy     CHF (congestive heart failure) (HCC)     Chronic anticoagulation     COPD (chronic obstructive pulmonary disease) (HCC)     GERD (gastroesophageal reflux disease)     Hepatitis 1979    Hx of blood clots     Hyperlipidemia     Hyperparathyroidism     Hypertension     IBS (irritable bowel syndrome)     Kidney stones     Oxygen dependent 08/2023    wears 2L/NC at night    Pneumothorax 2011    Prostatitis     Pulmonary embolism        Patient Active Problem List   Diagnosis Code    Nephrolithiasis N20.0    Pneumonia J18.9    Essential hypertension I10    Back pain M54.9    URTI (acute upper respiratory infection) J06.9    Irritable bowel syndrome K58.9    Pure hypercholesterolemia

## 2025-04-14 NOTE — ED NOTES
Meal tray ordered for pt at this time.   
Pt from [facility] to room 45 at this time. Pt placed on the monitor.   
Rn irrigated pacemaker. Care continues.   
Calcium 10.9 (*)     Total Protein 6.2 (*)     All other components within normal limits   TROPONIN - Abnormal; Notable for the following components:    Troponin, High Sensitivity 26 (*)     All other components within normal limits   TROPONIN - Abnormal; Notable for the following components:    Troponin, High Sensitivity 24 (*)     All other components within normal limits   PROTIME-INR - Abnormal; Notable for the following components:    Protime 36.7 (*)     INR 3.75 (*)     All other components within normal limits   APTT - Abnormal; Notable for the following components:    aPTT 42.5 (*)     All other components within normal limits       Background  Allergies:   Allergies   Allergen Reactions    Atrovent Nasal Spray [Ipratropium] Anaphylaxis and Other (See Comments)     Chest tightness    Ipratropium Bromide Hfa Shortness Of Breath    Nitroglycerin Other (See Comments)     Severe hypotension (went from 139 to 66 systolic)    Crestor [Rosuvastatin] Myalgia    Doxycycline Hives    Jardiance [Empagliflozin]      Dr. Dumont discontinued due to hypotension issues     Macrobid [Nitrofurantoin] Hives    Nexletol [Bempedoic Acid]      Hives neck and chest     Sulfa Antibiotics Rash    Vicodin [Hydrocodone-Acetaminophen] Rash     History:   Past Medical History:   Diagnosis Date    Abnormal CT scan 08/2023    spots on pancrease    Ankylosing spondylitis     Atrial fibrillation     Bronchiolitis obliterans (HCC)     CAD (coronary artery disease)     Cardiomyopathy     CHF (congestive heart failure) (HCC)     Chronic anticoagulation     COPD (chronic obstructive pulmonary disease) (HCC)     GERD (gastroesophageal reflux disease)     Hepatitis 1979    Hx of blood clots     Hyperlipidemia     Hyperparathyroidism     Hypertension     IBS (irritable bowel syndrome)     Kidney stones     Oxygen dependent 08/2023    wears 2L/NC at night    Pneumothorax 2011    Prostatitis     Pulmonary embolism        Assessment  Vitals:

## 2025-04-14 NOTE — PROGRESS NOTES
Patient admitted to room 4130 from ED.  Oriented to room, call light, and floor policies.Pt is resting in bed. No s/s of distress noted. A&O x4 VSS.  Safety precautions in place; call light and bedside table within reach. Pt denies any needs at this time.

## 2025-04-14 NOTE — ED TRIAGE NOTES
Pt to ed c/o hypotension. Pt states PCP told Pt to come to ED d/t Pt being hypotensive. Pt also c/o dizziness that stared yesterday.    Pt c/po lower back pain that started last night. Pt reports urinary frequency.

## 2025-04-14 NOTE — PROGRESS NOTES
Medication Reconciliation    List of medications patient is currently taking is complete.     Source of information: 1. Conversation with patient at bedside                                      2. EPIC records      Notes regarding home medications:   1. Patient received all of his morning home medications prior to arrival to the ER today.  2. Patient's current Entresto dose is 24/26 mg 1 po BID  3. Patient's current Toprol XL dose is 25 mg po BID.  4. Patient takes Fosamax and Ergocalciferol on Wednesdays.  5. Patient is anticoagulated with Warfarin:      [Indication: Hx PE, Target INR; 2-3, Current dose: 2.5 mg TUE/THUR and 5 mg ALL OTHER DAYS] - Patient did already take his Warfarin 5 mg prior to arrival to the ER today.    Alfred Espinosa [facility], PharmD, BCPS  4/14/2025 4:48 PM

## 2025-04-15 ENCOUNTER — APPOINTMENT (OUTPATIENT)
Age: 65
DRG: 191 | End: 2025-04-15
Attending: STUDENT IN AN ORGANIZED HEALTH CARE EDUCATION/TRAINING PROGRAM
Payer: MEDICARE

## 2025-04-15 ENCOUNTER — CARE COORDINATION (OUTPATIENT)
Dept: CARE COORDINATION | Age: 65
End: 2025-04-15

## 2025-04-15 PROBLEM — I95.9 HYPOTENSION: Status: ACTIVE | Noted: 2025-04-15

## 2025-04-15 PROBLEM — R79.89 ELEVATED TROPONIN: Status: ACTIVE | Noted: 2025-04-15

## 2025-04-15 LAB
ANION GAP SERPL CALCULATED.3IONS-SCNC: 8 MMOL/L (ref 3–16)
BACTERIA URNS QL MICRO: ABNORMAL /HPF
BASOPHILS # BLD: 0 K/UL (ref 0–0.2)
BASOPHILS NFR BLD: 0.1 %
BILIRUB UR QL STRIP.AUTO: NEGATIVE
BUN SERPL-MCNC: 16 MG/DL (ref 7–20)
CALCIUM SERPL-MCNC: 11 MG/DL (ref 8.3–10.6)
CHLORIDE SERPL-SCNC: 106 MMOL/L (ref 99–110)
CLARITY UR: CLEAR
CO2 SERPL-SCNC: 21 MMOL/L (ref 21–32)
COLOR UR: YELLOW
CREAT SERPL-MCNC: 1.1 MG/DL (ref 0.8–1.3)
DEPRECATED RDW RBC AUTO: 15.7 % (ref 12.4–15.4)
ECHO AO ROOT DIAM: 3.3 CM
ECHO AO ROOT INDEX: 1.68 CM/M2
ECHO AV AREA PEAK VELOCITY: 2 CM2
ECHO AV AREA VTI: 1.7 CM2
ECHO AV AREA/BSA PEAK VELOCITY: 1 CM2/M2
ECHO AV AREA/BSA VTI: 0.9 CM2/M2
ECHO AV MEAN GRADIENT: 5 MMHG
ECHO AV MEAN VELOCITY: 1.1 M/S
ECHO AV PEAK GRADIENT: 8 MMHG
ECHO AV PEAK VELOCITY: 1.4 M/S
ECHO AV VELOCITY RATIO: 0.64
ECHO AV VTI: 27.2 CM
ECHO BSA: 2 M2
ECHO LA AREA 2C: 11.5 CM2
ECHO LA AREA 4C: 14.3 CM2
ECHO LA MAJOR AXIS: 4.6 CM
ECHO LA MINOR AXIS: 4 CM
ECHO LA VOL BP: 32 ML (ref 18–58)
ECHO LA VOL MOD A2C: 26 ML (ref 18–58)
ECHO LA VOL MOD A4C: 35 ML (ref 18–58)
ECHO LA VOL/BSA BIPLANE: 16 ML/M2 (ref 16–34)
ECHO LA VOLUME INDEX MOD A2C: 13 ML/M2 (ref 16–34)
ECHO LA VOLUME INDEX MOD A4C: 18 ML/M2 (ref 16–34)
ECHO LV EF PHYSICIAN: 43 %
ECHO LV FRACTIONAL SHORTENING: 24 % (ref 28–44)
ECHO LV INTERNAL DIMENSION DIASTOLE INDEX: 2.5 CM/M2
ECHO LV INTERNAL DIMENSION DIASTOLIC: 4.9 CM (ref 4.2–5.9)
ECHO LV INTERNAL DIMENSION SYSTOLIC INDEX: 1.89 CM/M2
ECHO LV INTERNAL DIMENSION SYSTOLIC: 3.7 CM
ECHO LV IVSD: 0.9 CM (ref 0.6–1)
ECHO LV MASS 2D: 141.9 G (ref 88–224)
ECHO LV MASS INDEX 2D: 72.4 G/M2 (ref 49–115)
ECHO LV POSTERIOR WALL DIASTOLIC: 0.8 CM (ref 0.6–1)
ECHO LV RELATIVE WALL THICKNESS RATIO: 0.33
ECHO LVOT AREA: 3.1 CM2
ECHO LVOT AV VTI INDEX: 0.55
ECHO LVOT DIAM: 2 CM
ECHO LVOT MEAN GRADIENT: 2 MMHG
ECHO LVOT PEAK GRADIENT: 3 MMHG
ECHO LVOT PEAK VELOCITY: 0.9 M/S
ECHO LVOT STROKE VOLUME INDEX: 24 ML/M2
ECHO LVOT SV: 47.1 ML
ECHO LVOT VTI: 15 CM
ECHO MV AREA VTI: 2.1 CM2
ECHO MV LVOT VTI INDEX: 1.51
ECHO MV MAX VELOCITY: 1.2 M/S
ECHO MV MEAN GRADIENT: 3 MMHG
ECHO MV MEAN VELOCITY: 0.7 M/S
ECHO MV PEAK GRADIENT: 6 MMHG
ECHO MV VTI: 22.7 CM
ECHO PV MAX VELOCITY: 1.2 M/S
ECHO PV PEAK GRADIENT: 5 MMHG
ECHO RA AREA 4C: 12.4 CM2
ECHO RA END SYSTOLIC VOLUME APICAL 4 CHAMBER INDEX BSA: 15 ML/M2
ECHO RA VOLUME: 29 ML
ECHO RV BASAL DIMENSION: 3.8 CM
ECHO RV FREE WALL PEAK S': 11.7 CM/S
ECHO RV MID DIMENSION: 2.7 CM
ECHO RV TAPSE: 2.1 CM (ref 1.7–?)
EOSINOPHIL # BLD: 0 K/UL (ref 0–0.6)
EOSINOPHIL NFR BLD: 0.1 %
EPI CELLS #/AREA URNS AUTO: 0 /HPF (ref 0–5)
GFR SERPLBLD CREATININE-BSD FMLA CKD-EPI: 74 ML/MIN/{1.73_M2}
GLUCOSE BLD-MCNC: 166 MG/DL (ref 70–99)
GLUCOSE BLD-MCNC: 187 MG/DL (ref 70–99)
GLUCOSE BLD-MCNC: 198 MG/DL (ref 70–99)
GLUCOSE BLD-MCNC: 214 MG/DL (ref 70–99)
GLUCOSE SERPL-MCNC: 165 MG/DL (ref 70–99)
GLUCOSE UR STRIP.AUTO-MCNC: 500 MG/DL
HCT VFR BLD AUTO: 45.6 % (ref 40.5–52.5)
HGB BLD-MCNC: 14.7 G/DL (ref 13.5–17.5)
HGB UR QL STRIP.AUTO: ABNORMAL
HYALINE CASTS #/AREA URNS AUTO: 0 /LPF (ref 0–8)
INR PPP: 4.25 (ref 0.85–1.15)
KETONES UR STRIP.AUTO-MCNC: ABNORMAL MG/DL
LEUKOCYTE ESTERASE UR QL STRIP.AUTO: NEGATIVE
LYMPHOCYTES # BLD: 0.7 K/UL (ref 1–5.1)
LYMPHOCYTES NFR BLD: 7.8 %
MCH RBC QN AUTO: 27.9 PG (ref 26–34)
MCHC RBC AUTO-ENTMCNC: 32.3 G/DL (ref 31–36)
MCV RBC AUTO: 86.3 FL (ref 80–100)
MONOCYTES # BLD: 0.1 K/UL (ref 0–1.3)
MONOCYTES NFR BLD: 0.7 %
NEUTROPHILS # BLD: 8.2 K/UL (ref 1.7–7.7)
NEUTROPHILS NFR BLD: 91.3 %
NITRITE UR QL STRIP.AUTO: NEGATIVE
PERFORMED ON: ABNORMAL
PH UR STRIP.AUTO: 6 [PH] (ref 5–8)
PLATELET # BLD AUTO: 178 K/UL (ref 135–450)
PMV BLD AUTO: 7.6 FL (ref 5–10.5)
POTASSIUM SERPL-SCNC: 5 MMOL/L (ref 3.5–5.1)
PROCALCITONIN SERPL IA-MCNC: 0.03 NG/ML (ref 0–0.15)
PROT UR STRIP.AUTO-MCNC: NEGATIVE MG/DL
PROTHROMBIN TIME: 40.4 SEC (ref 11.9–14.9)
RBC # BLD AUTO: 5.29 M/UL (ref 4.2–5.9)
RBC CLUMPS #/AREA URNS AUTO: 13 /HPF (ref 0–4)
SODIUM SERPL-SCNC: 135 MMOL/L (ref 136–145)
SP GR UR STRIP.AUTO: 1.04 (ref 1–1.03)
UA COMPLETE W REFLEX CULTURE PNL UR: ABNORMAL
UA DIPSTICK W REFLEX MICRO PNL UR: YES
URN SPEC COLLECT METH UR: ABNORMAL
UROBILINOGEN UR STRIP-ACNC: 0.2 E.U./DL
WBC # BLD AUTO: 9 K/UL (ref 4–11)
WBC #/AREA URNS AUTO: 5 /HPF (ref 0–5)

## 2025-04-15 PROCEDURE — 2500000003 HC RX 250 WO HCPCS

## 2025-04-15 PROCEDURE — 99222 1ST HOSP IP/OBS MODERATE 55: CPT | Performed by: INTERNAL MEDICINE

## 2025-04-15 PROCEDURE — 1200000000 HC SEMI PRIVATE

## 2025-04-15 PROCEDURE — 2500000003 HC RX 250 WO HCPCS: Performed by: STUDENT IN AN ORGANIZED HEALTH CARE EDUCATION/TRAINING PROGRAM

## 2025-04-15 PROCEDURE — 85025 COMPLETE CBC W/AUTO DIFF WBC: CPT

## 2025-04-15 PROCEDURE — 81001 URINALYSIS AUTO W/SCOPE: CPT

## 2025-04-15 PROCEDURE — 6360000002 HC RX W HCPCS: Performed by: STUDENT IN AN ORGANIZED HEALTH CARE EDUCATION/TRAINING PROGRAM

## 2025-04-15 PROCEDURE — 6370000000 HC RX 637 (ALT 250 FOR IP): Performed by: STUDENT IN AN ORGANIZED HEALTH CARE EDUCATION/TRAINING PROGRAM

## 2025-04-15 PROCEDURE — 80048 BASIC METABOLIC PNL TOTAL CA: CPT

## 2025-04-15 PROCEDURE — 6370000000 HC RX 637 (ALT 250 FOR IP): Performed by: INTERNAL MEDICINE

## 2025-04-15 PROCEDURE — 99223 1ST HOSP IP/OBS HIGH 75: CPT | Performed by: STUDENT IN AN ORGANIZED HEALTH CARE EDUCATION/TRAINING PROGRAM

## 2025-04-15 PROCEDURE — 36415 COLL VENOUS BLD VENIPUNCTURE: CPT

## 2025-04-15 PROCEDURE — 84145 PROCALCITONIN (PCT): CPT

## 2025-04-15 PROCEDURE — 85610 PROTHROMBIN TIME: CPT

## 2025-04-15 PROCEDURE — 93306 TTE W/DOPPLER COMPLETE: CPT

## 2025-04-15 PROCEDURE — 94760 N-INVAS EAR/PLS OXIMETRY 1: CPT

## 2025-04-15 RX ORDER — WATER 10 ML/10ML
INJECTION INTRAMUSCULAR; INTRAVENOUS; SUBCUTANEOUS
Status: COMPLETED
Start: 2025-04-15 | End: 2025-04-15

## 2025-04-15 RX ORDER — METHYLPREDNISOLONE SODIUM SUCCINATE 125 MG/2ML
40 INJECTION INTRAMUSCULAR; INTRAVENOUS EVERY 6 HOURS
Status: COMPLETED | OUTPATIENT
Start: 2025-04-15 | End: 2025-04-16

## 2025-04-15 RX ORDER — MIDODRINE HYDROCHLORIDE 5 MG/1
5 TABLET ORAL
Status: DISCONTINUED | OUTPATIENT
Start: 2025-04-15 | End: 2025-04-17 | Stop reason: HOSPADM

## 2025-04-15 RX ORDER — PREDNISONE 20 MG/1
40 TABLET ORAL DAILY
Status: DISCONTINUED | OUTPATIENT
Start: 2025-04-17 | End: 2025-04-17

## 2025-04-15 RX ADMIN — OXYCODONE AND ACETAMINOPHEN 1 TABLET: 325; 5 TABLET ORAL at 19:54

## 2025-04-15 RX ADMIN — PREGABALIN 150 MG: 75 CAPSULE ORAL at 20:47

## 2025-04-15 RX ADMIN — SUCRALFATE 1 G: 1 TABLET ORAL at 16:51

## 2025-04-15 RX ADMIN — WATER 1000 MG: 1 INJECTION INTRAMUSCULAR; INTRAVENOUS; SUBCUTANEOUS at 17:00

## 2025-04-15 RX ADMIN — TAMSULOSIN HYDROCHLORIDE 0.4 MG: 0.4 CAPSULE ORAL at 08:30

## 2025-04-15 RX ADMIN — METHYLPREDNISOLONE SODIUM SUCCINATE 40 MG: 125 INJECTION INTRAMUSCULAR; INTRAVENOUS at 06:15

## 2025-04-15 RX ADMIN — POLYETHYLENE GLYCOL 3350 17 G: 17 POWDER, FOR SOLUTION ORAL at 20:47

## 2025-04-15 RX ADMIN — METHYLPREDNISOLONE SODIUM SUCCINATE 40 MG: 125 INJECTION INTRAMUSCULAR; INTRAVENOUS at 20:47

## 2025-04-15 RX ADMIN — OMEPRAZOLE 20 MG: 20 CAPSULE, DELAYED RELEASE ORAL at 06:15

## 2025-04-15 RX ADMIN — METOPROLOL SUCCINATE 37.5 MG: 25 TABLET, EXTENDED RELEASE ORAL at 08:30

## 2025-04-15 RX ADMIN — METHYLPREDNISOLONE SODIUM SUCCINATE 40 MG: 125 INJECTION INTRAMUSCULAR; INTRAVENOUS at 00:31

## 2025-04-15 RX ADMIN — METHYLPREDNISOLONE SODIUM SUCCINATE 40 MG: 125 INJECTION INTRAMUSCULAR; INTRAVENOUS at 12:04

## 2025-04-15 RX ADMIN — INSULIN LISPRO 2 UNITS: 100 INJECTION, SOLUTION INTRAVENOUS; SUBCUTANEOUS at 16:55

## 2025-04-15 RX ADMIN — MIDODRINE HYDROCHLORIDE 5 MG: 5 TABLET ORAL at 16:51

## 2025-04-15 RX ADMIN — SODIUM CHLORIDE, PRESERVATIVE FREE 10 ML: 5 INJECTION INTRAVENOUS at 20:46

## 2025-04-15 RX ADMIN — MIDODRINE HYDROCHLORIDE 5 MG: 5 TABLET ORAL at 12:04

## 2025-04-15 RX ADMIN — INSULIN LISPRO 2 UNITS: 100 INJECTION, SOLUTION INTRAVENOUS; SUBCUTANEOUS at 12:04

## 2025-04-15 RX ADMIN — INSULIN LISPRO 2 UNITS: 100 INJECTION, SOLUTION INTRAVENOUS; SUBCUTANEOUS at 20:47

## 2025-04-15 RX ADMIN — TAMSULOSIN HYDROCHLORIDE 0.4 MG: 0.4 CAPSULE ORAL at 20:48

## 2025-04-15 RX ADMIN — WATER: 1 INJECTION INTRAMUSCULAR; INTRAVENOUS; SUBCUTANEOUS at 00:52

## 2025-04-15 RX ADMIN — PREGABALIN 75 MG: 75 CAPSULE ORAL at 08:30

## 2025-04-15 RX ADMIN — PRAVASTATIN SODIUM 40 MG: 40 TABLET ORAL at 20:47

## 2025-04-15 RX ADMIN — OXYCODONE AND ACETAMINOPHEN 1 TABLET: 325; 5 TABLET ORAL at 12:04

## 2025-04-15 RX ADMIN — SUCRALFATE 1 G: 1 TABLET ORAL at 20:47

## 2025-04-15 RX ADMIN — WATER 10 ML: 1 INJECTION INTRAMUSCULAR; INTRAVENOUS; SUBCUTANEOUS at 20:49

## 2025-04-15 RX ADMIN — FLUTICASONE PROPIONATE 1 SPRAY: 50 SPRAY, METERED NASAL at 08:30

## 2025-04-15 RX ADMIN — SODIUM CHLORIDE, PRESERVATIVE FREE 10 ML: 5 INJECTION INTRAVENOUS at 08:32

## 2025-04-15 ASSESSMENT — PAIN DESCRIPTION - DESCRIPTORS
DESCRIPTORS: ACHING
DESCRIPTORS: ACHING

## 2025-04-15 ASSESSMENT — PAIN SCALES - WONG BAKER: WONGBAKER_NUMERICALRESPONSE: NO HURT

## 2025-04-15 ASSESSMENT — PAIN SCALES - GENERAL
PAINLEVEL_OUTOF10: 8
PAINLEVEL_OUTOF10: 0
PAINLEVEL_OUTOF10: 8
PAINLEVEL_OUTOF10: 5

## 2025-04-15 ASSESSMENT — PAIN DESCRIPTION - ORIENTATION: ORIENTATION: MID

## 2025-04-15 ASSESSMENT — PAIN DESCRIPTION - LOCATION
LOCATION: ABDOMEN
LOCATION: ABDOMEN;PELVIS

## 2025-04-15 NOTE — CARE COORDINATION
CTN contacted [facility] inpatient  on 4W and email sent regarding patient eligibility for RPM. DM, COPD, CHF, HTN     KIKA Colunga RN

## 2025-04-15 NOTE — CARE COORDINATION
Discharge Planning:      (CM) reviewed the patient's chart to assess needs. Patient's Readmission Risk Score is 13%. Patient's medical insurance is  Payor: [facility] MEDICARE / Plan: [facility] MEDICARE ADVANTAGE / Product Type: *No Product type* / .  Patient's PCP is Juan C Dumont MD .  No needs anticipated, at this time. CM team to follow. Staff to inform CM if additional discharge needs arise.    Pts preferred pharmacy is   [facility] - [facility] [address] - P [phone] - F [phone]  [address]  [facility] [address]  Phone: [phone] Fax: [phone]    [id number] - [place], [place] - [address] - P [phone] - F [phone] [address] [address]  Phone: [phone] Fax: [phone]    [facility] - [place], [place] - [address] -  [phone] - F [phone] [address] [address]  Phone: [phone] Fax: [phone]    Electronically signed by Ce Brito RN on 4/15/2025 at 3:45 PM

## 2025-04-15 NOTE — PROGRESS NOTES
Patient inform RN he is diabetic. Patient has a dexcom on his right upper posterior arm. Rn indicates dexcom on the LDA. MD Howell notified and a request for blood glucose monitoring orders requested. Orders placed by MD.

## 2025-04-15 NOTE — PROGRESS NOTES
Patient complain of left flank pain of 9 for kidney stones. MD Howell notified and Percocet ordered and administered. Patient also requested for Ambien but MD responded he cannot give both Ambien and Percocet.

## 2025-04-15 NOTE — H&P
[facility]  History and Physical    Patient's PCP: Juan C Dumont MD  Code Status: Full Code  History Obtained From:  patient    CC: weakness    HPI:      Jovon Javier is a 64-year-old male with past medical history significant for ischemic cardiomyopathy, recurrent nephrolithiasis, history of pulmonary embolism who presented from clinic for evaluation of hypotension.     The patient attempted to golf yesterday, and felt weak and lightheaded after golfing two holes. He did not report any chest pain but had shortness of breath. He was found to be hypotensive in clinic and referred to ER. Today, he is not feeling lightheaded after holding entresto. He does complain of bladder pressure and left lower quadrant pain. He feels that he is not emptying completely when he urinates. No fever, chills, nausea, vomiting, or diarrhea.      Past Medical / Surgical History:    Past Medical History:   Diagnosis Date    Abnormal CT scan 08/2023    spots on pancrease    Ankylosing spondylitis     Atrial fibrillation     Bronchiolitis obliterans (HCC)     CAD (coronary artery disease)     Cardiomyopathy     CHF (congestive heart failure) (HCC)     Chronic anticoagulation     COPD (chronic obstructive pulmonary disease) (HCC)     GERD (gastroesophageal reflux disease)     Hepatitis 1979    Hx of blood clots     Hyperlipidemia     Hyperparathyroidism     Hypertension     IBS (irritable bowel syndrome)     Kidney stones     Oxygen dependent 08/2023    wears 2L/NC at night    Pneumothorax 2011    Prostatitis     Pulmonary embolism      Past Surgical History:   Procedure Laterality Date    BRONCHOSCOPY N/A 11/2/2023    BRONCHOSCOPY WITH BRONCHIOLAR ALVEOLAR LAVAGE performed by Rafael Christy MD at [facility] ENDOSCOPY    CHOLECYSTECTOMY  2007    COLONOSCOPY  09/21/2012    COLONOSCOPY  11/30/2018    COLONOSCOPY N/A 5/9/2024    COLONOSCOPY POLYPECTOMY SNARE BIOPSY performed by Remington Hutchinson Jr., DO at [facility] ENDOSCOPY

## 2025-04-16 LAB
ANION GAP SERPL CALCULATED.3IONS-SCNC: 8 MMOL/L (ref 3–16)
BASOPHILS # BLD: 0 K/UL (ref 0–0.2)
BASOPHILS NFR BLD: 0 %
BUN SERPL-MCNC: 18 MG/DL (ref 7–20)
CALCIUM SERPL-MCNC: 10.9 MG/DL (ref 8.3–10.6)
CHLORIDE SERPL-SCNC: 101 MMOL/L (ref 99–110)
CO2 SERPL-SCNC: 22 MMOL/L (ref 21–32)
CREAT SERPL-MCNC: 1.2 MG/DL (ref 0.8–1.3)
DEPRECATED RDW RBC AUTO: 15.2 % (ref 12.4–15.4)
EOSINOPHIL # BLD: 0 K/UL (ref 0–0.6)
EOSINOPHIL NFR BLD: 0 %
GFR SERPLBLD CREATININE-BSD FMLA CKD-EPI: 67 ML/MIN/{1.73_M2}
GLUCOSE BLD-MCNC: 148 MG/DL (ref 70–99)
GLUCOSE BLD-MCNC: 162 MG/DL (ref 70–99)
GLUCOSE BLD-MCNC: 193 MG/DL (ref 70–99)
GLUCOSE BLD-MCNC: 243 MG/DL (ref 70–99)
GLUCOSE SERPL-MCNC: 205 MG/DL (ref 70–99)
HCT VFR BLD AUTO: 46.7 % (ref 40.5–52.5)
HGB BLD-MCNC: 15.4 G/DL (ref 13.5–17.5)
INR PPP: 3.68 (ref 0.85–1.15)
LYMPHOCYTES # BLD: 0.4 K/UL (ref 1–5.1)
LYMPHOCYTES NFR BLD: 3.1 %
MCH RBC QN AUTO: 28.1 PG (ref 26–34)
MCHC RBC AUTO-ENTMCNC: 33 G/DL (ref 31–36)
MCV RBC AUTO: 85.2 FL (ref 80–100)
MONOCYTES # BLD: 0.2 K/UL (ref 0–1.3)
MONOCYTES NFR BLD: 1.3 %
NEUTROPHILS # BLD: 13 K/UL (ref 1.7–7.7)
NEUTROPHILS NFR BLD: 95.6 %
PERFORMED ON: ABNORMAL
PLATELET # BLD AUTO: 184 K/UL (ref 135–450)
PMV BLD AUTO: 7.8 FL (ref 5–10.5)
POTASSIUM SERPL-SCNC: 4.7 MMOL/L (ref 3.5–5.1)
PROTHROMBIN TIME: 36.2 SEC (ref 11.9–14.9)
RBC # BLD AUTO: 5.48 M/UL (ref 4.2–5.9)
SODIUM SERPL-SCNC: 131 MMOL/L (ref 136–145)
WBC # BLD AUTO: 13.6 K/UL (ref 4–11)

## 2025-04-16 PROCEDURE — 6360000002 HC RX W HCPCS: Performed by: STUDENT IN AN ORGANIZED HEALTH CARE EDUCATION/TRAINING PROGRAM

## 2025-04-16 PROCEDURE — 2500000003 HC RX 250 WO HCPCS: Performed by: STUDENT IN AN ORGANIZED HEALTH CARE EDUCATION/TRAINING PROGRAM

## 2025-04-16 PROCEDURE — 85025 COMPLETE CBC W/AUTO DIFF WBC: CPT

## 2025-04-16 PROCEDURE — 2500000003 HC RX 250 WO HCPCS

## 2025-04-16 PROCEDURE — 6370000000 HC RX 637 (ALT 250 FOR IP): Performed by: INTERNAL MEDICINE

## 2025-04-16 PROCEDURE — 36415 COLL VENOUS BLD VENIPUNCTURE: CPT

## 2025-04-16 PROCEDURE — 1200000000 HC SEMI PRIVATE

## 2025-04-16 PROCEDURE — 6370000000 HC RX 637 (ALT 250 FOR IP): Performed by: STUDENT IN AN ORGANIZED HEALTH CARE EDUCATION/TRAINING PROGRAM

## 2025-04-16 PROCEDURE — 94640 AIRWAY INHALATION TREATMENT: CPT

## 2025-04-16 PROCEDURE — 94760 N-INVAS EAR/PLS OXIMETRY 1: CPT

## 2025-04-16 PROCEDURE — 99233 SBSQ HOSP IP/OBS HIGH 50: CPT | Performed by: STUDENT IN AN ORGANIZED HEALTH CARE EDUCATION/TRAINING PROGRAM

## 2025-04-16 PROCEDURE — 80048 BASIC METABOLIC PNL TOTAL CA: CPT

## 2025-04-16 PROCEDURE — 85610 PROTHROMBIN TIME: CPT

## 2025-04-16 PROCEDURE — 99233 SBSQ HOSP IP/OBS HIGH 50: CPT | Performed by: INTERNAL MEDICINE

## 2025-04-16 RX ORDER — WATER 10 ML/10ML
INJECTION INTRAMUSCULAR; INTRAVENOUS; SUBCUTANEOUS
Status: COMPLETED
Start: 2025-04-16 | End: 2025-04-16

## 2025-04-16 RX ORDER — ZOLPIDEM TARTRATE 5 MG/1
10 TABLET ORAL NIGHTLY PRN
Status: DISCONTINUED | OUTPATIENT
Start: 2025-04-16 | End: 2025-04-17 | Stop reason: HOSPADM

## 2025-04-16 RX ORDER — WATER 10 ML/10ML
INJECTION INTRAMUSCULAR; INTRAVENOUS; SUBCUTANEOUS
Status: DISPENSED
Start: 2025-04-16 | End: 2025-04-16

## 2025-04-16 RX ORDER — DOCUSATE SODIUM 100 MG/1
CAPSULE, LIQUID FILLED ORAL
Status: DISPENSED
Start: 2025-04-16 | End: 2025-04-17

## 2025-04-16 RX ORDER — ALBUTEROL SULFATE 0.83 MG/ML
2.5 SOLUTION RESPIRATORY (INHALATION)
Status: DISCONTINUED | OUTPATIENT
Start: 2025-04-16 | End: 2025-04-17 | Stop reason: HOSPADM

## 2025-04-16 RX ORDER — POLYETHYLENE GLYCOL 3350 17 G/17G
17 POWDER, FOR SOLUTION ORAL ONCE
Status: COMPLETED | OUTPATIENT
Start: 2025-04-16 | End: 2025-04-16

## 2025-04-16 RX ORDER — METOPROLOL SUCCINATE 25 MG/1
37.5 TABLET, EXTENDED RELEASE ORAL
Status: DISCONTINUED | OUTPATIENT
Start: 2025-04-17 | End: 2025-04-17 | Stop reason: HOSPADM

## 2025-04-16 RX ORDER — ZOLPIDEM TARTRATE 10 MG/1
10 TABLET ORAL NIGHTLY PRN
COMMUNITY

## 2025-04-16 RX ORDER — DOCUSATE SODIUM 100 MG/1
100 CAPSULE, LIQUID FILLED ORAL ONCE
Status: COMPLETED | OUTPATIENT
Start: 2025-04-16 | End: 2025-04-16

## 2025-04-16 RX ADMIN — INSULIN LISPRO 2 UNITS: 100 INJECTION, SOLUTION INTRAVENOUS; SUBCUTANEOUS at 17:55

## 2025-04-16 RX ADMIN — POLYETHYLENE GLYCOL 3350 17 G: 17 POWDER, FOR SOLUTION ORAL at 16:36

## 2025-04-16 RX ADMIN — ALBUTEROL SULFATE 2.5 MG: 2.5 SOLUTION RESPIRATORY (INHALATION) at 07:53

## 2025-04-16 RX ADMIN — ALBUTEROL SULFATE 2.5 MG: 2.5 SOLUTION RESPIRATORY (INHALATION) at 01:03

## 2025-04-16 RX ADMIN — SACUBITRIL AND VALSARTAN 0.5 TABLET: 24; 26 TABLET, FILM COATED ORAL at 11:48

## 2025-04-16 RX ADMIN — DOCUSATE SODIUM 100 MG: 100 CAPSULE, LIQUID FILLED ORAL at 16:36

## 2025-04-16 RX ADMIN — WATER 1000 MG: 1 INJECTION INTRAMUSCULAR; INTRAVENOUS; SUBCUTANEOUS at 17:56

## 2025-04-16 RX ADMIN — POLYETHYLENE GLYCOL 3350 17 G: 17 POWDER, FOR SOLUTION ORAL at 09:02

## 2025-04-16 RX ADMIN — OMEPRAZOLE 20 MG: 20 CAPSULE, DELAYED RELEASE ORAL at 06:37

## 2025-04-16 RX ADMIN — METHYLPREDNISOLONE SODIUM SUCCINATE 40 MG: 125 INJECTION INTRAMUSCULAR; INTRAVENOUS at 00:56

## 2025-04-16 RX ADMIN — WATER 10 ML: 1 INJECTION INTRAMUSCULAR; INTRAVENOUS; SUBCUTANEOUS at 06:37

## 2025-04-16 RX ADMIN — SUCRALFATE 1 G: 1 TABLET ORAL at 21:52

## 2025-04-16 RX ADMIN — FLUTICASONE PROPIONATE 1 SPRAY: 50 SPRAY, METERED NASAL at 08:50

## 2025-04-16 RX ADMIN — TAMSULOSIN HYDROCHLORIDE 0.4 MG: 0.4 CAPSULE ORAL at 08:49

## 2025-04-16 RX ADMIN — WATER: 1 INJECTION INTRAMUSCULAR; INTRAVENOUS; SUBCUTANEOUS at 00:56

## 2025-04-16 RX ADMIN — ZOLPIDEM TARTRATE 10 MG: 5 TABLET ORAL at 21:51

## 2025-04-16 RX ADMIN — SACUBITRIL AND VALSARTAN 0.5 TABLET: 24; 26 TABLET, FILM COATED ORAL at 21:51

## 2025-04-16 RX ADMIN — METHYLPREDNISOLONE SODIUM SUCCINATE 40 MG: 125 INJECTION INTRAMUSCULAR; INTRAVENOUS at 17:56

## 2025-04-16 RX ADMIN — MIDODRINE HYDROCHLORIDE 5 MG: 5 TABLET ORAL at 08:48

## 2025-04-16 RX ADMIN — PREGABALIN 75 MG: 75 CAPSULE ORAL at 08:49

## 2025-04-16 RX ADMIN — PRAVASTATIN SODIUM 40 MG: 40 TABLET ORAL at 21:52

## 2025-04-16 RX ADMIN — INSULIN LISPRO 2 UNITS: 100 INJECTION, SOLUTION INTRAVENOUS; SUBCUTANEOUS at 11:48

## 2025-04-16 RX ADMIN — SODIUM CHLORIDE, PRESERVATIVE FREE 10 ML: 5 INJECTION INTRAVENOUS at 21:52

## 2025-04-16 RX ADMIN — METHYLPREDNISOLONE SODIUM SUCCINATE 40 MG: 125 INJECTION INTRAMUSCULAR; INTRAVENOUS at 11:48

## 2025-04-16 RX ADMIN — METHYLPREDNISOLONE SODIUM SUCCINATE 40 MG: 125 INJECTION INTRAMUSCULAR; INTRAVENOUS at 06:37

## 2025-04-16 RX ADMIN — ALBUTEROL SULFATE 2.5 MG: 2.5 SOLUTION RESPIRATORY (INHALATION) at 21:03

## 2025-04-16 RX ADMIN — SUCRALFATE 1 G: 1 TABLET ORAL at 16:36

## 2025-04-16 RX ADMIN — METOPROLOL SUCCINATE 37.5 MG: 25 TABLET, EXTENDED RELEASE ORAL at 08:48

## 2025-04-16 RX ADMIN — SUCRALFATE 1 G: 1 TABLET ORAL at 08:48

## 2025-04-16 RX ADMIN — MIDODRINE HYDROCHLORIDE 5 MG: 5 TABLET ORAL at 11:48

## 2025-04-16 RX ADMIN — TAMSULOSIN HYDROCHLORIDE 0.4 MG: 0.4 CAPSULE ORAL at 21:52

## 2025-04-16 RX ADMIN — PREGABALIN 150 MG: 75 CAPSULE ORAL at 21:51

## 2025-04-16 RX ADMIN — MIDODRINE HYDROCHLORIDE 5 MG: 5 TABLET ORAL at 16:36

## 2025-04-16 NOTE — PLAN OF CARE
Pt in bed A&O x4. Pt c/o some abdominal pain. Pt states breathing feels improved, but still gets a little short of breath. Call light in reach and fall precautions in place. Pt denies further needs. Electronically signed by Justin N. Schoenung, RN on 4/16/2025 at 11:35 AM    Problem: Pain  Goal: Verbalizes/displays adequate comfort level or baseline comfort level  4/16/2025 1130 by Schoenung, Justin N., RN  Note: Pt educated on using pain scale. Pt given PRN pain medication per Dr orders see emar. Pt agreeable to pain management.      Problem: Respiratory - Adult  Goal: Achieves optimal ventilation and oxygenation  4/16/2025 1130 by Schoenung, Justin N., RN  Note: Pt educated on cough and deep breathing techniques.

## 2025-04-16 NOTE — PROGRESS NOTES
[facility]   Daily Progress Note      Admit Date:  4/14/2025    CC: \" I feel much better today  Jovon Javier is a 64 y.o. patient well-known to me with previous history of cardiomyopathy, congestive heart failure, status post ICD COPD, gastroesophageal reflux disease, irritable bowel syndrome, hyperlipidemia who presented to the hospital with complaints of dizziness while he was playing golf Sunday.  He said he got extremely dizzy and he had to abandon his golf game.  He went to see his PCP where he was found to be hypotensive and he was referred to the ER for admission.  He denies any chest tightness or pressure or shortness of breath.     Interval history 4/16/2025  Overall feels better.  Blood pressure has improved after he was started on midodrine therapy.  Echocardiogram reveals improved LV systolic function compared to before  Pt with no acute overnight events. Denies chest pain, palpitations, and dyspnea.    Objective:   /78   Pulse 95   Temp 97.5 °F (36.4 °C) (Oral)   Resp 16   Ht 1.676 m (5' 6\")   Wt 81.7 kg (180 lb 1.9 oz)   SpO2 96%   BMI 29.07 kg/m²     Intake/Output Summary (Last 24 hours) at 4/16/2025 1031  Last data filed at 4/16/2025 0647  Gross per 24 hour   Intake 700 ml   Output 600 ml   Net 100 ml     Wt Readings from Last 3 Encounters:   04/16/25 81.7 kg (180 lb 1.9 oz)   04/14/25 86.2 kg (190 lb)   04/08/25 86.2 kg (190 lb)     Telemetry: Sinus rhythm    Physical Exam:  General:  NAD, Awake, alert and oriented X4  Skin:  Warm and dry  Neck:  Supple, no JVP appreciated, no bruit  Chest:  Clear to auscultation, no wheezes/rhonchi/rales  Cardiovascular:  Regular rate. S1S2  Abdomen:  Soft, nontender, +bowel sounds  Extremities:  No LE edema    Cardiac Diagnosis:  hypertension, CHF, and atrial fibrillation    Medications:    albuterol  2.5 mg Nebulization BID RT    sacubitril-valsartan  0.5 tablet Oral BID    [START ON 4/17/2025] metoprolol succinate  37.5 mg Oral Lunch

## 2025-04-16 NOTE — FLOWSHEET NOTE
Call placed to Dr Hall, covering for Dr Estrella per patient request for ambien. Patient stated that he takes 10mg at home and has not slept in 3 days.  No new orders at this time.

## 2025-04-16 NOTE — PROGRESS NOTES
[facility]     INPATIENT PROGRESS NOTE  Name: Jovon Javier  /Age/Sex: 1960 (64 y.o. male)   MRN & CSN: [id number] & [id number]  Admission Date/Time: 2025 12:47 PM   Location: @\[facility]\""MARLYS@ [id number]  Current Hospital Day: Hospital Day: 3   Principal Problem: COPD exacerbation (HCC)  HPI       Patient seen and examined.  No issues overnight.  Having some abdominal discomfort today.  No fever or chills.  Blood pressure relatively low this morning.      All other review of systems negative unless noted above.  VITALS     Vitals:    25 1121   BP: 110/64   Pulse: 93   Resp: 16   Temp:    SpO2: 95%         PHYSICAL EXAM   General Appearance:    Alert, cooperative, no distress, appears stated age   Head:    Normocephalic, without obvious abnormality, atraumatic   Eyes:    PERRL, conjunctiva/corneas clear, EOM's intact, fundi     benign, both eyes              Nose:   Nares normal, septum midline, mucosa normal, no drainage    or sinus tenderness   Throat:   Lips, mucosa, and tongue normal; teeth and gums normal   Neck:   Supple, symmetrical, trachea midline, no adenopathy;        thyroid:  No enlargement/tenderness/nodules; no carotid    bruit or JVD   Back:     Symmetric, no curvature, ROM normal, no CVA tenderness   Lungs:     Clear to auscultation bilaterally, respirations unlabored   Chest wall:    No tenderness or deformity   Heart:    Regular rate and rhythm, S1 and S2 normal, no murmur, rub   or gallop   Abdomen:     Soft, LLQ tenderness               Extremities:   Extremities normal, atraumatic, no cyanosis or edema   Pulses:   2+ and symmetric all extremities   Skin:   Skin color, texture, turgor normal, no rashes or lesions   Lymph nodes:   Cervical, supraclavicular, and axillary nodes normal   Neurologic:   CNII-XII intact. Normal strength, sensation and reflexes       throughout           LABS   BMP  Recent Labs     25  1354 04/15/25  0530 25  0452   NA

## 2025-04-16 NOTE — PLAN OF CARE
Problem: Chronic Conditions and Co-morbidities  Goal: Patient's chronic conditions and co-morbidity symptoms are monitored and maintained or improved  4/16/2025 0259 by Sonya Dickey, RN  Outcome: Progressing  Flowsheets (Taken 4/15/2025 1960)  Care Plan - Patient's Chronic Conditions and Co-Morbidity Symptoms are Monitored and Maintained or Improved:   Monitor and assess patient's chronic conditions and comorbid symptoms for stability, deterioration, or improvement   Collaborate with multidisciplinary team to address chronic and comorbid conditions and prevent exacerbation or deterioration  4/15/2025 1322 by Karen Steen RN  Outcome: Progressing     Problem: Discharge Planning  Goal: Discharge to home or other facility with appropriate resources  4/16/2025 0259 by Sonya Dickey RN  Outcome: Progressing  Flowsheets (Taken 4/15/2025 1960)  Discharge to home or other facility with appropriate resources:   Identify barriers to discharge with patient and caregiver   Arrange for needed discharge resources and transportation as appropriate  4/15/2025 1322 by Karen Steen RN  Outcome: Progressing     Problem: Pain  Goal: Verbalizes/displays adequate comfort level or baseline comfort level  Outcome: Progressing     Problem: Respiratory - Adult  Goal: Achieves optimal ventilation and oxygenation  Outcome: Progressing  Flowsheets (Taken 4/15/2025 1960)  Achieves optimal ventilation and oxygenation:   Assess for changes in respiratory status   Assess for changes in mentation and behavior   Position to facilitate oxygenation and minimize respiratory effort   Oxygen supplementation based on oxygen saturation or arterial blood gases   Encourage broncho-pulmonary hygiene including cough, deep breathe, incentive spirometry   Assess the need for suctioning and aspirate as needed   Assess and instruct to report shortness of breath or any respiratory difficulty   Respiratory therapy support as indicated     Problem:

## 2025-04-17 VITALS
OXYGEN SATURATION: 95 % | WEIGHT: 179.45 LBS | DIASTOLIC BLOOD PRESSURE: 75 MMHG | HEART RATE: 69 BPM | SYSTOLIC BLOOD PRESSURE: 127 MMHG | BODY MASS INDEX: 28.84 KG/M2 | HEIGHT: 66 IN | TEMPERATURE: 98.1 F | RESPIRATION RATE: 18 BRPM

## 2025-04-17 LAB
ANION GAP SERPL CALCULATED.3IONS-SCNC: 9 MMOL/L (ref 3–16)
BASOPHILS # BLD: 0 K/UL (ref 0–0.2)
BASOPHILS NFR BLD: 0 %
BUN SERPL-MCNC: 22 MG/DL (ref 7–20)
CALCIUM SERPL-MCNC: 10.5 MG/DL (ref 8.3–10.6)
CHLORIDE SERPL-SCNC: 105 MMOL/L (ref 99–110)
CO2 SERPL-SCNC: 22 MMOL/L (ref 21–32)
CREAT SERPL-MCNC: 1.2 MG/DL (ref 0.8–1.3)
DEPRECATED RDW RBC AUTO: 15.2 % (ref 12.4–15.4)
EOSINOPHIL # BLD: 0 K/UL (ref 0–0.6)
EOSINOPHIL NFR BLD: 0 %
GFR SERPLBLD CREATININE-BSD FMLA CKD-EPI: 67 ML/MIN/{1.73_M2}
GLUCOSE BLD-MCNC: 136 MG/DL (ref 70–99)
GLUCOSE BLD-MCNC: 158 MG/DL (ref 70–99)
GLUCOSE BLD-MCNC: 260 MG/DL (ref 70–99)
GLUCOSE SERPL-MCNC: 176 MG/DL (ref 70–99)
HCT VFR BLD AUTO: 42.8 % (ref 40.5–52.5)
HGB BLD-MCNC: 14.3 G/DL (ref 13.5–17.5)
INR PPP: 2.57 (ref 0.85–1.15)
LYMPHOCYTES # BLD: 0.4 K/UL (ref 1–5.1)
LYMPHOCYTES NFR BLD: 3 %
MCH RBC QN AUTO: 28.7 PG (ref 26–34)
MCHC RBC AUTO-ENTMCNC: 33.5 G/DL (ref 31–36)
MCV RBC AUTO: 85.7 FL (ref 80–100)
MONOCYTES # BLD: 0.4 K/UL (ref 0–1.3)
MONOCYTES NFR BLD: 3.1 %
NEUTROPHILS # BLD: 13.3 K/UL (ref 1.7–7.7)
NEUTROPHILS NFR BLD: 93.9 %
PERFORMED ON: ABNORMAL
PLATELET # BLD AUTO: 188 K/UL (ref 135–450)
PMV BLD AUTO: 7.9 FL (ref 5–10.5)
POTASSIUM SERPL-SCNC: 4.9 MMOL/L (ref 3.5–5.1)
PROTHROMBIN TIME: 27.5 SEC (ref 11.9–14.9)
PTH-INTACT SERPL-MCNC: 160 PG/ML (ref 14–72)
RBC # BLD AUTO: 4.99 M/UL (ref 4.2–5.9)
SODIUM SERPL-SCNC: 136 MMOL/L (ref 136–145)
WBC # BLD AUTO: 14.2 K/UL (ref 4–11)

## 2025-04-17 PROCEDURE — 6370000000 HC RX 637 (ALT 250 FOR IP): Performed by: INTERNAL MEDICINE

## 2025-04-17 PROCEDURE — 85025 COMPLETE CBC W/AUTO DIFF WBC: CPT

## 2025-04-17 PROCEDURE — 99239 HOSP IP/OBS DSCHRG MGMT >30: CPT | Performed by: STUDENT IN AN ORGANIZED HEALTH CARE EDUCATION/TRAINING PROGRAM

## 2025-04-17 PROCEDURE — 83970 ASSAY OF PARATHORMONE: CPT

## 2025-04-17 PROCEDURE — 6360000002 HC RX W HCPCS: Performed by: STUDENT IN AN ORGANIZED HEALTH CARE EDUCATION/TRAINING PROGRAM

## 2025-04-17 PROCEDURE — 80048 BASIC METABOLIC PNL TOTAL CA: CPT

## 2025-04-17 PROCEDURE — 84155 ASSAY OF PROTEIN SERUM: CPT

## 2025-04-17 PROCEDURE — 36415 COLL VENOUS BLD VENIPUNCTURE: CPT

## 2025-04-17 PROCEDURE — 2500000003 HC RX 250 WO HCPCS: Performed by: STUDENT IN AN ORGANIZED HEALTH CARE EDUCATION/TRAINING PROGRAM

## 2025-04-17 PROCEDURE — 85610 PROTHROMBIN TIME: CPT

## 2025-04-17 PROCEDURE — 94640 AIRWAY INHALATION TREATMENT: CPT

## 2025-04-17 PROCEDURE — 84165 PROTEIN E-PHORESIS SERUM: CPT

## 2025-04-17 PROCEDURE — 94760 N-INVAS EAR/PLS OXIMETRY 1: CPT

## 2025-04-17 PROCEDURE — 6370000000 HC RX 637 (ALT 250 FOR IP): Performed by: STUDENT IN AN ORGANIZED HEALTH CARE EDUCATION/TRAINING PROGRAM

## 2025-04-17 RX ORDER — METHYLPREDNISOLONE SODIUM SUCCINATE 125 MG/2ML
40 INJECTION INTRAMUSCULAR; INTRAVENOUS DAILY
Status: DISCONTINUED | OUTPATIENT
Start: 2025-04-17 | End: 2025-04-17 | Stop reason: HOSPADM

## 2025-04-17 RX ORDER — CEFDINIR 300 MG/1
300 CAPSULE ORAL 2 TIMES DAILY
Qty: 6 CAPSULE | Refills: 0 | Status: SHIPPED | OUTPATIENT
Start: 2025-04-17 | End: 2025-04-20

## 2025-04-17 RX ORDER — WATER 10 ML/10ML
INJECTION INTRAMUSCULAR; INTRAVENOUS; SUBCUTANEOUS
Status: DISCONTINUED
Start: 2025-04-17 | End: 2025-04-17 | Stop reason: HOSPADM

## 2025-04-17 RX ORDER — WARFARIN SODIUM 2.5 MG/1
2.5 TABLET ORAL
Status: COMPLETED | OUTPATIENT
Start: 2025-04-17 | End: 2025-04-17

## 2025-04-17 RX ORDER — OXYCODONE AND ACETAMINOPHEN 5; 325 MG/1; MG/1
1 TABLET ORAL EVERY 8 HOURS PRN
Qty: 9 TABLET | Refills: 0 | Status: SHIPPED | OUTPATIENT
Start: 2025-04-17 | End: 2025-04-20

## 2025-04-17 RX ORDER — MIDODRINE HYDROCHLORIDE 5 MG/1
5 TABLET ORAL
Qty: 90 TABLET | Refills: 3 | Status: SHIPPED | OUTPATIENT
Start: 2025-04-17

## 2025-04-17 RX ADMIN — MIDODRINE HYDROCHLORIDE 5 MG: 5 TABLET ORAL at 17:20

## 2025-04-17 RX ADMIN — SACUBITRIL AND VALSARTAN 0.5 TABLET: 24; 26 TABLET, FILM COATED ORAL at 07:54

## 2025-04-17 RX ADMIN — PREGABALIN 75 MG: 75 CAPSULE ORAL at 07:55

## 2025-04-17 RX ADMIN — FLUTICASONE PROPIONATE 1 SPRAY: 50 SPRAY, METERED NASAL at 07:56

## 2025-04-17 RX ADMIN — WATER 1000 MG: 1 INJECTION INTRAMUSCULAR; INTRAVENOUS; SUBCUTANEOUS at 17:21

## 2025-04-17 RX ADMIN — OMEPRAZOLE 20 MG: 20 CAPSULE, DELAYED RELEASE ORAL at 07:56

## 2025-04-17 RX ADMIN — SUCRALFATE 1 G: 1 TABLET ORAL at 07:55

## 2025-04-17 RX ADMIN — METHYLPREDNISOLONE SODIUM SUCCINATE 40 MG: 125 INJECTION INTRAMUSCULAR; INTRAVENOUS at 10:38

## 2025-04-17 RX ADMIN — MIDODRINE HYDROCHLORIDE 5 MG: 5 TABLET ORAL at 07:55

## 2025-04-17 RX ADMIN — TAMSULOSIN HYDROCHLORIDE 0.4 MG: 0.4 CAPSULE ORAL at 07:55

## 2025-04-17 RX ADMIN — METOPROLOL SUCCINATE 37.5 MG: 25 TABLET, EXTENDED RELEASE ORAL at 12:26

## 2025-04-17 RX ADMIN — WARFARIN SODIUM 2.5 MG: 2.5 TABLET ORAL at 17:20

## 2025-04-17 RX ADMIN — ALBUTEROL SULFATE 2.5 MG: 2.5 SOLUTION RESPIRATORY (INHALATION) at 08:08

## 2025-04-17 RX ADMIN — INSULIN LISPRO 4 UNITS: 100 INJECTION, SOLUTION INTRAVENOUS; SUBCUTANEOUS at 17:20

## 2025-04-17 NOTE — PLAN OF CARE
Pt A&O x4. Pt states breathing is improved. Pt on room air. Pt states had BM overnight. Call light in reach and fall precautions in place. Pt denies pain currently. Pt denies any needs currently. Electronically signed by Justin N. Schoenung, RN on 4/17/2025 at 8:17 AM    Problem: Pain  Goal: Verbalizes/displays adequate comfort level or baseline comfort level  4/17/2025 0809 by Schoenung, Justin N., RN  Outcome: Progressing  Note: Pt educated on using pain scale. Pt given PRN pain medication per Dr orders see emar. Pt agreeable to pain management.      Problem: Safety - Adult  Goal: Free from fall injury  Outcome: Progressing  Note: Fall precautions in place. Bed in lowest position, wheels locked, call light in reach, non slip socks on. Pt educated on using call light for assistance. Pt agreeable.      Problem: Respiratory - Adult  Goal: Achieves optimal ventilation and oxygenation  4/17/2025 0809 by Schoenung, Justin N., RN  Outcome: Progressing  Note: Pt educated on cough and deep breathing techniques. Pt agreeable.

## 2025-04-17 NOTE — PROGRESS NOTES
Physician Progress Note      PATIENT:               HANG ANGEL  CSN #:                  [id number]  :                       1960  ADMIT DATE:       2025 12:47 PM  DISCH DATE:  RESPONDING  PROVIDER #:        Jeffery Estrella MD          QUERY TEXT:    Elevated cardiac troponin (cTc) levels are documented in the medical record.   Please clarify the cause:    The clinical indicators include:  Per Cardiology progress note  \"presyncope/dizziness. Patient presented   with dizziness and was found to be hypotensive. This is due to combination of   intravascular volume depletion as well as his current cardiac meds. Elevated   troponin. Unclear etiology patient's current presentation does not seem to   indicate acute coronary syndrome\".  Troponin 26-24  COPD/Bronchiolitis obliterans  Cardiomyopathy    Treatment: Cardiology consult, Echo, EKG, serial labs, supportive care  Options provided:  -- Myocardial injury, non-ischemic (non-traumatic) related to  intravascular   volume depletion  -- Other - I will add my own diagnosis  -- Disagree - Not applicable / Not valid  -- Disagree - Clinically unable to determine / Unknown  -- Refer to Clinical Documentation Reviewer    PROVIDER RESPONSE TEXT:    This patient has myocardial injury, non-ischemic (non-traumatic related to   intravascular volume depletion.    Query created by: Rosie Tejeda on 2025 11:27 AM      Electronically signed by:  Jeffery Estrella MD 2025 9:28 PM

## 2025-04-17 NOTE — PROGRESS NOTES
Discharge instruction went over with pt, all questions answered. IV flushed and discontinued, no complications. New medications and side effects went over with pt, stated understanding. Scripts escribed to pt pharmacy.  Pt waiting on transportation. Electronically signed by Justin N. Schoenung, RN on 4/17/2025 at 6:09 PM

## 2025-04-17 NOTE — PLAN OF CARE
Problem: Chronic Conditions and Co-morbidities  Goal: Patient's chronic conditions and co-morbidity symptoms are monitored and maintained or improved  Outcome: Progressing  Flowsheets (Taken 4/16/2025 2152)  Care Plan - Patient's Chronic Conditions and Co-Morbidity Symptoms are Monitored and Maintained or Improved:   Monitor and assess patient's chronic conditions and comorbid symptoms for stability, deterioration, or improvement   Collaborate with multidisciplinary team to address chronic and comorbid conditions and prevent exacerbation or deterioration     Problem: Discharge Planning  Goal: Discharge to home or other facility with appropriate resources  Outcome: Progressing  Flowsheets (Taken 4/16/2025 2152)  Discharge to home or other facility with appropriate resources:   Identify barriers to discharge with patient and caregiver   Arrange for needed discharge resources and transportation as appropriate     Problem: Pain  Goal: Verbalizes/displays adequate comfort level or baseline comfort level  4/17/2025 0047 by Sonya Dickey RN  Outcome: Progressing  4/16/2025 1130 by Schoenung, Justin N., RN  Note: Pt educated on using pain scale. Pt given PRN pain medication per Dr orders see emar. Pt agreeable to pain management.      Problem: Respiratory - Adult  Goal: Achieves optimal ventilation and oxygenation  4/17/2025 0047 by Sonya Dickey RN  Outcome: Progressing  Flowsheets (Taken 4/16/2025 2152)  Achieves optimal ventilation and oxygenation:   Assess for changes in respiratory status   Assess for changes in mentation and behavior   Position to facilitate oxygenation and minimize respiratory effort   Oxygen supplementation based on oxygen saturation or arterial blood gases   Encourage broncho-pulmonary hygiene including cough, deep breathe, incentive spirometry   Assess the need for suctioning and aspirate as needed   Assess and instruct to report shortness of breath or any respiratory difficulty

## 2025-04-17 NOTE — PROGRESS NOTES
[facility]     INPATIENT PROGRESS NOTE  Name: Jovon Javier  /Age/Sex: 1960 (64 y.o. male)   MRN & CSN: [id number] & [id number]  Admission Date/Time: 2025 12:47 PM   [id number]  Current Hospital Day: Hospital Day: 4   Principal Problem: COPD exacerbation (HCC)  HPI       Patient seen and examined.  No issues overnight.  Felt lightheaded yesterday.  No abdominal pain today.  No fever or chills.  No chest pain.  No cough.  Hopeful to go home today    All other review of systems negative unless noted above.  VITALS     Vitals:    25 1222   BP: 130/78   Pulse: 67   Resp:    Temp:    SpO2: 95%         PHYSICAL EXAM   General Appearance:    Alert, cooperative, no distress, appears stated age   Head:    Normocephalic, without obvious abnormality, atraumatic   Eyes:    PERRL, conjunctiva/corneas clear, EOM's intact, fundi     benign, both eyes              Nose:   Nares normal, septum midline, mucosa normal, no drainage    or sinus tenderness   Throat:   Lips, mucosa, and tongue normal; teeth and gums normal   Neck:   Supple, symmetrical, trachea midline, no adenopathy;        thyroid:  No enlargement/tenderness/nodules; no carotid    bruit or JVD   Back:     Symmetric, no curvature, ROM normal, no CVA tenderness   Lungs:     Clear to auscultation bilaterally, respirations unlabored   Chest wall:    No tenderness or deformity   Heart:    Regular rate and rhythm, S1 and S2 normal, no murmur, rub   or gallop   Abdomen:   Soft, nontender, nondistended               Extremities:   Extremities normal, atraumatic, no cyanosis or edema   Pulses:   2+ and symmetric all extremities   Skin:   Skin color, texture, turgor normal, no rashes or lesions   Lymph nodes:   Cervical, supraclavicular, and axillary nodes normal   Neurologic:   CNII-XII intact. Normal strength, sensation and reflexes       throughout           LABS   BMP  Recent Labs     25  1354

## 2025-04-17 NOTE — CONSULTS
Reason for Consult: nephrolithiasis     History of Present Illness: Jovon Javier is a 64 y.o. male PMH listed below. History of a-fib and blood clots, on Warfarin. He is well known to the urology service for his history of Bladder Ca, BPH, and hypogonadism, kidney stones.     Patient was admitted to hospital yesterday w/ COPD exacerbation, hypotension, dysuria. Urology was consulted for nephrolithiasis in setting of LLQ abdominal pain. Patient reports he has had several weeks of abdominal pain, bladder pressure, dysuria, intermittent GH. He was evaluated last week in the office by Dr. Lucero and UA was not indicative of infection. He did not mention gross hematuria. He was emptying his bladder well.  He denies fevers. He has not passed kidney stone recently. His last cystoscopy for surveillance of bladder cancer was 11/2024 and this was normal.     He had CTAP which reveals BL nephrolithiasis w/ no evidence of obstruction, hydronephrosis. He has not had urine specimen collected. His renal function is WNL. No leukocytosis. He reports urine is clear yellow today.     Past Medical History:   Past Medical History:   Diagnosis Date    Abnormal CT scan 08/2023    spots on pancrease    Ankylosing spondylitis     Atrial fibrillation     Bronchiolitis obliterans (HCC)     CAD (coronary artery disease)     Cardiomyopathy     CHF (congestive heart failure) (HCC)     Chronic anticoagulation     COPD (chronic obstructive pulmonary disease) (HCC)     GERD (gastroesophageal reflux disease)     Hepatitis 1979    Hx of blood clots     Hyperlipidemia     Hyperparathyroidism     Hypertension     IBS (irritable bowel syndrome)     Kidney stones     Oxygen dependent 08/2023    wears 2L/NC at night    Pneumothorax 2011    Prostatitis     Pulmonary embolism        Past Surgical History:  Past Surgical History:   Procedure Laterality Date    BRONCHOSCOPY N/A 11/2/2023    BRONCHOSCOPY WITH BRONCHIOLAR ALVEOLAR LAVAGE performed by 
    Referring Physician: * No referring provider recorded for this case *  Reason for Consultation: Dizziness, hypertension  Chief Complaint: I felt very dizzy while playing golf      Subjective:   History of Present Illness:  Jovon Javier is a 64 y.o. patient well-known to me with previous history of cardiomyopathy, congestive heart failure, status post ICD COPD, gastroesophageal reflux disease, irritable bowel syndrome, hyperlipidemia who presented to the hospital with complaints of dizziness while he was playing golf Sunday.  He said he got extremely dizzy and he had to abandon his golf game.  He went to see his PCP where he was found to be hypotensive and he was referred to the ER for admission.  He denies any chest tightness or pressure or shortness of breath.    I have been asked to provide consultation regarding further management and testing.    Review of Systems:   All 12 point review of symptoms completed. Pertinent positives identified in the HPI, all other review of symptoms negative as below.    Past Medical History:   has a past medical history of Abnormal CT scan, Ankylosing spondylitis, Atrial fibrillation, Bronchiolitis obliterans (HCC), CAD (coronary artery disease), Cardiomyopathy, CHF (congestive heart failure) (HCC), Chronic anticoagulation, COPD (chronic obstructive pulmonary disease) (HCC), GERD (gastroesophageal reflux disease), Hepatitis, Hx of blood clots, Hyperlipidemia, Hyperparathyroidism, Hypertension, IBS (irritable bowel syndrome), Kidney stones, Oxygen dependent, Pneumothorax, Prostatitis, and Pulmonary embolism.    Surgical History:   has a past surgical history that includes Cholecystectomy (2007); Colonoscopy (09/21/2012); Lithotripsy; sinus surgery; Upper gastrointestinal endoscopy (03/28/2014); hernia repair (2015); pacemaker placement; Cystocopy (05/17/2019); Cystoscopy (Right, 05/17/2019); Upper gastrointestinal endoscopy (N/A, 02/01/2021); Cystoscopy (Right, 07/02/2021); 
Clinical Pharmacy Note  Warfarin Consult    Jovon Javier is a 64 y.o. male receiving warfarin managed by pharmacy.     Warfarin Indication: history of PE  Target INR range: 2-3   Dose prior to admission: 2.5 mg TUE/THUR and 5 mg ALL OTHER DAYS     Current warfarin drug-drug interactions: Methylprednisolone/Prednisone    Recent Labs     04/14/25  1354   HGB 16.0   HCT 48.2   INR 3.75*     Assessment/Plan:  -Patient took warfarin at home today already (4/14)  -No additional warfarin today with supratherapeutic INR  -Daily PT/INR until stable within therapeutic range.     Thank you for the consult.  Will continue to follow.  Cathie Frost, PharmD, BCPS  4/14/2025 6:00 PM      
Clinical Pharmacy Note  Warfarin Consult    Jovon Javier is a 64 y.o. male receiving warfarin managed by pharmacy.     Warfarin Indication: history of PE  Target INR range: 2-3   Dose prior to admission: 2.5 mg TUE/THUR and 5 mg ALL OTHER DAYS     Jovon follows with the [facility] for Warfarin management.    Current warfarin drug-drug interactions: Methylprednisolone/Prednisone    Recent Labs     04/14/25  1354 04/15/25  0529 04/16/25  0452 04/16/25  0453   HGB 16.0 14.7 15.4  --    HCT 48.2 45.6 46.7  --    INR 3.75* 4.25*  --  3.68*     Assessment/Plan:  -Jovon took the 4/14 Warfarin dose prior to admission.  -Will not re dose today due to supra therapeutic INR  -Daily PT/INR until stable within therapeutic range.     Thank you for the consult.  Will continue to follow.  Sandra Quigley [facility]   4/16/2025 1:02 PM      
Clinical Pharmacy Note  Warfarin Consult    Jovon Javier is a 64 y.o. male receiving warfarin managed by pharmacy.     Warfarin Indication: history of PE  Target INR range: 2-3   Dose prior to admission: 2.5 mg TUE/THUR and 5 mg ALL OTHER DAYS     Jovon follows with the [facility] for Warfarin management.    Current warfarin drug-drug interactions: Methylprednisolone/Prednisone    Recent Labs     04/15/25  0529 04/16/25  0452 04/16/25  0453 04/17/25  0452   HGB 14.7 15.4  --  14.3   HCT 45.6 46.7  --  42.8   INR 4.25*  --  3.68* 2.57*     Assessment/Plan:  -Jovon's INR has been supra therapeutic since admission on 4/14. No doses given x 2 days. INR is now  < 3     Will restart Warfarin 2.5 mg tonight.    -Daily PT/INR until stable within therapeutic range.     Thank you for the consult.  Will continue to follow.  Sandra Quigley RPH   4/17/2025 7:18 AM      
Clinical Pharmacy Note  Warfarin Consult    Jovon Javier is a 64 y.o. male receiving warfarin managed by pharmacy.     Warfarin Indication: history of PE  Target INR range: 2-3   Dose prior to admission: 2.5 mg TUE/THUR and 5 mg ALL OTHER DAYS     Jovon follows with the [facility] for Warfarin management.    Current warfarin drug-drug interactions: Methylprednisolone/Prednisone    Recent Labs     04/14/25  1354 04/15/25  0529   HGB 16.0 14.7   HCT 48.2 45.6   INR 3.75* 4.25*     Assessment/Plan:  -Jovon took the 4/14 Warfarin dose prior to admission.  -Will not re dose today due to supra therapeutic INR  -Daily PT/INR until stable within therapeutic range.     Thank you for the consult.  Will continue to follow.  Sandra Quigley RPH   4/15/2025 7:28 AM      
left rail up/right rail down

## 2025-04-18 ENCOUNTER — TELEPHONE (OUTPATIENT)
Dept: PHARMACY | Age: 65
End: 2025-04-18

## 2025-04-18 ENCOUNTER — CARE COORDINATION (OUTPATIENT)
Dept: CASE MANAGEMENT | Age: 65
End: 2025-04-18

## 2025-04-18 LAB
ALBUMIN SERPL ELPH-MCNC: 2.8 G/DL (ref 3.1–4.9)
ALPHA1 GLOB SERPL ELPH-MCNC: 0.2 G/DL (ref 0.2–0.4)
ALPHA2 GLOB SERPL ELPH-MCNC: 0.7 G/DL (ref 0.4–1.1)
B-GLOBULIN SERPL ELPH-MCNC: 0.9 G/DL (ref 0.9–1.6)
GAMMA GLOB SERPL ELPH-MCNC: 0.8 G/DL (ref 0.6–1.8)
PROT SERPL-MCNC: 5.4 G/DL (ref 6.4–8.2)
SPE/IFE INTERPRETATION: NORMAL

## 2025-04-18 NOTE — CARE COORDINATION
Care Transitions Note    Initial Call - Call within 2 business days of discharge: Yes    Patient Current Location:  [facility] [address]   [address]  [place] [address]    Care Transition Nurse contacted the patient by telephone to perform post hospital discharge assessment, verified name and  as identifiers.  Provided introduction to self, and explanation of the Care Transition Nurse role.    Patient: Jovon Javier    Patient : 1960   MRN: [id number]    Reason for Admission: Near syncope - sent per PCP  Discharge Date: 25  RURS: Readmission Risk Score: 13.5      Last Discharge Facility       Date Complaint Diagnosis Description Type Department Provider    25 Hypotension; Back Pain Near syncope ... ED to Hosp-Admission (Discharged) (ADMITTED) [facility]W Jeffery Estrella MD; DANY Simental..            Was this an external facility discharge? No    Additional needs identified to be addressed with provider   Medication - Entresto             Method of communication with provider: staff message    Patients top risk factors for readmission: medical condition-near syncope    Interventions to address risk factors:   Review of patient management of conditions/medications: near syncope    Care Summary Note: Jovon states he is doing \"fine.\"  Jovon denies any SOB,chest pain, cough, fever, lightheaded, or abdominal pain.  He states he is up and about on his own for ambulation.  Jovon states his B/P today = 129/72. His BG = 70.  He states he is tolerating food & fluids - denies any N/V.  Jovon states he has home oxygen @ 2lpm at . He states he has a pulse oximeter at home. He states he has not yet checked his O2 sat today. He is unsure of the company that provides his oxygen.He states he has a hand held nebulizer at home which he uses 2 times per day.        Care Transition Nurse reviewed discharge instructions with patient. The patient was given an opportunity to ask questions; all questions answered at

## 2025-04-18 NOTE — CARE COORDINATION
Jovon is taking Warfarin . He states the dosage is 5mg on Tuesday- Thursday - Saturday - Sunday and 2.5mg on Monday - Wednesday - Friday. He states he had an INR done in the hospital - he will be awaiting a call from the clinic as to when he needs his next blood draw.    Mabel Daley RN BSN  Care Transition Nurse  [phone]

## 2025-04-18 NOTE — TELEPHONE ENCOUNTER
I called and spoke with Jovon regarding his warfarin dosing and next appointment date.  We pushed his next appointment back 1 week to 4/29 and since his INR was high upon admission to the hospital and at our most recent visit, he will do the following dosing for the next two weeks:    Take Warfarin 5mg daily except 2.5mg on Tues/Thurs/Sat.    Carlotta Dixon, PharmD 04/18/25  3:10 PM       ----- Message from Ryan DRIVER sent at 4/18/2025  1:36 PM EDT -----  Pt would like to s/w pharmacist he was d/c'd from [facility] yesterday ( 4/14-4/17).   INR of 3.75 on 4/14, 4.25 on 4/15, 3.68 on 4/16 and 2.57 on 4/17 was prescribed Cefdinir 300 mgs (see medications at d/c). Patient would like to know if he can r/s his upcoming appt on 4/22 to later date since he just had it checked.  [phone]

## 2025-04-19 ENCOUNTER — TELEPHONE (OUTPATIENT)
Dept: FAMILY MEDICINE CLINIC | Age: 65
End: 2025-04-19

## 2025-04-19 NOTE — TELEPHONE ENCOUNTER
After-Hours Call Communication:    Patient Concern:  Pt reports he was discharged from the hospital yesterday, was given rapid-acting insulin for his diabetes while being hospitalized. Pt usually takes Metformin for his T2DM.   Today, pt ate a bowl of cereal a couple of hours ago, now his BG is down in the 60's. Pt reports feeling clammy and shaky. Pt's speech is clear, is able to follow directions, hold a conversation, is aware of his surroundings and actions.   Pt has glucose tablets, has not taken 1 yet.     Advice Given:  I advised pt to take his glucose tablets, follow instructions on the label. If he cannot get his BG>70 after taking several glucose tablets over a couple of hours, he will need to go to ER.

## 2025-04-21 ENCOUNTER — TELEPHONE (OUTPATIENT)
Dept: CARDIOLOGY CLINIC | Age: 65
End: 2025-04-21

## 2025-04-21 ENCOUNTER — CARE COORDINATION (OUTPATIENT)
Dept: CASE MANAGEMENT | Age: 65
End: 2025-04-21

## 2025-04-21 NOTE — TELEPHONE ENCOUNTER
Noted. Per Dr. Johnson consult note during admission 4/16/25, echo reviewed. Improved LV systolic function compared to before.

## 2025-04-21 NOTE — TELEPHONE ENCOUNTER
Jovon called to cancel his ECHO that is scheduled 4/22 as this was done in the hospital last week.     Jovon's callback: [phone]

## 2025-04-21 NOTE — CARE COORDINATION
Care Transitions Note    Follow Up Call     Attempted to reach patient for transitions of care follow up.  Unable to reach patient.      Outreach Attempts:   HIPAA compliant voicemail left for patient.     Care Summary Note: N/A    Follow Up Appointment:   Future Appointments         Provider Specialty Dept Phone    4/22/2025 9:40 AM (Arrive by 9:25 AM) [facility] Radiology [phone]    4/23/2025 11:20 AM Juan C Dumont MD Internal Medicine [phone]    4/29/2025 3:40 PM [facility] [phone]    5/19/2025 3:45 PM Arcadio Johnson MD Cardiology [phone]    9/30/2025 11:30 AM Arcadio Johnson MD Cardiology [phone]    10/8/2025 1:40 PM Rafael Christy MD Pulmonology [phone]            Plan for follow-up on next business day.  based on severity of symptoms and risk factors. Plan for next call:  Entresto dosage - BG level - Warfarin dosing    Mabel Daley RN BSN  Care Transition Nurse  [phone]

## 2025-04-22 ENCOUNTER — HOSPITAL ENCOUNTER (OUTPATIENT)
Dept: CT IMAGING | Age: 65
Discharge: HOME | End: 2025-04-22
Attending: INTERNAL MEDICINE
Payer: MEDICARE

## 2025-04-22 ENCOUNTER — ANTI-COAG VISIT (OUTPATIENT)
Dept: PHARMACY | Age: 65
End: 2025-04-22
Attending: INTERNAL MEDICINE
Payer: MEDICARE

## 2025-04-22 ENCOUNTER — RESULTS FOLLOW-UP (OUTPATIENT)
Dept: CARDIOLOGY CLINIC | Age: 65
End: 2025-04-22

## 2025-04-22 ENCOUNTER — APPOINTMENT (OUTPATIENT)
Age: 65
End: 2025-04-22
Attending: INTERNAL MEDICINE
Payer: MEDICARE

## 2025-04-22 DIAGNOSIS — Z86.711 HISTORY OF PULMONARY EMBOLISM: Primary | ICD-10-CM

## 2025-04-22 DIAGNOSIS — I25.10 ASCVD (ARTERIOSCLEROTIC CARDIOVASCULAR DISEASE): ICD-10-CM

## 2025-04-22 DIAGNOSIS — Z79.01 CHRONIC ANTICOAGULATION: ICD-10-CM

## 2025-04-22 LAB
INTERNATIONAL NORMALIZATION RATIO, POC: 1.9
PROTHROMBIN TIME, POC: 0

## 2025-04-22 PROCEDURE — 99212 OFFICE O/P EST SF 10 MIN: CPT | Performed by: SPEECH-LANGUAGE PATHOLOGIST

## 2025-04-22 PROCEDURE — 75571 CT HRT W/O DYE W/CA TEST: CPT

## 2025-04-22 PROCEDURE — 85610 PROTHROMBIN TIME: CPT | Performed by: SPEECH-LANGUAGE PATHOLOGIST

## 2025-04-22 NOTE — PROGRESS NOTES
Jovon Javier is a 64 y.o. here for warfarin management.  Jovon had an INR test today. Results were reviewed and appropriate warfarin management was completed.     Patient verifies current warfarin dosing regimen: Yes     Warfarin medication reviewed and updated on the patient 's home medication list: Yes   All other medications reviewed and updated on the patient 's home medication list: No new medications     Lab Results   Component Value Date    INR 2.57 (H) 04/17/2025    INR 3.68 (H) 04/16/2025    INR 4.25 (H) 04/15/2025         Anticoagulation Summary  As of 4/22/2025      INR goal:  2.0-3.0   TTR:  36.1% (9 y)   INR used for dosing:  --   Plan last modified:  Carlotta Dixon [facility] (3/12/2025)   Next INR check:  --   Target end date:  Indefinite    Indications    History of pulmonary embolism [Z86.711]  Chronic anticoagulation [Z79.01]                 Anticoagulation Episode Summary       INR check location:  Anticoagulation Clinic    Preferred lab:  --    Send INR reminders to:  [facility]    Comments:  EPIC - finger stick every 2-3 weeks  Consent: 3/12/25          Anticoagulation Care Providers       Provider Role Specialty Phone number    TimJuan C MD Referring Internal Medicine [phone]          There were no vitals taken for this visit.    Warfarin assessment / plan:     Appears well  Sub-therapeutic INR     Denies missed doses.  Denies increased vitamin K intake.  Denies alcohol changes.  Denies increased activity.  Denies signs or symptoms of clotting.     Medication changes. Cefdinir x 5 days just finished  Hospitalized 4/14/25-4/18/25 --> INR supatherapeutic during hospialization   Dose reduced to warfarin 5mg daily except 2.5mg on Tues/Thurs/Sat.   Will continue for now as INR starting to normalize     Next INR in 2 weeks.   Immunization History   Administered Date(s) Administered    COVID-19, MODERNA BLUE border, Primary or Immunocompromised, (age 12y+), IM, 100

## 2025-04-24 ENCOUNTER — CARE COORDINATION (OUTPATIENT)
Dept: CASE MANAGEMENT | Age: 65
End: 2025-04-24

## 2025-04-24 NOTE — CARE COORDINATION
Care Transitions Note    Follow Up Call     Attempted to reach patient for transitions of care follow up.  Unable to reach patient.      Outreach Attempts:   HIPAA compliant voicemail left for patient.     Care Summary Note: N/A    Follow Up Appointment:   Future Appointments         Provider Specialty Dept Phone    5/6/2025 1:40 PM [facility] [phone]    5/19/2025 3:45 PM Arcadio Johnson MD Cardiology [phone]    6/4/2025 9:20 AM Juan C Dumont MD Internal Medicine [phone]    9/30/2025 11:30 AM Arcadio Johnson MD Cardiology [phone]    10/8/2025 1:40 PM Rafael Christy MD Pulmonology [phone]            Plan for follow-up call in 2-5 days based on inability to reach patient today.    Mabel Daley RN BSN  Care Transition Nurse  [phone]

## 2025-04-29 ENCOUNTER — CARE COORDINATION (OUTPATIENT)
Dept: CASE MANAGEMENT | Age: 65
End: 2025-04-29

## 2025-04-29 NOTE — CARE COORDINATION
Care Transitions Note    Follow Up Call     Attempted to reach patient for transitions of care follow up.  Unable to reach patient.      Outreach Attempts:   Multiple attempts to contact patient at phone numbers on file.     Patient closed (unable to reach patient) from the Care Transitions program on today.      Handoff:   Patient was not referred to the ACM team due to unable to contact patient.      Care Summary Note: Attempted to make contact with Jovon for an initial follow up call post discharge from the hospital without success.  Unable to leave a message regarding intent of call and call back information.  Will discharge from CTN services at this time due to inability to make contact.      Assessments:  Care Transitions Subsequent and Final Call    Subsequent and Final Calls  Care Transitions Interventions  Other Interventions:              Upcoming Appointments:    Future Appointments         Provider Specialty Dept Phone    5/6/2025 1:40 PM [facility] [phone]    5/19/2025 3:45 PM Arcadio Johnson MD Cardiology [phone]    6/4/2025 9:20 AM Juan C Dumont MD Internal Medicine [phone]    9/30/2025 11:30 AM Arcadio Johnson MD Cardiology [phone]    10/8/2025 1:40 PM Rafael Christy MD Pulmonology [phone]            Shireen Brito RN

## 2025-05-02 ENCOUNTER — CARE COORDINATION (OUTPATIENT)
Dept: CARE COORDINATION | Age: 65
End: 2025-05-02

## 2025-05-02 NOTE — CARE COORDINATION
Ambulatory Care Coordination Note     2025 3:51 PM     Patient Current Location:  Home: [address]   [address]  [place] [address]     This patient was received as a referral from Provider.    ACM contacted the patient by telephone. Verified name and  with patient as identifiers. Provided introduction to self, and explanation of the ACM role.   Patient declined care management services at this time.          ACM: Veronica Abel RN     Challenges to be reviewed by the provider   Additional needs identified to be addressed with provider No  none               Method of communication with provider: none.    Utilization: Initial Call - N/A    Care Summary Note: ACM outreached patient to [facility] CM support services.  Pt stated he manages his CHC fine and no need for CM support.        PCP/Specialist follow up:   Future Appointments         Provider Specialty Dept Phone    2025 1:40 PM [facility] [phone]    2025 3:45 PM Arcadio Johnson MD Cardiology [phone]    2025 9:20 AM Juan C Dumont MD Internal Medicine [phone]    2025 11:30 AM Arcadio Johnson MD Cardiology [phone]    10/8/2025 1:40 PM Rafael Christy MD Pulmonology [phone]            Follow Up:   No further Ambulatory Care Management follow-up scheduled at this time.  Patient  has Ambulatory Care Manager's contact information for any further questions, concerns or needs.           Veronica DUDLEY, RN     Ambulatory Care Manager    [facility]    Phone: [phone]    [email or web address]

## 2025-05-06 ENCOUNTER — ANTI-COAG VISIT (OUTPATIENT)
Dept: PHARMACY | Age: 65
End: 2025-05-06
Attending: INTERNAL MEDICINE
Payer: MEDICARE

## 2025-05-06 DIAGNOSIS — Z79.01 CHRONIC ANTICOAGULATION: ICD-10-CM

## 2025-05-06 DIAGNOSIS — Z86.711 HISTORY OF PULMONARY EMBOLISM: Primary | ICD-10-CM

## 2025-05-06 LAB
INTERNATIONAL NORMALIZATION RATIO, POC: 3.5
PROTHROMBIN TIME, POC: 0

## 2025-05-06 PROCEDURE — 99212 OFFICE O/P EST SF 10 MIN: CPT | Performed by: SPEECH-LANGUAGE PATHOLOGIST

## 2025-05-06 PROCEDURE — 85610 PROTHROMBIN TIME: CPT | Performed by: SPEECH-LANGUAGE PATHOLOGIST

## 2025-05-06 NOTE — PROGRESS NOTES
Jovon Javier is a 64 y.o. here for warfarin management.  Jovon had an INR test today. Results were reviewed and appropriate warfarin management was completed.     Patient verifies current warfarin dosing regimen: Yes     Warfarin medication reviewed and updated on the patient 's home medication list: Yes   All other medications reviewed and updated on the patient 's home medication list: No new medications     Lab Results   Component Value Date    INR 3.5 05/06/2025    INR 1.9 04/22/2025    INR 2.57 (H) 04/17/2025         Anticoagulation Summary  As of 5/6/2025      INR goal:  2.0-3.0   TTR:  36.2% (9.1 y)   INR used for dosing:  3.5 (5/6/2025)   Warfarin maintenance plan:  2.5 mg (5 mg x 0.5) every Tue, Thu, Sat; 5 mg (5 mg x 1) all other days   Weekly warfarin total:  27.5 mg   Plan last modified:  Cathie Frost RPH (4/22/2025)   Next INR check:  --   Priority:  Maintenance   Target end date:  Indefinite    Indications    History of pulmonary embolism [Z86.711]  Chronic anticoagulation [Z79.01]                 Anticoagulation Episode Summary       INR check location:  Anticoagulation Clinic    Preferred lab:  --    Send INR reminders to:  [facility]    Comments:  EPIC - finger stick every 2-3 weeks  Consent: 3/12/25          Anticoagulation Care Providers       Provider Role Specialty Phone number    TimJuan C MD Referring Internal Medicine [phone]          There were no vitals taken for this visit.    Warfarin assessment / plan:     Appears well  Supra-therapeutic INR.  Denies signs and symptoms of bleeding/bruising.  Denies medication changes.  Denies extra warfarin doses.  Denies increased alcohol intake.  Denies change in appetite.  Denies illness, fever, vomiting or diarrhea.  Denies recent hospitalization / ED visit  Denies decrease in vitamin K intake.    INR 3.5 today, was 1.9 2 weeks ago  Hold dose today (5/6) then continue same regimen of warfarin 5mg daily

## 2025-05-09 ENCOUNTER — CLINICAL DOCUMENTATION (OUTPATIENT)
Dept: OTHER | Facility: CLINIC | Age: 65
End: 2025-05-09

## 2025-05-09 ENCOUNTER — TELEPHONE (OUTPATIENT)
Dept: CARDIOLOGY CLINIC | Age: 65
End: 2025-05-09

## 2025-05-09 NOTE — TELEPHONE ENCOUNTER
Jovon called the office to relay his BP medication is not agreeing with him and he would like to discuss alternate options.     Please advise as he didn't go into detail.    Jovon's callback: [phone]

## 2025-05-09 NOTE — TELEPHONE ENCOUNTER
Last night pt was lightheaded and felt like he was going to pass out and had to sit down  He does not like how medication makes him feel he is dizzy when standing since starting Midodrine    Pt would like a response before end of day.  Pt was also seen in ED today for a head injury    Blood Pressure Problems:    Is your BP too low - Hypotension (Low BP)?     106/85- yesterday  90/77- last night  Or too high - Hypertension (High BP)?    127/85  What are your BP readings within the last week?    Pt average 116/68  What blood pressure medications are you taking and when?    (dosage and frequency)   Metoprolol 25mg  Midodrine 5mg  Entresto 24-26mg    What symptoms are you experiencing?  Headaches? yes    Dizziness? (All the time or with standing/changing positions?)  mostly sitting sitting he is fie    Have you passed out? No    Chest Pain? No    Difficulty breathing? No    Blurred vision? No    Anxiety?n No    Are you switching positions slowly? No    Are you spacing out your medications? Yes    Please review all medications - there are other medications that could be contributing besides just BP medications (example, pain medications).    What other medications are you taking?

## 2025-05-09 NOTE — TELEPHONE ENCOUNTER
Lvm for pt regarding below.         Blood Pressure Problems:    Is your BP too low - Hypotension (Low BP)?       Or too high - Hypertension (High BP)?      What are your BP readings within the last week?      What blood pressure medications are you taking and when?    (dosage and frequency)     What symptoms are you experiencing?  Headaches?    Dizziness? (All the time or with standing/changing positions?)     Have you passed out?    Chest Pain?     Difficulty breathing?    Blurred vision?    Anxiety?    Are you switching positions slowly?    Are you spacing out your medications?    Please review all medications - there are other medications that could be contributing besides just BP medications (example, pain medications).    What other medications are you taking?                            Rendering Text In Billing: The biopsy specimen was grossed and processed into a slide.

## 2025-05-12 ENCOUNTER — TELEPHONE (OUTPATIENT)
Dept: CARDIOLOGY CLINIC | Age: 65
End: 2025-05-12

## 2025-05-12 NOTE — TELEPHONE ENCOUNTER
Pt called in needing a refill on his Midodrine. Called pharmacy pt has 3 refills left. Pt is aware

## 2025-05-15 PROBLEM — R79.89 ELEVATED TROPONIN: Status: RESOLVED | Noted: 2025-04-15 | Resolved: 2025-05-15

## 2025-05-19 ENCOUNTER — ANTI-COAG VISIT (OUTPATIENT)
Dept: PHARMACY | Age: 65
End: 2025-05-19
Attending: INTERNAL MEDICINE
Payer: MEDICARE

## 2025-05-19 ENCOUNTER — OFFICE VISIT (OUTPATIENT)
Dept: CARDIOLOGY CLINIC | Age: 65
End: 2025-05-19
Payer: MEDICARE

## 2025-05-19 VITALS
WEIGHT: 190 LBS | HEIGHT: 66 IN | DIASTOLIC BLOOD PRESSURE: 66 MMHG | HEART RATE: 76 BPM | SYSTOLIC BLOOD PRESSURE: 116 MMHG | OXYGEN SATURATION: 95 % | BODY MASS INDEX: 30.53 KG/M2

## 2025-05-19 DIAGNOSIS — I10 ESSENTIAL HYPERTENSION: Primary | ICD-10-CM

## 2025-05-19 DIAGNOSIS — Z79.01 CHRONIC ANTICOAGULATION: ICD-10-CM

## 2025-05-19 DIAGNOSIS — Z86.711 HISTORY OF PULMONARY EMBOLISM: Primary | ICD-10-CM

## 2025-05-19 LAB
INTERNATIONAL NORMALIZATION RATIO, POC: 4.9
PROTHROMBIN TIME, POC: 0

## 2025-05-19 PROCEDURE — 99214 OFFICE O/P EST MOD 30 MIN: CPT | Performed by: INTERNAL MEDICINE

## 2025-05-19 PROCEDURE — 3078F DIAST BP <80 MM HG: CPT | Performed by: INTERNAL MEDICINE

## 2025-05-19 PROCEDURE — 85610 PROTHROMBIN TIME: CPT

## 2025-05-19 PROCEDURE — 1123F ACP DISCUSS/DSCN MKR DOCD: CPT | Performed by: INTERNAL MEDICINE

## 2025-05-19 PROCEDURE — 3074F SYST BP LT 130 MM HG: CPT | Performed by: INTERNAL MEDICINE

## 2025-05-19 PROCEDURE — 99212 OFFICE O/P EST SF 10 MIN: CPT

## 2025-05-19 RX ORDER — FUROSEMIDE 20 MG/1
10 TABLET ORAL PRN
Qty: 45 TABLET | Refills: 3 | Status: SHIPPED | OUTPATIENT
Start: 2025-05-19

## 2025-05-19 NOTE — PROGRESS NOTES
Exhibits no organomegaly, mass or bruit.   Extremities: No edema. No cyanosis or clubbing. Pulses are 2+ radial and carotid bilaterally.  Neurological: No gross cranial nerve deficit. Coordination normal.   Skin: Skin is warm and dry. There is no rash or diaphoresis.   Psychiatric: Patient has a normal mood and affect. Speech is normal and behavior is normal.     Lab Review:   Lab Results   Component Value Date/Time    TRIG 197 02/04/2025 11:46 AM    HDL 40 02/04/2025 11:46 AM    HDL 65 11/10/2011 06:05 AM       Lab Results   Component Value Date/Time    BUN 22 04/17/2025 04:52 AM    CREATININE 1.2 04/17/2025 04:52 AM     EKG Interpretation:   2/14/18: sinus rhythm with LBBB  11/13/19: Sinus rhythm LBBB  2/28/20: Sinus rhythm LBBB  2/25/21: SR with LBBB.   7/21/21: Sinus  Rhythm with Left bundle branch block.   2/1/24 Undtermined rhythm,possible sinus tachycardia, -Left bundle branch block and left axis.   4/23/24 sinus rhythm       Image Review:     ECHO 11/28/17  Summary   Normal left ventricular wall thickness. Ejection fraction is visually   estimated to be 40-45%.   There is septal hypokinesis (secondary to Pacing)   Trivial mitral & tricuspid regurgitation is present.   The left atrial size is normal.   Pacer / ICD wire is visualized in the right ventricle.   There appears to be normal right ventricular size and function.    Echo: 7/24/17  Left ventricle size is normal. Mild concentric left ventricular hypertrophy  is present. Global ejection fraction is moderately decreased and estimated  from 30 % to 35%. Diastolic filling parameters suggests grade I diastolic  dysfunction .  There is abnormal (paradoxical) septal motion consistent with left bundle  branch block.  Mitral valve is structurally normal.  Trivial to mild mitral regurgitation is present.  The left atrial size is normal.  A bubble study was performed and fails to show evidence of right to left  shunting.     Echo: 11/9/16  Dilated LV with severely

## 2025-05-19 NOTE — PROGRESS NOTES
PREVNAR 13, (age 6w+), IM, 0.5mL 2015    Pneumococcal, PPSV23, PNEUMOVAX 23, (age 2y+), SC/IM, 0.5mL 2007, 2012    TDaP, ADACEL (age 10y-64y), BOOSTRIX (age 10y+), IM, 0.5mL 2014       Orders Placed This Encounter   Procedures    POCT INR     This order was created through the anticoagulation tracking navigator section.      No orders of the defined types were placed in this encounter.     Reviewed AVS with patient / caregiver.    Billing Points:  Adjust dosage and/or reconcile meds (fill pill box) </= 5 medications - 2 points  BASIC ASSESSMENT UNCOMPLICATED (including but not limited to: vital signs; physical assessment screening; review of secondary markers like lab results, allergies, home readings; instructions on treatment plan and basic education; assessment of medication list with one med change and compliance) - 2 points     For Pharmacy Admin Tracking Only    Intervention Detail: Adherence Monitorin and Dose Adjustment: 1, reason: Therapy De-escalation  Total # of Interventions Recommended: 1  Total # of Interventions Accepted: 1  Time Spent (min): 15

## 2025-06-02 ENCOUNTER — ANTI-COAG VISIT (OUTPATIENT)
Dept: PHARMACY | Age: 65
End: 2025-06-02
Attending: INTERNAL MEDICINE
Payer: MEDICARE

## 2025-06-02 ENCOUNTER — APPOINTMENT (OUTPATIENT)
Dept: PHARMACY | Age: 65
End: 2025-06-02
Attending: INTERNAL MEDICINE
Payer: MEDICARE

## 2025-06-02 DIAGNOSIS — Z86.711 HISTORY OF PULMONARY EMBOLISM: Primary | ICD-10-CM

## 2025-06-02 DIAGNOSIS — Z79.01 CHRONIC ANTICOAGULATION: ICD-10-CM

## 2025-06-02 LAB
INTERNATIONAL NORMALIZATION RATIO, POC: 2.1
PROTHROMBIN TIME, POC: 0

## 2025-06-02 PROCEDURE — 99211 OFF/OP EST MAY X REQ PHY/QHP: CPT

## 2025-06-02 PROCEDURE — 85610 PROTHROMBIN TIME: CPT

## 2025-06-02 NOTE — PROGRESS NOTES
Jovon Javier is a 64 y.o. here for warfarin management.  Jovon had an INR test today. Results were reviewed and appropriate warfarin management was completed.     Patient verifies current warfarin dosing regimen: Yes     Warfarin medication reviewed and updated on the patient 's home medication list: Yes   All other medications reviewed and updated on the patient 's home medication list: Yes     Lab Results   Component Value Date    INR 2.1 2025    INR 4.9 2025    INR 3.5 2025       Anticoagulation Summary  As of 2025      INR goal:  2.0-3.0   TTR:  36.1% (9.2 y)   INR used for dosin.1 (2025)   Warfarin maintenance plan:  5 mg (5 mg x 1) every Mon, Fri; 2.5 mg (5 mg x 0.5) all other days   Weekly warfarin total:  22.5 mg   Plan last modified:  Veronica De La Cruz RPH (2025)   Next INR check:  2025   Priority:  Maintenance   Target end date:  Indefinite    Indications    History of pulmonary embolism [Z86.711]  Chronic anticoagulation [Z79.01]                 Anticoagulation Episode Summary       INR check location:  Anticoagulation Clinic    Preferred lab:  --    Send INR reminders to:  [facility]    Comments:  EPIC - finger stick every 2-3 weeks  Consent: 3/12/25          Anticoagulation Care Providers       Provider Role Specialty Phone number    LacicharliJuan C jenkins MD Referring Internal Medicine [phone]          There were no vitals taken for this visit.    Warfarin assessment / plan:     Patient appears well today.      No changes affecting warfarin therapy were noted.   No acute findings with regards to warfarin therapy.  INR today is within goal range.     Instructed to continue the same weekly warfarin dose.    See in 2 weeks    Description    Continue  Warfarin 2.5mg daily except 5mg every Monday and Friday    Call [phone] with signs or symptoms of bleeding or ANY medication changes (including over-the-counter medications or herbal

## 2025-06-18 ENCOUNTER — ANTI-COAG VISIT (OUTPATIENT)
Dept: PHARMACY | Age: 65
End: 2025-06-18
Attending: INTERNAL MEDICINE
Payer: MEDICARE

## 2025-06-18 DIAGNOSIS — Z79.01 CHRONIC ANTICOAGULATION: ICD-10-CM

## 2025-06-18 DIAGNOSIS — Z86.711 HISTORY OF PULMONARY EMBOLISM: Primary | ICD-10-CM

## 2025-06-18 LAB
INR BLD: 1.8
PROTIME: NORMAL

## 2025-06-18 PROCEDURE — 99212 OFFICE O/P EST SF 10 MIN: CPT | Performed by: PHARMACIST

## 2025-06-18 PROCEDURE — 85610 PROTHROMBIN TIME: CPT | Performed by: PHARMACIST

## 2025-06-18 NOTE — PROGRESS NOTES
Jovon Javier is a 64 y.o. here for warfarin management.  Jovon had an INR test today. Results were reviewed and appropriate warfarin management was completed.     Patient verifies current warfarin dosing regimen: Yes     Warfarin medication reviewed and updated on the patient 's home medication list: Yes   All other medications reviewed and updated on the patient 's home medication list: No new medication     Lab Results   Component Value Date    INR 1.80 2025    INR 2.1 2025    INR 4.9 2025     Patient Findings       Negatives:  Signs/symptoms of thrombosis, Signs/symptoms of bleeding, Missed doses, Change in medications, Change in diet/appetite, Bruising          Anticoagulation Summary  As of 2025      INR goal:  2.0-3.0   TTR:  36.0% (9.2 y)   INR used for dosin.80 (2025)   Warfarin maintenance plan:  5 mg (5 mg x 1) every Mon, Fri; 2.5 mg (5 mg x 0.5) all other days   Weekly warfarin total:  22.5 mg   Plan last modified:  Veronica De La Cruz [facility] (2025)   Next INR check:  2025   Priority:  Maintenance   Target end date:  Indefinite    Indications    History of pulmonary embolism [Z86.711]  Chronic anticoagulation [Z79.01]                 Anticoagulation Episode Summary       INR check location:  Anticoagulation Clinic    Preferred lab:  --    Send INR reminders to:  [facility]    Comments:  EPIC   Consent: 3/12/25          Anticoagulation Care Providers       Provider Role Specialty Phone number    TimJuan C MD Referring Internal Medicine [phone]          There were no vitals taken for this visit.    Warfarin assessment / plan:     Appears well  Sub-therapeutic INR     Denies increased vitamin K intake.  Denies medication changes.     Missed Warfarin dose(s) on the following dates: 6/10.    INR below goal = 1.8    Instructed him to take 5 mg of warfarin today and then continue warfarin 2.5mg daily except 5mg every Monday and Friday.

## 2025-06-22 PROBLEM — G62.9 NEUROPATHY: Status: ACTIVE | Noted: 2025-06-22

## 2025-07-09 ENCOUNTER — APPOINTMENT (OUTPATIENT)
Dept: PHARMACY | Age: 65
End: 2025-07-09
Attending: INTERNAL MEDICINE
Payer: MEDICARE

## 2025-07-10 PROBLEM — R55 NEAR SYNCOPE: Status: RESOLVED | Noted: 2023-06-14 | Resolved: 2025-07-10

## 2025-07-10 PROBLEM — R19.5 DARK STOOLS: Status: RESOLVED | Noted: 2023-10-05 | Resolved: 2025-07-10

## 2025-07-10 PROBLEM — R06.02 SOB (SHORTNESS OF BREATH): Status: RESOLVED | Noted: 2017-01-30 | Resolved: 2025-07-10

## 2025-07-10 PROBLEM — N23 URETERAL COLIC: Status: RESOLVED | Noted: 2021-02-03 | Resolved: 2025-07-10

## 2025-07-10 PROBLEM — R10.32 COLICKY LLQ ABDOMINAL PAIN: Status: RESOLVED | Noted: 2018-12-26 | Resolved: 2025-07-10

## 2025-07-10 PROBLEM — H57.9 ITCHY EYES: Status: RESOLVED | Noted: 2025-04-02 | Resolved: 2025-07-10

## 2025-07-10 PROBLEM — N20.0 CALCULOUS PYELONEPHRITIS: Status: RESOLVED | Noted: 2022-04-24 | Resolved: 2025-07-10

## 2025-07-10 PROBLEM — K85.90 PANCREATITIS, UNSPECIFIED PANCREATITIS TYPE: Status: RESOLVED | Noted: 2023-07-28 | Resolved: 2025-07-10

## 2025-07-10 PROBLEM — E83.52 HYPERCALCEMIA: Status: RESOLVED | Noted: 2017-12-14 | Resolved: 2025-07-10

## 2025-07-10 PROBLEM — R50.9 FEVER: Status: RESOLVED | Noted: 2019-05-24 | Resolved: 2025-07-10

## 2025-07-10 PROBLEM — R25.1 EPISODE OF SHAKING: Status: RESOLVED | Noted: 2019-12-06 | Resolved: 2025-07-10

## 2025-07-10 PROBLEM — M79.652 PAIN OF LEFT THIGH: Status: RESOLVED | Noted: 2023-11-22 | Resolved: 2025-07-10

## 2025-07-10 PROBLEM — H92.09 OTALGIA: Status: RESOLVED | Noted: 2020-08-18 | Resolved: 2025-07-10

## 2025-07-10 PROBLEM — M79.603 ARM PAIN: Status: RESOLVED | Noted: 2019-04-03 | Resolved: 2025-07-10

## 2025-07-10 PROBLEM — J44.1 COPD EXACERBATION (HCC): Status: RESOLVED | Noted: 2025-04-14 | Resolved: 2025-07-10

## 2025-07-10 PROBLEM — R10.9 ACUTE RIGHT FLANK PAIN: Status: RESOLVED | Noted: 2018-12-25 | Resolved: 2025-07-10

## 2025-07-10 PROBLEM — N28.1 RENAL CYST: Status: RESOLVED | Noted: 2024-01-11 | Resolved: 2025-07-10

## 2025-07-10 PROBLEM — R09.82 POST-NASAL DRIP: Status: RESOLVED | Noted: 2020-02-02 | Resolved: 2025-07-10

## 2025-07-10 PROBLEM — R10.13 EPIGASTRIC PAIN: Status: RESOLVED | Noted: 2024-02-02 | Resolved: 2025-07-10

## 2025-07-10 PROBLEM — M79.89 SWELLING OF CALF: Status: RESOLVED | Noted: 2021-01-27 | Resolved: 2025-07-10

## 2025-07-10 PROBLEM — H53.8 BLURRY VISION: Status: RESOLVED | Noted: 2025-03-19 | Resolved: 2025-07-10

## 2025-07-10 PROBLEM — R09.1 PLEURISY: Status: RESOLVED | Noted: 2020-10-02 | Resolved: 2025-07-10

## 2025-07-10 PROBLEM — R07.9 CHEST PAIN: Status: RESOLVED | Noted: 2021-04-25 | Resolved: 2025-07-10

## 2025-07-10 PROBLEM — N28.1 RENAL CYST, RIGHT: Status: RESOLVED | Noted: 2024-01-01 | Resolved: 2025-07-10

## 2025-07-10 PROBLEM — Z45.02 ENCOUNTER FOR ADJUSTMENT OF CARDIAC RESYNCHRONIZATION THERAPY DEFIBRILLATOR (CRT-D): Status: RESOLVED | Noted: 2017-10-11 | Resolved: 2025-07-10

## 2025-07-10 PROBLEM — N39.0 ACUTE UTI: Status: RESOLVED | Noted: 2018-04-14 | Resolved: 2025-07-10

## 2025-07-10 PROBLEM — F05 ACUTE CONFUSIONAL STATE: Status: RESOLVED | Noted: 2017-08-22 | Resolved: 2025-07-10

## 2025-07-10 PROBLEM — R20.0 NUMBNESS IN FEET: Status: RESOLVED | Noted: 2023-03-24 | Resolved: 2025-07-10

## 2025-07-10 PROBLEM — R04.0 EPISTAXIS: Status: RESOLVED | Noted: 2021-04-30 | Resolved: 2025-07-10

## 2025-07-10 PROBLEM — R73.09 ABNORMAL GLUCOSE: Status: RESOLVED | Noted: 2024-03-15 | Resolved: 2025-07-10

## 2025-07-10 PROBLEM — R22.0 SCALP LUMP: Status: RESOLVED | Noted: 2023-07-06 | Resolved: 2025-07-10

## 2025-07-10 PROBLEM — M54.2 NECK PAIN: Status: RESOLVED | Noted: 2023-04-14 | Resolved: 2025-07-10

## 2025-07-10 PROBLEM — M79.672 LEFT FOOT PAIN: Status: RESOLVED | Noted: 2020-02-19 | Resolved: 2025-07-10

## 2025-07-10 PROBLEM — R30.0 DYSURIA: Status: RESOLVED | Noted: 2019-04-26 | Resolved: 2025-07-10

## 2025-07-10 PROBLEM — S37.019A PERINEPHRIC HEMATOMA: Status: RESOLVED | Noted: 2018-04-05 | Resolved: 2025-07-10
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
## 2025-07-10 PROBLEM — R10.9 ACUTE ABDOMINAL PAIN: Status: RESOLVED | Noted: 2018-12-25 | Resolved: 2025-07-10

## 2025-07-10 PROBLEM — S30.1XXA ABDOMINAL WALL HEMATOMA: Status: RESOLVED | Noted: 2018-04-14 | Resolved: 2025-07-10

## 2025-07-10 PROBLEM — K42.9 UMBILICAL HERNIA WITHOUT OBSTRUCTION AND WITHOUT GANGRENE: Status: RESOLVED | Noted: 2023-05-26 | Resolved: 2025-07-10

## 2025-07-10 PROBLEM — R29.898 RIGHT ARM WEAKNESS: Status: RESOLVED | Noted: 2017-08-14 | Resolved: 2025-07-10

## 2025-07-10 PROBLEM — Z79.01 ON WARFARIN THERAPY: Status: RESOLVED | Noted: 2018-01-03 | Resolved: 2025-07-10

## 2025-07-10 PROBLEM — R11.0 NAUSEA: Status: RESOLVED | Noted: 2017-04-21 | Resolved: 2025-07-10

## 2025-07-10 PROBLEM — K12.1 STOMATITIS: Status: RESOLVED | Noted: 2023-09-28 | Resolved: 2025-07-10

## 2025-07-10 PROBLEM — R31.9 HEMATURIA: Status: RESOLVED | Noted: 2024-06-20 | Resolved: 2025-07-10

## 2025-07-10 PROBLEM — M54.31 SCIATICA OF RIGHT SIDE: Status: RESOLVED | Noted: 2023-04-14 | Resolved: 2025-07-10

## 2025-07-10 PROBLEM — L98.9 SKIN LESIONS: Status: RESOLVED | Noted: 2020-08-03 | Resolved: 2025-07-10

## 2025-07-10 PROBLEM — R11.2 INTRACTABLE VOMITING WITH NAUSEA: Status: RESOLVED | Noted: 2018-12-25 | Resolved: 2025-07-10

## 2025-07-10 PROBLEM — R68.83 CHILLS: Status: RESOLVED | Noted: 2021-01-11 | Resolved: 2025-07-10

## 2025-07-10 PROBLEM — Q62.11 STENOSIS OF URETEROPELVIC JUNCTION (UPJ): Status: RESOLVED | Noted: 2018-12-25 | Resolved: 2025-07-10

## 2025-07-10 PROBLEM — R00.2 PALPITATIONS: Status: RESOLVED | Noted: 2023-06-15 | Resolved: 2025-07-10

## 2025-07-10 PROBLEM — M79.661 RIGHT CALF PAIN: Status: RESOLVED | Noted: 2022-04-19 | Resolved: 2025-07-10

## 2025-07-10 PROBLEM — M70.61 TROCHANTERIC BURSITIS OF RIGHT HIP: Status: RESOLVED | Noted: 2023-11-22 | Resolved: 2025-07-10

## 2025-07-10 PROBLEM — J84.09: Status: RESOLVED | Noted: 2022-05-12 | Resolved: 2025-07-10

## 2025-07-10 PROBLEM — I50.9 CONGESTIVE HEART FAILURE (HCC): Status: RESOLVED | Noted: 2025-03-27 | Resolved: 2025-07-10

## 2025-07-10 PROBLEM — I95.9 HYPOTENSION: Status: RESOLVED | Noted: 2025-04-15 | Resolved: 2025-07-10

## 2025-07-10 PROBLEM — M89.8X1 PAIN IN SCAPULA: Status: RESOLVED | Noted: 2020-06-26 | Resolved: 2025-07-10

## 2025-07-10 PROBLEM — J01.00 ACUTE NON-RECURRENT MAXILLARY SINUSITIS: Status: RESOLVED | Noted: 2017-01-04 | Resolved: 2025-07-10

## 2025-07-14 PROCEDURE — 93297 REM INTERROG DEV EVAL ICPMS: CPT | Performed by: NURSE PRACTITIONER

## 2025-07-18 ENCOUNTER — TELEPHONE (OUTPATIENT)
Dept: PHARMACY | Facility: CLINIC | Age: 65
End: 2025-07-18

## 2025-07-18 NOTE — TELEPHONE ENCOUNTER
[facility]– [place] CLINICAL PHARMACY: ADHERENCE REVIEW  Identified care gap per [employer]: fills at [facility]: Statin adherence    Patient also appears to be prescribed: Entresto, Metformin XR    ASSESSMENT  STATIN ADHERENCE    Insurance Records claims through 25 (Prior Year PDC = not reported; YTD PDC = 59%; Potential Fail Date: 25):   Pravastatin 40mg last filled on 25 for 30 day supply. Next refill due:     Prescribed si tablet/capsule daily    Per Reconcile Dispense History: last filled on 7/15/25 for 30 day supply.     Lab Results   Component Value Date    CHOL 161 2025    TRIG 221 (H) 2025    HDL 36 (L) 2025     Lab Results   Component Value Date    LDL 81 2025      ALT   Date Value Ref Range Status   2025 22 10 - 40 U/L Final     AST   Date Value Ref Range Status   2025 22 15 - 37 U/L Final     The 10-year ASCVD risk score (Salas DK, et al., 2019) is: 25.3%    Values used to calculate the score:      Age: 65 years      Sex: Male      Is Non- : No      Diabetic: Yes      Tobacco smoker: No      Systolic Blood Pressure: 116 mmHg      Is BP treated: Yes      HDL Cholesterol: 36 mg/dL      Total Cholesterol: 161 mg/dL     PLAN  The following are interventions that have been identified:   Patient looks to be taking medication as prescribed.  Spoke with him earlier this year and confirmed. He uses a local independent pharmacy. Have been unsuccessful reaching out/working with them in the past so will not outreach.  I highly suspect that he is paying cash for some fills despite insurance likely covering at no cost.    Attempted to reach patient to discuss, but unable to get through or leave a message. Will send a mychart and sign off as it looks like he is filling medication on time    Recent Visits  Date Type Provider Dept   07/10/25 Office Visit Hernando Howell MD [facility]   25 Office Visit Randee Ma PA

## 2025-07-21 ENCOUNTER — ANTI-COAG VISIT (OUTPATIENT)
Dept: PHARMACY | Age: 65
End: 2025-07-21
Attending: INTERNAL MEDICINE
Payer: MEDICARE

## 2025-07-21 DIAGNOSIS — Z86.711 HISTORY OF PULMONARY EMBOLISM: Primary | ICD-10-CM

## 2025-07-21 DIAGNOSIS — Z79.01 CHRONIC ANTICOAGULATION: ICD-10-CM

## 2025-07-21 LAB
INTERNATIONAL NORMALIZATION RATIO, POC: 3.3
PROTHROMBIN TIME, POC: 0

## 2025-07-21 PROCEDURE — 85610 PROTHROMBIN TIME: CPT

## 2025-07-21 PROCEDURE — 99211 OFF/OP EST MAY X REQ PHY/QHP: CPT

## 2025-07-21 NOTE — PROGRESS NOTES
Jovon Javier is a 65 y.o. here for warfarin management.  Jovon had an INR test today. Results were reviewed and appropriate warfarin management was completed.     Patient verifies current warfarin dosing regimen: Yes     Warfarin medication reviewed and updated on the patient 's home medication list: Yes   All other medications reviewed and updated on the patient 's home medication list: No: No changes     Lab Results   Component Value Date    INR 3.3 07/21/2025    INR 1.80 06/18/2025    INR 2.1 06/02/2025     Patient Findings       Negatives:  Signs/symptoms of bleeding, Missed doses, Change in medications, Change in diet/appetite, Bruising          Anticoagulation Summary  As of 7/21/2025      INR goal:  2.0-3.0   TTR:  36.4% (9.3 y)   INR used for dosing:  3.3 (7/21/2025)   Warfarin maintenance plan:  5 mg (5 mg x 1) every Mon, Fri; 2.5 mg (5 mg x 0.5) all other days   Weekly warfarin total:  22.5 mg   Plan last modified:  Veronica De La Cruz RPH (5/19/2025)   Next INR check:  8/11/2025   Priority:  Maintenance   Target end date:  Indefinite    Indications    History of pulmonary embolism [Z86.711]  Chronic anticoagulation [Z79.01]                 Anticoagulation Episode Summary       INR check location:  Anticoagulation Clinic    Preferred lab:  --    Send INR reminders to:  [facility]    Comments:  EPIC   Consent: 3/12/25          Anticoagulation Care Providers       Provider Role Specialty Phone number    TimJuan C MD Referring Internal Medicine [phone]          There were no vitals taken for this visit.    Warfarin assessment / plan:     Appears well  Supra-therapeutic INR.     Denies signs and symptoms of bleeding/bruising.  Denies medication changes.  Denies illness, fever, vomiting or diarrhea.  Denies decrease in vitamin K intake.     Patient INR was high today. Since patient already took his dose before visit, decided to hold patient dose tomorrow and continue regimen

## 2025-07-22 PROBLEM — H57.89 EYE IRRITATION: Status: ACTIVE | Noted: 2025-07-22

## 2025-08-11 ENCOUNTER — APPOINTMENT (OUTPATIENT)
Dept: PHARMACY | Age: 65
End: 2025-08-11
Attending: INTERNAL MEDICINE
Payer: MEDICARE

## 2025-08-13 ENCOUNTER — CLINICAL SUPPORT (OUTPATIENT)
Dept: CARDIOLOGY CLINIC | Age: 65
End: 2025-08-13

## 2025-08-13 ENCOUNTER — ANTI-COAG VISIT (OUTPATIENT)
Dept: PHARMACY | Age: 65
End: 2025-08-13
Attending: INTERNAL MEDICINE
Payer: MEDICARE

## 2025-08-13 DIAGNOSIS — Z95.810 BIVENTRICULAR ICD (IMPLANTABLE CARDIOVERTER-DEFIBRILLATOR) IN PLACE: Primary | ICD-10-CM

## 2025-08-13 DIAGNOSIS — Z86.711 HISTORY OF PULMONARY EMBOLISM: Primary | ICD-10-CM

## 2025-08-13 DIAGNOSIS — Z79.01 CHRONIC ANTICOAGULATION: ICD-10-CM

## 2025-08-13 DIAGNOSIS — I42.9 CARDIOMYOPATHY, UNSPECIFIED TYPE (HCC): ICD-10-CM

## 2025-08-13 DIAGNOSIS — I44.7 LBBB (LEFT BUNDLE BRANCH BLOCK): ICD-10-CM

## 2025-08-13 LAB
INR BLD: 2.8
PROTIME: NORMAL

## 2025-08-13 PROCEDURE — 85610 PROTHROMBIN TIME: CPT

## 2025-08-13 PROCEDURE — 99211 OFF/OP EST MAY X REQ PHY/QHP: CPT

## 2025-08-14 PROCEDURE — 93297 REM INTERROG DEV EVAL ICPMS: CPT | Performed by: NURSE PRACTITIONER

## 2025-08-18 ENCOUNTER — TELEPHONE (OUTPATIENT)
Dept: CARDIOLOGY CLINIC | Age: 65
End: 2025-08-18

## 2025-08-18 ENCOUNTER — CLINICAL SUPPORT (OUTPATIENT)
Dept: CARDIOLOGY CLINIC | Age: 65
End: 2025-08-18

## 2025-08-24 ENCOUNTER — APPOINTMENT (OUTPATIENT)
Dept: GENERAL RADIOLOGY | Age: 65
DRG: 178 | End: 2025-08-24
Payer: MEDICARE

## 2025-08-24 ENCOUNTER — HOSPITAL ENCOUNTER (INPATIENT)
Age: 65
LOS: 2 days | Discharge: HOME | DRG: 178 | End: 2025-08-26
Admitting: STUDENT IN AN ORGANIZED HEALTH CARE EDUCATION/TRAINING PROGRAM
Payer: MEDICARE

## 2025-08-24 DIAGNOSIS — R00.0 SINUS TACHYCARDIA: ICD-10-CM

## 2025-08-24 DIAGNOSIS — U07.1 COVID-19 VIRUS INFECTION: Primary | ICD-10-CM

## 2025-08-24 LAB
ALBUMIN SERPL-MCNC: 3.9 G/DL (ref 3.4–5)
ALBUMIN/GLOB SERPL: 1.5 {RATIO} (ref 1.1–2.2)
ALP SERPL-CCNC: 121 U/L (ref 40–129)
ALT SERPL-CCNC: 59 U/L (ref 10–40)
ANION GAP SERPL CALCULATED.3IONS-SCNC: 12 MMOL/L (ref 3–16)
AST SERPL-CCNC: 53 U/L (ref 15–37)
BACTERIA URNS QL MICRO: ABNORMAL /HPF
BASE EXCESS BLDV CALC-SCNC: 1.5 MMOL/L
BASOPHILS # BLD: 0.1 K/UL (ref 0–0.2)
BASOPHILS NFR BLD: 0.8 %
BILIRUB SERPL-MCNC: 0.5 MG/DL (ref 0–1)
BILIRUB UR QL STRIP.AUTO: NEGATIVE
BUN SERPL-MCNC: 8 MG/DL (ref 7–20)
CALCIUM SERPL-MCNC: 10.6 MG/DL (ref 8.3–10.6)
CHLORIDE SERPL-SCNC: 104 MMOL/L (ref 99–110)
CLARITY UR: CLEAR
CO2 BLDV-SCNC: 28 MMOL/L
CO2 SERPL-SCNC: 23 MMOL/L (ref 21–32)
COHGB MFR BLDV: 1.5 %
COLOR UR: YELLOW
CREAT SERPL-MCNC: 1.3 MG/DL (ref 0.8–1.3)
DEPRECATED RDW RBC AUTO: 16.7 % (ref 12.4–15.4)
EOSINOPHIL # BLD: 0 K/UL (ref 0–0.6)
EOSINOPHIL NFR BLD: 0.1 %
EPI CELLS #/AREA URNS AUTO: 0 /HPF (ref 0–5)
FLUAV + FLUBV AG NOSE IA.RAPID: NOT DETECTED
FLUAV + FLUBV AG NOSE IA.RAPID: NOT DETECTED
GFR SERPLBLD CREATININE-BSD FMLA CKD-EPI: 61 ML/MIN/{1.73_M2}
GLUCOSE BLD-MCNC: 103 MG/DL (ref 70–99)
GLUCOSE BLD-MCNC: 116 MG/DL (ref 70–99)
GLUCOSE BLD-MCNC: 76 MG/DL (ref 70–99)
GLUCOSE SERPL-MCNC: 142 MG/DL (ref 70–99)
GLUCOSE UR STRIP.AUTO-MCNC: NEGATIVE MG/DL
HCO3 BLDV-SCNC: 27 MMOL/L (ref 23–29)
HCT VFR BLD AUTO: 51.6 % (ref 40.5–52.5)
HGB BLD-MCNC: 16.8 G/DL (ref 13.5–17.5)
HGB UR QL STRIP.AUTO: ABNORMAL
HYALINE CASTS #/AREA URNS AUTO: 2 /LPF (ref 0–8)
INR PPP: 2.87 (ref 0.86–1.14)
KETONES UR STRIP.AUTO-MCNC: NEGATIVE MG/DL
LACTATE BLDV-SCNC: 1.3 MMOL/L (ref 0.4–2)
LEUKOCYTE ESTERASE UR QL STRIP.AUTO: NEGATIVE
LYMPHOCYTES # BLD: 0.7 K/UL (ref 1–5.1)
LYMPHOCYTES NFR BLD: 10.5 %
MCH RBC QN AUTO: 27.8 PG (ref 26–34)
MCHC RBC AUTO-ENTMCNC: 32.6 G/DL (ref 31–36)
MCV RBC AUTO: 85.2 FL (ref 80–100)
METHGB MFR BLDV: 0.9 %
MONOCYTES # BLD: 0.8 K/UL (ref 0–1.3)
MONOCYTES NFR BLD: 12.4 %
NEUTROPHILS # BLD: 4.9 K/UL (ref 1.7–7.7)
NEUTROPHILS NFR BLD: 76.2 %
NITRITE UR QL STRIP.AUTO: NEGATIVE
NT-PROBNP SERPL-MCNC: 205 PG/ML (ref 0–124)
O2 THERAPY: NORMAL
PCO2 BLDV: 44.4 MMHG (ref 40–50)
PERFORMED ON: ABNORMAL
PERFORMED ON: ABNORMAL
PERFORMED ON: NORMAL
PH BLDV: 7.39 [PH] (ref 7.35–7.45)
PH UR STRIP.AUTO: 7.5 [PH] (ref 5–8)
PLATELET # BLD AUTO: 147 K/UL (ref 135–450)
PMV BLD AUTO: 8 FL (ref 5–10.5)
PO2 BLDV: 35 MMHG
POTASSIUM SERPL-SCNC: 3.9 MMOL/L (ref 3.5–5.1)
PROT SERPL-MCNC: 6.5 G/DL (ref 6.4–8.2)
PROT UR STRIP.AUTO-MCNC: NEGATIVE MG/DL
PROTHROMBIN TIME: 29.5 SEC (ref 12.1–14.9)
RBC # BLD AUTO: 6.06 M/UL (ref 4.2–5.9)
RBC CLUMPS #/AREA URNS AUTO: 9 /HPF (ref 0–4)
REASON FOR REJECTION: NORMAL
REJECTED TEST: NORMAL
SAO2 % BLDV: 67 %
SARS-COV-2 RDRP RESP QL NAA+PROBE: DETECTED
SODIUM SERPL-SCNC: 139 MMOL/L (ref 136–145)
SP GR UR STRIP.AUTO: 1.01 (ref 1–1.03)
TROPONIN, HIGH SENSITIVITY: 27 NG/L (ref 0–22)
TROPONIN, HIGH SENSITIVITY: 32 NG/L (ref 0–22)
UA COMPLETE W REFLEX CULTURE PNL UR: ABNORMAL
UA DIPSTICK W REFLEX MICRO PNL UR: YES
URN SPEC COLLECT METH UR: ABNORMAL
UROBILINOGEN UR STRIP-ACNC: 1 E.U./DL
WBC # BLD AUTO: 6.4 K/UL (ref 4–11)
WBC #/AREA URNS AUTO: 1 /HPF (ref 0–5)

## 2025-08-24 PROCEDURE — 6360000002 HC RX W HCPCS: Performed by: PHYSICIAN ASSISTANT

## 2025-08-24 PROCEDURE — 2500000003 HC RX 250 WO HCPCS: Performed by: STUDENT IN AN ORGANIZED HEALTH CARE EDUCATION/TRAINING PROGRAM

## 2025-08-24 PROCEDURE — 83605 ASSAY OF LACTIC ACID: CPT

## 2025-08-24 PROCEDURE — 2580000003 HC RX 258: Performed by: PHYSICIAN ASSISTANT

## 2025-08-24 PROCEDURE — 84484 ASSAY OF TROPONIN QUANT: CPT

## 2025-08-24 PROCEDURE — 87502 INFLUENZA DNA AMP PROBE: CPT

## 2025-08-24 PROCEDURE — 85025 COMPLETE CBC W/AUTO DIFF WBC: CPT

## 2025-08-24 PROCEDURE — 82803 BLOOD GASES ANY COMBINATION: CPT

## 2025-08-24 PROCEDURE — 93005 ELECTROCARDIOGRAM TRACING: CPT

## 2025-08-24 PROCEDURE — 99285 EMERGENCY DEPT VISIT HI MDM: CPT

## 2025-08-24 PROCEDURE — 96375 TX/PRO/DX INJ NEW DRUG ADDON: CPT

## 2025-08-24 PROCEDURE — 1200000000 HC SEMI PRIVATE

## 2025-08-24 PROCEDURE — 94760 N-INVAS EAR/PLS OXIMETRY 1: CPT

## 2025-08-24 PROCEDURE — 81001 URINALYSIS AUTO W/SCOPE: CPT

## 2025-08-24 PROCEDURE — 83880 ASSAY OF NATRIURETIC PEPTIDE: CPT

## 2025-08-24 PROCEDURE — 6370000000 HC RX 637 (ALT 250 FOR IP): Performed by: INTERNAL MEDICINE

## 2025-08-24 PROCEDURE — 6370000000 HC RX 637 (ALT 250 FOR IP): Performed by: STUDENT IN AN ORGANIZED HEALTH CARE EDUCATION/TRAINING PROGRAM

## 2025-08-24 PROCEDURE — 96366 THER/PROPH/DIAG IV INF ADDON: CPT

## 2025-08-24 PROCEDURE — 6370000000 HC RX 637 (ALT 250 FOR IP): Performed by: PHYSICIAN ASSISTANT

## 2025-08-24 PROCEDURE — 85610 PROTHROMBIN TIME: CPT

## 2025-08-24 PROCEDURE — 6360000002 HC RX W HCPCS: Performed by: STUDENT IN AN ORGANIZED HEALTH CARE EDUCATION/TRAINING PROGRAM

## 2025-08-24 PROCEDURE — 80053 COMPREHEN METABOLIC PANEL: CPT

## 2025-08-24 PROCEDURE — 96365 THER/PROPH/DIAG IV INF INIT: CPT

## 2025-08-24 PROCEDURE — 87635 SARS-COV-2 COVID-19 AMP PRB: CPT

## 2025-08-24 PROCEDURE — 87040 BLOOD CULTURE FOR BACTERIA: CPT

## 2025-08-24 PROCEDURE — 2500000003 HC RX 250 WO HCPCS: Performed by: PHYSICIAN ASSISTANT

## 2025-08-24 PROCEDURE — 36415 COLL VENOUS BLD VENIPUNCTURE: CPT

## 2025-08-24 PROCEDURE — 71045 X-RAY EXAM CHEST 1 VIEW: CPT

## 2025-08-24 PROCEDURE — 99222 1ST HOSP IP/OBS MODERATE 55: CPT | Performed by: STUDENT IN AN ORGANIZED HEALTH CARE EDUCATION/TRAINING PROGRAM

## 2025-08-24 RX ORDER — SODIUM CHLORIDE 0.9 % (FLUSH) 0.9 %
5-40 SYRINGE (ML) INJECTION EVERY 12 HOURS SCHEDULED
Status: DISCONTINUED | OUTPATIENT
Start: 2025-08-24 | End: 2025-08-26 | Stop reason: HOSPADM

## 2025-08-24 RX ORDER — METFORMIN HYDROCHLORIDE 500 MG/1
500 TABLET, EXTENDED RELEASE ORAL
Status: DISCONTINUED | OUTPATIENT
Start: 2025-08-25 | End: 2025-08-26 | Stop reason: HOSPADM

## 2025-08-24 RX ORDER — SODIUM CHLORIDE FOR INHALATION 3 %
4 VIAL, NEBULIZER (ML) INHALATION PRN
Status: DISCONTINUED | OUTPATIENT
Start: 2025-08-24 | End: 2025-08-26 | Stop reason: HOSPADM

## 2025-08-24 RX ORDER — SODIUM CHLORIDE 9 MG/ML
INJECTION, SOLUTION INTRAVENOUS PRN
Status: DISCONTINUED | OUTPATIENT
Start: 2025-08-24 | End: 2025-08-26 | Stop reason: HOSPADM

## 2025-08-24 RX ORDER — ASPIRIN 81 MG/1
81 TABLET, CHEWABLE ORAL DAILY
Status: DISCONTINUED | OUTPATIENT
Start: 2025-08-24 | End: 2025-08-26 | Stop reason: HOSPADM

## 2025-08-24 RX ORDER — ALENDRONATE SODIUM 35 MG/1
70 TABLET ORAL
Status: DISCONTINUED | OUTPATIENT
Start: 2025-08-24 | End: 2025-08-24

## 2025-08-24 RX ORDER — POTASSIUM CHLORIDE 7.45 MG/ML
10 INJECTION INTRAVENOUS PRN
Status: DISCONTINUED | OUTPATIENT
Start: 2025-08-24 | End: 2025-08-26 | Stop reason: HOSPADM

## 2025-08-24 RX ORDER — PREGABALIN 75 MG/1
75 CAPSULE ORAL EVERY MORNING
Status: DISCONTINUED | OUTPATIENT
Start: 2025-08-24 | End: 2025-08-26 | Stop reason: HOSPADM

## 2025-08-24 RX ORDER — SODIUM CHLORIDE 0.9 % (FLUSH) 0.9 %
5-40 SYRINGE (ML) INJECTION PRN
Status: DISCONTINUED | OUTPATIENT
Start: 2025-08-24 | End: 2025-08-26 | Stop reason: HOSPADM

## 2025-08-24 RX ORDER — ALBUTEROL SULFATE 0.83 MG/ML
2.5 SOLUTION RESPIRATORY (INHALATION)
Status: DISCONTINUED | OUTPATIENT
Start: 2025-08-24 | End: 2025-08-24

## 2025-08-24 RX ORDER — GLUCAGON 1 MG/ML
1 KIT INJECTION PRN
Status: DISCONTINUED | OUTPATIENT
Start: 2025-08-24 | End: 2025-08-26 | Stop reason: HOSPADM

## 2025-08-24 RX ORDER — SACUBITRIL AND VALSARTAN 24; 26 MG/1; MG/1
0.5 TABLET ORAL 2 TIMES DAILY
Status: DISCONTINUED | OUTPATIENT
Start: 2025-08-24 | End: 2025-08-24

## 2025-08-24 RX ORDER — PREGABALIN 75 MG/1
75 CAPSULE ORAL 2 TIMES DAILY
Status: DISCONTINUED | OUTPATIENT
Start: 2025-08-24 | End: 2025-08-24 | Stop reason: DRUGHIGH

## 2025-08-24 RX ORDER — ONDANSETRON 2 MG/ML
4 INJECTION INTRAMUSCULAR; INTRAVENOUS EVERY 6 HOURS PRN
Status: DISCONTINUED | OUTPATIENT
Start: 2025-08-24 | End: 2025-08-26 | Stop reason: HOSPADM

## 2025-08-24 RX ORDER — SODIUM CHLORIDE 9 MG/ML
INJECTION, SOLUTION INTRAVENOUS PRN
Status: DISCONTINUED | OUTPATIENT
Start: 2025-08-24 | End: 2025-08-25 | Stop reason: SDUPTHER

## 2025-08-24 RX ORDER — INSULIN LISPRO 100 [IU]/ML
0-4 INJECTION, SOLUTION INTRAVENOUS; SUBCUTANEOUS
Status: DISCONTINUED | OUTPATIENT
Start: 2025-08-24 | End: 2025-08-26 | Stop reason: HOSPADM

## 2025-08-24 RX ORDER — FLAVOXATE HYDROCHLORIDE 100 MG/1
100 TABLET ORAL 4 TIMES DAILY PRN
Status: DISCONTINUED | OUTPATIENT
Start: 2025-08-24 | End: 2025-08-24

## 2025-08-24 RX ORDER — DEXTROSE MONOHYDRATE 100 MG/ML
INJECTION, SOLUTION INTRAVENOUS CONTINUOUS PRN
Status: DISCONTINUED | OUTPATIENT
Start: 2025-08-24 | End: 2025-08-26 | Stop reason: HOSPADM

## 2025-08-24 RX ORDER — PRAVASTATIN SODIUM 40 MG
40 TABLET ORAL NIGHTLY
Status: DISCONTINUED | OUTPATIENT
Start: 2025-08-24 | End: 2025-08-26 | Stop reason: HOSPADM

## 2025-08-24 RX ORDER — ONDANSETRON 4 MG/1
4 TABLET, ORALLY DISINTEGRATING ORAL EVERY 8 HOURS PRN
Status: DISCONTINUED | OUTPATIENT
Start: 2025-08-24 | End: 2025-08-26 | Stop reason: HOSPADM

## 2025-08-24 RX ORDER — ACETAMINOPHEN 325 MG/1
650 TABLET ORAL EVERY 6 HOURS PRN
Status: DISCONTINUED | OUTPATIENT
Start: 2025-08-24 | End: 2025-08-26 | Stop reason: HOSPADM

## 2025-08-24 RX ORDER — ONDANSETRON 2 MG/ML
4 INJECTION INTRAMUSCULAR; INTRAVENOUS ONCE
Status: COMPLETED | OUTPATIENT
Start: 2025-08-24 | End: 2025-08-24

## 2025-08-24 RX ORDER — TAMSULOSIN HYDROCHLORIDE 0.4 MG/1
0.4 CAPSULE ORAL 2 TIMES DAILY
Status: DISCONTINUED | OUTPATIENT
Start: 2025-08-24 | End: 2025-08-26 | Stop reason: HOSPADM

## 2025-08-24 RX ORDER — SACUBITRIL AND VALSARTAN 24; 26 MG/1; MG/1
0.5 TABLET ORAL DAILY
Status: DISCONTINUED | OUTPATIENT
Start: 2025-08-25 | End: 2025-08-26 | Stop reason: HOSPADM

## 2025-08-24 RX ORDER — PREDNISONE 20 MG/1
40 TABLET ORAL DAILY
Status: DISCONTINUED | OUTPATIENT
Start: 2025-08-24 | End: 2025-08-24

## 2025-08-24 RX ORDER — FERROUS SULFATE 325(65) MG
325 TABLET ORAL EVERY OTHER DAY
Status: DISCONTINUED | OUTPATIENT
Start: 2025-08-25 | End: 2025-08-25

## 2025-08-24 RX ORDER — MIDODRINE HYDROCHLORIDE 5 MG/1
5 TABLET ORAL
Status: DISCONTINUED | OUTPATIENT
Start: 2025-08-24 | End: 2025-08-26 | Stop reason: HOSPADM

## 2025-08-24 RX ORDER — MAGNESIUM SULFATE IN WATER 40 MG/ML
2000 INJECTION, SOLUTION INTRAVENOUS PRN
Status: DISCONTINUED | OUTPATIENT
Start: 2025-08-24 | End: 2025-08-26 | Stop reason: HOSPADM

## 2025-08-24 RX ORDER — ALBUTEROL SULFATE 0.83 MG/ML
2.5 SOLUTION RESPIRATORY (INHALATION) EVERY 4 HOURS PRN
Status: DISCONTINUED | OUTPATIENT
Start: 2025-08-24 | End: 2025-08-26 | Stop reason: HOSPADM

## 2025-08-24 RX ORDER — POLYETHYLENE GLYCOL 3350 17 G/17G
17 POWDER, FOR SOLUTION ORAL DAILY PRN
Status: DISCONTINUED | OUTPATIENT
Start: 2025-08-24 | End: 2025-08-26 | Stop reason: HOSPADM

## 2025-08-24 RX ORDER — ENOXAPARIN SODIUM 100 MG/ML
40 INJECTION SUBCUTANEOUS DAILY
Status: DISCONTINUED | OUTPATIENT
Start: 2025-08-25 | End: 2025-08-24

## 2025-08-24 RX ORDER — PREGABALIN 75 MG/1
150 CAPSULE ORAL NIGHTLY
Status: DISCONTINUED | OUTPATIENT
Start: 2025-08-24 | End: 2025-08-26 | Stop reason: HOSPADM

## 2025-08-24 RX ORDER — ACETAMINOPHEN 650 MG/1
650 SUPPOSITORY RECTAL EVERY 6 HOURS PRN
Status: DISCONTINUED | OUTPATIENT
Start: 2025-08-24 | End: 2025-08-26 | Stop reason: HOSPADM

## 2025-08-24 RX ORDER — ACETAMINOPHEN 500 MG
1000 TABLET ORAL ONCE
Status: COMPLETED | OUTPATIENT
Start: 2025-08-24 | End: 2025-08-24

## 2025-08-24 RX ORDER — LEVOFLOXACIN 5 MG/ML
750 INJECTION, SOLUTION INTRAVENOUS ONCE
Status: COMPLETED | OUTPATIENT
Start: 2025-08-24 | End: 2025-08-24

## 2025-08-24 RX ORDER — BENZONATATE 100 MG/1
100 CAPSULE ORAL 3 TIMES DAILY PRN
Status: DISCONTINUED | OUTPATIENT
Start: 2025-08-24 | End: 2025-08-26 | Stop reason: HOSPADM

## 2025-08-24 RX ORDER — METHYLPREDNISOLONE SODIUM SUCCINATE 40 MG/ML
40 INJECTION INTRAMUSCULAR; INTRAVENOUS DAILY
Status: DISCONTINUED | OUTPATIENT
Start: 2025-08-24 | End: 2025-08-25

## 2025-08-24 RX ORDER — WARFARIN SODIUM 5 MG/1
5 TABLET ORAL
Status: COMPLETED | OUTPATIENT
Start: 2025-08-24 | End: 2025-08-24

## 2025-08-24 RX ORDER — ZOLPIDEM TARTRATE 5 MG/1
10 TABLET ORAL NIGHTLY PRN
Status: DISCONTINUED | OUTPATIENT
Start: 2025-08-24 | End: 2025-08-26 | Stop reason: HOSPADM

## 2025-08-24 RX ORDER — KETOROLAC TROMETHAMINE 15 MG/ML
15 INJECTION, SOLUTION INTRAMUSCULAR; INTRAVENOUS ONCE
Status: COMPLETED | OUTPATIENT
Start: 2025-08-24 | End: 2025-08-24

## 2025-08-24 RX ORDER — OMEPRAZOLE 20 MG/1
40 CAPSULE, DELAYED RELEASE ORAL
Status: DISCONTINUED | OUTPATIENT
Start: 2025-08-25 | End: 2025-08-26 | Stop reason: HOSPADM

## 2025-08-24 RX ORDER — SODIUM CHLORIDE 0.9 % (FLUSH) 0.9 %
5-40 SYRINGE (ML) INJECTION PRN
Status: DISCONTINUED | OUTPATIENT
Start: 2025-08-24 | End: 2025-08-25 | Stop reason: SDUPTHER

## 2025-08-24 RX ORDER — OXYCODONE AND ACETAMINOPHEN 5; 325 MG/1; MG/1
1 TABLET ORAL EVERY 6 HOURS PRN
Refills: 0 | Status: DISCONTINUED | OUTPATIENT
Start: 2025-08-24 | End: 2025-08-26 | Stop reason: HOSPADM

## 2025-08-24 RX ORDER — METOPROLOL SUCCINATE 25 MG/1
25 TABLET, EXTENDED RELEASE ORAL 2 TIMES DAILY
Status: DISCONTINUED | OUTPATIENT
Start: 2025-08-24 | End: 2025-08-26 | Stop reason: HOSPADM

## 2025-08-24 RX ORDER — POTASSIUM CHLORIDE 1500 MG/1
40 TABLET, EXTENDED RELEASE ORAL PRN
Status: DISCONTINUED | OUTPATIENT
Start: 2025-08-24 | End: 2025-08-26 | Stop reason: HOSPADM

## 2025-08-24 RX ORDER — 0.9 % SODIUM CHLORIDE 0.9 %
30 INTRAVENOUS SOLUTION INTRAVENOUS ONCE
Status: COMPLETED | OUTPATIENT
Start: 2025-08-24 | End: 2025-08-24

## 2025-08-24 RX ORDER — CODEINE PHOSPHATE AND GUAIFENESIN 10; 100 MG/5ML; MG/5ML
5 SOLUTION ORAL 4 TIMES DAILY PRN
Status: DISCONTINUED | OUTPATIENT
Start: 2025-08-24 | End: 2025-08-24

## 2025-08-24 RX ORDER — GUAIFENESIN 200 MG/10ML
200 LIQUID ORAL EVERY 4 HOURS PRN
Status: DISCONTINUED | OUTPATIENT
Start: 2025-08-24 | End: 2025-08-25 | Stop reason: SDUPTHER

## 2025-08-24 RX ORDER — SODIUM CHLORIDE 0.9 % (FLUSH) 0.9 %
5-40 SYRINGE (ML) INJECTION EVERY 12 HOURS SCHEDULED
Status: DISCONTINUED | OUTPATIENT
Start: 2025-08-24 | End: 2025-08-25 | Stop reason: SDUPTHER

## 2025-08-24 RX ADMIN — SODIUM CHLORIDE, PRESERVATIVE FREE 10 ML: 5 INJECTION INTRAVENOUS at 08:27

## 2025-08-24 RX ADMIN — SODIUM CHLORIDE 2541 ML: 0.9 INJECTION, SOLUTION INTRAVENOUS at 08:15

## 2025-08-24 RX ADMIN — SODIUM CHLORIDE, PRESERVATIVE FREE 10 ML: 5 INJECTION INTRAVENOUS at 11:00

## 2025-08-24 RX ADMIN — ONDANSETRON 4 MG: 2 INJECTION, SOLUTION INTRAMUSCULAR; INTRAVENOUS at 08:13

## 2025-08-24 RX ADMIN — ASPIRIN 81 MG: 81 TABLET, CHEWABLE ORAL at 12:25

## 2025-08-24 RX ADMIN — METOPROLOL SUCCINATE 25 MG: 25 TABLET, EXTENDED RELEASE ORAL at 12:25

## 2025-08-24 RX ADMIN — PREGABALIN 75 MG: 75 CAPSULE ORAL at 12:25

## 2025-08-24 RX ADMIN — METOPROLOL SUCCINATE 25 MG: 25 TABLET, EXTENDED RELEASE ORAL at 20:53

## 2025-08-24 RX ADMIN — ACETAMINOPHEN 1000 MG: 500 TABLET ORAL at 08:13

## 2025-08-24 RX ADMIN — SODIUM CHLORIDE, PRESERVATIVE FREE 10 ML: 5 INJECTION INTRAVENOUS at 20:54

## 2025-08-24 RX ADMIN — ZOLPIDEM TARTRATE 10 MG: 5 TABLET, FILM COATED ORAL at 23:58

## 2025-08-24 RX ADMIN — POLYETHYLENE GLYCOL 3350 17 G: 17 POWDER, FOR SOLUTION ORAL at 21:06

## 2025-08-24 RX ADMIN — MIDODRINE HYDROCHLORIDE 5 MG: 5 TABLET ORAL at 12:25

## 2025-08-24 RX ADMIN — OXYCODONE AND ACETAMINOPHEN 1 TABLET: 5; 325 TABLET ORAL at 20:23

## 2025-08-24 RX ADMIN — PREGABALIN 150 MG: 75 CAPSULE ORAL at 20:53

## 2025-08-24 RX ADMIN — TAMSULOSIN HYDROCHLORIDE 0.4 MG: 0.4 CAPSULE ORAL at 20:53

## 2025-08-24 RX ADMIN — KETOROLAC TROMETHAMINE 15 MG: 15 INJECTION, SOLUTION INTRAMUSCULAR; INTRAVENOUS at 08:13

## 2025-08-24 RX ADMIN — MIDODRINE HYDROCHLORIDE 5 MG: 5 TABLET ORAL at 17:18

## 2025-08-24 RX ADMIN — WATER 1000 MG: 1 INJECTION INTRAMUSCULAR; INTRAVENOUS; SUBCUTANEOUS at 08:13

## 2025-08-24 RX ADMIN — LEVOFLOXACIN 750 MG: 5 INJECTION, SOLUTION INTRAVENOUS at 08:25

## 2025-08-24 RX ADMIN — HYDROMORPHONE HYDROCHLORIDE 0.5 MG: 1 INJECTION, SOLUTION INTRAMUSCULAR; INTRAVENOUS; SUBCUTANEOUS at 15:09

## 2025-08-24 RX ADMIN — PRAVASTATIN SODIUM 40 MG: 40 TABLET ORAL at 20:53

## 2025-08-24 RX ADMIN — WARFARIN SODIUM 5 MG: 5 TABLET ORAL at 17:24

## 2025-08-24 RX ADMIN — METHYLPREDNISOLONE SODIUM SUCCINATE 40 MG: 40 INJECTION INTRAMUSCULAR; INTRAVENOUS at 17:18

## 2025-08-24 ASSESSMENT — PAIN DESCRIPTION - ORIENTATION
ORIENTATION: MID
ORIENTATION: RIGHT
ORIENTATION: MID

## 2025-08-24 ASSESSMENT — PAIN - FUNCTIONAL ASSESSMENT
PAIN_FUNCTIONAL_ASSESSMENT: ACTIVITIES ARE NOT PREVENTED
PAIN_FUNCTIONAL_ASSESSMENT: ACTIVITIES ARE NOT PREVENTED
PAIN_FUNCTIONAL_ASSESSMENT: 0-10

## 2025-08-24 ASSESSMENT — PAIN SCALES - GENERAL
PAINLEVEL_OUTOF10: 7
PAINLEVEL_OUTOF10: 8
PAINLEVEL_OUTOF10: 8
PAINLEVEL_OUTOF10: 0
PAINLEVEL_OUTOF10: 8

## 2025-08-24 ASSESSMENT — PAIN DESCRIPTION - PAIN TYPE
TYPE: ACUTE PAIN
TYPE: ACUTE PAIN

## 2025-08-24 ASSESSMENT — PAIN DESCRIPTION - LOCATION
LOCATION: CHEST

## 2025-08-24 ASSESSMENT — PAIN DESCRIPTION - DESCRIPTORS
DESCRIPTORS: SHARP
DESCRIPTORS: PRESSURE

## 2025-08-24 ASSESSMENT — PAIN DESCRIPTION - ONSET: ONSET: ON-GOING

## 2025-08-24 ASSESSMENT — PAIN DESCRIPTION - FREQUENCY
FREQUENCY: CONTINUOUS
FREQUENCY: INTERMITTENT

## 2025-08-25 LAB
ANION GAP SERPL CALCULATED.3IONS-SCNC: 7 MMOL/L (ref 3–16)
BASOPHILS # BLD: 0 K/UL (ref 0–0.2)
BASOPHILS NFR BLD: 0.3 %
BUN SERPL-MCNC: 10 MG/DL (ref 7–20)
CALCIUM SERPL-MCNC: 10.3 MG/DL (ref 8.3–10.6)
CHLORIDE SERPL-SCNC: 105 MMOL/L (ref 99–110)
CO2 SERPL-SCNC: 23 MMOL/L (ref 21–32)
CREAT SERPL-MCNC: 1.1 MG/DL (ref 0.8–1.3)
DEPRECATED RDW RBC AUTO: 16.6 % (ref 12.4–15.4)
EKG ATRIAL RATE: 119 BPM
EKG DIAGNOSIS: NORMAL
EKG DIAGNOSIS: NORMAL
EKG P AXIS: 90 DEGREES
EKG Q-T INTERVAL: 350 MS
EKG Q-T INTERVAL: 368 MS
EKG QRS DURATION: 136 MS
EKG QRS DURATION: 148 MS
EKG QTC CALCULATION (BAZETT): 473 MS
EKG QTC CALCULATION (BAZETT): 498 MS
EKG R AXIS: -76 DEGREES
EKG R AXIS: -76 DEGREES
EKG T AXIS: 93 DEGREES
EKG T AXIS: 97 DEGREES
EKG VENTRICULAR RATE: 110 BPM
EKG VENTRICULAR RATE: 110 BPM
EOSINOPHIL # BLD: 0 K/UL (ref 0–0.6)
EOSINOPHIL NFR BLD: 0 %
GFR SERPLBLD CREATININE-BSD FMLA CKD-EPI: 74 ML/MIN/{1.73_M2}
GLUCOSE BLD-MCNC: 100 MG/DL (ref 70–99)
GLUCOSE BLD-MCNC: 144 MG/DL (ref 70–99)
GLUCOSE BLD-MCNC: 153 MG/DL (ref 70–99)
GLUCOSE BLD-MCNC: 156 MG/DL (ref 70–99)
GLUCOSE SERPL-MCNC: 118 MG/DL (ref 70–99)
HCT VFR BLD AUTO: 48.9 % (ref 40.5–52.5)
HGB BLD-MCNC: 15.9 G/DL (ref 13.5–17.5)
INR PPP: 2.25 (ref 0.86–1.14)
LYMPHOCYTES # BLD: 0.5 K/UL (ref 1–5.1)
LYMPHOCYTES NFR BLD: 21.1 %
MCH RBC QN AUTO: 27.7 PG (ref 26–34)
MCHC RBC AUTO-ENTMCNC: 32.5 G/DL (ref 31–36)
MCV RBC AUTO: 85.2 FL (ref 80–100)
MONOCYTES # BLD: 0.3 K/UL (ref 0–1.3)
MONOCYTES NFR BLD: 10 %
NEUTROPHILS # BLD: 1.8 K/UL (ref 1.7–7.7)
NEUTROPHILS NFR BLD: 68.6 %
PERFORMED ON: ABNORMAL
PLATELET # BLD AUTO: 131 K/UL (ref 135–450)
PMV BLD AUTO: 7.9 FL (ref 5–10.5)
POTASSIUM SERPL-SCNC: 5.4 MMOL/L (ref 3.5–5.1)
PROTHROMBIN TIME: 24.6 SEC (ref 12.1–14.9)
RBC # BLD AUTO: 5.74 M/UL (ref 4.2–5.9)
SODIUM SERPL-SCNC: 135 MMOL/L (ref 136–145)
WBC # BLD AUTO: 2.6 K/UL (ref 4–11)

## 2025-08-25 PROCEDURE — 85025 COMPLETE CBC W/AUTO DIFF WBC: CPT

## 2025-08-25 PROCEDURE — 1200000000 HC SEMI PRIVATE

## 2025-08-25 PROCEDURE — 80048 BASIC METABOLIC PNL TOTAL CA: CPT

## 2025-08-25 PROCEDURE — 36415 COLL VENOUS BLD VENIPUNCTURE: CPT

## 2025-08-25 PROCEDURE — 2700000000 HC OXYGEN THERAPY PER DAY

## 2025-08-25 PROCEDURE — 6360000002 HC RX W HCPCS: Performed by: INTERNAL MEDICINE

## 2025-08-25 PROCEDURE — 85610 PROTHROMBIN TIME: CPT

## 2025-08-25 PROCEDURE — 2500000003 HC RX 250 WO HCPCS: Performed by: STUDENT IN AN ORGANIZED HEALTH CARE EDUCATION/TRAINING PROGRAM

## 2025-08-25 PROCEDURE — 6370000000 HC RX 637 (ALT 250 FOR IP): Performed by: STUDENT IN AN ORGANIZED HEALTH CARE EDUCATION/TRAINING PROGRAM

## 2025-08-25 PROCEDURE — 6360000002 HC RX W HCPCS: Performed by: STUDENT IN AN ORGANIZED HEALTH CARE EDUCATION/TRAINING PROGRAM

## 2025-08-25 PROCEDURE — 94761 N-INVAS EAR/PLS OXIMETRY MLT: CPT

## 2025-08-25 PROCEDURE — 93010 ELECTROCARDIOGRAM REPORT: CPT | Performed by: INTERNAL MEDICINE

## 2025-08-25 PROCEDURE — 6370000000 HC RX 637 (ALT 250 FOR IP): Performed by: INTERNAL MEDICINE

## 2025-08-25 PROCEDURE — 99232 SBSQ HOSP IP/OBS MODERATE 35: CPT | Performed by: INTERNAL MEDICINE

## 2025-08-25 RX ORDER — CETIRIZINE HYDROCHLORIDE 10 MG/1
10 TABLET ORAL DAILY PRN
Status: DISCONTINUED | OUTPATIENT
Start: 2025-08-25 | End: 2025-08-26 | Stop reason: HOSPADM

## 2025-08-25 RX ORDER — DEXAMETHASONE SODIUM PHOSPHATE 10 MG/ML
6 INJECTION, SOLUTION INTRAMUSCULAR; INTRAVENOUS EVERY 24 HOURS
Status: DISCONTINUED | OUTPATIENT
Start: 2025-08-25 | End: 2025-08-26

## 2025-08-25 RX ORDER — WARFARIN SODIUM 5 MG/1
5 TABLET ORAL
Status: COMPLETED | OUTPATIENT
Start: 2025-08-25 | End: 2025-08-25

## 2025-08-25 RX ORDER — GUAIFENESIN 600 MG/1
600 TABLET, EXTENDED RELEASE ORAL 2 TIMES DAILY PRN
Status: DISCONTINUED | OUTPATIENT
Start: 2025-08-25 | End: 2025-08-26 | Stop reason: HOSPADM

## 2025-08-25 RX ORDER — FERROUS SULFATE 325(65) MG
325 TABLET ORAL EVERY OTHER DAY
Status: DISCONTINUED | OUTPATIENT
Start: 2025-08-25 | End: 2025-08-26 | Stop reason: HOSPADM

## 2025-08-25 RX ADMIN — DEXAMETHASONE SODIUM PHOSPHATE 6 MG: 10 INJECTION INTRAMUSCULAR; INTRAVENOUS at 08:53

## 2025-08-25 RX ADMIN — METFORMIN HYDROCHLORIDE 500 MG: 500 TABLET, EXTENDED RELEASE ORAL at 08:53

## 2025-08-25 RX ADMIN — SODIUM CHLORIDE, PRESERVATIVE FREE 10 ML: 5 INJECTION INTRAVENOUS at 09:01

## 2025-08-25 RX ADMIN — FERROUS SULFATE TAB 325 MG (65 MG ELEMENTAL FE) 325 MG: 325 (65 FE) TAB at 14:05

## 2025-08-25 RX ADMIN — ZOLPIDEM TARTRATE 10 MG: 5 TABLET, FILM COATED ORAL at 22:17

## 2025-08-25 RX ADMIN — SODIUM CHLORIDE, PRESERVATIVE FREE 10 ML: 5 INJECTION INTRAVENOUS at 09:02

## 2025-08-25 RX ADMIN — TAMSULOSIN HYDROCHLORIDE 0.4 MG: 0.4 CAPSULE ORAL at 21:27

## 2025-08-25 RX ADMIN — ONDANSETRON 4 MG: 2 INJECTION, SOLUTION INTRAMUSCULAR; INTRAVENOUS at 00:41

## 2025-08-25 RX ADMIN — PREGABALIN 75 MG: 75 CAPSULE ORAL at 08:53

## 2025-08-25 RX ADMIN — OXYCODONE AND ACETAMINOPHEN 1 TABLET: 5; 325 TABLET ORAL at 02:20

## 2025-08-25 RX ADMIN — OXYCODONE AND ACETAMINOPHEN 1 TABLET: 5; 325 TABLET ORAL at 15:04

## 2025-08-25 RX ADMIN — ONDANSETRON 4 MG: 2 INJECTION, SOLUTION INTRAMUSCULAR; INTRAVENOUS at 22:17

## 2025-08-25 RX ADMIN — OMEPRAZOLE 40 MG: 20 CAPSULE, DELAYED RELEASE ORAL at 06:35

## 2025-08-25 RX ADMIN — WARFARIN SODIUM 5 MG: 5 TABLET ORAL at 18:34

## 2025-08-25 RX ADMIN — TAMSULOSIN HYDROCHLORIDE 0.4 MG: 0.4 CAPSULE ORAL at 08:53

## 2025-08-25 RX ADMIN — ASPIRIN 81 MG: 81 TABLET, CHEWABLE ORAL at 08:53

## 2025-08-25 RX ADMIN — SODIUM CHLORIDE, PRESERVATIVE FREE 10 ML: 5 INJECTION INTRAVENOUS at 21:25

## 2025-08-25 RX ADMIN — METOPROLOL SUCCINATE 25 MG: 25 TABLET, EXTENDED RELEASE ORAL at 21:26

## 2025-08-25 RX ADMIN — GUAIFENESIN 600 MG: 600 TABLET ORAL at 22:17

## 2025-08-25 RX ADMIN — PREGABALIN 150 MG: 75 CAPSULE ORAL at 21:25

## 2025-08-25 RX ADMIN — SACUBITRIL AND VALSARTAN 0.5 TABLET: 24; 26 TABLET, FILM COATED ORAL at 14:05

## 2025-08-25 RX ADMIN — MIDODRINE HYDROCHLORIDE 5 MG: 5 TABLET ORAL at 11:48

## 2025-08-25 RX ADMIN — METOPROLOL SUCCINATE 25 MG: 25 TABLET, EXTENDED RELEASE ORAL at 08:53

## 2025-08-25 RX ADMIN — PRAVASTATIN SODIUM 40 MG: 40 TABLET ORAL at 21:26

## 2025-08-25 RX ADMIN — CETIRIZINE HYDROCHLORIDE 10 MG: 10 TABLET, FILM COATED ORAL at 21:26

## 2025-08-25 ASSESSMENT — PAIN DESCRIPTION - FREQUENCY
FREQUENCY: INTERMITTENT
FREQUENCY: INTERMITTENT

## 2025-08-25 ASSESSMENT — PAIN DESCRIPTION - LOCATION
LOCATION: CHEST
LOCATION: CHEST

## 2025-08-25 ASSESSMENT — PAIN - FUNCTIONAL ASSESSMENT
PAIN_FUNCTIONAL_ASSESSMENT: ACTIVITIES ARE NOT PREVENTED
PAIN_FUNCTIONAL_ASSESSMENT: ACTIVITIES ARE NOT PREVENTED
PAIN_FUNCTIONAL_ASSESSMENT: 0-10
PAIN_FUNCTIONAL_ASSESSMENT: 0-10

## 2025-08-25 ASSESSMENT — PAIN DESCRIPTION - DESCRIPTORS
DESCRIPTORS: ACHING;SORE
DESCRIPTORS: ACHING;SORE

## 2025-08-25 ASSESSMENT — PAIN DESCRIPTION - PAIN TYPE
TYPE: ACUTE PAIN
TYPE: ACUTE PAIN

## 2025-08-25 ASSESSMENT — PAIN DESCRIPTION - ONSET
ONSET: ON-GOING
ONSET: ON-GOING

## 2025-08-25 ASSESSMENT — PAIN SCALES - GENERAL
PAINLEVEL_OUTOF10: 0
PAINLEVEL_OUTOF10: 7
PAINLEVEL_OUTOF10: 0
PAINLEVEL_OUTOF10: 7
PAINLEVEL_OUTOF10: 0

## 2025-08-25 ASSESSMENT — PAIN DESCRIPTION - ORIENTATION
ORIENTATION: RIGHT
ORIENTATION: RIGHT

## 2025-08-26 VITALS
OXYGEN SATURATION: 97 % | SYSTOLIC BLOOD PRESSURE: 132 MMHG | DIASTOLIC BLOOD PRESSURE: 92 MMHG | HEIGHT: 66 IN | RESPIRATION RATE: 18 BRPM | HEART RATE: 58 BPM | BODY MASS INDEX: 30.86 KG/M2 | TEMPERATURE: 97.3 F | WEIGHT: 192.02 LBS

## 2025-08-26 LAB
ANION GAP SERPL CALCULATED.3IONS-SCNC: 7 MMOL/L (ref 3–16)
BASOPHILS # BLD: 0 K/UL (ref 0–0.2)
BASOPHILS NFR BLD: 0.1 %
BUN SERPL-MCNC: 12 MG/DL (ref 7–20)
CALCIUM SERPL-MCNC: 10.3 MG/DL (ref 8.3–10.6)
CHLORIDE SERPL-SCNC: 107 MMOL/L (ref 99–110)
CO2 SERPL-SCNC: 24 MMOL/L (ref 21–32)
CREAT SERPL-MCNC: 1.1 MG/DL (ref 0.8–1.3)
DEPRECATED RDW RBC AUTO: 16.3 % (ref 12.4–15.4)
EOSINOPHIL # BLD: 0 K/UL (ref 0–0.6)
EOSINOPHIL NFR BLD: 0 %
GFR SERPLBLD CREATININE-BSD FMLA CKD-EPI: 74 ML/MIN/{1.73_M2}
GLUCOSE BLD-MCNC: 134 MG/DL (ref 70–99)
GLUCOSE BLD-MCNC: 135 MG/DL (ref 70–99)
GLUCOSE SERPL-MCNC: 122 MG/DL (ref 70–99)
HCT VFR BLD AUTO: 48.1 % (ref 40.5–52.5)
HGB BLD-MCNC: 15.7 G/DL (ref 13.5–17.5)
INR PPP: 2.72 (ref 0.86–1.14)
LYMPHOCYTES # BLD: 0.8 K/UL (ref 1–5.1)
LYMPHOCYTES NFR BLD: 11.3 %
MCH RBC QN AUTO: 27.6 PG (ref 26–34)
MCHC RBC AUTO-ENTMCNC: 32.6 G/DL (ref 31–36)
MCV RBC AUTO: 84.9 FL (ref 80–100)
MONOCYTES # BLD: 0.5 K/UL (ref 0–1.3)
MONOCYTES NFR BLD: 7.7 %
NEUTROPHILS # BLD: 5.4 K/UL (ref 1.7–7.7)
NEUTROPHILS NFR BLD: 80.9 %
PERFORMED ON: ABNORMAL
PERFORMED ON: ABNORMAL
PLATELET # BLD AUTO: 144 K/UL (ref 135–450)
PMV BLD AUTO: 8 FL (ref 5–10.5)
POTASSIUM SERPL-SCNC: 4.9 MMOL/L (ref 3.5–5.1)
PROTHROMBIN TIME: 28.3 SEC (ref 12.1–14.9)
RBC # BLD AUTO: 5.67 M/UL (ref 4.2–5.9)
SODIUM SERPL-SCNC: 138 MMOL/L (ref 136–145)
WBC # BLD AUTO: 6.7 K/UL (ref 4–11)

## 2025-08-26 PROCEDURE — 2500000003 HC RX 250 WO HCPCS: Performed by: STUDENT IN AN ORGANIZED HEALTH CARE EDUCATION/TRAINING PROGRAM

## 2025-08-26 PROCEDURE — 85610 PROTHROMBIN TIME: CPT

## 2025-08-26 PROCEDURE — 99239 HOSP IP/OBS DSCHRG MGMT >30: CPT | Performed by: INTERNAL MEDICINE

## 2025-08-26 PROCEDURE — 80048 BASIC METABOLIC PNL TOTAL CA: CPT

## 2025-08-26 PROCEDURE — 6370000000 HC RX 637 (ALT 250 FOR IP): Performed by: STUDENT IN AN ORGANIZED HEALTH CARE EDUCATION/TRAINING PROGRAM

## 2025-08-26 PROCEDURE — 6360000002 HC RX W HCPCS: Performed by: INTERNAL MEDICINE

## 2025-08-26 PROCEDURE — 36415 COLL VENOUS BLD VENIPUNCTURE: CPT

## 2025-08-26 PROCEDURE — 6370000000 HC RX 637 (ALT 250 FOR IP): Performed by: INTERNAL MEDICINE

## 2025-08-26 PROCEDURE — 85025 COMPLETE CBC W/AUTO DIFF WBC: CPT

## 2025-08-26 RX ORDER — GUAIFENESIN 600 MG/1
600 TABLET, EXTENDED RELEASE ORAL 2 TIMES DAILY PRN
COMMUNITY
Start: 2025-08-26

## 2025-08-26 RX ORDER — DEXAMETHASONE 4 MG/1
6 TABLET ORAL DAILY
Status: DISCONTINUED | OUTPATIENT
Start: 2025-08-26 | End: 2025-08-26 | Stop reason: HOSPADM

## 2025-08-26 RX ORDER — DEXAMETHASONE 6 MG/1
6 TABLET ORAL
Qty: 2 TABLET | Refills: 0 | Status: SHIPPED | OUTPATIENT
Start: 2025-08-27 | End: 2025-08-29

## 2025-08-26 RX ORDER — BENZONATATE 100 MG/1
100 CAPSULE ORAL 3 TIMES DAILY PRN
Qty: 30 CAPSULE | Refills: 0 | Status: SHIPPED | OUTPATIENT
Start: 2025-08-26

## 2025-08-26 RX ORDER — WARFARIN SODIUM 2.5 MG/1
2.5 TABLET ORAL
Status: DISCONTINUED | OUTPATIENT
Start: 2025-08-26 | End: 2025-08-26 | Stop reason: HOSPADM

## 2025-08-26 RX ADMIN — OXYCODONE AND ACETAMINOPHEN 1 TABLET: 5; 325 TABLET ORAL at 01:42

## 2025-08-26 RX ADMIN — ASPIRIN 81 MG: 81 TABLET, CHEWABLE ORAL at 08:47

## 2025-08-26 RX ADMIN — DEXAMETHASONE 6 MG: 4 TABLET ORAL at 08:47

## 2025-08-26 RX ADMIN — MIDODRINE HYDROCHLORIDE 5 MG: 5 TABLET ORAL at 08:47

## 2025-08-26 RX ADMIN — PREGABALIN 75 MG: 75 CAPSULE ORAL at 08:47

## 2025-08-26 RX ADMIN — SODIUM CHLORIDE, PRESERVATIVE FREE 10 ML: 5 INJECTION INTRAVENOUS at 08:46

## 2025-08-26 RX ADMIN — METFORMIN HYDROCHLORIDE 500 MG: 500 TABLET, EXTENDED RELEASE ORAL at 08:47

## 2025-08-26 RX ADMIN — TAMSULOSIN HYDROCHLORIDE 0.4 MG: 0.4 CAPSULE ORAL at 08:47

## 2025-08-26 RX ADMIN — SACUBITRIL AND VALSARTAN 0.5 TABLET: 24; 26 TABLET, FILM COATED ORAL at 14:33

## 2025-08-26 RX ADMIN — POLYETHYLENE GLYCOL 3350 17 G: 17 POWDER, FOR SOLUTION ORAL at 02:11

## 2025-08-26 RX ADMIN — MIDODRINE HYDROCHLORIDE 5 MG: 5 TABLET ORAL at 12:40

## 2025-08-26 RX ADMIN — OMEPRAZOLE 40 MG: 20 CAPSULE, DELAYED RELEASE ORAL at 05:30

## 2025-08-26 ASSESSMENT — PAIN DESCRIPTION - ORIENTATION: ORIENTATION: MID

## 2025-08-26 ASSESSMENT — PAIN SCALES - GENERAL
PAINLEVEL_OUTOF10: 7
PAINLEVEL_OUTOF10: 0
PAINLEVEL_OUTOF10: 0

## 2025-08-26 ASSESSMENT — PAIN DESCRIPTION - LOCATION: LOCATION: CHEST

## 2025-08-26 ASSESSMENT — PAIN DESCRIPTION - DESCRIPTORS: DESCRIPTORS: DULL

## 2025-08-26 ASSESSMENT — PAIN DESCRIPTION - ONSET: ONSET: ON-GOING

## 2025-08-26 ASSESSMENT — PAIN DESCRIPTION - PAIN TYPE: TYPE: ACUTE PAIN

## 2025-08-26 ASSESSMENT — PAIN DESCRIPTION - FREQUENCY: FREQUENCY: CONTINUOUS

## 2025-08-27 ENCOUNTER — CARE COORDINATION (OUTPATIENT)
Dept: CARE COORDINATION | Age: 65
End: 2025-08-27

## 2025-08-28 ENCOUNTER — CARE COORDINATION (OUTPATIENT)
Dept: CARE COORDINATION | Age: 65
End: 2025-08-28

## 2025-08-28 LAB
BACTERIA BLD CULT ORG #2: NORMAL
BACTERIA BLD CULT: NORMAL

## (undated) DEVICE — Z INACTIVE USE 2635503 SOLUTION IRRIG 3000ML ST H2O USP UROMATIC PLAS CONT

## (undated) DEVICE — Z DISCONTINUED BY MEDLINE USE 2711682 TRAY SKIN PREP DRY W/ PREM GLV

## (undated) DEVICE — GLOVE SURG SZ 65 L12IN FNGR THK87MIL WHT LTX FREE

## (undated) DEVICE — PACK,CYSTOSCOPY,PK III,AURORA: Brand: MEDLINE

## (undated) DEVICE — SEAL ENDOSCP INSTR BX PRT FOR ACC UPTO 3FR

## (undated) DEVICE — SYRINGE MED 10ML POLYPR LUERSLIP TIP FLAT TOP W/O SFTY DISP

## (undated) DEVICE — SINGLE USE ASPIRATION NEEDLE NA-U401SX-4025N: Brand: SINGLE USE ASPIRATION NEEDLE

## (undated) DEVICE — FORMALIN CLEAR VIAL 20 ML 10%

## (undated) DEVICE — SOLUTION IV IRRIG WATER 1000ML POUR BRL 2F7114

## (undated) DEVICE — CONTAINER SPEC 480ML CLR POLYSTYR 10% NEUT BUFF FRMLN ZN

## (undated) DEVICE — GUIDEWIRE URO L150CM DIA0.035IN TAPR 8CM STR TIP STD SHFT

## (undated) DEVICE — SKIN MARKER REGULAR TIP WITH RULER CAP AND LABELS: Brand: DEVON

## (undated) DEVICE — Y-TYPE TUR/BLADDER IRRIGATION SET, REGULATING CLAMP

## (undated) DEVICE — TRAP POLYP ETRAP

## (undated) DEVICE — TOWEL,OR,DSP,ST,BLUE,STD,4/PK,20PK/CS: Brand: MEDLINE

## (undated) DEVICE — FORCEPS BX 240CM 2.4MM L NDL RAD JAW 4 M00513334

## (undated) DEVICE — ENDOSCOPY KIT: Brand: MEDLINE INDUSTRIES, INC.

## (undated) DEVICE — KIT OR ROOM TURNOVER W/STRAP

## (undated) DEVICE — SYRINGE MED 10ML LUERLOCK TIP W/O SFTY DISP

## (undated) DEVICE — Device

## (undated) DEVICE — SOLUTION SCRB 4OZ 10% POVIDONE IOD ANTIMIC BTL

## (undated) DEVICE — SOLUTION IRRIG 3000ML STRL H2O USP UROMATIC PLAS CONT

## (undated) DEVICE — 60 ML SYRINGE REGULAR TIP: Brand: MONOJECT

## (undated) DEVICE — OPEN-END FLEXI-TIP URETERAL CATHETER: Brand: FLEXI-TIP

## (undated) DEVICE — STERILE SURGICAL LUBRICANT, METAL TUBE: Brand: SURGILUBE

## (undated) DEVICE — SINGLE USE BIOPSY VALVE MAJ-210: Brand: SINGLE USE BIOPSY VALVE (STERILE)

## (undated) DEVICE — SNARE ENDOSCP L240CM SHTH DIA2.4MM LOOP W20MM MIN WRK CHN

## (undated) DEVICE — GOWN SIRUS NONREIN XL W/TWL: Brand: MEDLINE INDUSTRIES, INC.

## (undated) DEVICE — CYSTOSCOPY: Brand: MEDLINE INDUSTRIES, INC.

## (undated) DEVICE — BITE BLOCK ENDOSCP AD 60 FR W/ ADJ STRP PLAS GRN BLOX

## (undated) DEVICE — BAG URO DRN URO-CATCHER

## (undated) DEVICE — GLOVE ORANGE PI 8   MSG9080

## (undated) DEVICE — TRAP,MUCUS SPECIMEN, 80CC: Brand: MEDLINE

## (undated) DEVICE — MERCY HEALTH WEST TURNOVER: Brand: MEDLINE INDUSTRIES, INC.

## (undated) DEVICE — SINGLE USE SUCTION VALVE MAJ-209: Brand: SINGLE USE SUCTION VALVE (STERILE)

## (undated) DEVICE — CYSTO/BLADDER IRRIGATION SET, REGULATING CLAMP

## (undated) DEVICE — RETRIEVER ENDOSCP L230CM SHTH DIA2.5MM NET 3X6CM STD FOR

## (undated) DEVICE — TUR/ENDOSCOPIC CABLE, 10' (3.05 M): Brand: CONMED

## (undated) DEVICE — GLOVE SURG SZ 65 CRM LTX FREE POLYISOPRENE POLYMER BEAD ANTI

## (undated) DEVICE — BAG DRAINAGE FOR SIEMANS DORNIER